# Patient Record
Sex: FEMALE | Race: WHITE | Employment: OTHER | URBAN - NONMETROPOLITAN AREA
[De-identification: names, ages, dates, MRNs, and addresses within clinical notes are randomized per-mention and may not be internally consistent; named-entity substitution may affect disease eponyms.]

---

## 2017-01-05 ENCOUNTER — TELEPHONE (OUTPATIENT)
Dept: CARDIOLOGY | Age: 62
End: 2017-01-05

## 2017-01-05 ENCOUNTER — OFFICE VISIT (OUTPATIENT)
Dept: CARDIOLOGY | Age: 62
End: 2017-01-05
Payer: COMMERCIAL

## 2017-01-05 VITALS — DIASTOLIC BLOOD PRESSURE: 62 MMHG | HEART RATE: 68 BPM | SYSTOLIC BLOOD PRESSURE: 118 MMHG

## 2017-01-05 DIAGNOSIS — I48.92 ATRIAL FLUTTER, PAROXYSMAL (HCC): Primary | ICD-10-CM

## 2017-01-05 PROCEDURE — 93000 ELECTROCARDIOGRAM COMPLETE: CPT | Performed by: CLINICAL NURSE SPECIALIST

## 2017-01-31 ENCOUNTER — TELEPHONE (OUTPATIENT)
Dept: NEUROLOGY | Age: 62
End: 2017-01-31

## 2017-01-31 ENCOUNTER — OFFICE VISIT (OUTPATIENT)
Dept: NEUROLOGY | Age: 62
End: 2017-01-31
Payer: COMMERCIAL

## 2017-01-31 VITALS
HEIGHT: 67 IN | SYSTOLIC BLOOD PRESSURE: 110 MMHG | HEART RATE: 72 BPM | WEIGHT: 293 LBS | DIASTOLIC BLOOD PRESSURE: 80 MMHG | RESPIRATION RATE: 16 BRPM | BODY MASS INDEX: 45.99 KG/M2

## 2017-01-31 DIAGNOSIS — R06.83 SNORING: ICD-10-CM

## 2017-01-31 DIAGNOSIS — G25.81 RESTLESS LEG SYNDROME: ICD-10-CM

## 2017-01-31 DIAGNOSIS — G47.00 INSOMNIA, UNSPECIFIED TYPE: ICD-10-CM

## 2017-01-31 DIAGNOSIS — G47.33 OBSTRUCTIVE SLEEP APNEA (ADULT) (PEDIATRIC): Primary | ICD-10-CM

## 2017-01-31 DIAGNOSIS — R40.0 SOMNOLENCE, DAYTIME: ICD-10-CM

## 2017-01-31 PROCEDURE — 99203 OFFICE O/P NEW LOW 30 MIN: CPT | Performed by: PHYSICIAN ASSISTANT

## 2017-01-31 RX ORDER — GABAPENTIN 600 MG/1
600 TABLET ORAL 2 TIMES DAILY
COMMUNITY
End: 2017-04-27 | Stop reason: ALTCHOICE

## 2017-01-31 RX ORDER — FLUCONAZOLE 150 MG/1
150 TABLET ORAL PRN
COMMUNITY
End: 2017-11-15

## 2017-02-14 ENCOUNTER — HOSPITAL ENCOUNTER (OUTPATIENT)
Dept: GENERAL RADIOLOGY | Facility: HOSPITAL | Age: 62
Discharge: HOME | End: 2017-02-14
Admitting: SPECIALIST

## 2017-02-14 ENCOUNTER — APPOINTMENT (OUTPATIENT)
Dept: PREADMISSION TESTING | Facility: HOSPITAL | Age: 62
End: 2017-02-14

## 2017-02-14 VITALS
SYSTOLIC BLOOD PRESSURE: 122 MMHG | DIASTOLIC BLOOD PRESSURE: 66 MMHG | RESPIRATION RATE: 18 BRPM | OXYGEN SATURATION: 96 % | HEART RATE: 69 BPM | WEIGHT: 293 LBS | HEIGHT: 66 IN | BODY MASS INDEX: 47.09 KG/M2

## 2017-02-14 LAB
ANION GAP SERPL CALCULATED.3IONS-SCNC: 6 MMOL/L (ref 4–13)
BUN BLD-MCNC: 17 MG/DL (ref 5–21)
BUN/CREAT SERPL: 18.1 (ref 7–25)
CALCIUM SPEC-SCNC: 8.9 MG/DL (ref 8.4–10.4)
CHLORIDE SERPL-SCNC: 103 MMOL/L (ref 98–110)
CO2 SERPL-SCNC: 31 MMOL/L (ref 24–31)
CREAT BLD-MCNC: 0.94 MG/DL (ref 0.5–1.4)
DEPRECATED RDW RBC AUTO: 54.7 FL (ref 40–54)
ERYTHROCYTE [DISTWIDTH] IN BLOOD BY AUTOMATED COUNT: 16.6 % (ref 12–15)
GFR SERPL CREATININE-BSD FRML MDRD: 60 ML/MIN/1.73
GLUCOSE BLD-MCNC: 119 MG/DL (ref 70–100)
HCT VFR BLD AUTO: 40.4 % (ref 37–47)
HGB BLD-MCNC: 12.5 G/DL (ref 12–16)
MCH RBC QN AUTO: 28 PG (ref 28–32)
MCHC RBC AUTO-ENTMCNC: 30.9 G/DL (ref 33–36)
MCV RBC AUTO: 90.6 FL (ref 82–98)
PLATELET # BLD AUTO: 252 10*3/MM3 (ref 130–400)
PMV BLD AUTO: 11.6 FL (ref 6–12)
POTASSIUM BLD-SCNC: 4.1 MMOL/L (ref 3.5–5.3)
RBC # BLD AUTO: 4.46 10*6/MM3 (ref 4.2–5.4)
SODIUM BLD-SCNC: 140 MMOL/L (ref 135–145)
WBC NRBC COR # BLD: 8.15 10*3/MM3 (ref 4.8–10.8)

## 2017-02-14 PROCEDURE — 93010 ELECTROCARDIOGRAM REPORT: CPT | Performed by: INTERNAL MEDICINE

## 2017-02-14 RX ORDER — ROPINIROLE 3 MG/1
6 TABLET, FILM COATED ORAL 2 TIMES DAILY
COMMUNITY
End: 2021-10-14

## 2017-02-14 RX ORDER — TRIAMCINOLONE ACETONIDE 1 MG/G
CREAM TOPICAL 2 TIMES DAILY
COMMUNITY
End: 2019-01-04 | Stop reason: ALTCHOICE

## 2017-02-14 RX ORDER — FLECAINIDE ACETATE 100 MG/1
100 TABLET ORAL 2 TIMES DAILY
COMMUNITY
End: 2019-01-04 | Stop reason: SDUPTHER

## 2017-02-14 RX ORDER — FAMCICLOVIR 500 MG/1
500 TABLET ORAL 2 TIMES DAILY
COMMUNITY
End: 2019-10-11

## 2017-02-14 RX ORDER — LEVOTHYROXINE SODIUM 137 UG/1
137 TABLET ORAL EVERY MORNING
COMMUNITY
End: 2021-08-25 | Stop reason: SDUPTHER

## 2017-02-14 RX ORDER — FLUCONAZOLE 150 MG/1
150 TABLET ORAL ONCE
Status: ON HOLD | COMMUNITY
End: 2018-03-13

## 2017-02-14 RX ORDER — VENLAFAXINE HYDROCHLORIDE 150 MG/1
75 CAPSULE, EXTENDED RELEASE ORAL DAILY
Status: ON HOLD | COMMUNITY
End: 2018-11-23

## 2017-02-14 RX ORDER — PANTOPRAZOLE SODIUM 40 MG/1
40 TABLET, DELAYED RELEASE ORAL 2 TIMES DAILY
COMMUNITY
End: 2018-10-03

## 2017-02-14 RX ORDER — ASPIRIN 325 MG
1 TABLET ORAL DAILY
COMMUNITY
End: 2018-03-19 | Stop reason: HOSPADM

## 2017-02-14 RX ORDER — LORAZEPAM 1 MG/1
1 TABLET ORAL 2 TIMES DAILY
COMMUNITY
End: 2021-04-23 | Stop reason: SDUPTHER

## 2017-02-14 RX ORDER — ALENDRONATE SODIUM 70 MG/1
1 TABLET ORAL
Status: ON HOLD | COMMUNITY
End: 2018-03-13

## 2017-02-14 RX ORDER — GABAPENTIN 600 MG/1
800 TABLET ORAL 3 TIMES DAILY
Status: ON HOLD | COMMUNITY
End: 2019-01-25

## 2017-02-14 RX ORDER — DULOXETIN HYDROCHLORIDE 30 MG/1
60 CAPSULE, DELAYED RELEASE ORAL 2 TIMES DAILY
Status: ON HOLD | COMMUNITY
End: 2019-01-25

## 2017-02-14 NOTE — DISCHARGE INSTRUCTIONS
DAY OF SURGERY INSTRUCTIONS        YOUR SURGEON: April SIMEON    PROCEDURE: PILONIDAL CYSTECTOMY AND SUBACEOUS CYST REMOVAL    DATE OF SURGERY: 2/16/2017    ARRIVAL TIME: AS DIRECTED BY OFFICE    DAY OF SURGERY TAKE ONLY THESE MEDICATIONS: TAKE METOPROLOL MORNING OF SURGERY WITH A SIP OF WATER.        BEFORE YOU COME TO THE HOSPITAL  (Pre-op instructions)  • Do not eat, drink, smoke or chew gum after midnight the night before surgery.  This also includes no mints.  • Morning of surgery take only the medicines you have been instructed with a sip of water unless otherwise instructed  by your physician.  • Do not shave, wear makeup or dark nail polish.  • Remove all jewelry including rings.  • Leave anything you consider valuable at home.  • Leave your suitcase in the car until after your surgery.  • Bring the following with you if applicable:  o Picture ID and insurance, Medicare or Medicaid cards  o Co-pay/deductible required by insurance (cash, check, credit card)  o Medications (no narcotics) in original bottles (not a list) including all over-the-counter medications.  o Copy of advance directive, living will or power-of- documents if not brought to PAT  o CPAP or BIPAP mask and tubing  o Skin prep instruction sheet  o Relaxation aids (MP3 player, book, magazine)  • Confirm your arrival time with you surgeon the day before your surgery (surgery times are subject to change)  • On the day of surgery check in at registration located at the main entrance of the hospital.       Outpatient Surgery Guidelines, Adult  Outpatient procedures are those for which the person having the procedure is allowed to go home the same day as the procedure. Various procedures are done on an outpatient basis. You should follow some general guidelines if you will be having an outpatient procedure.  LET YOUR HEALTH CARE PROVIDER KNOW ABOUT:  · Any allergies you have.  · All medicines you are taking, including vitamins, herbs, eye  drops, creams, and over-the-counter medicines.  · Previous problems you or members of your family have had with the use of anesthetics.  · Any blood disorders you have.  · Previous surgeries you have had.  · Medical conditions you have.  RISKS AND COMPLICATIONS  Your health care provider will discuss possible risks and complications with you before surgery. Common risks and complications include:    · Problems due to the use of anesthetics.  · Blood loss and replacement (does not apply to minor surgical procedures).  · Temporary increase in pain due to surgery.  · Uncorrected pain or problems that the surgery was meant to correct.  · Infection.  · New damage.  BEFORE THE PROCEDURE  · Ask your health care provider about changing or stopping your regular medicines. You may need to stop taking certain medicines in the days or weeks before the procedure.  · Stop smoking at least 2 weeks before surgery. This lowers your risk for complications during and after surgery. Ask your health care provider for help with this if needed.  · Eat your usual meals and a light supper the day before surgery. Continue fluid intake. Do not drink alcohol.  · Do not eat or drink after midnight the night before your surgery.   · Arrange for someone to take you home and to stay with you for 24 hours after the procedure. Medicine given for your procedure may affect your ability to drive or to care for yourself.  · Call your health care provider's office if you develop an illness or problem that may prevent you from safely having your procedure.  AFTER THE PROCEDURE  After surgery, you will be taken to a recovery area, where your progress will be monitored. If there are no complications, you will be allowed to go home when you are awake, stable, and taking fluids well. You may have numbness around the surgical site. Healing will take some time. You will have tenderness at the surgical site and may have some swelling and bruising. You may also  have some nausea.  HOME CARE INSTRUCTIONS  · Do not drive for 24 hours, or as directed by your health care provider. Do not drive while taking prescription pain medicines.  · Do not drink alcohol for 24 hours.  · Do not make important decisions or sign legal documents for 24 hours.  · You may resume a normal diet and activities as directed.  · Do not lift anything heavier than 10 pounds (4.5 kg) or play contact sports until your health care provider says it is okay.  · Change your bandages (dressings) as directed.  · Only take over-the-counter or prescription medicines as directed by your health care provider.  · Follow up with your health care provider as directed.  SEEK MEDICAL CARE IF:  · You have increased bleeding (more than a small spot) from the surgical site.  · You have redness, swelling, or increasing pain in the wound.  · You see pus coming from the wound.  · You have a fever.  · You notice a bad smell coming from the wound or dressing.  · You feel lightheaded or faint.  · You develop a rash.  · You have trouble breathing.  · You develop allergies.  MAKE SURE YOU:  · Understand these instructions.  · Will watch your condition.  · Will get help right away if you are not doing well or get worse.     This information is not intended to replace advice given to you by your health care provider. Make sure you discuss any questions you have with your health care provider.     Document Released: 09/12/2002 Document Revised: 05/03/2016 Document Reviewed: 05/22/2014  [employer] Interactive Patient Education ©2016 [employer]       Fall Prevention in Hospitals, Adult  As a hospital patient, your condition and the treatments you receive can increase your risk for falls. Some additional risk factors for falls in a hospital include:  · Being in an unfamiliar environment.  · Being on bed rest.  · Your surgery.  · Taking certain medicines.  · Your tubing requirements, such as intravenous (IV) therapy or catheters.  It is  important that you learn how to decrease fall risks while at the hospital. Below are important tips that can help prevent falls.  SAFETY TIPS FOR PREVENTING FALLS  Talk about your risk of falling.  · Ask your health care provider why you are at risk for falling. Is it your medicine, illness, tubing placement, or something else?  · Make a plan with your health care provider to keep you safe from falls.  · Ask your health care provider or pharmacist about side effects of your medicines. Some medicines can make you dizzy or affect your coordination.  Ask for help.  · Ask for help before getting out of bed. You may need to press your call button.  · Ask for assistance in getting safely to the toilet.  · Ask for a walker or cane to be put at your bedside. Ask that most of the side rails on your bed be placed up before your health care provider leaves the room.  · Ask family or friends to sit with you.  · Ask for things that are out of your reach, such as your glasses, hearing aids, telephone, bedside table, or call button.  Follow these tips to avoid falling:  · Stay lying or seated, rather than standing, while waiting for help.  · Wear rubber-soled slippers or shoes whenever you walk in the hospital.  · Avoid quick, sudden movements.  ¨ Change positions slowly.  ¨ Sit on the side of your bed before standing.  ¨ Stand up slowly and wait before you start to walk.  · Let your health care provider know if there is a spill on the floor.  · Pay careful attention to the medical equipment, electrical cords, and tubes around you.  · When you need help, use your call button by your bed or in the bathroom. Wait for one of your health care providers to help you.  · If you feel dizzy or unsure of your footing, return to bed and wait for assistance.  · Avoid being distracted by the TV, telephone, or another person in your room.  · Do not lean or support yourself on rolling objects, such as IV poles or bedside tables.     This  information is not intended to replace advice given to you by your health care provider. Make sure you discuss any questions you have with your health care provider.     Document Released: 12/15/2001 Document Revised: 01/08/2016 Document Reviewed: 08/25/2013  [employer] Interactive Patient Education ©2016 [employer].       Surgical Site Infections FAQs  What is a Surgical Site Infection (SSI)?  A surgical site infection is an infection that occurs after surgery in the part of the body where the surgery took place. Most patients who have surgery do not develop an infection. However, infections develop in about 1 to 3 out of every 100 patients who have surgery.  Some of the common symptoms of a surgical site infection are:  · Redness and pain around the area where you had surgery  · Drainage of cloudy fluid from your surgical wound  · Fever  Can SSIs be treated?  Yes. Most surgical site infections can be treated with antibiotics. The antibiotic given to you depends on the bacteria (germs) causing the infection. Sometimes patients with SSIs also need another surgery to treat the infection.  What are some of the things that hospitals are doing to prevent SSIs?  To prevent SSIs, doctors, nurses, and other healthcare providers:  · Clean their hands and arms up to their elbows with an antiseptic agent just before the surgery.  · Clean their hands with soap and water or an alcohol-based hand rub before and after caring for each patient.  · May remove some of your hair immediately before your surgery using electric clippers if the hair is in the same area where the procedure will occur. They should not shave you with a razor.  · Wear special hair covers, masks, gowns, and gloves during surgery to keep the surgery area clean.  · Give you antibiotics before your surgery starts. In most cases, you should get antibiotics within 60 minutes before the surgery starts and the antibiotics should be stopped within 24 hours after  surgery.  · Clean the skin at the site of your surgery with a special soap that kills germs.  What can I do to help prevent SSIs?  Before your surgery:  · Tell your doctor about other medical problems you may have. Health problems such as allergies, diabetes, and obesity could affect your surgery and your treatment.  · Quit smoking. Patients who smoke get more infections. Talk to your doctor about how you can quit before your surgery.  · Do not shave near where you will have surgery. Shaving with a razor can irritate your skin and make it easier to develop an infection.  At the time of your surgery:  · Speak up if someone tries to shave you with a razor before surgery. Ask why you need to be shaved and talk with your surgeon if you have any concerns.  · Ask if you will get antibiotics before surgery.  After your surgery:  · Make sure that your healthcare providers clean their hands before examining you, either with soap and water or an alcohol-based hand rub.  · If you do not see your providers clean their hands, please ask them to do so.  · Family and friends who visit you should not touch the surgical wound or dressings.  · Family and friends should clean their hands with soap and water or an alcohol-based hand rub before and after visiting you. If you do not see them clean their hands, ask them to clean their hands.  What do I need to do when I go home from the hospital?  · Before you go home, your doctor or nurse should explain everything you need to know about taking care of your wound. Make sure you understand how to care for your wound before you leave the hospital.  · Always clean your hands before and after caring for your wound.  · Before you go home, make sure you know who to contact if you have questions or problems after you get home.  · If you have any symptoms of an infection, such as redness and pain at the surgery site, drainage, or fever, call your doctor immediately.  If you have additional  questions, please ask your doctor or nurse.  Developed and co-sponsored by The Society for Healthcare Epidemiology of [place] (SHEA); Infectious Diseases Society of [place] (IDSA); American Hospital Association; Association for Professionals in Infection Control and Epidemiology (APIC); Centers for Disease Control and Prevention (CDC); and The Joint Commission.     This information is not intended to replace advice given to you by your health care provider. Make sure you discuss any questions you have with your health care provider.     Document Released: 12/23/2014 Document Revised: 01/08/2016 Document Reviewed: 03/02/2016  [employer] Interactive Patient Education ©2016 [employer].       [facility]  CHG 4% Patient Instruction Sheet    Preparing the Skin Before Surgery  Preparing or “prepping” skin before surgery can reduce the risk of infection at the surgical site. To make the process easier,[facility] has chosen 4% Chlorhexidine Gluconate (CHG) antiseptic solution.   The steps below outline the prepping process and should be carefully followed.                                                                                                                                                      Prep the skin at the following time(s):                                                      We recommend you shower the night before surgery, and again the morning of surgery with the 4% CHG antiseptic solution using half of the bottle and a cloth each time.  Dress in clean clothes/sleepwear after showering.  See instructions below for application.          Do not apply any lotions or moisturizers.       Do not shave the area to be prepped for at least 2 days prior to surgery.    Clipping the hair may be done immediately prior to your surgery at the hospital    if needed.    Directions:  Thoroughly rinse your body with water.  Apply 4% CHG to a cloth and wash skin gently, paying special attention to the operative site.   Rinse again thoroughly.  Once you have begun using this product do not apply anything else to your skin. If itching or redness persists, rinse affected areas and discontinue use.    When using this product:  • Keep out of eyes, ears, and mouth.  • If solution should contact these areas, rinse out promptly and thoroughly with water.  • For external use only.  • Do not use in genital area, on your face or head.      PATIENT/FAMILY/RESPONSIBLE PARTY VERBALIZES UNDERSTANDING OF ABOVE EDUCATION

## 2017-02-16 ENCOUNTER — ANESTHESIA EVENT (OUTPATIENT)
Dept: PERIOP | Facility: HOSPITAL | Age: 62
End: 2017-02-16

## 2017-02-16 ENCOUNTER — ANESTHESIA (OUTPATIENT)
Dept: PERIOP | Facility: HOSPITAL | Age: 62
End: 2017-02-16

## 2017-02-16 ENCOUNTER — HOSPITAL ENCOUNTER (OUTPATIENT)
Facility: HOSPITAL | Age: 62
Setting detail: HOSPITAL OUTPATIENT SURGERY
Discharge: HOME | End: 2017-02-16
Attending: SPECIALIST | Admitting: SPECIALIST

## 2017-02-16 VITALS
RESPIRATION RATE: 18 BRPM | SYSTOLIC BLOOD PRESSURE: 121 MMHG | HEART RATE: 71 BPM | OXYGEN SATURATION: 96 % | TEMPERATURE: 97.3 F | DIASTOLIC BLOOD PRESSURE: 52 MMHG

## 2017-02-16 DIAGNOSIS — L05.91 PILONIDAL CYST: ICD-10-CM

## 2017-02-16 DIAGNOSIS — L72.3 SEBACEOUS CYST: ICD-10-CM

## 2017-02-16 PROCEDURE — 25010000002 MIDAZOLAM PER 1 MG: Performed by: ANESTHESIOLOGY

## 2017-02-16 PROCEDURE — 25010000002 ONDANSETRON PER 1 MG: Performed by: NURSE ANESTHETIST, CERTIFIED REGISTERED

## 2017-02-16 PROCEDURE — 25010000002 DEXAMETHASONE PER 1 MG: Performed by: ANESTHESIOLOGY

## 2017-02-16 PROCEDURE — 25010000002 MORPHINE (PF) 10 MG/ML SOLUTION: Performed by: NURSE ANESTHETIST, CERTIFIED REGISTERED

## 2017-02-16 PROCEDURE — 25010000002 SUCCINYLCHOLINE PER 20 MG: Performed by: NURSE ANESTHETIST, CERTIFIED REGISTERED

## 2017-02-16 PROCEDURE — 25010000002 NEOSTIGMINE PER 0.5 MG: Performed by: NURSE ANESTHETIST, CERTIFIED REGISTERED

## 2017-02-16 PROCEDURE — 25010000002 PROPOFOL 10 MG/ML EMULSION: Performed by: NURSE ANESTHETIST, CERTIFIED REGISTERED

## 2017-02-16 PROCEDURE — 88304 TISSUE EXAM BY PATHOLOGIST: CPT | Performed by: SPECIALIST

## 2017-02-16 PROCEDURE — 25010000002 METOCLOPRAMIDE PER 10 MG: Performed by: ANESTHESIOLOGY

## 2017-02-16 PROCEDURE — 25010000002 FENTANYL CITRATE (PF) 250 MCG/5ML SOLUTION: Performed by: NURSE ANESTHETIST, CERTIFIED REGISTERED

## 2017-02-16 RX ORDER — SCOLOPAMINE TRANSDERMAL SYSTEM 1 MG/1
1 PATCH, EXTENDED RELEASE TRANSDERMAL ONCE
Status: DISCONTINUED | OUTPATIENT
Start: 2017-02-16 | End: 2017-02-16 | Stop reason: HOSPADM

## 2017-02-16 RX ORDER — IPRATROPIUM BROMIDE AND ALBUTEROL SULFATE 2.5; .5 MG/3ML; MG/3ML
3 SOLUTION RESPIRATORY (INHALATION) ONCE AS NEEDED
Status: COMPLETED | OUTPATIENT
Start: 2017-02-16 | End: 2017-02-16

## 2017-02-16 RX ORDER — LABETALOL HYDROCHLORIDE 5 MG/ML
5 INJECTION, SOLUTION INTRAVENOUS
Status: DISCONTINUED | OUTPATIENT
Start: 2017-02-16 | End: 2017-02-16 | Stop reason: HOSPADM

## 2017-02-16 RX ORDER — SODIUM CHLORIDE 0.9 % (FLUSH) 0.9 %
1-10 SYRINGE (ML) INJECTION AS NEEDED
Status: DISCONTINUED | OUTPATIENT
Start: 2017-02-16 | End: 2017-02-16 | Stop reason: HOSPADM

## 2017-02-16 RX ORDER — BUPIVACAINE HYDROCHLORIDE AND EPINEPHRINE 2.5; 5 MG/ML; UG/ML
INJECTION, SOLUTION INFILTRATION; PERINEURAL AS NEEDED
Status: DISCONTINUED | OUTPATIENT
Start: 2017-02-16 | End: 2017-02-16 | Stop reason: HOSPADM

## 2017-02-16 RX ORDER — FLUMAZENIL 0.1 MG/ML
0.2 INJECTION INTRAVENOUS AS NEEDED
Status: DISCONTINUED | OUTPATIENT
Start: 2017-02-16 | End: 2017-02-16 | Stop reason: HOSPADM

## 2017-02-16 RX ORDER — SUCCINYLCHOLINE CHLORIDE 20 MG/ML
INJECTION INTRAMUSCULAR; INTRAVENOUS AS NEEDED
Status: DISCONTINUED | OUTPATIENT
Start: 2017-02-16 | End: 2017-02-16 | Stop reason: SURG

## 2017-02-16 RX ORDER — METOCLOPRAMIDE HYDROCHLORIDE 5 MG/ML
10 INJECTION INTRAMUSCULAR; INTRAVENOUS ONCE AS NEEDED
Status: COMPLETED | OUTPATIENT
Start: 2017-02-16 | End: 2017-02-16

## 2017-02-16 RX ORDER — DEXAMETHASONE SODIUM PHOSPHATE 4 MG/ML
4 INJECTION, SOLUTION INTRA-ARTICULAR; INTRALESIONAL; INTRAMUSCULAR; INTRAVENOUS; SOFT TISSUE ONCE AS NEEDED
Status: COMPLETED | OUTPATIENT
Start: 2017-02-16 | End: 2017-02-16

## 2017-02-16 RX ORDER — MIDAZOLAM HYDROCHLORIDE 1 MG/ML
1 INJECTION INTRAMUSCULAR; INTRAVENOUS
Status: DISCONTINUED | OUTPATIENT
Start: 2017-02-16 | End: 2017-02-16 | Stop reason: HOSPADM

## 2017-02-16 RX ORDER — MAGNESIUM HYDROXIDE 1200 MG/15ML
LIQUID ORAL AS NEEDED
Status: DISCONTINUED | OUTPATIENT
Start: 2017-02-16 | End: 2017-02-16 | Stop reason: HOSPADM

## 2017-02-16 RX ORDER — ONDANSETRON 2 MG/ML
INJECTION INTRAMUSCULAR; INTRAVENOUS AS NEEDED
Status: DISCONTINUED | OUTPATIENT
Start: 2017-02-16 | End: 2017-02-16 | Stop reason: SURG

## 2017-02-16 RX ORDER — HYDROCODONE BITARTRATE AND ACETAMINOPHEN 5; 325 MG/1; MG/1
1 TABLET ORAL EVERY 4 HOURS PRN
Status: DISCONTINUED | OUTPATIENT
Start: 2017-02-16 | End: 2017-02-16 | Stop reason: HOSPADM

## 2017-02-16 RX ORDER — FENTANYL CITRATE 50 UG/ML
25 INJECTION, SOLUTION INTRAMUSCULAR; INTRAVENOUS AS NEEDED
Status: DISCONTINUED | OUTPATIENT
Start: 2017-02-16 | End: 2017-02-16 | Stop reason: HOSPADM

## 2017-02-16 RX ORDER — MIDAZOLAM HYDROCHLORIDE 1 MG/ML
2 INJECTION INTRAMUSCULAR; INTRAVENOUS
Status: DISCONTINUED | OUTPATIENT
Start: 2017-02-16 | End: 2017-02-16 | Stop reason: HOSPADM

## 2017-02-16 RX ORDER — MORPHINE SULFATE 10 MG/ML
INJECTION, SOLUTION INTRAMUSCULAR; INTRAVENOUS AS NEEDED
Status: DISCONTINUED | OUTPATIENT
Start: 2017-02-16 | End: 2017-02-16 | Stop reason: SURG

## 2017-02-16 RX ORDER — MORPHINE SULFATE 2 MG/ML
2 INJECTION, SOLUTION INTRAMUSCULAR; INTRAVENOUS AS NEEDED
Status: DISCONTINUED | OUTPATIENT
Start: 2017-02-16 | End: 2017-02-16 | Stop reason: HOSPADM

## 2017-02-16 RX ORDER — LIDOCAINE HYDROCHLORIDE 20 MG/ML
INJECTION, SOLUTION INFILTRATION; PERINEURAL AS NEEDED
Status: DISCONTINUED | OUTPATIENT
Start: 2017-02-16 | End: 2017-02-16 | Stop reason: SURG

## 2017-02-16 RX ORDER — ROCURONIUM BROMIDE 10 MG/ML
INJECTION, SOLUTION INTRAVENOUS AS NEEDED
Status: DISCONTINUED | OUTPATIENT
Start: 2017-02-16 | End: 2017-02-16 | Stop reason: SURG

## 2017-02-16 RX ORDER — IPRATROPIUM BROMIDE AND ALBUTEROL SULFATE 2.5; .5 MG/3ML; MG/3ML
SOLUTION RESPIRATORY (INHALATION)
Status: COMPLETED
Start: 2017-02-16 | End: 2017-02-16

## 2017-02-16 RX ORDER — METOCLOPRAMIDE HYDROCHLORIDE 5 MG/ML
5 INJECTION INTRAMUSCULAR; INTRAVENOUS
Status: DISCONTINUED | OUTPATIENT
Start: 2017-02-16 | End: 2017-02-16 | Stop reason: HOSPADM

## 2017-02-16 RX ORDER — SODIUM CHLORIDE, SODIUM LACTATE, POTASSIUM CHLORIDE, CALCIUM CHLORIDE 600; 310; 30; 20 MG/100ML; MG/100ML; MG/100ML; MG/100ML
100 INJECTION, SOLUTION INTRAVENOUS CONTINUOUS
Status: DISCONTINUED | OUTPATIENT
Start: 2017-02-16 | End: 2017-02-16 | Stop reason: HOSPADM

## 2017-02-16 RX ORDER — HYDRALAZINE HYDROCHLORIDE 20 MG/ML
5 INJECTION INTRAMUSCULAR; INTRAVENOUS
Status: DISCONTINUED | OUTPATIENT
Start: 2017-02-16 | End: 2017-02-16 | Stop reason: HOSPADM

## 2017-02-16 RX ORDER — NALOXONE HCL 0.4 MG/ML
0.04 VIAL (ML) INJECTION AS NEEDED
Status: DISCONTINUED | OUTPATIENT
Start: 2017-02-16 | End: 2017-02-16 | Stop reason: HOSPADM

## 2017-02-16 RX ORDER — MEPERIDINE HYDROCHLORIDE 25 MG/ML
12.5 INJECTION INTRAMUSCULAR; INTRAVENOUS; SUBCUTANEOUS
Status: DISCONTINUED | OUTPATIENT
Start: 2017-02-16 | End: 2017-02-16 | Stop reason: HOSPADM

## 2017-02-16 RX ORDER — FENTANYL CITRATE 50 UG/ML
INJECTION, SOLUTION INTRAMUSCULAR; INTRAVENOUS AS NEEDED
Status: DISCONTINUED | OUTPATIENT
Start: 2017-02-16 | End: 2017-02-16 | Stop reason: SURG

## 2017-02-16 RX ORDER — PROPOFOL 10 MG/ML
VIAL (ML) INTRAVENOUS AS NEEDED
Status: DISCONTINUED | OUTPATIENT
Start: 2017-02-16 | End: 2017-02-16 | Stop reason: SURG

## 2017-02-16 RX ORDER — ONDANSETRON 2 MG/ML
4 INJECTION INTRAMUSCULAR; INTRAVENOUS AS NEEDED
Status: DISCONTINUED | OUTPATIENT
Start: 2017-02-16 | End: 2017-02-16 | Stop reason: HOSPADM

## 2017-02-16 RX ORDER — HYDROCODONE BITARTRATE AND ACETAMINOPHEN 5; 325 MG/1; MG/1
1-2 TABLET ORAL EVERY 4 HOURS PRN
Qty: 30 TABLET | Refills: 0 | Status: SHIPPED | OUTPATIENT
Start: 2017-02-16 | End: 2018-03-19 | Stop reason: HOSPADM

## 2017-02-16 RX ORDER — GLYCOPYRROLATE 0.2 MG/ML
INJECTION INTRAMUSCULAR; INTRAVENOUS AS NEEDED
Status: DISCONTINUED | OUTPATIENT
Start: 2017-02-16 | End: 2017-02-16 | Stop reason: SURG

## 2017-02-16 RX ADMIN — MIDAZOLAM HYDROCHLORIDE 1 MG: 1 INJECTION, SOLUTION INTRAMUSCULAR; INTRAVENOUS at 10:38

## 2017-02-16 RX ADMIN — SODIUM CHLORIDE, POTASSIUM CHLORIDE, SODIUM LACTATE AND CALCIUM CHLORIDE 100 ML/HR: 600; 310; 30; 20 INJECTION, SOLUTION INTRAVENOUS at 10:38

## 2017-02-16 RX ADMIN — CEFAZOLIN 1 G: 1 INJECTION, POWDER, FOR SOLUTION INTRAMUSCULAR; INTRAVENOUS; PARENTERAL at 12:07

## 2017-02-16 RX ADMIN — PROPOFOL 200 MG: 10 INJECTION, EMULSION INTRAVENOUS at 12:02

## 2017-02-16 RX ADMIN — ROCURONIUM BROMIDE 30 MG: 10 INJECTION INTRAVENOUS at 12:06

## 2017-02-16 RX ADMIN — ROCURONIUM BROMIDE 10 MG: 10 INJECTION INTRAVENOUS at 12:02

## 2017-02-16 RX ADMIN — Medication 4 MG: at 12:38

## 2017-02-16 RX ADMIN — SUCCINYLCHOLINE CHLORIDE 120 MG: 20 INJECTION, SOLUTION INTRAMUSCULAR; INTRAVENOUS at 12:02

## 2017-02-16 RX ADMIN — LIDOCAINE HYDROCHLORIDE 40 MG: 20 INJECTION, SOLUTION INFILTRATION; PERINEURAL at 12:02

## 2017-02-16 RX ADMIN — METOCLOPRAMIDE 10 MG: 5 INJECTION, SOLUTION INTRAMUSCULAR; INTRAVENOUS at 10:38

## 2017-02-16 RX ADMIN — LIDOCAINE HYDROCHLORIDE 0.5 ML: 10 INJECTION, SOLUTION EPIDURAL; INFILTRATION; INTRACAUDAL; PERINEURAL at 10:38

## 2017-02-16 RX ADMIN — GLYCOPYRROLATE 0.4 MG: 0.2 INJECTION, SOLUTION INTRAMUSCULAR; INTRAVENOUS at 12:38

## 2017-02-16 RX ADMIN — IPRATROPIUM BROMIDE AND ALBUTEROL SULFATE 3 ML: .5; 3 SOLUTION RESPIRATORY (INHALATION) at 12:58

## 2017-02-16 RX ADMIN — DEXAMETHASONE SODIUM PHOSPHATE 4 MG: 4 INJECTION, SOLUTION INTRA-ARTICULAR; INTRALESIONAL; INTRAMUSCULAR; INTRAVENOUS; SOFT TISSUE at 10:38

## 2017-02-16 RX ADMIN — FENTANYL CITRATE 100 MCG: 50 INJECTION INTRAMUSCULAR; INTRAVENOUS at 12:02

## 2017-02-16 RX ADMIN — ONDANSETRON HYDROCHLORIDE 4 MG: 2 SOLUTION INTRAMUSCULAR; INTRAVENOUS at 12:38

## 2017-02-16 RX ADMIN — MORPHINE SULFATE 10 MG: 10 INJECTION, SOLUTION INTRAMUSCULAR; INTRAVENOUS at 12:26

## 2017-02-16 RX ADMIN — SCOPALAMINE 1 PATCH: 1 PATCH, EXTENDED RELEASE TRANSDERMAL at 10:38

## 2017-02-16 RX ADMIN — IPRATROPIUM BROMIDE AND ALBUTEROL SULFATE 3 ML: 2.5; .5 SOLUTION RESPIRATORY (INHALATION) at 12:58

## 2017-02-16 NOTE — PLAN OF CARE
Problem: Patient Care Overview (Adult)  Goal: Plan of Care Review  Outcome: Outcome(s) achieved Date Met:  02/16/17 02/16/17 1605   Coping/Psychosocial Response Interventions   Plan Of Care Reviewed With patient;spouse   Patient Care Overview   Progress improving         Problem: Perioperative Period (Adult)  Goal: Signs and Symptoms of Listed Potential Problems Will be Absent or Manageable (Perioperative Period)  Outcome: Outcome(s) achieved Date Met:  02/16/17 02/16/17 1605   Perioperative Period   Problems Assessed (Perioperative Period) all   Problems Present (Perioperative Period) none

## 2017-02-16 NOTE — DISCHARGE INSTRUCTIONS

## 2017-02-16 NOTE — ANESTHESIA PROCEDURE NOTES
Airway  Urgency: elective    Airway not difficult    General Information and Staff    Patient location during procedure: OR  CRNA: MADIE DANG    Indications and Patient Condition  Indications for airway management: airway protection    Preoxygenated: yes  MILS not maintained throughout  Mask difficulty assessment: 1 - vent by mask    Final Airway Details  Final airway type: endotracheal airway      Successful airway: ETT  Cuffed: yes   Successful intubation technique: direct laryngoscopy  Facilitating devices/methods: intubating stylet  Blade: Millan  Blade size: #2  ETT size: 7.5 mm  Cormack-Lehane Classification: grade IIa - partial view of glottis  Placement verified by: chest auscultation and capnometry   Measured from: lips  ETT to lips (cm): 22  Number of attempts at approach: 1

## 2017-02-16 NOTE — ANESTHESIA POSTPROCEDURE EVALUATION
Patient: Danisha Cannon    Procedure Summary     Date Anesthesia Start Anesthesia Stop Room / Location    02/16/17 1156 1300 [facility] OR 06 / [facility]       Procedure Diagnosis Surgeon Provider    PILONIDAL CYSTECTOMY, EXCISION SEBACEOUS CYST - BACK (N/A Back); EXCISION SEBACEOUS CYST - BACK (N/A Back) (PILONIDAL CYST, SEBACEOUS CYST - BACK) MD Anyi Murray CRNA          Anesthesia Type: general  Last vitals  /50 (02/16/17 1422)    Temp 97.3 °F (36.3 °C) (02/16/17 1407)    Pulse 60 (02/16/17 1422)   Resp 14 (02/16/17 1422)    SpO2 98 % (02/16/17 1422)      Post Anesthesia Care and Evaluation    Patient location during evaluation: bedside  Patient participation: complete - patient participated  Level of consciousness: awake and alert  Pain score: 0  Pain management: adequate  Airway patency: patent  Anesthetic complications: No anesthetic complications  PONV Status: noneRespiratory status: acceptable  Hydration status: acceptable

## 2017-02-16 NOTE — H&P
CHIEF COMPLAINT: Cyst on back and buttock.     HISTORY OF PRESENT ILLNESS: The patient is a 61-year-old lady who presents complaining of pain and drainage from her pudendal cleft region. She has noticed this for the last couple of weeks. She has been placed on antibiotics in the past with some relief. Additionally, she complains of a cyst in the center of her mid back and states that it flares, intermittently gets larger, it gets inflamed and then goes down.  She does state that it never completely resolves and the pain always still persist.     PAST MEDICAL HISTORY:  1.  Morbid obesity.   2.  Restless leg syndrome.    3.  Peripheral neuropathy.   4.  Atrial fibrillation.   5.  History of supraventricular tachycardia.    6.  Gastroesophageal reflux disease.    7.  Pylephlebitis.   8.  Osteoporosis.   9.  Depression, anxiety.   10.  Osteoarthritis.   11.  Hypothyroidism.   12.  Recurrent umbilical hernia.     PAST SURGICAL HISTORY:  1.  Laparoscopic cholecystectomy.    2.  Umbilical hernia repair x3 with mesh laparoscopically performed.    3.  Cataract extraction.   4.  Bunionectomy and hammertoe resection.    5.  Release of trigger finger and the thumb.     MEDICATIONS:   1.  Lorazepam.    2.  Ropinirole.  3.  Venlafaxine.    4.  Pantoprazole.    5.  Levothyroxine.    6.  Metoprolol.    7.  Flecainide.  8.  Gabapentin.    9.  Aspirin.   10.  Alendronate.  11.  Fluoxetine.    12.  Diclofenac.     ALLERGIES: CODEINE.     SOCIAL HISTORY: She denies tobacco, ethanol, or illicit drug use.      FAMILY HISTORY: Diabetes mellitus, coronary artery disease, and leukemia.     REVIEW OF SYSTEMS:  CONSTITUTIONAL: No weight changes, fevers, chills.   HEENT: No vision or hearing changes.   PULMONARY: Chronic cough. No recent exacerbation or shortness of breath.   CARDIOVASCULAR: No chest pain, palpitations or arrhythmias.   GASTROINTESTINAL: Heartburn. No hematemesis, bright red blood per rectum, melena, or hematochezia.    GENITOURINARY: No dysuria or hematuria.   ENDOCRINE: Hypothyroidism on supplementation.   PSYCHIATRIC: Anxiety and depression.   NEUROLOGIC: Numbness and tingling.   MUSCULOSKELETAL: _____ bones, painful joints, arthritis and chronic fatigue.   HEMATOLOGIC: History of DVT, easy bruising.   INTEGUMENT: No complaint.     PHYSICAL EXAMINATION:  VITAL SIGNS: She is 5 feet 10 inches tall, weighs 293 pounds. BMI is 45.9, temperature 98.6, blood pressure is 120/70, pulse 66.   GENERAL: The patient is a well-developed, well-nourished female in no acute distress.   HEENT: Normocephalic, atraumatic.   NECK: Supple.   PULMONARY: Clear to auscultation bilaterally.   CARDIOVASCULAR: Regular rate and rhythm.   GASTROINTESTINAL: The abdomen is soft. It is morbidly obese.  Incisional scars are present from laparoscopic hernia repair. I do demonstrate slight bulge just superior to the area of the umbilicus, but actual fascial defect I could not completely discern.  The patient denies any pain or GI symptoms or overlying skin changes.   EXTREMITIES: No pedal edema.   NEUROLOGICAL: Alert and oriented x3.  No gross sensory changes.   BACK: Mid back there is a 2 cm subcutaneous cyst, with overlying pit suggestive of epidermal inclusion cyst. There is a midline pit in the pudendal cleft with slight thickening to the left suggestive of a sinus tract.     ASSESSMENT:   1.  Epidermal inclusion cyst on back.   2.  Pilonidal cyst with tract.     PLAN: The patient does have a history of multiple recurrent umbilical hernia repairs. She is completely asymptomatic from this at this time, in that she has no abdominal pain, GI symptoms or overlying skin changes. Because of this, I would not suggest repair at this time.  I did however, suggest she that wear tightfitting undergarments. She does have a symptomatic epidermal inclusion cyst on her back as well as a symptomatic pilonidal cyst in tract.  She will present to [facility] on  Thursday, 02/16/2017 for excision of the cyst and pilonidal cystectomy. She does understand that the cystectomy will remain open, she will need to have dressing changes.  The risks, benefits, complications and possible alternatives of the above procedure were discussed with the patient who agreed to proceed.         cc:              Macrina [id number]  D:  02/15/2017 08:14:00(Eastern Time)  T:  02/15/2017 09:37:52(Eastern Time)  Voice ID:  [id number]/Document ID:  [id number]  (n)

## 2017-02-16 NOTE — ANESTHESIA PREPROCEDURE EVALUATION
Anesthesia Evaluation     Patient summary reviewed   history of anesthetic complications: PONV  NPO Status: > 8 hours   Airway   Mallampati: I  TM distance: >3 FB  Neck ROM: full  no difficulty expected  Dental - normal exam     Pulmonary - normal exam   (+) sleep apnea (Bipap with 02),   Cardiovascular - normal exam  Exercise tolerance: (Patient limited by weight, denies chest pain with activity)    ECG reviewed  Patient on routine beta blocker and Beta blocker given within 24 hours of surgery    (+) dysrhythmias Atrial Fib,       Neuro/Psych  (+) numbness (bilateral peripheral neuropathy),    GI/Hepatic/Renal/Endo    (+) morbid obesity, GERD, hypothyroidism,   (-) liver disease, renal disease, diabetes    Musculoskeletal (-) negative ROS    Abdominal    Substance History - negative use     OB/GYN negative ob/gyn ROS         Other - negative ROS                                   Anesthesia Plan    ASA 3     general     intravenous induction   Anesthetic plan and risks discussed with patient.

## 2017-02-17 LAB
CYTO UR: NORMAL
LAB AP CASE REPORT: NORMAL
LAB AP CLINICAL INFORMATION: NORMAL
Lab: NORMAL
PATH REPORT.FINAL DX SPEC: NORMAL
PATH REPORT.GROSS SPEC: NORMAL

## 2017-02-17 NOTE — OP NOTE
Date of Procedure:  02/16/2017     PREOPERATIVE DIAGNOSES:   1.  Epidermal inclusion cyst on back.   2.  Pilonidal cyst.     POSTOPERATIVE DIAGNOSES:  1.  Epidermal inclusion cyst on back.  2.  Pilonidal cyst.     PROCEDURES PERFORMED:   1.  Excision of epidermal inclusion cyst, 2 cm, on back.   2.  Excision of pilonidal cyst.     SURGEON: Macrina Capellan MD    ANESTHESIA: General endotracheal with local.     ESTIMATED BLOOD LOSS: Minimal.     IV FLUIDS: Please see Anesthesias notes.     INDICATIONS: The patient is an 61-year-old lady who presented complaining of a recurrent infected cyst on her back. Additionally, she complained of a pilonidal cyst that intermittently flares and does have a sinus tract. She presents for excision of both structures. The risks, benefits, complications, and possible alternatives of the above procedures were discussed with the patient who agreed to proceed.     DESCRIPTION OF PROCEDURE: The patient was taken to the operating room, laid supine on the operating room table. After general endotracheal anesthesia was obtained, she was placed in supine positioning. The back and pudendal cleft were prepped and draped in the usual sterile fashion. On examination, there was a cyst in the midportion of her upper back. An elliptical incision was created including the overlying enlarged pit or sinus or large pore. A 2 cm cyst was removed. The wound bed was then copiously irrigated with sterile saline and suctioned dry. Hemostasis was obtained with Bovie electrocautery. Deep dermis was then approximated with 3-0 Vicryl in a buried simple interrupted fashion. The skin was approximated with 4-0 Vicryl in a running subcuticular fashion. The wounds were then cleansed, dried. Mastisol, Steri-Strips, dry dressings, and Tegaderm were applied examination. On examination of the pudendal cleft, there was 1 pit. Elliptical incision was created. Bovie electrocautery was then used to enter the deep dermis and  subcutaneous tissues down to healthy adipose tissue. Wound bed was then copiously irrigated with sterile saline and suctioned dry. Deep dermis was then approximated with 3-0 Vicryl in a buried, simple interrupted fashion. The wound was then cleansed, dried, and dry dressings were applied. The patient was laid supine and awakened from general endotracheal anesthesia and transported to the postanesthesia unit in stable condition. The sponge, needle, and instrument counts were correct at the end of the case.     COMPLICATIONS: None.     DISPOSITION: Good, stable to PACU.     FINDINGS: A 2 cm epidermal inclusion cyst and 1 sinus pit.         cc:             Macrina [id number]  D:  02/16/2017 13:39:16(Eastern Time)  T:  02/16/2017 15:31:28(Eastern Time)  Voice ID:  [id number]/Document ID:  [id number]

## 2017-03-08 ENCOUNTER — HOSPITAL ENCOUNTER (OUTPATIENT)
Dept: PAIN MANAGEMENT | Age: 62
Discharge: HOME | End: 2017-03-08
Payer: COMMERCIAL

## 2017-03-08 VITALS
RESPIRATION RATE: 20 BRPM | OXYGEN SATURATION: 95 % | TEMPERATURE: 97 F | WEIGHT: 293 LBS | SYSTOLIC BLOOD PRESSURE: 148 MMHG | BODY MASS INDEX: 45.99 KG/M2 | HEIGHT: 67 IN | DIASTOLIC BLOOD PRESSURE: 76 MMHG | HEART RATE: 67 BPM

## 2017-03-08 DIAGNOSIS — M47.816 LUMBAR FACET ARTHROPATHY: ICD-10-CM

## 2017-03-08 DIAGNOSIS — M47.816 FACET SYNDROME, LUMBAR: ICD-10-CM

## 2017-03-08 PROCEDURE — 99213 OFFICE O/P EST LOW 20 MIN: CPT

## 2017-03-08 RX ORDER — HYDROCODONE BITARTRATE AND ACETAMINOPHEN 5; 325 MG/1; MG/1
1 TABLET ORAL EVERY 4 HOURS PRN
COMMUNITY
End: 2017-04-04

## 2017-03-08 ASSESSMENT — PAIN DESCRIPTION - DIRECTION: RADIATING_TOWARDS: DOES NOT RADIATE

## 2017-03-08 ASSESSMENT — PAIN DESCRIPTION - FREQUENCY: FREQUENCY: INTERMITTENT

## 2017-03-08 ASSESSMENT — PAIN SCALES - GENERAL: PAINLEVEL_OUTOF10: 8

## 2017-03-08 ASSESSMENT — PAIN DESCRIPTION - DESCRIPTORS: DESCRIPTORS: ACHING;THROBBING

## 2017-03-08 ASSESSMENT — PAIN DESCRIPTION - PAIN TYPE: TYPE: CHRONIC PAIN

## 2017-03-08 ASSESSMENT — PAIN DESCRIPTION - PROGRESSION: CLINICAL_PROGRESSION: NOT CHANGED

## 2017-03-08 ASSESSMENT — PAIN DESCRIPTION - ONSET: ONSET: ON-GOING

## 2017-03-08 ASSESSMENT — PAIN DESCRIPTION - LOCATION: LOCATION: BACK

## 2017-03-08 ASSESSMENT — PAIN DESCRIPTION - ORIENTATION: ORIENTATION: LOWER

## 2017-03-29 ENCOUNTER — HOSPITAL ENCOUNTER (OUTPATIENT)
Dept: SLEEP CENTER | Age: 62
Discharge: HOME | End: 2017-03-29
Payer: COMMERCIAL

## 2017-03-29 PROCEDURE — 95811 POLYSOM 6/>YRS CPAP 4/> PARM: CPT | Performed by: PSYCHIATRY & NEUROLOGY

## 2017-03-29 PROCEDURE — 95811 POLYSOM 6/>YRS CPAP 4/> PARM: CPT

## 2017-04-02 DIAGNOSIS — G47.33 OBSTRUCTIVE SLEEP APNEA: Primary | ICD-10-CM

## 2017-04-04 ENCOUNTER — OFFICE VISIT (OUTPATIENT)
Dept: NEUROLOGY | Age: 62
End: 2017-04-04
Payer: COMMERCIAL

## 2017-04-04 VITALS
BODY MASS INDEX: 45.99 KG/M2 | HEART RATE: 70 BPM | WEIGHT: 293 LBS | SYSTOLIC BLOOD PRESSURE: 120 MMHG | RESPIRATION RATE: 20 BRPM | DIASTOLIC BLOOD PRESSURE: 62 MMHG | HEIGHT: 67 IN

## 2017-04-04 DIAGNOSIS — F51.01 PRIMARY INSOMNIA: ICD-10-CM

## 2017-04-04 DIAGNOSIS — G25.81 RESTLESS LEGS SYNDROME: ICD-10-CM

## 2017-04-04 DIAGNOSIS — G47.33 SLEEP APNEA, OBSTRUCTIVE: Primary | ICD-10-CM

## 2017-04-04 DIAGNOSIS — G60.9 IDIOPATHIC PERIPHERAL NEUROPATHY: ICD-10-CM

## 2017-04-04 PROCEDURE — 99214 OFFICE O/P EST MOD 30 MIN: CPT | Performed by: PSYCHIATRY & NEUROLOGY

## 2017-04-04 RX ORDER — GABAPENTIN 600 MG/1
600 TABLET ORAL 3 TIMES DAILY
Qty: 90 TABLET | Refills: 3 | Status: SHIPPED | OUTPATIENT
Start: 2017-04-04 | End: 2017-08-07 | Stop reason: SDUPTHER

## 2017-04-04 RX ORDER — DULOXETIN HYDROCHLORIDE 60 MG/1
60 CAPSULE, DELAYED RELEASE ORAL DAILY
Qty: 30 CAPSULE | Refills: 3 | Status: SHIPPED | OUTPATIENT
Start: 2017-04-04 | End: 2017-08-15 | Stop reason: SDUPTHER

## 2017-04-04 RX ORDER — VENLAFAXINE HYDROCHLORIDE 75 MG/1
75 CAPSULE, EXTENDED RELEASE ORAL DAILY
Qty: 30 CAPSULE | Refills: 3 | Status: SHIPPED | OUTPATIENT
Start: 2017-04-04 | End: 2017-07-06

## 2017-04-04 RX ORDER — VENLAFAXINE HYDROCHLORIDE 150 MG/1
150 CAPSULE, EXTENDED RELEASE ORAL DAILY
COMMUNITY
End: 2017-04-27 | Stop reason: ALTCHOICE

## 2017-04-05 DIAGNOSIS — I48.92 ATRIAL FLUTTER, PAROXYSMAL (HCC): Primary | ICD-10-CM

## 2017-04-05 DIAGNOSIS — R00.0 HEART RATE FAST: ICD-10-CM

## 2017-04-12 ENCOUNTER — HOSPITAL ENCOUNTER (OUTPATIENT)
Dept: NEUROLOGY | Age: 62
Discharge: HOME | End: 2017-04-12
Payer: COMMERCIAL

## 2017-04-12 DIAGNOSIS — G60.9 IDIOPATHIC PERIPHERAL NEUROPATHY: ICD-10-CM

## 2017-04-12 LAB
FOLATE: 17.1 NG/ML (ref 4.8–37.3)
TSH SERPL DL<=0.05 MIU/L-ACNC: 0.31 UIU/ML (ref 0.27–4.2)
VITAMIN B-12: >2000 PG/ML (ref 211–946)

## 2017-04-12 PROCEDURE — 95886 MUSC TEST DONE W/N TEST COMP: CPT | Performed by: PSYCHIATRY & NEUROLOGY

## 2017-04-12 PROCEDURE — 95886 MUSC TEST DONE W/N TEST COMP: CPT

## 2017-04-12 PROCEDURE — 95911 NRV CNDJ TEST 9-10 STUDIES: CPT

## 2017-04-12 PROCEDURE — 95911 NRV CNDJ TEST 9-10 STUDIES: CPT | Performed by: PSYCHIATRY & NEUROLOGY

## 2017-04-14 LAB — ANA IGG, ELISA: NORMAL

## 2017-04-15 LAB
+IMM: NORMAL
ALBUMIN SERPL-MCNC: 3.69 G/DL (ref 3.75–5.01)
ALPHA-1-GLOBULIN: 0.32 G/DL (ref 0.19–0.46)
ALPHA-2-GLOBULIN: 0.81 G/DL (ref 0.48–1.05)
BETA GLOBULIN: 0.84 G/DL (ref 0.48–1.1)
GAMMA GLOBULIN: 0.85 G/DL (ref 0.62–1.51)
IGA: 212 MG/DL (ref 68–408)
IGG: 993 MG/DL (ref 768–1632)
IGM: 64 MG/DL (ref 35–263)
SPE/IFE INTERPRETATION: ABNORMAL
TOTAL PROTEIN: 6.5 G/DL (ref 6–8.3)

## 2017-04-20 ENCOUNTER — HOSPITAL ENCOUNTER (OUTPATIENT)
Dept: GENERAL RADIOLOGY | Age: 62
Discharge: HOME | End: 2017-04-20
Payer: COMMERCIAL

## 2017-04-20 DIAGNOSIS — I48.0 AF (PAROXYSMAL ATRIAL FIBRILLATION) (HCC): ICD-10-CM

## 2017-04-20 DIAGNOSIS — R07.89 OTHER CHEST PAIN: ICD-10-CM

## 2017-04-20 PROCEDURE — 71100 X-RAY EXAM RIBS UNI 2 VIEWS: CPT

## 2017-04-27 ENCOUNTER — OFFICE VISIT (OUTPATIENT)
Dept: CARDIOLOGY | Age: 62
End: 2017-04-27
Payer: COMMERCIAL

## 2017-04-27 VITALS
DIASTOLIC BLOOD PRESSURE: 78 MMHG | HEART RATE: 68 BPM | BODY MASS INDEX: 45.99 KG/M2 | WEIGHT: 293 LBS | HEIGHT: 67 IN | SYSTOLIC BLOOD PRESSURE: 138 MMHG

## 2017-04-27 DIAGNOSIS — I48.0 PAROXYSMAL A-FIB (HCC): Primary | ICD-10-CM

## 2017-04-27 PROCEDURE — 93000 ELECTROCARDIOGRAM COMPLETE: CPT | Performed by: INTERNAL MEDICINE

## 2017-04-27 PROCEDURE — 99214 OFFICE O/P EST MOD 30 MIN: CPT | Performed by: INTERNAL MEDICINE

## 2017-05-08 ENCOUNTER — HOSPITAL ENCOUNTER (OUTPATIENT)
Dept: MRI IMAGING | Age: 62
Discharge: HOME | End: 2017-05-08
Payer: COMMERCIAL

## 2017-05-08 DIAGNOSIS — S83.241S OTHER TEAR OF MEDIAL MENISCUS OF RIGHT KNEE, UNSPECIFIED WHETHER OLD OR CURRENT TEAR, SEQUELA: ICD-10-CM

## 2017-05-08 DIAGNOSIS — M25.461 EFFUSION OF RIGHT KNEE: ICD-10-CM

## 2017-05-08 PROCEDURE — 73721 MRI JNT OF LWR EXTRE W/O DYE: CPT

## 2017-05-09 ENCOUNTER — TELEPHONE (OUTPATIENT)
Dept: NEUROLOGY | Age: 62
End: 2017-05-09

## 2017-05-11 ENCOUNTER — OFFICE VISIT (OUTPATIENT)
Dept: NEUROLOGY | Age: 62
End: 2017-05-11
Payer: COMMERCIAL

## 2017-05-11 VITALS
WEIGHT: 292 LBS | SYSTOLIC BLOOD PRESSURE: 122 MMHG | HEIGHT: 67 IN | BODY MASS INDEX: 45.83 KG/M2 | DIASTOLIC BLOOD PRESSURE: 74 MMHG | RESPIRATION RATE: 22 BRPM | HEART RATE: 68 BPM

## 2017-05-11 DIAGNOSIS — G60.9 IDIOPATHIC PERIPHERAL NEUROPATHY: ICD-10-CM

## 2017-05-11 DIAGNOSIS — G25.81 RESTLESS LEGS SYNDROME: ICD-10-CM

## 2017-05-11 DIAGNOSIS — G47.33 OBSTRUCTIVE SLEEP APNEA: ICD-10-CM

## 2017-05-11 DIAGNOSIS — M47.26 OSTEOARTHRITIS OF SPINE WITH RADICULOPATHY, LUMBAR REGION: Primary | ICD-10-CM

## 2017-05-11 PROCEDURE — 99213 OFFICE O/P EST LOW 20 MIN: CPT | Performed by: PSYCHIATRY & NEUROLOGY

## 2017-06-08 DIAGNOSIS — I48.91 ATRIAL FIBRILLATION, UNSPECIFIED TYPE (HCC): ICD-10-CM

## 2017-06-08 RX ORDER — FLECAINIDE ACETATE 100 MG/1
TABLET ORAL
Qty: 180 TABLET | Refills: 0 | Status: SHIPPED | OUTPATIENT
Start: 2017-06-08 | End: 2017-09-22 | Stop reason: SDUPTHER

## 2017-06-19 ENCOUNTER — HOSPITAL ENCOUNTER (EMERGENCY)
Age: 62
Discharge: LEFT BEFORE DISCHARGE | End: 2017-06-19
Payer: COMMERCIAL

## 2017-06-19 VITALS
DIASTOLIC BLOOD PRESSURE: 76 MMHG | WEIGHT: 285 LBS | TEMPERATURE: 97.8 F | HEART RATE: 76 BPM | BODY MASS INDEX: 43.19 KG/M2 | SYSTOLIC BLOOD PRESSURE: 130 MMHG | HEIGHT: 68 IN | RESPIRATION RATE: 16 BRPM | OXYGEN SATURATION: 98 %

## 2017-06-19 PROCEDURE — 4500000002 HC ER NO CHARGE

## 2017-06-22 ENCOUNTER — HOSPITAL ENCOUNTER (OUTPATIENT)
Dept: PREADMISSION TESTING | Age: 62
Discharge: HOME | End: 2017-06-22
Payer: COMMERCIAL

## 2017-06-22 VITALS — BODY MASS INDEX: 45.04 KG/M2 | HEIGHT: 67 IN | WEIGHT: 287 LBS

## 2017-06-22 LAB
ANION GAP SERPL CALCULATED.3IONS-SCNC: 13 MMOL/L (ref 7–19)
APTT: 29.3 SEC (ref 26–36.2)
BASOPHILS ABSOLUTE: 0 K/UL (ref 0–0.2)
BASOPHILS RELATIVE PERCENT: 0.6 % (ref 0–1)
BUN BLDV-MCNC: 18 MG/DL (ref 8–23)
CALCIUM SERPL-MCNC: 9.1 MG/DL (ref 8.8–10.2)
CHLORIDE BLD-SCNC: 101 MMOL/L (ref 98–111)
CO2: 27 MMOL/L (ref 22–29)
CREAT SERPL-MCNC: 0.8 MG/DL (ref 0.5–0.9)
EOSINOPHILS ABSOLUTE: 0.2 K/UL (ref 0–0.6)
EOSINOPHILS RELATIVE PERCENT: 2.3 % (ref 0–5)
GFR NON-AFRICAN AMERICAN: >60
GLUCOSE BLD-MCNC: 90 MG/DL (ref 74–109)
HCT VFR BLD CALC: 39.6 % (ref 37–47)
HEMOGLOBIN: 12.2 G/DL (ref 12–16)
INR BLD: 1.04 (ref 0.88–1.18)
LYMPHOCYTES ABSOLUTE: 1.7 K/UL (ref 1.1–4.5)
LYMPHOCYTES RELATIVE PERCENT: 25.3 % (ref 20–40)
MCH RBC QN AUTO: 28.8 PG (ref 27–31)
MCHC RBC AUTO-ENTMCNC: 30.8 G/DL (ref 33–37)
MCV RBC AUTO: 93.4 FL (ref 81–99)
MONOCYTES ABSOLUTE: 0.7 K/UL (ref 0–0.9)
MONOCYTES RELATIVE PERCENT: 10.8 % (ref 0–10)
NEUTROPHILS ABSOLUTE: 4 K/UL (ref 1.5–7.5)
NEUTROPHILS RELATIVE PERCENT: 60.7 % (ref 50–65)
PDW BLD-RTO: 15.5 % (ref 11.5–14.5)
PLATELET # BLD: 242 K/UL (ref 130–400)
PMV BLD AUTO: 11.2 FL (ref 9.4–12.3)
POTASSIUM SERPL-SCNC: 4.2 MMOL/L (ref 3.5–5)
PROTHROMBIN TIME: 13.5 SEC (ref 12–14.6)
RBC # BLD: 4.24 M/UL (ref 4.2–5.4)
SODIUM BLD-SCNC: 141 MMOL/L (ref 136–145)
WBC # BLD: 6.7 K/UL (ref 4.8–10.8)

## 2017-06-22 PROCEDURE — 85610 PROTHROMBIN TIME: CPT

## 2017-06-22 PROCEDURE — 85025 COMPLETE CBC W/AUTO DIFF WBC: CPT

## 2017-06-22 PROCEDURE — 85730 THROMBOPLASTIN TIME PARTIAL: CPT

## 2017-06-22 PROCEDURE — 87070 CULTURE OTHR SPECIMN AEROBIC: CPT

## 2017-06-22 PROCEDURE — 80048 BASIC METABOLIC PNL TOTAL CA: CPT

## 2017-06-23 LAB — MRSA CULTURE ONLY: NORMAL

## 2017-06-23 RX ORDER — SULFAMETHOXAZOLE AND TRIMETHOPRIM 400; 80 MG/1; MG/1
1 TABLET ORAL 2 TIMES DAILY
COMMUNITY
End: 2017-10-02

## 2017-06-23 RX ORDER — DEXLANSOPRAZOLE 60 MG/1
60 CAPSULE, DELAYED RELEASE ORAL DAILY
COMMUNITY
End: 2017-11-27 | Stop reason: ALTCHOICE

## 2017-07-02 PROBLEM — S83.241A TEAR OF MEDIAL MENISCUS OF RIGHT KNEE, CURRENT: Status: ACTIVE | Noted: 2017-07-02

## 2017-07-03 ENCOUNTER — ANESTHESIA (OUTPATIENT)
Dept: OPERATING ROOM | Age: 62
End: 2017-07-03
Payer: COMMERCIAL

## 2017-07-03 ENCOUNTER — HOSPITAL ENCOUNTER (OUTPATIENT)
Age: 62
Setting detail: OUTPATIENT SURGERY
Discharge: HOME | End: 2017-07-03
Attending: ORTHOPAEDIC SURGERY | Admitting: ORTHOPAEDIC SURGERY
Payer: COMMERCIAL

## 2017-07-03 ENCOUNTER — ANESTHESIA EVENT (OUTPATIENT)
Dept: OPERATING ROOM | Age: 62
End: 2017-07-03
Payer: COMMERCIAL

## 2017-07-03 VITALS
HEIGHT: 67 IN | OXYGEN SATURATION: 100 % | WEIGHT: 287 LBS | BODY MASS INDEX: 45.04 KG/M2 | RESPIRATION RATE: 16 BRPM | SYSTOLIC BLOOD PRESSURE: 113 MMHG | TEMPERATURE: 98.3 F | HEART RATE: 53 BPM | DIASTOLIC BLOOD PRESSURE: 67 MMHG

## 2017-07-03 VITALS
OXYGEN SATURATION: 100 % | RESPIRATION RATE: 10 BRPM | TEMPERATURE: 98 F | SYSTOLIC BLOOD PRESSURE: 110 MMHG | DIASTOLIC BLOOD PRESSURE: 52 MMHG

## 2017-07-03 PROCEDURE — 2500000003 HC RX 250 WO HCPCS: Performed by: NURSE ANESTHETIST, CERTIFIED REGISTERED

## 2017-07-03 PROCEDURE — 6370000000 HC RX 637 (ALT 250 FOR IP): Performed by: ORTHOPAEDIC SURGERY

## 2017-07-03 PROCEDURE — 6360000002 HC RX W HCPCS: Performed by: NURSE ANESTHETIST, CERTIFIED REGISTERED

## 2017-07-03 PROCEDURE — 6360000002 HC RX W HCPCS: Performed by: ORTHOPAEDIC SURGERY

## 2017-07-03 PROCEDURE — 7100000000 HC PACU RECOVERY - FIRST 15 MIN: Performed by: ORTHOPAEDIC SURGERY

## 2017-07-03 PROCEDURE — 3700000000 HC ANESTHESIA ATTENDED CARE: Performed by: ORTHOPAEDIC SURGERY

## 2017-07-03 PROCEDURE — 3600000014 HC SURGERY LEVEL 4 ADDTL 15MIN: Performed by: ORTHOPAEDIC SURGERY

## 2017-07-03 PROCEDURE — 7100000001 HC PACU RECOVERY - ADDTL 15 MIN: Performed by: ORTHOPAEDIC SURGERY

## 2017-07-03 PROCEDURE — 3700000001 HC ADD 15 MINUTES (ANESTHESIA): Performed by: ORTHOPAEDIC SURGERY

## 2017-07-03 PROCEDURE — 2720000001 HC MISC SURG SUPPLY STERILE $51-500: Performed by: ORTHOPAEDIC SURGERY

## 2017-07-03 PROCEDURE — 7100000010 HC PHASE II RECOVERY - FIRST 15 MIN: Performed by: ORTHOPAEDIC SURGERY

## 2017-07-03 PROCEDURE — 3600000004 HC SURGERY LEVEL 4 BASE: Performed by: ORTHOPAEDIC SURGERY

## 2017-07-03 PROCEDURE — 2580000003 HC RX 258: Performed by: ANESTHESIOLOGY

## 2017-07-03 RX ORDER — SODIUM CHLORIDE, SODIUM LACTATE, POTASSIUM CHLORIDE, CALCIUM CHLORIDE 600; 310; 30; 20 MG/100ML; MG/100ML; MG/100ML; MG/100ML
INJECTION, SOLUTION INTRAVENOUS CONTINUOUS
Status: DISCONTINUED | OUTPATIENT
Start: 2017-07-03 | End: 2017-07-03 | Stop reason: HOSPADM

## 2017-07-03 RX ORDER — LIDOCAINE HYDROCHLORIDE 10 MG/ML
INJECTION, SOLUTION EPIDURAL; INFILTRATION; INTRACAUDAL; PERINEURAL PRN
Status: DISCONTINUED | OUTPATIENT
Start: 2017-07-03 | End: 2017-07-03 | Stop reason: SDUPTHER

## 2017-07-03 RX ORDER — MEPERIDINE HYDROCHLORIDE 50 MG/ML
12.5 INJECTION INTRAMUSCULAR; INTRAVENOUS; SUBCUTANEOUS EVERY 5 MIN PRN
Status: DISCONTINUED | OUTPATIENT
Start: 2017-07-03 | End: 2017-07-03 | Stop reason: HOSPADM

## 2017-07-03 RX ORDER — PROPOFOL 10 MG/ML
INJECTION, EMULSION INTRAVENOUS PRN
Status: DISCONTINUED | OUTPATIENT
Start: 2017-07-03 | End: 2017-07-03 | Stop reason: SDUPTHER

## 2017-07-03 RX ORDER — ONDANSETRON 2 MG/ML
4 INJECTION INTRAMUSCULAR; INTRAVENOUS
Status: COMPLETED | OUTPATIENT
Start: 2017-07-03 | End: 2017-07-03

## 2017-07-03 RX ORDER — METHYLPREDNISOLONE ACETATE 80 MG/ML
INJECTION, SUSPENSION INTRA-ARTICULAR; INTRALESIONAL; INTRAMUSCULAR; SOFT TISSUE PRN
Status: DISCONTINUED | OUTPATIENT
Start: 2017-07-03 | End: 2017-07-03 | Stop reason: HOSPADM

## 2017-07-03 RX ORDER — FENTANYL CITRATE 50 UG/ML
50 INJECTION, SOLUTION INTRAMUSCULAR; INTRAVENOUS
Status: DISCONTINUED | OUTPATIENT
Start: 2017-07-03 | End: 2017-07-03 | Stop reason: HOSPADM

## 2017-07-03 RX ORDER — MIDAZOLAM HYDROCHLORIDE 1 MG/ML
2 INJECTION INTRAMUSCULAR; INTRAVENOUS
Status: DISCONTINUED | OUTPATIENT
Start: 2017-07-03 | End: 2017-07-03 | Stop reason: HOSPADM

## 2017-07-03 RX ORDER — SODIUM CHLORIDE 0.9 % (FLUSH) 0.9 %
10 SYRINGE (ML) INJECTION PRN
Status: DISCONTINUED | OUTPATIENT
Start: 2017-07-03 | End: 2017-07-03 | Stop reason: HOSPADM

## 2017-07-03 RX ORDER — ONDANSETRON 4 MG/1
4 TABLET, FILM COATED ORAL DAILY PRN
Qty: 20 TABLET | Refills: 0 | Status: SHIPPED | OUTPATIENT
Start: 2017-07-03 | End: 2017-07-06

## 2017-07-03 RX ORDER — FENTANYL CITRATE 50 UG/ML
INJECTION, SOLUTION INTRAMUSCULAR; INTRAVENOUS PRN
Status: DISCONTINUED | OUTPATIENT
Start: 2017-07-03 | End: 2017-07-03 | Stop reason: SDUPTHER

## 2017-07-03 RX ORDER — LIDOCAINE HYDROCHLORIDE 10 MG/ML
1 INJECTION, SOLUTION EPIDURAL; INFILTRATION; INTRACAUDAL; PERINEURAL
Status: DISCONTINUED | OUTPATIENT
Start: 2017-07-03 | End: 2017-07-03 | Stop reason: HOSPADM

## 2017-07-03 RX ORDER — PROMETHAZINE HYDROCHLORIDE 25 MG/ML
6.25 INJECTION, SOLUTION INTRAMUSCULAR; INTRAVENOUS
Status: DISCONTINUED | OUTPATIENT
Start: 2017-07-03 | End: 2017-07-03 | Stop reason: HOSPADM

## 2017-07-03 RX ORDER — MIDAZOLAM HYDROCHLORIDE 1 MG/ML
INJECTION INTRAMUSCULAR; INTRAVENOUS PRN
Status: DISCONTINUED | OUTPATIENT
Start: 2017-07-03 | End: 2017-07-03 | Stop reason: SDUPTHER

## 2017-07-03 RX ORDER — MORPHINE SULFATE 4 MG/ML
1 INJECTION, SOLUTION INTRAMUSCULAR; INTRAVENOUS EVERY 5 MIN PRN
Status: DISCONTINUED | OUTPATIENT
Start: 2017-07-03 | End: 2017-07-03 | Stop reason: HOSPADM

## 2017-07-03 RX ORDER — DIPHENHYDRAMINE HYDROCHLORIDE 50 MG/ML
12.5 INJECTION INTRAMUSCULAR; INTRAVENOUS
Status: DISCONTINUED | OUTPATIENT
Start: 2017-07-03 | End: 2017-07-03 | Stop reason: HOSPADM

## 2017-07-03 RX ORDER — FENTANYL CITRATE 50 UG/ML
25 INJECTION, SOLUTION INTRAMUSCULAR; INTRAVENOUS
Status: DISCONTINUED | OUTPATIENT
Start: 2017-07-03 | End: 2017-07-03 | Stop reason: HOSPADM

## 2017-07-03 RX ORDER — IBUPROFEN 400 MG/1
400 TABLET ORAL EVERY 6 HOURS PRN
Status: DISCONTINUED | OUTPATIENT
Start: 2017-07-03 | End: 2017-07-03 | Stop reason: HOSPADM

## 2017-07-03 RX ORDER — ENALAPRILAT 2.5 MG/2ML
1.25 INJECTION INTRAVENOUS
Status: DISCONTINUED | OUTPATIENT
Start: 2017-07-03 | End: 2017-07-03 | Stop reason: HOSPADM

## 2017-07-03 RX ORDER — SODIUM CHLORIDE 0.9 % (FLUSH) 0.9 %
10 SYRINGE (ML) INJECTION EVERY 12 HOURS SCHEDULED
Status: DISCONTINUED | OUTPATIENT
Start: 2017-07-03 | End: 2017-07-03 | Stop reason: HOSPADM

## 2017-07-03 RX ORDER — HYDROCODONE BITARTRATE AND ACETAMINOPHEN 10; 325 MG/1; MG/1
TABLET ORAL
Qty: 40 TABLET | Refills: 0 | Status: SHIPPED | OUTPATIENT
Start: 2017-07-03 | End: 2017-07-06

## 2017-07-03 RX ORDER — LABETALOL HYDROCHLORIDE 5 MG/ML
5 INJECTION, SOLUTION INTRAVENOUS EVERY 10 MIN PRN
Status: DISCONTINUED | OUTPATIENT
Start: 2017-07-03 | End: 2017-07-03 | Stop reason: HOSPADM

## 2017-07-03 RX ORDER — DEXAMETHASONE SODIUM PHOSPHATE 10 MG/ML
INJECTION INTRAMUSCULAR; INTRAVENOUS PRN
Status: DISCONTINUED | OUTPATIENT
Start: 2017-07-03 | End: 2017-07-03 | Stop reason: SDUPTHER

## 2017-07-03 RX ORDER — HYDRALAZINE HYDROCHLORIDE 20 MG/ML
5 INJECTION INTRAMUSCULAR; INTRAVENOUS EVERY 10 MIN PRN
Status: DISCONTINUED | OUTPATIENT
Start: 2017-07-03 | End: 2017-07-03 | Stop reason: HOSPADM

## 2017-07-03 RX ORDER — TRAMADOL HYDROCHLORIDE 50 MG/1
50 TABLET ORAL ONCE
Status: COMPLETED | OUTPATIENT
Start: 2017-07-03 | End: 2017-07-03

## 2017-07-03 RX ORDER — MORPHINE SULFATE 4 MG/ML
2 INJECTION, SOLUTION INTRAMUSCULAR; INTRAVENOUS EVERY 5 MIN PRN
Status: DISCONTINUED | OUTPATIENT
Start: 2017-07-03 | End: 2017-07-03 | Stop reason: HOSPADM

## 2017-07-03 RX ORDER — ONDANSETRON 2 MG/ML
INJECTION INTRAMUSCULAR; INTRAVENOUS PRN
Status: DISCONTINUED | OUTPATIENT
Start: 2017-07-03 | End: 2017-07-03 | Stop reason: SDUPTHER

## 2017-07-03 RX ADMIN — HYDROMORPHONE HYDROCHLORIDE 0.5 MG: 1 INJECTION, SOLUTION INTRAMUSCULAR; INTRAVENOUS; SUBCUTANEOUS at 09:26

## 2017-07-03 RX ADMIN — DEXAMETHASONE SODIUM PHOSPHATE 8 MG: 10 INJECTION INTRAMUSCULAR; INTRAVENOUS at 09:05

## 2017-07-03 RX ADMIN — Medication 2 G: at 08:39

## 2017-07-03 RX ADMIN — ONDANSETRON 4 MG: 2 INJECTION INTRAMUSCULAR; INTRAVENOUS at 10:32

## 2017-07-03 RX ADMIN — LIDOCAINE HYDROCHLORIDE 50 MG: 10 INJECTION, SOLUTION EPIDURAL; INFILTRATION; INTRACAUDAL; PERINEURAL at 08:34

## 2017-07-03 RX ADMIN — IBUPROFEN 400 MG: 400 TABLET, FILM COATED ORAL at 10:32

## 2017-07-03 RX ADMIN — ONDANSETRON HYDROCHLORIDE 4 MG: 2 INJECTION, SOLUTION INTRAVENOUS at 09:05

## 2017-07-03 RX ADMIN — TRAMADOL HYDROCHLORIDE 50 MG: 50 TABLET, COATED ORAL at 10:32

## 2017-07-03 RX ADMIN — MIDAZOLAM HYDROCHLORIDE 2 MG: 1 INJECTION, SOLUTION INTRAMUSCULAR; INTRAVENOUS at 08:28

## 2017-07-03 RX ADMIN — PROPOFOL 130 MG: 10 INJECTION, EMULSION INTRAVENOUS at 08:34

## 2017-07-03 RX ADMIN — FENTANYL CITRATE 50 MCG: 50 INJECTION INTRAMUSCULAR; INTRAVENOUS at 08:46

## 2017-07-03 RX ADMIN — SODIUM CHLORIDE, SODIUM LACTATE, POTASSIUM CHLORIDE, AND CALCIUM CHLORIDE: 600; 310; 30; 20 INJECTION, SOLUTION INTRAVENOUS at 08:17

## 2017-07-03 ASSESSMENT — PAIN SCALES - GENERAL
PAINLEVEL_OUTOF10: 4
PAINLEVEL_OUTOF10: 5
PAINLEVEL_OUTOF10: 6
PAINLEVEL_OUTOF10: 6
PAINLEVEL_OUTOF10: 3

## 2017-07-03 ASSESSMENT — PAIN DESCRIPTION - ORIENTATION
ORIENTATION: RIGHT

## 2017-07-03 ASSESSMENT — PAIN DESCRIPTION - LOCATION
LOCATION: KNEE

## 2017-07-03 ASSESSMENT — PAIN DESCRIPTION - PAIN TYPE
TYPE: SURGICAL PAIN

## 2017-07-03 ASSESSMENT — PAIN DESCRIPTION - DESCRIPTORS
DESCRIPTORS: ACHING
DESCRIPTORS: ACHING

## 2017-07-06 ENCOUNTER — OFFICE VISIT (OUTPATIENT)
Dept: NEUROLOGY | Age: 62
End: 2017-07-06
Payer: COMMERCIAL

## 2017-07-06 VITALS
HEART RATE: 66 BPM | HEIGHT: 67 IN | SYSTOLIC BLOOD PRESSURE: 100 MMHG | BODY MASS INDEX: 45.2 KG/M2 | DIASTOLIC BLOOD PRESSURE: 62 MMHG | RESPIRATION RATE: 20 BRPM | WEIGHT: 288 LBS

## 2017-07-06 DIAGNOSIS — F51.01 PRIMARY INSOMNIA: ICD-10-CM

## 2017-07-06 DIAGNOSIS — G60.3 IDIOPATHIC PROGRESSIVE NEUROPATHY: ICD-10-CM

## 2017-07-06 DIAGNOSIS — G47.33 OBSTRUCTIVE SLEEP APNEA: Primary | ICD-10-CM

## 2017-07-06 DIAGNOSIS — M47.816 SPONDYLOSIS OF LUMBAR REGION WITHOUT MYELOPATHY OR RADICULOPATHY: ICD-10-CM

## 2017-07-06 PROCEDURE — 99213 OFFICE O/P EST LOW 20 MIN: CPT | Performed by: PSYCHIATRY & NEUROLOGY

## 2017-07-06 RX ORDER — TRAMADOL HYDROCHLORIDE 50 MG/1
50 TABLET ORAL
COMMUNITY
End: 2017-11-15

## 2017-07-06 RX ORDER — NORTRIPTYLINE HYDROCHLORIDE 25 MG/1
CAPSULE ORAL
Qty: 30 CAPSULE | Refills: 3 | Status: SHIPPED | OUTPATIENT
Start: 2017-07-06 | End: 2017-09-05 | Stop reason: SDUPTHER

## 2017-08-08 RX ORDER — GABAPENTIN 600 MG/1
TABLET ORAL
Qty: 90 TABLET | Refills: 5 | Status: SHIPPED | OUTPATIENT
Start: 2017-08-08 | End: 2018-03-12 | Stop reason: SDUPTHER

## 2017-09-01 DIAGNOSIS — R00.0 HEART RATE FAST: ICD-10-CM

## 2017-09-01 DIAGNOSIS — I48.92 ATRIAL FLUTTER, PAROXYSMAL (HCC): ICD-10-CM

## 2017-09-11 ENCOUNTER — HOSPITAL ENCOUNTER (OUTPATIENT)
Dept: PAIN MANAGEMENT | Age: 62
Discharge: HOME | End: 2017-09-11
Payer: COMMERCIAL

## 2017-09-11 VITALS
BODY MASS INDEX: 45.99 KG/M2 | OXYGEN SATURATION: 95 % | HEART RATE: 64 BPM | DIASTOLIC BLOOD PRESSURE: 63 MMHG | RESPIRATION RATE: 18 BRPM | WEIGHT: 293 LBS | TEMPERATURE: 96.9 F | SYSTOLIC BLOOD PRESSURE: 129 MMHG | HEIGHT: 67 IN

## 2017-09-11 DIAGNOSIS — M47.816 LUMBAR FACET ARTHROPATHY: ICD-10-CM

## 2017-09-11 DIAGNOSIS — M47.816 FACET SYNDROME, LUMBAR: ICD-10-CM

## 2017-09-11 PROCEDURE — 99214 OFFICE O/P EST MOD 30 MIN: CPT

## 2017-09-11 ASSESSMENT — PAIN DESCRIPTION - PAIN TYPE: TYPE: CHRONIC PAIN

## 2017-09-11 ASSESSMENT — PAIN DESCRIPTION - ORIENTATION: ORIENTATION: LOWER;RIGHT

## 2017-09-11 ASSESSMENT — PAIN SCALES - GENERAL: PAINLEVEL_OUTOF10: 2

## 2017-09-11 ASSESSMENT — PAIN DESCRIPTION - LOCATION: LOCATION: BACK;KNEE

## 2017-09-11 ASSESSMENT — ACTIVITIES OF DAILY LIVING (ADL): EFFECT OF PAIN ON DAILY ACTIVITIES: LIMITS ACTIVITY

## 2017-09-11 ASSESSMENT — PAIN DESCRIPTION - DESCRIPTORS: DESCRIPTORS: ACHING;THROBBING

## 2017-09-11 ASSESSMENT — PAIN DESCRIPTION - FREQUENCY: FREQUENCY: INTERMITTENT

## 2017-09-11 ASSESSMENT — PAIN DESCRIPTION - ONSET: ONSET: ON-GOING

## 2017-09-11 ASSESSMENT — PAIN DESCRIPTION - PROGRESSION: CLINICAL_PROGRESSION: NOT CHANGED

## 2017-09-22 DIAGNOSIS — I48.0 PAROXYSMAL A-FIB (HCC): Primary | ICD-10-CM

## 2017-09-25 RX ORDER — FLECAINIDE ACETATE 100 MG/1
TABLET ORAL
Qty: 180 TABLET | Refills: 3 | Status: SHIPPED | OUTPATIENT
Start: 2017-09-25

## 2017-09-27 ENCOUNTER — HOSPITAL ENCOUNTER (OUTPATIENT)
Dept: GENERAL RADIOLOGY | Age: 62
Discharge: HOME | End: 2017-09-27
Payer: COMMERCIAL

## 2017-09-27 DIAGNOSIS — M15.9 GENERALIZED OSTEOARTHROSIS, UNSPECIFIED SITE: ICD-10-CM

## 2017-09-27 DIAGNOSIS — M79.641 PAIN IN RIGHT HAND: ICD-10-CM

## 2017-09-27 PROCEDURE — 73130 X-RAY EXAM OF HAND: CPT

## 2017-10-02 ENCOUNTER — OFFICE VISIT (OUTPATIENT)
Dept: OBGYN | Age: 62
End: 2017-10-02
Payer: COMMERCIAL

## 2017-10-02 VITALS — BODY MASS INDEX: 45.04 KG/M2 | HEIGHT: 67 IN | WEIGHT: 287 LBS

## 2017-10-02 DIAGNOSIS — N81.10 VAGINAL PROLAPSE: ICD-10-CM

## 2017-10-02 DIAGNOSIS — Z01.419 ROUTINE GYNECOLOGICAL EXAMINATION: Primary | ICD-10-CM

## 2017-10-02 DIAGNOSIS — Z12.31 ENCOUNTER FOR SCREENING MAMMOGRAM FOR BREAST CANCER: ICD-10-CM

## 2017-10-02 DIAGNOSIS — Z12.4 SCREENING FOR CERVICAL CANCER: ICD-10-CM

## 2017-10-02 PROCEDURE — 99386 PREV VISIT NEW AGE 40-64: CPT | Performed by: NURSE PRACTITIONER

## 2017-10-02 RX ORDER — NYSTATIN AND TRIAMCINOLONE ACETONIDE 100000; 1 [USP'U]/G; MG/G
OINTMENT TOPICAL 2 TIMES DAILY
COMMUNITY
End: 2017-11-15

## 2017-10-02 ASSESSMENT — ENCOUNTER SYMPTOMS
GASTROINTESTINAL NEGATIVE: 1
RESPIRATORY NEGATIVE: 1
EYES NEGATIVE: 1

## 2017-10-02 NOTE — PATIENT INSTRUCTIONS
Well Visit, Women 48 to 72: Care Instructions  Your Care Instructions  Physical exams can help you stay healthy. Your doctor has checked your overall health and may have suggested ways to take good care of yourself. He or she also may have recommended tests. At home, you can help prevent illness with healthy eating, regular exercise, and other steps. Follow-up care is a key part of your treatment and safety. Be sure to make and go to all appointments, and call your doctor if you are having problems. It's also a good idea to know your test results and keep a list of the medicines you take. How can you care for yourself at home? · Reach and stay at a healthy weight. This will lower your risk for many problems, such as obesity, diabetes, heart disease, and high blood pressure. · Get at least 30 minutes of exercise on most days of the week. Walking is a good choice. You also may want to do other activities, such as running, swimming, cycling, or playing tennis or team sports. · Do not smoke. Smoking can make health problems worse. If you need help quitting, talk to your doctor about stop-smoking programs and medicines. These can increase your chances of quitting for good. · Protect your skin from too much sun. When you're outdoors from 10 a.m. to 4 p.m., stay in the shade or cover up with clothing and a hat with a wide brim. Wear sunglasses that block UV rays. Even when it's cloudy, put broad-spectrum sunscreen (SPF 30 or higher) on any exposed skin. · See a dentist one or two times a year for checkups and to have your teeth cleaned. · Wear a seat belt in the car. · Limit alcohol to 1 drink a day. Too much alcohol can cause health problems. Follow your doctor's advice about when to have certain tests. These tests can spot problems early. · Cholesterol.  Your doctor will tell you how often to have this done based on your age, family history, or other things that can increase your risk for heart attack and show what your risk for a heart attack or stroke is over the next 10 years. When should you call for help? Watch closely for changes in your health, and be sure to contact your doctor if you have any problems or symptoms that concern you. Where can you learn more? Go to https://chpeanushka.healthAllied [employer] Network. org and sign in to your Kinderm[employer] account. Enter D039 in the [employer] box to learn more about \"Well Visit, Women 50 to 72: Care Instructions. \"     If you do not have an account, please click on the \"Sign Up Now\" link. Current as of: July 19, 2016  Content Version: 11.3  © 2169-5719 [employer]. Care instructions adapted under license by [facility] ([facility]). If you have questions about a medical condition or this instruction, always ask your healthcare professional. [address] any warranty or liability for your use of this information.

## 2017-10-02 NOTE — PROGRESS NOTES
METATARSAL HEAD RESECTION performed by Quirino Boeck, DPM at [address]  1/12/2015    x3    AR KNEE SCOPE,DIAGNOSTIC Right 7/3/2017    KNEE ARTHROSCOPY PARTIAL MEDIAL MENISECTOMY performed by Mekhi Beaulieu MD at [place] [address] N/A 1/18/2016    HERNIA VENTRAL REPAIR LAPAROSCOPIC WITH MESH  performed by Lorena Ritchie MD at 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       flecainide (TAMBOCOR) 100 MG tablet TAKE 1 TABLET BY MOUTH TWICE DAILY 180 tablet 3    nortriptyline (PAMELOR) 25 MG capsule TAKE 1 TO 2 CAPSULES BY MOUTH AT BEDTIME 30 capsule 5    metoprolol tartrate (LOPRESSOR) 25 MG tablet TAKE 1/2 TABLET BY MOUTH TWICE DAILY 90 tablet 3    venlafaxine (EFFEXOR XR) 75 MG extended release capsule TAKE 1 CAPSULE BY MOUTH DAILY 30 capsule 5    DULoxetine (CYMBALTA) 60 MG extended release capsule TAKE 1 CAPSULE BY MOUTH DAILY 30 capsule 5    gabapentin (NEURONTIN) 600 MG tablet TAKE 1 TABLET BY MOUTH THREE TIMES DAILY 90 tablet 5    traMADol (ULTRAM) 50 MG tablet Take 50 mg by mouth      dexlansoprazole (DEXILANT) 60 MG CPDR delayed release capsule Take 60 mg by mouth daily Indications: GERD      diclofenac (VOLTAREN) 50 MG EC tablet Take 50 mg by mouth daily      fluconazole (DIFLUCAN) 150 MG tablet Take 150 mg by mouth as needed      aspirin 325 MG tablet Take 325 mg by mouth daily      LORazepam Genitourinary: Rectal exam shows no external hemorrhoid, no internal hemorrhoid, no fissure, no mass, no tenderness and guaiac negative stool. No breast swelling, tenderness, discharge or bleeding. There is no rash, tenderness or lesion on the right labia. There is no rash, tenderness or lesion on the left labia. Uterus is not deviated, not enlarged, not fixed and not tender. Cervix exhibits no motion tenderness, no discharge and no friability. Right adnexum displays no mass, no tenderness and no fullness. Left adnexum displays no mass, no tenderness and no fullness. No vaginal discharge found. Genitourinary Comments: External genitalia: labia and vagina are atrophic with some loss of normal anatomical architecture. Specimen for cervical cytology obtained. Vaginal prolapse noted, mild, grade 2; anterior and posterior   Musculoskeletal: Normal range of motion. Neurological: She is alert and oriented to person, place, and time. Skin: Skin is warm and dry. Psychiatric: She has a normal mood and affect. Her behavior is normal.   Nursing note and vitals reviewed. 1. Routine gynecological examination     2. Screening for cervical cancer  PAP SMEAR   3. Encounter for screening mammogram for breast cancer  [facility] DIGITAL SCREEN W OR WO CAD BILATERAL   4. Vaginal prolapse         MEDICATIONS:  No orders of the defined types were placed in this encounter. ORDERS:  Orders Placed This Encounter   Procedures    [facility] DIGITAL SCREEN W OR WO CAD BILATERAL    PAP SMEAR       PLAN:  1. Pap collected  2. Scheduling mammogram  3. We discussed her prolapse. Let her know if elected for repair, would be surgical and we refer for that type of surgery. Let her know a pessary would likely not be effective as it's vaginal and not bladder or uterine. Let her know that Kegel exercises can be effective at time and we discussed how to do these. To call if problems or concerns.    Patient Instructions        Well Visit, Women 48 Have your blood pressure checked during a routine doctor visit. Your doctor will tell you how often to check your blood pressure based on your age, your blood pressure results, and other factors. · Mammogram. Ask your doctor how often you should have a mammogram, which is an X-ray of your breasts. A mammogram can spot breast cancer before it can be felt and when it is easiest to treat. · Pap test and pelvic exam. Ask your doctor how often you should have a Pap test. You may not need to have a Pap test as often as you used to. · Vision. Have your eyes checked every year or two or as often as your doctor suggests. Some experts recommend that you have yearly exams for glaucoma and other age-related eye problems starting at age 48. · Hearing. Tell your doctor if you notice any change in your hearing. You can have tests to find out how well you hear. · Diabetes. Ask your doctor whether you should have tests for diabetes. · Colon cancer. You should begin tests for colon cancer at age 48. You may have one of several tests. Your doctor will tell you how often to have tests based on your age and risk. Risks include whether you already had a precancerous polyp removed from your colon or whether your parents, sisters and brothers, or children have had colon cancer. · Thyroid disease. Talk to your doctor about whether to have your thyroid checked as part of a regular physical exam. Women have an increased chance of a thyroid problem. · Osteoporosis. You should begin tests for bone density at age 72. If you are younger than 72, ask your doctor whether you have factors that may increase your risk for this disease. You may want to have this test before age 72. · Heart attack and stroke risk. At least every 4 to 6 years, you should have your risk for heart attack and stroke assessed.  Your doctor uses factors such as your age, blood pressure, cholesterol, and whether you smoke or have diabetes to show what your risk for a

## 2017-10-02 NOTE — MR AVS SNAPSHOT
After Visit Summary             Alray Matter   10/2/2017 1:30 PM   Office Visit    Description:  Female : 1955   Provider:  SAMUEL Gallagher   Department:  [facility] OB/GYN              Your Follow-Up and Future Appointments         Below is a list of your follow-up and future appointments. This may not be a complete list as you may have made appointments directly with providers that we are not aware of or your providers may have made some for you. Please call your providers to confirm appointments. It is important to keep your appointments. Please bring your current insurance card, photo ID, co-pay, and all medication bottles to your appointment. If self-pay, payment is expected at the time of service. Your To-Do List     Future Appointments Provider Department Dept Phone    10/9/2017 1:00 PM Jewel Dean MD [facility] [phone]    Please arrive 15 minutes prior to appointment, bring photo ID and insurance card. 10/30/2017 2:15 PM Gabino Black MD [id number] [phone]    Please arrive 15 minutes prior to appointment time, bring insurance card and photo ID.      5:48 PM [facility] Art, [facility] [phone]    Please arrive 15 minutes prior to appointment, bring photo ID and insurance card. 10/8/2018 1:45 PM Pritesh Dumont, [address] [phone]    If this is a sports or school physical please bring the physical form with you. Future Orders Complete By Expires    [facility] DIGITAL SCREEN W OR WO CAD BILATERAL [ Custom]  10/2/2017 2018         Information from Your Visit        Department     Name Address Phone Fax    [facility] OB/GYN [address] [facility]      You Were Seen for:         Comments    Routine gynecological examination   [V72.31. ICD-9-CM]         Vital Signs     Height Weight Breastfeeding?  Body Mass Index Smoking Status quitting, talk to your doctor about stop-smoking programs and medicines. These can increase your chances of quitting for good. · Protect your skin from too much sun. When you're outdoors from 10 a.m. to 4 p.m., stay in the shade or cover up with clothing and a hat with a wide brim. Wear sunglasses that block UV rays. Even when it's cloudy, put broad-spectrum sunscreen (SPF 30 or higher) on any exposed skin. · See a dentist one or two times a year for checkups and to have your teeth cleaned. · Wear a seat belt in the car. · Limit alcohol to 1 drink a day. Too much alcohol can cause health problems. Follow your doctor's advice about when to have certain tests. These tests can spot problems early. · Cholesterol. Your doctor will tell you how often to have this done based on your age, family history, or other things that can increase your risk for heart attack and stroke. · Blood pressure. Have your blood pressure checked during a routine doctor visit. Your doctor will tell you how often to check your blood pressure based on your age, your blood pressure results, and other factors. · Mammogram. Ask your doctor how often you should have a mammogram, which is an X-ray of your breasts. A mammogram can spot breast cancer before it can be felt and when it is easiest to treat. · Pap test and pelvic exam. Ask your doctor how often you should have a Pap test. You may not need to have a Pap test as often as you used to. · Vision. Have your eyes checked every year or two or as often as your doctor suggests. Some experts recommend that you have yearly exams for glaucoma and other age-related eye problems starting at age 48. · Hearing. Tell your doctor if you notice any change in your hearing. You can have tests to find out how well you hear. · Diabetes. Ask your doctor whether you should have tests for diabetes. · Colon cancer. You should begin tests for colon cancer at age 48.  You may have one of several tests. Your doctor will tell you how often to have tests based on your age and risk. Risks include whether you already had a precancerous polyp removed from your colon or whether your parents, sisters and brothers, or children have had colon cancer. · Thyroid disease. Talk to your doctor about whether to have your thyroid checked as part of a regular physical exam. Women have an increased chance of a thyroid problem. · Osteoporosis. You should begin tests for bone density at age 72. If you are younger than 72, ask your doctor whether you have factors that may increase your risk for this disease. You may want to have this test before age 72. · Heart attack and stroke risk. At least every 4 to 6 years, you should have your risk for heart attack and stroke assessed. Your doctor uses factors such as your age, blood pressure, cholesterol, and whether you smoke or have diabetes to show what your risk for a heart attack or stroke is over the next 10 years. When should you call for help? Watch closely for changes in your health, and be sure to contact your doctor if you have any problems or symptoms that concern you. Where can you learn more? Go to https://App Partner.CORD:USE [employer]. org and sign in to your Indigoz account. Enter B465 in the [facility] box to learn more about \"Well Visit, Women 50 to 72: Care Instructions. \"     If you do not have an account, please click on the \"Sign Up Now\" link. Current as of: July 19, 2016  Content Version: 11.3  © 5866-3182 [employer], [employer]. Care instructions adapted under license by [facility] ([facility]). If you have questions about a medical condition or this instruction, always ask your healthcare professional. [place] [address] any warranty or liability for your use of this information.               Today's Medication Changes          These changes are accurate as of: 10/2/17  2:54 PM.  If you have any Basic Information     Date Of Birth Sex Race Ethnicity Preferred Language Preferred Written Language    1955 Female White Non-/Non  English English      Problem List as of 10/2/2017  Date Reviewed: 10/2/2017                Chest pain    Idiopathic progressive neuropathy    Spondylosis of lumbar region without myelopathy or radiculopathy    Tear of medial meniscus of right knee, current    Sleep apnea, obstructive    Obstructive sleep apnea    Obstructive sleep apnea (adult) (pediatric)    Restless leg syndrome    Somnolence, daytime    Snoring    Insomnia    Lumbar facet arthropathy (HCC) (Chronic)    Lumbar facet arthropathy (HCC) (Chronic)    Bilateral edema of lower extremity    Facet syndrome, lumbar (Chronic)    Idiopathic hypotension    Abdominal adhesions (Chronic)    Recurrent ventral incisional hernia (Chronic)    Sick sinus syndrome (HCC)    Paroxysmal a-fib (HCC)    Atrial flutter, paroxysmal (HCC)    Heart rate fast    HTN (hypertension)    Abnormal echocardiogram    Shortness of breath    Sleep apnea    Abnormal Holter exam    MRSA carrier    Umbilical hernia    Cholecystitis with cholelithiasis      Preventive Care        Date Due    Hepatitis C screening is recommended for all adults regardless of risk factors born between [facility] at least once (lifetime) who have never been tested. 1955    HIV screening is recommended for all people regardless of risk factors  aged 15-65 years at least once (lifetime) who have never been HIV tested. 6/7/1968    Tetanus Combination Vaccine (1 - Tdap) 6/7/1972    Pap Smear 6/7/1974    Cholesterol Screening 6/7/1993    Mammograms are recommended every 2 years for low/average risk patients aged 48 - 69, and every year for high risk patients per updated national guidelines. However these guidelines can be individualized by your provider.  6/7/2003    Colonoscopy 6/7/2003    Zoster Vaccine 6/7/2013    Yearly Flu Vaccine (1) 9/1/2017

## 2017-10-09 ENCOUNTER — OFFICE VISIT (OUTPATIENT)
Dept: SURGERY | Age: 62
End: 2017-10-09
Payer: COMMERCIAL

## 2017-10-09 ENCOUNTER — HOSPITAL ENCOUNTER (OUTPATIENT)
Dept: GENERAL RADIOLOGY | Age: 62
Discharge: HOME | End: 2017-10-09
Payer: COMMERCIAL

## 2017-10-09 ENCOUNTER — OFFICE VISIT (OUTPATIENT)
Dept: NEUROLOGY | Age: 62
End: 2017-10-09
Payer: COMMERCIAL

## 2017-10-09 VITALS
DIASTOLIC BLOOD PRESSURE: 78 MMHG | BODY MASS INDEX: 44.98 KG/M2 | HEIGHT: 67 IN | WEIGHT: 286.6 LBS | TEMPERATURE: 97.4 F | SYSTOLIC BLOOD PRESSURE: 124 MMHG

## 2017-10-09 VITALS
RESPIRATION RATE: 20 BRPM | HEIGHT: 67 IN | SYSTOLIC BLOOD PRESSURE: 104 MMHG | WEIGHT: 285.6 LBS | HEART RATE: 62 BPM | DIASTOLIC BLOOD PRESSURE: 60 MMHG | BODY MASS INDEX: 44.83 KG/M2

## 2017-10-09 DIAGNOSIS — M47.816 SPONDYLOSIS OF LUMBAR REGION WITHOUT MYELOPATHY OR RADICULOPATHY: ICD-10-CM

## 2017-10-09 DIAGNOSIS — R25.1 TREMOR: ICD-10-CM

## 2017-10-09 DIAGNOSIS — M53.3 COCCYX PAIN: ICD-10-CM

## 2017-10-09 DIAGNOSIS — G47.33 OBSTRUCTIVE SLEEP APNEA: Primary | ICD-10-CM

## 2017-10-09 DIAGNOSIS — M53.3 COCCYX PAIN: Primary | ICD-10-CM

## 2017-10-09 DIAGNOSIS — G60.3 IDIOPATHIC PROGRESSIVE NEUROPATHY: ICD-10-CM

## 2017-10-09 PROCEDURE — 72220 X-RAY EXAM SACRUM TAILBONE: CPT

## 2017-10-09 PROCEDURE — 99214 OFFICE O/P EST MOD 30 MIN: CPT | Performed by: SURGERY

## 2017-10-09 PROCEDURE — 99214 OFFICE O/P EST MOD 30 MIN: CPT | Performed by: PSYCHIATRY & NEUROLOGY

## 2017-10-09 RX ORDER — DULOXETIN HYDROCHLORIDE 60 MG/1
60 CAPSULE, DELAYED RELEASE ORAL 2 TIMES DAILY
Qty: 60 CAPSULE | Refills: 5 | Status: SHIPPED | OUTPATIENT
Start: 2017-10-09 | End: 2018-09-28 | Stop reason: SDUPTHER

## 2017-10-09 NOTE — PATIENT INSTRUCTIONS
INSTRUCTIONS:  1. Stop the Effexor (venlafaxine)  2. Increase the Cymbalta (duloxetine) to 60 mg twice a day  3. Try to stop the nortriptyline at night  4.  Try to reduce the Requip (ropinirole) to 2 tablets twice a day or 1 in the am and 3 in the pm

## 2017-10-09 NOTE — PROGRESS NOTES
Surgical History:   Procedure Laterality Date    CARDIAC CATHETERIZATION  8/18/15  JDT    EF 50%    CATARACT REMOVAL      CHOLECYSTECTOMY  8/4/14    BY Dr. Caroline Davis  2008    CYST INCISION AND DRAINAGE  03/2017    FINGER TRIGGER RELEASE      FOOT SURGERY Left     removal of two screws    HAMMER TOE SURGERY      HARDWARE REMOVAL FOOT / ANKLE Left 8/16/2016    FOOT HARDWARE REMOVAL - DEEP / 2nd METATARSAL HEAD RESECTION performed by Melinda Nuno DPM at [address] [place] [address]  1/12/2015    x3    [place] KNEE [address] Right 7/3/2017    KNEE ARTHROSCOPY PARTIAL MEDIAL MENISECTOMY performed by Umesh Pittman MD at [place] [address] N/A 1/18/2016    HERNIA VENTRAL REPAIR LAPAROSCOPIC WITH MESH  performed by Beatrice Abernathy MD at [facility]  · None    Significant Injuries  · None    Habits  Maged Curiel reports that she has never smoked. She has never used smokeless tobacco. She reports that she does not drink alcohol or use drugs. Family History   Problem Relation Age of Onset    Heart Disease Mother     Diabetes Mother     High Blood Pressure Father     Cancer Father 79     lung CA    Cancer Brother      leukemia    Alzheimer's Disease Brother        Social History  Maged Curiel is , lives in [place], [place], and is retired. Medications:  Current Outpatient Prescriptions   Medication Sig Dispense Refill    nystatin-triamcinolone (MYCOLOG) 284869-2.1 UNIT/GM-% ointment Apply topically 2 times daily Apply topically 2 times daily.       flecainide (TAMBOCOR) 100 MG tablet TAKE 1 TABLET BY MOUTH TWICE DAILY 180 tablet 3    nortriptyline (PAMELOR) 25 MG capsule TAKE 1 TO 2 CAPSULES BY MOUTH AT BEDTIME 30 capsule 5    metoprolol tartrate (LOPRESSOR) 25 MG tablet TAKE 1/2 TABLET BY MOUTH TWICE DAILY 90 tablet 3    venlafaxine (EFFEXOR XR) 75 MG extended release capsule TAKE 1 CAPSULE BY MOUTH DAILY 30 capsule 5    DULoxetine (CYMBALTA) 60 MG extended release capsule TAKE 1 CAPSULE BY MOUTH DAILY 30 capsule 5    gabapentin (NEURONTIN) 600 MG tablet TAKE 1 TABLET BY MOUTH THREE TIMES DAILY 90 tablet 5    traMADol (ULTRAM) 50 MG tablet Take 50 mg by mouth      dexlansoprazole (DEXILANT) 60 MG CPDR delayed release capsule Take 60 mg by mouth daily Indications: GERD      diclofenac (VOLTAREN) 50 MG EC tablet Take 50 mg by mouth daily      fluconazole (DIFLUCAN) 150 MG tablet Take 150 mg by mouth as needed      aspirin 325 MG tablet Take 325 mg by mouth daily      LORazepam (ATIVAN) 1 MG tablet Take 1 mg by mouth daily   2    rOPINIRole (REQUIP) 3 MG tablet Take 3 mg by mouth See Admin Instructions 2 tabs in AM and 3 tabs in PM      levothyroxine (SYNTHROID) 137 MCG tablet   Take 137 mcg by mouth daily       fluticasone (FLONASE) 50 MCG/ACT nasal spray 2 sprays by Nasal route daily       famciclovir (FAMVIR) 500 MG tablet   Take 500 mg by mouth 2 times daily       triamcinolone acetonide (KENALOG) 0.1 % paste Place onto teeth 2 times daily as needed Apply to teeth 2 times daily. No current facility-administered medications for this visit. Allergies: Allergies as of 10/09/2017 - Review Complete 10/09/2017   Allergen Reaction Noted    Codeine Nausea Only 07/29/2014       Examination:  Vitals:  /60   Pulse 62   Resp 20   Ht 5' 7\" (1.702 m)   Wt 285 lb 9.6 oz (129.5 kg)   BMI 44.73 kg/m²   General appearance: She is an obese woman who is in no distress. HEENT: Sclera clear, anicteric, Oropharynx clear, no lesions, Neck supple with midline trachea, Thyroid without masses and Trachea midline Mallampati 3  Heart:: regular rate and rhythm, S1, S2 normal, no murmur, click, rub or gallop  Lungs: clear to auscultation bilaterally  Extremities: edema mild ankle  Neurologic: Extraocular movements are intact without nystagmus. Visual fields are full to confrontation.  Facial movements are symmetrical and normal. Speech is precise. Extremity strength is normal in both uppers and lowers. Deep tendon reflexes are trace presnt in the upper extremities, and absent in the lower extremities. Rapid alternating movements are unimpaired. Finger-to-nose testing is performed well, without dysmetria. She has a moderate hand postural tremor. Gait is unsteady and she ambulates with a wheeled walker. Assessment:       ICD-10-CM ICD-9-CM    1. Obstructive sleep apnea G47.33 327.23    2. 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 Spondylosis of lumbar region without myelopathy or radiculopathy M47.816 721.3    4. Tremor R25.1 781.0    Her neuropathy pain is worsening. She has developed a postural tremor, likely due to medications. Plan:   1. Stop the Effexor (venlafaxine)  2. Increase the Cymbalta (duloxetine) to 60 mg twice a day  3. Try to stop the nortriptyline at night  4. Try to reduce the Requip (ropinirole) to 2 tablets twice a day or 1 in the am and 3 in the pm  5. Continue BiPAP use  6. FU here in 6 months.     Electronically signed by Sangeetha White MD on 10/9/2017

## 2017-10-30 ENCOUNTER — OFFICE VISIT (OUTPATIENT)
Dept: CARDIOLOGY | Age: 62
End: 2017-10-30
Payer: COMMERCIAL

## 2017-10-30 VITALS
WEIGHT: 277 LBS | SYSTOLIC BLOOD PRESSURE: 138 MMHG | DIASTOLIC BLOOD PRESSURE: 70 MMHG | HEART RATE: 70 BPM | BODY MASS INDEX: 43.47 KG/M2 | HEIGHT: 67 IN

## 2017-10-30 DIAGNOSIS — I48.0 PAROXYSMAL A-FIB (HCC): Primary | ICD-10-CM

## 2017-10-30 PROCEDURE — 99214 OFFICE O/P EST MOD 30 MIN: CPT | Performed by: INTERNAL MEDICINE

## 2017-10-30 PROCEDURE — 93000 ELECTROCARDIOGRAM COMPLETE: CPT | Performed by: INTERNAL MEDICINE

## 2017-10-30 RX ORDER — FLUCONAZOLE 200 MG/1
TABLET ORAL
Refills: 2 | COMMUNITY
Start: 2017-10-18 | End: 2017-10-30

## 2017-10-30 RX ORDER — FUROSEMIDE 20 MG/1
20 TABLET ORAL DAILY PRN
Qty: 90 TABLET | Refills: 3 | Status: SHIPPED | OUTPATIENT
Start: 2017-10-30 | End: 2017-11-15

## 2017-10-30 RX ORDER — POTASSIUM CHLORIDE 750 MG/1
10 TABLET, EXTENDED RELEASE ORAL DAILY PRN
Qty: 90 TABLET | Refills: 3 | Status: SHIPPED | OUTPATIENT
Start: 2017-10-30 | End: 2017-11-15

## 2017-10-30 NOTE — PROGRESS NOTES
[facility], [place]. [address], [place] [address]  Phone: [phone]  Fax: [phone]  Office Visit:  10/30/2017    Daryl Barr : 1955, Female, 57 y.o. Chief Complaint   Patient presents with    6 Month Follow-Up       HISTORY OF PRESENT ILLNESS:    She presents today for follow-up of her paroxysmal atrial fibrillation. I last saw her about 6 months ago. Since then she said she is just had a few relatively mild and brief palpitation episodes. She has not had any progressive shortness of breath. She has been able to lose weight watching her diet although she's not able to do any regular exercise. She recently saw an optometrist who told her that he thought it was possible that she had suffered a stroke. She has had intermittent double vision for about 3 years. She has not seen a neurologist or an ophthalmologist.    Her other concern is problems with lower extremity edema that develops at the end of the day. Said that she has been trying to eat a low-salt diet. She has not had any PND. No angina type symptoms. No syncope. Her peripheral neuropathy is still bothering her.       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.322    HTN (hypertension) I10    Abnormal echocardiogram R93.1    Chest pain R07.9    Shortness of breath R06.02    Sleep apnea G47.30    Abnormal Holter exam R94.31    Heart rate fast R00.0    Paroxysmal a-fib (HCC) I48.0    Atrial flutter, paroxysmal (HCC) I48.92    Sick sinus syndrome (HCC) I49.5    Recurrent ventral incisional hernia K43.2    Abdominal adhesions K66.0    Idiopathic hypotension I95.0    Facet syndrome, lumbar M12.88    Bilateral edema of lower extremity R60.0    Lumbar facet arthropathy M12.88    Lumbar facet arthropathy M12.88    Obstructive sleep apnea G47.33    Obstructive sleep apnea G47.33    Restless leg syndrome G25.81    Somnolence, daytime R40.0    Snoring R06.83    Insomnia G47.00    Sleep apnea, obstructive G47.33    Tear of medial meniscus of right knee, current S83.241A    Idiopathic progressive neuropathy G60.3    Spondylosis of lumbar region without myelopathy or radiculopathy M47.816    Tremor R25.1       [Allergies/Contraindications:  Codeine]     Outpatient Prescriptions Marked as Taking for the 10/30/17 encounter (Office Visit) with Alice Barton MD   Medication Sig Dispense Refill    DULoxetine (CYMBALTA) 60 MG extended release capsule Take 1 capsule by mouth 2 times daily 60 capsule 5    nystatin-triamcinolone (MYCOLOG) 548301-3.1 UNIT/GM-% ointment Apply topically 2 times daily Apply topically 2 times daily.       flecainide (TAMBOCOR) 100 MG tablet TAKE 1 TABLET BY MOUTH TWICE DAILY 180 tablet 3    nortriptyline (PAMELOR) 25 MG capsule TAKE 1 TO 2 CAPSULES BY MOUTH AT BEDTIME 30 capsule 5    metoprolol tartrate (LOPRESSOR) 25 MG tablet TAKE 1/2 TABLET BY MOUTH TWICE DAILY 90 tablet 3    venlafaxine (EFFEXOR XR) 75 MG extended release capsule TAKE 1 CAPSULE BY MOUTH DAILY 30 capsule 5    gabapentin (NEURONTIN) 600 MG tablet TAKE 1 TABLET BY MOUTH THREE TIMES DAILY 90 tablet 5    traMADol (ULTRAM) 50 MG tablet Take 50 mg by mouth      dexlansoprazole (DEXILANT) 60 MG CPDR delayed release capsule Take 60 mg by mouth daily Indications: GERD      diclofenac (VOLTAREN) 50 MG EC tablet Take 50 mg by mouth daily      fluconazole (DIFLUCAN) 150 MG tablet Take 150 mg by mouth as needed      aspirin 325 MG tablet Take 325 mg by mouth daily      LORazepam (ATIVAN) 1 MG tablet Take 1 mg by mouth daily   2    rOPINIRole (REQUIP) 3 MG tablet Take 3 mg by mouth See Admin Instructions 2 tabs in AM and 3 tabs in PM      levothyroxine (SYNTHROID) 137 MCG tablet   Take 137 mcg by mouth daily       fluticasone (FLONASE) 50 MCG/ACT nasal spray

## 2017-11-08 ENCOUNTER — HOSPITAL ENCOUNTER (OUTPATIENT)
Dept: CT IMAGING | Age: 62
Discharge: HOME | End: 2017-11-08
Payer: COMMERCIAL

## 2017-11-08 DIAGNOSIS — J01.00 ACUTE MAXILLARY SINUSITIS, RECURRENCE NOT SPECIFIED: ICD-10-CM

## 2017-11-08 DIAGNOSIS — H53.2 DOUBLE VISION: ICD-10-CM

## 2017-11-08 LAB
GFR NON-AFRICAN AMERICAN: >60
PERFORMED ON: NORMAL
POC CREATININE: 0.9 MG/DL (ref 0.3–1.3)
POC SAMPLE TYPE: NORMAL

## 2017-11-08 PROCEDURE — 82565 ASSAY OF CREATININE: CPT

## 2017-11-08 PROCEDURE — 70470 CT HEAD/BRAIN W/O & W/DYE: CPT

## 2017-11-08 PROCEDURE — 6360000004 HC RX CONTRAST MEDICATION: Performed by: FAMILY MEDICINE

## 2017-11-08 RX ADMIN — IOPAMIDOL 90 ML: 755 INJECTION, SOLUTION INTRAVENOUS at 09:14

## 2017-11-15 ENCOUNTER — OFFICE VISIT (OUTPATIENT)
Dept: PRIMARY CARE CLINIC | Age: 62
End: 2017-11-15
Payer: COMMERCIAL

## 2017-11-15 VITALS
WEIGHT: 275.8 LBS | DIASTOLIC BLOOD PRESSURE: 68 MMHG | OXYGEN SATURATION: 97 % | HEIGHT: 67 IN | HEART RATE: 66 BPM | BODY MASS INDEX: 43.29 KG/M2 | TEMPERATURE: 97.8 F | SYSTOLIC BLOOD PRESSURE: 116 MMHG

## 2017-11-15 DIAGNOSIS — Z23 INFLUENZA VACCINATION ADMINISTERED AT CURRENT VISIT: ICD-10-CM

## 2017-11-15 DIAGNOSIS — M54.40 CHRONIC RIGHT-SIDED LOW BACK PAIN WITH SCIATICA, SCIATICA LATERALITY UNSPECIFIED: ICD-10-CM

## 2017-11-15 DIAGNOSIS — G89.29 CHRONIC RIGHT-SIDED LOW BACK PAIN WITH SCIATICA, SCIATICA LATERALITY UNSPECIFIED: ICD-10-CM

## 2017-11-15 DIAGNOSIS — L05.91 PILONIDAL CYST: ICD-10-CM

## 2017-11-15 DIAGNOSIS — M54.31 SCIATICA OF RIGHT SIDE: Primary | ICD-10-CM

## 2017-11-15 DIAGNOSIS — M1A.00X0 IDIOPATHIC CHRONIC GOUT WITHOUT TOPHUS, UNSPECIFIED SITE: ICD-10-CM

## 2017-11-15 PROCEDURE — 99204 OFFICE O/P NEW MOD 45 MIN: CPT | Performed by: NURSE PRACTITIONER

## 2017-11-15 RX ORDER — METHYLPREDNISOLONE 4 MG/1
TABLET ORAL
Qty: 1 KIT | Refills: 0 | Status: SHIPPED | OUTPATIENT
Start: 2017-11-15 | End: 2017-11-21

## 2017-11-15 RX ORDER — TIZANIDINE 4 MG/1
4 TABLET ORAL NIGHTLY
Qty: 30 TABLET | Refills: 0 | Status: SHIPPED | OUTPATIENT
Start: 2017-11-15 | End: 2017-11-27 | Stop reason: ALTCHOICE

## 2017-11-15 RX ORDER — PREDNISONE 10 MG/1
TABLET ORAL
Refills: 0 | COMMUNITY
Start: 2017-11-06 | End: 2017-11-15 | Stop reason: DRUGHIGH

## 2017-11-15 RX ORDER — ALLOPURINOL 100 MG/1
TABLET ORAL
Refills: 1 | COMMUNITY
Start: 2017-11-06 | End: 2017-11-30 | Stop reason: SDUPTHER

## 2017-11-15 RX ORDER — COLCHICINE 0.6 MG/1
0.6 TABLET ORAL DAILY
Qty: 66 TABLET | Refills: 0 | Status: SHIPPED | OUTPATIENT
Start: 2017-11-15 | End: 2017-11-27 | Stop reason: ALTCHOICE

## 2017-11-15 ASSESSMENT — ENCOUNTER SYMPTOMS
DIARRHEA: 0
NAUSEA: 0
VOICE CHANGE: 0
CHEST TIGHTNESS: 0
BACK PAIN: 1
EYE REDNESS: 0
BLOOD IN STOOL: 0
CONSTIPATION: 0
COUGH: 0
TROUBLE SWALLOWING: 0
SHORTNESS OF BREATH: 0
RHINORRHEA: 0
WHEEZING: 0
VOMITING: 0
SORE THROAT: 0
ABDOMINAL PAIN: 0

## 2017-11-15 NOTE — PROGRESS NOTES
[facility] PRIMARY CARE  [facility]  [address] [place] [address]  Dept: [phone]  Dept Fax: [phone]  Loc: [phone]    Jayson Seo is a 57 y.o. female who presents today for her medical conditions/complaints as noted below. Jayson Seo is c/o of New Patient      Chief Complaint   Patient presents with    New Patient       HPI:       HPI  Pt here to establish care for chronic conditions. Pt has gout and has received treatment from Dr. David Holliday from [facility] recently. Pt states that she isn't completely over the acute flare but has been taking allopurinol 100mg daily along with prednisone. Pt states that the gout is primarily in her hands/ fingers and in the tops of her feet. Pt states that she has also been having low back pain. Pt states that she falls all the time and felt that she might have broken her tail bone. She states that she had a rupture of fluid onto her panties at [address] last year when she noted a cyst to her coccyx area. She states that it never would go away or heal and she had surgery per Dr. Sal Rodriguez for a pilonidal cyst. She states that after the surgery the area of inflammation and drainage improved after packing but then it began to drain again. Pt states that she has been on 4 rounds of bactrim and it still drains at times. Pt states that she has sought the consult of another surgeon and they do not want to perform another surgery on her. Pt states that she also had an xray of the area ordered by Dr. Brigitte Harrison which showed that she did have mild angulation , possible subacute or acute fracture through the distal aspect of the sacrum. The results in mild angulation. Pt c/o low back pain that radiates down into her right buttock and right hip. Pt also c/o pain with palpation of the area.      Past Medical History:   Diagnosis Date    Abdominal adhesions 1/19/2016    Anxiety     Arthritis     Atrial fibrillation (HCC)     Atrial FIRST MEAL OF THE DAY  3     No current facility-administered medications for this visit. Allergies   Allergen Reactions    Codeine Nausea Only     Rapid heart rate, dizziness    Mobic [Meloxicam] Rash     Arms and legs bumpy red rash         Subjective:      Review of Systems   Constitutional: Negative for activity change, appetite change, fatigue, fever and unexpected weight change. HENT: Negative for congestion, ear pain, nosebleeds, rhinorrhea, sore throat, trouble swallowing and voice change. Eyes: Negative for redness and visual disturbance. Respiratory: Negative for cough, chest tightness, shortness of breath and wheezing. Cardiovascular: Negative for chest pain, palpitations and leg swelling. Gastrointestinal: Negative for abdominal pain, blood in stool, constipation, diarrhea, nausea and vomiting. Endocrine: Negative for polydipsia, polyphagia and polyuria. Genitourinary: Negative for dysuria, frequency and urgency. Musculoskeletal: Positive for arthralgias, back pain (low back pain radiates into right buttock. ) and gait problem (right hip pain). Negative for myalgias. Skin: Negative for rash and wound. Neurological: Positive for weakness (normal for pt. Pt has arthritis. Walks with a walker. ). Negative for dizziness, speech difficulty, light-headedness and headaches. Psychiatric/Behavioral: Negative for agitation, confusion, self-injury and suicidal ideas. The patient is not nervous/anxious. Objective:     Physical Exam   Constitutional: She is oriented to person, place, and time. She appears well-developed and well-nourished. No distress. HENT:   Head: Normocephalic and atraumatic. Right Ear: External ear normal.   Left Ear: External ear normal.   Nose: Nose normal.   Mouth/Throat: Oropharynx is clear and moist. No oropharyngeal exudate. Eyes: Conjunctivae are normal. Pupils are equal, round, and reactive to light. Right eye exhibits no discharge.  Left eye exhibits no discharge. Neck: Normal range of motion. Neck supple. Cardiovascular: Normal rate, regular rhythm, normal heart sounds and intact distal pulses. No murmur heard. Pulmonary/Chest: Effort normal and breath sounds normal. No stridor. No respiratory distress. She has no wheezes. She has no rales. She exhibits no tenderness. Abdominal: Soft. Bowel sounds are normal. She exhibits no distension. Musculoskeletal: She exhibits no deformity. Right hip: She exhibits decreased range of motion, tenderness and bony tenderness. Lumbar back: She exhibits tenderness, bony tenderness, swelling and edema. Neurological: She is alert and oriented to person, place, and time. She has normal reflexes. No cranial nerve deficit. Coordination normal.   Skin: Skin is warm and dry. No rash noted. She is not diaphoretic. No erythema. Psychiatric: She has a normal mood and affect. Her behavior is normal. Thought content normal.   Nursing note and vitals reviewed. /68   Pulse 66   Temp 97.8 °F (36.6 °C)   Ht 5' 7\" (1.702 m)   Wt 275 lb 12.8 oz (125.1 kg)   SpO2 97%   BMI 43.20 kg/m²       Assessment:     1. Sciatica of right side  methylPREDNISolone (MEDROL DOSEPACK) 4 MG tablet    DISCONTINUED: tiZANidine (ZANAFLEX) 4 MG tablet   2. Chronic right-sided low back pain with sciatica, sciatica laterality unspecified  Urinalysis   3. Idiopathic chronic gout without tophus, unspecified site  CBC Auto Differential    Comprehensive Metabolic Panel    Urinalysis    C-Reactive Protein    Uric Acid   4. Pilonidal cyst     5. Influenza vaccination administered at current visit  CANCELED: INFLUENZA, QUADV, 3 YRS AND OLDER, IM, MDV, 0.5ML (FLUZONE QUADV)   6. Class 3 obesity due to excess calories with serious comorbidity in adult, unspecified BMI  Hemoglobin A1C    Lipid Panel    TSH without Reflex    Urinalysis       No results found for this visit on 11/15/17. Plan:     1.  Sciatica of right side

## 2017-11-16 ASSESSMENT — ENCOUNTER SYMPTOMS
BACK PAIN: 1
EYE PAIN: 0
ABDOMINAL PAIN: 0
ABDOMINAL DISTENTION: 0
APNEA: 1
COUGH: 1
EYE REDNESS: 0

## 2017-11-16 NOTE — PROGRESS NOTES
03/2017    FINGER TRIGGER RELEASE      FOOT SURGERY Left     removal of two screws    HAMMER TOE SURGERY      HARDWARE REMOVAL FOOT / ANKLE Left 8/16/2016    FOOT HARDWARE REMOVAL - DEEP / 2nd METATARSAL HEAD RESECTION performed by Janusz Arias DPM at [address] [place] [address]  1/12/2015    x3    OTHER SURGICAL HISTORY  02/2017    pilonidal cyst-Dr Bina Kim    AL KNEE [facility] Right 7/3/2017    KNEE ARTHROSCOPY PARTIAL MEDIAL MENISECTOMY performed by Ivan White MD at [place] [address] N/A 1/18/2016    HERNIA VENTRAL REPAIR LAPAROSCOPIC WITH MESH  performed by Mirella Curry MD at [facility]     Current Outpatient Prescriptions   Medication Sig Dispense Refill    allopurinol (ZYLOPRIM) 100 MG tablet TK 1 T PO QD  1    methylPREDNISolone (MEDROL DOSEPACK) 4 MG tablet Take by mouth. 1 kit 0    tiZANidine (ZANAFLEX) 4 MG tablet Take 1 tablet by mouth nightly 1/2 - 1 tablet at bedtime as needed for back pain. 30 tablet 0    colchicine (COLCRYS) 0.6 MG tablet Take 1 tablet by mouth daily Take 2 tablets now and then another tablet at bedtime. Then begin bid dosing.  66 tablet 0    DULoxetine (CYMBALTA) 60 MG extended release capsule Take 1 capsule by mouth 2 times daily 60 capsule 5    flecainide (TAMBOCOR) 100 MG tablet TAKE 1 TABLET BY MOUTH TWICE DAILY 180 tablet 3    metoprolol tartrate (LOPRESSOR) 25 MG tablet TAKE 1/2 TABLET BY MOUTH TWICE DAILY 90 tablet 3    venlafaxine (EFFEXOR XR) 75 MG extended release capsule TAKE 1 CAPSULE BY MOUTH DAILY 30 capsule 5    gabapentin (NEURONTIN) 600 MG tablet TAKE 1 TABLET BY MOUTH THREE TIMES DAILY 90 tablet 5    dexlansoprazole (DEXILANT) 60 MG CPDR delayed release capsule Take 60 mg by mouth daily Indications: GERD      diclofenac (VOLTAREN) 50 MG EC tablet Take 50 mg by mouth daily      aspirin 325 MG tablet Take 325 mg by mouth daily      LORazepam (ATIVAN) 1 MG tablet Take 1 mg by mouth daily   2    rOPINIRole (REQUIP) 3 MG tablet Take 3 mg by mouth See Admin Instructions 2 tabs in AM and 3 tabs in PM      levothyroxine (SYNTHROID) 137 MCG tablet   Take 137 mcg by mouth daily       fluticasone (FLONASE) 50 MCG/ACT nasal spray 2 sprays by Nasal route as needed       famciclovir (FAMVIR) 500 MG tablet   Take 500 mg by mouth 2 times daily       triamcinolone acetonide (KENALOG) 0.1 % paste Place onto teeth 2 times daily as needed Apply to teeth 2 times daily. No current facility-administered medications for this visit. 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 Negative for appetite change. HENT: Positive for mouth sores and postnasal drip. Eyes: Negative for pain and redness. Respiratory: Positive for apnea and cough. Cardiovascular: Positive for chest pain, palpitations and leg swelling. Gastrointestinal: Negative for abdominal distention and abdominal pain. Endocrine: Negative for cold intolerance and heat intolerance. Genitourinary: Negative for dysuria and hematuria. Musculoskeletal: Positive for arthralgias, back pain, gait problem, joint swelling and myalgias. Skin: Negative for rash and wound. Neurological: Positive for tremors, weakness and numbness. Hematological: Bruises/bleeds easily. Psychiatric/Behavioral: Positive for dysphoric mood. The patient is nervous/anxious. Objective:   Physical Exam   Constitutional: She is oriented to person, place, and time. She appears well-developed and well-nourished.    Quite obese with central obesity   HENT:   Head: Normocephalic and atraumatic. Eyes: EOM are normal. Pupils are equal, round, and reactive to light. Neck: Neck supple. No tracheal deviation present. Cardiovascular: Regular rhythm. No murmur heard. Pulmonary/Chest: Effort normal. No respiratory distress. Abdominal: Soft. She exhibits no distension. There is no tenderness. Musculoskeletal: Normal range of motion. She exhibits edema. She exhibits no deformity. Neurological: She is alert and oriented to person, place, and time. No cranial nerve deficit. Skin: Skin is warm and dry. Small scar in blase of cleft, but no evidence of sinus or palpable lump. No erythema. Mild tenderness to palpation over coccyx. Psychiatric: She has a normal mood and affect. Her behavior is normal.   Vitals reviewed. Assessment:      57year old female with coccyx pain and concern of recurrent pilonidal cyst      Plan:      I discussed with the patient that at this time, on exam, I do not see an area to excise. If I excised now, it might not be the area that is getting repeatedly inflamed. Also, I discussed with her that I think her pain may be more related to her coccyx. I have instructed her to follow up with me when/if this area becomes inflamed in the future, as soon as she starts to feel the area developing. I will also order a coccyx xray to evaluate the bone structure as well. I have recommended that she get a memory foam cushion, and avoid cushions with holes cut out as well as sitting for too long on her bottom. I will have her call for any questions or concerns.

## 2017-11-20 ENCOUNTER — HOSPITAL ENCOUNTER (OUTPATIENT)
Dept: GENERAL RADIOLOGY | Age: 62
Discharge: HOME | End: 2017-11-20
Payer: COMMERCIAL

## 2017-11-20 DIAGNOSIS — M25.542 ARTHRALGIA OF LEFT HAND: ICD-10-CM

## 2017-11-20 DIAGNOSIS — M25.541 ARTHRALGIA OF RIGHT HAND: ICD-10-CM

## 2017-11-20 PROCEDURE — 73130 X-RAY EXAM OF HAND: CPT

## 2017-11-27 ENCOUNTER — OFFICE VISIT (OUTPATIENT)
Dept: URGENT CARE | Age: 62
End: 2017-11-27
Payer: COMMERCIAL

## 2017-11-27 ENCOUNTER — HOSPITAL ENCOUNTER (OUTPATIENT)
Dept: GENERAL RADIOLOGY | Age: 62
Discharge: HOME | End: 2017-11-27
Payer: COMMERCIAL

## 2017-11-27 VITALS
SYSTOLIC BLOOD PRESSURE: 133 MMHG | BODY MASS INDEX: 42.69 KG/M2 | DIASTOLIC BLOOD PRESSURE: 77 MMHG | HEART RATE: 76 BPM | WEIGHT: 272 LBS | TEMPERATURE: 97.4 F | HEIGHT: 67 IN | RESPIRATION RATE: 20 BRPM | OXYGEN SATURATION: 95 %

## 2017-11-27 DIAGNOSIS — R07.81 RIB PAIN ON LEFT SIDE: Primary | ICD-10-CM

## 2017-11-27 DIAGNOSIS — M54.40 CHRONIC RIGHT-SIDED LOW BACK PAIN WITH SCIATICA, SCIATICA LATERALITY UNSPECIFIED: ICD-10-CM

## 2017-11-27 DIAGNOSIS — M1A.00X0 IDIOPATHIC CHRONIC GOUT WITHOUT TOPHUS, UNSPECIFIED SITE: ICD-10-CM

## 2017-11-27 DIAGNOSIS — R07.81 RIB PAIN ON LEFT SIDE: ICD-10-CM

## 2017-11-27 DIAGNOSIS — G89.29 CHRONIC RIGHT-SIDED LOW BACK PAIN WITH SCIATICA, SCIATICA LATERALITY UNSPECIFIED: ICD-10-CM

## 2017-11-27 LAB
ALBUMIN SERPL-MCNC: 4 G/DL (ref 3.5–5.2)
ALP BLD-CCNC: 107 U/L (ref 35–104)
ALT SERPL-CCNC: 27 U/L (ref 5–33)
ANION GAP SERPL CALCULATED.3IONS-SCNC: 17 MMOL/L (ref 7–19)
AST SERPL-CCNC: 22 U/L (ref 5–32)
BACTERIA: ABNORMAL /HPF
BASOPHILS ABSOLUTE: 0.1 K/UL (ref 0–0.2)
BASOPHILS RELATIVE PERCENT: 0.6 % (ref 0–1)
BILIRUB SERPL-MCNC: 0.6 MG/DL (ref 0.2–1.2)
BILIRUBIN URINE: NEGATIVE
BLOOD, URINE: NEGATIVE
BUN BLDV-MCNC: 18 MG/DL (ref 8–23)
C-REACTIVE PROTEIN: 0.92 MG/DL (ref 0–0.5)
CALCIUM SERPL-MCNC: 9.5 MG/DL (ref 8.8–10.2)
CASTS: ABNORMAL /LPF
CHLORIDE BLD-SCNC: 104 MMOL/L (ref 98–111)
CHOLESTEROL, TOTAL: 229 MG/DL (ref 160–199)
CLARITY: ABNORMAL
CO2: 26 MMOL/L (ref 22–29)
COLOR: YELLOW
CREAT SERPL-MCNC: 0.9 MG/DL (ref 0.5–0.9)
EOSINOPHILS ABSOLUTE: 0.3 K/UL (ref 0–0.6)
EOSINOPHILS RELATIVE PERCENT: 3.4 % (ref 0–5)
EPITHELIAL CELLS, UA: ABNORMAL /HPF
GFR NON-AFRICAN AMERICAN: >60
GLUCOSE BLD-MCNC: 83 MG/DL (ref 74–109)
GLUCOSE URINE: NEGATIVE MG/DL
HBA1C MFR BLD: 5.7 %
HCT VFR BLD CALC: 46.2 % (ref 37–47)
HDLC SERPL-MCNC: 46 MG/DL (ref 65–121)
HEMOGLOBIN: 14.1 G/DL (ref 12–16)
KETONES, URINE: NEGATIVE MG/DL
LDL CHOLESTEROL CALCULATED: 134 MG/DL
LEUKOCYTE ESTERASE, URINE: ABNORMAL
LYMPHOCYTES ABSOLUTE: 2 K/UL (ref 1.1–4.5)
LYMPHOCYTES RELATIVE PERCENT: 25.2 % (ref 20–40)
MCH RBC QN AUTO: 27.9 PG (ref 27–31)
MCHC RBC AUTO-ENTMCNC: 30.5 G/DL (ref 33–37)
MCV RBC AUTO: 91.5 FL (ref 81–99)
MONOCYTES ABSOLUTE: 0.8 K/UL (ref 0–0.9)
MONOCYTES RELATIVE PERCENT: 9.4 % (ref 0–10)
NEUTROPHILS ABSOLUTE: 4.9 K/UL (ref 1.5–7.5)
NEUTROPHILS RELATIVE PERCENT: 61 % (ref 50–65)
NITRITE, URINE: NEGATIVE
PDW BLD-RTO: 15.6 % (ref 11.5–14.5)
PH UA: 5.5
PLATELET # BLD: 236 K/UL (ref 130–400)
PMV BLD AUTO: 11.8 FL (ref 9.4–12.3)
POTASSIUM SERPL-SCNC: 3.9 MMOL/L (ref 3.5–5)
PROTEIN UA: NEGATIVE MG/DL
RBC # BLD: 5.05 M/UL (ref 4.2–5.4)
RBC UA: ABNORMAL /HPF (ref 0–2)
RENAL EPITHELIAL, UA: ABNORMAL /HPF
SODIUM BLD-SCNC: 147 MMOL/L (ref 136–145)
SPECIFIC GRAVITY UA: 1.02
TOTAL PROTEIN: 6.7 G/DL (ref 6.6–8.7)
TRIGL SERPL-MCNC: 245 MG/DL (ref 0–149)
TSH SERPL DL<=0.05 MIU/L-ACNC: 3.5 UIU/ML (ref 0.27–4.2)
URIC ACID, SERUM: 5.3 MG/DL (ref 2.4–5.7)
UROBILINOGEN, URINE: 0.2 E.U./DL
WBC # BLD: 8.1 K/UL (ref 4.8–10.8)
WBC UA: ABNORMAL /HPF (ref 0–5)

## 2017-11-27 PROCEDURE — 71100 X-RAY EXAM RIBS UNI 2 VIEWS: CPT

## 2017-11-27 PROCEDURE — 96372 THER/PROPH/DIAG INJ SC/IM: CPT | Performed by: PHYSICIAN ASSISTANT

## 2017-11-27 PROCEDURE — 99213 OFFICE O/P EST LOW 20 MIN: CPT | Performed by: PHYSICIAN ASSISTANT

## 2017-11-27 RX ORDER — PANTOPRAZOLE SODIUM 40 MG/1
40 GRANULE, DELAYED RELEASE ORAL
COMMUNITY
End: 2017-11-30 | Stop reason: SDUPTHER

## 2017-11-27 RX ORDER — ALENDRONATE SODIUM 70 MG/1
TABLET ORAL
Refills: 3 | COMMUNITY
Start: 2017-11-06

## 2017-11-27 RX ORDER — KETOROLAC TROMETHAMINE 30 MG/ML
60 INJECTION, SOLUTION INTRAMUSCULAR; INTRAVENOUS ONCE
Status: COMPLETED | OUTPATIENT
Start: 2017-11-27 | End: 2017-11-27

## 2017-11-27 RX ADMIN — KETOROLAC TROMETHAMINE 60 MG: 30 INJECTION, SOLUTION INTRAMUSCULAR; INTRAVENOUS at 12:44

## 2017-11-27 NOTE — PROGRESS NOTES
Subjective:      Patient ID: Brianna Ortiz is a 57 y.o. female. [facility] presents today with left rib pain. States that pain developed 3 weeks ago. She leaned over her computer chair and has been hurting ever since. Has pain with deep breathing, walking, and lying down. Having difficulty sleeping due to pain. States that she is almost out of Tramadol that she has been taking for pain. Is also taking ibuprofen. Review of Systems   Constitutional: Negative for chills and fever. Musculoskeletal:        Left rib pain     All other systems reviewed and are negative. Objective:   Physical Exam   Constitutional: She is oriented to person, place, and time. She appears well-developed and well-nourished. No distress. HENT:   Head: Normocephalic and atraumatic. Eyes: Right eye exhibits no discharge. Left eye exhibits no discharge. Neck: Normal range of motion. Neck supple. Cardiovascular: Normal rate, regular rhythm and normal heart sounds. No murmur heard. Pulmonary/Chest: Effort normal and breath sounds normal. No respiratory distress. Musculoskeletal:   Tender to palpation Left rib   Lymphadenopathy:     She has no cervical adenopathy. Neurological: She is alert and oriented to person, place, and time. Skin: Skin is warm and dry. No rash noted. She is not diaphoretic. No erythema. No pallor. Psychiatric: She has a normal mood and affect. Her behavior is normal. Judgment and thought content normal.   Nursing note and vitals reviewed. Assessment:       Left Rib Pain      Plan:      - Left Rib XR: no acute fracture. - Toradol 60 mg IM x 1 today in office.   - Informed patient that since Tramadol is controlled, I am unable to refill that for her.   - Recommend going to the ER if symptoms worsen or do not improve. - Notify clinic with any questions or concerns   - Return as needed.

## 2017-11-29 LAB — URINE CULTURE, ROUTINE: NORMAL

## 2017-11-30 ENCOUNTER — OFFICE VISIT (OUTPATIENT)
Dept: PRIMARY CARE CLINIC | Age: 62
End: 2017-11-30
Payer: COMMERCIAL

## 2017-11-30 VITALS
BODY MASS INDEX: 43.32 KG/M2 | OXYGEN SATURATION: 89 % | HEIGHT: 67 IN | HEART RATE: 66 BPM | DIASTOLIC BLOOD PRESSURE: 78 MMHG | WEIGHT: 276 LBS | TEMPERATURE: 97.5 F | SYSTOLIC BLOOD PRESSURE: 126 MMHG | RESPIRATION RATE: 18 BRPM

## 2017-11-30 DIAGNOSIS — M1A.9XX0 CHRONIC GOUT WITHOUT TOPHUS, UNSPECIFIED CAUSE, UNSPECIFIED SITE: Primary | ICD-10-CM

## 2017-11-30 DIAGNOSIS — K21.9 GASTROESOPHAGEAL REFLUX DISEASE WITHOUT ESOPHAGITIS: ICD-10-CM

## 2017-11-30 PROCEDURE — 99213 OFFICE O/P EST LOW 20 MIN: CPT | Performed by: NURSE PRACTITIONER

## 2017-11-30 RX ORDER — ALLOPURINOL 100 MG/1
100 TABLET ORAL 2 TIMES DAILY
Qty: 30 TABLET | Refills: 0 | Status: SHIPPED | OUTPATIENT
Start: 2017-11-30 | End: 2017-12-21 | Stop reason: SDUPTHER

## 2017-11-30 RX ORDER — PANTOPRAZOLE SODIUM 40 MG/1
40 GRANULE, DELAYED RELEASE ORAL
Qty: 60 EACH | Refills: 0 | Status: SHIPPED | OUTPATIENT
Start: 2017-11-30 | End: 2018-05-09 | Stop reason: ALTCHOICE

## 2017-11-30 NOTE — PROGRESS NOTES
[facility] PRIMARY CARE  [facility]  [address] [place] [address]  Dept: [phone]  Dept Fax: [phone]  Loc: [phone]    Nacho Ogden is a 57 y.o. female who presents today for her medical conditions/complaints as noted below.   Nacho Ogden is c/o of 2 Week Follow-Up (possible gout or OA)      Chief Complaint   Patient presents with    2 Week Follow-Up     possible gout or OA       HPI:       HPI      Past Medical History:   Diagnosis Date    Abdominal adhesions 1/19/2016    Anxiety     Arthritis     Atrial fibrillation (HCC)     Atrial fibrillation and flutter (HCC) 9/2015    Chronic back pain     Chronic cough     Depression     Edema     Fibrocystic breast     GERD (gastroesophageal reflux disease)     Glossitis     Headache(784.0)     Heart burn     Heart disease     Hypertension     Hypothyroidism     Mouth sore     MRSA (methicillin resistant staph aureus) culture positive 8/4/14    nasal    Numbness     toes    Obstructive sleep apnea     BIPAP    Peripheral neuropathy (HCC)     PONV (postoperative nausea and vomiting)     Prolonged emergence from general anesthesia     Recurrent ventral incisional hernia 1/18/2016    Sleep apnea     Stroke ([address]     Stroke-like symptom     SVT (supraventricular tachycardia) (HCC)     SVT (supraventricular tachycardia) (HCC)     Swelling of extremity     Unspecified sleep apnea     clinicallybi-pap    Ventricular tachyarrhythmia ([address] 9/15    svt        Past Surgical History:   Procedure Laterality Date    CARDIAC CATHETERIZATION  8/18/15  JDT    EF 50%    CATARACT REMOVAL      CHOLECYSTECTOMY  8/4/14    BY Dr. Elise Paredes    COLONOSCOPY  2008    CYST INCISION AND DRAINAGE  03/2017    FINGER TRIGGER RELEASE      FOOT SURGERY Left     removal of two screws    HAMMER TOE SURGERY      HARDWARE REMOVAL FOOT / ANKLE Left 8/16/2016    FOOT HARDWARE REMOVAL - DEEP / 2nd METATARSAL HEAD RESECTION performed by Suzanna Meier DPM at [address] [place] [address]  1/12/2015    x3    OTHER SURGICAL HISTORY  02/2017    pilonidal cyst-Dr Bina Kim    [place] [facility]    KNEE ARTHROSCOPY PARTIAL MEDIAL MENISECTOMY performed by Marlene Melendez MD at 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    Alcohol use No        Current Outpatient Prescriptions   Medication Sig Dispense Refill    allopurinol (ZYLOPRIM) 100 MG tablet Take 1 tablet by mouth 2 times daily 30 tablet 0    pantoprazole sodium (PROTONIX) 40 MG PACK packet Take 1 packet by mouth 2 times daily (before meals) 60 each 0    alendronate (FOSAMAX) 70 MG tablet TK 1 T PO ONCE A WEEK IN THE MORNING 30 MINUTES BEFORE FIRST MEAL OF THE DAY  3    DULoxetine (CYMBALTA) 60 MG extended release capsule Take 1 capsule by mouth 2 times daily 60 capsule 5    flecainide (TAMBOCOR) 100 MG tablet TAKE 1 TABLET BY MOUTH TWICE DAILY 180 tablet 3    metoprolol tartrate (LOPRESSOR) 25 MG tablet TAKE 1/2 TABLET BY MOUTH TWICE DAILY 90 tablet 3    venlafaxine (EFFEXOR XR) 75 MG extended release capsule TAKE 1 CAPSULE BY MOUTH DAILY 30 capsule 5    gabapentin (NEURONTIN) 600 MG tablet TAKE 1 TABLET BY MOUTH THREE TIMES DAILY 90 tablet 5    diclofenac (VOLTAREN) 50 MG EC tablet Take 50 mg by mouth daily      aspirin 325 MG tablet Take 325 mg by mouth daily      LORazepam (ATIVAN) 1 MG tablet Take 1 mg by mouth daily   2    rOPINIRole (REQUIP) 3 MG tablet Take 3 mg by mouth See Admin Instructions 2 tabs in AM and 3 tabs in PM      levothyroxine (SYNTHROID) 137 MCG tablet   Take 137 mcg by mouth daily       fluticasone (FLONASE) 50 MCG/ACT nasal spray 2 sprays by Nasal Neck supple. Cardiovascular: Normal rate, regular rhythm, normal heart sounds and intact distal pulses. No murmur heard. Pulmonary/Chest: Effort normal and breath sounds normal. No stridor. No respiratory distress. She has no wheezes. She has no rales. She exhibits no tenderness. Abdominal: Soft. Bowel sounds are normal. She exhibits no distension. There is no tenderness. Musculoskeletal: Normal range of motion. She exhibits no edema, tenderness or deformity. Neurological: She is alert and oriented to person, place, and time. She has normal reflexes. No cranial nerve deficit. Coordination normal.   Skin: Skin is warm and dry. No rash noted. She is not diaphoretic. There is erythema. Bilateral fingers show erythema, edema to dip, pip joints. Psychiatric: She has a normal mood and affect. Her behavior is normal. Thought content normal.   Nursing note and vitals reviewed. /78   Pulse 66   Temp 97.5 °F (36.4 °C) (Temporal)   Resp 18   Ht 5' 7\" (1.702 m)   Wt 276 lb (125.2 kg)   SpO2 (!) 89% Comment: artificial nails  BMI 43.23 kg/m²         Assessment:     1. Chronic gout without tophus, unspecified cause, unspecified site  allopurinol (ZYLOPRIM) 100 MG tablet   2. Gastroesophageal reflux disease without esophagitis  pantoprazole sodium (PROTONIX) 40 MG PACK packet       No results found for this visit on 11/30/17. Plan:     1. Chronic gout without tophus, unspecified cause, unspecified site    2. Gastroesophageal reflux disease without esophagitis      Pt is not in acute flair but dealing with chronic gout. Plan to start pt on allupurinol to prevent acute flare. Will follow up in one month or sooner if needed. No Follow-up on file. No orders of the defined types were placed in this encounter.       Orders Placed This Encounter   Medications    allopurinol (ZYLOPRIM) 100 MG tablet     Sig: Take 1 tablet by mouth 2 times daily     Dispense:  30 tablet     Refill:  0   

## 2017-12-04 ASSESSMENT — ENCOUNTER SYMPTOMS
CHEST TIGHTNESS: 0
VOICE CHANGE: 0
CONSTIPATION: 0
DIARRHEA: 0
RHINORRHEA: 0
WHEEZING: 0
SORE THROAT: 0
SHORTNESS OF BREATH: 0
VOMITING: 0
ABDOMINAL PAIN: 0
COUGH: 0
EYE REDNESS: 0
BLOOD IN STOOL: 0
TROUBLE SWALLOWING: 0
NAUSEA: 0

## 2017-12-13 ENCOUNTER — OFFICE VISIT (OUTPATIENT)
Dept: PRIMARY CARE CLINIC | Age: 62
End: 2017-12-13
Payer: COMMERCIAL

## 2017-12-13 VITALS
TEMPERATURE: 97.3 F | HEIGHT: 67 IN | BODY MASS INDEX: 43.16 KG/M2 | SYSTOLIC BLOOD PRESSURE: 106 MMHG | WEIGHT: 275 LBS | HEART RATE: 84 BPM | DIASTOLIC BLOOD PRESSURE: 72 MMHG | RESPIRATION RATE: 18 BRPM | OXYGEN SATURATION: 94 %

## 2017-12-13 DIAGNOSIS — G89.29 CHRONIC BACK PAIN, UNSPECIFIED BACK LOCATION, UNSPECIFIED BACK PAIN LATERALITY: ICD-10-CM

## 2017-12-13 DIAGNOSIS — E03.9 HYPOTHYROIDISM, UNSPECIFIED TYPE: Primary | ICD-10-CM

## 2017-12-13 DIAGNOSIS — M54.31 SCIATICA OF RIGHT SIDE: ICD-10-CM

## 2017-12-13 DIAGNOSIS — M54.9 CHRONIC BACK PAIN, UNSPECIFIED BACK LOCATION, UNSPECIFIED BACK PAIN LATERALITY: ICD-10-CM

## 2017-12-13 PROCEDURE — 20552 NJX 1/MLT TRIGGER POINT 1/2: CPT | Performed by: NURSE PRACTITIONER

## 2017-12-13 PROCEDURE — 99214 OFFICE O/P EST MOD 30 MIN: CPT | Performed by: NURSE PRACTITIONER

## 2017-12-13 RX ORDER — LEVOTHYROXINE SODIUM 137 UG/1
137 TABLET ORAL DAILY
Qty: 30 TABLET | Refills: 2 | Status: SHIPPED | OUTPATIENT
Start: 2017-12-13 | End: 2018-10-08 | Stop reason: SDUPTHER

## 2017-12-13 RX ORDER — METHYLPREDNISOLONE ACETATE 40 MG/ML
40 INJECTION, SUSPENSION INTRA-ARTICULAR; INTRALESIONAL; INTRAMUSCULAR; SOFT TISSUE ONCE
Qty: 1 ML | Refills: 0
Start: 2017-12-13 | End: 2017-12-13

## 2017-12-13 RX ORDER — TRAMADOL HYDROCHLORIDE 50 MG/1
50 TABLET ORAL EVERY 6 HOURS PRN
COMMUNITY
End: 2017-12-13 | Stop reason: CLARIF

## 2017-12-13 RX ORDER — TIZANIDINE 4 MG/1
4 TABLET ORAL EVERY EVENING
Qty: 30 TABLET | Refills: 2 | Status: SHIPPED | OUTPATIENT
Start: 2017-12-13 | End: 2018-03-02 | Stop reason: SDUPTHER

## 2017-12-13 ASSESSMENT — ENCOUNTER SYMPTOMS
SORE THROAT: 0
CONSTIPATION: 0
EYE REDNESS: 0
BACK PAIN: 1
COUGH: 0
WHEEZING: 0
VOMITING: 0
RHINORRHEA: 0
DIARRHEA: 0
SHORTNESS OF BREATH: 0
BLOOD IN STOOL: 0
NAUSEA: 0
VOICE CHANGE: 0
CHEST TIGHTNESS: 0
ABDOMINAL PAIN: 0
TROUBLE SWALLOWING: 0

## 2017-12-14 NOTE — PROGRESS NOTES
[facility] PRIMARY CARE  [facility]  [address] [place] [address]  Dept: [phone]  Dept Fax: [phone]  Loc: [phone]    Lionel Rodriguez is a 57 y.o. female who presents today for her medical conditions/complaints as noted below. Lionel Rodriguez is c/o of 2 Week Follow-Up      Chief Complaint   Patient presents with    2 Week Follow-Up       HPI:       HPI     Pt states that she has seen Dr. Ibrahima Steven for her knee pain. Pt states that she was started on mobic and had an allergic reaction. Pt states that she is will follow up with her in two weeks. Pt states that her main concern today is the pain she has in her buttocks. Pt states that the pain will sometimes radiate down her right leg and sometimes into her left buttock. Pt states that her right buttock will feel like their is a ball in their.      Past Medical History:   Diagnosis Date    Abdominal adhesions 1/19/2016    Anxiety     Arthritis     Atrial fibrillation (HCC)     Atrial fibrillation and flutter (HCC) 9/2015    Chronic back pain     Chronic cough     Depression     Edema     Fibrocystic breast     GERD (gastroesophageal reflux disease)     Glossitis     Headache(784.0)     Heart burn     Heart disease     Hypertension     Hypothyroidism     Mouth sore     MRSA (methicillin resistant staph aureus) culture positive 8/4/14    nasal    Numbness     toes    Obstructive sleep apnea     BIPAP    Peripheral neuropathy (HCC)     PONV (postoperative nausea and vomiting)     Prolonged emergence from general anesthesia     Recurrent ventral incisional hernia 1/18/2016    Sleep apnea     Stroke ([address]     Stroke-like symptom     SVT (supraventricular tachycardia) (HCC)     SVT (supraventricular tachycardia) (HCC)     Swelling of extremity     Unspecified sleep apnea     clinicallybi-pap    Ventricular tachyarrhythmia ([address] 9/15    svt        Past Surgical History:   Procedure Laterality Date    CARDIAC CATHETERIZATION  8/18/15  JDT    EF 50%    CATARACT REMOVAL      CHOLECYSTECTOMY  8/4/14    BY Dr. Cleveland Brush  2008    CYST INCISION AND DRAINAGE  03/2017    FINGER TRIGGER RELEASE      FOOT SURGERY Left     removal of two screws    HAMMER TOE SURGERY      HARDWARE REMOVAL FOOT / ANKLE Left 8/16/2016    FOOT HARDWARE REMOVAL - DEEP / 2nd METATARSAL HEAD RESECTION performed by Suzanna Meier DPM at [address] [place] [address] Street  1/12/2015    x3    OTHER SURGICAL HISTORY  02/2017    pilonidal cyst-Dr Bina Kim    [place] KNEE [facility] Right 7/3/2017    KNEE ARTHROSCOPY PARTIAL MEDIAL MENISECTOMY performed by Marlene Melendez MD at [facility] 41 N/A 1/18/2016    HERNIA VENTRAL REPAIR LAPAROSCOPIC WITH MESH  performed by Glen Gupta MD at [place] [address] History   Substance Use Topics    Smoking status: Never Smoker    Smokeless tobacco: Never Used      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  30 tablet 2    methylPREDNISolone acetate (DEPO-MEDROL) 40 MG/ML injection Inject 1 mL into the articular space once for 1 dose 1 mL 0    allopurinol (ZYLOPRIM) 100 MG tablet Take 1 tablet by mouth 2 times daily 30 tablet 0    pantoprazole sodium (PROTONIX) 40 MG PACK packet Take 1 packet by mouth 2 times daily (before meals) 60 each 0    alendronate (FOSAMAX) 70 MG tablet TK 1 T PO ONCE A WEEK IN THE MORNING 30 MINUTES BEFORE FIRST MEAL OF THE DAY  3    DULoxetine (CYMBALTA) 60 MG extended release capsule Take 1 capsule by mouth 2 times daily 60 capsule 5    flecainide (TAMBOCOR) 100 MG tablet TAKE 1 TABLET BY MOUTH TWICE DAILY 180 tablet 3    metoprolol tartrate (LOPRESSOR) 25 MG tablet TAKE 1/2 TABLET BY MOUTH TWICE DAILY 90 tablet 3    venlafaxine (EFFEXOR XR) 75 MG extended release capsule TAKE 1 CAPSULE BY MOUTH DAILY 30 capsule 5    gabapentin (NEURONTIN) 600 MG tablet TAKE 1 TABLET BY MOUTH THREE TIMES DAILY 90 tablet 5    diclofenac (VOLTAREN) 50 MG EC tablet Take 50 mg by mouth daily      aspirin 325 MG tablet Take 325 mg by mouth daily      LORazepam (ATIVAN) 1 MG tablet Take 1 mg by mouth daily   2    rOPINIRole (REQUIP) 3 MG tablet Take 3 mg by mouth See Admin Instructions 2 tabs in AM and 3 tabs in PM      fluticasone (FLONASE) 50 MCG/ACT nasal spray 2 sprays by Nasal route as needed       famciclovir (FAMVIR) 500 MG tablet   Take 500 mg by mouth 2 times daily       triamcinolone acetonide (KENALOG) 0.1 % paste Place onto teeth 2 times daily as needed Apply to teeth 2 times daily. No current facility-administered medications for this visit. Allergies   Allergen Reactions    Codeine Nausea Only     Rapid heart rate, dizziness    Mobic [Meloxicam] Rash     Arms and legs bumpy red rash         Subjective:      Review of Systems   Constitutional: Negative for activity change, appetite change, fatigue, fever and unexpected weight change. HENT: Negative for congestion, ear pain, nosebleeds, rhinorrhea, sore throat, trouble swallowing and voice change. Eyes: Negative for redness and visual disturbance. Respiratory: Negative for cough, chest tightness, shortness of breath and wheezing. Cardiovascular: Negative for chest pain, palpitations and leg swelling. Gastrointestinal: Negative for abdominal pain, blood in stool, constipation, diarrhea, nausea and vomiting. Endocrine: Negative for polydipsia, polyphagia and polyuria. Genitourinary: Negative for dysuria, frequency and urgency. Musculoskeletal: Positive for arthralgias and back pain. Negative for myalgias. Skin: Negative for rash and wound. Neurological: Negative for dizziness, speech difficulty, light-headedness and headaches. Psychiatric/Behavioral: Negative for agitation, confusion, self-injury and suicidal ideas. The patient is not nervous/anxious. Objective:     Physical Exam   Constitutional: She is oriented to person, place, and time. She appears well-developed and well-nourished. No distress. HENT:   Head: Normocephalic and atraumatic. Right Ear: External ear normal.   Left Ear: External ear normal.   Nose: Nose normal.   Mouth/Throat: Oropharynx is clear and moist. No oropharyngeal exudate. Eyes: Conjunctivae are normal. Pupils are equal, round, and reactive to light. Right eye exhibits no discharge. Left eye exhibits no discharge. Neck: Normal range of motion. Neck supple. Cardiovascular: Normal rate, regular rhythm, normal heart sounds and intact distal pulses. No murmur heard. Pulmonary/Chest: Effort normal and breath sounds normal. No stridor. No respiratory distress. She has no wheezes. She has no rales. She exhibits no tenderness. Abdominal: Soft. Bowel sounds are normal. She exhibits no distension. There is no tenderness. Musculoskeletal: Normal range of motion. She exhibits no edema, tenderness or deformity. Back:    Neurological: She is alert and oriented to person, place, and time. She has normal reflexes. No cranial nerve deficit. Coordination normal.   Skin: Skin is warm and dry. No rash noted. She is not diaphoretic. No erythema. Psychiatric: She has a normal mood and affect. Her behavior is normal. Thought content normal.   Nursing note and vitals reviewed. /72   Pulse 84   Temp 97.3 °F (36.3 °C) (Temporal)   Resp 18   Ht 5' 7\" (1.702 m)   Wt 275 lb (124.7 kg)   SpO2 94%   BMI 43.07 kg/m²     A trigger point injection was performed at the site of maximal tenderness noted at the right sciatic area. Area injected noted on assessment.  Consent was obtain verbally and

## 2017-12-19 ENCOUNTER — OFFICE VISIT (OUTPATIENT)
Dept: NEUROLOGY | Age: 62
End: 2017-12-19
Payer: COMMERCIAL

## 2017-12-19 VITALS
WEIGHT: 269.4 LBS | SYSTOLIC BLOOD PRESSURE: 106 MMHG | BODY MASS INDEX: 42.28 KG/M2 | DIASTOLIC BLOOD PRESSURE: 61 MMHG | RESPIRATION RATE: 20 BRPM | HEIGHT: 67 IN | HEART RATE: 67 BPM

## 2017-12-19 DIAGNOSIS — M54.41 CHRONIC RIGHT-SIDED LOW BACK PAIN WITH RIGHT-SIDED SCIATICA: ICD-10-CM

## 2017-12-19 DIAGNOSIS — G89.29 CHRONIC RIGHT-SIDED LOW BACK PAIN WITH RIGHT-SIDED SCIATICA: ICD-10-CM

## 2017-12-19 DIAGNOSIS — R41.3 MEMORY LOSS: ICD-10-CM

## 2017-12-19 DIAGNOSIS — G60.3 IDIOPATHIC PROGRESSIVE NEUROPATHY: Primary | ICD-10-CM

## 2017-12-19 DIAGNOSIS — G47.33 OBSTRUCTIVE SLEEP APNEA: ICD-10-CM

## 2017-12-19 DIAGNOSIS — H53.2 DIPLOPIA: ICD-10-CM

## 2017-12-19 PROCEDURE — 99213 OFFICE O/P EST LOW 20 MIN: CPT | Performed by: PSYCHIATRY & NEUROLOGY

## 2017-12-19 RX ORDER — MELOXICAM 15 MG/1
TABLET ORAL
Refills: 2 | COMMUNITY
Start: 2017-11-29 | End: 2017-12-19

## 2017-12-19 NOTE — PROGRESS NOTES
[id number] [facility] Neurology Follow Up Encounter  2017    Information:   Patient Name: Nara Pugh  :   1955  Age:   57 y.o. MRN:   [id number]  Account #:  [de-identified]  Today:  17    Provider: Manpreet Rosa M.D. Chief Complaint:   Chief Complaint   Patient presents with    3 Month Follow-Up     2 month follow up       Subjective: Nara Pugh is a 57 y.o. woman  with a history of DEVEN, PN, and chronic back pain who is following up. Last visit she was having more neuropathy pain and some medication side effects. Her medications were adjusted. She is doing better. Her neuropathy pain is better. She is sleeping at night better. She uses her CPAP. The RLS is doing fairly well. She does complain of forgetfulness. We evaluated that in the spring and found only benign forgetfulness. She is drowsy in the daytime. She has had right knee problems and is expecting a replacement this winter. She also complains of horizontal binocular diplopia that has been present for a few years. She had a CT head that was normal.  She has seen an eye doctor and has Prizm eye glasses no that helps. There are no headaches.       Objective:     Past Medical History:  Past Medical History:   Diagnosis Date    Abdominal adhesions 2016    Anxiety     Arthritis     Atrial fibrillation (HCC)     Atrial fibrillation and flutter (HCC) 2015    Chronic back pain     Chronic cough     Depression     Edema     Fibrocystic breast     GERD (gastroesophageal reflux disease)     Glossitis     Headache(784.0)     Heart burn     Heart disease     Hypertension     Hypothyroidism     Mouth sore     MRSA (methicillin resistant staph aureus) culture positive 14    nasal    Numbness     toes    Obstructive sleep apnea     BIPAP    Peripheral neuropathy (HCC)     PONV (postoperative nausea and vomiting)     Prolonged emergence from general anesthesia     Recurrent ventral incisional hernia 2016    nodes  Respiratory ROS: negative for - cough, hemoptysis or shortness of breath  Cardiovascular ROS: negative for - chest pain or palpitations  Gastrointestinal ROS: negative for - blood in stools, constipation, diarrhea or nausea/vomiting  Genito-Urinary ROS: negative for - hematuria or urinary frequency/urgency  Musculoskeletal ROS: positive for - joint pain, joint stiffness or joint swelling  Neurological ROS: positive for - memory loss, numbness/tingling or weakness     Examination:  Vitals:  /61   Pulse 67   Resp 20   Ht 5' 7\" (1.702 m)   Wt 269 lb 6.4 oz (122.2 kg)   BMI 42.19 kg/m²   General appearance: She is an obese woman who is in no distress. HEENT: Sclera clear, anicteric, Oropharynx clear, no lesions, Neck supple with midline trachea, Thyroid without masses and Trachea midline Mallampati 3  Heart:: regular rate and rhythm, S1, S2 normal, no murmur, click, rub or gallop  Lungs: clear to auscultation bilaterally  Extremities: edema mild ankle  Neurologic: Extraocular movements are intact without nystagmus. Cover/uncover test does show exophoria bilaterally. Visual fields are full to confrontation. Facial movements are symmetrical and normal. Speech is precise. Extremity strength is normal in both uppers and lowers. Deep tendon reflexes are present and symmetrical including trace present in the ankles. Rapid alternating movements are unimpaired. Finger-to-nose testing is performed well, without dysmetria. Gait is unsteady and she ambulates with a wheeled walker. Pertinent Diagnostic Studies:  CT head with and without was normal    Assessment:       ICD-10-CM ICD-9-CM    1. Idiopathic progressive neuropathy G60.3 356.4    2. Obstructive sleep apnea G47.33 327.23    3. Chronic right-sided low back pain with right-sided sciatica M54.41 724.2     G89.29 724.3      338.29    4. Memory loss R41.3 780.93    5. Diplopia H53.2 368. 2    She is fairly stable.   Her memory loss is from her medical problems and medications and are not felt to represent onset of dementia. Her diplopia is from an exophoria. Her neuropathy is primarily a small fiber type. I discussed these with her and her . Plan:   1. Continue present medications and PBiPAP use. 2. FU in 6 months.     Electronically signed by Tricia Coleman MD on 12/19/2017

## 2017-12-22 DIAGNOSIS — M1A.9XX0 CHRONIC GOUT WITHOUT TOPHUS, UNSPECIFIED CAUSE, UNSPECIFIED SITE: ICD-10-CM

## 2017-12-22 RX ORDER — ALLOPURINOL 100 MG/1
TABLET ORAL
Qty: 180 TABLET | Refills: 0 | Status: ON HOLD | OUTPATIENT
Start: 2017-12-22 | End: 2018-02-16 | Stop reason: ALTCHOICE

## 2018-01-10 ENCOUNTER — OFFICE VISIT (OUTPATIENT)
Dept: PRIMARY CARE CLINIC | Age: 63
End: 2018-01-10
Payer: COMMERCIAL

## 2018-01-10 ENCOUNTER — HOSPITAL ENCOUNTER (OUTPATIENT)
Dept: PREADMISSION TESTING | Age: 63
Discharge: HOME | End: 2018-01-10
Payer: COMMERCIAL

## 2018-01-10 VITALS
BODY MASS INDEX: 42.69 KG/M2 | HEART RATE: 95 BPM | TEMPERATURE: 98.4 F | OXYGEN SATURATION: 94 % | DIASTOLIC BLOOD PRESSURE: 72 MMHG | HEIGHT: 67 IN | WEIGHT: 272 LBS | SYSTOLIC BLOOD PRESSURE: 124 MMHG

## 2018-01-10 VITALS — HEIGHT: 67 IN | WEIGHT: 269 LBS | BODY MASS INDEX: 42.22 KG/M2

## 2018-01-10 DIAGNOSIS — J01.40 ACUTE NON-RECURRENT PANSINUSITIS: Primary | ICD-10-CM

## 2018-01-10 DIAGNOSIS — H66.91 RIGHT OTITIS MEDIA, UNSPECIFIED OTITIS MEDIA TYPE: ICD-10-CM

## 2018-01-10 LAB
ANION GAP SERPL CALCULATED.3IONS-SCNC: 12 MMOL/L (ref 7–19)
APTT: 30 SEC (ref 26–36.2)
BASOPHILS ABSOLUTE: 0 K/UL (ref 0–0.2)
BASOPHILS RELATIVE PERCENT: 0.4 % (ref 0–1)
BUN BLDV-MCNC: 19 MG/DL (ref 8–23)
CALCIUM SERPL-MCNC: 8.6 MG/DL (ref 8.8–10.2)
CHLORIDE BLD-SCNC: 104 MMOL/L (ref 98–111)
CO2: 26 MMOL/L (ref 22–29)
CREAT SERPL-MCNC: 0.8 MG/DL (ref 0.5–0.9)
EKG P AXIS: 31 DEGREES
EKG P-R INTERVAL: 160 MS
EKG Q-T INTERVAL: 412 MS
EKG QRS DURATION: 100 MS
EKG QTC CALCULATION (BAZETT): 426 MS
EKG T AXIS: 48 DEGREES
EOSINOPHILS ABSOLUTE: 0.1 K/UL (ref 0–0.6)
EOSINOPHILS RELATIVE PERCENT: 1.5 % (ref 0–5)
GFR NON-AFRICAN AMERICAN: >60
GLUCOSE BLD-MCNC: 91 MG/DL (ref 74–109)
HCT VFR BLD CALC: 37.5 % (ref 37–47)
HEMOGLOBIN: 11.5 G/DL (ref 12–16)
INR BLD: 1.1 (ref 0.88–1.18)
LYMPHOCYTES ABSOLUTE: 0.5 K/UL (ref 1.1–4.5)
LYMPHOCYTES RELATIVE PERCENT: 10.4 % (ref 20–40)
MCH RBC QN AUTO: 28.6 PG (ref 27–31)
MCHC RBC AUTO-ENTMCNC: 30.7 G/DL (ref 33–37)
MCV RBC AUTO: 93.3 FL (ref 81–99)
MONOCYTES ABSOLUTE: 0.5 K/UL (ref 0–0.9)
MONOCYTES RELATIVE PERCENT: 10 % (ref 0–10)
NEUTROPHILS ABSOLUTE: 3.6 K/UL (ref 1.5–7.5)
NEUTROPHILS RELATIVE PERCENT: 76.6 % (ref 50–65)
PDW BLD-RTO: 15.9 % (ref 11.5–14.5)
PLATELET # BLD: 213 K/UL (ref 130–400)
PMV BLD AUTO: 11.6 FL (ref 9.4–12.3)
POTASSIUM SERPL-SCNC: 3.8 MMOL/L (ref 3.5–5)
PROTHROMBIN TIME: 14.1 SEC (ref 12–14.6)
RBC # BLD: 4.02 M/UL (ref 4.2–5.4)
SODIUM BLD-SCNC: 142 MMOL/L (ref 136–145)
WBC # BLD: 4.7 K/UL (ref 4.8–10.8)

## 2018-01-10 PROCEDURE — 85610 PROTHROMBIN TIME: CPT

## 2018-01-10 PROCEDURE — 85025 COMPLETE CBC W/AUTO DIFF WBC: CPT

## 2018-01-10 PROCEDURE — 99214 OFFICE O/P EST MOD 30 MIN: CPT | Performed by: NURSE PRACTITIONER

## 2018-01-10 PROCEDURE — 85730 THROMBOPLASTIN TIME PARTIAL: CPT

## 2018-01-10 PROCEDURE — 80048 BASIC METABOLIC PNL TOTAL CA: CPT

## 2018-01-10 PROCEDURE — 93005 ELECTROCARDIOGRAM TRACING: CPT

## 2018-01-10 PROCEDURE — 87070 CULTURE OTHR SPECIMN AEROBIC: CPT

## 2018-01-10 RX ORDER — BENZONATATE 100 MG/1
100 CAPSULE ORAL 3 TIMES DAILY PRN
Qty: 21 CAPSULE | Refills: 0 | Status: SHIPPED | OUTPATIENT
Start: 2018-01-10 | End: 2018-01-17

## 2018-01-10 RX ORDER — AMOXICILLIN AND CLAVULANATE POTASSIUM 875; 125 MG/1; MG/1
1 TABLET, FILM COATED ORAL 2 TIMES DAILY
Qty: 20 TABLET | Refills: 0 | Status: SHIPPED | OUTPATIENT
Start: 2018-01-10 | End: 2018-01-20

## 2018-01-10 RX ORDER — DEXLANSOPRAZOLE 60 MG/1
60 CAPSULE, DELAYED RELEASE ORAL DAILY
COMMUNITY
Start: 2018-01-09

## 2018-01-10 RX ORDER — MELOXICAM 15 MG/1
TABLET ORAL
Refills: 2 | COMMUNITY
Start: 2018-01-05 | End: 2018-01-10

## 2018-01-10 RX ORDER — PREDNISONE 10 MG/1
10 TABLET ORAL DAILY
Qty: 7 TABLET | Refills: 0 | Status: SHIPPED | OUTPATIENT
Start: 2018-01-10 | End: 2018-01-17

## 2018-01-10 ASSESSMENT — ENCOUNTER SYMPTOMS
CHEST TIGHTNESS: 1
SINUS PRESSURE: 1
GASTROINTESTINAL NEGATIVE: 1
COUGH: 1
SORE THROAT: 1
SINUS PAIN: 1
EYES NEGATIVE: 1

## 2018-01-10 NOTE — PROGRESS NOTES
[facility] PRIMARY CARE  [address]  [address] [facility] [address]  Dept: [phone]  Dept Fax: [phone]  Loc: [phone]    Nimesh Rubio is a 57 y.o. female who presents today for her medical conditions/complaints as noted below. Nimesh Rubio is c/o of Congestion (x 4 days); Cough; Pharyngitis; and Headache      Chief Complaint   Patient presents with    Congestion     x 4 days    Cough    Pharyngitis    Headache       HPI:     HPI  Patient here with complaints of cough, congestion, sore throat, and ear pain. She reports that the symptoms have been ongoing for about 4 days. She has been taking otc meds including vicks vapor rub without any relief.      Past Medical History:   Diagnosis Date    Abdominal adhesions 1/19/2016    Anxiety     Arthritis     Atrial fibrillation (HCC)     Atrial fibrillation and flutter (HCC) 9/2015    Chronic back pain     Chronic cough     Depression     Edema     Fibrocystic breast     GERD (gastroesophageal reflux disease)     Glossitis     Headache(784.0)     Heart burn     Heart disease     Hypertension     Hypothyroidism     Mouth sore     MRSA (methicillin resistant staph aureus) culture positive 8/4/14    nasal    Numbness     toes    Obstructive sleep apnea     BIPAP    Peripheral neuropathy (HCC)     PONV (postoperative nausea and vomiting)     Prolonged emergence from general anesthesia     Recurrent ventral incisional hernia 1/18/2016    Sleep apnea     Stroke (HCC)     Stroke-like symptom     SVT (supraventricular tachycardia) (HCC)     SVT (supraventricular tachycardia) (HCC)     Swelling of extremity     Unspecified sleep apnea     clinicallybi-pap    Ventricular tachyarrhythmia ([address] 9/15    svt        Past Surgical History:   Procedure Laterality Date    CARDIAC CATHETERIZATION  8/18/15  JDT    EF 50%    CATARACT REMOVAL      CHOLECYSTECTOMY  8/4/14    BY Dr. Boo Freeman

## 2018-01-11 LAB — MRSA CULTURE ONLY: NORMAL

## 2018-01-18 RX ORDER — MORPHINE SULFATE 4 MG/ML
INJECTION, SOLUTION INTRAMUSCULAR; INTRAVENOUS
Status: DISPENSED
Start: 2018-01-18 | End: 2018-01-19

## 2018-01-24 ENCOUNTER — TELEPHONE (OUTPATIENT)
Dept: PRIMARY CARE CLINIC | Age: 63
End: 2018-01-24

## 2018-01-25 ENCOUNTER — OFFICE VISIT (OUTPATIENT)
Dept: PRIMARY CARE CLINIC | Age: 63
End: 2018-01-25
Payer: COMMERCIAL

## 2018-01-25 VITALS
TEMPERATURE: 97.4 F | OXYGEN SATURATION: 98 % | BODY MASS INDEX: 41.75 KG/M2 | DIASTOLIC BLOOD PRESSURE: 74 MMHG | HEART RATE: 63 BPM | HEIGHT: 67 IN | SYSTOLIC BLOOD PRESSURE: 116 MMHG | WEIGHT: 266 LBS | RESPIRATION RATE: 18 BRPM

## 2018-01-25 DIAGNOSIS — B86 SCABIES: Primary | ICD-10-CM

## 2018-01-25 DIAGNOSIS — J30.89 NON-SEASONAL ALLERGIC RHINITIS DUE TO OTHER ALLERGIC TRIGGER, UNSPECIFIED CHRONICITY: ICD-10-CM

## 2018-01-25 PROCEDURE — 99213 OFFICE O/P EST LOW 20 MIN: CPT | Performed by: NURSE PRACTITIONER

## 2018-01-25 RX ORDER — PERMETHRIN 50 MG/G
CREAM TOPICAL
Refills: 0 | COMMUNITY
Start: 2018-01-21 | End: 2018-05-09 | Stop reason: ALTCHOICE

## 2018-01-25 RX ORDER — HYDROXYCHLOROQUINE SULFATE 200 MG/1
TABLET, FILM COATED ORAL
Refills: 1 | COMMUNITY
Start: 2018-01-23 | End: 2018-10-08 | Stop reason: ALTCHOICE

## 2018-01-30 ENCOUNTER — TELEPHONE (OUTPATIENT)
Dept: CARDIOLOGY | Age: 63
End: 2018-01-30

## 2018-01-30 NOTE — TELEPHONE ENCOUNTER
Date: 2/16/18    Cardiologist: Yolette Luis    Procedure: Total Knee    Surgeon: [facility] [address]    Last Office Visit: 10/30/17  Reason for office visit and medical concerns addressed at this office visit: Atrial Fibrillation    Testing Performed and Date of Service:  N/A    Current Medications: ASA, Plaquenil, Elimite, Dexilant, Zyloprim, Synthroid, Protonix, Fosamax, Cymbalta, Tambocor, Lopressor, Effexor Xr, Neurontin, Voltaren, Ativan, Requip, Flonase, Famvir, Kenalog    Is the patient currently taking an anticoagulant? If so, what is the diagnosis the patient has been given to warrant the need for the anticoagulant?  ASA    Additional Notes: Surgical clearance request.

## 2018-01-30 NOTE — TELEPHONE ENCOUNTER
Called and spoke to pt notified that surgical clearance being processed and pt will need to hold ASA prior to surgery.

## 2018-02-02 RX ORDER — VENLAFAXINE HYDROCHLORIDE 75 MG/1
CAPSULE, EXTENDED RELEASE ORAL
Qty: 30 CAPSULE | Refills: 5 | Status: SHIPPED | OUTPATIENT
Start: 2018-02-02 | End: 2018-05-29 | Stop reason: SDUPTHER

## 2018-02-12 ASSESSMENT — ENCOUNTER SYMPTOMS
VOICE CHANGE: 0
WHEEZING: 0
COUGH: 0
EYE REDNESS: 0
CONSTIPATION: 0
NAUSEA: 0
ABDOMINAL PAIN: 0
RHINORRHEA: 0
SORE THROAT: 0
BLOOD IN STOOL: 0
CHEST TIGHTNESS: 0
DIARRHEA: 0
SHORTNESS OF BREATH: 0
VOMITING: 0
TROUBLE SWALLOWING: 0

## 2018-02-12 NOTE — PROGRESS NOTES
03/2017    FINGER TRIGGER RELEASE      FOOT SURGERY Left     removal of two screws    HAMMER TOE SURGERY      HARDWARE REMOVAL FOOT / ANKLE Left 8/16/2016    FOOT HARDWARE REMOVAL - DEEP / 2nd METATARSAL HEAD RESECTION performed by Wil Sheets DPM at [address] [place] [address] Street  1/12/2015    x3    OTHER SURGICAL HISTORY  02/2017    pilonidal cyst-Dr Bina Kim    [place] KNEE [facility] Right 7/3/2017    KNEE ARTHROSCOPY PARTIAL MEDIAL MENISECTOMY performed by Claudia Rachel MD at [place] [address] N/A 1/18/2016    HERNIA VENTRAL REPAIR LAPAROSCOPIC WITH MESH  performed by Sonia Vieira MD at [place] [address] History   Substance Use Topics    Smoking status: Never Smoker    Smokeless tobacco: Never Used      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  THEN 2 TS QD  1    permethrin (ELIMITE) 5 % cream APPLY ON THE SKIN UTD  0    DEXILANT 60 MG CPDR delayed release capsule       allopurinol (ZYLOPRIM) 100 MG tablet TAKE 1 TABLET BY MOUTH TWICE DAILY 180 tablet 0    levothyroxine (SYNTHROID) 137 MCG tablet Take 1 tablet by mouth daily 30 tablet 2    pantoprazole sodium (PROTONIX) 40 MG PACK packet Take 1 packet by mouth 2 times daily (before meals) 60 each 0    alendronate (FOSAMAX) 70 MG tablet TK 1 T PO ONCE A WEEK IN THE MORNING 30 MINUTES BEFORE FIRST MEAL OF THE DAY  3    DULoxetine (CYMBALTA) 60 MG extended release capsule Take 1 capsule by mouth 2 times daily 60 capsule 5    flecainide (TAMBOCOR) 100 MG tablet TAKE 1 TABLET BY MOUTH TWICE DAILY 180 tablet 3   

## 2018-02-16 ENCOUNTER — ANESTHESIA EVENT (OUTPATIENT)
Dept: OPERATING ROOM | Age: 63
DRG: 470 | End: 2018-02-16
Payer: COMMERCIAL

## 2018-02-16 ENCOUNTER — ANESTHESIA (OUTPATIENT)
Dept: OPERATING ROOM | Age: 63
DRG: 470 | End: 2018-02-16
Payer: COMMERCIAL

## 2018-02-16 ENCOUNTER — HOSPITAL ENCOUNTER (INPATIENT)
Age: 63
LOS: 2 days | Discharge: HOME HEALTH | DRG: 470 | End: 2018-02-19
Attending: ORTHOPAEDIC SURGERY | Admitting: ORTHOPAEDIC SURGERY
Payer: COMMERCIAL

## 2018-02-16 VITALS
SYSTOLIC BLOOD PRESSURE: 158 MMHG | OXYGEN SATURATION: 93 % | TEMPERATURE: 97.6 F | RESPIRATION RATE: 26 BRPM | DIASTOLIC BLOOD PRESSURE: 77 MMHG

## 2018-02-16 PROBLEM — M17.11 PRIMARY OSTEOARTHRITIS OF RIGHT KNEE: Status: ACTIVE | Noted: 2018-02-16

## 2018-02-16 LAB
ABO/RH: NORMAL
ANTIBODY SCREEN: NORMAL

## 2018-02-16 PROCEDURE — C1776 JOINT DEVICE (IMPLANTABLE): HCPCS | Performed by: ORTHOPAEDIC SURGERY

## 2018-02-16 PROCEDURE — 2720000001 HC MISC SURG SUPPLY STERILE $51-500: Performed by: ORTHOPAEDIC SURGERY

## 2018-02-16 PROCEDURE — 94664 DEMO&/EVAL PT USE INHALER: CPT

## 2018-02-16 PROCEDURE — 0SRC0J9 REPLACEMENT OF RIGHT KNEE JOINT WITH SYNTHETIC SUBSTITUTE, CEMENTED, OPEN APPROACH: ICD-10-PCS | Performed by: ORTHOPAEDIC SURGERY

## 2018-02-16 PROCEDURE — 2500000003 HC RX 250 WO HCPCS: Performed by: NURSE ANESTHETIST, CERTIFIED REGISTERED

## 2018-02-16 PROCEDURE — 3700000001 HC ADD 15 MINUTES (ANESTHESIA): Performed by: ORTHOPAEDIC SURGERY

## 2018-02-16 PROCEDURE — 6360000002 HC RX W HCPCS: Performed by: ORTHOPAEDIC SURGERY

## 2018-02-16 PROCEDURE — 6360000002 HC RX W HCPCS: Performed by: NURSE ANESTHETIST, CERTIFIED REGISTERED

## 2018-02-16 PROCEDURE — 2720000000 HC MISC SURG SUPPLY STERILE $0-50: Performed by: ORTHOPAEDIC SURGERY

## 2018-02-16 PROCEDURE — 2580000003 HC RX 258: Performed by: ORTHOPAEDIC SURGERY

## 2018-02-16 PROCEDURE — 64447 NJX AA&/STRD FEMORAL NRV IMG: CPT | Performed by: NURSE ANESTHETIST, CERTIFIED REGISTERED

## 2018-02-16 PROCEDURE — 86850 RBC ANTIBODY SCREEN: CPT

## 2018-02-16 PROCEDURE — 2580000003 HC RX 258: Performed by: ANESTHESIOLOGY

## 2018-02-16 PROCEDURE — 6360000002 HC RX W HCPCS: Performed by: ANESTHESIOLOGY

## 2018-02-16 PROCEDURE — 86901 BLOOD TYPING SEROLOGIC RH(D): CPT

## 2018-02-16 PROCEDURE — 3700000000 HC ANESTHESIA ATTENDED CARE: Performed by: ORTHOPAEDIC SURGERY

## 2018-02-16 PROCEDURE — 3E0T3BZ INTRODUCTION OF ANESTHETIC AGENT INTO PERIPHERAL NERVES AND PLEXI, PERCUTANEOUS APPROACH: ICD-10-PCS | Performed by: ORTHOPAEDIC SURGERY

## 2018-02-16 PROCEDURE — 6360000002 HC RX W HCPCS

## 2018-02-16 PROCEDURE — 86900 BLOOD TYPING SEROLOGIC ABO: CPT

## 2018-02-16 PROCEDURE — 7100000000 HC PACU RECOVERY - FIRST 15 MIN: Performed by: ORTHOPAEDIC SURGERY

## 2018-02-16 PROCEDURE — 3600000005 HC SURGERY LEVEL 5 BASE: Performed by: ORTHOPAEDIC SURGERY

## 2018-02-16 PROCEDURE — 3600000015 HC SURGERY LEVEL 5 ADDTL 15MIN: Performed by: ORTHOPAEDIC SURGERY

## 2018-02-16 PROCEDURE — 2720000010 HC SURG SUPPLY STERILE: Performed by: ORTHOPAEDIC SURGERY

## 2018-02-16 PROCEDURE — 7100000001 HC PACU RECOVERY - ADDTL 15 MIN: Performed by: ORTHOPAEDIC SURGERY

## 2018-02-16 PROCEDURE — 36415 COLL VENOUS BLD VENIPUNCTURE: CPT

## 2018-02-16 PROCEDURE — 6370000000 HC RX 637 (ALT 250 FOR IP): Performed by: ORTHOPAEDIC SURGERY

## 2018-02-16 PROCEDURE — 6370000000 HC RX 637 (ALT 250 FOR IP): Performed by: ANESTHESIOLOGY

## 2018-02-16 PROCEDURE — C1713 ANCHOR/SCREW BN/BN,TIS/BN: HCPCS | Performed by: ORTHOPAEDIC SURGERY

## 2018-02-16 DEVICE — IMPLANTABLE DEVICE: Type: IMPLANTABLE DEVICE | Status: FUNCTIONAL

## 2018-02-16 DEVICE — BASEPLATE TIB SZ 4 UNIV KNEE TRITANIUM TOT STBL CEM: Type: IMPLANTABLE DEVICE | Status: FUNCTIONAL

## 2018-02-16 DEVICE — DISCONTINUED USE 416978 CEMENT PALACOS R SING DOSE 40GR: Type: IMPLANTABLE DEVICE | Status: FUNCTIONAL

## 2018-02-16 DEVICE — IMPLANT PAT DIA32MM THK10MM X3 ASYM TRIATHLON: Type: IMPLANTABLE DEVICE | Status: FUNCTIONAL

## 2018-02-16 RX ORDER — OXYCODONE HYDROCHLORIDE AND ACETAMINOPHEN 5; 325 MG/1; MG/1
1 TABLET ORAL EVERY 4 HOURS PRN
Status: DISCONTINUED | OUTPATIENT
Start: 2018-02-16 | End: 2018-02-19 | Stop reason: HOSPADM

## 2018-02-16 RX ORDER — HYDRALAZINE HYDROCHLORIDE 20 MG/ML
5 INJECTION INTRAMUSCULAR; INTRAVENOUS EVERY 10 MIN PRN
Status: DISCONTINUED | OUTPATIENT
Start: 2018-02-16 | End: 2018-02-16

## 2018-02-16 RX ORDER — SODIUM CHLORIDE 9 MG/ML
INJECTION, SOLUTION INTRAVENOUS CONTINUOUS
Status: DISCONTINUED | OUTPATIENT
Start: 2018-02-16 | End: 2018-02-18

## 2018-02-16 RX ORDER — LIDOCAINE HYDROCHLORIDE 10 MG/ML
INJECTION, SOLUTION EPIDURAL; INFILTRATION; INTRACAUDAL; PERINEURAL PRN
Status: DISCONTINUED | OUTPATIENT
Start: 2018-02-16 | End: 2018-02-16 | Stop reason: SDUPTHER

## 2018-02-16 RX ORDER — GLYCOPYRROLATE 0.2 MG/ML
INJECTION INTRAMUSCULAR; INTRAVENOUS PRN
Status: DISCONTINUED | OUTPATIENT
Start: 2018-02-16 | End: 2018-02-16 | Stop reason: SDUPTHER

## 2018-02-16 RX ORDER — PROMETHAZINE HYDROCHLORIDE 25 MG/ML
6.25 INJECTION, SOLUTION INTRAMUSCULAR; INTRAVENOUS
Status: DISCONTINUED | OUTPATIENT
Start: 2018-02-16 | End: 2018-02-16

## 2018-02-16 RX ORDER — SODIUM CHLORIDE 0.9 % (FLUSH) 0.9 %
10 SYRINGE (ML) INJECTION EVERY 12 HOURS SCHEDULED
Status: DISCONTINUED | OUTPATIENT
Start: 2018-02-16 | End: 2018-02-18

## 2018-02-16 RX ORDER — MIDAZOLAM HYDROCHLORIDE 1 MG/ML
2 INJECTION INTRAMUSCULAR; INTRAVENOUS
Status: COMPLETED | OUTPATIENT
Start: 2018-02-16 | End: 2018-02-16

## 2018-02-16 RX ORDER — MORPHINE SULFATE 4 MG/ML
4 INJECTION, SOLUTION INTRAMUSCULAR; INTRAVENOUS EVERY 5 MIN PRN
Status: DISCONTINUED | OUTPATIENT
Start: 2018-02-16 | End: 2018-02-16

## 2018-02-16 RX ORDER — DEXAMETHASONE SODIUM PHOSPHATE 10 MG/ML
INJECTION INTRAMUSCULAR; INTRAVENOUS PRN
Status: DISCONTINUED | OUTPATIENT
Start: 2018-02-16 | End: 2018-02-16 | Stop reason: SDUPTHER

## 2018-02-16 RX ORDER — MORPHINE SULFATE 4 MG/ML
4 INJECTION, SOLUTION INTRAMUSCULAR; INTRAVENOUS
Status: DISCONTINUED | OUTPATIENT
Start: 2018-02-16 | End: 2018-02-16 | Stop reason: SDUPTHER

## 2018-02-16 RX ORDER — FENTANYL CITRATE 50 UG/ML
INJECTION, SOLUTION INTRAMUSCULAR; INTRAVENOUS PRN
Status: DISCONTINUED | OUTPATIENT
Start: 2018-02-16 | End: 2018-02-16 | Stop reason: SDUPTHER

## 2018-02-16 RX ORDER — SODIUM CHLORIDE, SODIUM LACTATE, POTASSIUM CHLORIDE, CALCIUM CHLORIDE 600; 310; 30; 20 MG/100ML; MG/100ML; MG/100ML; MG/100ML
INJECTION, SOLUTION INTRAVENOUS CONTINUOUS
Status: DISCONTINUED | OUTPATIENT
Start: 2018-02-16 | End: 2018-02-16

## 2018-02-16 RX ORDER — MIDAZOLAM HYDROCHLORIDE 1 MG/ML
INJECTION INTRAMUSCULAR; INTRAVENOUS
Status: COMPLETED
Start: 2018-02-16 | End: 2018-02-16

## 2018-02-16 RX ORDER — MORPHINE SULFATE 4 MG/ML
4 INJECTION, SOLUTION INTRAMUSCULAR; INTRAVENOUS
Status: DISCONTINUED | OUTPATIENT
Start: 2018-02-16 | End: 2018-02-17

## 2018-02-16 RX ORDER — ASPIRIN 81 MG/1
81 TABLET ORAL 2 TIMES DAILY
Status: DISCONTINUED | OUTPATIENT
Start: 2018-02-16 | End: 2018-02-19 | Stop reason: HOSPADM

## 2018-02-16 RX ORDER — MORPHINE SULFATE 4 MG/ML
2 INJECTION, SOLUTION INTRAMUSCULAR; INTRAVENOUS
Status: DISCONTINUED | OUTPATIENT
Start: 2018-02-16 | End: 2018-02-16 | Stop reason: SDUPTHER

## 2018-02-16 RX ORDER — LABETALOL HYDROCHLORIDE 5 MG/ML
5 INJECTION, SOLUTION INTRAVENOUS EVERY 10 MIN PRN
Status: DISCONTINUED | OUTPATIENT
Start: 2018-02-16 | End: 2018-02-16

## 2018-02-16 RX ORDER — OXYCODONE HYDROCHLORIDE AND ACETAMINOPHEN 5; 325 MG/1; MG/1
2 TABLET ORAL EVERY 4 HOURS PRN
Status: DISCONTINUED | OUTPATIENT
Start: 2018-02-16 | End: 2018-02-19 | Stop reason: HOSPADM

## 2018-02-16 RX ORDER — ONDANSETRON 2 MG/ML
4 INJECTION INTRAMUSCULAR; INTRAVENOUS EVERY 6 HOURS PRN
Status: DISCONTINUED | OUTPATIENT
Start: 2018-02-16 | End: 2018-02-19 | Stop reason: HOSPADM

## 2018-02-16 RX ORDER — MORPHINE SULFATE 4 MG/ML
4 INJECTION, SOLUTION INTRAMUSCULAR; INTRAVENOUS
Status: DISCONTINUED | OUTPATIENT
Start: 2018-02-16 | End: 2018-02-16

## 2018-02-16 RX ORDER — MORPHINE SULFATE 4 MG/ML
2 INJECTION, SOLUTION INTRAMUSCULAR; INTRAVENOUS EVERY 5 MIN PRN
Status: DISCONTINUED | OUTPATIENT
Start: 2018-02-16 | End: 2018-02-16

## 2018-02-16 RX ORDER — DIPHENHYDRAMINE HYDROCHLORIDE 50 MG/ML
12.5 INJECTION INTRAMUSCULAR; INTRAVENOUS
Status: DISCONTINUED | OUTPATIENT
Start: 2018-02-16 | End: 2018-02-16

## 2018-02-16 RX ORDER — PROPOFOL 10 MG/ML
INJECTION, EMULSION INTRAVENOUS PRN
Status: DISCONTINUED | OUTPATIENT
Start: 2018-02-16 | End: 2018-02-16 | Stop reason: SDUPTHER

## 2018-02-16 RX ORDER — SODIUM CHLORIDE 0.9 % (FLUSH) 0.9 %
10 SYRINGE (ML) INJECTION PRN
Status: DISCONTINUED | OUTPATIENT
Start: 2018-02-16 | End: 2018-02-16

## 2018-02-16 RX ORDER — 0.9 % SODIUM CHLORIDE 0.9 %
500 INTRAVENOUS SOLUTION INTRAVENOUS PRN
Status: DISCONTINUED | OUTPATIENT
Start: 2018-02-16 | End: 2018-02-18

## 2018-02-16 RX ORDER — SODIUM CHLORIDE 0.9 % (FLUSH) 0.9 %
10 SYRINGE (ML) INJECTION PRN
Status: DISCONTINUED | OUTPATIENT
Start: 2018-02-16 | End: 2018-02-18

## 2018-02-16 RX ORDER — MORPHINE SULFATE 4 MG/ML
2 INJECTION, SOLUTION INTRAMUSCULAR; INTRAVENOUS
Status: DISCONTINUED | OUTPATIENT
Start: 2018-02-16 | End: 2018-02-17

## 2018-02-16 RX ORDER — MIDAZOLAM HYDROCHLORIDE 1 MG/ML
INJECTION INTRAMUSCULAR; INTRAVENOUS PRN
Status: DISCONTINUED | OUTPATIENT
Start: 2018-02-16 | End: 2018-02-16 | Stop reason: SDUPTHER

## 2018-02-16 RX ORDER — MEPERIDINE HYDROCHLORIDE 50 MG/ML
12.5 INJECTION INTRAMUSCULAR; INTRAVENOUS; SUBCUTANEOUS EVERY 5 MIN PRN
Status: DISCONTINUED | OUTPATIENT
Start: 2018-02-16 | End: 2018-02-16

## 2018-02-16 RX ORDER — SODIUM CHLORIDE 0.9 % (FLUSH) 0.9 %
10 SYRINGE (ML) INJECTION EVERY 12 HOURS SCHEDULED
Status: DISCONTINUED | OUTPATIENT
Start: 2018-02-16 | End: 2018-02-16

## 2018-02-16 RX ORDER — ROCURONIUM BROMIDE 10 MG/ML
INJECTION, SOLUTION INTRAVENOUS PRN
Status: DISCONTINUED | OUTPATIENT
Start: 2018-02-16 | End: 2018-02-16 | Stop reason: SDUPTHER

## 2018-02-16 RX ORDER — FENTANYL CITRATE 50 UG/ML
50 INJECTION, SOLUTION INTRAMUSCULAR; INTRAVENOUS
Status: DISCONTINUED | OUTPATIENT
Start: 2018-02-16 | End: 2018-02-16

## 2018-02-16 RX ORDER — DOCUSATE SODIUM 100 MG/1
100 CAPSULE, LIQUID FILLED ORAL 2 TIMES DAILY
Status: DISCONTINUED | OUTPATIENT
Start: 2018-02-16 | End: 2018-02-19 | Stop reason: HOSPADM

## 2018-02-16 RX ORDER — SCOLOPAMINE TRANSDERMAL SYSTEM 1 MG/1
1 PATCH, EXTENDED RELEASE TRANSDERMAL ONCE
Status: DISCONTINUED | OUTPATIENT
Start: 2018-02-16 | End: 2018-02-19

## 2018-02-16 RX ORDER — LIDOCAINE HYDROCHLORIDE 10 MG/ML
1 INJECTION, SOLUTION EPIDURAL; INFILTRATION; INTRACAUDAL; PERINEURAL
Status: DISCONTINUED | OUTPATIENT
Start: 2018-02-16 | End: 2018-02-16

## 2018-02-16 RX ORDER — METOCLOPRAMIDE HYDROCHLORIDE 5 MG/ML
10 INJECTION INTRAMUSCULAR; INTRAVENOUS
Status: DISCONTINUED | OUTPATIENT
Start: 2018-02-16 | End: 2018-02-16

## 2018-02-16 RX ADMIN — ASPIRIN 81 MG: 81 TABLET, COATED ORAL at 20:43

## 2018-02-16 RX ADMIN — SODIUM CHLORIDE, SODIUM LACTATE, POTASSIUM CHLORIDE, AND CALCIUM CHLORIDE: 600; 310; 30; 20 INJECTION, SOLUTION INTRAVENOUS at 11:16

## 2018-02-16 RX ADMIN — FENTANYL CITRATE 50 MCG: 50 INJECTION, SOLUTION INTRAMUSCULAR; INTRAVENOUS at 15:55

## 2018-02-16 RX ADMIN — SODIUM CHLORIDE, SODIUM LACTATE, POTASSIUM CHLORIDE, AND CALCIUM CHLORIDE: 600; 310; 30; 20 INJECTION, SOLUTION INTRAVENOUS at 14:13

## 2018-02-16 RX ADMIN — MIDAZOLAM 2 MG: 1 INJECTION INTRAMUSCULAR; INTRAVENOUS at 11:22

## 2018-02-16 RX ADMIN — FENTANYL CITRATE 100 MCG: 50 INJECTION, SOLUTION INTRAMUSCULAR; INTRAVENOUS at 14:20

## 2018-02-16 RX ADMIN — CEFAZOLIN 3 G: 1 INJECTION, POWDER, FOR SOLUTION INTRAMUSCULAR; INTRAVENOUS at 14:25

## 2018-02-16 RX ADMIN — GLYCOPYRROLATE 0.8 MG: 0.2 INJECTION, SOLUTION INTRAMUSCULAR; INTRAVENOUS at 16:18

## 2018-02-16 RX ADMIN — MIDAZOLAM HYDROCHLORIDE 2 MG: 1 INJECTION, SOLUTION INTRAMUSCULAR; INTRAVENOUS at 14:13

## 2018-02-16 RX ADMIN — FENTANYL CITRATE 50 MCG: 50 INJECTION, SOLUTION INTRAMUSCULAR; INTRAVENOUS at 16:10

## 2018-02-16 RX ADMIN — HYDROMORPHONE HYDROCHLORIDE 1 MG: 1 INJECTION, SOLUTION INTRAMUSCULAR; INTRAVENOUS; SUBCUTANEOUS at 16:30

## 2018-02-16 RX ADMIN — SODIUM CHLORIDE, SODIUM LACTATE, POTASSIUM CHLORIDE, AND CALCIUM CHLORIDE: 600; 310; 30; 20 INJECTION, SOLUTION INTRAVENOUS at 16:00

## 2018-02-16 RX ADMIN — MIDAZOLAM: 1 INJECTION INTRAMUSCULAR; INTRAVENOUS at 13:30

## 2018-02-16 RX ADMIN — SODIUM CHLORIDE: 9 INJECTION, SOLUTION INTRAVENOUS at 18:05

## 2018-02-16 RX ADMIN — SODIUM CHLORIDE, SODIUM LACTATE, POTASSIUM CHLORIDE, AND CALCIUM CHLORIDE: 600; 310; 30; 20 INJECTION, SOLUTION INTRAVENOUS at 14:40

## 2018-02-16 RX ADMIN — ROCURONIUM BROMIDE 10 MG: 10 INJECTION INTRAVENOUS at 15:25

## 2018-02-16 RX ADMIN — DOCUSATE SODIUM 100 MG: 100 CAPSULE, LIQUID FILLED ORAL at 20:43

## 2018-02-16 RX ADMIN — OXYCODONE HYDROCHLORIDE AND ACETAMINOPHEN 2 TABLET: 5; 325 TABLET ORAL at 20:44

## 2018-02-16 RX ADMIN — FENTANYL CITRATE 50 MCG: 50 INJECTION, SOLUTION INTRAMUSCULAR; INTRAVENOUS at 14:18

## 2018-02-16 RX ADMIN — PROPOFOL 200 MG: 10 INJECTION, EMULSION INTRAVENOUS at 14:18

## 2018-02-16 RX ADMIN — LIDOCAINE HYDROCHLORIDE 5 ML: 10 INJECTION, SOLUTION EPIDURAL; INFILTRATION; INTRACAUDAL; PERINEURAL at 14:18

## 2018-02-16 RX ADMIN — DEXAMETHASONE SODIUM PHOSPHATE 10 MG: 10 INJECTION INTRAMUSCULAR; INTRAVENOUS at 14:19

## 2018-02-16 RX ADMIN — NEOSTIGMINE METHYLSULFATE 4 MG: 1 INJECTION, SOLUTION INTRAMUSCULAR; INTRAVENOUS; SUBCUTANEOUS at 16:18

## 2018-02-16 RX ADMIN — ROCURONIUM BROMIDE 50 MG: 10 INJECTION INTRAVENOUS at 14:18

## 2018-02-16 ASSESSMENT — PAIN SCALES - GENERAL
PAINLEVEL_OUTOF10: 3
PAINLEVEL_OUTOF10: 0
PAINLEVEL_OUTOF10: 0

## 2018-02-16 ASSESSMENT — ENCOUNTER SYMPTOMS: SHORTNESS OF BREATH: 1

## 2018-02-16 NOTE — ANESTHESIA PRE PROCEDURE
mouth daily    Historical Provider, MD   LORazepam (ATIVAN) 1 MG tablet Take 1 mg by mouth every 8 hours as needed . 3/12/16   Historical Provider, MD   rOPINIRole (REQUIP) 3 MG tablet Take 3 mg by mouth See Admin Instructions 2 tabs in AM and 3 tabs in PM    Historical Provider, MD   fluticasone (FLONASE) 50 MCG/ACT nasal spray 2 sprays by Nasal route as needed     Historical Provider, MD   famciclovir (FAMVIR) 500 MG tablet   Take 500 mg by mouth 2 times daily     Historical Provider, MD   triamcinolone acetonide (KENALOG) 0.1 % paste Place onto teeth 2 times daily as needed Apply to teeth 2 times daily. Historical Provider, MD       Current medications:    Current Facility-Administered Medications   Medication Dose Route Frequency Provider Last Rate Last Dose    lactated ringers infusion   Intravenous Continuous Michaela Burroughs MD        ceFAZolin (ANCEF) 3 g in dextrose 5 % 100 mL IVPB  3 g Intravenous Once Michaela Burroughs MD           Allergies:     Allergies   Allergen Reactions    Codeine Nausea Only     Rapid heart rate, dizziness    Mobic [Meloxicam] Rash     Arms and legs bumpy red rash       Problem List:    Patient Active Problem List   Diagnosis Code    Umbilical hernia X83.6    Cholecystitis with cholelithiasis K80.10    MRSA carrier Z22.322    HTN (hypertension) I10    Abnormal echocardiogram R93.1    Chest pain R07.9    Shortness of breath R06.02    Sleep apnea G47.30    Abnormal Holter exam R94.31    Heart rate fast R00.0    Paroxysmal a-fib (HCC) I48.0    Atrial flutter, paroxysmal (HCC) I48.92    Sick sinus syndrome (HCC) I49.5    Recurrent ventral incisional hernia K43.2    Abdominal adhesions K66.0    Idiopathic hypotension I95.0    Facet syndrome, lumbar M12.88    Bilateral edema of lower extremity R60.0    Lumbar facet arthropathy M12.88    Lumbar facet arthropathy M12.88    Obstructive sleep apnea G47.33    Obstructive sleep apnea G47.33    Restless leg syndrome G25.81    Somnolence, daytime R40.0    Snoring R06.83    Insomnia G47.00    Sleep apnea, obstructive G47.33    Tear of medial meniscus of right knee, current S83.241A    Idiopathic progressive neuropathy G60.3    Spondylosis of lumbar region without myelopathy or radiculopathy M47.816    Tremor R25.1    Sciatica of right side M54.31    Chronic right-sided low back pain with sciatica M54.40, G89.29    Idiopathic chronic gout without tophus M1A. 00X0    Pilonidal cyst L05.91    Class 3 obesity due to excess calories with serious comorbidity in adult E66.09    Memory loss R41.3       Past Medical History:        Diagnosis Date    Abdominal adhesions 1/19/2016    Anxiety     Arthritis     Atrial fibrillation (HCC)     Atrial fibrillation and flutter (HCC) 9/2015    Chronic back pain     Chronic cough     Depression     Edema     Fibrocystic breast     GERD (gastroesophageal reflux disease)     Glossitis     Headache(784.0)     Heart burn     Heart disease     Hypertension     Hypothyroidism     Mouth sore     MRSA (methicillin resistant staph aureus) culture positive 8/4/14    nasal    Numbness     toes    Obstructive sleep apnea     BIPAP    Peripheral neuropathy (HCC)     PONV (postoperative nausea and vomiting)     Prolonged emergence from general anesthesia     Recurrent ventral incisional hernia 1/18/2016    Sleep apnea     Stroke ([address]     Stroke-like symptom     SVT (supraventricular tachycardia) (HCC)     SVT (supraventricular tachycardia) (HCC)     Swelling of extremity     Unspecified sleep apnea     clinicallybi-pap    Ventricular tachyarrhythmia ([address] 9/15    svt       Past Surgical History:        Procedure Laterality Date    CARDIAC CATHETERIZATION  8/18/15  JDT    EF 50%    CATARACT REMOVAL      CHOLECYSTECTOMY  8/4/14    BY Dr. Aron Vivas  2008    CYST INCISION AND DRAINAGE  03/2017    FINGER TRIGGER RELEASE      FOOT SURGERY Left and agrees with Pre Eval content              Shlomo Harding MD   2/16/2018

## 2018-02-17 PROBLEM — Z96.651 TOTAL KNEE REPLACEMENT STATUS, RIGHT: Status: ACTIVE | Noted: 2018-02-17

## 2018-02-17 LAB
HCT VFR BLD CALC: 37 % (ref 37–47)
HEMOGLOBIN: 11.3 G/DL (ref 12–16)

## 2018-02-17 PROCEDURE — 1210000000 HC MED SURG R&B

## 2018-02-17 PROCEDURE — G8979 MOBILITY GOAL STATUS: HCPCS

## 2018-02-17 PROCEDURE — 51798 US URINE CAPACITY MEASURE: CPT

## 2018-02-17 PROCEDURE — 97750 PHYSICAL PERFORMANCE TEST: CPT

## 2018-02-17 PROCEDURE — 2700000000 HC OXYGEN THERAPY PER DAY

## 2018-02-17 PROCEDURE — 85018 HEMOGLOBIN: CPT

## 2018-02-17 PROCEDURE — 51701 INSERT BLADDER CATHETER: CPT

## 2018-02-17 PROCEDURE — 6370000000 HC RX 637 (ALT 250 FOR IP): Performed by: ORTHOPAEDIC SURGERY

## 2018-02-17 PROCEDURE — 94762 N-INVAS EAR/PLS OXIMTRY CONT: CPT

## 2018-02-17 PROCEDURE — G8978 MOBILITY CURRENT STATUS: HCPCS

## 2018-02-17 PROCEDURE — 97162 PT EVAL MOD COMPLEX 30 MIN: CPT

## 2018-02-17 PROCEDURE — 85014 HEMATOCRIT: CPT

## 2018-02-17 PROCEDURE — 2580000003 HC RX 258: Performed by: ORTHOPAEDIC SURGERY

## 2018-02-17 PROCEDURE — 36415 COLL VENOUS BLD VENIPUNCTURE: CPT

## 2018-02-17 PROCEDURE — 6360000002 HC RX W HCPCS: Performed by: ORTHOPAEDIC SURGERY

## 2018-02-17 RX ORDER — LEVOTHYROXINE SODIUM 137 UG/1
137 TABLET ORAL DAILY
Status: DISCONTINUED | OUTPATIENT
Start: 2018-02-17 | End: 2018-02-19 | Stop reason: HOSPADM

## 2018-02-17 RX ORDER — HYDROMORPHONE HCL IN 0.9% NACL 10 MG/50ML
PATIENT CONTROLLED ANALGESIA SYRINGE INTRAVENOUS CONTINUOUS
Status: DISCONTINUED | OUTPATIENT
Start: 2018-02-17 | End: 2018-02-18

## 2018-02-17 RX ORDER — GABAPENTIN 600 MG/1
600 TABLET ORAL 3 TIMES DAILY
Status: DISCONTINUED | OUTPATIENT
Start: 2018-02-17 | End: 2018-02-19 | Stop reason: HOSPADM

## 2018-02-17 RX ORDER — IBUPROFEN 400 MG/1
200 TABLET ORAL DAILY
Status: DISCONTINUED | OUTPATIENT
Start: 2018-02-17 | End: 2018-02-19 | Stop reason: HOSPADM

## 2018-02-17 RX ORDER — ACYCLOVIR 800 MG/1
800 TABLET ORAL 2 TIMES DAILY
Status: DISCONTINUED | OUTPATIENT
Start: 2018-02-17 | End: 2018-02-19 | Stop reason: HOSPADM

## 2018-02-17 RX ORDER — FLUTICASONE PROPIONATE 50 MCG
2 SPRAY, SUSPENSION (ML) NASAL PRN
Status: DISCONTINUED | OUTPATIENT
Start: 2018-02-17 | End: 2018-02-19 | Stop reason: HOSPADM

## 2018-02-17 RX ORDER — ROPINIROLE 2 MG/1
6 TABLET, FILM COATED ORAL DAILY
Status: DISCONTINUED | OUTPATIENT
Start: 2018-02-18 | End: 2018-02-19 | Stop reason: HOSPADM

## 2018-02-17 RX ORDER — PANTOPRAZOLE SODIUM 40 MG/1
40 TABLET, DELAYED RELEASE ORAL
Status: DISCONTINUED | OUTPATIENT
Start: 2018-02-18 | End: 2018-02-19 | Stop reason: HOSPADM

## 2018-02-17 RX ORDER — NALOXONE HYDROCHLORIDE 0.4 MG/ML
0.4 INJECTION, SOLUTION INTRAMUSCULAR; INTRAVENOUS; SUBCUTANEOUS PRN
Status: DISCONTINUED | OUTPATIENT
Start: 2018-02-17 | End: 2018-02-18

## 2018-02-17 RX ORDER — VENLAFAXINE HYDROCHLORIDE 75 MG/1
75 CAPSULE, EXTENDED RELEASE ORAL
Status: DISCONTINUED | OUTPATIENT
Start: 2018-02-18 | End: 2018-02-19 | Stop reason: HOSPADM

## 2018-02-17 RX ORDER — LORAZEPAM 1 MG/1
1 TABLET ORAL EVERY 8 HOURS PRN
Status: DISCONTINUED | OUTPATIENT
Start: 2018-02-17 | End: 2018-02-19 | Stop reason: HOSPADM

## 2018-02-17 RX ORDER — DULOXETIN HYDROCHLORIDE 60 MG/1
60 CAPSULE, DELAYED RELEASE ORAL 2 TIMES DAILY
Status: DISCONTINUED | OUTPATIENT
Start: 2018-02-17 | End: 2018-02-19 | Stop reason: HOSPADM

## 2018-02-17 RX ORDER — FLECAINIDE ACETATE 100 MG/1
100 TABLET ORAL 2 TIMES DAILY
Status: DISCONTINUED | OUTPATIENT
Start: 2018-02-17 | End: 2018-02-19 | Stop reason: HOSPADM

## 2018-02-17 RX ORDER — DIPHENHYDRAMINE HYDROCHLORIDE 50 MG/ML
25 INJECTION INTRAMUSCULAR; INTRAVENOUS EVERY 6 HOURS PRN
Status: DISCONTINUED | OUTPATIENT
Start: 2018-02-17 | End: 2018-02-19 | Stop reason: HOSPADM

## 2018-02-17 RX ORDER — ALENDRONATE SODIUM 70 MG/1
70 TABLET ORAL
Status: DISCONTINUED | OUTPATIENT
Start: 2018-02-18 | End: 2018-02-17 | Stop reason: CLARIF

## 2018-02-17 RX ADMIN — FLECAINIDE ACETATE 100 MG: 100 TABLET ORAL at 20:40

## 2018-02-17 RX ADMIN — DULOXETINE HYDROCHLORIDE 60 MG: 60 CAPSULE, DELAYED RELEASE ORAL at 13:00

## 2018-02-17 RX ADMIN — ACYCLOVIR 800 MG: 800 TABLET ORAL at 20:40

## 2018-02-17 RX ADMIN — METOPROLOL TARTRATE 25 MG: 25 TABLET ORAL at 13:00

## 2018-02-17 RX ADMIN — MORPHINE SULFATE 4 MG: 4 INJECTION, SOLUTION INTRAMUSCULAR; INTRAVENOUS at 07:19

## 2018-02-17 RX ADMIN — GABAPENTIN 600 MG: 600 TABLET, FILM COATED ORAL at 20:39

## 2018-02-17 RX ADMIN — DOCUSATE SODIUM 100 MG: 100 CAPSULE, LIQUID FILLED ORAL at 20:40

## 2018-02-17 RX ADMIN — ROPINIROLE HYDROCHLORIDE 9 MG: 4 TABLET, FILM COATED ORAL at 20:40

## 2018-02-17 RX ADMIN — LORAZEPAM 1 MG: 1 TABLET ORAL at 20:40

## 2018-02-17 RX ADMIN — LEVOTHYROXINE SODIUM 137 MCG: 137 TABLET ORAL at 13:00

## 2018-02-17 RX ADMIN — ACYCLOVIR 800 MG: 800 TABLET ORAL at 13:15

## 2018-02-17 RX ADMIN — FLECAINIDE ACETATE 100 MG: 100 TABLET ORAL at 13:00

## 2018-02-17 RX ADMIN — OXYCODONE HYDROCHLORIDE AND ACETAMINOPHEN 2 TABLET: 5; 325 TABLET ORAL at 08:19

## 2018-02-17 RX ADMIN — DULOXETINE HYDROCHLORIDE 60 MG: 60 CAPSULE, DELAYED RELEASE ORAL at 20:40

## 2018-02-17 RX ADMIN — Medication 10 MG: at 13:18

## 2018-02-17 RX ADMIN — ASPIRIN 81 MG: 81 TABLET, COATED ORAL at 08:18

## 2018-02-17 RX ADMIN — MORPHINE SULFATE 4 MG: 4 INJECTION, SOLUTION INTRAMUSCULAR; INTRAVENOUS at 09:44

## 2018-02-17 RX ADMIN — DOCUSATE SODIUM 100 MG: 100 CAPSULE, LIQUID FILLED ORAL at 08:18

## 2018-02-17 RX ADMIN — ONDANSETRON 4 MG: 2 INJECTION INTRAMUSCULAR; INTRAVENOUS at 20:37

## 2018-02-17 RX ADMIN — MORPHINE SULFATE 2 MG: 4 INJECTION, SOLUTION INTRAMUSCULAR; INTRAVENOUS at 02:37

## 2018-02-17 RX ADMIN — SODIUM CHLORIDE: 9 INJECTION, SOLUTION INTRAVENOUS at 17:42

## 2018-02-17 RX ADMIN — GABAPENTIN 600 MG: 600 TABLET, FILM COATED ORAL at 13:00

## 2018-02-17 RX ADMIN — OXYCODONE HYDROCHLORIDE AND ACETAMINOPHEN 2 TABLET: 5; 325 TABLET ORAL at 00:55

## 2018-02-17 RX ADMIN — METOPROLOL TARTRATE 25 MG: 25 TABLET ORAL at 20:40

## 2018-02-17 RX ADMIN — ASPIRIN 81 MG: 81 TABLET, COATED ORAL at 20:40

## 2018-02-17 ASSESSMENT — PAIN SCALES - GENERAL
PAINLEVEL_OUTOF10: 0
PAINLEVEL_OUTOF10: 3
PAINLEVEL_OUTOF10: 5
PAINLEVEL_OUTOF10: 10
PAINLEVEL_OUTOF10: 8
PAINLEVEL_OUTOF10: 0
PAINLEVEL_OUTOF10: 5
PAINLEVEL_OUTOF10: 10
PAINLEVEL_OUTOF10: 3
PAINLEVEL_OUTOF10: 5

## 2018-02-17 ASSESSMENT — PAIN DESCRIPTION - FREQUENCY
FREQUENCY: INTERMITTENT
FREQUENCY: CONTINUOUS
FREQUENCY: INTERMITTENT

## 2018-02-17 ASSESSMENT — PAIN DESCRIPTION - LOCATION
LOCATION: KNEE

## 2018-02-17 ASSESSMENT — PAIN DESCRIPTION - ORIENTATION
ORIENTATION: RIGHT

## 2018-02-17 ASSESSMENT — PAIN DESCRIPTION - PAIN TYPE
TYPE: SURGICAL PAIN

## 2018-02-17 ASSESSMENT — PAIN DESCRIPTION - DESCRIPTORS: DESCRIPTORS: SORE

## 2018-02-18 LAB
HCT VFR BLD CALC: 34.9 % (ref 37–47)
HEMOGLOBIN: 10.5 G/DL (ref 12–16)

## 2018-02-18 PROCEDURE — 94762 N-INVAS EAR/PLS OXIMTRY CONT: CPT

## 2018-02-18 PROCEDURE — 97116 GAIT TRAINING THERAPY: CPT

## 2018-02-18 PROCEDURE — 51702 INSERT TEMP BLADDER CATH: CPT

## 2018-02-18 PROCEDURE — 85014 HEMATOCRIT: CPT

## 2018-02-18 PROCEDURE — 6370000000 HC RX 637 (ALT 250 FOR IP): Performed by: ORTHOPAEDIC SURGERY

## 2018-02-18 PROCEDURE — 51701 INSERT BLADDER CATHETER: CPT

## 2018-02-18 PROCEDURE — 85018 HEMOGLOBIN: CPT

## 2018-02-18 PROCEDURE — 1210000000 HC MED SURG R&B

## 2018-02-18 PROCEDURE — 36415 COLL VENOUS BLD VENIPUNCTURE: CPT

## 2018-02-18 PROCEDURE — 51798 US URINE CAPACITY MEASURE: CPT

## 2018-02-18 PROCEDURE — 97530 THERAPEUTIC ACTIVITIES: CPT

## 2018-02-18 RX ADMIN — ASPIRIN 81 MG: 81 TABLET, COATED ORAL at 20:27

## 2018-02-18 RX ADMIN — METOPROLOL TARTRATE 25 MG: 25 TABLET ORAL at 20:27

## 2018-02-18 RX ADMIN — FLECAINIDE ACETATE 100 MG: 100 TABLET ORAL at 08:25

## 2018-02-18 RX ADMIN — DULOXETINE HYDROCHLORIDE 60 MG: 60 CAPSULE, DELAYED RELEASE ORAL at 20:27

## 2018-02-18 RX ADMIN — ROPINIROLE HYDROCHLORIDE 9 MG: 4 TABLET, FILM COATED ORAL at 20:26

## 2018-02-18 RX ADMIN — GABAPENTIN 600 MG: 600 TABLET, FILM COATED ORAL at 08:25

## 2018-02-18 RX ADMIN — GABAPENTIN 600 MG: 600 TABLET, FILM COATED ORAL at 20:27

## 2018-02-18 RX ADMIN — GABAPENTIN 600 MG: 600 TABLET, FILM COATED ORAL at 13:47

## 2018-02-18 RX ADMIN — ACYCLOVIR 800 MG: 800 TABLET ORAL at 08:25

## 2018-02-18 RX ADMIN — ASPIRIN 81 MG: 81 TABLET, COATED ORAL at 08:24

## 2018-02-18 RX ADMIN — ROPINIROLE HYDROCHLORIDE 6 MG: 2 TABLET, FILM COATED ORAL at 08:24

## 2018-02-18 RX ADMIN — DOCUSATE SODIUM 100 MG: 100 CAPSULE, LIQUID FILLED ORAL at 20:27

## 2018-02-18 RX ADMIN — PANTOPRAZOLE SODIUM 40 MG: 40 TABLET, DELAYED RELEASE ORAL at 06:10

## 2018-02-18 RX ADMIN — DULOXETINE HYDROCHLORIDE 60 MG: 60 CAPSULE, DELAYED RELEASE ORAL at 08:24

## 2018-02-18 RX ADMIN — DOCUSATE SODIUM 100 MG: 100 CAPSULE, LIQUID FILLED ORAL at 08:24

## 2018-02-18 RX ADMIN — METOPROLOL TARTRATE 25 MG: 25 TABLET ORAL at 08:24

## 2018-02-18 RX ADMIN — LEVOTHYROXINE SODIUM 137 MCG: 137 TABLET ORAL at 08:25

## 2018-02-18 RX ADMIN — ACYCLOVIR 800 MG: 800 TABLET ORAL at 20:27

## 2018-02-18 RX ADMIN — FLECAINIDE ACETATE 100 MG: 100 TABLET ORAL at 20:27

## 2018-02-18 RX ADMIN — VENLAFAXINE HYDROCHLORIDE 75 MG: 75 CAPSULE, EXTENDED RELEASE ORAL at 08:24

## 2018-02-18 ASSESSMENT — PAIN DESCRIPTION - ORIENTATION: ORIENTATION: RIGHT

## 2018-02-18 ASSESSMENT — PAIN SCALES - GENERAL: PAINLEVEL_OUTOF10: 3

## 2018-02-18 ASSESSMENT — PAIN DESCRIPTION - PAIN TYPE: TYPE: SURGICAL PAIN

## 2018-02-18 ASSESSMENT — PAIN DESCRIPTION - LOCATION: LOCATION: KNEE

## 2018-02-19 ENCOUNTER — TELEPHONE (OUTPATIENT)
Dept: PRIMARY CARE CLINIC | Age: 63
End: 2018-02-19

## 2018-02-19 VITALS
RESPIRATION RATE: 18 BRPM | TEMPERATURE: 98.2 F | WEIGHT: 266 LBS | BODY MASS INDEX: 41.75 KG/M2 | HEIGHT: 67 IN | SYSTOLIC BLOOD PRESSURE: 100 MMHG | OXYGEN SATURATION: 95 % | HEART RATE: 69 BPM | DIASTOLIC BLOOD PRESSURE: 55 MMHG

## 2018-02-19 LAB
HCT VFR BLD CALC: 32.8 % (ref 37–47)
HEMOGLOBIN: 10.2 G/DL (ref 12–16)

## 2018-02-19 PROCEDURE — 6370000000 HC RX 637 (ALT 250 FOR IP): Performed by: ORTHOPAEDIC SURGERY

## 2018-02-19 PROCEDURE — 51701 INSERT BLADDER CATHETER: CPT

## 2018-02-19 PROCEDURE — 97116 GAIT TRAINING THERAPY: CPT

## 2018-02-19 PROCEDURE — 51798 US URINE CAPACITY MEASURE: CPT

## 2018-02-19 PROCEDURE — 36415 COLL VENOUS BLD VENIPUNCTURE: CPT

## 2018-02-19 PROCEDURE — 85014 HEMATOCRIT: CPT

## 2018-02-19 PROCEDURE — 85018 HEMOGLOBIN: CPT

## 2018-02-19 RX ORDER — OXYCODONE HYDROCHLORIDE AND ACETAMINOPHEN 5; 325 MG/1; MG/1
1 TABLET ORAL EVERY 4 HOURS PRN
Qty: 60 TABLET | Refills: 0 | Status: SHIPPED | OUTPATIENT
Start: 2018-02-19 | End: 2018-02-26

## 2018-02-19 RX ADMIN — PANTOPRAZOLE SODIUM 40 MG: 40 TABLET, DELAYED RELEASE ORAL at 06:49

## 2018-02-19 RX ADMIN — DULOXETINE HYDROCHLORIDE 60 MG: 60 CAPSULE, DELAYED RELEASE ORAL at 11:25

## 2018-02-19 RX ADMIN — VENLAFAXINE HYDROCHLORIDE 75 MG: 75 CAPSULE, EXTENDED RELEASE ORAL at 11:25

## 2018-02-19 RX ADMIN — DOCUSATE SODIUM 100 MG: 100 CAPSULE, LIQUID FILLED ORAL at 11:27

## 2018-02-19 RX ADMIN — FLECAINIDE ACETATE 100 MG: 100 TABLET ORAL at 11:26

## 2018-02-19 RX ADMIN — METOPROLOL TARTRATE 25 MG: 25 TABLET ORAL at 11:26

## 2018-02-19 RX ADMIN — ROPINIROLE HYDROCHLORIDE 6 MG: 2 TABLET, FILM COATED ORAL at 11:26

## 2018-02-19 RX ADMIN — GABAPENTIN 600 MG: 600 TABLET, FILM COATED ORAL at 11:26

## 2018-02-19 RX ADMIN — LEVOTHYROXINE SODIUM 137 MCG: 137 TABLET ORAL at 11:25

## 2018-02-19 RX ADMIN — ASPIRIN 81 MG: 81 TABLET, COATED ORAL at 11:26

## 2018-02-19 RX ADMIN — ACYCLOVIR 800 MG: 800 TABLET ORAL at 11:25

## 2018-03-02 ENCOUNTER — APPOINTMENT (OUTPATIENT)
Dept: PREADMISSION TESTING | Facility: HOSPITAL | Age: 63
End: 2018-03-02

## 2018-03-02 ENCOUNTER — ANESTHESIA EVENT (OUTPATIENT)
Dept: PERIOP | Facility: HOSPITAL | Age: 63
End: 2018-03-02

## 2018-03-02 VITALS
SYSTOLIC BLOOD PRESSURE: 112 MMHG | WEIGHT: 270.73 LBS | DIASTOLIC BLOOD PRESSURE: 56 MMHG | HEART RATE: 70 BPM | OXYGEN SATURATION: 93 % | HEIGHT: 65 IN | BODY MASS INDEX: 45.11 KG/M2 | RESPIRATION RATE: 18 BRPM

## 2018-03-02 DIAGNOSIS — G89.29 CHRONIC BACK PAIN, UNSPECIFIED BACK LOCATION, UNSPECIFIED BACK PAIN LATERALITY: ICD-10-CM

## 2018-03-02 DIAGNOSIS — M54.9 CHRONIC BACK PAIN, UNSPECIFIED BACK LOCATION, UNSPECIFIED BACK PAIN LATERALITY: ICD-10-CM

## 2018-03-02 LAB
ANION GAP SERPL CALCULATED.3IONS-SCNC: 9 MMOL/L (ref 4–13)
BUN BLD-MCNC: 25 MG/DL (ref 5–21)
BUN/CREAT SERPL: 18.8 (ref 7–25)
CALCIUM SPEC-SCNC: 8.5 MG/DL (ref 8.4–10.4)
CHLORIDE SERPL-SCNC: 98 MMOL/L (ref 98–110)
CO2 SERPL-SCNC: 33 MMOL/L (ref 24–31)
CREAT BLD-MCNC: 1.33 MG/DL (ref 0.5–1.4)
DEPRECATED RDW RBC AUTO: 53.3 FL (ref 40–54)
ERYTHROCYTE [DISTWIDTH] IN BLOOD BY AUTOMATED COUNT: 15.9 % (ref 12–15)
GFR SERPL CREATININE-BSD FRML MDRD: 40 ML/MIN/1.73
GLUCOSE BLD-MCNC: 126 MG/DL (ref 70–100)
HCT VFR BLD AUTO: 33.8 % (ref 37–47)
HGB BLD-MCNC: 10.5 G/DL (ref 12–16)
MCH RBC QN AUTO: 28.3 PG (ref 28–32)
MCHC RBC AUTO-ENTMCNC: 31.1 G/DL (ref 33–36)
MCV RBC AUTO: 91.1 FL (ref 82–98)
PLATELET # BLD AUTO: 376 10*3/MM3 (ref 130–400)
PMV BLD AUTO: 11.1 FL (ref 6–12)
POTASSIUM BLD-SCNC: 4.2 MMOL/L (ref 3.5–5.3)
RBC # BLD AUTO: 3.71 10*6/MM3 (ref 4.2–5.4)
SODIUM BLD-SCNC: 140 MMOL/L (ref 135–145)
WBC NRBC COR # BLD: 19.86 10*3/MM3 (ref 4.8–10.8)

## 2018-03-02 PROCEDURE — 80048 BASIC METABOLIC PNL TOTAL CA: CPT | Performed by: ORTHOPAEDIC SURGERY

## 2018-03-02 PROCEDURE — 36415 COLL VENOUS BLD VENIPUNCTURE: CPT

## 2018-03-02 PROCEDURE — 93005 ELECTROCARDIOGRAM TRACING: CPT

## 2018-03-02 PROCEDURE — 93010 ELECTROCARDIOGRAM REPORT: CPT | Performed by: INTERNAL MEDICINE

## 2018-03-02 PROCEDURE — 85027 COMPLETE CBC AUTOMATED: CPT | Performed by: ORTHOPAEDIC SURGERY

## 2018-03-02 RX ORDER — TIZANIDINE 4 MG/1
TABLET ORAL
Qty: 30 TABLET | Refills: 0 | Status: SHIPPED | OUTPATIENT
Start: 2018-03-02 | End: 2018-04-03 | Stop reason: SDUPTHER

## 2018-03-02 NOTE — DISCHARGE INSTRUCTIONS
DAY OF SURGERY INSTRUCTIONS        YOUR SURGEON: OLGA HENDRIX     PROCEDURE: IRRIGATION AND DEBRIDEMENT TOTAL KNEE AND EXCHANGE POLY RIGHT    DATE OF SURGERY: 3/3/2018    ARRIVAL TIME: AS DIRECTED BY OFFICE    DAY OF SURGERY TAKE ONLY THESE MEDICATIONS: TAMBOCOR AND METOPROLOL        BEFORE YOU COME TO THE HOSPITAL  (Pre-op instructions)  • Do not eat, drink, smoke or chew gum after midnight the night before surgery.  This also includes no mints.  • Morning of surgery take only the medicines you have been instructed with a sip of water unless otherwise instructed  by your physician.  • Do not shave, wear makeup or dark nail polish.  • Remove all jewelry including rings.  • Leave anything you consider valuable at home.  • Leave your suitcase in the car until after your surgery.  • Bring the following with you if applicable:  o Picture ID and insurance, Medicare or Medicaid cards  o Co-pay/deductible required by insurance (cash, check, credit card)  o Copy of advance directive, living will or power-of- documents if not brought to PAT  o CPAP or BIPAP mask and tubing  o Relaxation aids (MP3 player, book, magazine)  • On the day of surgery check in at registration located at the main entrance of the hospital.       Outpatient Surgery Guidelines, Adult  Outpatient procedures are those for which the person having the procedure is allowed to go home the same day as the procedure. Various procedures are done on an outpatient basis. You should follow some general guidelines if you will be having an outpatient procedure.  LET YOUR HEALTH CARE PROVIDER KNOW ABOUT:  · Any allergies you have.  · All medicines you are taking, including vitamins, herbs, eye drops, creams, and over-the-counter medicines.  · Previous problems you or members of your family have had with the use of anesthetics.  · Any blood disorders you have.  · Previous surgeries you have had.  · Medical conditions you have.  RISKS AND  COMPLICATIONS  Your health care provider will discuss possible risks and complications with you before surgery. Common risks and complications include:    · Problems due to the use of anesthetics.  · Blood loss and replacement (does not apply to minor surgical procedures).  · Temporary increase in pain due to surgery.  · Uncorrected pain or problems that the surgery was meant to correct.  · Infection.  · New damage.  BEFORE THE PROCEDURE  · Ask your health care provider about changing or stopping your regular medicines. You may need to stop taking certain medicines in the days or weeks before the procedure.  · Stop smoking at least 2 weeks before surgery. This lowers your risk for complications during and after surgery. Ask your health care provider for help with this if needed.  · Eat your usual meals and a light supper the day before surgery. Continue fluid intake. Do not drink alcohol.  · Do not eat or drink after midnight the night before your surgery.   · Arrange for someone to take you home and to stay with you for 24 hours after the procedure. Medicine given for your procedure may affect your ability to drive or to care for yourself.  · Call your health care provider's office if you develop an illness or problem that may prevent you from safely having your procedure.  AFTER THE PROCEDURE  After surgery, you will be taken to a recovery area, where your progress will be monitored. If there are no complications, you will be allowed to go home when you are awake, stable, and taking fluids well. You may have numbness around the surgical site. Healing will take some time. You will have tenderness at the surgical site and may have some swelling and bruising. You may also have some nausea.  HOME CARE INSTRUCTIONS  · Do not drive for 24 hours, or as directed by your health care provider. Do not drive while taking prescription pain medicines.  · Do not drink alcohol for 24 hours.  · Do not make important decisions or  sign legal documents for 24 hours.  · You may resume a normal diet and activities as directed.  · Do not lift anything heavier than 10 pounds (4.5 kg) or play contact sports until your health care provider says it is okay.  · Change your bandages (dressings) as directed.  · Only take over-the-counter or prescription medicines as directed by your health care provider.  · Follow up with your health care provider as directed.  SEEK MEDICAL CARE IF:  · You have increased bleeding (more than a small spot) from the surgical site.  · You have redness, swelling, or increasing pain in the wound.  · You see pus coming from the wound.  · You have a fever.  · You notice a bad smell coming from the wound or dressing.  · You feel lightheaded or faint.  · You develop a rash.  · You have trouble breathing.  · You develop allergies.  MAKE SURE YOU:  · Understand these instructions.  · Will watch your condition.  · Will get help right away if you are not doing well or get worse.     This information is not intended to replace advice given to you by your health care provider. Make sure you discuss any questions you have with your health care provider.     Document Released: 09/12/2002 Document Revised: 05/03/2016 Document Reviewed: 05/22/2014  [employer] Interactive Patient Education ©2016 [employer]       Fall Prevention in Hospitals, Adult  As a hospital patient, your condition and the treatments you receive can increase your risk for falls. Some additional risk factors for falls in a hospital include:  · Being in an unfamiliar environment.  · Being on bed rest.  · Your surgery.  · Taking certain medicines.  · Your tubing requirements, such as intravenous (IV) therapy or catheters.  It is important that you learn how to decrease fall risks while at the hospital. Below are important tips that can help prevent falls.  SAFETY TIPS FOR PREVENTING FALLS  Talk about your risk of falling.  · Ask your health care provider why you are at  risk for falling. Is it your medicine, illness, tubing placement, or something else?  · Make a plan with your health care provider to keep you safe from falls.  · Ask your health care provider or pharmacist about side effects of your medicines. Some medicines can make you dizzy or affect your coordination.  Ask for help.  · Ask for help before getting out of bed. You may need to press your call button.  · Ask for assistance in getting safely to the toilet.  · Ask for a walker or cane to be put at your bedside. Ask that most of the side rails on your bed be placed up before your health care provider leaves the room.  · Ask family or friends to sit with you.  · Ask for things that are out of your reach, such as your glasses, hearing aids, telephone, bedside table, or call button.  Follow these tips to avoid falling:  · Stay lying or seated, rather than standing, while waiting for help.  · Wear rubber-soled slippers or shoes whenever you walk in the hospital.  · Avoid quick, sudden movements.  ¨ Change positions slowly.  ¨ Sit on the side of your bed before standing.  ¨ Stand up slowly and wait before you start to walk.  · Let your health care provider know if there is a spill on the floor.  · Pay careful attention to the medical equipment, electrical cords, and tubes around you.  · When you need help, use your call button by your bed or in the bathroom. Wait for one of your health care providers to help you.  · If you feel dizzy or unsure of your footing, return to bed and wait for assistance.  · Avoid being distracted by the TV, telephone, or another person in your room.  · Do not lean or support yourself on rolling objects, such as IV poles or bedside tables.     This information is not intended to replace advice given to you by your health care provider. Make sure you discuss any questions you have with your health care provider.     Document Released: 12/15/2001 Document Revised: 01/08/2016 Document Reviewed:  08/25/2013  [employer] Interactive Patient Education ©2016 [employer]       Surgical Site Infections FAQs  What is a Surgical Site Infection (SSI)?  A surgical site infection is an infection that occurs after surgery in the part of the body where the surgery took place. Most patients who have surgery do not develop an infection. However, infections develop in about 1 to 3 out of every 100 patients who have surgery.  Some of the common symptoms of a surgical site infection are:  · Redness and pain around the area where you had surgery  · Drainage of cloudy fluid from your surgical wound  · Fever  Can SSIs be treated?  Yes. Most surgical site infections can be treated with antibiotics. The antibiotic given to you depends on the bacteria (germs) causing the infection. Sometimes patients with SSIs also need another surgery to treat the infection.  What are some of the things that hospitals are doing to prevent SSIs?  To prevent SSIs, doctors, nurses, and other healthcare providers:  · Clean their hands and arms up to their elbows with an antiseptic agent just before the surgery.  · Clean their hands with soap and water or an alcohol-based hand rub before and after caring for each patient.  · May remove some of your hair immediately before your surgery using electric clippers if the hair is in the same area where the procedure will occur. They should not shave you with a razor.  · Wear special hair covers, masks, gowns, and gloves during surgery to keep the surgery area clean.  · Give you antibiotics before your surgery starts. In most cases, you should get antibiotics within 60 minutes before the surgery starts and the antibiotics should be stopped within 24 hours after surgery.  · Clean the skin at the site of your surgery with a special soap that kills germs.  What can I do to help prevent SSIs?  Before your surgery:  · Tell your doctor about other medical problems you may have. Health problems such as allergies,  diabetes, and obesity could affect your surgery and your treatment.  · Quit smoking. Patients who smoke get more infections. Talk to your doctor about how you can quit before your surgery.  · Do not shave near where you will have surgery. Shaving with a razor can irritate your skin and make it easier to develop an infection.  At the time of your surgery:  · Speak up if someone tries to shave you with a razor before surgery. Ask why you need to be shaved and talk with your surgeon if you have any concerns.  · Ask if you will get antibiotics before surgery.  After your surgery:  · Make sure that your healthcare providers clean their hands before examining you, either with soap and water or an alcohol-based hand rub.  · If you do not see your providers clean their hands, please ask them to do so.  · Family and friends who visit you should not touch the surgical wound or dressings.  · Family and friends should clean their hands with soap and water or an alcohol-based hand rub before and after visiting you. If you do not see them clean their hands, ask them to clean their hands.  What do I need to do when I go home from the hospital?  · Before you go home, your doctor or nurse should explain everything you need to know about taking care of your wound. Make sure you understand how to care for your wound before you leave the hospital.  · Always clean your hands before and after caring for your wound.  · Before you go home, make sure you know who to contact if you have questions or problems after you get home.  · If you have any symptoms of an infection, such as redness and pain at the surgery site, drainage, or fever, call your doctor immediately.  If you have additional questions, please ask your doctor or nurse.  Developed and co-sponsored by The Society for Healthcare Epidemiology of [place] (SHEA); Infectious Diseases Society of [place] (IDSA); American Hospital Association; Association for Professionals in Infection  Control and Epidemiology (APIC); Centers for Disease Control and Prevention (CDC); and The Joint Commission.     This information is not intended to replace advice given to you by your health care provider. Make sure you discuss any questions you have with your health care provider.     Document Released: 12/23/2014 Document Revised: 01/08/2016 Document Reviewed: 03/02/2016  [employer] Interactive Patient Education ©2016 [employer]       [facility]  CHG 4% Patient Instruction Sheet    Preparing the Skin Before Surgery  Preparing or “prepping” skin before surgery can reduce the risk of infection at the surgical site. To make the process easier,[facility] has chosen 4% Chlorhexidine Gluconate (CHG) antiseptic solution.   The steps below outline the prepping process and should be carefully followed.                                                                                                                                                      Prep the skin at the following time(s):                                                      We recommend you shower the night before surgery, and again the morning of surgery with the 4% CHG antiseptic solution using half of the bottle and a cloth each time.  Dress in clean clothes/sleepwear after showering.  See instructions below for application.          Do not apply any lotions or moisturizers.       Do not shave the area to be prepped for at least 2 days prior to surgery.    Clipping the hair may be done immediately prior to your surgery at the hospital    if needed.    Directions:  Thoroughly rinse your body with water.  Apply 4% CHG to a cloth and wash skin gently, paying special attention to the operative site.  Rinse again thoroughly.  Once you have begun using this product do not apply anything else to your skin. If itching or redness persists, rinse affected areas and discontinue use.    When using this product:  • Keep out of eyes, ears, and mouth.  • If  solution should contact these areas, rinse out promptly and thoroughly with water.  • For external use only.  • Do not use in genital area, on your face or head.      PATIENT/FAMILY/RESPONSIBLE PARTY VERBALIZES UNDERSTANDING OF ABOVE EDUCATION.  COPY OF PAIN SCALE GIVEN AND REVIEWED WITH VERBALIZED UNDERSTANDING.

## 2018-03-03 ENCOUNTER — ANESTHESIA (OUTPATIENT)
Dept: PERIOP | Facility: HOSPITAL | Age: 63
End: 2018-03-03

## 2018-03-03 ENCOUNTER — HOSPITAL ENCOUNTER (INPATIENT)
Facility: HOSPITAL | Age: 63
LOS: 5 days | Discharge: HOME HEALTH | End: 2018-03-08
Attending: ORTHOPAEDIC SURGERY | Admitting: ORTHOPAEDIC SURGERY

## 2018-03-03 DIAGNOSIS — Z74.09 IMPAIRED MOBILITY: ICD-10-CM

## 2018-03-03 DIAGNOSIS — M00.9 INFECTION OF RIGHT KNEE (HCC): ICD-10-CM

## 2018-03-03 PROBLEM — R41.3 MEMORY LOSS: Status: ACTIVE | Noted: 2017-12-19

## 2018-03-03 PROBLEM — Z96.651 TOTAL KNEE REPLACEMENT STATUS, RIGHT: Status: ACTIVE | Noted: 2018-02-17

## 2018-03-03 PROBLEM — M17.11 PRIMARY OSTEOARTHRITIS OF RIGHT KNEE: Status: ACTIVE | Noted: 2018-02-16

## 2018-03-03 PROBLEM — L05.91 PILONIDAL CYST: Status: ACTIVE | Noted: 2017-11-15

## 2018-03-03 PROBLEM — G47.00 INSOMNIA: Status: ACTIVE | Noted: 2017-01-31

## 2018-03-03 PROBLEM — G47.33 OBSTRUCTIVE SLEEP APNEA: Status: ACTIVE | Noted: 2017-01-31

## 2018-03-03 PROBLEM — M47.816 SPONDYLOSIS OF LUMBAR REGION WITHOUT MYELOPATHY OR RADICULOPATHY: Status: ACTIVE | Noted: 2017-07-06

## 2018-03-03 PROBLEM — R25.1 TREMOR: Status: ACTIVE | Noted: 2017-10-09

## 2018-03-03 PROBLEM — IMO0001 CLASS 3 OBESITY DUE TO EXCESS CALORIES WITH SERIOUS COMORBIDITY IN ADULT: Status: ACTIVE | Noted: 2017-11-15

## 2018-03-03 PROBLEM — R06.83 SNORING: Status: ACTIVE | Noted: 2017-01-31

## 2018-03-03 PROBLEM — S83.241A TEAR OF MEDIAL MENISCUS OF RIGHT KNEE, CURRENT: Status: ACTIVE | Noted: 2017-07-02

## 2018-03-03 PROBLEM — G25.81 RESTLESS LEG SYNDROME: Status: ACTIVE | Noted: 2017-01-31

## 2018-03-03 PROBLEM — M54.31 SCIATICA OF RIGHT SIDE: Status: ACTIVE | Noted: 2017-11-15

## 2018-03-03 PROBLEM — G47.30 SLEEP APNEA: Status: ACTIVE | Noted: 2018-03-03

## 2018-03-03 PROBLEM — G47.33 SLEEP APNEA, OBSTRUCTIVE: Status: ACTIVE | Noted: 2017-04-04

## 2018-03-03 PROBLEM — I10 HTN (HYPERTENSION): Status: ACTIVE | Noted: 2018-03-03

## 2018-03-03 PROBLEM — G60.3 IDIOPATHIC PROGRESSIVE NEUROPATHY: Status: ACTIVE | Noted: 2017-07-06

## 2018-03-03 PROBLEM — R40.0 SOMNOLENCE, DAYTIME: Status: ACTIVE | Noted: 2017-01-31

## 2018-03-03 PROBLEM — G89.29 CHRONIC RIGHT-SIDED LOW BACK PAIN WITH SCIATICA: Status: ACTIVE | Noted: 2017-11-15

## 2018-03-03 PROBLEM — M1A.00X0 IDIOPATHIC CHRONIC GOUT WITHOUT TOPHUS: Status: ACTIVE | Noted: 2017-11-15

## 2018-03-03 PROBLEM — M54.40 CHRONIC RIGHT-SIDED LOW BACK PAIN WITH SCIATICA: Status: ACTIVE | Noted: 2017-11-15

## 2018-03-03 PROCEDURE — 87176 TISSUE HOMOGENIZATION CULTR: CPT | Performed by: ORTHOPAEDIC SURGERY

## 2018-03-03 PROCEDURE — 25010000003 HYDROMORPHONE PER 4 MG: Performed by: ORTHOPAEDIC SURGERY

## 2018-03-03 PROCEDURE — 87186 SC STD MICRODIL/AGAR DIL: CPT | Performed by: ORTHOPAEDIC SURGERY

## 2018-03-03 PROCEDURE — 87070 CULTURE OTHR SPECIMN AEROBIC: CPT | Performed by: ORTHOPAEDIC SURGERY

## 2018-03-03 PROCEDURE — 25010000002 ALBUMIN HUMAN 5% PER 50 ML

## 2018-03-03 PROCEDURE — 87147 CULTURE TYPE IMMUNOLOGIC: CPT | Performed by: ORTHOPAEDIC SURGERY

## 2018-03-03 PROCEDURE — 0SUV09Z SUPPLEMENT RIGHT KNEE JOINT, TIBIAL SURFACE WITH LINER, OPEN APPROACH: ICD-10-PCS | Performed by: ORTHOPAEDIC SURGERY

## 2018-03-03 PROCEDURE — C1776 JOINT DEVICE (IMPLANTABLE): HCPCS | Performed by: ORTHOPAEDIC SURGERY

## 2018-03-03 PROCEDURE — 25010000002 VANCOMYCIN PER 500 MG: Performed by: ORTHOPAEDIC SURGERY

## 2018-03-03 PROCEDURE — 0SPC09Z REMOVAL OF LINER FROM RIGHT KNEE JOINT, OPEN APPROACH: ICD-10-PCS | Performed by: ORTHOPAEDIC SURGERY

## 2018-03-03 PROCEDURE — 25010000002 VANCOMYCIN PER 500 MG: Performed by: NURSE ANESTHETIST, CERTIFIED REGISTERED

## 2018-03-03 PROCEDURE — 25010000002 ONDANSETRON PER 1 MG: Performed by: NURSE ANESTHETIST, CERTIFIED REGISTERED

## 2018-03-03 PROCEDURE — 87205 SMEAR GRAM STAIN: CPT | Performed by: ORTHOPAEDIC SURGERY

## 2018-03-03 PROCEDURE — 25010000002 HYDROMORPHONE PER 4 MG: Performed by: NURSE ANESTHETIST, CERTIFIED REGISTERED

## 2018-03-03 PROCEDURE — P9041 ALBUMIN (HUMAN),5%, 50ML: HCPCS

## 2018-03-03 PROCEDURE — 25010000002 PROPOFOL 10 MG/ML EMULSION: Performed by: NURSE ANESTHETIST, CERTIFIED REGISTERED

## 2018-03-03 PROCEDURE — 87185 SC STD ENZYME DETCJ PER NZM: CPT | Performed by: ORTHOPAEDIC SURGERY

## 2018-03-03 PROCEDURE — 94799 UNLISTED PULMONARY SVC/PX: CPT

## 2018-03-03 PROCEDURE — 25010000002 DEXAMETHASONE PER 1 MG: Performed by: NURSE ANESTHETIST, CERTIFIED REGISTERED

## 2018-03-03 PROCEDURE — 25010000002 MIDAZOLAM PER 1 MG: Performed by: NURSE ANESTHETIST, CERTIFIED REGISTERED

## 2018-03-03 PROCEDURE — 87075 CULTR BACTERIA EXCEPT BLOOD: CPT | Performed by: ORTHOPAEDIC SURGERY

## 2018-03-03 DEVICE — IMPLANTABLE DEVICE: Type: IMPLANTABLE DEVICE | Status: FUNCTIONAL

## 2018-03-03 RX ORDER — NALOXONE HCL 0.4 MG/ML
0.04 VIAL (ML) INJECTION AS NEEDED
Status: DISCONTINUED | OUTPATIENT
Start: 2018-03-03 | End: 2018-03-03 | Stop reason: HOSPADM

## 2018-03-03 RX ORDER — ACYCLOVIR 200 MG/1
400 CAPSULE ORAL 3 TIMES DAILY
Status: DISCONTINUED | OUTPATIENT
Start: 2018-03-03 | End: 2018-03-08 | Stop reason: HOSPADM

## 2018-03-03 RX ORDER — FENTANYL CITRATE 50 UG/ML
25 INJECTION, SOLUTION INTRAMUSCULAR; INTRAVENOUS AS NEEDED
Status: DISCONTINUED | OUTPATIENT
Start: 2018-03-03 | End: 2018-03-03 | Stop reason: HOSPADM

## 2018-03-03 RX ORDER — HYDROMORPHONE HCL IN 0.9% NACL 50 MG/50ML
PATIENT CONTROLLED ANALGESIA SYRINGE INTRAVENOUS CONTINUOUS
Status: DISCONTINUED | OUTPATIENT
Start: 2018-03-03 | End: 2018-03-05

## 2018-03-03 RX ORDER — SODIUM CHLORIDE, SODIUM LACTATE, POTASSIUM CHLORIDE, CALCIUM CHLORIDE 600; 310; 30; 20 MG/100ML; MG/100ML; MG/100ML; MG/100ML
100 INJECTION, SOLUTION INTRAVENOUS CONTINUOUS
Status: DISCONTINUED | OUTPATIENT
Start: 2018-03-03 | End: 2018-03-05

## 2018-03-03 RX ORDER — LEVOTHYROXINE SODIUM 137 UG/1
137 TABLET ORAL DAILY
Status: DISCONTINUED | OUTPATIENT
Start: 2018-03-03 | End: 2018-03-08 | Stop reason: HOSPADM

## 2018-03-03 RX ORDER — SODIUM CHLORIDE, SODIUM LACTATE, POTASSIUM CHLORIDE, CALCIUM CHLORIDE 600; 310; 30; 20 MG/100ML; MG/100ML; MG/100ML; MG/100ML
INJECTION, SOLUTION INTRAVENOUS CONTINUOUS PRN
Status: DISCONTINUED | OUTPATIENT
Start: 2018-03-03 | End: 2018-03-03 | Stop reason: SURG

## 2018-03-03 RX ORDER — ALBUMIN, HUMAN INJ 5% 5 %
SOLUTION INTRAVENOUS
Status: COMPLETED
Start: 2018-03-03 | End: 2018-03-03

## 2018-03-03 RX ORDER — ALBUTEROL SULFATE 90 UG/1
AEROSOL, METERED RESPIRATORY (INHALATION) AS NEEDED
Status: DISCONTINUED | OUTPATIENT
Start: 2018-03-03 | End: 2018-03-03 | Stop reason: SURG

## 2018-03-03 RX ORDER — FLUCONAZOLE 150 MG/1
150 TABLET ORAL ONCE
Status: COMPLETED | OUTPATIENT
Start: 2018-03-03 | End: 2018-03-03

## 2018-03-03 RX ORDER — MIDAZOLAM HYDROCHLORIDE 1 MG/ML
INJECTION INTRAMUSCULAR; INTRAVENOUS AS NEEDED
Status: DISCONTINUED | OUTPATIENT
Start: 2018-03-03 | End: 2018-03-03 | Stop reason: SURG

## 2018-03-03 RX ORDER — HYDROCODONE BITARTRATE AND ACETAMINOPHEN 7.5; 325 MG/1; MG/1
1 TABLET ORAL EVERY 4 HOURS PRN
Status: DISCONTINUED | OUTPATIENT
Start: 2018-03-03 | End: 2018-03-08 | Stop reason: HOSPADM

## 2018-03-03 RX ORDER — NALOXONE HCL 0.4 MG/ML
0.1 VIAL (ML) INJECTION
Status: DISCONTINUED | OUTPATIENT
Start: 2018-03-03 | End: 2018-03-08 | Stop reason: HOSPADM

## 2018-03-03 RX ORDER — MEPERIDINE HYDROCHLORIDE 25 MG/ML
12.5 INJECTION INTRAMUSCULAR; INTRAVENOUS; SUBCUTANEOUS
Status: DISCONTINUED | OUTPATIENT
Start: 2018-03-03 | End: 2018-03-03 | Stop reason: HOSPADM

## 2018-03-03 RX ORDER — TRIAMCINOLONE ACETONIDE 1 MG/G
CREAM TOPICAL EVERY 12 HOURS SCHEDULED
Status: DISCONTINUED | OUTPATIENT
Start: 2018-03-03 | End: 2018-03-08 | Stop reason: HOSPADM

## 2018-03-03 RX ORDER — ROPINIROLE 1 MG/1
3 TABLET, FILM COATED ORAL NIGHTLY
Status: DISCONTINUED | OUTPATIENT
Start: 2018-03-03 | End: 2018-03-08 | Stop reason: HOSPADM

## 2018-03-03 RX ORDER — MAGNESIUM HYDROXIDE 1200 MG/15ML
LIQUID ORAL AS NEEDED
Status: DISCONTINUED | OUTPATIENT
Start: 2018-03-03 | End: 2018-03-03 | Stop reason: HOSPADM

## 2018-03-03 RX ORDER — ONDANSETRON 4 MG/1
4 TABLET, FILM COATED ORAL EVERY 6 HOURS PRN
Status: DISCONTINUED | OUTPATIENT
Start: 2018-03-03 | End: 2018-03-08 | Stop reason: HOSPADM

## 2018-03-03 RX ORDER — ALBUMIN, HUMAN INJ 5% 5 %
12.5 SOLUTION INTRAVENOUS ONCE
Status: COMPLETED | OUTPATIENT
Start: 2018-03-03 | End: 2018-03-03

## 2018-03-03 RX ORDER — VASOPRESSIN 20 U/ML
INJECTION PARENTERAL AS NEEDED
Status: DISCONTINUED | OUTPATIENT
Start: 2018-03-03 | End: 2018-03-03 | Stop reason: SURG

## 2018-03-03 RX ORDER — FLUTICASONE PROPIONATE 50 MCG
2 SPRAY, SUSPENSION (ML) NASAL DAILY
Status: DISCONTINUED | OUTPATIENT
Start: 2018-03-03 | End: 2018-03-08 | Stop reason: HOSPADM

## 2018-03-03 RX ORDER — LORAZEPAM 1 MG/1
1 TABLET ORAL EVERY 8 HOURS PRN
Status: DISCONTINUED | OUTPATIENT
Start: 2018-03-03 | End: 2018-03-08 | Stop reason: HOSPADM

## 2018-03-03 RX ORDER — DEXAMETHASONE SODIUM PHOSPHATE 4 MG/ML
INJECTION, SOLUTION INTRA-ARTICULAR; INTRALESIONAL; INTRAMUSCULAR; INTRAVENOUS; SOFT TISSUE AS NEEDED
Status: DISCONTINUED | OUTPATIENT
Start: 2018-03-03 | End: 2018-03-03 | Stop reason: SURG

## 2018-03-03 RX ORDER — PANTOPRAZOLE SODIUM 40 MG/1
40 TABLET, DELAYED RELEASE ORAL
Status: DISCONTINUED | OUTPATIENT
Start: 2018-03-04 | End: 2018-03-08 | Stop reason: HOSPADM

## 2018-03-03 RX ORDER — EPHEDRINE SULFATE 50 MG/ML
5 INJECTION, SOLUTION INTRAVENOUS ONCE AS NEEDED
Status: DISCONTINUED | OUTPATIENT
Start: 2018-03-03 | End: 2018-03-03 | Stop reason: HOSPADM

## 2018-03-03 RX ORDER — DULOXETIN HYDROCHLORIDE 30 MG/1
30 CAPSULE, DELAYED RELEASE ORAL DAILY
Status: DISCONTINUED | OUTPATIENT
Start: 2018-03-03 | End: 2018-03-08 | Stop reason: HOSPADM

## 2018-03-03 RX ORDER — ONDANSETRON 2 MG/ML
4 INJECTION INTRAMUSCULAR; INTRAVENOUS AS NEEDED
Status: DISCONTINUED | OUTPATIENT
Start: 2018-03-03 | End: 2018-03-03 | Stop reason: HOSPADM

## 2018-03-03 RX ORDER — PROPOFOL 10 MG/ML
VIAL (ML) INTRAVENOUS AS NEEDED
Status: DISCONTINUED | OUTPATIENT
Start: 2018-03-03 | End: 2018-03-03 | Stop reason: SURG

## 2018-03-03 RX ORDER — ONDANSETRON 2 MG/ML
INJECTION INTRAMUSCULAR; INTRAVENOUS AS NEEDED
Status: DISCONTINUED | OUTPATIENT
Start: 2018-03-03 | End: 2018-03-03 | Stop reason: SURG

## 2018-03-03 RX ORDER — LIDOCAINE HYDROCHLORIDE 20 MG/ML
INJECTION, SOLUTION INFILTRATION; PERINEURAL AS NEEDED
Status: DISCONTINUED | OUTPATIENT
Start: 2018-03-03 | End: 2018-03-03 | Stop reason: SURG

## 2018-03-03 RX ORDER — ROCURONIUM BROMIDE 10 MG/ML
INJECTION, SOLUTION INTRAVENOUS AS NEEDED
Status: DISCONTINUED | OUTPATIENT
Start: 2018-03-03 | End: 2018-03-03 | Stop reason: SURG

## 2018-03-03 RX ORDER — GABAPENTIN 300 MG/1
600 CAPSULE ORAL EVERY 8 HOURS SCHEDULED
Status: DISCONTINUED | OUTPATIENT
Start: 2018-03-03 | End: 2018-03-08 | Stop reason: HOSPADM

## 2018-03-03 RX ORDER — FLUMAZENIL 0.1 MG/ML
INJECTION INTRAVENOUS AS NEEDED
Status: DISCONTINUED | OUTPATIENT
Start: 2018-03-03 | End: 2018-03-03 | Stop reason: SURG

## 2018-03-03 RX ORDER — ONDANSETRON 4 MG/1
4 TABLET, ORALLY DISINTEGRATING ORAL EVERY 6 HOURS PRN
Status: DISCONTINUED | OUTPATIENT
Start: 2018-03-03 | End: 2018-03-08 | Stop reason: HOSPADM

## 2018-03-03 RX ORDER — VENLAFAXINE HYDROCHLORIDE 75 MG/1
150 CAPSULE, EXTENDED RELEASE ORAL DAILY
Status: DISCONTINUED | OUTPATIENT
Start: 2018-03-03 | End: 2018-03-08 | Stop reason: HOSPADM

## 2018-03-03 RX ORDER — VANCOMYCIN HYDROCHLORIDE 1 G/200ML
1000 INJECTION, SOLUTION INTRAVENOUS EVERY 12 HOURS
Status: DISCONTINUED | OUTPATIENT
Start: 2018-03-04 | End: 2018-03-05 | Stop reason: DRUGHIGH

## 2018-03-03 RX ORDER — FLUMAZENIL 0.1 MG/ML
0.2 INJECTION INTRAVENOUS AS NEEDED
Status: DISCONTINUED | OUTPATIENT
Start: 2018-03-03 | End: 2018-03-03 | Stop reason: HOSPADM

## 2018-03-03 RX ORDER — METOCLOPRAMIDE HYDROCHLORIDE 5 MG/ML
5 INJECTION INTRAMUSCULAR; INTRAVENOUS
Status: DISCONTINUED | OUTPATIENT
Start: 2018-03-03 | End: 2018-03-03 | Stop reason: HOSPADM

## 2018-03-03 RX ORDER — LABETALOL HYDROCHLORIDE 5 MG/ML
5 INJECTION, SOLUTION INTRAVENOUS
Status: DISCONTINUED | OUTPATIENT
Start: 2018-03-03 | End: 2018-03-03 | Stop reason: HOSPADM

## 2018-03-03 RX ORDER — SODIUM CHLORIDE, SODIUM LACTATE, POTASSIUM CHLORIDE, CALCIUM CHLORIDE 600; 310; 30; 20 MG/100ML; MG/100ML; MG/100ML; MG/100ML
20 INJECTION, SOLUTION INTRAVENOUS CONTINUOUS
Status: DISCONTINUED | OUTPATIENT
Start: 2018-03-03 | End: 2018-03-08 | Stop reason: HOSPADM

## 2018-03-03 RX ORDER — DOCUSATE SODIUM 100 MG/1
100 CAPSULE, LIQUID FILLED ORAL 2 TIMES DAILY PRN
Status: DISCONTINUED | OUTPATIENT
Start: 2018-03-03 | End: 2018-03-08 | Stop reason: HOSPADM

## 2018-03-03 RX ORDER — IPRATROPIUM BROMIDE AND ALBUTEROL SULFATE 2.5; .5 MG/3ML; MG/3ML
3 SOLUTION RESPIRATORY (INHALATION) ONCE AS NEEDED
Status: DISCONTINUED | OUTPATIENT
Start: 2018-03-03 | End: 2018-03-03 | Stop reason: HOSPADM

## 2018-03-03 RX ORDER — SUFENTANIL CITRATE 50 UG/ML
INJECTION EPIDURAL; INTRAVENOUS AS NEEDED
Status: DISCONTINUED | OUTPATIENT
Start: 2018-03-03 | End: 2018-03-03 | Stop reason: SURG

## 2018-03-03 RX ORDER — ONDANSETRON 2 MG/ML
4 INJECTION INTRAMUSCULAR; INTRAVENOUS EVERY 6 HOURS PRN
Status: DISCONTINUED | OUTPATIENT
Start: 2018-03-03 | End: 2018-03-08 | Stop reason: HOSPADM

## 2018-03-03 RX ORDER — PHENYLEPHRINE HCL IN 0.9% NACL 0.8MG/10ML
SYRINGE (ML) INTRAVENOUS AS NEEDED
Status: DISCONTINUED | OUTPATIENT
Start: 2018-03-03 | End: 2018-03-03 | Stop reason: SURG

## 2018-03-03 RX ORDER — HYDRALAZINE HYDROCHLORIDE 20 MG/ML
5 INJECTION INTRAMUSCULAR; INTRAVENOUS
Status: DISCONTINUED | OUTPATIENT
Start: 2018-03-03 | End: 2018-03-03 | Stop reason: HOSPADM

## 2018-03-03 RX ORDER — FLECAINIDE ACETATE 50 MG/1
50 TABLET ORAL EVERY 12 HOURS SCHEDULED
Status: DISCONTINUED | OUTPATIENT
Start: 2018-03-03 | End: 2018-03-07

## 2018-03-03 RX ADMIN — ONDANSETRON HYDROCHLORIDE 4 MG: 2 SOLUTION INTRAMUSCULAR; INTRAVENOUS at 09:12

## 2018-03-03 RX ADMIN — SODIUM CHLORIDE, POTASSIUM CHLORIDE, SODIUM LACTATE AND CALCIUM CHLORIDE 100 ML/HR: 600; 310; 30; 20 INJECTION, SOLUTION INTRAVENOUS at 10:13

## 2018-03-03 RX ADMIN — ROCURONIUM BROMIDE 50 MG: 10 INJECTION INTRAVENOUS at 08:22

## 2018-03-03 RX ADMIN — SUFENTANIL CITRATE 5 MCG: 50 INJECTION, SOLUTION EPIDURAL; INTRAVENOUS at 09:32

## 2018-03-03 RX ADMIN — VASOPRESSIN 1 UNITS: 20 INJECTION INTRAVENOUS at 08:54

## 2018-03-03 RX ADMIN — Medication 160 MCG: at 08:31

## 2018-03-03 RX ADMIN — LEVOTHYROXINE SODIUM 137 MCG: 137 TABLET ORAL at 13:51

## 2018-03-03 RX ADMIN — MIDAZOLAM HYDROCHLORIDE 2 MG: 1 INJECTION, SOLUTION INTRAMUSCULAR; INTRAVENOUS at 08:09

## 2018-03-03 RX ADMIN — LIDOCAINE HYDROCHLORIDE 100 MG: 20 INJECTION, SOLUTION INFILTRATION; PERINEURAL at 08:22

## 2018-03-03 RX ADMIN — FLUMAZENIL 0.3 MG: 0.1 INJECTION, SOLUTION INTRAVENOUS at 09:30

## 2018-03-03 RX ADMIN — VENLAFAXINE HYDROCHLORIDE 150 MG: 75 CAPSULE, EXTENDED RELEASE ORAL at 13:44

## 2018-03-03 RX ADMIN — HYDROMORPHONE HYDROCHLORIDE: 10 INJECTION, SOLUTION INTRAMUSCULAR; INTRAVENOUS; SUBCUTANEOUS at 13:36

## 2018-03-03 RX ADMIN — DEXAMETHASONE SODIUM PHOSPHATE 8 MG: 4 INJECTION, SOLUTION INTRAMUSCULAR; INTRAVENOUS at 08:43

## 2018-03-03 RX ADMIN — SODIUM CHLORIDE, POTASSIUM CHLORIDE, SODIUM LACTATE AND CALCIUM CHLORIDE: 600; 310; 30; 20 INJECTION, SOLUTION INTRAVENOUS at 09:13

## 2018-03-03 RX ADMIN — TRIAMCINOLONE ACETONIDE: 1 CREAM TOPICAL at 23:10

## 2018-03-03 RX ADMIN — PROPOFOL 100 MG: 10 INJECTION, EMULSION INTRAVENOUS at 08:22

## 2018-03-03 RX ADMIN — FLECAINIDE ACETATE 50 MG: 50 TABLET ORAL at 21:41

## 2018-03-03 RX ADMIN — ALBUMIN, HUMAN INJ 5% 12.5 G: 5 SOLUTION at 11:27

## 2018-03-03 RX ADMIN — HYDROMORPHONE HYDROCHLORIDE 0.5 MG: 1 INJECTION, SOLUTION INTRAMUSCULAR; INTRAVENOUS; SUBCUTANEOUS at 10:44

## 2018-03-03 RX ADMIN — ALBUTEROL SULFATE 10 PUFF: 90 AEROSOL, METERED RESPIRATORY (INHALATION) at 09:27

## 2018-03-03 RX ADMIN — GABAPENTIN 600 MG: 300 CAPSULE ORAL at 21:38

## 2018-03-03 RX ADMIN — FLUCONAZOLE 150 MG: 150 TABLET ORAL at 13:51

## 2018-03-03 RX ADMIN — VASOPRESSIN 1 UNITS: 20 INJECTION INTRAVENOUS at 08:42

## 2018-03-03 RX ADMIN — Medication 160 MCG: at 08:27

## 2018-03-03 RX ADMIN — SODIUM CHLORIDE, POTASSIUM CHLORIDE, SODIUM LACTATE AND CALCIUM CHLORIDE 100 ML/HR: 600; 310; 30; 20 INJECTION, SOLUTION INTRAVENOUS at 13:36

## 2018-03-03 RX ADMIN — HYDROCODONE BITARTRATE AND ACETAMINOPHEN 1 TABLET: 7.5; 325 TABLET ORAL at 13:43

## 2018-03-03 RX ADMIN — SODIUM CHLORIDE, POTASSIUM CHLORIDE, SODIUM LACTATE AND CALCIUM CHLORIDE: 600; 310; 30; 20 INJECTION, SOLUTION INTRAVENOUS at 08:04

## 2018-03-03 RX ADMIN — HYDROMORPHONE HYDROCHLORIDE 0.5 MG: 1 INJECTION, SOLUTION INTRAMUSCULAR; INTRAVENOUS; SUBCUTANEOUS at 10:30

## 2018-03-03 RX ADMIN — VASOPRESSIN 1 UNITS: 20 INJECTION INTRAVENOUS at 09:12

## 2018-03-03 RX ADMIN — ROPINIROLE HYDROCHLORIDE 3 MG: 1 TABLET, FILM COATED ORAL at 21:37

## 2018-03-03 RX ADMIN — DULOXETINE HYDROCHLORIDE 30 MG: 30 CAPSULE, DELAYED RELEASE ORAL at 13:44

## 2018-03-03 RX ADMIN — SUFENTANIL CITRATE 20 MCG: 50 INJECTION, SOLUTION EPIDURAL; INTRAVENOUS at 08:20

## 2018-03-03 RX ADMIN — FLUTICASONE PROPIONATE 2 SPRAY: 50 SPRAY, METERED NASAL at 13:51

## 2018-03-03 RX ADMIN — GABAPENTIN 600 MG: 300 CAPSULE ORAL at 13:46

## 2018-03-03 RX ADMIN — VANCOMYCIN HYDROCHLORIDE 1750 MG: 1 INJECTION, POWDER, LYOPHILIZED, FOR SOLUTION INTRAVENOUS at 13:50

## 2018-03-03 RX ADMIN — SUGAMMADEX 200 MG: 100 INJECTION, SOLUTION INTRAVENOUS at 09:23

## 2018-03-03 RX ADMIN — ACYCLOVIR 400 MG: 200 CAPSULE ORAL at 21:35

## 2018-03-03 RX ADMIN — VANCOMYCIN HYDROCHLORIDE 1 G: 1 INJECTION, POWDER, LYOPHILIZED, FOR SOLUTION INTRAVENOUS at 08:53

## 2018-03-03 RX ADMIN — VASOPRESSIN 1 UNITS: 20 INJECTION INTRAVENOUS at 08:38

## 2018-03-03 RX ADMIN — ALBUMIN HUMAN 12.5 G: 0.05 INJECTION, SOLUTION INTRAVENOUS at 11:27

## 2018-03-03 RX ADMIN — SUFENTANIL CITRATE 5 MCG: 50 INJECTION, SOLUTION EPIDURAL; INTRAVENOUS at 09:33

## 2018-03-03 NOTE — ANESTHESIA POSTPROCEDURE EVALUATION
Patient: Danisha Cannon    Procedure Summary     Date Anesthesia Start Anesthesia Stop Room / Location    03/03/18 0817 0947  [facility] OR 11 / [facility] OR       Procedure Diagnosis Surgeon Provider    IRRIGATION AND DEBRIDEMENT TOTAL KNEE & POLY EXCHANGE - RIGHT (Right Knee) (T84.53XA) MD Carito Stallings CRNA          Anesthesia Type: general  Last vitals  BP   118/58 (03/03/18 1158)   Temp   97.8 °F (36.6 °C) (03/03/18 0943)   Pulse   65 (03/03/18 1158)   Resp   16 (03/03/18 1153)     SpO2   95 % (03/03/18 1158)     Post Anesthesia Care and Evaluation    Patient location during evaluation: PACU  Patient participation: complete - patient participated  Level of consciousness: awake and alert  Pain score: 2  Pain management: satisfactory to patient  Airway patency: patent  Anesthetic complications: No anesthetic complications  PONV Status: none  Cardiovascular status: acceptable (/70s prior to PACU discharge. )  Respiratory status: acceptable  Hydration status: acceptable    Comments: Blood pressure 118/58, pulse 65, temperature 97.8 °F (36.6 °C), temperature source Temporal Artery , resp. rate 16, SpO2 95 %.

## 2018-03-03 NOTE — ANESTHESIA PREPROCEDURE EVALUATION
Anesthesia Evaluation     Patient summary reviewed and Nursing notes reviewed   history of anesthetic complications: PONV  NPO Solid Status: > 8 hours  NPO Liquid Status: > 8 hours           Airway   Mallampati: I  TM distance: >3 FB  Neck ROM: full  no difficulty expected  Dental - normal exam     Pulmonary - normal exam   (+) sleep apnea (Bipap with 02; with her in hospital),   (-) pneumonia  Cardiovascular - normal exam  Exercise tolerance: (Patient in wheelchair in hospital r/t 3wk post-op RTKA, can walk with a walker, no chest pain with exertion. )    ECG reviewed  Patient on routine beta blocker and Beta blocker given within 24 hours of surgery    (+) dysrhythmias (currently in Sinus) Atrial Fib,     ROS comment: EKG - NSR with PACs,    Neuro/Psych  (+) numbness (bilateral peripheral neuropathy),     GI/Hepatic/Renal/Endo    (+) morbid obesity, GERD,  hypothyroidism,   (-) liver disease, diabetes    Musculoskeletal     Abdominal    Substance History - negative use     OB/GYN negative ob/gyn ROS         Other   (+) arthritis                     Anesthesia Plan    ASA 3     general     intravenous induction   Anesthetic plan and risks discussed with patient.

## 2018-03-03 NOTE — PROGRESS NOTES
Pharmacy Dosing Service  Pharmacokinetics  Vancomycin Initial Evaluation    Assessment/Action/Plan:  Initiated Vancomycin 1750 mg IVPB once, followed by 1000 mg every 12 hours. Will order Vancomycin trough when pharmacokinetically appropriate.  Pharmacy will monitor renal function and adjust dose accordingly.     Subjective:  Danisha Cannon is a 62 y.o. female for treatment of MRSA colonization/Surgical Prophylaxis.    Objective:  Ht:  ; Wt:    Estimated Creatinine Clearance: 56.2 mL/min (by C-G formula based on Cr of 1.33).   Lab Results   Component Value Date    CREATININE 1.33 03/02/2018    CREATININE 0.94 02/14/2017      Lab Results   Component Value Date    WBC 19.86 (H) 03/02/2018    WBC 8.15 02/14/2017      Baseline culture results:  Microbiology Results (last 10 days)       ** No results found for the last 240 hours. **          Nimo Benson, PharmD  03/03/18 1:16 PM

## 2018-03-03 NOTE — ANESTHESIA PROCEDURE NOTES
Airway  Urgency: elective    Airway not difficult    General Information and Staff    Patient location during procedure: OR  CRNA: WINTER SAUCEDO    Indications and Patient Condition  Indications for airway management: airway protection    Preoxygenated: yes  Mask difficulty assessment: 1 - vent by mask    Final Airway Details  Final airway type: endotracheal airway      Successful airway: ETT  Cuffed: yes   Successful intubation technique: direct laryngoscopy  Facilitating devices/methods: intubating stylet  Endotracheal tube insertion site: oral  Blade: Millan  Blade size: #2  ETT size: 7.5 mm  Cormack-Lehane Classification: grade I - full view of glottis  Placement verified by: chest auscultation and capnometry   Cuff volume (mL): 8  Measured from: lips  ETT to lips (cm): 22  Number of attempts at approach: 1    Additional Comments  Very dry mouth, lubricant used on blade, easy, atraumatic intubation.

## 2018-03-03 NOTE — NURSING NOTE
Patient c/o riight thigh hurting. Can see where tourniquet was.. Dressing checked for tightness. Wiggles toes. Toes cool. Capillary refill good. Pulses 2+ and 3+ with doppler.

## 2018-03-03 NOTE — PLAN OF CARE
Problem: Patient Care Overview (Adult)  Goal: Plan of Care Review  Outcome: Ongoing (interventions implemented as appropriate)   03/03/18 8045   Coping/Psychosocial Response Interventions   Plan Of Care Reviewed With patient   Outcome Evaluation   Outcome Summary/Follow up Plan Pain well controlled with PCA and breakthrough pain meds, Dressing CDI without drainage, ice and elevation, PPP but severe edema present. Plan to get up out of bed tomorrow.      Goal: Adult Individualization and Mutuality  Outcome: Ongoing (interventions implemented as appropriate)    Goal: Discharge Needs Assessment  Outcome: Ongoing (interventions implemented as appropriate)      Problem: Perioperative Period (Adult)  Goal: Signs and Symptoms of Listed Potential Problems Will be Absent or Manageable (Perioperative Period)  Outcome: Ongoing (interventions implemented as appropriate)      Problem: Fall Risk (Adult)  Goal: Identify Related Risk Factors and Signs and Symptoms  Outcome: Outcome(s) achieved Date Met: 03/03/18    Goal: Absence of Falls  Outcome: Ongoing (interventions implemented as appropriate)

## 2018-03-03 NOTE — OP NOTE
[facility]  OP NOTE    Patient Name: Danisha Cannon  Date of Procedure: 3/3/2018     PREOPERATIVE DIAGNOSIS: Infected right total knee arthroplasty     POSTOPERATIVE DIAGNOSIS: Same.     PROCEDURE PERFORMED: Debridement right total knee arthroplasty with polyethylene bearing exchange     SURGEON: Amilcar Capellan MD      ANESTHESIA: General.    PREPARATION: Routine.    STAFF: Circulator: Jaden Stein RN  Scrub Person: Pam Greene; Jayden Ramsey  Assistant: Carlton Alcocer    ESTIMATED BLOOD LOSS: None    SPECIMENS: Deep cultures    COMPLICATIONS: None    INDICATIONS: Danisha Cannon is a 62 y.o. female who is 2 weeks post a right total knee arthroplasty.  She has develop purulent drainage.  It was felt that debridement and a polyethylene bearing exchange were indicated. The indications, risks, and possible complications of the procedure were explained to the patient, who voiced understanding and wished to proceed with surgery.  At surgery there was purulent material in the deep space     PROCEDURE IN DETAIL: The patient was taken to the operating room and placed on the operating table in a supine position. After general anesthesia was obtained, the right lower extremity was prepped and draped in a sterile manner.  The previous surgical incision was reopened and all sutures were removed including superficial and deep fascial sutures.  The area was then thoroughly debrided.  Deep cultures were obtained.  The previous polyethylene liner was then wedged free with an osteotome and removed.  The posterior compartment was then thoroughly debrided and thoroughly irrigated with saline to which vancomycin had been added.  The wound was suctioned dry.  A new polyethylene liner was then impacted in the position appeared closure was then accomplished over deep and superficial Hemovac drains using #1 Vicryl in interrupted fashion on the deep fascia followed by nylon in interrupted vertical mattress fashion  skin. Sterile dressings were applied. The patient tolerated the procedure well. Sponge and needle counts were correct. The patient was then awakened and extubated in the operating room and taken to the recovery room in good condition.  Amilcar Capellan MD  Date: 3/3/2018 Time: 9:32 AM

## 2018-03-03 NOTE — PLAN OF CARE
Problem: Patient Care Overview (Adult)  Goal: Plan of Care Review  Outcome: Ongoing (interventions implemented as appropriate)   03/03/18 1217   Coping/Psychosocial Response Interventions   Plan Of Care Reviewed With patient   Patient Care Overview   Progress improving   Outcome Evaluation   Outcome Summary/Follow up Plan Pain level decresed. B/P improved. Mre hydrated. Meetspacu discharge criteria.       Problem: Perioperative Period (Adult)  Goal: Signs and Symptoms of Listed Potential Problems Will be Absent or Manageable (Perioperative Period)  Outcome: Ongoing (interventions implemented as appropriate)

## 2018-03-04 PROCEDURE — 25010000002 CEFEPIME PER 500 MG: Performed by: INTERNAL MEDICINE

## 2018-03-04 PROCEDURE — 97161 PT EVAL LOW COMPLEX 20 MIN: CPT

## 2018-03-04 PROCEDURE — G8978 MOBILITY CURRENT STATUS: HCPCS

## 2018-03-04 PROCEDURE — G8979 MOBILITY GOAL STATUS: HCPCS

## 2018-03-04 PROCEDURE — 25010000002 VANCOMYCIN PER 500 MG: Performed by: ORTHOPAEDIC SURGERY

## 2018-03-04 PROCEDURE — 97530 THERAPEUTIC ACTIVITIES: CPT

## 2018-03-04 RX ORDER — CEFEPIME HYDROCHLORIDE 1 G/1
2 INJECTION, POWDER, FOR SOLUTION INTRAMUSCULAR; INTRAVENOUS EVERY 12 HOURS
Status: DISCONTINUED | OUTPATIENT
Start: 2018-03-04 | End: 2018-03-04 | Stop reason: SDUPTHER

## 2018-03-04 RX ADMIN — VENLAFAXINE HYDROCHLORIDE 150 MG: 75 CAPSULE, EXTENDED RELEASE ORAL at 08:30

## 2018-03-04 RX ADMIN — DULOXETINE HYDROCHLORIDE 30 MG: 30 CAPSULE, DELAYED RELEASE ORAL at 08:31

## 2018-03-04 RX ADMIN — METOPROLOL TARTRATE 25 MG: 25 TABLET, FILM COATED ORAL at 08:30

## 2018-03-04 RX ADMIN — FLECAINIDE ACETATE 50 MG: 50 TABLET ORAL at 20:56

## 2018-03-04 RX ADMIN — CEFEPIME HYDROCHLORIDE 2 G: 2 INJECTION, POWDER, FOR SOLUTION INTRAVENOUS at 22:02

## 2018-03-04 RX ADMIN — DOCUSATE SODIUM 100 MG: 100 CAPSULE, LIQUID FILLED ORAL at 10:27

## 2018-03-04 RX ADMIN — VANCOMYCIN HYDROCHLORIDE 1000 MG: 1 INJECTION, SOLUTION INTRAVENOUS at 02:59

## 2018-03-04 RX ADMIN — ACYCLOVIR 400 MG: 200 CAPSULE ORAL at 22:02

## 2018-03-04 RX ADMIN — VANCOMYCIN HYDROCHLORIDE 1000 MG: 1 INJECTION, SOLUTION INTRAVENOUS at 14:44

## 2018-03-04 RX ADMIN — FLECAINIDE ACETATE 50 MG: 50 TABLET ORAL at 08:31

## 2018-03-04 RX ADMIN — FLUTICASONE PROPIONATE 2 SPRAY: 50 SPRAY, METERED NASAL at 08:30

## 2018-03-04 RX ADMIN — GABAPENTIN 600 MG: 300 CAPSULE ORAL at 14:44

## 2018-03-04 RX ADMIN — ROPINIROLE HYDROCHLORIDE 3 MG: 1 TABLET, FILM COATED ORAL at 20:56

## 2018-03-04 RX ADMIN — HYDROCODONE BITARTRATE AND ACETAMINOPHEN 1 TABLET: 7.5; 325 TABLET ORAL at 03:03

## 2018-03-04 RX ADMIN — GABAPENTIN 600 MG: 300 CAPSULE ORAL at 21:00

## 2018-03-04 RX ADMIN — METOPROLOL TARTRATE 25 MG: 25 TABLET, FILM COATED ORAL at 20:55

## 2018-03-04 RX ADMIN — SODIUM CHLORIDE, POTASSIUM CHLORIDE, SODIUM LACTATE AND CALCIUM CHLORIDE 100 ML/HR: 600; 310; 30; 20 INJECTION, SOLUTION INTRAVENOUS at 02:59

## 2018-03-04 RX ADMIN — ACYCLOVIR 400 MG: 200 CAPSULE ORAL at 08:31

## 2018-03-04 RX ADMIN — ACYCLOVIR 400 MG: 200 CAPSULE ORAL at 15:44

## 2018-03-04 RX ADMIN — HYDROCODONE BITARTRATE AND ACETAMINOPHEN 1 TABLET: 7.5; 325 TABLET ORAL at 20:56

## 2018-03-04 RX ADMIN — HYDROCODONE BITARTRATE AND ACETAMINOPHEN 1 TABLET: 7.5; 325 TABLET ORAL at 15:44

## 2018-03-04 RX ADMIN — GABAPENTIN 600 MG: 300 CAPSULE ORAL at 05:57

## 2018-03-04 RX ADMIN — LEVOTHYROXINE SODIUM 137 MCG: 137 TABLET ORAL at 08:30

## 2018-03-04 RX ADMIN — CEFEPIME HYDROCHLORIDE 2 G: 2 INJECTION, POWDER, FOR SOLUTION INTRAVENOUS at 11:30

## 2018-03-04 RX ADMIN — PANTOPRAZOLE SODIUM 40 MG: 40 TABLET, DELAYED RELEASE ORAL at 05:57

## 2018-03-04 NOTE — PLAN OF CARE
Problem: Patient Care Overview (Adult)  Goal: Plan of Care Review  Outcome: Ongoing (interventions implemented as appropriate)   03/04/18 4928   Coping/Psychosocial Response Interventions   Plan Of Care Reviewed With patient   Patient Care Overview   Progress no change   Outcome Evaluation   Outcome Summary/Follow up Plan PCA in use. Given Norco once, more for back pain than knee pain. Ambulating to bathroom with 1 assist and walker. Hemovac with small amount sang drainage. Right ankle/foot swollen. Blood pressure improved.        Problem: Perioperative Period (Adult)  Goal: Signs and Symptoms of Listed Potential Problems Will be Absent or Manageable (Perioperative Period)  Outcome: Ongoing (interventions implemented as appropriate)      Problem: Fall Risk (Adult)  Goal: Absence of Falls  Outcome: Ongoing (interventions implemented as appropriate)

## 2018-03-04 NOTE — PLAN OF CARE
Problem: Patient Care Overview (Adult)  Goal: Plan of Care Review  Outcome: Ongoing (interventions implemented as appropriate)   03/04/18 1504   Coping/Psychosocial Response Interventions   Plan Of Care Reviewed With patient   Patient Care Overview   Progress improving   Outcome Evaluation   Outcome Summary/Follow up Plan Pt A&Ox4. Pain well controlled with PCA and occasional P.O. med. Hemovac in place. Incision well approximated, sutures in place. Ice to site for swelling.        Problem: Fall Risk (Adult)  Goal: Absence of Falls  Outcome: Ongoing (interventions implemented as appropriate)

## 2018-03-04 NOTE — PROGRESS NOTES
"Pharmacy Dosing Service  Pharmacokinetics  Vancomycin Follow-up Evaluation    Assessment/Action/Plan:  Vancomycin trough ordered before dose due on 3/5 at 0200.  Continue Vancomycin 1000 mg iv q12h.  Pharmacy will continue to monitor renal function and adjust dose accordingly.     Subjective:  Danisha Cannon is a 62 y.o. female currently on Vancomycin 1000 mg IV every 12 hours for the treatment of possible right knee prosthetic joint infection, day 2 of therapy.    Objective:  Ht: 165 cm (64.96\"); Wt: 126 kg (278 lb 12.8 oz)  Estimated Creatinine Clearance: 57 mL/min (by C-G formula based on Cr of 1.33).   Lab Results   Component Value Date    CREATININE 1.33 03/02/2018    CREATININE 0.94 02/14/2017      Lab Results   Component Value Date    WBC 19.86 (H) 03/02/2018    WBC 8.15 02/14/2017       No results found for: VANCOPEAK, VANCOTROUGH, VANCORANDOM    Culture Results:  Microbiology Results (last 10 days)       Procedure Component Value - Date/Time      Anaerobic Culture - Wound, Knee, Right [[id number]]  (Normal) Collected:  03/03/18 0934    Lab Status:  Preliminary result Specimen:  Wound from Knee, Right Updated:  03/04/18 1221     Culture No anaerobes isolated at 24 hours    Wound Culture - Wound, Knee, Right [[id number]]  (Abnormal) Collected:  03/03/18 0934    Lab Status:  Preliminary result Specimen:  Wound from Knee, Right Updated:  03/04/18 1222     Wound Culture --      Light growth (2+) Staphylococcus aureus, MSSA (A)     BETA LACTAMASE Positive     Gram Stain Result Rare (1+) WBCs seen      Rare (1+) Gram positive cocci    Anaerobic Culture - Tissue, Knee, Right [[id number]]  (Normal) Collected:  03/03/18 0904    Lab Status:  Preliminary result Specimen:  Tissue from Knee, Right Updated:  03/04/18 1220     Culture No anaerobes isolated at 24 hours    Tissue / Bone Culture - Tissue, Knee, Right [[id number]]  (Abnormal) Collected:  03/03/18 0904    Lab Status:  Preliminary result Specimen:  Tissue from Knee, " Right Updated:  03/04/18 1224     Tissue Culture --      Heavy growth (4+) Staphylococcus aureus, MSSA (A)     BETA LACTAMASE Positive     Gram Stain Result Many (4+) WBCs seen      Moderate (3+) Gram positive cocci, intracellular and extracellular            Brayan Johnson [facility]   03/04/18 3:09 PM

## 2018-03-04 NOTE — PROGRESS NOTES
Infectious Diseases Progress Note    Patient:  Danisha Cannon  YOB: 1955  MRN: [id number]   Admit date: 3/3/2018   Admitting Physician: Amilcar Capellan MD  Primary Care Physician: Juan Carlos Sparks MD    Chief Complaint/Interval History: She is without fever.  She is sitting at bedside.  She is going to work with physical therapy.  She does not describe much in the way of any right knee pain or discomfort.  She had indicated yesterday that she had some concern that a prior infected cyst in the sacral/coccyx area may have some relation to her knee.  I examined her with nurse present so that I could thoroughly evaluate the reported cystic area in the sacral location.  She indicates no recent drainage from this area.  She had prior surgical drainage with with Dr. Capellan in 2016.  Patient had some concern about a communication between the sacral area and her bladder because the smell she noticed when the cyst area in the region of the sacrum was drained, was the same as the smell when she had a urinary tract infection sometime later.  On exam there is no erythema, warmth, induration, tenderness, cystic area, or induration in the region of the sacral or coccyx or buttock area.  At this point I feel any communication would be unlikely.  I explained I felt the similar smell was simply having a cyst infection and a separate urinary tract infections with an organism with similar characteristics.  I explained there is no evidence of infection involving the sacral or coccyx area at this time I do not think there would be any correlation with her possible right knee infection.    Intake/Output Summary (Last 24 hours) at 03/04/18 0900  Last data filed at 03/04/18 0420   Gross per 24 hour   Intake             5896 ml   Output             2425 ml   Net             3471 ml     Allergies:   Allergies   Allergen Reactions   • Codeine Dizziness and Nausea Only     Rapid heart rate, dizziness  NAUSEA   • Mobic  "[Meloxicam] Rash   • Morphine And Related Itching     Current Scheduled Medications:     acyclovir 400 mg Oral TID   DULoxetine 30 mg Oral Daily   flecainide 50 mg Oral Q12H   fluticasone 2 spray Each Nare Daily   gabapentin 600 mg Oral Q8H   levothyroxine 137 mcg Oral Daily   metoprolol tartrate 25 mg Oral Q12H   pantoprazole 40 mg Oral Q AM   rOPINIRole 3 mg Oral Nightly   triamcinolone  Topical Q12H   vancomycin 1,000 mg Intravenous Q12H   venlafaxine  mg Oral Daily     Current PRN Medications:  docusate sodium  •  HYDROcodone-acetaminophen  •  LORazepam  •  magnesium hydroxide  •  naloxone  •  ondansetron **OR** ondansetron ODT **OR** ondansetron    Review of Systems no rash or skin itching.  No nausea or vomiting.  No diarrhea.    Vital Signs:  /62 (BP Location: Left arm, Patient Position: Sitting)  Pulse 72  Temp 97.4 °F (36.3 °C) (Axillary)   Resp 20  Ht 165 cm (64.96\")  Wt 127 kg (280 lb 1.6 oz)  SpO2 95%  BMI 46.67 kg/m2    Physical Exam  Vital signs reviewed.  Right knee with drain remains in place.  Abdomen soft and nontender.  Examination of the sacral/coccyx area and buttocks area is outlined in the HPI (nurse present).  Line/IV site: No erythema, warmth, induration, or tenderness.    Lab Results:  CBC:   Results from last 7 days  Lab Units 03/02/18  1437   WBC 10*3/mm3 19.86*   HEMOGLOBIN g/dL 10.5*   HEMATOCRIT % 33.8*   PLATELETS 10*3/mm3 376     BMP:  Results from last 7 days  Lab Units 03/02/18  1437   SODIUM mmol/L 140   POTASSIUM mmol/L 4.2   CHLORIDE mmol/L 98   CO2 mmol/L 33.0*   BUN mg/dL 25*   CREATININE mg/dL 1.33   GLUCOSE mg/dL 126*   CALCIUM mg/dL 8.5     Culture Results:  Operative cultures pending    Radiology: None  Additional Studies Reviewed: None    Impression:   1.  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  Remote history of sacral cyst/possible pilonidal cyst infection-no evidence of any persistent " infection or recurrence.    Recommendations:   Continue empiric antimicrobial therapy while awaiting right knee culture results  Going to continue vancomycin and expand gram-negative coverage while awaiting culture results  We will adjust antibiotics as we get additional information from microbiology lab    Chente Urena MD

## 2018-03-04 NOTE — PROGRESS NOTES
This patient is 2 weeks post a right total knee arthroplasty.  Unfortunately she is now developed an infection.  She is now 1 day post-debridement and polyethylene liner exchange and closure over drains.  Her wound actually looks reasonably good this morning.  There is a slight increase in warmth but no no major swelling is noted.  Her drain is functional the cultures have revealed methicillin sensitive Staphylococcus aureus antibiotics will be managed by infectious disease

## 2018-03-04 NOTE — THERAPY EVALUATION
Acute Care - Physical Therapy Initial Evaluation  [facility]     Patient Name: Danisha Cannon  : 1955  MRN: [id number]  Today's Date: 3/4/2018   Onset of Illness/Injury or Date of Surgery Date: 18  Date of Referral to PT: 18  Referring Physician: Dr. Capellan      Admit Date: 3/3/2018     Visit Dx:    ICD-10-CM ICD-9-CM   1. Infection of right knee M00.9 711.96   2. Impaired mobility Z74.09 799.89     Patient Active Problem List   Diagnosis   • Abdominal adhesions   • Abnormal echocardiogram   • Abnormal Holter exam   • Atrial flutter, paroxysmal   • Bilateral edema of lower extremity   • Chest pain   • Cholecystitis with cholelithiasis   • Chronic right-sided low back pain with sciatica   • Class 3 obesity due to excess calories with serious comorbidity in adult   • Facet syndrome, lumbar   • Heart rate fast   • HTN (hypertension)   • Idiopathic chronic gout without tophus   • Idiopathic hypotension   • Idiopathic progressive neuropathy   • Insomnia   • Lumbar facet arthropathy   • Memory loss   • MRSA carrier   • Obstructive sleep apnea   • Paroxysmal a-fib   • Pilonidal cyst   • Primary osteoarthritis of right knee   • Recurrent ventral incisional hernia   • Restless leg syndrome   • Sciatica of right side   • Shortness of breath   • Sick sinus syndrome   • Sleep apnea   • Sleep apnea, obstructive   • Snoring   • Somnolence, daytime   • Spondylosis of lumbar region without myelopathy or radiculopathy   • Tear of medial meniscus of right knee, current   • Total knee replacement status, right   • Tremor   • Umbilical hernia     Past Medical History:   Diagnosis Date   • Arthritis    • Atrial fibrillation    • Disease of thyroid gland    • GERD (gastroesophageal reflux disease)    • Neuropathy, peripheral    • Pneumonia     RECENT DX PER FAMILY DOCTOR   • PONV (postoperative nausea and vomiting)    • Restless leg syndrome    • Sleep apnea    • SVT (supraventricular tachycardia)      Past Surgical  History:   Procedure Laterality Date   • BUNIONECTOMY Left     AND HAMMER TOE   • CARDIAC SURGERY      HEART CATH   • CATARACT EXTRACTION Right    • HERNIA REPAIR      UMBILICAL X3   • JOINT REPLACEMENT      RIGHT KNEE   • LAPAROSCOPIC CHOLECYSTECTOMY     • PILONIDAL CYSTECTOMY N/A 2/16/2017    Procedure: PILONIDAL CYSTECTOMY, EXCISION SEBACEOUS CYST - BACK;  Surgeon: Macrina Capellan MD;  Location:  [facility] OR;  Service:    • TRUNK LESION/CYST EXCISION N/A 2/16/2017    Procedure: EXCISION SEBACEOUS CYST - BACK;  Surgeon: Macrina Capellan MD;  Location:  [facility] OR;  Service:           PT ASSESSMENT (last 72 hours)      PT Evaluation       03/04/18 0800 03/03/18 1511    Rehab Evaluation    Document Type evaluation   See MAR  -RA     Subjective Information agree to therapy;pain;complains of;numbness;swelling  -RA     Patient Effort, Rehab Treatment good  -RA     General Information    Patient Profile Review yes  -RA     Onset of Illness/Injury or Date of Surgery Date 03/03/18  -RA     Referring Physician Dr. Capellan  -RA     General Observations Awake, alert  -RA     Pertinent History Of Current Problem Recieved total knee, became infected  -RA     Precautions/Limitations fall precautions  -RA     Prior Level of Function independent:;all household mobility;yard work  -RA     Equipment Currently Used at Home rollator;shower chair  -RA     Plans/Goals Discussed With patient  -RA     Risks Reviewed patient:  -RA     Benefits Reviewed patient:  -RA     Barriers to Rehab none identified  -RA     Living Environment    Lives With spouse  -RA spouse  -AB    Living Arrangements house  -RA house  -AB    Home Accessibility no concerns  -RA no concerns  -AB    Stair Railings at Home none  -RA none  -AB    Type of Financial/Environmental Concern  none  -AB    Transportation Available  car  -AB    Clinical Impression    Date of Referral to PT 03/03/18  -RA     PT Diagnosis infection of total knee  -RA     Prognosis good  -RA      Patient/Family Goals Statement Pt would like to get out of bed to walk  -RA     Criteria for Skilled Therapeutic Interventions Met yes;treatment indicated  -RA     Pathology/Pathophysiology Noted (Describe Specifically for Each System) musculoskeletal  -RA     Impairments Found (describe specific impairments) gait, locomotion, and balance  -RA     Functional Limitations in Following Categories (Describe Specific Limitations) self-care;home management  -RA     Rehab Potential good, to achieve stated therapy goals  -RA     Predicted Duration of Therapy Intervention (days/wks) until d/c  -RA     Pain Assessment    Pain Assessment 0-10  -RA     Pain Score 1  -RA     Pain Type Acute pain;Surgical pain  -RA     Pain Location Knee  -RA     Pain Orientation Right  -RA     Pain Descriptors Aching  -RA     Pain Frequency Constant/continuous  -RA     Pain Intervention(s) Medication (See MAR);Repositioned;Ambulation/increased activity  -RA     Response to Interventions tolerated  -RA     Cognitive Assessment/Intervention    Current Cognitive/Communication Assessment functional  -RA     Orientation Status oriented x 4  -RA     Follows Commands/Answers Questions 100% of the time;able to follow multi-step instructions  -RA     Personal Safety good awareness, safety precautions  -RA     Personal Safety Interventions gait belt;nonskid shoes/slippers when out of bed  -RA     ROM (Range of Motion)    General ROM lower extremity range of motion deficits identified  -RA     General ROM Detail AROM -10 degrees R knee ext, 35 degrees R knee flexion  -RA     MMT (Manual Muscle Testing)    General MMT Assessment lower extremity strength deficits identified  -RA     General MMT Assessment Detail R LE 4/5 within available ROM  -RA     Bed Mobility, Assessment/Treatment    Bed Mobility, Assistive Device bed rails;head of bed elevated  -RA     Bed Mobility, Scoot/Bridge, [place] conditional independence  -RA     Bed Mob, Supine to Sit,  [place] conditional independence  -RA     Bed Mobility, Comment Able to elevate head of bed at home  -RA     Transfer Assessment/Treatment    Transfers, Sit-Stand [place] contact guard assist  -RA     Transfer, Comment modAx1 for toilet sit to stand  -RA     Gait Assessment/Treatment    Gait, [place] Level contact guard assist  -RA     Gait, Assistive Device rolling walker  -RA     Gait, Distance (Feet) 150  -RA     Gait, Gait Deviations antalgic  -RA     Gait, Impairments pain;ROM decreased  -RA     Sensory Assessment/Intervention    Sensory Impairment numbness   B feet  -RA     Positioning and Restraints    Pre-Treatment Position in bed  -RA     Post Treatment Position bed  -RA     In Bed supine;call light within reach;encouraged to call for assist;exit alarm on;side rails up x2  -RA       03/03/18 1510 03/02/18 1444    General Information    Equipment Currently Used at Home walker, rolling  -AB walker, rolling;shower chair;raised toilet;grab bar  -    Living Environment    Lives With  spouse  -    Living Arrangements  house  -    Home Accessibility  no concerns  -    Stair Railings at Home  none  -    Type of Financial/Environmental Concern  none  -    Transportation Available  car;family or friend will provide  -      User Key  (r) = Recorded By, (t) = Taken By, (c) = Cosigned By    Initials Name Provider Type    AB Jie Lino, RN Registered Nurse    LATIA Lundy, RN Registered Nurse    JUAN Mary, DIAMOND Physical Therapist          Physical Therapy Education     Title: PT OT SLP Therapies (Done)     Topic: Physical Therapy (Done)     Point: Mobility training (Done)    Learning Progress Summary    Learner Readiness Method Response Comment Documented by Status   Patient Acceptance E VU Pt informed of benefits of PT. Pt instructed to call for help for OOB activity.  03/04/18 0975 Done               Point: Precautions (Done)    Learning Progress Summary    Learner  Readiness Method Response Comment Documented by Status   Patient Acceptance E VU Pt informed of benefits of PT. Pt instructed to call for help for OOB activity. RA 03/04/18 0954 Done                      User Key     Initials Effective Dates Name Provider Type Discipline     02/05/18 -  Sima Mary PT Physical Therapist PT                PT Recommendation and Plan  Anticipated Discharge Disposition: home with outpatient services  Planned Therapy Interventions: bed mobility training, gait training, transfer training  PT Frequency: 2 times/day, per priority policy, daily  Plan of Care Review  Plan Of Care Reviewed With: patient  Outcome Summary/Follow up Plan: PT eval complete. Pt able to ambualte 150 feet using rolling walker CGAx1 no LOB, no rest breaks. ModAx1 needed for sit to stand from toilet, but CGAx1 sit to stand from bed. Skilled therapy services needed to improve ROM and strength for safer function at home. PT recommend d/c home with outpatient therapy.           IP PT Goals       03/04/18 0958          Transfer Training 2 PT LTG    Transfer Training PT 2 LTG, Date Established 03/04/18  -RA      Transfer Training PT 2 LTG, Time to Achieve by discharge  -RA      Transfer Training PT 2 LTG, Activity Type bed to chair /chair to bed;sit to stand/stand to sit;toilet  -RA      Transfer Training PT 2 LTG, [place] Level independent  -RA      Transfer Training PT 2 LTG, Assist Device walker, rolling  -RA      Gait Training PT LTG    Gait Training Goal PT LTG, Date Established 03/04/18  -RA      Gait Training Goal PT LTG, [place] Level conditional independence  -RA      Gait Training Goal PT LTG, Assist Device walker, rolling  -RA      Gait Training Goal PT LTG, Distance to Achieve 200  -RA        User Key  (r) = Recorded By, (t) = Taken By, (c) = Cosigned By    Initials Name Provider Type    RA Sima Mary, PT Physical Therapist                Outcome Measures       03/04/18 0900           How much help from another person do you currently need...    Turning from your back to your side while in flat bed without using bedrails? 3  -RA      Moving from lying on back to sitting on the side of a flat bed without bedrails? 3  -RA      Moving to and from a bed to a chair (including a wheelchair)? 3  -RA      Standing up from a chair using your arms (e.g., wheelchair, bedside chair)? 3  -RA      Climbing 3-5 steps with a railing? 2  -RA      To walk in hospital room? 3  -RA      AM-PAC 6 Clicks Score 17  -RA      Functional Assessment    Outcome Measure Options AM-PAC 6 Clicks Basic Mobility (PT)  -RA        User Key  (r) = Recorded By, (t) = Taken By, (c) = Cosigned By    Initials Name Provider Type    JUAN Mary PT Physical Therapist           Time Calculation:         PT Charges       03/04/18 1000          Time Calculation    Start Time 0845  -RA      Stop Time 0945  -RA      Time Calculation (min) 60 min  -RA      PT Received On 03/04/18  -RA      PT Goal Re-Cert Due Date 03/14/18  -RA        User Key  (r) = Recorded By, (t) = Taken By, (c) = Cosigned By    Initials Name Provider Type    JUAN Mary PT Physical Therapist          Therapy Charges for Today     Code Description Service Date Service Provider Modifiers Qty    99607666922 HC PT MOBILITY CURRENT 3/4/2018 Sima Mary PT GP, CK 1    47299000109 HC PT MOBILITY PROJECTED 3/4/2018 Sima Mary PT GP, CJ 1    08759023711 HC PT EVAL LOW COMPLEXITY 4 3/4/2018 Sima Mary PT GP 1          PT G-Codes  Outcome Measure Options: AM-PAC 6 Clicks Basic Mobility (PT)  Score: 17  Functional Limitation: Mobility: Walking and moving around  Mobility: Walking and Moving Around Current Status (): At least 40 percent but less than 60 percent impaired, limited or restricted  Mobility: Walking and Moving Around Goal Status (): At least 20 percent but less than 40 percent impaired, limited or restricted      Sima BLEDSOE  Usman, PT  3/4/2018

## 2018-03-04 NOTE — PLAN OF CARE
Problem: Patient Care Overview (Adult)  Goal: Plan of Care Review  Outcome: Ongoing (interventions implemented as appropriate)   03/04/18 0956   Coping/Psychosocial Response Interventions   Plan Of Care Reviewed With patient   Outcome Evaluation   Outcome Summary/Follow up Plan PT eval complete. Pt able to ambulate 150 feet using rolling walker CGAx1 no LOB, no rest breaks. ModAx1 needed for sit to stand from toilet, but CGAx1 sit to stand from bed. Skilled therapy services needed to improve ROM and strength for safer function at home. PT recommend d/c home with outpatient therapy.

## 2018-03-05 LAB
B-LACTAMASE USUAL SUSC ISLT: POSITIVE
B-LACTAMASE USUAL SUSC ISLT: POSITIVE
BACTERIA SPEC AEROBE CULT: ABNORMAL
CREAT BLD-MCNC: 1 MG/DL (ref 0.5–1.4)
GFR SERPL CREATININE-BSD FRML MDRD: 56 ML/MIN/1.73
GRAM STN SPEC: ABNORMAL
VANCOMYCIN TROUGH SERPL-MCNC: 23.03 MCG/ML (ref 10–20)

## 2018-03-05 PROCEDURE — 97116 GAIT TRAINING THERAPY: CPT

## 2018-03-05 PROCEDURE — 25010000002 CEFEPIME PER 500 MG: Performed by: INTERNAL MEDICINE

## 2018-03-05 PROCEDURE — 97110 THERAPEUTIC EXERCISES: CPT

## 2018-03-05 PROCEDURE — 82565 ASSAY OF CREATININE: CPT | Performed by: ORTHOPAEDIC SURGERY

## 2018-03-05 PROCEDURE — C1751 CATH, INF, PER/CENT/MIDLINE: HCPCS

## 2018-03-05 PROCEDURE — 97530 THERAPEUTIC ACTIVITIES: CPT

## 2018-03-05 PROCEDURE — 80202 ASSAY OF VANCOMYCIN: CPT | Performed by: ORTHOPAEDIC SURGERY

## 2018-03-05 PROCEDURE — 02HV33Z INSERTION OF INFUSION DEVICE INTO SUPERIOR VENA CAVA, PERCUTANEOUS APPROACH: ICD-10-PCS | Performed by: ORTHOPAEDIC SURGERY

## 2018-03-05 PROCEDURE — 25010000002 VANCOMYCIN 10 G RECONSTITUTED SOLUTION: Performed by: ORTHOPAEDIC SURGERY

## 2018-03-05 RX ORDER — LIDOCAINE HYDROCHLORIDE 10 MG/ML
1 INJECTION, SOLUTION INFILTRATION; PERINEURAL ONCE
Status: COMPLETED | OUTPATIENT
Start: 2018-03-05 | End: 2018-03-05

## 2018-03-05 RX ADMIN — VENLAFAXINE HYDROCHLORIDE 150 MG: 75 CAPSULE, EXTENDED RELEASE ORAL at 08:40

## 2018-03-05 RX ADMIN — LIDOCAINE HYDROCHLORIDE 1 ML: 10 INJECTION, SOLUTION EPIDURAL; INFILTRATION; INTRACAUDAL; PERINEURAL at 19:03

## 2018-03-05 RX ADMIN — SODIUM CHLORIDE, POTASSIUM CHLORIDE, SODIUM LACTATE AND CALCIUM CHLORIDE 20 ML/HR: 600; 310; 30; 20 INJECTION, SOLUTION INTRAVENOUS at 02:17

## 2018-03-05 RX ADMIN — ACYCLOVIR 400 MG: 200 CAPSULE ORAL at 15:11

## 2018-03-05 RX ADMIN — NAFCILLIN SODIUM 2 G: 2 INJECTION, POWDER, LYOPHILIZED, FOR SOLUTION INTRAMUSCULAR; INTRAVENOUS at 22:23

## 2018-03-05 RX ADMIN — METOPROLOL TARTRATE 25 MG: 25 TABLET, FILM COATED ORAL at 20:13

## 2018-03-05 RX ADMIN — GABAPENTIN 600 MG: 300 CAPSULE ORAL at 20:13

## 2018-03-05 RX ADMIN — NAFCILLIN SODIUM 2 G: 2 INJECTION, POWDER, LYOPHILIZED, FOR SOLUTION INTRAMUSCULAR; INTRAVENOUS at 18:23

## 2018-03-05 RX ADMIN — ACYCLOVIR 400 MG: 200 CAPSULE ORAL at 08:40

## 2018-03-05 RX ADMIN — MAGNESIUM HYDROXIDE 10 ML: 2400 SUSPENSION ORAL at 06:15

## 2018-03-05 RX ADMIN — HYDROCODONE BITARTRATE AND ACETAMINOPHEN 1 TABLET: 7.5; 325 TABLET ORAL at 12:42

## 2018-03-05 RX ADMIN — METOPROLOL TARTRATE 25 MG: 25 TABLET, FILM COATED ORAL at 08:40

## 2018-03-05 RX ADMIN — DULOXETINE HYDROCHLORIDE 30 MG: 30 CAPSULE, DELAYED RELEASE ORAL at 08:40

## 2018-03-05 RX ADMIN — ROPINIROLE HYDROCHLORIDE 3 MG: 1 TABLET, FILM COATED ORAL at 20:14

## 2018-03-05 RX ADMIN — HYDROCODONE BITARTRATE AND ACETAMINOPHEN 1 TABLET: 7.5; 325 TABLET ORAL at 03:48

## 2018-03-05 RX ADMIN — GABAPENTIN 600 MG: 300 CAPSULE ORAL at 13:15

## 2018-03-05 RX ADMIN — CEFEPIME HYDROCHLORIDE 2 G: 2 INJECTION, POWDER, FOR SOLUTION INTRAVENOUS at 10:36

## 2018-03-05 RX ADMIN — GABAPENTIN 600 MG: 300 CAPSULE ORAL at 06:14

## 2018-03-05 RX ADMIN — LORAZEPAM 1 MG: 1 TABLET ORAL at 22:30

## 2018-03-05 RX ADMIN — FLECAINIDE ACETATE 50 MG: 50 TABLET ORAL at 08:40

## 2018-03-05 RX ADMIN — VANCOMYCIN HYDROCHLORIDE 750 MG: 10 INJECTION, POWDER, LYOPHILIZED, FOR SOLUTION INTRAVENOUS at 03:48

## 2018-03-05 RX ADMIN — VANCOMYCIN HYDROCHLORIDE 750 MG: 10 INJECTION, POWDER, LYOPHILIZED, FOR SOLUTION INTRAVENOUS at 13:15

## 2018-03-05 RX ADMIN — PANTOPRAZOLE SODIUM 40 MG: 40 TABLET, DELAYED RELEASE ORAL at 06:14

## 2018-03-05 RX ADMIN — FLECAINIDE ACETATE 50 MG: 50 TABLET ORAL at 20:14

## 2018-03-05 RX ADMIN — HYDROCODONE BITARTRATE AND ACETAMINOPHEN 1 TABLET: 7.5; 325 TABLET ORAL at 20:14

## 2018-03-05 RX ADMIN — LORAZEPAM 1 MG: 1 TABLET ORAL at 02:16

## 2018-03-05 RX ADMIN — LEVOTHYROXINE SODIUM 137 MCG: 137 TABLET ORAL at 08:40

## 2018-03-05 RX ADMIN — ACYCLOVIR 400 MG: 200 CAPSULE ORAL at 20:13

## 2018-03-05 NOTE — PAYOR COMM NOTE
"FROM: [facility]  PHONE: [phone]  FAX: [phone]    PENDING: [id number]    Lucretia Cannon (62 y.o. Female)     Date of Birth Social Security Number Address Home Phone MRN    1955  [address] [place] [address] [phone] [id number]    Yazidi Marital Status          None        Admission Date Admission Type Admitting Provider Attending Provider Department, Room/Bed    3/3/18 Elective Amilcar Capellan MD Jackson, Stephen H, MD [facility] 3A, 349/1    Discharge Date Discharge Disposition Discharge Destination                      Attending Provider: Amilcar Capellan MD     Allergies:  Codeine, Mobic [Meloxicam], Morphine And Related    Isolation:  None   Infection:  None   Code Status:  FULL    Ht:  165 cm (64.96\")   Wt:  126 kg (278 lb 12.8 oz)    Admission Cmt:  None   Principal Problem:  None                Active Insurance as of 3/3/2018     Primary Coverage     Payor Plan Insurance Group Employer/Plan Group    [employer]HE[employer] BLUE CROSS ANTHEM BLUE CROSS BLUE SHIELD PPO [id number]     Payor Plan Address Payor Plan Phone Number Effective From Effective To    [address] [phone] 1/1/2016     [place] [address]       Subscriber Name Subscriber Birth Date Member ID       LUCRETIA CANNON 1955 [id number]                 Emergency Contacts      (Rel.) Home Phone Work Phone Mobile Phone    Clark Cannon (Spouse) [phone] -- [phone]            History & Physical     No notes of this type exist for this encounter.        Vital Signs (last 72 hrs)       03/02 0700  -  03/03 0659 03/03 0700  -  03/04 0659 03/04 0700  -  03/05 0659 03/05 0700  -  03/05 0929   Most Recent    Temp (°F)   96.9 -  98.1    97.4 -  98.4      97.6     97.6 (36.4)    Heart Rate   70    62 -  78    64 -  72      72     72    Resp   18    14 -  24    18 -  20      20     20    BP   112/56    (!)76/47 -  136/51    (!) 123/38 -  140/66      150/76     150/76    SpO2 (%)   93    90 -  100 "    95 -  99      99     99          Hospital Medications (all)       Dose Frequency Start End    acyclovir (ZOVIRAX) capsule 400 mg 400 mg 3 Times Daily 3/3/2018 3/10/2018    Sig - Route: Take 2 capsules by mouth 3 (Three) Times a Day. - Oral    albumin human 5 % bottle 12.5 g 12.5 g Once 3/3/2018 3/3/2018    Sig - Route: Infuse 250 mL into a venous catheter 1 (One) Time. - Intravenous    ceFEPime (MAXIPIME) 2g/12.5ml IV PUSH syringe 2 g Every 12 Hours 3/4/2018 3/11/2018    Sig - Route: Infuse 12.5 mL into a venous catheter Every 12 (Twelve) Hours. - Intravenous    Cosign for Ordering: Required by Chente Florentino MD    docusate sodium (COLACE) capsule 100 mg 100 mg 2 Times Daily PRN 3/3/2018     Sig - Route: Take 1 capsule by mouth 2 (Two) Times a Day As Needed for Constipation. - Oral    DULoxetine (CYMBALTA) DR capsule 30 mg 30 mg Daily 3/3/2018     Sig - Route: Take 1 capsule by mouth Daily. - Oral    flecainide (TAMBOCOR) tablet 50 mg 50 mg Every 12 Hours Scheduled 3/3/2018     Sig - Route: Take 1 tablet by mouth Every 12 (Twelve) Hours. - Oral    fluconazole (DIFLUCAN) tablet 150 mg 150 mg Once 3/3/2018 3/3/2018    Sig - Route: Take 1 tablet by mouth 1 (One) Time. - Oral    fluticasone (FLONASE) 50 MCG/ACT nasal spray 2 spray 2 spray Daily 3/3/2018     Sig - Route: 2 sprays by Each Nare route Daily. - Each Nare    gabapentin (NEURONTIN) capsule 600 mg 600 mg Every 8 Hours Scheduled 3/3/2018     Sig - Route: Take 2 capsules by mouth Every 8 (Eight) Hours. - Oral    HYDROcodone-acetaminophen (NORCO) 7.5-325 MG per tablet 1 tablet 1 tablet Every 4 Hours PRN 3/3/2018 3/13/2018    Sig - Route: Take 1 tablet by mouth Every 4 (Four) Hours As Needed for Moderate Pain . - Oral    HYDROmorphone (DILAUDID) PCA 1 mg/mL syringe  Continuous 3/3/2018 3/17/2018    Sig - Route: Infuse  into a venous catheter Continuous. - Intravenous    lactated ringers infusion 20 mL/hr Continuous 3/3/2018     Sig - Route: Infuse 20 mL/hr into  "a venous catheter Continuous. - Intravenous    lactated ringers infusion 100 mL/hr Continuous 3/3/2018     Sig - Route: Infuse 100 mL/hr into a venous catheter Continuous. - Intravenous    levothyroxine (SYNTHROID, LEVOTHROID) tablet 137 mcg 137 mcg Daily 3/3/2018     Sig - Route: Take 1 tablet by mouth Daily. - Oral    LORazepam (ATIVAN) tablet 1 mg 1 mg Every 8 Hours PRN 3/3/2018     Sig - Route: Take 1 tablet by mouth Every 8 (Eight) Hours As Needed for Anxiety. - Oral    magnesium hydroxide (MILK OF MAGNESIA) suspension 2400 mg/10mL 10 mL 10 mL Daily PRN 3/3/2018     Sig - Route: Take 10 mL by mouth Daily As Needed for Constipation. - Oral    metoprolol tartrate (LOPRESSOR) tablet 25 mg 25 mg Every 12 Hours Scheduled 3/3/2018     Sig - Route: Take 1 tablet by mouth Every 12 (Twelve) Hours. - Oral    naloxone (NARCAN) injection 0.1 mg 0.1 mg Every 5 Minutes PRN 3/3/2018     Sig - Route: Infuse 0.25 mL into a venous catheter Every 5 (Five) Minutes As Needed for Opioid Reversal or Respiratory Depression (see administration instructions). - Intravenous    ondansetron (ZOFRAN) injection 4 mg 4 mg Every 6 Hours PRN 3/3/2018     Sig - Route: Infuse 2 mL into a venous catheter Every 6 (Six) Hours As Needed for Nausea or Vomiting. - Intravenous    Linked Group 1:  \"Or\" Linked Group Details        ondansetron (ZOFRAN) tablet 4 mg 4 mg Every 6 Hours PRN 3/3/2018     Sig - Route: Take 1 tablet by mouth Every 6 (Six) Hours As Needed for Nausea or Vomiting. - Oral    Linked Group 1:  \"Or\" Linked Group Details        ondansetron ODT (ZOFRAN-ODT) disintegrating tablet 4 mg 4 mg Every 6 Hours PRN 3/3/2018     Sig - Route: Take 1 tablet by mouth Every 6 (Six) Hours As Needed for Nausea or Vomiting. - Oral    Linked Group 1:  \"Or\" Linked Group Details        pantoprazole (PROTONIX) EC tablet 40 mg 40 mg Every Early Morning 3/4/2018     Sig - Route: Take 1 tablet by mouth Every Morning. - Oral    rOPINIRole (REQUIP) tablet 3 mg 3 " "mg Nightly 3/3/2018     Sig - Route: Take 3 tablets by mouth Every Night. - Oral    triamcinolone (KENALOG) 0.1 % cream  Every 12 Hours Scheduled 3/3/2018     Sig - Route: Apply  topically Every 12 (Twelve) Hours. - Topical    vancomycin (VANCOCIN) 1,750 mg in sodium chloride 0.9 % 500 mL IVPB 15 mg/kg × 123 kg Once 3/3/2018 3/3/2018    Sig - Route: Infuse 1,750 mg into a venous catheter 1 (One) Time. - Intravenous    Linked Group 2:  \"Followed by\" Linked Group Details        vancomycin 750 mg/250 mL 0.9% NS IVPB ([facility]) 750 mg Every 12 Hours 3/5/2018 3/11/2018    Sig - Route: Infuse 250 mL into a venous catheter Every 12 (Twelve) Hours. - Intravenous    venlafaxine XR (EFFEXOR-XR) 24 hr capsule 150 mg 150 mg Daily 3/3/2018     Sig - Route: Take 2 capsules by mouth Daily. - Oral    atropine sulfate injection 0.5 mg (Discontinued) 0.5 mg Once As Needed 3/3/2018 3/3/2018    Sig - Route: Infuse 5 mL into a venous catheter 1 (One) Time As Needed for Bradycardia (symptomatic bradycardia (hypotension, dizziness, confusion). Notify attending anesthesiologist.). - Intravenous    Reason for Discontinue: Patient Transfer    cefepime (MAXIPIME) intramuscular injection 2 g (Discontinued) 2 g Every 12 Hours 3/4/2018 3/4/2018    Sig - Route: Inject 2 g into the shoulder, thigh, or buttocks Every 12 (Twelve) Hours. - Intramuscular    Reason for Discontinue: Reorder    ePHEDrine injection 5 mg (Discontinued) 5 mg Once As Needed 3/3/2018 3/3/2018    Sig - Route: Infuse 0.1 mL into a venous catheter 1 (One) Time As Needed (symptomatic hypotension - Notify attending anesthesiologist). - Intravenous    Reason for Discontinue: Patient Transfer    fentaNYL citrate (PF) (SUBLIMAZE) injection 25 mcg (Discontinued) 25 mcg As Needed 3/3/2018 3/3/2018    Sig - Route: Infuse 0.5 mL into a venous catheter As Needed for Severe Pain  (Use for breakthrough pain). - Intravenous    Reason for Discontinue: Patient Transfer    flumazenil (ROMAZICON) " injection 0.2 mg (Discontinued) 0.2 mg As Needed 3/3/2018 3/3/2018    Sig - Route: Infuse 2 mL into a venous catheter As Needed (unresponsiveness). - Intravenous    Reason for Discontinue: Patient Transfer    hydrALAZINE (APRESOLINE) injection 5 mg (Discontinued) 5 mg Every 10 Minutes PRN 3/3/2018 3/3/2018    Sig - Route: Infuse 0.25 mL into a venous catheter Every 10 (Ten) Minutes As Needed for High Blood Pressure (for systolic blood pressure greater than 180 mmHg or diastolic blood pressure greater than 105 mmHg when HR less than 60). - Intravenous    Reason for Discontinue: Patient Transfer    HYDROmorphone (DILAUDID) injection 0.5 mg (Discontinued) 0.5 mg Every 5 Minutes PRN 3/3/2018 3/3/2018    Sig - Route: Infuse 0.5 mL into a venous catheter Every 5 (Five) Minutes As Needed for Moderate Pain . - Intravenous    Reason for Discontinue: Patient Transfer    ipratropium-albuterol (DUO-NEB) nebulizer solution 3 mL (Discontinued) 3 mL Once As Needed 3/3/2018 3/3/2018    Sig - Route: Take 3 mL by nebulization 1 (One) Time As Needed for Wheezing or Shortness of Air (bronchospasm). - Nebulization    Reason for Discontinue: Patient Transfer    labetalol (NORMODYNE,TRANDATE) injection 5 mg (Discontinued) 5 mg Every 5 Minutes PRN 3/3/2018 3/3/2018    Sig - Route: Infuse 1 mL into a venous catheter Every 5 (Five) Minutes As Needed for High Blood Pressure (for systolic blood pressure greater than 180 mmHg or diastolic blood pressure greater than 105 mmHg). - Intravenous    Reason for Discontinue: Patient Transfer    meperidine (DEMEROL) injection 12.5 mg (Discontinued) 12.5 mg Every 5 Minutes PRN 3/3/2018 3/3/2018    Sig - Route: Infuse 0.5 mL into a venous catheter Every 5 (Five) Minutes As Needed for Shivering (May repeat x 3). - Intravenous    Reason for Discontinue: Patient Transfer    metoclopramide (REGLAN) injection 5 mg (Discontinued) 5 mg Every 15 Minutes PRN 3/3/2018 3/3/2018    Sig - Route: Infuse 1 mL into a  "venous catheter Every 15 (Fifteen) Minutes As Needed for Nausea or Vomiting (for nausea/vomiting). - Intravenous    Reason for Discontinue: Patient Transfer    naloxone (NARCAN) injection 0.04 mg (Discontinued) 0.04 mg As Needed 3/3/2018 3/3/2018    Sig - Route: Infuse 0.1 mL into a venous catheter As Needed for Opioid Reversal (unresponsiveness, decrease oxygen saturation). - Intravenous    Reason for Discontinue: Patient Transfer    ondansetron (ZOFRAN) injection 4 mg (Discontinued) 4 mg As Needed 3/3/2018 3/3/2018    Sig - Route: Infuse 2 mL into a venous catheter As Needed for Nausea or Vomiting. - Intravenous    Reason for Discontinue: Patient Transfer    phenylephrine (AGATA-SYNEPHRINE) 50 mg in sodium chloride 0.9 % 250 mL (0.2 mg/mL) infusion (Discontinued) 0.5-3 mcg/kg/min × 123 kg Titrated 3/3/2018 3/3/2018    Sig - Route: Infuse 61.5-369 mcg/min into a venous catheter Dose Adjusted By Provider As Needed. - Intravenous    Reason for Discontinue: Patient Transfer    sodium chloride (NS) irrigation solution (Discontinued)  As Needed 3/3/2018 3/3/2018    Sig: As Needed.    Reason for Discontinue: Patient Discharge    sodium chloride 3,000 mL with vancomycin 1,000 mg irrigation (Discontinued)  As Needed 3/3/2018 3/3/2018    Sig: As Needed.    Reason for Discontinue: Patient Discharge    vancomycin (VANCOCIN) 1,750 mg in sodium chloride 0.9 % 500 mL IVPB (Discontinued) 15 mg/kg × 123 kg Every 12 Hours 3/3/2018 3/3/2018    Sig - Route: Infuse 1,750 mg into a venous catheter Every 12 (Twelve) Hours. - Intravenous    Reason for Discontinue: Reorder    vancomycin (VANCOCIN) in iso-osmotic dextrose IVPB 1 g (premix) 200 mL (Discontinued) 1,000 mg Every 12 Hours 3/4/2018 3/5/2018    Sig - Route: Infuse 200 mL into a venous catheter Every 12 (Twelve) Hours. - Intravenous    Reason for Discontinue: Dose adjustment    Linked Group 2:  \"Followed by\" Linked Group Details              Lab Results (last 72 hours)     " Procedure Component Value Units Date/Time    Anaerobic Culture - Tissue, Knee, Right [[id number]]  (Normal) Collected:  03/03/18 0904    Specimen:  Tissue from Knee, Right Updated:  03/04/18 1220     Culture No anaerobes isolated at 24 hours    Anaerobic Culture - Wound, Knee, Right [[id number]]  (Normal) Collected:  03/03/18 0934    Specimen:  Wound from Knee, Right Updated:  03/04/18 1221     Culture No anaerobes isolated at 24 hours    Vancomycin, Trough [[id number]]  (Abnormal) Collected:  03/05/18 0048    Specimen:  Blood Updated:  03/05/18 0107     Vancomycin Trough 23.03 (H) mcg/mL     Tissue / Bone Culture - Tissue, Knee, Right [[id number]]  (Abnormal)  (Susceptibility) Collected:  03/03/18 0904    Specimen:  Tissue from Knee, Right Updated:  03/05/18 0651     Tissue Culture --      Heavy growth (4+) Staphylococcus aureus, MSSA (A)     BETA LACTAMASE Positive     Gram Stain Result Many (4+) WBCs seen      Moderate (3+) Gram positive cocci, intracellular and extracellular    Susceptibility      Staphylococcus aureus, MSSA     DONTE     Clindamycin <=0.25 ug/ml Resistant     Erythromycin 4 ug/ml Resistant     Gentamicin <=0.5 ug/ml Susceptible     Inducible Clindamycin Resistance POS  Positive     Levofloxacin 1 ug/ml Susceptible  [1]      Oxacillin 0.5 ug/ml Susceptible     Penicillin G >=0.5 ug/ml Resistant     Tetracycline <=1 ug/ml Susceptible     Trimethoprim + Sulfamethoxazole <=10 ug/ml Susceptible     Vancomycin <=0.5 ug/ml Susceptible            [1]   Staphylococcus species may develop resistance during prolonged therapy with quinolones. Isolates that are initially susceptible may become resistant within three to four days after initiation of therapy. Testing of repeat isolates may be warranted.              Susceptibility Comments     Staphylococcus aureus, MSSA      This isolate is presumed to be clindamycin resistant based on detection of inducible clindamycin resistance.  Clindamycin may still be  effective in some patients.             Wound Culture - Wound, Knee, Right [[id number]]  (Abnormal) Collected:  18 0934    Specimen:  Wound from Knee, Right Updated:  18 0736     Wound Culture --      Light growth (2+) Staphylococcus aureus, MSSA (A)     BETA LACTAMASE Positive     Gram Stain Result Rare (1+) WBCs seen      Rare (1+) Gram positive cocci    Narrative:       See tissue culture [id number] collected 3/3 for DONTE report.           Imaging Results (last 72 hours)     ** No results found for the last 72 hours. **        ECG/EMG Results (last 72 hours)     ** No results found for the last 72 hours. **        Orders (last 72 hrs)     Start     Ordered    18 0200  vancomycin 750 mg/250 mL 0.9% NS IVPB ([facility])  Every 12 Hours      18 0114    18 0100  Vancomycin, Trough  Timed      18 1508    18 1600  Vital Signs  Every 8 Hours      18 1250    18 1528  Change Dressing  Once      18 1527    18 1045  ceFEPime (MAXIPIME) 2g/12.5ml IV PUSH syringe  Every 12 Hours      18 1017    18 1030  cefepime (MAXIPIME) intramuscular injection 2 g  Every 12 Hours,   Status:  Discontinued      18 0953    18 1003  PT Plan of Care Cert / Re-Cert  Once     Comments:  Physical Therapy Plan of Care  Initial Certification  Certification Period: 3/4/2018 - 2018    Patient Name: Danisha Cannon  : 1955    (M00.9) Infection of right knee  (Z74.09) Impaired mobility                  Assessment  PT Assessment  Impairments Found (describe specific impairments): gait, locomotion, and balance  PT Diagnosis: infection of total knee  Rehab Potential: good, to achieve stated therapy goals  Plans/Goals Discussed With: patient  Criteria for Skilled Therapeutic Interventions Met: yes, treatment indicated              IP PT Goals       18 0958          Transfer Training 2 PT LTG    Transfer Training PT 2 LTG, Date Established 18  -RA      Transfer  Training PT 2 LTG, Time to Achieve by discharge  -RA      Transfer Training PT 2 LTG, Activity Type bed to chair /chair to bed;sit   to stand/stand to sit;toilet  -RA      Transfer Training PT 2 LTG, [place] Level independent  -RA      Transfer Training PT 2 LTG, Assist Device walker, rolling  -RA      Gait Training PT LTG    Gait Training Goal PT LTG, Date Established 03/04/18  -RA      Gait Training Goal PT LTG, [place] Level conditional independence    -RA      Gait Training Goal PT LTG, Assist Device walker, rolling  -RA      Gait Training Goal PT LTG, Distance to Achieve 200  -RA        User Key  (r) = Recorded By, (t) = Taken By, (c) = Cosigned By    Initials Name Provider Type    JUAN Mary, PT Physical Therapist            PT OP Goals       03/04/18 1000       Time Calculation    PT Goal Re-Cert Due Date 03/14/18  -RA       User Key  (r) = Recorded By, (t) = Taken By, (c) = Cosigned By    Initials Name Provider Type    JUAN Mary, PT Physical Therapist            Plan  PT Plan  PT Frequency: 2 times/day, per priority policy, daily  Predicted Duration of Therapy Intervention (days/wks): until d/c  Planned Therapy Interventions: bed mobility training, gait training, transfer training  Outcome Summary/Follow up Plan: PT eval complete. Pt able to ambualte 150 feet using rolling walker CGAx1 no LOB, no rest breaks. ModAx1 needed for sit to stand from toilet, but CGAx1 sit to stand from bed. Skilled therapy services needed to improve ROM and strength for safer function at home. PT recommend d/c home with outpatient therapy.         Sima Mary, PT  3/4/2018            By cosigning this order, either electronically or physically, I certify that the therapy services are furnished while this patient is under my care, the services outline above are required by this patient, and will be reviewed every 90 days.        M.D.:__________________________________________ Date:  ______________    03/04/18 1003    03/04/18 0600  pantoprazole (PROTONIX) EC tablet 40 mg  Every Early Morning      03/03/18 1250    03/04/18 0600  Discontinue Indwelling Urinary Catheter in AM  Once      03/03/18 1250    03/04/18 0200  vancomycin (VANCOCIN) in iso-osmotic dextrose IVPB 1 g (premix) 200 mL  Every 12 Hours,   Status:  Discontinued      03/03/18 1310    03/04/18 0000  PT Consult: Eval & Treat  Once     Comments:  Do not flex knee    03/03/18 2134 03/03/18 2215  lactated ringers infusion  Continuous      03/03/18 2134 03/03/18 2135  Oxygen Therapy- Blow by - Humidified; Titrate for SPO2: equal to or greater than, per policy, 92%  Continuous      03/03/18 2134 03/03/18 2135  Pulse Oximetry, Continuous  Continuous      03/03/18 2134 03/03/18 2100  rOPINIRole (REQUIP) tablet 3 mg  Nightly      03/03/18 1250    03/03/18 1600  acyclovir (ZOVIRAX) capsule 400 mg  3 Times Daily      03/03/18 1250    03/03/18 1400  gabapentin (NEURONTIN) capsule 600 mg  Every 8 Hours Scheduled      03/03/18 1250    03/03/18 1400  Turn, Cough & Deep Breathe Every 2 Hours Until Ambulating  Every 2 Hours      03/03/18 1250    03/03/18 1400  Incentive Spirometry  Every 2 Hours While Awake     Comments:  Until lungs are clear and no productive cough.    03/03/18 1250    03/03/18 1400  vancomycin (VANCOCIN) 1,750 mg in sodium chloride 0.9 % 500 mL IVPB  Once      03/03/18 1310    03/03/18 1330  DULoxetine (CYMBALTA) DR capsule 30 mg  Daily      03/03/18 1250    03/03/18 1330  flecainide (TAMBOCOR) tablet 50 mg  Every 12 Hours Scheduled      03/03/18 1250    03/03/18 1330  fluconazole (DIFLUCAN) tablet 150 mg  Once      03/03/18 1250    03/03/18 1330  fluticasone (FLONASE) 50 MCG/ACT nasal spray 2 spray  Daily      03/03/18 1250    03/03/18 1330  levothyroxine (SYNTHROID, LEVOTHROID) tablet 137 mcg  Daily      03/03/18 1250    03/03/18 1330  metoprolol tartrate (LOPRESSOR) tablet 25 mg  Every 12 Hours Scheduled       03/03/18 1250    03/03/18 1330  triamcinolone (KENALOG) 0.1 % cream  Every 12 Hours Scheduled      03/03/18 1250    03/03/18 1330  venlafaxine XR (EFFEXOR-XR) 24 hr capsule 150 mg  Daily      03/03/18 1250    03/03/18 1330  vancomycin (VANCOCIN) 1,750 mg in sodium chloride 0.9 % 500 mL IVPB  Every 12 Hours,   Status:  Discontinued      03/03/18 1250    03/03/18 1330  HYDROmorphone (DILAUDID) PCA 1 mg/mL syringe  Continuous      03/03/18 1250    03/03/18 1330  albumin human 5 % bottle 12.5 g  Once      03/03/18 1251    03/03/18 1251  Weight Bearing - As Tolerated  Continuous      03/03/18 1250    03/03/18 1251  Pillows May Be Used to Elevate Operative Leg, But Do NOT Flex Operative Knee Over a Pillow  Until Discontinued      03/03/18 1250    03/03/18 1251  Instruct Patient to Do At Least 10 Ankle Pumps Per Hour While Awake  Once     Comments:  Instruct Patient to Do At Least 10 Ankle Pumps Per Hour While Awake    03/03/18 1250    03/03/18 1251  Saline lock IV with adequate po intake.  Continuous      03/03/18 1250    03/03/18 1251  Hemovac Drain to Self Suction  Continuous      03/03/18 1250    03/03/18 1251  Continue Indwelling Urinary Catheter Already in Place  Once      03/03/18 1250    03/03/18 1251  Notify Provider if Bladder Distention Continues  Until Discontinued      03/03/18 1250    03/03/18 1251  Catheter Care  Every Shift      03/03/18 1250    03/03/18 1251  Oxygen Therapy-  Continuous      03/03/18 1250    03/03/18 1251  Advance diet as tolerated  Until Discontinued      03/03/18 1250    03/03/18 1251  Full Code  Continuous      03/03/18 1250    03/03/18 1251  VTE Risk Assessment - Low Risk  Once      03/03/18 1250    03/03/18 1251  Pharmacologic VTE Prophylaxis Not Indicated: Post-Op Bleeding Concern  Once      03/03/18 1250    03/03/18 1251  Mechanical VTE Prophylaxis Not Indicated: Risks Outweigh Benefits  Once      03/03/18 1250    03/03/18 1251  Intake and Output  Every Shift      03/03/18 1250     03/03/18 1251  Diet Regular  Diet Effective Now      03/03/18 1250    03/03/18 1250  LORazepam (ATIVAN) tablet 1 mg  Every 8 Hours PRN      03/03/18 1250    03/03/18 1250  ondansetron (ZOFRAN) tablet 4 mg  Every 6 Hours PRN      03/03/18 1250    03/03/18 1250  ondansetron ODT (ZOFRAN-ODT) disintegrating tablet 4 mg  Every 6 Hours PRN      03/03/18 1250    03/03/18 1250  ondansetron (ZOFRAN) injection 4 mg  Every 6 Hours PRN      03/03/18 1250    03/03/18 1250  naloxone (NARCAN) injection 0.1 mg  Every 5 Minutes PRN      03/03/18 1250    03/03/18 1250  HYDROcodone-acetaminophen (NORCO) 7.5-325 MG per tablet 1 tablet  Every 4 Hours PRN      03/03/18 1250    03/03/18 1250  docusate sodium (COLACE) capsule 100 mg  2 Times Daily PRN      03/03/18 1250    03/03/18 1250  magnesium hydroxide (MILK OF MAGNESIA) suspension 2400 mg/10mL 10 mL  Daily PRN      03/03/18 1250    03/03/18 1100  lactated ringers infusion  Continuous      03/03/18 1023    03/03/18 1030  phenylephrine (AGATA-SYNEPHRINE) 50 mg in sodium chloride 0.9 % 250 mL (0.2 mg/mL) infusion  Titrated,   Status:  Discontinued      03/03/18 0952    03/03/18 0953  Vital signs every 5 minutes for 15 minutes, every 15 minutes thereafter.  Once,   Status:  Canceled      03/03/18 0952    03/03/18 0953  Call Anesthesiologist for additional IV Fluid bolus for Hypotension/Tachycardia  Continuous,   Status:  Canceled      03/03/18 0952    03/03/18 0953  Notify Anesthesia of Any Acute Changes in Patient Condition  Until Discontinued,   Status:  Canceled      03/03/18 0952    03/03/18 0953  Notify Anesthesia for Unrelieved Pain  Until Discontinued,   Status:  Canceled      03/03/18 0952    03/03/18 0953  Once DC criteria to floor met, follow surgeon's orders.  Until Discontinued,   Status:  Canceled      03/03/18 0952    03/03/18 0953  Discharge patient from PACU when discharge criteria is met.  Until Discontinued,   Status:  Canceled      03/03/18 0952    03/03/18 0952  Apply  warming blanket  As Needed,   Status:  Canceled     Comments:  For a recorded temp of <36.9 C    03/03/18 0952    03/03/18 0952  labetalol (NORMODYNE,TRANDATE) injection 5 mg  Every 5 Minutes PRN,   Status:  Discontinued      03/03/18 0952    03/03/18 0952  hydrALAZINE (APRESOLINE) injection 5 mg  Every 10 Minutes PRN,   Status:  Discontinued      03/03/18 0952    03/03/18 0952  atropine sulfate injection 0.5 mg  Once As Needed,   Status:  Discontinued      03/03/18 0952    03/03/18 0952  ipratropium-albuterol (DUO-NEB) nebulizer solution 3 mL  Once As Needed,   Status:  Discontinued      03/03/18 0952    03/03/18 0952  naloxone (NARCAN) injection 0.04 mg  As Needed,   Status:  Discontinued      03/03/18 0952    03/03/18 0952  flumazenil (ROMAZICON) injection 0.2 mg  As Needed,   Status:  Discontinued      03/03/18 0952    03/03/18 0952  ondansetron (ZOFRAN) injection 4 mg  As Needed,   Status:  Discontinued      03/03/18 0952    03/03/18 0952  metoclopramide (REGLAN) injection 5 mg  Every 15 Minutes PRN,   Status:  Discontinued      03/03/18 0952    03/03/18 0952  meperidine (DEMEROL) injection 12.5 mg  Every 5 Minutes PRN,   Status:  Discontinued      03/03/18 0952    03/03/18 0952  fentaNYL citrate (PF) (SUBLIMAZE) injection 25 mcg  As Needed,   Status:  Discontinued      03/03/18 0952    03/03/18 0952  HYDROmorphone (DILAUDID) injection 0.5 mg  Every 5 Minutes PRN,   Status:  Discontinued      03/03/18 0952    03/03/18 0952  ePHEDrine injection 5 mg  Once As Needed,   Status:  Discontinued      03/03/18 0952    03/03/18 0950  Transfer Patient  Once      03/03/18 0950    03/03/18 0946  Inpatient Infectious Diseases Consult  Once     Specialty:  Infectious Diseases  Provider:  Chente Florentino MD    03/03/18 0945    03/03/18 0940  Outpatient In A Bed  Once      03/03/18 0944    03/03/18 0935  Anaerobic Culture - Wound, Knee, Right      03/03/18 0935    03/03/18 0935  Wound Culture - Wound, Knee, Right      03/03/18  0935    03/03/18 0905  Anaerobic Culture - Tissue, Knee, Right      03/03/18 0905    03/03/18 0905  Tissue / Bone Culture - Tissue, Knee, Right      03/03/18 0905 03/03/18 0903  sodium chloride (NS) irrigation solution  As Needed,   Status:  Discontinued      03/03/18 0903 03/03/18 0901  sodium chloride 3,000 mL with vancomycin 1,000 mg irrigation  As Needed,   Status:  Discontinued      03/03/18 0902    Unscheduled  Ice to Incision for 48 Hours  As Needed      03/03/18 1250    Unscheduled  Apply Ice to Incision PRN for Edema, After Activity or Exercise, and to Lessen Discomfort  As Needed      03/03/18 1250    Unscheduled  May Stand to Void or Use Bedside Commode Day of Surgery. POD 1 & Thereafter Use Bedside Commode or Bathroom  As Needed      03/03/18 1250    Unscheduled  Change dressing  As Needed     Comments:  May d/c dressing changes when no drainage from wound.    03/03/18 1250    Unscheduled  Bladder Scan if Patient Unable to Void 4-6 Hours After Catheter Removal  As Needed      03/03/18 1250    Unscheduled  Straight Cath Every 4-6 Hours As Needed If Patient is Unable to Void After 4-6 Hours, Bladder Scan Volume is Greater Than 350-500mL & Patient Has Symptoms of Bladder Discomfort / Distention  As Needed      03/03/18 1250    Unscheduled  Consult Pharmacist For Review of Medications That May Cause Urinary Retention - RN To Place Order for Consult it Needed  As Needed      03/03/18 1250    Unscheduled  Schedule / Prompt Voiding For Patients With Urinary Incontinence  As Needed      03/03/18 1250             Operative/Procedure Notes (last 72 hours) (Notes from 3/2/2018  9:30 AM through 3/5/2018  9:30 AM)      Amilcar Capellan MD at 3/3/2018  9:31 AM  Version 1 of 1         [facility]  OP NOTE    Patient Name: Danisha Cannon  Date of Procedure: 3/3/2018     PREOPERATIVE DIAGNOSIS: Infected right total knee arthroplasty     POSTOPERATIVE DIAGNOSIS: Same.     PROCEDURE PERFORMED: Debridement  right total knee arthroplasty with polyethylene bearing exchange     SURGEON: Amilcar Capellan MD      ANESTHESIA: General.    PREPARATION: Routine.    STAFF: Circulator: Jaden Stein RN  Scrub Person: Pam Greene; Jayden Ramsey  Assistant: Carlton Alcocer    ESTIMATED BLOOD LOSS: None    SPECIMENS: Deep cultures    COMPLICATIONS: None    INDICATIONS: Danisha Cannon is a 62 y.o. female who is 2 weeks post a right total knee arthroplasty.  She has develop purulent drainage.  It was felt that debridement and a polyethylene bearing exchange were indicated. The indications, risks, and possible complications of the procedure were explained to the patient, who voiced understanding and wished to proceed with surgery.  At surgery there was purulent material in the deep space     PROCEDURE IN DETAIL: The patient was taken to the operating room and placed on the operating table in a supine position. After general anesthesia was obtained, the right lower extremity was prepped and draped in a sterile manner.  The previous surgical incision was reopened and all sutures were removed including superficial and deep fascial sutures.  The area was then thoroughly debrided.  Deep cultures were obtained.  The previous polyethylene liner was then wedged free with an osteotome and removed.  The posterior compartment was then thoroughly debrided and thoroughly irrigated with saline to which vancomycin had been added.  The wound was suctioned dry.  A new polyethylene liner was then impacted in the position appeared closure was then accomplished over deep and superficial Hemovac drains using #1 Vicryl in interrupted fashion on the deep fascia followed by nylon in interrupted vertical mattress fashion skin. Sterile dressings were applied. The patient tolerated the procedure well. Sponge and needle counts were correct. The patient was then awakened and extubated in the operating room and taken to the recovery room in good  condition.  Amilcar Capellan MD  Date: 3/3/2018 Time: 9:32 AM         Electronically signed by Amilcar Capellan MD at 3/3/2018  9:35 AM           Physician Progress Notes (last 72 hours) (Notes from 3/2/2018  9:30 AM through 3/5/2018  9:30 AM)      Chente Florentino MD at 3/4/2018  9:00 AM  Version 1 of 1         Infectious Diseases Progress Note    Patient:  Danisha Cannon  YOB: 1955  MRN: [id number]   Admit date: 3/3/2018   Admitting Physician: Amilcar Capellan MD  Primary Care Physician: Juan Carlos Sparks MD    Chief Complaint/Interval History: She is without fever.  She is sitting at bedside.  She is going to work with physical therapy.  She does not describe much in the way of any right knee pain or discomfort.  She had indicated yesterday that she had some concern that a prior infected cyst in the sacral/coccyx area may have some relation to her knee.  I examined her with nurse present so that I could thoroughly evaluate the reported cystic area in the sacral location.  She indicates no recent drainage from this area.  She had prior surgical drainage with with Dr. Capellan in 2016.  Patient had some concern about a communication between the sacral area and her bladder because the smell she noticed when the cyst area in the region of the sacrum was drained, was the same as the smell when she had a urinary tract infection sometime later.  On exam there is no erythema, warmth, induration, tenderness, cystic area, or induration in the region of the sacral or coccyx or buttock area.  At this point I feel any communication would be unlikely.  I explained I felt the similar smell was simply having a cyst infection and a separate urinary tract infections with an organism with similar characteristics.  I explained there is no evidence of infection involving the sacral or coccyx area at this time I do not think there would be any correlation with her possible right knee infection.    Intake/Output  "Summary (Last 24 hours) at 03/04/18 0900  Last data filed at 03/04/18 0420   Gross per 24 hour   Intake             5896 ml   Output             2425 ml   Net             3471 ml     Allergies:   Allergies   Allergen Reactions   • Codeine Dizziness and Nausea Only     Rapid heart rate, dizziness  NAUSEA   • Mobic [Meloxicam] Rash   • Morphine And Related Itching     Current Scheduled Medications:     acyclovir 400 mg Oral TID   DULoxetine 30 mg Oral Daily   flecainide 50 mg Oral Q12H   fluticasone 2 spray Each Nare Daily   gabapentin 600 mg Oral Q8H   levothyroxine 137 mcg Oral Daily   metoprolol tartrate 25 mg Oral Q12H   pantoprazole 40 mg Oral Q AM   rOPINIRole 3 mg Oral Nightly   triamcinolone  Topical Q12H   vancomycin 1,000 mg Intravenous Q12H   venlafaxine  mg Oral Daily     Current PRN Medications:  docusate sodium  •  HYDROcodone-acetaminophen  •  LORazepam  •  magnesium hydroxide  •  naloxone  •  ondansetron **OR** ondansetron ODT **OR** ondansetron    Review of Systems no rash or skin itching.  No nausea or vomiting.  No diarrhea.    Vital Signs:  /62 (BP Location: Left arm, Patient Position: Sitting)  Pulse 72  Temp 97.4 °F (36.3 °C) (Axillary)   Resp 20  Ht 165 cm (64.96\")  Wt 127 kg (280 lb 1.6 oz)  SpO2 95%  BMI 46.67 kg/m2    Physical Exam  Vital signs reviewed.  Right knee with drain remains in place.  Abdomen soft and nontender.  Examination of the sacral/coccyx area and buttocks area is outlined in the HPI (nurse present).  Line/IV site: No erythema, warmth, induration, or tenderness.    Lab Results:  CBC:   Results from last 7 days  Lab Units 03/02/18  1437   WBC 10*3/mm3 19.86*   HEMOGLOBIN g/dL 10.5*   HEMATOCRIT % 33.8*   PLATELETS 10*3/mm3 376     BMP:  Results from last 7 days  Lab Units 03/02/18  1437   SODIUM mmol/L 140   POTASSIUM mmol/L 4.2   CHLORIDE mmol/L 98   CO2 mmol/L 33.0*   BUN mg/dL 25*   CREATININE mg/dL 1.33   GLUCOSE mg/dL 126*   CALCIUM mg/dL 8.5 "     Culture Results:  Operative cultures pending    Radiology: None  Additional Studies Reviewed: None    Impression:   1.  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  Remote history of sacral cyst/possible pilonidal cyst infection-no evidence of any persistent infection or recurrence.    Recommendations:   Continue empiric antimicrobial therapy while awaiting right knee culture results  Going to continue vancomycin and expand gram-negative coverage while awaiting culture results  We will adjust antibiotics as we get additional information from microbiology lab    Chente Urena MD     Electronically signed by Chente Urena MD at 3/4/2018  9:52 AM      Amilcar Caepllan MD at 3/4/2018 10:33 AM  Version 1 of 1         This patient is 2 weeks post a right total knee arthroplasty.  Unfortunately she is now developed an infection.  She is now 1 day post-debridement and polyethylene liner exchange and closure over drains.  Her wound actually looks reasonably good this morning.  There is a slight increase in warmth but no no major swelling is noted.  Her drain is functional the cultures have revealed methicillin sensitive Staphylococcus aureus antibiotics will be managed by infectious disease     Electronically signed by Amilcar Capellan MD at 3/4/2018 10:34 AM        Consult Notes (last 72 hours) (Notes from 3/2/2018  9:30 AM through 3/5/2018  9:30 AM)     No notes of this type exist for this encounter.

## 2018-03-05 NOTE — PROGRESS NOTES
Discharge Planning Assessment   Elizabeth     Patient Name: Danisha Cannon  MRN: [id number]  Today's Date: 3/5/2018    Admit Date: 3/3/2018          Discharge Needs Assessment       03/05/18 1046    Living Environment    Lives With spouse    Living Arrangements house    Home Accessibility no concerns    Transportation Available family or friend will provide;car    Living Environment    Primary Care Provided By spouse/significant other    Quality Of Family Relationships supportive;helpful;involved    Able to Return to Prior Living Arrangements yes    Discharge Needs Assessment    Concerns To Be Addressed adjustment to diagnosis/illness concerns    Equipment Currently Used at Home rollator;shower chair    Discharge Planning Comments Patient lives with her  and plans to return home at discharge. Patient has potential need of IV abx. Patient states she is already current with [facility]. SW spoke to Shanta at [facility] and did confirm patient is current. ANDIE advised Shanta that patient has the potential need of IV home infusion. SW will follow for definite need and will make arrangements if IV abx are needed.             Discharge Plan     None        Discharge Placement     No information found                Demographic Summary     None            Functional Status     None            Psychosocial     None            Abuse/Neglect     None            Legal     None            Substance Abuse     None            Patient Forms     None          SOPHIE HernandezW

## 2018-03-05 NOTE — PLAN OF CARE
Problem: Patient Care Overview (Adult)  Goal: Plan of Care Review  Outcome: Ongoing (interventions implemented as appropriate)   03/05/18 7601   Coping/Psychosocial Response Interventions   Plan Of Care Reviewed With patient   Patient Care Overview   Progress improving   Outcome Evaluation   Outcome Summary/Follow up Plan Patient has maintained safety with complaint of pain in right knee; given prn pain medication with relief noted. Patient was taken off PCA pump as ordered this shift. Dressing to right knee changed, sutures intact, hemivac in place; patient tolerated well. Patient continues on IV antibiotics. VSS. Spouse at bedside, will continue to monitor.       Problem: Perioperative Period (Adult)  Goal: Signs and Symptoms of Listed Potential Problems Will be Absent or Manageable (Perioperative Period)  Outcome: Ongoing (interventions implemented as appropriate)      Problem: Fall Risk (Adult)  Goal: Absence of Falls  Outcome: Ongoing (interventions implemented as appropriate)      Problem: Infection, Risk/Actual (Adult)  Goal: Infection Prevention/Resolution  Outcome: Ongoing (interventions implemented as appropriate)

## 2018-03-05 NOTE — PROGRESS NOTES
"Pharmacy Dosing Service  Pharmacokinetics  Vancomycin Follow-up Evaluation    Assessment/Action/Plan:  Vancomycin trough = 23.03 (~10 hours post dose)(estimate true trough ~ 18-19). Since there have been no SCr levels drawn since the drug was initiated, the patient may not be at true steady state yet due to morbid obesity, and the preliminary cultures show MSSA; decreased the Vancomycin dose at this time from 1,000 mg Q12H to 750 mg Q12H to prevent excessive accumulation. Pharmacy will continue to monitor renal function and adjust dose accordingly.     Subjective:  Danisha Cannon is a 62 y.o. female currently on Vancomycin 750 mg IV every 12 hours (from 1,000 mg IV every 12 hours) for the treatment of possible infection of right knee prosthetic joint, day 3 of therapy.    Objective:  Ht: 165 cm (64.96\"); Wt: 126 kg (278 lb 12.8 oz)  Estimated Creatinine Clearance: 57 mL/min (by C-G formula based on Cr of 1.33).   Lab Results   Component Value Date    CREATININE 1.33 03/02/2018      Lab Results   Component Value Date    WBC 19.86 (H) 03/02/2018         Lab Results   Component Value Date    VANCOTROUGH 23.03 (H) 03/05/2018       Culture Results:  Microbiology Results (last 10 days)       Procedure Component Value - Date/Time      Anaerobic Culture - Wound, Knee, Right [[id number]]  (Normal) Collected:  03/03/18 0934    Lab Status:  Preliminary result Specimen:  Wound from Knee, Right Updated:  03/04/18 1221     Culture No anaerobes isolated at 24 hours    Wound Culture - Wound, Knee, Right [[id number]]  (Abnormal) Collected:  03/03/18 0934    Lab Status:  Preliminary result Specimen:  Wound from Knee, Right Updated:  03/04/18 1222     Wound Culture --      Light growth (2+) Staphylococcus aureus, MSSA (A)     BETA LACTAMASE Positive     Gram Stain Result Rare (1+) WBCs seen      Rare (1+) Gram positive cocci    Anaerobic Culture - Tissue, Knee, Right [[id number]]  (Normal) Collected:  03/03/18 0904    Lab Status:  " Preliminary result Specimen:  Tissue from Knee, Right Updated:  03/04/18 1220     Culture No anaerobes isolated at 24 hours    Tissue / Bone Culture - Tissue, Knee, Right [[id number]]  (Abnormal) Collected:  03/03/18 0904    Lab Status:  Preliminary result Specimen:  Tissue from Knee, Right Updated:  03/04/18 1224     Tissue Culture --      Heavy growth (4+) Staphylococcus aureus, MSSA (A)     BETA LACTAMASE Positive     Gram Stain Result Many (4+) WBCs seen      Moderate (3+) Gram positive cocci, intracellular and extracellular            Hayden Cody [facility]   03/05/18 1:15 AM

## 2018-03-05 NOTE — PROGRESS NOTES
This patient is 2 days postoperative debridement of infected right total knee arthroplasty.  She appears to be progressing satisfactorily.  She is afebrile.  There is some slight erythema but no major swelling at this juncture.   plan continue with IV antibiotics

## 2018-03-05 NOTE — THERAPY TREATMENT NOTE
Acute Care - Physical Therapy Treatment Note   [place]     Patient Name: Danisha Cannon  : 1955  MRN: [id number]  Today's Date: 3/5/2018  Onset of Illness/Injury or Date of Surgery Date: 18  Date of Referral to PT: 18  Referring Physician: Dr. Capellan    Admit Date: 3/3/2018    Visit Dx:    ICD-10-CM ICD-9-CM   1. Infection of right knee M00.9 711.96   2. Impaired mobility Z74.09 799.89     Patient Active Problem List   Diagnosis   • Abdominal adhesions   • Abnormal echocardiogram   • Abnormal Holter exam   • Atrial flutter, paroxysmal   • Bilateral edema of lower extremity   • Chest pain   • Cholecystitis with cholelithiasis   • Chronic right-sided low back pain with sciatica   • Class 3 obesity due to excess calories with serious comorbidity in adult   • Facet syndrome, lumbar   • Heart rate fast   • HTN (hypertension)   • Idiopathic chronic gout without tophus   • Idiopathic hypotension   • Idiopathic progressive neuropathy   • Insomnia   • Lumbar facet arthropathy   • Memory loss   • MRSA carrier   • Obstructive sleep apnea   • Paroxysmal a-fib   • Pilonidal cyst   • Primary osteoarthritis of right knee   • Recurrent ventral incisional hernia   • Restless leg syndrome   • Sciatica of right side   • Shortness of breath   • Sick sinus syndrome   • Sleep apnea   • Sleep apnea, obstructive   • Snoring   • Somnolence, daytime   • Spondylosis of lumbar region without myelopathy or radiculopathy   • Tear of medial meniscus of right knee, current   • Total knee replacement status, right   • Tremor   • Umbilical hernia               Adult Rehabilitation Note       18 1435 18 1013 18 1500    Rehab Assessment/Intervention    Discipline physical therapy assistant  -CW physical therapy assistant  -CW physical therapist  -RA    Document Type therapy note (daily note)  -CW therapy note (daily note)  -CW therapy note (daily note)  -RA    Subjective Information agree to therapy;complains  of;dizziness  -CW agree to therapy;complains of;pain  -CW agree to therapy;pain;fatigue  -RA    Patient Effort, Rehab Treatment good  -CW good  -CW good  -RA    Symptoms Noted During/After Treatment dizziness  -CW      Precautions/Limitations fall precautions  -CW fall precautions  -CW fall precautions  -RA    Specific Treatment Considerations No R knee flexion per Dr Capellan  -CW No R knee flexion  -CW     Recorded by [CW] Yadira Flores PTA [CW] Yadira Flores PTA [RA] Sima Mary, PT    Pain Assessment    Pain Assessment 0-10  -CW 0-10  -CW 0-10  -RA    Pain Score 2  -CW 5  -CW 1  -RA    Pain Type Acute pain;Surgical pain  -CW Acute pain;Surgical pain  -CW Acute pain;Surgical pain  -RA    Pain Location Knee  -CW Knee  -CW Knee  -RA    Pain Orientation Right  -CW Right  -CW Right  -RA    Pain Descriptors Aching  -CW Aching  -CW Aching  -RA    Pain Frequency Constant/continuous  -CW Constant/continuous  -CW Constant/continuous  -RA    Pain Intervention(s) Ambulation/increased activity  -CW Ambulation/increased activity  -CW Repositioned;Ambulation/increased activity  -RA    Response to Interventions tolerated  -CW tolerated  -CW tolerated  -RA    Recorded by [CW] Yadira Flores PTA [CW] Yadira Flores PTA [RA] Sima Mary, PT    Bed Mobility, Assessment/Treatment    Bed Mobility, Assistive Device bed rails  -CW bed rails;head of bed elevated  -CW     Bed Mobility, Scoot/Bridge, [place]   conditional independence  -RA    Bed Mob, Supine to Sit, [place] contact guard assist   assist with RLE  -CW conditional independence  -CW     Bed Mob, Sit to Supine, [place] minimum assist (75% patient effort);verbal cues required  -CW --   in chair  -CW     Bed Mobility, Safety Issues decreased use of legs for bridging/pushing  -CW      Bed Mobility, Impairments strength decreased  -CW      Bed Mobility, Comment Able to elevate HOB at home  -CW  Able to elevate head of bed at home   -RA    Recorded by [CW] Yadira Flores PTA [CW] Yadira Flores, MARNI [RA] Sima Mary, PT    Transfer Assessment/Treatment    Transfers, Bed-Chair [place]   contact guard assist  -RA    Transfers, Sit-Stand [place] stand by assist  -CW stand by assist  -CW contact guard assist  -RA    Transfers, Stand-Sit [place] stand by assist  -CW stand by assist  -CW     Transfers, Sit-Stand-Sit, Assist Device elevated surface;rolling walker  -CW elevated surface;rolling walker  -CW     Toilet Transfer, [place] supervision required  -CW supervision required  -CW     Toilet Transfer, Assistive Device rolling walker  -CW rolling walker  -CW     Transfer, Impairments ROM decreased;pain  -CW ROM decreased;pain  -CW     Transfer, Comment   modAx1 for toilet sit to stand  -RA    Recorded by [CW] Yadira Flores PTA [CW] Yadira Flores, MARNI [RA] Sima Mary, PT    Gait Assessment/Treatment    Gait, [place] Level stand by assist  -CW stand by assist  -CW     Gait, Assistive Device rolling walker  -CW rolling walker  -CW     Gait, Distance (Feet) 200   x 2  -   x 2  -CW     Gait, Gait Deviations antalgic;dami decreased;right:;decreased heel strike  -CW antalgic;dami decreased;step length decreased  -CW     Gait, Safety Issues weight-shifting ability decreased  -CW weight-shifting ability decreased  -CW     Gait, Impairments ROM decreased  -CW ROM decreased;pain  -CW     Gait, Comment No R knee flexion per Dr Capellan  -CW No R knee flexion per Dr Capellan  -CW     Recorded by [CW] Yadira Flores PTA [CW] Yadira Flores PTA     Therapy Exercises    Bilateral Lower Extremities  AROM:;supine;ankle pumps/circles;hip abduction/adduction;SLR;quad sets  -CW     Recorded by  [CW] Yadira Flores PTA     Positioning and Restraints    Pre-Treatment Position in bed  -CW in bed  -CW in bed  -RA    Post Treatment Position bed  -CW chair  -CW chair  -RA    In Bed fowlers;call  light within reach;encouraged to call for assist;exit alarm on;with family/caregiver;RLE elevated  -CW  call light within reach;sitting;encouraged to call for assist;legs elevated  -RA    In Chair  reclined;call light within reach;encouraged to call for assist  -CW     Recorded by [CW] Yadira Flores, PTA [CW] Yadira Flores, PTA [RA] Sima Mary, PT      User Key  (r) = Recorded By, (t) = Taken By, (c) = Cosigned By    Initials Name Effective Dates    CW Yadira Flores, PTA 06/22/15 -     RA Sima Mary, PT 02/05/18 -                 IP PT Goals       03/04/18 0958          Transfer Training 2 PT LTG    Transfer Training PT 2 LTG, Date Established 03/04/18  -RA      Transfer Training PT 2 LTG, Time to Achieve by discharge  -RA      Transfer Training PT 2 LTG, Activity Type bed to chair /chair to bed;sit to stand/stand to sit;toilet  -RA      Transfer Training PT 2 LTG, [place] Level independent  -RA      Transfer Training PT 2 LTG, Assist Device walker, rolling  -RA      Gait Training PT LTG    Gait Training Goal PT LTG, Date Established 03/04/18  -RA      Gait Training Goal PT LTG, [place] Level conditional independence  -RA      Gait Training Goal PT LTG, Assist Device walker, rolling  -RA      Gait Training Goal PT LTG, Distance to Achieve 200  -RA        User Key  (r) = Recorded By, (t) = Taken By, (c) = Cosigned By    Initials Name Provider Type    RA Sima Mary, PT Physical Therapist          Physical Therapy Education     Title: PT OT SLP Therapies (Done)     Topic: Physical Therapy (Done)     Point: Mobility training (Done)    Learning Progress Summary    Learner Readiness Method Response Comment Documented by Status   Patient Acceptance E,D MASOUD LUNA Plan of care, precautions, benefit of activity CW 03/05/18 1144 Done    Acceptance E CHERYL Pt informed of benefits of PT. Pt instructed to call for help for OOB activity. RA 03/04/18 0954 Done               Point: Precautions  (Done)    Learning Progress Summary    Learner Readiness Method Response Comment Documented by Status   Patient Acceptance E,D [place] LUNA Plan of care, precautions, benefit of activity  03/05/18 1144 Done    Acceptance E CHERYL Pt informed of benefits of PT. Pt instructed to call for help for OOB activity. RA 03/04/18 0954 Done                      User Key     Initials Effective Dates Name Provider Type Discipline     06/22/15 -  Yadira Flores, PTA Physical Therapy Assistant PT    RA 02/05/18 -  Sima Mary, PT Physical Therapist PT                    PT Recommendation and Plan  Anticipated Discharge Disposition: home with outpatient services  Planned Therapy Interventions: bed mobility training, gait training, transfer training  PT Frequency: 2 times/day, per priority policy, daily  Plan of Care Review  Plan Of Care Reviewed With: patient  Progress: progress toward functional goals as expected  Outcome Summary/Follow up Plan: Pt is independent with bed mobility and sba for sit to stand transfers.  Ambulated sba with rolling walker 200' maintaininng no R knee flexion precautions.  Pt c/o R knee pain 7/10 with activity.  Yobany continue to benefit from therapy to increase strength and endurance.          Outcome Measures       03/05/18 1200 03/04/18 0900       How much help from another person do you currently need...    Turning from your back to your side while in flat bed without using bedrails? 4  -CW 3  -RA     Moving from lying on back to sitting on the side of a flat bed without bedrails? 4  -CW 3  -RA     Moving to and from a bed to a chair (including a wheelchair)? 3  -CW 3  -RA     Standing up from a chair using your arms (e.g., wheelchair, bedside chair)? 3  -CW 3  -RA     Climbing 3-5 steps with a railing? 3  -CW 2  -RA     To walk in hospital room? 3  -CW 3  -RA     AM-PAC 6 Clicks Score 20  -CW 17  -RA     Functional Assessment    Outcome Measure Options AM-PAC 6 Clicks Basic Mobility (PT)  -CW  AM-PAC 6 Clicks Basic Mobility (PT)  -RA       User Key  (r) = Recorded By, (t) = Taken By, (c) = Cosigned By    Initials Name Provider Type    CW Yadira Flores PTA Physical Therapy Assistant    JUAN Mary, PT Physical Therapist           Time Calculation:         PT Charges       03/05/18 1523 03/05/18 1205       Time Calculation    Start Time 1435  -CW 1013  -CW     Stop Time 1517  -CW 1054  -CW     Time Calculation (min) 42 min  -CW 41 min  -CW     PT Received On 03/05/18  -CW 03/05/18  -CW     PT Goal Re-Cert Due Date 03/14/18  -CW 03/14/18  -CW     Time Calculation- PT    Total Timed Code Minutes- PT 42 minute(s)  -CW 41 minute(s)  -CW       User Key  (r) = Recorded By, (t) = Taken By, (c) = Cosigned By    Initials Name Provider Type    CARYN Flores PTA Physical Therapy Assistant          Therapy Charges for Today     Code Description Service Date Service Provider Modifiers Qty    87294312916 HC PT THER PROC EA 15 MIN 3/5/2018 Yadira Flores, PTA GP 1    35839426342 HC GAIT TRAINING EA 15 MIN 3/5/2018 Yadira Flores, PTA GP 2    89214091393 HC GAIT TRAINING EA 15 MIN 3/5/2018 Yadira Flores, PTA GP 2    19871215554 HC PT THERAPEUTIC ACT EA 15 MIN 3/5/2018 Yadira Flores, PTA GP 1          PT G-Codes  Outcome Measure Options: AM-PAC 6 Clicks Basic Mobility (PT)  Score: 17  Functional Limitation: Mobility: Walking and moving around  Mobility: Walking and Moving Around Current Status (): At least 40 percent but less than 60 percent impaired, limited or restricted  Mobility: Walking and Moving Around Goal Status (): At least 20 percent but less than 40 percent impaired, limited or restricted    Yadira Flores PTA  3/5/2018

## 2018-03-05 NOTE — THERAPY TREATMENT NOTE
Acute Care - Physical Therapy Treatment Note   [place]     Patient Name: Danisha Cannon  : 1955  MRN: [id number]  Today's Date: 3/5/2018  Onset of Illness/Injury or Date of Surgery Date: 18  Date of Referral to PT: 18  Referring Physician: Dr. Capellan    Admit Date: 3/3/2018    Visit Dx:    ICD-10-CM ICD-9-CM   1. Infection of right knee M00.9 711.96   2. Impaired mobility Z74.09 799.89     Patient Active Problem List   Diagnosis   • Abdominal adhesions   • Abnormal echocardiogram   • Abnormal Holter exam   • Atrial flutter, paroxysmal   • Bilateral edema of lower extremity   • Chest pain   • Cholecystitis with cholelithiasis   • Chronic right-sided low back pain with sciatica   • Class 3 obesity due to excess calories with serious comorbidity in adult   • Facet syndrome, lumbar   • Heart rate fast   • HTN (hypertension)   • Idiopathic chronic gout without tophus   • Idiopathic hypotension   • Idiopathic progressive neuropathy   • Insomnia   • Lumbar facet arthropathy   • Memory loss   • MRSA carrier   • Obstructive sleep apnea   • Paroxysmal a-fib   • Pilonidal cyst   • Primary osteoarthritis of right knee   • Recurrent ventral incisional hernia   • Restless leg syndrome   • Sciatica of right side   • Shortness of breath   • Sick sinus syndrome   • Sleep apnea   • Sleep apnea, obstructive   • Snoring   • Somnolence, daytime   • Spondylosis of lumbar region without myelopathy or radiculopathy   • Tear of medial meniscus of right knee, current   • Total knee replacement status, right   • Tremor   • Umbilical hernia               Adult Rehabilitation Note       18 1013 18 1500       Rehab Assessment/Intervention    Discipline physical therapy assistant  -CW physical therapist  -RA     Document Type therapy note (daily note)  -CW therapy note (daily note)  -RA     Subjective Information agree to therapy;complains of;pain  -CW agree to therapy;pain;fatigue  -RA     Patient Effort, Rehab  Treatment good  -CW good  -RA     Precautions/Limitations fall precautions  -CW fall precautions  -RA     Specific Treatment Considerations No R knee flexion  -CW      Recorded by [CW] Yadira Flores PTA [RA] Sima Mary, PT     Pain Assessment    Pain Assessment 0-10  -CW 0-10  -RA     Pain Score 5  -CW 1  -RA     Pain Type Acute pain;Surgical pain  -CW Acute pain;Surgical pain  -RA     Pain Location Knee  -CW Knee  -RA     Pain Orientation Right  -CW Right  -RA     Pain Descriptors Aching  -CW Aching  -RA     Pain Frequency Constant/continuous  -CW Constant/continuous  -RA     Pain Intervention(s) Ambulation/increased activity  -CW Repositioned;Ambulation/increased activity  -RA     Response to Interventions tolerated  -CW tolerated  -RA     Recorded by [CW] Yadira Flores PTA [RA] Sima Mary, PT     Bed Mobility, Assessment/Treatment    Bed Mobility, Assistive Device bed rails;head of bed elevated  -CW      Bed Mobility, Scoot/Bridge, [place]  conditional independence  -RA     Bed Mob, Supine to Sit, [place] conditional independence  -CW      Bed Mob, Sit to Supine, [place] --   in chair  -CW      Bed Mobility, Comment  Able to elevate head of bed at home  -RA     Recorded by [CW] Yadira Flores PTA [RA] Sima Mary, DIAMOND     Transfer Assessment/Treatment    Transfers, Bed-Chair [place]  contact guard assist  -RA     Transfers, Sit-Stand [place] stand by assist  -CW contact guard assist  -RA     Transfers, Stand-Sit [place] stand by assist  -CW      Transfers, Sit-Stand-Sit, Assist Device elevated surface;rolling walker  -CW      Toilet Transfer, [place] supervision required  -CW      Toilet Transfer, Assistive Device rolling walker  -CW      Transfer, Impairments ROM decreased;pain  -CW      Transfer, Comment  modAx1 for toilet sit to stand  -RA     Recorded by [CW] Yadira Flores PTA [RA] Sima Mary, PT     Gait Assessment/Treatment     Gait, [place] Level stand by assist  -CW      Gait, Assistive Device rolling walker  -CW      Gait, Distance (Feet) 200   x 2  -CW      Gait, Gait Deviations antalgic;dami decreased;step length decreased  -CW      Gait, Safety Issues weight-shifting ability decreased  -CW      Gait, Impairments ROM decreased;pain  -CW      Gait, Comment No R knee flexion per Dr Capellan  -CW      Recorded by [CW] Yadira Flores PTA      Therapy Exercises    Bilateral Lower Extremities AROM:;supine;ankle pumps/circles;hip abduction/adduction;SLR;quad sets  -CW      Recorded by [CW] Yadira Flores PTA      Positioning and Restraints    Pre-Treatment Position in bed  -CW in bed  -RA     Post Treatment Position chair  -CW chair  -RA     In Bed  call light within reach;sitting;encouraged to call for assist;legs elevated  -RA     In Chair reclined;call light within reach;encouraged to call for assist  -CW      Recorded by [CW] Yadira Flores PTA [RA] Sima Mary, PT       User Key  (r) = Recorded By, (t) = Taken By, (c) = Cosigned By    Initials Name Effective Dates    CW Yadira Flores, PTA 06/22/15 -     RA Sima Mary, PT 02/05/18 -                 IP PT Goals       03/04/18 0958          Transfer Training 2 PT LTG    Transfer Training PT 2 LTG, Date Established 03/04/18  -RA      Transfer Training PT 2 LTG, Time to Achieve by discharge  -RA      Transfer Training PT 2 LTG, Activity Type bed to chair /chair to bed;sit to stand/stand to sit;toilet  -RA      Transfer Training PT 2 LTG, [place] Level independent  -RA      Transfer Training PT 2 LTG, Assist Device walker, rolling  -RA      Gait Training PT LTG    Gait Training Goal PT LTG, Date Established 03/04/18  -RA      Gait Training Goal PT LTG, [place] Level conditional independence  -RA      Gait Training Goal PT LTG, Assist Device walker, rolling  -RA      Gait Training Goal PT LTG, Distance to Achieve 200  -RA        User Key   (r) = Recorded By, (t) = Taken By, (c) = Cosigned By    Initials Name Provider Type    RA Sima Mary, PT Physical Therapist          Physical Therapy Education     Title: PT OT SLP Therapies (Done)     Topic: Physical Therapy (Done)     Point: Mobility training (Done)    Learning Progress Summary    Learner Readiness Method Response Comment Documented by Status   Patient Acceptance E,D VU,DU Plan of care, precautions, benefit of activity  03/05/18 1144 Done    Acceptance E VU Pt informed of benefits of PT. Pt instructed to call for help for OOB activity.  03/04/18 0954 Done               Point: Precautions (Done)    Learning Progress Summary    Learner Readiness Method Response Comment Documented by Status   Patient Acceptance E,D VU,DU Plan of care, precautions, benefit of activity  03/05/18 1144 Done    Acceptance E VU Pt informed of benefits of PT. Pt instructed to call for help for OOB activity.  03/04/18 0954 Done                      User Key     Initials Effective Dates Name Provider Type Discipline     06/22/15 -  Yadira Flores PTA Physical Therapy Assistant PT     02/05/18 -  Sima Mary, PT Physical Therapist PT                    PT Recommendation and Plan  Anticipated Discharge Disposition: home with outpatient services  Planned Therapy Interventions: bed mobility training, gait training, transfer training  PT Frequency: 2 times/day, per priority policy, daily  Plan of Care Review  Plan Of Care Reviewed With: patient  Progress: progress toward functional goals as expected  Outcome Summary/Follow up Plan: Pt is independent with bed mobility and sba for sit to stand transfers.  Ambulated sba with rolling walker 200' maintaininng no R knee flexion precautions.  Pt c/o R knee pain 7/10 with activity.  Yobany continue to benefit from therapy to increase strength and endurance.          Outcome Measures       03/05/18 1200 03/04/18 0900       How much help from another person do you  currently need...    Turning from your back to your side while in flat bed without using bedrails? 4  -CW 3  -RA     Moving from lying on back to sitting on the side of a flat bed without bedrails? 4  -CW 3  -RA     Moving to and from a bed to a chair (including a wheelchair)? 3  -CW 3  -RA     Standing up from a chair using your arms (e.g., wheelchair, bedside chair)? 3  -CW 3  -RA     Climbing 3-5 steps with a railing? 3  -CW 2  -RA     To walk in hospital room? 3  -CW 3  -RA     AM-PAC 6 Clicks Score 20  -CW 17  -RA     Functional Assessment    Outcome Measure Options AM-PAC 6 Clicks Basic Mobility (PT)  -CW AM-PAC 6 Clicks Basic Mobility (PT)  -RA       User Key  (r) = Recorded By, (t) = Taken By, (c) = Cosigned By    Initials Name Provider Type    CARYN Flores PTA Physical Therapy Assistant    JUAN Mary, PT Physical Therapist           Time Calculation:         PT Charges       03/05/18 1205          Time Calculation    Start Time 1013  -CW      Stop Time 1054  -CW      Time Calculation (min) 41 min  -CW      PT Received On 03/05/18  -CW      PT Goal Re-Cert Due Date 03/14/18  -CW      Time Calculation- PT    Total Timed Code Minutes- PT 41 minute(s)  -CW        User Key  (r) = Recorded By, (t) = Taken By, (c) = Cosigned By    Initials Name Provider Type    CARYN Flores PTA Physical Therapy Assistant          Therapy Charges for Today     Code Description Service Date Service Provider Modifiers Qty    67222400014 HC PT THER PROC EA 15 MIN 3/5/2018 Yadira Flores PTA GP 1    40649063304 HC GAIT TRAINING EA 15 MIN 3/5/2018 Yadira Flores PTA GP 2          PT G-Codes  Outcome Measure Options: AM-PAC 6 Clicks Basic Mobility (PT)  Score: 17  Functional Limitation: Mobility: Walking and moving around  Mobility: Walking and Moving Around Current Status (): At least 40 percent but less than 60 percent impaired, limited or restricted  Mobility: Walking and Moving Around Goal  Status (): At least 20 percent but less than 40 percent impaired, limited or restricted    Yadira Flores, PTA  3/5/2018

## 2018-03-05 NOTE — NURSING NOTE
"At 0610 pt asked to see nurse.  Pt then stated that she was worried that she was \"starting to have symptoms like I did  after the surgery 3 weeks ago\".  Pt states that her left leg is twitching and she is seeing \"little blue balloons\" on the walls that aren't there.  Upon further discussion pt says that she only sees things when she is \"between asleep and awake\".  She also states that her legs have a history of twitching due to her neuropathy.  Pt is noticeably anxious.  She states, \"If you say I look healthy then I probably need to get with Dr. Sparks and work on my anxiety\".  Assessed pt's leg, dorsal pedis pulse is strong, toes warm and mobile.  She has no numbness/tingling.  Will inform oncoming nurse at shift report.  Maribell Underwood RN    "

## 2018-03-05 NOTE — PLAN OF CARE
Problem: Patient Care Overview (Adult)  Goal: Plan of Care Review  Outcome: Ongoing (interventions implemented as appropriate)   03/05/18 0402   Coping/Psychosocial Response Interventions   Plan Of Care Reviewed With patient   Patient Care Overview   Progress no change   Outcome Evaluation   Outcome Summary/Follow up Plan Pt. states pain in knee is greater than yesterday. Rates pain 3-4/10. PCA in use and PO norco given. Pt. often anxious. Worries about various medical issues. Last BM 3-1-18. Will offer MOM this morning. Right foot/ankle still swollen. PPP. Hemovac with small amount sero sang drainage. VSS.     Goal: Adult Individualization and Mutuality  Outcome: Ongoing (interventions implemented as appropriate)      Problem: Perioperative Period (Adult)  Goal: Signs and Symptoms of Listed Potential Problems Will be Absent or Manageable (Perioperative Period)  Outcome: Ongoing (interventions implemented as appropriate)      Problem: Fall Risk (Adult)  Goal: Absence of Falls  Outcome: Ongoing (interventions implemented as appropriate)      Problem: Infection, Risk/Actual (Adult)  Goal: Identify Related Risk Factors and Signs and Symptoms  Outcome: Outcome(s) achieved Date Met: 03/05/18    Goal: Infection Prevention/Resolution  Outcome: Ongoing (interventions implemented as appropriate)

## 2018-03-05 NOTE — PLAN OF CARE
Problem: Patient Care Overview (Adult)  Goal: Plan of Care Review  Outcome: Ongoing (interventions implemented as appropriate)   03/05/18 1144   Coping/Psychosocial Response Interventions   Plan Of Care Reviewed With patient   Patient Care Overview   Progress progress toward functional goals as expected   Outcome Evaluation   Outcome Summary/Follow up Plan Pt is independent with bed mobility and sba for sit to stand transfers. Ambulated sba with rolling walker 200' maintaining no R knee flexion precautions. Pt c/o R knee pain 7/10 with activity. Will continue to benefit from therapy to increase strength and endurance.

## 2018-03-05 NOTE — PROGRESS NOTES
Infectious Diseases Progress Note    Patient:  Danisha Cannon  YOB: 1955  MRN: [id number]   Admit date: 3/3/2018   Admitting Physician: Amilcar Capellan MD  Primary Care Physician: Juan Carlos Sparks MD    Chief Complaint/Interval History: She feels okay.  No new symptoms.  Drain remains in place.  Daughter at bedside.  Reviewed culture results with both of them.  Explained one stage and 2-stage approach to prosthetic joint infection.  Reviewed risks and benefits of PICC line.  Explained in her situation, since she has had incision/drainage/poly-exchange that attempt at one stage procedure seems reasonable.  Explained most aggressive approach to infection management was with joint explant.  Explained with MSSA, nafcillin or cefazolin would be treatment of choice.  She reports no allergy to either of these antibiotics.    Intake/Output Summary (Last 24 hours) at 03/05/18 1642  Last data filed at 03/05/18 1445   Gross per 24 hour   Intake             1560 ml   Output             2920 ml   Net            -1360 ml     Allergies:   Allergies   Allergen Reactions   • Codeine Dizziness and Nausea Only     Rapid heart rate, dizziness  NAUSEA   • Mobic [Meloxicam] Rash   • Morphine And Related Itching     Current Scheduled Medications:     acyclovir 400 mg Oral TID   cefepime 2 g Intravenous Q12H   DULoxetine 30 mg Oral Daily   flecainide 50 mg Oral Q12H   fluticasone 2 spray Each Nare Daily   gabapentin 600 mg Oral Q8H   levothyroxine 137 mcg Oral Daily   metoprolol tartrate 25 mg Oral Q12H   pantoprazole 40 mg Oral Q AM   rOPINIRole 3 mg Oral Nightly   triamcinolone  Topical Q12H   vancomycin 750 mg Intravenous Q12H   venlafaxine  mg Oral Daily     Current PRN Medications:  docusate sodium  •  HYDROcodone-acetaminophen  •  LORazepam  •  magnesium hydroxide  •  naloxone  •  ondansetron **OR** ondansetron ODT **OR** ondansetron    Review of Systems no nausea, diarrhea, rash, skin itching.    Vital  "Signs:  /64 (BP Location: Left arm, Patient Position: Lying)  Pulse 79  Temp 97.9 °F (36.6 °C) (Oral)   Resp 18  Ht 165 cm (64.96\")  Wt 126 kg (278 lb 12.8 oz)  SpO2 95%  BMI 46.45 kg/m2    Physical Exam  Vital signs reviewed.  Skin without rash  Right knee with drain in place.  Abdomen soft nontender   Line/IV (right arm peripheral) site: No erythema, warmth, induration, or tenderness.    Lab Results:  CBC:   Results from last 7 days  Lab Units 03/02/18  1437   WBC 10*3/mm3 19.86*   HEMOGLOBIN g/dL 10.5*   HEMATOCRIT % 33.8*   PLATELETS 10*3/mm3 376     BMP:  Results from last 7 days  Lab Units 03/05/18  0048 03/02/18  1437   SODIUM mmol/L  --  140   POTASSIUM mmol/L  --  4.2   CHLORIDE mmol/L  --  98   CO2 mmol/L  --  33.0*   BUN mg/dL  --  25*   CREATININE mg/dL 1.00 1.33   GLUCOSE mg/dL  --  126*   CALCIUM mg/dL  --  8.5     Culture Results:   Wound Culture   Date Value Ref Range Status   03/03/2018 Light growth (2+) Staphylococcus aureus, MSSA (A)  Final   Susceptibility    Staphylococcus aureus, MSSA     DONTE     Clindamycin <=0.25 ug/ml Resistant     Erythromycin 4 ug/ml Resistant     Gentamicin <=0.5 ug/ml Susceptible     Inducible Clindamycin Resistance POS  Positive     Levofloxacin 1 ug/ml Susceptible 1     Oxacillin 0.5 ug/ml Susceptible     Penicillin G >=0.5 ug/ml Resistant     Tetracycline <=1 ug/ml Susceptible     Trimethoprim + Sulfamethoxazole <=10 ug/ml Susceptible     Vancomycin <=0.5 ug/ml Susceptible      Radiology: None  Additional Studies Reviewed: None    Impression:   Right knee prosthetic joint infection.  MSSA recovered on culture.    Recommendations:   Simplify antibiotic treatment to nafcillin  PICC line placement   consultation to evaluate for home IV antibiotic treatment  Patient agreeable to PICC and IV treatment  See orders below     Home IV antibiotic orders:  1.  Diagnosis right knee prosthetic joint infection-she underwent irrigation, debridement, and " poly-exchange on 3/3/18  2.  Orthopedic surgeon-Mervin Capellan M.D.  3.  Microbiology-methicillin susceptible staph aureus  4.  IV access-PICC line  5.  Antibiotic orders:  Nafcillin 12 g IV daily given via a continuous infusion through 4/14/18 (that would be 6 weeks following irrigation and poly-exchange)  6.  Laboratory monitoring:  CMP, CBC, sedimentation rate, CRP every Monday  7.  Follow-up appointment 2 weeks  8.  Fax copy of these orders to [phone]    Chente Urena MD

## 2018-03-06 LAB
ALBUMIN SERPL-MCNC: 3 G/DL (ref 3.5–5)
ALBUMIN/GLOB SERPL: 0.9 G/DL (ref 1.1–2.5)
ALP SERPL-CCNC: 121 U/L (ref 24–120)
ALT SERPL W P-5'-P-CCNC: 38 U/L (ref 0–54)
ANION GAP SERPL CALCULATED.3IONS-SCNC: 9 MMOL/L (ref 4–13)
AST SERPL-CCNC: 47 U/L (ref 7–45)
BASOPHILS # BLD AUTO: 0.06 10*3/MM3 (ref 0–0.2)
BASOPHILS NFR BLD AUTO: 0.7 % (ref 0–2)
BILIRUB SERPL-MCNC: 1.5 MG/DL (ref 0.1–1)
BUN BLD-MCNC: 20 MG/DL (ref 5–21)
BUN/CREAT SERPL: 22.2 (ref 7–25)
CALCIUM SPEC-SCNC: 9 MG/DL (ref 8.4–10.4)
CHLORIDE SERPL-SCNC: 98 MMOL/L (ref 98–110)
CO2 SERPL-SCNC: 35 MMOL/L (ref 24–31)
CREAT BLD-MCNC: 0.9 MG/DL (ref 0.5–1.4)
CRP SERPL-MCNC: 19.92 MG/DL (ref 0–0.99)
DEPRECATED RDW RBC AUTO: 53.6 FL (ref 40–54)
EOSINOPHIL # BLD AUTO: 0.48 10*3/MM3 (ref 0–0.7)
EOSINOPHIL NFR BLD AUTO: 5.9 % (ref 0–4)
ERYTHROCYTE [DISTWIDTH] IN BLOOD BY AUTOMATED COUNT: 16 % (ref 12–15)
ERYTHROCYTE [SEDIMENTATION RATE] IN BLOOD: 69 MM/HR (ref 0–20)
GFR SERPL CREATININE-BSD FRML MDRD: 63 ML/MIN/1.73
GLOBULIN UR ELPH-MCNC: 3.2 GM/DL
GLUCOSE BLD-MCNC: 98 MG/DL (ref 70–100)
HCT VFR BLD AUTO: 32.2 % (ref 37–47)
HGB BLD-MCNC: 9.8 G/DL (ref 12–16)
IMM GRANULOCYTES # BLD: 0.09 10*3/MM3 (ref 0–0.03)
IMM GRANULOCYTES NFR BLD: 1.1 % (ref 0–5)
LYMPHOCYTES # BLD AUTO: 1.23 10*3/MM3 (ref 0.72–4.86)
LYMPHOCYTES NFR BLD AUTO: 15.2 % (ref 15–45)
MCH RBC QN AUTO: 27.6 PG (ref 28–32)
MCHC RBC AUTO-ENTMCNC: 30.4 G/DL (ref 33–36)
MCV RBC AUTO: 90.7 FL (ref 82–98)
MONOCYTES # BLD AUTO: 0.83 10*3/MM3 (ref 0.19–1.3)
MONOCYTES NFR BLD AUTO: 10.3 % (ref 4–12)
NEUTROPHILS # BLD AUTO: 5.4 10*3/MM3 (ref 1.87–8.4)
NEUTROPHILS NFR BLD AUTO: 66.8 % (ref 39–78)
NRBC BLD MANUAL-RTO: 0 /100 WBC (ref 0–0)
PLATELET # BLD AUTO: 301 10*3/MM3 (ref 130–400)
PMV BLD AUTO: 11.2 FL (ref 6–12)
POTASSIUM BLD-SCNC: 4.2 MMOL/L (ref 3.5–5.3)
PROT SERPL-MCNC: 6.2 G/DL (ref 6.3–8.7)
RBC # BLD AUTO: 3.55 10*6/MM3 (ref 4.2–5.4)
SODIUM BLD-SCNC: 142 MMOL/L (ref 135–145)
WBC NRBC COR # BLD: 8.09 10*3/MM3 (ref 4.8–10.8)

## 2018-03-06 PROCEDURE — 86140 C-REACTIVE PROTEIN: CPT | Performed by: INTERNAL MEDICINE

## 2018-03-06 PROCEDURE — 85025 COMPLETE CBC W/AUTO DIFF WBC: CPT | Performed by: INTERNAL MEDICINE

## 2018-03-06 PROCEDURE — 84484 ASSAY OF TROPONIN QUANT: CPT | Performed by: FAMILY MEDICINE

## 2018-03-06 PROCEDURE — 80053 COMPREHEN METABOLIC PANEL: CPT | Performed by: INTERNAL MEDICINE

## 2018-03-06 PROCEDURE — 85651 RBC SED RATE NONAUTOMATED: CPT | Performed by: INTERNAL MEDICINE

## 2018-03-06 PROCEDURE — 97110 THERAPEUTIC EXERCISES: CPT

## 2018-03-06 PROCEDURE — 97116 GAIT TRAINING THERAPY: CPT

## 2018-03-06 RX ADMIN — NAFCILLIN SODIUM 2 G: 2 INJECTION, POWDER, LYOPHILIZED, FOR SOLUTION INTRAMUSCULAR; INTRAVENOUS at 17:47

## 2018-03-06 RX ADMIN — HYDROCODONE BITARTRATE AND ACETAMINOPHEN 1 TABLET: 7.5; 325 TABLET ORAL at 05:56

## 2018-03-06 RX ADMIN — NAFCILLIN SODIUM 2 G: 2 INJECTION, POWDER, LYOPHILIZED, FOR SOLUTION INTRAMUSCULAR; INTRAVENOUS at 09:58

## 2018-03-06 RX ADMIN — HYDROCODONE BITARTRATE AND ACETAMINOPHEN 1 TABLET: 7.5; 325 TABLET ORAL at 20:56

## 2018-03-06 RX ADMIN — GABAPENTIN 600 MG: 300 CAPSULE ORAL at 13:25

## 2018-03-06 RX ADMIN — GABAPENTIN 600 MG: 300 CAPSULE ORAL at 20:57

## 2018-03-06 RX ADMIN — NAFCILLIN SODIUM 2 G: 2 INJECTION, POWDER, LYOPHILIZED, FOR SOLUTION INTRAMUSCULAR; INTRAVENOUS at 13:30

## 2018-03-06 RX ADMIN — GABAPENTIN 600 MG: 300 CAPSULE ORAL at 05:53

## 2018-03-06 RX ADMIN — ACYCLOVIR 400 MG: 200 CAPSULE ORAL at 08:18

## 2018-03-06 RX ADMIN — METOPROLOL TARTRATE 25 MG: 25 TABLET, FILM COATED ORAL at 20:56

## 2018-03-06 RX ADMIN — LEVOTHYROXINE SODIUM 137 MCG: 137 TABLET ORAL at 08:18

## 2018-03-06 RX ADMIN — NAFCILLIN SODIUM 2 G: 2 INJECTION, POWDER, LYOPHILIZED, FOR SOLUTION INTRAMUSCULAR; INTRAVENOUS at 05:51

## 2018-03-06 RX ADMIN — DULOXETINE HYDROCHLORIDE 30 MG: 30 CAPSULE, DELAYED RELEASE ORAL at 08:18

## 2018-03-06 RX ADMIN — PANTOPRAZOLE SODIUM 40 MG: 40 TABLET, DELAYED RELEASE ORAL at 05:56

## 2018-03-06 RX ADMIN — ACYCLOVIR 400 MG: 200 CAPSULE ORAL at 16:27

## 2018-03-06 RX ADMIN — ACYCLOVIR 400 MG: 200 CAPSULE ORAL at 20:56

## 2018-03-06 RX ADMIN — HYDROCODONE BITARTRATE AND ACETAMINOPHEN 1 TABLET: 7.5; 325 TABLET ORAL at 18:03

## 2018-03-06 RX ADMIN — NAFCILLIN SODIUM 2 G: 2 INJECTION, POWDER, LYOPHILIZED, FOR SOLUTION INTRAMUSCULAR; INTRAVENOUS at 22:04

## 2018-03-06 RX ADMIN — FLECAINIDE ACETATE 50 MG: 50 TABLET ORAL at 08:18

## 2018-03-06 RX ADMIN — VENLAFAXINE HYDROCHLORIDE 150 MG: 75 CAPSULE, EXTENDED RELEASE ORAL at 08:18

## 2018-03-06 RX ADMIN — FLECAINIDE ACETATE 50 MG: 50 TABLET ORAL at 20:56

## 2018-03-06 RX ADMIN — HYDROCODONE BITARTRATE AND ACETAMINOPHEN 1 TABLET: 7.5; 325 TABLET ORAL at 16:54

## 2018-03-06 RX ADMIN — NAFCILLIN SODIUM 2 G: 2 INJECTION, POWDER, LYOPHILIZED, FOR SOLUTION INTRAMUSCULAR; INTRAVENOUS at 02:05

## 2018-03-06 RX ADMIN — METOPROLOL TARTRATE 25 MG: 25 TABLET, FILM COATED ORAL at 08:18

## 2018-03-06 RX ADMIN — ROPINIROLE HYDROCHLORIDE 3 MG: 1 TABLET, FILM COATED ORAL at 20:56

## 2018-03-06 NOTE — THERAPY TREATMENT NOTE
Acute Care - Physical Therapy Treatment Note   [facility]     Patient Name: Danisha Cannon  : 1955  MRN: [id number]  Today's Date: 3/6/2018  Onset of Illness/Injury or Date of Surgery Date: 18  Date of Referral to PT: 18  Referring Physician: Dr. Capellan    Admit Date: 3/3/2018    Visit Dx:    ICD-10-CM ICD-9-CM   1. Infection of right knee M00.9 711.96   2. Impaired mobility Z74.09 799.89     Patient Active Problem List   Diagnosis   • Abdominal adhesions   • Abnormal echocardiogram   • Abnormal Holter exam   • Atrial flutter, paroxysmal   • Bilateral edema of lower extremity   • Chest pain   • Cholecystitis with cholelithiasis   • Chronic right-sided low back pain with sciatica   • Class 3 obesity due to excess calories with serious comorbidity in adult   • Facet syndrome, lumbar   • Heart rate fast   • HTN (hypertension)   • Idiopathic chronic gout without tophus   • Idiopathic hypotension   • Idiopathic progressive neuropathy   • Insomnia   • Lumbar facet arthropathy   • Memory loss   • MRSA carrier   • Obstructive sleep apnea   • Paroxysmal a-fib   • Pilonidal cyst   • Primary osteoarthritis of right knee   • Recurrent ventral incisional hernia   • Restless leg syndrome   • Sciatica of right side   • Shortness of breath   • Sick sinus syndrome   • Sleep apnea   • Sleep apnea, obstructive   • Snoring   • Somnolence, daytime   • Spondylosis of lumbar region without myelopathy or radiculopathy   • Tear of medial meniscus of right knee, current   • Total knee replacement status, right   • Tremor   • Umbilical hernia               Adult Rehabilitation Note       18 1000 18 1435 18 1013    Rehab Assessment/Intervention    Discipline physical therapy assistant  -CW physical therapy assistant  -CW physical therapy assistant  -CW    Document Type therapy note (daily note)  -CW therapy note (daily note)  -CW therapy note (daily note)  -CW    Subjective Information agree to  therapy;complains of;pain;fatigue  -CW agree to therapy;complains of;dizziness  -CW agree to therapy;complains of;pain  -CW    Patient Effort, Rehab Treatment good  -CW good  -CW good  -CW    Symptoms Noted During/After Treatment fatigue  -CW dizziness  -CW     Precautions/Limitations fall precautions  -CW fall precautions  -CW fall precautions  -CW    Specific Treatment Considerations No R knee flexion per Dr Capellan  -CW No R knee flexion per Dr Capellan  -CARYN No R knee flexion  -CW    Recorded by [CW] Yadira Flores PTA [CW] Yadira Flores PTA [CW] Yadira Flores PTA    Pain Assessment    Pain Assessment 0-10  -CW 0-10  -CW 0-10  -CW    Pain Score 3  -CW 2  -CW 5  -CW    Pain Type Acute pain;Surgical pain  -CW Acute pain;Surgical pain  -CW Acute pain;Surgical pain  -CW    Pain Location Knee  -CW Knee  -CW Knee  -CW    Pain Orientation Right  -CW Right  -CW Right  -CW    Pain Descriptors Aching  -CW Aching  -CW Aching  -CW    Pain Frequency Constant/continuous  -CW Constant/continuous  -CW Constant/continuous  -CW    Pain Intervention(s) Repositioned  -CW Ambulation/increased activity  -CW Ambulation/increased activity  -CW    Response to Interventions tolerated  -CW tolerated  -CW tolerated  -CW    Recorded by [CW] Yadira Flores PTA [CW] Yadira Flores PTA [CW] Yadira Flores PTA    Bed Mobility, Assessment/Treatment    Bed Mobility, Assistive Device  bed rails  -CW bed rails;head of bed elevated  -CW    Bed Mob, Supine to Sit, [place]  contact guard assist   assist with RLE  -CW conditional independence  -CW    Bed Mob, Sit to Supine, [place]  minimum assist (75% patient effort);verbal cues required  -CW --   in chair  -CW    Bed Mobility, Safety Issues  decreased use of legs for bridging/pushing  -CW     Bed Mobility, Impairments  strength decreased  -CW     Bed Mobility, Comment  Able to elevate HOB at home  -CW     Recorded by  [CW] Yadira Flores PTA [CW] Yadira  CHARLES Flores PTA    Transfer Assessment/Treatment    Transfers, Sit-Stand [place]  stand by assist  -CW stand by assist  -CW    Transfers, Stand-Sit [place]  stand by assist  -CW stand by assist  -CW    Transfers, Sit-Stand-Sit, Assist Device  elevated surface;rolling walker  -CW elevated surface;rolling walker  -CW    Toilet Transfer, [place]  supervision required  -CW supervision required  -CW    Toilet Transfer, Assistive Device  rolling walker  -CW rolling walker  -CW    Transfer, Impairments  ROM decreased;pain  -CW ROM decreased;pain  -CW    Recorded by  [CW] Yadira Flores PTA [CW] Yadira Flores PTA    Gait Assessment/Treatment    Gait, [place] Level  stand by assist  -CW stand by assist  -CW    Gait, Assistive Device  rolling walker  -CW rolling walker  -CW    Gait, Distance (Feet)  200   x 2  -   x 2  -CW    Gait, Gait Deviations  antalgic;dami decreased;right:;decreased heel strike  -CW antalgic;dami decreased;step length decreased  -CW    Gait, Safety Issues  weight-shifting ability decreased  -CW weight-shifting ability decreased  -CW    Gait, Impairments  ROM decreased  -CW ROM decreased;pain  -CW    Gait, Comment  No R knee flexion per Dr Capellan  -CW No R knee flexion per Dr Capellan  -CARYN    Recorded by  [CW] Yadira Flores PTA [CW] Yadira Flores PTA    Therapy Exercises    Right Lower Extremity AROM:;20 reps;supine;ankle pumps/circles;glut sets;hip abduction/adduction;quad sets;SLR  -CW      Left Lower Extremity AROM:;20 reps;supine;ankle pumps/circles;glut sets;heel slides;hip abduction/adduction;quad sets;SAQ;SLR  -CW      Bilateral Lower Extremities   AROM:;supine;ankle pumps/circles;hip abduction/adduction;SLR;quad sets  -CW    Recorded by [CW] Yadira Flores PTA  [CW] Yadira Flores PTA    Positioning and Restraints    Pre-Treatment Position in bed  -CW in bed  -CW in bed  -CW    Post Treatment Position bed  -CW bed  -CW chair  -CW     In Bed supine;call light within reach;encouraged to call for assist;exit alarm on  -CW fowlers;call light within reach;encouraged to call for assist;exit alarm on;with family/caregiver;RLE elevated  -CW     In Chair   reclined;call light within reach;encouraged to call for assist  -CW    Recorded by [CW] Yadira Flores PTA [CW] Yadira Flores, MARNI [CW] Yadira Flores PTA      03/04/18 1500          Rehab Assessment/Intervention    Discipline physical therapist  -RA      Document Type therapy note (daily note)  -RA      Subjective Information agree to therapy;pain;fatigue  -RA      Patient Effort, Rehab Treatment good  -RA      Precautions/Limitations fall precautions  -RA      Recorded by [RA] Sima Mary, PT      Pain Assessment    Pain Assessment 0-10  -RA      Pain Score 1  -RA      Pain Type Acute pain;Surgical pain  -RA      Pain Location Knee  -RA      Pain Orientation Right  -RA      Pain Descriptors Aching  -RA      Pain Frequency Constant/continuous  -RA      Pain Intervention(s) Repositioned;Ambulation/increased activity  -RA      Response to Interventions tolerated  -RA      Recorded by [RA] Sima Mary, PT      Bed Mobility, Assessment/Treatment    Bed Mobility, Scoot/Bridge, [place] conditional independence  -RA      Bed Mobility, Comment Able to elevate head of bed at home  -RA      Recorded by [RA] Sima Mary, PT      Transfer Assessment/Treatment    Transfers, Bed-Chair [place] contact guard assist  -RA      Transfers, Sit-Stand [place] contact guard assist  -RA      Transfer, Comment modAx1 for toilet sit to stand  -RA      Recorded by [RA] Sima Mary, PT      Positioning and Restraints    Pre-Treatment Position in bed  -RA      Post Treatment Position chair  -RA      In Bed call light within reach;sitting;encouraged to call for assist;legs elevated  -RA      Recorded by [RA] Sima Mary, PT        User Key  (r) = Recorded By, (t) = Taken  By, (c) = Cosigned By    Initials Name Effective Dates    CW Yadira Flores, PTA 06/22/15 -     JUAN Mary, PT 02/05/18 -                 IP PT Goals       03/04/18 0958          Transfer Training 2 PT LTG    Transfer Training PT 2 LTG, Date Established 03/04/18  -RA      Transfer Training PT 2 LTG, Time to Achieve by discharge  -RA      Transfer Training PT 2 LTG, Activity Type bed to chair /chair to bed;sit to stand/stand to sit;toilet  -RA      Transfer Training PT 2 LTG, [place] Level independent  -RA      Transfer Training PT 2 LTG, Assist Device walker, rolling  -RA      Gait Training PT LTG    Gait Training Goal PT LTG, Date Established 03/04/18  -RA      Gait Training Goal PT LTG, [place] Level conditional independence  -RA      Gait Training Goal PT LTG, Assist Device walker, rolling  -RA      Gait Training Goal PT LTG, Distance to Achieve 200  -RA        User Key  (r) = Recorded By, (t) = Taken By, (c) = Cosigned By    Initials Name Provider Type    RA Sima Mary, PT Physical Therapist          Physical Therapy Education     Title: PT OT SLP Therapies (Done)     Topic: Physical Therapy (Done)     Point: Mobility training (Done)    Learning Progress Summary    Learner Readiness Method Response Comment Documented by Status   Patient Acceptance [facility], plan of care CW 03/06/18 1127 Done    Acceptance MATTHIEU DESHPANDE DU Plan of care, precautions, benefit of activity CW 03/05/18 1144 Done    Acceptance E VU Pt informed of benefits of PT. Pt instructed to call for help for OOB activity. RA 03/04/18 0954 Done               Point: Precautions (Done)    Learning Progress Summary    Learner Readiness Method Response Comment Documented by Status   Patient Acceptance EMATTHIEU [facility] HEP, plan of care CW 03/06/18 1127 Done    Acceptance EMATTHIEU DU Plan of care, precautions, benefit of activity CW 03/05/18 1144 Done    Acceptance E VU Pt informed of benefits of PT. Pt instructed to call for help  for OOB activity. RA 03/04/18 0954 Done                      User Key     Initials Effective Dates Name Provider Type Discipline    CW 06/22/15 -  Yadira Flores PTA Physical Therapy Assistant PT    RA 02/05/18 -  Sima Mary PT Physical Therapist PT                    PT Recommendation and Plan  Anticipated Discharge Disposition: home with outpatient services  Planned Therapy Interventions: bed mobility training, gait training, transfer training  PT Frequency: 2 times/day, per priority policy, daily  Plan of Care Review  Plan Of Care Reviewed With: patient  Progress: progress toward functional goals as expected  Outcome Summary/Follow up Plan: Pt c/o fatigue this morning. Has been back and forth to bathroom several times.  Completed BLE exercises x 20 in supine position.  Will ambulate in hallway this afternoon.  Pt will continue to benefit from therapy to increase strength and improve mobility.          Outcome Measures       03/06/18 1100 03/05/18 1200 03/04/18 0900    How much help from another person do you currently need...    Turning from your back to your side while in flat bed without using bedrails? 4  -CW 4  -CW 3  -RA    Moving from lying on back to sitting on the side of a flat bed without bedrails? 4  -CW 4  -CW 3  -RA    Moving to and from a bed to a chair (including a wheelchair)? 3  -CW 3  -CW 3  -RA    Standing up from a chair using your arms (e.g., wheelchair, bedside chair)? 3  -CW 3  -CW 3  -RA    Climbing 3-5 steps with a railing? 3  -CW 3  -CW 2  -RA    To walk in hospital room? 3  -CW 3  -CW 3  -RA    AM-PAC 6 Clicks Score 20  -CW 20  -CW 17  -RA    Functional Assessment    Outcome Measure Options AM-PAC 6 Clicks Basic Mobility (PT)  -CW AM-PAC 6 Clicks Basic Mobility (PT)  -CW AM-PAC 6 Clicks Basic Mobility (PT)  -RA      User Key  (r) = Recorded By, (t) = Taken By, (c) = Cosigned By    Initials Name Provider Type    CW Yadira Flores PTA Physical Therapy Assistant    RA  Sima Mary, PT Physical Therapist           Time Calculation:         PT Charges       03/06/18 1130          Time Calculation    Start Time 1000  -CW      Stop Time 1023  -CW      Time Calculation (min) 23 min  -CW      PT Received On 03/06/18  -CW      PT Goal Re-Cert Due Date 03/14/18  -CW      Time Calculation- PT    Total Timed Code Minutes- PT 23 minute(s)  -CW        User Key  (r) = Recorded By, (t) = Taken By, (c) = Cosigned By    Initials Name Provider Type    CW Yadira Flores PTA Physical Therapy Assistant          Therapy Charges for Today     Code Description Service Date Service Provider Modifiers Qty    18796438542 HC PT THER PROC EA 15 MIN 3/5/2018 Yadira Flores, PTA GP 1    55646049196 HC GAIT TRAINING EA 15 MIN 3/5/2018 Yadira Flores, PTA GP 2    81082204782 HC GAIT TRAINING EA 15 MIN 3/5/2018 Yadira Flores, PTA GP 2    57438614181 HC PT THERAPEUTIC ACT EA 15 MIN 3/5/2018 Yadira Flores, PTA GP 1    83062771228 HC PT THER PROC EA 15 MIN 3/6/2018 Yadira Flores, PTA GP 2          PT G-Codes  Outcome Measure Options: AM-PAC 6 Clicks Basic Mobility (PT)  Score: 17  Functional Limitation: Mobility: Walking and moving around  Mobility: Walking and Moving Around Current Status (): At least 40 percent but less than 60 percent impaired, limited or restricted  Mobility: Walking and Moving Around Goal Status (): At least 20 percent but less than 40 percent impaired, limited or restricted    Yadira Flores PTA  3/6/2018

## 2018-03-06 NOTE — PROGRESS NOTES
Continued Stay Note  DOMINGO Johnson     Patient Name: Danisha Cannon  MRN: [id number]  Today's Date: 3/6/2018    Admit Date: 3/3/2018          Discharge Plan       03/06/18 0955    Case Management/Social Work Plan    Plan Possible home with iv abx and Isabel     Patient/Family In Agreement With Plan yes    Additional Comments Pt has orders for iv abx. Spoke with pt and she is fine with us. Orders sent to [facility] at [phone]. Awaiting coverage determination.               Discharge Codes     None            JAHAIRA Moses

## 2018-03-06 NOTE — PLAN OF CARE
Problem: Patient Care Overview (Adult)  Goal: Plan of Care Review  Outcome: Ongoing (interventions implemented as appropriate)   03/06/18 2569   Coping/Psychosocial Response Interventions   Plan Of Care Reviewed With patient   Patient Care Overview   Progress improving   Outcome Evaluation   Outcome Summary/Follow up Plan Patient has maintained safety this shift given prn pain medication as needed. Patient states that her pain has improved, dressing to right knee is c/d/i, vss, continues on iv antibiotics, spouse at bedside, will continue to monitor.        Problem: Perioperative Period (Adult)  Goal: Signs and Symptoms of Listed Potential Problems Will be Absent or Manageable (Perioperative Period)  Outcome: Ongoing (interventions implemented as appropriate)      Problem: Fall Risk (Adult)  Goal: Absence of Falls  Outcome: Ongoing (interventions implemented as appropriate)      Problem: Infection, Risk/Actual (Adult)  Goal: Infection Prevention/Resolution  Outcome: Ongoing (interventions implemented as appropriate)

## 2018-03-06 NOTE — DISCHARGE PLACEMENT REQUEST
"Meghan Samaniego [facility] [phone]    Lucretia Cannon (62 y.o. Female)     Date of Birth Social Security Number Address Home Phone MRN    1955  [address] [place] [address] [phone] [id number]    Spiritism Marital Status          None        Admission Date Admission Type Admitting Provider Attending Provider Department, Room/Bed    3/3/18 Elective Amilcar Capellan MD Jackson, Amilcar CAMILO MD [facility] 3A, 349/1    Discharge Date Discharge Disposition Discharge Destination                      Attending Provider: Amilcar Capellan MD     Allergies:  Codeine, Mobic [Meloxicam], Morphine And Related    Isolation:  None   Infection:  None   Code Status:  FULL    Ht:  165 cm (64.96\")   Wt:  126 kg (278 lb 12.8 oz)    Admission Cmt:  None   Principal Problem:  None                Active Insurance as of 3/3/2018     Primary Coverage     Payor Plan Insurance Group Employer/Plan Group    [employer] BLUE CROSS [employer]M BLUE CROSS BLUE SHIELD PPO [id number]     Payor Plan Address Payor Plan Phone Number Effective From Effective To    [address] [phone] [address]     [place] [address]       Subscriber Name Subscriber Birth Date Member ID       LUCRETIA CANNON 1955 [id number]                 Emergency Contacts      (Rel.) Home Phone Work Phone Mobile Phone    Clark Cannon (Spouse) [phone] -- [phone]        [address] [address]  Phone:  [phone]  Fax:          Patient:     Lucretia Cannon MRN:  [id number]   [address] [place] [address] :  1955  SSN:    Phone: [phone] Sex:  F      INSURANCE PAYOR PLAN GROUP # SUBSCRIBER ID   Primary: ANTHEM BLUE CROSS [id number]   Admitting Diagnosis: Infection of right knee [M00.9]  Order Date:  Mar 5, 2018         Inpatient Case Management  Consult       (Order ID: [id number])     Diagnosis:         Priority:  Routine Expected Date: "   Expiration Date:        Interval:   Count:    Comments: Home IV antibiotic orders:  1.  Diagnosis right knee prosthetic joint infection-she underwent irrigation, debridement, and poly-exchange on 3/3/18  2.  Orthopedic surgeon-Mervin Capellan M.D.  3.  Microbiology-methicillin susceptible staph aureus  4.  IV access-PICC line  5.  Antibiotic orders:  Nafcillin 12 g IV daily given via a continuous infusion through 4/14/18 (that would be 6 weeks following irrigation and poly-exchange)  6.  Laboratory monitoring:  CMP, CBC, sedimentation rate, CRP every Monday  7.  Follow-up appointment 2 weeks  8.  Fax copy of these orders to [phone]      Cehnte Florentino MD  Reason for Consult? Home IV antibiotic orders     Specimen Type:   Specimen Source:   Specimen Taken Date:   Specimen Taken Time:                   Authorizing Provider:Chente Florentino MD  Authorizing Provider's NPI: [id number]  Order Entered By: Chente Florentino MD 3/5/2018  5:50 PM     Electronically signed by: Chente Florentino MD 3/5/2018  5:50 PM     [facility]  OP NOTE     Patient Name: Danisha Cannon  Date of Procedure: 3/3/2018     PREOPERATIVE DIAGNOSIS: Infected right total knee arthroplasty     POSTOPERATIVE DIAGNOSIS: Same.     PROCEDURE PERFORMED: Debridement right total knee arthroplasty with polyethylene bearing exchange     SURGEON: Amilcar Capellan MD      ANESTHESIA: General.     PREPARATION: Routine.     STAFF: Circulator: Jaden Stein RN  Scrub Person: Pam Ramsey  Assistant: Carlton Alcocer     ESTIMATED BLOOD LOSS: None     SPECIMENS: Deep cultures     COMPLICATIONS: None     INDICATIONS: Danisha Cannon is a 62 y.o. female who is 2 weeks post a right total knee arthroplasty.  She has develop purulent drainage.  It was felt that debridement and a polyethylene bearing exchange were indicated. The indications, risks, and possible complications of the procedure were explained to the patient, who voiced  understanding and wished to proceed with surgery.  At surgery there was purulent material in the deep space     PROCEDURE IN DETAIL: The patient was taken to the operating room and placed on the operating table in a supine position. After general anesthesia was obtained, the right lower extremity was prepped and draped in a sterile manner.  The previous surgical incision was reopened and all sutures were removed including superficial and deep fascial sutures.  The area was then thoroughly debrided.  Deep cultures were obtained.  The previous polyethylene liner was then wedged free with an osteotome and removed.  The posterior compartment was then thoroughly debrided and thoroughly irrigated with saline to which vancomycin had been added.  The wound was suctioned dry.  A new polyethylene liner was then impacted in the position appeared closure was then accomplished over deep and superficial Hemovac drains using #1 Vicryl in interrupted fashion on the deep fascia followed by nylon in interrupted vertical mattress fashion skin. Sterile dressings were applied. The patient tolerated the procedure well. Sponge and needle counts were correct. The patient was then awakened and extubated in the operating room and taken to the recovery room in good condition.  Amilcar Capellan MD  Date: 3/3/2018                      Time: 9:32 AM                Routing History       Date/Time From To Method     3/3/2018  9:35 AM MD Amilcar Stallings MD Fax                   Infectious Diseases Progress Note     Patient:  Danisha Cannon  YOB: 1955  MRN: [id number]                 Admit date: 3/3/2018             Admitting Physician: Amilcar Capellan MD  Primary Care Physician: Juan Carlos Sparks MD     Chief Complaint/Interval History: She feels okay.  No new symptoms.  Drain remains in place.  Daughter at bedside.  Reviewed culture results with both of them.  Explained one stage and 2-stage approach to  "prosthetic joint infection.  Reviewed risks and benefits of PICC line.  Explained in her situation, since she has had incision/drainage/poly-exchange that attempt at one stage procedure seems reasonable.  Explained most aggressive approach to infection management was with joint explant.  Explained with MSSA, nafcillin or cefazolin would be treatment of choice.  She reports no allergy to either of these antibiotics.     Intake/Output Summary (Last 24 hours) at 03/05/18 1642  Last data filed at 03/05/18 1445    Gross per 24 hour   Intake             1560 ml   Output             2920 ml   Net            -1360 ml      Allergies:         Allergies   Allergen Reactions   • Codeine Dizziness and Nausea Only       Rapid heart rate, dizziness  NAUSEA   • Mobic [Meloxicam] Rash   • Morphine And Related Itching      Current Scheduled Medications:      acyclovir 400 mg Oral TID   cefepime 2 g Intravenous Q12H   DULoxetine 30 mg Oral Daily   flecainide 50 mg Oral Q12H   fluticasone 2 spray Each Nare Daily   gabapentin 600 mg Oral Q8H   levothyroxine 137 mcg Oral Daily   metoprolol tartrate 25 mg Oral Q12H   pantoprazole 40 mg Oral Q AM   rOPINIRole 3 mg Oral Nightly   triamcinolone   Topical Q12H   vancomycin 750 mg Intravenous Q12H   venlafaxine  mg Oral Daily      Current PRN Medications:  docusate sodium  •  HYDROcodone-acetaminophen  •  LORazepam  •  magnesium hydroxide  •  naloxone  •  ondansetron **OR** ondansetron ODT **OR** ondansetron     Review of Systems no nausea, diarrhea, rash, skin itching.     Vital Signs:  /64 (BP Location: Left arm, Patient Position: Lying)  Pulse 79  Temp 97.9 °F (36.6 °C) (Oral)   Resp 18  Ht 165 cm (64.96\")  Wt 126 kg (278 lb 12.8 oz)  SpO2 95%  BMI 46.45 kg/m2     Physical Exam  Vital signs reviewed.  Skin without rash  Right knee with drain in place.  Abdomen soft nontender   Line/IV (right arm peripheral) site: No erythema, warmth, induration, or tenderness.     Lab " Results:  CBC:   Results from last 7 days  Lab Units 03/02/18  1437   WBC 10*3/mm3 19.86*   HEMOGLOBIN g/dL 10.5*   HEMATOCRIT % 33.8*   PLATELETS 10*3/mm3 376      BMP:  Results from last 7 days  Lab Units 03/05/18  0048 03/02/18  1437   SODIUM mmol/L  --  140   POTASSIUM mmol/L  --  4.2   CHLORIDE mmol/L  --  98   CO2 mmol/L  --  33.0*   BUN mg/dL  --  25*   CREATININE mg/dL 1.00 1.33   GLUCOSE mg/dL  --  126*   CALCIUM mg/dL  --  8.5      Culture Results:         Wound Culture   Date Value Ref Range Status   03/03/2018 Light growth (2+) Staphylococcus aureus, MSSA (A)   Final   Susceptibility     Staphylococcus aureus, MSSA       DONTE       Clindamycin <=0.25 ug/ml Resistant       Erythromycin 4 ug/ml Resistant       Gentamicin <=0.5 ug/ml Susceptible       Inducible Clindamycin Resistance POS  Positive       Levofloxacin 1 ug/ml Susceptible 1       Oxacillin 0.5 ug/ml Susceptible       Penicillin G >=0.5 ug/ml Resistant       Tetracycline <=1 ug/ml Susceptible       Trimethoprim + Sulfamethoxazole <=10 ug/ml Susceptible       Vancomycin <=0.5 ug/ml Susceptible        Radiology: None  Additional Studies Reviewed: None     Impression:   Right knee prosthetic joint infection.  MSSA recovered on culture.     Recommendations:   Simplify antibiotic treatment to nafcillin  PICC line placement   consultation to evaluate for home IV antibiotic treatment  Patient agreeable to PICC and IV treatment  See orders below      Home IV antibiotic orders:  1.  Diagnosis right knee prosthetic joint infection-she underwent irrigation, debridement, and poly-exchange on 3/3/18  2.  Orthopedic surgeon-Mervin Capellan M.D.  3.  Microbiology-methicillin susceptible staph aureus  4.  IV access-PICC line  5.  Antibiotic orders:  Nafcillin 12 g IV daily given via a continuous infusion through 4/14/18 (that would be 6 weeks following irrigation and poly-exchange)  6.  Laboratory monitoring:  CMP, CBC, sedimentation rate, CRP  every Monday  7.  Follow-up appointment 2 weeks  8.  Fax copy of these orders to [phone]     Chente Urena MD                      Infectious Diseases Progress Note     Patient:  Danisha Cannon  YOB: 1955  MRN: [id number]                 Admit date: 3/3/2018             Admitting Physician: Amilcar Capellan MD  Primary Care Physician: Juan Carlos Sparks MD     Chief Complaint/Interval History: She is without fever.  She is sitting at bedside.  She is going to work with physical therapy.  She does not describe much in the way of any right knee pain or discomfort.  She had indicated yesterday that she had some concern that a prior infected cyst in the sacral/coccyx area may have some relation to her knee.  I examined her with nurse present so that I could thoroughly evaluate the reported cystic area in the sacral location.  She indicates no recent drainage from this area.  She had prior surgical drainage with with Dr. Capellan in 2016.  Patient had some concern about a communication between the sacral area and her bladder because the smell she noticed when the cyst area in the region of the sacrum was drained, was the same as the smell when she had a urinary tract infection sometime later.  On exam there is no erythema, warmth, induration, tenderness, cystic area, or induration in the region of the sacral or coccyx or buttock area.  At this point I feel any communication would be unlikely.  I explained I felt the similar smell was simply having a cyst infection and a separate urinary tract infections with an organism with similar characteristics.  I explained there is no evidence of infection involving the sacral or coccyx area at this time I do not think there would be any correlation with her possible right knee infection.     Intake/Output Summary (Last 24 hours) at 03/04/18 0900  Last data filed at 03/04/18 0420    Gross per 24 hour   Intake             5896 ml   Output             2425 ml  "  Net             3471 ml      Allergies:         Allergies   Allergen Reactions   • Codeine Dizziness and Nausea Only       Rapid heart rate, dizziness  NAUSEA   • Mobic [Meloxicam] Rash   • Morphine And Related Itching      Current Scheduled Medications:      acyclovir 400 mg Oral TID   DULoxetine 30 mg Oral Daily   flecainide 50 mg Oral Q12H   fluticasone 2 spray Each Nare Daily   gabapentin 600 mg Oral Q8H   levothyroxine 137 mcg Oral Daily   metoprolol tartrate 25 mg Oral Q12H   pantoprazole 40 mg Oral Q AM   rOPINIRole 3 mg Oral Nightly   triamcinolone   Topical Q12H   vancomycin 1,000 mg Intravenous Q12H   venlafaxine  mg Oral Daily      Current PRN Medications:  docusate sodium  •  HYDROcodone-acetaminophen  •  LORazepam  •  magnesium hydroxide  •  naloxone  •  ondansetron **OR** ondansetron ODT **OR** ondansetron     Review of Systems no rash or skin itching.  No nausea or vomiting.  No diarrhea.     Vital Signs:  /62 (BP Location: Left arm, Patient Position: Sitting)  Pulse 72  Temp 97.4 °F (36.3 °C) (Axillary)   Resp 20  Ht 165 cm (64.96\")  Wt 127 kg (280 lb 1.6 oz)  SpO2 95%  BMI 46.67 kg/m2     Physical Exam  Vital signs reviewed.  Right knee with drain remains in place.  Abdomen soft and nontender.  Examination of the sacral/coccyx area and buttocks area is outlined in the HPI (nurse present).  Line/IV site: No erythema, warmth, induration, or tenderness.     Lab Results:  CBC:   Results from last 7 days  Lab Units 03/02/18  1437   WBC 10*3/mm3 19.86*   HEMOGLOBIN g/dL 10.5*   HEMATOCRIT % 33.8*   PLATELETS 10*3/mm3 376      BMP:  Results from last 7 days  Lab Units 03/02/18  1437   SODIUM mmol/L 140   POTASSIUM mmol/L 4.2   CHLORIDE mmol/L 98   CO2 mmol/L 33.0*   BUN mg/dL 25*   CREATININE mg/dL 1.33   GLUCOSE mg/dL 126*   CALCIUM mg/dL 8.5      Culture Results:  Operative cultures pending     Radiology: None  Additional Studies Reviewed: None     Impression:   1.  Possible right " knee prosthetic joint infection-she has had irrigation, debridement, and poly-exchange  2.  Remote history of sacral cyst/possible pilonidal cyst infection-no evidence of any persistent infection or recurrence.     Recommendations:   Continue empiric antimicrobial therapy while awaiting right knee culture results  Going to continue vancomycin and expand gram-negative coverage while awaiting culture results  We will adjust antibiotics as we get additional information from microbiology lab     Chente Urena MD

## 2018-03-06 NOTE — THERAPY TREATMENT NOTE
Acute Care - Physical Therapy Treatment Note  [facility]     Patient Name: Danisha Cannon  : 1955  MRN: [id number]  Today's Date: 3/6/2018  Onset of Illness/Injury or Date of Surgery Date: 18  Date of Referral to PT: 18  Referring Physician: Dr. Capellan    Admit Date: 3/3/2018    Visit Dx:    ICD-10-CM ICD-9-CM   1. Infection of right knee M00.9 711.96   2. Impaired mobility Z74.09 799.89     Patient Active Problem List   Diagnosis   • Abdominal adhesions   • Abnormal echocardiogram   • Abnormal Holter exam   • Atrial flutter, paroxysmal   • Bilateral edema of lower extremity   • Chest pain   • Cholecystitis with cholelithiasis   • Chronic right-sided low back pain with sciatica   • Class 3 obesity due to excess calories with serious comorbidity in adult   • Facet syndrome, lumbar   • Heart rate fast   • HTN (hypertension)   • Idiopathic chronic gout without tophus   • Idiopathic hypotension   • Idiopathic progressive neuropathy   • Insomnia   • Lumbar facet arthropathy   • Memory loss   • MRSA carrier   • Obstructive sleep apnea   • Paroxysmal a-fib   • Pilonidal cyst   • Primary osteoarthritis of right knee   • Recurrent ventral incisional hernia   • Restless leg syndrome   • Sciatica of right side   • Shortness of breath   • Sick sinus syndrome   • Sleep apnea   • Sleep apnea, obstructive   • Snoring   • Somnolence, daytime   • Spondylosis of lumbar region without myelopathy or radiculopathy   • Tear of medial meniscus of right knee, current   • Total knee replacement status, right   • Tremor   • Umbilical hernia               Adult Rehabilitation Note       18 1437 18 1355 18 1000    Rehab Assessment/Intervention    Discipline physical therapy assistant  -CW --  -CW physical therapy assistant  -CW    Document Type therapy note (daily note)  -CW  therapy note (daily note)  -CW    Subjective Information agree to therapy;complains of;fatigue  -CW  agree to therapy;complains  of;pain;fatigue  -CW    Patient Effort, Rehab Treatment good  -CW  good  -CW    Symptoms Noted During/After Treatment fatigue  -CW  fatigue  -CW    Precautions/Limitations fall precautions  -CW  fall precautions  -CW    Specific Treatment Considerations No R knee flexion per Dr Capellan  -CW  No R knee flexion per Dr Capellan  -CW    Recorded by [CW] Yadira Flores PTA [CW] Yadira Flores PTA [CW] Yadira Flores PTA    Pain Assessment    Pain Assessment No/denies pain  -CW  0-10  -CW    Pain Score   3  -CW    Pain Type   Acute pain;Surgical pain  -CW    Pain Location   Knee  -CW    Pain Orientation   Right  -CW    Pain Descriptors   Aching  -CW    Pain Frequency   Constant/continuous  -CW    Pain Intervention(s)   Repositioned  -CW    Response to Interventions   tolerated  -CW    Recorded by [CW] Yadira Flores PTA  [CW] Yadira Flores PTA    Bed Mobility, Assessment/Treatment    Bed Mob, Supine to Sit, [place] contact guard assist  -CW      Bed Mob, Sit to Supine, [place] contact guard assist  -CW      Bed Mobility, Safety Issues decreased use of legs for bridging/pushing  -CW      Bed Mobility, Impairments ROM decreased  -CW      Bed Mobility, Comment Assist RLE  -CW      Recorded by [CW] Yadira Flores PTA      Transfer Assessment/Treatment    Transfers, Sit-Stand [place] stand by assist  -CW      Transfers, Stand-Sit [place] stand by assist  -CW      Transfers, Sit-Stand-Sit, Assist Device rolling walker  -CW      Toilet Transfer, [place] supervision required  -CW      Toilet Transfer, Assistive Device rolling walker  -CW      Transfer, Impairments ROM decreased  -CW      Recorded by [CW] Yadira Flores PTA      Gait Assessment/Treatment    Gait, [place] Level supervision required  -CW      Gait, Assistive Device rolling walker  -CW      Gait, Distance (Feet) 200  -CW      Gait, Gait Deviations antalgic;dami decreased  -CW      Gait, Impairments  ROM decreased  -CW      Gait, Comment No R knee flexion per Dr Capellan  -CW      Recorded by [CW] Yadira Flores PTA      Therapy Exercises    Right Lower Extremity   AROM:;20 reps;supine;ankle pumps/circles;glut sets;hip abduction/adduction;quad sets;SLR  -CW    Left Lower Extremity   AROM:;20 reps;supine;ankle pumps/circles;glut sets;heel slides;hip abduction/adduction;quad sets;SAQ;SLR  -CW    Recorded by   [CW] Yadira Flores PTA    Positioning and Restraints    Pre-Treatment Position in bed  -CW  in bed  -CW    Post Treatment Position bed  -CW  bed  -CW    In Bed fowlers;call light within reach;encouraged to call for assist;with family/caregiver  -CW  supine;call light within reach;encouraged to call for assist;exit alarm on  -CW    Recorded by [CW] Yadira Flores PTA  [CW] Yadira Flores PTA      03/05/18 1435 03/05/18 1013 03/04/18 1500    Rehab Assessment/Intervention    Discipline physical therapy assistant  -CW physical therapy assistant  -CW physical therapist  -RA    Document Type therapy note (daily note)  -CW therapy note (daily note)  -CW therapy note (daily note)  -RA    Subjective Information agree to therapy;complains of;dizziness  -CW agree to therapy;complains of;pain  -CW agree to therapy;pain;fatigue  -RA    Patient Effort, Rehab Treatment good  -CW good  -CW good  -RA    Symptoms Noted During/After Treatment dizziness  -CW      Precautions/Limitations fall precautions  -CW fall precautions  -CW fall precautions  -RA    Specific Treatment Considerations No R knee flexion per Dr Capellan  -CW No R knee flexion  -CW     Recorded by [CW] Yadira Flores PTA [CW] Yadira Flores PTA [RA] Sima Mary, DIAMOND    Pain Assessment    Pain Assessment 0-10  -CW 0-10  -CW 0-10  -RA    Pain Score 2  -CW 5  -CW 1  -RA    Pain Type Acute pain;Surgical pain  -CW Acute pain;Surgical pain  -CW Acute pain;Surgical pain  -RA    Pain Location Knee  -CW Knee  -CW Knee  -RA    Pain  Orientation Right  -CW Right  -CW Right  -RA    Pain Descriptors Aching  -CW Aching  -CW Aching  -RA    Pain Frequency Constant/continuous  -CW Constant/continuous  -CW Constant/continuous  -RA    Pain Intervention(s) Ambulation/increased activity  -CW Ambulation/increased activity  -CW Repositioned;Ambulation/increased activity  -RA    Response to Interventions tolerated  -CW tolerated  -CW tolerated  -RA    Recorded by [CW] Yadira Flores PTA [CW] Yadira Flores PTA [RA] Sima Mary, PT    Bed Mobility, Assessment/Treatment    Bed Mobility, Assistive Device bed rails  -CW bed rails;head of bed elevated  -CW     Bed Mobility, Scoot/Bridge, [place]   conditional independence  -RA    Bed Mob, Supine to Sit, [place] contact guard assist   assist with RLE  -CW conditional independence  -CW     Bed Mob, Sit to Supine, [place] minimum assist (75% patient effort);verbal cues required  -CW --   in chair  -CW     Bed Mobility, Safety Issues decreased use of legs for bridging/pushing  -CW      Bed Mobility, Impairments strength decreased  -CW      Bed Mobility, Comment Able to elevate HOB at home  -CW  Able to elevate head of bed at home  -RA    Recorded by [CW] Yadira Flores PTA [CW] Yadira Flores, MARNI [RA] Sima Mary, PT    Transfer Assessment/Treatment    Transfers, Bed-Chair [place]   contact guard assist  -RA    Transfers, Sit-Stand [place] stand by assist  -CW stand by assist  -CW contact guard assist  -RA    Transfers, Stand-Sit [place] stand by assist  -CW stand by assist  -CW     Transfers, Sit-Stand-Sit, Assist Device elevated surface;rolling walker  -CW elevated surface;rolling walker  -CW     Toilet Transfer, [place] supervision required  -CW supervision required  -CW     Toilet Transfer, Assistive Device rolling walker  -CW rolling walker  -CW     Transfer, Impairments ROM decreased;pain  -CW ROM decreased;pain  -CW     Transfer, Comment    modAx1 for toilet sit to stand  -RA    Recorded by [CW] Yadira Flores PTA [CW] Yadira Flores PTA [RA] Sima Mary, PT    Gait Assessment/Treatment    Gait, [place] Level stand by assist  -CW stand by assist  -CW     Gait, Assistive Device rolling walker  -CW rolling walker  -CW     Gait, Distance (Feet) 200   x 2  -   x 2  -CW     Gait, Gait Deviations antalgic;dami decreased;right:;decreased heel strike  -CW antalgic;dami decreased;step length decreased  -CW     Gait, Safety Issues weight-shifting ability decreased  -CW weight-shifting ability decreased  -CW     Gait, Impairments ROM decreased  -CW ROM decreased;pain  -CW     Gait, Comment No R knee flexion per Dr Capellan  -CW No R knee flexion per Dr Capellan  -CW     Recorded by [CW] Yadira Flores PTA [CW] Yadira Flores PTA     Therapy Exercises    Bilateral Lower Extremities  AROM:;supine;ankle pumps/circles;hip abduction/adduction;SLR;quad sets  -CW     Recorded by  [CW] Yadira Flores PTA     Positioning and Restraints    Pre-Treatment Position in bed  -CW in bed  -CW in bed  -RA    Post Treatment Position bed  -CW chair  -CW chair  -RA    In Bed fowlers;call light within reach;encouraged to call for assist;exit alarm on;with family/caregiver;RLE elevated  -CW  call light within reach;sitting;encouraged to call for assist;legs elevated  -RA    In Chair  reclined;call light within reach;encouraged to call for assist  -CW     Recorded by [CW] Yadira Flores PTA [CW] Yadira Flores PTA [RA] Sima Mary, PT      User Key  (r) = Recorded By, (t) = Taken By, (c) = Cosigned By    Initials Name Effective Dates     Yadira Flores PTA 06/22/15 -     RA Sima Mary, PT 02/05/18 -                 IP PT Goals       03/04/18 0958          Transfer Training 2 PT LTG    Transfer Training PT 2 LTG, Date Established 03/04/18  -RA      Transfer Training PT 2 LTG, Time to Achieve by discharge  -RA       Transfer Training PT 2 LTG, Activity Type bed to chair /chair to bed;sit to stand/stand to sit;toilet  -RA      Transfer Training PT 2 LTG, [place] Level independent  -RA      Transfer Training PT 2 LTG, Assist Device walker, rolling  -RA      Gait Training PT LTG    Gait Training Goal PT LTG, Date Established 03/04/18  -RA      Gait Training Goal PT LTG, [place] Level conditional independence  -RA      Gait Training Goal PT LTG, Assist Device walker, rolling  -RA      Gait Training Goal PT LTG, Distance to Achieve 200  -RA        User Key  (r) = Recorded By, (t) = Taken By, (c) = Cosigned By    Initials Name Provider Type    RA Sima Mary, PT Physical Therapist          Physical Therapy Education     Title: PT OT SLP Therapies (Done)     Topic: Physical Therapy (Done)     Point: Mobility training (Done)    Learning Progress Summary    Learner Readiness Method Response Comment Documented by Status   Patient Acceptance E,D MASOUD LUNA HEP, plan of care  03/06/18 1127 Done    Acceptance EMATTHIEU DU Plan of care, precautions, benefit of activity  03/05/18 1144 Done    Acceptance E VU Pt informed of benefits of PT. Pt instructed to call for help for OOB activity.  03/04/18 0954 Done               Point: Precautions (Done)    Learning Progress Summary    Learner Readiness Method Response Comment Documented by Status   Patient Acceptance E,D MASOUD LUNA HEP, plan of care CW 03/06/18 1127 Done    Acceptance E,D MASOUD LUNA Plan of care, precautions, benefit of activity CW 03/05/18 1144 Done    Acceptance E VU Pt informed of benefits of PT. Pt instructed to call for help for OOB activity.  03/04/18 0954 Done                      User Key     Initials Effective Dates Name Provider Type Discipline     06/22/15 -  Yadira Flores PTA Physical Therapy Assistant PT     02/05/18 -  Sima Mary PT Physical Therapist PT                    PT Recommendation and Plan  Anticipated Discharge Disposition: home  with outpatient services  Planned Therapy Interventions: bed mobility training, gait training, transfer training  PT Frequency: 2 times/day, per priority policy, daily  Plan of Care Review  Plan Of Care Reviewed With: patient  Progress: progress toward functional goals as expected  Outcome Summary/Follow up Plan: Pt c/o fatigue this morning. Has been back and forth to bathroom several times.  Completed BLE exercises x 20 in supine position.  Will ambulate in hallway this afternoon.  Pt will continue to benefit from therapy to increase strength and improve mobility.          Outcome Measures       03/06/18 1100 03/05/18 1200 03/04/18 0900    How much help from another person do you currently need...    Turning from your back to your side while in flat bed without using bedrails? 4  -CW 4  -CW 3  -RA    Moving from lying on back to sitting on the side of a flat bed without bedrails? 4  -CW 4  -CW 3  -RA    Moving to and from a bed to a chair (including a wheelchair)? 3  -CW 3  -CW 3  -RA    Standing up from a chair using your arms (e.g., wheelchair, bedside chair)? 3  -CW 3  -CW 3  -RA    Climbing 3-5 steps with a railing? 3  -CW 3  -CW 2  -RA    To walk in hospital room? 3  -CW 3  -CW 3  -RA    AM-PAC 6 Clicks Score 20  -CW 20  -CW 17  -RA    Functional Assessment    Outcome Measure Options AM-PAC 6 Clicks Basic Mobility (PT)  -CW AM-PAC 6 Clicks Basic Mobility (PT)  -CW AM-PAC 6 Clicks Basic Mobility (PT)  -RA      User Key  (r) = Recorded By, (t) = Taken By, (c) = Cosigned By    Initials Name Provider Type    CARYN Flores PTA Physical Therapy Assistant    JUAN Mary PT Physical Therapist           Time Calculation:         PT Charges       03/06/18 1518 03/06/18 1130       Time Calculation    Start Time 1437  -CW 1000  -CW     Stop Time 1501  -CW 1023  -CW     Time Calculation (min) 24 min  -CW 23 min  -CW     PT Received On 03/06/18  -CW 03/06/18  -CW     PT Goal Re-Cert Due Date 03/14/18  -CW  03/14/18  -CW     Time Calculation- PT    Total Timed Code Minutes- PT 24 minute(s)  -CW 23 minute(s)  -CW       User Key  (r) = Recorded By, (t) = Taken By, (c) = Cosigned By    Initials Name Provider Type    CARYN Flores PTA Physical Therapy Assistant          Therapy Charges for Today     Code Description Service Date Service Provider Modifiers Qty    70419632709 HC PT THER PROC EA 15 MIN 3/5/2018 Yadira Flores, PTA GP 1    95936055753 HC GAIT TRAINING EA 15 MIN 3/5/2018 Yadira Flores, PTA GP 2    87905434763 HC GAIT TRAINING EA 15 MIN 3/5/2018 Yadira Flores, PTA GP 2    65429839231 HC PT THERAPEUTIC ACT EA 15 MIN 3/5/2018 Yadira Flores, PTA GP 1    14373820348 HC PT THER PROC EA 15 MIN 3/6/2018 Yadira Flores, PTA GP 2    88453694218 HC GAIT TRAINING EA 15 MIN 3/6/2018 Yadira Flores, PTA GP 2          PT G-Codes  Outcome Measure Options: AM-PAC 6 Clicks Basic Mobility (PT)  Score: 17  Functional Limitation: Mobility: Walking and moving around  Mobility: Walking and Moving Around Current Status (): At least 40 percent but less than 60 percent impaired, limited or restricted  Mobility: Walking and Moving Around Goal Status (): At least 20 percent but less than 40 percent impaired, limited or restricted    Yadira Flores PTA  3/6/2018

## 2018-03-06 NOTE — PROGRESS NOTES
Continued Stay Note  [facility]INGO [facility]     Patient Name: Danisha Cannon  MRN: [id number]  Today's Date: 3/6/2018    Admit Date: 3/3/2018          Discharge Plan       03/06/18 1542    Case Management/Social Work Plan    Plan Possible home with iv abx and [facility]    Patient/Family In Agreement With Plan yes    Additional Comments La from [facility] [phone] has called and she is working on pt's orders. She is going to talk with pt to make arrangements and discuss possible price. They can deliver pt's medication tomorrow.               Discharge Codes     None            JAHAIRA Moses

## 2018-03-06 NOTE — PLAN OF CARE
Problem: Patient Care Overview (Adult)  Goal: Plan of Care Review  Outcome: Ongoing (interventions implemented as appropriate)   03/06/18 1128   Coping/Psychosocial Response Interventions   Plan Of Care Reviewed With patient   Patient Care Overview   Progress progress toward functional goals as expected   Outcome Evaluation   Outcome Summary/Follow up Plan Pt c/o fatigue this morning. Has been back and forth to bathroom several times. Completed BLE exercises x 20 in supine position. Will ambulate in hallway this afternoon. Pt will continue to benefit from therapy to increase strength and improve mobility.

## 2018-03-06 NOTE — DISCHARGE PLACEMENT REQUEST
Meghan Samaniego [facility]  [phone]     [facility] [address]  [address] [place] [address]  Phone:  [phone]  Fax:          Patient:     Danisha Cannon MRN:  [id number]   [address] [place] [address] :  1955  SSN:    Phone: [phone] Sex:  F      [employer] # SUBSCRIBER ID   Primary: NADINE VALERO [id number]   Admitting Diagnosis: Infection of right knee [M00.9]  Order Date:  Mar 5, 2018               Inpatient Case Management  Consult       (Order ID: [id number])     Diagnosis:         Priority:  Routine Expected Date:   Expiration Date:        Interval:   Count:    Comments: Home IV antibiotic orders:  1.  Diagnosis right knee prosthetic joint infection-she underwent irrigation, debridement, and poly-exchange on 3/3/18  2.  Orthopedic surgeon-Mervin Capellan M.D.  3.  Microbiology-methicillin susceptible staph aureus  4.  IV access-PICC line  5.  Antibiotic orders:  Nafcillin 12 g IV daily given via a continuous infusion through 18 (that would be 6 weeks following irrigation and poly-exchange)  6.  Laboratory monitoring:  CMP, CBC, sedimentation rate, CRP every Monday  7.  Follow-up appointment 2 weeks  8.  Fax copy of these orders to [phone]      Chente Florentino MD  Reason for Consult? Home IV antibiotic orders     Specimen Type:   Specimen Source:   Specimen Taken Date:   Specimen Taken Time:                         Authorizing Provider:Chente Florentino MD  Authorizing Provider's NPI: [id number]  Order Entered By: Chente Flornetino MD 3/5/2018  5:50 PM     Electronically signed by: Chente Florentino MD 3/5/2018  5:50 PM

## 2018-03-06 NOTE — CONSULTS
PICC placed by Marcela Ramirez RN. Inserted into left basilic vein with ultrasound guidance.  Tip verified by Cardiac Electrical Activity

## 2018-03-06 NOTE — CONSULTS
INFECTIOUS DISEASES CONSULT NOTE    Patient:  Danisha Cannon 62 y.o. female  ROOM # 349/1  YOB: 1955  MRN: [id number]  [facility]:  [id number]  Admit date: 3/3/2018   Admitting Physician: Amilcar Capellan MD  Primary Care Physician: Juan Carlos Sparks MD  REFERRING PROVIDER: Amilcar Capellan MD    Reason for Consultation: Recommendations for antibiotic management of possible right knee infection.    History of Present Illness/Chief Complaint: Pleasant 62-year-old woman.  I was asked to see her for possible right knee prosthetic joint infection.  She actually dated the onset of her problems to 2 years ago.  She indicates she had a cyst type process involving her buttocks.  From what she describes it was near the sacral or right buttock area.  She indicates it was Constance of 2016 when the problem developed.  She had drainage of some foul-smelling material.  She indicates Dr. Capellan of general surgery evaluated her, drained it and then it was left to close by secondary intention.  She indicates she had a couple of other episodes where this area drained.  She had a number of courses of oral antibiotic treatment.  She also indicates she has had some problems with some soreness in her hands and feet.  She has seen rheumatology and had some courses of steroid treatment.  Currently the area where she had had a problem on her buttocks/sacral area has remained completely healed.  She does indicate at one time she had some urine output that had a similar smell related to what she had experienced from the sacral area drainage.  She was concerned that the 2 areas may be related.  Again currently there is no symptoms of either drainage from the sacral area or urinary tract complaints.    She spent quite a bit of time reviewing the above history before we talked about her knee.  She indicates on February 16 she underwent elective right knee arthroplasty.  She indicates the area never stopped draining completely.  She  describes having some clear at times somewhat cloudy or more thick drainage.  She took Bactrim.  The drainage seemed to clear a little bit.  She indicates she was saturating a fair amount of 4 x 4's.  She was taken to surgery.  She underwent irrigation, debridement, and exchange of the poly-material.  She was admitted post surgery.  Infectious diseases has been asked to evaluate and make recommendations for antibiotic treatment.  Prior to surgery she was having no fevers, chills or other localizing symptoms of infection.    I saw her and completed for consultation on March 3, 2018.  Inadvertently did not get full consult completed.  This note is a late entry.    Current Scheduled Medications:     acyclovir 400 mg Oral TID   DULoxetine 30 mg Oral Daily   flecainide 50 mg Oral Q12H   fluticasone 2 spray Each Nare Daily   gabapentin 600 mg Oral Q8H   levothyroxine 137 mcg Oral Daily   metoprolol tartrate 25 mg Oral Q12H   nafcillin 2 g Intravenous Q4H   pantoprazole 40 mg Oral Q AM   rOPINIRole 3 mg Oral Nightly   triamcinolone  Topical Q12H   venlafaxine  mg Oral Daily     Current PRN Medications:  docusate sodium  •  HYDROcodone-acetaminophen  •  LORazepam  •  magnesium hydroxide  •  naloxone  •  ondansetron **OR** ondansetron ODT **OR** ondansetron    Allergies:    Allergies   Allergen Reactions   • Codeine Dizziness and Nausea Only     Rapid heart rate, dizziness  NAUSEA   • Mobic [Meloxicam] Rash   • Morphine And Related Itching     Past Medical History: Pilonidal/buttock cyst.  Obesity.  Thyroid dysfunction.  Gastroesophageal reflux disease.  Atrial fibrillation. Degenerative arthritis.  Sleep apnea.    Past Surgical History: Umbilical hernia repair.  Laparoscopic cholecystectomy.  Bunionectomy.  Trigger finger release.    Social History: No tobacco, alcohol, or illicit drug use.    Family History: Noncontributory    Exposure History: No close contacts have been ill.    Review of Systems   No cough, chest  "pain, sputum production  No nausea or vomiting  No diarrhea or abdominal pain  No vaginal bleeding or discharge  No sore throat or pharyngitis  No adenopathy, weight gain or weight loss  No depression  Please see HPI for remaining pertinent positives negatives from her complete review of systems    Vital Signs:  /100 (BP Location: Right arm, Patient Position: Lying)  Pulse 115  Temp 97.2 °F (36.2 °C) (Oral)   Resp 18  Ht 165 cm (64.96\")  Wt 126 kg (278 lb 12.8 oz)  SpO2 95%  BMI 46.45 kg/m2 Temp (24hrs), Av.2 °F (36.8 °C), Min:97.2 °F (36.2 °C), Max:99.2 °F (37.3 °C)    Physical Exam  Vital signs reviewed.  Line/IV site: No erythema or tenderness.    Lab Results:  CBC:   Results from last 7 days  Lab Units 18  1305 18  1437   WBC 10*3/mm3 8.09 19.86*   HEMOGLOBIN g/dL 9.8* 10.5*   HEMATOCRIT % 32.2* 33.8*   PLATELETS 10*3/mm3 301 376     CMP:   Results from last 7 days  Lab Units 18  1305 18  0048 18  1437   SODIUM mmol/L 142  --  140   POTASSIUM mmol/L 4.2  --  4.2   CHLORIDE mmol/L 98  --  98   CO2 mmol/L 35.0*  --  33.0*   BUN mg/dL 20  --  25*   CREATININE mg/dL 0.90 1.00 1.33   CALCIUM mg/dL 9.0  --  8.5   BILIRUBIN mg/dL 1.5*  --   --    ALK PHOS U/L 121*  --   --    ALT (SGPT) U/L 38  --   --    AST (SGOT) U/L 47*  --   --    GLUCOSE mg/dL 98  --  126*     Impression:   1.  Possible early right knee prosthetic joint infection  2.  History of pilonidal or other buttock area cyst-no evidence of recurrence from a symptom standpoint.  She had been concerned to some degree about her relationship between her prior buttock cyst, a subsequent fairly remote urinary tract infection, and her right knee.  Explained I did not feel there was likely to be any relationship.    Recommendations:    Antibiotic treatment with vancomycin and cefepime  Await operative culture results  We will make additional antibiotic recommendations as we follow clinical course and Bergeron additional " information from culture    Chente Urena MD  03/06/18  4:05 PM

## 2018-03-06 NOTE — PROGRESS NOTES
Patient is post-debridement infected total knee arthroplasty.  She has methicillin sensitive Staphylococcus aureus and is on nafcillin at this time.  She still has some erythema about her wound drainage from her TMs is clearing.    Plan we will discontinue drains and redress the wound and continue with IV antibiotics

## 2018-03-06 NOTE — PLAN OF CARE
Problem: Patient Care Overview (Adult)  Goal: Plan of Care Review  Outcome: Ongoing (interventions implemented as appropriate)   03/06/18 0435   Coping/Psychosocial Response Interventions   Plan Of Care Reviewed With patient   Patient Care Overview   Progress no change   Outcome Evaluation   Outcome Summary/Follow up Plan pt c/o moderate pain during shift, relieved with PRN pain medication as prescribed. Safety maintained, VSS pt up frequesnty to void, PPP, will continue to monitor.       Problem: Perioperative Period (Adult)  Goal: Signs and Symptoms of Listed Potential Problems Will be Absent or Manageable (Perioperative Period)  Outcome: Ongoing (interventions implemented as appropriate)      Problem: Fall Risk (Adult)  Goal: Absence of Falls  Outcome: Ongoing (interventions implemented as appropriate)      Problem: Infection, Risk/Actual (Adult)  Goal: Infection Prevention/Resolution  Outcome: Ongoing (interventions implemented as appropriate)

## 2018-03-07 PROBLEM — E66.01 MORBID (SEVERE) OBESITY DUE TO EXCESS CALORIES: Chronic | Status: ACTIVE | Noted: 2018-03-07

## 2018-03-07 PROBLEM — I10 HTN (HYPERTENSION): Chronic | Status: ACTIVE | Noted: 2018-03-03

## 2018-03-07 PROBLEM — G25.81 RESTLESS LEG SYNDROME: Chronic | Status: ACTIVE | Noted: 2017-01-31

## 2018-03-07 PROBLEM — G47.33 OBSTRUCTIVE SLEEP APNEA: Chronic | Status: ACTIVE | Noted: 2017-01-31

## 2018-03-07 PROBLEM — Z96.651 TOTAL KNEE REPLACEMENT STATUS, RIGHT: Chronic | Status: ACTIVE | Noted: 2018-02-17

## 2018-03-07 PROBLEM — Z96.659 INFECTION ASSOCIATED WITH INTERNAL KNEE PROSTHESIS (HCC): Status: ACTIVE | Noted: 2018-03-07

## 2018-03-07 PROBLEM — G60.3 IDIOPATHIC PROGRESSIVE NEUROPATHY: Chronic | Status: ACTIVE | Noted: 2017-07-06

## 2018-03-07 PROBLEM — T84.59XA: Status: ACTIVE | Noted: 2018-03-07

## 2018-03-07 LAB
ANION GAP SERPL CALCULATED.3IONS-SCNC: 8 MMOL/L (ref 4–13)
BUN BLD-MCNC: 16 MG/DL (ref 5–21)
BUN/CREAT SERPL: 18.6 (ref 7–25)
CALCIUM SPEC-SCNC: 8.8 MG/DL (ref 8.4–10.4)
CHLORIDE SERPL-SCNC: 98 MMOL/L (ref 98–110)
CO2 SERPL-SCNC: 36 MMOL/L (ref 24–31)
CREAT BLD-MCNC: 0.86 MG/DL (ref 0.5–1.4)
GFR SERPL CREATININE-BSD FRML MDRD: 66 ML/MIN/1.73
GLUCOSE BLD-MCNC: 119 MG/DL (ref 70–100)
MAGNESIUM SERPL-MCNC: 2.2 MG/DL (ref 1.4–2.2)
POTASSIUM BLD-SCNC: 3.9 MMOL/L (ref 3.5–5.3)
SODIUM BLD-SCNC: 142 MMOL/L (ref 135–145)
TROPONIN I SERPL-MCNC: <0.012 NG/ML (ref 0–0.03)
TSH SERPL DL<=0.05 MIU/L-ACNC: 13.1 MIU/ML (ref 0.47–4.68)

## 2018-03-07 PROCEDURE — 93010 ELECTROCARDIOGRAM REPORT: CPT | Performed by: INTERNAL MEDICINE

## 2018-03-07 PROCEDURE — 80048 BASIC METABOLIC PNL TOTAL CA: CPT | Performed by: NURSE PRACTITIONER

## 2018-03-07 PROCEDURE — 83735 ASSAY OF MAGNESIUM: CPT | Performed by: NURSE PRACTITIONER

## 2018-03-07 PROCEDURE — 99253 IP/OBS CNSLTJ NEW/EST LOW 45: CPT | Performed by: INTERNAL MEDICINE

## 2018-03-07 PROCEDURE — 93005 ELECTROCARDIOGRAM TRACING: CPT | Performed by: FAMILY MEDICINE

## 2018-03-07 PROCEDURE — 84443 ASSAY THYROID STIM HORMONE: CPT | Performed by: NURSE PRACTITIONER

## 2018-03-07 PROCEDURE — 25010000002 DIGOXIN PER 500 MCG: Performed by: NURSE PRACTITIONER

## 2018-03-07 RX ORDER — FLECAINIDE ACETATE 100 MG/1
100 TABLET ORAL EVERY 12 HOURS SCHEDULED
Status: DISCONTINUED | OUTPATIENT
Start: 2018-03-07 | End: 2018-03-08 | Stop reason: HOSPADM

## 2018-03-07 RX ORDER — DIGOXIN 125 MCG
125 TABLET ORAL
Status: DISCONTINUED | OUTPATIENT
Start: 2018-03-08 | End: 2018-03-08 | Stop reason: HOSPADM

## 2018-03-07 RX ORDER — DIGOXIN 0.25 MG/ML
250 INJECTION INTRAMUSCULAR; INTRAVENOUS ONCE
Status: COMPLETED | OUTPATIENT
Start: 2018-03-07 | End: 2018-03-07

## 2018-03-07 RX ADMIN — ACYCLOVIR 400 MG: 200 CAPSULE ORAL at 09:26

## 2018-03-07 RX ADMIN — DIGOXIN 250 MCG: 250 INJECTION, SOLUTION INTRAMUSCULAR; INTRAVENOUS; PARENTERAL at 14:15

## 2018-03-07 RX ADMIN — DILTIAZEM HYDROCHLORIDE 15 MG/HR: 5 INJECTION INTRAVENOUS at 16:33

## 2018-03-07 RX ADMIN — DILTIAZEM HYDROCHLORIDE 5 MG/HR: 5 INJECTION INTRAVENOUS at 08:24

## 2018-03-07 RX ADMIN — TRIAMCINOLONE ACETONIDE 1 APPLICATION: 1 CREAM TOPICAL at 22:19

## 2018-03-07 RX ADMIN — NAFCILLIN SODIUM 2 G: 2 INJECTION, POWDER, LYOPHILIZED, FOR SOLUTION INTRAMUSCULAR; INTRAVENOUS at 05:48

## 2018-03-07 RX ADMIN — GABAPENTIN 600 MG: 300 CAPSULE ORAL at 05:48

## 2018-03-07 RX ADMIN — NAFCILLIN SODIUM 2 G: 2 INJECTION, POWDER, LYOPHILIZED, FOR SOLUTION INTRAMUSCULAR; INTRAVENOUS at 20:20

## 2018-03-07 RX ADMIN — LORAZEPAM 1 MG: 1 TABLET ORAL at 03:39

## 2018-03-07 RX ADMIN — FLECAINIDE ACETATE 100 MG: 100 TABLET ORAL at 20:57

## 2018-03-07 RX ADMIN — ROPINIROLE HYDROCHLORIDE 3 MG: 1 TABLET, FILM COATED ORAL at 20:20

## 2018-03-07 RX ADMIN — NAFCILLIN SODIUM 2 G: 2 INJECTION, POWDER, LYOPHILIZED, FOR SOLUTION INTRAMUSCULAR; INTRAVENOUS at 12:17

## 2018-03-07 RX ADMIN — METOPROLOL TARTRATE 25 MG: 25 TABLET, FILM COATED ORAL at 09:26

## 2018-03-07 RX ADMIN — NYSTATIN 500000 UNITS: 500000 SUSPENSION ORAL at 20:21

## 2018-03-07 RX ADMIN — NAFCILLIN SODIUM 2 G: 2 INJECTION, POWDER, LYOPHILIZED, FOR SOLUTION INTRAMUSCULAR; INTRAVENOUS at 16:33

## 2018-03-07 RX ADMIN — LEVOTHYROXINE SODIUM 137 MCG: 137 TABLET ORAL at 09:26

## 2018-03-07 RX ADMIN — METOPROLOL TARTRATE 25 MG: 25 TABLET, FILM COATED ORAL at 20:20

## 2018-03-07 RX ADMIN — FLECAINIDE ACETATE 50 MG: 50 TABLET ORAL at 09:26

## 2018-03-07 RX ADMIN — HYDROCODONE BITARTRATE AND ACETAMINOPHEN 1 TABLET: 7.5; 325 TABLET ORAL at 05:47

## 2018-03-07 RX ADMIN — NYSTATIN 500000 UNITS: 500000 SUSPENSION ORAL at 17:35

## 2018-03-07 RX ADMIN — PANTOPRAZOLE SODIUM 40 MG: 40 TABLET, DELAYED RELEASE ORAL at 05:48

## 2018-03-07 RX ADMIN — HYDROCODONE BITARTRATE AND ACETAMINOPHEN 1 TABLET: 7.5; 325 TABLET ORAL at 22:28

## 2018-03-07 RX ADMIN — ACYCLOVIR 400 MG: 200 CAPSULE ORAL at 16:33

## 2018-03-07 RX ADMIN — GABAPENTIN 600 MG: 300 CAPSULE ORAL at 14:16

## 2018-03-07 RX ADMIN — GABAPENTIN 600 MG: 300 CAPSULE ORAL at 20:24

## 2018-03-07 RX ADMIN — VENLAFAXINE HYDROCHLORIDE 150 MG: 75 CAPSULE, EXTENDED RELEASE ORAL at 09:26

## 2018-03-07 RX ADMIN — NAFCILLIN SODIUM 2 G: 2 INJECTION, POWDER, LYOPHILIZED, FOR SOLUTION INTRAMUSCULAR; INTRAVENOUS at 02:18

## 2018-03-07 RX ADMIN — ACYCLOVIR 400 MG: 200 CAPSULE ORAL at 20:20

## 2018-03-07 RX ADMIN — NYSTATIN 500000 UNITS: 500000 SUSPENSION ORAL at 12:17

## 2018-03-07 RX ADMIN — NYSTATIN 500000 UNITS: 500000 SUSPENSION ORAL at 09:51

## 2018-03-07 RX ADMIN — DULOXETINE HYDROCHLORIDE 30 MG: 30 CAPSULE, DELAYED RELEASE ORAL at 09:26

## 2018-03-07 NOTE — CONSULTS
[facility] CONSULT NOTE    Referring Provider: Amilcar Capellan MD    Reason for Consultation: Atrial Fibrillation    Chief complaint: Drainage from Incsision    Subjective .     History of present illness:  Danisha Cannon is a 62 y.o. female with recent right knee replacement 2/16. Patient was admitted 3/2/18 for drainage and fever. 3/3 patient underwent debridement procedure of right knee. Wound Culture has grown staphylococcus aureus, MSSA. Paitent is being followed by Infectious Disease who is managing antibiotics. Patient has a history of paroxysmal atrial fibrillation for which she previously followed with Dr. Wall. He started patient on Flecainide for which patient was maintaining NSR. Patient on admission was started on half of her home dose (50mg) and was started on double her home Lopressor dose (25). Patient was noted on vital signs to have fluctuating heart rate. An EKG was ordered which showed A-fib RVR. Rates ranging from 140-170. Patient is asymptomatic. Denies chest pain or shortness of breath. Patient is on pain medicine and is drowsy. Patient has a Cardizem drip ordered and is being moved to .     History  Past Medical History:   Diagnosis Date   • Arthritis    • Atrial fibrillation    • Disease of thyroid gland    • GERD (gastroesophageal reflux disease)    • Neuropathy, peripheral    • Pneumonia     RECENT DX PER FAMILY DOCTOR   • PONV (postoperative nausea and vomiting)    • Restless leg syndrome    • Sleep apnea    • SVT (supraventricular tachycardia)    ,   Past Surgical History:   Procedure Laterality Date   • BUNIONECTOMY Left     AND HAMMER TOE   • CARDIAC SURGERY      HEART CATH   • CATARACT EXTRACTION Right    • HERNIA REPAIR      UMBILICAL X3   • JOINT REPLACEMENT      RIGHT KNEE   • KNEE POLY INSERT EXCHANGE Right 3/3/2018    Procedure: IRRIGATION AND DEBRIDEMENT TOTAL KNEE & POLY EXCHANGE - RIGHT;  Surgeon: Amilcar Capellan MD;  Location: [facility] OR;   Service:    • LAPAROSCOPIC CHOLECYSTECTOMY     • PILONIDAL CYSTECTOMY N/A 2/16/2017    Procedure: PILONIDAL CYSTECTOMY, EXCISION SEBACEOUS CYST - BACK;  Surgeon: Macrina Capellan MD;  Location:  [facility] OR;  Service:    • TRUNK LESION/CYST EXCISION N/A 2/16/2017    Procedure: EXCISION SEBACEOUS CYST - BACK;  Surgeon: Macrina Capellan MD;  Location:  [facility] OR;  Service:    ,   Family History   Problem Relation Age of Onset   • Diabetes Mother    • Heart disease Mother    • Cancer Father    • Leukemia Brother    ,   Social History   Substance Use Topics   • Smoking status: Never Smoker   • Smokeless tobacco: Former User   • Alcohol use No   ,     Medications    Prior to Admission medications    Medication Sig Start Date End Date Taking? Authorizing Provider   alendronate (FOSAMAX) 70 MG tablet Take 1 tablet by mouth Every 7 (Seven) Days.    Historical Provider, MD   aspirin 325 MG tablet Take 1 tablet by mouth Daily.    Historical Provider, MD   DICLOFENAC PO Take 50 mg by mouth 2 (Two) Times a Day.    Historical Provider, MD   DULoxetine (CYMBALTA) 30 MG capsule Take 30 mg by mouth 2 (Two) Times a Day.    Historical Provider, MD   famciclovir (FAMVIR) 500 MG tablet Take 500 mg by mouth 2 (Two) Times a Day.    Historical Provider, MD   flecainide (TAMBOCOR) 100 MG tablet Take 100 mg by mouth 2 (Two) Times a Day.    Historical Provider, MD   fluconazole (DIFLUCAN) 150 MG tablet Take 150 mg by mouth 1 (One) Time. Takes as needed    Historical Provider, MD   fluticasone (VERAMYST) 27.5 MCG/SPRAY nasal spray 2 sprays into each nostril Daily.    Historical Provider, MD   gabapentin (NEURONTIN) 600 MG tablet Take 600 mg by mouth 3 (Three) Times a Day.    Historical Provider, MD   HYDROcodone-acetaminophen (NORCO) 5-325 MG per tablet Take 1-2 tablets by mouth Every 4 (Four) Hours As Needed (Pain). 2/16/17   Macrina Capellan MD   levothyroxine (SYNTHROID, LEVOTHROID) 137 MCG tablet Take 137 mcg by mouth Daily.    Historical  Provider, MD   LORazepam (ATIVAN) 1 MG tablet Take 1 mg by mouth Every 8 (Eight) Hours As Needed for anxiety.    Historical Provider, MD   metoprolol tartrate (LOPRESSOR) 25 MG tablet Take 25 mg by mouth 2 (Two) Times a Day. TAKES 1/2 TAB BID    Historical Provider, MD   pantoprazole (PROTONIX) 40 MG EC tablet Take 40 mg by mouth 2 (Two) Times a Day.    Historical Provider, MD   rOPINIRole (REQUIP) 3 MG tablet Take 3 mg by mouth Every Night. 2 TABS IN AM AND 3 TABS AT NIGHT    Historical Provider, MD   triamcinolone (KENALOG) 0.1 % cream Apply  topically 2 (Two) Times a Day.    Historical Provider, MD   venlafaxine XR (EFFEXOR-XR) 150 MG 24 hr capsule Take 150 mg by mouth Daily.    Historical Provider, MD       Current Facility-Administered Medications   Medication Dose Route Frequency Provider Last Rate Last Dose   • acyclovir (ZOVIRAX) capsule 400 mg  400 mg Oral TID Amilcar Capellan MD   400 mg at 03/07/18 0926   • diltiaZEM (CARDIZEM) 125 mg in sodium chloride 0.9 % 125 mL (1 mg/mL) infusion  5-15 mg/hr Intravenous Titrated Juan Carlos Sparks MD 10 mL/hr at 03/07/18 0919 10 mg/hr at 03/07/18 0919   • docusate sodium (COLACE) capsule 100 mg  100 mg Oral BID PRN Amilcar Capellan MD   100 mg at 03/04/18 1027   • DULoxetine (CYMBALTA) DR capsule 30 mg  30 mg Oral Daily Amilcar Capellan MD   30 mg at 03/07/18 0926   • flecainide (TAMBOCOR) tablet 100 mg  100 mg Oral Q12H ERNESTO Lobo       • fluticasone (FLONASE) 50 MCG/ACT nasal spray 2 spray  2 spray Each Nare Daily Amilcar Capellan MD   2 spray at 03/04/18 0830   • gabapentin (NEURONTIN) capsule 600 mg  600 mg Oral Q8H Amilcar Capellan MD   600 mg at 03/07/18 0548   • HYDROcodone-acetaminophen (NORCO) 7.5-325 MG per tablet 1 tablet  1 tablet Oral Q4H PRN Amilcar Capellan MD   1 tablet at 03/07/18 0547   • lactated ringers infusion  20 mL/hr Intravenous Continuous Amilcar Capellan MD 20 mL/hr at 03/05/18 0217 20 mL/hr at 03/05/18 0217   •  levothyroxine (SYNTHROID, LEVOTHROID) tablet 137 mcg  137 mcg Oral Daily Amilcar Capellan MD   137 mcg at 03/07/18 0926   • LORazepam (ATIVAN) tablet 1 mg  1 mg Oral Q8H PRN Amilcar Capellan MD   1 mg at 03/07/18 0339   • magnesium hydroxide (MILK OF MAGNESIA) suspension 2400 mg/10mL 10 mL  10 mL Oral Daily PRN Amilcar Capellan MD   10 mL at 03/05/18 0615   • metoprolol tartrate (LOPRESSOR) tablet 25 mg  25 mg Oral Q12H Amilcar Capellan MD   25 mg at 03/07/18 0926   • nafcillin (UNIPEN) 2 g/100 mL 0.9% NS IVPB (mbp)  2 g Intravenous Q4H Chente Florentino MD 0 mL/hr at 03/07/18 0248 2 g at 03/07/18 0548   • naloxone (NARCAN) injection 0.1 mg  0.1 mg Intravenous Q5 Min PRN Amilcar Capellan MD       • nystatin (MYCOSTATIN) 627586 UNIT/ML suspension 500,000 Units  5 mL Mouth/Throat 4x Daily Juan Carlos Sparks MD   500,000 Units at 03/07/18 0951   • ondansetron (ZOFRAN) tablet 4 mg  4 mg Oral Q6H PRN Amilcar Capellan MD        Or   • ondansetron ODT (ZOFRAN-ODT) disintegrating tablet 4 mg  4 mg Oral Q6H PRN Amilcar Capellan MD        Or   • ondansetron (ZOFRAN) injection 4 mg  4 mg Intravenous Q6H PRN Amilcar Capellan MD       • pantoprazole (PROTONIX) EC tablet 40 mg  40 mg Oral Q AM Amilcar Capellan MD   40 mg at 03/07/18 0548   • rOPINIRole (REQUIP) tablet 3 mg  3 mg Oral Nightly Amilcar Capellan MD   3 mg at 03/06/18 2056   • triamcinolone (KENALOG) 0.1 % cream   Topical Q12H Amilcar Capellan MD       • venlafaxine XR (EFFEXOR-XR) 24 hr capsule 150 mg  150 mg Oral Daily Amilcar Capellan MD   150 mg at 03/07/18 0926       Allergies:  Codeine; Mobic [meloxicam]; and Morphine and related    Review of Systems  Review of Systems   Constitution: Negative for chills, decreased appetite, fever, malaise/fatigue, weight gain and weight loss.   HENT: Negative for nosebleeds.    Eyes: Negative for visual disturbance.   Cardiovascular: Negative for chest pain, dyspnea on exertion, leg swelling,  near-syncope, orthopnea, palpitations, paroxysmal nocturnal dyspnea and syncope.   Respiratory: Negative for cough, hemoptysis, shortness of breath and snoring.    Endocrine: Negative for cold intolerance and heat intolerance.   Hematologic/Lymphatic: Negative for bleeding problem. Does not bruise/bleed easily.   Skin: Negative for rash.   Musculoskeletal: Positive for joint pain and joint swelling. Negative for back pain and falls.   Gastrointestinal: Negative for abdominal pain, constipation, diarrhea, heartburn, melena, nausea and vomiting.   Genitourinary: Negative for hematuria.   Neurological: Negative for dizziness, headaches and light-headedness.   Psychiatric/Behavioral: Negative for altered mental status.   Allergic/Immunologic: Negative for persistent infections.       Objective     Physical Exam:  Patient Vitals for the past 24 hrs:   BP Temp Temp src Pulse Resp SpO2   03/07/18 0721 135/71 98.4 °F (36.9 °C) Oral (!) 129 19 94 %   03/07/18 0430 122/66 97.4 °F (36.3 °C) Oral 115 20 97 %   03/07/18 0000 137/56 98.5 °F (36.9 °C) Oral 74 19 98 %   03/06/18 2055 148/65 99 °F (37.2 °C) Oral 87 16 93 %   03/06/18 1850 122/58 99.6 °F (37.6 °C) Oral 88 18 97 %     Physical Exam   Constitutional: She is oriented to person, place, and time. She appears well-developed and well-nourished.   HENT:   Head: Normocephalic and atraumatic.   Eyes: Pupils are equal, round, and reactive to light.   Neck: Normal range of motion. Neck supple. No JVD present. Carotid bruit is not present.   Cardiovascular: Normal heart sounds and intact distal pulses.  An irregular rhythm present. Tachycardia present.    Pulmonary/Chest: Effort normal and breath sounds normal.   Abdominal: Soft. Bowel sounds are normal.   Musculoskeletal: Normal range of motion.   Neurological: She is alert and oriented to person, place, and time. She has normal reflexes.   Skin: Skin is warm and dry.   Psychiatric: She has a normal mood and affect. Her behavior  is normal. Judgment and thought content normal.       Results Review:   I reviewed the patient's new clinical results.  Lab Results (last 72 hours)     Procedure Component Value Units Date/Time    Vancomycin, Trough [[id number]]  (Abnormal) Collected:  03/05/18 0048    Specimen:  Blood Updated:  03/05/18 0107     Vancomycin Trough 23.03 (H) mcg/mL     Tissue / Bone Culture - Tissue, Knee, Right [[id number]]  (Abnormal)  (Susceptibility) Collected:  03/03/18 0904    Specimen:  Tissue from Knee, Right Updated:  03/05/18 0651     Tissue Culture --      Heavy growth (4+) Staphylococcus aureus, MSSA (A)     BETA LACTAMASE Positive     Gram Stain Result Many (4+) WBCs seen      Moderate (3+) Gram positive cocci, intracellular and extracellular    Susceptibility      Staphylococcus aureus, MSSA     DONTE     Clindamycin <=0.25 ug/ml Resistant     Erythromycin 4 ug/ml Resistant     Gentamicin <=0.5 ug/ml Susceptible     Inducible Clindamycin Resistance POS  Positive     Levofloxacin 1 ug/ml Susceptible  [1]      Oxacillin 0.5 ug/ml Susceptible     Penicillin G >=0.5 ug/ml Resistant     Tetracycline <=1 ug/ml Susceptible     Trimethoprim + Sulfamethoxazole <=10 ug/ml Susceptible     Vancomycin <=0.5 ug/ml Susceptible            [1]   Staphylococcus species may develop resistance during prolonged therapy with quinolones. Isolates that are initially susceptible may become resistant within three to four days after initiation of therapy. Testing of repeat isolates may be warranted.              Susceptibility Comments     Staphylococcus aureus, MSSA      This isolate is presumed to be clindamycin resistant based on detection of inducible clindamycin resistance.  Clindamycin may still be effective in some patients.             Wound Culture - Wound, Knee, Right [[id number]]  (Abnormal) Collected:  03/03/18 0934    Specimen:  Wound from Knee, Right Updated:  03/05/18 0736     Wound Culture --      Light growth (2+) Staphylococcus  aureus, MSSA (A)     BETA LACTAMASE Positive     Gram Stain Result Rare (1+) WBCs seen      Rare (1+) Gram positive cocci    Narrative:       See tissue culture [id number] collected 3/3 for DONTE report.     Creatinine, Serum [[id number]]  (Abnormal) Collected:  03/05/18 0048    Specimen:  Blood Updated:  03/05/18 1135     Creatinine 1.00 mg/dL      eGFR Non African Amer 56 (L) mL/min/1.73     Anaerobic Culture - Tissue, Knee, Right [[id number]]  (Normal) Collected:  03/03/18 0904    Specimen:  Tissue from Knee, Right Updated:  03/06/18 0954     Culture No anaerobes isolated at 3 days    Anaerobic Culture - Wound, Knee, Right [[id number]]  (Normal) Collected:  03/03/18 0934    Specimen:  Wound from Knee, Right Updated:  03/06/18 0954     Culture No anaerobes isolated at 3 days    CBC & Differential [[id number]] Collected:  03/06/18 1305    Specimen:  Blood Updated:  03/06/18 1331    Narrative:       The following orders were created for panel order CBC & Differential.  Procedure                               Abnormality         Status                     ---------                               -----------         ------                     CBC Auto Differential[[id number]]        Abnormal            Final result                 Please view results for these tests on the individual orders.    CBC Auto Differential [[id number]]  (Abnormal) Collected:  03/06/18 1305    Specimen:  Blood Updated:  03/06/18 1331     WBC 8.09 10*3/mm3      RBC 3.55 (L) 10*6/mm3      Hemoglobin 9.8 (L) g/dL      Hematocrit 32.2 (L) %      MCV 90.7 fL      MCH 27.6 (L) pg      MCHC 30.4 (L) g/dL      RDW 16.0 (H) %      RDW-SD 53.6 fl      MPV 11.2 fL      Platelets 301 10*3/mm3      Neutrophil % 66.8 %      Lymphocyte % 15.2 %      Monocyte % 10.3 %      Eosinophil % 5.9 (H) %      Basophil % 0.7 %      Immature Grans % 1.1 %      Neutrophils, Absolute 5.40 10*3/mm3      Lymphocytes, Absolute 1.23 10*3/mm3      Monocytes, Absolute 0.83 10*3/mm3       Eosinophils, Absolute 0.48 10*3/mm3      Basophils, Absolute 0.06 10*3/mm3      Immature Grans, Absolute 0.09 (H) 10*3/mm3      nRBC 0.0 /100 WBC     Comprehensive Metabolic Panel [[id number]]  (Abnormal) Collected:  03/06/18 1305    Specimen:  Blood Updated:  03/06/18 1341     Glucose 98 mg/dL      BUN 20 mg/dL      Creatinine 0.90 mg/dL      Sodium 142 mmol/L      Potassium 4.2 mmol/L      Chloride 98 mmol/L      CO2 35.0 (H) mmol/L      Calcium 9.0 mg/dL      Total Protein 6.2 (L) g/dL      Albumin 3.00 (L) g/dL      ALT (SGPT) 38 U/L      AST (SGOT) 47 (H) U/L      Alkaline Phosphatase 121 (H) U/L      Total Bilirubin 1.5 (H) mg/dL      eGFR Non African Amer 63 mL/min/1.73      Globulin 3.2 gm/dL      A/G Ratio 0.9 (L) g/dL      BUN/Creatinine Ratio 22.2     Anion Gap 9.0 mmol/L     C-reactive Protein [[id number]]  (Abnormal) Collected:  03/06/18 1305    Specimen:  Blood Updated:  03/06/18 1352     C-Reactive Protein 19.92 (H) mg/dL     Sedimentation Rate [[id number]]  (Abnormal) Collected:  03/06/18 1305    Specimen:  Blood Updated:  03/06/18 1404     Sed Rate 69 (H) mm/hr     Troponin [[id number]]  (Normal) Collected:  03/06/18 1305    Specimen:  Blood Updated:  03/07/18 0731     Troponin I <0.012 ng/mL           No results found for: ECHOEFEST    Imaging Results (last 72 hours)     ** No results found for the last 72 hours. **        Assessment/Plan     Principal Problem:    Infection associated with internal knee prosthesis  Active Problems:    Atrial fibrillation, paroxysmal    MRSA    Obstructive sleep apnea    Restless leg syndrome    Total knee replacement status, right    Morbid (severe) obesity due to excess calories    Plan:  Will resume home dose of Flecainide- was resumed at half dose  Agree with IV Cardizem  Monitor Telemetry  [id number] score of 1- Aspirin 325 mg at home- would recommend resuming Aspirin once okay with surgery   In review it seems patient has had issues with bradycardia, was  initially evaluated for ablation per Dr. Wall but she was well controlled on Flecainide   Antibiotics per I&D and primary  Labs    Further orders per Dr. King    Thank you for asking us to follow this patient with you.     ERNESTO Lobo  03/07/18  10:28 AM

## 2018-03-07 NOTE — PROGRESS NOTES
This patient is progressing with her antimicrobial therapy for infected total knee arthroplasty.  She unfortunately has noted above has developed atrial fibrillation and is currently being treated by cardiology.    Plan continue with IV antibiotics

## 2018-03-07 NOTE — PROGRESS NOTES
"Infectious Diseases Progress Note    Patient:  Danisha Cannon  YOB: 1955  MRN: [id number]   Admit date: 3/3/2018   Admitting Physician: Amilcar Capellan MD  Primary Care Physician: Juan Carlos Sparks MD    Chief Complaint/Interval History: She developed some atrial arrhythmia.  Transferred to telemetry.  She is not having fever.  No nausea, diarrhea, rash.  No significant right knee discomfort at present.  She has a PICC line in place.   working on home IV antibiotic treatment.    Intake/Output Summary (Last 24 hours) at 03/07/18 1400  Last data filed at 03/07/18 1300   Gross per 24 hour   Intake              840 ml   Output              200 ml   Net              640 ml     Allergies:   Allergies   Allergen Reactions   • Codeine Dizziness and Nausea Only     Rapid heart rate, dizziness  NAUSEA   • Mobic [Meloxicam] Rash   • Morphine And Related Itching     Current Scheduled Medications:     acyclovir 400 mg Oral TID   DULoxetine 30 mg Oral Daily   flecainide 100 mg Oral Q12H   fluticasone 2 spray Each Nare Daily   gabapentin 600 mg Oral Q8H   levothyroxine 137 mcg Oral Daily   metoprolol tartrate 25 mg Oral Q12H   nafcillin 2 g Intravenous Q4H   nystatin 5 mL Mouth/Throat 4x Daily   pantoprazole 40 mg Oral Q AM   rOPINIRole 3 mg Oral Nightly   triamcinolone  Topical Q12H   venlafaxine  mg Oral Daily     Current PRN Medications:  docusate sodium  •  HYDROcodone-acetaminophen  •  LORazepam  •  magnesium hydroxide  •  naloxone  •  ondansetron **OR** ondansetron ODT **OR** ondansetron    Review of Systems see HPI    Vital Signs:  /72 (BP Location: Right arm, Patient Position: Lying)  Pulse 103  Temp 97.9 °F (36.6 °C) (Tympanic)   Resp 20  Ht 165 cm (64.96\")  Wt 126 kg (278 lb 12.8 oz)  SpO2 98%  BMI 46.45 kg/m2    Physical Exam  Vital signs reviewed.    Lungs clear without crackles  Heart without murmur  Skin without rash  Right knee without drainage  Line/IV (right arm " PICC) site: No erythema, warmth, induration, or tenderness.    Lab Results:  CBC:   Results from last 7 days  Lab Units 03/06/18  1305 03/02/18  1437   WBC 10*3/mm3 8.09 19.86*   HEMOGLOBIN g/dL 9.8* 10.5*   HEMATOCRIT % 32.2* 33.8*   PLATELETS 10*3/mm3 301 376     BMP:  Results from last 7 days  Lab Units 03/07/18  1200 03/06/18  1305 03/05/18  0048 03/02/18  1437   SODIUM mmol/L 142 142  --  140   POTASSIUM mmol/L 3.9 4.2  --  4.2   CHLORIDE mmol/L 98 98  --  98   CO2 mmol/L 36.0* 35.0*  --  33.0*   BUN mg/dL 16 20  --  25*   CREATININE mg/dL 0.86 0.90 1.00 1.33   GLUCOSE mg/dL 119* 98  --  126*   CALCIUM mg/dL 8.8 9.0  --  8.5   ALT (SGPT) U/L  --  38  --   --      Culture Results:   Wound Culture   Date Value Ref Range Status   03/03/2018 Light growth (2+) Staphylococcus aureus, MSSA (A)  Final   Susceptibility    Staphylococcus aureus, MSSA     DONTE     Clindamycin <=0.25 ug/ml Resistant     Erythromycin 4 ug/ml Resistant     Gentamicin <=0.5 ug/ml Susceptible     Inducible Clindamycin Resistance POS  Positive     Levofloxacin 1 ug/ml Susceptible 1     Oxacillin 0.5 ug/ml Susceptible     Penicillin G >=0.5 ug/ml Resistant     Tetracycline <=1 ug/ml Susceptible     Trimethoprim + Sulfamethoxazole <=10 ug/ml Susceptible     Vancomycin <=0.5 ug/ml Susceptible      Radiology: None  Additional Studies Reviewed: None    Impression:   Right knee prosthetic joint infection with MSSA.  She seems to be responding favorably to antibiotic treatment with nafcillin.  No signs of antibiotic or line-related problem.    Recommendations:   Continue treatment with nafcillin  Home when stable from orthopedic and cardiac standpoint  Home IV antibiotic orders written on 03/05/2018  See copy of home antibiotic orders below   working on arrangements    Home IV antibiotic orders:  1.  Diagnosis right knee prosthetic joint infection-she underwent irrigation, debridement, and poly-exchange on 3/3/18  2.  Orthopedic  surgeon-Mervin Capellan M.D.  3.  Microbiology-methicillin susceptible staph aureus  4.  IV access-PICC line  5.  Antibiotic orders:  Nafcillin 12 g IV daily given via a continuous infusion through 4/14/18 (that would be 6 weeks following irrigation and poly-exchange)  6.  Laboratory monitoring:  CMP, CBC, sedimentation rate, CRP every Monday  7.  Follow-up appointment 2 weeks  8.  Fax copy of these orders to [phone]    Chente Urena MD

## 2018-03-07 NOTE — PLAN OF CARE
Problem: Patient Care Overview (Adult)  Goal: Plan of Care Review  Outcome: Ongoing (interventions implemented as appropriate)   03/07/18 0502   Coping/Psychosocial Response Interventions   Plan Of Care Reviewed With patient   Patient Care Overview   Progress improving   Outcome Evaluation   Outcome Summary/Follow up Plan pt c/o moderate pain PRN pain medcaiont given as prescirbed. VSS, safety maintained, pt up x1 with a walker, and ambulating well with standy by assistance. PPP, Dressing C/D/I, will continue to monitor.       Problem: Perioperative Period (Adult)  Goal: Signs and Symptoms of Listed Potential Problems Will be Absent or Manageable (Perioperative Period)  Outcome: Outcome(s) achieved Date Met: 03/07/18      Problem: Fall Risk (Adult)  Goal: Absence of Falls  Outcome: Ongoing (interventions implemented as appropriate)      Problem: Infection, Risk/Actual (Adult)  Goal: Infection Prevention/Resolution  Outcome: Ongoing (interventions implemented as appropriate)

## 2018-03-07 NOTE — PROGRESS NOTES
Continued Stay Note   [facility]     Patient Name: Dainsha Cannon  MRN: [id number]  Today's Date: 3/7/2018    Admit Date: 3/3/2018          Discharge Plan       03/07/18 0955    Case Management/Social Work Plan    Plan Home IV Abx and [facility]    Additional Comments IV Abx have been approved and [facility] at [facility] has spoken to patient's  re copay and he agrees. IV abx to be delivered today. SW will follow for discharge orders and will provide to [facility] and [facility].               Discharge Codes     None            FRANKIE Hernandez

## 2018-03-07 NOTE — PLAN OF CARE
Problem: Patient Care Overview (Adult)  Goal: Plan of Care Review  Outcome: Ongoing (interventions implemented as appropriate)   03/07/18 0900   Coping/Psychosocial Response Interventions   Plan Of Care Reviewed With patient   Patient Care Overview   Progress progress towards functional goals is fair   Outcome Evaluation   Outcome Summary/Follow up Plan Pt went converted into afib in the 160s to 180s this morning. Cardizem drip started, cardiology consult requested. Pt had no c/o pain. Up x1 with walker. Drsg c/d/i. Is going to be transferred to , will continue to monitor.      Goal: Adult Individualization and Mutuality  Outcome: Ongoing (interventions implemented as appropriate)    Goal: Discharge Needs Assessment  Outcome: Ongoing (interventions implemented as appropriate)      Problem: Fall Risk (Adult)  Goal: Absence of Falls  Outcome: Ongoing (interventions implemented as appropriate)      Problem: Infection, Risk/Actual (Adult)  Goal: Infection Prevention/Resolution  Outcome: Ongoing (interventions implemented as appropriate)

## 2018-03-07 NOTE — PLAN OF CARE
Problem: Patient Care Overview (Adult)  Goal: Plan of Care Review  Outcome: Ongoing (interventions implemented as appropriate)   03/07/18 1550   Coping/Psychosocial Response Interventions   Plan Of Care Reviewed With patient   Patient Care Overview   Progress no change   Outcome Evaluation   Outcome Summary/Follow up Plan Patient still in Afib/Aflutter with a rate of 120-140's, MD aware. Cardizem gtt at 15mg/hr per orders and IV x1 Digoxin per orders. Will continue to monitor.        Problem: Fall Risk (Adult)  Goal: Absence of Falls  Outcome: Ongoing (interventions implemented as appropriate)      Problem: Infection, Risk/Actual (Adult)  Goal: Infection Prevention/Resolution  Outcome: Ongoing (interventions implemented as appropriate)

## 2018-03-08 VITALS
DIASTOLIC BLOOD PRESSURE: 55 MMHG | HEIGHT: 65 IN | RESPIRATION RATE: 18 BRPM | BODY MASS INDEX: 43.4 KG/M2 | SYSTOLIC BLOOD PRESSURE: 124 MMHG | OXYGEN SATURATION: 96 % | WEIGHT: 260.5 LBS | HEART RATE: 73 BPM | TEMPERATURE: 98 F

## 2018-03-08 PROBLEM — M00.9 INFECTION OF RIGHT KNEE: Status: ACTIVE | Noted: 2018-03-08

## 2018-03-08 LAB
ANION GAP SERPL CALCULATED.3IONS-SCNC: 8 MMOL/L (ref 4–13)
BACTERIA SPEC ANAEROBE CULT: NORMAL
BACTERIA SPEC ANAEROBE CULT: NORMAL
BUN BLD-MCNC: 17 MG/DL (ref 5–21)
BUN/CREAT SERPL: 23.3 (ref 7–25)
CALCIUM SPEC-SCNC: 8.5 MG/DL (ref 8.4–10.4)
CHLORIDE SERPL-SCNC: 100 MMOL/L (ref 98–110)
CO2 SERPL-SCNC: 32 MMOL/L (ref 24–31)
CREAT BLD-MCNC: 0.73 MG/DL (ref 0.5–1.4)
GFR SERPL CREATININE-BSD FRML MDRD: 80 ML/MIN/1.73
GLUCOSE BLD-MCNC: 171 MG/DL (ref 70–100)
MAGNESIUM SERPL-MCNC: 2.3 MG/DL (ref 1.4–2.2)
POTASSIUM BLD-SCNC: 3.4 MMOL/L (ref 3.5–5.3)
SODIUM BLD-SCNC: 140 MMOL/L (ref 135–145)

## 2018-03-08 PROCEDURE — 83735 ASSAY OF MAGNESIUM: CPT | Performed by: INTERNAL MEDICINE

## 2018-03-08 PROCEDURE — 80048 BASIC METABOLIC PNL TOTAL CA: CPT | Performed by: INTERNAL MEDICINE

## 2018-03-08 PROCEDURE — 93005 ELECTROCARDIOGRAM TRACING: CPT | Performed by: INTERNAL MEDICINE

## 2018-03-08 PROCEDURE — 99232 SBSQ HOSP IP/OBS MODERATE 35: CPT | Performed by: INTERNAL MEDICINE

## 2018-03-08 PROCEDURE — 93010 ELECTROCARDIOGRAM REPORT: CPT | Performed by: INTERNAL MEDICINE

## 2018-03-08 RX ORDER — HYDROCODONE BITARTRATE AND ACETAMINOPHEN 5; 325 MG/1; MG/1
1 TABLET ORAL EVERY 6 HOURS PRN
Qty: 30 TABLET | Refills: 0 | Status: ON HOLD | OUTPATIENT
Start: 2018-03-08 | End: 2018-03-13

## 2018-03-08 RX ADMIN — NAFCILLIN SODIUM 2 G: 2 INJECTION, POWDER, LYOPHILIZED, FOR SOLUTION INTRAMUSCULAR; INTRAVENOUS at 08:43

## 2018-03-08 RX ADMIN — METOPROLOL TARTRATE 25 MG: 25 TABLET, FILM COATED ORAL at 08:44

## 2018-03-08 RX ADMIN — GABAPENTIN 600 MG: 300 CAPSULE ORAL at 13:55

## 2018-03-08 RX ADMIN — FLUTICASONE PROPIONATE 2 SPRAY: 50 SPRAY, METERED NASAL at 08:45

## 2018-03-08 RX ADMIN — PANTOPRAZOLE SODIUM 40 MG: 40 TABLET, DELAYED RELEASE ORAL at 05:25

## 2018-03-08 RX ADMIN — ACYCLOVIR 400 MG: 200 CAPSULE ORAL at 08:44

## 2018-03-08 RX ADMIN — TRIAMCINOLONE ACETONIDE: 1 CREAM TOPICAL at 08:45

## 2018-03-08 RX ADMIN — NAFCILLIN SODIUM 2 G: 2 INJECTION, POWDER, LYOPHILIZED, FOR SOLUTION INTRAMUSCULAR; INTRAVENOUS at 01:23

## 2018-03-08 RX ADMIN — GABAPENTIN 600 MG: 300 CAPSULE ORAL at 05:25

## 2018-03-08 RX ADMIN — FLECAINIDE ACETATE 100 MG: 100 TABLET ORAL at 08:44

## 2018-03-08 RX ADMIN — NAFCILLIN SODIUM 2 G: 2 INJECTION, POWDER, LYOPHILIZED, FOR SOLUTION INTRAMUSCULAR; INTRAVENOUS at 12:48

## 2018-03-08 RX ADMIN — NYSTATIN 500000 UNITS: 500000 SUSPENSION ORAL at 08:43

## 2018-03-08 RX ADMIN — DULOXETINE HYDROCHLORIDE 30 MG: 30 CAPSULE, DELAYED RELEASE ORAL at 08:44

## 2018-03-08 RX ADMIN — VENLAFAXINE HYDROCHLORIDE 150 MG: 75 CAPSULE, EXTENDED RELEASE ORAL at 08:45

## 2018-03-08 RX ADMIN — NAFCILLIN SODIUM 2 G: 2 INJECTION, POWDER, LYOPHILIZED, FOR SOLUTION INTRAMUSCULAR; INTRAVENOUS at 05:25

## 2018-03-08 RX ADMIN — DILTIAZEM HYDROCHLORIDE 10 MG/HR: 5 INJECTION INTRAVENOUS at 01:23

## 2018-03-08 RX ADMIN — LEVOTHYROXINE SODIUM 137 MCG: 137 TABLET ORAL at 08:44

## 2018-03-08 RX ADMIN — HYDROCODONE BITARTRATE AND ACETAMINOPHEN 1 TABLET: 7.5; 325 TABLET ORAL at 10:46

## 2018-03-08 NOTE — PROGRESS NOTES
"Chief Complaint: Atrial fibrillation    S: No acute events overnight.  The patient did convert back into normal sinus rhythm overnight.  As she is feeling okay today.  There are plans in place for discharge today.    Medications: Reviewed    Review of Systems: All pertinent negative and positives as noted above.  Otherwise, all systems reviewed and found to be negative.    Telemetry: The patient did convert to normal sinus rhythm about 8 PM last night and has remained in normal sinus rhythm since that time.    O:  /55 (BP Location: Right arm, Patient Position: Lying)  Pulse 73  Temp 98 °F (36.7 °C) (Tympanic)   Resp 18  Ht 165 cm (64.96\")  Wt 118 kg (260 lb 8 oz)  SpO2 96%  BMI 43.4 kg/m2    Temp:  [97.1 °F (36.2 °C)-99.4 °F (37.4 °C)] 98 °F (36.7 °C)  Heart Rate:  [] 73  Resp:  [18-22] 18  BP: (104-140)/(42-73) 124/55    Gen: NAD  CV: RRR  Pulm: Decreased B  GI: obese, s, nt, nd, +bs  Ext: right knee incision c/d/i    Diagnostic Data:    Lab Results   Component Value Date    WBC 8.09 03/06/2018    HGB 9.8 (L) 03/06/2018    HCT 32.2 (L) 03/06/2018    MCV 90.7 03/06/2018     03/06/2018     Lab Results   Component Value Date    GLUCOSE 171 (H) 03/08/2018    CALCIUM 8.5 03/08/2018     03/08/2018    K 3.4 (L) 03/08/2018    CO2 32.0 (H) 03/08/2018     03/08/2018    BUN 17 03/08/2018    CREATININE 0.73 03/08/2018    EGFRIFNONA 80 03/08/2018    BCR 23.3 03/08/2018    ANIONGAP 8.0 03/08/2018     ASSESSMENT/PLAN:     1.  Paroxysmal atrial fibrillation  2.  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  Essential hypertension  4.  Acquired hypothyroidism with elevated TSH  5.  Morbid obesity due to excess calories, BMI 46  6.  Obstructive sleep apnea     - Back in NSR at this time.  - Discontinue Cardizem drip and continue home medications for afib rate and rhythm control - no change in dosing at discharge.  - We will defer addressing the patient's elevated TSH to the " primary service.  The patient does take Synthroid for a history of hypothyroidism.  - Would resume 325 mg daily of aspirin when okay with surgery.    - Follow-up with Dr. Lawrence at [facility] in place and I have recommended patient to keep this follow-up appointment with Dr. Lawrence.  - Dr. Capellan states likely discharge today.

## 2018-03-08 NOTE — PLAN OF CARE
Problem: Patient Care Overview (Adult)  Goal: Plan of Care Review  Outcome: Ongoing (interventions implemented as appropriate)   03/08/18 5670   Coping/Psychosocial Response Interventions   Plan Of Care Reviewed With patient   Patient Care Overview   Progress improving   Outcome Evaluation   Outcome Summary/Follow up Plan Patient is now sinus rhythm in the 60's. Cardizem gtt is weaned to 5mg/hr. Cont to monitor. Cont antibiotic therapy. Also continue 100mg of Tambocor.        Problem: Fall Risk (Adult)  Goal: Absence of Falls  Outcome: Ongoing (interventions implemented as appropriate)      Problem: Infection, Risk/Actual (Adult)  Goal: Infection Prevention/Resolution  Outcome: Ongoing (interventions implemented as appropriate)

## 2018-03-08 NOTE — PROGRESS NOTES
Continued Stay Note   [facility]     Patient Name: Danisha Cannon  MRN: [id number]  Today's Date: 3/8/2018    Admit Date: 3/3/2018          Discharge Plan       03/08/18 1521    Case Management/Social Work Plan    Plan [facility]    Final Note    Final Note Pt is being discharged home today.  Myla Ivan from [facility] is here and aware of d/c home today.  La from [facility] [phone] aware of pt's d/c and they will be delivering IV needs this evening.                Discharge Codes     None        Expected Discharge Date and Time     Expected Discharge Date Expected Discharge Time    Mar 8, 2018             JAHAIRA Jain

## 2018-03-08 NOTE — DISCHARGE SUMMARY
"[facility]  DISCHARGE SUMMARY       Date of Admission: 3/3/2018     Date of Discharge:  3/8/2018     Primary Care Physician: Juan Carlos Sparks MD    Presenting Problem/History of Present Illness:  Infection of right knee [M00.9]     Final Discharge Diagnoses:  Hospital Problem List     * (Principal)Infection associated with internal knee prosthesis    Atrial flutter, paroxysmal    HTN (hypertension) (Chronic)    Idiopathic hypotension (Chronic)    Idiopathic progressive neuropathy (Chronic)    MRSA carrier (Chronic)    Obstructive sleep apnea (Chronic)    Overview Signed 3/3/2018  9:48 AM by Amilcar Capellan MD     Overview:   Updating Deprecated Diagnoses         Restless leg syndrome (Chronic)    Total knee replacement status, right (Chronic)    Morbid (severe) obesity due to excess calories (Chronic)          Consults: #1 Dr. Shook #2 Dr. King #3 Dr Florentino    Procedures Performed: Debridement right knee and polyethylene bearing liner exchange    Pertinent Test Results: Cultures positive for methicillin sensitive Staphylococcus aureus    History of Present Illness on Day of Discharge: Stable on discharge     Hospital Course:  The patient is a 62 y.o. female who presented to [facility] with with an infection to her right knee 2 weeks postoperatively.  On the day of admission she was taken to surgery and debridement was carried out along with deep cultures and polyethylene liner bearing exchange.  Dr. Florentino was consulted for antibiotic management.  Her knee condition gradually improved.  Prior to discharge she developed atrial fibrillation she was treated by Dr. Phillips and is in sinus rhythm at this time.        /55 (BP Location: Right arm, Patient Position: Lying)  Pulse 73  Temp 98 °F (36.7 °C) (Tympanic)   Resp 18  Ht 165 cm (64.96\")  Wt 118 kg (260 lb 8 oz)  SpO2 96%  BMI 43.4 kg/m2      Discharge Medications:   Danisha Cannon   Home Medication " Instructions [facility]:[id number]    Printed on:03/08/18 2582   Medication Information                      alendronate (FOSAMAX) 70 MG tablet  Take 1 tablet by mouth Every 7 (Seven) Days.             aspirin 325 MG tablet  Take 1 tablet by mouth Daily.             DICLOFENAC PO  Take 50 mg by mouth 2 (Two) Times a Day.             DULoxetine (CYMBALTA) 30 MG capsule  Take 30 mg by mouth 2 (Two) Times a Day.             famciclovir (FAMVIR) 500 MG tablet  Take 500 mg by mouth 2 (Two) Times a Day.             flecainide (TAMBOCOR) 100 MG tablet  Take 100 mg by mouth 2 (Two) Times a Day.             fluconazole (DIFLUCAN) 150 MG tablet  Take 150 mg by mouth 1 (One) Time. Takes as needed             fluticasone (VERAMYST) 27.5 MCG/SPRAY nasal spray  2 sprays into each nostril Daily.             gabapentin (NEURONTIN) 600 MG tablet  Take 600 mg by mouth 3 (Three) Times a Day.             HYDROcodone-acetaminophen (NORCO) 5-325 MG per tablet  Take 1-2 tablets by mouth Every 4 (Four) Hours As Needed (Pain).             levothyroxine (SYNTHROID, LEVOTHROID) 137 MCG tablet  Take 137 mcg by mouth Daily.             LORazepam (ATIVAN) 1 MG tablet  Take 1 mg by mouth Every 8 (Eight) Hours As Needed for anxiety.             metoprolol tartrate (LOPRESSOR) 25 MG tablet  Take 25 mg by mouth 2 (Two) Times a Day. TAKES 1/2 TAB BID             pantoprazole (PROTONIX) 40 MG EC tablet  Take 40 mg by mouth 2 (Two) Times a Day.             rOPINIRole (REQUIP) 3 MG tablet  Take 3 mg by mouth Every Night. 2 TABS IN AM AND 3 TABS AT NIGHT             triamcinolone (KENALOG) 0.1 % cream  Apply  topically 2 (Two) Times a Day.             venlafaxine XR (EFFEXOR-XR) 150 MG 24 hr capsule  Take 150 mg by mouth Daily.                 Discharge Diet:  regular    Discharge Care Plan/Instructions:   #1 She can bear weight as tolerated      #2 she is to follow-up in the office in 5 days    #3 She will be maintained on home IV antibiotics    Follow-up  Appointments:   Monday, March 12      Amilcar Capellan MD  03/08/18  8:13 AM

## 2018-03-08 NOTE — PROGRESS NOTES
Family Medicine Progress Note    Patient:  Danisha Cannon  YOB: 1955    MRN: [id number]     Acct: [id number]     Admit date: 3/3/2018    Patient Seen, Chart, Consults notes, Labs, Radiology studies reviewed.    Subjective: Day 0 of stay with infected right knee status post arthroplasty and most recent (in last 24 hours) has had atrial fibrillation requiring moving patient to telemetry bed and Cardizem drip and cardiology consultation.  She is feeling much better this morning.  Her pain is controlled.  Denies any palpitations or shortness of breath.    Past, Family, Social History unchanged from admission.    Diet: Diet Regular    Medications:  Scheduled Meds:  acyclovir 400 mg Oral TID   digoxin 125 mcg Oral Daily   DULoxetine 30 mg Oral Daily   flecainide 100 mg Oral Q12H   fluticasone 2 spray Each Nare Daily   gabapentin 600 mg Oral Q8H   levothyroxine 137 mcg Oral Daily   metoprolol tartrate 25 mg Oral Q12H   nafcillin 2 g Intravenous Q4H   nystatin 5 mL Mouth/Throat 4x Daily   pantoprazole 40 mg Oral Q AM   rOPINIRole 3 mg Oral Nightly   triamcinolone  Topical Q12H   venlafaxine  mg Oral Daily     Continuous Infusions:  diltiaZEM 5-15 mg/hr Last Rate: 5 mg/hr (03/08/18 0634)   lactated ringers 20 mL/hr Last Rate: 20 mL/hr (03/05/18 0217)     PRN Meds:docusate sodium  •  HYDROcodone-acetaminophen  •  LORazepam  •  magnesium hydroxide  •  naloxone  •  ondansetron **OR** ondansetron ODT **OR** ondansetron    Objective:    Lab Results (last 24 hours)     Procedure Component Value Units Date/Time    Troponin [[id number]]  (Normal) Collected:  03/06/18 1305    Specimen:  Blood Updated:  03/07/18 0731     Troponin I <0.012 ng/mL     Anaerobic Culture - Tissue, Knee, Right [[id number]]  (Normal) Collected:  03/03/18 0904    Specimen:  Tissue from Knee, Right Updated:  03/07/18 1210     Culture No anaerobes isolated at 4 days    Anaerobic Culture - Wound, Knee, Right [[id number]]  (Normal) Collected:   "03/03/18 0934    Specimen:  Wound from Knee, Right Updated:  03/07/18 1210     Culture No anaerobes isolated at 4 days    Basic Metabolic Panel [[id number]]  (Abnormal) Collected:  03/07/18 1200    Specimen:  Blood Updated:  03/07/18 1219     Glucose 119 (H) mg/dL      BUN 16 mg/dL      Creatinine 0.86 mg/dL      Sodium 142 mmol/L      Potassium 3.9 mmol/L      Chloride 98 mmol/L      CO2 36.0 (H) mmol/L      Calcium 8.8 mg/dL      eGFR Non African Amer 66 mL/min/1.73      BUN/Creatinine Ratio 18.6     Anion Gap 8.0 mmol/L     Narrative:       GFR Normal >60  Chronic Kidney Disease <60  Kidney Failure <15    Magnesium [[id number]]  (Normal) Collected:  03/07/18 1200    Specimen:  Blood Updated:  03/07/18 1219     Magnesium 2.2 mg/dL     TSH [[id number]]  (Abnormal) Collected:  03/07/18 1200    Specimen:  Blood Updated:  03/07/18 1256     TSH 13.100 (H) mIU/mL     Basic Metabolic Panel [[id number]]  (Abnormal) Collected:  03/08/18 0520    Specimen:  Blood Updated:  03/08/18 0621     Glucose 171 (H) mg/dL      BUN 17 mg/dL      Creatinine 0.73 mg/dL      Sodium 140 mmol/L      Potassium 3.4 (L) mmol/L      Chloride 100 mmol/L      CO2 32.0 (H) mmol/L      Calcium 8.5 mg/dL      eGFR Non African Amer 80 mL/min/1.73      BUN/Creatinine Ratio 23.3     Anion Gap 8.0 mmol/L     Narrative:       GFR Normal >60  Chronic Kidney Disease <60  Kidney Failure <15    Magnesium [[id number]]  (Abnormal) Collected:  03/08/18 0520    Specimen:  Blood Updated:  03/08/18 0621     Magnesium 2.3 (H) mg/dL            Imaging Results (last 72 hours)     ** No results found for the last 72 hours. **           Physical Exam:    Vitals: /42 (BP Location: Right arm, Patient Position: Lying)  Pulse 71  Temp 97.1 °F (36.2 °C) (Temporal Artery )   Resp 20  Ht 165 cm (64.96\")  Wt 118 kg (260 lb 8 oz)  SpO2 92%  BMI 43.4 kg/m2  24 hour intake/output:  Intake/Output Summary (Last 24 hours) at 03/08/18 0644  Last data filed at 03/08/18 7297   " Gross per 24 hour   Intake              560 ml   Output                0 ml   Net              560 ml     Last 3 weights:  Wt Readings from Last 3 Encounters:   03/07/18 118 kg (260 lb 8 oz)   03/02/18 123 kg (270 lb 11.6 oz)   02/14/17 135 kg (298 lb)       General Appearance alert, appears stated age and cooperative  Head normocephalic, without obvious abnormality  Eyes conjunctivae and sclerae normal and no icterus  Neck suppple and trachea midline  Lungs clear to auscultation, respirations regular, respirations even and respirations unlabored  Heart regular rhythm & normal rate and normal S1, S2  Abdomen normal bowel sounds, soft non-tender and no guarding  Extremities edema trace lower bilateral  Skin Decreased erythema present distal to the knee  Neurologic Mental Status orientated to person, place, time and situation        Assessment:    Principal Problem:    Infection associated with internal knee prosthesis  Active Problems:    Atrial flutter, paroxysmal    HTN (hypertension)    Idiopathic hypotension    Idiopathic progressive neuropathy    MRSA carrier    Obstructive sleep apnea    Restless leg syndrome    Total knee replacement status, right    Morbid (severe) obesity due to excess calories          Plan:  I believe has converted with medications back to sinus rhythm.  Appreciate cardiology consultation and input.  Antibiotics per infectious disease instructions.  I anticipate that we will be able to get her off of the Cardizem drip back on her antiarrhythmics today.  As far as disposition, it is likely dependent on when antibiotics and other home plans can be made.      Electronically signed by Juan Carlos Sparks MD on 3/8/2018 at 6:44 AM

## 2018-03-09 NOTE — THERAPY DISCHARGE NOTE
Acute Care - Physical Therapy Discharge Summary  [facility]       Patient Name: Danisha Cannon  : 1955  MRN: [id number]    Today's Date: 3/9/2018  Onset of Illness/Injury or Date of Surgery Date: 18    Date of Referral to PT: 18  Referring Physician: Dr. Capellan      Admit Date: 3/3/2018      PT Recommendation and Plan    Visit Dx:    ICD-10-CM ICD-9-CM   1. Infection of right knee M00.9 711.96   2. Impaired mobility Z74.09 799.89             Outcome Measures       18 1100          How much help from another person do you currently need...    Turning from your back to your side while in flat bed without using bedrails? 4  -CW      Moving from lying on back to sitting on the side of a flat bed without bedrails? 4  -CW      Moving to and from a bed to a chair (including a wheelchair)? 3  -CW      Standing up from a chair using your arms (e.g., wheelchair, bedside chair)? 3  -CW      Climbing 3-5 steps with a railing? 3  -CW      To walk in hospital room? 3  -CW      AM-PAC 6 Clicks Score 20  -CW      Functional Assessment    Outcome Measure Options AM-PAC 6 Clicks Basic Mobility (PT)  -CW        User Key  (r) = Recorded By, (t) = Taken By, (c) = Cosigned By    Initials Name Provider Type    CW Yadira Flores PTA Physical Therapy Assistant                      IP PT Goals       18 0831 18 0958       Transfer Training 2 PT LTG    Transfer Training PT 2 LTG, Date Established  18  -RA     Transfer Training PT 2 LTG, Time to Achieve  by discharge  -RA     Transfer Training PT 2 LTG, Activity Type  bed to chair /chair to bed;sit to stand/stand to sit;toilet  -RA     Transfer Training PT 2 LTG, [place] Level  independent  -RA     Transfer Training PT 2 LTG, Assist Device  walker, rolling  -RA     Transfer Training PT 2 LTG, Date Goal Reviewed 18  -      Transfer Training PT 2 LTG, Outcome goal met  -      Gait Training PT LTG    Gait Training Goal PT LTG, Date  Established  03/04/18  -RA     Gait Training Goal PT LTG, [place] Level  conditional independence  -RA     Gait Training Goal PT LTG, Assist Device  walker, rolling  -RA     Gait Training Goal PT LTG, Distance to Achieve  200  -RA     Gait Training Goal PT LTG, Date Goal Reviewed 03/09/18  -      Gait Training Goal PT LTG, Outcome goal met  -        User Key  (r) = Recorded By, (t) = Taken By, (c) = Cosigned By    Initials Name Provider Type     Gypsy Underwood, PTA Physical Therapy Assistant    JUAN Mary, PT Physical Therapist              PT Discharge Summary  Reason for Discharge: Discharge from facility  Outcomes Achieved: Refer to plan of care for updates on goals achieved  Discharge Destination: Home      Gypsy Underwood PTA   3/9/2018

## 2018-03-09 NOTE — PLAN OF CARE
Problem: Inpatient Physical Therapy  Goal: Transfer Training Goal 2 LTG- PT  Outcome: Outcome(s) achieved Date Met: 03/09/18 03/04/18 0958 03/09/18 0831   Transfer Training 2 PT LTG   Transfer Training PT 2 LTG, Date Established 03/04/18 --    Transfer Training PT 2 LTG, Time to Achieve by discharge --    Transfer Training PT 2 LTG, Activity Type bed to chair /chair to bed;sit to stand/stand to sit;toilet --    Transfer Training PT 2 LTG, [place] Level independent --    Transfer Training PT 2 LTG, Assist Device walker, rolling --    Transfer Training PT 2 LTG, Date Goal Reviewed --  03/09/18   Transfer Training PT 2 LTG, Outcome --  goal met     Goal: Gait Training Goal LTG- PT  Outcome: Unable to achieve outcome(s) by discharge Date Met: 03/09/18

## 2018-03-12 RX ORDER — GABAPENTIN 600 MG/1
TABLET ORAL
Qty: 90 TABLET | Refills: 5 | Status: SHIPPED | OUTPATIENT
Start: 2018-03-12 | End: 2018-05-08

## 2018-03-13 ENCOUNTER — HOSPITAL ENCOUNTER (INPATIENT)
Facility: HOSPITAL | Age: 63
LOS: 6 days | Discharge: HOME | End: 2018-03-19
Attending: ORTHOPAEDIC SURGERY | Admitting: ORTHOPAEDIC SURGERY

## 2018-03-13 ENCOUNTER — ANESTHESIA EVENT (OUTPATIENT)
Dept: PERIOP | Facility: HOSPITAL | Age: 63
End: 2018-03-13

## 2018-03-13 ENCOUNTER — ANESTHESIA (OUTPATIENT)
Dept: PERIOP | Facility: HOSPITAL | Age: 63
End: 2018-03-13

## 2018-03-13 ENCOUNTER — APPOINTMENT (OUTPATIENT)
Dept: GENERAL RADIOLOGY | Facility: HOSPITAL | Age: 63
End: 2018-03-13

## 2018-03-13 DIAGNOSIS — Z74.09 IMPAIRED MOBILITY AND ADLS: ICD-10-CM

## 2018-03-13 DIAGNOSIS — M86.9 BONE INFECTION, KNEE (HCC): ICD-10-CM

## 2018-03-13 DIAGNOSIS — Z78.9 IMPAIRED MOBILITY AND ADLS: ICD-10-CM

## 2018-03-13 DIAGNOSIS — Z74.09 IMPAIRED MOBILITY: ICD-10-CM

## 2018-03-13 PROCEDURE — G0378 HOSPITAL OBSERVATION PER HR: HCPCS

## 2018-03-13 PROCEDURE — 25010000002 NEOSTIGMINE PER 0.5 MG: Performed by: NURSE ANESTHETIST, CERTIFIED REGISTERED

## 2018-03-13 PROCEDURE — C1776 JOINT DEVICE (IMPLANTABLE): HCPCS | Performed by: ORTHOPAEDIC SURGERY

## 2018-03-13 PROCEDURE — 25010000002 ONDANSETRON PER 1 MG: Performed by: NURSE ANESTHETIST, CERTIFIED REGISTERED

## 2018-03-13 PROCEDURE — 87070 CULTURE OTHR SPECIMN AEROBIC: CPT | Performed by: ORTHOPAEDIC SURGERY

## 2018-03-13 PROCEDURE — 25010000002 METOCLOPRAMIDE PER 10 MG: Performed by: ANESTHESIOLOGY

## 2018-03-13 PROCEDURE — 25010000002 TOBRAMYCIN PER 80 MG: Performed by: ORTHOPAEDIC SURGERY

## 2018-03-13 PROCEDURE — 87176 TISSUE HOMOGENIZATION CULTR: CPT | Performed by: ORTHOPAEDIC SURGERY

## 2018-03-13 PROCEDURE — 25010000002 FENTANYL CITRATE (PF) 250 MCG/5ML SOLUTION: Performed by: NURSE ANESTHETIST, CERTIFIED REGISTERED

## 2018-03-13 PROCEDURE — C1713 ANCHOR/SCREW BN/BN,TIS/BN: HCPCS | Performed by: ORTHOPAEDIC SURGERY

## 2018-03-13 PROCEDURE — 25010000002 FENTANYL CITRATE (PF) 100 MCG/2ML SOLUTION: Performed by: NURSE ANESTHETIST, CERTIFIED REGISTERED

## 2018-03-13 PROCEDURE — 25010000002 HYDROMORPHONE PER 4 MG: Performed by: ORTHOPAEDIC SURGERY

## 2018-03-13 PROCEDURE — 87075 CULTR BACTERIA EXCEPT BLOOD: CPT | Performed by: ORTHOPAEDIC SURGERY

## 2018-03-13 PROCEDURE — 94799 UNLISTED PULMONARY SVC/PX: CPT

## 2018-03-13 PROCEDURE — 87205 SMEAR GRAM STAIN: CPT | Performed by: ORTHOPAEDIC SURGERY

## 2018-03-13 PROCEDURE — 3E0U029 INTRODUCTION OF OTHER ANTI-INFECTIVE INTO JOINTS, OPEN APPROACH: ICD-10-PCS | Performed by: ORTHOPAEDIC SURGERY

## 2018-03-13 PROCEDURE — 25010000002 PROPOFOL 10 MG/ML EMULSION: Performed by: NURSE ANESTHETIST, CERTIFIED REGISTERED

## 2018-03-13 PROCEDURE — 73560 X-RAY EXAM OF KNEE 1 OR 2: CPT

## 2018-03-13 PROCEDURE — 25010000002 VANCOMYCIN PER 500 MG: Performed by: ORTHOPAEDIC SURGERY

## 2018-03-13 PROCEDURE — 25010000002 HYDROMORPHONE PER 4 MG: Performed by: ANESTHESIOLOGY

## 2018-03-13 PROCEDURE — 25010000002 ONDANSETRON PER 1 MG: Performed by: ANESTHESIOLOGY

## 2018-03-13 PROCEDURE — 25010000002 GENTAMICIN PER 80 MG: Performed by: ORTHOPAEDIC SURGERY

## 2018-03-13 PROCEDURE — 76000 FLUOROSCOPY <1 HR PHYS/QHP: CPT

## 2018-03-13 PROCEDURE — 25010000003 CEFAZOLIN PER 500 MG: Performed by: ORTHOPAEDIC SURGERY

## 2018-03-13 PROCEDURE — 0SPC0JZ REMOVAL OF SYNTHETIC SUBSTITUTE FROM RIGHT KNEE JOINT, OPEN APPROACH: ICD-10-PCS | Performed by: ORTHOPAEDIC SURGERY

## 2018-03-13 PROCEDURE — 0SRC0J9 REPLACEMENT OF RIGHT KNEE JOINT WITH SYNTHETIC SUBSTITUTE, CEMENTED, OPEN APPROACH: ICD-10-PCS | Performed by: ORTHOPAEDIC SURGERY

## 2018-03-13 PROCEDURE — 25010000002 SUCCINYLCHOLINE PER 20 MG: Performed by: NURSE ANESTHETIST, CERTIFIED REGISTERED

## 2018-03-13 DEVICE — SMARTSET HV HIGH VISCOSITY BONE CEMENT 40G
Type: IMPLANTABLE DEVICE | Status: FUNCTIONAL
Brand: SMARTSET

## 2018-03-13 DEVICE — SIGMA FEMORAL TC3 CEMENTED 4 RIGHT
Type: IMPLANTABLE DEVICE | Status: FUNCTIONAL
Brand: SIGMA

## 2018-03-13 DEVICE — SIGMA TIBIAL INSERT ROTATING PLATFORM TC3 SIZE 4 15MM GVF
Type: IMPLANTABLE DEVICE | Status: FUNCTIONAL
Brand: SIGMA

## 2018-03-13 DEVICE — STIMULAN® RAPID CURE PROVIDED STERILE FOR SINGLE PATIENT USE. STIMULAN® RAPID CURE CONTAINS CALCIUM SULFATE POWDER AND MIXING SOLUTION IN PRE-MEASURED QUANTITIES SO THAT WHEN MIXED TOGETHER IN A STERILE MIXING BOWL, THE RESULTANT PASTE IS TO BE DIGITALLY PACKED INTO OPEN BONE VOID/GAP TO SET INSITU OR PLACED INTO THE MOULD PROVIDED, THE MIXTURE SETS TO FORM BEADS. THE BIODEGRADABLE, RADIOPAQUE BEADS ARE RESORBED IN APPROXIMATELY 30 – 60 DAYS WHEN USED IN ACCORDANCE WITH THE DEVICE LABELLING. STIMULAN® RAPID CURE IS MANUFACTURED FROM SYNTHETIC IMPLANT GRADE CALCIUM SULFATE DIHYDRATE(CASO4.2H2O) THAT RESORBS AND IS REPLACED WITH BONE DURING THE HEALING PROCESS. ALSO, AS THE BONE VOID FILLER BEADS ARE BIODEGRADABLE AND BIOCOMPATIBLE, THEY MAY BE USED AT AN INFECTED SITE.
Type: IMPLANTABLE DEVICE | Status: FUNCTIONAL
Brand: STIMULAN® RAPID CURE

## 2018-03-13 RX ORDER — SODIUM CHLORIDE 0.9 % (FLUSH) 0.9 %
1-10 SYRINGE (ML) INJECTION AS NEEDED
Status: DISCONTINUED | OUTPATIENT
Start: 2018-03-13 | End: 2018-03-13 | Stop reason: HOSPADM

## 2018-03-13 RX ORDER — PROPOFOL 10 MG/ML
VIAL (ML) INTRAVENOUS AS NEEDED
Status: DISCONTINUED | OUTPATIENT
Start: 2018-03-13 | End: 2018-03-13 | Stop reason: SURG

## 2018-03-13 RX ORDER — VENLAFAXINE HYDROCHLORIDE 75 MG/1
150 CAPSULE, EXTENDED RELEASE ORAL DAILY
Status: DISCONTINUED | OUTPATIENT
Start: 2018-03-13 | End: 2018-03-19 | Stop reason: HOSPADM

## 2018-03-13 RX ORDER — SODIUM CHLORIDE, SODIUM LACTATE, POTASSIUM CHLORIDE, CALCIUM CHLORIDE 600; 310; 30; 20 MG/100ML; MG/100ML; MG/100ML; MG/100ML
1000 INJECTION, SOLUTION INTRAVENOUS CONTINUOUS
Status: DISCONTINUED | OUTPATIENT
Start: 2018-03-13 | End: 2018-03-13

## 2018-03-13 RX ORDER — TRIAMCINOLONE ACETONIDE 1 MG/G
CREAM TOPICAL EVERY 12 HOURS SCHEDULED
Status: DISCONTINUED | OUTPATIENT
Start: 2018-03-13 | End: 2018-03-19 | Stop reason: HOSPADM

## 2018-03-13 RX ORDER — HYDROMORPHONE HCL 110MG/55ML
0.5 PATIENT CONTROLLED ANALGESIA SYRINGE INTRAVENOUS
Status: DISCONTINUED | OUTPATIENT
Start: 2018-03-13 | End: 2018-03-15

## 2018-03-13 RX ORDER — SODIUM CHLORIDE 0.9 % (FLUSH) 0.9 %
3 SYRINGE (ML) INJECTION AS NEEDED
Status: DISCONTINUED | OUTPATIENT
Start: 2018-03-13 | End: 2018-03-13 | Stop reason: HOSPADM

## 2018-03-13 RX ORDER — ASPIRIN 325 MG
325 TABLET ORAL DAILY
Status: DISCONTINUED | OUTPATIENT
Start: 2018-03-13 | End: 2018-03-19 | Stop reason: HOSPADM

## 2018-03-13 RX ORDER — HYDROCODONE BITARTRATE AND ACETAMINOPHEN 7.5; 325 MG/1; MG/1
1 TABLET ORAL EVERY 4 HOURS PRN
Status: DISCONTINUED | OUTPATIENT
Start: 2018-03-13 | End: 2018-03-19 | Stop reason: HOSPADM

## 2018-03-13 RX ORDER — ROCURONIUM BROMIDE 10 MG/ML
INJECTION, SOLUTION INTRAVENOUS AS NEEDED
Status: DISCONTINUED | OUTPATIENT
Start: 2018-03-13 | End: 2018-03-13 | Stop reason: SURG

## 2018-03-13 RX ORDER — DOCUSATE SODIUM 100 MG/1
100 CAPSULE, LIQUID FILLED ORAL 2 TIMES DAILY PRN
Status: DISCONTINUED | OUTPATIENT
Start: 2018-03-13 | End: 2018-03-19 | Stop reason: HOSPADM

## 2018-03-13 RX ORDER — GLYCOPYRROLATE 0.2 MG/ML
INJECTION INTRAMUSCULAR; INTRAVENOUS AS NEEDED
Status: DISCONTINUED | OUTPATIENT
Start: 2018-03-13 | End: 2018-03-13 | Stop reason: SURG

## 2018-03-13 RX ORDER — LEVOTHYROXINE SODIUM 137 UG/1
137 TABLET ORAL
Status: DISCONTINUED | OUTPATIENT
Start: 2018-03-14 | End: 2018-03-19 | Stop reason: HOSPADM

## 2018-03-13 RX ORDER — NALOXONE HCL 0.4 MG/ML
0.1 VIAL (ML) INJECTION
Status: DISCONTINUED | OUTPATIENT
Start: 2018-03-13 | End: 2018-03-19 | Stop reason: HOSPADM

## 2018-03-13 RX ORDER — ROPINIROLE 1 MG/1
3 TABLET, FILM COATED ORAL NIGHTLY
Status: DISCONTINUED | OUTPATIENT
Start: 2018-03-13 | End: 2018-03-19 | Stop reason: HOSPADM

## 2018-03-13 RX ORDER — FLECAINIDE ACETATE 100 MG/1
100 TABLET ORAL EVERY 12 HOURS SCHEDULED
Status: DISCONTINUED | OUTPATIENT
Start: 2018-03-13 | End: 2018-03-19 | Stop reason: HOSPADM

## 2018-03-13 RX ORDER — SODIUM CHLORIDE, SODIUM LACTATE, POTASSIUM CHLORIDE, CALCIUM CHLORIDE 600; 310; 30; 20 MG/100ML; MG/100ML; MG/100ML; MG/100ML
50 INJECTION, SOLUTION INTRAVENOUS CONTINUOUS
Status: DISCONTINUED | OUTPATIENT
Start: 2018-03-13 | End: 2018-03-15

## 2018-03-13 RX ORDER — MIDAZOLAM HYDROCHLORIDE 1 MG/ML
1 INJECTION INTRAMUSCULAR; INTRAVENOUS
Status: DISCONTINUED | OUTPATIENT
Start: 2018-03-13 | End: 2018-03-13 | Stop reason: HOSPADM

## 2018-03-13 RX ORDER — FENTANYL CITRATE 50 UG/ML
INJECTION, SOLUTION INTRAMUSCULAR; INTRAVENOUS AS NEEDED
Status: DISCONTINUED | OUTPATIENT
Start: 2018-03-13 | End: 2018-03-13 | Stop reason: SURG

## 2018-03-13 RX ORDER — SODIUM CHLORIDE, SODIUM LACTATE, POTASSIUM CHLORIDE, CALCIUM CHLORIDE 600; 310; 30; 20 MG/100ML; MG/100ML; MG/100ML; MG/100ML
100 INJECTION, SOLUTION INTRAVENOUS CONTINUOUS
Status: DISCONTINUED | OUTPATIENT
Start: 2018-03-13 | End: 2018-03-13

## 2018-03-13 RX ORDER — ONDANSETRON 4 MG/1
4 TABLET, FILM COATED ORAL EVERY 6 HOURS PRN
Status: DISCONTINUED | OUTPATIENT
Start: 2018-03-13 | End: 2018-03-19 | Stop reason: HOSPADM

## 2018-03-13 RX ORDER — METOCLOPRAMIDE HYDROCHLORIDE 5 MG/ML
5 INJECTION INTRAMUSCULAR; INTRAVENOUS
Status: DISCONTINUED | OUTPATIENT
Start: 2018-03-13 | End: 2018-03-13 | Stop reason: HOSPADM

## 2018-03-13 RX ORDER — LABETALOL HYDROCHLORIDE 5 MG/ML
INJECTION, SOLUTION INTRAVENOUS AS NEEDED
Status: DISCONTINUED | OUTPATIENT
Start: 2018-03-13 | End: 2018-03-13 | Stop reason: SURG

## 2018-03-13 RX ORDER — GENTAMICIN SULFATE 40 MG/ML
INJECTION, SOLUTION INTRAMUSCULAR; INTRAVENOUS AS NEEDED
Status: DISCONTINUED | OUTPATIENT
Start: 2018-03-13 | End: 2018-03-13 | Stop reason: HOSPADM

## 2018-03-13 RX ORDER — SODIUM CHLORIDE, SODIUM LACTATE, POTASSIUM CHLORIDE, CALCIUM CHLORIDE 600; 310; 30; 20 MG/100ML; MG/100ML; MG/100ML; MG/100ML
100 INJECTION, SOLUTION INTRAVENOUS CONTINUOUS PRN
Status: DISCONTINUED | OUTPATIENT
Start: 2018-03-13 | End: 2018-03-13 | Stop reason: HOSPADM

## 2018-03-13 RX ORDER — ONDANSETRON 2 MG/ML
4 INJECTION INTRAMUSCULAR; INTRAVENOUS EVERY 6 HOURS PRN
Status: DISCONTINUED | OUTPATIENT
Start: 2018-03-13 | End: 2018-03-19 | Stop reason: HOSPADM

## 2018-03-13 RX ORDER — GABAPENTIN 300 MG/1
600 CAPSULE ORAL EVERY 8 HOURS SCHEDULED
Status: DISCONTINUED | OUTPATIENT
Start: 2018-03-13 | End: 2018-03-19 | Stop reason: HOSPADM

## 2018-03-13 RX ORDER — FLUTICASONE PROPIONATE 50 MCG
1 SPRAY, SUSPENSION (ML) NASAL DAILY
Status: DISCONTINUED | OUTPATIENT
Start: 2018-03-14 | End: 2018-03-19 | Stop reason: HOSPADM

## 2018-03-13 RX ORDER — MIDAZOLAM HYDROCHLORIDE 1 MG/ML
2 INJECTION INTRAMUSCULAR; INTRAVENOUS
Status: DISCONTINUED | OUTPATIENT
Start: 2018-03-13 | End: 2018-03-13 | Stop reason: HOSPADM

## 2018-03-13 RX ORDER — LIDOCAINE HYDROCHLORIDE 20 MG/ML
INJECTION, SOLUTION INFILTRATION; PERINEURAL AS NEEDED
Status: DISCONTINUED | OUTPATIENT
Start: 2018-03-13 | End: 2018-03-13 | Stop reason: SURG

## 2018-03-13 RX ORDER — LORAZEPAM 1 MG/1
1 TABLET ORAL EVERY 8 HOURS PRN
Status: DISCONTINUED | OUTPATIENT
Start: 2018-03-13 | End: 2018-03-19 | Stop reason: HOSPADM

## 2018-03-13 RX ORDER — ONDANSETRON 4 MG/1
4 TABLET, ORALLY DISINTEGRATING ORAL EVERY 6 HOURS PRN
Status: DISCONTINUED | OUTPATIENT
Start: 2018-03-13 | End: 2018-03-19 | Stop reason: HOSPADM

## 2018-03-13 RX ORDER — PANTOPRAZOLE SODIUM 40 MG/1
40 TABLET, DELAYED RELEASE ORAL
Status: DISCONTINUED | OUTPATIENT
Start: 2018-03-14 | End: 2018-03-19 | Stop reason: HOSPADM

## 2018-03-13 RX ORDER — TRANEXAMIC ACID 100 MG/ML
INJECTION, SOLUTION INTRAVENOUS AS NEEDED
Status: DISCONTINUED | OUTPATIENT
Start: 2018-03-13 | End: 2018-03-13 | Stop reason: SURG

## 2018-03-13 RX ORDER — IPRATROPIUM BROMIDE AND ALBUTEROL SULFATE 2.5; .5 MG/3ML; MG/3ML
3 SOLUTION RESPIRATORY (INHALATION) ONCE AS NEEDED
Status: DISCONTINUED | OUTPATIENT
Start: 2018-03-13 | End: 2018-03-13 | Stop reason: HOSPADM

## 2018-03-13 RX ORDER — NALOXONE HCL 0.4 MG/ML
0.04 VIAL (ML) INJECTION AS NEEDED
Status: DISCONTINUED | OUTPATIENT
Start: 2018-03-13 | End: 2018-03-13 | Stop reason: HOSPADM

## 2018-03-13 RX ORDER — LABETALOL HYDROCHLORIDE 5 MG/ML
5 INJECTION, SOLUTION INTRAVENOUS
Status: DISCONTINUED | OUTPATIENT
Start: 2018-03-13 | End: 2018-03-13 | Stop reason: HOSPADM

## 2018-03-13 RX ORDER — FAMOTIDINE 10 MG/ML
20 INJECTION, SOLUTION INTRAVENOUS
Status: DISCONTINUED | OUTPATIENT
Start: 2018-03-13 | End: 2018-03-13 | Stop reason: HOSPADM

## 2018-03-13 RX ORDER — HYDRALAZINE HYDROCHLORIDE 20 MG/ML
5 INJECTION INTRAMUSCULAR; INTRAVENOUS
Status: DISCONTINUED | OUTPATIENT
Start: 2018-03-13 | End: 2018-03-13 | Stop reason: HOSPADM

## 2018-03-13 RX ORDER — TOBRAMYCIN 1.2 G/30ML
INJECTION, POWDER, LYOPHILIZED, FOR SOLUTION INTRAVENOUS AS NEEDED
Status: DISCONTINUED | OUTPATIENT
Start: 2018-03-13 | End: 2018-03-13 | Stop reason: HOSPADM

## 2018-03-13 RX ORDER — MEPERIDINE HYDROCHLORIDE 25 MG/ML
12.5 INJECTION INTRAMUSCULAR; INTRAVENOUS; SUBCUTANEOUS
Status: DISCONTINUED | OUTPATIENT
Start: 2018-03-13 | End: 2018-03-13 | Stop reason: HOSPADM

## 2018-03-13 RX ORDER — ROPINIROLE 1 MG/1
2 TABLET, FILM COATED ORAL DAILY
Status: DISCONTINUED | OUTPATIENT
Start: 2018-03-14 | End: 2018-03-19 | Stop reason: HOSPADM

## 2018-03-13 RX ORDER — SUCCINYLCHOLINE CHLORIDE 20 MG/ML
INJECTION INTRAMUSCULAR; INTRAVENOUS AS NEEDED
Status: DISCONTINUED | OUTPATIENT
Start: 2018-03-13 | End: 2018-03-13 | Stop reason: SURG

## 2018-03-13 RX ORDER — METOCLOPRAMIDE HYDROCHLORIDE 5 MG/ML
10 INJECTION INTRAMUSCULAR; INTRAVENOUS ONCE AS NEEDED
Status: COMPLETED | OUTPATIENT
Start: 2018-03-13 | End: 2018-03-13

## 2018-03-13 RX ORDER — FLUMAZENIL 0.1 MG/ML
0.2 INJECTION INTRAVENOUS AS NEEDED
Status: DISCONTINUED | OUTPATIENT
Start: 2018-03-13 | End: 2018-03-13 | Stop reason: HOSPADM

## 2018-03-13 RX ORDER — CEFAZOLIN SODIUM 1 G/3ML
INJECTION, POWDER, FOR SOLUTION INTRAMUSCULAR; INTRAVENOUS AS NEEDED
Status: DISCONTINUED | OUTPATIENT
Start: 2018-03-13 | End: 2018-03-13 | Stop reason: HOSPADM

## 2018-03-13 RX ORDER — ONDANSETRON 2 MG/ML
INJECTION INTRAMUSCULAR; INTRAVENOUS AS NEEDED
Status: DISCONTINUED | OUTPATIENT
Start: 2018-03-13 | End: 2018-03-13 | Stop reason: SURG

## 2018-03-13 RX ORDER — VASOPRESSIN 20 U/ML
INJECTION PARENTERAL AS NEEDED
Status: DISCONTINUED | OUTPATIENT
Start: 2018-03-13 | End: 2018-03-13 | Stop reason: SURG

## 2018-03-13 RX ORDER — ONDANSETRON 2 MG/ML
4 INJECTION INTRAMUSCULAR; INTRAVENOUS AS NEEDED
Status: DISCONTINUED | OUTPATIENT
Start: 2018-03-13 | End: 2018-03-13 | Stop reason: HOSPADM

## 2018-03-13 RX ORDER — DULOXETIN HYDROCHLORIDE 30 MG/1
30 CAPSULE, DELAYED RELEASE ORAL NIGHTLY
Status: DISCONTINUED | OUTPATIENT
Start: 2018-03-13 | End: 2018-03-19 | Stop reason: HOSPADM

## 2018-03-13 RX ORDER — MAGNESIUM HYDROXIDE 1200 MG/15ML
LIQUID ORAL AS NEEDED
Status: DISCONTINUED | OUTPATIENT
Start: 2018-03-13 | End: 2018-03-13 | Stop reason: HOSPADM

## 2018-03-13 RX ADMIN — ONDANSETRON 4 MG: 2 INJECTION INTRAMUSCULAR; INTRAVENOUS at 15:06

## 2018-03-13 RX ADMIN — FENTANYL CITRATE 250 MCG: 50 INJECTION INTRAMUSCULAR; INTRAVENOUS at 11:52

## 2018-03-13 RX ADMIN — MEPERIDINE HYDROCHLORIDE 25 MG: 25 INJECTION INTRAMUSCULAR; INTRAVENOUS; SUBCUTANEOUS at 15:11

## 2018-03-13 RX ADMIN — HYDROCODONE BITARTRATE AND ACETAMINOPHEN 1 TABLET: 7.5; 325 TABLET ORAL at 18:37

## 2018-03-13 RX ADMIN — GLYCOPYRROLATE 0.4 MG: 0.2 INJECTION, SOLUTION INTRAMUSCULAR; INTRAVENOUS at 13:55

## 2018-03-13 RX ADMIN — ONDANSETRON HYDROCHLORIDE 4 MG: 2 SOLUTION INTRAMUSCULAR; INTRAVENOUS at 13:55

## 2018-03-13 RX ADMIN — LABETALOL HYDROCHLORIDE 10 MG: 5 INJECTION, SOLUTION INTRAVENOUS at 11:59

## 2018-03-13 RX ADMIN — SODIUM CHLORIDE, POTASSIUM CHLORIDE, SODIUM LACTATE AND CALCIUM CHLORIDE: 600; 310; 30; 20 INJECTION, SOLUTION INTRAVENOUS at 12:42

## 2018-03-13 RX ADMIN — FENTANYL CITRATE 150 MCG: 50 INJECTION INTRAMUSCULAR; INTRAVENOUS at 11:26

## 2018-03-13 RX ADMIN — VENLAFAXINE HYDROCHLORIDE 150 MG: 75 CAPSULE, EXTENDED RELEASE ORAL at 18:37

## 2018-03-13 RX ADMIN — FENTANYL CITRATE 100 MCG: 50 INJECTION INTRAMUSCULAR; INTRAVENOUS at 11:22

## 2018-03-13 RX ADMIN — HYDROMORPHONE HYDROCHLORIDE 0.5 MG: 2 INJECTION, SOLUTION INTRAMUSCULAR; INTRAVENOUS; SUBCUTANEOUS at 17:40

## 2018-03-13 RX ADMIN — GABAPENTIN 600 MG: 300 CAPSULE ORAL at 20:31

## 2018-03-13 RX ADMIN — ROCURONIUM BROMIDE 25 MG: 10 INJECTION INTRAVENOUS at 11:29

## 2018-03-13 RX ADMIN — HYDROMORPHONE HYDROCHLORIDE 1 MG: 1 INJECTION, SOLUTION INTRAMUSCULAR; INTRAVENOUS; SUBCUTANEOUS at 14:41

## 2018-03-13 RX ADMIN — HYDROMORPHONE HYDROCHLORIDE 1 MG: 1 INJECTION, SOLUTION INTRAMUSCULAR; INTRAVENOUS; SUBCUTANEOUS at 14:57

## 2018-03-13 RX ADMIN — SODIUM CHLORIDE, POTASSIUM CHLORIDE, SODIUM LACTATE AND CALCIUM CHLORIDE 1000 ML: 600; 310; 30; 20 INJECTION, SOLUTION INTRAVENOUS at 09:37

## 2018-03-13 RX ADMIN — VASOPRESSIN 1 UNITS: 20 INJECTION INTRAVENOUS at 13:05

## 2018-03-13 RX ADMIN — TRANEXAMIC ACID 1000 MG: 100 INJECTION, SOLUTION INTRAVENOUS at 11:16

## 2018-03-13 RX ADMIN — HYDROMORPHONE HYDROCHLORIDE 0.5 MG: 2 INJECTION, SOLUTION INTRAMUSCULAR; INTRAVENOUS; SUBCUTANEOUS at 20:03

## 2018-03-13 RX ADMIN — TRANEXAMIC ACID 1000 MG: 100 INJECTION, SOLUTION INTRAVENOUS at 13:55

## 2018-03-13 RX ADMIN — HYDROCODONE BITARTRATE AND ACETAMINOPHEN 1 TABLET: 7.5; 325 TABLET ORAL at 22:06

## 2018-03-13 RX ADMIN — Medication 3 MG: at 13:55

## 2018-03-13 RX ADMIN — HYDROMORPHONE HYDROCHLORIDE 0.5 MG: 2 INJECTION, SOLUTION INTRAMUSCULAR; INTRAVENOUS; SUBCUTANEOUS at 23:14

## 2018-03-13 RX ADMIN — FLECAINIDE ACETATE 100 MG: 100 TABLET ORAL at 20:31

## 2018-03-13 RX ADMIN — ROCURONIUM BROMIDE 20 MG: 10 INJECTION INTRAVENOUS at 11:52

## 2018-03-13 RX ADMIN — LABETALOL HYDROCHLORIDE 10 MG: 5 INJECTION, SOLUTION INTRAVENOUS at 12:06

## 2018-03-13 RX ADMIN — PROPOFOL 200 MG: 10 INJECTION, EMULSION INTRAVENOUS at 11:22

## 2018-03-13 RX ADMIN — ROPINIROLE HYDROCHLORIDE 3 MG: 1 TABLET, FILM COATED ORAL at 20:32

## 2018-03-13 RX ADMIN — Medication 2 G: at 11:21

## 2018-03-13 RX ADMIN — FENTANYL CITRATE 100 MCG: 50 INJECTION, SOLUTION INTRAMUSCULAR; INTRAVENOUS at 12:16

## 2018-03-13 RX ADMIN — SODIUM CHLORIDE, POTASSIUM CHLORIDE, SODIUM LACTATE AND CALCIUM CHLORIDE 50 ML/HR: 600; 310; 30; 20 INJECTION, SOLUTION INTRAVENOUS at 19:02

## 2018-03-13 RX ADMIN — SUCCINYLCHOLINE CHLORIDE 120 MG: 20 INJECTION, SOLUTION INTRAMUSCULAR; INTRAVENOUS at 11:22

## 2018-03-13 RX ADMIN — DULOXETINE HYDROCHLORIDE 30 MG: 30 CAPSULE, DELAYED RELEASE ORAL at 20:32

## 2018-03-13 RX ADMIN — ROCURONIUM BROMIDE 5 MG: 10 INJECTION INTRAVENOUS at 11:22

## 2018-03-13 RX ADMIN — FAMOTIDINE 20 MG: 10 INJECTION INTRAVENOUS at 10:30

## 2018-03-13 RX ADMIN — ROCURONIUM BROMIDE 15 MG: 10 INJECTION INTRAVENOUS at 12:45

## 2018-03-13 RX ADMIN — LIDOCAINE HYDROCHLORIDE 40 MG: 20 INJECTION, SOLUTION INFILTRATION; PERINEURAL at 11:22

## 2018-03-13 RX ADMIN — CEFAZOLIN 2 G: 1 INJECTION, POWDER, FOR SOLUTION INTRAVENOUS at 20:31

## 2018-03-13 RX ADMIN — MEPERIDINE HYDROCHLORIDE 25 MG: 25 INJECTION INTRAMUSCULAR; INTRAVENOUS; SUBCUTANEOUS at 15:06

## 2018-03-13 RX ADMIN — METOCLOPRAMIDE 10 MG: 5 INJECTION, SOLUTION INTRAMUSCULAR; INTRAVENOUS at 10:29

## 2018-03-13 RX ADMIN — ASPIRIN 325 MG: 325 TABLET, COATED ORAL at 18:37

## 2018-03-13 RX ADMIN — LABETALOL HYDROCHLORIDE 5 MG: 5 INJECTION, SOLUTION INTRAVENOUS at 12:16

## 2018-03-13 NOTE — ANESTHESIA PROCEDURE NOTES
Airway  Urgency: elective    Airway not difficult    General Information and Staff    Patient location during procedure: OR  CRNA: MARGARITA BELL    Indications and Patient Condition  Indications for airway management: airway protection    Preoxygenated: yes  MILS not maintained throughout  Mask difficulty assessment: 1 - vent by mask    Final Airway Details  Final airway type: endotracheal airway      Successful airway: ETT  Cuffed: yes   Successful intubation technique: direct laryngoscopy  Facilitating devices/methods: intubating stylet  Endotracheal tube insertion site: oral  Blade: Millan  Blade size: #2  ETT size: 7.5 mm  Cormack-Lehane Classification: grade I - full view of glottis  Placement verified by: chest auscultation and capnometry   Measured from: lips  ETT to lips (cm): 21  Number of attempts at approach: 1

## 2018-03-13 NOTE — ANESTHESIA POSTPROCEDURE EVALUATION
"Patient: Danisha Cannon    Procedure Summary     Date:  03/13/18 Room / Location:  [facility] OR  /  [facility] OR    Anesthesia Start:  1112 Anesthesia Stop:  1439    Procedure:  1.  EXPLANT TOTAL KNEE 2.  ANTIBOTIC SPACER KNEE (Right Knee) Diagnosis:  (T84.53XA)    Surgeon:  Amilcar Capellan MD Provider:  Darren Kirkland CRNA    Anesthesia Type:  general ASA Status:  3          Anesthesia Type: general  Last vitals  BP   122/50 (03/13/18 1609)   Temp   97.6 °F (36.4 °C) (03/13/18 1609)   Pulse   73 (03/13/18 1609)   Resp   18 (03/13/18 1609)     SpO2   92 % (03/13/18 1609)     Post Anesthesia Care and Evaluation    Patient location during evaluation: PACU  Patient participation: complete - patient participated  Level of consciousness: awake and alert  Pain management: adequate  Airway patency: patent  Anesthetic complications: No anesthetic complications  PONV Status: none  Cardiovascular status: acceptable and hemodynamically stable  Respiratory status: acceptable  Hydration status: acceptable    Comments: Blood pressure 122/50, pulse 73, temperature 97.6 °F (36.4 °C), temperature source Temporal Artery , resp. rate 18, height 165.5 cm (65.16\"), weight 119 kg (262 lb 5.6 oz), SpO2 92 %.    Patient discharged from PACU based upon Carla score. Please see RN notes for further details      "

## 2018-03-13 NOTE — OP NOTE
[facility]  OP NOTE    Patient Name: Danisha Cannon  Date of Procedure: 3/13/2018     PREOPERATIVE DIAGNOSIS: Infected right total knee replacement     POSTOPERATIVE DIAGNOSIS: Same.     PROCEDURE PERFORMED: #1 explantation of right total knee arthroplasty #2 reimplantation right total knee with antibiotic impregnated cement     SURGEON: Amilcar Capellan MD   Asst. : Immanuel Kaminski M.D.   ANESTHESIA: General.    PREPARATION: Routine.    STAFF: Circulator: Karla Caraballo RN  Scrub Person: Marcela Vail; Chandan Lamb; Shani Giang    ESTIMATED BLOOD LOSS: 200 mL    SPECIMENS: None    COMPLICATIONS: None    INDICATIONS: Danisha Cannon is a 62 y.o. female who is post a right total knee replacement and developed an infection.  She is post-debridement and polyethylene liner exchange only to have recurrent infection.  It was felt that removal of current implants and placement of antibiotic spacers were indicated at this time. The indications, risks, and possible complications of the procedure were explained to the patient, who voiced understanding and wished to proceed with surgery.Swyzzleuy implants were used utilizing a size #4 TC 3 femoral component and a size number 4:15 millimeter all polyethylene Sigma tibial component both cemented with antibiotic cement.  Antibiotic impregnated beads were placed deep and superficial also     PROCEDURE IN DETAIL: The patient was taken to the operating room and placed on the operating table in a supine position. After general anesthesia was obtained, the right lower extremity was prepped and draped in a sterile manner.  The right lower extremity was then prepped and draped in the usual fashion.  The extremity was then exsanguinated with an Esmarch bandage and a tourniquet was inflated to 300 mmHg a.  All previous sutures were removed and the arthrotomy was opened.  A thorough debridement with sharp dissection was carried out throughout the knee.  The patellar component was  then removed with an oscillating saw as was the femoral and tibial components after they were impacted free all antibiotic cement was removed with a combination of a Ronjair and the bur.  After completion of the debridement and thorough irrigation the tourniquet was deflated and the knee was wrapped.  A complete new prep and draped was carried out and a new set of instruments in the holding crew changed to sterile gowns and gloves.  The extremity was then reprepped and draped and exsanguinated and the tourniquet was then reinflated to 300 mmHg a.  The appropriate size components were then chosen.    Antibiotic impregnated cement was then mixed and placed on the tibia and the tibial component was pressed into position and any excess cement was then removed after the cement had hardened femoral component was similarly cemented and impacted into position.  After the cement hardened the knee was taken through a range of motion and the alignment appeared excellent.  A minimal lateral release was then carried out.  The knee was again irrigated.  Deep and superficial drains were placed.  Antibiotic beads were placed deep and superficially the deep fascia was closed with Vicryl suture followed by nylon in interrupted fashion on the skin. Sterile dressings were applied. The patient tolerated the procedure well. Sponge and needle counts were correct. The patient was then awakened and extubated in the operating room and taken to the recovery room in good condition.  Amilcar Capellan MD  Date: 3/13/2018 Time: 2:44 PM

## 2018-03-13 NOTE — ANESTHESIA PREPROCEDURE EVALUATION
Anesthesia Evaluation     Patient summary reviewed and Nursing notes reviewed   history of anesthetic complications: PONV  NPO Solid Status: > 8 hours  NPO Liquid Status: > 8 hours           Airway   Mallampati: I  TM distance: >3 FB  Neck ROM: full  no difficulty expected  Dental - normal exam     Pulmonary - normal exam    breath sounds clear to auscultation  (+) sleep apnea (Bipap with 02; with her in hospital),   (-) pneumonia  Cardiovascular - normal exam  Exercise tolerance: poor (<4 METS) ( Can walk with a walker, no chest pain with exertion. )    ECG reviewed  Patient on routine beta blocker and Beta blocker given within 24 hours of surgery  Rhythm: regular  Rate: normal    (+) dysrhythmias (currently in Sinus, SVT. Occurred after surgery.  ) Atrial Fib,     ROS comment: EKG - NSR with PACs,    Neuro/Psych  (+) numbness (bilateral peripheral neuropathy),     GI/Hepatic/Renal/Endo    (+) morbid obesity, GERD,  hypothyroidism,   (-) liver disease, no renal disease, diabetes    Musculoskeletal     Abdominal    Substance History - negative use     OB/GYN negative ob/gyn ROS         Other   (+) arthritis                       Anesthesia Plan    ASA 3     general     intravenous induction   Anesthetic plan and risks discussed with patient.

## 2018-03-14 LAB
HCT VFR BLD AUTO: 25.6 % (ref 37–47)
HGB BLD-MCNC: 8 G/DL (ref 12–16)

## 2018-03-14 PROCEDURE — 25010000002 HYDROMORPHONE PER 4 MG: Performed by: ORTHOPAEDIC SURGERY

## 2018-03-14 PROCEDURE — 85014 HEMATOCRIT: CPT | Performed by: ORTHOPAEDIC SURGERY

## 2018-03-14 PROCEDURE — 97165 OT EVAL LOW COMPLEX 30 MIN: CPT

## 2018-03-14 PROCEDURE — 97161 PT EVAL LOW COMPLEX 20 MIN: CPT

## 2018-03-14 PROCEDURE — 25010000002 TOBRAMYCIN PER 80 MG: Performed by: ORTHOPAEDIC SURGERY

## 2018-03-14 PROCEDURE — G8979 MOBILITY GOAL STATUS: HCPCS

## 2018-03-14 PROCEDURE — G8988 SELF CARE GOAL STATUS: HCPCS

## 2018-03-14 PROCEDURE — 97116 GAIT TRAINING THERAPY: CPT

## 2018-03-14 PROCEDURE — G8987 SELF CARE CURRENT STATUS: HCPCS

## 2018-03-14 PROCEDURE — G8978 MOBILITY CURRENT STATUS: HCPCS

## 2018-03-14 PROCEDURE — 25010000003 CEFAZOLIN PER 500 MG: Performed by: ORTHOPAEDIC SURGERY

## 2018-03-14 PROCEDURE — 85018 HEMOGLOBIN: CPT | Performed by: ORTHOPAEDIC SURGERY

## 2018-03-14 RX ORDER — TOBRAMYCIN SULFATE 40 MG/ML
VIAL (ML) INJECTION AS NEEDED
Status: DISCONTINUED | OUTPATIENT
Start: 2018-03-14 | End: 2018-03-14 | Stop reason: HOSPADM

## 2018-03-14 RX ADMIN — NAFCILLIN SODIUM 2 G: 2 INJECTION, POWDER, LYOPHILIZED, FOR SOLUTION INTRAMUSCULAR; INTRAVENOUS at 20:13

## 2018-03-14 RX ADMIN — GABAPENTIN 600 MG: 300 CAPSULE ORAL at 13:03

## 2018-03-14 RX ADMIN — TRIAMCINOLONE ACETONIDE: 1 CREAM TOPICAL at 08:28

## 2018-03-14 RX ADMIN — METOPROLOL TARTRATE 12.5 MG: 25 TABLET, FILM COATED ORAL at 08:29

## 2018-03-14 RX ADMIN — HYDROCODONE BITARTRATE AND ACETAMINOPHEN 1 TABLET: 7.5; 325 TABLET ORAL at 04:04

## 2018-03-14 RX ADMIN — CEFAZOLIN 2 G: 1 INJECTION, POWDER, FOR SOLUTION INTRAVENOUS at 04:04

## 2018-03-14 RX ADMIN — NAFCILLIN SODIUM 2 G: 2 INJECTION, POWDER, LYOPHILIZED, FOR SOLUTION INTRAMUSCULAR; INTRAVENOUS at 13:03

## 2018-03-14 RX ADMIN — FLECAINIDE ACETATE 100 MG: 100 TABLET ORAL at 08:29

## 2018-03-14 RX ADMIN — PANTOPRAZOLE SODIUM 40 MG: 40 TABLET, DELAYED RELEASE ORAL at 05:54

## 2018-03-14 RX ADMIN — LEVOTHYROXINE SODIUM 137 MCG: 137 TABLET ORAL at 05:54

## 2018-03-14 RX ADMIN — HYDROMORPHONE HYDROCHLORIDE 0.5 MG: 2 INJECTION, SOLUTION INTRAMUSCULAR; INTRAVENOUS; SUBCUTANEOUS at 11:31

## 2018-03-14 RX ADMIN — MAGNESIUM HYDROXIDE 10 ML: 2400 SUSPENSION ORAL at 20:16

## 2018-03-14 RX ADMIN — FLECAINIDE ACETATE 100 MG: 100 TABLET ORAL at 20:17

## 2018-03-14 RX ADMIN — GABAPENTIN 600 MG: 300 CAPSULE ORAL at 20:13

## 2018-03-14 RX ADMIN — ROPINIROLE HYDROCHLORIDE 2 MG: 1 TABLET, FILM COATED ORAL at 08:28

## 2018-03-14 RX ADMIN — ASPIRIN 325 MG: 325 TABLET, COATED ORAL at 08:29

## 2018-03-14 RX ADMIN — VENLAFAXINE HYDROCHLORIDE 150 MG: 75 CAPSULE, EXTENDED RELEASE ORAL at 08:29

## 2018-03-14 RX ADMIN — HYDROMORPHONE HYDROCHLORIDE 0.5 MG: 2 INJECTION, SOLUTION INTRAMUSCULAR; INTRAVENOUS; SUBCUTANEOUS at 02:47

## 2018-03-14 RX ADMIN — HYDROCODONE BITARTRATE AND ACETAMINOPHEN 1 TABLET: 7.5; 325 TABLET ORAL at 20:22

## 2018-03-14 RX ADMIN — GABAPENTIN 600 MG: 300 CAPSULE ORAL at 05:54

## 2018-03-14 RX ADMIN — NAFCILLIN SODIUM 2 G: 2 INJECTION, POWDER, LYOPHILIZED, FOR SOLUTION INTRAMUSCULAR; INTRAVENOUS at 16:41

## 2018-03-14 RX ADMIN — METOPROLOL TARTRATE 12.5 MG: 25 TABLET, FILM COATED ORAL at 20:15

## 2018-03-14 RX ADMIN — FLUTICASONE PROPIONATE 1 SPRAY: 50 SPRAY, METERED NASAL at 08:28

## 2018-03-14 RX ADMIN — HYDROCODONE BITARTRATE AND ACETAMINOPHEN 1 TABLET: 7.5; 325 TABLET ORAL at 16:41

## 2018-03-14 RX ADMIN — HYDROCODONE BITARTRATE AND ACETAMINOPHEN 1 TABLET: 7.5; 325 TABLET ORAL at 08:29

## 2018-03-14 RX ADMIN — DULOXETINE HYDROCHLORIDE 30 MG: 30 CAPSULE, DELAYED RELEASE ORAL at 20:14

## 2018-03-14 RX ADMIN — HYDROMORPHONE HYDROCHLORIDE 0.5 MG: 2 INJECTION, SOLUTION INTRAMUSCULAR; INTRAVENOUS; SUBCUTANEOUS at 05:54

## 2018-03-14 RX ADMIN — ROPINIROLE HYDROCHLORIDE 3 MG: 1 TABLET, FILM COATED ORAL at 20:14

## 2018-03-14 RX ADMIN — NAFCILLIN SODIUM 2 G: 2 INJECTION, POWDER, LYOPHILIZED, FOR SOLUTION INTRAMUSCULAR; INTRAVENOUS at 08:36

## 2018-03-14 NOTE — THERAPY EVALUATION
Acute Care - Occupational Therapy Initial Evaluation  [facility]     Patient Name: Danisha Cannon  : 1955  MRN: [id number]  Today's Date: 3/14/2018  Onset of Illness/Injury or Date of Surgery: 18  Date of Referral to OT: 18  Referring Physician: Dr. Capellan    Admit Date: 3/13/2018       ICD-10-CM ICD-9-CM   1. Bone infection, knee M86.9 730.96   2. Impaired mobility Z74. 799.89   3. Impaired mobility and ADLs Z74. 799.89     Patient Active Problem List   Diagnosis   • Abdominal adhesions   • Abnormal echocardiogram   • Abnormal Holter exam   • Atrial flutter, paroxysmal   • Bilateral edema of lower extremity   • Chest pain   • Cholecystitis with cholelithiasis   • Chronic right-sided low back pain with sciatica   • Class 3 obesity due to excess calories with serious comorbidity in adult   • Facet syndrome, lumbar   • Heart rate fast   • HTN (hypertension)   • Idiopathic chronic gout without tophus   • Idiopathic hypotension   • Idiopathic progressive neuropathy   • Insomnia   • Lumbar facet arthropathy   • Memory loss   • MRSA carrier   • Obstructive sleep apnea   • Paroxysmal a-fib   • Pilonidal cyst   • Primary osteoarthritis of right knee   • Recurrent ventral incisional hernia   • Restless leg syndrome   • Sciatica of right side   • Shortness of breath   • Sick sinus syndrome   • Sleep apnea   • Sleep apnea, obstructive   • Snoring   • Somnolence, daytime   • Spondylosis of lumbar region without myelopathy or radiculopathy   • Tear of medial meniscus of right knee, current   • Total knee replacement status, right   • Tremor   • Umbilical hernia   • Morbid (severe) obesity due to excess calories   • Infection associated with internal knee prosthesis   • Infection of right knee   • Bone infection, knee     Past Medical History:   Diagnosis Date   • Arthritis    • Atrial fibrillation    • Disease of thyroid gland    • GERD (gastroesophageal reflux disease)    • History of incision and drainage      Right knee   • Neuropathy, peripheral    • Pneumonia     RECENT DX PER FAMILY DOCTOR   • PONV (postoperative nausea and vomiting)    • Restless leg syndrome    • Sleep apnea with use of continuous positive airway pressure (CPAP)    • SVT (supraventricular tachycardia)      Past Surgical History:   Procedure Laterality Date   • BUNIONECTOMY Left     AND HAMMER TOE   • CARDIAC SURGERY      HEART CATH   • CATARACT EXTRACTION Right    • HERNIA REPAIR      UMBILICAL X3   • JOINT REPLACEMENT      RIGHT KNEE   • KNEE POLY INSERT EXCHANGE Right 3/3/2018    Procedure: IRRIGATION AND DEBRIDEMENT TOTAL KNEE & POLY EXCHANGE - RIGHT;  Surgeon: Amilcar Capellan MD;  Location:  [facility] OR;  Service:    • LAPAROSCOPIC CHOLECYSTECTOMY     • PILONIDAL CYSTECTOMY N/A 2/16/2017    Procedure: PILONIDAL CYSTECTOMY, EXCISION SEBACEOUS CYST - BACK;  Surgeon: Macrina Capellan MD;  Location:  [facility] OR;  Service:    • TOTAL KNEE  PROSTHESIS REMOVAL W/ SPACER INSERTION Right 3/13/2018    Procedure: 1.  EXPLANT TOTAL KNEE 2.  ANTIBOTIC SPACER KNEE;  Surgeon: Amilcar Capellan MD;  Location:  [facility] OR;  Service: Orthopedics   • TRUNK LESION/CYST EXCISION N/A 2/16/2017    Procedure: EXCISION SEBACEOUS CYST - BACK;  Surgeon: Macrina Capellan MD;  Location:  [facility] OR;  Service:           OT ASSESSMENT FLOWSHEET (last 72 hours)      Occupational Therapy Evaluation     Row Name 03/14/18 0758                   OT Evaluation Time/Intention    Subjective Information complains of;pain   peripheral neuropathy  -AC (r) MK (t) AC (c)        Document Type evaluation  -AC (r) MK (t) AC (c)        Mode of Treatment occupational therapy  -AC (r) MK (t) AC (c)           General Information    Patient Profile Reviewed? yes  -AC (r) MK (t) AC (c)        Onset of Illness/Injury or Date of Surgery 03/13/18  -AC (r) MK (t) AC (c)        Referring Physician Dr. Capellan  -LEESA (r) MK (t) AC (c)        Patient Observations alert;cooperative;agree to therapy  -AC (r)  MK (t) AC (c)        Patient/Family Observations daughter present, supportive, nurse at [facility]  -AC (r) MK (t) AC (c)        General Observations of Patient awake in bed, states pain improved with meds, 2 L supplemental O2 nc  -AC (r) MK (t) AC (c)        Prior Level of Function independent:;ADL's;dressing;bathing;all household mobility  -AC (r) MK (t) AC (c)        Equipment Currently Used at Home walker, rolling;shower chair;grab bar  -AC (r) MK (t) AC (c)        Pertinent History of Current Functional Problem s/p R total knee arthoplasty 3 weeks ago that got infected, pt now s/p debridement and placement of antibiotic spacer 3/13/18  -AC (r) MK (t) AC (c)        Existing Precautions/Restrictions fall   WBAT  -AC (r) MK (t) AC (c)        Equipment Issued to Patient walker, front wheeled  -AC (r) MK (t) AC (c)        Risks Reviewed patient and family:;LOB;nausea/vomiting;dizziness;increased discomfort;lines disloged  -AC (r) MK (t) AC (c)        Benefits Reviewed patient and family:;increase independence;improve function;increase strength;increase balance;decrease pain;increase knowledge  -AC (r) MK (t) AC (c)        Barriers to Rehab environmental barriers;previous functional deficit  -AC (r) MK (t) AC (c)           Relationship/Environment    Primary Source of Support/Comfort spouse;child(tram)   spouse available 24/7, daughter available as needed  -AC (r) MK (t) AC (c)        Lives With spouse  -AC (r) MK (t) AC (c)        Family Caregiver if Needed spouse;child(tram), adult  -AC (r) MK (t) AC (c)           Resource/Environmental Concerns    Current Living Arrangements home/apartment/condo  -AC (r) MK (t) AC (c)        Resource/Environmental Concerns environmental  -AC (r) MK (t) AC (c)        Environment Concerns --   pt has many cats and poor emotional support at home  -AC (r) MK (t) AC (c)           Cognitive Assessment/Interventions    Additional Documentation Cognitive Assessment/Intervention (Group)  -AC (r) MK  (t) AC (c)           Cognitive Assessment/Intervention- PT/OT    Affect/Mental Status (Cognitive) anxious  -AC (r) MK (t) AC (c)        Orientation Status (Cognition) oriented x 4  -AC (r) MK (t) AC (c)        Follows Commands (Cognition) WFL  -AC (r) MK (t) AC (c)        Cognitive Function (Cognitive) WFL  -AC (r) MK (t) AC (c)        Personal Safety Interventions fall prevention program maintained;gait belt;nonskid shoes/slippers when out of bed;supervised activity  -AC (r) MK (t) AC (c)           Safety Issues, Functional Mobility    Impairments Affecting Function (Mobility) balance;pain;strength;endurance/activity tolerance  -AC (r) MK (t) AC (c)           Mobility Assessment/Treatment    Extremity Weight-bearing Status right lower extremity  -AC (r) MK (t) AC (c)        Right Lower Extremity (Weight-bearing Status) weight-bearing as tolerated (WBAT)  -AC (r) MK (t) AC (c)           Bed Mobility Assessment/Treatment    Bed Mobility Assessment/Treatment supine-sit  -AC (r) MK (t) AC (c)        Supine-Sit [place] (Bed Mobility) minimum assist (75% patient effort);verbal cues;moderate assist (50% patient effort)  -AC (r) MK (t) AC (c)        Bed Mobility, Safety Issues decreased use of legs for bridging/pushing;decreased use of arms for pushing/pulling  -AC (r) MK (t) AC (c)        Assistive Device (Bed Mobility) bed rails;head of bed elevated   bed raised  -AC (r) MK (t) AC (c)        Comment (Bed Mobility) assist with RLE  -AC (r) MK (t) AC (c)           Functional Mobility    Functional Mobility- Ind. Level minimum assist (75% patient effort);contact guard assist  -AC (r) MK (t) AC (c)        Functional Mobility- Device rolling walker  -AC (r) MK (t) AC (c)        Functional Mobility- Safety Issues weight-shifting ability decreased  -AC (r) MK (t) AC (c)        Functional Mobility- Comment amb across hallway and back to chair, fatigues with increased distance  -AC (r) MK (t) AC (c)           Transfer  Assessment/Treatment    Transfer Assessment/Treatment stand-sit transfer;sit-stand transfer  -AC (r) MK (t) AC (c)        Sit-Stand [place] (Transfers) minimum assist (75% patient effort)   elevated bed surface  -AC (r) MK (t) AC (c)        Stand-Sit [place] (Transfers) contact guard;verbal cues  -AC (r) MK (t) AC (c)           Sit-Stand Transfer    Assistive Device (Sit-Stand Transfers) walker, platform  -AC (r) MK (t) AC (c)           Stand-Sit Transfer    Assistive Device (Stand-Sit Transfers) walker, front-wheeled  -AC (r) MK (t) AC (c)           ADL Assessment/Intervention    BADL Assessment/Intervention lower body dressing  -AC (r) MK (t) AC (c)           Lower Body Dressing Assessment/Training    Lower Body Dressing [place] Level socks;maximum assist (25% patient effort)  -AC (r) MK (t) AC (c)        Lower Body Dressing Position long sitting  -AC (r) MK (t) AC (c)           BADL Safety/Performance    Impairments, BADL Safety/Performance balance;pain;strength  -AC (r) MK (t) AC (c)           General ROM    GENERAL ROM COMMENTS AROM WFL BUE  -AC (r) MK (t) AC (c)           General Assessment (Manual Muscle Testing)    Comment, General Manual Muscle Testing (MMT) Assessment obs functional 4/5 with RW  -AC (r) MK (t) AC (c)           Motor Assessment/Interventions    Additional Documentation Balance (Group)  -AC (r) MK (t) AC (c)           Balance    Balance static sitting balance;static standing balance;dynamic sitting balance;dynamic standing balance  -AC (r) MK (t) AC (c)           Static Sitting Balance    Level of [place] (Unsupported Sitting, Static Balance) supervision  -AC (r) MK (t) AC (c)        Sitting Position (Unsupported Sitting, Static Balance) sitting on edge of bed  -AC (r) MK (t) AC (c)           Dynamic Sitting Balance    Level of [place], Reaches Outside Midline (Sitting, Dynamic Balance) supervision  -AC (r) MK (t) AC (c)        Sitting Position, Reaches Outside  Midline (Sitting, Dynamic Balance) sitting on edge of bed  -AC (r) MK (t) AC (c)           Static Standing Balance    Level of [place] (Supported Standing, Static Balance) contact guard assist  -AC (r) MK (t) AC (c)        Assistive Device Utilized (Supported Standing, Static Balance) rolling walker  -AC (r) MK (t) AC (c)           Dynamic Standing Balance    Level of [place], Reaches Outside Midline (Standing, Dynamic Balance) contact guard assist;minimal assist, 75% patient effort  -AC (r) MK (t) AC (c)        Comment, Reaches Outside Midline (Standing, Dynamic Balance) use of RW and vc  -AC (r) MK (t) AC (c)           Sensory Assessment/Intervention    Sensory General Assessment no sensation deficits identified  -AC (r) MK (t) AC (c)           Positioning and Restraints    Pre-Treatment Position in bed  -AC (r) MK (t) AC (c)        Post Treatment Position chair  -AC (r) MK (t) AC (c)        In Chair sitting;call light within reach;encouraged to call for assist;with family/caregiver;notified nsg  -AC (r) MK (t) AC (c)           Pain Assessment    Additional Documentation Pain Scale: Numbers Pre/Post-Treatment (Group)  -AC (r) MK (t) AC (c)           Pain Scale: Numbers Pre/Post-Treatment    Pain Scale: Numbers, Pretreatment 2/10  -AC (r) MK (t) AC (c)        Pain Location - Side Right  -AC (r) MK (t) AC (c)        Pain Location knee  -AC (r) MK (t) AC (c)        Pain Intervention(s) Medication (See MAR);Repositioned;Ambulation/increased activity  -AC (r) MK (t) AC (c)           Wound 03/13/18 1411 knee incision    Wound - Properties Group Date first assessed: 03/13/18  -SH Time first assessed: 1411  -SH Location: knee  -SH Type: incision  -SH       Coping    Observed Emotional State calm;cooperative  -AC (r) MK (t) AC (c)           Plan of Care Review    Plan of Care Reviewed With patient;daughter  -AC (r) MK (t) AC (c)           Clinical Impression (OT)    Date of Referral to OT 03/13/18  -AC (r) MK (t)  AC (c)        OT Diagnosis decreased ADl  -AC (r) MK (t) AC (c)        Prognosis (OT Eval) good  -AC (r) MK (t) AC (c)        Patient/Family Goals Statement (OT Eval) return home  -AC (r) MK (t) AC (c)        Criteria for Skilled Therapeutic Interventions Met (OT Eval) yes;treatment indicated  -AC (r) MK (t) AC (c)        Rehab Potential (OT Eval) good, to achieve stated therapy goals  -AC (r) MK (t) AC (c)        Therapy Frequency (OT Eval) 5 times/wk  -AC (r) MK (t) AC (c)        Predicted Duration of Therapy Intervention (OT Eval) until d/c  -AC (r) MK (t) AC (c)        Care Plan Review (OT) evaluation/treatment results reviewed;care plan/treatment goals reviewed;risks/benefits reviewed;current/potential barriers reviewed;patient/other agree to care plan  -AC (r) MK (t) AC (c)        Care Plan Review, Other Participant (OT Eval) daughter  -AC (r) MK (t) AC (c)        Anticipated Discharge Disposition (OT) home or self care  -AC (r) MK (t) AC (c)           Planned OT Interventions    Planned Therapy Interventions (OT Eval) activity tolerance training;BADL retraining;functional balance retraining;occupation/activity based interventions;patient/caregiver education/training;strengthening exercise;transfer/mobility retraining  -AC (r) MK (t) AC (c)           OT Goals    Transfer Goal Selection (OT) transfer, OT goal 1  -AC (r) MK (t) AC (c)        Bathing Goal Selection (OT) --  -AC (r) MK (t) AC (c)        Dressing Goal Selection (OT) dressing, OT goal 1  -AC (r) MK (t) AC (c)        Toileting Goal Selection (OT) toileting, OT goal 1  -AC (r) MK (t) AC (c)           Transfer Goal 1 (OT)    Activity/Assistive Device (Transfer Goal 1, OT) shower chair;toilet  -AC (r) MK (t) AC (c)        [place] Level/Cues Needed (Transfer Goal 1, OT) minimum assist (75% or more patient effort)  -AC (r) MK (t) AC (c)        Time Frame (Transfer Goal 1, OT) long term goal (LTG);by discharge  -AC (r) THOMAS (t) LEESA (c)        Barriers  (Transfers Goal 1, OT) strength, balance  -AC (r) MK (t) AC (c)        Progress/Outcome (Transfer Goal 1, OT) goal ongoing  -AC (r) MK (t) AC (c)           Dressing Goal 1 (OT)    Activity/Assistive Device (Dressing Goal 1, OT) lower body dressing  -AC (r) MK (t) AC (c)        [place]/Cues Needed (Dressing Goal 1, OT) minimum assist (75% or more patient effort)  -AC (r) MK (t) AC (c)        Time Frame (Dressing Goal 1, OT) long term goal (LTG);by discharge  -AC (r) MK (t) AC (c)        Barriers (Dressing Goal 1, OT) ROM, strength  -AC (r) MK (t) AC (c)        Progress/Outcome (Dressing Goal 1, OT) goal ongoing  -AC (r) MK (t) AC (c)           Toileting Goal 1 (OT)    Activity/Device (Toileting Goal 1, OT) toileting skills, all;commode  -AC (r) MK (t) AC (c)        [place] Level/Cues Needed (Toileting Goal 1, OT) minimum assist (75% or more patient effort)  -AC (r) MK (t) AC (c)        Time Frame (Toileting Goal 1, OT) long term goal (LTG);by discharge  -AC (r) MK (t) AC (c)        Barriers (Toileting Goal 1, OT) ROM, strength  -AC (r) MK (t) AC (c)        Progress/Outcome (Toileting Goal 1, OT) goal ongoing  -AC (r) MK (t) AC (c)           Living Environment    Home Accessibility --   walk in shower  -AC (r) MK (t) AC (c)          User Key  (r) = Recorded By, (t) = Taken By, (c) = Cosigned By    Initials Name Effective Dates    LEESA Wheatley, OTTOBIN/L 06/22/15 -     RAFFAELE Caraballo RN 08/02/16 -     THOMAS Helm, OT Student 03/07/18 -                  OT Recommendation and Plan  Rehab Outcome Summary/Treatment Plan  Anticipated Discharge Disposition (OT): home or self care  Planned Therapy Interventions (OT Eval): activity tolerance training, BADL retraining, functional balance retraining, occupation/activity based interventions, patient/caregiver education/training, strengthening exercise, transfer/mobility retraining  Therapy Frequency (OT Eval): 5 times/wk  Plan of Care Review  Plan of Care Reviewed  With: patient  Plan of Care Reviewed With: patient  Outcome Summary: OT eval completed. Pt awake and alert in fowlers, daughter present for eval. States pain has improved with medication rates at 2/10 improvement from 6/10. AROM WFL BUE. Sit<>stand minAx2, RW, and vc. Amb across prabhakar with vc for advancing RW and taking steps. Pt requires skilled OT to address deficits related to transfer and LB ADL for safety and ind. with return home. OT recommends d/c home with assist.          Outcome Measures     Row Name 03/14/18 1400 03/14/18 1000          How much help from another person do you currently need...    Turning from your back to your side while in flat bed without using bedrails?  -- 3  -LH     Moving from lying on back to sitting on the side of a flat bed without bedrails?  -- 2  -LH     Moving to and from a bed to a chair (including a wheelchair)?  -- 3  -LH     Standing up from a chair using your arms (e.g., wheelchair, bedside chair)?  -- 3  -LH     Climbing 3-5 steps with a railing?  -- 2  -LH     To walk in hospital room?  -- 3  -LH     AM-PAC 6 Clicks Score  -- 16  -LH        How much help from another is currently needed...    Putting on and taking off regular lower body clothing? 2  -AC (r) MK (t) AC (c)  --     Bathing (including washing, rinsing, and drying) 2  -AC (r) MK (t) AC (c)  --     Toileting (which includes using toilet bed pan or urinal) 2  -AC (r) MK (t) AC (c)  --     Putting on and taking off regular upper body clothing 3  -AC (r) MK (t) AC (c)  --     Taking care of personal grooming (such as brushing teeth) 3  -AC (r) MK (t) AC (c)  --     Eating meals 4  -AC (r) MK (t) AC (c)  --     Score 16  -AC (r) MK (t)  --        Functional Assessment    Outcome Measure Options AM-PAC 6 Clicks Daily Activity (OT)  -AC (r) MK (t) AC (c) AM-PAC 6 Clicks Basic Mobility (PT)  -       User Key  (r) = Recorded By, (t) = Taken By, (c) = Cosigned By    Initials Name Provider Type    LEESA Wheatley,  OTR/L Occupational Therapist    LH Jovi Whittington, PT Physical Therapist    THOMAS Helm, OT Student OT Student          Time Calculation:   OT Start Time: 0926  OT Stop Time: 1028  OT Time Calculation (min): 62 min    Therapy Charges for Today     Code Description Service Date Service Provider Modifiers Qty    73068694768 HC OT EVAL LOW COMPLEXITY 4 3/14/2018 Carito Helm, OT Student GO, KX 1          OT G-codes  OT Professional Judgement Used?: Yes  OT Functional Scales Options: AM-PAC 6 Clicks Daily Activity (OT)  Score: 16  Functional Limitation: Self care  Self Care Current Status (): At least 40 percent but less than 60 percent impaired, limited or restricted  Self Care Goal Status (): At least 20 percent but less than 40 percent impaired, limited or restricted    Carito Helm OT Student  3/14/2018

## 2018-03-14 NOTE — CONSULTS
INFECTIOUS DISEASES CONSULT NOTE    Patient:  Danisha Cannon 62 y.o. female  ROOM # 352/1  YOB: 1955  MRN: [id number]  [facility]:  [id number]  Admit date: 3/13/2018   Admitting Physician: Amilcar Capellan MD  Primary Care Physician: Juan Carlos Sparks MD  REFERRING PROVIDER: Amilcar Capellan MD    Reason for Consultation: Recommendations for antibiotic management.    History of Present Illness/Chief Complaint: 62-year-old woman.  Known to me from consultation last hospitalization.  She had what was felt to be early right knee prosthetic joint infection with methicillin susceptible Staphylococcus aureus.  She had undergone irrigation, exchange of the poly-material.  She was home on IV antibiotic treatment.  She had developed some ongoing drainage.  She was taken back to surgery today.  She underwent joint explant and placement of antibiotic spacer.  She is having discomfort postoperatively.  Daughter at bedside.  Patient had expressed concern that there was a 12-18 hour delay in her getting IV antibiotics at the time of discharge from her last hospitalization to when she received her next dose at home.  She was concerned that the antibiotic delay caused her difficulties.  I explained I did not feel the antibiotic delay created the problem.  In prior discussions with Dr. Capellan she had significant purulence when he first irrigated and exchanged the poly-material.  Chances of success with a 1 stage approach with her ongoing drainage were going to be minimal.  Two-stage approach appears to be the best way for her to get over this infection and eventually get a new joint.    Current Scheduled Medications:     aspirin 325 mg Oral Daily   ceFAZolin 2 g Intravenous Q8H   DULoxetine 30 mg Oral Nightly   flecainide 100 mg Oral Q12H   fluticasone 1 spray Each Nare Daily   gabapentin 600 mg Oral Q8H   levothyroxine 137 mcg Oral Q AM   metoprolol tartrate 12.5 mg Oral Q12H   pantoprazole 40 mg Oral Q AM   rOPINIRole  "2 mg Oral Daily   rOPINIRole 3 mg Oral Nightly   triamcinolone  Topical Q12H   venlafaxine  mg Oral Daily     Current PRN Medications:  docusate sodium  •  HYDROcodone-acetaminophen  •  HYDROmorphone **AND** naloxone  •  LORazepam  •  magnesium hydroxide  •  naloxone  •  ondansetron **OR** ondansetron ODT **OR** ondansetron    Allergies:    Allergies   Allergen Reactions   • Codeine Dizziness and Nausea Only     Rapid heart rate, dizziness  NAUSEA   • Mobic [Meloxicam] Rash   • Morphine And Related Itching     Past Medical History: History of pilonidal/buttock cyst.  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  Thyroid dysfunction.  Gastroesophageal reflux disease.  Sleep apnea.  Degenerative arthritis.  Sleep apnea.    Past Surgical History: Umbilical hernia repair.  Laparoscopic cholecystectomy.  Bunionectomy.  Trigger finger release.  Right knee arthroplasty.  Irrigation/debridement/poly-exchange right knee.  Right knee explant with placement of antibiotic spacer.    Social History: No tobacco, alcohol, or illicit drug use.    Family History: Noncontributory    Review of Systems  No nausea or vomiting  No rash or skin itching  No pain at her line site  No diarrhea  No cardiopulmonary complaints  She was anxious and tearful  Please see HPI for additional pertinent positives negatives from her complete review of systems    Vital Signs:  /50 (BP Location: Left arm, Patient Position: Lying)   Pulse 90   Temp 99.3 °F (37.4 °C) (Oral)   Resp 18   Ht 165.1 cm (65\")   Wt 119 kg (263 lb)   LMP  (LMP Unknown) Comment: post menapause  SpO2 97%   BMI 43.77 kg/m²  Temp (24hrs), Av.2 °F (36.8 °C), Min:97.2 °F (36.2 °C), Max:99.4 °F (37.4 °C)    Physical Exam  Vital signs reviewed.  Sclera nonicteric  Lungs clear without crackles  Heart regular rhythm without murmur  Abdomen soft nontender without hepatosplenomegaly  Skin without drug rash  PICC without erythema  Right knee with operative dressing " "in place  Right foot intact sensation and capillary refill  Line/IV site: No erythema or tenderness.    Lab Results:  CBC:   Results from last 7 days  Lab Units 03/14/18  0505   HEMOGLOBIN g/dL 8.0*   HEMATOCRIT % 25.6*     CMP:   Results from last 7 days  Lab Units 03/08/18  0520 03/07/18  1200   SODIUM mmol/L 140 142   POTASSIUM mmol/L 3.4* 3.9   CHLORIDE mmol/L 100 98   CO2 mmol/L 32.0* 36.0*   BUN mg/dL 17 16   CREATININE mg/dL 0.73 0.86   CALCIUM mg/dL 8.5 8.8   GLUCOSE mg/dL 171* 119*     Operative culture last admission:  Culture           Heavy growth (4+) Staphylococcus aureus, MSSA        BETA LACTAMASE Positive       Stain     Many (4+) WBCs seen      Moderate (3+) Gram positive cocci, intracellular and extracellular            Resulting Agency: [facility]   Susceptibility      Staphylococcus aureus, MSSA     DONTE     Clindamycin <=0.25 ug/ml\"><=0.25 ug/ml Resistant     Erythromycin 4 ug/ml Resistant     Gentamicin <=0.5 ug/ml\"><=0.5 ug/ml Susceptible     Inducible Clindamycin Resistance POS  Positive     Levofloxacin 1 ug/ml Susceptible 1     Oxacillin 0.5 ug/ml Susceptible     Penicillin G >=0.5 ug/ml Resistant     Tetracycline <=1 ug/ml\"><=1 ug/ml Susceptible     Trimethoprim + Sulfamethoxazole <=10 ug/ml\"><=10 ug/ml Susceptible     Vancomycin <=0.5 ug/ml\"><=0.5 ug/ml Susceptible         Impression:   1.  Right knee prosthetic joint infection.  Methicillin susceptible Staphylococcus aureus recovered from prior culture.  She has undergone joint explant and placement of antibiotic impregnated spacer.  2.  History of pilonidal or buttock cyst-resolved  3.  History of sleep apnea  4.  History atrial fibrillation    Recommendations:    Suggest antibiotic treatment with nafcillin 2 g IV every 4 hours or cefazolin 2 g IV every 8 hours.  Suspect methicillin susceptible staph aureus as the pathogen  We will make antibiotic adjustments if necessary based on operative cultures from her current admission  Will " continue to follow    Chente Urena MD  03/14/18  7:50 AM

## 2018-03-14 NOTE — PLAN OF CARE
Problem: Patient Care Overview  Goal: Plan of Care Review  Outcome: Ongoing (interventions implemented as appropriate)   03/14/18 1128   Coping/Psychosocial   Plan of Care Reviewed With patient;daughter   OTHER   Outcome Summary PT IE complete. Pt ambulated ~ 20 feet cg assist w/ RW. Min/mod assist to sit EOB. Pt to receive skilled PT to address functional mobility deficits. Recommend home w/ assist at D/C. Thank you for referral.

## 2018-03-14 NOTE — PLAN OF CARE
Problem: Patient Care Overview  Goal: Plan of Care Review  Outcome: Ongoing (interventions implemented as appropriate)   03/13/18 1600   Coping/Psychosocial   Plan of Care Reviewed With patient   Plan of Care Review   Progress no change   OTHER   Outcome Summary Pt c/o pain. IV and po pain med given as ordered. Drsg c/d/i. Eulalio drains in place and draining. Safety maintained will continue to monitor.      Goal: Individualization and Mutuality  Outcome: Ongoing (interventions implemented as appropriate)    Goal: Discharge Needs Assessment  Outcome: Ongoing (interventions implemented as appropriate)    Goal: Interprofessional Rounds/Family Conf  Outcome: Ongoing (interventions implemented as appropriate)      Problem: Skin Injury Risk (Adult)  Goal: Identify Related Risk Factors and Signs and Symptoms  Outcome: Ongoing (interventions implemented as appropriate)    Goal: Skin Health and Integrity  Outcome: Ongoing (interventions implemented as appropriate)      Problem: Infection, Risk/Actual (Adult)  Goal: Identify Related Risk Factors and Signs and Symptoms  Outcome: Ongoing (interventions implemented as appropriate)    Goal: Infection Prevention/Resolution  Outcome: Ongoing (interventions implemented as appropriate)      Problem: Pain, Acute (Adult)  Goal: Identify Related Risk Factors and Signs and Symptoms  Outcome: Ongoing (interventions implemented as appropriate)    Goal: Acceptable Pain Control/Comfort Level  Outcome: Ongoing (interventions implemented as appropriate)

## 2018-03-14 NOTE — THERAPY TREATMENT NOTE
Acute Care - Physical Therapy Treatment Note   [place]     Patient Name: Danisha Cannon  : 1955  MRN: [id number]  Today's Date: 3/14/2018  Onset of Illness/Injury or Date of Surgery: 18  Date of Referral to PT: 18  Referring Physician: Dr. Capellan    Admit Date: 3/13/2018    Visit Dx:    ICD-10-CM ICD-9-CM   1. Bone infection, knee M86.9 730.96   2. Impaired mobility Z74.09 799.89     Patient Active Problem List   Diagnosis   • Abdominal adhesions   • Abnormal echocardiogram   • Abnormal Holter exam   • Atrial flutter, paroxysmal   • Bilateral edema of lower extremity   • Chest pain   • Cholecystitis with cholelithiasis   • Chronic right-sided low back pain with sciatica   • Class 3 obesity due to excess calories with serious comorbidity in adult   • Facet syndrome, lumbar   • Heart rate fast   • HTN (hypertension)   • Idiopathic chronic gout without tophus   • Idiopathic hypotension   • Idiopathic progressive neuropathy   • Insomnia   • Lumbar facet arthropathy   • Memory loss   • MRSA carrier   • Obstructive sleep apnea   • Paroxysmal a-fib   • Pilonidal cyst   • Primary osteoarthritis of right knee   • Recurrent ventral incisional hernia   • Restless leg syndrome   • Sciatica of right side   • Shortness of breath   • Sick sinus syndrome   • Sleep apnea   • Sleep apnea, obstructive   • Snoring   • Somnolence, daytime   • Spondylosis of lumbar region without myelopathy or radiculopathy   • Tear of medial meniscus of right knee, current   • Total knee replacement status, right   • Tremor   • Umbilical hernia   • Morbid (severe) obesity due to excess calories   • Infection associated with internal knee prosthesis   • Infection of right knee   • Bone infection, knee       Therapy Treatment    Therapy Treatment / Health Promotion    Treatment Time/Intention  Discipline: physical therapy assistant (Gypsy Underwood PTA)  Document Type: therapy note (daily note) (Gypsy Underwood PTA)  Subjective  Information: complains of, weakness, pain, swelling (Gypsy M Underwood, PTA)  Mode of Treatment: physical therapy (co evaluation) (Jovi Whittington, PT)  Therapy Frequency (PT Clinical Impression): 2 times/day (Jovi Whittington PT)  Existing Precautions/Restrictions: fall (Gypsy M Underwood, PTA)  Equipment Issued to Patient: walker, front wheeled (Jovi Whittington PT)  Plan of Care Review  Plan of Care Reviewed With: patient, daughter (Jovi Whittington PT)    Vitals/Pain/Safety  Pain Assessment  Additional Documentation: Pain Scale: Numbers Pre/Post-Treatment (Group) (Gypsy M Underwood, PTA)  Pain Scale: Numbers Pre/Post-Treatment  Pain Scale: Numbers, Pretreatment: 2/10 (Gypsy M Underwood, PTA)  Pain Location - Side: Right (Gypsy M Underwood, PTA)  Pain Location: knee (Gypsy M Underwood, PTA)  Pain Intervention(s): Medication (See MAR), Repositioned, Ambulation/increased activity (Gypsy M Underwood, PTA)  Pain Scale: Word Pre/Post-Treatment  Pain Location - Side: Right (Gypsy M Underwood, PTA)  Pain Location: knee (Gypsy M Underwood, PTA)  Pain Intervention(s): Medication (See MAR), Repositioned, Ambulation/increased activity (Gypsy M Underwood, PTA)  Pain Scale: FACES Pre/Post-Treatment  Pain Location - Side: Right (Gypsy M Underwood, PTA)  Pain Location: knee (Gypsy M Underwood, PTA)  Pain Intervention(s): Medication (See MAR), Repositioned, Ambulation/increased activity (Gypsy M Underwood, PTA)  Safety Issues, Functional Mobility  Impairments Affecting Function (Mobility): balance, endurance/activity tolerance (Jovi Whittington, PT)  Positioning and Restraints  Pre-Treatment Position: bathroom (Gypsy M Kj, PTA)  Post Treatment Position: bed (Gypsy M Kj, PTA)  In Bed: fowlers, call light within reach, encouraged to call for assist, with family/caregiver (Gypsy Underwood, PTA)  In Chair: sitting, call light within reach, encouraged to call for assist, with family/caregiver, notified nsg (Jovi Whittington, PT)    Mobility,ADL,Motor, Modality  Mobility  Assessment/Intervention  Extremity Weight-bearing Status: right lower extremity (Jovi Whittington, PT)  Right Lower Extremity (Weight-bearing Status): weight-bearing as tolerated (WBAT) (Jovi Whittington, PT)  Bed Mobility Assessment/Treatment  Bed Mobility Assessment/Treatment: sit-supine (Gypsy Underwood, PTA)  Supine-Sit [place] (Bed Mobility):  (in BR) (Gypsy Underwood, PTA)  Sit-Supine [place] (Bed Mobility): minimum assist (75% patient effort), verbal cues (Gypsy Underwood, PTA)  Bed Mobility, Safety Issues: decreased use of legs for bridging/pushing (Jovi Whittington, PT)  Assistive Device (Bed Mobility): bed rails, head of bed elevated (Jovi Whittington, PT)  Transfer Assessment/Treatment  Transfer Assessment/Treatment: sit-stand transfer, stand-sit transfer (Jovi Whittington, PT)  Sit-Stand Transfer  Sit-Stand [place] (Transfers): contact guard, minimum assist (75% patient effort), verbal cues (Gypsy Underwood, PTA)  Assistive Device (Sit-Stand Transfers): walker, front-wheeled (Gypsy Underwood, PTA)  Stand-Sit Transfer  Stand-Sit [place] (Transfers): contact guard, verbal cues (Gypsy Underwood, PTA)  Gait/Stairs Assessment/Training  [place] Level (Gait): contact guard, verbal cues (Gypsy Underwood, PTA)  Assistive Device (Gait): walker, front-wheeled (Gypsy Underwood, PTA)  Distance in Feet (Gait): 50 (Gypsy Underwood PTA)  Deviations/Abnormal Patterns (Gait): antalgic, dami decreased (Gypsy Underwood PTA)  Right Sided Gait Deviations:  (decrease wt bearing RLE) (Jovi Whittington, PT)                 ROM/MMT  General ROM  GENERAL ROM COMMENTS: WFL, R knee n/a (Jovi Whittington, PT)  General Assessment (Manual Muscle Testing)  Comment, General Manual Muscle Testing (MMT) Assessment: grossly 4/5, R knee n/a (Jovi Whittington, PT)       Sensory, Edema, Orthotics          Cognition, Communication, Swallow  Cognitive Assessment/Intervention- PT/OT  Orientation Status (Cognition): oriented x 4 (Jovi Whittington, PT)  Follows  Commands (Cognition): WFL (Jovi Whittington, PT)  Cognitive Function (Cognitive): WFL (Jovi Whittington, PT)  Personal Safety Interventions: fall prevention program maintained, gait belt, nonskid shoes/slippers when out of bed (Jovi Whittington, PT)  Clinical Impression  Equipment Issued to Patient: walker, front wheeled (Jovi Whittington, PT)    Outcome Summary  Outcome Summary/Treatment Plan (PT)  Anticipated Discharge Disposition (PT): home or self care (Jovi Whittington, PT)            PT Rehab Goals     Row Name 03/14/18 0925             Bed Mobility Goal 1 (PT)    Activity/Assistive Device (Bed Mobility Goal 1, PT) bed mobility activities, all  -LH      [place] Level/Cues Needed (Bed Mobility Goal 1, PT) independent  -LH      Time Frame (Bed Mobility Goal 1, PT) by discharge  -      Progress/Outcomes (Bed Mobility Goal 1, PT) goal ongoing  -         Transfer Goal 1 (PT)    Activity/Assistive Device (Transfer Goal 1, PT) sit-to-stand/stand-to-sit;bed-to-chair/chair-to-bed  -LH      [place] Level/Cues Needed (Transfer Goal 1, PT) independent  -LH      Time Frame (Transfer Goal 1, PT) by discharge  -      Progress/Outcome (Transfer Goal 1, PT) goal ongoing  -         Gait Training Goal 1 (PT)    Activity/Assistive Device (Gait Training Goal 1, PT) gait (walking locomotion);assistive device use  -LH      [place] Level (Gait Training Goal 1, PT) independent  -LH      Distance (Gait Goal 1, PT) 120  -LH      Time Frame (Gait Training Goal 1, PT) by discharge  -      Progress/Outcome (Gait Training Goal 1, PT) goal ongoing  -        User Key  (r) = Recorded By, (t) = Taken By, (c) = Cosigned By    Initials Name Provider Type     Jovi Whittington, PT Physical Therapist          Physical Therapy Education     Title: PT OT SLP Therapies (Done)     Topic: Physical Therapy (Done)     Point: Mobility training (Done)    Learning Progress Summary     Learner Status Readiness Method Response Comment Documented by     Patient Done Acceptance E,D VU,DU benefits of PT and POC, call for assist w/ mobility  03/14/18 1128    Family Done Acceptance E,D VU,DU benefits of PT and POC, call for assist w/ mobility  03/14/18 1128          Point: Precautions (Done)    Learning Progress Summary     Learner Status Readiness Method Response Comment Documented by    Patient Done Acceptance E,D VU,DU benefits of PT and POC, call for assist w/ mobility  03/14/18 1128    Family Done Acceptance E,D VU,DU benefits of PT and POC, call for assist w/ mobility  03/14/18 1128                      User Key     Initials Effective Dates Name Provider Type [facility] 08/02/16 -  Jovi Whittington PT Physical Therapist PT                    PT Recommendation and Plan                Outcome Measures     Row Name 03/14/18 1000             How much help from another person do you currently need...    Turning from your back to your side while in flat bed without using bedrails? 3  -LH      Moving from lying on back to sitting on the side of a flat bed without bedrails? 2  -LH      Moving to and from a bed to a chair (including a wheelchair)? 3  -LH      Standing up from a chair using your arms (e.g., wheelchair, bedside chair)? 3  -LH      Climbing 3-5 steps with a railing? 2  -LH      To walk in hospital room? 3  -      AM-PAC 6 Clicks Score 16  -         Functional Assessment    Outcome Measure Options AM-PAC 6 Clicks Basic Mobility (PT)  -        User Key  (r) = Recorded By, (t) = Taken By, (c) = Cosigned By    Initials Name Provider Type     Jovi Whittington PT Physical Therapist           Time Calculation:         PT Charges     Row Name 03/14/18 1441 03/14/18 1127          Time Calculation    Start Time 1415  - 0925  -     Stop Time 1438  -AH 1020  -     Time Calculation (min) 23 min  - 55 min  -     PT Received On  -- 03/14/18  -     PT Goal Re-Cert Due Date  -- 03/24/18  -        Time Calculation- PT    Total Timed Code Minutes-  PT 23 minute(s)  -  --       User Key  (r) = Recorded By, (t) = Taken By, (c) = Cosigned By    Initials Name Provider Type     Gypsy Underwood PTA Physical Therapy Assistant     Jovi Whittington, DIAMOND Physical Therapist          Therapy Charges for Today     Code Description Service Date Service Provider Modifiers Qty    [id number] HC GAIT TRAINING EA 15 MIN 3/14/2018 Gypsy Underwood PTA GP 2          PT G-Codes  Outcome Measure Options: AM-PAC 6 Clicks Basic Mobility (PT)  Score: 16  Functional Limitation: Mobility: Walking and moving around  Mobility: Walking and Moving Around Current Status (): At least 40 percent but less than 60 percent impaired, limited or restricted  Mobility: Walking and Moving Around Goal Status (): At least 20 percent but less than 40 percent impaired, limited or restricted    Gypsy Underwood PTA  3/14/2018

## 2018-03-14 NOTE — PLAN OF CARE
Problem: Patient Care Overview  Goal: Plan of Care Review  Outcome: Ongoing (interventions implemented as appropriate)   03/14/18 0629   Coping/Psychosocial   Plan of Care Reviewed With patient   Plan of Care Review   Progress no change   OTHER   Outcome Summary Medicated throughout shift for c/o pain. Ace wrap to right leg CDI. LETY drains intact. Con't to monitor.     Goal: Individualization and Mutuality  Outcome: Ongoing (interventions implemented as appropriate)    Goal: Discharge Needs Assessment  Outcome: Ongoing (interventions implemented as appropriate)      Problem: Fall Risk (Adult)  Goal: Identify Related Risk Factors and Signs and Symptoms  Outcome: Outcome(s) achieved Date Met: 03/14/18    Goal: Absence of Fall  Outcome: Ongoing (interventions implemented as appropriate)      Problem: Skin Injury Risk (Adult)  Goal: Identify Related Risk Factors and Signs and Symptoms  Outcome: Outcome(s) achieved Date Met: 03/14/18    Goal: Skin Health and Integrity  Outcome: Ongoing (interventions implemented as appropriate)      Problem: Infection, Risk/Actual (Adult)  Goal: Identify Related Risk Factors and Signs and Symptoms  Outcome: Outcome(s) achieved Date Met: 03/14/18    Goal: Infection Prevention/Resolution  Outcome: Ongoing (interventions implemented as appropriate)      Problem: Pain, Acute (Adult)  Goal: Identify Related Risk Factors and Signs and Symptoms  Outcome: Outcome(s) achieved Date Met: 03/14/18    Goal: Acceptable Pain Control/Comfort Level  Outcome: Ongoing (interventions implemented as appropriate)

## 2018-03-14 NOTE — PROGRESS NOTES
Discharge Planning Assessment  [facility]     Patient Name: Danisha Cannon  MRN: [id number]  Today's Date: 3/14/2018    Admit Date: 3/13/2018          Discharge Needs Assessment     Row Name 03/14/18 1001       Living Environment    Lives With spouse    Current Living Arrangements home/apartment/condo    Primary Care Provided by spouse/significant other    Family Caregiver if Needed spouse    Quality of Family Relationships helpful;involved;supportive    Able to Return to Prior Arrangements yes       Resource/Environmental Concerns    Transportation Concerns car, none       Transition Planning    Patient/Family Anticipates Transition to home with help/services;home with family    Patient/Family Anticipated Services at Transition home health care    Transportation Anticipated family or friend will provide       Discharge Needs Assessment    Readmission Within the Last 30 Days previous discharge plan unsuccessful    Concerns to be Addressed discharge planning;adjustment to diagnosis/illness    Equipment Currently Used at Home walker, rolling;commode;bipap/cpap    Outpatient/Agency/Support Group Needs homecare agency;infusion therapy, home    Discharge Facility/Level of Care Needs home with home health    Discharge Coordination/Progress Patient lives with her  and plans to return home at discharge. Patient is current with [facility]. ANDIE spoke to Myla Love with [facility] to confirm and Myla is aware of current admission and will follow for discharge. Patient is also current with home infusion via [facility] and this will continue at discharge as well. ANDIE will follow to notify [facility] and [facility] of discharge.             Discharge Plan    No documentation.       Destination     No service coordination in this encounter.      Durable Medical Equipment     No service coordination in this encounter.      Dialysis/Infusion     No service coordination in this encounter.      Home  Medical Care     No service coordination in this encounter.      Social Care     No service coordination in this encounter.                Demographic Summary    No documentation.           Functional Status    No documentation.           Psychosocial    No documentation.           Abuse/Neglect    No documentation.           Legal    No documentation.           Substance Abuse    No documentation.           Patient Forms    No documentation.         SOPHIE HernandezW

## 2018-03-14 NOTE — THERAPY EVALUATION
Acute Care - Physical Therapy Initial Evaluation  [facility]     Patient Name: Danisha Cannon  : 1955  MRN: [id number]  Today's Date: 3/14/2018   Onset of Illness/Injury or Date of Surgery: 18  Date of Referral to PT: 18  Referring Physician: Dr. Capellan      Admit Date: 3/13/2018    Visit Dx:     ICD-10-CM ICD-9-CM   1. Bone infection, knee M86.9 730.96   2. Impaired mobility Z74.09 799.89     Patient Active Problem List   Diagnosis   • Abdominal adhesions   • Abnormal echocardiogram   • Abnormal Holter exam   • Atrial flutter, paroxysmal   • Bilateral edema of lower extremity   • Chest pain   • Cholecystitis with cholelithiasis   • Chronic right-sided low back pain with sciatica   • Class 3 obesity due to excess calories with serious comorbidity in adult   • Facet syndrome, lumbar   • Heart rate fast   • HTN (hypertension)   • Idiopathic chronic gout without tophus   • Idiopathic hypotension   • Idiopathic progressive neuropathy   • Insomnia   • Lumbar facet arthropathy   • Memory loss   • MRSA carrier   • Obstructive sleep apnea   • Paroxysmal a-fib   • Pilonidal cyst   • Primary osteoarthritis of right knee   • Recurrent ventral incisional hernia   • Restless leg syndrome   • Sciatica of right side   • Shortness of breath   • Sick sinus syndrome   • Sleep apnea   • Sleep apnea, obstructive   • Snoring   • Somnolence, daytime   • Spondylosis of lumbar region without myelopathy or radiculopathy   • Tear of medial meniscus of right knee, current   • Total knee replacement status, right   • Tremor   • Umbilical hernia   • Morbid (severe) obesity due to excess calories   • Infection associated with internal knee prosthesis   • Infection of right knee   • Bone infection, knee     Past Medical History:   Diagnosis Date   • Arthritis    • Atrial fibrillation    • Disease of thyroid gland    • GERD (gastroesophageal reflux disease)    • History of incision and drainage     Right knee   • Neuropathy,  peripheral    • Pneumonia     RECENT DX PER FAMILY DOCTOR   • PONV (postoperative nausea and vomiting)    • Restless leg syndrome    • Sleep apnea with use of continuous positive airway pressure (CPAP)    • SVT (supraventricular tachycardia)      Past Surgical History:   Procedure Laterality Date   • BUNIONECTOMY Left     AND HAMMER TOE   • CARDIAC SURGERY      HEART CATH   • CATARACT EXTRACTION Right    • HERNIA REPAIR      UMBILICAL X3   • JOINT REPLACEMENT      RIGHT KNEE   • KNEE POLY INSERT EXCHANGE Right 3/3/2018    Procedure: IRRIGATION AND DEBRIDEMENT TOTAL KNEE & POLY EXCHANGE - RIGHT;  Surgeon: Amilcar Capellan MD;  Location: [facility] OR;  Service:    • LAPAROSCOPIC CHOLECYSTECTOMY     • PILONIDAL CYSTECTOMY N/A 2/16/2017    Procedure: PILONIDAL CYSTECTOMY, EXCISION SEBACEOUS CYST - BACK;  Surgeon: Macrina Capellna MD;  Location:  [facility] OR;  Service:    • TOTAL KNEE  PROSTHESIS REMOVAL W/ SPACER INSERTION Right 3/13/2018    Procedure: 1.  EXPLANT TOTAL KNEE 2.  ANTIBOTIC SPACER KNEE;  Surgeon: Amilcar Capellan MD;  Location:  [facility] OR;  Service: Orthopedics   • TRUNK LESION/CYST EXCISION N/A 2/16/2017    Procedure: EXCISION SEBACEOUS CYST - BACK;  Surgeon: Macrina Capellan MD;  Location:  [facility] OR;  Service:         PT ASSESSMENT (last 72 hours)      Physical Therapy Evaluation     Row Name 03/14/18 0925          PT Evaluation Time/Intention    Subjective Information complains of;weakness;fatigue;pain;swelling  -     Document Type evaluation  -     Mode of Treatment physical therapy   co evaluation  -     Row Name 03/14/18 0925          General Information    Patient Profile Reviewed? yes  -     Onset of Illness/Injury or Date of Surgery 03/13/18  -     Referring Physician Dr. Capellan  -     Patient Observations alert;cooperative;agree to therapy  -     General Observations of Patient awake, fowlers in bed  -     Prior Level of Function independent:;ADL's;all household mobility  -      Equipment Currently Used at Home walker, rolling;shower chair;grab bar  -     Pertinent History of Current Functional Problem s/p RTKR infection, Sx:  explantation of RTKR, implanted antibiotic spacer 3.13.18  -     Existing Precautions/Restrictions fall  -     Equipment Issued to Patient walker, front wheeled  -     Risks Reviewed patient and family:;LOB;nausea/vomiting;dizziness;increased discomfort  -     Benefits Reviewed patient and family:;improve function;increase independence;increase strength;increase balance  -     Barriers to Rehab previous functional deficit  -     Row Name 03/14/18 0925          Relationship/Environment    Primary Source of Support/Comfort spouse;child(tram)  -     Lives With spouse  -     Family Caregiver if Needed spouse;child(tram), adult  -     Row Name 03/14/18 0925          Resource/Environmental Concerns    Current Living Arrangements home/apartment/condo  -     Row Name 03/14/18 0925          Cognitive Assessment/Interventions    Additional Documentation Cognitive Assessment/Intervention (Group)  -     Row Name 03/14/18 0925          Cognitive Assessment/Intervention- PT/OT    Orientation Status (Cognition) oriented x 4  -     Follows Commands (Cognition) WFL  -     Cognitive Function (Cognitive) WFL  -     Personal Safety Interventions fall prevention program maintained;gait belt;nonskid shoes/slippers when out of bed  -     Row Name 03/14/18 0925          Safety Issues, Functional Mobility    Impairments Affecting Function (Mobility) balance;endurance/activity tolerance  -     Row Name 03/14/18 0925          Mobility Assessment/Treatment    Extremity Weight-bearing Status right lower extremity  -     Right Lower Extremity (Weight-bearing Status) weight-bearing as tolerated (WBAT)  -     Row Name 03/14/18 0925          Bed Mobility Assessment/Treatment    Bed Mobility Assessment/Treatment supine-sit  -     Supine-Sit [place] (Bed  Mobility) verbal cues;minimum assist (75% patient effort);moderate assist (50% patient effort)  -     Bed Mobility, Safety Issues decreased use of legs for bridging/pushing  -     Assistive Device (Bed Mobility) bed rails;head of bed elevated  -     Row Name 03/14/18 0925          Transfer Assessment/Treatment    Transfer Assessment/Treatment sit-stand transfer;stand-sit transfer  -     Sit-Stand [place] (Transfers) verbal cues;minimum assist (75% patient effort)   elevated bed surface  -     Stand-Sit [place] (Transfers) verbal cues;contact guard  -     Row Name 03/14/18 0925          Gait/Stairs Assessment/Training    [place] Level (Gait) verbal cues;contact guard  -     Assistive Device (Gait) walker, front-wheeled  -     Distance in Feet (Gait) 20  -LH     Right Sided Gait Deviations --   decrease wt bearing RLE  -[facility] Name 03/14/18 0925          General ROM    GENERAL ROM COMMENTS WFL, R knee n/a  -[facility] Name 03/14/18 0925          General Assessment (Manual Muscle Testing)    Comment, General Manual Muscle Testing (MMT) Assessment grossly 4/5, R knee n/a  -[facility] Name 03/14/18 0925          Pain Assessment    Additional Documentation Pain Scale: Numbers Pre/Post-Treatment (Group)  -LH     Row Name 03/14/18 0925          Pain Scale: Numbers Pre/Post-Treatment    Pain Scale: Numbers, Pretreatment 2/10  -LH     Pain Location - Side Right  -LH     Pain Location knee  -LH     Pain Intervention(s) Medication (See MAR);Repositioned;Ambulation/increased activity  -     Row Name             Wound 03/13/18 1411 knee incision    Wound - Properties Group Date first assessed: 03/13/18  - Time first assessed: 1411  - Location: knee  -SH Type: incision  -SH    Row Name 03/14/18 0925          Plan of Care Review    Plan of Care Reviewed With patient;daughter  -     Row Name 03/14/18 0925          Physical Therapy Clinical Impression    Date of Referral to PT 03/13/18  -      PT Diagnosis (PT Clinical Impression) impaired mobility  -     Patient/Family Goals Statement (PT Clinical Impression) return home  -     Criteria for Skilled Interventions Met (PT Clinical Impression) yes;treatment indicated  -     Rehab Potential (PT Clinical Summary) good, to achieve stated therapy goals  -     Predicted Duration of Therapy (PT) until d/c  -     Care Plan Review (PT) risks/benefits reviewed;patient/other agree to care plan  -     Row Name 03/14/18 0925          Physical Therapy Goals    Bed Mobility Goal Selection (PT) bed mobility, PT goal 1  -     Transfer Goal Selection (PT) transfer, PT goal 1  -     Gait Training Goal Selection (PT) gait training, PT goal 1  -     Row Name 03/14/18 0925          Bed Mobility Goal 1 (PT)    Activity/Assistive Device (Bed Mobility Goal 1, PT) bed mobility activities, all  -     [place] Level/Cues Needed (Bed Mobility Goal 1, PT) independent  -     Time Frame (Bed Mobility Goal 1, PT) by discharge  -     Progress/Outcomes (Bed Mobility Goal 1, PT) goal ongoing  -     Row Name 03/14/18 0925          Transfer Goal 1 (PT)    Activity/Assistive Device (Transfer Goal 1, PT) sit-to-stand/stand-to-sit;bed-to-chair/chair-to-bed  -LH     [place] Level/Cues Needed (Transfer Goal 1, PT) independent  -LH     Time Frame (Transfer Goal 1, PT) by discharge  -     Progress/Outcome (Transfer Goal 1, PT) goal ongoing  -     Row Name 03/14/18 0925          Gait Training Goal 1 (PT)    Activity/Assistive Device (Gait Training Goal 1, PT) gait (walking locomotion);assistive device use  -     [place] Level (Gait Training Goal 1, PT) independent  -     Distance (Gait Goal 1, PT) 120  -LH     Time Frame (Gait Training Goal 1, PT) by discharge  -     Progress/Outcome (Gait Training Goal 1, PT) goal ongoing  -     Row Name 03/14/18 0925          Positioning and Restraints    Pre-Treatment Position in bed  -     Post Treatment  Position chair  -     In Chair sitting;call light within reach;encouraged to call for assist;with family/caregiver;notified ns  -     Row Name 03/14/18 0925          Living Environment    Home Accessibility --   walk in shower  -       User Key  (r) = Recorded By, (t) = Taken By, (c) = Cosigned By    Initials Name Provider Type     Jovi Whittington PT Physical Therapist     Karla Caraballo, RN Registered Nurse          Physical Therapy Education     Title: PT OT SLP Therapies (Done)     Topic: Physical Therapy (Done)     Point: Mobility training (Done)    Learning Progress Summary     Learner Status Readiness Method Response Comment Documented by    Patient Done Acceptance E,D VU,DU benefits of PT and POC, call for assist w/ mobility  03/14/18 1128    Family Done Acceptance E,D VU,DU benefits of PT and POC, call for assist w/ mobility  03/14/18 1128          Point: Precautions (Done)    Learning Progress Summary     Learner Status Readiness Method Response Comment Documented by    Patient Done Acceptance E,D VU,DU benefits of PT and POC, call for assist w/ mobility  03/14/18 1128    Family Done Acceptance E,D VU,DU benefits of PT and POC, call for assist w/ mobility  03/14/18 1128                      User Key     Initials Effective Dates Name Provider Type [facility] 08/02/16 -  Jovi Whittington PT Physical Therapist PT                PT Recommendation and Plan  Planned Therapy Interventions (PT Eval): bed mobility training, gait training, patient/family education, transfer training  Therapy Frequency (PT Clinical Impression): 2 times/day  Plan of Care Reviewed With: patient, daughter  Outcome Summary: PT IE complete.  Pt ambulated ~ 20 feet cg assist w/ RW.  Min/mod assist to sit EOB.  Pt to receive skilled PT to address functional mobility deficits.  Recommend home w/ assist at D/C.  Thank you for referral.  Plan of Care Reviewed With: patient, daughter          Outcome Measures     Row Name  03/14/18 1000             How much help from another person do you currently need...    Turning from your back to your side while in flat bed without using bedrails? 3  -LH      Moving from lying on back to sitting on the side of a flat bed without bedrails? 2  -LH      Moving to and from a bed to a chair (including a wheelchair)? 3  -LH      Standing up from a chair using your arms (e.g., wheelchair, bedside chair)? 3  -LH      Climbing 3-5 steps with a railing? 2  -LH      To walk in hospital room? 3  -      AM-PAC 6 Clicks Score 16  -         Functional Assessment    Outcome Measure Options AM-PAC 6 Clicks Basic Mobility (PT)  -        User Key  (r) = Recorded By, (t) = Taken By, (c) = Cosigned By    Initials Name Provider Type     Jovi Whittington PT Physical Therapist           Time Calculation:         PT Charges     Row Name 03/14/18 1127             Time Calculation    Start Time 0925  -      Stop Time 1020  -      Time Calculation (min) 55 min  -      PT Received On 03/14/18  -      PT Goal Re-Cert Due Date 03/24/18  -        User Key  (r) = Recorded By, (t) = Taken By, (c) = Cosigned By    Initials Name Provider Type     Jovi Whittington PT Physical Therapist          Therapy Charges for Today     Code Description Service Date Service Provider Modifiers Qty    94518157556  PT MOBILITY CURRENT 3/14/2018 Jovi Whittington PT GP, CK 1    03100148113 HC PT MOBILITY PROJECTED 3/14/2018 Jovi Whittington PT GP, CJ 1    [id number] HC PT EVAL LOW COMPLEXITY 4 3/14/2018 Jovi Whittington PT GP 1          PT G-Codes  Outcome Measure Options: AM-PAC 6 Clicks Basic Mobility (PT)  Score: 16  Functional Limitation: Mobility: Walking and moving around  Mobility: Walking and Moving Around Current Status (): At least 40 percent but less than 60 percent impaired, limited or restricted  Mobility: Walking and Moving Around Goal Status (): At least 20 percent but less than 40 percent impaired, limited or  restricted      Jovi Whittington, PT  3/14/2018

## 2018-03-14 NOTE — PAYOR COMM NOTE
"Lucretia Cannon (62 y.o. Female)     Date of Birth Social Security Number Address Home Phone MRN    1955  [id number] [place] [address] [phone] [id number]    Holiness Marital Status          None        Admission Date Admission Type Admitting Provider Attending Provider Department, Room/Bed    3/13/18 Elective Amilcar Capellan MD Jackson, Stephen H, MD [facility] 3A, 352/1    Discharge Date Discharge Disposition Discharge Destination                       Attending Provider:  Amilcar Capellan MD    Allergies:  Codeine, Mobic [Meloxicam], Morphine And Related    Isolation:  None   Infection:  None   Code Status:  FULL    Ht:  165.1 cm (65\")   Wt:  119 kg (263 lb)    Admission Cmt:  None   Principal Problem:  None                Active Insurance as of 3/13/2018     Primary Coverage     Payor Plan Insurance Group Employer/Plan Group    [employer] BLUE CROSS AN[employer] BLUE CROSS BLUE SHIELD PPO [id number]     Payor Plan Address Payor Plan Phone Number Effective From Effective To    [address] [phone] 1/1/2016     [place] [address]       Subscriber Name Subscriber Birth Date Member ID       LUCRETIA CANNON 1955 [id number]                 Emergency Contacts      (Rel.) Home Phone Work Phone Mobile Phone    Clark Cannon (Spouse) [phone] -- [phone]                  ICU Vital Signs     Row Name 03/14/18 0849 03/14/18 0829 03/14/18 0320 03/14/18 0001 03/13/18 2030       Vitals    Temp 98.9 °F (37.2 °C)  -- 99.3 °F (37.4 °C) 99.4 °F (37.4 °C) 98.2 °F (36.8 °C)    Temp src Axillary  -- Oral Oral Oral    Pulse 88  -- 90 93 82    Heart Rate Source Monitor  -- Monitor Monitor Monitor    Resp 18  -- 18 18 16    Resp Rate Source Visual  -- Visual Visual Visual    /63  -- 123/50 128/53 134/49    BP Location Right arm  -- Left arm Right arm Right arm    BP Method Automatic  -- Automatic Automatic Automatic    Patient Position Lying  -- Lying Lying Lying       " "Oxygen Therapy    SpO2 95 %  -- 97 % 92 % 97 %    Pulse Oximetry Type  --  -- Continuous Continuous Intermittent    Device (Oxygen Therapy) room air room air CPAP CPAP nasal cannula    Flow (L/min) 2  --  --  -- 3    Row Name 03/13/18 2003 03/13/18 1720 03/13/18 1650 03/13/18 1620 03/13/18 1609       Height and Weight    Height  -- 165.1 cm (65\")  --  --  --    Weight  -- 119 kg (263 lb)  --  --  --    Weight Method  -- Bed scale  --  --  --    BSA (Calculated - sq m)  -- 2.22 sq meters  --  --  --    BMI (Calculated)  -- 43.8  --  --  --    Weight in (lb) to have BMI = 25  -- 149.9  --  --  --       Vitals    Temp  -- 97.6 °F (36.4 °C) 97.6 °F (36.4 °C) 97.2 °F (36.2 °C) 97.6 °F (36.4 °C)    Temp src  -- Oral Oral Oral Temporal Artery     Pulse  -- 78 75 73 73    Heart Rate Source  -- Monitor Monitor Monitor Monitor    Resp  -- 18 18 18 18    Resp Rate Source  -- Visual Visual Visual Monitor    BP  -- 152/62 149/70 118/51 122/50    BP Location  -- Right arm Right arm Right arm  --    BP Method  -- Automatic Automatic Automatic Automatic    Patient Position  -- Lying Lying Lying Lying       Oxygen Therapy    SpO2  -- 99 % 100 % 99 % 92 %    Pulse Oximetry Type  -- Intermittent Intermittent Intermittent Continuous    Device (Oxygen Therapy) nasal cannula nasal cannula nasal cannula nasal cannula nasal cannula    Flow (L/min) 3 3 3 3 3    Row Name 03/13/18 1555 03/13/18 1540 03/13/18 1510 03/13/18 1455 03/13/18 1450       Vitals    Pulse 69 69 72 73 76    Heart Rate Source Monitor Monitor Monitor Monitor Monitor    Resp 14 16 20 20 24    Resp Rate Source Monitor Monitor Monitor Monitor Monitor    /57 114/59 133/60 107/67 132/66    Noninvasive MAP (mmHg)  --  --  -- 74 80    BP Method Automatic Automatic Automatic Automatic Automatic    Patient Position Lying Lying Lying Lying Lying       Oxygen Therapy    SpO2 96 % 99 % 95 % 100 % 100 %    Pulse Oximetry Type Continuous Continuous Continuous Continuous " Continuous    Device (Oxygen Therapy) nasal cannula nasal cannula room air room air simple face mask    Flow (L/min) 3 3  --  -- 8    Row Name 03/13/18 1445 03/13/18 1440                Vitals    Temp  -- 97.4 °F (36.3 °C)       Temp src  -- Temporal Artery        Pulse 76 77       Heart Rate Source Monitor Monitor       Resp 24 12       Resp Rate Source Monitor Monitor       /56 133/56       Noninvasive MAP (mmHg) 71  --       BP Method Automatic Automatic       Patient Position Lying Lying          Oxygen Therapy    SpO2 97 % 98 %       Pulse Oximetry Type Continuous Continuous       Device (Oxygen Therapy) simple face mask simple face mask       Flow (L/min) 8 8           Hospital Medications (all)       Dose Frequency Start End    aspirin tablet 325 mg 325 mg Daily 3/13/2018     Sig - Route: Take 1 tablet by mouth Daily. - Oral    ceFAZolin (ANCEF) 2 g/20ml IV PUSH syringe 2 g Once 3/13/2018 3/13/2018    Sig - Route: Infuse 20 mL into a venous catheter 1 (One) Time. - Intravenous    docusate sodium (COLACE) capsule 100 mg 100 mg 2 Times Daily PRN 3/13/2018     Sig - Route: Take 1 capsule by mouth 2 (Two) Times a Day As Needed for Constipation. - Oral    DULoxetine (CYMBALTA) DR capsule 30 mg 30 mg Nightly 3/13/2018     Sig - Route: Take 1 capsule by mouth Every Night. - Oral    flecainide (TAMBOCOR) tablet 100 mg 100 mg Every 12 Hours Scheduled 3/13/2018     Sig - Route: Take 1 tablet by mouth Every 12 (Twelve) Hours. - Oral    fluticasone (FLONASE) 50 MCG/ACT nasal spray 1 spray 1 spray Daily 3/14/2018     Sig - Route: 1 spray by Each Nare route Daily. - Each Nare    gabapentin (NEURONTIN) capsule 600 mg 600 mg Every 8 Hours Scheduled 3/13/2018     Sig - Route: Take 2 capsules by mouth Every 8 (Eight) Hours. - Oral    HYDROcodone-acetaminophen (NORCO) 7.5-325 MG per tablet 1 tablet 1 tablet Every 4 Hours PRN 3/13/2018 3/23/2018    Sig - Route: Take 1 tablet by mouth Every 4 (Four) Hours As Needed for  "Moderate Pain . - Oral    HYDROmorphone (DILAUDID) injection 0.5 mg 0.5 mg Every 2 Hours PRN 3/13/2018 3/23/2018    Sig - Route: Infuse 0.25 mL into a venous catheter Every 2 (Two) Hours As Needed for Severe Pain . - Intravenous    Linked Group 1:  \"And\" Linked Group Details        lactated ringers infusion 50 mL/hr Continuous 3/13/2018     Sig - Route: Infuse 50 mL/hr into a venous catheter Continuous. - Intravenous    levothyroxine (SYNTHROID, LEVOTHROID) tablet 137 mcg 137 mcg Every Early Morning 3/14/2018     Sig - Route: Take 1 tablet by mouth Every Morning. - Oral    LORazepam (ATIVAN) tablet 1 mg 1 mg Every 8 Hours PRN 3/13/2018     Sig - Route: Take 1 tablet by mouth Every 8 (Eight) Hours As Needed for Anxiety (TOOK 2 TABS 3-13-18 4830). - Oral    magnesium hydroxide (MILK OF MAGNESIA) suspension 2400 mg/10mL 10 mL 10 mL Daily PRN 3/13/2018     Sig - Route: Take 10 mL by mouth Daily As Needed for Constipation. - Oral    metoclopramide (REGLAN) injection 10 mg 10 mg Once As Needed 3/13/2018 3/13/2018    Sig - Route: Infuse 2 mL into a venous catheter 1 (One) Time As Needed for Nausea or Vomiting. - Intravenous    metoprolol tartrate (LOPRESSOR) tablet 12.5 mg 12.5 mg Every 12 Hours Scheduled 3/13/2018     Sig - Route: Take 0.5 tablets by mouth Every 12 (Twelve) Hours. - Oral    nafcillin (UNIPEN) 2 g/100 mL 0.9% NS IVPB (mbp) 2 g Every 4 Hours 3/14/2018 4/25/2018    Sig - Route: Infuse 100 mL into a venous catheter Every 4 (Four) Hours. - Intravenous    naloxone (NARCAN) injection 0.1 mg 0.1 mg Every 5 Minutes PRN 3/13/2018     Sig - Route: Infuse 0.25 mL into a venous catheter Every 5 (Five) Minutes As Needed for Opioid Reversal or Respiratory Depression (see administration instructions). - Intravenous    naloxone (NARCAN) injection 0.1 mg 0.1 mg Every 5 Minutes PRN 3/13/2018     Sig - Route: Infuse 0.25 mL into a venous catheter Every 5 (Five) Minutes As Needed for Respiratory Depression. - Intravenous    " "Linked Group 1:  \"And\" Linked Group Details        ondansetron (ZOFRAN) injection 4 mg 4 mg Every 6 Hours PRN 3/13/2018     Sig - Route: Infuse 2 mL into a venous catheter Every 6 (Six) Hours As Needed for Nausea or Vomiting. - Intravenous    Linked Group 2:  \"Or\" Linked Group Details        ondansetron (ZOFRAN) tablet 4 mg 4 mg Every 6 Hours PRN 3/13/2018     Sig - Route: Take 1 tablet by mouth Every 6 (Six) Hours As Needed for Nausea or Vomiting. - Oral    Linked Group 2:  \"Or\" Linked Group Details        ondansetron ODT (ZOFRAN-ODT) disintegrating tablet 4 mg 4 mg Every 6 Hours PRN 3/13/2018     Sig - Route: Take 1 tablet by mouth Every 6 (Six) Hours As Needed for Nausea or Vomiting. - Oral    Linked Group 2:  \"Or\" Linked Group Details        pantoprazole (PROTONIX) EC tablet 40 mg 40 mg Every Early Morning 3/14/2018     Sig - Route: Take 1 tablet by mouth Every Morning. - Oral    rOPINIRole (REQUIP) tablet 2 mg 2 mg Daily 3/14/2018     Sig - Route: Take 2 tablets by mouth Daily. - Oral    rOPINIRole (REQUIP) tablet 3 mg 3 mg Nightly 3/13/2018     Sig - Route: Take 3 tablets by mouth Every Night. - Oral    triamcinolone (KENALOG) 0.1 % cream  Every 12 Hours Scheduled 3/13/2018     Sig - Route: Apply  topically Every 12 (Twelve) Hours. - Topical    venlafaxine XR (EFFEXOR-XR) 24 hr capsule 150 mg 150 mg Daily 3/13/2018     Sig - Route: Take 2 capsules by mouth Daily. - Oral    atropine sulfate injection 0.5 mg (Discontinued) 0.5 mg Once As Needed 3/13/2018 3/13/2018    Sig - Route: Infuse 5 mL into a venous catheter 1 (One) Time As Needed for Bradycardia (symptomatic bradycardia (hypotension, dizziness, confusion). Notify attending anesthesiologist.). - Intravenous    Reason for Discontinue: Patient Transfer    buffered lidocaine syringe 0.5 mL (Discontinued) 0.5 mL Once As Needed 3/13/2018 3/13/2018    Sig - Route: Inject 0.5 mL as directed 1 (One) Time As Needed (IV Start). - Injection    Reason for Discontinue: " Patient Transfer    buffered lidocaine syringe 0.5 mL (Discontinued) 0.5 mL Once As Needed 3/13/2018 3/13/2018    Sig - Route: Inject 0.5 mL into the skin 1 (One) Time As Needed (Prior to IV Insertion). - Intradermal    Reason for Discontinue: Patient Transfer    buffered lidocaine syringe 0.5 mL (Discontinued) 0.5 mL Once As Needed 3/13/2018 3/13/2018    Sig - Route: Inject 0.5 mL as directed 1 (One) Time As Needed (IV Start). - Injection    ceFAZolin (ANCEF) 2 g/20ml IV PUSH syringe (Discontinued) 2 g Every 8 Hours 3/13/2018 3/14/2018    Sig - Route: Infuse 20 mL into a venous catheter Every 8 (Eight) Hours. - Intravenous    ceFAZolin (ANCEF) injection (Discontinued)  As Needed 3/13/2018 3/13/2018    Sig: As Needed.    Reason for Discontinue: Patient Discharge    famotidine (PEPCID) injection 20 mg (Discontinued) 20 mg 60 Minutes Pre-Op 3/13/2018 3/13/2018    Sig - Route: Infuse 2 mL into a venous catheter 60 Minutes Prior to Surgery. - Intravenous    Reason for Discontinue: Patient Transfer    flumazenil (ROMAZICON) injection 0.2 mg (Discontinued) 0.2 mg As Needed 3/13/2018 3/13/2018    Sig - Route: Infuse 2 mL into a venous catheter As Needed (unresponsiveness). - Intravenous    Reason for Discontinue: Patient Transfer    gentamicin (GARAMYCIN) injection (Discontinued)  As Needed 3/13/2018 3/13/2018    Sig: As Needed.    Reason for Discontinue: Patient Discharge    hydrALAZINE (APRESOLINE) injection 5 mg (Discontinued) 5 mg Every 10 Minutes PRN 3/13/2018 3/13/2018    Sig - Route: Infuse 0.25 mL into a venous catheter Every 10 (Ten) Minutes As Needed for High Blood Pressure (for systolic blood pressure greater than 180 mmHg or diastolic blood pressure greater than 105 mmHg when HR less than 60). - Intravenous    Reason for Discontinue: Patient Transfer    HYDROmorphone (DILAUDID) injection 0.5 mg (Discontinued) 0.5 mg As Needed 3/13/2018 3/13/2018    Sig - Route: Infuse 0.5 mL into a venous catheter As Needed for  Moderate Pain  or Severe Pain . - Intravenous    Reason for Discontinue: Patient Transfer    HYDROmorphone (DILAUDID) injection 0.5 mg (Discontinued) 0.5 mg Every 5 Minutes PRN 3/13/2018 3/13/2018    Sig - Route: Infuse 0.5 mL into a venous catheter Every 5 (Five) Minutes As Needed for Moderate Pain . - Intravenous    Reason for Discontinue: Patient Transfer    ipratropium-albuterol (DUO-NEB) nebulizer solution 3 mL (Discontinued) 3 mL Once As Needed 3/13/2018 3/13/2018    Sig - Route: Take 3 mL by nebulization 1 (One) Time As Needed for Wheezing or Shortness of Air (bronchospasm). - Nebulization    Reason for Discontinue: Patient Transfer    labetalol (NORMODYNE,TRANDATE) injection 5 mg (Discontinued) 5 mg Every 5 Minutes PRN 3/13/2018 3/13/2018    Sig - Route: Infuse 1 mL into a venous catheter Every 5 (Five) Minutes As Needed for High Blood Pressure (for systolic blood pressure greater than 180 mmHg or diastolic blood pressure greater than 105 mmHg). - Intravenous    Reason for Discontinue: Patient Transfer    lactated ringers infusion 1,000 mL (Discontinued) 1,000 mL Continuous 3/13/2018 3/13/2018    Sig - Route: Infuse 1,000 mL into a venous catheter Continuous. - Intravenous    lactated ringers infusion (Discontinued) 100 mL/hr Continuous PRN 3/13/2018 3/13/2018    Sig - Route: Infuse 100 mL/hr into a venous catheter Continuous As Needed (Start Prior to Surgery). - Intravenous    Reason for Discontinue: Patient Transfer    lactated ringers infusion (Discontinued) 100 mL/hr Continuous 3/13/2018 3/13/2018    Sig - Route: Infuse 100 mL/hr into a venous catheter Continuous. - Intravenous    meperidine (DEMEROL) injection 12.5 mg (Discontinued) 12.5 mg Every 5 Minutes PRN 3/13/2018 3/13/2018    Sig - Route: Infuse 0.5 mL into a venous catheter Every 5 (Five) Minutes As Needed for Shivering (May repeat x 3). - Intravenous    Reason for Discontinue: Patient Transfer    metoclopramide (REGLAN) injection 5 mg  "(Discontinued) 5 mg Every 15 Minutes PRN 3/13/2018 3/13/2018    Sig - Route: Infuse 1 mL into a venous catheter Every 15 (Fifteen) Minutes As Needed for Nausea or Vomiting (for nausea/vomiting). - Intravenous    Reason for Discontinue: Patient Transfer    midazolam (VERSED) injection 1 mg (Discontinued) 1 mg Every 5 Minutes PRN 3/13/2018 3/13/2018    Sig - Route: Infuse 1 mL into a venous catheter Every 5 (Five) Minutes As Needed for Anxiety (Max 4mg midazolam TOTAL). - Intravenous    Reason for Discontinue: Patient Transfer    Linked Group 3:  \"Or\" Linked Group Details        midazolam (VERSED) injection 2 mg (Discontinued) 2 mg Every 5 Minutes PRN 3/13/2018 3/13/2018    Sig - Route: Infuse 2 mL into a venous catheter Every 5 (Five) Minutes As Needed for Anxiety (Max 4mg midazolam TOTAL). - Intravenous    Reason for Discontinue: Patient Transfer    Linked Group 3:  \"Or\" Linked Group Details        naloxone (NARCAN) injection 0.04 mg (Discontinued) 0.04 mg As Needed 3/13/2018 3/13/2018    Sig - Route: Infuse 0.1 mL into a venous catheter As Needed for Opioid Reversal (unresponsiveness, decrease oxygen saturation). - Intravenous    Reason for Discontinue: Patient Transfer    ondansetron (ZOFRAN) injection 4 mg (Discontinued) 4 mg As Needed 3/13/2018 3/13/2018    Sig - Route: Infuse 2 mL into a venous catheter As Needed for Nausea or Vomiting. - Intravenous    Reason for Discontinue: Patient Transfer    sodium chloride (NS) irrigation solution (Discontinued)  As Needed 3/13/2018 3/13/2018    Sig: As Needed.    Reason for Discontinue: Patient Discharge    sodium chloride 0.9 % flush 1-10 mL (Discontinued) 1-10 mL As Needed 3/13/2018 3/13/2018    Sig - Route: Infuse 1-10 mL into a venous catheter As Needed for Line Care. - Intravenous    Reason for Discontinue: Patient Transfer    sodium chloride 0.9 % flush 1-10 mL (Discontinued) 1-10 mL As Needed 3/13/2018 3/13/2018    Sig - Route: Infuse 1-10 mL into a venous " catheter As Needed for Line Care. - Intravenous    Reason for Discontinue: Patient Transfer    sodium chloride 0.9 % flush 3 mL (Discontinued) 3 mL As Needed 3/13/2018 3/13/2018    Sig - Route: Infuse 3 mL into a venous catheter As Needed for Line Care. - Intravenous    Reason for Discontinue: Patient Transfer    sodium chloride 12,000 mL with vancomycin 3,000 mg irrigation (Discontinued)  As Needed 3/13/2018 3/13/2018    Sig: As Needed.    Reason for Discontinue: Patient Discharge    sodium chloride 12,000 mL with vancomycin 6,000 mg irrigation (Discontinued)  As Needed 3/13/2018 3/13/2018    Sig: As Needed.    Reason for Discontinue: Patient Discharge    tobramycin (NEBCIN) injection (Discontinued)  As Needed 3/13/2018 3/13/2018    Sig: As Needed.    Reason for Discontinue: Patient Discharge    tobramycin (NEBCIN) injection (Discontinued)  As Needed 3/14/2018 3/14/2018    Sig: As Needed.    Reason for Discontinue: Patient Discharge    vancomycin (VANCOCIN) injection (Discontinued)  As Needed 3/13/2018 3/13/2018    Sig: As Needed.    Reason for Discontinue: Patient Discharge    vancomycin (VANCOCIN) injection (Discontinued)  As Needed 3/13/2018 3/13/2018    Sig: As Needed.    Reason for Discontinue: Patient Discharge          Lab Results (last 24 hours)     Procedure Component Value Units Date/Time    Tissue / Bone Culture - Tissue, Knee, Right [[id number]]  (Normal) Collected:  03/13/18 1228    Specimen:  Tissue from Knee, Right Updated:  03/14/18 1100     Tissue Culture No growth at 24 hours     Gram Stain Result Moderate (3+) WBCs seen      No organisms seen    Tissue / Bone Culture - Bone, Knee, Right [[id number]]  (Normal) Collected:  03/13/18 1235    Specimen:  Bone from Knee, Right Updated:  03/14/18 1059     Tissue Culture No growth at 24 hours     Gram Stain Result Many (4+) WBCs seen      No organisms seen    Tissue / Bone Culture - Bone, Knee, Right [[id number]]  (Normal) Collected:  03/13/18 1233     Specimen:  Bone from Knee, Right Updated:  18 1058     Tissue Culture No growth at 24 hours     Gram Stain Result Few (2+) WBCs seen      No organisms seen    Hemoglobin & Hematocrit, Blood [[id number]]  (Abnormal) Collected:  18 0505    Specimen:  Blood Updated:  18 0607     Hemoglobin 8.0 (L) g/dL      Hematocrit 25.6 (L) %     Narrative:       ckd    Anaerobic Culture - Tissue, Knee, Right [[id number]] Collected:  18 1228    Specimen:  Tissue from Knee, Right Updated:  18 1331        Imaging Results (last 24 hours)     Procedure Component Value Units Date/Time    XR Knee 1 or 2 View Right [[id number]] Collected:  18 1451     Updated:  18 1559    Narrative:       EXAMINATION:   XR KNEE 1 OR 2 VW RIGHT-  3/13/2018 2:51 PM CDT     HISTORY: Knee with antibiotics spacer     Single view of the right knee is obtained in the operating room under  the direction of Dr. Capellan. 1 seconds of fluoroscopic time was used  for this procedure  This report was finalized on 2018 14:51 by Dr. Luca Monaco MD.    FL C Arm During Surgery [[id number]] Collected:  18 1451     Updated:  18 1559    Narrative:       EXAMINATION:   XR KNEE 1 OR 2 VW RIGHT-  3/13/2018 2:51 PM CDT     HISTORY: Knee with antibiotics spacer     Single view of the right knee is obtained in the operating room under  the direction of Dr. Capellan. 1 seconds of fluoroscopic time was used  for this procedure  This report was finalized on 2018 14:51 by Dr. Luca Monaco MD.        ECG/EMG Results (last 24 hours)     ** No results found for the last 24 hours. **        Orders (last 24 hrs)     Start     Ordered    18 1132  PT Plan of Care Cert / Re-Cert  Once     Comments:  Physical Therapy Plan of Care  Initial Certification  Certification Period: 3/14/2018 - 2018    Patient Name: Danisha Cannon  : 1955    (M86.9) Bone infection, knee  (Z74.09) Impaired mobility                   Assessment  PT Assessment  PT Diagnosis (PT Clinical Impression): impaired mobility  Criteria for Skilled Interventions Met (PT Clinical Impression): yes, treatment indicated              PT Rehab Goals     Row Name 03/14/18 0925             Bed Mobility Goal 1 (PT)    Activity/Assistive Device (Bed Mobility Goal 1, PT) bed mobility   activities, all  -LH      [place] Level/Cues Needed (Bed Mobility Goal 1, PT) independent  -LH        Time Frame (Bed Mobility Goal 1, PT) by discharge  -LH      Progress/Outcomes (Bed Mobility Goal 1, PT) goal ongoing  -         Transfer Goal 1 (PT)    Activity/Assistive Device (Transfer Goal 1, PT)   sit-to-stand/stand-to-sit;bed-to-chair/chair-to-bed  -LH      [place] Level/Cues Needed (Transfer Goal 1, PT) independent  -LH      Time Frame (Transfer Goal 1, PT) by discharge  -LH      Progress/Outcome (Transfer Goal 1, PT) goal ongoing  -         Gait Training Goal 1 (PT)    Activity/Assistive Device (Gait Training Goal 1, PT) gait (walking   locomotion);assistive device use  -LH      [place] Level (Gait Training Goal 1, PT) independent  -LH      Distance (Gait Goal 1, PT) 120  -LH      Time Frame (Gait Training Goal 1, PT) by discharge  -LH      Progress/Outcome (Gait Training Goal 1, PT) goal ongoing  -        User Key  (r) = Recorded By, (t) = Taken By, (c) = Cosigned By    Initials Name Provider Type     Jovi Whittington, PT Physical Therapist            PT OP Goals     Row Name 03/14/18 1127          Time Calculation    PT Goal Re-Cert Due Date 03/24/18  -       User Key  (r) = Recorded By, (t) = Taken By, (c) = Cosigned By    Initials Name Provider Type     Jovi Whittington, PT Physical Therapist            Plan  PT Plan  Therapy Frequency (PT Clinical Impression): 2 times/day  Planned Therapy Interventions (PT Eval): bed mobility training, gait training, patient/family education, transfer training  Outcome Summary: PT IE complete.  Pt ambulated ~  20 feet cg assist w/ RW.  Min/mod assist to sit EOB.  Pt to receive skilled PT to address functional mobility deficits.  Recommend home w/ assist at D/C.  Thank you for referral.        Jovi Whittington, PT  3/14/2018            By cosigning this order, either electronically or physically, I certify that the therapy services are furnished while this patient is under my care, the services outline above are required by this patient, and will be reviewed every 90 days.        M.D.:__________________________________________ Date: ______________    03/14/18 1132    03/14/18 1114  Change Dressing  Once      03/14/18 1114    03/14/18 1026  Inpatient Advance Care Planning Consult  Once     Comments:  NEW ADMIT   Provider:  (Not yet assigned)    03/14/18 1025    03/14/18 0900  fluticasone (FLONASE) 50 MCG/ACT nasal spray 1 spray  Daily      03/13/18 1717    03/14/18 0900  rOPINIRole (REQUIP) tablet 2 mg  Daily      03/13/18 1806    03/14/18 0900  nafcillin (UNIPEN) 2 g/100 mL 0.9% NS IVPB (mbp)  Every 4 Hours      03/14/18 0803    03/14/18 0746  tobramycin (NEBCIN) injection  As Needed,   Status:  Discontinued      03/14/18 0746    03/14/18 0600  levothyroxine (SYNTHROID, LEVOTHROID) tablet 137 mcg  Every Early Morning      03/13/18 1717    03/14/18 0600  pantoprazole (PROTONIX) EC tablet 40 mg  Every Early Morning      03/13/18 1717    03/14/18 0600  Discontinue Indwelling Urinary Catheter in AM  Once      03/13/18 1717    03/14/18 0600  Hemoglobin & Hematocrit, Blood  Daily      03/13/18 1717    03/14/18 0000  PT Consult: Eval & Treat  Once      03/13/18 1502    03/14/18 0000  OT Consult: Eval & Treat Post-Op Total Knee Arthroplasty  Once      03/13/18 1502    03/13/18 2223  Inpatient Spiritual Care Consult  Once     Provider:  (Not yet assigned)    03/13/18 2223    03/13/18 2200  gabapentin (NEURONTIN) capsule 600 mg  Every 8 Hours Scheduled      03/13/18 1717 03/13/18 2100  DULoxetine (CYMBALTA) DR capsule 30 mg  Nightly       03/13/18 1717 03/13/18 2100  flecainide (TAMBOCOR) tablet 100 mg  Every 12 Hours Scheduled      03/13/18 1717 03/13/18 2100  metoprolol tartrate (LOPRESSOR) tablet 12.5 mg  Every 12 Hours Scheduled      03/13/18 1717 03/13/18 2100  rOPINIRole (REQUIP) tablet 3 mg  Nightly      03/13/18 1717 03/13/18 2100  triamcinolone (KENALOG) 0.1 % cream  Every 12 Hours Scheduled      03/13/18 1717 03/13/18 2000  ceFAZolin (ANCEF) 2 g/20ml IV PUSH syringe  Every 8 Hours,   Status:  Discontinued      03/13/18 1717 03/13/18 1900  aspirin tablet 325 mg  Daily      03/13/18 1717 03/13/18 1800  lactated ringers infusion  Continuous      03/13/18 1717 03/13/18 1800  venlafaxine XR (EFFEXOR-XR) 24 hr capsule 150 mg  Daily      03/13/18 1717 03/13/18 1800  Turn, Cough & Deep Breathe Every 2 Hours Until Ambulating  Every 2 Hours      03/13/18 1717 03/13/18 1800  Incentive Spirometry  Every 2 Hours While Awake     Comments:  Until lungs are clear and no productive cough.    03/13/18 1717 03/13/18 1718  Assess Need for Indwelling Urinary Catheter - Follow Removal Protocol  Continuous     Comments:  Indwelling Urinary Catheter Removal Criteria  Discontinue Indwelling Urinary Catheter Unless One of the Following is Present  Urinary Retention or Obstruction  Chronic Noyola Catheter Use  End of Life  Critical Illness with Strict I/O   Tract or Abdominal Surgery  Stage 3/4 Sacral / Perineal Wound  Required Activity Restriction: Trauma  Required Activity Restriction: Spine Surgery  If Patient is Being Followed by Urology Contact Them PRIOR to Removal  Do Not Remove Indwelling Urinary Catheter Order is Present with a CLINICAL REASON to Maintain the Catheter. Provider is Required to Include a Clinical Reason to Maintain a Urinary Catheter    Chronic Noyola Catheter Use (Present on Admission)  Assess for Continued Need & Document Medical Necessity  If Infection is Suspected, Contact the Provider        03/13/18  1717    03/13/18 1718  Catheter Care  Every Shift      03/13/18 1717    03/13/18 1717  Weight Bearing - As Tolerated  Continuous      03/13/18 1717    03/13/18 1717  Pillows May Be Used to Elevate Operative Leg, But Do NOT Flex Operative Knee Over a Pillow  Until Discontinued      03/13/18 1717    03/13/18 1717  Instruct Patient to Do At Least 10 Ankle Pumps Per Hour While Awake  Once     Comments:  Instruct Patient to Do At Least 10 Ankle Pumps Per Hour While Awake    03/13/18 1717    03/13/18 1717  Saline lock IV with adequate po intake.  Continuous      03/13/18 1717    03/13/18 1717  Hemovac Drain to Self Suction  Continuous,   Status:  Canceled      03/13/18 1717    03/13/18 1717  Continue Indwelling Urinary Catheter Already in Place  Once      03/13/18 1717    03/13/18 1717  Notify Provider if Bladder Distention Continues  Until Discontinued      03/13/18 1717    03/13/18 1717  Catheter Care  Every Shift      03/13/18 1717    03/13/18 1717  Oxygen Therapy-  Continuous      03/13/18 1717    03/13/18 1717  Advance diet as tolerated  Until Discontinued      03/13/18 1717    03/13/18 1717  Full Code  Continuous      03/13/18 1717    03/13/18 1717  VTE Risk Assessment - Low Risk  Once      03/13/18 1717    03/13/18 1717  Pharmacologic VTE Prophylaxis Not Indicated: Currently Anticoagulated  Once      03/13/18 1717    03/13/18 1717  Mechanical VTE Prophylaxis Not Indicated: Currently Anticoagulated  Once      03/13/18 1717    03/13/18 1717  Diet Regular  Diet Effective Now      03/13/18 1717    03/13/18 1716  HYDROmorphone (DILAUDID) injection 0.5 mg  Every 2 Hours PRN      03/13/18 1717    03/13/18 1716  naloxone (NARCAN) injection 0.1 mg  Every 5 Minutes PRN      03/13/18 1717    03/13/18 1716  ondansetron (ZOFRAN) tablet 4 mg  Every 6 Hours PRN      03/13/18 1717    03/13/18 1716  ondansetron ODT (ZOFRAN-ODT) disintegrating tablet 4 mg  Every 6 Hours PRN      03/13/18 1717    03/13/18 1716  ondansetron (ZOFRAN)  injection 4 mg  Every 6 Hours PRN      03/13/18 1717    03/13/18 1716  LORazepam (ATIVAN) tablet 1 mg  Every 8 Hours PRN      03/13/18 1717    03/13/18 1716  naloxone (NARCAN) injection 0.1 mg  Every 5 Minutes PRN      03/13/18 1717    03/13/18 1716  HYDROcodone-acetaminophen (NORCO) 7.5-325 MG per tablet 1 tablet  Every 4 Hours PRN      03/13/18 1717    03/13/18 1716  magnesium hydroxide (MILK OF MAGNESIA) suspension 2400 mg/10mL 10 mL  Daily PRN      03/13/18 1717    03/13/18 1716  docusate sodium (COLACE) capsule 100 mg  2 Times Daily PRN      03/13/18 1717    03/13/18 1523  Inpatient Infectious Diseases Consult  Once     Specialty:  Infectious Diseases  Provider:  Chente lForentino MD    03/13/18 1523    03/13/18 1457  Outpatient In A Bed  Once      03/13/18 1502    03/13/18 1447  FL C Arm During Surgery  1 Time Imaging      03/13/18 1446    03/13/18 1446  XR Knee 1 or 2 View Right  1 Time Imaging      03/13/18 1446    03/13/18 1443  Vital signs every 5 minutes for 15 minutes, every 15 minutes thereafter.  Once,   Status:  Canceled      03/13/18 1443    03/13/18 1443  Call Anesthesiologist for additional IV Fluid bolus for Hypotension/Tachycardia  Continuous,   Status:  Canceled      03/13/18 1443    03/13/18 1443  Notify Anesthesia of Any Acute Changes in Patient Condition  Until Discontinued,   Status:  Canceled      03/13/18 1443    03/13/18 1443  Notify Anesthesia for Unrelieved Pain  Until Discontinued,   Status:  Canceled      03/13/18 1443    03/13/18 1443  Once DC criteria to floor met, follow surgeon's orders.  Until Discontinued,   Status:  Canceled      03/13/18 1443    03/13/18 1443  Discharge patient from PACU when discharge criteria is met.  Until Discontinued,   Status:  Canceled      03/13/18 1443    03/13/18 1442  Apply warming blanket  As Needed,   Status:  Canceled     Comments:  For a recorded temp of <36.9 C    03/13/18 1443    03/13/18 1442  HYDROmorphone (DILAUDID) injection 0.5 mg  As  Needed,   Status:  Discontinued      03/13/18 1443    03/13/18 1442  labetalol (NORMODYNE,TRANDATE) injection 5 mg  Every 5 Minutes PRN,   Status:  Discontinued      03/13/18 1443    03/13/18 1442  hydrALAZINE (APRESOLINE) injection 5 mg  Every 10 Minutes PRN,   Status:  Discontinued      03/13/18 1443    03/13/18 1442  atropine sulfate injection 0.5 mg  Once As Needed,   Status:  Discontinued      03/13/18 1443    03/13/18 1442  ipratropium-albuterol (DUO-NEB) nebulizer solution 3 mL  Once As Needed,   Status:  Discontinued      03/13/18 1443    03/13/18 1442  naloxone (NARCAN) injection 0.04 mg  As Needed,   Status:  Discontinued      03/13/18 1443    03/13/18 1442  flumazenil (ROMAZICON) injection 0.2 mg  As Needed,   Status:  Discontinued      03/13/18 1443    03/13/18 1442  ondansetron (ZOFRAN) injection 4 mg  As Needed,   Status:  Discontinued      03/13/18 1443    03/13/18 1442  metoclopramide (REGLAN) injection 5 mg  Every 15 Minutes PRN,   Status:  Discontinued      03/13/18 1443    03/13/18 1442  meperidine (DEMEROL) injection 12.5 mg  Every 5 Minutes PRN,   Status:  Discontinued      03/13/18 1443    03/13/18 1442  HYDROmorphone (DILAUDID) injection 0.5 mg  Every 5 Minutes PRN,   Status:  Discontinued      03/13/18 1443    03/13/18 1353  sodium chloride 12,000 mL with vancomycin 3,000 mg irrigation  As Needed,   Status:  Discontinued      03/13/18 1353    03/13/18 1351  vancomycin (VANCOCIN) injection  As Needed,   Status:  Discontinued      03/13/18 1351    03/13/18 1351  tobramycin (NEBCIN) injection  As Needed,   Status:  Discontinued      03/13/18 1352    03/13/18 1235  Tissue / Bone Culture - Bone, Knee, Right      03/13/18 1235    03/13/18 1234  Tissue / Bone Culture - Bone, Knee, Right  Status:  Canceled      03/13/18 1234    03/13/18 1233  Tissue / Bone Culture - Bone, Knee, Right      03/13/18 1233    03/13/18 1232  Tissue / Bone Culture - Bone, Knee, Right  Status:  Canceled      03/13/18 1232     03/13/18 1231  Anaerobic Culture - Tissue, Knee, Right      03/13/18 1231    03/13/18 1231  Tissue / Bone Culture - Tissue, Knee, Right      03/13/18 1231    03/13/18 1219  gentamicin (GARAMYCIN) injection  As Needed,   Status:  Discontinued      03/13/18 1220    03/13/18 1217  ceFAZolin (ANCEF) injection  As Needed,   Status:  Discontinued      03/13/18 1218    03/13/18 1216  sodium chloride (NS) irrigation solution  As Needed,   Status:  Discontinued      03/13/18 1216    03/13/18 1216  vancomycin (VANCOCIN) injection  As Needed,   Status:  Discontinued      03/13/18 1217    03/13/18 1213  Insert Noyola Catheter  Once,   Status:  Canceled      03/13/18 1212    03/13/18 1213  sodium chloride 12,000 mL with vancomycin 6,000 mg irrigation  As Needed,   Status:  Discontinued      03/13/18 1215    03/13/18 1100  lactated ringers infusion  Continuous,   Status:  Discontinued      03/13/18 1026    03/13/18 1026  famotidine (PEPCID) injection 20 mg  60 Minutes Pre-Op,   Status:  Discontinued      03/13/18 1026    03/13/18 1025  midazolam (VERSED) injection 1 mg  Every 5 Minutes PRN,   Status:  Discontinued      03/13/18 1026    03/13/18 1025  midazolam (VERSED) injection 2 mg  Every 5 Minutes PRN,   Status:  Discontinued      03/13/18 1026    03/13/18 1025  Vital Signs - Per Anesthesia Protocol  As Needed,   Status:  Canceled      03/13/18 1026    03/13/18 1025  sodium chloride 0.9 % flush 1-10 mL  As Needed,   Status:  Discontinued      03/13/18 1026    03/13/18 1015  lactated ringers infusion 1,000 mL  Continuous,   Status:  Discontinued      03/13/18 0936    03/13/18 0942  sodium chloride 0.9 % flush 1-10 mL  As Needed,   Status:  Discontinued      03/13/18 0942    03/13/18 0942  lactated ringers infusion  Continuous PRN,   Status:  Discontinued      03/13/18 0942    03/13/18 0942  buffered lidocaine syringe 0.5 mL  Once As Needed,   Status:  Discontinued      03/13/18 0942    03/13/18 0936  buffered lidocaine  syringe 0.5 mL  Once As Needed,   Status:  Discontinued      03/13/18 0936 03/13/18 0936  sodium chloride 0.9 % flush 3 mL  As Needed,   Status:  Discontinued      03/13/18 0936    Unscheduled  Ice to Incision for 48 Hours  As Needed      03/13/18 1717    Unscheduled  Apply Ice to Incision PRN for Edema, After Activity or Exercise, and to Lessen Discomfort  As Needed      03/13/18 1717    Unscheduled  May Stand to Void or Use Bedside Commode Day of Surgery. POD 1 & Thereafter Use Bedside Commode or Bathroom  As Needed      03/13/18 1717    Unscheduled  Change dressing  As Needed     Comments:  May d/c dressing changes when no drainage from wound.    03/13/18 1717    Unscheduled  Bladder Scan if Patient Unable to Void 4-6 Hours After Catheter Removal  As Needed      03/13/18 1717    Unscheduled  Straight Cath Every 4-6 Hours As Needed If Patient is Unable to Void After 4-6 Hours, Bladder Scan Volume is Greater Than 350-500mL & Patient Has Symptoms of Bladder Discomfort / Distention  As Needed      03/13/18 1717    Unscheduled  Consult Pharmacist For Review of Medications That May Cause Urinary Retention - RN To Place Order for Consult it Needed  As Needed      03/13/18 1717    Unscheduled  Schedule / Prompt Voiding For Patients With Urinary Incontinence  As Needed      03/13/18 1717          Physician Progress Notes (last 24 hours) (Notes from 3/13/2018 12:12 PM through 3/14/2018 12:12 PM)     No notes of this type exist for this encounter.           Consult Notes (last 24 hours) (Notes from 3/13/2018 12:12 PM through 3/14/2018 12:12 PM)      Chente Florentino MD at 3/13/2018  8:00 PM      Consult Orders:    1. Inpatient Infectious Diseases Consult [[id number]] ordered by Amilcar Capellan MD at 03/13/18 1523                INFECTIOUS DISEASES CONSULT NOTE    Patient:  Danisah Cannon 62 y.o. female  ROOM # 352/1  YOB: 1955  MRN: [id number]  CSN:  [id number]  Admit date: 3/13/2018   Admitting Physician:  Amilcar Capellan MD  Primary Care Physician: Juan Carlos Sparks MD  REFERRING PROVIDER: Amilcar Capellan MD    Reason for Consultation: Recommendations for antibiotic management.    History of Present Illness/Chief Complaint: 62-year-old woman.  Known to me from consultation last hospitalization.  She had what was felt to be early right knee prosthetic joint infection with methicillin susceptible Staphylococcus aureus.  She had undergone irrigation, exchange of the poly-material.  She was home on IV antibiotic treatment.  She had developed some ongoing drainage.  She was taken back to surgery today.  She underwent joint explant and placement of antibiotic spacer.  She is having discomfort postoperatively.  Daughter at bedside.  Patient had expressed concern that there was a 12-18 hour delay in her getting IV antibiotics at the time of discharge from her last hospitalization to when she received her next dose at home.  She was concerned that the antibiotic delay caused her difficulties.  I explained I did not feel the antibiotic delay created the problem.  In prior discussions with Dr. Capellan she had significant purulence when he first irrigated and exchanged the poly-material.  Chances of success with a 1 stage approach with her ongoing drainage were going to be minimal.  Two-stage approach appears to be the best way for her to get over this infection and eventually get a new joint.    Current Scheduled Medications:     aspirin 325 mg Oral Daily   ceFAZolin 2 g Intravenous Q8H   DULoxetine 30 mg Oral Nightly   flecainide 100 mg Oral Q12H   fluticasone 1 spray Each Nare Daily   gabapentin 600 mg Oral Q8H   levothyroxine 137 mcg Oral Q AM   metoprolol tartrate 12.5 mg Oral Q12H   pantoprazole 40 mg Oral Q AM   rOPINIRole 2 mg Oral Daily   rOPINIRole 3 mg Oral Nightly   triamcinolone  Topical Q12H   venlafaxine  mg Oral Daily     Current PRN Medications:  docusate sodium  •  HYDROcodone-acetaminophen  •   "HYDROmorphone **AND** naloxone  •  LORazepam  •  magnesium hydroxide  •  naloxone  •  ondansetron **OR** ondansetron ODT **OR** ondansetron    Allergies:    Allergies   Allergen Reactions   • Codeine Dizziness and Nausea Only     Rapid heart rate, dizziness  NAUSEA   • Mobic [Meloxicam] Rash   • Morphine And Related Itching     Past Medical History: History of pilonidal/buttock cyst.  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  Thyroid dysfunction.  Gastroesophageal reflux disease.  Sleep apnea.  Degenerative arthritis.  Sleep apnea.    Past Surgical History: Umbilical hernia repair.  Laparoscopic cholecystectomy.  Bunionectomy.  Trigger finger release.  Right knee arthroplasty.  Irrigation/debridement/poly-exchange right knee.  Right knee explant with placement of antibiotic spacer.    Social History: No tobacco, alcohol, or illicit drug use.    Family History: Noncontributory    Review of Systems  No nausea or vomiting  No rash or skin itching  No pain at her line site  No diarrhea  No cardiopulmonary complaints  She was anxious and tearful  Please see HPI for additional pertinent positives negatives from her complete review of systems    Vital Signs:  /50 (BP Location: Left arm, Patient Position: Lying)   Pulse 90   Temp 99.3 °F (37.4 °C) (Oral)   Resp 18   Ht 165.1 cm (65\")   Wt 119 kg (263 lb)   LMP  (LMP Unknown) Comment: post menapause  SpO2 97%   BMI 43.77 kg/m²   Temp (24hrs), Av.2 °F (36.8 °C), Min:97.2 °F (36.2 °C), Max:99.4 °F (37.4 °C)    Physical Exam  Vital signs reviewed.  Sclera nonicteric  Lungs clear without crackles  Heart regular rhythm without murmur  Abdomen soft nontender without hepatosplenomegaly  Skin without drug rash  PICC without erythema  Right knee with operative dressing in place  Right foot intact sensation and capillary refill  Line/IV site: No erythema or tenderness.    Lab Results:  CBC:   Results from last 7 days  Lab Units 18  0505   HEMOGLOBIN " "g/dL 8.0*   HEMATOCRIT % 25.6*     CMP:   Results from last 7 days  Lab Units 03/08/18  0520 03/07/18  1200   SODIUM mmol/L 140 142   POTASSIUM mmol/L 3.4* 3.9   CHLORIDE mmol/L 100 98   CO2 mmol/L 32.0* 36.0*   BUN mg/dL 17 16   CREATININE mg/dL 0.73 0.86   CALCIUM mg/dL 8.5 8.8   GLUCOSE mg/dL 171* 119*     Operative culture last admission:  Culture           Heavy growth (4+) Staphylococcus aureus, MSSA        BETA LACTAMASE Positive       Stain     Many (4+) WBCs seen      Moderate (3+) Gram positive cocci, intracellular and extracellular            Resulting Agency: [facility]   Susceptibility      Staphylococcus aureus, MSSA     DONTE     Clindamycin <=0.25 ug/ml\"><=0.25 ug/ml Resistant     Erythromycin 4 ug/ml Resistant     Gentamicin <=0.5 ug/ml\"><=0.5 ug/ml Susceptible     Inducible Clindamycin Resistance POS  Positive     Levofloxacin 1 ug/ml Susceptible 1     Oxacillin 0.5 ug/ml Susceptible     Penicillin G >=0.5 ug/ml Resistant     Tetracycline <=1 ug/ml\"><=1 ug/ml Susceptible     Trimethoprim + Sulfamethoxazole <=10 ug/ml\"><=10 ug/ml Susceptible     Vancomycin <=0.5 ug/ml\"><=0.5 ug/ml Susceptible         Impression:   1.  Right knee prosthetic joint infection.  Methicillin susceptible Staphylococcus aureus recovered from prior culture.  She has undergone joint explant and placement of antibiotic impregnated spacer.  2.  History of pilonidal or buttock cyst-resolved  3.  History of sleep apnea  4.  History atrial fibrillation    Recommendations:    Suggest antibiotic treatment with nafcillin 2 g IV every 4 hours or cefazolin 2 g IV every 8 hours.  Suspect methicillin susceptible staph aureus as the pathogen  We will make antibiotic adjustments if necessary based on operative cultures from her current admission  Will continue to follow    Chente Urena MD  03/14/18  7:50 AM            Electronically signed by Chente Urena MD at 3/14/2018  8:02 AM       "

## 2018-03-14 NOTE — PLAN OF CARE
Problem: Patient Care Overview  Goal: Plan of Care Review  Outcome: Ongoing (interventions implemented as appropriate)   03/14/18 1108   Coping/Psychosocial   Plan of Care Reviewed With patient   Plan of Care Review   Progress no change   OTHER   Outcome Summary OT eval completed. Pt awake and alert in fowlers, daughter present for eval. States pain has improved with medication rates at 2/10 improvement from 6/10. AROM WFL BUE. Sit<>stand minAx2, RW, and vc. Amb across prabhakar with vc for advancing RW and taking steps. Pt requires skilled OT to address deficits related to transfer and LB ADL for safety and ind. with return home. OT recommends d/c home with assist.

## 2018-03-14 NOTE — PLAN OF CARE
Problem: Patient Care Overview  Goal: Plan of Care Review  Outcome: Ongoing (interventions implemented as appropriate)   03/14/18 4296   Coping/Psychosocial   Plan of Care Reviewed With patient   Plan of Care Review   Progress improving   OTHER   Outcome Summary Patient has maintained safety this shift; given prn pain medication when asked for as needed for pain. Patient was able to sit up in chair for a little bit this morning before needing to go back to bed. LETY drains removed by Dr. Capellan changed dressing to right knee sutures intact. Continues on IV antibiotics. VSS. Will continue to monitor.        Problem: Fall Risk (Adult)  Goal: Absence of Fall  Outcome: Ongoing (interventions implemented as appropriate)      Problem: Skin Injury Risk (Adult)  Goal: Skin Health and Integrity  Outcome: Ongoing (interventions implemented as appropriate)      Problem: Infection, Risk/Actual (Adult)  Goal: Infection Prevention/Resolution  Outcome: Ongoing (interventions implemented as appropriate)      Problem: Pain, Acute (Adult)  Goal: Acceptable Pain Control/Comfort Level  Outcome: Ongoing (interventions implemented as appropriate)

## 2018-03-15 LAB
HCT VFR BLD AUTO: 24.8 % (ref 37–47)
HGB BLD-MCNC: 7.5 G/DL (ref 12–16)

## 2018-03-15 PROCEDURE — 25010000002 ONDANSETRON PER 1 MG: Performed by: ORTHOPAEDIC SURGERY

## 2018-03-15 PROCEDURE — 85018 HEMOGLOBIN: CPT | Performed by: ORTHOPAEDIC SURGERY

## 2018-03-15 PROCEDURE — 85014 HEMATOCRIT: CPT | Performed by: ORTHOPAEDIC SURGERY

## 2018-03-15 PROCEDURE — 97116 GAIT TRAINING THERAPY: CPT

## 2018-03-15 RX ADMIN — FLECAINIDE ACETATE 100 MG: 100 TABLET ORAL at 10:00

## 2018-03-15 RX ADMIN — GABAPENTIN 600 MG: 300 CAPSULE ORAL at 05:50

## 2018-03-15 RX ADMIN — PANTOPRAZOLE SODIUM 40 MG: 40 TABLET, DELAYED RELEASE ORAL at 05:50

## 2018-03-15 RX ADMIN — LEVOTHYROXINE SODIUM 137 MCG: 137 TABLET ORAL at 05:50

## 2018-03-15 RX ADMIN — MAGNESIUM HYDROXIDE 10 ML: 2400 SUSPENSION ORAL at 17:34

## 2018-03-15 RX ADMIN — VENLAFAXINE HYDROCHLORIDE 150 MG: 75 CAPSULE, EXTENDED RELEASE ORAL at 09:59

## 2018-03-15 RX ADMIN — ROPINIROLE HYDROCHLORIDE 2 MG: 1 TABLET, FILM COATED ORAL at 08:37

## 2018-03-15 RX ADMIN — HYDROCODONE BITARTRATE AND ACETAMINOPHEN 1 TABLET: 7.5; 325 TABLET ORAL at 10:16

## 2018-03-15 RX ADMIN — DULOXETINE HYDROCHLORIDE 30 MG: 30 CAPSULE, DELAYED RELEASE ORAL at 20:24

## 2018-03-15 RX ADMIN — FLUTICASONE PROPIONATE 1 SPRAY: 50 SPRAY, METERED NASAL at 08:38

## 2018-03-15 RX ADMIN — NAFCILLIN SODIUM 2 G: 2 INJECTION, POWDER, LYOPHILIZED, FOR SOLUTION INTRAMUSCULAR; INTRAVENOUS at 14:00

## 2018-03-15 RX ADMIN — NAFCILLIN SODIUM 2 G: 2 INJECTION, POWDER, LYOPHILIZED, FOR SOLUTION INTRAMUSCULAR; INTRAVENOUS at 20:27

## 2018-03-15 RX ADMIN — METOPROLOL TARTRATE 12.5 MG: 25 TABLET, FILM COATED ORAL at 10:00

## 2018-03-15 RX ADMIN — ONDANSETRON 4 MG: 2 INJECTION INTRAMUSCULAR; INTRAVENOUS at 02:31

## 2018-03-15 RX ADMIN — ROPINIROLE HYDROCHLORIDE 3 MG: 1 TABLET, FILM COATED ORAL at 20:24

## 2018-03-15 RX ADMIN — NAFCILLIN SODIUM 2 G: 2 INJECTION, POWDER, LYOPHILIZED, FOR SOLUTION INTRAMUSCULAR; INTRAVENOUS at 09:59

## 2018-03-15 RX ADMIN — LORAZEPAM 1 MG: 1 TABLET ORAL at 18:20

## 2018-03-15 RX ADMIN — ASPIRIN 325 MG: 325 TABLET, COATED ORAL at 08:37

## 2018-03-15 RX ADMIN — HYDROCODONE BITARTRATE AND ACETAMINOPHEN 1 TABLET: 7.5; 325 TABLET ORAL at 17:34

## 2018-03-15 RX ADMIN — HYDROCODONE BITARTRATE AND ACETAMINOPHEN 1 TABLET: 7.5; 325 TABLET ORAL at 04:39

## 2018-03-15 RX ADMIN — GABAPENTIN 600 MG: 300 CAPSULE ORAL at 14:02

## 2018-03-15 RX ADMIN — GABAPENTIN 600 MG: 300 CAPSULE ORAL at 21:57

## 2018-03-15 RX ADMIN — NAFCILLIN SODIUM 2 G: 2 INJECTION, POWDER, LYOPHILIZED, FOR SOLUTION INTRAMUSCULAR; INTRAVENOUS at 04:39

## 2018-03-15 RX ADMIN — METOPROLOL TARTRATE 12.5 MG: 25 TABLET, FILM COATED ORAL at 20:24

## 2018-03-15 RX ADMIN — NAFCILLIN SODIUM 2 G: 2 INJECTION, POWDER, LYOPHILIZED, FOR SOLUTION INTRAMUSCULAR; INTRAVENOUS at 00:28

## 2018-03-15 RX ADMIN — FLECAINIDE ACETATE 100 MG: 100 TABLET ORAL at 20:24

## 2018-03-15 RX ADMIN — NAFCILLIN SODIUM 2 G: 2 INJECTION, POWDER, LYOPHILIZED, FOR SOLUTION INTRAMUSCULAR; INTRAVENOUS at 17:35

## 2018-03-15 NOTE — PLAN OF CARE
Problem: Patient Care Overview  Goal: Plan of Care Review  Outcome: Ongoing (interventions implemented as appropriate)   03/15/18 0243   Coping/Psychosocial   Plan of Care Reviewed With patient   Plan of Care Review   Progress improving   OTHER   Outcome Summary pt is improving with her ambulation, drsg to right knee noted to have small amount of drainage, abx given, pain well controled, daughter at bedside, safety maintained.        Problem: Fall Risk (Adult)  Goal: Absence of Fall  Outcome: Ongoing (interventions implemented as appropriate)      Problem: Skin Injury Risk (Adult)  Goal: Skin Health and Integrity  Outcome: Ongoing (interventions implemented as appropriate)      Problem: Infection, Risk/Actual (Adult)  Goal: Infection Prevention/Resolution  Outcome: Ongoing (interventions implemented as appropriate)      Problem: Pain, Acute (Adult)  Goal: Acceptable Pain Control/Comfort Level  Outcome: Ongoing (interventions implemented as appropriate)

## 2018-03-15 NOTE — THERAPY TREATMENT NOTE
Acute Care - Physical Therapy Treatment Note  [facility]     Patient Name: Danisha Cannon  : 1955  MRN: [id number]  Today's Date: 3/15/2018  Onset of Illness/Injury or Date of Surgery: 18  Date of Referral to PT: 18  Referring Physician: Dr. Capellan    Admit Date: 3/13/2018    Visit Dx:    ICD-10-CM ICD-9-CM   1. Bone infection, knee M86.9 730.96   2. Impaired mobility Z74. 799.89   3. Impaired mobility and ADLs Z74. 799.     Patient Active Problem List   Diagnosis   • Abdominal adhesions   • Abnormal echocardiogram   • Abnormal Holter exam   • Atrial flutter, paroxysmal   • Bilateral edema of lower extremity   • Chest pain   • Cholecystitis with cholelithiasis   • Chronic right-sided low back pain with sciatica   • Class 3 obesity due to excess calories with serious comorbidity in adult   • Facet syndrome, lumbar   • Heart rate fast   • HTN (hypertension)   • Idiopathic chronic gout without tophus   • Idiopathic hypotension   • Idiopathic progressive neuropathy   • Insomnia   • Lumbar facet arthropathy   • Memory loss   • MRSA carrier   • Obstructive sleep apnea   • Paroxysmal a-fib   • Pilonidal cyst   • Primary osteoarthritis of right knee   • Recurrent ventral incisional hernia   • Restless leg syndrome   • Sciatica of right side   • Shortness of breath   • Sick sinus syndrome   • Sleep apnea   • Sleep apnea, obstructive   • Snoring   • Somnolence, daytime   • Spondylosis of lumbar region without myelopathy or radiculopathy   • Tear of medial meniscus of right knee, current   • Total knee replacement status, right   • Tremor   • Umbilical hernia   • Morbid (severe) obesity due to excess calories   • Infection associated with internal knee prosthesis   • Infection of right knee   • Bone infection, knee       Therapy Treatment    Therapy Treatment / Health Promotion    Treatment Time/Intention  Discipline: physical therapy assistant (Gypsy Underwood PTA)  Document Type: therapy note (daily  note) (Gypsy Underwood PTA)  Subjective Information: complains of, weakness, pain (Gypsy Underwood, PTA)  Existing Precautions/Restrictions: fall (Gypsy Underwood, PTA)  Plan of Care Review  Plan of Care Reviewed With: patient (Gypsy Underwood PTA)    Vitals/Pain/Safety  Pain Scale: Numbers Pre/Post-Treatment  Pain Scale: Numbers, Pretreatment: 1/10 (Gypsy Underwood PTA)  Pain Location - Side: Right (Gypsy ELIZABETH Underwood, PTA)  Pain Location: knee (Gypsy ELIZABETH Underwood, PTA)  Pain Intervention(s): Medication (See MAR), Repositioned, Ambulation/increased activity (Gypsy ELIZABETH Underwood, PTA)  Pain Scale: Word Pre/Post-Treatment  Pain Location - Side: Right (Gypsy M Underwood, PTA)  Pain Location: knee (Gypsy ELIZABETH CaseUnderwood, PTA)  Pain Intervention(s): Medication (See MAR), Repositioned, Ambulation/increased activity (Gypsy ELIZABETH Underwood, PTA)  Pain Scale: FACES Pre/Post-Treatment  Pain Location - Side: Right (Gypsybrina Underwood, PTA)  Pain Location: knee (Gypsy ELIZABETH Underwood, PTA)  Pain Intervention(s): Medication (See MAR), Repositioned, Ambulation/increased activity (Gypsy ELIZABETH Underwood, PTA)  Positioning and Restraints  Pre-Treatment Position: in bed (Gypsy Underwood PTA)  Post Treatment Position: bed (Gypsy Underwood PTA)  In Bed: fowlers, call light within reach, encouraged to call for assist (Gypsy Underwood PTA)    Mobility,ADL,Motor, Modality  Bed Mobility Assessment/Treatment  Supine-Sit [place] (Bed Mobility): minimum assist (75% patient effort), contact guard, verbal cues (assist with RLE) (Gypsy Underwood PTA)  Sit-Supine [place] (Bed Mobility): contact guard, minimum assist (75% patient effort), verbal cues (assist with RLE) (Gypsy Underwood PTA)  Comment (Bed Mobility): assist wtih RLE (Gypsy Underwood PTA)  Sit-Stand Transfer  Sit-Stand [place] (Transfers): contact guard, minimum assist (75% patient effort), verbal cues (Gypsy Underwood PTA)  Stand-Sit Transfer  Stand-Sit [place] (Transfers): contact guard, verbal cues (Gypsy JOHNSON  MARNI Underwood)  Gait/Stairs Assessment/Training  [place] Level (Gait): contact guard, verbal cues (Gypsy Underwood PTA)  Assistive Device (Gait): walker, front-wheeled (Gypsy Underwood PTA)  Distance in Feet (Gait): 75 (75 x 2) (Gypsy Underwood PTA)  Deviations/Abnormal Patterns (Gait): antalgic, dami decreased (Gypsy Underwood PTA)                 ROM/MMT             Sensory, Edema, Orthotics          Cognition, Communication, Swallow       Outcome Summary               PT Rehab Goals     Row Name 03/14/18 0925             Bed Mobility Goal 1 (PT)    Activity/Assistive Device (Bed Mobility Goal 1, PT) bed mobility activities, all  -LH      [place] Level/Cues Needed (Bed Mobility Goal 1, PT) independent  -LH      Time Frame (Bed Mobility Goal 1, PT) by discharge  -      Progress/Outcomes (Bed Mobility Goal 1, PT) goal ongoing  -         Transfer Goal 1 (PT)    Activity/Assistive Device (Transfer Goal 1, PT) sit-to-stand/stand-to-sit;bed-to-chair/chair-to-bed  -LH      [place] Level/Cues Needed (Transfer Goal 1, PT) independent  -LH      Time Frame (Transfer Goal 1, PT) by discharge  -      Progress/Outcome (Transfer Goal 1, PT) goal ongoing  -         Gait Training Goal 1 (PT)    Activity/Assistive Device (Gait Training Goal 1, PT) gait (walking locomotion);assistive device use  -LH      [place] Level (Gait Training Goal 1, PT) independent  -LH      Distance (Gait Goal 1, PT) 120  -LH      Time Frame (Gait Training Goal 1, PT) by discharge  -      Progress/Outcome (Gait Training Goal 1, PT) goal ongoing  -        User Key  (r) = Recorded By, (t) = Taken By, (c) = Cosigned By    Initials Name Provider Type     Jovi Whittington PT Physical Therapist          Physical Therapy Education     Title: PT OT SLP Therapies (Done)     Topic: Physical Therapy (Done)     Point: Mobility training (Done)    Learning Progress Summary     Learner Status Readiness Method Response Comment Documented by     Patient Done Acceptance E VU bed mobility  03/15/18 1005     Done Acceptance E,D VU,DU benefits of PT and POC, call for assist w/ mobility  03/14/18 1128    Family Done Acceptance E,D VU,DU benefits of PT and POC, call for assist w/ mobility  03/14/18 1128          Point: Precautions (Done)    Learning Progress Summary     Learner Status Readiness Method Response Comment Documented by    Patient Done Acceptance E,D VU,DU benefits of PT and POC, call for assist w/ mobility  03/14/18 1128    Family Done Acceptance E,D VU,DU benefits of PT and POC, call for assist w/ mobility  03/14/18 1128                      User Key     Initials Effective Dates Name Provider Type Discipline     08/02/16 -  Gypsy Underwood, PTA Physical Therapy Assistant PT     08/02/16 -  Jovi Whittington, PT Physical Therapist PT                    PT Recommendation and Plan     Plan of Care Reviewed With: patient  Progress: improving  Outcome Summary: pt trans to EOB cga-min assist for RLE, sit-stand cga-min from elevated bed, pt amb 75 feet x 2 with rwx cga.           Outcome Measures     Row Name 03/15/18 1000 03/14/18 1400 03/14/18 1000       How much help from another person do you currently need...    Turning from your back to your side while in flat bed without using bedrails? 3  -AH  -- 3  -LH    Moving from lying on back to sitting on the side of a flat bed without bedrails? 3  -AH  -- 2  -LH    Moving to and from a bed to a chair (including a wheelchair)? 3  -AH  -- 3  -LH    Standing up from a chair using your arms (e.g., wheelchair, bedside chair)? 3  -AH  -- 3  -LH    Climbing 3-5 steps with a railing? 2  -AH  -- 2  -LH    To walk in hospital room? 3  -AH  -- 3  -LH    AM-PAC 6 Clicks Score 17  -  -- 16  -       How much help from another is currently needed...    Putting on and taking off regular lower body clothing?  -- 2  -AC (r) MK (t) AC (c)  --    Bathing (including washing, rinsing, and drying)  -- 2  -AC (r) MK  (t) AC (c)  --    Toileting (which includes using toilet bed pan or urinal)  -- 2  -AC (r) MK (t) AC (c)  --    Putting on and taking off regular upper body clothing  -- 3  -AC (r) MK (t) AC (c)  --    Taking care of personal grooming (such as brushing teeth)  -- 3  -AC (r) MK (t) AC (c)  --    Eating meals  -- 4  -AC (r) MK (t) AC (c)  --    Score  -- 16  -AC (r) MK (t)  --       Functional Assessment    Outcome Measure Options AM-PAC 6 Clicks Basic Mobility (PT)  - AM-PAC 6 Clicks Daily Activity (OT)  -AC (r) MK (t) AC (c) AM-PAC 6 Clicks Basic Mobility (PT)  -      User Key  (r) = Recorded By, (t) = Taken By, (c) = Cosigned By    Initials Name Provider Type     Edouard Wheatley, OTR/L Occupational Therapist     Gypsy Underwood PTA Physical Therapy Assistant     Jovi Whittington, PT Physical Therapist    THOMAS Helm, OT Student OT Student           Time Calculation:         PT Charges     Row Name 03/15/18 1007             Time Calculation    Start Time 0937  -      Stop Time 1007  -      Time Calculation (min) 30 min  -      PT Received On 03/15/18  -      PT Goal Re-Cert Due Date 03/24/18  -         Time Calculation- PT    Total Timed Code Minutes- PT 30 minute(s)  -        User Key  (r) = Recorded By, (t) = Taken By, (c) = Cosigned By    Initials Name Provider Type     Gypsy Underwood PTA Physical Therapy Assistant          Therapy Charges for Today     Code Description Service Date Service Provider Modifiers Qty    96273747542 HC GAIT TRAINING EA 15 MIN 3/14/2018 Gypsy Underwood PTA GP 2    12941808481 HC GAIT TRAINING EA 15 MIN 3/15/2018 Gypsy Underwood PTA GP, KX 2          PT G-Codes  Outcome Measure Options: AM-PAC 6 Clicks Basic Mobility (PT)  Score: 16  Functional Limitation: Mobility: Walking and moving around  Mobility: Walking and Moving Around Current Status (): At least 40 percent but less than 60 percent impaired, limited or restricted  Mobility: Walking and Moving Around  Goal Status (): At least 20 percent but less than 40 percent impaired, limited or restricted    Gypsy Underwood, PTA  3/15/2018

## 2018-03-15 NOTE — PROGRESS NOTES
"Infectious Diseases Progress Note    Patient:  Danisha Cannon  YOB: 1955  MRN: [id number]   Admit date: 3/13/2018   Admitting Physician: Amilcar Capellan MD  Primary Care Physician: Juan Carlos Sparks MD    Chief Complaint/Interval History: She feels much better today.  She had pain which came out of anesthesia yesterday.  Pain controlled today.  No fever.  Tolerating nafcillin without side effect.    Intake/Output Summary (Last 24 hours) at 03/14/18 1916  Last data filed at 03/14/18 1338   Gross per 24 hour   Intake              480 ml   Output              685 ml   Net             -205 ml     Allergies:   Allergies   Allergen Reactions   • Codeine Dizziness and Nausea Only     Rapid heart rate, dizziness  NAUSEA   • Mobic [Meloxicam] Rash   • Morphine And Related Itching     Current Scheduled Medications:     aspirin 325 mg Oral Daily   DULoxetine 30 mg Oral Nightly   flecainide 100 mg Oral Q12H   fluticasone 1 spray Each Nare Daily   gabapentin 600 mg Oral Q8H   levothyroxine 137 mcg Oral Q AM   metoprolol tartrate 12.5 mg Oral Q12H   nafcillin 2 g Intravenous Q4H   pantoprazole 40 mg Oral Q AM   rOPINIRole 2 mg Oral Daily   rOPINIRole 3 mg Oral Nightly   triamcinolone  Topical Q12H   venlafaxine  mg Oral Daily     Current PRN Medications:  docusate sodium  •  HYDROcodone-acetaminophen  •  HYDROmorphone **AND** naloxone  •  LORazepam  •  magnesium hydroxide  •  naloxone  •  ondansetron **OR** ondansetron ODT **OR** ondansetron    Review of Systems see HPI    Vital Signs:  /55 (BP Location: Right arm, Patient Position: Lying)   Pulse 87   Temp 98.7 °F (37.1 °C) (Oral)   Resp 18   Ht 165.1 cm (65\")   Wt 119 kg (263 lb)   LMP  (LMP Unknown) Comment: post menapause  SpO2 92%   BMI 43.77 kg/m²     Physical Exam  Vital signs reviewed.  Right lower extremity sensation and capillary refill intact  Line/IV site: No erythema, warmth, induration, or tenderness.    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  She is undergone explant.  Tolerating nafcillin without side effect.    Recommendations:   Continue nafcillin  We will plan to continue nafcillin 12 g IV daily via continuous infusion when she goes home  IV antibiotic treatment is in place at home.  She will be able to go home from infectious disease standpoint when ready for release per orthopedic surgery.    Chente Urena MD

## 2018-03-15 NOTE — PROGRESS NOTES
Continued Stay Note   [facility]     Patient Name: Danisha Cannon  MRN: [id number]  Today's Date: 3/15/2018    Admit Date: 3/13/2018          Discharge Plan     Row Name 03/15/18 3926       Plan    Plan Home Infusion / Home Health    Patient/Family in Agreement with Plan yes    Plan Comments Received orders for home infusion. ANDIE has faxed to [facility] and spoke to La to confirm receipt. ANDIE faxed to [facility] at [phone]. ANDIE faxed to Dr. Florentino's office at [phone]. ANDIE will follow to notify [facility] and [facility] at time of discharge (likely tomorrow).               Discharge Codes    No documentation.           FRANKIE Hernandez

## 2018-03-15 NOTE — THERAPY TREATMENT NOTE
Acute Care - Physical Therapy Treatment Note  [facility]     Patient Name: Danisha Cannon  : 1955  MRN: [id number]  Today's Date: 3/15/2018  Onset of Illness/Injury or Date of Surgery: 18  Date of Referral to PT: 18  Referring Physician: Dr. Capellan    Admit Date: 3/13/2018    Visit Dx:    ICD-10-CM ICD-9-CM   1. Bone infection, knee M86.9 730.96   2. Impaired mobility Z74. 799.89   3. Impaired mobility and ADLs Z74. 799.     Patient Active Problem List   Diagnosis   • Abdominal adhesions   • Abnormal echocardiogram   • Abnormal Holter exam   • Atrial flutter, paroxysmal   • Bilateral edema of lower extremity   • Chest pain   • Cholecystitis with cholelithiasis   • Chronic right-sided low back pain with sciatica   • Class 3 obesity due to excess calories with serious comorbidity in adult   • Facet syndrome, lumbar   • Heart rate fast   • HTN (hypertension)   • Idiopathic chronic gout without tophus   • Idiopathic hypotension   • Idiopathic progressive neuropathy   • Insomnia   • Lumbar facet arthropathy   • Memory loss   • MRSA carrier   • Obstructive sleep apnea   • Paroxysmal a-fib   • Pilonidal cyst   • Primary osteoarthritis of right knee   • Recurrent ventral incisional hernia   • Restless leg syndrome   • Sciatica of right side   • Shortness of breath   • Sick sinus syndrome   • Sleep apnea   • Sleep apnea, obstructive   • Snoring   • Somnolence, daytime   • Spondylosis of lumbar region without myelopathy or radiculopathy   • Tear of medial meniscus of right knee, current   • Total knee replacement status, right   • Tremor   • Umbilical hernia   • Morbid (severe) obesity due to excess calories   • Infection associated with internal knee prosthesis   • Infection of right knee   • Bone infection, knee       Therapy Treatment    Therapy Treatment / Health Promotion    Treatment Time/Intention  Discipline: physical therapy assistant (Gypsy Underwood PTA)  Document Type: therapy note (daily  note) (Gypsy Underwood PTA)  Subjective Information: complains of, pain, weakness (Gypsy Underwood PTA)  Existing Precautions/Restrictions: fall (Gypsy Underwood, PTA)  Plan of Care Review  Plan of Care Reviewed With: patient (Gypsy Underwood PTA)    Vitals/Pain/Safety  Pain Scale: Numbers Pre/Post-Treatment  Pain Scale: Numbers, Pretreatment: 2/10 (Gypsy Underwood PTA)  Pain Location - Side: Right (Gypsy Underwood, PTA)  Pain Location: knee (Gypsy Underwood, PTA)  Pain Intervention(s): Medication (See MAR) (Gypsy ELIZABETH Underwood, PTA)  Pain Scale: Word Pre/Post-Treatment  Pain Location - Side: Right (Gypsy Underwood, PTA)  Pain Location: knee (Gypsy ELIZABETH Underwood, PTA)  Pain Intervention(s): Medication (See MAR) (Gypsy ELIZABETH Underwood, PTA)  Pain Scale: FACES Pre/Post-Treatment  Pain Location - Side: Right (Gypsy Underwood, PTA)  Pain Location: knee (Gypsy ELIZABETH Underwood, PTA)  Pain Intervention(s): Medication (See MAR) (Gypsy Underwood, PTA)  Positioning and Restraints  Pre-Treatment Position: in bed (Gypsy Underwood PTA)  Post Treatment Position: bed (Gypsy Underwood PTA)  In Bed: fowlers, call light within reach, encouraged to call for assist (Gypsy Underwood PTA)    Mobility,ADL,Motor, Modality  Bed Mobility Assessment/Treatment  Supine-Sit [place] (Bed Mobility): conditional independence (Gypsy Underwood PTA)  Sit-Supine [place] (Bed Mobility): contact guard, minimum assist (75% patient effort), verbal cues (assist with RLE) (Gypsy Underwood PTA)  Comment (Bed Mobility): assist wtih RLE (Gypsy Underwood PTA)  Transfer Assessment/Treatment  Transfer Assessment/Treatment: toilet transfer (Gypsy Underwood PTA)  Sit-Stand Transfer  Sit-Stand [place] (Transfers): contact guard, minimum assist (75% patient effort), verbal cues (Gypsy Underwood PTA)  Stand-Sit Transfer  Stand-Sit [place] (Transfers): contact guard, verbal cues (Gypsy Underwood PTA)  Toilet Transfer  [place] Level (Toilet Transfer): conditional independence  (Gypsy Underwood, MARNI)  Assistive Device (Toilet Transfer): walker, front-wheeled, raised toilet seat (Gypsy Underwood, PTA)  Gait/Stairs Assessment/Training  [place] Level (Gait): contact guard, verbal cues (Gypsy Underwood PTA)  Assistive Device (Gait): walker, front-wheeled (Gypsy Underwood, PTA)  Distance in Feet (Gait): 100 (100 x 2) (Gypsy Underwood PTA)  Deviations/Abnormal Patterns (Gait): antalgic, dami decreased (Gypsy Underwood PTA)                 ROM/MMT             Sensory, Edema, Orthotics          Cognition, Communication, Swallow       Outcome Summary               PT Rehab Goals     Row Name 03/14/18 0925             Bed Mobility Goal 1 (PT)    Activity/Assistive Device (Bed Mobility Goal 1, PT) bed mobility activities, all  -LH      [place] Level/Cues Needed (Bed Mobility Goal 1, PT) independent  -      Time Frame (Bed Mobility Goal 1, PT) by discharge  -      Progress/Outcomes (Bed Mobility Goal 1, PT) goal ongoing  -         Transfer Goal 1 (PT)    Activity/Assistive Device (Transfer Goal 1, PT) sit-to-stand/stand-to-sit;bed-to-chair/chair-to-bed  -LH      [place] Level/Cues Needed (Transfer Goal 1, PT) independent  -LH      Time Frame (Transfer Goal 1, PT) by discharge  -      Progress/Outcome (Transfer Goal 1, PT) goal ongoing  -         Gait Training Goal 1 (PT)    Activity/Assistive Device (Gait Training Goal 1, PT) gait (walking locomotion);assistive device use  -      [place] Level (Gait Training Goal 1, PT) independent  -      Distance (Gait Goal 1, PT) 120  -LH      Time Frame (Gait Training Goal 1, PT) by discharge  -      Progress/Outcome (Gait Training Goal 1, PT) goal ongoing  -        User Key  (r) = Recorded By, (t) = Taken By, (c) = Cosigned By    Initials Name Provider Type     Jovi Whittington PT Physical Therapist          Physical Therapy Education     Title: PT OT SLP Therapies (Done)     Topic: Physical Therapy (Done)     Point: Mobility  training (Done)    Learning Progress Summary     Learner Status Readiness Method Response Comment Documented by    Patient Done Acceptance E VU bed mobility  03/15/18 1005     Done Acceptance E,D VU,DU benefits of PT and POC, call for assist w/ mobility  03/14/18 1128    Family Done Acceptance E,D VU,DU benefits of PT and POC, call for assist w/ mobility  03/14/18 1128          Point: Precautions (Done)    Learning Progress Summary     Learner Status Readiness Method Response Comment Documented by    Patient Done Acceptance E,D VU,DU benefits of PT and POC, call for assist w/ mobility  03/14/18 1128    Family Done Acceptance E,D VU,DU benefits of PT and POC, call for assist w/ mobility  03/14/18 1128                      User Key     Initials Effective Dates Name Provider Type Discipline     08/02/16 -  Gypsy Underwood, PTA Physical Therapy Assistant PT     08/02/16 -  Jovi Whittington, PT Physical Therapist PT                    PT Recommendation and Plan     Plan of Care Reviewed With: patient  Progress: improving  Outcome Summary: pt trans to EOB cga-min assist for RLE, sit-stand cga-min from elevated bed, pt amb 75 feet x 2 with rwx cga.           Outcome Measures     Row Name 03/15/18 1000 03/14/18 1400 03/14/18 1000       How much help from another person do you currently need...    Turning from your back to your side while in flat bed without using bedrails? 3  -AH  -- 3  -LH    Moving from lying on back to sitting on the side of a flat bed without bedrails? 3  -AH  -- 2  -LH    Moving to and from a bed to a chair (including a wheelchair)? 3  -AH  -- 3  -LH    Standing up from a chair using your arms (e.g., wheelchair, bedside chair)? 3  -AH  -- 3  -LH    Climbing 3-5 steps with a railing? 2  -AH  -- 2  -LH    To walk in hospital room? 3  -AH  -- 3  -LH    AM-PAC 6 Clicks Score 17  -AH  -- 16  -       How much help from another is currently needed...    Putting on and taking off regular lower body  clothing?  -- 2  -AC (r) MK (t) AC (c)  --    Bathing (including washing, rinsing, and drying)  -- 2  -AC (r) MK (t) AC (c)  --    Toileting (which includes using toilet bed pan or urinal)  -- 2  -AC (r) MK (t) AC (c)  --    Putting on and taking off regular upper body clothing  -- 3  -AC (r) MK (t) AC (c)  --    Taking care of personal grooming (such as brushing teeth)  -- 3  -AC (r) MK (t) AC (c)  --    Eating meals  -- 4  -AC (r) MK (t) AC (c)  --    Score  -- 16  -AC (r) MK (t)  --       Functional Assessment    Outcome Measure Options AM-PAC 6 Clicks Basic Mobility (PT)  -[facility]-PAC 6 Clicks Daily Activity (OT)  -AC (r) MK (t) AC (c) -PAC 6 Clicks Basic Mobility (PT)  -      User Key  (r) = Recorded By, (t) = Taken By, (c) = Cosigned By    Initials Name Provider Type     Edouard Wheatley, OTR/L Occupational Therapist    NICOL Underwood PTA Physical Therapy Assistant     Jovi Whittington, PT Physical Therapist    THOMAS Helm, OT Student OT Student           Time Calculation:         PT Charges     Row Name 03/15/18 1430 03/15/18 1007          Time Calculation    Start Time 1357  - 0937  -     Stop Time 1427  -AH 1007  -     Time Calculation (min) 30 min  - 30 min  -     PT Received On  -- 03/15/18  -     PT Goal Re-Cert Due Date  -- 03/24/18  -        Time Calculation- PT    Total Timed Code Minutes- PT 30 minute(s)  - 30 minute(s)  -       User Key  (r) = Recorded By, (t) = Taken By, (c) = Cosigned By    Initials Name Provider Type     Gypsy Underwood PTA Physical Therapy Assistant          Therapy Charges for Today     Code Description Service Date Service Provider Modifiers Qty    83526544113  GAIT TRAINING EA 15 MIN 3/14/2018 Gypsy Underwood PTA GP 2    79510632647 HC GAIT TRAINING EA 15 MIN 3/15/2018 Gypsy Underwood PTA GP, KX 2    29673636514 HC GAIT TRAINING EA 15 MIN 3/15/2018 Gypsy Underwood PTA GP, KX 2          PT G-Codes  Outcome Measure Options: AM-PAC 6 Clicks Basic  Mobility (PT)  Score: 16  Functional Limitation: Mobility: Walking and moving around  Mobility: Walking and Moving Around Current Status (): At least 40 percent but less than 60 percent impaired, limited or restricted  Mobility: Walking and Moving Around Goal Status (): At least 20 percent but less than 40 percent impaired, limited or restricted    Gypsy Underwood, PTA  3/15/2018

## 2018-03-15 NOTE — PROGRESS NOTES
"Infectious Diseases Progress Note    Patient:  Danisha Cannon  YOB: 1955  MRN: [id number]   Admit date: 3/13/2018   Admitting Physician: Amilcar Capellan MD  Primary Care Physician: Juan Carlos Sparks MD    Chief Complaint/Interval History: She feels better.  She indicates Dr. Capellan hopes for her to be discharged Friday.  She is tolerating antibiotic treatment without rash or diarrhea.  Pain controlled at present.  No pain at PICC line site.  She seems in good spirits.    Intake/Output Summary (Last 24 hours) at 03/15/18 1117  Last data filed at 03/15/18 0439   Gross per 24 hour   Intake             1060 ml   Output                0 ml   Net             1060 ml     Allergies:   Allergies   Allergen Reactions   • Codeine Dizziness and Nausea Only     Rapid heart rate, dizziness  NAUSEA   • Mobic [Meloxicam] Rash   • Morphine And Related Itching     Current Scheduled Medications:     aspirin 325 mg Oral Daily   DULoxetine 30 mg Oral Nightly   flecainide 100 mg Oral Q12H   fluticasone 1 spray Each Nare Daily   gabapentin 600 mg Oral Q8H   levothyroxine 137 mcg Oral Q AM   metoprolol tartrate 12.5 mg Oral Q12H   nafcillin 2 g Intravenous Q4H   pantoprazole 40 mg Oral Q AM   rOPINIRole 2 mg Oral Daily   rOPINIRole 3 mg Oral Nightly   triamcinolone  Topical Q12H   venlafaxine  mg Oral Daily     Current PRN Medications:  docusate sodium  •  HYDROcodone-acetaminophen  •  LORazepam  •  magnesium hydroxide  •  naloxone  •  [DISCONTINUED] HYDROmorphone **AND** naloxone  •  ondansetron **OR** ondansetron ODT **OR** ondansetron    Review of Systems see HPI    Vital Signs:  /64 (BP Location: Right arm, Patient Position: Lying)   Pulse 82   Temp 98.1 °F (36.7 °C) (Oral)   Resp 18   Ht 165.1 cm (65\")   Wt 119 kg (263 lb)   LMP  (LMP Unknown) Comment: post menapause  SpO2 92%   BMI 43.77 kg/m²     Physical Exam  Vital signs reviewed.  Right foot with intact sensation and capillary refill.  Minimal " serous appearing drainage on right knee dressing.  Line/IV site: No erythema, warmth, induration, or tenderness.    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  She underwent explant on 03/13/2018.    Recommendations:   Anticipate discharge home tomorrow  Updated home IV antibiotic orders written as outlined below and sent to   Will sign off for now  Would be happy to reassess if any questions or concerns for infectious diseases    Home IV antibiotic orders:  1.  Diagnosis right knee prosthetic joint infection-she underwent irrigation, debridement, and poly-exchange on 3/3/18.  She underwent right knee prosthetic joint explant with placement of an antibiotic impregnated spacer on 03/13/2018.  2.  Orthopedic surgeon-Mervin Capellan M.D.  3.  Microbiology-methicillin susceptible staph aureus  4.  IV access-PICC line  5.  Antibiotic orders:  Nafcillin 12 g IV daily given via a continuous infusion through 4/24/18 (that would be 6 weeks following joint explant)  6.  Laboratory monitoring:  CBC, sedimentation rate, CRP every Monday  7.  Follow-up appointment 2 weeks  8.  Fax copy of these orders to [phone]     Chente Urena MD

## 2018-03-15 NOTE — PLAN OF CARE
Problem: Patient Care Overview  Goal: Plan of Care Review  Outcome: Ongoing (interventions implemented as appropriate)   03/15/18 1005   Coping/Psychosocial   Plan of Care Reviewed With patient   Plan of Care Review   Progress improving   OTHER   Outcome Summary pt trans to EOB cga-min assist for RLE, sit-stand cga-min from elevated bed, pt amb 75 feet x 2 with rwx cga.

## 2018-03-16 LAB
HCT VFR BLD AUTO: 23.6 % (ref 37–47)
HGB BLD-MCNC: 7.3 G/DL (ref 12–16)

## 2018-03-16 PROCEDURE — 85018 HEMOGLOBIN: CPT | Performed by: ORTHOPAEDIC SURGERY

## 2018-03-16 PROCEDURE — 97535 SELF CARE MNGMENT TRAINING: CPT

## 2018-03-16 PROCEDURE — 97116 GAIT TRAINING THERAPY: CPT

## 2018-03-16 PROCEDURE — 85014 HEMATOCRIT: CPT | Performed by: ORTHOPAEDIC SURGERY

## 2018-03-16 RX ADMIN — NAFCILLIN SODIUM 2 G: 2 INJECTION, POWDER, LYOPHILIZED, FOR SOLUTION INTRAMUSCULAR; INTRAVENOUS at 20:22

## 2018-03-16 RX ADMIN — HYDROCODONE BITARTRATE AND ACETAMINOPHEN 1 TABLET: 7.5; 325 TABLET ORAL at 09:52

## 2018-03-16 RX ADMIN — GABAPENTIN 600 MG: 300 CAPSULE ORAL at 05:32

## 2018-03-16 RX ADMIN — NAFCILLIN SODIUM 2 G: 2 INJECTION, POWDER, LYOPHILIZED, FOR SOLUTION INTRAMUSCULAR; INTRAVENOUS at 05:32

## 2018-03-16 RX ADMIN — FLECAINIDE ACETATE 100 MG: 100 TABLET ORAL at 09:52

## 2018-03-16 RX ADMIN — VENLAFAXINE HYDROCHLORIDE 150 MG: 75 CAPSULE, EXTENDED RELEASE ORAL at 09:52

## 2018-03-16 RX ADMIN — FLUTICASONE PROPIONATE 1 SPRAY: 50 SPRAY, METERED NASAL at 09:54

## 2018-03-16 RX ADMIN — GABAPENTIN 600 MG: 300 CAPSULE ORAL at 15:40

## 2018-03-16 RX ADMIN — GABAPENTIN 600 MG: 300 CAPSULE ORAL at 20:24

## 2018-03-16 RX ADMIN — HYDROCODONE BITARTRATE AND ACETAMINOPHEN 1 TABLET: 7.5; 325 TABLET ORAL at 03:18

## 2018-03-16 RX ADMIN — PANTOPRAZOLE SODIUM 40 MG: 40 TABLET, DELAYED RELEASE ORAL at 05:32

## 2018-03-16 RX ADMIN — FLECAINIDE ACETATE 100 MG: 100 TABLET ORAL at 20:21

## 2018-03-16 RX ADMIN — NAFCILLIN SODIUM 2 G: 2 INJECTION, POWDER, LYOPHILIZED, FOR SOLUTION INTRAMUSCULAR; INTRAVENOUS at 09:51

## 2018-03-16 RX ADMIN — ASPIRIN 325 MG: 325 TABLET, COATED ORAL at 09:52

## 2018-03-16 RX ADMIN — TRIAMCINOLONE ACETONIDE: 1 CREAM TOPICAL at 09:52

## 2018-03-16 RX ADMIN — DULOXETINE HYDROCHLORIDE 30 MG: 30 CAPSULE, DELAYED RELEASE ORAL at 20:20

## 2018-03-16 RX ADMIN — METOPROLOL TARTRATE 12.5 MG: 25 TABLET, FILM COATED ORAL at 20:21

## 2018-03-16 RX ADMIN — ROPINIROLE HYDROCHLORIDE 3 MG: 1 TABLET, FILM COATED ORAL at 20:21

## 2018-03-16 RX ADMIN — METOPROLOL TARTRATE 12.5 MG: 25 TABLET, FILM COATED ORAL at 09:53

## 2018-03-16 RX ADMIN — NAFCILLIN SODIUM 2 G: 2 INJECTION, POWDER, LYOPHILIZED, FOR SOLUTION INTRAMUSCULAR; INTRAVENOUS at 17:39

## 2018-03-16 RX ADMIN — LEVOTHYROXINE SODIUM 137 MCG: 137 TABLET ORAL at 05:32

## 2018-03-16 RX ADMIN — MAGNESIUM HYDROXIDE 10 ML: 2400 SUSPENSION ORAL at 10:04

## 2018-03-16 RX ADMIN — ROPINIROLE HYDROCHLORIDE 2 MG: 1 TABLET, FILM COATED ORAL at 09:52

## 2018-03-16 RX ADMIN — NAFCILLIN SODIUM 2 G: 2 INJECTION, POWDER, LYOPHILIZED, FOR SOLUTION INTRAMUSCULAR; INTRAVENOUS at 00:55

## 2018-03-16 RX ADMIN — HYDROCODONE BITARTRATE AND ACETAMINOPHEN 1 TABLET: 7.5; 325 TABLET ORAL at 17:49

## 2018-03-16 NOTE — PLAN OF CARE
Problem: Patient Care Overview  Goal: Plan of Care Review  Outcome: Ongoing (interventions implemented as appropriate)   03/16/18 2314   Coping/Psychosocial   Plan of Care Reviewed With patient   Plan of Care Review   Progress improving   OTHER   Outcome Summary Pt completed TB bathing with S with tub bench and HH shower head. Pt I with UB dressing and required set up for LB dressing. R knee was covered during TB shower to prevent water from saturating dressing. Pt plans to discharge home. Continue OT POC

## 2018-03-16 NOTE — PROGRESS NOTES
This patient is post explantation of a right total knee replacement for infection.  She has a spacer in place with antibiotic cement antibiotic beads.  She is on IV antibiotics.  She unfortunately developed some drainage.  We will continue with dressing changes as needed

## 2018-03-16 NOTE — PAYOR COMM NOTE
"FROM: WINTER NAGY  PHONE: [phone]  FAX: [phone]    AUTH: [id number]    Lucretia Cannon (62 y.o. Female)     Date of Birth Social Security Number Address Home Phone MRN    1955  [address] [place] [address] [phone] [id number]    Nondenominational Marital Status          None        Admission Date Admission Type Admitting Provider Attending Provider Department, Room/Bed    3/13/18 Elective Amilcar Capellan MD Jackson, Stephen H, MD [facility] 3A, 352/1    Discharge Date Discharge Disposition Discharge Destination                       Attending Provider:  Amilcar Capellan MD    Allergies:  Codeine, Mobic [Meloxicam], Morphine And Related    Isolation:  None   Infection:  None   Code Status:  FULL    Ht:  165.1 cm (65\")   Wt:  119 kg (263 lb)    Admission Cmt:  None   Principal Problem:  None                Active Insurance as of 3/13/2018     Primary Coverage     Payor Plan Insurance Group Employer/Plan Group    [employer] BLUE CROSS AN[employer]M BLUE CROSS BLUE SHIELD PPO [id number]     Payor Plan Address Payor Plan Phone Number Effective From Effective To    [address] [phone] 1/1/2016     [place] [address]       Subscriber Name Subscriber Birth Date Member ID       LUCRETIA CANNON 1955 [id number]                 Emergency Contacts      (Rel.) Home Phone Work Phone Mobile Phone    Clark Cannon (Spouse) [phone] -- [phone]                  ICU Vital Signs     Row Name 03/16/18 0827 03/16/18 0730 03/16/18 0320 03/15/18 2350 03/15/18 2018       Vitals    Temp 98.1 °F (36.7 °C)  -- 99.1 °F (37.3 °C) 97.9 °F (36.6 °C) 98.8 °F (37.1 °C)    Temp src Oral  -- Oral Oral Oral    Pulse 79  -- 80 73 81    Heart Rate Source Monitor  -- Monitor Monitor Monitor    Resp 18  -- 17 18  --    Resp Rate Source Visual  -- Visual Visual  --    /68  -- 136/58 154/54 139/57    BP Location Right arm  -- Left arm Right arm Right arm    BP Method Automatic  -- Automatic " Automatic Automatic    Patient Position Lying  -- Lying Lying Lying       Oxygen Therapy    SpO2 96 %  -- 94 % 93 % 100 %    Pulse Oximetry Type  --  -- Continuous Continuous Continuous    Device (Oxygen Therapy) room air room air room air CPAP room air    Row Name 03/15/18 1540 03/15/18 1121                Vitals    Temp 98.2 °F (36.8 °C) 98.1 °F (36.7 °C)       Temp src Oral Oral       Pulse 80 81       Heart Rate Source Monitor Monitor       Resp 18 18       Resp Rate Source Visual Visual       /62 124/61       Noninvasive MAP (mmHg) 74 74       BP Location Right arm Right arm       BP Method Automatic Automatic       Patient Position Lying Lying          Oxygen Therapy    SpO2 94 % 93 %       Pulse Oximetry Type Continuous Continuous       Device (Oxygen Therapy) nasal cannula nasal cannula       Flow (L/min) 2 2           Hospital Medications (all)       Dose Frequency Start End    aspirin tablet 325 mg 325 mg Daily 3/13/2018     Sig - Route: Take 1 tablet by mouth Daily. - Oral    ceFAZolin (ANCEF) 2 g/20ml IV PUSH syringe 2 g Once 3/13/2018 3/13/2018    Sig - Route: Infuse 20 mL into a venous catheter 1 (One) Time. - Intravenous    docusate sodium (COLACE) capsule 100 mg 100 mg 2 Times Daily PRN 3/13/2018     Sig - Route: Take 1 capsule by mouth 2 (Two) Times a Day As Needed for Constipation. - Oral    DULoxetine (CYMBALTA) DR capsule 30 mg 30 mg Nightly 3/13/2018     Sig - Route: Take 1 capsule by mouth Every Night. - Oral    flecainide (TAMBOCOR) tablet 100 mg 100 mg Every 12 Hours Scheduled 3/13/2018     Sig - Route: Take 1 tablet by mouth Every 12 (Twelve) Hours. - Oral    fluticasone (FLONASE) 50 MCG/ACT nasal spray 1 spray 1 spray Daily 3/14/2018     Sig - Route: 1 spray by Each Nare route Daily. - Each Nare    gabapentin (NEURONTIN) capsule 600 mg 600 mg Every 8 Hours Scheduled 3/13/2018     Sig - Route: Take 2 capsules by mouth Every 8 (Eight) Hours. - Oral    HYDROcodone-acetaminophen (NORCO)  "7.5-325 MG per tablet 1 tablet 1 tablet Every 4 Hours PRN 3/13/2018 3/23/2018    Sig - Route: Take 1 tablet by mouth Every 4 (Four) Hours As Needed for Moderate Pain . - Oral    levothyroxine (SYNTHROID, LEVOTHROID) tablet 137 mcg 137 mcg Every Early Morning 3/14/2018     Sig - Route: Take 1 tablet by mouth Every Morning. - Oral    LORazepam (ATIVAN) tablet 1 mg 1 mg Every 8 Hours PRN 3/13/2018     Sig - Route: Take 1 tablet by mouth Every 8 (Eight) Hours As Needed for Anxiety (TOOK 2 TABS 3-13-18 0630). - Oral    magnesium hydroxide (MILK OF MAGNESIA) suspension 2400 mg/10mL 10 mL 10 mL Daily PRN 3/13/2018     Sig - Route: Take 10 mL by mouth Daily As Needed for Constipation. - Oral    metoclopramide (REGLAN) injection 10 mg 10 mg Once As Needed 3/13/2018 3/13/2018    Sig - Route: Infuse 2 mL into a venous catheter 1 (One) Time As Needed for Nausea or Vomiting. - Intravenous    metoprolol tartrate (LOPRESSOR) tablet 12.5 mg 12.5 mg Every 12 Hours Scheduled 3/13/2018     Sig - Route: Take 0.5 tablets by mouth Every 12 (Twelve) Hours. - Oral    nafcillin (UNIPEN) 2 g/100 mL 0.9% NS IVPB (mbp) 2 g Every 4 Hours 3/14/2018 4/25/2018    Sig - Route: Infuse 100 mL into a venous catheter Every 4 (Four) Hours. - Intravenous    naloxone (NARCAN) injection 0.1 mg 0.1 mg Every 5 Minutes PRN 3/13/2018     Sig - Route: Infuse 0.25 mL into a venous catheter Every 5 (Five) Minutes As Needed for Opioid Reversal or Respiratory Depression (see administration instructions). - Intravenous    naloxone (NARCAN) injection 0.1 mg 0.1 mg Every 5 Minutes PRN 3/13/2018     Sig - Route: Infuse 0.25 mL into a venous catheter Every 5 (Five) Minutes As Needed for Respiratory Depression. - Intravenous    Linked Group 1:  \"And\" Linked Group Details        ondansetron (ZOFRAN) injection 4 mg 4 mg Every 6 Hours PRN 3/13/2018     Sig - Route: Infuse 2 mL into a venous catheter Every 6 (Six) Hours As Needed for Nausea or Vomiting. - Intravenous    " "Linked Group 2:  \"Or\" Linked Group Details        ondansetron (ZOFRAN) tablet 4 mg 4 mg Every 6 Hours PRN 3/13/2018     Sig - Route: Take 1 tablet by mouth Every 6 (Six) Hours As Needed for Nausea or Vomiting. - Oral    Linked Group 2:  \"Or\" Linked Group Details        ondansetron ODT (ZOFRAN-ODT) disintegrating tablet 4 mg 4 mg Every 6 Hours PRN 3/13/2018     Sig - Route: Take 1 tablet by mouth Every 6 (Six) Hours As Needed for Nausea or Vomiting. - Oral    Linked Group 2:  \"Or\" Linked Group Details        pantoprazole (PROTONIX) EC tablet 40 mg 40 mg Every Early Morning 3/14/2018     Sig - Route: Take 1 tablet by mouth Every Morning. - Oral    rOPINIRole (REQUIP) tablet 2 mg 2 mg Daily 3/14/2018     Sig - Route: Take 2 tablets by mouth Daily. - Oral    rOPINIRole (REQUIP) tablet 3 mg 3 mg Nightly 3/13/2018     Sig - Route: Take 3 tablets by mouth Every Night. - Oral    triamcinolone (KENALOG) 0.1 % cream  Every 12 Hours Scheduled 3/13/2018     Sig - Route: Apply  topically Every 12 (Twelve) Hours. - Topical    venlafaxine XR (EFFEXOR-XR) 24 hr capsule 150 mg 150 mg Daily 3/13/2018     Sig - Route: Take 2 capsules by mouth Daily. - Oral    atropine sulfate injection 0.5 mg (Discontinued) 0.5 mg Once As Needed 3/13/2018 3/13/2018    Sig - Route: Infuse 5 mL into a venous catheter 1 (One) Time As Needed for Bradycardia (symptomatic bradycardia (hypotension, dizziness, confusion). Notify attending anesthesiologist.). - Intravenous    Reason for Discontinue: Patient Transfer    buffered lidocaine syringe 0.5 mL (Discontinued) 0.5 mL Once As Needed 3/13/2018 3/13/2018    Sig - Route: Inject 0.5 mL as directed 1 (One) Time As Needed (IV Start). - Injection    Reason for Discontinue: Patient Transfer    buffered lidocaine syringe 0.5 mL (Discontinued) 0.5 mL Once As Needed 3/13/2018 3/13/2018    Sig - Route: Inject 0.5 mL into the skin 1 (One) Time As Needed (Prior to IV Insertion). - Intradermal    Reason for " Discontinue: Patient Transfer    buffered lidocaine syringe 0.5 mL (Discontinued) 0.5 mL Once As Needed 3/13/2018 3/13/2018    Sig - Route: Inject 0.5 mL as directed 1 (One) Time As Needed (IV Start). - Injection    ceFAZolin (ANCEF) 2 g/20ml IV PUSH syringe (Discontinued) 2 g Every 8 Hours 3/13/2018 3/14/2018    Sig - Route: Infuse 20 mL into a venous catheter Every 8 (Eight) Hours. - Intravenous    ceFAZolin (ANCEF) injection (Discontinued)  As Needed 3/13/2018 3/13/2018    Sig: As Needed.    Reason for Discontinue: Patient Discharge    famotidine (PEPCID) injection 20 mg (Discontinued) 20 mg 60 Minutes Pre-Op 3/13/2018 3/13/2018    Sig - Route: Infuse 2 mL into a venous catheter 60 Minutes Prior to Surgery. - Intravenous    Reason for Discontinue: Patient Transfer    flumazenil (ROMAZICON) injection 0.2 mg (Discontinued) 0.2 mg As Needed 3/13/2018 3/13/2018    Sig - Route: Infuse 2 mL into a venous catheter As Needed (unresponsiveness). - Intravenous    Reason for Discontinue: Patient Transfer    gentamicin (GARAMYCIN) injection (Discontinued)  As Needed 3/13/2018 3/13/2018    Sig: As Needed.    Reason for Discontinue: Patient Discharge    hydrALAZINE (APRESOLINE) injection 5 mg (Discontinued) 5 mg Every 10 Minutes PRN 3/13/2018 3/13/2018    Sig - Route: Infuse 0.25 mL into a venous catheter Every 10 (Ten) Minutes As Needed for High Blood Pressure (for systolic blood pressure greater than 180 mmHg or diastolic blood pressure greater than 105 mmHg when HR less than 60). - Intravenous    Reason for Discontinue: Patient Transfer    HYDROmorphone (DILAUDID) injection 0.5 mg (Discontinued) 0.5 mg As Needed 3/13/2018 3/13/2018    Sig - Route: Infuse 0.5 mL into a venous catheter As Needed for Moderate Pain  or Severe Pain . - Intravenous    Reason for Discontinue: Patient Transfer    HYDROmorphone (DILAUDID) injection 0.5 mg (Discontinued) 0.5 mg Every 5 Minutes PRN 3/13/2018 3/13/2018    Sig - Route: Infuse 0.5 mL  "into a venous catheter Every 5 (Five) Minutes As Needed for Moderate Pain . - Intravenous    Reason for Discontinue: Patient Transfer    HYDROmorphone (DILAUDID) injection 0.5 mg (Discontinued) 0.5 mg Every 2 Hours PRN 3/13/2018 3/15/2018    Sig - Route: Infuse 0.25 mL into a venous catheter Every 2 (Two) Hours As Needed for Severe Pain . - Intravenous    Linked Group 1:  \"And\" Linked Group Details        ipratropium-albuterol (DUO-NEB) nebulizer solution 3 mL (Discontinued) 3 mL Once As Needed 3/13/2018 3/13/2018    Sig - Route: Take 3 mL by nebulization 1 (One) Time As Needed for Wheezing or Shortness of Air (bronchospasm). - Nebulization    Reason for Discontinue: Patient Transfer    labetalol (NORMODYNE,TRANDATE) injection 5 mg (Discontinued) 5 mg Every 5 Minutes PRN 3/13/2018 3/13/2018    Sig - Route: Infuse 1 mL into a venous catheter Every 5 (Five) Minutes As Needed for High Blood Pressure (for systolic blood pressure greater than 180 mmHg or diastolic blood pressure greater than 105 mmHg). - Intravenous    Reason for Discontinue: Patient Transfer    lactated ringers infusion 1,000 mL (Discontinued) 1,000 mL Continuous 3/13/2018 3/13/2018    Sig - Route: Infuse 1,000 mL into a venous catheter Continuous. - Intravenous    lactated ringers infusion (Discontinued) 100 mL/hr Continuous PRN 3/13/2018 3/13/2018    Sig - Route: Infuse 100 mL/hr into a venous catheter Continuous As Needed (Start Prior to Surgery). - Intravenous    Reason for Discontinue: Patient Transfer    lactated ringers infusion (Discontinued) 100 mL/hr Continuous 3/13/2018 3/13/2018    Sig - Route: Infuse 100 mL/hr into a venous catheter Continuous. - Intravenous    lactated ringers infusion (Discontinued) 50 mL/hr Continuous 3/13/2018 3/15/2018    Sig - Route: Infuse 50 mL/hr into a venous catheter Continuous. - Intravenous    meperidine (DEMEROL) injection 12.5 mg (Discontinued) 12.5 mg Every 5 Minutes PRN 3/13/2018 3/13/2018    Sig - Route: " "Infuse 0.5 mL into a venous catheter Every 5 (Five) Minutes As Needed for Shivering (May repeat x 3). - Intravenous    Reason for Discontinue: Patient Transfer    metoclopramide (REGLAN) injection 5 mg (Discontinued) 5 mg Every 15 Minutes PRN 3/13/2018 3/13/2018    Sig - Route: Infuse 1 mL into a venous catheter Every 15 (Fifteen) Minutes As Needed for Nausea or Vomiting (for nausea/vomiting). - Intravenous    Reason for Discontinue: Patient Transfer    midazolam (VERSED) injection 1 mg (Discontinued) 1 mg Every 5 Minutes PRN 3/13/2018 3/13/2018    Sig - Route: Infuse 1 mL into a venous catheter Every 5 (Five) Minutes As Needed for Anxiety (Max 4mg midazolam TOTAL). - Intravenous    Reason for Discontinue: Patient Transfer    Linked Group 3:  \"Or\" Linked Group Details        midazolam (VERSED) injection 2 mg (Discontinued) 2 mg Every 5 Minutes PRN 3/13/2018 3/13/2018    Sig - Route: Infuse 2 mL into a venous catheter Every 5 (Five) Minutes As Needed for Anxiety (Max 4mg midazolam TOTAL). - Intravenous    Reason for Discontinue: Patient Transfer    Linked Group 3:  \"Or\" Linked Group Details        naloxone (NARCAN) injection 0.04 mg (Discontinued) 0.04 mg As Needed 3/13/2018 3/13/2018    Sig - Route: Infuse 0.1 mL into a venous catheter As Needed for Opioid Reversal (unresponsiveness, decrease oxygen saturation). - Intravenous    Reason for Discontinue: Patient Transfer    ondansetron (ZOFRAN) injection 4 mg (Discontinued) 4 mg As Needed 3/13/2018 3/13/2018    Sig - Route: Infuse 2 mL into a venous catheter As Needed for Nausea or Vomiting. - Intravenous    Reason for Discontinue: Patient Transfer    sodium chloride (NS) irrigation solution (Discontinued)  As Needed 3/13/2018 3/13/2018    Sig: As Needed.    Reason for Discontinue: Patient Discharge    sodium chloride 0.9 % flush 1-10 mL (Discontinued) 1-10 mL As Needed 3/13/2018 3/13/2018    Sig - Route: Infuse 1-10 mL into a venous catheter As Needed for Line Care. " - Intravenous    Reason for Discontinue: Patient Transfer    sodium chloride 0.9 % flush 1-10 mL (Discontinued) 1-10 mL As Needed 3/13/2018 3/13/2018    Sig - Route: Infuse 1-10 mL into a venous catheter As Needed for Line Care. - Intravenous    Reason for Discontinue: Patient Transfer    sodium chloride 0.9 % flush 3 mL (Discontinued) 3 mL As Needed 3/13/2018 3/13/2018    Sig - Route: Infuse 3 mL into a venous catheter As Needed for Line Care. - Intravenous    Reason for Discontinue: Patient Transfer    sodium chloride 12,000 mL with vancomycin 3,000 mg irrigation (Discontinued)  As Needed 3/13/2018 3/13/2018    Sig: As Needed.    Reason for Discontinue: Patient Discharge    sodium chloride 12,000 mL with vancomycin 6,000 mg irrigation (Discontinued)  As Needed 3/13/2018 3/13/2018    Sig: As Needed.    Reason for Discontinue: Patient Discharge    tobramycin (NEBCIN) injection (Discontinued)  As Needed 3/13/2018 3/13/2018    Sig: As Needed.    Reason for Discontinue: Patient Discharge    tobramycin (NEBCIN) injection (Discontinued)  As Needed 3/14/2018 3/14/2018    Sig: As Needed.    Reason for Discontinue: Patient Discharge    vancomycin (VANCOCIN) injection (Discontinued)  As Needed 3/13/2018 3/13/2018    Sig: As Needed.    Reason for Discontinue: Patient Discharge    vancomycin (VANCOCIN) injection (Discontinued)  As Needed 3/13/2018 3/13/2018    Sig: As Needed.    Reason for Discontinue: Patient Discharge          Lab Results (last 24 hours)     Procedure Component Value Units Date/Time    Anaerobic Culture - Tissue, Knee, Right [[id number]]  (Normal) Collected:  03/13/18 1228    Specimen:  Tissue from Knee, Right Updated:  03/16/18 1020     Culture No anaerobes isolated at 3 days    Tissue / Bone Culture - Tissue, Knee, Right [[id number]]  (Normal) Collected:  03/13/18 1228    Specimen:  Tissue from Knee, Right Updated:  03/16/18 1020     Tissue Culture No growth at 3 days     Gram Stain Result Moderate (3+) WBCs  seen      No organisms seen    Tissue / Bone Culture - Bone, Knee, Right [[id number]]  (Normal) Collected:  03/13/18 1235    Specimen:  Bone from Knee, Right Updated:  03/16/18 0749     Tissue Culture No growth at 3 days     Gram Stain Result Many (4+) WBCs seen      No organisms seen    Tissue / Bone Culture - Bone, Knee, Right [[id number]]  (Normal) Collected:  03/13/18 1233    Specimen:  Bone from Knee, Right Updated:  03/16/18 0749     Tissue Culture No growth at 3 days     Gram Stain Result Few (2+) WBCs seen      No organisms seen    Hemoglobin & Hematocrit, Blood [[id number]]  (Abnormal) Collected:  03/16/18 0459    Specimen:  Blood Updated:  03/16/18 0533     Hemoglobin 7.3 (L) g/dL      Hematocrit 23.6 (L) %         Imaging Results (last 24 hours)     ** No results found for the last 24 hours. **        ECG/EMG Results (last 24 hours)     ** No results found for the last 24 hours. **        Orders (last 24 hrs)     Start     Ordered    03/15/18 1547  Inpatient Case Management  Consult  Once     Comments:  Home IV antibiotic orders:  1.  Diagnosis right knee prosthetic joint infection-she underwent irrigation, debridement, and poly-exchange on 3/3/18.  She underwent right knee prosthetic joint explant with placement of an antibiotic impregnated spacer on 03/13/2018.  2.  Orthopedic surgeon-Mervin Capellan M.D.  3.  Microbiology-methicillin susceptible staph aureus  4.  IV access-PICC line  5.  Antibiotic orders:  Nafcillin 12 g IV daily given via a continuous infusion through 4/24/18 (that would be 6 weeks following joint explant)  6.  Laboratory monitoring:  CBC, sedimentation rate, CRP every Monday  7.  Follow-up appointment 2 weeks  8.  Fax copy of these orders to [phone]     Chente Urena MD   Provider:  (Not yet assigned)    03/15/18 1547    03/14/18 0900  fluticasone (FLONASE) 50 MCG/ACT nasal spray 1 spray  Daily      03/13/18 1717    03/14/18 0900  rOPINIRole (REQUIP) tablet 2 mg   Daily      03/13/18 1806    03/14/18 0900  nafcillin (UNIPEN) 2 g/100 mL 0.9% NS IVPB (mbp)  Every 4 Hours      03/14/18 0803    03/14/18 0600  levothyroxine (SYNTHROID, LEVOTHROID) tablet 137 mcg  Every Early Morning      03/13/18 1717    03/14/18 0600  pantoprazole (PROTONIX) EC tablet 40 mg  Every Early Morning      03/13/18 1717    03/14/18 0600  Hemoglobin & Hematocrit, Blood  Daily      03/13/18 1717    03/13/18 2200  gabapentin (NEURONTIN) capsule 600 mg  Every 8 Hours Scheduled      03/13/18 1717    03/13/18 2100  DULoxetine (CYMBALTA) DR capsule 30 mg  Nightly      03/13/18 1717    03/13/18 2100  flecainide (TAMBOCOR) tablet 100 mg  Every 12 Hours Scheduled      03/13/18 1717 03/13/18 2100  metoprolol tartrate (LOPRESSOR) tablet 12.5 mg  Every 12 Hours Scheduled      03/13/18 1717 03/13/18 2100  rOPINIRole (REQUIP) tablet 3 mg  Nightly      03/13/18 1717    03/13/18 2100  triamcinolone (KENALOG) 0.1 % cream  Every 12 Hours Scheduled      03/13/18 1717 03/13/18 1900  aspirin tablet 325 mg  Daily      03/13/18 1717    03/13/18 1800  venlafaxine XR (EFFEXOR-XR) 24 hr capsule 150 mg  Daily      03/13/18 1717    03/13/18 1716  naloxone (NARCAN) injection 0.1 mg  Every 5 Minutes PRN      03/13/18 1717    03/13/18 1716  ondansetron (ZOFRAN) tablet 4 mg  Every 6 Hours PRN      03/13/18 1717    03/13/18 1716  ondansetron ODT (ZOFRAN-ODT) disintegrating tablet 4 mg  Every 6 Hours PRN      03/13/18 1717    03/13/18 1716  ondansetron (ZOFRAN) injection 4 mg  Every 6 Hours PRN      03/13/18 1717    03/13/18 1716  LORazepam (ATIVAN) tablet 1 mg  Every 8 Hours PRN      03/13/18 1717    03/13/18 1716  naloxone (NARCAN) injection 0.1 mg  Every 5 Minutes PRN      03/13/18 1717    03/13/18 1716  HYDROcodone-acetaminophen (NORCO) 7.5-325 MG per tablet 1 tablet  Every 4 Hours PRN      03/13/18 1717    03/13/18 1716  magnesium hydroxide (MILK OF MAGNESIA) suspension 2400 mg/10mL 10 mL  Daily PRN      03/13/18 1717     03/13/18 1716  docusate sodium (COLACE) capsule 100 mg  2 Times Daily PRN      03/13/18 1717    Unscheduled  Ice to Incision for 48 Hours  As Needed      03/13/18 1717    Unscheduled  Apply Ice to Incision PRN for Edema, After Activity or Exercise, and to Lessen Discomfort  As Needed      03/13/18 1717    Unscheduled  May Stand to Void or Use Bedside Commode Day of Surgery. POD 1 & Thereafter Use Bedside Commode or Bathroom  As Needed      03/13/18 1717    Unscheduled  Change dressing  As Needed     Comments:  May d/c dressing changes when no drainage from wound.    03/13/18 1717    Unscheduled  Bladder Scan if Patient Unable to Void 4-6 Hours After Catheter Removal  As Needed      03/13/18 1717    Unscheduled  Straight Cath Every 4-6 Hours As Needed If Patient is Unable to Void After 4-6 Hours, Bladder Scan Volume is Greater Than 350-500mL & Patient Has Symptoms of Bladder Discomfort / Distention  As Needed      03/13/18 1717    Unscheduled  Consult Pharmacist For Review of Medications That May Cause Urinary Retention - RN To Place Order for Consult it Needed  As Needed      03/13/18 1717    Unscheduled  Schedule / Prompt Voiding For Patients With Urinary Incontinence  As Needed      03/13/18 1717             Physician Progress Notes (last 24 hours) (Notes from 3/15/2018 11:04 AM through 3/16/2018 11:04 AM)      Chente Florentino MD at 3/15/2018 11:17 AM          Infectious Diseases Progress Note    Patient:  Danisha Cannon  YOB: 1955  MRN: [id number]   Admit date: 3/13/2018   Admitting Physician: Amilcar Capellan MD  Primary Care Physician: Juan Carlos Sparks MD    Chief Complaint/Interval History: She feels better.  She indicates Dr. Capellan hopes for her to be discharged Friday.  She is tolerating antibiotic treatment without rash or diarrhea.  Pain controlled at present.  No pain at PICC line site.  She seems in good spirits.    Intake/Output Summary (Last 24 hours) at 03/15/18 1117  Last data filed  "at 03/15/18 0439   Gross per 24 hour   Intake             1060 ml   Output                0 ml   Net             1060 ml     Allergies:   Allergies   Allergen Reactions   • Codeine Dizziness and Nausea Only     Rapid heart rate, dizziness  NAUSEA   • Mobic [Meloxicam] Rash   • Morphine And Related Itching     Current Scheduled Medications:     aspirin 325 mg Oral Daily   DULoxetine 30 mg Oral Nightly   flecainide 100 mg Oral Q12H   fluticasone 1 spray Each Nare Daily   gabapentin 600 mg Oral Q8H   levothyroxine 137 mcg Oral Q AM   metoprolol tartrate 12.5 mg Oral Q12H   nafcillin 2 g Intravenous Q4H   pantoprazole 40 mg Oral Q AM   rOPINIRole 2 mg Oral Daily   rOPINIRole 3 mg Oral Nightly   triamcinolone  Topical Q12H   venlafaxine  mg Oral Daily     Current PRN Medications:  docusate sodium  •  HYDROcodone-acetaminophen  •  LORazepam  •  magnesium hydroxide  •  naloxone  •  [DISCONTINUED] HYDROmorphone **AND** naloxone  •  ondansetron **OR** ondansetron ODT **OR** ondansetron    Review of Systems see HPI    Vital Signs:  /64 (BP Location: Right arm, Patient Position: Lying)   Pulse 82   Temp 98.1 °F (36.7 °C) (Oral)   Resp 18   Ht 165.1 cm (65\")   Wt 119 kg (263 lb)   LMP  (LMP Unknown) Comment: post menapause  SpO2 92%   BMI 43.77 kg/m²      Physical Exam  Vital signs reviewed.  Right foot with intact sensation and capillary refill.  Minimal serous appearing drainage on right knee dressing.  Line/IV site: No erythema, warmth, induration, or tenderness.    Lab Results:  CBC:   Results from last 7 days  Lab Units 03/15/18  0521 03/14/18  0505   HEMOGLOBIN g/dL 7.5* 8.0*   HEMATOCRIT % 24.8* 25.6*     BMP:    Culture Results:     Current operative cultures no growth  Previous operative cultures 03/03/2018-methicillin susceptible Staphylococcus aureus    Radiology: None  Additional Studies Reviewed: None    Impression:   Methicillin susceptible Staphylococcus aureus right knee prosthetic joint " infection.  She underwent explant on 03/13/2018.    Recommendations:   Anticipate discharge home tomorrow  Updated home IV antibiotic orders written as outlined below and sent to   Will sign off for now  Would be happy to reassess if any questions or concerns for infectious diseases    Home IV antibiotic orders:  1.  Diagnosis right knee prosthetic joint infection-she underwent irrigation, debridement, and poly-exchange on 3/3/18.  She underwent right knee prosthetic joint explant with placement of an antibiotic impregnated spacer on 03/13/2018.  2.  Orthopedic surgeon-Mervin Capellan M.D.  3.  Microbiology-methicillin susceptible staph aureus  4.  IV access-PICC line  5.  Antibiotic orders:  Nafcillin 12 g IV daily given via a continuous infusion through 4/24/18 (that would be 6 weeks following joint explant)  6.  Laboratory monitoring:  CBC, sedimentation rate, CRP every Monday  7.  Follow-up appointment 2 weeks  8.  Fax copy of these orders to [phone]     Chente Florentino MD    Electronically signed by Chente Florentino MD at 3/15/2018  3:46 PM       Consult Notes (last 24 hours) (Notes from 3/15/2018 11:04 AM through 3/16/2018 11:04 AM)     No notes of this type exist for this encounter.

## 2018-03-16 NOTE — THERAPY TREATMENT NOTE
Acute Care - Occupational Therapy Treatment Note  [facility]     Patient Name: Danisha Cannon  : 1955  MRN: [id number]  Today's Date: 3/16/2018  Onset of Illness/Injury or Date of Surgery: 18  Date of Referral to OT: 18  Referring Physician: Dr. Capellan    Admit Date: 3/13/2018       ICD-10-CM ICD-9-CM   1. Bone infection, knee M86.9 730.96   2. Impaired mobility Z74. 799.89   3. Impaired mobility and ADLs Z74. 799.89     Patient Active Problem List   Diagnosis   • Abdominal adhesions   • Abnormal echocardiogram   • Abnormal Holter exam   • Atrial flutter, paroxysmal   • Bilateral edema of lower extremity   • Chest pain   • Cholecystitis with cholelithiasis   • Chronic right-sided low back pain with sciatica   • Class 3 obesity due to excess calories with serious comorbidity in adult   • Facet syndrome, lumbar   • Heart rate fast   • HTN (hypertension)   • Idiopathic chronic gout without tophus   • Idiopathic hypotension   • Idiopathic progressive neuropathy   • Insomnia   • Lumbar facet arthropathy   • Memory loss   • MRSA carrier   • Obstructive sleep apnea   • Paroxysmal a-fib   • Pilonidal cyst   • Primary osteoarthritis of right knee   • Recurrent ventral incisional hernia   • Restless leg syndrome   • Sciatica of right side   • Shortness of breath   • Sick sinus syndrome   • Sleep apnea   • Sleep apnea, obstructive   • Snoring   • Somnolence, daytime   • Spondylosis of lumbar region without myelopathy or radiculopathy   • Tear of medial meniscus of right knee, current   • Total knee replacement status, right   • Tremor   • Umbilical hernia   • Morbid (severe) obesity due to excess calories   • Infection associated with internal knee prosthesis   • Infection of right knee   • Bone infection, knee     Past Medical History:   Diagnosis Date   • Arthritis    • Atrial fibrillation    • Disease of thyroid gland    • GERD (gastroesophageal reflux disease)    • History of incision and drainage      Right knee   • Neuropathy, peripheral    • Pneumonia     RECENT DX PER FAMILY DOCTOR   • PONV (postoperative nausea and vomiting)    • Restless leg syndrome    • Sleep apnea with use of continuous positive airway pressure (CPAP)    • SVT (supraventricular tachycardia)      Past Surgical History:   Procedure Laterality Date   • BUNIONECTOMY Left     AND HAMMER TOE   • CARDIAC SURGERY      HEART CATH   • CATARACT EXTRACTION Right    • HERNIA REPAIR      UMBILICAL X3   • JOINT REPLACEMENT      RIGHT KNEE   • KNEE POLY INSERT EXCHANGE Right 3/3/2018    Procedure: IRRIGATION AND DEBRIDEMENT TOTAL KNEE & POLY EXCHANGE - RIGHT;  Surgeon: Amilcar Capellan MD;  Location: [facility] OR;  Service:    • LAPAROSCOPIC CHOLECYSTECTOMY     • PILONIDAL CYSTECTOMY N/A 2/16/2017    Procedure: PILONIDAL CYSTECTOMY, EXCISION SEBACEOUS CYST - BACK;  Surgeon: Macrina Capellan MD;  Location:  [facility] OR;  Service:    • TOTAL KNEE  PROSTHESIS REMOVAL W/ SPACER INSERTION Right 3/13/2018    Procedure: 1.  EXPLANT TOTAL KNEE 2.  ANTIBOTIC SPACER KNEE;  Surgeon: Amilcar Capellan MD;  Location:  [facility] OR;  Service: Orthopedics   • TRUNK LESION/CYST EXCISION N/A 2/16/2017    Procedure: EXCISION SEBACEOUS CYST - BACK;  Surgeon: Macrina Capellan MD;  Location: [facility] OR;  Service:        Therapy Treatment    Therapy Treatment / Health Promotion    Treatment Time/Intention  Discipline: occupational therapy assistant (JUANA Ojeda)  Document Type: therapy note (daily note) (JUANA Ojeda)  Subjective Information: complains of, weakness (JUANA Ojeda)  Existing Precautions/Restrictions: fall (JUANA Ojeda)  Plan of Care Review  Plan of Care Reviewed With: patient (Cee N Vanessa, GRIMES/L)    Vitals/Pain/Safety  Pain Scale: Numbers Pre/Post-Treatment  Pain Scale: Numbers, Pretreatment: 0/10 - no pain (JUANA Ojeda)  Positioning and Restraints  Pre-Treatment Position: in bed  (Cee Mendez, GRIMES/L)  Post Treatment Position: chair (Cee Mendez, GRIMES/L)  In Chair: reclined, call light within reach, encouraged to call for assist, with family/caregiver (Cee Mendez, GRIMES/L)    Mobility,ADL,Motor, Modality  Bed Mobility Assessment/Treatment  Bed Mobility Assessment/Treatment: supine-sit (Cee Mendez, GRIMES/L)  Supine-Sit Iron (Bed Mobility): conditional independence (Cee Mendez, GRIMES/L)  Assistive Device (Bed Mobility): bed rails, head of bed elevated (Cee Mendez, GRIMES/L)  Transfer Assessment/Treatment  Transfer Assessment/Treatment: sit-stand transfer, stand-sit transfer, toilet transfer (Cee Mendez, GRIMES/L)  Comment (Transfers): sit<> walk in shower from tub bench S with grab bar (Cee Mendez, GRIMES/L)  Sit-Stand Transfer  Sit-Stand Iron (Transfers): stand by assist, supervision (Cee Mendez, GRIMES/L)  Assistive Device (Sit-Stand Transfers): walker, front-wheeled (Cee Adamin, GRIMES/L)  Stand-Sit Transfer  Stand-Sit Iron (Transfers): stand by assist, supervision (Cee Adamin, GRIMES/L)  Assistive Device (Stand-Sit Transfers): walker, front-wheeled (Cee Adamin, GRIMES/L)  Toilet Transfer  Type (Toilet Transfer): sit-stand, stand-sit (Cee Adamin, GRIMES/L)  [place] Level (Toilet Transfer): supervision (Cee Adamin, GRIMES/L)  Assistive Device (Toilet Transfer): commode, raised toilet seat (Cee Mendez, GRIMES/L)  ADL Assessment/Intervention  BADL Assessment/Intervention: upper body dressing, lower body dressing, toileting, bathing (Cee Mendez, GRIMES/L)  Bathing Assessment/Intervention  Bathing [place] Level: lower body, upper body, set up, supervision, conditional independence (JUANA Ojeda)  Assistive Devices (Bathing): grab bars/tub rail, hand-held shower spray hose, tub bench (CORNELIO Ojeda/CHARLES)  Bathing  Position: unsupported sitting, unsupported standing (Cee N Vanessa, GRIMES/L)  Upper Body Dressing Assessment/Training  Upper Body Dressing [place] Level: don, doff, independent (gown) (Cee N Vanessa, GRIMES/L)  Upper Body Dressing Position: unsupported sitting (Cee N Vanessa, GRIMES/L)  Lower Body Dressing Assessment/Training  Lower Body Dressing [place] Level: don, socks, set up (Cee N Vanessa, GRIMES/L)  Lower Body Dressing Position: edge of bed sitting (Cee N Vanessa, GRIMES/L)  Toileting Assessment/Training  [place] Level (Toileting): set up (Cee N Vanessa, GRIMES/L)  Assistive Devices (Toileting): commode, raised toilet seat (Cee N Vanessa, GRIMES/L)  Toileting Position: unsupported sitting, unsupported standing (Cee N Vanessa, GRIMES/L)              ROM/MMT             Sensory, Edema, Orthotics          Cognition, Communication, Swallow  Cognitive Assessment/Intervention- PT/OT  Personal Safety Interventions: fall prevention program maintained, gait belt, nonskid shoes/slippers when out of bed (Cee N Vanessa, GRIMES/L)    Outcome Summary           OT Rehab Goals     Row Name 03/16/18 1100 03/14/18 0758          Transfer Goal 1 (OT)    Activity/Assistive Device (Transfer Goal 1, OT)  -- shower chair;toilet  -AC (r) MK (t) AC (c)     [place] Level/Cues Needed (Transfer Goal 1, OT)  -- minimum assist (75% or more patient effort)  -AC (r) MK (t) AC (c)     Time Frame (Transfer Goal 1, OT)  -- long term goal (LTG);by discharge  -AC (r) MK (t) AC (c)     Barriers (Transfers Goal 1, OT)  -- strength, balance  -AC (r) MK (t) AC (c)     Progress/Outcome (Transfer Goal 1, OT) goal met  -TS goal ongoing  -AC (r) MK (t) AC (c)        Dressing Goal 1 (OT)    Activity/Assistive Device (Dressing Goal 1, OT)  -- lower body dressing  -AC (r) MK (t) AC (c)     [place]/Cues Needed (Dressing Goal 1, OT)  -- minimum assist (75% or more patient effort)  -AC (r) MK (t) AC  (c)     Time Frame (Dressing Goal 1, OT)  -- long term goal (LTG);by discharge  -AC (r) MK (t) AC (c)     Barriers (Dressing Goal 1, OT)  -- ROM, strength  -AC (r) MK (t) AC (c)     Progress/Outcome (Dressing Goal 1, OT) goal met  -TS goal ongoing  -AC (r) MK (t) AC (c)        Toileting Goal 1 (OT)    Activity/Device (Toileting Goal 1, OT)  -- toileting skills, all;commode  -AC (r) MK (t) AC (c)     [place] Level/Cues Needed (Toileting Goal 1, OT)  -- minimum assist (75% or more patient effort)  -AC (r) MK (t) AC (c)     Time Frame (Toileting Goal 1, OT)  -- long term goal (LTG);by discharge  -AC (r) MK (t) AC (c)     Barriers (Toileting Goal 1, OT)  -- ROM, strength  -AC (r) MK (t) AC (c)     Progress/Outcome (Toileting Goal 1, OT) goal met  -TS goal ongoing  -AC (r) MK (t) AC (c)       User Key  (r) = Recorded By, (t) = Taken By, (c) = Cosigned By    Initials Name Provider Type    AC Edouard Wheatley, OTR/L Occupational Therapist    CORNELIO Kincaid/L Occupational Therapy Assistant    THOMAS Helm, OT Student OT Student              OT Recommendation and Plan     Plan of Care Review  Plan of Care Reviewed With: patient  Plan of Care Reviewed With: patient  Outcome Summary: Pt completed TB bathing with S with tub bench and HH shower head. Pt I with UB dressing and required set up for LB dressing. R knee was covered during TB shower to prevent water from saturating dressing. Pt plans to discharge home. Continue OT POC         Outcome Measures     Row Name 03/16/18 1100 03/16/18 0900 03/15/18 1000       How much help from another person do you currently need...    Turning from your back to your side while in flat bed without using bedrails?  -- 3  -AH 3  -AH    Moving from lying on back to sitting on the side of a flat bed without bedrails?  -- 3  -AH 3  -AH    Moving to and from a bed to a chair (including a wheelchair)?  -- 3  -AH 3  -AH    Standing up from a chair using your arms (e.g.,  wheelchair, bedside chair)?  -- 3  - 3  -AH    Climbing 3-5 steps with a railing?  -- 2  - 2  -AH    To walk in hospital room?  -- 3  - 3  -AH    AM-PAC 6 Clicks Score  -- 17  - 17  -       How much help from another is currently needed...    Putting on and taking off regular lower body clothing? 3  -TS  --  --    Bathing (including washing, rinsing, and drying) 3  -TS  --  --    Toileting (which includes using toilet bed pan or urinal) 4  -TS  --  --    Putting on and taking off regular upper body clothing 4  -TS  --  --    Taking care of personal grooming (such as brushing teeth) 4  -TS  --  --    Eating meals 4  -TS  --  --    Score 22  -TS  --  --       Functional Assessment    Outcome Measure Options AM-[facility] 6 Clicks Daily Activity (OT)  -[facility] 6 Clicks Basic Mobility (PT)  -[facility] 6 Clicks Basic Mobility (PT)  -    Row Name 03/14/18 1400 03/14/18 1000          How much help from another person do you currently need...    Turning from your back to your side while in flat bed without using bedrails?  -- 3  -LH     Moving from lying on back to sitting on the side of a flat bed without bedrails?  -- 2  -LH     Moving to and from a bed to a chair (including a wheelchair)?  -- 3  -LH     Standing up from a chair using your arms (e.g., wheelchair, bedside chair)?  -- 3  -LH     Climbing 3-5 steps with a railing?  -- 2  -LH     To walk in hospital room?  -- 3  -     AM-PAC 6 Clicks Score  -- 16  -LH        How much help from another is currently needed...    Putting on and taking off regular lower body clothing? 2  -AC (r) MK (t) AC (c)  --     Bathing (including washing, rinsing, and drying) 2  -AC (r) MK (t) AC (c)  --     Toileting (which includes using toilet bed pan or urinal) 2  -AC (r) MK (t) AC (c)  --     Putting on and taking off regular upper body clothing 3  -AC (r) MK (t) AC (c)  --     Taking care of personal grooming (such as brushing teeth) 3  -AC (r) MK (t) AC (c)  --     Eating  meals 4  -AC (r) MK (t) AC (c)  --     Score 16  -AC (r) MK (t)  --        Functional Assessment    Outcome Measure Options AM-PAC 6 Clicks Daily Activity (OT)  -AC (r) MK (t) AC (c) AM-PAC 6 Clicks Basic Mobility (PT)  -       User Key  (r) = Recorded By, (t) = Taken By, (c) = Cosigned By    Initials Name Provider Type    AC Edouard Wheatley, OTR/L Occupational Therapist     Gypsy Underwood, PTA Physical Therapy Assistant     Jovi Whittington, PT Physical Therapist     Cee Mendez, GRIMES/L Occupational Therapy Assistant     Carito Helm, OT Student OT Student           Time Calculation:         Time Calculation- OT     Row Name 03/16/18 1139             Time Calculation- OT    OT Start Time 1029  -TS      OT Stop Time 1130  -TS      OT Time Calculation (min) 61 min  -TS      Total Timed Code Minutes- OT 61 minute(s)  -TS      OT Received On 03/16/18  -TS        User Key  (r) = Recorded By, (t) = Taken By, (c) = Cosigned By    Initials Name Provider Type     Cee Mendez, GRIMES/L Occupational Therapy Assistant           Therapy Charges for Today     Code Description Service Date Service Provider Modifiers Qty    [id number]  OT SELF CARE/MGMT/TRAIN EA 15 MIN 3/16/2018 PK OjedaA/L KAILEY KX 4          OT G-codes  OT Professional Judgement Used?: Yes  OT Functional Scales Options: AM-PAC 6 Clicks Daily Activity (OT)  Score: 16  Functional Limitation: Self care  Self Care Current Status (): At least 40 percent but less than 60 percent impaired, limited or restricted  Self Care Goal Status (): At least 20 percent but less than 40 percent impaired, limited or restricted    CORNELIO Villavicencio/CHARLES  3/16/2018

## 2018-03-16 NOTE — PROGRESS NOTES
Continued Stay Note  DOMINGO Johnson     Patient Name: Danisha Cannon  MRN: [id number]  Today's Date: 3/16/2018    Admit Date: 3/13/2018          Discharge Plan     Row Name 03/16/18 1137       Plan    Plan Home Infusion / Home Health    Patient/Family in Agreement with Plan yes    Plan Comments SW now unsure if patient will be ready for discharge today d/t drainage in her knee (per Dr. Capellan). SS will continue to follow to notify [facility] and [facility] at time of discharge. Discharge summary will also need to be faxed to both agencies when available.     [facility] [phone] / [phone] fax  [facility] [phone] / [phone]                  Discharge Codes    No documentation.           FRANKIE Hernandez

## 2018-03-16 NOTE — PROGRESS NOTES
Infectious Diseases Progress Note    Patient:  Danisha Cannon  YOB: 1955  MRN: [id number]   Admit date: 3/13/2018   Admitting Physician: Amilcar Capellan MD  Primary Care Physician: Juan Carlos Sparsk MD    Chief Complaint/Interval History: She was concerned about some drainage from the right knee.  She indicates there was some bloody drainage yesterday.  Today she has had small amount of serosanguineous drainage.  She indicates she is a little uncomfortable going home until the drainage has subsided further.  She was concerned she needed a stronger antibiotic given the drainage.  I explained her cultures from the time of irrigation and poly-exchange grew heavy growth of methicillin susceptible staph aureus.  I explained her cultures at the time of joint explant were no growth.  I explained she is on the treatment of choice at optimized dosing for MSSA.  After we talked she seemed to understand we have her on the most appropriate IV antibiotic treatment at this time.  She acknowledged that she gets anxious at times.  Currently her drainage is serosanguineous and not purulent.  She does not show significant cellulitis on current exam.  There does not appear to be any trapped fluid or fluctuance.  Explained I do not think drainage is a sign of treatment failure.  She has had 3 surgeries on her knee within the past month and a half.  I think the multiple recent surgeries are contributing to her drainage.  She voiced understanding.  Family at bedside and seemed to understand discussion as well.    Intake/Output Summary (Last 24 hours) at 03/16/18 1604  Last data filed at 03/16/18 0639   Gross per 24 hour   Intake              650 ml   Output                0 ml   Net              650 ml     Allergies:   Allergies   Allergen Reactions   • Codeine Dizziness and Nausea Only     Rapid heart rate, dizziness  NAUSEA   • Mobic [Meloxicam] Rash   • Morphine And Related Itching     Current Scheduled Medications:  "    aspirin 325 mg Oral Daily   DULoxetine 30 mg Oral Nightly   flecainide 100 mg Oral Q12H   fluticasone 1 spray Each Nare Daily   gabapentin 600 mg Oral Q8H   levothyroxine 137 mcg Oral Q AM   metoprolol tartrate 12.5 mg Oral Q12H   nafcillin 2 g Intravenous Q4H   pantoprazole 40 mg Oral Q AM   rOPINIRole 2 mg Oral Daily   rOPINIRole 3 mg Oral Nightly   triamcinolone  Topical Q12H   venlafaxine  mg Oral Daily     Current PRN Medications:  docusate sodium  •  HYDROcodone-acetaminophen  •  LORazepam  •  magnesium hydroxide  •  naloxone  •  [DISCONTINUED] HYDROmorphone **AND** naloxone  •  ondansetron **OR** ondansetron ODT **OR** ondansetron    Review of Systems she has no nausea, diarrhea, rash, or skin itching.    Vital Signs:  /63 (BP Location: Right arm, Patient Position: Lying)   Pulse 65   Temp 98 °F (36.7 °C) (Oral)   Resp 18   Ht 165.1 cm (65\")   Wt 119 kg (263 lb)   LMP  (LMP Unknown) Comment: post menapause  SpO2 98%   BMI 43.77 kg/m²     Physical Exam  Vital signs reviewed.  PICC line without erythema, warmth, induration.  Right knee without cellulitis.  Small amount of serosanguineous drainage on dressing  Skin margins look viable.  There is no warmth, fluctuance or fluid accumulation.  Skin without drug rash.    Lab Results:  CBC:   Results from last 7 days  Lab Units 03/16/18  0459 03/15/18  0521 03/14/18  0505   HEMOGLOBIN g/dL 7.3* 7.5* 8.0*   HEMATOCRIT % 23.6* 24.8* 25.6*     BMP:    Culture Results:    Operative cultures from 03/03/2018 (irrigation and poly-exchange):  Susceptibility      Staphylococcus aureus, MSSA     DONTE     Clindamycin <=0.25 ug/ml\"><=0.25 ug/ml Resistant     Erythromycin 4 ug/ml Resistant     Gentamicin <=0.5 ug/ml\"><=0.5 ug/ml Susceptible     Inducible Clindamycin Resistance POS  Positive     Levofloxacin 1 ug/ml Susceptible 1     Oxacillin 0.5 ug/ml Susceptible     Penicillin G >=0.5 ug/ml Resistant     Tetracycline <=1 ug/ml\"><=1 ug/ml Susceptible     " "Trimethoprim + Sulfamethoxazole <=10 ug/ml\"><=10 ug/ml Susceptible     Vancomycin <=0.5 ug/ml\"><=0.5 ug/ml Susceptible      Operative cultures from 03/13/2018 (joint explant):  Gram stains between 2+ and 4+ white blood cells depending upon sample.  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  She is undergone explant on 03/13/2018.  From infection standpoint she seems to be improving.  Her operative cultures from 03/03/2018 grew MSSA and her operative cultures from 03/13/2018 are no growth.  She is on the treatment of choice for MSSA infection.  Okay with me for discharge home when she is ready for release per Dr. Capellan.  My home IV antibiotic orders are outlined below.  I discussed with nursing today.  They are going to make sure her home health company has antibiotics in place in case she is discharged over the weekend.  Spoke with the patient about follow-up in 1-2 weeks.  All questions answered.  She was comfortable with plan from infection standpoint.    Recommendations:     Continue treatment with nafcillin  See home IV antibiotic orders below  Okay with me for discharge when released by Dr. Capellan  I will sign off for now  Follow-up in 1-2 weeks  Please call if any questions or concerns for infectious diseases    Home IV antibiotic orders:  1.  Diagnosis right knee prosthetic joint infection-she underwent irrigation, debridement, and poly-exchange on 3/3/18.  She underwent right knee prosthetic joint explant with placement of an antibiotic impregnated spacer on 03/13/2018.  2.  Orthopedic surgeon-Mervni Capellan M.D.  3.  Microbiology-methicillin susceptible staph aureus  4.  IV access-PICC line  5.  Antibiotic orders:  Nafcillin 12 g IV daily given via a continuous infusion through 4/24/18 (that would be 6 weeks following joint explant)  6.  " Laboratory monitoring:  CBC, sedimentation rate, CRP every Monday  7.  Follow-up appointment 2 weeks  8.  Fax copy of these orders to [phone]      Chente Urena MD

## 2018-03-16 NOTE — NURSING NOTE
Pt worried about small amount of drainage on dressing. Reassured her that is normal post op and that we would keep and eye on it and show it to Dr. Capellan tomorrow. Pt called the on call infectious disease number and Dr. Florentino called and told me to repeat to her again that it is normal to have some drainage and that he would rather it be out than in. Also that Dr. Capellan would see her again tomorrow. He said to go ahead and change the dressing to make her feel better.     Rabia NOLEN

## 2018-03-16 NOTE — PLAN OF CARE
Problem: Patient Care Overview  Goal: Plan of Care Review  Outcome: Ongoing (interventions implemented as appropriate)   03/16/18 1742   Coping/Psychosocial   Plan of Care Reviewed With patient   Plan of Care Review   Progress no change   OTHER   Outcome Summary pt c/o nausea and concerned about drainage from R knee, Pt trans to EOB sba, sit-stand cga-sba, pt amb 75 feet with rwx cga-sba.

## 2018-03-16 NOTE — PLAN OF CARE
Problem: Patient Care Overview  Goal: Plan of Care Review  Outcome: Ongoing (interventions implemented as appropriate)   03/15/18 7487   Coping/Psychosocial   Plan of Care Reviewed With patient   Plan of Care Review   Progress no change   OTHER   Outcome Summary PO pain meds PRN with some relief. Drsg changed-small amount of drainage. Up x1 with walker     Goal: Individualization and Mutuality  Outcome: Ongoing (interventions implemented as appropriate)      Problem: Fall Risk (Adult)  Goal: Absence of Fall  Outcome: Ongoing (interventions implemented as appropriate)      Problem: Infection, Risk/Actual (Adult)  Goal: Infection Prevention/Resolution  Outcome: Ongoing (interventions implemented as appropriate)      Problem: Knee Arthroplasty (Total, Partial) (Adult)  Goal: Signs and Symptoms of Listed Potential Problems Will be Absent, Minimized or Managed (Knee Arthroplasty)  Outcome: Ongoing (interventions implemented as appropriate)    Goal: Anesthesia/Sedation Recovery  Outcome: Outcome(s) achieved Date Met: 03/15/18

## 2018-03-16 NOTE — ACP (ADVANCE CARE PLANNING)
Date of First Steps ACP interview: 3/16/2018  Location of interview patient's room  First Steps ACP Facilitator: Blank Desouza, RN  Referral Source:Nurse referral  Present for facilitation: Patient    SUMMARY OF ADVANCE CARE PLANNING DISCUSSION:  Danisha visited for First Steps facilitation. We reviewed purpose and goals for advance care planning.    Advance care/living will document finished during this facilitation? no    Time spent on preparation, facilitation and documentation was under 30 minutes.    RECOMMENDATIONS/FOLLOW-UP:Patient states she has one at home but did not remember to bring. Encouraged to do so when convenient.  Pt request we contact a  for confession and anointing of the sick.      CONSULT/NOTE ROUTED  Yes    Blank Desouza, RN

## 2018-03-16 NOTE — PLAN OF CARE
Problem: Patient Care Overview  Goal: Plan of Care Review  Outcome: Ongoing (interventions implemented as appropriate)   03/16/18 8029   Coping/Psychosocial   Plan of Care Reviewed With patient;spouse   Plan of Care Review   Progress improving   OTHER   Outcome Summary medicated x 1 on my shift for c/o right knee pain. drsg changed to right knee. safety maintained. up x 1 with walker to bathroom.  at bedside. dc plan is home possibly tomorrow with home health and iv abx for approx 6 weeks. left upper arm picc line intact. iv abx every 4 hours continue

## 2018-03-16 NOTE — THERAPY TREATMENT NOTE
Acute Care - Physical Therapy Treatment Note  [facility]     Patient Name: Danisha Cannon  : 1955  MRN: [id number]  Today's Date: 3/16/2018  Onset of Illness/Injury or Date of Surgery: 18  Date of Referral to PT: 18  Referring Physician: Dr. Capellan    Admit Date: 3/13/2018    Visit Dx:    ICD-10-CM ICD-9-CM   1. Bone infection, knee M86.9 730.96   2. Impaired mobility Z74. 799.89   3. Impaired mobility and ADLs Z74. 799.     Patient Active Problem List   Diagnosis   • Abdominal adhesions   • Abnormal echocardiogram   • Abnormal Holter exam   • Atrial flutter, paroxysmal   • Bilateral edema of lower extremity   • Chest pain   • Cholecystitis with cholelithiasis   • Chronic right-sided low back pain with sciatica   • Class 3 obesity due to excess calories with serious comorbidity in adult   • Facet syndrome, lumbar   • Heart rate fast   • HTN (hypertension)   • Idiopathic chronic gout without tophus   • Idiopathic hypotension   • Idiopathic progressive neuropathy   • Insomnia   • Lumbar facet arthropathy   • Memory loss   • MRSA carrier   • Obstructive sleep apnea   • Paroxysmal a-fib   • Pilonidal cyst   • Primary osteoarthritis of right knee   • Recurrent ventral incisional hernia   • Restless leg syndrome   • Sciatica of right side   • Shortness of breath   • Sick sinus syndrome   • Sleep apnea   • Sleep apnea, obstructive   • Snoring   • Somnolence, daytime   • Spondylosis of lumbar region without myelopathy or radiculopathy   • Tear of medial meniscus of right knee, current   • Total knee replacement status, right   • Tremor   • Umbilical hernia   • Morbid (severe) obesity due to excess calories   • Infection associated with internal knee prosthesis   • Infection of right knee   • Bone infection, knee       Therapy Treatment    Therapy Treatment / Health Promotion    Treatment Time/Intention  Discipline: physical therapy assistant (Gypsy Underwood PTA)  Document Type: therapy note (daily  note) (Gypsy Underwood PTA)  Subjective Information: complains of, weakness, pain, nausea/vomiting (Gypsy Underwood PTA)  Existing Precautions/Restrictions: fall (Gypsy Underwood PTA)  Plan of Care Review  Plan of Care Reviewed With: patient (Gypsy Underwood PTA)    Vitals/Pain/Safety  Pain Scale: Numbers Pre/Post-Treatment  Pain Scale: Numbers, Pretreatment: 2/10 (Gypsy Underwood PTA)  Pain Location - Side: Right (Gypsy Underwood PTA)  Pain Location: knee (Gypsy Underwood PTA)  Pain Intervention(s): Repositioned, Ambulation/increased activity (notified RN) (Gypsy Underwood PTA)  Pain Scale: Word Pre/Post-Treatment  Pain Location - Side: Right (Gypsy Underwood PTA)  Pain Location: knee (Gypsy Underwood PTA)  Pain Intervention(s): Repositioned, Ambulation/increased activity (notified RN) (Gypsy Underwood PTA)  Pain Scale: FACES Pre/Post-Treatment  Pain Location - Side: Right (Gypsy Underwood PTA)  Pain Location: knee (Gypsy Underwood PTA)  Pain Intervention(s): Repositioned, Ambulation/increased activity (notified RN) (Gypsy Underwood PTA)  Positioning and Restraints  Pre-Treatment Position: in bed (Gypsy Underwood PTA)  Post Treatment Position: bed (Gypsy Underwood PTA)  In Bed: fowlers, call light within reach, encouraged to call for assist (Gypsy Underwood PTA)    Mobility,ADL,Motor, Modality  Mobility Assessment/Intervention  Extremity Weight-bearing Status: right lower extremity (Gypsy Underwood PTA)  Right Lower Extremity (Weight-bearing Status): weight-bearing as tolerated (WBAT) (Gypsy Underwood PTA)  Bed Mobility Assessment/Treatment  Supine-Sit [place] (Bed Mobility): conditional independence (Gypsy Underwood PTA)  Sit-Supine [place] (Bed Mobility): contact guard (assist with RLE) (Gypsy Underwood PTA)  Sit-Stand Transfer  Sit-Stand [place] (Transfers): contact guard, verbal cues (Gypsy Underwood PTA)  Stand-Sit Transfer  Stand-Sit [place] (Transfers): contact guard, verbal cues (Gypsy  ELIZABETH Underwood PTA)  Gait/Stairs Assessment/Training  [place] Level (Gait): contact guard, stand by assist (Gypsy Underwood PTA)  Assistive Device (Gait): walker, front-wheeled (Gypsy Underwood PTA)  Distance in Feet (Gait): 75 (Gypsy Underwood PTA)  Deviations/Abnormal Patterns (Gait): antalgic, dami decreased (Gypsy Underwood PTA)                 ROM/MMT             Sensory, Edema, Orthotics          Cognition, Communication, Swallow       Outcome Summary               PT Rehab Goals     Row Name 03/14/18 0925             Bed Mobility Goal 1 (PT)    Activity/Assistive Device (Bed Mobility Goal 1, PT) bed mobility activities, all  -LH      [place] Level/Cues Needed (Bed Mobility Goal 1, PT) independent  -      Time Frame (Bed Mobility Goal 1, PT) by discharge  -      Progress/Outcomes (Bed Mobility Goal 1, PT) goal ongoing  -         Transfer Goal 1 (PT)    Activity/Assistive Device (Transfer Goal 1, PT) sit-to-stand/stand-to-sit;bed-to-chair/chair-to-bed  -LH      [place] Level/Cues Needed (Transfer Goal 1, PT) independent  -LH      Time Frame (Transfer Goal 1, PT) by discharge  -      Progress/Outcome (Transfer Goal 1, PT) goal ongoing  -         Gait Training Goal 1 (PT)    Activity/Assistive Device (Gait Training Goal 1, PT) gait (walking locomotion);assistive device use  -LH      [place] Level (Gait Training Goal 1, PT) independent  -LH      Distance (Gait Goal 1, PT) 120  -LH      Time Frame (Gait Training Goal 1, PT) by discharge  -      Progress/Outcome (Gait Training Goal 1, PT) goal ongoing  -        User Key  (r) = Recorded By, (t) = Taken By, (c) = Cosigned By    Initials Name Provider Type     Jovi Whittington PT Physical Therapist          Physical Therapy Education     Title: PT OT SLP Therapies (Done)     Topic: Physical Therapy (Done)     Point: Mobility training (Done)    Learning Progress Summary     Learner Status Readiness Method Response Comment Documented by     Patient Done Acceptance E VU bed mobility  03/16/18 0924     Done Acceptance E VU bed mobility  03/15/18 1005     Done Acceptance E,D VU,DU benefits of PT and POC, call for assist w/ mobility  03/14/18 1128    Family Done Acceptance E,D VU,DU benefits of PT and POC, call for assist w/ mobility  03/14/18 1128          Point: Precautions (Done)    Learning Progress Summary     Learner Status Readiness Method Response Comment Documented by    Patient Done Acceptance E,D VU,DU benefits of PT and POC, call for assist w/ mobility  03/14/18 1128    Family Done Acceptance E,D VU,DU benefits of PT and POC, call for assist w/ mobility  03/14/18 1128                      User Key     Initials Effective Dates Name Provider Type Discipline     08/02/16 -  Gypsy Underwood, PTA Physical Therapy Assistant PT     08/02/16 -  Jovi Whittington, PT Physical Therapist PT                    PT Recommendation and Plan     Plan of Care Reviewed With: patient  Progress: no change  Outcome Summary: pt c/o nausea and concerned about drainage from R knee, Pt trans to EOB sba, sit-stand cga-sba, pt amb 75 feet with rwx cga-sba.          Outcome Measures     Row Name 03/16/18 0900 03/15/18 1000 03/14/18 1400       How much help from another person do you currently need...    Turning from your back to your side while in flat bed without using bedrails? 3  -AH 3  -AH  --    Moving from lying on back to sitting on the side of a flat bed without bedrails? 3  -AH 3  -AH  --    Moving to and from a bed to a chair (including a wheelchair)? 3  -AH 3  -AH  --    Standing up from a chair using your arms (e.g., wheelchair, bedside chair)? 3  - 3  -AH  --    Climbing 3-5 steps with a railing? 2  -AH 2  -AH  --    To walk in hospital room? 3  - 3  -AH  --    AM-PAC 6 Clicks Score 17  -AH 17  -AH  --       How much help from another is currently needed...    Putting on and taking off regular lower body clothing?  --  -- 2  -AC (r) MK (t) AC (c)     Bathing (including washing, rinsing, and drying)  --  -- 2  -AC (r) MK (t) AC (c)    Toileting (which includes using toilet bed pan or urinal)  --  -- 2  -AC (r) MK (t) AC (c)    Putting on and taking off regular upper body clothing  --  -- 3  -AC (r) MK (t) AC (c)    Taking care of personal grooming (such as brushing teeth)  --  -- 3  -AC (r) MK (t) AC (c)    Eating meals  --  -- 4  -AC (r) MK (t) AC (c)    Score  --  -- 16  -AC (r) MK (t)       Functional Assessment    Outcome Measure Options AM-PAC 6 Clicks Basic Mobility (PT)  - AM-PAC 6 Clicks Basic Mobility (PT)  - AM-[facility] 6 Clicks Daily Activity (OT)  -AC (r) MK (t) AC (c)    Row Name 03/14/18 1000             How much help from another person do you currently need...    Turning from your back to your side while in flat bed without using bedrails? 3  -LH      Moving from lying on back to sitting on the side of a flat bed without bedrails? 2  -LH      Moving to and from a bed to a chair (including a wheelchair)? 3  -LH      Standing up from a chair using your arms (e.g., wheelchair, bedside chair)? 3  -LH      Climbing 3-5 steps with a railing? 2  -LH      To walk in hospital room? 3  -      AM-PAC 6 Clicks Score 16  -         Functional Assessment    Outcome Measure Options -[facility] 6 Clicks Basic Mobility (PT)  -        User Key  (r) = Recorded By, (t) = Taken By, (c) = Cosigned By    Initials Name Provider Type    LEESA Wheatley, OTR/L Occupational Therapist    NICOL Underwood, PTA Physical Therapy Assistant    YANI Whittington, PT Physical Therapist    THOMAS Helm, OT Student OT Student           Time Calculation:         PT Charges     Row Name 03/16/18 0926             Time Calculation    Start Time 0853  -      Stop Time 0916  -      Time Calculation (min) 23 min  -      PT Received On 03/16/18  -      PT Goal Re-Cert Due Date 03/24/18  -         Time Calculation- PT    Total Timed Code Minutes- PT 23 minute(s)  -AH         User Key  (r) = Recorded By, (t) = Taken By, (c) = Cosigned By    Initials Name Provider Type     Gypsy Underwood PTA Physical Therapy Assistant          Therapy Charges for Today     Code Description Service Date Service Provider Modifiers Qty    42902574059 HC GAIT TRAINING EA 15 MIN 3/15/2018 Gypsy Underwood PTA GP, KX 2    44560233450 HC GAIT TRAINING EA 15 MIN 3/15/2018 Gypsy Underwood PTA GP, KX 2    [id number] HC GAIT TRAINING EA 15 MIN 3/16/2018 Gypsy Underwood PTA GP, KX 2          PT G-Codes  Outcome Measure Options: AM-PAC 6 Clicks Basic Mobility (PT)  Score: 16  Functional Limitation: Mobility: Walking and moving around  Mobility: Walking and Moving Around Current Status (): At least 40 percent but less than 60 percent impaired, limited or restricted  Mobility: Walking and Moving Around Goal Status (): At least 20 percent but less than 40 percent impaired, limited or restricted    Gypsy Underwood PTA  3/16/2018

## 2018-03-16 NOTE — PLAN OF CARE
"Problem: Patient Care Overview  Goal: Plan of Care Review  Outcome: Ongoing (interventions implemented as appropriate)   03/16/18 9225   Coping/Psychosocial   Plan of Care Reviewed With patient   Plan of Care Review   Progress no change   OTHER   Outcome Summary At beginning of shift pt. was mildly anxious. She felt that she wasn't able to use the incentive spirometer as well as she had been. Assessed lung sounds, clear but slightly diminished in bases. O2 sat 100% on RA. No visible s/s of resp distress. Pt. also upset at daughter. States she told daughter about her IS use difficulty and the daughter said it was because she had a \"big belly\". Dayshift nurse reported half dollar sized area of sero sang drainage on drsg. Since it was changed by dayshift there has been no visible drainage. Med x1 for pain. Pt. only wore CPAP part of the night. O2 sat does drop then come back up when she is asleep without cpap on.     Goal: Individualization and Mutuality  Outcome: Ongoing (interventions implemented as appropriate)      Problem: Fall Risk (Adult)  Goal: Absence of Fall  Outcome: Ongoing (interventions implemented as appropriate)      Problem: Infection, Risk/Actual (Adult)  Goal: Infection Prevention/Resolution  Outcome: Ongoing (interventions implemented as appropriate)      Problem: Knee Arthroplasty (Total, Partial) (Adult)  Goal: Signs and Symptoms of Listed Potential Problems Will be Absent, Minimized or Managed (Knee Arthroplasty)  Outcome: Ongoing (interventions implemented as appropriate)        "

## 2018-03-17 LAB
HCT VFR BLD AUTO: 23.6 % (ref 37–47)
HGB BLD-MCNC: 7.1 G/DL (ref 12–16)

## 2018-03-17 PROCEDURE — 97116 GAIT TRAINING THERAPY: CPT

## 2018-03-17 PROCEDURE — 85018 HEMOGLOBIN: CPT | Performed by: ORTHOPAEDIC SURGERY

## 2018-03-17 PROCEDURE — 97110 THERAPEUTIC EXERCISES: CPT

## 2018-03-17 PROCEDURE — 85014 HEMATOCRIT: CPT | Performed by: ORTHOPAEDIC SURGERY

## 2018-03-17 PROCEDURE — 97535 SELF CARE MNGMENT TRAINING: CPT

## 2018-03-17 RX ADMIN — HYDROCODONE BITARTRATE AND ACETAMINOPHEN 1 TABLET: 7.5; 325 TABLET ORAL at 02:06

## 2018-03-17 RX ADMIN — GABAPENTIN 600 MG: 300 CAPSULE ORAL at 22:05

## 2018-03-17 RX ADMIN — PANTOPRAZOLE SODIUM 40 MG: 40 TABLET, DELAYED RELEASE ORAL at 05:17

## 2018-03-17 RX ADMIN — FLECAINIDE ACETATE 100 MG: 100 TABLET ORAL at 08:18

## 2018-03-17 RX ADMIN — ROPINIROLE HYDROCHLORIDE 3 MG: 1 TABLET, FILM COATED ORAL at 22:00

## 2018-03-17 RX ADMIN — HYDROCODONE BITARTRATE AND ACETAMINOPHEN 1 TABLET: 7.5; 325 TABLET ORAL at 13:58

## 2018-03-17 RX ADMIN — NAFCILLIN SODIUM 2 G: 2 INJECTION, POWDER, LYOPHILIZED, FOR SOLUTION INTRAMUSCULAR; INTRAVENOUS at 22:01

## 2018-03-17 RX ADMIN — GABAPENTIN 600 MG: 300 CAPSULE ORAL at 05:17

## 2018-03-17 RX ADMIN — VENLAFAXINE HYDROCHLORIDE 150 MG: 75 CAPSULE, EXTENDED RELEASE ORAL at 08:18

## 2018-03-17 RX ADMIN — ASPIRIN 325 MG: 325 TABLET, COATED ORAL at 08:18

## 2018-03-17 RX ADMIN — NAFCILLIN SODIUM 2 G: 2 INJECTION, POWDER, LYOPHILIZED, FOR SOLUTION INTRAMUSCULAR; INTRAVENOUS at 17:26

## 2018-03-17 RX ADMIN — HYDROCODONE BITARTRATE AND ACETAMINOPHEN 1 TABLET: 7.5; 325 TABLET ORAL at 06:10

## 2018-03-17 RX ADMIN — METOPROLOL TARTRATE 12.5 MG: 25 TABLET, FILM COATED ORAL at 22:00

## 2018-03-17 RX ADMIN — LEVOTHYROXINE SODIUM 137 MCG: 137 TABLET ORAL at 05:17

## 2018-03-17 RX ADMIN — NAFCILLIN SODIUM 2 G: 2 INJECTION, POWDER, LYOPHILIZED, FOR SOLUTION INTRAMUSCULAR; INTRAVENOUS at 14:00

## 2018-03-17 RX ADMIN — NAFCILLIN SODIUM 2 G: 2 INJECTION, POWDER, LYOPHILIZED, FOR SOLUTION INTRAMUSCULAR; INTRAVENOUS at 00:24

## 2018-03-17 RX ADMIN — GABAPENTIN 600 MG: 300 CAPSULE ORAL at 13:58

## 2018-03-17 RX ADMIN — METOPROLOL TARTRATE 12.5 MG: 25 TABLET, FILM COATED ORAL at 08:19

## 2018-03-17 RX ADMIN — DULOXETINE HYDROCHLORIDE 30 MG: 30 CAPSULE, DELAYED RELEASE ORAL at 22:00

## 2018-03-17 RX ADMIN — NAFCILLIN SODIUM 2 G: 2 INJECTION, POWDER, LYOPHILIZED, FOR SOLUTION INTRAMUSCULAR; INTRAVENOUS at 08:18

## 2018-03-17 RX ADMIN — FLECAINIDE ACETATE 100 MG: 100 TABLET ORAL at 22:01

## 2018-03-17 RX ADMIN — ROPINIROLE HYDROCHLORIDE 2 MG: 1 TABLET, FILM COATED ORAL at 08:18

## 2018-03-17 RX ADMIN — NAFCILLIN SODIUM 2 G: 2 INJECTION, POWDER, LYOPHILIZED, FOR SOLUTION INTRAMUSCULAR; INTRAVENOUS at 05:17

## 2018-03-17 RX ADMIN — FLUTICASONE PROPIONATE 1 SPRAY: 50 SPRAY, METERED NASAL at 08:20

## 2018-03-17 RX ADMIN — HYDROCODONE BITARTRATE AND ACETAMINOPHEN 1 TABLET: 7.5; 325 TABLET ORAL at 18:52

## 2018-03-17 NOTE — PROGRESS NOTES
This patient has a infected right total knee arthroplasty.  She is post explantation and reimplantation with antibiotic Romi.  Yesterday her knee was angered drainage and appears somewhat better tonight is beginning to soften the.  Tag plan possibly discharge in a.m.

## 2018-03-17 NOTE — PLAN OF CARE
Problem: Patient Care Overview  Goal: Plan of Care Review  Outcome: Ongoing (interventions implemented as appropriate)   03/17/18 1501   Coping/Psychosocial   Plan of Care Reviewed With patient   Plan of Care Review   Progress improving   OTHER   Outcome Summary Pt sat up in chair. C/o PRN pain med given as ordered. Continue fall precautiona d antibiotics.       Problem: Fall Risk (Adult)  Goal: Absence of Fall  Outcome: Ongoing (interventions implemented as appropriate)      Problem: Infection, Risk/Actual (Adult)  Goal: Infection Prevention/Resolution  Outcome: Ongoing (interventions implemented as appropriate)

## 2018-03-17 NOTE — PLAN OF CARE
Problem: Patient Care Overview  Goal: Plan of Care Review  Outcome: Ongoing (interventions implemented as appropriate)   03/17/18 9212   Coping/Psychosocial   Plan of Care Reviewed With patient   Plan of Care Review   Progress (Pt. progressing good!)   OTHER   Outcome Summary Pt. has no issues with completing adl dressing using ae with vc's from this drew/l, ue exs to increase ue abiltiy!

## 2018-03-18 LAB
BACTERIA SPEC AEROBE CULT: NORMAL
BACTERIA SPEC ANAEROBE CULT: NORMAL
GRAM STN SPEC: NORMAL
HCT VFR BLD AUTO: 24.2 % (ref 37–47)
HGB BLD-MCNC: 7.3 G/DL (ref 12–16)

## 2018-03-18 PROCEDURE — 85018 HEMOGLOBIN: CPT | Performed by: ORTHOPAEDIC SURGERY

## 2018-03-18 PROCEDURE — 97116 GAIT TRAINING THERAPY: CPT

## 2018-03-18 PROCEDURE — 85014 HEMATOCRIT: CPT | Performed by: ORTHOPAEDIC SURGERY

## 2018-03-18 RX ADMIN — HYDROCODONE BITARTRATE AND ACETAMINOPHEN 1 TABLET: 7.5; 325 TABLET ORAL at 03:55

## 2018-03-18 RX ADMIN — METOPROLOL TARTRATE 12.5 MG: 25 TABLET, FILM COATED ORAL at 20:22

## 2018-03-18 RX ADMIN — FLECAINIDE ACETATE 100 MG: 100 TABLET ORAL at 20:22

## 2018-03-18 RX ADMIN — ASPIRIN 325 MG: 325 TABLET, COATED ORAL at 08:53

## 2018-03-18 RX ADMIN — FLECAINIDE ACETATE 100 MG: 100 TABLET ORAL at 08:53

## 2018-03-18 RX ADMIN — FLUTICASONE PROPIONATE 1 SPRAY: 50 SPRAY, METERED NASAL at 08:56

## 2018-03-18 RX ADMIN — LEVOTHYROXINE SODIUM 137 MCG: 137 TABLET ORAL at 05:57

## 2018-03-18 RX ADMIN — ROPINIROLE HYDROCHLORIDE 3 MG: 1 TABLET, FILM COATED ORAL at 20:22

## 2018-03-18 RX ADMIN — METOPROLOL TARTRATE 12.5 MG: 25 TABLET, FILM COATED ORAL at 08:54

## 2018-03-18 RX ADMIN — ROPINIROLE HYDROCHLORIDE 2 MG: 1 TABLET, FILM COATED ORAL at 08:54

## 2018-03-18 RX ADMIN — VENLAFAXINE HYDROCHLORIDE 150 MG: 75 CAPSULE, EXTENDED RELEASE ORAL at 08:55

## 2018-03-18 RX ADMIN — DULOXETINE HYDROCHLORIDE 30 MG: 30 CAPSULE, DELAYED RELEASE ORAL at 20:22

## 2018-03-18 RX ADMIN — GABAPENTIN 600 MG: 300 CAPSULE ORAL at 21:34

## 2018-03-18 RX ADMIN — TRIAMCINOLONE ACETONIDE: 1 CREAM TOPICAL at 08:56

## 2018-03-18 RX ADMIN — LORAZEPAM 1 MG: 1 TABLET ORAL at 15:47

## 2018-03-18 RX ADMIN — NAFCILLIN SODIUM 2 G: 2 INJECTION, POWDER, LYOPHILIZED, FOR SOLUTION INTRAMUSCULAR; INTRAVENOUS at 13:04

## 2018-03-18 RX ADMIN — NAFCILLIN SODIUM 2 G: 2 INJECTION, POWDER, LYOPHILIZED, FOR SOLUTION INTRAMUSCULAR; INTRAVENOUS at 05:57

## 2018-03-18 RX ADMIN — NAFCILLIN SODIUM 2 G: 2 INJECTION, POWDER, LYOPHILIZED, FOR SOLUTION INTRAMUSCULAR; INTRAVENOUS at 21:35

## 2018-03-18 RX ADMIN — HYDROCODONE BITARTRATE AND ACETAMINOPHEN 1 TABLET: 7.5; 325 TABLET ORAL at 13:03

## 2018-03-18 RX ADMIN — HYDROCODONE BITARTRATE AND ACETAMINOPHEN 1 TABLET: 7.5; 325 TABLET ORAL at 17:14

## 2018-03-18 RX ADMIN — GABAPENTIN 600 MG: 300 CAPSULE ORAL at 13:13

## 2018-03-18 RX ADMIN — NAFCILLIN SODIUM 2 G: 2 INJECTION, POWDER, LYOPHILIZED, FOR SOLUTION INTRAMUSCULAR; INTRAVENOUS at 17:14

## 2018-03-18 RX ADMIN — NAFCILLIN SODIUM 2 G: 2 INJECTION, POWDER, LYOPHILIZED, FOR SOLUTION INTRAMUSCULAR; INTRAVENOUS at 09:55

## 2018-03-18 RX ADMIN — PANTOPRAZOLE SODIUM 40 MG: 40 TABLET, DELAYED RELEASE ORAL at 05:57

## 2018-03-18 RX ADMIN — NAFCILLIN SODIUM 2 G: 2 INJECTION, POWDER, LYOPHILIZED, FOR SOLUTION INTRAMUSCULAR; INTRAVENOUS at 01:22

## 2018-03-18 RX ADMIN — GABAPENTIN 600 MG: 300 CAPSULE ORAL at 05:57

## 2018-03-18 RX ADMIN — LORAZEPAM 1 MG: 1 TABLET ORAL at 06:37

## 2018-03-18 RX ADMIN — HYDROCODONE BITARTRATE AND ACETAMINOPHEN 1 TABLET: 7.5; 325 TABLET ORAL at 21:34

## 2018-03-18 NOTE — PROGRESS NOTES
Continued Stay Note  DOMINGO Johnson     Patient Name: Danisha Cannon  MRN: [id number]  Today's Date: 3/18/2018    Admit Date: 3/13/2018          Discharge Plan     Row Name 03/18/18 0925       Plan    Plan ANDIE spoke to [facility] charge nurse.  [facility] does not think that pt will be dc today.  ANDIE has already faxed iv abx orders/hh orders to [facility] care/[facility].  They will both need to be notified upon dc.  FRANKIE Herr.     Patient/Family in Agreement with Plan yes              Discharge Codes    No documentation.       Expected Discharge Date and Time     Expected Discharge Date Expected Discharge Time    Mar 17, 2018             FRANKIE Silva

## 2018-03-18 NOTE — DISCHARGE PLACEMENT REQUEST
"To:  [facility]  From:  Kallie Dang, FRANKIE  [phone]    Possible dc tomorrow, Monday 3/19    Lucretia Cannon (62 y.o. Female)     Date of Birth Social Security Number Address Home Phone MRN    1955  [address] [place] [address] [phone] [id number]    Mormonism Marital Status          None        Admission Date Admission Type Admitting Provider Attending Provider Department, Room/Bed    3/13/18 Elective Amilcar Capellan MD Jackson, Stephen H, MD [facility] 3A, 352/1    Discharge Date Discharge Disposition Discharge Destination                       Attending Provider:  Amilcar Capellan MD    Allergies:  Codeine, Mobic [Meloxicam], Morphine And Related    Isolation:  None   Infection:  None   Code Status:  FULL    Ht:  165.1 cm (65\")   Wt:  119 kg (263 lb)    Admission Cmt:  None   Principal Problem:  None                Active Insurance as of 3/13/2018     Primary Coverage     Payor Plan Insurance Group Employer/Plan Group    [employer]HE[employer] BLUE CROSS [facility] BLUE CROSS BLUE [facility] PPO [id number]     Payor Plan Address Payor Plan Phone Number Effective From Effective To    [address] [phone] 1/1/2016     [place] [address]       Subscriber Name Subscriber Birth Date Member ID       LUCRETIA CANNON 1955 [id number]                 Emergency Contacts      (Rel.) Home Phone Work Phone Mobile Phone    Clark Cannon (Spouse) [phone] -- [phone]        Here is a copy of my home IV antibiotic orders. Please make sure arrangements in place in case she is discharged over the weekend. Home IV antibiotic orders: 1.  Diagnosis right knee prosthetic joint infection-she underwent irrigation, debridement... [[id number]] (Order [id number])   Order   Date: 3/16/2018 Department: [address] Released By/Authorizing: Chente Florentino MD (auto-released)   Order History   Inpatient   Date/Time Action Taken User Additional Information   03/16/18 1614 Release Chente NAVARRO " MD Mishel (auto-released) From Order: [id number]   Order Details     Frequency Duration Priority Order Class   Once 1  occurrence Routine Hospital Performed   Start Date/Time     Start Date Start Time   03/16/18 0344   Comments     Here is a copy of my home IV antibiotic orders. Please make sure arrangements in place in case she is discharged over the weekend.    Home IV antibiotic orders:  1.  Diagnosis right knee prosthetic joint infection-she underwent irrigation, debridement, and poly-exchange on 3/3/18.  She underwent right knee prosthetic joint explant with placement of an antibiotic impregnated spacer on 03/13/2018.  2.  Orthopedic surgeon-Mervin Capellan M.D.  3.  Microbiology-methicillin susceptible staph aureus  4.  IV access-PICC line  5.  Antibiotic orders:  Nafcillin 12 g IV daily given via a continuous infusion through 4/24/18 (that would be 6 weeks following joint explant)  6.  Laboratory monitoring:  CBC, sedimentation rate, CRP every Monday  7.  Follow-up appointment 2 weeks  8.  Fax copy of these orders to [phone]    Chente Urena MD            History & Physical      H&P filed by Linsey Corona MD at 3/13/2018 11:15 AM     Scan on 3/13/2018 : HISTORY AND PHYSICAL 3/13/2018 (below)            Electronically signed by Interface, Scans Incoming at 3/13/2018 11:15 AM     H&P filed by Linsey Corona MD at 3/12/2018 11:39 PM     Scan on 3/13/2018 : [facility] - 03/12/2018 (below)            Electronically signed by Interface, Scans Incoming at 3/12/2018 11:39 PM          Operative/Procedure Notes (most recent note)      Amilcar Capellan MD at 3/13/2018 10:45 AM        [facility]  OP NOTE    Patient Name: Danisha Cannon  Date of Procedure: 3/13/2018     PREOPERATIVE DIAGNOSIS: Infected right total knee replacement     POSTOPERATIVE DIAGNOSIS: Same.     PROCEDURE PERFORMED: #1 explantation of right total knee arthroplasty #2 reimplantation right total knee with  antibiotic impregnated cement     SURGEON: Amilcar Capellan MD   Asst. : Immanuel Kaminski M.D.   ANESTHESIA: General.    PREPARATION: Routine.    STAFF: Circulator: Karla Caraballo RN  Scrub Person: Macrela Vail; Chandan Lamb; Shani Giang    ESTIMATED BLOOD LOSS: 200 mL    SPECIMENS: None    COMPLICATIONS: None    INDICATIONS: Danisha Cannon is a 62 y.o. female who is post a right total knee replacement and developed an infection.  She is post-debridement and polyethylene liner exchange only to have recurrent infection.  It was felt that removal of current implants and placement of antibiotic spacers were indicated at this time. The indications, risks, and possible complications of the procedure were explained to the patient, who voiced understanding and wished to proceed with surgery.Pocketbookuy implants were used utilizing a size #4 TC 3 femoral component and a size number 4:15 millimeter all polyethylene Sigma tibial component both cemented with antibiotic cement.  Antibiotic impregnated beads were placed deep and superficial also     PROCEDURE IN DETAIL: The patient was taken to the operating room and placed on the operating table in a supine position. After general anesthesia was obtained, the right lower extremity was prepped and draped in a sterile manner.  The right lower extremity was then prepped and draped in the usual fashion.  The extremity was then exsanguinated with an Esmarch bandage and a tourniquet was inflated to 300 mmHg a.  All previous sutures were removed and the arthrotomy was opened.  A thorough debridement with sharp dissection was carried out throughout the knee.  The patellar component was then removed with an oscillating saw as was the femoral and tibial components after they were impacted free all antibiotic cement was removed with a combination of a Ronjair and the bur.  After completion of the debridement and thorough irrigation the tourniquet was deflated and the knee was wrapped.  A  complete new prep and draped was carried out and a new set of instruments in the holding crew changed to sterile gowns and gloves.  The extremity was then reprepped and draped and exsanguinated and the tourniquet was then reinflated to 300 mmHg a.  The appropriate size components were then chosen.    Antibiotic impregnated cement was then mixed and placed on the tibia and the tibial component was pressed into position and any excess cement was then removed after the cement had hardened femoral component was similarly cemented and impacted into position.  After the cement hardened the knee was taken through a range of motion and the alignment appeared excellent.  A minimal lateral release was then carried out.  The knee was again irrigated.  Deep and superficial drains were placed.  Antibiotic beads were placed deep and superficially the deep fascia was closed with Vicryl suture followed by nylon in interrupted fashion on the skin. Sterile dressings were applied. The patient tolerated the procedure well. Sponge and needle counts were correct. The patient was then awakened and extubated in the operating room and taken to the recovery room in good condition.  Amilcar Capellan MD  Date: 3/13/2018 Time: 2:44 PM        Electronically signed by Amilcar Capellan MD at 3/13/2018  2:51 PM          Physician Progress Notes (most recent note)      Amilcar Capellan MD at 3/17/2018 10:19 AM        This patient has a infected right total knee arthroplasty.  She is post explantation and reimplantation with antibiotic Romi.  Yesterday her knee was angered drainage and appears somewhat better tonight is beginning to soften the.  Tag plan possibly discharge in a.m.    Electronically signed by Amilcar Capellan MD at 3/17/2018 10:20 AM          Consult Notes (most recent note)      Chente Florentino MD at 3/13/2018  8:00 PM      Consult Orders:    1. Inpatient Infectious Diseases Consult [[id number]] ordered by Amilcar CAMILO  MD Timi at 03/13/18 1523                INFECTIOUS DISEASES CONSULT NOTE    Patient:  Danisha Cannon 62 y.o. female  ROOM # 352/1  YOB: 1955  MRN: [id number]  CSN:  [id number]  Admit date: 3/13/2018   Admitting Physician: Amilcar Capellan MD  Primary Care Physician: Juan Carlos Sparks MD  REFERRING PROVIDER: Amilcar Capellan MD    Reason for Consultation: Recommendations for antibiotic management.    History of Present Illness/Chief Complaint: 62-year-old woman.  Known to me from consultation last hospitalization.  She had what was felt to be early right knee prosthetic joint infection with methicillin susceptible Staphylococcus aureus.  She had undergone irrigation, exchange of the poly-material.  She was home on IV antibiotic treatment.  She had developed some ongoing drainage.  She was taken back to surgery today.  She underwent joint explant and placement of antibiotic spacer.  She is having discomfort postoperatively.  Daughter at bedside.  Patient had expressed concern that there was a 12-18 hour delay in her getting IV antibiotics at the time of discharge from her last hospitalization to when she received her next dose at home.  She was concerned that the antibiotic delay caused her difficulties.  I explained I did not feel the antibiotic delay created the problem.  In prior discussions with Dr. Capellan she had significant purulence when he first irrigated and exchanged the poly-material.  Chances of success with a 1 stage approach with her ongoing drainage were going to be minimal.  Two-stage approach appears to be the best way for her to get over this infection and eventually get a new joint.    Current Scheduled Medications:     aspirin 325 mg Oral Daily   ceFAZolin 2 g Intravenous Q8H   DULoxetine 30 mg Oral Nightly   flecainide 100 mg Oral Q12H   fluticasone 1 spray Each Nare Daily   gabapentin 600 mg Oral Q8H   levothyroxine 137 mcg Oral Q AM   metoprolol tartrate 12.5 mg Oral Q12H  "  pantoprazole 40 mg Oral Q AM   rOPINIRole 2 mg Oral Daily   rOPINIRole 3 mg Oral Nightly   triamcinolone  Topical Q12H   venlafaxine  mg Oral Daily     Current PRN Medications:  docusate sodium  •  HYDROcodone-acetaminophen  •  HYDROmorphone **AND** naloxone  •  LORazepam  •  magnesium hydroxide  •  naloxone  •  ondansetron **OR** ondansetron ODT **OR** ondansetron    Allergies:    Allergies   Allergen Reactions   • Codeine Dizziness and Nausea Only     Rapid heart rate, dizziness  NAUSEA   • Mobic [Meloxicam] Rash   • Morphine And Related Itching     Past Medical History: History of pilonidal/buttock cyst.  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  Thyroid dysfunction.  Gastroesophageal reflux disease.  Sleep apnea.  Degenerative arthritis.  Sleep apnea.    Past Surgical History: Umbilical hernia repair.  Laparoscopic cholecystectomy.  Bunionectomy.  Trigger finger release.  Right knee arthroplasty.  Irrigation/debridement/poly-exchange right knee.  Right knee explant with placement of antibiotic spacer.    Social History: No tobacco, alcohol, or illicit drug use.    Family History: Noncontributory    Review of Systems  No nausea or vomiting  No rash or skin itching  No pain at her line site  No diarrhea  No cardiopulmonary complaints  She was anxious and tearful  Please see HPI for additional pertinent positives negatives from her complete review of systems    Vital Signs:  /50 (BP Location: Left arm, Patient Position: Lying)   Pulse 90   Temp 99.3 °F (37.4 °C) (Oral)   Resp 18   Ht 165.1 cm (65\")   Wt 119 kg (263 lb)   LMP  (LMP Unknown) Comment: post menapause  SpO2 97%   BMI 43.77 kg/m²   Temp (24hrs), Av.2 °F (36.8 °C), Min:97.2 °F (36.2 °C), Max:99.4 °F (37.4 °C)    Physical Exam  Vital signs reviewed.  Sclera nonicteric  Lungs clear without crackles  Heart regular rhythm without murmur  Abdomen soft nontender without hepatosplenomegaly  Skin without drug rash  PICC without " "erythema  Right knee with operative dressing in place  Right foot intact sensation and capillary refill  Line/IV site: No erythema or tenderness.    Lab Results:  CBC:   Results from last 7 days  Lab Units 03/14/18  0505   HEMOGLOBIN g/dL 8.0*   HEMATOCRIT % 25.6*     CMP:   Results from last 7 days  Lab Units 03/08/18  0520 03/07/18  1200   SODIUM mmol/L 140 142   POTASSIUM mmol/L 3.4* 3.9   CHLORIDE mmol/L 100 98   CO2 mmol/L 32.0* 36.0*   BUN mg/dL 17 16   CREATININE mg/dL 0.73 0.86   CALCIUM mg/dL 8.5 8.8   GLUCOSE mg/dL 171* 119*     Operative culture last admission:  Culture           Heavy growth (4+) Staphylococcus aureus, MSSA        BETA LACTAMASE Positive       Stain     Many (4+) WBCs seen      Moderate (3+) Gram positive cocci, intracellular and extracellular            Resulting Agency: [facility]   Susceptibility      Staphylococcus aureus, MSSA     DONTE     Clindamycin <=0.25 ug/ml\"><=0.25 ug/ml Resistant     Erythromycin 4 ug/ml Resistant     Gentamicin <=0.5 ug/ml\"><=0.5 ug/ml Susceptible     Inducible Clindamycin Resistance POS  Positive     Levofloxacin 1 ug/ml Susceptible 1     Oxacillin 0.5 ug/ml Susceptible     Penicillin G >=0.5 ug/ml Resistant     Tetracycline <=1 ug/ml\"><=1 ug/ml Susceptible     Trimethoprim + Sulfamethoxazole <=10 ug/ml\"><=10 ug/ml Susceptible     Vancomycin <=0.5 ug/ml\"><=0.5 ug/ml Susceptible         Impression:   1.  Right knee prosthetic joint infection.  Methicillin susceptible Staphylococcus aureus recovered from prior culture.  She has undergone joint explant and placement of antibiotic impregnated spacer.  2.  History of pilonidal or buttock cyst-resolved  3.  History of sleep apnea  4.  History atrial fibrillation    Recommendations:    Suggest antibiotic treatment with nafcillin 2 g IV every 4 hours or cefazolin 2 g IV every 8 hours.  Suspect methicillin susceptible staph aureus as the pathogen  We will make antibiotic adjustments if necessary based on operative " cultures from her current admission  Will continue to follow    Chente Urena MD  03/14/18  7:50 AM            Electronically signed by Chente Urena MD at 3/14/2018  8:02 AM

## 2018-03-18 NOTE — PROGRESS NOTES
's revision right knee surgery for an infected total knee arthroplasty.  She is progressing satisfactory.  Wound is beginning to soften.  She still has some drainage but it is serous in nature and not purulent.    Plan continue IV antibiotics

## 2018-03-18 NOTE — DISCHARGE PLACEMENT REQUEST
"Lucretia Cannon (62 y.o. Female)     Date of Birth Social Security Number Address Home Phone MRN    1955  [address]  [place] [address] [phone] [id number]    Yarsanism Marital Status          None        Admission Date Admission Type Admitting Provider Attending Provider Department, Room/Bed    3/13/18 Elective Amilcar Capellan MD Jackson, Stephen H, MD [facility] 3A, 352/1    Discharge Date Discharge Disposition Discharge Destination                       Attending Provider:  Amilcar Capellan MD    Allergies:  Codeine, Mobic [Meloxicam], Morphine And Related    Isolation:  None   Infection:  None   Code Status:  FULL    Ht:  165.1 cm (65\")   Wt:  119 kg (263 lb)    Admission Cmt:  None   Principal Problem:  None                Active Insurance as of 3/13/2018     Primary Coverage     Payor Plan Insurance Group Employer/Plan Group    [employer] BLUE CROSS [employer] BLUE CROSS BLUE [employer] PPO [id number]     Payor Plan Address Payor Plan Phone Number Effective From Effective To    [address] [phone] 1/1/2016     [place] [address]       Subscriber Name Subscriber Birth Date Member ID       LUCRETIA CANNON 1955 [id number]                 Emergency Contacts      (Rel.) Home Phone Work Phone Mobile Phone    Clark Cannon (Spouse) [phone] -- [phone]        Here is a copy of my home IV antibiotic orders. Please make sure arrangements in place in case she is discharged over the weekend. Home IV antibiotic orders: 1.  Diagnosis right knee prosthetic joint infection-she underwent irrigation, debridement... [[id number]] (Order [id number])   Order   Date: 3/16/2018 Department: [address] Released By/Authorizing: Chente Florentino MD (auto-released)   Order History   Inpatient   Date/Time Action Taken User Additional Information   03/16/18 1614 Release Chente Florentino MD (auto-released) From Order: [id number]   Order Details     Frequency Duration Priority " Order Class   Once 1  occurrence Routine Hospital Performed   Start Date/Time     Start Date Start Time   03/16/18 2104   Comments     Here is a copy of my home IV antibiotic orders. Please make sure arrangements in place in case she is discharged over the weekend.    Home IV antibiotic orders:  1.  Diagnosis right knee prosthetic joint infection-she underwent irrigation, debridement, and poly-exchange on 3/3/18.  She underwent right knee prosthetic joint explant with placement of an antibiotic impregnated spacer on 03/13/2018.  2.  Orthopedic surgeon-Mervin Capellan M.D.  3.  Microbiology-methicillin susceptible staph aureus  4.  IV access-PICC line  5.  Antibiotic orders:  Nafcillin 12 g IV daily given via a continuous infusion through 4/24/18 (that would be 6 weeks following joint explant)  6.  Laboratory monitoring:  CBC, sedimentation rate, CRP every Monday  7.  Follow-up appointment 2 weeks  8.  Fax copy of these orders to [phone]    Chente Urena MD

## 2018-03-18 NOTE — PLAN OF CARE
Problem: Patient Care Overview  Goal: Plan of Care Review   03/18/18 9305   Coping/Psychosocial   Plan of Care Reviewed With patient   Plan of Care Review   Progress improving   OTHER   Outcome Summary Pt is A&Ox4. Gets upx1 with walker and walks to bathroom. VSS. Pt had small BM today. Pt c/o some pain in R knee, medication given as ordered. IV abx given. Pt tolerating regular diet. Neuro checks done. Safety maintained.      Goal: Individualization and Mutuality  Outcome: Ongoing (interventions implemented as appropriate)    Goal: Discharge Needs Assessment  Outcome: Ongoing (interventions implemented as appropriate)    Goal: Interprofessional Rounds/Family Conf  Outcome: Ongoing (interventions implemented as appropriate)      Problem: Fall Risk (Adult)  Goal: Absence of Fall  Outcome: Ongoing (interventions implemented as appropriate)      Problem: Infection, Risk/Actual (Adult)  Goal: Infection Prevention/Resolution  Outcome: Ongoing (interventions implemented as appropriate)      Problem: Pain, Acute (Adult)  Goal: Acceptable Pain Control/Comfort Level  Outcome: Ongoing (interventions implemented as appropriate)      Problem: Knee Arthroplasty (Total, Partial) (Adult)  Goal: Signs and Symptoms of Listed Potential Problems Will be Absent, Minimized or Managed (Knee Arthroplasty)  Outcome: Ongoing (interventions implemented as appropriate)

## 2018-03-18 NOTE — DISCHARGE PLACEMENT REQUEST
"To:  [facility]  From:  Kallie Alton, JUSTICE  [phone]    Possible dc tomorrow, 3/19, Monday.      Lucretia Cannon (62 y.o. Female)     Date of Birth Social Security Number Address Home Phone MRN    1955  [address] [place] [address] [phone] [id number]    Gnosticist Marital Status          None        Admission Date Admission Type Admitting Provider Attending Provider Department, Room/Bed    3/13/18 Elective Amilcar Capellan MD Jackson, Stephen H, MD [facility] 3A, 352/1    Discharge Date Discharge Disposition Discharge Destination                       Attending Provider:  Amilcar Capellan MD    Allergies:  Codeine, Mobic [Meloxicam], Morphine And Related    Isolation:  None   Infection:  None   Code Status:  FULL    Ht:  165.1 cm (65\")   Wt:  119 kg (263 lb)    Admission Cmt:  None   Principal Problem:  None                Active Insurance as of 3/13/2018     Primary Coverage     Payor Plan Insurance Group Employer/Plan Group    ANTHEM BLUE CROSS ANTHEM BLUE CROSS BLUE SHIELD PPO [id number]     Payor Plan Address Payor Plan Phone Number Effective From Effective To    [address] [phone] 1/1/2016     [place] [address]       Subscriber Name Subscriber Birth Date Member ID       LUCRETIA CANNON 1955 [id number]                 Emergency Contacts      (Rel.) Home Phone Work Phone Mobile Phone    Clark Cannon (Spouse) [phone] -- [phone]        Inpatient Case Management  Consult [[id number]] (Order [id number])   Order   Date: 3/18/2018 Department: [address] Released By: Rachael Coombs RN (auto-released) Authorizing: Amilcar Capellan MD   Order History   Inpatient   Date/Time Action Taken User Additional Information   03/18/18 0925 Release Rachael Coombs RN (auto-released) From Order: [id number]   Order Details     Frequency Duration Priority Order Class   Once 1  occurrence Routine Hospital Performed   Order " Questions     Question Answer Comment   Reason for Consult? [facility] to evaluate, treat, and administer IV antibiotics           Verbal Order Info     Action Created on Order Mode Entered by Responsible Provider Signed by Signed on   Ordering 03/18/18 0925 Verbal with readback TAMANNA Moore MD     Consult Order Info     ID Description Priority Start Date Start Time   [id number] Inpatient Case Management  Consult Routine 03/18/2018  9:25 AM   Provider Specialty Referred to   ______________________________________ _____________________________________   Reprint Order Requisition     Inpatient Case Management  Consult (Order #[id number]) on 3/18/18   Encounter     View Encounter           Order Provider Info         Office phone Pager E-mail   Ordering User Rachael Coombs RN -- -- --   Authorizing Provider Amilcar Capellan [phone] -- --   Attending Provider Amilcar Capellan [phone] -- --   Order Transmittal Tracking     Inpatient Case Management  Consult (Order #[id number]) on 3/18/18            History & Physical      H&P filed by Linsey Corona MD at 3/13/2018 11:15 AM     Scan on 3/13/2018 : HISTORY AND PHYSICAL 3/13/2018 (below)            Electronically signed by Interface, Scans Incoming at 3/13/2018 11:15 AM     H&P filed by Linsey Corona MD at 3/12/2018 11:39 PM     Scan on 3/13/2018 : [facility] - 03/12/2018 (below)            Electronically signed by Interface, Scans Incoming at 3/12/2018 11:39 PM       Prior to Admission Medications     Prescriptions Last Dose Informant Patient Reported? Taking?    aspirin 325 MG tablet 3/10/2018  Yes Yes    Take 1 tablet by mouth Daily.    DULoxetine (CYMBALTA) 30 MG capsule 3/12/2018 Self Yes Yes    Take 30 mg by mouth 2 (Two) Times a Day.    famciclovir (FAMVIR) 500 MG tablet 3/12/2018 Self Yes Yes    Take 500 mg by mouth 2 (Two) Times a Day.     flecainide (TAMBOCOR) 100 MG tablet 3/12/2018 Self Yes Yes    Take 100 mg by mouth 2 (Two) Times a Day.    fluticasone (VERAMYST) 27.5 MCG/SPRAY nasal spray 3/13/2018 Self Yes Yes    2 sprays into each nostril Daily.    gabapentin (NEURONTIN) 600 MG tablet 3/12/2018  Yes Yes    Take 600 mg by mouth 3 (Three) Times a Day.    HYDROcodone-acetaminophen (NORCO) 5-325 MG per tablet 3/12/2018  No Yes    Take 1-2 tablets by mouth Every 4 (Four) Hours As Needed (Pain).    levothyroxine (SYNTHROID, LEVOTHROID) 137 MCG tablet 3/6/2018 Self Yes Yes    Take 137 mcg by mouth Every Morning.    LORazepam (ATIVAN) 1 MG tablet 3/13/2018 Self Yes Yes    Take 1 mg by mouth Every 8 (Eight) Hours As Needed for Anxiety (TOOK 2 TABS 3-13-18 0630).    metoprolol tartrate (LOPRESSOR) 25 MG tablet 3/13/2018 Self Yes Yes    Take 25 mg by mouth 2 (Two) Times a Day. TAKES 1/2 TAB BID    pantoprazole (PROTONIX) 40 MG EC tablet 3/12/2018 Self Yes Yes    Take 40 mg by mouth 2 (Two) Times a Day.    rOPINIRole (REQUIP) 3 MG tablet 3/12/2018  Yes Yes    Take 3 mg by mouth Every Night. 2 TABS IN AM AND 3 TABS AT NIGHT    triamcinolone (KENALOG) 0.1 % cream 3/12/2018 Self Yes Yes    Apply  topically 2 (Two) Times a Day.    venlafaxine XR (EFFEXOR-XR) 150 MG 24 hr capsule 3/12/2018 Self Yes Yes    Take 150 mg by mouth Daily.          Hospital Medications (active)       Dose Frequency Start End    aspirin tablet 325 mg 325 mg Daily 3/13/2018     Sig - Route: Take 1 tablet by mouth Daily. - Oral    docusate sodium (COLACE) capsule 100 mg 100 mg 2 Times Daily PRN 3/13/2018     Sig - Route: Take 1 capsule by mouth 2 (Two) Times a Day As Needed for Constipation. - Oral    DULoxetine (CYMBALTA) DR capsule 30 mg 30 mg Nightly 3/13/2018     Sig - Route: Take 1 capsule by mouth Every Night. - Oral    flecainide (TAMBOCOR) tablet 100 mg 100 mg Every 12 Hours Scheduled 3/13/2018     Sig - Route: Take 1 tablet by mouth Every 12 (Twelve) Hours. - Oral    fluticasone  "(FLONASE) 50 MCG/ACT nasal spray 1 spray 1 spray Daily 3/14/2018     Sig - Route: 1 spray by Each Nare route Daily. - Each Nare    gabapentin (NEURONTIN) capsule 600 mg 600 mg Every 8 Hours Scheduled 3/13/2018     Sig - Route: Take 2 capsules by mouth Every 8 (Eight) Hours. - Oral    HYDROcodone-acetaminophen (NORCO) 7.5-325 MG per tablet 1 tablet 1 tablet Every 4 Hours PRN 3/13/2018 3/23/2018    Sig - Route: Take 1 tablet by mouth Every 4 (Four) Hours As Needed for Moderate Pain . - Oral    levothyroxine (SYNTHROID, LEVOTHROID) tablet 137 mcg 137 mcg Every Early Morning 3/14/2018     Sig - Route: Take 1 tablet by mouth Every Morning. - Oral    LORazepam (ATIVAN) tablet 1 mg 1 mg Every 8 Hours PRN 3/13/2018     Sig - Route: Take 1 tablet by mouth Every 8 (Eight) Hours As Needed for Anxiety (TOOK 2 TABS 3-13-18 0630). - Oral    magnesium hydroxide (MILK OF MAGNESIA) suspension 2400 mg/10mL 10 mL 10 mL Daily PRN 3/13/2018     Sig - Route: Take 10 mL by mouth Daily As Needed for Constipation. - Oral    metoprolol tartrate (LOPRESSOR) tablet 12.5 mg 12.5 mg Every 12 Hours Scheduled 3/13/2018     Sig - Route: Take 0.5 tablets by mouth Every 12 (Twelve) Hours. - Oral    nafcillin (UNIPEN) 2 g/100 mL 0.9% NS IVPB (mbp) 2 g Every 4 Hours 3/14/2018 4/25/2018    Sig - Route: Infuse 100 mL into a venous catheter Every 4 (Four) Hours. - Intravenous    naloxone (NARCAN) injection 0.1 mg 0.1 mg Every 5 Minutes PRN 3/13/2018     Sig - Route: Infuse 0.25 mL into a venous catheter Every 5 (Five) Minutes As Needed for Opioid Reversal or Respiratory Depression (see administration instructions). - Intravenous    naloxone (NARCAN) injection 0.1 mg 0.1 mg Every 5 Minutes PRN 3/13/2018     Sig - Route: Infuse 0.25 mL into a venous catheter Every 5 (Five) Minutes As Needed for Respiratory Depression. - Intravenous    Linked Group 1:  \"And\" Linked Group Details        ondansetron (ZOFRAN) injection 4 mg 4 mg Every 6 Hours PRN 3/13/2018     " "Sig - Route: Infuse 2 mL into a venous catheter Every 6 (Six) Hours As Needed for Nausea or Vomiting. - Intravenous    Linked Group 2:  \"Or\" Linked Group Details        ondansetron (ZOFRAN) tablet 4 mg 4 mg Every 6 Hours PRN 3/13/2018     Sig - Route: Take 1 tablet by mouth Every 6 (Six) Hours As Needed for Nausea or Vomiting. - Oral    Linked Group 2:  \"Or\" Linked Group Details        ondansetron ODT (ZOFRAN-ODT) disintegrating tablet 4 mg 4 mg Every 6 Hours PRN 3/13/2018     Sig - Route: Take 1 tablet by mouth Every 6 (Six) Hours As Needed for Nausea or Vomiting. - Oral    Linked Group 2:  \"Or\" Linked Group Details        pantoprazole (PROTONIX) EC tablet 40 mg 40 mg Every Early Morning 3/14/2018     Sig - Route: Take 1 tablet by mouth Every Morning. - Oral    rOPINIRole (REQUIP) tablet 2 mg 2 mg Daily 3/14/2018     Sig - Route: Take 2 tablets by mouth Daily. - Oral    rOPINIRole (REQUIP) tablet 3 mg 3 mg Nightly 3/13/2018     Sig - Route: Take 3 tablets by mouth Every Night. - Oral    triamcinolone (KENALOG) 0.1 % cream  Every 12 Hours Scheduled 3/13/2018     Sig - Route: Apply  topically Every 12 (Twelve) Hours. - Topical    venlafaxine XR (EFFEXOR-XR) 24 hr capsule 150 mg 150 mg Daily 3/13/2018     Sig - Route: Take 2 capsules by mouth Daily. - Oral             Operative/Procedure Notes (most recent note)      Amilcar Capellan MD at 3/13/2018 10:45 AM        [facility]  OP NOTE    Patient Name: Danisha Cannon  Date of Procedure: 3/13/2018     PREOPERATIVE DIAGNOSIS: Infected right total knee replacement     POSTOPERATIVE DIAGNOSIS: Same.     PROCEDURE PERFORMED: #1 explantation of right total knee arthroplasty #2 reimplantation right total knee with antibiotic impregnated cement     SURGEON: Amilcar Capellan MD   Asst. : Immanuel Kaminski M.D.   ANESTHESIA: General.    PREPARATION: Routine.    STAFF: Circulator: Karla Caraballo RN  Scrub Person: Marcela Vail; Chandan JOHNSON" Rahat    ESTIMATED BLOOD LOSS: 200 mL    SPECIMENS: None    COMPLICATIONS: None    INDICATIONS: Danisha Cannon is a 62 y.o. female who is post a right total knee replacement and developed an infection.  She is post-debridement and polyethylene liner exchange only to have recurrent infection.  It was felt that removal of current implants and placement of antibiotic spacers were indicated at this time. The indications, risks, and possible complications of the procedure were explained to the patient, who voiced understanding and wished to proceed with surgery.Pollenizeruy implants were used utilizing a size #4 TC 3 femoral component and a size number 4:15 millimeter all polyethylene Sigma tibial component both cemented with antibiotic cement.  Antibiotic impregnated beads were placed deep and superficial also     PROCEDURE IN DETAIL: The patient was taken to the operating room and placed on the operating table in a supine position. After general anesthesia was obtained, the right lower extremity was prepped and draped in a sterile manner.  The right lower extremity was then prepped and draped in the usual fashion.  The extremity was then exsanguinated with an Esmarch bandage and a tourniquet was inflated to 300 mmHg a.  All previous sutures were removed and the arthrotomy was opened.  A thorough debridement with sharp dissection was carried out throughout the knee.  The patellar component was then removed with an oscillating saw as was the femoral and tibial components after they were impacted free all antibiotic cement was removed with a combination of a Ronjair and the bur.  After completion of the debridement and thorough irrigation the tourniquet was deflated and the knee was wrapped.  A complete new prep and draped was carried out and a new set of instruments in the holding crew changed to sterile gowns and gloves.  The extremity was then reprepped and draped and exsanguinated and the tourniquet was then reinflated to 300  mmHg a.  The appropriate size components were then chosen.    Antibiotic impregnated cement was then mixed and placed on the tibia and the tibial component was pressed into position and any excess cement was then removed after the cement had hardened femoral component was similarly cemented and impacted into position.  After the cement hardened the knee was taken through a range of motion and the alignment appeared excellent.  A minimal lateral release was then carried out.  The knee was again irrigated.  Deep and superficial drains were placed.  Antibiotic beads were placed deep and superficially the deep fascia was closed with Vicryl suture followed by nylon in interrupted fashion on the skin. Sterile dressings were applied. The patient tolerated the procedure well. Sponge and needle counts were correct. The patient was then awakened and extubated in the operating room and taken to the recovery room in good condition.  Amilcar Capellan MD  Date: 3/13/2018 Time: 2:44 PM        Electronically signed by Amilcar Capellan MD at 3/13/2018  2:51 PM          Physician Progress Notes (most recent note)      Amilcar Capellan MD at 3/17/2018 10:19 AM        This patient has a infected right total knee arthroplasty.  She is post explantation and reimplantation with antibiotic Romi.  Yesterday her knee was angered drainage and appears somewhat better tonight is beginning to soften the.  Tag plan possibly discharge in a.m.    Electronically signed by Amilcar Capellan MD at 3/17/2018 10:20 AM          Consult Notes (most recent note)      Chente Florentino MD at 3/13/2018  8:00 PM      Consult Orders:    1. Inpatient Infectious Diseases Consult [[id number]] ordered by Amilcar Capellan MD at 03/13/18 1523                INFECTIOUS DISEASES CONSULT NOTE    Patient:  Danisha Cannon 62 y.o. female  ROOM # 352/1  YOB: 1955  MRN: [id number]  CSN:  [id number]  Admit date: 3/13/2018   Admitting Physician: Amilcar CAMILO  MD Timi  Primary Care Physician: Juan Carlos Sparks MD  REFERRING PROVIDER: Amilcar Capellan MD    Reason for Consultation: Recommendations for antibiotic management.    History of Present Illness/Chief Complaint: 62-year-old woman.  Known to me from consultation last hospitalization.  She had what was felt to be early right knee prosthetic joint infection with methicillin susceptible Staphylococcus aureus.  She had undergone irrigation, exchange of the poly-material.  She was home on IV antibiotic treatment.  She had developed some ongoing drainage.  She was taken back to surgery today.  She underwent joint explant and placement of antibiotic spacer.  She is having discomfort postoperatively.  Daughter at bedside.  Patient had expressed concern that there was a 12-18 hour delay in her getting IV antibiotics at the time of discharge from her last hospitalization to when she received her next dose at home.  She was concerned that the antibiotic delay caused her difficulties.  I explained I did not feel the antibiotic delay created the problem.  In prior discussions with Dr. Capellan she had significant purulence when he first irrigated and exchanged the poly-material.  Chances of success with a 1 stage approach with her ongoing drainage were going to be minimal.  Two-stage approach appears to be the best way for her to get over this infection and eventually get a new joint.    Current Scheduled Medications:     aspirin 325 mg Oral Daily   ceFAZolin 2 g Intravenous Q8H   DULoxetine 30 mg Oral Nightly   flecainide 100 mg Oral Q12H   fluticasone 1 spray Each Nare Daily   gabapentin 600 mg Oral Q8H   levothyroxine 137 mcg Oral Q AM   metoprolol tartrate 12.5 mg Oral Q12H   pantoprazole 40 mg Oral Q AM   rOPINIRole 2 mg Oral Daily   rOPINIRole 3 mg Oral Nightly   triamcinolone  Topical Q12H   venlafaxine  mg Oral Daily     Current PRN Medications:  docusate sodium  •  HYDROcodone-acetaminophen  •   "HYDROmorphone **AND** naloxone  •  LORazepam  •  magnesium hydroxide  •  naloxone  •  ondansetron **OR** ondansetron ODT **OR** ondansetron    Allergies:    Allergies   Allergen Reactions   • Codeine Dizziness and Nausea Only     Rapid heart rate, dizziness  NAUSEA   • Mobic [Meloxicam] Rash   • Morphine And Related Itching     Past Medical History: History of pilonidal/buttock cyst.  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  Thyroid dysfunction.  Gastroesophageal reflux disease.  Sleep apnea.  Degenerative arthritis.  Sleep apnea.    Past Surgical History: Umbilical hernia repair.  Laparoscopic cholecystectomy.  Bunionectomy.  Trigger finger release.  Right knee arthroplasty.  Irrigation/debridement/poly-exchange right knee.  Right knee explant with placement of antibiotic spacer.    Social History: No tobacco, alcohol, or illicit drug use.    Family History: Noncontributory    Review of Systems  No nausea or vomiting  No rash or skin itching  No pain at her line site  No diarrhea  No cardiopulmonary complaints  She was anxious and tearful  Please see HPI for additional pertinent positives negatives from her complete review of systems    Vital Signs:  /50 (BP Location: Left arm, Patient Position: Lying)   Pulse 90   Temp 99.3 °F (37.4 °C) (Oral)   Resp 18   Ht 165.1 cm (65\")   Wt 119 kg (263 lb)   LMP  (LMP Unknown) Comment: post menapause  SpO2 97%   BMI 43.77 kg/m²   Temp (24hrs), Av.2 °F (36.8 °C), Min:97.2 °F (36.2 °C), Max:99.4 °F (37.4 °C)    Physical Exam  Vital signs reviewed.  Sclera nonicteric  Lungs clear without crackles  Heart regular rhythm without murmur  Abdomen soft nontender without hepatosplenomegaly  Skin without drug rash  PICC without erythema  Right knee with operative dressing in place  Right foot intact sensation and capillary refill  Line/IV site: No erythema or tenderness.    Lab Results:  CBC:   Results from last 7 days  Lab Units 18  0505   HEMOGLOBIN " "g/dL 8.0*   HEMATOCRIT % 25.6*     CMP:   Results from last 7 days  Lab Units 03/08/18  0520 03/07/18  1200   SODIUM mmol/L 140 142   POTASSIUM mmol/L 3.4* 3.9   CHLORIDE mmol/L 100 98   CO2 mmol/L 32.0* 36.0*   BUN mg/dL 17 16   CREATININE mg/dL 0.73 0.86   CALCIUM mg/dL 8.5 8.8   GLUCOSE mg/dL 171* 119*     Operative culture last admission:  Culture           Heavy growth (4+) Staphylococcus aureus, MSSA        BETA LACTAMASE Positive       Stain     Many (4+) WBCs seen      Moderate (3+) Gram positive cocci, intracellular and extracellular            Resulting Agency: [facility]   Susceptibility      Staphylococcus aureus, MSSA     DONTE     Clindamycin <=0.25 ug/ml\"><=0.25 ug/ml Resistant     Erythromycin 4 ug/ml Resistant     Gentamicin <=0.5 ug/ml\"><=0.5 ug/ml Susceptible     Inducible Clindamycin Resistance POS  Positive     Levofloxacin 1 ug/ml Susceptible 1     Oxacillin 0.5 ug/ml Susceptible     Penicillin G >=0.5 ug/ml Resistant     Tetracycline <=1 ug/ml\"><=1 ug/ml Susceptible     Trimethoprim + Sulfamethoxazole <=10 ug/ml\"><=10 ug/ml Susceptible     Vancomycin <=0.5 ug/ml\"><=0.5 ug/ml Susceptible         Impression:   1.  Right knee prosthetic joint infection.  Methicillin susceptible Staphylococcus aureus recovered from prior culture.  She has undergone joint explant and placement of antibiotic impregnated spacer.  2.  History of pilonidal or buttock cyst-resolved  3.  History of sleep apnea  4.  History atrial fibrillation    Recommendations:    Suggest antibiotic treatment with nafcillin 2 g IV every 4 hours or cefazolin 2 g IV every 8 hours.  Suspect methicillin susceptible staph aureus as the pathogen  We will make antibiotic adjustments if necessary based on operative cultures from her current admission  Will continue to follow    Chente Urena MD  03/14/18  7:50 AM            Electronically signed by Chente Urena MD at 3/14/2018  8:02 AM       "

## 2018-03-18 NOTE — PLAN OF CARE
Problem: Patient Care Overview  Goal: Plan of Care Review  Outcome: Ongoing (interventions implemented as appropriate)   03/18/18 5098   Coping/Psychosocial   Plan of Care Reviewed With patient   Plan of Care Review   Progress no change   OTHER   Outcome Summary VSS. NV check wnl, except continue to have serous drainage at times, ppp. Drsg is intact, area marked did not drain the last 2 times up to br. Pt cont to c/o intermit pain, prn pain med effective. Continue iv antbiotics.. Safety maintained.       Problem: Fall Risk (Adult)  Goal: Absence of Fall  Outcome: Ongoing (interventions implemented as appropriate)      Problem: Infection, Risk/Actual (Adult)  Goal: Infection Prevention/Resolution  Outcome: Ongoing (interventions implemented as appropriate)      Problem: Pain, Acute (Adult)  Goal: Acceptable Pain Control/Comfort Level  Outcome: Ongoing (interventions implemented as appropriate)

## 2018-03-18 NOTE — PLAN OF CARE
Problem: Patient Care Overview  Goal: Plan of Care Review  Outcome: Ongoing (interventions implemented as appropriate)   03/18/18 1101   Coping/Psychosocial   Plan of Care Reviewed With patient   Plan of Care Review   Progress improving   OTHER   Outcome Summary Pt hopes to go home soon. MD keeping eye on drainage. But feel pt is safe from PT stand point. Bed mobility cond independent. Sit-stand w/ bed elevated supervision. Pt amb 175' [facility]. Reviewed home safety

## 2018-03-19 VITALS
WEIGHT: 263 LBS | BODY MASS INDEX: 43.82 KG/M2 | OXYGEN SATURATION: 93 % | SYSTOLIC BLOOD PRESSURE: 147 MMHG | HEIGHT: 65 IN | DIASTOLIC BLOOD PRESSURE: 71 MMHG | HEART RATE: 84 BPM | TEMPERATURE: 97.9 F | RESPIRATION RATE: 16 BRPM

## 2018-03-19 LAB
ALBUMIN SERPL-MCNC: 2.7 G/DL (ref 3.5–5)
ALBUMIN/GLOB SERPL: 0.7 G/DL (ref 1.1–2.5)
ALP SERPL-CCNC: 98 U/L (ref 24–120)
ALT SERPL W P-5'-P-CCNC: 25 U/L (ref 0–54)
ANION GAP SERPL CALCULATED.3IONS-SCNC: 11 MMOL/L (ref 4–13)
AST SERPL-CCNC: 20 U/L (ref 7–45)
BASOPHILS # BLD AUTO: 0.07 10*3/MM3 (ref 0–0.2)
BASOPHILS NFR BLD AUTO: 0.8 % (ref 0–2)
BILIRUB SERPL-MCNC: 0.7 MG/DL (ref 0.1–1)
BUN BLD-MCNC: 12 MG/DL (ref 5–21)
BUN/CREAT SERPL: 17.6 (ref 7–25)
CALCIUM SPEC-SCNC: 8.5 MG/DL (ref 8.4–10.4)
CHLORIDE SERPL-SCNC: 102 MMOL/L (ref 98–110)
CO2 SERPL-SCNC: 30 MMOL/L (ref 24–31)
CREAT BLD-MCNC: 0.68 MG/DL (ref 0.5–1.4)
CRP SERPL-MCNC: 8.93 MG/DL (ref 0–0.99)
DEPRECATED RDW RBC AUTO: 55.5 FL (ref 40–54)
EOSINOPHIL # BLD AUTO: 0.41 10*3/MM3 (ref 0–0.7)
EOSINOPHIL NFR BLD AUTO: 4.9 % (ref 0–4)
ERYTHROCYTE [DISTWIDTH] IN BLOOD BY AUTOMATED COUNT: 16.7 % (ref 12–15)
ERYTHROCYTE [SEDIMENTATION RATE] IN BLOOD: 71 MM/HR (ref 0–20)
GFR SERPL CREATININE-BSD FRML MDRD: 87 ML/MIN/1.73
GLOBULIN UR ELPH-MCNC: 3.7 GM/DL
GLUCOSE BLD-MCNC: 103 MG/DL (ref 70–100)
GLUCOSE BLDC GLUCOMTR-MCNC: 142 MG/DL (ref 70–130)
HCT VFR BLD AUTO: 26.1 % (ref 37–47)
HCT VFR BLD AUTO: 26.7 % (ref 37–47)
HGB BLD-MCNC: 8 G/DL (ref 12–16)
HGB BLD-MCNC: 8.1 G/DL (ref 12–16)
IMM GRANULOCYTES # BLD: 0.03 10*3/MM3 (ref 0–0.03)
IMM GRANULOCYTES NFR BLD: 0.4 % (ref 0–5)
LYMPHOCYTES # BLD AUTO: 1.7 10*3/MM3 (ref 0.72–4.86)
LYMPHOCYTES NFR BLD AUTO: 20.5 % (ref 15–45)
MCH RBC QN AUTO: 27.6 PG (ref 28–32)
MCHC RBC AUTO-ENTMCNC: 30.3 G/DL (ref 33–36)
MCV RBC AUTO: 91.1 FL (ref 82–98)
MONOCYTES # BLD AUTO: 0.83 10*3/MM3 (ref 0.19–1.3)
MONOCYTES NFR BLD AUTO: 10 % (ref 4–12)
NEUTROPHILS # BLD AUTO: 5.27 10*3/MM3 (ref 1.87–8.4)
NEUTROPHILS NFR BLD AUTO: 63.4 % (ref 39–78)
NRBC BLD MANUAL-RTO: 0 /100 WBC (ref 0–0)
PLATELET # BLD AUTO: 458 10*3/MM3 (ref 130–400)
PMV BLD AUTO: 10.8 FL (ref 6–12)
POTASSIUM BLD-SCNC: 3.6 MMOL/L (ref 3.5–5.3)
PROT SERPL-MCNC: 6.4 G/DL (ref 6.3–8.7)
RBC # BLD AUTO: 2.93 10*6/MM3 (ref 4.2–5.4)
SODIUM BLD-SCNC: 143 MMOL/L (ref 135–145)
WBC NRBC COR # BLD: 8.31 10*3/MM3 (ref 4.8–10.8)

## 2018-03-19 PROCEDURE — 85651 RBC SED RATE NONAUTOMATED: CPT | Performed by: INTERNAL MEDICINE

## 2018-03-19 PROCEDURE — 85025 COMPLETE CBC W/AUTO DIFF WBC: CPT | Performed by: INTERNAL MEDICINE

## 2018-03-19 PROCEDURE — 86140 C-REACTIVE PROTEIN: CPT | Performed by: INTERNAL MEDICINE

## 2018-03-19 PROCEDURE — 82962 GLUCOSE BLOOD TEST: CPT

## 2018-03-19 PROCEDURE — 85018 HEMOGLOBIN: CPT | Performed by: ORTHOPAEDIC SURGERY

## 2018-03-19 PROCEDURE — 80053 COMPREHEN METABOLIC PANEL: CPT | Performed by: INTERNAL MEDICINE

## 2018-03-19 PROCEDURE — 85014 HEMATOCRIT: CPT | Performed by: ORTHOPAEDIC SURGERY

## 2018-03-19 RX ORDER — HYDROCODONE BITARTRATE AND ACETAMINOPHEN 7.5; 325 MG/1; MG/1
1 TABLET ORAL EVERY 6 HOURS PRN
Qty: 40 TABLET | Refills: 0 | Status: SHIPPED | OUTPATIENT
Start: 2018-03-19 | End: 2018-03-23

## 2018-03-19 RX ADMIN — FLECAINIDE ACETATE 100 MG: 100 TABLET ORAL at 08:38

## 2018-03-19 RX ADMIN — LORAZEPAM 1 MG: 1 TABLET ORAL at 06:00

## 2018-03-19 RX ADMIN — ASPIRIN 325 MG: 325 TABLET, COATED ORAL at 08:40

## 2018-03-19 RX ADMIN — NAFCILLIN SODIUM 2 G: 2 INJECTION, POWDER, LYOPHILIZED, FOR SOLUTION INTRAMUSCULAR; INTRAVENOUS at 02:32

## 2018-03-19 RX ADMIN — NAFCILLIN SODIUM 2 G: 2 INJECTION, POWDER, LYOPHILIZED, FOR SOLUTION INTRAMUSCULAR; INTRAVENOUS at 10:53

## 2018-03-19 RX ADMIN — GABAPENTIN 600 MG: 300 CAPSULE ORAL at 06:19

## 2018-03-19 RX ADMIN — METOPROLOL TARTRATE 12.5 MG: 25 TABLET, FILM COATED ORAL at 08:38

## 2018-03-19 RX ADMIN — FLUTICASONE PROPIONATE 1 SPRAY: 50 SPRAY, METERED NASAL at 08:39

## 2018-03-19 RX ADMIN — PANTOPRAZOLE SODIUM 40 MG: 40 TABLET, DELAYED RELEASE ORAL at 06:19

## 2018-03-19 RX ADMIN — VENLAFAXINE HYDROCHLORIDE 150 MG: 75 CAPSULE, EXTENDED RELEASE ORAL at 08:39

## 2018-03-19 RX ADMIN — HYDROCODONE BITARTRATE AND ACETAMINOPHEN 1 TABLET: 7.5; 325 TABLET ORAL at 04:42

## 2018-03-19 RX ADMIN — NAFCILLIN SODIUM 2 G: 2 INJECTION, POWDER, LYOPHILIZED, FOR SOLUTION INTRAMUSCULAR; INTRAVENOUS at 06:19

## 2018-03-19 RX ADMIN — LEVOTHYROXINE SODIUM 137 MCG: 137 TABLET ORAL at 06:20

## 2018-03-19 RX ADMIN — ROPINIROLE HYDROCHLORIDE 2 MG: 1 TABLET, FILM COATED ORAL at 08:38

## 2018-03-19 NOTE — PLAN OF CARE
Problem: Patient Care Overview  Goal: Plan of Care Review  Outcome: Ongoing (interventions implemented as appropriate)   03/19/18 0815 03/19/18 9535   Coping/Psychosocial   Plan of Care Reviewed With --  patient   Plan of Care Review   Progress improving --    OTHER   Outcome Summary --  Pt is A&Ox4. Pt gets up standby assist with walker. Dressing changed to abd pad and medipore tape. Neuro checks done. Pt going home w IV abx thru PICC. Takes medications whole with water, tolerates regular diet well. VSS. Safety maintained.      Goal: Individualization and Mutuality  Outcome: Ongoing (interventions implemented as appropriate)    Goal: Discharge Needs Assessment  Outcome: Ongoing (interventions implemented as appropriate)    Goal: Interprofessional Rounds/Family Conf  Outcome: Ongoing (interventions implemented as appropriate)      Problem: Fall Risk (Adult)  Goal: Absence of Fall  Outcome: Ongoing (interventions implemented as appropriate)      Problem: Infection, Risk/Actual (Adult)  Goal: Infection Prevention/Resolution  Outcome: Ongoing (interventions implemented as appropriate)      Problem: Pain, Acute (Adult)  Goal: Acceptable Pain Control/Comfort Level  Outcome: Ongoing (interventions implemented as appropriate)      Problem: Knee Arthroplasty (Total, Partial) (Adult)  Goal: Signs and Symptoms of Listed Potential Problems Will be Absent, Minimized or Managed (Knee Arthroplasty)  Outcome: Ongoing (interventions implemented as appropriate)

## 2018-03-19 NOTE — PROGRESS NOTES
Continued Stay Note   [facility]     Patient Name: Danisha Cannon  MRN: [id number]  Today's Date: 3/19/2018    Admit Date: 3/13/2018          Discharge Plan     Row Name 03/19/18 0828       Plan    Plan [facility]    Patient/Family in Agreement with Plan yes    Final Discharge Disposition Code 06 - home with home health care    Final Note Patient is discharged home today. ANDIE called [facility] and spoke to Shanta to notify her of discharge today. ANDIE faxed discharge summary to Shanta at [phone]. ANDIE also faxed discharge summary to [facility] at [phone]. ANDIE called La at [facility] at [phone] to notify her of discharge today.             Expected Discharge Date and Time     Expected Discharge Date Expected Discharge Time    Mar 19, 2018             FRANKIE Hernandez

## 2018-03-19 NOTE — DISCHARGE INSTR - ACTIVITY
As directed.  #1 she can shower and redress the wound #2 she can bear weight as tolerated #3 continue with home IV antibiotics

## 2018-03-19 NOTE — DISCHARGE SUMMARY
"[facility]  DISCHARGE SUMMARY       Date of Admission: 3/13/2018  Date of Discharge:  3/19/2018  Primary Care Physician: Juan Carlos Sparks MD    Presenting Problem/History of Present Illness:  Bone infection, knee [M86.9]  Bone infection, knee [M86.9]     Final Discharge Diagnoses:  Hospital Problem List     Bone infection, knee          Consults: Dr. Immanuel Kaminski    Procedures Performed: Explantation of right total knee arthroplasty and placement of antibiotic spacer    Pertinent Test Results: Afebrile    History of Present Illness on Day of Discharge: Stable discharge and infected right total knee arthroplasty.  Patient previously has had a polyethylene liner exchange and debridement only to have persistent infection.  On the day of admission the patient was taken to surgery and her components were explanted and an antibiotic spacer system was placed with antibiotic cement and antibiotic beads.  She was continued on IV antibiotics.  Her wound has continued to improve she does have some serous drainage but the tissue is softening and she is pain-free     Hospital Course:  The patient is a 62 y.o. female who presented to [facility] as noted above.       /72 (BP Location: Right arm, Patient Position: Lying)   Pulse 80   Temp 98.2 °F (36.8 °C) (Oral)   Resp 16   Ht 165.1 cm (65\")   Wt 119 kg (263 lb)   LMP  (LMP Unknown) Comment: post menapause  SpO2 97%   BMI 43.77 kg/m²       Discharge Medications:   Danisha Cannon   Home Medication Instructions [id number]    Printed on:03/19/18 0807   Medication Information                      aspirin 325 MG tablet  Take 1 tablet by mouth Daily.             DULoxetine (CYMBALTA) 30 MG capsule  Take 30 mg by mouth 2 (Two) Times a Day.             famciclovir (FAMVIR) 500 MG tablet  Take 500 mg by mouth 2 (Two) Times a Day.             flecainide (TAMBOCOR) 100 MG tablet  Take 100 mg by mouth 2 (Two) Times a Day.             fluticasone " (VERAMYST) 27.5 MCG/SPRAY nasal spray  2 sprays into each nostril Daily.             gabapentin (NEURONTIN) 600 MG tablet  Take 600 mg by mouth 3 (Three) Times a Day.             HYDROcodone-acetaminophen (NORCO) 5-325 MG per tablet  Take 1-2 tablets by mouth Every 4 (Four) Hours As Needed (Pain).             levothyroxine (SYNTHROID, LEVOTHROID) 137 MCG tablet  Take 137 mcg by mouth Every Morning.             LORazepam (ATIVAN) 1 MG tablet  Take 1 mg by mouth Every 8 (Eight) Hours As Needed for Anxiety (TOOK 2 TABS 3-13-18 0630).             metoprolol tartrate (LOPRESSOR) 25 MG tablet  Take 25 mg by mouth 2 (Two) Times a Day. TAKES 1/2 TAB BID             pantoprazole (PROTONIX) 40 MG EC tablet  Take 40 mg by mouth 2 (Two) Times a Day.             rOPINIRole (REQUIP) 3 MG tablet  Take 3 mg by mouth Every Night. 2 TABS IN AM AND 3 TABS AT NIGHT             triamcinolone (KENALOG) 0.1 % cream  Apply  topically 2 (Two) Times a Day.             venlafaxine XR (EFFEXOR-XR) 150 MG 24 hr capsule  Take 150 mg by mouth Daily.                 Discharge Diet:  regular    Discharge Care Plan/Instructions: #1 she can shower and redress the wound #2 she can bear weight as tolerated #3 continue with home IV antibiotics #4 follow-up in the office    Follow-up Appointments:   Thursday, March 22      Amilcar Capellan MD  03/19/18  8:07 AM

## 2018-03-19 NOTE — PLAN OF CARE
Problem: Patient Care Overview  Goal: Plan of Care Review  Outcome: Ongoing (interventions implemented as appropriate)   03/19/18 0815   Coping/Psychosocial   Plan of Care Reviewed With patient   Plan of Care Review   Progress improving   OTHER   Outcome Summary Norco given for pain, dressing has scant amount of drainage, up with minimal assist, abx as ordered, PICC line intact, rested well, safety maintained.        Problem: Fall Risk (Adult)  Goal: Absence of Fall  Outcome: Ongoing (interventions implemented as appropriate)      Problem: Infection, Risk/Actual (Adult)  Goal: Infection Prevention/Resolution  Outcome: Ongoing (interventions implemented as appropriate)      Problem: Pain, Acute (Adult)  Goal: Acceptable Pain Control/Comfort Level  Outcome: Ongoing (interventions implemented as appropriate)      Problem: Knee Arthroplasty (Total, Partial) (Adult)  Goal: Signs and Symptoms of Listed Potential Problems Will be Absent, Minimized or Managed (Knee Arthroplasty)  Outcome: Ongoing (interventions implemented as appropriate)

## 2018-03-20 NOTE — THERAPY DISCHARGE NOTE
Acute Care - Occupational Therapy Discharge Summary  [facility]     Patient Name: Danisha Cannon  : 1955  MRN: [id number]    Today's Date: 3/20/2018       Date of Referral to OT: 18         Admit Date: 3/13/2018        OT Recommendation and Plan    Visit Dx:    ICD-10-CM ICD-9-CM   1. Bone infection, knee M86.9 730.96   2. Impaired mobility Z74.09 799.89   3. Impaired mobility and ADLs Z74.09 799.89                     OT Rehab Goals     Row Name 18 1100 18 0758          Transfer Goal 1 (OT)    Activity/Assistive Device (Transfer Goal 1, OT)  -- shower chair;toilet  -AC (r) MK (t) AC (c)     [place] Level/Cues Needed (Transfer Goal 1, OT)  -- minimum assist (75% or more patient effort)  -AC (r) MK (t) AC (c)     Time Frame (Transfer Goal 1, OT)  -- long term goal (LTG);by discharge  -AC (r) MK (t) AC (c)     Barriers (Transfers Goal 1, OT)  -- strength, balance  -AC (r) MK (t) AC (c)     Progress/Outcome (Transfer Goal 1, OT) goal met  -TS goal ongoing  -AC (r) MK (t) AC (c)        Dressing Goal 1 (OT)    Activity/Assistive Device (Dressing Goal 1, OT)  -- lower body dressing  -AC (r) MK (t) AC (c)     [place]/Cues Needed (Dressing Goal 1, OT)  -- minimum assist (75% or more patient effort)  -AC (r) MK (t) AC (c)     Time Frame (Dressing Goal 1, OT)  -- long term goal (LTG);by discharge  -AC (r) MK (t) AC (c)     Barriers (Dressing Goal 1, OT)  -- ROM, strength  -AC (r) MK (t) AC (c)     Progress/Outcome (Dressing Goal 1, OT) goal met  -TS goal ongoing  -AC (r) MK (t) AC (c)        Toileting Goal 1 (OT)    Activity/Device (Toileting Goal 1, OT)  -- toileting skills, all;commode  -AC (r) MK (t) AC (c)     [place] Level/Cues Needed (Toileting Goal 1, OT)  -- minimum assist (75% or more patient effort)  -AC (r) MK (t) AC (c)     Time Frame (Toileting Goal 1, OT)  -- long term goal (LTG);by discharge  -AC (r) MK (t) AC (c)     Barriers (Toileting Goal 1, OT)  -- ROM, strength  -AC (r)  MK (t) AC (c)     Progress/Outcome (Toileting Goal 1, OT) goal met  -TS goal ongoing  -AC (r) MK (t) AC (c)       User Key  (r) = Recorded By, (t) = Taken By, (c) = Cosigned By    Initials Name Provider Type    AC Edouard Wheatley, OTR/L Occupational Therapist    TS Cee Mendez, GRIMES/L Occupational Therapy Assistant    THOMAS Heml, OT Student OT Student                Outcome Measures     Row Name 03/18/18 1100 03/17/18 5615          How much help from another person do you currently need...    Turning from your back to your side while in flat bed without using bedrails? 4  -NW  --     Moving from lying on back to sitting on the side of a flat bed without bedrails? 4  -NW  --     Moving to and from a bed to a chair (including a wheelchair)? 3  -NW  --     Standing up from a chair using your arms (e.g., wheelchair, bedside chair)? 3  -NW  --     Climbing 3-5 steps with a railing? 3  -NW  --     To walk in hospital room? 3  -NW  --     AM-PAC 6 Clicks Score 20  -NW  --        How much help from another is currently needed...    Putting on and taking off regular lower body clothing?  -- 4  -CJ     Bathing (including washing, rinsing, and drying)  -- 3  -CJ     Toileting (which includes using toilet bed pan or urinal)  -- 4  -CJ     Putting on and taking off regular upper body clothing  -- 4  -CJ     Taking care of personal grooming (such as brushing teeth)  -- 4  -CJ     Eating meals  -- 4  -CJ     Score  -- 23  -CJ        Functional Assessment    Outcome Measure Options AM-PAC 6 Clicks Basic Mobility (PT)  -NW AM-PAC 6 Clicks Daily Activity (OT)  -CJ       User Key  (r) = Recorded By, (t) = Taken By, (c) = Cosigned By    Initials Name Provider Type    SARAH Samaniego, GRIMES/L Occupational Therapy Assistant    CRISTINA Faulkner, MARNI Physical Therapy Assistant              OT Discharge Summary  Reason for Discharge: Discharge from facility  Outcomes Achieved: Refer to plan of care for updates on goals  achieved  Discharge Destination: Home with assist, Home with home health      JUANA Villavicencio  3/20/2018

## 2018-03-20 NOTE — PAYOR COMM NOTE
"[facility] HOME 3-19-18  [id number]  UR PHONE    [phone]    Lucretia Cannon (62 y.o. Female)     Date of Birth Social Security Number Address Home Phone MRN    1955  [address] [place] [address] [phone] [id number]    Jew Marital Status          None        Admission Date Admission Type Admitting Provider Attending Provider Department, Room/Bed    3/13/18 Elective Amilcar Capellan MD  [facility] 3A, 352/1    Discharge Date Discharge Disposition Discharge Destination        3/19/2018 Home or Self Care              Attending Provider:  (none)   Allergies:  Codeine, Mobic [Meloxicam], Morphine And Related    Isolation:  None   Infection:  None   Code Status:  Prior    Ht:  165.1 cm (65\")   Wt:  119 kg (263 lb)    Admission Cmt:  None   Principal Problem:  None                Active Insurance as of 3/13/2018     Primary Coverage     Payor Plan Insurance Group Employer/Plan Group    [employer] BLUE [employer]OSS [employer] BLUE CROSS BLUE [employer]IELD PPO [id number]     Payor Plan Address Payor Plan Phone Number Effective From Effective To    [address] [phone] 1/1/2016     [place] [address]       Subscriber Name Subscriber Birth Date Member ID       LUCRETIA CANNON 1955 [id number]                 Emergency Contacts      (Rel.) Home Phone Work Phone Mobile Phone    Clark Cannon (Spouse) [phone] -- [phone]               Discharge Summary      Amilcar Capellan MD at 3/19/2018  8:07 AM          [facility]  DISCHARGE SUMMARY       Date of Admission: 3/13/2018  Date of Discharge:  3/19/2018  Primary Care Physician: Juan Carlos Sparks MD    Presenting Problem/History of Present Illness:  Bone infection, knee [M86.9]  Bone infection, knee [M86.9]     Final Discharge Diagnoses:  Hospital Problem List     Bone infection, knee          Consults: Dr. Immanuel Kaminski    Procedures Performed: Explantation of right total knee arthroplasty and placement of " "antibiotic spacer    Pertinent Test Results: Afebrile    History of Present Illness on Day of Discharge: Stable discharge and infected right total knee arthroplasty.  Patient previously has had a polyethylene liner exchange and debridement only to have persistent infection.  On the day of admission the patient was taken to surgery and her components were explanted and an antibiotic spacer system was placed with antibiotic cement and antibiotic beads.  She was continued on IV antibiotics.  Her wound has continued to improve she does have some serous drainage but the tissue is softening and she is pain-free     Hospital Course:  The patient is a 62 y.o. female who presented to [facility] as noted above.       /72 (BP Location: Right arm, Patient Position: Lying)   Pulse 80   Temp 98.2 °F (36.8 °C) (Oral)   Resp 16   Ht 165.1 cm (65\")   Wt 119 kg (263 lb)   LMP  (LMP Unknown) Comment: post menapause  SpO2 97%   BMI 43.77 kg/m²        Discharge Medications:   Danisha Cannon   Home Medication Instructions [id number]    Printed on:03/19/18 8982   Medication Information                      aspirin 325 MG tablet  Take 1 tablet by mouth Daily.             DULoxetine (CYMBALTA) 30 MG capsule  Take 30 mg by mouth 2 (Two) Times a Day.             famciclovir (FAMVIR) 500 MG tablet  Take 500 mg by mouth 2 (Two) Times a Day.             flecainide (TAMBOCOR) 100 MG tablet  Take 100 mg by mouth 2 (Two) Times a Day.             fluticasone (VERAMYST) 27.5 MCG/SPRAY nasal spray  2 sprays into each nostril Daily.             gabapentin (NEURONTIN) 600 MG tablet  Take 600 mg by mouth 3 (Three) Times a Day.             HYDROcodone-acetaminophen (NORCO) 5-325 MG per tablet  Take 1-2 tablets by mouth Every 4 (Four) Hours As Needed (Pain).             levothyroxine (SYNTHROID, LEVOTHROID) 137 MCG tablet  Take 137 mcg by mouth Every Morning.             LORazepam (ATIVAN) 1 MG tablet  Take 1 mg by mouth Every " 8 (Eight) Hours As Needed for Anxiety (TOOK 2 TABS 3-13-18 0630).             metoprolol tartrate (LOPRESSOR) 25 MG tablet  Take 25 mg by mouth 2 (Two) Times a Day. TAKES 1/2 TAB BID             pantoprazole (PROTONIX) 40 MG EC tablet  Take 40 mg by mouth 2 (Two) Times a Day.             rOPINIRole (REQUIP) 3 MG tablet  Take 3 mg by mouth Every Night. 2 TABS IN AM AND 3 TABS AT NIGHT             triamcinolone (KENALOG) 0.1 % cream  Apply  topically 2 (Two) Times a Day.             venlafaxine XR (EFFEXOR-XR) 150 MG 24 hr capsule  Take 150 mg by mouth Daily.                 Discharge Diet:  regular    Discharge Care Plan/Instructions: #1 she can shower and redress the wound #2 she can bear weight as tolerated #3 continue with home IV antibiotics #4 follow-up in the office    Follow-up Appointments:   Thursday, March 22      Amilcar Capellan MD  03/19/18  8:07 AM        Electronically signed by Amilcar Capellan MD at 3/19/2018  8:11 AM

## 2018-03-21 NOTE — PAYOR COMM NOTE
"FROM: WINTER NAGY  PHONE: [phone]  FAX: [phone]    AUTH: [id number]    Lucretia Cannon (62 y.o. Female)     Date of Birth Social Security Number Address Home Phone MRN    1955  [address] [place] [address] [phone] [id number]    Anglican Marital Status          None        Admission Date Admission Type Admitting Provider Attending Provider Department, Room/Bed    3/13/18 Elective Amilcar Capellan MD  [facility] 3A, 352/1    Discharge Date Discharge Disposition Discharge Destination        3/19/2018 Home or Self Care              Attending Provider:  (none)   Allergies:  Codeine, Mobic [Meloxicam], Morphine And Related    Isolation:  None   Infection:  None   Code Status:  Prior    Ht:  165.1 cm (65\")   Wt:  119 kg (263 lb)    Admission Cmt:  None   Principal Problem:  None                Active Insurance as of 3/13/2018     Primary Coverage     Payor Plan Insurance Group Employer/Plan Group    [employer] BLUE CROSS [employer] BLUE CROSS BLUE SHIELD PPO [id number]     Payor Plan Address Payor Plan Phone Number Effective From Effective To    [address] [phone] 1/1/2016     [place] [address]       Subscriber Name Subscriber Birth Date Member ID       LUCRETIA CANNON 1955 [id number]                 Emergency Contacts      (Rel.) Home Phone Work Phone Mobile Phone    Clark Cannon (Spouse) [phone] -- [phone]               Physician Progress Notes (last 7 days) (Notes from 3/14/2018 12:12 PM through 3/21/2018 12:12 PM)      Amilcar Capellan MD at 3/18/2018 10:13 AM         's revision right knee surgery for an infected total knee arthroplasty.  She is progressing satisfactory.  Wound is beginning to soften.  She still has some drainage but it is serous in nature and not purulent.    Plan continue IV antibiotics    Electronically signed by Amilcar Capellan MD at 3/18/2018 10:13 AM     Amilcar Capellan MD at 3/17/2018 10:19 AM        This patient " has a infected right total knee arthroplasty.  She is post explantation and reimplantation with antibiotic Romi.  Yesterday her knee was angered drainage and appears somewhat better tonight is beginning to soften the.  Tag plan possibly discharge in a.m.    Electronically signed by Amilcar Capellan MD at 3/17/2018 10:20 AM     Chente Florentino MD at 3/16/2018  4:04 PM          Infectious Diseases Progress Note    Patient:  Danisha Cannon  YOB: 1955  MRN: [id number]   Admit date: 3/13/2018   Admitting Physician: Amilcar Capellan MD  Primary Care Physician: Juan Carlos Sparks MD    Chief Complaint/Interval History: She was concerned about some drainage from the right knee.  She indicates there was some bloody drainage yesterday.  Today she has had small amount of serosanguineous drainage.  She indicates she is a little uncomfortable going home until the drainage has subsided further.  She was concerned she needed a stronger antibiotic given the drainage.  I explained her cultures from the time of irrigation and poly-exchange grew heavy growth of methicillin susceptible staph aureus.  I explained her cultures at the time of joint explant were no growth.  I explained she is on the treatment of choice at optimized dosing for MSSA.  After we talked she seemed to understand we have her on the most appropriate IV antibiotic treatment at this time.  She acknowledged that she gets anxious at times.  Currently her drainage is serosanguineous and not purulent.  She does not show significant cellulitis on current exam.  There does not appear to be any trapped fluid or fluctuance.  Explained I do not think drainage is a sign of treatment failure.  She has had 3 surgeries on her knee within the past month and a half.  I think the multiple recent surgeries are contributing to her drainage.  She voiced understanding.  Family at bedside and seemed to understand discussion as well.    Intake/Output Summary (Last 24  "hours) at 03/16/18 1604  Last data filed at 03/16/18 0639   Gross per 24 hour   Intake              650 ml   Output                0 ml   Net              650 ml     Allergies:   Allergies   Allergen Reactions   • Codeine Dizziness and Nausea Only     Rapid heart rate, dizziness  NAUSEA   • Mobic [Meloxicam] Rash   • Morphine And Related Itching     Current Scheduled Medications:     aspirin 325 mg Oral Daily   DULoxetine 30 mg Oral Nightly   flecainide 100 mg Oral Q12H   fluticasone 1 spray Each Nare Daily   gabapentin 600 mg Oral Q8H   levothyroxine 137 mcg Oral Q AM   metoprolol tartrate 12.5 mg Oral Q12H   nafcillin 2 g Intravenous Q4H   pantoprazole 40 mg Oral Q AM   rOPINIRole 2 mg Oral Daily   rOPINIRole 3 mg Oral Nightly   triamcinolone  Topical Q12H   venlafaxine  mg Oral Daily     Current PRN Medications:  docusate sodium  •  HYDROcodone-acetaminophen  •  LORazepam  •  magnesium hydroxide  •  naloxone  •  [DISCONTINUED] HYDROmorphone **AND** naloxone  •  ondansetron **OR** ondansetron ODT **OR** ondansetron    Review of Systems she has no nausea, diarrhea, rash, or skin itching.    Vital Signs:  /63 (BP Location: Right arm, Patient Position: Lying)   Pulse 65   Temp 98 °F (36.7 °C) (Oral)   Resp 18   Ht 165.1 cm (65\")   Wt 119 kg (263 lb)   LMP  (LMP Unknown) Comment: post menapause  SpO2 98%   BMI 43.77 kg/m²      Physical Exam  Vital signs reviewed.  PICC line without erythema, warmth, induration.  Right knee without cellulitis.  Small amount of serosanguineous drainage on dressing  Skin margins look viable.  There is no warmth, fluctuance or fluid accumulation.  Skin without drug rash.    Lab Results:  CBC:   Results from last 7 days  Lab Units 03/16/18  0459 03/15/18  0521 03/14/18  0505   HEMOGLOBIN g/dL 7.3* 7.5* 8.0*   HEMATOCRIT % 23.6* 24.8* 25.6*     BMP:    Culture Results:    Operative cultures from 03/03/2018 (irrigation and poly-exchange):  Susceptibility      " "Staphylococcus aureus, MSSA     DONTE     Clindamycin <=0.25 ug/ml\"><=0.25 ug/ml Resistant     Erythromycin 4 ug/ml Resistant     Gentamicin <=0.5 ug/ml\"><=0.5 ug/ml Susceptible     Inducible Clindamycin Resistance POS  Positive     Levofloxacin 1 ug/ml Susceptible 1     Oxacillin 0.5 ug/ml Susceptible     Penicillin G >=0.5 ug/ml Resistant     Tetracycline <=1 ug/ml\"><=1 ug/ml Susceptible     Trimethoprim + Sulfamethoxazole <=10 ug/ml\"><=10 ug/ml Susceptible     Vancomycin <=0.5 ug/ml\"><=0.5 ug/ml Susceptible      Operative cultures from 03/13/2018 (joint explant):  Gram stains between 2+ and 4+ white blood cells depending upon sample.  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  She is undergone explant on 03/13/2018.  From infection standpoint she seems to be improving.  Her operative cultures from 03/03/2018 grew MSSA and her operative cultures from 03/13/2018 are no growth.  She is on the treatment of choice for MSSA infection.  Okay with me for discharge home when she is ready for release per Dr. Capellan.  My home IV antibiotic orders are outlined below.  I discussed with nursing today.  They are going to make sure her home health company has antibiotics in place in case she is discharged over the weekend.  Spoke with the patient about follow-up in 1-2 weeks.  All questions answered.  She was comfortable with plan from infection standpoint.    Recommendations:     Continue treatment with nafcillin  See home IV antibiotic orders below  Okay with me for discharge when released by Dr. Capellan  I will sign off for now  Follow-up in 1-2 weeks  Please call if any questions or concerns for infectious diseases    Home IV antibiotic orders:  1.  Diagnosis right knee prosthetic joint infection-she underwent irrigation, debridement, and poly-exchange on " 3/3/18.  She underwent right knee prosthetic joint explant with placement of an antibiotic impregnated spacer on 03/13/2018.  2.  Orthopedic surgeon-Mervin Capellan M.D.  3.  Microbiology-methicillin susceptible staph aureus  4.  IV access-PICC line  5.  Antibiotic orders:  Nafcillin 12 g IV daily given via a continuous infusion through 4/24/18 (that would be 6 weeks following joint explant)  6.  Laboratory monitoring:  CBC, sedimentation rate, CRP every Monday  7.  Follow-up appointment 2 weeks  8.  Fax copy of these orders to [phone]      Chente Florentino MD    Electronically signed by Chente Florentino MD at 3/16/2018  4:13 PM     Amilcar Capellan MD at 3/16/2018 11:26 AM        This patient is post explantation of a right total knee replacement for infection.  She has a spacer in place with antibiotic cement antibiotic beads.  She is on IV antibiotics.  She unfortunately developed some drainage.  We will continue with dressing changes as needed    Electronically signed by Amilcar Capellan MD at 3/16/2018 11:27 AM     Chente Florentino MD at 3/15/2018 11:17 AM          Infectious Diseases Progress Note    Patient:  Danisha Cannon  YOB: 1955  MRN: [id number]   Admit date: 3/13/2018   Admitting Physician: Amilcar Capellan MD  Primary Care Physician: Juan Carlos Sparks MD    Chief Complaint/Interval History: She feels better.  She indicates Dr. Capellan hopes for her to be discharged Friday.  She is tolerating antibiotic treatment without rash or diarrhea.  Pain controlled at present.  No pain at PICC line site.  She seems in good spirits.    Intake/Output Summary (Last 24 hours) at 03/15/18 1117  Last data filed at 03/15/18 0439   Gross per 24 hour   Intake             1060 ml   Output                0 ml   Net             1060 ml     Allergies:   Allergies   Allergen Reactions   • Codeine Dizziness and Nausea Only     Rapid heart rate, dizziness  NAUSEA   • Mobic [Meloxicam] Rash   • Morphine And  "Related Itching     Current Scheduled Medications:     aspirin 325 mg Oral Daily   DULoxetine 30 mg Oral Nightly   flecainide 100 mg Oral Q12H   fluticasone 1 spray Each Nare Daily   gabapentin 600 mg Oral Q8H   levothyroxine 137 mcg Oral Q AM   metoprolol tartrate 12.5 mg Oral Q12H   nafcillin 2 g Intravenous Q4H   pantoprazole 40 mg Oral Q AM   rOPINIRole 2 mg Oral Daily   rOPINIRole 3 mg Oral Nightly   triamcinolone  Topical Q12H   venlafaxine  mg Oral Daily     Current PRN Medications:  docusate sodium  •  HYDROcodone-acetaminophen  •  LORazepam  •  magnesium hydroxide  •  naloxone  •  [DISCONTINUED] HYDROmorphone **AND** naloxone  •  ondansetron **OR** ondansetron ODT **OR** ondansetron    Review of Systems see HPI    Vital Signs:  /64 (BP Location: Right arm, Patient Position: Lying)   Pulse 82   Temp 98.1 °F (36.7 °C) (Oral)   Resp 18   Ht 165.1 cm (65\")   Wt 119 kg (263 lb)   LMP  (LMP Unknown) Comment: post menapause  SpO2 92%   BMI 43.77 kg/m²      Physical Exam  Vital signs reviewed.  Right foot with intact sensation and capillary refill.  Minimal serous appearing drainage on right knee dressing.  Line/IV site: No erythema, warmth, induration, or tenderness.    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  She underwent explant on 03/13/2018.    Recommendations:   Anticipate discharge home tomorrow  Updated home IV antibiotic orders written as outlined below and sent to   Will sign off for now  Would be happy to reassess if any questions or concerns for infectious " diseases    Home IV antibiotic orders:  1.  Diagnosis right knee prosthetic joint infection-she underwent irrigation, debridement, and poly-exchange on 3/3/18.  She underwent right knee prosthetic joint explant with placement of an antibiotic impregnated spacer on 03/13/2018.  2.  Orthopedic surgeon-Mervin Capellan M.D.  3.  Microbiology-methicillin susceptible staph aureus  4.  IV access-PICC line  5.  Antibiotic orders:  Nafcillin 12 g IV daily given via a continuous infusion through 4/24/18 (that would be 6 weeks following joint explant)  6.  Laboratory monitoring:  CBC, sedimentation rate, CRP every Monday  7.  Follow-up appointment 2 weeks  8.  Fax copy of these orders to [phone]     Chente Florentino MD    Electronically signed by Chente Florentino MD at 3/15/2018  3:46 PM     Chente Florentino MD at 3/14/2018  7:16 PM          Infectious Diseases Progress Note    Patient:  Danisha Cannon  YOB: 1955  MRN: [id number]   Admit date: 3/13/2018   Admitting Physician: Amilcar Capellan MD  Primary Care Physician: Juan Carlos Sparks MD    Chief Complaint/Interval History: She feels much better today.  She had pain which came out of anesthesia yesterday.  Pain controlled today.  No fever.  Tolerating nafcillin without side effect.    Intake/Output Summary (Last 24 hours) at 03/14/18 1916  Last data filed at 03/14/18 1338   Gross per 24 hour   Intake              480 ml   Output              685 ml   Net             -205 ml     Allergies:   Allergies   Allergen Reactions   • Codeine Dizziness and Nausea Only     Rapid heart rate, dizziness  NAUSEA   • Mobic [Meloxicam] Rash   • Morphine And Related Itching     Current Scheduled Medications:     aspirin 325 mg Oral Daily   DULoxetine 30 mg Oral Nightly   flecainide 100 mg Oral Q12H   fluticasone 1 spray Each Nare Daily   gabapentin 600 mg Oral Q8H   levothyroxine 137 mcg Oral Q AM   metoprolol tartrate 12.5 mg Oral Q12H   nafcillin 2 g Intravenous Q4H  "  pantoprazole 40 mg Oral Q AM   rOPINIRole 2 mg Oral Daily   rOPINIRole 3 mg Oral Nightly   triamcinolone  Topical Q12H   venlafaxine  mg Oral Daily     Current PRN Medications:  docusate sodium  •  HYDROcodone-acetaminophen  •  HYDROmorphone **AND** naloxone  •  LORazepam  •  magnesium hydroxide  •  naloxone  •  ondansetron **OR** ondansetron ODT **OR** ondansetron    Review of Systems see HPI    Vital Signs:  /55 (BP Location: Right arm, Patient Position: Lying)   Pulse 87   Temp 98.7 °F (37.1 °C) (Oral)   Resp 18   Ht 165.1 cm (65\")   Wt 119 kg (263 lb)   LMP  (LMP Unknown) Comment: post menapause  SpO2 92%   BMI 43.77 kg/m²      Physical Exam  Vital signs reviewed.  Right lower extremity sensation and capillary refill intact  Line/IV site: No erythema, warmth, induration, or tenderness.    Lab Results:  CBC:   Results from last 7 days  Lab Units 03/14/18  0505   HEMOGLOBIN g/dL 8.0*   HEMATOCRIT % 25.6*     BMP:  Results from last 7 days  Lab Units 03/08/18  0520   SODIUM mmol/L 140   POTASSIUM mmol/L 3.4*   CHLORIDE mmol/L 100   CO2 mmol/L 32.0*   BUN mg/dL 17   CREATININE mg/dL 0.73   GLUCOSE mg/dL 171*   CALCIUM mg/dL 8.5     Culture Results:  Operative cultures no growth to date  Radiology: None  Additional Studies Reviewed: None    Impression:   Right knee prosthetic joint infection with methicillin susceptible staph aureus.  She is undergone explant.  Tolerating nafcillin without side effect.    Recommendations:   Continue nafcillin  We will plan to continue nafcillin 12 g IV daily via continuous infusion when she goes home  IV antibiotic treatment is in place at home.  She will be able to go home from infectious disease standpoint when ready for release per orthopedic surgery.    Chente Urena MD    Electronically signed by Chente Urena MD at 3/14/2018  7:36 PM       Consult Notes (last 7 days) (Notes from 03/14/18 through 03/21/18)     No notes of this type exist for this " encounter.          Progress Notes  Date of Service: 3/16/2018 4:04 PM  Chente Florentino MD   Infectious Disease      []Manual[]Template  []Copied  Infectious Diseases Progress Note     Patient:  Danisha Cannon  YOB: 1955  MRN: [id number]       Admit date: 3/13/2018             Admitting Physician: Amilcar Capellan MD  Primary Care Physician: Juan Carlos Sparks MD     Chief Complaint/Interval History: She was concerned about some drainage from the right knee.  She indicates there was some bloody drainage yesterday.  Today she has had small amount of serosanguineous drainage.  She indicates she is a little uncomfortable going home until the drainage has subsided further.  She was concerned she needed a stronger antibiotic given the drainage.  I explained her cultures from the time of irrigation and poly-exchange grew heavy growth of methicillin susceptible staph aureus.  I explained her cultures at the time of joint explant were no growth.  I explained she is on the treatment of choice at optimized dosing for MSSA.  After we talked she seemed to understand we have her on the most appropriate IV antibiotic treatment at this time.  She acknowledged that she gets anxious at times.  Currently her drainage is serosanguineous and not purulent.  She does not show significant cellulitis on current exam.  There does not appear to be any trapped fluid or fluctuance.  Explained I do not think drainage is a sign of treatment failure.  She has had 3 surgeries on her knee within the past month and a half.  I think the multiple recent surgeries are contributing to her drainage.  She voiced understanding.  Family at bedside and seemed to understand discussion as well.     Intake/Output Summary (Last 24 hours) at 03/16/18 1604  Last data filed at 03/16/18 0639    Gross per 24 hour   Intake              650 ml   Output                0 ml   Net              650 ml      Allergies:         Allergies   Allergen Reactions   •  "Codeine Dizziness and Nausea Only       Rapid heart rate, dizziness  NAUSEA   • Mobic [Meloxicam] Rash   • Morphine And Related Itching      Current Scheduled Medications:      aspirin 325 mg Oral Daily   DULoxetine 30 mg Oral Nightly   flecainide 100 mg Oral Q12H   fluticasone 1 spray Each Nare Daily   gabapentin 600 mg Oral Q8H   levothyroxine 137 mcg Oral Q AM   metoprolol tartrate 12.5 mg Oral Q12H   nafcillin 2 g Intravenous Q4H   pantoprazole 40 mg Oral Q AM   rOPINIRole 2 mg Oral Daily   rOPINIRole 3 mg Oral Nightly   triamcinolone   Topical Q12H   venlafaxine  mg Oral Daily      Current PRN Medications:  docusate sodium  •  HYDROcodone-acetaminophen  •  LORazepam  •  magnesium hydroxide  •  naloxone  •  [DISCONTINUED] HYDROmorphone **AND** naloxone  •  ondansetron **OR** ondansetron ODT **OR** ondansetron     Review of Systems she has no nausea, diarrhea, rash, or skin itching.     Vital Signs:  /63 (BP Location: Right arm, Patient Position: Lying)   Pulse 65   Temp 98 °F (36.7 °C) (Oral)   Resp 18   Ht 165.1 cm (65\")   Wt 119 kg (263 lb)   LMP  (LMP Unknown) Comment: post menapause  SpO2 98%   BMI 43.77 kg/m²      Physical Exam  Vital signs reviewed.  PICC line without erythema, warmth, induration.  Right knee without cellulitis.  Small amount of serosanguineous drainage on dressing  Skin margins look viable.  There is no warmth, fluctuance or fluid accumulation.  Skin without drug rash.     Lab Results:  CBC:   Results from last 7 days  Lab Units 03/16/18  0459 03/15/18  0521 03/14/18  0505   HEMOGLOBIN g/dL 7.3* 7.5* 8.0*   HEMATOCRIT % 23.6* 24.8* 25.6*      BMP:    Culture Results:    Operative cultures from 03/03/2018 (irrigation and poly-exchange):  Susceptibility               Staphylococcus aureus, MSSA       DONTE       Clindamycin <=0.25 ug/ml\"><=0.25 ug/ml Resistant       Erythromycin 4 ug/ml Resistant       Gentamicin <=0.5 ug/ml\"><=0.5 ug/ml Susceptible       Inducible " "Clindamycin Resistance POS  Positive       Levofloxacin 1 ug/ml Susceptible 1       Oxacillin 0.5 ug/ml Susceptible       Penicillin G >=0.5 ug/ml Resistant       Tetracycline <=1 ug/ml\"><=1 ug/ml Susceptible       Trimethoprim + Sulfamethoxazole <=10 ug/ml\"><=10 ug/ml Susceptible       Vancomycin <=0.5 ug/ml\"><=0.5 ug/ml Susceptible        Operative cultures from 03/13/2018 (joint explant):  Gram stains between 2+ and 4+ white blood cells depending upon sample.  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  She is undergone explant on 03/13/2018.  From infection standpoint she seems to be improving.  Her operative cultures from 03/03/2018 grew MSSA and her operative cultures from 03/13/2018 are no growth.  She is on the treatment of choice for MSSA infection.  Okay with me for discharge home when she is ready for release per Dr. Capellan.  My home IV antibiotic orders are outlined below.  I discussed with nursing today.  They are going to make sure her home health company has antibiotics in place in case she is discharged over the weekend.  Spoke with the patient about follow-up in 1-2 weeks.  All questions answered.  She was comfortable with plan from infection standpoint.     Recommendations:      Continue treatment with nafcillin  See home IV antibiotic orders below  Okay with me for discharge when released by Dr. Capellan  I will sign off for now  Follow-up in 1-2 weeks  Please call if any questions or concerns for infectious diseases     Home IV antibiotic orders:  1.  Diagnosis right knee prosthetic joint infection-she underwent irrigation, debridement, and poly-exchange on 3/3/18.  She underwent right knee prosthetic joint explant with placement of an antibiotic impregnated spacer on 03/13/2018.  2.  Orthopedic surgeon-Mervin Capellan M.D.  3. "  Microbiology-methicillin susceptible staph aureus  4.  IV access-PICC line  5.  Antibiotic orders:  Nafcillin 12 g IV daily given via a continuous infusion through 4/24/18 (that would be 6 weeks following joint explant)  6.  Laboratory monitoring:  CBC, sedimentation rate, CRP every Monday  7.  Follow-up appointment 2 weeks  8.  Fax copy of these orders to [phone]        Chente Urena MD

## 2018-03-21 NOTE — THERAPY DISCHARGE NOTE
Acute Care - Physical Therapy Discharge Summary  [facility]       Patient Name: Danisha Cannon  : 1955  MRN: [id number]    Today's Date: 3/21/2018  Onset of Illness/Injury or Date of Surgery: 18    Date of Referral to PT: 18  Referring Physician: Dr. Capellan      Admit Date: 3/13/2018      PT Recommendation and Plan    Visit Dx:    ICD-10-CM ICD-9-CM   1. Bone infection, knee M86.9 730.96   2. Impaired mobility Z74.09 799.89   3. Impaired mobility and ADLs Z74.09 799.89             Outcome Measures     Row Name 18 1100             How much help from another person do you currently need...    Turning from your back to your side while in flat bed without using bedrails? 4  -NW      Moving from lying on back to sitting on the side of a flat bed without bedrails? 4  -NW      Moving to and from a bed to a chair (including a wheelchair)? 3  -NW      Standing up from a chair using your arms (e.g., wheelchair, bedside chair)? 3  -NW      Climbing 3-5 steps with a railing? 3  -NW      To walk in hospital room? 3  -NW      AM-PAC 6 Clicks Score 20  -NW         Functional Assessment    Outcome Measure Options AM-PAC 6 Clicks Basic Mobility (PT)  -NW        User Key  (r) = Recorded By, (t) = Taken By, (c) = Cosigned By    Initials Name Provider Type    CRISTINA Faulkner PTA Physical Therapy Assistant                      PT Rehab Goals     Row Name 18 0900 18 0925          Bed Mobility Goal 1 (PT)    Activity/Assistive Device (Bed Mobility Goal 1, PT)  -- bed mobility activities, all  -     [place] Level/Cues Needed (Bed Mobility Goal 1, PT)  -- independent  -     Time Frame (Bed Mobility Goal 1, PT)  -- by discharge  -     Progress/Outcomes (Bed Mobility Goal 1, PT) goal met  - goal ongoing  -        Transfer Goal 1 (PT)    Activity/Assistive Device (Transfer Goal 1, PT)  -- sit-to-stand/stand-to-sit;bed-to-chair/chair-to-bed  -     [place]wright Level/Cues Needed (Transfer  Goal 1, PT)  -- independent  -     Time Frame (Transfer Goal 1, PT)  -- by discharge  -     Progress/Outcome (Transfer Goal 1, PT) goal not met  -KJ goal ongoing  -        Gait Training Goal 1 (PT)    Activity/Assistive Device (Gait Training Goal 1, PT)  -- gait (walking locomotion);assistive device use  -     [place] Level (Gait Training Goal 1, PT)  -- independent  -     Distance (Gait Goal 1, PT)  -- 120  -LH     Time Frame (Gait Training Goal 1, PT)  -- by discharge  -     Progress/Outcome (Gait Training Goal 1, PT) goal not met  -KJ goal ongoing  -       User Key  (r) = Recorded By, (t) = Taken By, (c) = Cosigned By    Initials Name Provider Type     Jovi Whittington, PT Physical Therapist    JASKARAN Limon, PTA Physical Therapy Assistant              PT Discharge Summary  Anticipated Discharge Disposition (PT): home or self care  Reason for Discharge: Discharge from facility  Outcomes Achieved: Refer to plan of care for updates on goals achieved  Discharge Destination: Home      Sophia Limon PTA   3/21/2018

## 2018-03-23 ENCOUNTER — ANESTHESIA (OUTPATIENT)
Dept: PERIOP | Facility: HOSPITAL | Age: 63
End: 2018-03-23

## 2018-03-23 ENCOUNTER — HOSPITAL ENCOUNTER (OUTPATIENT)
Facility: HOSPITAL | Age: 63
Setting detail: HOSPITAL OUTPATIENT SURGERY
Discharge: HOME | End: 2018-03-23
Attending: ORTHOPAEDIC SURGERY | Admitting: ORTHOPAEDIC SURGERY

## 2018-03-23 ENCOUNTER — ANESTHESIA EVENT (OUTPATIENT)
Dept: PERIOP | Facility: HOSPITAL | Age: 63
End: 2018-03-23

## 2018-03-23 VITALS
TEMPERATURE: 97.5 F | RESPIRATION RATE: 16 BRPM | HEART RATE: 71 BPM | DIASTOLIC BLOOD PRESSURE: 59 MMHG | SYSTOLIC BLOOD PRESSURE: 119 MMHG | HEIGHT: 65 IN | OXYGEN SATURATION: 97 % | WEIGHT: 263.23 LBS | BODY MASS INDEX: 43.86 KG/M2

## 2018-03-23 LAB
ABO GROUP BLD: NORMAL
BASOPHILS # BLD AUTO: 0.06 10*3/MM3 (ref 0–0.2)
BASOPHILS NFR BLD AUTO: 0.8 % (ref 0–2)
BLD GP AB SCN SERPL QL: NEGATIVE
DEPRECATED RDW RBC AUTO: 57.1 FL (ref 40–54)
EOSINOPHIL # BLD AUTO: 0.34 10*3/MM3 (ref 0–0.7)
EOSINOPHIL NFR BLD AUTO: 4.6 % (ref 0–4)
ERYTHROCYTE [DISTWIDTH] IN BLOOD BY AUTOMATED COUNT: 17.1 % (ref 12–15)
HCT VFR BLD AUTO: 26.9 % (ref 37–47)
HGB BLD-MCNC: 8 G/DL (ref 12–16)
IMM GRANULOCYTES # BLD: 0.04 10*3/MM3 (ref 0–0.03)
IMM GRANULOCYTES NFR BLD: 0.5 % (ref 0–5)
LYMPHOCYTES # BLD AUTO: 1.44 10*3/MM3 (ref 0.72–4.86)
LYMPHOCYTES NFR BLD AUTO: 19.4 % (ref 15–45)
MCH RBC QN AUTO: 26.9 PG (ref 28–32)
MCHC RBC AUTO-ENTMCNC: 29.7 G/DL (ref 33–36)
MCV RBC AUTO: 90.6 FL (ref 82–98)
MONOCYTES # BLD AUTO: 0.63 10*3/MM3 (ref 0.19–1.3)
MONOCYTES NFR BLD AUTO: 8.5 % (ref 4–12)
NEUTROPHILS # BLD AUTO: 4.93 10*3/MM3 (ref 1.87–8.4)
NEUTROPHILS NFR BLD AUTO: 66.2 % (ref 39–78)
NRBC BLD MANUAL-RTO: 0 /100 WBC (ref 0–0)
PLATELET # BLD AUTO: 531 10*3/MM3 (ref 130–400)
PMV BLD AUTO: 10.2 FL (ref 6–12)
RBC # BLD AUTO: 2.97 10*6/MM3 (ref 4.2–5.4)
RH BLD: POSITIVE
WBC NRBC COR # BLD: 7.44 10*3/MM3 (ref 4.8–10.8)

## 2018-03-23 PROCEDURE — 25010000002 PROPOFOL 10 MG/ML EMULSION: Performed by: NURSE ANESTHETIST, CERTIFIED REGISTERED

## 2018-03-23 PROCEDURE — 25010000002 ONDANSETRON PER 1 MG: Performed by: ANESTHESIOLOGY

## 2018-03-23 PROCEDURE — 25010000002 VANCOMYCIN PER 500 MG: Performed by: ORTHOPAEDIC SURGERY

## 2018-03-23 PROCEDURE — L1830 KO IMMOB CANVAS LONG PRE OTS: HCPCS | Performed by: ORTHOPAEDIC SURGERY

## 2018-03-23 PROCEDURE — 86900 BLOOD TYPING SEROLOGIC ABO: CPT | Performed by: ANESTHESIOLOGY

## 2018-03-23 PROCEDURE — 25010000002 FENTANYL CITRATE (PF) 100 MCG/2ML SOLUTION: Performed by: NURSE ANESTHETIST, CERTIFIED REGISTERED

## 2018-03-23 PROCEDURE — 25010000002 ONDANSETRON PER 1 MG: Performed by: NURSE ANESTHETIST, CERTIFIED REGISTERED

## 2018-03-23 PROCEDURE — 25010000002 DEXAMETHASONE PER 1 MG: Performed by: NURSE ANESTHETIST, CERTIFIED REGISTERED

## 2018-03-23 PROCEDURE — 25010000002 SUCCINYLCHOLINE PER 20 MG: Performed by: NURSE ANESTHETIST, CERTIFIED REGISTERED

## 2018-03-23 PROCEDURE — 86850 RBC ANTIBODY SCREEN: CPT | Performed by: ANESTHESIOLOGY

## 2018-03-23 PROCEDURE — 86901 BLOOD TYPING SEROLOGIC RH(D): CPT | Performed by: ANESTHESIOLOGY

## 2018-03-23 PROCEDURE — 25010000002 MIDAZOLAM PER 1 MG: Performed by: ANESTHESIOLOGY

## 2018-03-23 PROCEDURE — 85025 COMPLETE CBC W/AUTO DIFF WBC: CPT | Performed by: ORTHOPAEDIC SURGERY

## 2018-03-23 RX ORDER — SODIUM CHLORIDE 0.9 % (FLUSH) 0.9 %
1-10 SYRINGE (ML) INJECTION AS NEEDED
Status: DISCONTINUED | OUTPATIENT
Start: 2018-03-23 | End: 2018-03-23 | Stop reason: HOSPADM

## 2018-03-23 RX ORDER — IPRATROPIUM BROMIDE AND ALBUTEROL SULFATE 2.5; .5 MG/3ML; MG/3ML
3 SOLUTION RESPIRATORY (INHALATION) ONCE AS NEEDED
Status: DISCONTINUED | OUTPATIENT
Start: 2018-03-23 | End: 2018-03-23 | Stop reason: HOSPADM

## 2018-03-23 RX ORDER — ASPIRIN 325 MG
325 TABLET ORAL DAILY
COMMUNITY
End: 2018-03-23 | Stop reason: HOSPADM

## 2018-03-23 RX ORDER — FENTANYL CITRATE 50 UG/ML
25 INJECTION, SOLUTION INTRAMUSCULAR; INTRAVENOUS
Status: DISCONTINUED | OUTPATIENT
Start: 2018-03-23 | End: 2018-03-23 | Stop reason: HOSPADM

## 2018-03-23 RX ORDER — SODIUM CHLORIDE, SODIUM LACTATE, POTASSIUM CHLORIDE, CALCIUM CHLORIDE 600; 310; 30; 20 MG/100ML; MG/100ML; MG/100ML; MG/100ML
9 INJECTION, SOLUTION INTRAVENOUS CONTINUOUS
Status: DISCONTINUED | OUTPATIENT
Start: 2018-03-23 | End: 2018-03-23 | Stop reason: HOSPADM

## 2018-03-23 RX ORDER — MEPERIDINE HYDROCHLORIDE 25 MG/ML
12.5 INJECTION INTRAMUSCULAR; INTRAVENOUS; SUBCUTANEOUS
Status: DISCONTINUED | OUTPATIENT
Start: 2018-03-23 | End: 2018-03-23 | Stop reason: HOSPADM

## 2018-03-23 RX ORDER — FENTANYL CITRATE 50 UG/ML
INJECTION, SOLUTION INTRAMUSCULAR; INTRAVENOUS AS NEEDED
Status: DISCONTINUED | OUTPATIENT
Start: 2018-03-23 | End: 2018-03-23 | Stop reason: SURG

## 2018-03-23 RX ORDER — MAGNESIUM HYDROXIDE 1200 MG/15ML
LIQUID ORAL AS NEEDED
Status: DISCONTINUED | OUTPATIENT
Start: 2018-03-23 | End: 2018-03-23 | Stop reason: HOSPADM

## 2018-03-23 RX ORDER — MIDAZOLAM HYDROCHLORIDE 1 MG/ML
2 INJECTION INTRAMUSCULAR; INTRAVENOUS
Status: DISCONTINUED | OUTPATIENT
Start: 2018-03-23 | End: 2018-03-23 | Stop reason: HOSPADM

## 2018-03-23 RX ORDER — SODIUM CHLORIDE, SODIUM LACTATE, POTASSIUM CHLORIDE, CALCIUM CHLORIDE 600; 310; 30; 20 MG/100ML; MG/100ML; MG/100ML; MG/100ML
1000 INJECTION, SOLUTION INTRAVENOUS CONTINUOUS
Status: DISCONTINUED | OUTPATIENT
Start: 2018-03-23 | End: 2018-03-23 | Stop reason: HOSPADM

## 2018-03-23 RX ORDER — LIDOCAINE HYDROCHLORIDE 40 MG/ML
SOLUTION TOPICAL AS NEEDED
Status: DISCONTINUED | OUTPATIENT
Start: 2018-03-23 | End: 2018-03-23 | Stop reason: SURG

## 2018-03-23 RX ORDER — LABETALOL HYDROCHLORIDE 5 MG/ML
5 INJECTION, SOLUTION INTRAVENOUS
Status: DISCONTINUED | OUTPATIENT
Start: 2018-03-23 | End: 2018-03-23 | Stop reason: HOSPADM

## 2018-03-23 RX ORDER — ONDANSETRON 2 MG/ML
4 INJECTION INTRAMUSCULAR; INTRAVENOUS ONCE AS NEEDED
Status: COMPLETED | OUTPATIENT
Start: 2018-03-23 | End: 2018-03-23

## 2018-03-23 RX ORDER — DEXTROSE MONOHYDRATE 25 G/50ML
12.5 INJECTION, SOLUTION INTRAVENOUS AS NEEDED
Status: DISCONTINUED | OUTPATIENT
Start: 2018-03-23 | End: 2018-03-23 | Stop reason: HOSPADM

## 2018-03-23 RX ORDER — SODIUM CHLORIDE 0.9 % (FLUSH) 0.9 %
3 SYRINGE (ML) INJECTION AS NEEDED
Status: DISCONTINUED | OUTPATIENT
Start: 2018-03-23 | End: 2018-03-23 | Stop reason: HOSPADM

## 2018-03-23 RX ORDER — HYDROCODONE BITARTRATE AND ACETAMINOPHEN 7.5; 325 MG/1; MG/1
2 TABLET ORAL ONCE AS NEEDED
Status: COMPLETED | OUTPATIENT
Start: 2018-03-23 | End: 2018-03-23

## 2018-03-23 RX ORDER — LABETALOL HYDROCHLORIDE 5 MG/ML
INJECTION, SOLUTION INTRAVENOUS AS NEEDED
Status: DISCONTINUED | OUTPATIENT
Start: 2018-03-23 | End: 2018-03-23 | Stop reason: SURG

## 2018-03-23 RX ORDER — LIDOCAINE HYDROCHLORIDE 20 MG/ML
INJECTION, SOLUTION INFILTRATION; PERINEURAL AS NEEDED
Status: DISCONTINUED | OUTPATIENT
Start: 2018-03-23 | End: 2018-03-23 | Stop reason: SURG

## 2018-03-23 RX ORDER — SUCCINYLCHOLINE CHLORIDE 20 MG/ML
INJECTION INTRAMUSCULAR; INTRAVENOUS AS NEEDED
Status: DISCONTINUED | OUTPATIENT
Start: 2018-03-23 | End: 2018-03-23 | Stop reason: SURG

## 2018-03-23 RX ORDER — DIPHENHYDRAMINE HYDROCHLORIDE 50 MG/ML
12.5 INJECTION INTRAMUSCULAR; INTRAVENOUS
Status: DISCONTINUED | OUTPATIENT
Start: 2018-03-23 | End: 2018-03-23 | Stop reason: HOSPADM

## 2018-03-23 RX ORDER — MIDAZOLAM HYDROCHLORIDE 1 MG/ML
1 INJECTION INTRAMUSCULAR; INTRAVENOUS
Status: DISCONTINUED | OUTPATIENT
Start: 2018-03-23 | End: 2018-03-23 | Stop reason: HOSPADM

## 2018-03-23 RX ORDER — METOPROLOL TARTRATE 5 MG/5ML
2.5 INJECTION INTRAVENOUS
Status: DISCONTINUED | OUTPATIENT
Start: 2018-03-23 | End: 2018-03-23 | Stop reason: HOSPADM

## 2018-03-23 RX ORDER — ONDANSETRON 2 MG/ML
INJECTION INTRAMUSCULAR; INTRAVENOUS AS NEEDED
Status: DISCONTINUED | OUTPATIENT
Start: 2018-03-23 | End: 2018-03-23 | Stop reason: SURG

## 2018-03-23 RX ORDER — ROCURONIUM BROMIDE 10 MG/ML
INJECTION, SOLUTION INTRAVENOUS AS NEEDED
Status: DISCONTINUED | OUTPATIENT
Start: 2018-03-23 | End: 2018-03-23 | Stop reason: SURG

## 2018-03-23 RX ORDER — DEXAMETHASONE SODIUM PHOSPHATE 4 MG/ML
INJECTION, SOLUTION INTRA-ARTICULAR; INTRALESIONAL; INTRAMUSCULAR; INTRAVENOUS; SOFT TISSUE AS NEEDED
Status: DISCONTINUED | OUTPATIENT
Start: 2018-03-23 | End: 2018-03-23 | Stop reason: SURG

## 2018-03-23 RX ORDER — HYDRALAZINE HYDROCHLORIDE 20 MG/ML
5 INJECTION INTRAMUSCULAR; INTRAVENOUS
Status: DISCONTINUED | OUTPATIENT
Start: 2018-03-23 | End: 2018-03-23 | Stop reason: HOSPADM

## 2018-03-23 RX ORDER — PROPOFOL 10 MG/ML
VIAL (ML) INTRAVENOUS AS NEEDED
Status: DISCONTINUED | OUTPATIENT
Start: 2018-03-23 | End: 2018-03-23 | Stop reason: SURG

## 2018-03-23 RX ORDER — NALOXONE HCL 0.4 MG/ML
0.4 VIAL (ML) INJECTION AS NEEDED
Status: DISCONTINUED | OUTPATIENT
Start: 2018-03-23 | End: 2018-03-23 | Stop reason: HOSPADM

## 2018-03-23 RX ADMIN — HYDROCODONE BITARTRATE AND ACETAMINOPHEN 2 TABLET: 7.5; 325 TABLET ORAL at 14:50

## 2018-03-23 RX ADMIN — ONDANSETRON 4 MG: 2 INJECTION INTRAMUSCULAR; INTRAVENOUS at 13:51

## 2018-03-23 RX ADMIN — FENTANYL CITRATE 100 MCG: 50 INJECTION, SOLUTION INTRAMUSCULAR; INTRAVENOUS at 12:31

## 2018-03-23 RX ADMIN — LABETALOL HYDROCHLORIDE 10 MG: 5 INJECTION, SOLUTION INTRAVENOUS at 13:20

## 2018-03-23 RX ADMIN — DEXAMETHASONE SODIUM PHOSPHATE 4 MG: 4 INJECTION, SOLUTION INTRAMUSCULAR; INTRAVENOUS at 12:31

## 2018-03-23 RX ADMIN — MIDAZOLAM HYDROCHLORIDE 1 MG: 1 INJECTION, SOLUTION INTRAMUSCULAR; INTRAVENOUS at 12:05

## 2018-03-23 RX ADMIN — LIDOCAINE HYDROCHLORIDE 50 MG: 20 INJECTION, SOLUTION INFILTRATION; PERINEURAL at 12:31

## 2018-03-23 RX ADMIN — LIDOCAINE HYDROCHLORIDE 1 EACH: 40 SOLUTION TOPICAL at 12:32

## 2018-03-23 RX ADMIN — LIDOCAINE HYDROCHLORIDE 0.5 ML: 10 INJECTION, SOLUTION EPIDURAL; INFILTRATION; INTRACAUDAL; PERINEURAL at 10:00

## 2018-03-23 RX ADMIN — ROCURONIUM BROMIDE 10 MG: 10 INJECTION INTRAVENOUS at 12:31

## 2018-03-23 RX ADMIN — SODIUM CHLORIDE, POTASSIUM CHLORIDE, SODIUM LACTATE AND CALCIUM CHLORIDE 1000 ML: 600; 310; 30; 20 INJECTION, SOLUTION INTRAVENOUS at 10:00

## 2018-03-23 RX ADMIN — PROPOFOL 150 MG: 10 INJECTION, EMULSION INTRAVENOUS at 12:31

## 2018-03-23 RX ADMIN — ONDANSETRON HYDROCHLORIDE 4 MG: 2 SOLUTION INTRAMUSCULAR; INTRAVENOUS at 12:31

## 2018-03-23 RX ADMIN — SUCCINYLCHOLINE CHLORIDE 100 MG: 20 INJECTION, SOLUTION INTRAMUSCULAR; INTRAVENOUS at 12:31

## 2018-03-23 NOTE — OP NOTE
[facility]  OP NOTE    Patient Name: Danisha Cannon  Date of Procedure: 3/23/2018     PREOPERATIVE DIAGNOSIS: Infected right total knee replacement     POSTOPERATIVE DIAGNOSIS: Same.     PROCEDURE PERFORMED: Debridement and wound closure right total knee     SURGEON: Amilcar Capellan MD      ANESTHESIA: General.    PREPARATION: Routine.    STAFF: Circulator: Harjit Johnson RN  Scrub Person: Pam Greene; Lara Kirkland    ESTIMATED BLOOD LOSS: None    SPECIMENS: None    COMPLICATIONS: None    INDICATIONS: Danisha Cannon is a 62 y.o. 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  She is post explantation of the components and revision with antibiotic cemented components she continues to have a pinhole opening the drainage it was felt that debridement and closure was indicated. The indications, risks, and possible complications of the procedure were explained to the patient, who voiced understanding and wished to proceed with surgery.     PROCEDURE IN DETAIL: The patient was taken to the operating room and placed on the operating table in a supine position. After general anesthesia was obtained, the right lower extremity was prepped and draped in a sterile manner.  The proximal one half of the sutures were removed and area was explored and debrided.  A rent in the deep fascia was closed with #1 Vicryl suture.  The skin was then closed over a drain using nylon suture in interrupted fashionterile dressings were applied. The patient tolerated the procedure well. Sponge and needle counts were correct. The patient was then awakened and extubated in the operating room and taken to the recovery room in good condition.  Amilcar Capellan MD  Date: 3/23/2018 Time: 1:22 PM

## 2018-03-23 NOTE — NURSING NOTE
Patient states that she has a history of sleep apnea which requires her to sleep with a bipap machine at home. She states that she took her lopressor p.o. At 0600 am today . She states that she sleeps with oxygen at bedtime nightly

## 2018-03-23 NOTE — ANESTHESIA PREPROCEDURE EVALUATION
Anesthesia Evaluation     Patient summary reviewed and Nursing notes reviewed   history of anesthetic complications: PONV  NPO Solid Status: > 8 hours  NPO Liquid Status: > 8 hours           Airway   Mallampati: I  TM distance: >3 FB  Neck ROM: full  no difficulty expected  Dental - normal exam     Pulmonary - normal exam    breath sounds clear to auscultation  (+) sleep apnea (Bipap with 02; with her in hospital),   (-) pneumonia, COPD, not a smoker  Cardiovascular - normal exam  Exercise tolerance: poor (<4 METS) ( Can walk with a walker, no chest pain with exertion. )    ECG reviewed  Patient on routine beta blocker and Beta blocker given within 24 hours of surgery  Rhythm: regular  Rate: normal    (+) hypertension, dysrhythmias (currently in Sinus, SVT. Occurred after surgery.  ) Atrial Fib,   (-) pacemaker, past MI, cardiac stents    ROS comment: EKG - NSR with PACs,    Neuro/Psych  (+) numbness (bilateral peripheral neuropathy),     (-) seizures, TIA, CVA  GI/Hepatic/Renal/Endo    (+) morbid obesity, GERD,  hypothyroidism,   (-) liver disease, no renal disease, diabetes    Musculoskeletal     Abdominal    Substance History      OB/GYN          Other   (+) arthritis                       Anesthesia Plan    ASA 3     general     intravenous induction   Anesthetic plan and risks discussed with patient.

## 2018-03-23 NOTE — ANESTHESIA POSTPROCEDURE EVALUATION
"Patient: Danisha Cannon    Procedure Summary     Date:  03/23/18 Room / Location:   [facility]  /  [facility]    Anesthesia Start:  1229 Anesthesia Stop:  1325    Procedure:  DEBRIDEMENT AND CLOSURE KNEE - RIGHT (Right ) Diagnosis:  (T84.53xD)    Surgeon:  Amilcar Capellan MD Provider:  Philip Munoz CRNA    Anesthesia Type:  general ASA Status:  3          Anesthesia Type: general  Last vitals  BP   128/73 (03/23/18 1418)   Temp   97.5 °F (36.4 °C) (03/23/18 1410)   Pulse   67 (03/23/18 1418)   Resp   16 (03/23/18 1418)     SpO2   95 % (03/23/18 1418)     Post Anesthesia Care and Evaluation    Patient location during evaluation: PACU  Patient participation: complete - patient participated  Level of consciousness: awake and alert  Pain score: 0  Pain management: adequate  Airway patency: patent  Anesthetic complications: No anesthetic complications    Cardiovascular status: acceptable and stable  Respiratory status: acceptable and unassisted  Hydration status: stable    Comments: Blood pressure 128/73, pulse 67, temperature 97.5 °F (36.4 °C), temperature source Temporal Artery , resp. rate 16, height 165 cm (64.96\"), weight 119 kg (263 lb 3.7 oz), SpO2 95 %.  .Patient discharged from Pacu per protocol        "

## 2018-03-23 NOTE — ANESTHESIA PROCEDURE NOTES
Airway  Urgency: elective    Airway not difficult    General Information and Staff    Patient location during procedure: OR  CRNA: ROYCE REYNA    Indications and Patient Condition  Indications for airway management: airway protection    Preoxygenated: yes  MILS maintained throughout  Mask difficulty assessment: 1 - vent by mask    Final Airway Details  Final airway type: endotracheal airway      Successful airway: ETT  Cuffed: yes   Successful intubation technique: direct laryngoscopy  Endotracheal tube insertion site: oral  Blade: Millan  Blade size: #2  ETT size: 7.5 mm  Cormack-Lehane Classification: grade I - full view of glottis  Placement verified by: chest auscultation and capnometry   Cuff volume (mL): 6  Measured from: lips  ETT to lips (cm): 21  Number of attempts at approach: 1

## 2018-04-03 DIAGNOSIS — G89.29 CHRONIC BACK PAIN, UNSPECIFIED BACK LOCATION, UNSPECIFIED BACK PAIN LATERALITY: ICD-10-CM

## 2018-04-03 DIAGNOSIS — M54.9 CHRONIC BACK PAIN, UNSPECIFIED BACK LOCATION, UNSPECIFIED BACK PAIN LATERALITY: ICD-10-CM

## 2018-04-03 RX ORDER — TIZANIDINE 4 MG/1
TABLET ORAL
Qty: 30 TABLET | Refills: 0 | Status: SHIPPED | OUTPATIENT
Start: 2018-04-03 | End: 2018-04-30 | Stop reason: SDUPTHER

## 2018-04-25 ENCOUNTER — OFFICE VISIT (OUTPATIENT)
Dept: CARDIOLOGY | Age: 63
End: 2018-04-25
Payer: COMMERCIAL

## 2018-04-25 VITALS
SYSTOLIC BLOOD PRESSURE: 110 MMHG | HEIGHT: 67 IN | WEIGHT: 266 LBS | BODY MASS INDEX: 41.75 KG/M2 | HEART RATE: 68 BPM | DIASTOLIC BLOOD PRESSURE: 60 MMHG

## 2018-04-25 DIAGNOSIS — I48.92 ATRIAL FLUTTER, PAROXYSMAL (HCC): Primary | ICD-10-CM

## 2018-04-25 DIAGNOSIS — R60.9 EDEMA, UNSPECIFIED TYPE: ICD-10-CM

## 2018-04-25 DIAGNOSIS — R06.00 DYSPNEA, UNSPECIFIED TYPE: ICD-10-CM

## 2018-04-25 PROCEDURE — 99202 OFFICE O/P NEW SF 15 MIN: CPT | Performed by: INTERNAL MEDICINE

## 2018-04-25 PROCEDURE — 93000 ELECTROCARDIOGRAM COMPLETE: CPT | Performed by: INTERNAL MEDICINE

## 2018-04-25 RX ORDER — GABAPENTIN 600 MG/1
600 TABLET ORAL 3 TIMES DAILY
COMMUNITY
End: 2018-05-08

## 2018-04-25 RX ORDER — LEVOTHYROXINE SODIUM 137 UG/1
137 TABLET ORAL DAILY
COMMUNITY

## 2018-04-25 ASSESSMENT — ENCOUNTER SYMPTOMS
APNEA: 0
WHEEZING: 0
ABDOMINAL DISTENTION: 0
CHEST TIGHTNESS: 0
STRIDOR: 0
NAUSEA: 0
SHORTNESS OF BREATH: 1
CHOKING: 0
BACK PAIN: 0
BLOOD IN STOOL: 0

## 2018-05-03 ENCOUNTER — HOSPITAL ENCOUNTER (OUTPATIENT)
Dept: NON INVASIVE DIAGNOSTICS | Age: 63
Discharge: HOME | End: 2018-05-03
Payer: COMMERCIAL

## 2018-05-03 DIAGNOSIS — I48.92 ATRIAL FLUTTER, PAROXYSMAL (HCC): ICD-10-CM

## 2018-05-03 DIAGNOSIS — R06.00 DYSPNEA, UNSPECIFIED TYPE: ICD-10-CM

## 2018-05-03 DIAGNOSIS — R60.9 EDEMA, UNSPECIFIED TYPE: ICD-10-CM

## 2018-05-03 LAB
LV EF: 48 %
LVEF MODALITY: NORMAL

## 2018-05-03 PROCEDURE — 93306 TTE W/DOPPLER COMPLETE: CPT

## 2018-05-08 ENCOUNTER — OFFICE VISIT (OUTPATIENT)
Dept: NEUROLOGY | Age: 63
End: 2018-05-08
Payer: COMMERCIAL

## 2018-05-08 VITALS
HEIGHT: 67 IN | BODY MASS INDEX: 39.55 KG/M2 | SYSTOLIC BLOOD PRESSURE: 110 MMHG | DIASTOLIC BLOOD PRESSURE: 69 MMHG | WEIGHT: 252 LBS | HEART RATE: 67 BPM

## 2018-05-08 DIAGNOSIS — G47.33 SLEEP APNEA, OBSTRUCTIVE: ICD-10-CM

## 2018-05-08 DIAGNOSIS — G60.3 IDIOPATHIC PROGRESSIVE NEUROPATHY: Primary | ICD-10-CM

## 2018-05-08 DIAGNOSIS — M47.816 SPONDYLOSIS OF LUMBAR REGION WITHOUT MYELOPATHY OR RADICULOPATHY: ICD-10-CM

## 2018-05-08 PROCEDURE — 99213 OFFICE O/P EST LOW 20 MIN: CPT | Performed by: PSYCHIATRY & NEUROLOGY

## 2018-05-08 RX ORDER — AMITRIPTYLINE HYDROCHLORIDE 25 MG/1
50 TABLET, FILM COATED ORAL NIGHTLY
Qty: 60 TABLET | Refills: 5 | Status: SHIPPED | OUTPATIENT
Start: 2018-05-08 | End: 2018-06-04 | Stop reason: SDUPTHER

## 2018-05-08 RX ORDER — GABAPENTIN 800 MG/1
800 TABLET ORAL 3 TIMES DAILY
Qty: 90 TABLET | Refills: 3 | Status: SHIPPED | OUTPATIENT
Start: 2018-05-08 | End: 2018-09-04 | Stop reason: SDUPTHER

## 2018-05-09 ENCOUNTER — TELEPHONE (OUTPATIENT)
Dept: NEUROLOGY | Age: 63
End: 2018-05-09

## 2018-05-09 ENCOUNTER — OFFICE VISIT (OUTPATIENT)
Dept: CARDIOLOGY | Age: 63
End: 2018-05-09
Payer: COMMERCIAL

## 2018-05-09 VITALS
BODY MASS INDEX: 39.55 KG/M2 | HEIGHT: 67 IN | DIASTOLIC BLOOD PRESSURE: 62 MMHG | WEIGHT: 252 LBS | HEART RATE: 70 BPM | SYSTOLIC BLOOD PRESSURE: 110 MMHG

## 2018-05-09 DIAGNOSIS — I48.0 PAROXYSMAL A-FIB (HCC): Primary | ICD-10-CM

## 2018-05-09 PROCEDURE — 99214 OFFICE O/P EST MOD 30 MIN: CPT | Performed by: INTERNAL MEDICINE

## 2018-05-09 PROCEDURE — 93000 ELECTROCARDIOGRAM COMPLETE: CPT | Performed by: INTERNAL MEDICINE

## 2018-05-29 RX ORDER — VENLAFAXINE HYDROCHLORIDE 75 MG/1
CAPSULE, EXTENDED RELEASE ORAL
Qty: 90 CAPSULE | Refills: 3 | Status: SHIPPED | OUTPATIENT
Start: 2018-05-29 | End: 2019-05-15 | Stop reason: SDUPTHER

## 2018-06-05 RX ORDER — AMITRIPTYLINE HYDROCHLORIDE 25 MG/1
TABLET, FILM COATED ORAL
Qty: 180 TABLET | Refills: 3 | Status: SHIPPED | OUTPATIENT
Start: 2018-06-05 | End: 2021-04-26

## 2018-07-06 ENCOUNTER — TELEPHONE (OUTPATIENT)
Dept: OBGYN | Age: 63
End: 2018-07-06

## 2018-07-06 DIAGNOSIS — Z12.31 ENCOUNTER FOR SCREENING MAMMOGRAM FOR BREAST CANCER: Primary | ICD-10-CM

## 2018-07-24 ENCOUNTER — HOSPITAL ENCOUNTER (OUTPATIENT)
Dept: GENERAL RADIOLOGY | Facility: HOSPITAL | Age: 63
Discharge: HOME | End: 2018-07-24
Attending: PODIATRIST | Admitting: PODIATRIST

## 2018-07-24 ENCOUNTER — OFFICE VISIT (OUTPATIENT)
Dept: WOUND CARE | Facility: HOSPITAL | Age: 63
End: 2018-07-24
Attending: PODIATRIST

## 2018-07-24 ENCOUNTER — APPOINTMENT (OUTPATIENT)
Dept: LAB | Facility: HOSPITAL | Age: 63
End: 2018-07-24
Attending: PODIATRIST

## 2018-07-24 ENCOUNTER — TRANSCRIBE ORDERS (OUTPATIENT)
Dept: LAB | Facility: HOSPITAL | Age: 63
End: 2018-07-24

## 2018-07-24 ENCOUNTER — LAB REQUISITION (OUTPATIENT)
Dept: LAB | Facility: HOSPITAL | Age: 63
End: 2018-07-24

## 2018-07-24 ENCOUNTER — TRANSCRIBE ORDERS (OUTPATIENT)
Dept: ADMINISTRATIVE | Facility: HOSPITAL | Age: 63
End: 2018-07-24

## 2018-07-24 DIAGNOSIS — I73.9 PAD (PERIPHERAL ARTERY DISEASE) (HCC): Primary | ICD-10-CM

## 2018-07-24 DIAGNOSIS — Z00.00 ENCOUNTER FOR GENERAL ADULT MEDICAL EXAMINATION WITHOUT ABNORMAL FINDINGS: ICD-10-CM

## 2018-07-24 DIAGNOSIS — M86.9 OSTEOMYELITIS OF OTHER SITE, UNSPECIFIED TYPE (HCC): Primary | ICD-10-CM

## 2018-07-24 LAB
ALBUMIN SERPL-MCNC: 4 G/DL (ref 3.5–5)
ALBUMIN/GLOB SERPL: 1.3 G/DL (ref 1.1–2.5)
ALP SERPL-CCNC: 95 U/L (ref 24–120)
ALT SERPL W P-5'-P-CCNC: 24 U/L (ref 0–54)
ANION GAP SERPL CALCULATED.3IONS-SCNC: 10 MMOL/L (ref 4–13)
AST SERPL-CCNC: 29 U/L (ref 7–45)
BILIRUB SERPL-MCNC: 0.3 MG/DL (ref 0.1–1)
BUN BLD-MCNC: 24 MG/DL (ref 5–21)
BUN/CREAT SERPL: 27.6 (ref 7–25)
CALCIUM SPEC-SCNC: 9.4 MG/DL (ref 8.4–10.4)
CHLORIDE SERPL-SCNC: 103 MMOL/L (ref 98–110)
CO2 SERPL-SCNC: 29 MMOL/L (ref 24–31)
CREAT BLD-MCNC: 0.87 MG/DL (ref 0.5–1.4)
CRP SERPL-MCNC: 1.71 MG/DL (ref 0–0.99)
DEPRECATED RDW RBC AUTO: 42.7 FL (ref 40–54)
ERYTHROCYTE [DISTWIDTH] IN BLOOD BY AUTOMATED COUNT: 12.8 % (ref 12–15)
ERYTHROCYTE [SEDIMENTATION RATE] IN BLOOD: 24 MM/HR (ref 0–20)
GFR SERPL CREATININE-BSD FRML MDRD: 65 ML/MIN/1.73
GLOBULIN UR ELPH-MCNC: 3 GM/DL
GLUCOSE BLD-MCNC: 92 MG/DL (ref 70–100)
HCT VFR BLD AUTO: 38.7 % (ref 37–47)
HGB BLD-MCNC: 12 G/DL (ref 12–16)
MCH RBC QN AUTO: 28.5 PG (ref 28–32)
MCHC RBC AUTO-ENTMCNC: 31 G/DL (ref 33–36)
MCV RBC AUTO: 91.9 FL (ref 82–98)
PLATELET # BLD AUTO: 254 10*3/MM3 (ref 130–400)
PMV BLD AUTO: 11.1 FL (ref 6–12)
POTASSIUM BLD-SCNC: 4.8 MMOL/L (ref 3.5–5.3)
PREALB SERPL-MCNC: 24.2 MG/DL (ref 18–36)
PROT SERPL-MCNC: 7 G/DL (ref 6.3–8.7)
RBC # BLD AUTO: 4.21 10*6/MM3 (ref 4.2–5.4)
SODIUM BLD-SCNC: 142 MMOL/L (ref 135–145)
WBC NRBC COR # BLD: 6.11 10*3/MM3 (ref 4.8–10.8)

## 2018-07-24 PROCEDURE — 87070 CULTURE OTHR SPECIMN AEROBIC: CPT | Performed by: PODIATRIST

## 2018-07-24 PROCEDURE — 73630 X-RAY EXAM OF FOOT: CPT

## 2018-07-24 PROCEDURE — 86140 C-REACTIVE PROTEIN: CPT | Performed by: PODIATRIST

## 2018-07-24 PROCEDURE — 87176 TISSUE HOMOGENIZATION CULTR: CPT | Performed by: PODIATRIST

## 2018-07-24 PROCEDURE — 84134 ASSAY OF PREALBUMIN: CPT | Performed by: PODIATRIST

## 2018-07-24 PROCEDURE — 87075 CULTR BACTERIA EXCEPT BLOOD: CPT | Performed by: PODIATRIST

## 2018-07-24 PROCEDURE — 87077 CULTURE AEROBIC IDENTIFY: CPT | Performed by: PODIATRIST

## 2018-07-24 PROCEDURE — 87205 SMEAR GRAM STAIN: CPT | Performed by: PODIATRIST

## 2018-07-24 PROCEDURE — 83036 HEMOGLOBIN GLYCOSYLATED A1C: CPT | Performed by: PODIATRIST

## 2018-07-24 PROCEDURE — 80053 COMPREHEN METABOLIC PANEL: CPT | Performed by: PODIATRIST

## 2018-07-24 PROCEDURE — G0463 HOSPITAL OUTPT CLINIC VISIT: HCPCS

## 2018-07-24 PROCEDURE — 85027 COMPLETE CBC AUTOMATED: CPT | Performed by: PODIATRIST

## 2018-07-24 PROCEDURE — 85651 RBC SED RATE NONAUTOMATED: CPT | Performed by: PODIATRIST

## 2018-07-24 PROCEDURE — 36415 COLL VENOUS BLD VENIPUNCTURE: CPT

## 2018-07-24 PROCEDURE — 87147 CULTURE TYPE IMMUNOLOGIC: CPT | Performed by: PODIATRIST

## 2018-07-25 LAB — HBA1C MFR BLD: 5.3 %

## 2018-07-26 ENCOUNTER — HOSPITAL ENCOUNTER (OUTPATIENT)
Dept: ULTRASOUND IMAGING | Facility: HOSPITAL | Age: 63
Discharge: HOME | End: 2018-07-26
Attending: PODIATRIST | Admitting: PODIATRIST

## 2018-07-26 DIAGNOSIS — I73.9 PAD (PERIPHERAL ARTERY DISEASE) (HCC): ICD-10-CM

## 2018-07-26 PROCEDURE — 93924 LWR XTR VASC STDY BILAT: CPT | Performed by: SURGERY

## 2018-07-26 PROCEDURE — 93923 UPR/LXTR ART STDY 3+ LVLS: CPT

## 2018-07-28 LAB
BACTERIA SPEC AEROBE CULT: ABNORMAL
GRAM STN SPEC: ABNORMAL

## 2018-07-29 LAB — BACTERIA SPEC ANAEROBE CULT: NORMAL

## 2018-08-02 ENCOUNTER — OFFICE VISIT (OUTPATIENT)
Dept: WOUND CARE | Facility: HOSPITAL | Age: 63
End: 2018-08-02
Attending: PODIATRIST

## 2018-08-10 ENCOUNTER — LAB REQUISITION (OUTPATIENT)
Dept: LAB | Facility: HOSPITAL | Age: 63
End: 2018-08-10

## 2018-08-10 ENCOUNTER — OFFICE VISIT (OUTPATIENT)
Dept: WOUND CARE | Facility: HOSPITAL | Age: 63
End: 2018-08-10

## 2018-08-10 DIAGNOSIS — Z00.00 ENCOUNTER FOR GENERAL ADULT MEDICAL EXAMINATION WITHOUT ABNORMAL FINDINGS: ICD-10-CM

## 2018-08-10 PROCEDURE — 87176 TISSUE HOMOGENIZATION CULTR: CPT | Performed by: NURSE PRACTITIONER

## 2018-08-10 PROCEDURE — 87075 CULTR BACTERIA EXCEPT BLOOD: CPT | Performed by: NURSE PRACTITIONER

## 2018-08-10 PROCEDURE — 87077 CULTURE AEROBIC IDENTIFY: CPT | Performed by: NURSE PRACTITIONER

## 2018-08-10 PROCEDURE — 87205 SMEAR GRAM STAIN: CPT | Performed by: NURSE PRACTITIONER

## 2018-08-10 PROCEDURE — 87186 SC STD MICRODIL/AGAR DIL: CPT | Performed by: NURSE PRACTITIONER

## 2018-08-10 PROCEDURE — 87070 CULTURE OTHR SPECIMN AEROBIC: CPT | Performed by: NURSE PRACTITIONER

## 2018-08-12 LAB
BACTERIA SPEC AEROBE CULT: ABNORMAL
GRAM STN SPEC: ABNORMAL
GRAM STN SPEC: ABNORMAL

## 2018-08-15 ENCOUNTER — OFFICE VISIT (OUTPATIENT)
Dept: WOUND CARE | Facility: HOSPITAL | Age: 63
End: 2018-08-15

## 2018-08-15 LAB — BACTERIA SPEC ANAEROBE CULT: NORMAL

## 2018-08-15 PROCEDURE — G0463 HOSPITAL OUTPT CLINIC VISIT: HCPCS

## 2018-08-22 ENCOUNTER — APPOINTMENT (OUTPATIENT)
Dept: WOUND CARE | Facility: HOSPITAL | Age: 63
End: 2018-08-22

## 2018-08-29 ENCOUNTER — OFFICE VISIT (OUTPATIENT)
Dept: WOUND CARE | Facility: HOSPITAL | Age: 63
End: 2018-08-29

## 2018-09-04 DIAGNOSIS — G60.3 IDIOPATHIC PROGRESSIVE NEUROPATHY: Primary | ICD-10-CM

## 2018-09-04 RX ORDER — GABAPENTIN 800 MG/1
TABLET ORAL
Qty: 90 TABLET | Refills: 5 | Status: SHIPPED | OUTPATIENT
Start: 2018-09-04 | End: 2018-09-13 | Stop reason: SDUPTHER

## 2018-09-05 ENCOUNTER — OFFICE VISIT (OUTPATIENT)
Dept: WOUND CARE | Facility: HOSPITAL | Age: 63
End: 2018-09-05

## 2018-09-05 PROCEDURE — G0463 HOSPITAL OUTPT CLINIC VISIT: HCPCS

## 2018-09-07 ENCOUNTER — TELEPHONE (OUTPATIENT)
Dept: NEUROLOGY | Age: 63
End: 2018-09-07

## 2018-09-10 RX ORDER — AMITRIPTYLINE HYDROCHLORIDE 25 MG/1
TABLET, FILM COATED ORAL
Qty: 180 TABLET | Refills: 3 | Status: SHIPPED | OUTPATIENT
Start: 2018-09-10 | End: 2018-10-08 | Stop reason: SDUPTHER

## 2018-09-13 ENCOUNTER — OFFICE VISIT (OUTPATIENT)
Dept: NEUROLOGY | Age: 63
End: 2018-09-13
Payer: MEDICARE

## 2018-09-13 VITALS
HEART RATE: 63 BPM | WEIGHT: 270.6 LBS | BODY MASS INDEX: 42.47 KG/M2 | DIASTOLIC BLOOD PRESSURE: 58 MMHG | SYSTOLIC BLOOD PRESSURE: 101 MMHG | HEIGHT: 67 IN

## 2018-09-13 DIAGNOSIS — M79.602 LEFT ARM PAIN: ICD-10-CM

## 2018-09-13 DIAGNOSIS — R41.3 MEMORY LOSS: ICD-10-CM

## 2018-09-13 DIAGNOSIS — G47.33 OBSTRUCTIVE SLEEP APNEA: ICD-10-CM

## 2018-09-13 DIAGNOSIS — M54.41 CHRONIC RIGHT-SIDED LOW BACK PAIN WITH RIGHT-SIDED SCIATICA: ICD-10-CM

## 2018-09-13 DIAGNOSIS — G89.29 CHRONIC RIGHT-SIDED LOW BACK PAIN WITH RIGHT-SIDED SCIATICA: ICD-10-CM

## 2018-09-13 DIAGNOSIS — R20.0 LEFT ARM NUMBNESS: ICD-10-CM

## 2018-09-13 DIAGNOSIS — G60.3 IDIOPATHIC PROGRESSIVE NEUROPATHY: Primary | ICD-10-CM

## 2018-09-13 PROCEDURE — 1101F PT FALLS ASSESS-DOCD LE1/YR: CPT | Performed by: PSYCHIATRY & NEUROLOGY

## 2018-09-13 PROCEDURE — 1123F ACP DISCUSS/DSCN MKR DOCD: CPT | Performed by: PSYCHIATRY & NEUROLOGY

## 2018-09-13 PROCEDURE — 3017F COLORECTAL CA SCREEN DOC REV: CPT | Performed by: PSYCHIATRY & NEUROLOGY

## 2018-09-13 PROCEDURE — G8427 DOCREV CUR MEDS BY ELIG CLIN: HCPCS | Performed by: PSYCHIATRY & NEUROLOGY

## 2018-09-13 PROCEDURE — 99213 OFFICE O/P EST LOW 20 MIN: CPT | Performed by: PSYCHIATRY & NEUROLOGY

## 2018-09-13 PROCEDURE — G8417 CALC BMI ABV UP PARAM F/U: HCPCS | Performed by: PSYCHIATRY & NEUROLOGY

## 2018-09-13 PROCEDURE — 1036F TOBACCO NON-USER: CPT | Performed by: PSYCHIATRY & NEUROLOGY

## 2018-09-13 PROCEDURE — G8399 PT W/DXA RESULTS DOCUMENT: HCPCS | Performed by: PSYCHIATRY & NEUROLOGY

## 2018-09-13 PROCEDURE — 4040F PNEUMOC VAC/ADMIN/RCVD: CPT | Performed by: PSYCHIATRY & NEUROLOGY

## 2018-09-13 PROCEDURE — 1090F PRES/ABSN URINE INCON ASSESS: CPT | Performed by: PSYCHIATRY & NEUROLOGY

## 2018-09-13 RX ORDER — GABAPENTIN 800 MG/1
TABLET ORAL
Qty: 90 TABLET | Refills: 5 | Status: SHIPPED | OUTPATIENT
Start: 2018-09-13 | End: 2019-03-16 | Stop reason: SDUPTHER

## 2018-09-13 NOTE — PROGRESS NOTES
Stroke-like symptom     SVT (supraventricular tachycardia) (HCC)     SVT (supraventricular tachycardia) (HCC)     Swelling of extremity     Unspecified sleep apnea     clinicallybi-pap    Ventricular tachyarrhythmia ([address] 9/15    svt       Past Surgical History:   Procedure Laterality Date    CARDIAC CATHETERIZATION  8/18/15  JDT    EF 50%    CATARACT REMOVAL      CHOLECYSTECTOMY  8/4/14    BY Dr. Tanna Bernheim  2008    CYST INCISION AND DRAINAGE  03/2017    FINGER TRIGGER RELEASE      FOOT SURGERY Left     removal of two screws    HAMMER TOE SURGERY      HARDWARE REMOVAL FOOT / ANKLE Left 8/16/2016    FOOT HARDWARE REMOVAL - DEEP / 2nd METATARSAL HEAD RESECTION performed by Raul Field DPM at [address] [place] [address]  1/12/2015    x3    KNEE SURGERY      Took prostetic joint out because of infection and put a spacer in    OTHER SURGICAL HISTORY  02/2017    pilonidal cyst-Dr Bina Kim    IN KNEE [facility] Right 7/3/2017    KNEE ARTHROSCOPY PARTIAL MEDIAL MENISECTOMY performed by Alphonse Rivera MD at [address] [place] [address] Right 2/16/2018    KNEE TOTAL ARTHROPLASTY performed by April Valadez MD at [place] [address] N/A 1/18/2016    HERNIA VENTRAL REPAIR LAPAROSCOPIC WITH MESH  performed by Yolonda Severs, MD at [facility]  · None    Significant Injuries  · None    Habits  Augusto Patel reports that she has never smoked. She has never used smokeless tobacco. She reports that she does not drink alcohol or use drugs. Family History   Problem Relation Age of Onset    Heart Disease Mother     Diabetes Mother     High Blood Pressure Father     Cancer Father 79        lung CA    Cancer Brother         leukemia    Alzheimer's Disease Brother        Social History  Augusto Patel is , lives in [place], [place], and is retired.     Medications:  Current Outpatient Prescriptions   Medication Sig Dispense Refill    amitriptyline (ELAVIL) 25 MG tablet TAKE 2 TABLETS BY MOUTH EVERY NIGHT 180 tablet 3    gabapentin (NEURONTIN) 800 MG tablet TAKE 1 TABLET BY MOUTH THREE TIMES DAILY 90 tablet 5    amitriptyline (ELAVIL) 25 MG tablet TAKE 2 TABLETS BY MOUTH EVERY NIGHT 180 tablet 3    venlafaxine (EFFEXOR XR) 75 MG extended release capsule TAKE 1 CAPSULE BY MOUTH DAILY 90 capsule 3    dicloxacillin (DYNAPEN) 500 MG capsule Take 500 mg by mouth 3 times daily      tiZANidine (ZANAFLEX) 4 MG tablet TAKE 1/2-1 TABLET BY MOUTH EVERY NIGHT AT BEDTIME 30 tablet 3    levothyroxine (SYNTHROID) 137 MCG tablet Take 137 mcg by mouth Daily      hydroxychloroquine (PLAQUENIL) 200 MG tablet TK 1 T PO QD FOR WEEK ONE. THEN 2 TS QD  1    DEXILANT 60 MG CPDR delayed release capsule Take 60 mg by mouth daily       alendronate (FOSAMAX) 70 MG tablet TK 1 T PO ONCE A WEEK IN THE MORNING 30 MINUTES BEFORE FIRST MEAL OF THE DAY  3    DULoxetine (CYMBALTA) 60 MG extended release capsule Take 1 capsule by mouth 2 times daily 60 capsule 5    flecainide (TAMBOCOR) 100 MG tablet TAKE 1 TABLET BY MOUTH TWICE DAILY 180 tablet 3    metoprolol tartrate (LOPRESSOR) 25 MG tablet TAKE 1/2 TABLET BY MOUTH TWICE DAILY 90 tablet 3    diclofenac (VOLTAREN) 50 MG EC tablet Take 50 mg by mouth daily      LORazepam (ATIVAN) 1 MG tablet Take 1 mg by mouth every 8 hours as needed . 2    rOPINIRole (REQUIP) 3 MG tablet Take 3 mg by mouth 2 times daily 2 tabs in AM and 3 tabs in PM       fluticasone (FLONASE) 50 MCG/ACT nasal spray 2 sprays by Nasal route as needed       famciclovir (FAMVIR) 500 MG tablet   Take 500 mg by mouth 2 times daily       triamcinolone acetonide (KENALOG) 0.1 % paste Place onto teeth 2 times daily as needed Apply to teeth 2 times daily.  levothyroxine (SYNTHROID) 137 MCG tablet Take 1 tablet by mouth daily 30 tablet 2     No current facility-administered medications for this visit. Allergies:   Allergies as of 09/13/2018

## 2018-09-17 ENCOUNTER — OFFICE VISIT (OUTPATIENT)
Dept: WOUND CARE | Facility: HOSPITAL | Age: 63
End: 2018-09-17

## 2018-09-24 ENCOUNTER — OFFICE VISIT (OUTPATIENT)
Dept: WOUND CARE | Facility: HOSPITAL | Age: 63
End: 2018-09-24

## 2018-09-27 ENCOUNTER — HOSPITAL ENCOUNTER (OUTPATIENT)
Dept: CT IMAGING | Age: 63
Discharge: HOME | End: 2018-09-27
Payer: MEDICARE

## 2018-09-27 DIAGNOSIS — S70.11XA CONTUSION OF RIGHT THIGH, INITIAL ENCOUNTER: ICD-10-CM

## 2018-09-27 PROCEDURE — 73701 CT LOWER EXTREMITY W/DYE: CPT

## 2018-09-27 PROCEDURE — 6360000004 HC RX CONTRAST MEDICATION: Performed by: FAMILY MEDICINE

## 2018-09-27 RX ADMIN — IOPAMIDOL 75 ML: 755 INJECTION, SOLUTION INTRAVENOUS at 11:35

## 2018-09-28 RX ORDER — DULOXETIN HYDROCHLORIDE 60 MG/1
CAPSULE, DELAYED RELEASE ORAL
Qty: 180 CAPSULE | Refills: 3 | Status: SHIPPED | OUTPATIENT
Start: 2018-09-28 | End: 2019-09-07 | Stop reason: SDUPTHER

## 2018-09-28 NOTE — TELEPHONE ENCOUNTER
Requested Prescriptions     Pending Prescriptions Disp Refills    DULoxetine (CYMBALTA) 60 MG extended release capsule [Pharmacy Med Name: DULOXETINE DR 60MG CAPSULES] 180 capsule 0     Sig: TAKE 1 CAPSULE BY MOUTH TWICE DAILY     Last ov 9/13/2018  Next ov 1/7/2019  Last rx  10/9/2017 with 5 refills  Claudio Scott

## 2018-10-01 ENCOUNTER — OFFICE VISIT (OUTPATIENT)
Dept: WOUND CARE | Facility: HOSPITAL | Age: 63
End: 2018-10-01

## 2018-10-02 ENCOUNTER — TELEPHONE (OUTPATIENT)
Dept: VASCULAR SURGERY | Facility: CLINIC | Age: 63
End: 2018-10-02

## 2018-10-02 NOTE — TELEPHONE ENCOUNTER
Spoke with Mrs Cannon reminding her of her appointment for Wednesday, October 3rd, 2018 at 1030 am with Dr Nino Stern.  Cannon confirmed she would be here.

## 2018-10-03 ENCOUNTER — OFFICE VISIT (OUTPATIENT)
Dept: VASCULAR SURGERY | Facility: CLINIC | Age: 63
End: 2018-10-03

## 2018-10-03 VITALS
HEART RATE: 65 BPM | DIASTOLIC BLOOD PRESSURE: 66 MMHG | OXYGEN SATURATION: 99 % | SYSTOLIC BLOOD PRESSURE: 104 MMHG | HEIGHT: 67 IN | WEIGHT: 273 LBS | BODY MASS INDEX: 42.85 KG/M2

## 2018-10-03 DIAGNOSIS — S70.11XA HEMATOMA OF THIGH, RIGHT, INITIAL ENCOUNTER: Primary | ICD-10-CM

## 2018-10-03 DIAGNOSIS — I10 ESSENTIAL HYPERTENSION: Chronic | ICD-10-CM

## 2018-10-03 PROCEDURE — 99203 OFFICE O/P NEW LOW 30 MIN: CPT | Performed by: SURGERY

## 2018-10-03 RX ORDER — TIZANIDINE 4 MG/1
TABLET ORAL
COMMUNITY
Start: 2018-04-30 | End: 2018-10-03

## 2018-10-03 RX ORDER — AMITRIPTYLINE HYDROCHLORIDE 50 MG/1
50 TABLET, FILM COATED ORAL NIGHTLY
COMMUNITY
End: 2019-05-28

## 2018-10-03 RX ORDER — DEXLANSOPRAZOLE 60 MG/1
60 CAPSULE, DELAYED RELEASE ORAL DAILY
COMMUNITY
Start: 2018-01-09 | End: 2021-10-21 | Stop reason: SDUPTHER

## 2018-10-03 RX ORDER — ALENDRONATE SODIUM 70 MG/1
70 TABLET ORAL
COMMUNITY
End: 2022-10-10

## 2018-10-03 RX ORDER — ALLOPURINOL 100 MG/1
100 TABLET ORAL DAILY
COMMUNITY
End: 2019-01-04 | Stop reason: ALTCHOICE

## 2018-10-03 NOTE — PROGRESS NOTES
10/03/2018      No referring provider defined for this encounter.    Danisha Cannon  1955    Chief Complaint   Patient presents with   • Establish Care     Hematoma of the R thigh.  Referred by Dr. Shook.  This happened after a fall on September the 9th       Dear No ref. provider found:      HPI  I had the pleasure of seeing your patient Danisha Cannon in the office today.  Thank you kindly for this consultation.  As you recall, Danisha Cannon is a 63 y.o.  female who you are currently following for right medial thigh hematoma.  As per the patient she had a fall approximately 3-4 weeks ago at which time she sustained injury to the right medial thigh.  At that time she developed a hematoma in the area.  She was initially seen in the [facility] ER at which time an ultrasound of the thigh did show hematoma.  However it was stable and not expanding and so she was discharged.  However she subsequently continued to have an unchanged hematoma at the site and so on 9/27 had a CT of the right lower extremity with contrast also done at [facility] which does show a 14 x 9 x 16 cm hematoma in the subcutaneous tissue of the right medial thigh.  There was no active extravasation noted.  I have only been able to see this report and unable to review the images at this time.  Ms. Cannon does report that the hematoma has been stable in size over the last 2 weeks.  However she does report that it does give her some discomfort due to its size.  She also notes some mild edema of the distal right lower extremity since the time of the injury.  However she denies any erythema to the site.  Chest denies any fever or chills.  She has no other complaints at this time..    Past Medical History:   Diagnosis Date   • Anemia    • Anxiety and depression    • Arthritis    • Atrial fibrillation (CMS/HCC)    • Disease of thyroid gland    • GERD (gastroesophageal reflux disease)    • History of incision and drainage     Right knee   • Infection       in right knee to bone, cleaned it out and put new hardware with antibiotic and pt developed hole in incision   • Neuropathy, peripheral    • Pneumonia     RECENT DX PER FAMILY DOCTOR   • PONV (postoperative nausea and vomiting)    • Restless leg syndrome    • Sleep apnea with use of continuous positive airway pressure (CPAP)    • SVT (supraventricular tachycardia) (CMS/HCC)        Past Surgical History:   Procedure Laterality Date   • BUNIONECTOMY Left     AND HAMMER TOE   • CARDIAC SURGERY      HEART CATH   • CATARACT EXTRACTION Right    • HERNIA REPAIR      UMBILICAL X3   • JOINT REPLACEMENT      RIGHT KNEE   • KNEE POLY INSERT EXCHANGE Right 3/3/2018    Procedure: IRRIGATION AND DEBRIDEMENT TOTAL KNEE & POLY EXCHANGE - RIGHT;  Surgeon: Amilcar Capellan MD;  Location:  [facility] OR;  Service:    • LAPAROSCOPIC CHOLECYSTECTOMY     • LEG DEBRIDEMENT Right 3/23/2018    Procedure: DEBRIDEMENT AND CLOSURE KNEE - RIGHT;  Surgeon: Amilcar Capellan MD;  Location:  [facility] OR;  Service: Orthopedics   • PERIPHERALLY INSERTED CENTRAL CATHETER INSERTION     • PILONIDAL CYSTECTOMY N/A 2/16/2017    Procedure: PILONIDAL CYSTECTOMY, EXCISION SEBACEOUS CYST - BACK;  Surgeon: Macrina Capellan MD;  Location:  [facility] OR;  Service:    • TOTAL KNEE  PROSTHESIS REMOVAL W/ SPACER INSERTION Right 3/13/2018    Procedure: 1.  EXPLANT TOTAL KNEE 2.  ANTIBOTIC SPACER KNEE;  Surgeon: Amilcar Capellan MD;  Location:  [facility] OR;  Service: Orthopedics   • TRUNK LESION/CYST EXCISION N/A 2/16/2017    Procedure: EXCISION SEBACEOUS CYST - BACK;  Surgeon: Macrina Capellan MD;  Location:  [facility] OR;  Service:        Family History   Problem Relation Age of Onset   • Diabetes Mother    • Heart disease Mother    • Cancer Father    • Leukemia Brother        Social History     Social History   • Marital status:      Spouse name: N/A   • Number of children: N/A   • Years of education: N/A     Occupational History   • Not on file.     Social History  Main Topics   • Smoking status: Never Smoker   • Smokeless tobacco: Never Used   • Alcohol use No   • Drug use: No   • Sexual activity: Defer     Other Topics Concern   • Not on file     Social History Narrative   • No narrative on file       Allergies   Allergen Reactions   • Other Rash     Dial soap  Redness and swelling on skin and burning sensation   • Codeine Dizziness and Nausea Only     Rapid heart rate, dizziness  NAUSEA   • Mobic [Meloxicam] Rash   • Morphine And Related Itching       Prior to Admission medications    Medication Sig Start Date End Date Taking? Authorizing Provider   alendronate (FOSAMAX) 70 MG tablet Take 70 mg by mouth Every 7 (Seven) Days.   Yes Christel Fermin MD   allopurinol (ZYLOPRIM) 100 MG tablet Take 100 mg by mouth Daily.   Yes Christle Fermin MD   amitriptyline (ELAVIL) 50 MG tablet Take 50 mg by mouth Every Night. 2 tablets at bedtime   Yes Christel Fermin MD   dexlansoprazole (DEXILANT) 60 MG capsule Take 60 mg by mouth. 1/9/18  Yes Christel Fermin MD   DICLOFENAC PO Take 50 mg by mouth Daily.   Yes Christel Fermin MD   dicloxacillin (DYNAPEN) 500 MG capsule Take 500 mg by mouth Every 8 (Eight) Hours.   Yes Chirstel Fermin MD   DULoxetine (CYMBALTA) 30 MG capsule Take 30 mg by mouth 2 (Two) Times a Day.   Yes Christel Fermin MD   famciclovir (FAMVIR) 500 MG tablet Take 500 mg by mouth 2 (Two) Times a Day.   Yes Christel Fermin MD   flecainide (TAMBOCOR) 100 MG tablet Take 100 mg by mouth 2 (Two) Times a Day.   Yes Christel Fermin MD   fluticasone (VERAMYST) 27.5 MCG/SPRAY nasal spray 2 sprays into each nostril Daily.   Yes Christel Fermin MD   gabapentin (NEURONTIN) 600 MG tablet Take 600 mg by mouth 3 (Three) Times a Day.   Yes Christel Fermin MD   levothyroxine (SYNTHROID, LEVOTHROID) 137 MCG tablet Take 137 mcg by mouth Every Morning.   Yes Christel Fermin MD   LORazepam (ATIVAN) 1 MG tablet Take 1 mg by  "mouth Every 8 (Eight) Hours As Needed for Anxiety (TOOK 2 TABS 3-13-18 0630).   Yes Christel Fermin MD   metoprolol tartrate (LOPRESSOR) 25 MG tablet Take 25 mg by mouth 2 (Two) Times a Day. TAKES 1/2 TAB BID   Yes Christel Fermin MD   rOPINIRole (REQUIP) 3 MG tablet Take 3 mg by mouth Every Night. 2 TABS IN AM AND 3 TABS AT NIGHT   Yes Christel Fermin MD   triamcinolone (KENALOG) 0.1 % cream Apply  topically 2 (Two) Times a Day.   Yes Christel Fermin MD   venlafaxine XR (EFFEXOR-XR) 150 MG 24 hr capsule Take 150 mg by mouth Daily.   Yes Christel Fermin MD   tiZANidine (ZANAFLEX) 4 MG tablet TAKE 1/2-1 TABLET BY MOUTH EVERY NIGHT AT BEDTIME 4/30/18 10/3/18 Yes Christel Fermin MD   nafcillin 12 g in sodium chloride 0.9 % 500 mL IVPB Infuse 12 g into a venous catheter Daily.  10/3/18  Christel Fermin MD   pantoprazole (PROTONIX) 40 MG EC tablet Take 40 mg by mouth 2 (Two) Times a Day.  10/3/18  Christel Fermin MD       Review of Systems   Constitutional: Negative.    HENT: Negative.    Eyes: Negative.    Respiratory: Negative.    Cardiovascular: Positive for leg swelling (Visible hematoma presents to the right medial thigh). Negative for chest pain and palpitations.   Gastrointestinal: Negative.    Endocrine: Negative.    Genitourinary: Negative.    Musculoskeletal: Negative.    Skin: Negative.    Allergic/Immunologic: Negative.    Neurological: Negative.    Hematological: Negative.    Psychiatric/Behavioral: Negative.        /66   Pulse 65   Ht 170.2 cm (67\")   Wt 124 kg (273 lb)   LMP  (LMP Unknown)   SpO2 99%   BMI 42.76 kg/m²   Physical Exam   Constitutional: She is oriented to person, place, and time. She appears well-developed and well-nourished.   HENT:   Head: Normocephalic and atraumatic.   Eyes: Pupils are equal, round, and reactive to light. Conjunctivae and EOM are normal.   Neck: Normal range of motion. Neck supple. No JVD present. "   Cardiovascular: Normal rate, regular rhythm, intact distal pulses and normal pulses.    Pulses:       Carotid pulses are 2+ on the right side, and 2+ on the left side.       Radial pulses are 2+ on the right side, and 2+ on the left side.        Femoral pulses are 2+ on the right side, and 2+ on the left side.       Popliteal pulses are 2+ on the right side, and 2+ on the left side.        Dorsalis pedis pulses are 2+ on the right side, and 2+ on the left side.        Posterior tibial pulses are 2+ on the right side, and 2+ on the left side.   Pulmonary/Chest: Effort normal. No respiratory distress.   Abdominal: Soft. Bowel sounds are normal. She exhibits no distension and no mass. There is no tenderness.   Musculoskeletal: Normal range of motion. She exhibits edema (There is pitting edema to the bilateral lower extremities right greater than left.).   Neurological: She is alert and oriented to person, place, and time. No sensory deficit. She exhibits normal muscle tone.   Skin: Skin is warm and dry. Capillary refill takes less than 2 seconds.   There is an obvious deformity to the soft tissues of the right medial thigh measuring approximately 15 x 10 cm.  This area is mainly soft with some mild induration to the periphery and consistent with the known hematoma.  There is no overlying erythema and really no overlying ecchymosis.   Psychiatric: She has a normal mood and affect. Her behavior is normal. Judgment and thought content normal.   Vitals reviewed.      No results found.    Patient Active Problem List   Diagnosis   • Abdominal adhesions   • Abnormal echocardiogram   • Abnormal Holter exam   • Atrial flutter, paroxysmal (CMS/HCC)   • Bilateral edema of lower extremity   • Chest pain   • Cholecystitis with cholelithiasis   • Chronic right-sided low back pain with sciatica   • Class 3 obesity due to excess calories with serious comorbidity in adult   • Facet syndrome, lumbar   • Heart rate fast   • HTN  (hypertension)   • Idiopathic chronic gout without tophus   • Idiopathic hypotension   • Idiopathic progressive neuropathy   • Insomnia   • Lumbar facet arthropathy   • Memory loss   • MRSA carrier   • Obstructive sleep apnea   • Paroxysmal A-fib (CMS/HCC)   • Pilonidal cyst   • Primary osteoarthritis of right knee   • Recurrent ventral incisional hernia   • Restless leg syndrome   • Sciatica of right side   • Shortness of breath   • Sick sinus syndrome (CMS/HCC)   • Sleep apnea   • Sleep apnea, obstructive   • Snoring   • Somnolence, daytime   • Spondylosis of lumbar region without myelopathy or radiculopathy   • Tear of medial meniscus of right knee, current   • Total knee replacement status, right   • Tremor   • Umbilical hernia   • Morbid (severe) obesity due to excess calories (CMS/HCC)   • 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        Plan: After thoroughly evaluating Danisha Cannon, I believe the best course of action is to remain conservative from a vascular standpoint.  I have asked Mrs. Cannon to obtain her CT scan images from [facility] that I may review them.  Based on review of the images and her response to continued conservative therapy, we will discuss of any further surgical intervention is necessary.  However I think initially conservative approach to this hematoma is the most prudent.  We will see Danisha Cannon back in 1 week to further assess her progress.  We also discussed her lower extremity edema and that venous reflux may be playing a role in this.  As such I recommended that she wear compression stockings to the bilateral lower extremities which she has.  We also discussed that once this hematoma is resolved we will likely proceed with lower extremity venous duplex with venous reflux study to further evaluate her venous system. I also did discuss vascular risk factors as they  pertain to the progression of vascular disease including controlling hypertension as well as obesity  The patient can continue taking their current medication regimen as previously planned.  This was all discussed in full with complete understanding.    Thank you for allowing me to participate in the care of your patient.  Please do not hesitate with any questions or concerns.  I will keep you aware of any further encounters with Danisha Cannon.        Sincerely yours,         Nino Stern MD

## 2018-10-03 NOTE — PATIENT INSTRUCTIONS

## 2018-10-05 ENCOUNTER — TELEPHONE (OUTPATIENT)
Dept: VASCULAR SURGERY | Facility: CLINIC | Age: 63
End: 2018-10-05

## 2018-10-08 ENCOUNTER — OFFICE VISIT (OUTPATIENT)
Dept: VASCULAR SURGERY | Facility: CLINIC | Age: 63
End: 2018-10-08

## 2018-10-08 ENCOUNTER — OFFICE VISIT (OUTPATIENT)
Dept: OBGYN | Age: 63
End: 2018-10-08
Payer: MEDICARE

## 2018-10-08 VITALS
HEIGHT: 67 IN | SYSTOLIC BLOOD PRESSURE: 122 MMHG | DIASTOLIC BLOOD PRESSURE: 72 MMHG | HEART RATE: 64 BPM | BODY MASS INDEX: 43 KG/M2 | WEIGHT: 274 LBS

## 2018-10-08 VITALS
BODY MASS INDEX: 42.85 KG/M2 | OXYGEN SATURATION: 97 % | DIASTOLIC BLOOD PRESSURE: 70 MMHG | HEIGHT: 67 IN | WEIGHT: 273 LBS | SYSTOLIC BLOOD PRESSURE: 118 MMHG | HEART RATE: 62 BPM

## 2018-10-08 DIAGNOSIS — I87.2 VENOUS INSUFFICIENCY OF BOTH LOWER EXTREMITIES: ICD-10-CM

## 2018-10-08 DIAGNOSIS — I10 ESSENTIAL HYPERTENSION: Chronic | ICD-10-CM

## 2018-10-08 DIAGNOSIS — Z12.39 SCREENING FOR BREAST CANCER: ICD-10-CM

## 2018-10-08 DIAGNOSIS — E66.01 MORBID (SEVERE) OBESITY DUE TO EXCESS CALORIES (HCC): Chronic | ICD-10-CM

## 2018-10-08 DIAGNOSIS — N90.89 VULVAR LESION: ICD-10-CM

## 2018-10-08 DIAGNOSIS — Z01.419 ENCOUNTER FOR WELL WOMAN EXAM WITH ROUTINE GYNECOLOGICAL EXAM: Primary | ICD-10-CM

## 2018-10-08 DIAGNOSIS — Z12.4 SCREENING FOR CERVICAL CANCER: ICD-10-CM

## 2018-10-08 DIAGNOSIS — S70.11XD HEMATOMA OF RIGHT THIGH, SUBSEQUENT ENCOUNTER: Primary | ICD-10-CM

## 2018-10-08 PROCEDURE — 99213 OFFICE O/P EST LOW 20 MIN: CPT | Performed by: SURGERY

## 2018-10-08 PROCEDURE — G8484 FLU IMMUNIZE NO ADMIN: HCPCS | Performed by: NURSE PRACTITIONER

## 2018-10-08 PROCEDURE — G0101 CA SCREEN;PELVIC/BREAST EXAM: HCPCS | Performed by: NURSE PRACTITIONER

## 2018-10-08 RX ORDER — TRIAMCINOLONE ACETONIDE 0.1 %
PASTE (GRAM) DENTAL 2 TIMES DAILY PRN
Qty: 1 TUBE | Refills: 2 | Status: SHIPPED | OUTPATIENT
Start: 2018-10-08 | End: 2019-07-17 | Stop reason: SDUPTHER

## 2018-10-08 RX ORDER — ALLOPURINOL 100 MG/1
100 TABLET ORAL DAILY
COMMUNITY
End: 2021-08-17

## 2018-10-08 ASSESSMENT — ENCOUNTER SYMPTOMS
RESPIRATORY NEGATIVE: 1
ALLERGIC/IMMUNOLOGIC NEGATIVE: 1
DIARRHEA: 0
GASTROINTESTINAL NEGATIVE: 1
CONSTIPATION: 0

## 2018-10-08 NOTE — PATIENT INSTRUCTIONS

## 2018-10-08 NOTE — PROGRESS NOTES
"10/08/2018      Juan Carlos Sparks MD  [address]  [place] [address]        Danisha Cannon  1955    Chief Complaint   Patient presents with   • Follow-up     Hematoma of the R leg.  She doesn't see a change in the size, however, it isn't as uncomfortable       Dear Juan Carlos Sparks MD:    HPI     I had the pleasure of seeing you patient in the office today for follow up.  As you recall, the patient is a 63 y.o. female who we are currently following for a right thigh hematoma which she sustained after a fall.  She reports some improvement in both the size of the hematoma as well as symptoms.  She no longer feels the pressure-like symptoms that she was previously feeling.  She also reports that the hematoma has somewhat slightly decreased in size.  She also reports the lower extremity edema has improved since wearing her compression stockings.  She is otherwise without complaint.  She does have a disc of her CT that was done at [facility] previously, but was not able to bring it to the office today so I was unable to review her images.    /70 (BP Location: Right arm, Patient Position: Sitting, Cuff Size: Adult)   Ht 170.2 cm (67\")   Wt 124 kg (273 lb)   LMP  (LMP Unknown)   BMI 42.76 kg/m²   Physical Exam   Constitutional: She is oriented to person, place, and time. She appears well-developed and well-nourished.   HENT:   Head: Normocephalic and atraumatic.   Eyes: Pupils are equal, round, and reactive to light. Conjunctivae and EOM are normal.   Neck: Normal range of motion. Neck supple. No JVD present.   Cardiovascular: Normal rate, regular rhythm, intact distal pulses and normal pulses.    Pulses:       Carotid pulses are 2+ on the right side, and 2+ on the left side.       Radial pulses are 2+ on the right side, and 2+ on the left side.        Femoral pulses are 2+ on the right side, and 2+ on the left side.       Popliteal pulses are 2+ on the right side, and 2+ on the " left side.        Dorsalis pedis pulses are 2+ on the right side, and 2+ on the left side.        Posterior tibial pulses are 2+ on the right side, and 2+ on the left side.   Pulmonary/Chest: Effort normal. No respiratory distress.   Abdominal: Soft. Bowel sounds are normal. She exhibits no distension and no mass. There is no tenderness.   Musculoskeletal: Normal range of motion. She exhibits edema (There is pitting edema to the bilateral lower extremities right greater than left.  This is somewhat improved today with the application of a compression stocking that she has been wearing.).   Neurological: She is alert and oriented to person, place, and time. No sensory deficit. She exhibits normal muscle tone.   Skin: Skin is warm and dry. Capillary refill takes less than 2 seconds.   The right thigh hematoma has somewhat improved in size.  Today measures approximately 13 x 8 cm.  There is no overlying skin erythema or signs of necrosis.  There is no tenderness to palpation of the area.    There are varicosities present of the bilateral lower extremities mainly around the proximal posterior calves bilaterally   Psychiatric: She has a normal mood and affect. Her behavior is normal. Judgment and thought content normal.   Vitals reviewed.      DIAGNOSTIC DATA: CT of the right thigh was performed prior at [facility] but images are unavailable to me at this time.    No results found.    Patient Active Problem List   Diagnosis   • Abdominal adhesions   • Abnormal echocardiogram   • Abnormal Holter exam   • Atrial flutter, paroxysmal (CMS/HCC)   • Bilateral edema of lower extremity   • Chest pain   • Cholecystitis with cholelithiasis   • Chronic right-sided low back pain with sciatica   • Class 3 obesity due to excess calories with serious comorbidity in adult   • Facet syndrome, lumbar   • Heart rate fast   • HTN (hypertension)   • Idiopathic chronic gout without tophus   • Idiopathic hypotension   • Idiopathic  progressive neuropathy   • Insomnia   • Lumbar facet arthropathy   • Memory loss   • MRSA carrier   • Obstructive sleep apnea   • Paroxysmal A-fib (CMS/HCC)   • Pilonidal cyst   • Primary osteoarthritis of right knee   • Recurrent ventral incisional hernia   • Restless leg syndrome   • Sciatica of right side   • Shortness of breath   • Sick sinus syndrome (CMS/HCC)   • Sleep apnea   • Sleep apnea, obstructive   • Snoring   • Somnolence, daytime   • Spondylosis of lumbar region without myelopathy or radiculopathy   • Tear of medial meniscus of right knee, current   • Total knee replacement status, right   • Tremor   • Umbilical hernia   • Morbid (severe) obesity due to excess calories (CMS/HCC)   • 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 Hematoma of right thigh, subsequent encounter S70.11XD V58.89     924.00   2. Morbid (severe) obesity due to excess calories (CMS/HCC) E66.01 278.01   3. Essential hypertension I10 401.9           PLAN: After thoroughly evaluating Danisha Cannon, I believe the best course of action is to remain conservative from a vascular standpoint.  As the hematoma is decreasing in size, and her symptoms are improving there is no plan for vascular intervention at this point.  I advised her to continue with warm compresses to the area daily, and to continue to wear her lower extremity compression stockings to help with the lower extremity edema.  She will follow up in 6 weeks to further assess her progress.  That time if the hematoma has resolved, we will plan for a lower extremity venous duplex with venous insufficiency study given her history of bilateral lower extremity edema as well as varicose veins.  The patient is to continue taking their medications as previously discussed.   This was all discussed in full with complete understanding.  Thank you for allowing me to  participate in the care of your patient.  Please do not hesitate to call with any questions or concerns.  We will keep you aware of any further encounters with Danisha Cannon.      Sincerely Yours,      Nino Stern MD

## 2018-10-08 NOTE — PROGRESS NOTES
place, and time. She appears well-developed and well-nourished. Neck: Normal range of motion. Neck supple. No thyroid mass and no thyromegaly present. Cardiovascular: Normal rate and regular rhythm. Pulmonary/Chest: Effort normal and breath sounds normal. Right breast exhibits no inverted nipple, no mass, no nipple discharge and no skin change. Left breast exhibits no inverted nipple, no mass, no nipple discharge and no skin change. Abdominal: Soft. She exhibits no mass. There is no tenderness. Genitourinary: Vagina normal and uterus normal.       There is lesion on the right labia. Cervix exhibits no motion tenderness. Right adnexum displays no mass and no tenderness. Left adnexum displays no mass and no tenderness. Genitourinary Comments: Pap collected  Atrophic vaginal changes  External os pinpoint  White lesion on right labia minora   Musculoskeletal: Normal range of motion. Neurological: She is alert and oriented to person, place, and time. Skin: Skin is warm and dry. Psychiatric: She has a normal mood and affect. Nursing note and vitals reviewed. Diagnosis Orders   1. Encounter for well woman exam with routine gynecological exam  PAP SMEAR    Human papillomavirus (HPV) DNA probe thin prep high risk   2. Screening for cervical cancer  PAP SMEAR    Human papillomavirus (HPV) DNA probe thin prep high risk   3. Screening for breast cancer  CHONG Screening Bilateral   4. Vulvar lesion         MEDICATIONS:  Orders Placed This Encounter   Medications    triamcinolone acetonide (KENALOG) 0.1 % paste     Sig: Place onto teeth 2 times daily as needed (itching) Apply to teeth 2 times daily.      Dispense:  1 Tube     Refill:  2       ORDERS:  Orders Placed This Encounter   Procedures    CHONG Screening Bilateral    PAP SMEAR    Human papillomavirus (HPV) DNA probe thin prep high risk       PLAN:  Pap collected  Gave order for screening mammogram  Discussed white lesion on vulva- pt states she

## 2018-10-08 NOTE — PATIENT INSTRUCTIONS
Patient Education        Well Visit, Over 72: Care Instructions  Your Care Instructions    Physical exams can help you stay healthy. Your doctor has checked your overall health and may have suggested ways to take good care of yourself. He or she also may have recommended tests. At home, you can help prevent illness with healthy eating, regular exercise, and other steps. Follow-up care is a key part of your treatment and safety. Be sure to make and go to all appointments, and call your doctor if you are having problems. It's also a good idea to know your test results and keep a list of the medicines you take. How can you care for yourself at home? · Reach and stay at a healthy weight. This will lower your risk for many problems, such as obesity, diabetes, heart disease, and high blood pressure. · Get at least 30 minutes of exercise on most days of the week. Walking is a good choice. You also may want to do other activities, such as running, swimming, cycling, or playing tennis or team sports. · Do not smoke. Smoking can make health problems worse. If you need help quitting, talk to your doctor about stop-smoking programs and medicines. These can increase your chances of quitting for good. · Protect your skin from too much sun. When you're outdoors from 10 a.m. to 4 p.m., stay in the shade or cover up with clothing and a hat with a wide brim. Wear sunglasses that block UV rays. Even when it's cloudy, put broad-spectrum sunscreen (SPF 30 or higher) on any exposed skin. · See a dentist one or two times a year for checkups and to have your teeth cleaned. · Wear a seat belt in the car. · Limit alcohol to 2 drinks a day for men and 1 drink a day for women. Too much alcohol can cause health problems. Follow your doctor's advice about when to have certain tests. These tests can spot problems early. For men and women  · Cholesterol.  Your doctor will tell you how often to have this done based on your overall health

## 2018-10-09 ENCOUNTER — OFFICE VISIT (OUTPATIENT)
Dept: WOUND CARE | Facility: HOSPITAL | Age: 63
End: 2018-10-09
Attending: PODIATRIST

## 2018-10-09 ENCOUNTER — HOSPITAL ENCOUNTER (OUTPATIENT)
Dept: GENERAL RADIOLOGY | Facility: HOSPITAL | Age: 63
Discharge: HOME | End: 2018-10-09
Attending: PODIATRIST | Admitting: PODIATRIST

## 2018-10-09 ENCOUNTER — TRANSCRIBE ORDERS (OUTPATIENT)
Dept: ADMINISTRATIVE | Facility: HOSPITAL | Age: 63
End: 2018-10-09

## 2018-10-09 DIAGNOSIS — L97.522 ULCER OF LEFT FOOT, WITH FAT LAYER EXPOSED (HCC): Primary | ICD-10-CM

## 2018-10-09 PROCEDURE — 73630 X-RAY EXAM OF FOOT: CPT

## 2018-10-11 ENCOUNTER — HOSPITAL ENCOUNTER (OUTPATIENT)
Dept: WOMENS IMAGING | Age: 63
Discharge: HOME | End: 2018-10-11
Payer: MEDICARE

## 2018-10-11 DIAGNOSIS — Z12.31 ENCOUNTER FOR SCREENING MAMMOGRAM FOR BREAST CANCER: ICD-10-CM

## 2018-10-11 LAB
HPV TYPE 16: NOT DETECTED
HPV TYPE 18: NOT DETECTED
INTERPRETATION: NORMAL
OTHER HIGH RISK HPV: NOT DETECTED
SOURCE: NORMAL

## 2018-10-11 PROCEDURE — 77063 BREAST TOMOSYNTHESIS BI: CPT

## 2018-10-16 ENCOUNTER — OFFICE VISIT (OUTPATIENT)
Dept: WOUND CARE | Facility: HOSPITAL | Age: 63
End: 2018-10-16
Attending: PODIATRIST

## 2018-10-18 ENCOUNTER — TELEPHONE (OUTPATIENT)
Dept: NEUROLOGY | Age: 63
End: 2018-10-18

## 2018-10-18 NOTE — TELEPHONE ENCOUNTER
pt wants to know why her appt was canceled on 11/13 with Kianna, it was for a follow up from her MRI on 11/02. She was rescheduled to Jan 2019 and does not want to wait that long to discuss results, please contact pt to discuss why it was changed.

## 2018-10-23 ENCOUNTER — OFFICE VISIT (OUTPATIENT)
Dept: WOUND CARE | Facility: HOSPITAL | Age: 63
End: 2018-10-23
Attending: PODIATRIST

## 2018-10-30 ENCOUNTER — OFFICE VISIT (OUTPATIENT)
Dept: WOUND CARE | Facility: HOSPITAL | Age: 63
End: 2018-10-30
Attending: PODIATRIST

## 2018-11-05 RX ORDER — AMITRIPTYLINE HYDROCHLORIDE 25 MG/1
TABLET, FILM COATED ORAL
Qty: 180 TABLET | Refills: 1 | Status: SHIPPED | OUTPATIENT
Start: 2018-11-05 | End: 2019-05-04 | Stop reason: SDUPTHER

## 2018-11-07 ENCOUNTER — OFFICE VISIT (OUTPATIENT)
Dept: WOUND CARE | Facility: HOSPITAL | Age: 63
End: 2018-11-07
Attending: SURGERY

## 2018-11-12 ENCOUNTER — OFFICE VISIT (OUTPATIENT)
Dept: CARDIOLOGY | Age: 63
End: 2018-11-12
Payer: MEDICARE

## 2018-11-12 VITALS
HEIGHT: 67 IN | WEIGHT: 274 LBS | BODY MASS INDEX: 43 KG/M2 | DIASTOLIC BLOOD PRESSURE: 64 MMHG | SYSTOLIC BLOOD PRESSURE: 94 MMHG | HEART RATE: 67 BPM

## 2018-11-12 DIAGNOSIS — I48.0 PAROXYSMAL A-FIB (HCC): Primary | ICD-10-CM

## 2018-11-12 DIAGNOSIS — I48.92 ATRIAL FLUTTER, PAROXYSMAL (HCC): ICD-10-CM

## 2018-11-12 PROCEDURE — 93000 ELECTROCARDIOGRAM COMPLETE: CPT | Performed by: INTERNAL MEDICINE

## 2018-11-12 PROCEDURE — 3017F COLORECTAL CA SCREEN DOC REV: CPT | Performed by: INTERNAL MEDICINE

## 2018-11-12 PROCEDURE — G8427 DOCREV CUR MEDS BY ELIG CLIN: HCPCS | Performed by: INTERNAL MEDICINE

## 2018-11-12 PROCEDURE — G8417 CALC BMI ABV UP PARAM F/U: HCPCS | Performed by: INTERNAL MEDICINE

## 2018-11-12 PROCEDURE — 0296T PR EXT ECG > 48HR TO 21 DAY RCRD W/CONECT INTL RCRD: CPT | Performed by: INTERNAL MEDICINE

## 2018-11-12 PROCEDURE — 99211 OFF/OP EST MAY X REQ PHY/QHP: CPT | Performed by: INTERNAL MEDICINE

## 2018-11-14 ENCOUNTER — OFFICE VISIT (OUTPATIENT)
Dept: WOUND CARE | Facility: HOSPITAL | Age: 63
End: 2018-11-14
Attending: SURGERY

## 2018-11-19 ENCOUNTER — OFFICE VISIT (OUTPATIENT)
Dept: VASCULAR SURGERY | Facility: CLINIC | Age: 63
End: 2018-11-19

## 2018-11-19 VITALS
DIASTOLIC BLOOD PRESSURE: 70 MMHG | BODY MASS INDEX: 41.59 KG/M2 | HEIGHT: 67 IN | OXYGEN SATURATION: 98 % | SYSTOLIC BLOOD PRESSURE: 128 MMHG | HEART RATE: 72 BPM | WEIGHT: 265 LBS

## 2018-11-19 DIAGNOSIS — S70.11XD TRAUMATIC HEMATOMA OF RIGHT THIGH, SUBSEQUENT ENCOUNTER: Primary | ICD-10-CM

## 2018-11-19 PROBLEM — S70.11XA TRAUMATIC HEMATOMA OF RIGHT THIGH: Status: ACTIVE | Noted: 2018-11-19

## 2018-11-19 PROCEDURE — 99213 OFFICE O/P EST LOW 20 MIN: CPT | Performed by: SURGERY

## 2018-11-19 NOTE — PROGRESS NOTES
11/19/2018      Juan Carlos Sparks MD  [address]  [facility] [address]        Danisha Cannon  1955    No chief complaint on file.      Dear Juan Carlos Sparks MD:    HPI     I had the pleasure of seeing you patient in the office today for follow up.  As you recall, the patient is a 63 y.o. female who we are currently following for a right thigh hematoma which she sustained after a fall.  The hematoma itself appears to have almost completely resolved and the only complaint is a small area of induration at the actual site of trauma in the medial right thigh.  She otherwise denies any fevers or chills.  She has no other complaints and reports feeling overall much better.     LMP  (LMP Unknown)   Physical Exam   Constitutional: She is oriented to person, place, and time. She appears well-developed and well-nourished.   HENT:   Head: Normocephalic and atraumatic.   Eyes: Conjunctivae and EOM are normal. Pupils are equal, round, and reactive to light.   Neck: Normal range of motion. Neck supple. No JVD present.   Cardiovascular: Normal rate, regular rhythm, intact distal pulses and normal pulses.   Pulses:       Carotid pulses are 2+ on the right side, and 2+ on the left side.       Radial pulses are 2+ on the right side, and 2+ on the left side.        Femoral pulses are 2+ on the right side, and 2+ on the left side.       Popliteal pulses are 2+ on the right side, and 2+ on the left side.        Dorsalis pedis pulses are 2+ on the right side, and 2+ on the left side.        Posterior tibial pulses are 2+ on the right side, and 2+ on the left side.   Pulmonary/Chest: Effort normal. No respiratory distress.   Abdominal: Soft. Bowel sounds are normal. She exhibits no distension and no mass. There is no tenderness.   Musculoskeletal: Normal range of motion. She exhibits edema (Lower extremity edema is significantly improved with the continued use of compression stockings.  She now only has minimal  edema of the bilateral lower extremities.).   Neurological: She is alert and oriented to person, place, and time. No sensory deficit. She exhibits normal muscle tone.   Skin: Skin is warm and dry. Capillary refill takes less than 2 seconds.   The right thigh hematoma has almost completely resolved.  The only finding is a small area of induration and subcutaneous tissue at the site of her prior trauma which appears to be just normal healing tissue.  She has no erythema or ecchymosis of the overlying skin.  There is no fluctuance present.    There are varicosities present of the bilateral lower extremities mainly around the proximal posterior calves bilaterally   Psychiatric: She has a normal mood and affect. Her behavior is normal. Judgment and thought content normal.   Vitals reviewed.      DIAGNOSTIC DATA:    No results found.    Patient Active Problem List   Diagnosis   • Abdominal adhesions   • Abnormal echocardiogram   • Abnormal Holter exam   • Atrial flutter, paroxysmal (CMS/HCC)   • Bilateral edema of lower extremity   • Chest pain   • Cholecystitis with cholelithiasis   • Chronic right-sided low back pain with sciatica   • Class 3 obesity due to excess calories with serious comorbidity in adult   • Facet syndrome, lumbar   • Heart rate fast   • HTN (hypertension)   • Idiopathic chronic gout without tophus   • Idiopathic hypotension   • Idiopathic progressive neuropathy   • Insomnia   • Lumbar facet arthropathy   • Memory loss   • MRSA carrier   • Obstructive sleep apnea   • Paroxysmal A-fib (CMS/HCC)   • Pilonidal cyst   • Primary osteoarthritis of right knee   • Recurrent ventral incisional hernia   • Restless leg syndrome   • Sciatica of right side   • Shortness of breath   • Sick sinus syndrome (CMS/HCC)   • Sleep apnea   • Sleep apnea, obstructive   • Snoring   • Somnolence, daytime   • Spondylosis of lumbar region without myelopathy or radiculopathy   • Tear of medial meniscus of right knee, current   •  Total knee replacement status, right   • Tremor   • Umbilical hernia   • Morbid (severe) obesity due to excess calories (CMS/HCC)   • 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 Traumatic hematoma of right thigh, subsequent encounter S70.11XD V58.89     924.00           PLAN: After thoroughly evaluating Danisha Cannon, I believe the best course of action is to remain conservative from a vascular standpoint.  As the hematoma has now basically completely resolved she has no further need for vascular intervention.  I advised her to continue with warm compresses to the area daily, and to continue to wear her lower extremity compression stockings to help with the lower extremity edema.  Given her significant improvement in lower extremity edema with the compression stockings we will hold off on any venous reflux testing at this point.  She will continue to follow in the wound care center for her left first toe wound which also continues to improve.  I will actually be seeing her in the wound care center in 2 weeks for continued follow-up there.  The patient is to continue taking their medications as previously discussed.   This was all discussed in full with complete understanding.  Thank you for allowing me to participate in the care of your patient.  Please do not hesitate to call with any questions or concerns.  We will keep you aware of any further encounters with Danisha Cannon.      Sincerely Yours,      Nino Stern MD

## 2018-11-19 NOTE — PATIENT INSTRUCTIONS

## 2018-11-23 ENCOUNTER — HOSPITAL ENCOUNTER (INPATIENT)
Facility: HOSPITAL | Age: 63
LOS: 1 days | Discharge: HOME | End: 2018-11-23
Attending: FAMILY MEDICINE | Admitting: FAMILY MEDICINE

## 2018-11-23 VITALS
DIASTOLIC BLOOD PRESSURE: 72 MMHG | HEIGHT: 67 IN | TEMPERATURE: 98.4 F | HEART RATE: 65 BPM | OXYGEN SATURATION: 100 % | BODY MASS INDEX: 43.03 KG/M2 | RESPIRATION RATE: 18 BRPM | SYSTOLIC BLOOD PRESSURE: 138 MMHG | WEIGHT: 274.2 LBS

## 2018-11-23 PROBLEM — I48.91 ATRIAL FIBRILLATION WITH RVR (HCC): Status: ACTIVE | Noted: 2018-11-23

## 2018-11-23 PROBLEM — E03.9 HYPOTHYROIDISM: Status: ACTIVE | Noted: 2018-11-23

## 2018-11-23 LAB
ANION GAP SERPL CALCULATED.3IONS-SCNC: 10 MMOL/L (ref 4–13)
ARTICHOKE IGE QN: 120 MG/DL (ref 0–99)
BASOPHILS # BLD AUTO: 0.03 10*3/MM3 (ref 0–0.2)
BASOPHILS NFR BLD AUTO: 0.5 % (ref 0–2)
BUN BLD-MCNC: 21 MG/DL (ref 5–21)
BUN/CREAT SERPL: 26.3 (ref 7–25)
CALCIUM SPEC-SCNC: 9.1 MG/DL (ref 8.4–10.4)
CHLORIDE SERPL-SCNC: 107 MMOL/L (ref 98–110)
CHOLEST SERPL-MCNC: 198 MG/DL (ref 130–200)
CO2 SERPL-SCNC: 28 MMOL/L (ref 24–31)
CREAT BLD-MCNC: 0.8 MG/DL (ref 0.5–1.4)
DEPRECATED RDW RBC AUTO: 48.8 FL (ref 40–54)
EOSINOPHIL # BLD AUTO: 0.28 10*3/MM3 (ref 0–0.7)
EOSINOPHIL NFR BLD AUTO: 4.8 % (ref 0–4)
ERYTHROCYTE [DISTWIDTH] IN BLOOD BY AUTOMATED COUNT: 14.7 % (ref 12–15)
GFR SERPL CREATININE-BSD FRML MDRD: 72 ML/MIN/1.73
GLUCOSE BLD-MCNC: 114 MG/DL (ref 70–100)
HCT VFR BLD AUTO: 40.7 % (ref 37–47)
HDLC SERPL-MCNC: 39 MG/DL
HGB BLD-MCNC: 12.6 G/DL (ref 12–16)
IMM GRANULOCYTES # BLD: 0.02 10*3/MM3 (ref 0–0.03)
IMM GRANULOCYTES NFR BLD: 0.3 % (ref 0–5)
LDLC/HDLC SERPL: 3.43 {RATIO}
LYMPHOCYTES # BLD AUTO: 1.56 10*3/MM3 (ref 0.72–4.86)
LYMPHOCYTES NFR BLD AUTO: 26.5 % (ref 15–45)
MAGNESIUM SERPL-MCNC: 2 MG/DL (ref 1.4–2.2)
MCH RBC QN AUTO: 28 PG (ref 28–32)
MCHC RBC AUTO-ENTMCNC: 31 G/DL (ref 33–36)
MCV RBC AUTO: 90.4 FL (ref 82–98)
MONOCYTES # BLD AUTO: 0.57 10*3/MM3 (ref 0.19–1.3)
MONOCYTES NFR BLD AUTO: 9.7 % (ref 4–12)
NEUTROPHILS # BLD AUTO: 3.43 10*3/MM3 (ref 1.87–8.4)
NEUTROPHILS NFR BLD AUTO: 58.2 % (ref 39–78)
NRBC BLD MANUAL-RTO: 0 /100 WBC (ref 0–0)
PLATELET # BLD AUTO: 241 10*3/MM3 (ref 130–400)
PMV BLD AUTO: 11.4 FL (ref 6–12)
POTASSIUM BLD-SCNC: 3.9 MMOL/L (ref 3.5–5.3)
RBC # BLD AUTO: 4.5 10*6/MM3 (ref 4.2–5.4)
SODIUM BLD-SCNC: 145 MMOL/L (ref 135–145)
TRIGL SERPL-MCNC: 126 MG/DL (ref 0–149)
TROPONIN I SERPL-MCNC: 0.01 NG/ML (ref 0–0.03)
TSH SERPL DL<=0.05 MIU/L-ACNC: 0.72 MIU/ML (ref 0.47–4.68)
WBC NRBC COR # BLD: 5.89 10*3/MM3 (ref 4.8–10.8)

## 2018-11-23 PROCEDURE — 93010 ELECTROCARDIOGRAM REPORT: CPT | Performed by: INTERNAL MEDICINE

## 2018-11-23 PROCEDURE — 93005 ELECTROCARDIOGRAM TRACING: CPT | Performed by: NURSE PRACTITIONER

## 2018-11-23 PROCEDURE — 94799 UNLISTED PULMONARY SVC/PX: CPT

## 2018-11-23 PROCEDURE — 25010000002 ENOXAPARIN PER 10 MG: Performed by: FAMILY MEDICINE

## 2018-11-23 PROCEDURE — 84484 ASSAY OF TROPONIN QUANT: CPT | Performed by: FAMILY MEDICINE

## 2018-11-23 PROCEDURE — 84443 ASSAY THYROID STIM HORMONE: CPT | Performed by: FAMILY MEDICINE

## 2018-11-23 PROCEDURE — 83735 ASSAY OF MAGNESIUM: CPT | Performed by: FAMILY MEDICINE

## 2018-11-23 PROCEDURE — 99222 1ST HOSP IP/OBS MODERATE 55: CPT | Performed by: INTERNAL MEDICINE

## 2018-11-23 PROCEDURE — 85025 COMPLETE CBC W/AUTO DIFF WBC: CPT | Performed by: FAMILY MEDICINE

## 2018-11-23 PROCEDURE — 80048 BASIC METABOLIC PNL TOTAL CA: CPT | Performed by: FAMILY MEDICINE

## 2018-11-23 PROCEDURE — 94760 N-INVAS EAR/PLS OXIMETRY 1: CPT

## 2018-11-23 PROCEDURE — 80061 LIPID PANEL: CPT | Performed by: FAMILY MEDICINE

## 2018-11-23 RX ORDER — SODIUM CHLORIDE 0.9 % (FLUSH) 0.9 %
3-10 SYRINGE (ML) INJECTION AS NEEDED
Status: DISCONTINUED | OUTPATIENT
Start: 2018-11-23 | End: 2018-11-23 | Stop reason: HOSPADM

## 2018-11-23 RX ORDER — FLECAINIDE ACETATE 100 MG/1
100 TABLET ORAL EVERY 12 HOURS SCHEDULED
Status: DISCONTINUED | OUTPATIENT
Start: 2018-11-23 | End: 2018-11-23 | Stop reason: HOSPADM

## 2018-11-23 RX ORDER — DULOXETIN HYDROCHLORIDE 30 MG/1
60 CAPSULE, DELAYED RELEASE ORAL DAILY
Status: DISCONTINUED | OUTPATIENT
Start: 2018-11-23 | End: 2018-11-23 | Stop reason: HOSPADM

## 2018-11-23 RX ORDER — SODIUM CHLORIDE 0.9 % (FLUSH) 0.9 %
3 SYRINGE (ML) INJECTION EVERY 12 HOURS SCHEDULED
Status: DISCONTINUED | OUTPATIENT
Start: 2018-11-23 | End: 2018-11-23 | Stop reason: HOSPADM

## 2018-11-23 RX ORDER — TRAMADOL HYDROCHLORIDE 50 MG/1
50 TABLET ORAL EVERY 6 HOURS PRN
Status: DISCONTINUED | OUTPATIENT
Start: 2018-11-23 | End: 2018-11-23 | Stop reason: HOSPADM

## 2018-11-23 RX ORDER — LEVOTHYROXINE SODIUM 137 UG/1
137 TABLET ORAL
Status: DISCONTINUED | OUTPATIENT
Start: 2018-11-23 | End: 2018-11-23 | Stop reason: HOSPADM

## 2018-11-23 RX ORDER — AMITRIPTYLINE HYDROCHLORIDE 25 MG/1
25 TABLET, FILM COATED ORAL NIGHTLY
Status: DISCONTINUED | OUTPATIENT
Start: 2018-11-23 | End: 2018-11-23 | Stop reason: HOSPADM

## 2018-11-23 RX ORDER — SODIUM CHLORIDE 9 MG/ML
50 INJECTION, SOLUTION INTRAVENOUS CONTINUOUS
Status: DISCONTINUED | OUTPATIENT
Start: 2018-11-23 | End: 2018-11-23 | Stop reason: HOSPADM

## 2018-11-23 RX ORDER — PANTOPRAZOLE SODIUM 40 MG/1
40 TABLET, DELAYED RELEASE ORAL
Status: DISCONTINUED | OUTPATIENT
Start: 2018-11-23 | End: 2018-11-23 | Stop reason: HOSPADM

## 2018-11-23 RX ORDER — ROPINIROLE 1 MG/1
3 TABLET, FILM COATED ORAL NIGHTLY
Status: DISCONTINUED | OUTPATIENT
Start: 2018-11-23 | End: 2018-11-23 | Stop reason: HOSPADM

## 2018-11-23 RX ORDER — HYDROCODONE BITARTRATE AND ACETAMINOPHEN 10; 325 MG/1; MG/1
1 TABLET ORAL EVERY 4 HOURS PRN
Status: DISCONTINUED | OUTPATIENT
Start: 2018-11-23 | End: 2018-11-23 | Stop reason: HOSPADM

## 2018-11-23 RX ORDER — LORAZEPAM 1 MG/1
1 TABLET ORAL 2 TIMES DAILY PRN
Status: DISCONTINUED | OUTPATIENT
Start: 2018-11-23 | End: 2018-11-23 | Stop reason: HOSPADM

## 2018-11-23 RX ORDER — DICLOXACILLIN SODIUM 250 MG/1
500 CAPSULE ORAL EVERY 8 HOURS SCHEDULED
Status: DISCONTINUED | OUTPATIENT
Start: 2018-11-23 | End: 2018-11-23 | Stop reason: HOSPADM

## 2018-11-23 RX ORDER — FLUTICASONE PROPIONATE 50 MCG
1 SPRAY, SUSPENSION (ML) NASAL DAILY
Status: DISCONTINUED | OUTPATIENT
Start: 2018-11-23 | End: 2018-11-23 | Stop reason: HOSPADM

## 2018-11-23 RX ORDER — ALLOPURINOL 100 MG/1
100 TABLET ORAL DAILY
Status: DISCONTINUED | OUTPATIENT
Start: 2018-11-23 | End: 2018-11-23 | Stop reason: HOSPADM

## 2018-11-23 RX ORDER — ACETAMINOPHEN 325 MG/1
650 TABLET ORAL EVERY 4 HOURS PRN
Status: DISCONTINUED | OUTPATIENT
Start: 2018-11-23 | End: 2018-11-23 | Stop reason: HOSPADM

## 2018-11-23 RX ORDER — GABAPENTIN 400 MG/1
800 CAPSULE ORAL EVERY 8 HOURS SCHEDULED
Status: DISCONTINUED | OUTPATIENT
Start: 2018-11-23 | End: 2018-11-23 | Stop reason: HOSPADM

## 2018-11-23 RX ORDER — FLECAINIDE ACETATE 100 MG/1
100 TABLET ORAL EVERY 12 HOURS SCHEDULED
Qty: 60 TABLET | Refills: 0 | Status: SHIPPED | OUTPATIENT
Start: 2018-11-23 | End: 2019-10-11

## 2018-11-23 RX ADMIN — DULOXETINE HYDROCHLORIDE 60 MG: 30 CAPSULE, DELAYED RELEASE ORAL at 09:30

## 2018-11-23 RX ADMIN — GABAPENTIN 800 MG: 400 CAPSULE ORAL at 15:13

## 2018-11-23 RX ADMIN — DICLOXACILLIN SODIUM 500 MG: 250 CAPSULE ORAL at 15:13

## 2018-11-23 RX ADMIN — GABAPENTIN 800 MG: 400 CAPSULE ORAL at 05:38

## 2018-11-23 RX ADMIN — ALLOPURINOL 100 MG: 100 TABLET ORAL at 09:30

## 2018-11-23 RX ADMIN — ENOXAPARIN SODIUM 40 MG: 40 INJECTION SUBCUTANEOUS at 09:32

## 2018-11-23 RX ADMIN — SODIUM CHLORIDE, PRESERVATIVE FREE 3 ML: 5 INJECTION INTRAVENOUS at 09:30

## 2018-11-23 RX ADMIN — SODIUM CHLORIDE 50 ML/HR: 9 INJECTION, SOLUTION INTRAVENOUS at 02:56

## 2018-11-23 RX ADMIN — PANTOPRAZOLE SODIUM 40 MG: 40 TABLET, DELAYED RELEASE ORAL at 05:37

## 2018-11-23 RX ADMIN — DILTIAZEM HYDROCHLORIDE 10 MG/HR: 5 INJECTION INTRAVENOUS at 02:56

## 2018-11-23 RX ADMIN — FLECAINIDE ACETATE 100 MG: 100 TABLET ORAL at 09:30

## 2018-11-23 RX ADMIN — DICLOXACILLIN SODIUM 500 MG: 250 CAPSULE ORAL at 05:38

## 2018-11-23 RX ADMIN — LEVOTHYROXINE SODIUM 137 MCG: 137 TABLET ORAL at 05:37

## 2018-11-23 RX ADMIN — METOPROLOL TARTRATE 25 MG: 25 TABLET, FILM COATED ORAL at 09:30

## 2018-11-23 RX ADMIN — FLUTICASONE PROPIONATE 1 SPRAY: 50 SPRAY, METERED NASAL at 09:30

## 2018-11-23 NOTE — PLAN OF CARE
Problem: Patient Care Overview  Goal: Plan of Care Review  Outcome: Ongoing (interventions implemented as appropriate)   11/23/18 0619   Coping/Psychosocial   Plan of Care Reviewed With patient   Plan of Care Review   Progress improving   OTHER   Outcome Summary Patient converted to NSR at 0255. Cardizem IV stopped. NS at 50 ml/hr continued. VSS. Consult cardiology today. Continue to monitor.      Goal: Individualization and Mutuality  Outcome: Ongoing (interventions implemented as appropriate)    Goal: Discharge Needs Assessment  Outcome: Ongoing (interventions implemented as appropriate)    Goal: Interprofessional Rounds/Family Conf  Outcome: Ongoing (interventions implemented as appropriate)      Problem: Fall Risk (Adult)  Goal: Identify Related Risk Factors and Signs and Symptoms  Outcome: Ongoing (interventions implemented as appropriate)    Goal: Absence of Fall  Outcome: Ongoing (interventions implemented as appropriate)      Problem: Arrhythmia/Dysrhythmia (Symptomatic) (Adult)  Goal: Signs and Symptoms of Listed Potential Problems Will be Absent, Minimized or Managed (Arrhythmia/Dysrhythmia)  Outcome: Ongoing (interventions implemented as appropriate)

## 2018-11-23 NOTE — CONSULTS
"  Patient Care Team:  Juan Carlos Sparks MD as PCP - General  Juan Carlos Sparks MD as PCP - Family Medicine  Juan Carlos Sparks MD  REASON FOR REFERRAL: atrial fibrillation   Chief complaint : palpitations, dizziness     Subjective     Patient is a 63 y.o. female presents with palpitations. She states she was at home last night around 6 pm when she developed palpitations or \"heart pounding\", with associated dizziness, weakness and diaphoresis. She presented to the [facility] and was found to be in afib, rvr with HR as high as 170s. Troponin was negative. She denies chest pain or shortness of breath. She was transferred to [facility]. She was placed in IV Cardizem. She converted to NSR around 3 am this morning and her symptoms have resolved. She has a known history of PAF, but has been out of her lopressor and flecainide for 2 weeks. She is not anticoagulated, previously due to a low CHADSVASC score. She was also taken off of her aspirin following issues with an infected right knee and right thigh hematoma. She reports frequent falls due to loss of balance and neuropathy. She saw Dr. Lawrence at [facility] recently and was placed in a 1 week zio patch monitor. She reports she turned this in 1 week ago, but has not heard back regarding the results. His note indicates he plans to anticoagulate her if there is evidence of PAF on the monitor. She reports she did have another episode of palpitations 1-2 weeks ago. She tells me she is interested in possibly following with [facility] going forward.     She reports she had a normal cardiac cath 4-5 years ago. Echo in May reveals and LVEF of 45-50%, mild LVH and TR, and grade I diastolic dysfunction.     Review of Systems   Review of Systems   Constitutional: Positive for diaphoresis. Negative for fatigue, fever and unexpected weight change.   HENT: Negative for nosebleeds.    Respiratory: Negative for apnea, cough, chest tightness, shortness of breath and wheezing.  "   Cardiovascular: Positive for palpitations. Negative for chest pain and leg swelling.   Gastrointestinal: Negative for abdominal distention, nausea and vomiting.   Genitourinary: Negative for hematuria.   Musculoskeletal: Negative for gait problem.   Skin: Negative for color change.   Neurological: Positive for dizziness. Negative for syncope, weakness and light-headedness.       History  Past Medical History:   Diagnosis Date   • Anemia    • Anxiety and depression    • Arthritis    • Atrial fibrillation (CMS/HCC)    • Disease of thyroid gland    • GERD (gastroesophageal reflux disease)    • History of incision and drainage     Right knee   • Infection      in right knee to bone, cleaned it out and put new hardware with antibiotic and pt developed hole in incision   • Neuropathy, peripheral    • Pneumonia     RECENT DX PER FAMILY DOCTOR   • PONV (postoperative nausea and vomiting)    • Restless leg syndrome    • Sleep apnea with use of continuous positive airway pressure (CPAP)    • SVT (supraventricular tachycardia) (CMS/HCC)      Past Surgical History:   Procedure Laterality Date   • BUNIONECTOMY Left     AND HAMMER TOE   • CARDIAC SURGERY      HEART CATH   • CATARACT EXTRACTION Right    • HERNIA REPAIR      UMBILICAL X3   • JOINT REPLACEMENT      RIGHT KNEE   • LAPAROSCOPIC CHOLECYSTECTOMY     • PERIPHERALLY INSERTED CENTRAL CATHETER INSERTION       Family History   Problem Relation Age of Onset   • Diabetes Mother    • Heart disease Mother    • Cancer Father    • Leukemia Brother      Social History     Tobacco Use   • Smoking status: Never Smoker   • Smokeless tobacco: Never Used   Substance Use Topics   • Alcohol use: No   • Drug use: No     Medications Prior to Admission   Medication Sig Dispense Refill Last Dose   • alendronate (FOSAMAX) 70 MG tablet Take 70 mg by mouth Every 7 (Seven) Days.   Past Month at Unknown time   • allopurinol (ZYLOPRIM) 100 MG tablet Take 100 mg by mouth Daily.   11/22/2018 at  Unknown time   • amitriptyline (ELAVIL) 50 MG tablet Take 25 mg by mouth Every Night. 2 tablets at bedtime    11/22/2018 at Unknown time   • dexlansoprazole (DEXILANT) 60 MG capsule Take 60 mg by mouth.   11/22/2018 at Unknown time   • DICLOFENAC PO Take 50 mg by mouth Daily.   11/22/2018 at Unknown time   • DULoxetine (CYMBALTA) 30 MG capsule Take 60 mg by mouth 2 (Two) Times a Day.   11/22/2018 at Unknown time   • famciclovir (FAMVIR) 500 MG tablet Take 500 mg by mouth 2 (Two) Times a Day.   11/22/2018 at Unknown time   • flecainide (TAMBOCOR) 100 MG tablet Take 100 mg by mouth 2 (Two) Times a Day.   Past Week at Unknown time   • fluticasone (VERAMYST) 27.5 MCG/SPRAY nasal spray 2 sprays into the nostril(s) as directed by provider Daily As Needed.   Past Week at Unknown time   • gabapentin (NEURONTIN) 600 MG tablet Take 800 mg by mouth 3 (Three) Times a Day.   11/22/2018 at Unknown time   • levothyroxine (SYNTHROID, LEVOTHROID) 137 MCG tablet Take 137 mcg by mouth Every Morning.   11/22/2018 at Unknown time   • LORazepam (ATIVAN) 1 MG tablet Take 1 mg by mouth 2 (Two) Times a Day As Needed for Anxiety (TOOK 2 TABS 3-13-18 0630).   11/22/2018 at Unknown time   • metoprolol tartrate (LOPRESSOR) 25 MG tablet Take 25 mg by mouth 2 (Two) Times a Day. TAKES 1/2 TAB BID   Past Week at Unknown time   • rOPINIRole (REQUIP) 3 MG tablet Take 3 mg by mouth Every Night. 2 TABS IN AM AND 3 TABS AT NIGHT   11/22/2018 at Unknown time   • dicloxacillin (DYNAPEN) 500 MG capsule Take 500 mg by mouth Every 8 (Eight) Hours.   More than a month at Unknown time   • triamcinolone (KENALOG) 0.1 % cream Apply  topically 2 (Two) Times a Day.   More than a month at Unknown time       Current Facility-Administered Medications:   •  acetaminophen (TYLENOL) tablet 650 mg, 650 mg, Oral, Q4H PRN, Juan Carlos Sparks MD  •  allopurinol (ZYLOPRIM) tablet 100 mg, 100 mg, Oral, Daily, Juan Carlos Sparks MD, 100 mg at 11/23/18 0930  •   amitriptyline (ELAVIL) tablet 25 mg, 25 mg, Oral, Nightly, Juan Carlos Sparks MD  •  dicloxacillin (DYNAPEN) capsule 500 mg, 500 mg, Oral, Q8H, Juan Carlos Sparks MD, 500 mg at 11/23/18 0538  •  diltiaZEM (CARDIZEM) 125 mg in sodium chloride 0.9 % 125 mL (1 mg/mL) infusion, 5-15 mg/hr, Intravenous, Titrated, Juan Carlos Sparks MD, Stopped at 11/23/18 0533  •  DULoxetine (CYMBALTA) DR capsule 60 mg, 60 mg, Oral, Daily, Juan Carlos Sparks MD, 60 mg at 11/23/18 0930  •  enoxaparin (LOVENOX) syringe 40 mg, 40 mg, Subcutaneous, Q12H, Juan Carlos Sparks MD  •  flecainide (TAMBOCOR) tablet 100 mg, 100 mg, Oral, Q12H, Juan Carlos Sparks MD, 100 mg at 11/23/18 0930  •  fluticasone (FLONASE) 50 MCG/ACT nasal spray 1 spray, 1 spray, Each Nare, Daily, Juan Carlos Sparks MD, 1 spray at 11/23/18 0930  •  gabapentin (NEURONTIN) capsule 800 mg, 800 mg, Oral, Q8H, Juan Carlos Sparks MD, 800 mg at 11/23/18 0538  •  HYDROcodone-acetaminophen (NORCO)  MG per tablet 1 tablet, 1 tablet, Oral, Q4H PRN, Juan Carlos Sparks MD  •  levothyroxine (SYNTHROID, LEVOTHROID) tablet 137 mcg, 137 mcg, Oral, Q AM, Juan Carlos Sparks MD, 137 mcg at 11/23/18 0537  •  LORazepam (ATIVAN) tablet 1 mg, 1 mg, Oral, BID PRN, Juan Carlos Sparks MD  •  metoprolol tartrate (LOPRESSOR) tablet 25 mg, 25 mg, Oral, Q12H, Juan Carlos Sparks MD, 25 mg at 11/23/18 0930  •  pantoprazole (PROTONIX) EC tablet 40 mg, 40 mg, Oral, BID AC, Juan Carlos Sparks MD, 40 mg at 11/23/18 0537  •  rOPINIRole (REQUIP) tablet 3 mg, 3 mg, Oral, Nightly, Juan Carlos Sparks MD  •  sodium chloride 0.9 % flush 3 mL, 3 mL, Intravenous, Q12H, Juan Carlos Sparks MD, 3 mL at 11/23/18 0930  •  sodium chloride 0.9 % flush 3-10 mL, 3-10 mL, Intravenous, PRN, Juan Carlos Sparks MD  •  sodium chloride 0.9 % infusion, 50 mL/hr, Intravenous, Continuous, Juan Carlos Sparks MD, Last Rate: 50 mL/hr at 11/23/18 0256, 50 mL/hr at 11/23/18 0256  •   traMADol (ULTRAM) tablet 50 mg, 50 mg, Oral, Q6H PRN, Juan Carlos Sparks MD  Allergies:  Other; Codeine; Mobic [meloxicam]; and Morphine and related    Objective     Vital Signs  Temp:  [97.7 °F (36.5 °C)-98.4 °F (36.9 °C)] 97.7 °F (36.5 °C)  Heart Rate:  [] 78  Resp:  [18-20] 20  BP: (112-134)/(59-74) 134/74    Physical Exam:   Physical Exam   Constitutional: She is oriented to person, place, and time. She appears well-developed and well-nourished. No distress.   HENT:   Head: Normocephalic and atraumatic.   Eyes: Pupils are equal, round, and reactive to light.   Neck: Normal range of motion. Neck supple. No JVD present. No thyromegaly present.   Cardiovascular: Normal rate, regular rhythm, normal heart sounds and intact distal pulses. Exam reveals no gallop and no friction rub.   No murmur heard.  Pulmonary/Chest: Effort normal and breath sounds normal. No respiratory distress. She has no wheezes. She has no rales. She exhibits no tenderness.   Abdominal: Soft. Bowel sounds are normal. She exhibits no distension. There is no tenderness.   Musculoskeletal: Normal range of motion. She exhibits no edema.   Neurological: She is alert and oriented to person, place, and time. No cranial nerve deficit.   Skin: Skin is warm and dry. She is not diaphoretic.   Right inner thigh hematoma, firm to palpation , resolving    Psychiatric: She has a normal mood and affect. Her behavior is normal.     Results Review:     Lab Results (last 72 hours)     Procedure Component Value Units Date/Time    TSH [[id number]]  (Normal) Collected:  11/23/18 0240    Specimen:  Blood Updated:  11/23/18 0355     TSH 0.716 mIU/mL     Troponin [[id number]]  (Normal) Collected:  11/23/18 0240    Specimen:  Blood Updated:  11/23/18 0336     Troponin I 0.012 ng/mL     Lipid Panel [[id number]]  (Abnormal) Collected:  11/23/18 0240    Specimen:  Blood Updated:  11/23/18 0336     Total Cholesterol 198 mg/dL      Triglycerides 126 mg/dL      HDL  Cholesterol 39 mg/dL      LDL Cholesterol  120 mg/dL      LDL/HDL Ratio 3.43    Basic Metabolic Panel [[id number]]  (Abnormal) Collected:  11/23/18 0240    Specimen:  Blood Updated:  11/23/18 0325     Glucose 114 mg/dL      BUN 21 mg/dL      Creatinine 0.80 mg/dL      Sodium 145 mmol/L      Potassium 3.9 mmol/L      Chloride 107 mmol/L      CO2 28.0 mmol/L      Calcium 9.1 mg/dL      eGFR Non African Amer 72 mL/min/1.73      BUN/Creatinine Ratio 26.3     Anion Gap 10.0 mmol/L     Narrative:       GFR Normal >60  Chronic Kidney Disease <60  Kidney Failure <15    Magnesium [[id number]]  (Normal) Collected:  11/23/18 0240    Specimen:  Blood Updated:  11/23/18 0325     Magnesium 2.0 mg/dL     CBC & Differential [[id number]] Collected:  11/23/18 0240    Specimen:  Blood Updated:  11/23/18 0314    Narrative:       The following orders were created for panel order CBC & Differential.  Procedure                               Abnormality         Status                     ---------                               -----------         ------                     CBC Auto Differential[[id number]]        Abnormal            Final result                 Please view results for these tests on the individual orders.    CBC Auto Differential [[id number]]  (Abnormal) Collected:  11/23/18 0240    Specimen:  Blood Updated:  11/23/18 0314     WBC 5.89 10*3/mm3      RBC 4.50 10*6/mm3      Hemoglobin 12.6 g/dL      Hematocrit 40.7 %      MCV 90.4 fL      MCH 28.0 pg      MCHC 31.0 g/dL      RDW 14.7 %      RDW-SD 48.8 fl      MPV 11.4 fL      Platelets 241 10*3/mm3      Neutrophil % 58.2 %      Lymphocyte % 26.5 %      Monocyte % 9.7 %      Eosinophil % 4.8 %      Basophil % 0.5 %      Immature Grans % 0.3 %      Neutrophils, Absolute 3.43 10*3/mm3      Lymphocytes, Absolute 1.56 10*3/mm3      Monocytes, Absolute 0.57 10*3/mm3      Eosinophils, Absolute 0.28 10*3/mm3      Basophils, Absolute 0.03 10*3/mm3      Immature Grans, Absolute 0.02  10*3/mm3      nRBC 0.0 /100 WBC           Assessment/Plan     -Paroxysmal atrial fibrillation, rapid ventricular response - now NSR   -Frequent falls   -Right thigh hematoma following fall 9/9/18- resolving  -Morbid obesity   -Sleep apnea   -Hyperlipidemia   -Mild left ventricular systolic dysfunction - LVEF 45-50% by 5/2018 echo at [facility]   -History of bradycardia     Plan-  Resume flecainide and lopressor at home doses - pt had been out of these for 2 weeks  Monitor telemetry  zio patch results from [facility] pending   TSH and electrolytes WNL  Continue CPAP at night for sleep apnea  CHADSVASC is at least 3 (female, age 63, LV systolic dysfunction). However, she may not bee the best candidate for anticoagulation give her history of frequent falls and right thigh hematoma.  Possible Watchman candidate. Will discuss further with Dr. Cobb     I discussed the patient's findings and my recommendations with patient and nursing staff.     Jie Santos, APRN  11/23/18  9:43 AM

## 2018-11-23 NOTE — H&P
Internal Medicine History and Physical and Discharge Summary      Name: Danisha Cannon  MRN: [id number]     Acct: [id number]  Room: 409/    Admit Date: 11/23/2018   Discharge Date: 11/23/2018  PCP: Juan Carlos Sparks MD    Discharge Diagnoses:    Principal Problem: Atrial fibrillation with RVR (CMS/HCC)  Active Hospital Problems    Diagnosis Date Noted   • **Atrial fibrillation with RVR (CMS/HCC) [I48.91] 11/23/2018   • Hypothyroidism [E03.9] 11/23/2018   • HTN (hypertension) [I10] 03/03/2018   • Obstructive sleep apnea [G47.33] 01/31/2017      Resolved Hospital Problems   No resolved problems to display.       Chief Complaint:     Fast heart rate    Consults:     Cardiology    History of Present Illness & Hospital Course:      Danisha Cannon is a 63 y.o.  female who presented to [facility] ED 11/22 with acute onset of rapid heart beat.  Associated with chest pain, dizziness, diaphoresis, and anxiety.  No SOB/edema.  No jaw pain/nausea/vomiting.  She has a history of paroxysmal Afib and currently sees [facility] cardiologist Dr. Lawrence.  Recently completed extended holter study as Dr. Lawrence would like work-up repeated.  Reports noncompliance with metoprolol & fleicanide last 2 weeks secondary to no refills available when transitioning from Dr. Wall to Dr. Lawrence.  Started on diltiazem gtt in ED.  Transferred to [facility] for access to cardiology.  Patient declined [facility].      She was admitted for further management.  Monitored on telemetry and continued on diltiazem gtt.  Home medications resumed.  Discussed need for compliance with medications.  Converted to NSR overnight and was able to be weaned off diltiazem.  Did have asymptomatic 7s run of SVT overnight.  Cardiology was consulted.  They agreed with resuming her home medications.  She was interested in continuing care with Dr. Cobb.  Cardiology recommended discharge home without anticoagulation.  Discussion of watchman procedure was initiated and is  to be continued at her follow-up appointment.  On day of discharge, patient was pleased with her progress and comfortable with continuing her care at home.  A 30day supply of her metoprolol & fleicanide were sent to her pharmacy of choice.    Past Medical History:     Code Status:    Code Status and Medical Interventions:   Ordered at: 11/23/18 0205     Code Status:    CPR     Medical Interventions (Level of Support Prior to Arrest):    Full     Past Medical History:   Diagnosis Date   • Anemia    • Anxiety and depression    • Arthritis    • Atrial fibrillation (CMS/HCC)    • Disease of thyroid gland    • GERD (gastroesophageal reflux disease)    • History of incision and drainage     Right knee   • Infection      in right knee to bone, cleaned it out and put new hardware with antibiotic and pt developed hole in incision   • Neuropathy, peripheral    • Pneumonia     RECENT DX PER FAMILY DOCTOR   • PONV (postoperative nausea and vomiting)    • Restless leg syndrome    • Sleep apnea with use of continuous positive airway pressure (CPAP)    • SVT (supraventricular tachycardia) (CMS/HCC)         Past Surgical History:     Past Surgical History:   Procedure Laterality Date   • BUNIONECTOMY Left     AND HAMMER TOE   • CARDIAC SURGERY      HEART CATH   • CATARACT EXTRACTION Right    • HERNIA REPAIR      UMBILICAL X3   • JOINT REPLACEMENT      RIGHT KNEE   • LAPAROSCOPIC CHOLECYSTECTOMY     • PERIPHERALLY INSERTED CENTRAL CATHETER INSERTION        Medications Prior to Admission:       Prior to Admission medications    Medication Sig Start Date End Date Taking? Authorizing Provider   alendronate (FOSAMAX) 70 MG tablet Take 70 mg by mouth Every 7 (Seven) Days.   Yes Christel Fermin MD   allopurinol (ZYLOPRIM) 100 MG tablet Take 100 mg by mouth Daily.   Yes Christel Fermin MD   amitriptyline (ELAVIL) 50 MG tablet Take 25 mg by mouth Every Night. 2 tablets at bedtime    Yes Christel Fermin MD   dexlansoprazole  (DEXILANT) 60 MG capsule Take 60 mg by mouth. 1/9/18  Yes Christel Fermin MD   DICLOFENAC PO Take 50 mg by mouth Daily.   Yes Christel Fermin MD   DULoxetine (CYMBALTA) 30 MG capsule Take 60 mg by mouth 2 (Two) Times a Day.   Yes Christel Fermin MD   famciclovir (FAMVIR) 500 MG tablet Take 500 mg by mouth 2 (Two) Times a Day.   Yes Christel Fermin MD   flecainide (TAMBOCOR) 100 MG tablet Take 100 mg by mouth 2 (Two) Times a Day.   Yes Christel Fermin MD   fluticasone (VERAMYST) 27.5 MCG/SPRAY nasal spray 2 sprays into the nostril(s) as directed by provider Daily As Needed.   Yes Christel Fermin MD   gabapentin (NEURONTIN) 600 MG tablet Take 800 mg by mouth 3 (Three) Times a Day.   Yes Christel Fermin MD   levothyroxine (SYNTHROID, LEVOTHROID) 137 MCG tablet Take 137 mcg by mouth Every Morning.   Yes Christel Fermin MD   LORazepam (ATIVAN) 1 MG tablet Take 1 mg by mouth 2 (Two) Times a Day As Needed for Anxiety (TOOK 2 TABS 3-13-18 0630).   Yes Christel Fermin MD   metoprolol tartrate (LOPRESSOR) 25 MG tablet Take 25 mg by mouth 2 (Two) Times a Day. TAKES 1/2 TAB BID   Yes Christel Fermin MD   rOPINIRole (REQUIP) 3 MG tablet Take 3 mg by mouth Every Night. 2 TABS IN AM AND 3 TABS AT NIGHT   Yes Christel Fermin MD   venlafaxine XR (EFFEXOR-XR) 150 MG 24 hr capsule Take 75 mg by mouth Daily.  11/23/18 Yes Christel Fermin MD   dicloxacillin (DYNAPEN) 500 MG capsule Take 500 mg by mouth Every 8 (Eight) Hours.    Christel Fermin MD   triamcinolone (KENALOG) 0.1 % cream Apply  topically 2 (Two) Times a Day.    Christel Fermin MD        Allergies:       Other; Codeine; Mobic [meloxicam]; and Morphine and related    Social History:     Tobacco:    reports that  has never smoked. she has never used smokeless tobacco.  Alcohol:      reports that she does not drink alcohol.  Drug Use:  reports that she does not use drugs.    Family History:  "    Family History   Problem Relation Age of Onset   • Diabetes Mother    • Heart disease Mother    • Cancer Father    • Leukemia Brother        Review of Systems:     12point ROS discussed with patient and is negative except for what is reported in the HPI.    Physical Exam:     Vitals:  /72 (BP Location: Left arm, Patient Position: Sitting)   Pulse 65   Temp 98.4 °F (36.9 °C) (Temporal)   Resp 18   Ht 170.2 cm (67\")   Wt 124 kg (274 lb 3.2 oz)   LMP  (LMP Unknown)   SpO2 100%   BMI 42.95 kg/m²   Temp (24hrs), Av.2 °F (36.8 °C), Min:97.7 °F (36.5 °C), Max:98.4 °F (36.9 °C)    General appearance - alert, well appearing, and in no acute distress  Mental status - oriented to person, place, and time with normal affect  Head - normocephalic and atraumatic  Eyes - pupils equal and reactive, extraocular eye movements intact, conjunctiva clear  Ears - hearing appears to be intact  Nose - no drainage noted  Mouth - mucous membranes moist  Neck - supple, no carotid bruits, thyroid not palpable  Chest - clear to auscultation, normal effort  Heart - normal rate, regular rhythm, no murmur  Abdomen - soft, nontender, nondistended, bowel sounds present all four quadrants, no masses, hepatomegaly or splenomegaly  Neurological - normal speech, no focal findings or movement disorder noted, cranial nerves II through XII grossly intact  Extremities - peripheral pulses palpable, no pedal edema or calf pain with palpation  Skin - no gross lesions, rashes, or induration noted      Data:     Wt Readings from Last 3 Encounters:   18 124 kg (274 lb 3.2 oz)   18 120 kg (265 lb)   10/08/18 124 kg (273 lb)       Recent Results (from the past 48 hour(s))   Basic Metabolic Panel    Collection Time: 18  2:40 AM   Result Value Ref Range    Glucose 114 (H) 70 - 100 mg/dL    BUN 21 5 - 21 mg/dL    Creatinine 0.80 0.50 - 1.40 mg/dL    Sodium 145 135 - 145 mmol/L    Potassium 3.9 3.5 - 5.3 mmol/L    Chloride 107 98 " - 110 mmol/L    CO2 28.0 24.0 - 31.0 mmol/L    Calcium 9.1 8.4 - 10.4 mg/dL    eGFR Non African Amer 72 >60 mL/min/1.73    BUN/Creatinine Ratio 26.3 (H) 7.0 - 25.0    Anion Gap 10.0 4.0 - 13.0 mmol/L   Magnesium    Collection Time: 11/23/18  2:40 AM   Result Value Ref Range    Magnesium 2.0 1.4 - 2.2 mg/dL   TSH    Collection Time: 11/23/18  2:40 AM   Result Value Ref Range    TSH 0.716 0.470 - 4.680 mIU/mL   Troponin    Collection Time: 11/23/18  2:40 AM   Result Value Ref Range    Troponin I 0.012 0.000 - 0.034 ng/mL   Lipid Panel    Collection Time: 11/23/18  2:40 AM   Result Value Ref Range    Total Cholesterol 198 130 - 200 mg/dL    Triglycerides 126 0 - 149 mg/dL    HDL Cholesterol 39 (L) >=50 mg/dL    LDL Cholesterol  120 (H) 0 - 99 mg/dL    LDL/HDL Ratio 3.43    CBC Auto Differential    Collection Time: 11/23/18  2:40 AM   Result Value Ref Range    WBC 5.89 4.80 - 10.80 10*3/mm3    RBC 4.50 4.20 - 5.40 10*6/mm3    Hemoglobin 12.6 12.0 - 16.0 g/dL    Hematocrit 40.7 37.0 - 47.0 %    MCV 90.4 82.0 - 98.0 fL    MCH 28.0 28.0 - 32.0 pg    MCHC 31.0 (L) 33.0 - 36.0 g/dL    RDW 14.7 12.0 - 15.0 %    RDW-SD 48.8 40.0 - 54.0 fl    MPV 11.4 6.0 - 12.0 fL    Platelets 241 130 - 400 10*3/mm3    Neutrophil % 58.2 39.0 - 78.0 %    Lymphocyte % 26.5 15.0 - 45.0 %    Monocyte % 9.7 4.0 - 12.0 %    Eosinophil % 4.8 (H) 0.0 - 4.0 %    Basophil % 0.5 0.0 - 2.0 %    Immature Grans % 0.3 0.0 - 5.0 %    Neutrophils, Absolute 3.43 1.87 - 8.40 10*3/mm3    Lymphocytes, Absolute 1.56 0.72 - 4.86 10*3/mm3    Monocytes, Absolute 0.57 0.19 - 1.30 10*3/mm3    Eosinophils, Absolute 0.28 0.00 - 0.70 10*3/mm3    Basophils, Absolute 0.03 0.00 - 0.20 10*3/mm3    Immature Grans, Absolute 0.02 0.00 - 0.03 10*3/mm3    nRBC 0.0 0.0 - 0.0 /100 WBC       RADIOLOGY  Imaging Results (last 72 hours)     ** No results found for the last 72 hours. **          Discharge:          Discharge Medications      Changes to Medications      Instructions Start  Date   flecainide 100 MG tablet  Commonly known as:  TAMBOCOR  What changed:  Another medication with the same name was added. Make sure you understand how and when to take each.   100 mg, Oral, 2 Times Daily      flecainide 100 MG tablet  Commonly known as:  TAMBOCOR  What changed:  You were already taking a medication with the same name, and this prescription was added. Make sure you understand how and when to take each.   100 mg, Oral, Every 12 Hours Scheduled      metoprolol tartrate 25 MG tablet  Commonly known as:  LOPRESSOR  What changed:  Another medication with the same name was added. Make sure you understand how and when to take each.   25 mg, Oral, 2 Times Daily, TAKES 1/2 TAB BID       metoprolol tartrate 25 MG tablet  Commonly known as:  LOPRESSOR  What changed:  You were already taking a medication with the same name, and this prescription was added. Make sure you understand how and when to take each.   12.5 mg, Oral, Every 12 Hours Scheduled         Continue These Medications      Instructions Start Date   alendronate 70 MG tablet  Commonly known as:  FOSAMAX   70 mg, Oral, Every 7 Days      allopurinol 100 MG tablet  Commonly known as:  ZYLOPRIM   100 mg, Oral, Daily      amitriptyline 50 MG tablet  Commonly known as:  ELAVIL   25 mg, Oral, Nightly, 2 tablets at bedtime      DEXILANT 60 MG capsule  Generic drug:  dexlansoprazole   60 mg, Oral      DICLOFENAC PO   50 mg, Oral, Daily      dicloxacillin 500 MG capsule  Commonly known as:  DYNAPEN   500 mg, Oral, Every 8 Hours Scheduled      DULoxetine 30 MG capsule  Commonly known as:  CYMBALTA   60 mg, Oral, 2 Times Daily      famciclovir 500 MG tablet  Commonly known as:  FAMVIR   500 mg, Oral, 2 Times Daily      fluticasone 27.5 MCG/SPRAY nasal spray  Commonly known as:  VERAMYST   2 sprays, Nasal, Daily PRN      gabapentin 600 MG tablet  Commonly known as:  NEURONTIN   800 mg, Oral, 3 Times Daily      levothyroxine 137 MCG tablet  Commonly known  as:  SYNTHROID, LEVOTHROID   137 mcg, Oral, Every Morning      LORazepam 1 MG tablet  Commonly known as:  ATIVAN   1 mg, Oral, 2 Times Daily PRN      rOPINIRole 3 MG tablet  Commonly known as:  REQUIP   3 mg, Oral, Nightly, 2 TABS IN AM AND 3 TABS AT NIGHT       triamcinolone 0.1 % cream  Commonly known as:  KENALOG   Topical, 2 Times Daily             Discharge Diet:  regular  Discharge Activity:  increase to former level as tolerated    Disposition: to home  Follow up with Juan Carlos Sparks MD within 1 week.  Follow up with cardiology in 1month.    >30minutes was spent in the discharge planning and coordination process.    Signed:  Tomas Kennedy MD  11/23/2018, 3:12 PM  Copy to be sent to Juan Carlos Sparks MD

## 2018-11-28 ENCOUNTER — OFFICE VISIT (OUTPATIENT)
Dept: WOUND CARE | Facility: HOSPITAL | Age: 63
End: 2018-11-28
Attending: NURSE PRACTITIONER

## 2018-12-12 ENCOUNTER — OFFICE VISIT (OUTPATIENT)
Dept: WOUND CARE | Facility: HOSPITAL | Age: 63
End: 2018-12-12
Attending: SURGERY

## 2018-12-12 PROCEDURE — G0463 HOSPITAL OUTPT CLINIC VISIT: HCPCS

## 2019-01-04 ENCOUNTER — OFFICE VISIT (OUTPATIENT)
Dept: CARDIOLOGY | Facility: CLINIC | Age: 64
End: 2019-01-04

## 2019-01-04 VITALS
HEART RATE: 66 BPM | DIASTOLIC BLOOD PRESSURE: 74 MMHG | BODY MASS INDEX: 43.95 KG/M2 | OXYGEN SATURATION: 98 % | SYSTOLIC BLOOD PRESSURE: 128 MMHG | WEIGHT: 280 LBS | HEIGHT: 67 IN

## 2019-01-04 DIAGNOSIS — I48.0 PAROXYSMAL A-FIB (HCC): Primary | ICD-10-CM

## 2019-01-04 DIAGNOSIS — E66.01 MORBID (SEVERE) OBESITY DUE TO EXCESS CALORIES (HCC): Chronic | ICD-10-CM

## 2019-01-04 PROBLEM — I10 HTN (HYPERTENSION): Chronic | Status: RESOLVED | Noted: 2018-03-03 | Resolved: 2019-01-04

## 2019-01-04 PROBLEM — I48.91 ATRIAL FIBRILLATION WITH RVR (HCC): Status: RESOLVED | Noted: 2018-11-23 | Resolved: 2019-01-04

## 2019-01-04 PROCEDURE — 93000 ELECTROCARDIOGRAM COMPLETE: CPT | Performed by: INTERNAL MEDICINE

## 2019-01-04 PROCEDURE — 99214 OFFICE O/P EST MOD 30 MIN: CPT | Performed by: INTERNAL MEDICINE

## 2019-01-04 NOTE — PROGRESS NOTES
Referring Provider: Juan Carlos Sparks MD    Reason for Follow-up Visit: afib    Subjective .   Chief Complaint:   Chief Complaint   Patient presents with   • Follow-up     hospital fu  to discuss getting the Watchman.   • Atrial Fibrillation     as long as she is on her medicatins she has no palpitations.  if she has to be off it for more than 24 hours her heart goes wild.   • Hyperlipidemia     labs done 11/23/18 LDL was 120 pt is not on medication.       History of present illness:  Danisha Cannon is a 63 y.o. yo female with history of PAF, she wears an apple watch with an sal that monitors her heart rate and rhythm. She has about 10 episodes of recurrent afib that I have reviewed and agree with. The longest episode lasted 8 hrs. She is not anticoagulated at present due to frequent falls.     History  Past Medical History:   Diagnosis Date   • Anemia    • Anxiety and depression    • Arthritis    • Atrial fibrillation (CMS/HCC)    • Disease of thyroid gland    • GERD (gastroesophageal reflux disease)    • History of incision and drainage     Right knee   • Hyperlipidemia    • Infection      in right knee to bone, cleaned it out and put new hardware with antibiotic and pt developed hole in incision   • Neuropathy, peripheral    • Pneumonia     RECENT DX PER FAMILY DOCTOR   • PONV (postoperative nausea and vomiting)    • Restless leg syndrome    • Sleep apnea with use of continuous positive airway pressure (CPAP)    • SVT (supraventricular tachycardia) (CMS/HCC)    ,   Past Surgical History:   Procedure Laterality Date   • BUNIONECTOMY Left     AND HAMMER TOE   • CARDIAC SURGERY      HEART CATH   • CATARACT EXTRACTION Right    • HERNIA REPAIR      UMBILICAL X3   • JOINT REPLACEMENT      RIGHT KNEE   • KNEE POLY INSERT EXCHANGE Right 3/3/2018    Procedure: IRRIGATION AND DEBRIDEMENT TOTAL KNEE & POLY EXCHANGE - RIGHT;  Surgeon: Amilcar Capellan MD;  Location: [facility] OR;  Service:    • LAPAROSCOPIC  CHOLECYSTECTOMY     • LEG DEBRIDEMENT Right 3/23/2018    Procedure: DEBRIDEMENT AND CLOSURE KNEE - RIGHT;  Surgeon: Amilcar Capellan MD;  Location:  [facility] OR;  Service: Orthopedics   • PERIPHERALLY INSERTED CENTRAL CATHETER INSERTION     • PILONIDAL CYSTECTOMY N/A 2/16/2017    Procedure: PILONIDAL CYSTECTOMY, EXCISION SEBACEOUS CYST - BACK;  Surgeon: Macrina Capellan MD;  Location:  [facility] OR;  Service:    • TOTAL KNEE  PROSTHESIS REMOVAL W/ SPACER INSERTION Right 3/13/2018    Procedure: 1.  EXPLANT TOTAL KNEE 2.  ANTIBOTIC SPACER KNEE;  Surgeon: Amilcar Capellan MD;  Location:  [facility] OR;  Service: Orthopedics   • TRUNK LESION/CYST EXCISION N/A 2/16/2017    Procedure: EXCISION SEBACEOUS CYST - BACK;  Surgeon: Macrina Capellan MD;  Location:  [facility] OR;  Service:    ,   Family History   Problem Relation Age of Onset   • Diabetes Mother    • Heart disease Mother    • Alzheimer's disease Mother    • Hyperlipidemia Mother    • Hypertension Mother    • Cancer Father    • Hypertension Father    • Leukemia Brother    • Heart attack Sister    • Clotting disorder Brother    • No Known Problems Brother    ,   Social History     Tobacco Use   • Smoking status: Never Smoker   • Smokeless tobacco: Never Used   Substance Use Topics   • Alcohol use: No   • Drug use: No   ,     Medications  Current Outpatient Medications   Medication Sig Dispense Refill   • alendronate (FOSAMAX) 70 MG tablet Take 70 mg by mouth Every 7 (Seven) Days.     • amitriptyline (ELAVIL) 50 MG tablet Take 25 mg by mouth Every Night. 2 tablets at bedtime      • dexlansoprazole (DEXILANT) 60 MG capsule Take 60 mg by mouth.     • DICLOFENAC PO Take 50 mg by mouth Daily.     • DULoxetine (CYMBALTA) 30 MG capsule Take 60 mg by mouth 2 (Two) Times a Day.     • famciclovir (FAMVIR) 500 MG tablet Take 500 mg by mouth 2 (Two) Times a Day.     • flecainide (TAMBOCOR) 100 MG tablet Take 1 tablet by mouth Every 12 (Twelve) Hours. 60 tablet 0   • fluticasone (VERAMYST)  "27.5 MCG/SPRAY nasal spray 2 sprays into the nostril(s) as directed by provider Daily As Needed.     • gabapentin (NEURONTIN) 600 MG tablet Take 800 mg by mouth 3 (Three) Times a Day.     • levothyroxine (SYNTHROID, LEVOTHROID) 137 MCG tablet Take 137 mcg by mouth Every Morning.     • LORazepam (ATIVAN) 1 MG tablet Take 1 mg by mouth 2 (Two) Times a Day As Needed for Anxiety (TOOK 2 TABS 3-13-18 0630).     • metoprolol tartrate (LOPRESSOR) 25 MG tablet Take 0.5 tablets by mouth Every 12 (Twelve) Hours. 60 tablet 0   • rOPINIRole (REQUIP) 3 MG tablet Take 3 mg by mouth Every Night. 2 TABS IN AM AND 3 TABS AT NIGHT       No current facility-administered medications for this visit.        Allergies:  Other; Codeine; Mobic [meloxicam]; and Morphine and related    Review of Systems  Review of Systems   HENT: Negative for nosebleeds.    Cardiovascular: Positive for dyspnea on exertion and palpitations. Negative for chest pain, claudication, irregular heartbeat, leg swelling, near-syncope, orthopnea, paroxysmal nocturnal dyspnea and syncope.   Respiratory: Negative for cough, hemoptysis and shortness of breath.    Musculoskeletal: Positive for falls.   Gastrointestinal: Negative for dysphagia, hematemesis and melena.   Genitourinary: Negative for hematuria.   All other systems reviewed and are negative.      Objective     Physical Exam:  /74 (BP Location: Left arm, Patient Position: Sitting, Cuff Size: Adult)   Pulse 66   Ht 170.2 cm (67\")   Wt 127 kg (280 lb)   LMP  (LMP Unknown)   SpO2 98%   BMI 43.85 kg/m²   Physical Exam   Constitutional: She is oriented to person, place, and time. She appears well-developed and well-nourished. No distress.   HENT:   Head: Normocephalic and atraumatic.   Eyes: No scleral icterus.   Neck: Normal range of motion.   Cardiovascular: Normal rate, regular rhythm and normal heart sounds. Exam reveals no gallop and no friction rub.   No murmur heard.  Pulmonary/Chest: Effort normal " and breath sounds normal. No respiratory distress. She has no wheezes. She has no rales.   Abdominal: Soft. Bowel sounds are normal. She exhibits no distension. There is no tenderness.   Musculoskeletal: She exhibits no edema.        Legs:  Neurological: She is alert and oriented to person, place, and time. No cranial nerve deficit.   Skin: Skin is warm and dry. She is not diaphoretic. No erythema.   Psychiatric: She has a normal mood and affect. Her behavior is normal.       Results Review:    ECG 12 Lead  Date/Time: 1/4/2019 10:56 AM  Performed by: Philip Cobb MD  Authorized by: Philip Cobb MD   Comparison: compared with previous ECG   Rhythm: sinus rhythm  Rate: normal  Conduction: incomplete RBBB and LAFB  ST Segments: ST segments normal  T Waves: T waves normal  QRS axis: left  Clinical impression: abnormal ECG            Admission on 11/23/2018, Discharged on 11/23/2018   Component Date Value Ref Range Status   • Glucose 11/23/2018 114* 70 - 100 mg/dL Final   • BUN 11/23/2018 21  5 - 21 mg/dL Final   • Creatinine 11/23/2018 0.80  0.50 - 1.40 mg/dL Final   • Sodium 11/23/2018 145  135 - 145 mmol/L Final   • Potassium 11/23/2018 3.9  3.5 - 5.3 mmol/L Final   • Chloride 11/23/2018 107  98 - 110 mmol/L Final   • CO2 11/23/2018 28.0  24.0 - 31.0 mmol/L Final   • Calcium 11/23/2018 9.1  8.4 - 10.4 mg/dL Final   • eGFR Non  Amer 11/23/2018 72  >60 mL/min/1.73 Final   • BUN/Creatinine Ratio 11/23/2018 26.3* 7.0 - 25.0 Final   • Anion Gap 11/23/2018 10.0  4.0 - 13.0 mmol/L Final   • Magnesium 11/23/2018 2.0  1.4 - 2.2 mg/dL Final   • TSH 11/23/2018 0.716  0.470 - 4.680 mIU/mL Final   • Troponin I 11/23/2018 0.012  0.000 - 0.034 ng/mL Final   • Total Cholesterol 11/23/2018 198  130 - 200 mg/dL Final   • Triglycerides 11/23/2018 126  0 - 149 mg/dL Final   • HDL Cholesterol 11/23/2018 39* >=50 mg/dL Final   • LDL Cholesterol  11/23/2018 120* 0 - 99 mg/dL Final   • LDL/HDL Ratio 11/23/2018 3.43   Final   • WBC  11/23/2018 5.89  4.80 - 10.80 10*3/mm3 Final   • RBC 11/23/2018 4.50  4.20 - 5.40 10*6/mm3 Final   • Hemoglobin 11/23/2018 12.6  12.0 - 16.0 g/dL Final   • Hematocrit 11/23/2018 40.7  37.0 - 47.0 % Final   • MCV 11/23/2018 90.4  82.0 - 98.0 fL Final   • MCH 11/23/2018 28.0  28.0 - 32.0 pg Final   • MCHC 11/23/2018 31.0* 33.0 - 36.0 g/dL Final   • RDW 11/23/2018 14.7  12.0 - 15.0 % Final   • RDW-SD 11/23/2018 48.8  40.0 - 54.0 fl Final   • MPV 11/23/2018 11.4  6.0 - 12.0 fL Final   • Platelets 11/23/2018 241  130 - 400 10*3/mm3 Final   • Neutrophil % 11/23/2018 58.2  39.0 - 78.0 % Final   • Lymphocyte % 11/23/2018 26.5  15.0 - 45.0 % Final   • Monocyte % 11/23/2018 9.7  4.0 - 12.0 % Final   • Eosinophil % 11/23/2018 4.8* 0.0 - 4.0 % Final   • Basophil % 11/23/2018 0.5  0.0 - 2.0 % Final   • Immature Grans % 11/23/2018 0.3  0.0 - 5.0 % Final   • Neutrophils, Absolute 11/23/2018 3.43  1.87 - 8.40 10*3/mm3 Final   • Lymphocytes, Absolute 11/23/2018 1.56  0.72 - 4.86 10*3/mm3 Final   • Monocytes, Absolute 11/23/2018 0.57  0.19 - 1.30 10*3/mm3 Final   • Eosinophils, Absolute 11/23/2018 0.28  0.00 - 0.70 10*3/mm3 Final   • Basophils, Absolute 11/23/2018 0.03  0.00 - 0.20 10*3/mm3 Final   • Immature Grans, Absolute 11/23/2018 0.02  0.00 - 0.03 10*3/mm3 Final   • nRBC 11/23/2018 0.0  0.0 - 0.0 /100 WBC Final       Assessment/Plan   Danisha was seen today for follow-up, atrial fibrillation and hyperlipidemia.    Diagnoses and all orders for this visit:    Paroxysmal A-fib (CMS/HCC), having documented recurrence. Will refer to Dr. Clement to see if she is a candidate for ablation. She may be an eventual watchman candidate. Needs to have an echo to assess LV function    Morbid (severe) obesity due to excess calories (CMS/HCC), recently increasded    Other orders  -     ECG 12 Lead        Patient's Body mass index is 43.85 kg/m². BMI is above normal parameters. Recommendations include: exercise counseling.

## 2019-01-07 ENCOUNTER — OFFICE VISIT (OUTPATIENT)
Dept: NEUROLOGY | Age: 64
End: 2019-01-07
Payer: MEDICARE

## 2019-01-07 VITALS
RESPIRATION RATE: 20 BRPM | SYSTOLIC BLOOD PRESSURE: 100 MMHG | HEART RATE: 58 BPM | WEIGHT: 280.8 LBS | BODY MASS INDEX: 44.07 KG/M2 | HEIGHT: 67 IN | DIASTOLIC BLOOD PRESSURE: 61 MMHG

## 2019-01-07 DIAGNOSIS — G89.29 CHRONIC RIGHT-SIDED LOW BACK PAIN WITH RIGHT-SIDED SCIATICA: ICD-10-CM

## 2019-01-07 DIAGNOSIS — G60.3 IDIOPATHIC PROGRESSIVE NEUROPATHY: Primary | ICD-10-CM

## 2019-01-07 DIAGNOSIS — M54.41 CHRONIC RIGHT-SIDED LOW BACK PAIN WITH RIGHT-SIDED SCIATICA: ICD-10-CM

## 2019-01-07 DIAGNOSIS — R41.3 MEMORY LOSS: ICD-10-CM

## 2019-01-07 DIAGNOSIS — G47.33 OBSTRUCTIVE SLEEP APNEA: ICD-10-CM

## 2019-01-07 PROCEDURE — G8427 DOCREV CUR MEDS BY ELIG CLIN: HCPCS | Performed by: PSYCHIATRY & NEUROLOGY

## 2019-01-07 PROCEDURE — 99213 OFFICE O/P EST LOW 20 MIN: CPT | Performed by: PSYCHIATRY & NEUROLOGY

## 2019-01-07 PROCEDURE — 1036F TOBACCO NON-USER: CPT | Performed by: PSYCHIATRY & NEUROLOGY

## 2019-01-07 PROCEDURE — 1101F PT FALLS ASSESS-DOCD LE1/YR: CPT | Performed by: PSYCHIATRY & NEUROLOGY

## 2019-01-07 PROCEDURE — G8417 CALC BMI ABV UP PARAM F/U: HCPCS | Performed by: PSYCHIATRY & NEUROLOGY

## 2019-01-07 PROCEDURE — 3017F COLORECTAL CA SCREEN DOC REV: CPT | Performed by: PSYCHIATRY & NEUROLOGY

## 2019-01-07 PROCEDURE — 1123F ACP DISCUSS/DSCN MKR DOCD: CPT | Performed by: PSYCHIATRY & NEUROLOGY

## 2019-01-07 PROCEDURE — G8484 FLU IMMUNIZE NO ADMIN: HCPCS | Performed by: PSYCHIATRY & NEUROLOGY

## 2019-01-07 PROCEDURE — G8399 PT W/DXA RESULTS DOCUMENT: HCPCS | Performed by: PSYCHIATRY & NEUROLOGY

## 2019-01-07 PROCEDURE — 4040F PNEUMOC VAC/ADMIN/RCVD: CPT | Performed by: PSYCHIATRY & NEUROLOGY

## 2019-01-07 PROCEDURE — 1090F PRES/ABSN URINE INCON ASSESS: CPT | Performed by: PSYCHIATRY & NEUROLOGY

## 2019-01-14 ENCOUNTER — HOSPITAL ENCOUNTER (OUTPATIENT)
Dept: CARDIOLOGY | Facility: HOSPITAL | Age: 64
Discharge: HOME | End: 2019-01-14
Attending: INTERNAL MEDICINE | Admitting: INTERNAL MEDICINE

## 2019-01-14 VITALS
BODY MASS INDEX: 43.94 KG/M2 | HEIGHT: 67 IN | SYSTOLIC BLOOD PRESSURE: 128 MMHG | DIASTOLIC BLOOD PRESSURE: 74 MMHG | WEIGHT: 279.98 LBS

## 2019-01-14 DIAGNOSIS — I48.0 PAROXYSMAL A-FIB (HCC): ICD-10-CM

## 2019-01-14 LAB
BH CV ECHO MEAS - AO MAX PG (FULL): 9.2 MMHG
BH CV ECHO MEAS - AO MAX PG: 17.8 MMHG
BH CV ECHO MEAS - AO MEAN PG (FULL): 5 MMHG
BH CV ECHO MEAS - AO MEAN PG: 9 MMHG
BH CV ECHO MEAS - AO ROOT AREA (BSA CORRECTED): 1.2
BH CV ECHO MEAS - AO ROOT AREA: 6.6 CM^2
BH CV ECHO MEAS - AO ROOT DIAM: 2.9 CM
BH CV ECHO MEAS - AO V2 MAX: 211 CM/SEC
BH CV ECHO MEAS - AO V2 MEAN: 144 CM/SEC
BH CV ECHO MEAS - AO V2 VTI: 45.3 CM
BH CV ECHO MEAS - AVA(I,A): 2.4 CM^2
BH CV ECHO MEAS - AVA(I,D): 2.4 CM^2
BH CV ECHO MEAS - AVA(V,A): 2.2 CM^2
BH CV ECHO MEAS - AVA(V,D): 2.2 CM^2
BH CV ECHO MEAS - BSA(HAYCOCK): 2.5 M^2
BH CV ECHO MEAS - BSA: 2.3 M^2
BH CV ECHO MEAS - BZI_BMI: 43.9 KILOGRAMS/M^2
BH CV ECHO MEAS - BZI_METRIC_HEIGHT: 170.2 CM
BH CV ECHO MEAS - BZI_METRIC_WEIGHT: 127 KG
BH CV ECHO MEAS - EDV(CUBED): 179.4 ML
BH CV ECHO MEAS - EDV(TEICH): 156.2 ML
BH CV ECHO MEAS - EF(CUBED): 76.7 %
BH CV ECHO MEAS - EF(TEICH): 68.1 %
BH CV ECHO MEAS - ESV(CUBED): 41.8 ML
BH CV ECHO MEAS - ESV(TEICH): 49.8 ML
BH CV ECHO MEAS - FS: 38.5 %
BH CV ECHO MEAS - IVS/LVPW: 1.1
BH CV ECHO MEAS - IVSD: 1.1 CM
BH CV ECHO MEAS - LA DIMENSION: 4.2 CM
BH CV ECHO MEAS - LA/AO: 1.4
BH CV ECHO MEAS - LAT PEAK E' VEL: 12.2 CM/SEC
BH CV ECHO MEAS - LV MASS(C)D: 225.3 GRAMS
BH CV ECHO MEAS - LV MASS(C)DI: 96.6 GRAMS/M^2
BH CV ECHO MEAS - LV MAX PG: 8.6 MMHG
BH CV ECHO MEAS - LV MEAN PG: 4 MMHG
BH CV ECHO MEAS - LV V1 MAX: 147 CM/SEC
BH CV ECHO MEAS - LV V1 MEAN: 92.9 CM/SEC
BH CV ECHO MEAS - LV V1 VTI: 34.1 CM
BH CV ECHO MEAS - LVIDD: 5.6 CM
BH CV ECHO MEAS - LVIDS: 3.5 CM
BH CV ECHO MEAS - LVOT AREA (M): 3.1 CM^2
BH CV ECHO MEAS - LVOT AREA: 3.1 CM^2
BH CV ECHO MEAS - LVOT DIAM: 2 CM
BH CV ECHO MEAS - LVPWD: 0.94 CM
BH CV ECHO MEAS - MED PEAK E' VEL: 6.96 CM/SEC
BH CV ECHO MEAS - MV A MAX VEL: 89.2 CM/SEC
BH CV ECHO MEAS - MV DEC TIME: 0.18 SEC
BH CV ECHO MEAS - MV E MAX VEL: 118 CM/SEC
BH CV ECHO MEAS - MV E/A: 1.3
BH CV ECHO MEAS - RAP SYSTOLE: 5 MMHG
BH CV ECHO MEAS - RVSP: 45.7 MMHG
BH CV ECHO MEAS - SI(AO): 128.3 ML/M^2
BH CV ECHO MEAS - SI(CUBED): 59 ML/M^2
BH CV ECHO MEAS - SI(LVOT): 45.9 ML/M^2
BH CV ECHO MEAS - SI(TEICH): 45.6 ML/M^2
BH CV ECHO MEAS - SV(AO): 299.2 ML
BH CV ECHO MEAS - SV(CUBED): 137.6 ML
BH CV ECHO MEAS - SV(LVOT): 107.1 ML
BH CV ECHO MEAS - SV(TEICH): 106.4 ML
BH CV ECHO MEAS - TR MAX VEL: 319 CM/SEC
BH CV ECHO MEASUREMENTS AVERAGE E/E' RATIO: 12.32
LEFT ATRIUM VOLUME INDEX: 28.1 ML/M2
LEFT ATRIUM VOLUME: 65.5 CM3
LV EF 2D ECHO EST: 60 %

## 2019-01-14 PROCEDURE — 93306 TTE W/DOPPLER COMPLETE: CPT

## 2019-01-14 PROCEDURE — 93306 TTE W/DOPPLER COMPLETE: CPT | Performed by: INTERNAL MEDICINE

## 2019-01-23 ENCOUNTER — TRANSCRIBE ORDERS (OUTPATIENT)
Dept: LAB | Facility: HOSPITAL | Age: 64
End: 2019-01-23

## 2019-01-23 ENCOUNTER — APPOINTMENT (OUTPATIENT)
Dept: LAB | Facility: HOSPITAL | Age: 64
End: 2019-01-23
Attending: INTERNAL MEDICINE

## 2019-01-23 DIAGNOSIS — R00.0 HEART RATE FAST: ICD-10-CM

## 2019-01-23 DIAGNOSIS — I48.91 ATRIAL FIBRILLATION, UNSPECIFIED TYPE (HCC): ICD-10-CM

## 2019-01-23 DIAGNOSIS — I48.92 ATRIAL FLUTTER, PAROXYSMAL (HCC): ICD-10-CM

## 2019-01-23 DIAGNOSIS — B95.61 STAPHYLOCOCCUS AUREUS SUPERFICIAL FOLLICULITIS: Primary | ICD-10-CM

## 2019-01-23 DIAGNOSIS — L73.9 STAPHYLOCOCCUS AUREUS SUPERFICIAL FOLLICULITIS: Primary | ICD-10-CM

## 2019-01-23 LAB
BASOPHILS # BLD AUTO: 0.03 10*3/MM3 (ref 0–0.2)
BASOPHILS NFR BLD AUTO: 0.4 % (ref 0–2)
CRP SERPL-MCNC: 2.31 MG/DL (ref 0–0.99)
DEPRECATED RDW RBC AUTO: 48.6 FL (ref 40–54)
EOSINOPHIL # BLD AUTO: 0.29 10*3/MM3 (ref 0–0.7)
EOSINOPHIL NFR BLD AUTO: 3.9 % (ref 0–4)
ERYTHROCYTE [DISTWIDTH] IN BLOOD BY AUTOMATED COUNT: 15.1 % (ref 12–15)
ERYTHROCYTE [SEDIMENTATION RATE] IN BLOOD: 11 MM/HR (ref 0–20)
HCT VFR BLD AUTO: 38.1 % (ref 37–47)
HGB BLD-MCNC: 11.7 G/DL (ref 12–16)
IMM GRANULOCYTES # BLD AUTO: 0.02 10*3/MM3 (ref 0–0.03)
IMM GRANULOCYTES NFR BLD AUTO: 0.3 % (ref 0–5)
LYMPHOCYTES # BLD AUTO: 1.46 10*3/MM3 (ref 0.72–4.86)
LYMPHOCYTES NFR BLD AUTO: 19.8 % (ref 15–45)
MCH RBC QN AUTO: 27.5 PG (ref 28–32)
MCHC RBC AUTO-ENTMCNC: 30.7 G/DL (ref 33–36)
MCV RBC AUTO: 89.6 FL (ref 82–98)
MONOCYTES # BLD AUTO: 0.6 10*3/MM3 (ref 0.19–1.3)
MONOCYTES NFR BLD AUTO: 8.2 % (ref 4–12)
NEUTROPHILS # BLD AUTO: 4.96 10*3/MM3 (ref 1.87–8.4)
NEUTROPHILS NFR BLD AUTO: 67.4 % (ref 39–78)
NRBC BLD AUTO-RTO: 0 /100 WBC (ref 0–0)
PLATELET # BLD AUTO: 253 10*3/MM3 (ref 130–400)
PMV BLD AUTO: 10.9 FL (ref 6–12)
RBC # BLD AUTO: 4.25 10*6/MM3 (ref 4.2–5.4)
WBC NRBC COR # BLD: 7.36 10*3/MM3 (ref 4.8–10.8)

## 2019-01-23 PROCEDURE — 85025 COMPLETE CBC W/AUTO DIFF WBC: CPT | Performed by: INTERNAL MEDICINE

## 2019-01-23 PROCEDURE — 36415 COLL VENOUS BLD VENIPUNCTURE: CPT

## 2019-01-23 PROCEDURE — 85651 RBC SED RATE NONAUTOMATED: CPT | Performed by: INTERNAL MEDICINE

## 2019-01-23 PROCEDURE — 86140 C-REACTIVE PROTEIN: CPT | Performed by: INTERNAL MEDICINE

## 2019-01-24 ENCOUNTER — HOSPITAL ENCOUNTER (INPATIENT)
Facility: HOSPITAL | Age: 64
LOS: 7 days | Discharge: HOME HEALTH | End: 2019-02-01
Attending: EMERGENCY MEDICINE | Admitting: SURGERY

## 2019-01-24 DIAGNOSIS — G60.3 IDIOPATHIC PROGRESSIVE NEUROPATHY: Chronic | ICD-10-CM

## 2019-01-24 DIAGNOSIS — M00.9 INFECTION OF RIGHT KNEE (HCC): ICD-10-CM

## 2019-01-24 DIAGNOSIS — Z74.09 IMPAIRED FUNCTIONAL MOBILITY, BALANCE, GAIT, AND ENDURANCE: ICD-10-CM

## 2019-01-24 DIAGNOSIS — S70.11XD TRAUMATIC HEMATOMA OF RIGHT THIGH, SUBSEQUENT ENCOUNTER: ICD-10-CM

## 2019-01-24 DIAGNOSIS — R41.82 ALTERED MENTAL STATUS, UNSPECIFIED ALTERED MENTAL STATUS TYPE: Primary | ICD-10-CM

## 2019-01-24 DIAGNOSIS — I48.0 PAROXYSMAL A-FIB (HCC): Chronic | ICD-10-CM

## 2019-01-24 DIAGNOSIS — L02.415 ABSCESS OF RIGHT THIGH: ICD-10-CM

## 2019-01-24 DIAGNOSIS — R60.0 BILATERAL EDEMA OF LOWER EXTREMITY: ICD-10-CM

## 2019-01-24 DIAGNOSIS — G47.33 OBSTRUCTIVE SLEEP APNEA: Chronic | ICD-10-CM

## 2019-01-24 PROCEDURE — 85025 COMPLETE CBC W/AUTO DIFF WBC: CPT | Performed by: EMERGENCY MEDICINE

## 2019-01-24 PROCEDURE — 99285 EMERGENCY DEPT VISIT HI MDM: CPT

## 2019-01-24 PROCEDURE — 80053 COMPREHEN METABOLIC PANEL: CPT | Performed by: EMERGENCY MEDICINE

## 2019-01-24 RX ORDER — SODIUM CHLORIDE 0.9 % (FLUSH) 0.9 %
10 SYRINGE (ML) INJECTION AS NEEDED
Status: DISCONTINUED | OUTPATIENT
Start: 2019-01-24 | End: 2019-02-01 | Stop reason: HOSPADM

## 2019-01-25 ENCOUNTER — APPOINTMENT (OUTPATIENT)
Dept: CT IMAGING | Facility: HOSPITAL | Age: 64
End: 2019-01-25

## 2019-01-25 ENCOUNTER — APPOINTMENT (OUTPATIENT)
Dept: GENERAL RADIOLOGY | Facility: HOSPITAL | Age: 64
End: 2019-01-25

## 2019-01-25 PROBLEM — M17.11 PRIMARY OSTEOARTHRITIS OF RIGHT KNEE: Chronic | Status: ACTIVE | Noted: 2018-02-16

## 2019-01-25 PROBLEM — D72.829 LEUKOCYTOSIS: Status: ACTIVE | Noted: 2019-01-25

## 2019-01-25 PROBLEM — R50.9 FEVER: Status: ACTIVE | Noted: 2019-01-25

## 2019-01-25 PROBLEM — R09.02 HYPOXIA: Status: ACTIVE | Noted: 2019-01-25

## 2019-01-25 PROBLEM — R41.82 ALTERED MENTAL STATUS: Status: ACTIVE | Noted: 2019-01-25

## 2019-01-25 PROBLEM — E03.9 HYPOTHYROIDISM: Chronic | Status: ACTIVE | Noted: 2018-11-23

## 2019-01-25 PROBLEM — M1A.00X0 IDIOPATHIC CHRONIC GOUT WITHOUT TOPHUS: Chronic | Status: ACTIVE | Noted: 2017-11-15

## 2019-01-25 LAB
ALBUMIN SERPL-MCNC: 3.7 G/DL (ref 3.5–5)
ALBUMIN/GLOB SERPL: 1.2 G/DL (ref 1.1–2.5)
ALP SERPL-CCNC: 120 U/L (ref 24–120)
ALT SERPL W P-5'-P-CCNC: 33 U/L (ref 0–54)
ANION GAP SERPL CALCULATED.3IONS-SCNC: 7 MMOL/L (ref 4–13)
ARTERIAL PATENCY WRIST A: POSITIVE
AST SERPL-CCNC: 41 U/L (ref 7–45)
ATMOSPHERIC PRESS: 758 MMHG
BASE EXCESS BLDA CALC-SCNC: 1.7 MMOL/L (ref 0–2)
BASOPHILS # BLD AUTO: 0.04 10*3/MM3 (ref 0–0.2)
BASOPHILS NFR BLD AUTO: 0.3 % (ref 0–2)
BDY SITE: ABNORMAL
BILIRUB SERPL-MCNC: 0.9 MG/DL (ref 0.1–1)
BILIRUB UR QL STRIP: NEGATIVE
BODY TEMPERATURE: 37 C
BUN BLD-MCNC: 21 MG/DL (ref 5–21)
BUN/CREAT SERPL: 23.3 (ref 7–25)
CALCIUM SPEC-SCNC: 8.6 MG/DL (ref 8.4–10.4)
CHLORIDE SERPL-SCNC: 102 MMOL/L (ref 98–110)
CLARITY UR: CLEAR
CO2 SERPL-SCNC: 28 MMOL/L (ref 24–31)
COLOR UR: YELLOW
CREAT BLD-MCNC: 0.9 MG/DL (ref 0.5–1.4)
D-LACTATE SERPL-SCNC: 1.1 MMOL/L (ref 0.5–2)
DEPRECATED RDW RBC AUTO: 47.5 FL (ref 40–54)
EOSINOPHIL # BLD AUTO: 0.01 10*3/MM3 (ref 0–0.7)
EOSINOPHIL NFR BLD AUTO: 0.1 % (ref 0–4)
ERYTHROCYTE [DISTWIDTH] IN BLOOD BY AUTOMATED COUNT: 15.1 % (ref 12–15)
FLUAV AG NPH QL: NEGATIVE
FLUBV AG NPH QL IA: NEGATIVE
GFR SERPL CREATININE-BSD FRML MDRD: 63 ML/MIN/1.73
GLOBULIN UR ELPH-MCNC: 3.2 GM/DL
GLUCOSE BLD-MCNC: 114 MG/DL (ref 70–100)
GLUCOSE UR STRIP-MCNC: NEGATIVE MG/DL
HCO3 BLDA-SCNC: 26.1 MMOL/L (ref 20–26)
HCT VFR BLD AUTO: 39.2 % (ref 37–47)
HGB BLD-MCNC: 12.7 G/DL (ref 12–16)
HGB UR QL STRIP.AUTO: NEGATIVE
HOLD SPECIMEN: NORMAL
HOLD SPECIMEN: NORMAL
IMM GRANULOCYTES # BLD AUTO: 0.12 10*3/MM3 (ref 0–0.03)
IMM GRANULOCYTES NFR BLD AUTO: 0.9 % (ref 0–5)
KETONES UR QL STRIP: NEGATIVE
LEUKOCYTE ESTERASE UR QL STRIP.AUTO: NEGATIVE
LYMPHOCYTES # BLD AUTO: 0.75 10*3/MM3 (ref 0.72–4.86)
LYMPHOCYTES NFR BLD AUTO: 5.6 % (ref 15–45)
Lab: ABNORMAL
MCH RBC QN AUTO: 28 PG (ref 28–32)
MCHC RBC AUTO-ENTMCNC: 32.4 G/DL (ref 33–36)
MCV RBC AUTO: 86.5 FL (ref 82–98)
MODALITY: ABNORMAL
MONOCYTES # BLD AUTO: 1.08 10*3/MM3 (ref 0.19–1.3)
MONOCYTES NFR BLD AUTO: 8 % (ref 4–12)
NEUTROPHILS # BLD AUTO: 11.51 10*3/MM3 (ref 1.87–8.4)
NEUTROPHILS NFR BLD AUTO: 85.1 % (ref 39–78)
NITRITE UR QL STRIP: NEGATIVE
NRBC BLD AUTO-RTO: 0 /100 WBC (ref 0–0)
PCO2 BLDA: 39.7 MM HG (ref 35–45)
PH BLDA: 7.43 PH UNITS (ref 7.35–7.45)
PH UR STRIP.AUTO: 7 [PH] (ref 5–8)
PLATELET # BLD AUTO: 224 10*3/MM3 (ref 130–400)
PMV BLD AUTO: 11.1 FL (ref 6–12)
PO2 BLDA: 56.5 MM HG (ref 83–108)
POTASSIUM BLD-SCNC: 4.2 MMOL/L (ref 3.5–5.3)
PROT SERPL-MCNC: 6.9 G/DL (ref 6.3–8.7)
PROT UR QL STRIP: ABNORMAL
RBC # BLD AUTO: 4.53 10*6/MM3 (ref 4.2–5.4)
SAO2 % BLDCOA: 90.4 % (ref 94–99)
SODIUM BLD-SCNC: 137 MMOL/L (ref 135–145)
SP GR UR STRIP: 1.02 (ref 1–1.03)
UROBILINOGEN UR QL STRIP: ABNORMAL
VENTILATOR MODE: ABNORMAL
WBC NRBC COR # BLD: 13.51 10*3/MM3 (ref 4.8–10.8)
WHOLE BLOOD HOLD SPECIMEN: NORMAL
WHOLE BLOOD HOLD SPECIMEN: NORMAL

## 2019-01-25 PROCEDURE — 82803 BLOOD GASES ANY COMBINATION: CPT

## 2019-01-25 PROCEDURE — 70450 CT HEAD/BRAIN W/O DYE: CPT

## 2019-01-25 PROCEDURE — 25010000002 CEFTRIAXONE PER 250 MG: Performed by: EMERGENCY MEDICINE

## 2019-01-25 PROCEDURE — 71045 X-RAY EXAM CHEST 1 VIEW: CPT

## 2019-01-25 PROCEDURE — 83605 ASSAY OF LACTIC ACID: CPT | Performed by: EMERGENCY MEDICINE

## 2019-01-25 PROCEDURE — 36600 WITHDRAWAL OF ARTERIAL BLOOD: CPT

## 2019-01-25 PROCEDURE — 81003 URINALYSIS AUTO W/O SCOPE: CPT | Performed by: EMERGENCY MEDICINE

## 2019-01-25 PROCEDURE — 94799 UNLISTED PULMONARY SVC/PX: CPT

## 2019-01-25 PROCEDURE — 25010000002 ENOXAPARIN PER 10 MG: Performed by: FAMILY MEDICINE

## 2019-01-25 PROCEDURE — 87804 INFLUENZA ASSAY W/OPTIC: CPT | Performed by: FAMILY MEDICINE

## 2019-01-25 PROCEDURE — 87040 BLOOD CULTURE FOR BACTERIA: CPT | Performed by: EMERGENCY MEDICINE

## 2019-01-25 RX ORDER — DULOXETIN HYDROCHLORIDE 30 MG/1
60 CAPSULE, DELAYED RELEASE ORAL EVERY 12 HOURS SCHEDULED
Status: DISCONTINUED | OUTPATIENT
Start: 2019-01-25 | End: 2019-02-01 | Stop reason: HOSPADM

## 2019-01-25 RX ORDER — SODIUM CHLORIDE 0.9 % (FLUSH) 0.9 %
3-10 SYRINGE (ML) INJECTION AS NEEDED
Status: DISCONTINUED | OUTPATIENT
Start: 2019-01-25 | End: 2019-02-01 | Stop reason: HOSPADM

## 2019-01-25 RX ORDER — DICLOFENAC SODIUM 75 MG/1
75 TABLET, DELAYED RELEASE ORAL DAILY
COMMUNITY
End: 2022-02-01 | Stop reason: SDUPTHER

## 2019-01-25 RX ORDER — LORAZEPAM 1 MG/1
1 TABLET ORAL 2 TIMES DAILY PRN
Status: DISCONTINUED | OUTPATIENT
Start: 2019-01-25 | End: 2019-02-01 | Stop reason: HOSPADM

## 2019-01-25 RX ORDER — DULOXETIN HYDROCHLORIDE 60 MG/1
60 CAPSULE, DELAYED RELEASE ORAL 2 TIMES DAILY
COMMUNITY
End: 2021-10-05 | Stop reason: SDUPTHER

## 2019-01-25 RX ORDER — GABAPENTIN 800 MG/1
800 TABLET ORAL 3 TIMES DAILY
COMMUNITY

## 2019-01-25 RX ORDER — ROPINIROLE 1 MG/1
3 TABLET, FILM COATED ORAL NIGHTLY
Status: DISCONTINUED | OUTPATIENT
Start: 2019-01-25 | End: 2019-01-25 | Stop reason: SDUPTHER

## 2019-01-25 RX ORDER — DULOXETIN HYDROCHLORIDE 30 MG/1
60 CAPSULE, DELAYED RELEASE ORAL DAILY
Status: DISCONTINUED | OUTPATIENT
Start: 2019-01-25 | End: 2019-01-25 | Stop reason: SDUPTHER

## 2019-01-25 RX ORDER — FLUTICASONE PROPIONATE 50 MCG
2 SPRAY, SUSPENSION (ML) NASAL DAILY
Status: DISCONTINUED | OUTPATIENT
Start: 2019-01-25 | End: 2019-02-01 | Stop reason: HOSPADM

## 2019-01-25 RX ORDER — HYDROCODONE BITARTRATE AND ACETAMINOPHEN 7.5; 325 MG/1; MG/1
1 TABLET ORAL EVERY 6 HOURS PRN
COMMUNITY
End: 2019-02-15

## 2019-01-25 RX ORDER — AMITRIPTYLINE HYDROCHLORIDE 25 MG/1
25 TABLET, FILM COATED ORAL NIGHTLY
Status: DISCONTINUED | OUTPATIENT
Start: 2019-01-25 | End: 2019-01-25 | Stop reason: SDUPTHER

## 2019-01-25 RX ORDER — AMITRIPTYLINE HYDROCHLORIDE 25 MG/1
50 TABLET, FILM COATED ORAL NIGHTLY
Status: DISCONTINUED | OUTPATIENT
Start: 2019-01-25 | End: 2019-02-01 | Stop reason: HOSPADM

## 2019-01-25 RX ORDER — ACETAMINOPHEN 500 MG
500 TABLET ORAL EVERY 4 HOURS PRN
Status: DISCONTINUED | OUTPATIENT
Start: 2019-01-25 | End: 2019-02-01 | Stop reason: HOSPADM

## 2019-01-25 RX ORDER — FLECAINIDE ACETATE 100 MG/1
100 TABLET ORAL EVERY 12 HOURS SCHEDULED
Status: DISCONTINUED | OUTPATIENT
Start: 2019-01-25 | End: 2019-02-01 | Stop reason: HOSPADM

## 2019-01-25 RX ORDER — SODIUM CHLORIDE 0.9 % (FLUSH) 0.9 %
3 SYRINGE (ML) INJECTION EVERY 12 HOURS SCHEDULED
Status: DISCONTINUED | OUTPATIENT
Start: 2019-01-25 | End: 2019-02-01 | Stop reason: HOSPADM

## 2019-01-25 RX ORDER — LEVOTHYROXINE SODIUM 137 UG/1
137 TABLET ORAL
Status: DISCONTINUED | OUTPATIENT
Start: 2019-01-25 | End: 2019-02-01 | Stop reason: HOSPADM

## 2019-01-25 RX ORDER — ROPINIROLE 1 MG/1
3 TABLET, FILM COATED ORAL EVERY 12 HOURS
Status: DISCONTINUED | OUTPATIENT
Start: 2019-01-25 | End: 2019-02-01 | Stop reason: HOSPADM

## 2019-01-25 RX ORDER — FLUTICASONE PROPIONATE 50 MCG
2 SPRAY, SUSPENSION (ML) NASAL DAILY PRN
COMMUNITY
End: 2021-04-23 | Stop reason: SDUPTHER

## 2019-01-25 RX ORDER — GABAPENTIN 400 MG/1
800 CAPSULE ORAL EVERY 8 HOURS SCHEDULED
Status: DISCONTINUED | OUTPATIENT
Start: 2019-01-25 | End: 2019-02-01 | Stop reason: HOSPADM

## 2019-01-25 RX ORDER — PANTOPRAZOLE SODIUM 40 MG/1
40 TABLET, DELAYED RELEASE ORAL
Status: DISCONTINUED | OUTPATIENT
Start: 2019-01-25 | End: 2019-02-01 | Stop reason: HOSPADM

## 2019-01-25 RX ORDER — MELOXICAM 10 MG/1
10 CAPSULE ORAL DAILY
COMMUNITY
End: 2019-02-15

## 2019-01-25 RX ORDER — ROPINIROLE 3 MG/1
9 TABLET, FILM COATED ORAL NIGHTLY
COMMUNITY
End: 2019-02-15 | Stop reason: SDUPTHER

## 2019-01-25 RX ADMIN — FLECAINIDE ACETATE 100 MG: 100 TABLET ORAL at 21:07

## 2019-01-25 RX ADMIN — AMITRIPTYLINE HYDROCHLORIDE 50 MG: 25 TABLET, FILM COATED ORAL at 21:06

## 2019-01-25 RX ADMIN — METOPROLOL TARTRATE 12.5 MG: 25 TABLET, FILM COATED ORAL at 08:45

## 2019-01-25 RX ADMIN — PANTOPRAZOLE SODIUM 40 MG: 40 TABLET, DELAYED RELEASE ORAL at 06:44

## 2019-01-25 RX ADMIN — SODIUM CHLORIDE, PRESERVATIVE FREE 3 ML: 5 INJECTION INTRAVENOUS at 21:09

## 2019-01-25 RX ADMIN — FLECAINIDE ACETATE 100 MG: 100 TABLET ORAL at 08:44

## 2019-01-25 RX ADMIN — ACETAMINOPHEN 500 MG: 500 TABLET, FILM COATED ORAL at 10:35

## 2019-01-25 RX ADMIN — ACETAMINOPHEN 500 MG: 500 TABLET, FILM COATED ORAL at 14:26

## 2019-01-25 RX ADMIN — DULOXETINE HYDROCHLORIDE 60 MG: 30 CAPSULE, DELAYED RELEASE ORAL at 08:44

## 2019-01-25 RX ADMIN — PANTOPRAZOLE SODIUM 40 MG: 40 TABLET, DELAYED RELEASE ORAL at 16:37

## 2019-01-25 RX ADMIN — METOPROLOL TARTRATE 12.5 MG: 25 TABLET, FILM COATED ORAL at 21:07

## 2019-01-25 RX ADMIN — LORAZEPAM 1 MG: 1 TABLET ORAL at 21:17

## 2019-01-25 RX ADMIN — ENOXAPARIN SODIUM 40 MG: 40 INJECTION SUBCUTANEOUS at 08:44

## 2019-01-25 RX ADMIN — DULOXETINE HYDROCHLORIDE 60 MG: 30 CAPSULE, DELAYED RELEASE ORAL at 21:07

## 2019-01-25 RX ADMIN — FLUTICASONE PROPIONATE 2 SPRAY: 50 SPRAY, METERED NASAL at 08:44

## 2019-01-25 RX ADMIN — ACETAMINOPHEN 500 MG: 500 TABLET, FILM COATED ORAL at 21:17

## 2019-01-25 RX ADMIN — GABAPENTIN 800 MG: 400 CAPSULE ORAL at 13:33

## 2019-01-25 RX ADMIN — CEFTRIAXONE SODIUM 1 G: 1 INJECTION, POWDER, FOR SOLUTION INTRAMUSCULAR; INTRAVENOUS at 01:59

## 2019-01-25 RX ADMIN — LEVOTHYROXINE SODIUM 137 MCG: 137 TABLET ORAL at 06:44

## 2019-01-25 RX ADMIN — ROPINIROLE HYDROCHLORIDE 3 MG: 1 TABLET, FILM COATED ORAL at 21:06

## 2019-01-25 RX ADMIN — GABAPENTIN 800 MG: 400 CAPSULE ORAL at 06:44

## 2019-01-25 RX ADMIN — SODIUM CHLORIDE, PRESERVATIVE FREE 3 ML: 5 INJECTION INTRAVENOUS at 08:44

## 2019-01-25 RX ADMIN — GABAPENTIN 800 MG: 400 CAPSULE ORAL at 21:06

## 2019-01-26 LAB
ANION GAP SERPL CALCULATED.3IONS-SCNC: 12 MMOL/L (ref 4–13)
BASOPHILS # BLD AUTO: 0.03 10*3/MM3 (ref 0–0.2)
BASOPHILS NFR BLD AUTO: 0.2 % (ref 0–2)
BUN BLD-MCNC: 17 MG/DL (ref 5–21)
BUN/CREAT SERPL: 22.1 (ref 7–25)
CALCIUM SPEC-SCNC: 8.3 MG/DL (ref 8.4–10.4)
CHLORIDE SERPL-SCNC: 99 MMOL/L (ref 98–110)
CO2 SERPL-SCNC: 25 MMOL/L (ref 24–31)
CREAT BLD-MCNC: 0.77 MG/DL (ref 0.5–1.4)
DEPRECATED RDW RBC AUTO: 48.3 FL (ref 40–54)
EOSINOPHIL # BLD AUTO: 0.06 10*3/MM3 (ref 0–0.7)
EOSINOPHIL NFR BLD AUTO: 0.4 % (ref 0–4)
ERYTHROCYTE [DISTWIDTH] IN BLOOD BY AUTOMATED COUNT: 15.2 % (ref 12–15)
GFR SERPL CREATININE-BSD FRML MDRD: 75 ML/MIN/1.73
GLUCOSE BLD-MCNC: 105 MG/DL (ref 70–100)
HCT VFR BLD AUTO: 35.9 % (ref 37–47)
HGB BLD-MCNC: 11.4 G/DL (ref 12–16)
IMM GRANULOCYTES # BLD AUTO: 0.07 10*3/MM3 (ref 0–0.03)
IMM GRANULOCYTES NFR BLD AUTO: 0.5 % (ref 0–5)
IRON 24H UR-MRATE: <10 MCG/DL (ref 42–180)
LYMPHOCYTES # BLD AUTO: 0.98 10*3/MM3 (ref 0.72–4.86)
LYMPHOCYTES NFR BLD AUTO: 6.7 % (ref 15–45)
MCH RBC QN AUTO: 27.5 PG (ref 28–32)
MCHC RBC AUTO-ENTMCNC: 31.8 G/DL (ref 33–36)
MCV RBC AUTO: 86.5 FL (ref 82–98)
MONOCYTES # BLD AUTO: 1.06 10*3/MM3 (ref 0.19–1.3)
MONOCYTES NFR BLD AUTO: 7.3 % (ref 4–12)
NEUTROPHILS # BLD AUTO: 12.35 10*3/MM3 (ref 1.87–8.4)
NEUTROPHILS NFR BLD AUTO: 84.9 % (ref 39–78)
NRBC BLD AUTO-RTO: 0 /100 WBC (ref 0–0)
PLATELET # BLD AUTO: 196 10*3/MM3 (ref 130–400)
PMV BLD AUTO: 11.4 FL (ref 6–12)
POTASSIUM BLD-SCNC: 4.3 MMOL/L (ref 3.5–5.3)
RBC # BLD AUTO: 4.15 10*6/MM3 (ref 4.2–5.4)
SODIUM BLD-SCNC: 136 MMOL/L (ref 135–145)
WBC NRBC COR # BLD: 14.55 10*3/MM3 (ref 4.8–10.8)

## 2019-01-26 PROCEDURE — 25010000002 VANCOMYCIN 1 G RECONSTITUTED SOLUTION 1 EACH VIAL: Performed by: FAMILY MEDICINE

## 2019-01-26 PROCEDURE — 25010000002 DIGOXIN PER 500 MCG: Performed by: INTERNAL MEDICINE

## 2019-01-26 PROCEDURE — 80048 BASIC METABOLIC PNL TOTAL CA: CPT | Performed by: FAMILY MEDICINE

## 2019-01-26 PROCEDURE — 25010000002 CEFTRIAXONE PER 250 MG: Performed by: FAMILY MEDICINE

## 2019-01-26 PROCEDURE — 25010000002 VANCOMYCIN 10 G RECONSTITUTED SOLUTION: Performed by: FAMILY MEDICINE

## 2019-01-26 PROCEDURE — 83540 ASSAY OF IRON: CPT | Performed by: FAMILY MEDICINE

## 2019-01-26 PROCEDURE — 25010000002 ENOXAPARIN PER 10 MG: Performed by: FAMILY MEDICINE

## 2019-01-26 PROCEDURE — 85025 COMPLETE CBC W/AUTO DIFF WBC: CPT | Performed by: FAMILY MEDICINE

## 2019-01-26 RX ORDER — DILTIAZEM HYDROCHLORIDE 5 MG/ML
10 INJECTION INTRAVENOUS ONCE
Status: COMPLETED | OUTPATIENT
Start: 2019-01-26 | End: 2019-01-26

## 2019-01-26 RX ORDER — DIGOXIN 0.25 MG/ML
500 INJECTION INTRAMUSCULAR; INTRAVENOUS ONCE AS NEEDED
Status: COMPLETED | OUTPATIENT
Start: 2019-01-26 | End: 2019-01-26

## 2019-01-26 RX ADMIN — DILTIAZEM HYDROCHLORIDE 5 MG/HR: 5 INJECTION INTRAVENOUS at 20:42

## 2019-01-26 RX ADMIN — ENOXAPARIN SODIUM 40 MG: 40 INJECTION SUBCUTANEOUS at 09:37

## 2019-01-26 RX ADMIN — VANCOMYCIN HYDROCHLORIDE 1500 MG: 10 INJECTION, POWDER, LYOPHILIZED, FOR SOLUTION INTRAVENOUS at 22:08

## 2019-01-26 RX ADMIN — ACETAMINOPHEN 500 MG: 500 TABLET, FILM COATED ORAL at 06:30

## 2019-01-26 RX ADMIN — CEFTRIAXONE SODIUM 1 G: 1 INJECTION, POWDER, FOR SOLUTION INTRAMUSCULAR; INTRAVENOUS at 02:30

## 2019-01-26 RX ADMIN — METOPROLOL TARTRATE 12.5 MG: 25 TABLET, FILM COATED ORAL at 09:41

## 2019-01-26 RX ADMIN — METOPROLOL TARTRATE 12.5 MG: 25 TABLET, FILM COATED ORAL at 19:25

## 2019-01-26 RX ADMIN — DULOXETINE HYDROCHLORIDE 60 MG: 30 CAPSULE, DELAYED RELEASE ORAL at 20:34

## 2019-01-26 RX ADMIN — FLUTICASONE PROPIONATE 2 SPRAY: 50 SPRAY, METERED NASAL at 09:40

## 2019-01-26 RX ADMIN — ROPINIROLE HYDROCHLORIDE 3 MG: 1 TABLET, FILM COATED ORAL at 20:34

## 2019-01-26 RX ADMIN — ROPINIROLE HYDROCHLORIDE 3 MG: 1 TABLET, FILM COATED ORAL at 09:40

## 2019-01-26 RX ADMIN — FLECAINIDE ACETATE 100 MG: 100 TABLET ORAL at 19:25

## 2019-01-26 RX ADMIN — DULOXETINE HYDROCHLORIDE 60 MG: 30 CAPSULE, DELAYED RELEASE ORAL at 09:41

## 2019-01-26 RX ADMIN — SODIUM CHLORIDE, PRESERVATIVE FREE 3 ML: 5 INJECTION INTRAVENOUS at 20:34

## 2019-01-26 RX ADMIN — PANTOPRAZOLE SODIUM 40 MG: 40 TABLET, DELAYED RELEASE ORAL at 06:30

## 2019-01-26 RX ADMIN — PANTOPRAZOLE SODIUM 40 MG: 40 TABLET, DELAYED RELEASE ORAL at 16:55

## 2019-01-26 RX ADMIN — ACETAMINOPHEN 500 MG: 500 TABLET, FILM COATED ORAL at 16:22

## 2019-01-26 RX ADMIN — GABAPENTIN 800 MG: 400 CAPSULE ORAL at 14:14

## 2019-01-26 RX ADMIN — GABAPENTIN 800 MG: 400 CAPSULE ORAL at 06:30

## 2019-01-26 RX ADMIN — DILTIAZEM HYDROCHLORIDE 10 MG: 5 INJECTION INTRAVENOUS at 17:41

## 2019-01-26 RX ADMIN — DIGOXIN 500 MCG: 0.25 INJECTION INTRAMUSCULAR; INTRAVENOUS at 23:35

## 2019-01-26 RX ADMIN — SODIUM CHLORIDE, PRESERVATIVE FREE 3 ML: 5 INJECTION INTRAVENOUS at 09:38

## 2019-01-26 RX ADMIN — AMITRIPTYLINE HYDROCHLORIDE 50 MG: 25 TABLET, FILM COATED ORAL at 20:34

## 2019-01-26 RX ADMIN — FLECAINIDE ACETATE 100 MG: 100 TABLET ORAL at 09:39

## 2019-01-26 RX ADMIN — ACETAMINOPHEN 500 MG: 500 TABLET, FILM COATED ORAL at 11:46

## 2019-01-26 RX ADMIN — LORAZEPAM 1 MG: 1 TABLET ORAL at 16:21

## 2019-01-26 RX ADMIN — VANCOMYCIN HYDROCHLORIDE 2000 MG: 1 INJECTION, POWDER, LYOPHILIZED, FOR SOLUTION INTRAVENOUS at 12:19

## 2019-01-26 RX ADMIN — DILTIAZEM HYDROCHLORIDE 10 MG: 5 INJECTION INTRAVENOUS at 20:41

## 2019-01-26 RX ADMIN — GABAPENTIN 800 MG: 400 CAPSULE ORAL at 20:42

## 2019-01-26 RX ADMIN — LEVOTHYROXINE SODIUM 137 MCG: 137 TABLET ORAL at 06:30

## 2019-01-27 ENCOUNTER — APPOINTMENT (OUTPATIENT)
Dept: CARDIOLOGY | Facility: HOSPITAL | Age: 64
End: 2019-01-27
Attending: INTERNAL MEDICINE

## 2019-01-27 LAB
ANION GAP SERPL CALCULATED.3IONS-SCNC: 7 MMOL/L (ref 4–13)
BASOPHILS # BLD AUTO: 0.03 10*3/MM3 (ref 0–0.2)
BASOPHILS NFR BLD AUTO: 0.3 % (ref 0–2)
BH CV ECHO MEAS - BSA(HAYCOCK): 2.5 M^2
BH CV ECHO MEAS - BSA: 2.3 M^2
BH CV ECHO MEAS - BZI_BMI: 43.9 KILOGRAMS/M^2
BH CV ECHO MEAS - BZI_METRIC_HEIGHT: 170.2 CM
BH CV ECHO MEAS - BZI_METRIC_WEIGHT: 127 KG
BH CV ECHO MEAS - EDV(CUBED): 131.9 ML
BH CV ECHO MEAS - EDV(MOD-SP4): 122 ML
BH CV ECHO MEAS - EDV(TEICH): 123.2 ML
BH CV ECHO MEAS - EF(CUBED): 69.9 %
BH CV ECHO MEAS - EF(MOD-SP4): 47.4 %
BH CV ECHO MEAS - EF(TEICH): 61.2 %
BH CV ECHO MEAS - ESV(CUBED): 39.7 ML
BH CV ECHO MEAS - ESV(MOD-SP4): 64.2 ML
BH CV ECHO MEAS - ESV(TEICH): 47.8 ML
BH CV ECHO MEAS - FS: 33 %
BH CV ECHO MEAS - IVS/LVPW: 0.87
BH CV ECHO MEAS - IVSD: 1.2 CM
BH CV ECHO MEAS - LAT PEAK E' VEL: 9.4 CM/SEC
BH CV ECHO MEAS - LV DIASTOLIC VOL/BSA (35-75): 52.3 ML/M^2
BH CV ECHO MEAS - LV MASS(C)D: 250.4 GRAMS
BH CV ECHO MEAS - LV MASS(C)DI: 107.3 GRAMS/M^2
BH CV ECHO MEAS - LV SYSTOLIC VOL/BSA (12-30): 27.5 ML/M^2
BH CV ECHO MEAS - LVIDD: 5.1 CM
BH CV ECHO MEAS - LVIDS: 3.4 CM
BH CV ECHO MEAS - LVLD AP4: 8.8 CM
BH CV ECHO MEAS - LVLS AP4: 7.5 CM
BH CV ECHO MEAS - LVPWD: 1.3 CM
BH CV ECHO MEAS - MED PEAK E' VEL: 20.8 CM/SEC
BH CV ECHO MEAS - MV A MAX VEL: 75.2 CM/SEC
BH CV ECHO MEAS - MV DEC SLOPE: 278 CM/SEC^2
BH CV ECHO MEAS - MV DEC TIME: 0.17 SEC
BH CV ECHO MEAS - MV E MAX VEL: 118 CM/SEC
BH CV ECHO MEAS - MV E/A: 1.6
BH CV ECHO MEAS - MV P1/2T MAX VEL: 117 CM/SEC
BH CV ECHO MEAS - MV P1/2T: 123.3 MSEC
BH CV ECHO MEAS - MVA P1/2T LCG: 1.9 CM^2
BH CV ECHO MEAS - MVA(P1/2T): 1.8 CM^2
BH CV ECHO MEAS - RAP SYSTOLE: 5 MMHG
BH CV ECHO MEAS - RVSP: 37.5 MMHG
BH CV ECHO MEAS - SI(CUBED): 39.5 ML/M^2
BH CV ECHO MEAS - SI(MOD-SP4): 24.8 ML/M^2
BH CV ECHO MEAS - SI(TEICH): 32.4 ML/M^2
BH CV ECHO MEAS - SV(CUBED): 92.2 ML
BH CV ECHO MEAS - SV(MOD-SP4): 57.8 ML
BH CV ECHO MEAS - SV(TEICH): 75.5 ML
BH CV ECHO MEAS - TR MAX VEL: 285 CM/SEC
BH CV ECHO MEASUREMENTS AVERAGE E/E' RATIO: 7.81
BUN BLD-MCNC: 16 MG/DL (ref 5–21)
BUN/CREAT SERPL: 20.3 (ref 7–25)
CALCIUM SPEC-SCNC: 8 MG/DL (ref 8.4–10.4)
CHLORIDE SERPL-SCNC: 104 MMOL/L (ref 98–110)
CO2 SERPL-SCNC: 25 MMOL/L (ref 24–31)
CREAT BLD-MCNC: 0.79 MG/DL (ref 0.5–1.4)
DEPRECATED RDW RBC AUTO: 47.9 FL (ref 40–54)
EOSINOPHIL # BLD AUTO: 0.11 10*3/MM3 (ref 0–0.7)
EOSINOPHIL NFR BLD AUTO: 1 % (ref 0–4)
ERYTHROCYTE [DISTWIDTH] IN BLOOD BY AUTOMATED COUNT: 15.2 % (ref 12–15)
FOLATE SERPL-MCNC: >20 NG/ML
GFR SERPL CREATININE-BSD FRML MDRD: 73 ML/MIN/1.73
GLUCOSE BLD-MCNC: 143 MG/DL (ref 70–100)
HCT VFR BLD AUTO: 37.1 % (ref 37–47)
HGB BLD-MCNC: 11.8 G/DL (ref 12–16)
IMM GRANULOCYTES # BLD AUTO: 0.06 10*3/MM3 (ref 0–0.03)
IMM GRANULOCYTES NFR BLD AUTO: 0.5 % (ref 0–5)
LEFT ATRIUM VOLUME INDEX: 29.3 ML/M2
LV EF 2D ECHO EST: 50 %
LYMPHOCYTES # BLD AUTO: 1.29 10*3/MM3 (ref 0.72–4.86)
LYMPHOCYTES NFR BLD AUTO: 11.2 % (ref 15–45)
MAXIMAL PREDICTED HEART RATE: 155 BPM
MCH RBC QN AUTO: 27.5 PG (ref 28–32)
MCHC RBC AUTO-ENTMCNC: 31.8 G/DL (ref 33–36)
MCV RBC AUTO: 86.5 FL (ref 82–98)
MONOCYTES # BLD AUTO: 1.21 10*3/MM3 (ref 0.19–1.3)
MONOCYTES NFR BLD AUTO: 10.5 % (ref 4–12)
NEUTROPHILS # BLD AUTO: 8.78 10*3/MM3 (ref 1.87–8.4)
NEUTROPHILS NFR BLD AUTO: 76.5 % (ref 39–78)
NRBC BLD AUTO-RTO: 0 /100 WBC (ref 0–0)
PLATELET # BLD AUTO: 204 10*3/MM3 (ref 130–400)
PMV BLD AUTO: 11.6 FL (ref 6–12)
POTASSIUM BLD-SCNC: 3.9 MMOL/L (ref 3.5–5.3)
RBC # BLD AUTO: 4.29 10*6/MM3 (ref 4.2–5.4)
SODIUM BLD-SCNC: 136 MMOL/L (ref 135–145)
STRESS TARGET HR: 132 BPM
TSH SERPL DL<=0.05 MIU/L-ACNC: 0.65 MIU/ML (ref 0.47–4.68)
VIT B12 BLD-MCNC: 548 PG/ML (ref 239–931)
WBC NRBC COR # BLD: 11.48 10*3/MM3 (ref 4.8–10.8)

## 2019-01-27 PROCEDURE — 25010000002 ENOXAPARIN PER 10 MG: Performed by: FAMILY MEDICINE

## 2019-01-27 PROCEDURE — 84443 ASSAY THYROID STIM HORMONE: CPT | Performed by: INTERNAL MEDICINE

## 2019-01-27 PROCEDURE — 82746 ASSAY OF FOLIC ACID SERUM: CPT | Performed by: FAMILY MEDICINE

## 2019-01-27 PROCEDURE — 80048 BASIC METABOLIC PNL TOTAL CA: CPT | Performed by: FAMILY MEDICINE

## 2019-01-27 PROCEDURE — 93325 DOPPLER ECHO COLOR FLOW MAPG: CPT | Performed by: INTERNAL MEDICINE

## 2019-01-27 PROCEDURE — 93010 ELECTROCARDIOGRAM REPORT: CPT | Performed by: INTERNAL MEDICINE

## 2019-01-27 PROCEDURE — 93308 TTE F-UP OR LMTD: CPT

## 2019-01-27 PROCEDURE — 93005 ELECTROCARDIOGRAM TRACING: CPT | Performed by: NURSE PRACTITIONER

## 2019-01-27 PROCEDURE — 85025 COMPLETE CBC W/AUTO DIFF WBC: CPT | Performed by: FAMILY MEDICINE

## 2019-01-27 PROCEDURE — 93308 TTE F-UP OR LMTD: CPT | Performed by: INTERNAL MEDICINE

## 2019-01-27 PROCEDURE — 99222 1ST HOSP IP/OBS MODERATE 55: CPT | Performed by: NURSE PRACTITIONER

## 2019-01-27 PROCEDURE — 93325 DOPPLER ECHO COLOR FLOW MAPG: CPT

## 2019-01-27 PROCEDURE — 25010000002 VANCOMYCIN 10 G RECONSTITUTED SOLUTION: Performed by: FAMILY MEDICINE

## 2019-01-27 PROCEDURE — 82607 VITAMIN B-12: CPT | Performed by: FAMILY MEDICINE

## 2019-01-27 PROCEDURE — 25010000002 PERFLUTREN 6.52 MG/ML SUSPENSION: Performed by: FAMILY MEDICINE

## 2019-01-27 PROCEDURE — 25010000002 IRON SUCROSE PER 1 MG: Performed by: FAMILY MEDICINE

## 2019-01-27 PROCEDURE — 25010000002 CEFTRIAXONE PER 250 MG: Performed by: FAMILY MEDICINE

## 2019-01-27 RX ORDER — TRAMADOL HYDROCHLORIDE 50 MG/1
50 TABLET ORAL EVERY 6 HOURS PRN
Status: DISCONTINUED | OUTPATIENT
Start: 2019-01-27 | End: 2019-02-01 | Stop reason: HOSPADM

## 2019-01-27 RX ADMIN — METOPROLOL TARTRATE 12.5 MG: 25 TABLET, FILM COATED ORAL at 08:23

## 2019-01-27 RX ADMIN — ACETAMINOPHEN 500 MG: 500 TABLET, FILM COATED ORAL at 03:57

## 2019-01-27 RX ADMIN — DULOXETINE HYDROCHLORIDE 60 MG: 30 CAPSULE, DELAYED RELEASE ORAL at 21:52

## 2019-01-27 RX ADMIN — AMITRIPTYLINE HYDROCHLORIDE 50 MG: 25 TABLET, FILM COATED ORAL at 21:52

## 2019-01-27 RX ADMIN — IRON SUCROSE 200 MG: 20 INJECTION, SOLUTION INTRAVENOUS at 09:44

## 2019-01-27 RX ADMIN — VANCOMYCIN HYDROCHLORIDE 1500 MG: 10 INJECTION, POWDER, LYOPHILIZED, FOR SOLUTION INTRAVENOUS at 10:13

## 2019-01-27 RX ADMIN — CEFTRIAXONE SODIUM 1 G: 1 INJECTION, POWDER, FOR SOLUTION INTRAMUSCULAR; INTRAVENOUS at 03:47

## 2019-01-27 RX ADMIN — METOPROLOL TARTRATE 12.5 MG: 25 TABLET, FILM COATED ORAL at 21:53

## 2019-01-27 RX ADMIN — LEVOTHYROXINE SODIUM 137 MCG: 137 TABLET ORAL at 06:20

## 2019-01-27 RX ADMIN — SODIUM CHLORIDE, PRESERVATIVE FREE 3 ML: 5 INJECTION INTRAVENOUS at 08:24

## 2019-01-27 RX ADMIN — ACETAMINOPHEN 500 MG: 500 TABLET, FILM COATED ORAL at 16:46

## 2019-01-27 RX ADMIN — GABAPENTIN 800 MG: 400 CAPSULE ORAL at 06:20

## 2019-01-27 RX ADMIN — FLECAINIDE ACETATE 100 MG: 100 TABLET ORAL at 08:23

## 2019-01-27 RX ADMIN — GABAPENTIN 800 MG: 400 CAPSULE ORAL at 21:52

## 2019-01-27 RX ADMIN — VANCOMYCIN HYDROCHLORIDE 1500 MG: 10 INJECTION, POWDER, LYOPHILIZED, FOR SOLUTION INTRAVENOUS at 21:52

## 2019-01-27 RX ADMIN — SODIUM CHLORIDE, PRESERVATIVE FREE 3 ML: 5 INJECTION INTRAVENOUS at 21:52

## 2019-01-27 RX ADMIN — FLECAINIDE ACETATE 100 MG: 100 TABLET ORAL at 21:52

## 2019-01-27 RX ADMIN — DILTIAZEM HYDROCHLORIDE 15 MG/HR: 5 INJECTION INTRAVENOUS at 06:20

## 2019-01-27 RX ADMIN — ROPINIROLE HYDROCHLORIDE 3 MG: 1 TABLET, FILM COATED ORAL at 21:52

## 2019-01-27 RX ADMIN — ENOXAPARIN SODIUM 40 MG: 40 INJECTION SUBCUTANEOUS at 08:22

## 2019-01-27 RX ADMIN — PANTOPRAZOLE SODIUM 40 MG: 40 TABLET, DELAYED RELEASE ORAL at 16:46

## 2019-01-27 RX ADMIN — PERFLUTREN 8.48 MG: 6.52 INJECTION, SUSPENSION INTRAVENOUS at 15:05

## 2019-01-27 RX ADMIN — GABAPENTIN 800 MG: 400 CAPSULE ORAL at 13:42

## 2019-01-27 RX ADMIN — PANTOPRAZOLE SODIUM 40 MG: 40 TABLET, DELAYED RELEASE ORAL at 08:24

## 2019-01-27 RX ADMIN — ROPINIROLE HYDROCHLORIDE 3 MG: 1 TABLET, FILM COATED ORAL at 08:23

## 2019-01-27 RX ADMIN — DULOXETINE HYDROCHLORIDE 60 MG: 30 CAPSULE, DELAYED RELEASE ORAL at 08:24

## 2019-01-28 ENCOUNTER — APPOINTMENT (OUTPATIENT)
Dept: GENERAL RADIOLOGY | Facility: HOSPITAL | Age: 64
End: 2019-01-28

## 2019-01-28 ENCOUNTER — ANESTHESIA EVENT (OUTPATIENT)
Dept: PERIOP | Facility: HOSPITAL | Age: 64
End: 2019-01-28

## 2019-01-28 ENCOUNTER — ANESTHESIA (OUTPATIENT)
Dept: PERIOP | Facility: HOSPITAL | Age: 64
End: 2019-01-28

## 2019-01-28 ENCOUNTER — APPOINTMENT (OUTPATIENT)
Dept: CT IMAGING | Facility: HOSPITAL | Age: 64
End: 2019-01-28

## 2019-01-28 PROBLEM — M86.9: Status: RESOLVED | Noted: 2018-03-13 | Resolved: 2019-01-28

## 2019-01-28 PROBLEM — R93.89 ABNORMAL CHEST X-RAY: Status: ACTIVE | Noted: 2019-01-28

## 2019-01-28 LAB
ANION GAP SERPL CALCULATED.3IONS-SCNC: 9 MMOL/L (ref 4–13)
BASOPHILS # BLD AUTO: 0.04 10*3/MM3 (ref 0–0.2)
BASOPHILS NFR BLD AUTO: 0.4 % (ref 0–2)
BUN BLD-MCNC: 16 MG/DL (ref 5–21)
BUN/CREAT SERPL: 21.6 (ref 7–25)
CALCIUM SPEC-SCNC: 8.2 MG/DL (ref 8.4–10.4)
CHLORIDE SERPL-SCNC: 106 MMOL/L (ref 98–110)
CO2 SERPL-SCNC: 23 MMOL/L (ref 24–31)
CREAT BLD-MCNC: 0.74 MG/DL (ref 0.5–1.4)
DEPRECATED RDW RBC AUTO: 46.8 FL (ref 40–54)
EOSINOPHIL # BLD AUTO: 0.33 10*3/MM3 (ref 0–0.7)
EOSINOPHIL NFR BLD AUTO: 3.6 % (ref 0–4)
ERYTHROCYTE [DISTWIDTH] IN BLOOD BY AUTOMATED COUNT: 15.3 % (ref 12–15)
GFR SERPL CREATININE-BSD FRML MDRD: 79 ML/MIN/1.73
GLUCOSE BLD-MCNC: 122 MG/DL (ref 70–100)
HCT VFR BLD AUTO: 33.1 % (ref 37–47)
HGB BLD-MCNC: 10.5 G/DL (ref 12–16)
IMM GRANULOCYTES # BLD AUTO: 0.04 10*3/MM3 (ref 0–0.03)
IMM GRANULOCYTES NFR BLD AUTO: 0.4 % (ref 0–5)
LYMPHOCYTES # BLD AUTO: 1.03 10*3/MM3 (ref 0.72–4.86)
LYMPHOCYTES NFR BLD AUTO: 11.3 % (ref 15–45)
MCH RBC QN AUTO: 26.9 PG (ref 28–32)
MCHC RBC AUTO-ENTMCNC: 31.7 G/DL (ref 33–36)
MCV RBC AUTO: 84.9 FL (ref 82–98)
MONOCYTES # BLD AUTO: 1.1 10*3/MM3 (ref 0.19–1.3)
MONOCYTES NFR BLD AUTO: 12.1 % (ref 4–12)
NEUTROPHILS # BLD AUTO: 6.54 10*3/MM3 (ref 1.87–8.4)
NEUTROPHILS NFR BLD AUTO: 72.2 % (ref 39–78)
NRBC BLD AUTO-RTO: 0 /100 WBC (ref 0–0)
PLATELET # BLD AUTO: 207 10*3/MM3 (ref 130–400)
PMV BLD AUTO: 11.7 FL (ref 6–12)
POTASSIUM BLD-SCNC: 4 MMOL/L (ref 3.5–5.3)
RBC # BLD AUTO: 3.9 10*6/MM3 (ref 4.2–5.4)
SODIUM BLD-SCNC: 138 MMOL/L (ref 135–145)
VANCOMYCIN TROUGH SERPL-MCNC: 24.89 MCG/ML (ref 10–20)
WBC NRBC COR # BLD: 9.08 10*3/MM3 (ref 4.8–10.8)

## 2019-01-28 PROCEDURE — 0YBC0ZZ EXCISION OF RIGHT UPPER LEG, OPEN APPROACH: ICD-10-PCS | Performed by: SURGERY

## 2019-01-28 PROCEDURE — 25010000002 VANCOMYCIN 10 G RECONSTITUTED SOLUTION: Performed by: FAMILY MEDICINE

## 2019-01-28 PROCEDURE — 25010000002 DEXAMETHASONE PER 1 MG: Performed by: ANESTHESIOLOGY

## 2019-01-28 PROCEDURE — 87077 CULTURE AEROBIC IDENTIFY: CPT | Performed by: SURGERY

## 2019-01-28 PROCEDURE — 97530 THERAPEUTIC ACTIVITIES: CPT

## 2019-01-28 PROCEDURE — 87176 TISSUE HOMOGENIZATION CULTR: CPT | Performed by: SURGERY

## 2019-01-28 PROCEDURE — 10140 I&D HMTMA SEROMA/FLUID COLLJ: CPT | Performed by: SURGERY

## 2019-01-28 PROCEDURE — 25010000002 CEFTRIAXONE PER 250 MG: Performed by: FAMILY MEDICINE

## 2019-01-28 PROCEDURE — 25010000002 IRON SUCROSE PER 1 MG: Performed by: FAMILY MEDICINE

## 2019-01-28 PROCEDURE — 71260 CT THORAX DX C+: CPT

## 2019-01-28 PROCEDURE — 25010000002 ONDANSETRON PER 1 MG: Performed by: NURSE ANESTHETIST, CERTIFIED REGISTERED

## 2019-01-28 PROCEDURE — 88304 TISSUE EXAM BY PATHOLOGIST: CPT | Performed by: SURGERY

## 2019-01-28 PROCEDURE — 87185 SC STD ENZYME DETCJ PER NZM: CPT | Performed by: SURGERY

## 2019-01-28 PROCEDURE — 87206 SMEAR FLUORESCENT/ACID STAI: CPT | Performed by: SURGERY

## 2019-01-28 PROCEDURE — 25010000002 MIDAZOLAM PER 1 MG: Performed by: ANESTHESIOLOGY

## 2019-01-28 PROCEDURE — 99222 1ST HOSP IP/OBS MODERATE 55: CPT | Performed by: SURGERY

## 2019-01-28 PROCEDURE — 87186 SC STD MICRODIL/AGAR DIL: CPT | Performed by: SURGERY

## 2019-01-28 PROCEDURE — 25010000002 SUCCINYLCHOLINE PER 20 MG: Performed by: NURSE ANESTHETIST, CERTIFIED REGISTERED

## 2019-01-28 PROCEDURE — 87102 FUNGUS ISOLATION CULTURE: CPT | Performed by: SURGERY

## 2019-01-28 PROCEDURE — 85025 COMPLETE CBC W/AUTO DIFF WBC: CPT | Performed by: FAMILY MEDICINE

## 2019-01-28 PROCEDURE — 88305 TISSUE EXAM BY PATHOLOGIST: CPT | Performed by: SURGERY

## 2019-01-28 PROCEDURE — 88112 CYTOPATH CELL ENHANCE TECH: CPT | Performed by: SURGERY

## 2019-01-28 PROCEDURE — 97161 PT EVAL LOW COMPLEX 20 MIN: CPT

## 2019-01-28 PROCEDURE — 87070 CULTURE OTHR SPECIMN AEROBIC: CPT | Performed by: SURGERY

## 2019-01-28 PROCEDURE — 25010000002 FENTANYL CITRATE (PF) 250 MCG/5ML SOLUTION: Performed by: NURSE ANESTHETIST, CERTIFIED REGISTERED

## 2019-01-28 PROCEDURE — 87147 CULTURE TYPE IMMUNOLOGIC: CPT | Performed by: SURGERY

## 2019-01-28 PROCEDURE — 99232 SBSQ HOSP IP/OBS MODERATE 35: CPT | Performed by: INTERNAL MEDICINE

## 2019-01-28 PROCEDURE — 87015 SPECIMEN INFECT AGNT CONCNTJ: CPT | Performed by: SURGERY

## 2019-01-28 PROCEDURE — 87205 SMEAR GRAM STAIN: CPT | Performed by: SURGERY

## 2019-01-28 PROCEDURE — 87116 MYCOBACTERIA CULTURE: CPT | Performed by: SURGERY

## 2019-01-28 PROCEDURE — 80202 ASSAY OF VANCOMYCIN: CPT | Performed by: FAMILY MEDICINE

## 2019-01-28 PROCEDURE — 0J9N0ZX DRAINAGE OF RIGHT LOWER LEG SUBCUTANEOUS TISSUE AND FASCIA, OPEN APPROACH, DIAGNOSTIC: ICD-10-PCS | Performed by: SURGERY

## 2019-01-28 PROCEDURE — 71045 X-RAY EXAM CHEST 1 VIEW: CPT

## 2019-01-28 PROCEDURE — 25010000002 IOPAMIDOL 61 % SOLUTION: Performed by: FAMILY MEDICINE

## 2019-01-28 PROCEDURE — 25010000002 PROPOFOL 10 MG/ML EMULSION: Performed by: NURSE ANESTHETIST, CERTIFIED REGISTERED

## 2019-01-28 PROCEDURE — 80048 BASIC METABOLIC PNL TOTAL CA: CPT | Performed by: FAMILY MEDICINE

## 2019-01-28 PROCEDURE — 87075 CULTR BACTERIA EXCEPT BLOOD: CPT | Performed by: SURGERY

## 2019-01-28 RX ORDER — MIDAZOLAM HYDROCHLORIDE 1 MG/ML
2 INJECTION INTRAMUSCULAR; INTRAVENOUS
Status: DISCONTINUED | OUTPATIENT
Start: 2019-01-28 | End: 2019-01-28 | Stop reason: HOSPADM

## 2019-01-28 RX ORDER — METOCLOPRAMIDE HYDROCHLORIDE 5 MG/ML
5 INJECTION INTRAMUSCULAR; INTRAVENOUS
Status: DISCONTINUED | OUTPATIENT
Start: 2019-01-28 | End: 2019-01-28 | Stop reason: HOSPADM

## 2019-01-28 RX ORDER — LIDOCAINE HYDROCHLORIDE 20 MG/ML
INJECTION, SOLUTION INFILTRATION; PERINEURAL AS NEEDED
Status: DISCONTINUED | OUTPATIENT
Start: 2019-01-28 | End: 2019-01-28 | Stop reason: SURG

## 2019-01-28 RX ORDER — ACETAMINOPHEN 500 MG
1000 TABLET ORAL ONCE
Status: COMPLETED | OUTPATIENT
Start: 2019-01-28 | End: 2019-01-28

## 2019-01-28 RX ORDER — ONDANSETRON 2 MG/ML
4 INJECTION INTRAMUSCULAR; INTRAVENOUS EVERY 6 HOURS PRN
Status: DISCONTINUED | OUTPATIENT
Start: 2019-01-28 | End: 2019-02-01 | Stop reason: HOSPADM

## 2019-01-28 RX ORDER — ONDANSETRON 2 MG/ML
4 INJECTION INTRAMUSCULAR; INTRAVENOUS AS NEEDED
Status: DISCONTINUED | OUTPATIENT
Start: 2019-01-28 | End: 2019-01-28 | Stop reason: HOSPADM

## 2019-01-28 RX ORDER — PROPOFOL 10 MG/ML
VIAL (ML) INTRAVENOUS AS NEEDED
Status: DISCONTINUED | OUTPATIENT
Start: 2019-01-28 | End: 2019-01-28 | Stop reason: SURG

## 2019-01-28 RX ORDER — NALOXONE HCL 0.4 MG/ML
0.04 VIAL (ML) INJECTION AS NEEDED
Status: DISCONTINUED | OUTPATIENT
Start: 2019-01-28 | End: 2019-01-28 | Stop reason: HOSPADM

## 2019-01-28 RX ORDER — MEPERIDINE HYDROCHLORIDE 25 MG/ML
12.5 INJECTION INTRAMUSCULAR; INTRAVENOUS; SUBCUTANEOUS
Status: DISCONTINUED | OUTPATIENT
Start: 2019-01-28 | End: 2019-01-28 | Stop reason: HOSPADM

## 2019-01-28 RX ORDER — LABETALOL HYDROCHLORIDE 5 MG/ML
5 INJECTION, SOLUTION INTRAVENOUS
Status: DISCONTINUED | OUTPATIENT
Start: 2019-01-28 | End: 2019-01-28 | Stop reason: HOSPADM

## 2019-01-28 RX ORDER — DEXAMETHASONE SODIUM PHOSPHATE 4 MG/ML
4 INJECTION, SOLUTION INTRA-ARTICULAR; INTRALESIONAL; INTRAMUSCULAR; INTRAVENOUS; SOFT TISSUE ONCE AS NEEDED
Status: COMPLETED | OUTPATIENT
Start: 2019-01-28 | End: 2019-01-28

## 2019-01-28 RX ORDER — FENTANYL CITRATE 50 UG/ML
INJECTION, SOLUTION INTRAMUSCULAR; INTRAVENOUS AS NEEDED
Status: DISCONTINUED | OUTPATIENT
Start: 2019-01-28 | End: 2019-01-28 | Stop reason: SURG

## 2019-01-28 RX ORDER — FENTANYL CITRATE 50 UG/ML
25 INJECTION, SOLUTION INTRAMUSCULAR; INTRAVENOUS
Status: DISCONTINUED | OUTPATIENT
Start: 2019-01-28 | End: 2019-01-28 | Stop reason: HOSPADM

## 2019-01-28 RX ORDER — MIDAZOLAM HYDROCHLORIDE 1 MG/ML
1 INJECTION INTRAMUSCULAR; INTRAVENOUS
Status: DISCONTINUED | OUTPATIENT
Start: 2019-01-28 | End: 2019-01-28 | Stop reason: HOSPADM

## 2019-01-28 RX ORDER — BUPIVACAINE HCL/0.9 % NACL/PF 0.1 %
2 PLASTIC BAG, INJECTION (ML) EPIDURAL ONCE
Status: COMPLETED | OUTPATIENT
Start: 2019-01-28 | End: 2019-01-28

## 2019-01-28 RX ORDER — HYDRALAZINE HYDROCHLORIDE 20 MG/ML
5 INJECTION INTRAMUSCULAR; INTRAVENOUS
Status: DISCONTINUED | OUTPATIENT
Start: 2019-01-28 | End: 2019-01-28 | Stop reason: HOSPADM

## 2019-01-28 RX ORDER — IPRATROPIUM BROMIDE AND ALBUTEROL SULFATE 2.5; .5 MG/3ML; MG/3ML
SOLUTION RESPIRATORY (INHALATION)
Status: COMPLETED
Start: 2019-01-28 | End: 2019-01-28

## 2019-01-28 RX ORDER — FLUMAZENIL 0.1 MG/ML
0.2 INJECTION INTRAVENOUS AS NEEDED
Status: DISCONTINUED | OUTPATIENT
Start: 2019-01-28 | End: 2019-01-28 | Stop reason: HOSPADM

## 2019-01-28 RX ORDER — LIDOCAINE HYDROCHLORIDE 40 MG/ML
SOLUTION TOPICAL AS NEEDED
Status: DISCONTINUED | OUTPATIENT
Start: 2019-01-28 | End: 2019-01-28 | Stop reason: SURG

## 2019-01-28 RX ORDER — MORPHINE SULFATE 4 MG/ML
4 INJECTION, SOLUTION INTRAMUSCULAR; INTRAVENOUS EVERY 4 HOURS PRN
Status: ACTIVE | OUTPATIENT
Start: 2019-01-28 | End: 2019-01-31

## 2019-01-28 RX ORDER — OXYCODONE HYDROCHLORIDE AND ACETAMINOPHEN 5; 325 MG/1; MG/1
1 TABLET ORAL EVERY 4 HOURS PRN
Status: DISCONTINUED | OUTPATIENT
Start: 2019-01-28 | End: 2019-02-01 | Stop reason: HOSPADM

## 2019-01-28 RX ORDER — SODIUM CHLORIDE, SODIUM LACTATE, POTASSIUM CHLORIDE, CALCIUM CHLORIDE 600; 310; 30; 20 MG/100ML; MG/100ML; MG/100ML; MG/100ML
100 INJECTION, SOLUTION INTRAVENOUS CONTINUOUS
Status: DISCONTINUED | OUTPATIENT
Start: 2019-01-28 | End: 2019-01-28

## 2019-01-28 RX ORDER — DILTIAZEM HYDROCHLORIDE 5 MG/ML
10 INJECTION INTRAVENOUS ONCE
Status: COMPLETED | OUTPATIENT
Start: 2019-01-28 | End: 2019-01-28

## 2019-01-28 RX ORDER — SODIUM CHLORIDE 0.9 % (FLUSH) 0.9 %
1-10 SYRINGE (ML) INJECTION AS NEEDED
Status: DISCONTINUED | OUTPATIENT
Start: 2019-01-28 | End: 2019-01-28 | Stop reason: HOSPADM

## 2019-01-28 RX ORDER — OXYCODONE AND ACETAMINOPHEN 10; 325 MG/1; MG/1
1 TABLET ORAL ONCE AS NEEDED
Status: DISCONTINUED | OUTPATIENT
Start: 2019-01-28 | End: 2019-01-28 | Stop reason: HOSPADM

## 2019-01-28 RX ORDER — SUCCINYLCHOLINE CHLORIDE 20 MG/ML
INJECTION INTRAMUSCULAR; INTRAVENOUS AS NEEDED
Status: DISCONTINUED | OUTPATIENT
Start: 2019-01-28 | End: 2019-01-28 | Stop reason: SURG

## 2019-01-28 RX ORDER — ONDANSETRON 2 MG/ML
INJECTION INTRAMUSCULAR; INTRAVENOUS AS NEEDED
Status: DISCONTINUED | OUTPATIENT
Start: 2019-01-28 | End: 2019-01-28 | Stop reason: SURG

## 2019-01-28 RX ORDER — FENTANYL CITRATE 50 UG/ML
25 INJECTION, SOLUTION INTRAMUSCULAR; INTRAVENOUS AS NEEDED
Status: DISCONTINUED | OUTPATIENT
Start: 2019-01-28 | End: 2019-01-28 | Stop reason: HOSPADM

## 2019-01-28 RX ORDER — ROCURONIUM BROMIDE 10 MG/ML
INJECTION, SOLUTION INTRAVENOUS AS NEEDED
Status: DISCONTINUED | OUTPATIENT
Start: 2019-01-28 | End: 2019-01-28 | Stop reason: SURG

## 2019-01-28 RX ORDER — SODIUM CHLORIDE 0.9 % (FLUSH) 0.9 %
3 SYRINGE (ML) INJECTION EVERY 12 HOURS SCHEDULED
Status: DISCONTINUED | OUTPATIENT
Start: 2019-01-28 | End: 2019-01-28 | Stop reason: HOSPADM

## 2019-01-28 RX ORDER — PHENYLEPHRINE HCL IN 0.9% NACL 0.8MG/10ML
SYRINGE (ML) INTRAVENOUS AS NEEDED
Status: DISCONTINUED | OUTPATIENT
Start: 2019-01-28 | End: 2019-01-28 | Stop reason: SURG

## 2019-01-28 RX ORDER — IPRATROPIUM BROMIDE AND ALBUTEROL SULFATE 2.5; .5 MG/3ML; MG/3ML
3 SOLUTION RESPIRATORY (INHALATION) ONCE AS NEEDED
Status: COMPLETED | OUTPATIENT
Start: 2019-01-28 | End: 2019-01-28

## 2019-01-28 RX ADMIN — SODIUM CHLORIDE, PRESERVATIVE FREE 3 ML: 5 INJECTION INTRAVENOUS at 21:00

## 2019-01-28 RX ADMIN — FLUTICASONE PROPIONATE 2 SPRAY: 50 SPRAY, METERED NASAL at 08:38

## 2019-01-28 RX ADMIN — SUCCINYLCHOLINE CHLORIDE 200 MG: 20 INJECTION, SOLUTION INTRAMUSCULAR; INTRAVENOUS at 15:38

## 2019-01-28 RX ADMIN — FLECAINIDE ACETATE 100 MG: 100 TABLET ORAL at 21:00

## 2019-01-28 RX ADMIN — IPRATROPIUM BROMIDE AND ALBUTEROL SULFATE 3 ML: 2.5; .5 SOLUTION RESPIRATORY (INHALATION) at 16:24

## 2019-01-28 RX ADMIN — AMITRIPTYLINE HYDROCHLORIDE 50 MG: 25 TABLET, FILM COATED ORAL at 21:00

## 2019-01-28 RX ADMIN — Medication 2 G: at 15:42

## 2019-01-28 RX ADMIN — ROPINIROLE HYDROCHLORIDE 3 MG: 1 TABLET, FILM COATED ORAL at 20:59

## 2019-01-28 RX ADMIN — LEVOTHYROXINE SODIUM 137 MCG: 137 TABLET ORAL at 06:30

## 2019-01-28 RX ADMIN — DULOXETINE HYDROCHLORIDE 60 MG: 30 CAPSULE, DELAYED RELEASE ORAL at 08:36

## 2019-01-28 RX ADMIN — ROCURONIUM BROMIDE 10 MG: 10 INJECTION INTRAVENOUS at 15:38

## 2019-01-28 RX ADMIN — ONDANSETRON HYDROCHLORIDE 4 MG: 2 SOLUTION INTRAMUSCULAR; INTRAVENOUS at 15:56

## 2019-01-28 RX ADMIN — FENTANYL CITRATE 50 MCG: 50 INJECTION INTRAMUSCULAR; INTRAVENOUS at 15:59

## 2019-01-28 RX ADMIN — METOPROLOL TARTRATE 12.5 MG: 25 TABLET, FILM COATED ORAL at 20:59

## 2019-01-28 RX ADMIN — SUGAMMADEX 200 MG: 100 INJECTION, SOLUTION INTRAVENOUS at 16:49

## 2019-01-28 RX ADMIN — LIDOCAINE HYDROCHLORIDE 100 MG: 20 INJECTION, SOLUTION INFILTRATION; PERINEURAL at 15:38

## 2019-01-28 RX ADMIN — DILTIAZEM HYDROCHLORIDE 10 MG: 5 INJECTION INTRAVENOUS at 18:05

## 2019-01-28 RX ADMIN — GABAPENTIN 800 MG: 400 CAPSULE ORAL at 06:30

## 2019-01-28 RX ADMIN — CEFTRIAXONE SODIUM 1 G: 1 INJECTION, POWDER, FOR SOLUTION INTRAMUSCULAR; INTRAVENOUS at 03:57

## 2019-01-28 RX ADMIN — IOPAMIDOL 100 ML: 612 INJECTION, SOLUTION INTRAVENOUS at 09:00

## 2019-01-28 RX ADMIN — Medication 80 MCG: at 15:53

## 2019-01-28 RX ADMIN — DEXAMETHASONE SODIUM PHOSPHATE 4 MG: 4 INJECTION, SOLUTION INTRA-ARTICULAR; INTRALESIONAL; INTRAMUSCULAR; INTRAVENOUS; SOFT TISSUE at 15:29

## 2019-01-28 RX ADMIN — ROPINIROLE HYDROCHLORIDE 3 MG: 1 TABLET, FILM COATED ORAL at 08:36

## 2019-01-28 RX ADMIN — ACETAMINOPHEN 1000 MG: 500 TABLET, FILM COATED ORAL at 15:29

## 2019-01-28 RX ADMIN — LIDOCAINE HYDROCHLORIDE 1 EACH: 40 SOLUTION TOPICAL at 15:40

## 2019-01-28 RX ADMIN — DULOXETINE HYDROCHLORIDE 60 MG: 30 CAPSULE, DELAYED RELEASE ORAL at 20:59

## 2019-01-28 RX ADMIN — SODIUM CHLORIDE, POTASSIUM CHLORIDE, SODIUM LACTATE AND CALCIUM CHLORIDE 100 ML/HR: 600; 310; 30; 20 INJECTION, SOLUTION INTRAVENOUS at 15:29

## 2019-01-28 RX ADMIN — PROPOFOL 200 MG: 10 INJECTION, EMULSION INTRAVENOUS at 15:38

## 2019-01-28 RX ADMIN — FLECAINIDE ACETATE 100 MG: 100 TABLET ORAL at 08:36

## 2019-01-28 RX ADMIN — FENTANYL CITRATE 100 MCG: 50 INJECTION INTRAMUSCULAR; INTRAVENOUS at 15:38

## 2019-01-28 RX ADMIN — METOPROLOL TARTRATE 12.5 MG: 25 TABLET, FILM COATED ORAL at 08:36

## 2019-01-28 RX ADMIN — PANTOPRAZOLE SODIUM 40 MG: 40 TABLET, DELAYED RELEASE ORAL at 08:36

## 2019-01-28 RX ADMIN — MIDAZOLAM HYDROCHLORIDE 2 MG: 1 INJECTION, SOLUTION INTRAMUSCULAR; INTRAVENOUS at 15:29

## 2019-01-28 RX ADMIN — LIDOCAINE HYDROCHLORIDE 40 MG: 20 INJECTION, SOLUTION INFILTRATION; PERINEURAL at 16:46

## 2019-01-28 RX ADMIN — IRON SUCROSE 200 MG: 20 INJECTION, SOLUTION INTRAVENOUS at 08:36

## 2019-01-28 RX ADMIN — SODIUM CHLORIDE, PRESERVATIVE FREE 3 ML: 5 INJECTION INTRAVENOUS at 08:38

## 2019-01-28 RX ADMIN — GABAPENTIN 800 MG: 400 CAPSULE ORAL at 21:00

## 2019-01-28 RX ADMIN — Medication 80 MCG: at 15:46

## 2019-01-28 NOTE — ANESTHESIA PREPROCEDURE EVALUATION
Anesthesia Evaluation     Patient summary reviewed   history of anesthetic complications: PONV  NPO Solid Status: > 8 hours  NPO Liquid Status: > 8 hours           Airway   Mallampati: I  TM distance: >3 FB  Neck ROM: full  No difficulty expected  Dental - normal exam         Pulmonary - normal exam    breath sounds clear to auscultation  (+) sleep apnea (Bipap with 02; with her in hospital) on CPAP,   (-) pneumonia, COPD, asthma, recent URI, not a smoker  Cardiovascular - normal exam  Exercise tolerance: poor (<4 METS) ( Can walk with a walker, no chest pain with exertion. )    ECG reviewed  Patient on routine beta blocker and Beta blocker given within 24 hours of surgery  Rhythm: irregular  Rate: normal    (+) dysrhythmias (Reports she regularly converts back and forth) Atrial Fib, hyperlipidemia,   (-) pacemaker, hypertension (Denied by patient), past MI, angina, cardiac stents, CABG    ROS comment: TTE 1/27/19 - ·Left ventricular wall thickness is consistent with mild concentric hypertrophy.  ·Estimated EF = 50%.  ·Left ventricular systolic function is normal.  ·Left atrial cavity size is borderline dilated.    Neuro/Psych  (+) numbness (bilateral peripheral neuropathy), psychiatric history Anxiety and Depression,     (-) seizures, TIA, CVA  GI/Hepatic/Renal/Endo    (+) morbid obesity, GERD,  hypothyroidism,   (-) liver disease, no renal disease, diabetes, hyperthyroidism    Musculoskeletal     Abdominal   (+) obese,    Substance History      OB/GYN          Other   (+) arthritis                       Anesthesia Plan    ASA 3     general   (Admitted after a fall 1/24/19.)  intravenous induction   Anesthetic plan, all risks, benefits, and alternatives have been provided, discussed and informed consent has been obtained with: patient.

## 2019-01-28 NOTE — ANESTHESIA PROCEDURE NOTES
Airway  Urgency: elective    Airway not difficult    General Information and Staff    Patient location during procedure: OR  CRNA: Brook Dukes CRNA    Indications and Patient Condition  Indications for airway management: airway protection    Preoxygenated: yes  Mask difficulty assessment: 1 - vent by mask    Final Airway Details  Final airway type: endotracheal airway      Successful airway: ETT  Cuffed: yes   Successful intubation technique: direct laryngoscopy  Facilitating devices/methods: intubating stylet  Endotracheal tube insertion site: oral  Blade: Millan  Blade size: 2  ETT size (mm): 7.0  Cormack-Lehane Classification: grade I - full view of glottis  Placement verified by: chest auscultation and capnometry   Cuff volume (mL): 5  Measured from: lips  ETT to lips (cm): 22  Number of attempts at approach: 1    Additional Comments  Atraumatic

## 2019-01-29 LAB
ANION GAP SERPL CALCULATED.3IONS-SCNC: 7 MMOL/L (ref 4–13)
BASOPHILS # BLD AUTO: 0.02 10*3/MM3 (ref 0–0.2)
BASOPHILS NFR BLD AUTO: 0.2 % (ref 0–2)
BUN BLD-MCNC: 23 MG/DL (ref 5–21)
BUN/CREAT SERPL: 28 (ref 7–25)
CALCIUM SPEC-SCNC: 8.8 MG/DL (ref 8.4–10.4)
CHLORIDE SERPL-SCNC: 108 MMOL/L (ref 98–110)
CO2 SERPL-SCNC: 27 MMOL/L (ref 24–31)
CREAT BLD-MCNC: 0.82 MG/DL (ref 0.5–1.4)
DEPRECATED RDW RBC AUTO: 49.4 FL (ref 40–54)
EOSINOPHIL # BLD AUTO: 0 10*3/MM3 (ref 0–0.7)
EOSINOPHIL NFR BLD AUTO: 0 % (ref 0–4)
ERYTHROCYTE [DISTWIDTH] IN BLOOD BY AUTOMATED COUNT: 15.4 % (ref 12–15)
GFR SERPL CREATININE-BSD FRML MDRD: 70 ML/MIN/1.73
GLUCOSE BLD-MCNC: 129 MG/DL (ref 70–100)
HCT VFR BLD AUTO: 34.1 % (ref 37–47)
HGB BLD-MCNC: 10.6 G/DL (ref 12–16)
IMM GRANULOCYTES # BLD AUTO: 0.08 10*3/MM3 (ref 0–0.03)
IMM GRANULOCYTES NFR BLD AUTO: 0.8 % (ref 0–5)
LYMPHOCYTES # BLD AUTO: 0.55 10*3/MM3 (ref 0.72–4.86)
LYMPHOCYTES NFR BLD AUTO: 5.5 % (ref 15–45)
MCH RBC QN AUTO: 27.2 PG (ref 28–32)
MCHC RBC AUTO-ENTMCNC: 31.1 G/DL (ref 33–36)
MCV RBC AUTO: 87.4 FL (ref 82–98)
MONOCYTES # BLD AUTO: 0.5 10*3/MM3 (ref 0.19–1.3)
MONOCYTES NFR BLD AUTO: 5 % (ref 4–12)
NEUTROPHILS # BLD AUTO: 8.81 10*3/MM3 (ref 1.87–8.4)
NEUTROPHILS NFR BLD AUTO: 88.5 % (ref 39–78)
NRBC BLD AUTO-RTO: 0 /100 WBC (ref 0–0)
PLATELET # BLD AUTO: 240 10*3/MM3 (ref 130–400)
PMV BLD AUTO: 11.5 FL (ref 6–12)
POTASSIUM BLD-SCNC: 4.6 MMOL/L (ref 3.5–5.3)
RBC # BLD AUTO: 3.9 10*6/MM3 (ref 4.2–5.4)
SODIUM BLD-SCNC: 142 MMOL/L (ref 135–145)
WBC NRBC COR # BLD: 9.96 10*3/MM3 (ref 4.8–10.8)

## 2019-01-29 PROCEDURE — 85025 COMPLETE CBC W/AUTO DIFF WBC: CPT | Performed by: FAMILY MEDICINE

## 2019-01-29 PROCEDURE — 25010000002 CEFTRIAXONE PER 250 MG: Performed by: FAMILY MEDICINE

## 2019-01-29 PROCEDURE — 97116 GAIT TRAINING THERAPY: CPT

## 2019-01-29 PROCEDURE — 25010000002 VANCOMYCIN 10 G RECONSTITUTED SOLUTION: Performed by: FAMILY MEDICINE

## 2019-01-29 PROCEDURE — C1751 CATH, INF, PER/CENT/MIDLINE: HCPCS

## 2019-01-29 PROCEDURE — 99024 POSTOP FOLLOW-UP VISIT: CPT | Performed by: SURGERY

## 2019-01-29 PROCEDURE — 80048 BASIC METABOLIC PNL TOTAL CA: CPT | Performed by: FAMILY MEDICINE

## 2019-01-29 PROCEDURE — 25010000002 IRON SUCROSE PER 1 MG: Performed by: FAMILY MEDICINE

## 2019-01-29 PROCEDURE — 97530 THERAPEUTIC ACTIVITIES: CPT

## 2019-01-29 PROCEDURE — 36410 VNPNXR 3YR/> PHY/QHP DX/THER: CPT

## 2019-01-29 PROCEDURE — 25010000002 ENOXAPARIN PER 10 MG: Performed by: FAMILY MEDICINE

## 2019-01-29 PROCEDURE — 97164 PT RE-EVAL EST PLAN CARE: CPT

## 2019-01-29 RX ORDER — DIPHENHYDRAMINE HCL 25 MG
25 CAPSULE ORAL EVERY 6 HOURS PRN
Status: DISCONTINUED | OUTPATIENT
Start: 2019-01-29 | End: 2019-02-01 | Stop reason: HOSPADM

## 2019-01-29 RX ADMIN — METOPROLOL TARTRATE 12.5 MG: 25 TABLET, FILM COATED ORAL at 21:46

## 2019-01-29 RX ADMIN — GABAPENTIN 800 MG: 400 CAPSULE ORAL at 15:37

## 2019-01-29 RX ADMIN — ROPINIROLE HYDROCHLORIDE 3 MG: 1 TABLET, FILM COATED ORAL at 21:46

## 2019-01-29 RX ADMIN — FLECAINIDE ACETATE 100 MG: 100 TABLET ORAL at 09:17

## 2019-01-29 RX ADMIN — PANTOPRAZOLE SODIUM 40 MG: 40 TABLET, DELAYED RELEASE ORAL at 17:59

## 2019-01-29 RX ADMIN — IRON SUCROSE 200 MG: 20 INJECTION, SOLUTION INTRAVENOUS at 11:56

## 2019-01-29 RX ADMIN — VANCOMYCIN HYDROCHLORIDE 1250 MG: 10 INJECTION, POWDER, LYOPHILIZED, FOR SOLUTION INTRAVENOUS at 15:37

## 2019-01-29 RX ADMIN — FLUTICASONE PROPIONATE 2 SPRAY: 50 SPRAY, METERED NASAL at 09:16

## 2019-01-29 RX ADMIN — DULOXETINE HYDROCHLORIDE 60 MG: 30 CAPSULE, DELAYED RELEASE ORAL at 09:16

## 2019-01-29 RX ADMIN — PANTOPRAZOLE SODIUM 40 MG: 40 TABLET, DELAYED RELEASE ORAL at 09:20

## 2019-01-29 RX ADMIN — ROPINIROLE HYDROCHLORIDE 3 MG: 1 TABLET, FILM COATED ORAL at 09:16

## 2019-01-29 RX ADMIN — GABAPENTIN 800 MG: 400 CAPSULE ORAL at 21:46

## 2019-01-29 RX ADMIN — METOPROLOL TARTRATE 12.5 MG: 25 TABLET, FILM COATED ORAL at 09:16

## 2019-01-29 RX ADMIN — FLECAINIDE ACETATE 100 MG: 100 TABLET ORAL at 21:46

## 2019-01-29 RX ADMIN — LEVOTHYROXINE SODIUM 137 MCG: 137 TABLET ORAL at 05:21

## 2019-01-29 RX ADMIN — DULOXETINE HYDROCHLORIDE 60 MG: 30 CAPSULE, DELAYED RELEASE ORAL at 21:46

## 2019-01-29 RX ADMIN — SODIUM CHLORIDE, PRESERVATIVE FREE 3 ML: 5 INJECTION INTRAVENOUS at 21:47

## 2019-01-29 RX ADMIN — CEFTRIAXONE SODIUM 1 G: 1 INJECTION, POWDER, FOR SOLUTION INTRAMUSCULAR; INTRAVENOUS at 02:22

## 2019-01-29 RX ADMIN — AMITRIPTYLINE HYDROCHLORIDE 50 MG: 25 TABLET, FILM COATED ORAL at 21:45

## 2019-01-29 RX ADMIN — GABAPENTIN 800 MG: 400 CAPSULE ORAL at 05:21

## 2019-01-29 RX ADMIN — ENOXAPARIN SODIUM 40 MG: 40 INJECTION SUBCUTANEOUS at 09:21

## 2019-01-29 RX ADMIN — VANCOMYCIN HYDROCHLORIDE 1250 MG: 10 INJECTION, POWDER, LYOPHILIZED, FOR SOLUTION INTRAVENOUS at 04:32

## 2019-01-29 NOTE — ANESTHESIA POSTPROCEDURE EVALUATION
"Patient: Danisha Cannon    Procedure Summary     Date:  01/28/19 Room / Location:  [facility] OR  / [facility] HYBRID OR 12    Anesthesia Start:  1533 Anesthesia Stop:  1619    Procedure:  INCISION AND DRAINAGE OF RIGHT THIGH HEMATOMA (Right Leg Lower) Diagnosis:       Traumatic hematoma of right thigh, subsequent encounter      (Traumatic hematoma of right thigh, subsequent encounter [S70.11XD])    Surgeon:  Nino Stern MD Provider:  Brook Dukes CRNA    Anesthesia Type:  general ASA Status:  3          Anesthesia Type: general  Last vitals  BP   116/68 (01/28/19 1800)   Temp   98.3 °F (36.8 °C) (01/28/19 1639)   Pulse   103 (01/28/19 1800)   Resp   16 (01/28/19 1800)     SpO2   96 % (01/28/19 1800)     Post Anesthesia Care and Evaluation    Patient location during evaluation: PACU  Patient participation: complete - patient participated  Level of consciousness: awake and alert  Pain management: adequate  Airway patency: patent  Anesthetic complications: No anesthetic complications  PONV Status: controlled  Cardiovascular status: acceptable and hemodynamically stable  Respiratory status: acceptable  Hydration status: acceptable    Comments: Patient discharged from PACU prior to anesthesia evaluation based on Carla Score.  For details, see RN note.     /68   Pulse 103   Temp 98.3 °F (36.8 °C) (Temporal)   Resp 16   Ht 170.2 cm (67.01\")   Wt 127 kg (280 lb 3.2 oz)   LMP  (LMP Unknown)   SpO2 96%   BMI 43.88 kg/m²     Patient initially came to the PACU without any issues.  However, after 15 minutes, patient stated she was having difficulty breathing, despite receiving a breathing treatment.  Started to exhibit gasping with her breathing despite having 15L O2 via facemask.  Patient received only 10 mg of Rocuronium during the case, approximately one hour prior.  Patient was given Sugammadex, and patient began to feel some relief within one minute.  Patient improved, and was able to express " that she was feeling much better.  Respiratory was also called and brought the patient her BiPAP machine.  Patient also showed a worsening of her heart rate.  The patient was in atrial fibrillation at times prior to the operation.  During the preop conversation, the patient stated she regularly converts back and forth between NSR and A-fib.  Patient was given diltiazem 10 mg IV bolus, and rate was controlled < 110 bpm.

## 2019-01-30 LAB
ANION GAP SERPL CALCULATED.3IONS-SCNC: 7 MMOL/L (ref 4–13)
BACTERIA SPEC AEROBE CULT: NORMAL
BACTERIA SPEC AEROBE CULT: NORMAL
BASOPHILS # BLD AUTO: 0.07 10*3/MM3 (ref 0–0.2)
BASOPHILS NFR BLD AUTO: 0.6 % (ref 0–2)
BUN BLD-MCNC: 25 MG/DL (ref 5–21)
BUN/CREAT SERPL: 30.9 (ref 7–25)
CALCIUM SPEC-SCNC: 8.9 MG/DL (ref 8.4–10.4)
CHLORIDE SERPL-SCNC: 106 MMOL/L (ref 98–110)
CO2 SERPL-SCNC: 27 MMOL/L (ref 24–31)
CREAT BLD-MCNC: 0.81 MG/DL (ref 0.5–1.4)
CYTO UR: NORMAL
CYTO UR: NORMAL
DEPRECATED RDW RBC AUTO: 51.6 FL (ref 40–54)
EOSINOPHIL # BLD AUTO: 0.08 10*3/MM3 (ref 0–0.7)
EOSINOPHIL NFR BLD AUTO: 0.7 % (ref 0–4)
ERYTHROCYTE [DISTWIDTH] IN BLOOD BY AUTOMATED COUNT: 15.9 % (ref 12–15)
GFR SERPL CREATININE-BSD FRML MDRD: 71 ML/MIN/1.73
GLUCOSE BLD-MCNC: 87 MG/DL (ref 70–100)
HCT VFR BLD AUTO: 36.2 % (ref 37–47)
HGB BLD-MCNC: 11.2 G/DL (ref 12–16)
IMM GRANULOCYTES # BLD AUTO: 0.38 10*3/MM3 (ref 0–0.03)
IMM GRANULOCYTES NFR BLD AUTO: 3.2 % (ref 0–5)
LAB AP CASE REPORT: NORMAL
LAB AP CASE REPORT: NORMAL
LYMPHOCYTES # BLD AUTO: 1.98 10*3/MM3 (ref 0.72–4.86)
LYMPHOCYTES NFR BLD AUTO: 16.7 % (ref 15–45)
MCH RBC QN AUTO: 27.8 PG (ref 28–32)
MCHC RBC AUTO-ENTMCNC: 30.9 G/DL (ref 33–36)
MCV RBC AUTO: 89.8 FL (ref 82–98)
MONOCYTES # BLD AUTO: 0.76 10*3/MM3 (ref 0.19–1.3)
MONOCYTES NFR BLD AUTO: 6.4 % (ref 4–12)
NEUTROPHILS # BLD AUTO: 8.56 10*3/MM3 (ref 1.87–8.4)
NEUTROPHILS NFR BLD AUTO: 72.4 % (ref 39–78)
NRBC BLD AUTO-RTO: 0 /100 WBC (ref 0–0)
PATH REPORT.FINAL DX SPEC: NORMAL
PATH REPORT.FINAL DX SPEC: NORMAL
PATH REPORT.GROSS SPEC: NORMAL
PATH REPORT.GROSS SPEC: NORMAL
PLATELET # BLD AUTO: 315 10*3/MM3 (ref 130–400)
PMV BLD AUTO: 11.3 FL (ref 6–12)
POTASSIUM BLD-SCNC: 4.1 MMOL/L (ref 3.5–5.3)
RBC # BLD AUTO: 4.03 10*6/MM3 (ref 4.2–5.4)
SODIUM BLD-SCNC: 140 MMOL/L (ref 135–145)
VANCOMYCIN TROUGH SERPL-MCNC: 21.29 MCG/ML (ref 10–20)
WBC NRBC COR # BLD: 11.83 10*3/MM3 (ref 4.8–10.8)

## 2019-01-30 PROCEDURE — 99024 POSTOP FOLLOW-UP VISIT: CPT | Performed by: SURGERY

## 2019-01-30 PROCEDURE — 85025 COMPLETE CBC W/AUTO DIFF WBC: CPT | Performed by: FAMILY MEDICINE

## 2019-01-30 PROCEDURE — 25010000002 CEFTRIAXONE PER 250 MG: Performed by: FAMILY MEDICINE

## 2019-01-30 PROCEDURE — 25010000002 ENOXAPARIN PER 10 MG: Performed by: FAMILY MEDICINE

## 2019-01-30 PROCEDURE — 80048 BASIC METABOLIC PNL TOTAL CA: CPT | Performed by: FAMILY MEDICINE

## 2019-01-30 PROCEDURE — 25010000002 VANCOMYCIN 10 G RECONSTITUTED SOLUTION: Performed by: FAMILY MEDICINE

## 2019-01-30 PROCEDURE — 94760 N-INVAS EAR/PLS OXIMETRY 1: CPT

## 2019-01-30 PROCEDURE — 80202 ASSAY OF VANCOMYCIN: CPT | Performed by: FAMILY MEDICINE

## 2019-01-30 PROCEDURE — 97116 GAIT TRAINING THERAPY: CPT

## 2019-01-30 PROCEDURE — 94799 UNLISTED PULMONARY SVC/PX: CPT

## 2019-01-30 RX ORDER — VANCOMYCIN HYDROCHLORIDE 1 G/200ML
7.5 INJECTION, SOLUTION INTRAVENOUS EVERY 12 HOURS
Status: DISCONTINUED | OUTPATIENT
Start: 2019-01-31 | End: 2019-01-31

## 2019-01-30 RX ADMIN — GABAPENTIN 800 MG: 400 CAPSULE ORAL at 15:45

## 2019-01-30 RX ADMIN — PANTOPRAZOLE SODIUM 40 MG: 40 TABLET, DELAYED RELEASE ORAL at 09:25

## 2019-01-30 RX ADMIN — SODIUM CHLORIDE, PRESERVATIVE FREE 3 ML: 5 INJECTION INTRAVENOUS at 21:05

## 2019-01-30 RX ADMIN — LEVOTHYROXINE SODIUM 137 MCG: 137 TABLET ORAL at 05:02

## 2019-01-30 RX ADMIN — CEFTRIAXONE SODIUM 1 G: 1 INJECTION, POWDER, FOR SOLUTION INTRAMUSCULAR; INTRAVENOUS at 03:01

## 2019-01-30 RX ADMIN — AMITRIPTYLINE HYDROCHLORIDE 50 MG: 25 TABLET, FILM COATED ORAL at 21:00

## 2019-01-30 RX ADMIN — FLUTICASONE PROPIONATE 2 SPRAY: 50 SPRAY, METERED NASAL at 09:30

## 2019-01-30 RX ADMIN — DULOXETINE HYDROCHLORIDE 60 MG: 30 CAPSULE, DELAYED RELEASE ORAL at 21:03

## 2019-01-30 RX ADMIN — FLECAINIDE ACETATE 100 MG: 100 TABLET ORAL at 09:26

## 2019-01-30 RX ADMIN — ROPINIROLE HYDROCHLORIDE 3 MG: 1 TABLET, FILM COATED ORAL at 09:26

## 2019-01-30 RX ADMIN — GABAPENTIN 800 MG: 400 CAPSULE ORAL at 21:00

## 2019-01-30 RX ADMIN — METOPROLOL TARTRATE 12.5 MG: 25 TABLET, FILM COATED ORAL at 21:00

## 2019-01-30 RX ADMIN — GABAPENTIN 800 MG: 400 CAPSULE ORAL at 05:02

## 2019-01-30 RX ADMIN — FLECAINIDE ACETATE 100 MG: 100 TABLET ORAL at 21:00

## 2019-01-30 RX ADMIN — METOPROLOL TARTRATE 12.5 MG: 25 TABLET, FILM COATED ORAL at 09:26

## 2019-01-30 RX ADMIN — ROPINIROLE HYDROCHLORIDE 3 MG: 1 TABLET, FILM COATED ORAL at 21:03

## 2019-01-30 RX ADMIN — VANCOMYCIN HYDROCHLORIDE 1250 MG: 10 INJECTION, POWDER, LYOPHILIZED, FOR SOLUTION INTRAVENOUS at 17:35

## 2019-01-30 RX ADMIN — ENOXAPARIN SODIUM 40 MG: 40 INJECTION SUBCUTANEOUS at 09:30

## 2019-01-30 RX ADMIN — VANCOMYCIN HYDROCHLORIDE 1250 MG: 10 INJECTION, POWDER, LYOPHILIZED, FOR SOLUTION INTRAVENOUS at 03:14

## 2019-01-30 RX ADMIN — PANTOPRAZOLE SODIUM 40 MG: 40 TABLET, DELAYED RELEASE ORAL at 17:35

## 2019-01-30 RX ADMIN — DULOXETINE HYDROCHLORIDE 60 MG: 30 CAPSULE, DELAYED RELEASE ORAL at 09:26

## 2019-01-31 LAB
ANION GAP SERPL CALCULATED.3IONS-SCNC: 5 MMOL/L (ref 4–13)
ANISOCYTOSIS BLD QL: ABNORMAL
B-LACTAMASE USUAL SUSC ISLT: POSITIVE
BACTERIA FLD CULT: ABNORMAL
BUN BLD-MCNC: 27 MG/DL (ref 5–21)
BUN/CREAT SERPL: 31 (ref 7–25)
CALCIUM SPEC-SCNC: 8.9 MG/DL (ref 8.4–10.4)
CHLORIDE SERPL-SCNC: 107 MMOL/L (ref 98–110)
CLUMPED PLATELETS: PRESENT
CO2 SERPL-SCNC: 28 MMOL/L (ref 24–31)
CREAT BLD-MCNC: 0.87 MG/DL (ref 0.5–1.4)
DEPRECATED RDW RBC AUTO: 50.6 FL (ref 40–54)
EOSINOPHIL # BLD MANUAL: 0.28 10*3/MM3 (ref 0–0.7)
EOSINOPHIL NFR BLD MANUAL: 3 % (ref 0–4)
ERYTHROCYTE [DISTWIDTH] IN BLOOD BY AUTOMATED COUNT: 16 % (ref 12–15)
GFR SERPL CREATININE-BSD FRML MDRD: 65 ML/MIN/1.73
GIANT PLATELETS: ABNORMAL
GLUCOSE BLD-MCNC: 85 MG/DL (ref 70–100)
GRAM STN SPEC: ABNORMAL
GRAM STN SPEC: ABNORMAL
HCT VFR BLD AUTO: 34.3 % (ref 37–47)
HGB BLD-MCNC: 10.6 G/DL (ref 12–16)
LYMPHOCYTES # BLD MANUAL: 1.73 10*3/MM3 (ref 0.72–4.86)
LYMPHOCYTES NFR BLD MANUAL: 18.2 % (ref 15–45)
LYMPHOCYTES NFR BLD MANUAL: 6.1 % (ref 4–12)
MCH RBC QN AUTO: 27 PG (ref 28–32)
MCHC RBC AUTO-ENTMCNC: 30.9 G/DL (ref 33–36)
MCV RBC AUTO: 87.5 FL (ref 82–98)
METAMYELOCYTES NFR BLD MANUAL: 3 % (ref 0–0)
MICROCYTES BLD QL: ABNORMAL
MONOCYTES # BLD AUTO: 0.58 10*3/MM3 (ref 0.19–1.3)
MYELOCYTES NFR BLD MANUAL: 3 % (ref 0–0)
NEUTROPHILS # BLD AUTO: 6.04 10*3/MM3 (ref 1.87–8.4)
NEUTROPHILS NFR BLD MANUAL: 61.6 % (ref 39–78)
NEUTS BAND NFR BLD MANUAL: 2 % (ref 0–10)
PLATELET # BLD AUTO: 306 10*3/MM3 (ref 130–400)
PMV BLD AUTO: 11.2 FL (ref 6–12)
POLYCHROMASIA BLD QL SMEAR: ABNORMAL
POTASSIUM BLD-SCNC: 4.2 MMOL/L (ref 3.5–5.3)
RBC # BLD AUTO: 3.92 10*6/MM3 (ref 4.2–5.4)
SODIUM BLD-SCNC: 140 MMOL/L (ref 135–145)
VARIANT LYMPHS NFR BLD MANUAL: 3 % (ref 0–5)
WBC MORPH BLD: NORMAL
WBC NRBC COR # BLD: 9.49 10*3/MM3 (ref 4.8–10.8)

## 2019-01-31 PROCEDURE — 25010000002 VANCOMYCIN PER 500 MG: Performed by: FAMILY MEDICINE

## 2019-01-31 PROCEDURE — 97164 PT RE-EVAL EST PLAN CARE: CPT | Performed by: PHYSICAL THERAPIST

## 2019-01-31 PROCEDURE — 80048 BASIC METABOLIC PNL TOTAL CA: CPT | Performed by: FAMILY MEDICINE

## 2019-01-31 PROCEDURE — 85007 BL SMEAR W/DIFF WBC COUNT: CPT | Performed by: FAMILY MEDICINE

## 2019-01-31 PROCEDURE — 97116 GAIT TRAINING THERAPY: CPT

## 2019-01-31 PROCEDURE — 94799 UNLISTED PULMONARY SVC/PX: CPT

## 2019-01-31 PROCEDURE — 94760 N-INVAS EAR/PLS OXIMETRY 1: CPT

## 2019-01-31 PROCEDURE — 2W1LX6Z COMPRESSION OF RIGHT LOWER EXTREMITY USING PRESSURE DRESSING: ICD-10-PCS | Performed by: FAMILY MEDICINE

## 2019-01-31 PROCEDURE — 99024 POSTOP FOLLOW-UP VISIT: CPT | Performed by: SURGERY

## 2019-01-31 PROCEDURE — 85025 COMPLETE CBC W/AUTO DIFF WBC: CPT | Performed by: FAMILY MEDICINE

## 2019-01-31 PROCEDURE — 25010000002 ENOXAPARIN PER 10 MG: Performed by: FAMILY MEDICINE

## 2019-01-31 PROCEDURE — 97605 NEG PRS WND THER DME<=50SQCM: CPT | Performed by: PHYSICAL THERAPIST

## 2019-01-31 RX ORDER — CEPHALEXIN 500 MG/1
1000 CAPSULE ORAL EVERY 8 HOURS SCHEDULED
Status: DISCONTINUED | OUTPATIENT
Start: 2019-01-31 | End: 2019-02-01 | Stop reason: HOSPADM

## 2019-01-31 RX ADMIN — FLUTICASONE PROPIONATE 2 SPRAY: 50 SPRAY, METERED NASAL at 10:00

## 2019-01-31 RX ADMIN — PANTOPRAZOLE SODIUM 40 MG: 40 TABLET, DELAYED RELEASE ORAL at 09:59

## 2019-01-31 RX ADMIN — DULOXETINE HYDROCHLORIDE 60 MG: 30 CAPSULE, DELAYED RELEASE ORAL at 09:59

## 2019-01-31 RX ADMIN — METOPROLOL TARTRATE 12.5 MG: 25 TABLET, FILM COATED ORAL at 21:10

## 2019-01-31 RX ADMIN — LEVOTHYROXINE SODIUM 137 MCG: 137 TABLET ORAL at 05:12

## 2019-01-31 RX ADMIN — FLECAINIDE ACETATE 100 MG: 100 TABLET ORAL at 09:59

## 2019-01-31 RX ADMIN — AMITRIPTYLINE HYDROCHLORIDE 50 MG: 25 TABLET, FILM COATED ORAL at 21:10

## 2019-01-31 RX ADMIN — ROPINIROLE HYDROCHLORIDE 3 MG: 1 TABLET, FILM COATED ORAL at 22:39

## 2019-01-31 RX ADMIN — SODIUM CHLORIDE, PRESERVATIVE FREE 3 ML: 5 INJECTION INTRAVENOUS at 21:10

## 2019-01-31 RX ADMIN — CEPHALEXIN 1000 MG: 500 CAPSULE ORAL at 18:02

## 2019-01-31 RX ADMIN — SODIUM CHLORIDE, PRESERVATIVE FREE 3 ML: 5 INJECTION INTRAVENOUS at 10:00

## 2019-01-31 RX ADMIN — DULOXETINE HYDROCHLORIDE 60 MG: 30 CAPSULE, DELAYED RELEASE ORAL at 21:10

## 2019-01-31 RX ADMIN — VANCOMYCIN HYDROCHLORIDE 1000 MG: 1 INJECTION, SOLUTION INTRAVENOUS at 10:11

## 2019-01-31 RX ADMIN — FLECAINIDE ACETATE 100 MG: 100 TABLET ORAL at 21:10

## 2019-01-31 RX ADMIN — ENOXAPARIN SODIUM 40 MG: 40 INJECTION SUBCUTANEOUS at 10:00

## 2019-01-31 RX ADMIN — METOPROLOL TARTRATE 12.5 MG: 25 TABLET, FILM COATED ORAL at 09:59

## 2019-01-31 RX ADMIN — GABAPENTIN 800 MG: 400 CAPSULE ORAL at 21:10

## 2019-01-31 RX ADMIN — CEPHALEXIN 1000 MG: 500 CAPSULE ORAL at 22:39

## 2019-01-31 RX ADMIN — GABAPENTIN 800 MG: 400 CAPSULE ORAL at 05:12

## 2019-01-31 RX ADMIN — ACETAMINOPHEN 500 MG: 500 TABLET, FILM COATED ORAL at 22:39

## 2019-01-31 RX ADMIN — GABAPENTIN 800 MG: 400 CAPSULE ORAL at 14:08

## 2019-01-31 RX ADMIN — ROPINIROLE HYDROCHLORIDE 3 MG: 1 TABLET, FILM COATED ORAL at 09:59

## 2019-01-31 RX ADMIN — PANTOPRAZOLE SODIUM 40 MG: 40 TABLET, DELAYED RELEASE ORAL at 18:02

## 2019-02-01 VITALS
RESPIRATION RATE: 18 BRPM | TEMPERATURE: 97.6 F | HEART RATE: 64 BPM | OXYGEN SATURATION: 93 % | BODY MASS INDEX: 43.98 KG/M2 | DIASTOLIC BLOOD PRESSURE: 61 MMHG | WEIGHT: 280.2 LBS | HEIGHT: 67 IN | SYSTOLIC BLOOD PRESSURE: 120 MMHG

## 2019-02-01 PROBLEM — S70.11XA TRAUMATIC HEMATOMA OF RIGHT THIGH: Status: RESOLVED | Noted: 2018-11-19 | Resolved: 2019-02-01

## 2019-02-01 PROBLEM — L02.415 ABSCESS OF RIGHT THIGH: Status: ACTIVE | Noted: 2019-02-01

## 2019-02-01 PROBLEM — R41.82 ALTERED MENTAL STATUS: Status: RESOLVED | Noted: 2019-01-25 | Resolved: 2019-02-01

## 2019-02-01 PROBLEM — L02.415 ABSCESS OF RIGHT THIGH: Status: RESOLVED | Noted: 2019-02-01 | Resolved: 2019-02-01

## 2019-02-01 LAB
ANION GAP SERPL CALCULATED.3IONS-SCNC: 5 MMOL/L (ref 4–13)
B-LACTAMASE USUAL SUSC ISLT: POSITIVE
BACTERIA SPEC AEROBE CULT: ABNORMAL
BASOPHILS # BLD MANUAL: 0.09 10*3/MM3 (ref 0–0.2)
BASOPHILS NFR BLD AUTO: 1 % (ref 0–2)
BUN BLD-MCNC: 22 MG/DL (ref 5–21)
BUN/CREAT SERPL: 25.6 (ref 7–25)
CALCIUM SPEC-SCNC: 9.1 MG/DL (ref 8.4–10.4)
CHLORIDE SERPL-SCNC: 104 MMOL/L (ref 98–110)
CLUMPED PLATELETS: PRESENT
CO2 SERPL-SCNC: 31 MMOL/L (ref 24–31)
CREAT BLD-MCNC: 0.86 MG/DL (ref 0.5–1.4)
DEPRECATED RDW RBC AUTO: 49.4 FL (ref 40–54)
EOSINOPHIL # BLD MANUAL: 0.52 10*3/MM3 (ref 0–0.7)
EOSINOPHIL NFR BLD MANUAL: 6 % (ref 0–4)
ERYTHROCYTE [DISTWIDTH] IN BLOOD BY AUTOMATED COUNT: 15.7 % (ref 12–15)
GFR SERPL CREATININE-BSD FRML MDRD: 66 ML/MIN/1.73
GIANT PLATELETS: ABNORMAL
GLUCOSE BLD-MCNC: 91 MG/DL (ref 70–100)
GRAM STN SPEC: ABNORMAL
GRAM STN SPEC: ABNORMAL
HCT VFR BLD AUTO: 36.2 % (ref 37–47)
HGB BLD-MCNC: 11.3 G/DL (ref 12–16)
LYMPHOCYTES # BLD MANUAL: 1.92 10*3/MM3 (ref 0.72–4.86)
LYMPHOCYTES NFR BLD MANUAL: 22 % (ref 15–45)
LYMPHOCYTES NFR BLD MANUAL: 4 % (ref 4–12)
MCH RBC QN AUTO: 27.4 PG (ref 28–32)
MCHC RBC AUTO-ENTMCNC: 31.2 G/DL (ref 33–36)
MCV RBC AUTO: 87.9 FL (ref 82–98)
MONOCYTES # BLD AUTO: 0.35 10*3/MM3 (ref 0.19–1.3)
MYELOCYTES NFR BLD MANUAL: 1 % (ref 0–0)
NEUTROPHILS # BLD AUTO: 5.24 10*3/MM3 (ref 1.87–8.4)
NEUTROPHILS NFR BLD MANUAL: 60 % (ref 39–78)
PLASMA CELL PREC NFR BLD MANUAL: 1 % (ref 0–0)
PLATELET # BLD AUTO: 320 10*3/MM3 (ref 130–400)
PMV BLD AUTO: 10.6 FL (ref 6–12)
POTASSIUM BLD-SCNC: 4 MMOL/L (ref 3.5–5.3)
RBC # BLD AUTO: 4.12 10*6/MM3 (ref 4.2–5.4)
RBC MORPH BLD: NORMAL
SODIUM BLD-SCNC: 140 MMOL/L (ref 135–145)
VARIANT LYMPHS NFR BLD MANUAL: 5 % (ref 0–5)
WBC MORPH BLD: NORMAL
WBC NRBC COR # BLD: 8.74 10*3/MM3 (ref 4.8–10.8)

## 2019-02-01 PROCEDURE — 85025 COMPLETE CBC W/AUTO DIFF WBC: CPT | Performed by: FAMILY MEDICINE

## 2019-02-01 PROCEDURE — 80048 BASIC METABOLIC PNL TOTAL CA: CPT | Performed by: FAMILY MEDICINE

## 2019-02-01 PROCEDURE — 25010000002 ENOXAPARIN PER 10 MG: Performed by: FAMILY MEDICINE

## 2019-02-01 PROCEDURE — 85007 BL SMEAR W/DIFF WBC COUNT: CPT | Performed by: FAMILY MEDICINE

## 2019-02-01 PROCEDURE — 97530 THERAPEUTIC ACTIVITIES: CPT

## 2019-02-01 RX ORDER — FLUCONAZOLE 150 MG/1
150 TABLET ORAL WEEKLY
Qty: 3 TABLET | Refills: 0 | Status: SHIPPED | OUTPATIENT
Start: 2019-02-01 | End: 2019-02-15

## 2019-02-01 RX ORDER — FLUCONAZOLE 150 MG/1
150 TABLET ORAL WEEKLY
Status: DISCONTINUED | OUTPATIENT
Start: 2019-02-01 | End: 2019-02-01 | Stop reason: HOSPADM

## 2019-02-01 RX ORDER — CEPHALEXIN 500 MG/1
1000 CAPSULE ORAL EVERY 8 HOURS SCHEDULED
Qty: 78 CAPSULE | Refills: 0 | Status: SHIPPED | OUTPATIENT
Start: 2019-02-01 | End: 2019-02-14

## 2019-02-01 RX ADMIN — CEPHALEXIN 1000 MG: 500 CAPSULE ORAL at 13:55

## 2019-02-01 RX ADMIN — ROPINIROLE HYDROCHLORIDE 3 MG: 1 TABLET, FILM COATED ORAL at 08:54

## 2019-02-01 RX ADMIN — ENOXAPARIN SODIUM 40 MG: 40 INJECTION SUBCUTANEOUS at 08:54

## 2019-02-01 RX ADMIN — LEVOTHYROXINE SODIUM 137 MCG: 137 TABLET ORAL at 05:37

## 2019-02-01 RX ADMIN — DULOXETINE HYDROCHLORIDE 60 MG: 30 CAPSULE, DELAYED RELEASE ORAL at 08:54

## 2019-02-01 RX ADMIN — GABAPENTIN 800 MG: 400 CAPSULE ORAL at 05:37

## 2019-02-01 RX ADMIN — PANTOPRAZOLE SODIUM 40 MG: 40 TABLET, DELAYED RELEASE ORAL at 08:53

## 2019-02-01 RX ADMIN — FLECAINIDE ACETATE 100 MG: 100 TABLET ORAL at 08:54

## 2019-02-01 RX ADMIN — CEPHALEXIN 1000 MG: 500 CAPSULE ORAL at 05:37

## 2019-02-01 RX ADMIN — GABAPENTIN 800 MG: 400 CAPSULE ORAL at 13:55

## 2019-02-01 RX ADMIN — FLUTICASONE PROPIONATE 2 SPRAY: 50 SPRAY, METERED NASAL at 08:55

## 2019-02-01 RX ADMIN — METOPROLOL TARTRATE 12.5 MG: 25 TABLET, FILM COATED ORAL at 08:53

## 2019-02-01 RX ADMIN — FLUCONAZOLE 150 MG: 150 TABLET ORAL at 10:17

## 2019-02-01 NOTE — PROGRESS NOTES
Continued Stay Note  DOMINGO Johnson     Patient Name: Danisha Cannon  MRN: [id number]  Today's Date: 2/1/2019    Admit Date: 1/24/2019    Discharge Plan     Row Name 02/01/19 0921       Plan    Plan  [facility] / [facility] wound vac    Patient/Family in Agreement with Plan  yes    Plan Comments  Dr. Stern has signed [employer] [facility]. SW faxed Rx to [facility] at [phone]. Will follow for approval (anticipate today).             Expected Discharge Date and Time     Expected Discharge Date Expected Discharge Time    Feb 1, 2019             FRANKIE Hernandez

## 2019-02-01 NOTE — PROGRESS NOTES
Continued Stay Note  DOMINGO Johnson     Patient Name: Danisha Cannon  MRN: [id number]  Today's Date: 2/1/2019    Admit Date: 1/24/2019    Discharge Plan     Row Name 02/01/19 1425       Plan    Plan  [facility] / [facility] wound vac    Patient/Family in Agreement with Plan  yes    Plan Comments  SW checked [employer] re status of order. All needed documentation has been received and is being reviewed for approval. Will continue to follow for release of home vac.            Expected Discharge Date and Time     Expected Discharge Date Expected Discharge Time    Feb 1, 2019             FRANKIE Hernandez

## 2019-02-01 NOTE — DISCHARGE SUMMARY
Date of Discharge:  2/1/2019    Discharge Diagnosis: Right thigh traumatic hematoma with subsequent secondary infection and abscess, altered mental status metabolic in nature likely related to infection    Presenting Problem/History of Present Illness  Altered mental status, unspecified altered mental status type [R41.82]       Hospital Course  Patient is a 63 y.o. female presented with alteration in mental status as well as fever from unknown source and leukocytosis.  She was admitted and workup included a flu swab chest x-ray and blood work.  She had a long-standing right thigh hematoma at least since September of last year.  On the second day of admission her hematoma rapidly increased in size and had significant warmth and redness around it.  She was placed on antibiotics ceftriaxone and vancomycin.  Consultation obtained from vascular surgery who had been following this hematoma as an outpatient.  She was taken the operating room for incision and drainage at which time 700 mL of blood as well as purulent drainage and a rind were able to be removed.  Cultures sent and infectious disease consultation obtained.  Culture showed Heavy growth (4+) Staphylococcus lugdunensis.  Antibiotics were de-escalated and she was placed on oral Keflex.  A wound VAC was placed on January 31 and she tolerated this well.  We are currently in the process of arranging for home health to manage the wound VAC.  From a medical standpoint she can go home once arrangements are made with that.    Procedures Performed    Procedure(s):  INCISION AND DRAINAGE OF RIGHT THIGH HEMATOMA  -------------------       Consults:   Consults     Date and Time Order Name Status Description    1/30/2019 0724 Inpatient Infectious Diseases Consult Completed     1/28/2019 0709 Inpatient Vascular Surgery Consult Completed     1/26/2019 3815 Inpatient Cardiology Consult Completed           Pertinent Test Results: microbiology: blood culture: negative and wound  culture: positive    Condition on Discharge:  Stable    Vital Signs  Temp:  [97.3 °F (36.3 °C)-97.8 °F (36.6 °C)] 97.6 °F (36.4 °C)  Heart Rate:  [60-76] 60  Resp:  [16-20] 16  BP: (134-156)/(54-71) 156/71    Physical Exam:   General Appearance: alert, appears stated age and cooperative  Head: normocephalic, without obvious abnormality  Eyes: conjunctivae and sclerae normal and no icterus  Neck: supple and trachea midline  Lungs: clear to auscultation, respirations regular, respirations even and respirations unlabored  Heart: regular rhythm & normal rate and normal S1, S2  Abdomen: normal bowel sounds, soft non-tender and no guarding  Extremities: no edema, no cyanosis and no redness  Skin: Significantly decreased redness.  Wound VAC is in place.  Right thigh appears much improved.  Neurologic: Mental Status orientated to person, place, time and situation, Cranial Nerves cranial nerves 2 - 12 grossly intact as examined    Discharge Disposition      Discharge Medications     Discharge Medications      ASK your doctor about these medications      Instructions Start Date   alendronate 70 MG tablet  Commonly known as:  FOSAMAX   70 mg, Oral, Every 7 Days, Friday evening.      amitriptyline 50 MG tablet  Commonly known as:  ELAVIL   50 mg, Oral, Nightly      DEXILANT 60 MG capsule  Generic drug:  dexlansoprazole   60 mg, Oral, Daily      diclofenac 75 MG EC tablet  Commonly known as:  VOLTAREN   75 mg, Oral, Daily      DULoxetine 60 MG capsule  Commonly known as:  CYMBALTA   60 mg, Oral, 2 Times Daily      famciclovir 500 MG tablet  Commonly known as:  FAMVIR   500 mg, Oral, 2 Times Daily      flecainide 100 MG tablet  Commonly known as:  TAMBOCOR   100 mg, Oral, Every 12 Hours Scheduled      fluticasone 50 MCG/ACT nasal spray  Commonly known as:  FLONASE   2 sprays, Nasal, Daily PRN      gabapentin 800 MG tablet  Commonly known as:  NEURONTIN   800 mg, Oral, 3 Times Daily      HYDROcodone-acetaminophen 7.5-325 MG per  tablet  Commonly known as:  NORCO   1 tablet, Oral, Every 6 Hours PRN      levothyroxine 137 MCG tablet  Commonly known as:  SYNTHROID, LEVOTHROID   137 mcg, Oral, Every Morning      LORazepam 1 MG tablet  Commonly known as:  ATIVAN   1 mg, Oral, 2 Times Daily      metoprolol tartrate 25 MG tablet  Commonly known as:  LOPRESSOR   12.5 mg, Oral, Every 12 Hours      rOPINIRole 3 MG tablet  Commonly known as:  REQUIP   9 mg, Oral, Nightly, Night dose.       rOPINIRole 3 MG tablet  Commonly known as:  REQUIP   6 mg, Oral, Every Morning, Morning dose.      VIVLODEX 10 MG capsule  Generic drug:  Meloxicam   10 mg, Oral, Daily             Discharge Diet: regular    Activity at Discharge: ad clarke    Follow-up Appointments  Future Appointments   Date Time Provider Department Center   7/8/2019 10:15 AM Philip Cobb MD [facility]         Test Results Pending at Discharge   Order Current Status    Fungus Culture - Body Fluid, Thigh, Right In process    Fungus Culture - Tissue, Thigh, Right In process    AFB Culture - Body Fluid, Thigh, Right Preliminary result    AFB Culture - Tissue, Thigh, Right Preliminary result    Anaerobic Culture - Body Fluid, Thigh, Right Preliminary result    Anaerobic Culture - Tissue, Thigh, Right Preliminary result           Juan Carlos Sparks MD  02/01/19  7:37 AM    Time: Discharge 40 min

## 2019-02-01 NOTE — PROGRESS NOTES
"Infectious Diseases Progress Note    Patient:  Danisha Cannon  YOB: 1955  MRN: [id number]   Admit date: 1/24/2019   Admitting Physician: Juan Carlos Sparks MD  Primary Care Physician: Juan Carlos Sparks MD    Chief Complaint/Interval History: She will likely be discharged home today.  She is tolerating oral antibiotic treatment with Keflex.  Awaiting wound VAC approval.  No rash or skin itching.  No nausea or diarrhea.     Intake/Output Summary (Last 24 hours) at 2/1/2019 0728  Last data filed at 1/31/2019 1011  Gross per 24 hour   Intake 200 ml   Output --   Net 200 ml     Allergies:   Allergies   Allergen Reactions   • Other Rash     Dial soap  Redness and swelling on skin and burning sensation   • Codeine Dizziness and Nausea Only     Rapid heart rate, dizziness  NAUSEA   • Mobic [Meloxicam] Rash   • Morphine And Related Itching     Current Scheduled Medications:     amitriptyline 50 mg Oral Nightly   cephalexin 1,000 mg Oral Q8H   DULoxetine 60 mg Oral Q12H   enoxaparin 40 mg Subcutaneous Q24H   flecainide 100 mg Oral Q12H   fluconazole 150 mg Oral Weekly   fluticasone 2 spray Each Nare Daily   gabapentin 800 mg Oral Q8H   levothyroxine 137 mcg Oral Q AM   metoprolol tartrate 12.5 mg Oral Q12H   pantoprazole 40 mg Oral BID AC   rOPINIRole 3 mg Oral Q12H   sodium chloride 3 mL Intravenous Q12H     Current PRN Medications:  •  acetaminophen  •  diphenhydrAMINE  •  LORazepam  •  ondansetron  •  oxyCODONE-acetaminophen  •  [COMPLETED] Insert peripheral IV **AND** sodium chloride  •  sodium chloride  •  traMADol    Review of Systems see HPI    Vital Signs:  /71 (BP Location: Right arm, Patient Position: Lying)   Pulse 60   Temp 97.6 °F (36.4 °C) (Axillary)   Resp 16   Ht 170.2 cm (67.01\")   Wt 127 kg (280 lb 3.2 oz)   LMP  (LMP Unknown)   SpO2 100%   BMI 43.88 kg/m²     Physical Exam  Vital signs reviewed.  Right thigh wound VAC in place medially.  Less surrounding induration.  No " significant erythema.  No warmth or fluctuance.  Right knee without swelling, warmth, or effusion.    Lab Results:  CBC: Results from last 7 days   Lab Units 02/01/19  0507 01/31/19  0439 01/30/19  0454 01/29/19  0749 01/28/19  0617 01/27/19  0439 01/26/19  0446   WBC 10*3/mm3 8.74 9.49 11.83* 9.96 9.08 11.48* 14.55*   HEMOGLOBIN g/dL 11.3* 10.6* 11.2* 10.6* 10.5* 11.8* 11.4*   HEMATOCRIT % 36.2* 34.3* 36.2* 34.1* 33.1* 37.1 35.9*   PLATELETS 10*3/mm3 320 306 315 240 207 204 196     BMP:  Results from last 7 days   Lab Units 02/01/19  0507 01/31/19  0439 01/30/19  0454 01/29/19  0749 01/28/19  0617 01/27/19  0439 01/26/19  0446   SODIUM mmol/L 140 140 140 142 138 136 136   POTASSIUM mmol/L 4.0 4.2 4.1 4.6 4.0 3.9 4.3   CHLORIDE mmol/L 104 107 106 108 106 104 99   CO2 mmol/L 31.0 28.0 27.0 27.0 23.0* 25.0 25.0   BUN mg/dL 22* 27* 25* 23* 16 16 17   CREATININE mg/dL 0.86 0.87 0.81 0.82 0.74 0.79 0.77   GLUCOSE mg/dL 91 85 87 129* 122* 143* 105*   CALCIUM mg/dL 9.1 8.9 8.9 8.8 8.2* 8.0* 8.3*     Culture Results:    Radiology: None  Additional Studies Reviewed: None    Impression:   1.  Secondarily infected right thigh hematoma-Staphylococcus lugdanensis recovered on culture  2.  History right knee infection with MSSA-no current findings to suggest recurrent or persistent and infection involving the right knee    Recommendations:   Okay with me for discharge home  Continue cephalexin 1 g orally every 8 hours for 2 weeks  Follow-up appointment in 3 weeks  Would be happy to see sooner if any new or worsening symptoms concerning for infection  Discussed with Dr. Shook this morning  Will sign off  Please call if additional questions for infectious diseases    Chente Urena MD

## 2019-02-01 NOTE — NURSING NOTE
Pt alert and oriented x4. No c/o pain. VSS. Wound vac in place to R leg. Discharge teaching done. Discharged with home health.

## 2019-02-01 NOTE — PROGRESS NOTES
Continued Stay Note  DOMINGO Johnson     Patient Name: Danisha Cannon  MRN: [id number]  Today's Date: 2/1/2019    Admit Date: 1/24/2019    Discharge Plan     Row Name 02/01/19 1503       Plan    Plan  [facility] / [facility] wound vac    Patient/Family in Agreement with Plan  yes    Final Discharge Disposition Code  06 - home with home health care    Final Note  ANDIE checked [employer] again re status and home vac unit is approved. ANDIE completed placement paperwork with patient and faxed back to [facility] at [phone]. ANDIE notified charge nurse and PTA. Plan discharge today.        Expected Discharge Date and Time     Expected Discharge Date Expected Discharge Time    Feb 1, 2019             FRANKIE Hernandez

## 2019-02-01 NOTE — PLAN OF CARE
Problem: Patient Care Overview  Goal: Plan of Care Review  Outcome: Ongoing (interventions implemented as appropriate)   02/01/19 8499   Coping/Psychosocial   Plan of Care Reviewed With patient   Plan of Care Review   Progress no change   OTHER   Outcome Summary Med x1 for pain with stated relief. No neuro changes, no confusion. RLE still edematous, though redness improved. Wound vac in place and patent. Walking well with rolling walker and assist x1.        Problem: Fall Risk (Adult)  Goal: Absence of Fall  Outcome: Ongoing (interventions implemented as appropriate)      Problem: Skin and Soft Tissue Infection (Adult)  Goal: Signs and Symptoms of Listed Potential Problems Will be Absent, Minimized or Managed (Skin and Soft Tissue Infection)  Outcome: Ongoing (interventions implemented as appropriate)

## 2019-02-02 ENCOUNTER — READMISSION MANAGEMENT (OUTPATIENT)
Dept: CALL CENTER | Facility: HOSPITAL | Age: 64
End: 2019-02-02

## 2019-02-02 LAB
BACTERIA SPEC ANAEROBE CULT: NORMAL
BACTERIA SPEC ANAEROBE CULT: NORMAL

## 2019-02-03 NOTE — OUTREACH NOTE
Prep Survey      Responses   Facility patient discharged from?  [place]   Is patient eligible?  Yes   Discharge diagnosis  Right thigh traumatic hematoma with subsequent secondary infection and abscess, altered mental status metabolic in nature likely related to infection   Does the patient have one of the following disease processes/diagnoses(primary or secondary)?  General Surgery   Does the patient have Home health ordered?  Yes   What is the Home health agency?   [facility] / [facility] wound vac   Is there a DME ordered?  Yes   What DME was ordered?  wound vac   General alerts for this patient  INCISION AND DRAINAGE OF RIGHT THIGH HEMATOMA   Prep survey completed?  Yes          Jazzy Arshad RN

## 2019-02-03 NOTE — THERAPY DISCHARGE NOTE
Acute Care - Physical Therapy Discharge Summary  [facility]       Patient Name: Danisha Cannon  : 1955  MRN: [id number]    Today's Date: 2/3/2019  Onset of Illness/Injury or Date of Surgery: 19    Date of Referral to PT: 19  Referring Physician: Dr. Stern      Admit Date: 2019      PT Recommendation and Plan    Visit Dx:    ICD-10-CM ICD-9-CM   1. Altered mental status, unspecified altered mental status type R41.82 780.97   2. Traumatic hematoma of right thigh, subsequent encounter S70.11XD V58.89     924.00   3. Impaired functional mobility, balance, gait, and endurance Z74.09 V49.89   4. 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 Obstructive sleep apnea G47.33 327.23   6. Paroxysmal A-fib (CMS/[facility]) I48.0 427.31   7. Abscess of right thigh L02.415 682.6   8. Bilateral edema of lower extremity R60.0 782.3   9. Infection of right knee (CMS/[facility]) M00.9 686.9             Therapy Suggested Charges     Code   Minutes Charges    87453 (CPT®) Hc Pt Neuromusc Re Education Ea 15 Min      18031 (CPT®) Hc Pt Ther Proc Ea 15 Min      26225 (CPT®) Hc Gait Training Ea 15 Min 33 2    71367 (CPT®) Hc Pt Therapeutic Act Ea 15 Min      99533 (CPT®) Hc Pt Manual Therapy Ea 15 Min      59993 (CPT®) Hc Pt Iontophoresis Ea 15 Min      15614 (CPT®) Hc Pt Elec Stim Ea-Per 15 Min      88330 (CPT®) Hc Pt Ultrasound Ea 15 Min      85008 (CPT®) Hc Pt Self Care/Mgmt/Train Ea 15 Min      99263 (CPT®) Hc Pt Prosthetic (S) Train Initial Encounter, Each 15 Min      12090 (CPT®) Hc Pt Orthotic(S)/Prosthetic(S) Encounter, Each 15 Min      09458 (CPT®) Hc Orthotic(S) Mgmt/Train Initial Encounter, Each 15min      Total  33 2          Rehab Goal Summary     Row Name 19 1129             Physical Therapy Goals    Bed Mobility Goal Selection (PT)  bed mobility, PT goal 1  -KJ      Transfer Goal Selection (PT)  transfer, PT goal 1  -KJ      Gait Training Goal Selection (PT)  gait training, PT goal 1  -KJ      Wound Care  Goal Selection (PT)  wound care, PT goal 1;wound care, PT goal 2;wound care, PT goal 3  -KJ         Bed Mobility Goal 1 (PT)    Activity/Assistive Device (Bed Mobility Goal 1, PT)  bed mobility activities, all  -KJ      [place] Level/Cues Needed (Bed Mobility Goal 1, PT)  supervision required  -KJ      Time Frame (Bed Mobility Goal 1, PT)  10 days  -KJ      Progress/Outcomes (Bed Mobility Goal 1, PT)  goal ongoing  -KJ         Transfer Goal 1 (PT)    Activity/Assistive Device (Transfer Goal 1, PT)  transfers, all  -KJ      [place] Level/Cues Needed (Transfer Goal 1, PT)  supervision required  -KJ      Time Frame (Transfer Goal 1, PT)  10 days  -KJ      Progress/Outcome (Transfer Goal 1, PT)  goal ongoing  -KJ         Gait Training Goal 1 (PT)    Activity/Assistive Device (Gait Training Goal 1, PT)  gait (walking locomotion);assistive device use;decrease fall risk;diminish gait deviation;forward stepping;improve balance and speed;increase endurance/gait distance;increase energy conservation;turning, left;turning, right;walker, rolling;other (see comments) 3-wheeled walker  -KJ      [place] Level (Gait Training Goal 1, PT)  supervision required  -KJ      Distance (Gait Goal 1, PT)  200 ft  -KJ      Time Frame (Gait Training Goal 1, PT)  10 days  -KJ      Progress/Outcome (Gait Training Goal 1, PT)  goal ongoing  -KJ         Wound Care Goal 1 (PT)    Wound Care Goal 1 (PT)  R thigh wound will decrease in depth of undermining to no deeper than 5 cm all the way around  -KJ      Time Frame (Wound Care Goal 1, PT)  long term goal (LTG);by discharge  -KJ      Progress/Outcome (Wound Care Goal 1, PT)  goal not met  -KJ         Wound Care Goal 2 (PT)    Wound Care Goal 2 (PT)  R thigh wound will decrease in width by 1 cm from 4 cm to 3 cm  -KJ      Time Frame (Wound Care Goal 2, PT)  long term goal (LTG);by discharge  -KJ      Progress/Outcome (Wound Care Goal 2, PT)  goal not met  -KJ         Wound Care Goal 3  (PT)    Wound Care Goal 3 (PT)  R thigh wound will decrease in length by 1 cm to 3.5 cm  -KJ      Time Frame (Wound Care Goal 3, PT)  long term goal (LTG);by discharge  -KJ      Progress/Outcome (Wound Care Goal 3, PT)  goal not met  -KJ         Patient Education Goal (PT)    Activity (Patient Education Goal, PT)  Pt will voice understanding of activities to prevent post-op complications.  -KJ      [place]/Cues/Accuracy (Memory Goal 2, PT)  verbalizes understanding;demonstrates adequately;independent  -KJ      Time Frame (Patient Education Goal, PT)  10 days  -KJ      Progress/Outcome (Patient Education Goal, PT)  goal met  -KJ        User Key  (r) = Recorded By, (t) = Taken By, (c) = Cosigned By    Initials Name Provider Type Discipline    Sophia Butts, PTA Physical Therapy Assistant PT              PT Discharge Summary  Anticipated Discharge Disposition (PT): home  Reason for Discharge: Discharge from facility  Outcomes Achieved: Refer to plan of care for updates on goals achieved  Discharge Destination: Home with home health, Home      Sophia Limon PTA   2/3/2019

## 2019-02-04 ENCOUNTER — READMISSION MANAGEMENT (OUTPATIENT)
Dept: CALL CENTER | Facility: HOSPITAL | Age: 64
End: 2019-02-04

## 2019-02-04 NOTE — OUTREACH NOTE
General Surgery Week 1 Survey      Responses   Facility patient discharged from?  [place]   Does the patient have one of the following disease processes/diagnoses(primary or secondary)?  General Surgery   Is there a successful TCM telephone encounter documented?  No   Week 1 attempt successful?  Yes   Call start time  1637   Call end time  1639   General alerts for this patient  INCISION AND DRAINAGE OF RIGHT THIGH HEMATOMA   Meds reviewed with patient/caregiver?  Yes   Is the patient having any side effects they believe may be caused by any medication additions or changes?  No   Does the patient have all medications related to this admission filled (includes all antibiotics, pain medications, etc.)  Yes   Is the patient taking all medications as directed (includes completed medication regime)?  Yes   Medication comments  pcp on wed   Does the patient have a follow up appointment scheduled with their surgeon?  Yes   Has the patient kept scheduled appointments due by today?  Yes   What is the Home health agency?   [facility] / [facility] wound vac   Has home health visited the patient within 72 hours of discharge?  Yes   What DME was ordered?  wound vac   Psychosocial issues?  No   Did the patient receive a copy of their discharge instructions?  Yes   Nursing interventions  Reviewed instructions with patient   What is the patient's perception of their health status since discharge?  Improving   Nursing interventions  Nurse provided patient education   Is the patient /caregiver able to teach back basic post-op care?  Continue use of incentive spirometry at least 1 week post discharge, Practice 'cough and deep breath'   Is the patient/caregiver able to teach back signs and symptoms of incisional infection?  Increased redness, swelling or pain at the incisonal site, Increased drainage or bleeding, Incisional warmth, Pus or odor from incision, Fever   Is the patient/caregiver able to teach back steps to recovery at  home?  Make a list of questions for surgeon's appointment, Set small, achievable goals for return to baseline health   Is the patient/caregiver able to teach back the hierarchy of who to call/visit for symptoms/problems? PCP, Specialist, Home health nurse, Urgent Care, ED, 911  Yes   Week 1 call completed?  Yes          Jyoti Ness, RN

## 2019-02-12 ENCOUNTER — READMISSION MANAGEMENT (OUTPATIENT)
Dept: CALL CENTER | Facility: HOSPITAL | Age: 64
End: 2019-02-12

## 2019-02-12 NOTE — OUTREACH NOTE
General Surgery Week 2 Survey      Responses   Facility patient discharged from?  [place]   Does the patient have one of the following disease processes/diagnoses(primary or secondary)?  General Surgery   Week 2 attempt successful?  No   Unsuccessful attempts  Attempt 1          Krystina Sharp RN

## 2019-02-14 ENCOUNTER — READMISSION MANAGEMENT (OUTPATIENT)
Dept: CALL CENTER | Facility: HOSPITAL | Age: 64
End: 2019-02-14

## 2019-02-14 ENCOUNTER — TELEPHONE (OUTPATIENT)
Dept: VASCULAR SURGERY | Facility: CLINIC | Age: 64
End: 2019-02-14

## 2019-02-14 NOTE — TELEPHONE ENCOUNTER
I called the patient to remind her of her appt here in the office tomorrow with Dr. Stern.  She was aware of this appt and confirmed.

## 2019-02-14 NOTE — OUTREACH NOTE
General Surgery Week 2 Survey      Responses   Facility patient discharged from?  [place]   Does the patient have one of the following disease processes/diagnoses(primary or secondary)?  General Surgery   Week 2 attempt successful?  No   Unsuccessful attempts  Attempt 2          Jyoti Ness RN

## 2019-02-15 ENCOUNTER — OFFICE VISIT (OUTPATIENT)
Dept: VASCULAR SURGERY | Facility: CLINIC | Age: 64
End: 2019-02-15

## 2019-02-15 VITALS
DIASTOLIC BLOOD PRESSURE: 66 MMHG | WEIGHT: 280 LBS | OXYGEN SATURATION: 99 % | HEIGHT: 67 IN | HEART RATE: 64 BPM | SYSTOLIC BLOOD PRESSURE: 108 MMHG | BODY MASS INDEX: 43.95 KG/M2

## 2019-02-15 DIAGNOSIS — S70.11XD TRAUMATIC HEMATOMA OF RIGHT THIGH, SUBSEQUENT ENCOUNTER: ICD-10-CM

## 2019-02-15 DIAGNOSIS — R60.0 BILATERAL EDEMA OF LOWER EXTREMITY: Primary | ICD-10-CM

## 2019-02-15 PROCEDURE — 99024 POSTOP FOLLOW-UP VISIT: CPT | Performed by: SURGERY

## 2019-02-15 RX ORDER — VENLAFAXINE HYDROCHLORIDE 75 MG/1
CAPSULE, EXTENDED RELEASE ORAL
Refills: 2 | COMMUNITY
Start: 2018-11-23 | End: 2021-04-21

## 2019-02-15 RX ORDER — TRIAMCINOLONE ACETONIDE 0.1 %
PASTE (GRAM) DENTAL SEE ADMIN INSTRUCTIONS
Refills: 5 | COMMUNITY
Start: 2019-01-13 | End: 2021-06-16 | Stop reason: SDUPTHER

## 2019-02-15 NOTE — PATIENT INSTRUCTIONS

## 2019-02-15 NOTE — PROGRESS NOTES
02/15/2019      Juan Carlos Sparks MD  [address]  [facility] [address]        Danisha Cannon  1955    Chief Complaint   Patient presents with   • Post-op     2 wk po I&D.  Pt states that she is doing well.       Dear Juan Carlos Sparks MD:    HPI     I had the pleasure of seeing you patient in the office today for follow up.  As you recall, the patient is a 63 y.o. female who we are currently following for an infected right thigh hematoma.  I had previously seen her in the office after she had had a trauma that caused a right thigh hematoma which we were managing conservatively.  However she had a recent hospitalization 2 weeks ago for sepsis and had concerning signs for infected hematoma/abscess at that site.  She was ultimately taken to the operating room and had drainage of an infected hematoma/abscess to that site in the right upper thigh with ultimate wound VAC placement.  She was seen by ID and discharged on p.o. antibiotics which she is due to finish in the next few days.  She returns to the office today for continued evaluation.  She reports that the wound VAC changes have been going well at home with home health.  She has noted a significantly decreased size of the wound from the time of hospitalization.  She denies any pain at the site.  She denies any recent fevers or chills.  She otherwise is at her baseline level of health with no other complaints.      Review of Systems   Constitutional: Negative.  Negative for activity change, appetite change, chills, diaphoresis, fatigue and fever.   HENT: Negative.  Negative for congestion, sinus pressure, sore throat and trouble swallowing.    Eyes: Negative.  Negative for visual disturbance.   Respiratory: Negative.  Negative for chest tightness and shortness of breath.    Cardiovascular: Negative for chest pain, palpitations and leg swelling (Visible hematoma presents to the right medial thigh).   Gastrointestinal: Negative.  Negative for  "abdominal distention.   Endocrine: Negative.    Genitourinary: Negative.    Musculoskeletal: Negative.    Skin: Negative.    Allergic/Immunologic: Negative.    Neurological: Negative.    Hematological: Negative.    Psychiatric/Behavioral: Negative.        /66   Pulse 64   Ht 170.2 cm (67\")   Wt 127 kg (280 lb)   LMP  (LMP Unknown)   SpO2 99%   BMI 43.85 kg/m²   Physical Exam   Constitutional: She is oriented to person, place, and time. She appears well-developed and well-nourished.   HENT:   Head: Normocephalic and atraumatic.   Eyes: Conjunctivae and EOM are normal. Pupils are equal, round, and reactive to light.   Neck: Normal range of motion. Neck supple. No JVD present.   Cardiovascular: Normal rate, regular rhythm, intact distal pulses and normal pulses.   Pulses:       Carotid pulses are 2+ on the right side, and 2+ on the left side.       Radial pulses are 2+ on the right side, and 2+ on the left side.        Femoral pulses are 2+ on the right side, and 2+ on the left side.       Popliteal pulses are 2+ on the right side, and 2+ on the left side.        Dorsalis pedis pulses are 2+ on the right side, and 2+ on the left side.        Posterior tibial pulses are 2+ on the right side, and 2+ on the left side.   Pulmonary/Chest: Effort normal. No respiratory distress.   Abdominal: Soft. Bowel sounds are normal. She exhibits no distension and no mass. There is no tenderness.   Musculoskeletal: Normal range of motion. She exhibits edema (Lower extremity edema is significantly improved with the continued use of compression stockings.  She now only has minimal edema of the bilateral lower extremities.).   Neurological: She is alert and oriented to person, place, and time. No sensory deficit. She exhibits normal muscle tone.   Skin: Skin is warm and dry. Capillary refill takes less than 2 seconds.   There is a VAC in place to the right thigh incision site which we removed in the office.  The wound has " significantly decreased in size and today measures approximately 5 x 2-1/2 cm.  There is excellent granulation tissue present in the base of the wound.  There is no surrounding erythema and no fluctuance noted.  The area is nontender.    There are varicosities present of the bilateral lower extremities mainly around the proximal posterior calves bilaterally   Psychiatric: She has a normal mood and affect. Her behavior is normal. Judgment and thought content normal.   Vitals reviewed.      DIAGNOSTIC DATA:    Ct Head Without Contrast    Result Date: 1/25/2019  Narrative: EXAM: CT OF THE HEAD WITHOUT IV CONTRAST 1/25/2019  COMPARISON: None  INDICATION: Female, 63 years-old. Fall, confusion, loss of consciousness.  PROCEDURE: Non contrast enhanced head CT was performed.  Radiation dose equals  mGy-cm.  Automated exposure control dose reduction technique was implemented.  FINDINGS: Ventricles and cerebrospinal fluid spaces are normal in size and configuration for the patient's age. There is no evidence of mass-effect or midline shift. The gray-white differentiation is preserved.  There is no evidence of intracranial contusion, hemorrhage, or skull fracture. The visualized portions of the paranasal sinuses and mastoid air cells are unremarkable.      Impression: 1.   No acute intracranial abnormality. This report was finalized on 01/25/2019 07:40 by Dr. Viri Chu MD.    Ct Chest With Contrast    Result Date: 1/28/2019  Narrative: EXAM: CT CHEST W CONTRAST- - 1/28/2019 8:06 AM CST  HISTORY: Abn findings on diagnostic imaging of lung; R41.82-Altered mental status, unspecified   COMPARISON: 1/25/2019.  DOSE LENGTH PRODUCT: 261 mGy cm. Automated exposure control was also utilized to decrease patient radiation dose.  TECHNIQUE: Enhanced CT images of the chest obtained with multiplanar reformats.  FINDINGS:  AIRWAYS/PULMONARY PARENCHYMA: The central airways are midline and patent. No suspicious pulmonary mass or  nodule. No pleural effusion or pneumothorax.  CARDIAC:  Normal heart size.  No pericardial effusion.  No coronary artery calcifications by CT.  VASCULATURE: Thoracic aorta is normal in course and caliber. Mild calcified aortic atherosclerosis. Pulmonary artery enlarged measuring 3.6 cm in transverse dimension, may be partially artifactual, but enlargement could be due to pulmonary artery hypertension.  MEDIASTINUM: There is no mediastinal or hilar lymphadenopathy by CT size criteria. Esophagus has normal coarse, caliber and wall thickness.  EXTRATHORACIC: The visualized portions of the thyroid gland are unremarkable. No thoracic inlet or axillary adenopathy. The extrathoracic soft tissues are within normal limits.  INCLUDED UPPER ABDOMEN: Cholecystectomy clips. The partially visualized liver, spleen, adrenal glands are within normal limits. Fatty infiltration of the pancreas.  OSSEOUS: Old left-sided rib fractures, one of which likely correlates with the nodular opacity on prior chest radiograph 1/25/2019.       Impression: 1. Nodular opacity on prior radiograph correlates with old rib fracture. No suspicious pulmonary finding. 2. Enlarged pulmonary artery, may be partially artifactual due to motion, but true enlargement can be due to pulmonary artery hypertension. This report was finalized on 01/28/2019 08:28 by Dr Gris Fernandes MD.    Xr Chest 1 View    Result Date: 1/28/2019  Narrative: XR CHEST 1 VW- 1/28/2019 4:56 PM CST  HISTORY: Respiratory distress; R41.82-Altered mental status, unspecified; S70.11XD-Contusion of right thigh, subsequent encounter; Z74.09-Other reduced mobility   COMPARISON: 1/25/2019.  FINDINGS: Opacities are seen in the medial RIGHT lung base. Stable granulomatous calcifications are noted on the LEFT. The cardiomediastinal silhouette and pulmonary vascularity are unchanged.  The osseous structures and surrounding soft tissues demonstrate no acute abnormality.      Impression: 1. RIGHT  "middle lobe opacities are likely due to pneumonia.   This report was finalized on 01/28/2019 17:02 by Dr. Giovani Amato MD.    Xr Chest 1 View    Result Date: 1/25/2019  Narrative: EXAM: XR CHEST 1 VW- - 1/25/2019 12:06 AM CST  HISTORY: fever, shortness of breath  COMPARISON: None.  TECHNIQUE:  1 images.  Frontal view of the chest.  FINDINGS:  There is a 2.6 cm nodular opacity at the left lung base. No pneumothorax or pleural effusion. Borderline prominent cardiac silhouette. No acute bony finding.       Impression: 1. There is a 2.6 cm nodular opacity at the left lung base. This could represent a structure external to the patient or a pulmonary nodule. Options for follow-up include repeat chest x-ray with external structures removed or CT chest.  Chart review indicates the patient was admitted to the hospital overnight.  Exam was tagged in PACS with \"new lung findings\" to assist in appropriate follow up. This report was finalized on 01/25/2019 07:49 by Dr Gris Fernandes MD.      Patient Active Problem List   Diagnosis   • Abdominal adhesions   • Abnormal echocardiogram   • Abnormal Holter exam   • Atrial flutter, paroxysmal (CMS/HCC)   • Bilateral edema of lower extremity   • Chest pain   • Cholecystitis with cholelithiasis   • Chronic right-sided low back pain with sciatica   • Class 3 obesity due to excess calories with serious comorbidity in adult   • Facet syndrome, lumbar   • Heart rate fast   • Idiopathic chronic gout without tophus   • Idiopathic hypotension   • Idiopathic progressive neuropathy   • Insomnia   • Lumbar facet arthropathy   • Memory loss   • MRSA carrier   • Obstructive sleep apnea   • Paroxysmal A-fib (CMS/HCC)   • Pilonidal cyst   • Primary osteoarthritis of right knee   • Recurrent ventral incisional hernia   • Restless leg syndrome   • Sciatica of right side   • Shortness of breath   • Sick sinus syndrome (CMS/HCC)   • Sleep apnea   • Sleep apnea, obstructive   • Snoring   • Somnolence, " daytime   • Spondylosis of lumbar region without myelopathy or radiculopathy   • Tear of medial meniscus of right knee, current   • Total knee replacement status, right   • Tremor   • Umbilical hernia   • Morbid (severe) obesity due to excess calories (CMS/HCC)   • 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 Bilateral edema of lower extremity R60.0 782.3   2. Traumatic hematoma of right thigh, subsequent encounter S70.11XD V58.89     924.00           PLAN: After thoroughly evaluating Danisha aCnnon, I believe the best course of action is to remain conservative from a vascular standpoint.  She has done very well since drainage of the infected hematoma/abscess.  She continues with a wound VAC to the site.  We did remove the wound VAC in the office today to assess the wound and wet-to-dry dressing was placed.  She will have home health coming to replace the VAC later today.  I will ultimately refer her to wound care for continued care of this area and I will likely see her over there.  I expect that within the next week the VAC will be able to be removed as the wound is much smaller in size.  She should otherwise continue her course of antibiotics out to completion.  The patient is to continue taking their medications as previously discussed.   I did discuss vascular risk factors as they pertain to the progression of vascular disease including controlling hypertension. Patient's Body mass index is 43.85 kg/m². BMI is above normal parameters. Recommendations include: educational material.  I will plan to see her back here in my vascular office in 3 months.  At that time once her wound is completely healed we will have further discussion regarding workup for possible venous reflux as she does have continued lower extremity edema  and signs of venous insufficiency.   This was all discussed in full with complete understanding.  Thank you for allowing me to participate in the care of your patient.  Please do not hesitate to call with any questions or concerns.  We will keep you aware of any further encounters with Danisha Cannon.      Sincerely Yours,      Nino Stern MD

## 2019-02-16 ENCOUNTER — READMISSION MANAGEMENT (OUTPATIENT)
Dept: CALL CENTER | Facility: HOSPITAL | Age: 64
End: 2019-02-16

## 2019-02-16 NOTE — OUTREACH NOTE
General Surgery Week 3 Survey      Responses   Facility patient discharged from?  [place]   Does the patient have one of the following disease processes/diagnoses(primary or secondary)?  General Surgery   Week 3 attempt successful?  No   Unsuccessful attempts  Attempt 1          Reji Flores RN

## 2019-02-18 ENCOUNTER — READMISSION MANAGEMENT (OUTPATIENT)
Dept: CALL CENTER | Facility: HOSPITAL | Age: 64
End: 2019-02-18

## 2019-02-18 NOTE — OUTREACH NOTE
General Surgery Week 3 Survey      Responses   Facility patient discharged from?  [place]   Does the patient have one of the following disease processes/diagnoses(primary or secondary)?  General Surgery   Week 3 attempt successful?  No   Unsuccessful attempts  Attempt 2          Jyoti Ness RN

## 2019-02-22 ENCOUNTER — TELEPHONE (OUTPATIENT)
Dept: VASCULAR SURGERY | Facility: CLINIC | Age: 64
End: 2019-02-22

## 2019-02-22 NOTE — TELEPHONE ENCOUNTER
Rose called and stated that the patient's wound vac wouldn't stay on any longer because there was no longer an depth to the patient's wound.  Dr. Stern stated that it would be okay for the patient to discontinue using the wound vac and begin wet to dry dressings to the area.  She was notified.

## 2019-02-25 ENCOUNTER — OFFICE VISIT (OUTPATIENT)
Dept: WOUND CARE | Facility: HOSPITAL | Age: 64
End: 2019-02-25

## 2019-02-25 PROCEDURE — G0463 HOSPITAL OUTPT CLINIC VISIT: HCPCS

## 2019-03-06 ENCOUNTER — OFFICE VISIT (OUTPATIENT)
Dept: WOUND CARE | Facility: HOSPITAL | Age: 64
End: 2019-03-06

## 2019-03-07 ENCOUNTER — TRANSCRIBE ORDERS (OUTPATIENT)
Dept: ADMINISTRATIVE | Facility: HOSPITAL | Age: 64
End: 2019-03-07

## 2019-03-07 DIAGNOSIS — R06.02 SHORTNESS OF BREATH: Primary | ICD-10-CM

## 2019-03-11 ENCOUNTER — HOSPITAL ENCOUNTER (OUTPATIENT)
Dept: PULMONOLOGY | Facility: HOSPITAL | Age: 64
Discharge: HOME | End: 2019-03-11
Admitting: FAMILY MEDICINE

## 2019-03-11 DIAGNOSIS — R06.02 SHORTNESS OF BREATH: ICD-10-CM

## 2019-03-11 LAB
FUNGUS WND CULT: NORMAL
FUNGUS WND CULT: NORMAL
MYCOBACTERIUM SPEC CULT: NORMAL
MYCOBACTERIUM SPEC CULT: NORMAL
NIGHT BLUE STAIN TISS: NORMAL

## 2019-03-11 PROCEDURE — 94727 GAS DIL/WSHOT DETER LNG VOL: CPT | Performed by: INTERNAL MEDICINE

## 2019-03-11 PROCEDURE — 94729 DIFFUSING CAPACITY: CPT | Performed by: INTERNAL MEDICINE

## 2019-03-11 PROCEDURE — 94729 DIFFUSING CAPACITY: CPT

## 2019-03-11 PROCEDURE — 94727 GAS DIL/WSHOT DETER LNG VOL: CPT

## 2019-03-11 PROCEDURE — 94010 BREATHING CAPACITY TEST: CPT

## 2019-03-11 PROCEDURE — 94010 BREATHING CAPACITY TEST: CPT | Performed by: INTERNAL MEDICINE

## 2019-03-15 ENCOUNTER — OFFICE VISIT (OUTPATIENT)
Dept: WOUND CARE | Facility: HOSPITAL | Age: 64
End: 2019-03-15

## 2019-03-15 PROCEDURE — 17250 CHEM CAUT OF GRANLTJ TISSUE: CPT

## 2019-03-16 DIAGNOSIS — G60.3 IDIOPATHIC PROGRESSIVE NEUROPATHY: ICD-10-CM

## 2019-03-18 RX ORDER — GABAPENTIN 800 MG/1
TABLET ORAL
Qty: 90 TABLET | Refills: 5 | Status: SHIPPED | OUTPATIENT
Start: 2019-03-18 | End: 2019-09-24 | Stop reason: SDUPTHER

## 2019-03-22 ENCOUNTER — OFFICE VISIT (OUTPATIENT)
Dept: WOUND CARE | Facility: HOSPITAL | Age: 64
End: 2019-03-22

## 2019-03-22 PROCEDURE — 17250 CHEM CAUT OF GRANLTJ TISSUE: CPT

## 2019-03-27 ENCOUNTER — OFFICE VISIT (OUTPATIENT)
Dept: WOUND CARE | Facility: HOSPITAL | Age: 64
End: 2019-03-27

## 2019-03-27 PROCEDURE — G0463 HOSPITAL OUTPT CLINIC VISIT: HCPCS

## 2019-05-06 RX ORDER — AMITRIPTYLINE HYDROCHLORIDE 25 MG/1
TABLET, FILM COATED ORAL
Qty: 180 TABLET | Refills: 1 | Status: SHIPPED | OUTPATIENT
Start: 2019-05-06 | End: 2019-06-18 | Stop reason: SDUPTHER

## 2019-05-16 RX ORDER — VENLAFAXINE HYDROCHLORIDE 75 MG/1
CAPSULE, EXTENDED RELEASE ORAL
Qty: 90 CAPSULE | Refills: 3 | Status: SHIPPED | OUTPATIENT
Start: 2019-05-16 | End: 2020-05-04

## 2019-06-18 RX ORDER — AMITRIPTYLINE HYDROCHLORIDE 25 MG/1
TABLET, FILM COATED ORAL
Qty: 180 TABLET | Refills: 3 | Status: SHIPPED | OUTPATIENT
Start: 2019-06-18 | End: 2020-05-04

## 2019-07-09 ENCOUNTER — OFFICE VISIT (OUTPATIENT)
Dept: NEUROLOGY | Age: 64
End: 2019-07-09
Payer: MEDICARE

## 2019-07-09 VITALS
DIASTOLIC BLOOD PRESSURE: 77 MMHG | HEIGHT: 66 IN | SYSTOLIC BLOOD PRESSURE: 134 MMHG | WEIGHT: 275 LBS | RESPIRATION RATE: 20 BRPM | BODY MASS INDEX: 44.2 KG/M2 | HEART RATE: 78 BPM

## 2019-07-09 DIAGNOSIS — G25.81 RESTLESS LEG SYNDROME: ICD-10-CM

## 2019-07-09 DIAGNOSIS — G89.29 CHRONIC RIGHT-SIDED LOW BACK PAIN WITH RIGHT-SIDED SCIATICA: ICD-10-CM

## 2019-07-09 DIAGNOSIS — G60.3 IDIOPATHIC PROGRESSIVE NEUROPATHY: Primary | ICD-10-CM

## 2019-07-09 DIAGNOSIS — G47.33 OBSTRUCTIVE SLEEP APNEA: ICD-10-CM

## 2019-07-09 DIAGNOSIS — M54.41 CHRONIC RIGHT-SIDED LOW BACK PAIN WITH RIGHT-SIDED SCIATICA: ICD-10-CM

## 2019-07-09 DIAGNOSIS — R41.3 MEMORY LOSS: ICD-10-CM

## 2019-07-09 PROCEDURE — G8427 DOCREV CUR MEDS BY ELIG CLIN: HCPCS | Performed by: PSYCHIATRY & NEUROLOGY

## 2019-07-09 PROCEDURE — 99214 OFFICE O/P EST MOD 30 MIN: CPT | Performed by: PSYCHIATRY & NEUROLOGY

## 2019-07-09 PROCEDURE — G8399 PT W/DXA RESULTS DOCUMENT: HCPCS | Performed by: PSYCHIATRY & NEUROLOGY

## 2019-07-09 PROCEDURE — 4040F PNEUMOC VAC/ADMIN/RCVD: CPT | Performed by: PSYCHIATRY & NEUROLOGY

## 2019-07-09 PROCEDURE — 1090F PRES/ABSN URINE INCON ASSESS: CPT | Performed by: PSYCHIATRY & NEUROLOGY

## 2019-07-09 PROCEDURE — 1036F TOBACCO NON-USER: CPT | Performed by: PSYCHIATRY & NEUROLOGY

## 2019-07-09 PROCEDURE — 3017F COLORECTAL CA SCREEN DOC REV: CPT | Performed by: PSYCHIATRY & NEUROLOGY

## 2019-07-09 PROCEDURE — 1123F ACP DISCUSS/DSCN MKR DOCD: CPT | Performed by: PSYCHIATRY & NEUROLOGY

## 2019-07-09 PROCEDURE — G8417 CALC BMI ABV UP PARAM F/U: HCPCS | Performed by: PSYCHIATRY & NEUROLOGY

## 2019-07-09 NOTE — PROGRESS NOTES
[facility]  [address] [place] [address], [address] [place] [address],[address]  Morteza Peterson  Phone [phone]  Fax [phone]     [facility] Follow Up Encounter  2019 11:18 AM    Information:   Patient Name: Korin Nichole  :   1955  Age:   75 y.o. MRN:   [id number]  Account #:  [de-identified]  Today:  19    Provider: Blessing Tate M.D. Chief Complaint:   Chief Complaint   Patient presents with    6 Month Follow-Up       Subjective: Korin Nichole is a 75 y.o. woman with a history of peripheral neuropathy, chronic back and right leg pain, DEVEN, and forgetfulness who is following up. She has a stress fracture in the left 4th metatarsal.  She says her peripheral neuropathy is so bad that it does not hurt. She relays that she lived in [place] [address] within 200 feet of [place] for [place] years and moved here in . That large strawberry field used methyl bromide every spring as a soil fumigant. She is is worried that her PN may be related to that. She is also concerned about daytime drowsiness. She is concerned she may have narcolepsy. She says she gets enough sleep at night. She uses her BiPAP nightly she says. She takes several sedating medications - gabapentin, lorazepam, tizanidine, amitriptyline, Requip. She is very drowsy in the daytime and falls asleep when sedentary. She is planning cardio ablation for her Afib and SVT.       Objective:     Past Medical History:  Past Medical History:   Diagnosis Date    Abdominal adhesions 2016    Anxiety     Arthritis     Atrial fibrillation (HCC)     Atrial fibrillation and flutter (HCC) 2015    Chronic back pain     Chronic cough     Depression     Edema     Fibrocystic breast     GERD (gastroesophageal reflux disease)     Glossitis     Headache(784.0)     Heart burn     Heart disease     Hypertension     Hypothyroidism     Mouth sore     MRSA (methicillin resistant staph aureus) culture positive 14    nasal    Numbness     toes  Obstructive sleep apnea     BIPAP    Peripheral neuropathy     PONV (postoperative nausea and vomiting)     Prolonged emergence from general anesthesia     Recurrent ventral incisional hernia 1/18/2016    Sleep apnea     Stroke ([address]     Stroke-like symptom     SVT (supraventricular tachycardia) (HCC)     SVT (supraventricular tachycardia) (HCC)     Swelling of extremity     Unspecified sleep apnea     clinicallybi-pap    Ventricular tachyarrhythmia ([address] 9/15    svt       Past Surgical History:   Procedure Laterality Date    CARDIAC CATHETERIZATION  8/18/15  JDT    EF 50%    CATARACT REMOVAL      CHOLECYSTECTOMY  8/4/14    BY Dr. Brayden Vernon  2008    CYST INCISION AND DRAINAGE  03/2017    FINGER TRIGGER RELEASE      FOOT SURGERY Left     removal of two screws    HAMMER TOE SURGERY      HARDWARE REMOVAL FOOT / ANKLE Left 8/16/2016    FOOT HARDWARE REMOVAL - DEEP / 2nd METATARSAL HEAD RESECTION performed by Naseem Cha DPM at [address] [place] [address]  1/12/2015    x3    KNEE SURGERY      Took prostetic joint out because of infection and put a spacer in    OTHER SURGICAL HISTORY  02/2017    pilonidal cyst-Dr Bina Kim    OH KNEE [facility] Right 7/3/2017    KNEE ARTHROSCOPY PARTIAL MEDIAL MENISECTOMY performed by Sandip Hardy MD at [place] [address] Right 2/16/2018    KNEE TOTAL ARTHROPLASTY performed by Anthony Jain MD at [place] [address] N/A 1/18/2016    HERNIA VENTRAL REPAIR LAPAROSCOPIC WITH MESH  performed by Bennett Salmeron MD at [facility]  · None    Significant Injuries  · None    Habits  Delinda Schwab reports that she has never smoked. She has never used smokeless tobacco. She reports that she does not drink alcohol or use drugs.     Family History   Problem Relation Age of Onset    Heart Disease Mother     Diabetes Mother     High Blood Pressure Father     Cancer Father 79 lung CA    Cancer Brother         leukemia    Alzheimer's Disease Brother        Social History  Nidhi Duggan is , lives in [facility], and is retired. Medications:  Current Outpatient Medications   Medication Sig Dispense Refill    amitriptyline (ELAVIL) 25 MG tablet TAKE 2 TABLETS BY MOUTH EVERY NIGHT 180 tablet 3    venlafaxine (EFFEXOR XR) 75 MG extended release capsule TAKE 1 CAPSULE BY MOUTH DAILY 90 capsule 3    gabapentin (NEURONTIN) 800 MG tablet TAKE 1 TABLET BY MOUTH THREE TIMES DAILY 90 tablet 5    triamcinolone acetonide (KENALOG) 0.1 % paste Place onto teeth 2 times daily as needed (itching) Apply to teeth 2 times daily. 1 Tube 2    DULoxetine (CYMBALTA) 60 MG extended release capsule TAKE 1 CAPSULE BY MOUTH TWICE DAILY 180 capsule 3    levothyroxine (SYNTHROID) 137 MCG tablet Take 137 mcg by mouth Daily      DEXILANT 60 MG CPDR delayed release capsule Take 60 mg by mouth daily       alendronate (FOSAMAX) 70 MG tablet TK 1 T PO ONCE A WEEK IN THE MORNING 30 MINUTES BEFORE FIRST MEAL OF THE DAY  3    flecainide (TAMBOCOR) 100 MG tablet TAKE 1 TABLET BY MOUTH TWICE DAILY 180 tablet 3    metoprolol tartrate (LOPRESSOR) 25 MG tablet TAKE 1/2 TABLET BY MOUTH TWICE DAILY 90 tablet 3    diclofenac (VOLTAREN) 50 MG EC tablet Take 50 mg by mouth daily      LORazepam (ATIVAN) 1 MG tablet Take 1 mg by mouth every 8 hours as needed .   2    rOPINIRole (REQUIP) 3 MG tablet Take 3 mg by mouth 2 times daily 2 tabs in AM and 3 tabs in PM       fluticasone (FLONASE) 50 MCG/ACT nasal spray 2 sprays by Nasal route as needed       famciclovir (FAMVIR) 500 MG tablet   Take 500 mg by mouth 2 times daily       metoprolol tartrate (LOPRESSOR) 25 MG tablet TAKE 1/2 TABLET BY MOUTH TWICE DAILY 30 tablet 5    allopurinol (ZYLOPRIM) 100 MG tablet Take 100 mg by mouth daily      amitriptyline (ELAVIL) 25 MG tablet TAKE 2 TABLETS BY MOUTH EVERY NIGHT 180 tablet 3    dicloxacillin (DYNAPEN) 500 MG Jessy Baker MD on 7/9/2019

## 2019-07-17 DIAGNOSIS — I48.92 ATRIAL FLUTTER, PAROXYSMAL (HCC): ICD-10-CM

## 2019-07-17 DIAGNOSIS — R00.0 HEART RATE FAST: ICD-10-CM

## 2019-07-17 RX ORDER — TRIAMCINOLONE ACETONIDE 0.1 %
PASTE (GRAM) DENTAL
Qty: 5 G | Refills: 0 | Status: SHIPPED | OUTPATIENT
Start: 2019-07-17

## 2019-10-11 ENCOUNTER — OFFICE VISIT (OUTPATIENT)
Dept: GASTROENTEROLOGY | Facility: CLINIC | Age: 64
End: 2019-10-11

## 2019-10-11 VITALS
OXYGEN SATURATION: 96 % | WEIGHT: 282 LBS | DIASTOLIC BLOOD PRESSURE: 78 MMHG | SYSTOLIC BLOOD PRESSURE: 128 MMHG | HEIGHT: 66 IN | HEART RATE: 75 BPM | BODY MASS INDEX: 45.32 KG/M2

## 2019-10-11 DIAGNOSIS — Z78.9 NONSMOKER: ICD-10-CM

## 2019-10-11 DIAGNOSIS — E66.9 OBESITY, UNSPECIFIED OBESITY SEVERITY, UNSPECIFIED OBESITY TYPE: ICD-10-CM

## 2019-10-11 DIAGNOSIS — R10.13 DYSPEPSIA: Primary | ICD-10-CM

## 2019-10-11 DIAGNOSIS — I48.92 ATRIAL FLUTTER, PAROXYSMAL (HCC): ICD-10-CM

## 2019-10-11 DIAGNOSIS — Z79.01 ANTICOAGULATED: ICD-10-CM

## 2019-10-11 DIAGNOSIS — Z79.1 NSAID LONG-TERM USE: ICD-10-CM

## 2019-10-11 DIAGNOSIS — G47.30 SLEEP APNEA, UNSPECIFIED TYPE: ICD-10-CM

## 2019-10-11 DIAGNOSIS — R13.19 ESOPHAGEAL DYSPHAGIA: ICD-10-CM

## 2019-10-11 PROCEDURE — 99214 OFFICE O/P EST MOD 30 MIN: CPT | Performed by: CLINICAL NURSE SPECIALIST

## 2019-10-11 RX ORDER — APIXABAN 5 MG/1
5 TABLET, FILM COATED ORAL 2 TIMES DAILY
Refills: 4 | COMMUNITY
Start: 2019-07-15 | End: 2020-06-25 | Stop reason: HOSPADM

## 2019-10-11 NOTE — PROGRESS NOTES
Danisha Cannon  1955    10/11/2019  Chief Complaint   Patient presents with   • GI Problem     New patient ref by Dr. Sparks for reflux     Subjective   HPI  Danisha Cannon is a 64 y.o. female who presents with a complaint of dyspepsia and increased indigestion since cardiac ablation in August 2019.  Progressive ongoing no certain triggers.  She says that shortly after procedure she did have some epigastric discomfort that did resolve.  However 3 weeks later it seemed that her GERD and indigestion increased with some radiation to her shoulder blades.  Described as a burning.  Moderate to severe.  Dexilant does help.  Bending over makes it worse.  She does have some difficulty with swallowing meat or solid foods mid esophageal region she has had endoscopy 14 years ago.  She does not recall having dilatation.  She is nervous to have this done.  She is on Eliquis given her most recent cardiac ablation and is followed by Dr. Guerin for this she has an appointment next week with him in follow-up.  She does take NSAIDs diclofenac as well as meloxicam.  Past Medical History:   Diagnosis Date   • Anemia    • Anxiety and depression    • Arthritis    • Atrial fibrillation (CMS/HCC)    • Disease of thyroid gland    • GERD (gastroesophageal reflux disease)    • History of incision and drainage     Right knee   • Hyperlipidemia    • Infection      in right knee to bone, cleaned it out and put new hardware with antibiotic and pt developed hole in incision   • Neuropathy, peripheral    • Pneumonia     RECENT DX PER FAMILY DOCTOR   • PONV (postoperative nausea and vomiting)    • Restless leg syndrome    • Sleep apnea with use of continuous positive airway pressure (CPAP)    • SVT (supraventricular tachycardia) (CMS/HCC)      Past Surgical History:   Procedure Laterality Date   • BUNIONECTOMY Left     AND HAMMER TOE   • CARDIAC SURGERY      HEART CATH   • CATARACT EXTRACTION Right    • HERNIA REPAIR      UMBILICAL X3   • INCISION  AND DRAINAGE LEG Right 1/28/2019    Procedure: INCISION AND DRAINAGE OF RIGHT THIGH HEMATOMA;  Surgeon: Nino Stern MD;  Location:  [facility];  Service: Vascular   • JOINT REPLACEMENT      RIGHT KNEE   • KNEE POLY INSERT EXCHANGE Right 3/3/2018    Procedure: IRRIGATION AND DEBRIDEMENT TOTAL KNEE & POLY EXCHANGE - RIGHT;  Surgeon: Amilcar Capellan MD;  Location:  [facility] OR;  Service:    • LAPAROSCOPIC CHOLECYSTECTOMY     • LEG DEBRIDEMENT Right 3/23/2018    Procedure: DEBRIDEMENT AND CLOSURE KNEE - RIGHT;  Surgeon: Amilcar Capellan MD;  Location:  [facility] OR;  Service: Orthopedics   • PERIPHERALLY INSERTED CENTRAL CATHETER INSERTION     • PILONIDAL CYSTECTOMY N/A 2/16/2017    Procedure: PILONIDAL CYSTECTOMY, EXCISION SEBACEOUS CYST - BACK;  Surgeon: Macrina Capellan MD;  Location:  [facility] OR;  Service:    • TOTAL KNEE  PROSTHESIS REMOVAL W/ SPACER INSERTION Right 3/13/2018    Procedure: 1.  EXPLANT TOTAL KNEE 2.  ANTIBOTIC SPACER KNEE;  Surgeon: Amilcar Capellan MD;  Location:  [facility] OR;  Service: Orthopedics   • TRUNK LESION/CYST EXCISION N/A 2/16/2017    Procedure: EXCISION SEBACEOUS CYST - BACK;  Surgeon: Macrina Capellan MD;  Location: [facility] OR;  Service:        Outpatient Medications Marked as Taking for the 10/11/19 encounter (Office Visit) with Jazzy Maguire APRN   Medication Sig Dispense Refill   • alendronate (FOSAMAX) 70 MG tablet Take 70 mg by mouth Every 7 (Seven) Days. Friday evening.     • amitriptyline (ELAVIL) 50 MG tablet amitriptyline 50 mg tablet   Take 1 tablet every day by oral route.     • dexlansoprazole (DEXILANT) 60 MG capsule Take 60 mg by mouth Daily.     • diclofenac (VOLTAREN) 75 MG EC tablet Take 75 mg by mouth Daily.     • DULoxetine (CYMBALTA) 60 MG capsule Take 60 mg by mouth 2 (Two) Times a Day.     • ELIQUIS 5 MG tablet tablet Take 5 mg by mouth 2 (Two) Times a Day.  4   • fluticasone (FLONASE) 50 MCG/ACT nasal spray 2 sprays into the nostril(s) as  directed by provider Daily As Needed for Rhinitis or Allergies.     • gabapentin (NEURONTIN) 800 MG tablet Take 800 mg by mouth 3 (Three) Times a Day.     • levothyroxine (SYNTHROID, LEVOTHROID) 137 MCG tablet Take 137 mcg by mouth Every Morning.     • LORazepam (ATIVAN) 1 MG tablet Take 1 mg by mouth 2 (Two) Times a Day.     • metoprolol tartrate (LOPRESSOR) 25 MG tablet Take 12.5 mg by mouth Every 12 (Twelve) Hours.     • montelukast (SINGULAIR) 10 MG tablet Take  by mouth Daily.  5   • nystatin (MYCOSTATIN) 159089 UNIT/ML suspension 5 mL Every 6 (Six) Hours.     • rOPINIRole (REQUIP) 3 MG tablet Take 6 mg by mouth Every Morning. Morning dose.     • triamcinolone (KENALOG) 0.1 % paste See Admin Instructions.  5   • venlafaxine XR (EFFEXOR-XR) 75 MG 24 hr capsule TK 1 C PO D  2   • VIVLODEX 10 MG capsule TK 1 C PO QD  0     Allergies   Allergen Reactions   • Other Rash     Dial soap  Redness and swelling on skin and burning sensation   • Codeine Dizziness and Nausea Only     Rapid heart rate, dizziness  NAUSEA   • Mobic [Meloxicam] Rash   • Morphine And Related Itching     Social History     Socioeconomic History   • Marital status:      Spouse name: Not on file   • Number of children: Not on file   • Years of education: Not on file   • Highest education level: Not on file   Tobacco Use   • Smoking status: Never Smoker   • Smokeless tobacco: Never Used   Substance and Sexual Activity   • Alcohol use: No   • Drug use: No   • Sexual activity: Defer     Family History   Problem Relation Age of Onset   • Diabetes Mother    • Heart disease Mother    • Alzheimer's disease Mother    • Hyperlipidemia Mother    • Hypertension Mother    • Cancer Father    • Hypertension Father    • Leukemia Brother    • Heart attack Sister    • Clotting disorder Brother    • No Known Problems Brother    • Colon polyps Neg Hx    • Colon cancer Neg Hx      Health Maintenance   Topic Date Due   • URINE MICROALBUMIN  1955   •  "TDAP/TD VACCINES (1 - Tdap) 06/07/1972   • HEPATITIS C SCREENING  03/02/2018   • DIABETIC FOOT EXAM  03/02/2018   • DIABETIC EYE EXAM  03/02/2018   • COLONOSCOPY  03/02/2018   • ZOSTER VACCINE (2 of 2) 10/27/2018   • HEMOGLOBIN A1C  01/24/2019   • PNEUMOCOCCAL VACCINES (65+ LOW/MEDIUM RISK) (2 of 2 - PCV13) 09/04/2019   • LIPID PANEL  11/23/2019   • MEDICARE ANNUAL WELLNESS  01/02/2020   • MAMMOGRAM  10/11/2020   • INFLUENZA VACCINE  Completed     Review of Systems   Constitutional: Negative for activity change, appetite change, chills, diaphoresis, fatigue, fever and unexpected weight change.   HENT: Positive for trouble swallowing. Negative for ear pain, hearing loss, mouth sores, sore throat and voice change.    Eyes: Negative.    Respiratory: Negative for cough, choking, shortness of breath and wheezing.    Cardiovascular: Negative for chest pain and palpitations.   Gastrointestinal: Negative for abdominal pain, blood in stool, constipation, diarrhea, nausea and vomiting.   Endocrine: Negative for cold intolerance and heat intolerance.   Genitourinary: Negative for decreased urine volume, dysuria, frequency, hematuria and urgency.   Musculoskeletal: Negative for back pain, gait problem and myalgias.   Skin: Negative for color change, pallor and rash.   Allergic/Immunologic: Negative for food allergies and immunocompromised state.   Neurological: Negative for dizziness, tremors, seizures, syncope, weakness, light-headedness, numbness and headaches.   Hematological: Negative for adenopathy. Does not bruise/bleed easily.   Psychiatric/Behavioral: Negative for agitation and confusion. The patient is not nervous/anxious.    All other systems reviewed and are negative.    Objective   Vitals:    10/11/19 1108   BP: 128/78   Pulse: 75   SpO2: 96%   Weight: 128 kg (282 lb)   Height: 166.4 cm (65.5\")     Body mass index is 46.21 kg/m².  Physical Exam   Constitutional: She is oriented to person, place, and time. She " appears well-developed and well-nourished.   HENT:   Head: Normocephalic and atraumatic.   Eyes: Pupils are equal, round, and reactive to light.   Neck: Normal range of motion. Neck supple. No tracheal deviation present.   Cardiovascular: Normal rate, regular rhythm and normal heart sounds. Exam reveals no gallop and no friction rub.   No murmur heard.  Pulmonary/Chest: Effort normal and breath sounds normal. No respiratory distress. She has no wheezes. She has no rales. She exhibits no tenderness.   Abdominal: Soft. Bowel sounds are normal. She exhibits no distension. There is no hepatosplenomegaly. There is no tenderness. There is no rigidity, no rebound and no guarding.   Musculoskeletal: Normal range of motion. She exhibits no edema, tenderness or deformity.   Neurological: She is alert and oriented to person, place, and time. She has normal reflexes.   Skin: Skin is warm and dry. No rash noted. No pallor.   Psychiatric: She has a normal mood and affect. Her behavior is normal. Judgment and thought content normal.     Assessment/Plan   Danisha was seen today for gi problem.    Diagnoses and all orders for this visit:    Dyspepsia    Anticoagulated  Comments:  Eliquis with recent ablation in [place] Dr Guerin to hold if able likely wont be able to until follow up with him in next week.    Sleep apnea, unspecified type    Atrial flutter, paroxysmal (CMS/HCC)    Esophageal dysphagia  -     FL Esophagram Complete; Future    NSAID long-term use  Comments:  NO NSAIDS recommended, she is on Dicolfenac as well as Meloxicam    Obesity, unspecified obesity severity, unspecified obesity type    Nonsmoker    Long discussion today regarding her symptoms and history.  I feel that she has multiple contributing factors to include recent procedure, anxiety, sleep apnea, and morbid obesity. She is on Dexilant daily and she is to continue this. She says that her symptoms are at this time some better. She also is taking 2  different NSAIDS and she will need to take these minimally or discontinue if possible. I have discussed with her upper endoscopy however she is on Eliquis after her recent cardiac ablation and is unable to hold this she does have follow-up with Dr. Guerin next week and hopefully he can give us some guidance as to when she may be able to hold this for endoscopy procedure with possible dilatation.  Otherwise I have made dietary recommendations she is to continue her PPI therapy and will get an esophagram to further evaluate her esophagus at this time in the setting of Eliquis.    She verbalizes and agrees and we will see her back in 3 weeks    EMR Dragon/transcription disclaimer: Much of this encounter note is electronic transcription/translation of spoken language to printed text. The electronic translation of spoken language may be erroneous, or at times, nonsensical words or phrases may be inadvertently transcribed. Although I have reviewed the note for such errors, some may still exist.  Body mass index is 46.21 kg/m².  Return in about 3 weeks (around 11/1/2019).    Patient's Body mass index is 46.21 kg/m². BMI is above normal parameters. Recommendations include: nutrition counseling.      All risks, benefits, alternatives, and indications of colonoscopy and/or Endoscopy procedure have been discussed with the patient. Risks to include perforation of the colon requiring possible surgery or colostomy, risk of bleeding from biopsies or removal of colon tissue, possibility of missing a colon polyp or cancer, or adverse drug reaction.  Benefits to include the diagnosis and management of disease of the colon and rectum. Alternatives to include barium enema, radiographic evaluation, lab testing or no intervention. Pt verbalizes understanding and agrees.     Jazzy Maguire, APRN  10/11/2019  11:39 AM      Obesity, Adult  Obesity is the condition of having too much total body fat. Being overweight or obese means  that your weight is greater than what is considered healthy for your body size. Obesity is determined by a measurement called BMI. BMI is an estimate of body fat and is calculated from height and weight. For adults, a BMI of 30 or higher is considered obese.  Obesity can eventually lead to other health concerns and major illnesses, including:  · Stroke.  · Coronary artery disease (CAD).  · Type 2 diabetes.  · Some types of cancer, including cancers of the colon, breast, uterus, and gallbladder.  · Osteoarthritis.  · High blood pressure (hypertension).  · High cholesterol.  · Sleep apnea.  · Gallbladder stones.  · Infertility problems.  What are the causes?  The main cause of obesity is taking in (consuming) more calories than your body uses for energy. Other factors that contribute to this condition may include:  · Being born with genes that make you more likely to become obese.  · Having a medical condition that causes obesity. These conditions include:  ¨ Hypothyroidism.  ¨ Polycystic ovarian syndrome (PCOS).  ¨ Binge-eating disorder.  ¨ Cushing syndrome.  · Taking certain medicines, such as steroids, antidepressants, and seizure medicines.  · Not being physically active (sedentary lifestyle).  · Living where there are limited places to exercise safely or buy healthy foods.  · Not getting enough sleep.  What increases the risk?  The following factors may increase your risk of this condition:  · Having a family history of obesity.  · Being a woman of -American descent.  · Being a man of  descent.  What are the signs or symptoms?  Having excessive body fat is the main symptom of this condition.  How is this diagnosed?  This condition may be diagnosed based on:  · Your symptoms.  · Your medical history.  · A physical exam. Your health care provider may measure:  ¨ Your BMI. If you are an adult with a BMI between 25 and less than 30, you are considered overweight. If you are an adult with a BMI of 30 or  higher, you are considered obese.  ¨ The distances around your hips and your waist (circumferences). These may be compared to each other to help diagnose your condition.  ¨ Your skinfold thickness. Your health care provider may gently pinch a fold of your skin and measure it.  How is this treated?  Treatment for this condition often includes changing your lifestyle. Treatment may include some or all of the following:  · Dietary changes. Work with your health care provider and a dietitian to set a weight-loss goal that is healthy and reasonable for you. Dietary changes may include eating:  ¨ Smaller portions. A portion size is the amount of a particular food that is healthy for you to eat at one time. This varies from person to person.  ¨ Low-calorie or low-fat options.  ¨ More whole grains, fruits, and vegetables.  · Regular physical activity. This may include aerobic activity (cardio) and strength training.  · Medicine to help you lose weight. Your health care provider may prescribe medicine if you are unable to lose 1 pound a week after 6 weeks of eating more healthily and doing more physical activity.  · Surgery. Surgical options may include gastric banding and gastric bypass. Surgery may be done if:  ¨ Other treatments have not helped to improve your condition.  ¨ You have a BMI of 40 or higher.  ¨ You have life-threatening health problems related to obesity.  Follow these instructions at home:     Eating and drinking     · Follow recommendations from your health care provider about what you eat and drink. Your health care provider may advise you to:  ¨ Limit fast foods, sweets, and processed snack foods.  ¨ Choose low-fat options, such as low-fat milk instead of whole milk.  ¨ Eat 5 or more servings of fruits or vegetables every day.  ¨ Eat at home more often. This gives you more control over what you eat.  ¨ Choose healthy foods when you eat out.  ¨ Learn what a healthy portion size is.  ¨ Keep low-fat snacks  on hand.  ¨ Avoid sugary drinks, such as soda, fruit juice, iced tea sweetened with sugar, and flavored milk.  ¨ Eat a healthy breakfast.  · Drink enough water to keep your urine clear or pale yellow.  · Do not go without eating for long periods of time (do not fast) or follow a fad diet. Fasting and fad diets can be unhealthy and even dangerous.  Physical Activity   · Exercise regularly, as told by your health care provider. Ask your health care provider what types of exercise are safe for you and how often you should exercise.  · Warm up and stretch before being active.  · Cool down and stretch after being active.  · Rest between periods of activity.  Lifestyle   · Limit the time that you spend in front of your TV, computer, or video game system.  · Find ways to reward yourself that do not involve food.  · Limit alcohol intake to no more than 1 drink a day for nonpregnant women and 2 drinks a day for men. One drink equals 12 oz of beer, 5 oz of wine, or 1½ oz of hard liquor.  General instructions   · Keep a weight loss journal to keep track of the food you eat and how much you exercise you get.  · Take over-the-counter and prescription medicines only as told by your health care provider.  · Take vitamins and supplements only as told by your health care provider.  · Consider joining a support group. Your health care provider may be able to recommend a support group.  · Keep all follow-up visits as told by your health care provider. This is important.  Contact a health care provider if:  · You are unable to meet your weight loss goal after 6 weeks of dietary and lifestyle changes.  This information is not intended to replace advice given to you by your health care provider. Make sure you discuss any questions you have with your health care provider.  Document Released: 01/25/2006 Document Revised: 05/22/2017 Document Reviewed: 10/05/2016  [employer] Patient Education © 2017 [employer]      If you smoke  or use tobacco, 4 minutes reading provided  Steps to Quit Smoking  Smoking tobacco can be harmful to your health and can affect almost every organ in your body. Smoking puts you, and those around you, at risk for developing many serious chronic diseases. Quitting smoking is difficult, but it is one of the best things that you can do for your health. It is never too late to quit.  What are the benefits of quitting smoking?  When you quit smoking, you lower your risk of developing serious diseases and conditions, such as:  · Lung cancer or lung disease, such as COPD.  · Heart disease.  · Stroke.  · Heart attack.  · Infertility.  · Osteoporosis and bone fractures.  Additionally, symptoms such as coughing, wheezing, and shortness of breath may get better when you quit. You may also find that you get sick less often because your body is stronger at fighting off colds and infections. If you are pregnant, quitting smoking can help to reduce your chances of having a baby of low birth weight.  How do I get ready to quit?  When you decide to quit smoking, create a plan to make sure that you are successful. Before you quit:  · Pick a date to quit. Set a date within the next two weeks to give you time to prepare.  · Write down the reasons why you are quitting. Keep this list in places where you will see it often, such as on your bathroom mirror or in your car or wallet.  · Identify the people, places, things, and activities that make you want to smoke (triggers) and avoid them. Make sure to take these actions:  ¨ Throw away all cigarettes at home, at work, and in your car.  ¨ Throw away smoking accessories, such as ashtrays and lighters.  ¨ Clean your car and make sure to empty the ashtray.  ¨ Clean your home, including curtains and carpets.  · Tell your family, friends, and coworkers that you are quitting. Support from your loved ones can make quitting easier.  · Talk with your health care provider about your options for  quitting smoking.  · Find out what treatment options are covered by your health insurance.  What strategies can I use to quit smoking?  Talk with your healthcare provider about different strategies to quit smoking. Some strategies include:  · Quitting smoking altogether instead of gradually lessening how much you smoke over a period of time. Research shows that quitting “cold turkey” is more successful than gradually quitting.  · Attending in-person counseling to help you build problem-solving skills. You are more likely to have success in quitting if you attend several counseling sessions. Even short sessions of 10 minutes can be effective.  · Finding resources and support systems that can help you to quit smoking and remain smoke-free after you quit. These resources are most helpful when you use them often. They can include:  ¨ Online chats with a counselor.  ¨ Telephone quitlines.  ¨ Printed self-help materials.  ¨ Support groups or group counseling.  ¨ Text messaging programs.  ¨ Mobile phone applications.  · Taking medicines to help you quit smoking. (If you are pregnant or breastfeeding, talk with your health care provider first.) Some medicines contain nicotine and some do not. Both types of medicines help with cravings, but the medicines that include nicotine help to relieve withdrawal symptoms. Your health care provider may recommend:  ¨ Nicotine patches, gum, or lozenges.  ¨ Nicotine inhalers or sprays.  ¨ Non-nicotine medicine that is taken by mouth.  Talk with your health care provider about combining strategies, such as taking medicines while you are also receiving in-person counseling. Using these two strategies together makes you more likely to succeed in quitting than if you used either strategy on its own.  If you are pregnant or breastfeeding, talk with your health care provider about finding counseling or other support strategies to quit smoking. Do not take medicine to help you quit smoking  unless told to do so by your health care provider.  What things can I do to make it easier to quit?  Quitting smoking might feel overwhelming at first, but there is a lot that you can do to make it easier. Take these important actions:  · Reach out to your family and friends and ask that they support and encourage you during this time. Call telephone quitlines, reach out to support groups, or work with a counselor for support.  · Ask people who smoke to avoid smoking around you.  · Avoid places that trigger you to smoke, such as bars, parties, or smoke-break areas at work.  · Spend time around people who do not smoke.  · Lessen stress in your life, because stress can be a smoking trigger for some people. To lessen stress, try:  ¨ Exercising regularly.  ¨ Deep-breathing exercises.  ¨ Yoga.  ¨ Meditating.  ¨ Performing a body scan. This involves closing your eyes, scanning your body from head to toe, and noticing which parts of your body are particularly tense. Purposefully relax the muscles in those areas.  · Download or purchase mobile phone or tablet apps (applications) that can help you stick to your quit plan by providing reminders, tips, and encouragement. There are many free apps, such as QuitGuide from the CDC (Centers for Disease Control and Prevention). You can find other support for quitting smoking (smoking cessation) through smokefree.gov and other websites.  How will I feel when I quit smoking?  Within the first 24 hours of quitting smoking, you may start to feel some withdrawal symptoms. These symptoms are usually most noticeable 2-3 days after quitting, but they usually do not last beyond 2-3 weeks. Changes or symptoms that you might experience include:  · Mood swings.  · Restlessness, anxiety, or irritation.  · Difficulty concentrating.  · Dizziness.  · Strong cravings for sugary foods in addition to nicotine.  · Mild weight gain.  · Constipation.  · Nausea.  · Coughing or a sore throat.  · Changes in  how your medicines work in your body.  · A depressed mood.  · Difficulty sleeping (insomnia).  After the first 2-3 weeks of quitting, you may start to notice more positive results, such as:  · Improved sense of smell and taste.  · Decreased coughing and sore throat.  · Slower heart rate.  · Lower blood pressure.  · Clearer skin.  · The ability to breathe more easily.  · Fewer sick days.  Quitting smoking is very challenging for most people. Do not get discouraged if you are not successful the first time. Some people need to make many attempts to quit before they achieve long-term success. Do your best to stick to your quit plan, and talk with your health care provider if you have any questions or concerns.  This information is not intended to replace advice given to you by your health care provider. Make sure you discuss any questions you have with your health care provider.  Document Released: 12/12/2002 Document Revised: 08/15/2017 Document Reviewed: 05/03/2016  Elsevier Interactive Patient Education © 2017 Elsevier Inc.

## 2019-10-14 ENCOUNTER — TELEPHONE (OUTPATIENT)
Dept: GASTROENTEROLOGY | Facility: CLINIC | Age: 64
End: 2019-10-14

## 2019-10-14 ENCOUNTER — HOSPITAL ENCOUNTER (OUTPATIENT)
Dept: WOMENS IMAGING | Age: 64
Discharge: HOME | End: 2019-10-14
Payer: MEDICARE

## 2019-10-14 DIAGNOSIS — Z12.39 SCREENING FOR BREAST CANCER: ICD-10-CM

## 2019-10-14 PROCEDURE — 77063 BREAST TOMOSYNTHESIS BI: CPT

## 2019-10-14 NOTE — TELEPHONE ENCOUNTER
Last week you asked me to find out if the patient could hold her thinners. Received clearance from Dr. Guerin that the patient can hold her Eliquis at any time. We are setting her up for esophagram this week.

## 2019-10-18 ENCOUNTER — OFFICE VISIT (OUTPATIENT)
Dept: OBGYN | Age: 64
End: 2019-10-18
Payer: MEDICARE

## 2019-10-18 VITALS
HEIGHT: 65 IN | WEIGHT: 280 LBS | SYSTOLIC BLOOD PRESSURE: 98 MMHG | HEART RATE: 67 BPM | DIASTOLIC BLOOD PRESSURE: 53 MMHG | BODY MASS INDEX: 46.65 KG/M2

## 2019-10-18 DIAGNOSIS — N95.2 ATROPHIC VAGINITIS: ICD-10-CM

## 2019-10-18 DIAGNOSIS — Z01.419 VISIT FOR PELVIC EXAM: Primary | ICD-10-CM

## 2019-10-18 DIAGNOSIS — Z12.39 SCREENING FOR BREAST CANCER: ICD-10-CM

## 2019-10-18 DIAGNOSIS — B37.9 YEAST INFECTION: ICD-10-CM

## 2019-10-18 PROCEDURE — 1090F PRES/ABSN URINE INCON ASSESS: CPT | Performed by: NURSE PRACTITIONER

## 2019-10-18 PROCEDURE — G8417 CALC BMI ABV UP PARAM F/U: HCPCS | Performed by: NURSE PRACTITIONER

## 2019-10-18 PROCEDURE — 1036F TOBACCO NON-USER: CPT | Performed by: NURSE PRACTITIONER

## 2019-10-18 PROCEDURE — 3017F COLORECTAL CA SCREEN DOC REV: CPT | Performed by: NURSE PRACTITIONER

## 2019-10-18 PROCEDURE — 1123F ACP DISCUSS/DSCN MKR DOCD: CPT | Performed by: NURSE PRACTITIONER

## 2019-10-18 PROCEDURE — G8427 DOCREV CUR MEDS BY ELIG CLIN: HCPCS | Performed by: NURSE PRACTITIONER

## 2019-10-18 PROCEDURE — G8484 FLU IMMUNIZE NO ADMIN: HCPCS | Performed by: NURSE PRACTITIONER

## 2019-10-18 PROCEDURE — 99213 OFFICE O/P EST LOW 20 MIN: CPT | Performed by: NURSE PRACTITIONER

## 2019-10-18 PROCEDURE — G8399 PT W/DXA RESULTS DOCUMENT: HCPCS | Performed by: NURSE PRACTITIONER

## 2019-10-18 PROCEDURE — 4040F PNEUMOC VAC/ADMIN/RCVD: CPT | Performed by: NURSE PRACTITIONER

## 2019-10-18 PROCEDURE — G0101 CA SCREEN;PELVIC/BREAST EXAM: HCPCS | Performed by: NURSE PRACTITIONER

## 2019-10-18 RX ORDER — MELOXICAM 10 MG/1
10 CAPSULE ORAL DAILY
Refills: 0 | COMMUNITY
Start: 2019-09-13 | End: 2020-07-21

## 2019-10-18 RX ORDER — FLUCONAZOLE 100 MG/1
100 TABLET ORAL DAILY
Qty: 3 TABLET | Refills: 3 | Status: SHIPPED | OUTPATIENT
Start: 2019-10-18 | End: 2019-10-21

## 2019-10-18 ASSESSMENT — ENCOUNTER SYMPTOMS
RESPIRATORY NEGATIVE: 1
DIARRHEA: 0
CONSTIPATION: 0
GASTROINTESTINAL NEGATIVE: 1
ALLERGIC/IMMUNOLOGIC NEGATIVE: 1

## 2019-10-21 ENCOUNTER — HOSPITAL ENCOUNTER (OUTPATIENT)
Dept: GENERAL RADIOLOGY | Facility: HOSPITAL | Age: 64
Discharge: HOME | End: 2019-10-21
Admitting: CLINICAL NURSE SPECIALIST

## 2019-10-21 DIAGNOSIS — R13.19 ESOPHAGEAL DYSPHAGIA: ICD-10-CM

## 2019-10-21 PROCEDURE — 74220 X-RAY XM ESOPHAGUS 1CNTRST: CPT

## 2019-10-21 RX ADMIN — BARIUM SULFATE 240 ML: 960 POWDER, FOR SUSPENSION ORAL at 11:18

## 2019-10-21 RX ADMIN — ANTACID/ANTIFLATULENT 1 TABLET: 380; 550; 10; 10 GRANULE, EFFERVESCENT ORAL at 11:19

## 2019-10-21 RX ADMIN — BARIUM SULFATE 700 MG: 700 TABLET ORAL at 11:18

## 2019-10-21 RX ADMIN — BARIUM SULFATE 120 ML: 980 POWDER, FOR SUSPENSION ORAL at 11:18

## 2019-10-30 ENCOUNTER — OFFICE VISIT (OUTPATIENT)
Dept: GASTROENTEROLOGY | Facility: CLINIC | Age: 64
End: 2019-10-30

## 2019-10-30 VITALS
OXYGEN SATURATION: 98 % | WEIGHT: 283 LBS | DIASTOLIC BLOOD PRESSURE: 68 MMHG | HEART RATE: 77 BPM | SYSTOLIC BLOOD PRESSURE: 122 MMHG | HEIGHT: 66 IN | BODY MASS INDEX: 45.48 KG/M2

## 2019-10-30 DIAGNOSIS — E66.9 OBESITY, UNSPECIFIED OBESITY SEVERITY, UNSPECIFIED OBESITY TYPE: ICD-10-CM

## 2019-10-30 DIAGNOSIS — Z79.1 NSAID LONG-TERM USE: ICD-10-CM

## 2019-10-30 DIAGNOSIS — Z79.01 ANTICOAGULATED: ICD-10-CM

## 2019-10-30 DIAGNOSIS — R10.13 DYSPEPSIA: Primary | ICD-10-CM

## 2019-10-30 DIAGNOSIS — R13.19 ESOPHAGEAL DYSPHAGIA: ICD-10-CM

## 2019-10-30 DIAGNOSIS — I48.92 ATRIAL FLUTTER, PAROXYSMAL (HCC): ICD-10-CM

## 2019-10-30 DIAGNOSIS — Z78.9 NONSMOKER: ICD-10-CM

## 2019-10-30 DIAGNOSIS — G47.30 SLEEP APNEA, UNSPECIFIED TYPE: ICD-10-CM

## 2019-10-30 PROCEDURE — 99214 OFFICE O/P EST MOD 30 MIN: CPT | Performed by: CLINICAL NURSE SPECIALIST

## 2019-10-30 NOTE — PROGRESS NOTES
Danisha Cannon  1955      10/30/2019  Chief Complaint   Patient presents with   • GI Problem     Here to discuss problems swallowing         HPI    Danisha Cannon is a  64 y.o. female here for a follow up visit for her complaint of reflux and dysphasia.  She was initially seen by Jazzy Maguire on October 11, 2019 at that time she was having some dyspepsia and indigestion seem to have been aggravated by recent cardiac ablation.  She had burning moderate to severe nature.  Dexilant was started.  She also had complaints of dysphagia with meats.  She has a history of NSAID use dimension as well.    Today the patient returns in follow-up.  She continues to take her Dexilant and she describes her reflux as improved.  Dysphasia seems to be better as well is only happening on a rare occasion and it passes within seconds.  No painful swallowing.  She had an esophagram on October 21, 2019 the barium tablet swallowed passed within the stomach without delay.  There is no measurable hiatal hernia seen.  The meat mucosa of the esophagus was normal without any thickening ulcerations or erosions seen.  The esophageal transit time was also normal no evidence of dysmotility.  This report has been discussed with the patient who is accompanied by her family at the her side.    The patient tells me that she continues to be on Eliquis by her local cardiologist, and they are wanting her to go to [facility] in [facility] to discuss a watchman procedure as well as to partake in a clinical study regarding her heart.  They are awaiting the date to be seen at [facility].  She denies any chest pain or shortness of breath at this time.    Patient is unaware if she is ever had endoscopy in the past.  I see nothing in our [facility] records.  She tells me she had a colonoscopy which was normal 10 years ago in [facility].  She denies any family history of any intestinal/stomach/esophageal cancers.    Past Medical History:   Diagnosis  Date   • Anemia    • Anxiety and depression    • Arthritis    • Atrial fibrillation (CMS/HCC)    • Disease of thyroid gland    • GERD (gastroesophageal reflux disease)    • History of incision and drainage     Right knee   • Hyperlipidemia    • Infection      in right knee to bone, cleaned it out and put new hardware with antibiotic and pt developed hole in incision   • Neuropathy, peripheral    • Pneumonia     RECENT DX PER FAMILY DOCTOR   • PONV (postoperative nausea and vomiting)    • Restless leg syndrome    • Sleep apnea with use of continuous positive airway pressure (CPAP)    • SVT (supraventricular tachycardia) (CMS/HCC)      Past Surgical History:   Procedure Laterality Date   • BUNIONECTOMY Left     AND HAMMER TOE   • CARDIAC SURGERY      HEART CATH   • CATARACT EXTRACTION Right    • HERNIA REPAIR      UMBILICAL X3   • INCISION AND DRAINAGE LEG Right 1/28/2019    Procedure: INCISION AND DRAINAGE OF RIGHT THIGH HEMATOMA;  Surgeon: Nino Stern MD;  Location:  [facility] HYBRID OR 12;  Service: Vascular   • JOINT REPLACEMENT      RIGHT KNEE   • KNEE POLY INSERT EXCHANGE Right 3/3/2018    Procedure: IRRIGATION AND DEBRIDEMENT TOTAL KNEE & POLY EXCHANGE - RIGHT;  Surgeon: Amilcar Capellan MD;  Location: [facility] OR;  Service:    • LAPAROSCOPIC CHOLECYSTECTOMY     • LEG DEBRIDEMENT Right 3/23/2018    Procedure: DEBRIDEMENT AND CLOSURE KNEE - RIGHT;  Surgeon: Amilcar Capellan MD;  Location:  [facility] OR;  Service: Orthopedics   • PERIPHERALLY INSERTED CENTRAL CATHETER INSERTION     • PILONIDAL CYSTECTOMY N/A 2/16/2017    Procedure: PILONIDAL CYSTECTOMY, EXCISION SEBACEOUS CYST - BACK;  Surgeon: Macrina Capellan MD;  Location:  [facility] OR;  Service:    • TOTAL KNEE  PROSTHESIS REMOVAL W/ SPACER INSERTION Right 3/13/2018    Procedure: 1.  EXPLANT TOTAL KNEE 2.  ANTIBOTIC SPACER KNEE;  Surgeon: Amilcar Capellan MD;  Location: [facility] OR;  Service: Orthopedics   • TRUNK LESION/CYST EXCISION N/A 2/16/2017     Procedure: EXCISION SEBACEOUS CYST - BACK;  Surgeon: Macrina Capellan MD;  [facility]: [facility];  Service:        Outpatient Medications Marked as Taking for the 10/30/19 encounter (Office Visit) with Jazzy Maguire APRN   Medication Sig Dispense Refill   • alendronate (FOSAMAX) 70 MG tablet Take 70 mg by mouth Every 7 (Seven) Days. Friday evening.     • amitriptyline (ELAVIL) 50 MG tablet amitriptyline 50 mg tablet   Take 1 tablet every day by oral route.     • dexlansoprazole (DEXILANT) 60 MG capsule Take 60 mg by mouth Daily.     • diclofenac (VOLTAREN) 75 MG EC tablet Take 75 mg by mouth Daily.     • DULoxetine (CYMBALTA) 60 MG capsule Take 60 mg by mouth 2 (Two) Times a Day.     • ELIQUIS 5 MG tablet tablet Take 5 mg by mouth 2 (Two) Times a Day.  4   • fluticasone (FLONASE) 50 MCG/ACT nasal spray 2 sprays into the nostril(s) as directed by provider Daily As Needed for Rhinitis or Allergies.     • gabapentin (NEURONTIN) 800 MG tablet Take 800 mg by mouth 3 (Three) Times a Day.     • levothyroxine (SYNTHROID, LEVOTHROID) 137 MCG tablet Take 137 mcg by mouth Every Morning.     • LORazepam (ATIVAN) 1 MG tablet Take 1 mg by mouth 2 (Two) Times a Day.     • metoprolol tartrate (LOPRESSOR) 25 MG tablet Take 12.5 mg by mouth Every 12 (Twelve) Hours.     • montelukast (SINGULAIR) 10 MG tablet Take  by mouth Daily.  5   • nystatin (MYCOSTATIN) 728468 UNIT/ML suspension 5 mL Every 6 (Six) Hours.     • rOPINIRole (REQUIP) 3 MG tablet Take 6 mg by mouth Every Morning. Morning dose.     • triamcinolone (KENALOG) 0.1 % paste See Admin Instructions.  5   • venlafaxine XR (EFFEXOR-XR) 75 MG 24 hr capsule TK 1 C PO D  2   • VIVLODEX 10 MG capsule TK 1 C PO QD  0       Allergies   Allergen Reactions   • Other Rash     Dial soap  Redness and swelling on skin and burning sensation   • Codeine Dizziness and Nausea Only     Rapid heart rate, dizziness  NAUSEA   • Mobic [Meloxicam] Rash   • Morphine And Related Itching  "      Social History     Socioeconomic History   • Marital status:      Spouse name: Not on file   • Number of children: Not on file   • Years of education: Not on file   • Highest education level: Not on file   Tobacco Use   • Smoking status: Never Smoker   • Smokeless tobacco: Never Used   Substance and Sexual Activity   • Alcohol use: No   • Drug use: No   • Sexual activity: Defer       Family History   Problem Relation Age of Onset   • Diabetes Mother    • Heart disease Mother    • Alzheimer's disease Mother    • Hyperlipidemia Mother    • Hypertension Mother    • Cancer Father    • Hypertension Father    • Leukemia Brother    • Heart attack Sister    • Clotting disorder Brother    • No Known Problems Brother    • Colon polyps Neg Hx    • Colon cancer Neg Hx        Review of Systems   Constitutional: Negative for fatigue, fever and unexpected weight change.   Respiratory: Negative for cough and shortness of breath.    Gastrointestinal: Negative for blood in stool, constipation, diarrhea, nausea and vomiting.   Musculoskeletal: Positive for gait problem and myalgias.   Neurological: Negative for dizziness, speech difficulty and weakness.       /68   Pulse 77   Ht 166.4 cm (65.5\")   Wt 128 kg (283 lb)   LMP  (LMP Unknown)   SpO2 98%   Breastfeeding? No   BMI 46.38 kg/m²   Body mass index is 46.38 kg/m².    Physical Exam   Constitutional: She is oriented to person, place, and time. She appears well-developed and well-nourished.   HENT:   Head: Normocephalic and atraumatic.   Cardiovascular: Normal rate, regular rhythm and normal heart sounds.   Pulmonary/Chest: Effort normal. No respiratory distress. She has no wheezes. She has no rales.   Abdominal: Soft. Bowel sounds are normal. There is no tenderness.   Neurological: She is alert and oriented to person, place, and time.   Skin: Skin is warm and dry.       ASSESSMENT AND PLAN    Patient's Body mass index is 46.38 kg/m². BMI is above normal " parameters. Recommendations include: nutrition counseling.    Danisha was seen today for gi problem.    Diagnoses and all orders for this visit:    Dyspepsia  Comments:  improved with daily Dexilant    Anticoagulated  Comments:  Eliquis...awaiting appointment with [facility] [place] for possible watchman's procedure and clinical trial    Sleep apnea, unspecified type    Atrial flutter, paroxysmal (CMS/HCC)    Esophageal dysphagia  Comments:  normal Esophagram    NSAID long-term use  Comments:  educated to limit    Obesity, unspecified obesity severity, unspecified obesity type    Nonsmoker    We will see the patient back in follow-up in 6 months time.  She is to continue her Dexilant.  I have given her much education regarding her reflux disease including but not limited to having smaller meals more frequent times a day, weight loss, avoidance of chocolate, caffeine, peppermint, and tomato based products.  She is to call us if the dysphasia gives her any further problems.  I have educated her that she is in need of an endoscopy as well as colonoscopy exam.  She is going to see what the cardiologist at [facility] tell her regarding her Eliquis and possible watchman's procedure upcoming.  She would not need to be off the Eliquis prior to any endoscopic with dilatation and she verbalizes understanding of that.  * Surgery not found *    There are no Patient Instructions on file for this visit.  Gypsy Dugan, APRN  1:49 PM  10/30/2019    Obesity, Adult  Obesity is the condition of having too much total body fat. Being overweight or obese means that your weight is greater than what is considered healthy for your body size. Obesity is determined by a measurement called BMI. BMI is an estimate of body fat and is calculated from height and weight. For adults, a BMI of 30 or higher is considered obese.  Obesity can eventually lead to other health concerns and major illnesses, including:  · Stroke.  · Coronary artery disease  (CAD).  · Type 2 diabetes.  · Some types of cancer, including cancers of the colon, breast, uterus, and gallbladder.  · Osteoarthritis.  · High blood pressure (hypertension).  · High cholesterol.  · Sleep apnea.  · Gallbladder stones.  · Infertility problems.  What are the causes?  The main cause of obesity is taking in (consuming) more calories than your body uses for energy. Other factors that contribute to this condition may include:  · Being born with genes that make you more likely to become obese.  · Having a medical condition that causes obesity. These conditions include:  ¨ Hypothyroidism.  ¨ Polycystic ovarian syndrome (PCOS).  ¨ Binge-eating disorder.  ¨ Cushing syndrome.  · Taking certain medicines, such as steroids, antidepressants, and seizure medicines.  · Not being physically active (sedentary lifestyle).  · Living where there are limited places to exercise safely or buy healthy foods.  · Not getting enough sleep.  What increases the risk?  The following factors may increase your risk of this condition:  · Having a family history of obesity.  · Being a woman of -American descent.  · Being a man of  descent.  What are the signs or symptoms?  Having excessive body fat is the main symptom of this condition.  How is this diagnosed?  This condition may be diagnosed based on:  · Your symptoms.  · Your medical history.  · A physical exam. Your health care provider may measure:  ¨ Your BMI. If you are an adult with a BMI between 25 and less than 30, you are considered overweight. If you are an adult with a BMI of 30 or higher, you are considered obese.  ¨ The distances around your hips and your waist (circumferences). These may be compared to each other to help diagnose your condition.  ¨ Your skinfold thickness. Your health care provider may gently pinch a fold of your skin and measure it.  How is this treated?  Treatment for this condition often includes changing your lifestyle. Treatment may  include some or all of the following:  · Dietary changes. Work with your health care provider and a dietitian to set a weight-loss goal that is healthy and reasonable for you. Dietary changes may include eating:  ¨ Smaller portions. A portion size is the amount of a particular food that is healthy for you to eat at one time. This varies from person to person.  ¨ Low-calorie or low-fat options.  ¨ More whole grains, fruits, and vegetables.  · Regular physical activity. This may include aerobic activity (cardio) and strength training.  · Medicine to help you lose weight. Your health care provider may prescribe medicine if you are unable to lose 1 pound a week after 6 weeks of eating more healthily and doing more physical activity.  · Surgery. Surgical options may include gastric banding and gastric bypass. Surgery may be done if:  ¨ Other treatments have not helped to improve your condition.  ¨ You have a BMI of 40 or higher.  ¨ You have life-threatening health problems related to obesity.  Follow these instructions at home:     Eating and drinking     · Follow recommendations from your health care provider about what you eat and drink. Your health care provider may advise you to:  ¨ Limit fast foods, sweets, and processed snack foods.  ¨ Choose low-fat options, such as low-fat milk instead of whole milk.  ¨ Eat 5 or more servings of fruits or vegetables every day.  ¨ Eat at home more often. This gives you more control over what you eat.  ¨ Choose healthy foods when you eat out.  ¨ Learn what a healthy portion size is.  ¨ Keep low-fat snacks on hand.  ¨ Avoid sugary drinks, such as soda, fruit juice, iced tea sweetened with sugar, and flavored milk.  ¨ Eat a healthy breakfast.  · Drink enough water to keep your urine clear or pale yellow.  · Do not go without eating for long periods of time (do not fast) or follow a fad diet. Fasting and fad diets can be unhealthy and even dangerous.  Physical Activity   · Exercise  regularly, as told by your health care provider. Ask your health care provider what types of exercise are safe for you and how often you should exercise.  · Warm up and stretch before being active.  · Cool down and stretch after being active.  · Rest between periods of activity.  Lifestyle   · Limit the time that you spend in front of your TV, computer, or video game system.  · Find ways to reward yourself that do not involve food.  · Limit alcohol intake to no more than 1 drink a day for nonpregnant women and 2 drinks a day for men. One drink equals 12 oz of beer, 5 oz of wine, or 1½ oz of hard liquor.  General instructions   · Keep a weight loss journal to keep track of the food you eat and how much you exercise you get.  · Take over-the-counter and prescription medicines only as told by your health care provider.  · Take vitamins and supplements only as told by your health care provider.  · Consider joining a support group. Your health care provider may be able to recommend a support group.  · Keep all follow-up visits as told by your health care provider. This is important.  Contact a health care provider if:  · You are unable to meet your weight loss goal after 6 weeks of dietary and lifestyle changes.  This information is not intended to replace advice given to you by your health care provider. Make sure you discuss any questions you have with your health care provider.  Document Released: 01/25/2006 Document Revised: 05/22/2017 Document Reviewed: 10/05/2016  [employer] Interactive Patient Education © 2017 [employer] Inc.      IF YOU SMOKE OR USE TOBACCO PLEASE READ THE FOLLOWING:    Why is smoking bad for me?  Smoking increases the risk of heart disease, lung disease, vascular disease, stroke, and cancer.     If you smoke, STOP!    If you would like more information on quitting smoking, please visit the [employer] website: [email or web address]   This link will  provide additional resources including the QUIT line and the Beat the Pack support groups.     For more information:    Quit Now [place]  1-800-QUIT-NOW  [email or web address]

## 2019-12-03 ENCOUNTER — TRANSCRIBE ORDERS (OUTPATIENT)
Dept: ADMINISTRATIVE | Facility: HOSPITAL | Age: 64
End: 2019-12-03

## 2019-12-03 DIAGNOSIS — R09.02 HYPOXEMIA: Primary | ICD-10-CM

## 2019-12-12 ENCOUNTER — APPOINTMENT (OUTPATIENT)
Dept: PULMONOLOGY | Facility: HOSPITAL | Age: 64
End: 2019-12-12

## 2019-12-13 ENCOUNTER — HOSPITAL ENCOUNTER (OUTPATIENT)
Dept: PULMONOLOGY | Facility: HOSPITAL | Age: 64
Discharge: HOME | End: 2019-12-13
Admitting: FAMILY MEDICINE

## 2019-12-13 DIAGNOSIS — R09.02 HYPOXEMIA: ICD-10-CM

## 2019-12-13 PROCEDURE — 94729 DIFFUSING CAPACITY: CPT | Performed by: INTERNAL MEDICINE

## 2019-12-13 PROCEDURE — 94010 BREATHING CAPACITY TEST: CPT | Performed by: INTERNAL MEDICINE

## 2019-12-13 PROCEDURE — 94010 BREATHING CAPACITY TEST: CPT

## 2019-12-13 PROCEDURE — 94729 DIFFUSING CAPACITY: CPT

## 2019-12-13 PROCEDURE — 94726 PLETHYSMOGRAPHY LUNG VOLUMES: CPT

## 2019-12-13 PROCEDURE — 94726 PLETHYSMOGRAPHY LUNG VOLUMES: CPT | Performed by: INTERNAL MEDICINE

## 2019-12-16 NOTE — PLAN OF CARE
"Problem: Patient Care Overview  Goal: Plan of Care Review  Outcome: Ongoing (interventions implemented as appropriate)   03/17/18 9749   Coping/Psychosocial   Plan of Care Reviewed With patient   Plan of Care Review   Progress improving   OTHER   Outcome Summary Med x1 tonight for headache. Right knee drsg changed by dayshift after report at 1900. So far pt. has 2\" by 3\" area of sero sang drainage. Areas marked by time. Pt. stated she thinks she may be getting an ingrown toenail on her left big toe. Advised to discuss with her PCP.       Problem: Fall Risk (Adult)  Goal: Absence of Fall  Outcome: Ongoing (interventions implemented as appropriate)      Problem: Infection, Risk/Actual (Adult)  Goal: Infection Prevention/Resolution  Outcome: Ongoing (interventions implemented as appropriate)      Problem: Knee Arthroplasty (Total, Partial) (Adult)  Goal: Signs and Symptoms of Listed Potential Problems Will be Absent, Minimized or Managed (Knee Arthroplasty)  Outcome: Ongoing (interventions implemented as appropriate)        " Medication: Tramadol 50 MG     Date of last refill: 11/14/2019 (#60/0)  [place]     Last office visit: 02/13/2019  Due back to clinic per last office note:  6-8 months  Date next office visit scheduled:  No future appoin

## 2020-01-14 ENCOUNTER — OFFICE VISIT (OUTPATIENT)
Dept: NEUROLOGY | Age: 65
End: 2020-01-14
Payer: MEDICARE

## 2020-01-14 VITALS
RESPIRATION RATE: 18 BRPM | SYSTOLIC BLOOD PRESSURE: 115 MMHG | HEIGHT: 65 IN | HEART RATE: 66 BPM | WEIGHT: 293 LBS | BODY MASS INDEX: 48.82 KG/M2 | DIASTOLIC BLOOD PRESSURE: 67 MMHG

## 2020-01-14 PROCEDURE — 1090F PRES/ABSN URINE INCON ASSESS: CPT | Performed by: PSYCHIATRY & NEUROLOGY

## 2020-01-14 PROCEDURE — 99214 OFFICE O/P EST MOD 30 MIN: CPT | Performed by: PSYCHIATRY & NEUROLOGY

## 2020-01-14 PROCEDURE — 4040F PNEUMOC VAC/ADMIN/RCVD: CPT | Performed by: PSYCHIATRY & NEUROLOGY

## 2020-01-14 PROCEDURE — G8484 FLU IMMUNIZE NO ADMIN: HCPCS | Performed by: PSYCHIATRY & NEUROLOGY

## 2020-01-14 PROCEDURE — G8427 DOCREV CUR MEDS BY ELIG CLIN: HCPCS | Performed by: PSYCHIATRY & NEUROLOGY

## 2020-01-14 PROCEDURE — G8417 CALC BMI ABV UP PARAM F/U: HCPCS | Performed by: PSYCHIATRY & NEUROLOGY

## 2020-01-14 PROCEDURE — 3017F COLORECTAL CA SCREEN DOC REV: CPT | Performed by: PSYCHIATRY & NEUROLOGY

## 2020-01-14 PROCEDURE — 1036F TOBACCO NON-USER: CPT | Performed by: PSYCHIATRY & NEUROLOGY

## 2020-01-14 PROCEDURE — G8399 PT W/DXA RESULTS DOCUMENT: HCPCS | Performed by: PSYCHIATRY & NEUROLOGY

## 2020-01-14 PROCEDURE — 1123F ACP DISCUSS/DSCN MKR DOCD: CPT | Performed by: PSYCHIATRY & NEUROLOGY

## 2020-01-14 NOTE — PROGRESS NOTES
culture positive 8/4/14    nasal    Numbness     toes    Obstructive sleep apnea     BIPAP    Peripheral neuropathy     PONV (postoperative nausea and vomiting)     Prolonged emergence from general anesthesia     Recurrent ventral incisional hernia 1/18/2016    Sleep apnea     Stroke ([address]     Stroke-like symptom     SVT (supraventricular tachycardia) (HCC)     SVT (supraventricular tachycardia) (HCC)     Swelling of extremity     Unspecified sleep apnea     clinicallybi-pap    Ventricular tachyarrhythmia ([address] 9/15    svt       Past Surgical History:   Procedure Laterality Date    CARDIAC CATHETERIZATION  8/18/15  JDT    EF 50%    CARDIAC SURGERY      CATARACT REMOVAL      CHOLECYSTECTOMY  8/4/14    BY Dr. Olivia Acosta  2008    CYST INCISION AND DRAINAGE  03/2017    FINGER TRIGGER RELEASE      FOOT SURGERY Left     removal of two screws    HAMMER TOE SURGERY      HARDWARE REMOVAL FOOT / ANKLE Left 8/16/2016    FOOT HARDWARE REMOVAL - DEEP / 2nd METATARSAL HEAD RESECTION performed by Angela Brumfield DPM at [address] [place] [address]  1/12/2015    x3    KNEE SURGERY      Took prostetic joint out because of infection and put a spacer in    OTHER SURGICAL HISTORY  02/2017    pilonidal cyst-Dr Bina Kim    MA KNEE [address] Right 7/3/2017    KNEE ARTHROSCOPY PARTIAL MEDIAL MENISECTOMY performed by Jony Livingston MD at [place] [id number] Right 2/16/2018    KNEE TOTAL ARTHROPLASTY performed by Nic Beebe MD at [place] [address] N/A 1/18/2016    HERNIA VENTRAL REPAIR LAPAROSCOPIC WITH MESH  performed [facility]ravind Posey MD at [facility]  · None    Significant Injuries  · None    Habits  Jeanie Can reports that she has never smoked. She has never used smokeless tobacco. She reports that she does not drink alcohol or use drugs.     Family History   Problem Relation Age of Onset    Heart Disease Mother    Lonmireya Apt Diabetes Mother     High Blood Pressure Father     Cancer Father 79        lung CA    Cancer Brother         leukemia    Alzheimer's Disease Brother        Social History  Murray Sharma is , lives in [place], [place], and is retired. Medications:  Current Outpatient Medications   Medication Sig Dispense Refill    gabapentin (NEURONTIN) 800 MG tablet TAKE 1 TABLET BY MOUTH THREE TIMES DAILY 90 tablet 5    DULoxetine (CYMBALTA) 60 MG extended release capsule TAKE 1 CAPSULE BY MOUTH TWICE DAILY 180 capsule 3    triamcinolone acetonide (KENALOG) 0.1 % paste APPLY TWICE DAILY AS NEEDED TO TEETH 5 g 0    venlafaxine (EFFEXOR XR) 75 MG extended release capsule TAKE 1 CAPSULE BY MOUTH DAILY 90 capsule 3    allopurinol (ZYLOPRIM) 100 MG tablet Take 100 mg by mouth daily      amitriptyline (ELAVIL) 25 MG tablet TAKE 2 TABLETS BY MOUTH EVERY NIGHT 180 tablet 3    levothyroxine (SYNTHROID) 137 MCG tablet Take 137 mcg by mouth Daily      DEXILANT 60 MG CPDR delayed release capsule Take 60 mg by mouth daily       alendronate (FOSAMAX) 70 MG tablet TK 1 T PO ONCE A WEEK IN THE MORNING 30 MINUTES BEFORE FIRST MEAL OF THE DAY  3    flecainide (TAMBOCOR) 100 MG tablet TAKE 1 TABLET BY MOUTH TWICE DAILY 180 tablet 3    metoprolol tartrate (LOPRESSOR) 25 MG tablet TAKE 1/2 TABLET BY MOUTH TWICE DAILY 90 tablet 3    diclofenac (VOLTAREN) 50 MG EC tablet Take 50 mg by mouth daily      LORazepam (ATIVAN) 1 MG tablet Take 1 mg by mouth every 8 hours as needed .   2    rOPINIRole (REQUIP) 3 MG tablet Take 3 mg by mouth 2 times daily 2 tabs in AM and 3 tabs in PM       fluticasone (FLONASE) 50 MCG/ACT nasal spray 2 sprays by Nasal route as needed       famciclovir (FAMVIR) 500 MG tablet   Take 500 mg by mouth 2 times daily       VIVLODEX 10 MG CAPS Take 10 mg by mouth daily  0    metoprolol tartrate (LOPRESSOR) 25 MG tablet TAKE 1/2 TABLET BY MOUTH TWICE DAILY (Patient not taking: Reported on 10/18/2019) 30 tablet 5 are intact without nystagmus. Cover/uncover test does show exophoria bilaterally.  Visual fields are full to confrontation. Facial movements are symmetrical and normal. Speech is precise. Extremity strength is normal in both uppers and lowers. Deep tendon reflexes are present and symmetrical including trace present in the ankles. Rapid alternating movements are unimpaired. Finger-to-nose testing is performed well, without dysmetria. Gait is unsteady and she ambulates with a wheeled walker. Pertinent Diagnostic Studies:  BiPAP download shows that she uses her auto BiPAP at 6cm to 18cm 83.3% of nights but only for an average of 2 hours and 20 minutes. She is 23.3% compliant. Her residual AHI is 3.2. Assessment:       ICD-10-CM    1. 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 Chronic right-sided low back pain with right-sided sciatica M54.41     G89.29    3. Obstructive sleep apnea G47.33    4. Restless leg syndrome G25.81    5. Memory loss R41.3    Her neuropathy, back pain, RLS, and memory are stable. She takes the BiPAP off during the night. Plan:   1. Continue BiPAP use during sleep. Use more. Get more sleep at night. I discussed this with her.   2. Continue the same medications  3. FU in [facility]    Electronically signed by Deann Ibarra MD on 1/14/2020

## 2020-01-15 ENCOUNTER — HOSPITAL ENCOUNTER (OUTPATIENT)
Dept: WOMENS IMAGING | Age: 65
Discharge: HOME | End: 2020-01-15
Payer: MEDICARE

## 2020-01-15 PROCEDURE — 77080 DXA BONE DENSITY AXIAL: CPT

## 2020-01-31 ENCOUNTER — TELEPHONE (OUTPATIENT)
Dept: NEUROLOGY | Age: 65
End: 2020-01-31

## 2020-01-31 NOTE — TELEPHONE ENCOUNTER
Lalo Chun Key: [id number] - [place] Case ID: [id number] - Rx #: [id number] Need help?  Call us at [phone]   Status   Sent to [facility]   DrugAmitriptyline HCl 25MG tablets   FormOptumRx Medicare Part D Electronic Prior Authorization Form (8482 NCPDP)   Original Claim [id number] [place] Required [phone] If Level of Care [facility], call [phone] Requires Prior Authorization

## 2020-04-21 PROBLEM — Z98.890 S/P ABLATION OF ATRIAL FIBRILLATION: Status: ACTIVE | Noted: 2020-04-21

## 2020-04-21 PROBLEM — Z98.890 S/P RADIOFREQUENCY ABLATION OPERATION FOR ARRHYTHMIA: Status: ACTIVE | Noted: 2020-04-21

## 2020-04-21 PROBLEM — Z86.79 S/P RADIOFREQUENCY ABLATION OPERATION FOR ARRHYTHMIA: Status: ACTIVE | Noted: 2020-04-21

## 2020-04-21 PROBLEM — Z86.79 S/P ABLATION OF ATRIAL FIBRILLATION: Status: ACTIVE | Noted: 2020-04-21

## 2020-04-21 PROBLEM — E78.2 MIXED HYPERLIPIDEMIA: Status: ACTIVE | Noted: 2020-04-21

## 2020-04-22 ENCOUNTER — TELEMEDICINE - AUDIO (OUTPATIENT)
Dept: CARDIOLOGY | Facility: CLINIC | Age: 65
End: 2020-04-22

## 2020-04-22 VITALS — DIASTOLIC BLOOD PRESSURE: 79 MMHG | SYSTOLIC BLOOD PRESSURE: 136 MMHG | HEART RATE: 76 BPM

## 2020-04-22 DIAGNOSIS — G47.33 OBSTRUCTIVE SLEEP APNEA: Chronic | ICD-10-CM

## 2020-04-22 DIAGNOSIS — Z98.890 S/P ABLATION OF ATRIAL FIBRILLATION: ICD-10-CM

## 2020-04-22 DIAGNOSIS — E78.2 MIXED HYPERLIPIDEMIA: ICD-10-CM

## 2020-04-22 DIAGNOSIS — Z98.890 S/P RADIOFREQUENCY ABLATION OPERATION FOR ARRHYTHMIA: ICD-10-CM

## 2020-04-22 DIAGNOSIS — I48.0 PAROXYSMAL A-FIB (HCC): Primary | Chronic | ICD-10-CM

## 2020-04-22 DIAGNOSIS — Z86.79 S/P RADIOFREQUENCY ABLATION OPERATION FOR ARRHYTHMIA: ICD-10-CM

## 2020-04-22 DIAGNOSIS — Z86.79 S/P ABLATION OF ATRIAL FIBRILLATION: ICD-10-CM

## 2020-04-22 PROCEDURE — 99443 PR PHYS/QHP TELEPHONE EVALUATION 21-30 MIN: CPT | Performed by: NURSE PRACTITIONER

## 2020-04-22 NOTE — PROGRESS NOTES
Subjective:     Encounter Date:04/22/2020      Patient ID: Danisha Cannon is a 64 y.o. female with a history of paroxsymal atrial fibrillation s/p ablation with Dr. Clement 8/5/19, SVT s/p ablation with Dr. Clement 8/5/19, morbid obesity and hyperlipidemia.    You have chosen to receive care through a telephone visit. Do you consent to use a telephone visit for your medical care today? YES     Unable to complete visit using a video connection to the patient. A phone visit was used to complete this visits. Total time of discussion was 30 minutes.    Chief Complaint: follow up  Atrial Fibrillation   Presents for follow-up visit. Symptoms are negative for chest pain, dizziness, palpitations, shortness of breath, syncope, tachycardia and weakness. The symptoms have been stable. Past medical history includes atrial fibrillation and hyperlipidemia.   Hyperlipidemia   This is a chronic problem. The current episode started more than 1 year ago. The problem is controlled. Recent lipid tests were reviewed and are normal. Exacerbating diseases include obesity. Pertinent negatives include no chest pain or shortness of breath.     Patient presents today via video for a follow up. She was unable to get her camera working so her visit was changed to a telephone visit. She states she has been doing well since her ablation. She notes Dr. Guerin mentioned she might be a good candidate for a watchman device. She denies chest pain, shortness of breath, palpitations, orthopnea and PND. She is compliant with her Eliquis.     The following portions of the patient's history were reviewed and updated as appropriate: allergies, current medications, past family history, past medical history, past social history, past surgical history and problem list.    Allergies   Allergen Reactions   • Other Rash     Dial soap  Redness and swelling on skin and burning sensation   • Codeine Dizziness and Nausea Only     Rapid heart rate, dizziness  NAUSEA    • Mobic [Meloxicam] Rash   • Morphine And Related Itching       Current Outpatient Medications:   •  alendronate (FOSAMAX) 70 MG tablet, Take 70 mg by mouth Every 7 (Seven) Days. Friday evening., Disp: , Rfl:   •  amitriptyline (ELAVIL) 50 MG tablet, amitriptyline 50 mg tablet  Take 1 tablet every day by oral route., Disp: , Rfl:   •  dexlansoprazole (DEXILANT) 60 MG capsule, Take 60 mg by mouth Daily., Disp: , Rfl:   •  diclofenac (VOLTAREN) 75 MG EC tablet, Take 75 mg by mouth Daily., Disp: , Rfl:   •  DULoxetine (CYMBALTA) 60 MG capsule, Take 60 mg by mouth 2 (Two) Times a Day., Disp: , Rfl:   •  ELIQUIS 5 MG tablet tablet, Take 5 mg by mouth 2 (Two) Times a Day., Disp: , Rfl: 4  •  fluticasone (FLONASE) 50 MCG/ACT nasal spray, 2 sprays into the nostril(s) as directed by provider Daily As Needed for Rhinitis or Allergies., Disp: , Rfl:   •  gabapentin (NEURONTIN) 800 MG tablet, Take 800 mg by mouth 3 (Three) Times a Day., Disp: , Rfl:   •  levothyroxine (SYNTHROID, LEVOTHROID) 137 MCG tablet, Take 137 mcg by mouth Every Morning., Disp: , Rfl:   •  LORazepam (ATIVAN) 1 MG tablet, Take 1 mg by mouth 2 (Two) Times a Day., Disp: , Rfl:   •  metoprolol tartrate (LOPRESSOR) 25 MG tablet, Take 12.5 mg by mouth Every 12 (Twelve) Hours., Disp: , Rfl:   •  montelukast (SINGULAIR) 10 MG tablet, Take  by mouth Daily., Disp: , Rfl: 5  •  nystatin (MYCOSTATIN) 946614 UNIT/ML suspension, 5 mL Every 6 (Six) Hours., Disp: , Rfl:   •  rOPINIRole (REQUIP) 3 MG tablet, Take 6 mg by mouth Every Morning. Morning dose., Disp: , Rfl:   •  triamcinolone (KENALOG) 0.1 % paste, See Admin Instructions., Disp: , Rfl: 5  •  venlafaxine XR (EFFEXOR-XR) 75 MG 24 hr capsule, TK 1 C PO D, Disp: , Rfl: 2  Past Medical History:   Diagnosis Date   • Anemia    • Anxiety and depression    • Arthritis    • Atrial fibrillation (CMS/HCC)    • Disease of thyroid gland    • GERD (gastroesophageal reflux disease)    • History of incision and drainage      Right knee   • Hyperlipidemia    • Infection      in right knee to bone, cleaned it out and put new hardware with antibiotic and pt developed hole in incision   • Neuropathy, peripheral    • Pneumonia     RECENT DX PER FAMILY DOCTOR   • PONV (postoperative nausea and vomiting)    • Restless leg syndrome    • Sleep apnea with use of continuous positive airway pressure (CPAP)    • SVT (supraventricular tachycardia) (CMS/[facility])        Social History     Socioeconomic History   • Marital status:      Spouse name: Not on file   • Number of children: Not on file   • Years of education: Not on file   • Highest education level: Not on file   Tobacco Use   • Smoking status: Never Smoker   • Smokeless tobacco: Never Used   Substance and Sexual Activity   • Alcohol use: No   • Drug use: No   • Sexual activity: Defer       Review of Systems   Constitution: Negative for malaise/fatigue, weight gain and weight loss.   Cardiovascular: Negative for chest pain, dyspnea on exertion, irregular heartbeat, leg swelling, near-syncope, orthopnea, palpitations, paroxysmal nocturnal dyspnea and syncope.   Respiratory: Negative for cough, shortness of breath, sleep disturbances due to breathing, sputum production and wheezing.    Skin: Negative for dry skin, flushing, itching and rash.   Gastrointestinal: Negative for hematemesis and hematochezia.   Neurological: Negative for dizziness, light-headedness, loss of balance and weakness.       Procedures  /79   Pulse 76   LMP  (LMP Unknown)        Objective:     Physical Exam   Constitutional: She is oriented to person, place, and time. Vital signs are normal. No distress.   Cardiovascular: Normal rate.   Pulmonary/Chest: No respiratory distress.   Neurological: She is alert and oriented to person, place, and time.   Psychiatric: She has a normal mood and affect. Her speech is normal and behavior is normal. Judgment and thought content normal. Cognition and memory are normal.        Lab Review:   I have reviewed previous office notes, previous hospitalization records, most recent labs, most recent cardiac testing and notes from [facility].            Assessment:          Diagnosis Plan   1. Paroxysmal A-fib (CMS/HCC)     2. S/P ablation of atrial fibrillation     3. S/P radiofrequency ablation operation for arrhythmia     4. Mixed hyperlipidemia     5. Obstructive sleep apnea            Plan:       1. PAF- s/p ablation. Stable. On Eliquis  2. S/p ablation- in 8/19 with Dr. Guerin.   3. SVT ablation- same time as Afib ablation with Dr. Guerin.  4. HLD- followed by pcp.   5. TRISTON- compliant with her BiPAP    Informed patient we would be glad to send her to our structural heart clinic for evaluation of watchman device once our office opens back up following the COVID-19 pandemic.     Follow up in 6 months or sooner if symptoms worsen.

## 2020-04-30 ENCOUNTER — TELEMEDICINE (OUTPATIENT)
Dept: GASTROENTEROLOGY | Facility: CLINIC | Age: 65
End: 2020-04-30

## 2020-04-30 VITALS — WEIGHT: 280 LBS | HEIGHT: 65 IN | BODY MASS INDEX: 46.65 KG/M2

## 2020-04-30 DIAGNOSIS — Z79.01 ANTICOAGULATED: ICD-10-CM

## 2020-04-30 DIAGNOSIS — Z78.9 NONSMOKER: ICD-10-CM

## 2020-04-30 DIAGNOSIS — E66.9 OBESITY, UNSPECIFIED OBESITY SEVERITY, UNSPECIFIED OBESITY TYPE: ICD-10-CM

## 2020-04-30 DIAGNOSIS — R10.13 DYSPEPSIA: ICD-10-CM

## 2020-04-30 DIAGNOSIS — R13.19 ESOPHAGEAL DYSPHAGIA: Primary | ICD-10-CM

## 2020-04-30 PROCEDURE — 99213 OFFICE O/P EST LOW 20 MIN: CPT | Performed by: CLINICAL NURSE SPECIALIST

## 2020-04-30 NOTE — PROGRESS NOTES
Danisha Cannon  1955 4/30/2020  Chief Complaint   Patient presents with   • GI Problem     Discuss problems swallowing     Subjective   HPI  Danisha Cannon is a 64 y.o. female who presents with a complaint of ongoing persistent dysphagia which she has had for months. She has seen me prior for this in October 2019 however unable to do procedures at that time due to anticoagulation and procedures at [facility] for this to include possible Watchman device. She opted to follow dietary precautions eating slow and taking PPI at that time.  She did have an esophagram in October 2019 and this was negative. Barium tablet passed without delay.   Today she tells me that they have not scheduled her procedure for her Atrial flutter at this time. She is pending a clinical trial. She tells me that at this time she is much improved. No futher difficulty swallowing. No nausea or vomiting. No reflux. Stable on her medication.   Past Medical History:   Diagnosis Date   • Anemia    • Anxiety and depression    • Arthritis    • Atrial fibrillation (CMS/HCC)    • Disease of thyroid gland    • GERD (gastroesophageal reflux disease)    • History of incision and drainage     Right knee   • Hyperlipidemia    • Infection      in right knee to bone, cleaned it out and put new hardware with antibiotic and pt developed hole in incision   • Neuropathy, peripheral    • Pneumonia     RECENT DX PER FAMILY DOCTOR   • PONV (postoperative nausea and vomiting)    • Restless leg syndrome    • Sleep apnea with use of continuous positive airway pressure (CPAP)    • SVT (supraventricular tachycardia) (CMS/HCC)      Past Surgical History:   Procedure Laterality Date   • BUNIONECTOMY Left     AND HAMMER TOE   • CARDIAC SURGERY      HEART CATH   • CATARACT EXTRACTION Right    • HERNIA REPAIR      UMBILICAL X3   • INCISION AND DRAINAGE LEG Right 1/28/2019    Procedure: INCISION AND DRAINAGE OF RIGHT THIGH HEMATOMA;  Surgeon: Nino Stern MD;   Location:  [facility] HYBRID OR 12;  Service: Vascular   • JOINT REPLACEMENT      RIGHT KNEE   • KNEE POLY INSERT EXCHANGE Right 3/3/2018    Procedure: IRRIGATION AND DEBRIDEMENT TOTAL KNEE & POLY EXCHANGE - RIGHT;  Surgeon: Amilcar Capellan MD;  Location:  [facility] OR;  Service:    • LAPAROSCOPIC CHOLECYSTECTOMY     • LEG DEBRIDEMENT Right 3/23/2018    Procedure: DEBRIDEMENT AND CLOSURE KNEE - RIGHT;  Surgeon: Amilcar Capellan MD;  Location:  [facility] OR;  Service: Orthopedics   • PERIPHERALLY INSERTED CENTRAL CATHETER INSERTION     • PILONIDAL CYSTECTOMY N/A 2/16/2017    Procedure: PILONIDAL CYSTECTOMY, EXCISION SEBACEOUS CYST - BACK;  Surgeon: Macrina Capellan MD;  Location:  [facility] OR;  Service:    • TOTAL KNEE  PROSTHESIS REMOVAL W/ SPACER INSERTION Right 3/13/2018    Procedure: 1.  EXPLANT TOTAL KNEE 2.  ANTIBOTIC SPACER KNEE;  Surgeon: Amilcar Capellan MD;  Location:  [facility] OR;  Service: Orthopedics   • TRUNK LESION/CYST EXCISION N/A 2/16/2017    Procedure: EXCISION SEBACEOUS CYST - BACK;  Surgeon: Macrina Capellan MD;  Location:  [facility] OR;  Service:        Outpatient Medications Marked as Taking for the 4/30/20 encounter (Telemedicine) with Jazzy Maguire APRN   Medication Sig Dispense Refill   • alendronate (FOSAMAX) 70 MG tablet Take 70 mg by mouth Every 7 (Seven) Days. Friday evening.     • amitriptyline (ELAVIL) 50 MG tablet amitriptyline 50 mg tablet   Take 1 tablet every day by oral route.     • dexlansoprazole (DEXILANT) 60 MG capsule Take 60 mg by mouth Daily.     • diclofenac (VOLTAREN) 75 MG EC tablet Take 75 mg by mouth Daily.     • DULoxetine (CYMBALTA) 60 MG capsule Take 60 mg by mouth 2 (Two) Times a Day.     • ELIQUIS 5 MG tablet tablet Take 5 mg by mouth 2 (Two) Times a Day.  4   • fluticasone (FLONASE) 50 MCG/ACT nasal spray 2 sprays into the nostril(s) as directed by provider Daily As Needed for Rhinitis or Allergies.     • gabapentin (NEURONTIN) 800 MG tablet Take 800 mg by mouth 3  (Three) Times a Day.     • levothyroxine (SYNTHROID, LEVOTHROID) 137 MCG tablet Take 137 mcg by mouth Every Morning.     • LORazepam (ATIVAN) 1 MG tablet Take 1 mg by mouth 2 (Two) Times a Day.     • metoprolol tartrate (LOPRESSOR) 25 MG tablet Take 12.5 mg by mouth Every 12 (Twelve) Hours.     • montelukast (SINGULAIR) 10 MG tablet Take  by mouth Daily.  5   • nystatin (MYCOSTATIN) 852416 UNIT/ML suspension 5 mL Every 6 (Six) Hours.     • rOPINIRole (REQUIP) 3 MG tablet Take 6 mg by mouth Every Morning. Morning dose.     • triamcinolone (KENALOG) 0.1 % paste See Admin Instructions.  5   • venlafaxine XR (EFFEXOR-XR) 75 MG 24 hr capsule TK 1 C PO D  2     Allergies   Allergen Reactions   • Other Rash     Dial soap  Redness and swelling on skin and burning sensation   • Codeine Dizziness and Nausea Only     Rapid heart rate, dizziness  NAUSEA   • Mobic [Meloxicam] Rash   • Morphine And Related Itching     Social History     Socioeconomic History   • Marital status:      Spouse name: Not on file   • Number of children: Not on file   • Years of education: Not on file   • Highest education level: Not on file   Tobacco Use   • Smoking status: Never Smoker   • Smokeless tobacco: Never Used   Substance and Sexual Activity   • Alcohol use: No   • Drug use: No   • Sexual activity: Defer     Family History   Problem Relation Age of Onset   • Diabetes Mother    • Heart disease Mother    • Alzheimer's disease Mother    • Hyperlipidemia Mother    • Hypertension Mother    • Cancer Father    • Hypertension Father    • Leukemia Brother    • Heart attack Sister    • Clotting disorder Brother    • No Known Problems Brother    • Colon polyps Neg Hx    • Colon cancer Neg Hx      Health Maintenance   Topic Date Due   • URINE MICROALBUMIN  1955   • TDAP/TD VACCINES (1 - Tdap) 06/07/1964   • HEPATITIS C SCREENING  03/02/2018   • DIABETIC FOOT EXAM  03/02/2018   • DIABETIC EYE EXAM  03/02/2018   • COLONOSCOPY  03/02/2018   •  "Pneumococcal Vaccine Once at 65 Years Old  06/07/2018   • ZOSTER VACCINE (2 of 2) 10/27/2018   • HEMOGLOBIN A1C  01/24/2019   • LIPID PANEL  11/23/2019   • MEDICARE ANNUAL WELLNESS  01/02/2020   • INFLUENZA VACCINE  08/01/2020   • MAMMOGRAM  10/14/2021     Review of Systems   Constitutional: Negative for activity change, appetite change, chills, diaphoresis, fatigue, fever and unexpected weight change.   HENT: Positive for trouble swallowing. Negative for ear pain, hearing loss, mouth sores, sore throat and voice change.    Eyes: Negative.    Respiratory: Negative for cough, choking, shortness of breath and wheezing.    Cardiovascular: Negative for chest pain and palpitations.   Gastrointestinal: Negative for abdominal pain, blood in stool, constipation, diarrhea, nausea and vomiting.   Endocrine: Negative for cold intolerance and heat intolerance.   Genitourinary: Negative for decreased urine volume, dysuria, frequency, hematuria and urgency.   Musculoskeletal: Negative for back pain, gait problem and myalgias.   Skin: Negative for color change, pallor and rash.   Allergic/Immunologic: Negative for food allergies and immunocompromised state.   Neurological: Negative for dizziness, tremors, seizures, syncope, weakness, light-headedness, numbness and headaches.   Hematological: Negative for adenopathy. Does not bruise/bleed easily.   Psychiatric/Behavioral: Negative for agitation and confusion. The patient is not nervous/anxious.    All other systems reviewed and are negative.    Objective   Vitals:    04/30/20 1013   Weight: 127 kg (280 lb)   Height: 165.1 cm (65\")     Body mass index is 46.59 kg/m².  Physical Exam  Assessment/Plan   Danisha was seen today for gi problem.    Diagnoses and all orders for this visit:    Esophageal dysphagia    Dyspepsia    Obesity, unspecified obesity severity, unspecified obesity type    Nonsmoker    Anticoagulated  Comments:  Eliquis she takes due to hx of Aflutter      I discussed non " pharmaceutical treatment of gerd.  This includes gradually losing weight to achieve ideal body wt., elevation of the head of bed by 4-6 inches, nothing to eat or drink 3 hours prior to lying down, avoiding tight clothing, stress reduction, tobacco cessation, reduction of alcohol intake, and dietary restrictions (avoiding caffeine, coffee, fatty foods, mints, chocolate, spicy foods and tomato based sauces as much as possible).    Continue current regimen.   Follow up 1 year.     Part of this note may be an electronic transcription/translation of spoken language to printed text using the Dragon Dictation System.  Body mass index is 46.59 kg/m².  Return in about 1 year (around 4/30/2021).    Patient's Body mass index is 46.59 kg/m². BMI is above normal parameters. Recommendations include: nutrition counseling.  You have chosen to receive care through a telehealth visit.  Do you consent to use a video/audio connection for your medical care today? Yes      All risks, benefits, alternatives, and indications of colonoscopy and/or Endoscopy procedure have been discussed with the patient. Risks to include perforation of the colon requiring possible surgery or colostomy, risk of bleeding from biopsies or removal of colon tissue, possibility of missing a colon polyp or cancer, or adverse drug reaction.  Benefits to include the diagnosis and management of disease of the colon and rectum. Alternatives to include barium enema, radiographic evaluation, lab testing or no intervention. Pt verbalizes understanding and agrees.     Jazzy Maguire, APRN  4/30/2020  11:19      Obesity, Adult  Obesity is the condition of having too much total body fat. Being overweight or obese means that your weight is greater than what is considered healthy for your body size. Obesity is determined by a measurement called BMI. BMI is an estimate of body fat and is calculated from height and weight. For adults, a BMI of 30 or higher is considered  obese.  Obesity can eventually lead to other health concerns and major illnesses, including:  · Stroke.  · Coronary artery disease (CAD).  · Type 2 diabetes.  · Some types of cancer, including cancers of the colon, breast, uterus, and gallbladder.  · Osteoarthritis.  · High blood pressure (hypertension).  · High cholesterol.  · Sleep apnea.  · Gallbladder stones.  · Infertility problems.  What are the causes?  The main cause of obesity is taking in (consuming) more calories than your body uses for energy. Other factors that contribute to this condition may include:  · Being born with genes that make you more likely to become obese.  · Having a medical condition that causes obesity. These conditions include:  ¨ Hypothyroidism.  ¨ Polycystic ovarian syndrome (PCOS).  ¨ Binge-eating disorder.  ¨ Cushing syndrome.  · Taking certain medicines, such as steroids, antidepressants, and seizure medicines.  · Not being physically active (sedentary lifestyle).  · Living where there are limited places to exercise safely or buy healthy foods.  · Not getting enough sleep.  What increases the risk?  The following factors may increase your risk of this condition:  · Having a family history of obesity.  · Being a woman of -American descent.  · Being a man of  descent.  What are the signs or symptoms?  Having excessive body fat is the main symptom of this condition.  How is this diagnosed?  This condition may be diagnosed based on:  · Your symptoms.  · Your medical history.  · A physical exam. Your health care provider may measure:  ¨ Your BMI. If you are an adult with a BMI between 25 and less than 30, you are considered overweight. If you are an adult with a BMI of 30 or higher, you are considered obese.  ¨ The distances around your hips and your waist (circumferences). These may be compared to each other to help diagnose your condition.  ¨ Your skinfold thickness. Your health care provider may gently pinch a fold of  your skin and measure it.  How is this treated?  Treatment for this condition often includes changing your lifestyle. Treatment may include some or all of the following:  · Dietary changes. Work with your health care provider and a dietitian to set a weight-loss goal that is healthy and reasonable for you. Dietary changes may include eating:  ¨ Smaller portions. A portion size is the amount of a particular food that is healthy for you to eat at one time. This varies from person to person.  ¨ Low-calorie or low-fat options.  ¨ More whole grains, fruits, and vegetables.  · Regular physical activity. This may include aerobic activity (cardio) and strength training.  · Medicine to help you lose weight. Your health care provider may prescribe medicine if you are unable to lose 1 pound a week after 6 weeks of eating more healthily and doing more physical activity.  · Surgery. Surgical options may include gastric banding and gastric bypass. Surgery may be done if:  ¨ Other treatments have not helped to improve your condition.  ¨ You have a BMI of 40 or higher.  ¨ You have life-threatening health problems related to obesity.  Follow these instructions at home:     Eating and drinking     · Follow recommendations from your health care provider about what you eat and drink. Your health care provider may advise you to:  ¨ Limit fast foods, sweets, and processed snack foods.  ¨ Choose low-fat options, such as low-fat milk instead of whole milk.  ¨ Eat 5 or more servings of fruits or vegetables every day.  ¨ Eat at home more often. This gives you more control over what you eat.  ¨ Choose healthy foods when you eat out.  ¨ Learn what a healthy portion size is.  ¨ Keep low-fat snacks on hand.  ¨ Avoid sugary drinks, such as soda, fruit juice, iced tea sweetened with sugar, and flavored milk.  ¨ Eat a healthy breakfast.  · Drink enough water to keep your urine clear or pale yellow.  · Do not go without eating for long periods of  time (do not fast) or follow a fad diet. Fasting and fad diets can be unhealthy and even dangerous.  Physical Activity   · Exercise regularly, as told by your health care provider. Ask your health care provider what types of exercise are safe for you and how often you should exercise.  · Warm up and stretch before being active.  · Cool down and stretch after being active.  · Rest between periods of activity.  Lifestyle   · Limit the time that you spend in front of your TV, computer, or video game system.  · Find ways to reward yourself that do not involve food.  · Limit alcohol intake to no more than 1 drink a day for nonpregnant women and 2 drinks a day for men. One drink equals 12 oz of beer, 5 oz of wine, or 1½ oz of hard liquor.  General instructions   · Keep a weight loss journal to keep track of the food you eat and how much you exercise you get.  · Take over-the-counter and prescription medicines only as told by your health care provider.  · Take vitamins and supplements only as told by your health care provider.  · Consider joining a support group. Your health care provider may be able to recommend a support group.  · Keep all follow-up visits as told by your health care provider. This is important.  Contact a health care provider if:  · You are unable to meet your weight loss goal after 6 weeks of dietary and lifestyle changes.  This information is not intended to replace advice given to you by your health care provider. Make sure you discuss any questions you have with your health care provider.  Document Released: 01/25/2006 Document Revised: 05/22/2017 Document Reviewed: 10/05/2016  [employer] Interactive Patient Education © 2017 [employer]      If you smoke or use tobacco, 4 minutes reading provided  Steps to Quit Smoking  Smoking tobacco can be harmful to your health and can affect almost every organ in your body. Smoking puts you, and those around you, at risk for developing many serious chronic  diseases. Quitting smoking is difficult, but it is one of the best things that you can do for your health. It is never too late to quit.  What are the benefits of quitting smoking?  When you quit smoking, you lower your risk of developing serious diseases and conditions, such as:  · Lung cancer or lung disease, such as COPD.  · Heart disease.  · Stroke.  · Heart attack.  · Infertility.  · Osteoporosis and bone fractures.  Additionally, symptoms such as coughing, wheezing, and shortness of breath may get better when you quit. You may also find that you get sick less often because your body is stronger at fighting off colds and infections. If you are pregnant, quitting smoking can help to reduce your chances of having a baby of low birth weight.  How do I get ready to quit?  When you decide to quit smoking, create a plan to make sure that you are successful. Before you quit:  · Pick a date to quit. Set a date within the next two weeks to give you time to prepare.  · Write down the reasons why you are quitting. Keep this list in places where you will see it often, such as on your bathroom mirror or in your car or wallet.  · Identify the people, places, things, and activities that make you want to smoke (triggers) and avoid them. Make sure to take these actions:  ¨ Throw away all cigarettes at home, at work, and in your car.  ¨ Throw away smoking accessories, such as ashtrays and lighters.  ¨ Clean your car and make sure to empty the ashtray.  ¨ Clean your home, including curtains and carpets.  · Tell your family, friends, and coworkers that you are quitting. Support from your loved ones can make quitting easier.  · Talk with your health care provider about your options for quitting smoking.  · Find out what treatment options are covered by your health insurance.  What strategies can I use to quit smoking?  Talk with your healthcare provider about different strategies to quit smoking. Some strategies  include:  · Quitting smoking altogether instead of gradually lessening how much you smoke over a period of time. Research shows that quitting “cold turkey” is more successful than gradually quitting.  · Attending in-person counseling to help you build problem-solving skills. You are more likely to have success in quitting if you attend several counseling sessions. Even short sessions of 10 minutes can be effective.  · Finding resources and support systems that can help you to quit smoking and remain smoke-free after you quit. These resources are most helpful when you use them often. They can include:  ¨ Online chats with a counselor.  ¨ Telephone quitlines.  ¨ Printed self-help materials.  ¨ Support groups or group counseling.  ¨ Text messaging programs.  ¨ Mobile phone applications.  · Taking medicines to help you quit smoking. (If you are pregnant or breastfeeding, talk with your health care provider first.) Some medicines contain nicotine and some do not. Both types of medicines help with cravings, but the medicines that include nicotine help to relieve withdrawal symptoms. Your health care provider may recommend:  ¨ Nicotine patches, gum, or lozenges.  ¨ Nicotine inhalers or sprays.  ¨ Non-nicotine medicine that is taken by mouth.  Talk with your health care provider about combining strategies, such as taking medicines while you are also receiving in-person counseling. Using these two strategies together makes you more likely to succeed in quitting than if you used either strategy on its own.  If you are pregnant or breastfeeding, talk with your health care provider about finding counseling or other support strategies to quit smoking. Do not take medicine to help you quit smoking unless told to do so by your health care provider.  What things can I do to make it easier to quit?  Quitting smoking might feel overwhelming at first, but there is a lot that you can do to make it easier. Take these important  actions:  · Reach out to your family and friends and ask that they support and encourage you during this time. Call telephone quitlines, reach out to support groups, or work with a counselor for support.  · Ask people who smoke to avoid smoking around you.  · Avoid places that trigger you to smoke, such as bars, parties, or smoke-break areas at work.  · Spend time around people who do not smoke.  · Lessen stress in your life, because stress can be a smoking trigger for some people. To lessen stress, try:  ¨ Exercising regularly.  ¨ Deep-breathing exercises.  ¨ Yoga.  ¨ Meditating.  ¨ Performing a body scan. This involves closing your eyes, scanning your body from head to toe, and noticing which parts of your body are particularly tense. Purposefully relax the muscles in those areas.  · Download or purchase mobile phone or tablet apps (applications) that can help you stick to your quit plan by providing reminders, tips, and encouragement. There are many free apps, such as QuitGuide from the CDC (Centers for Disease Control and Prevention). You can find other support for quitting smoking (smoking cessation) through smokefree.gov and other websites.  How will I feel when I quit smoking?  Within the first 24 hours of quitting smoking, you may start to feel some withdrawal symptoms. These symptoms are usually most noticeable 2-3 days after quitting, but they usually do not last beyond 2-3 weeks. Changes or symptoms that you might experience include:  · Mood swings.  · Restlessness, anxiety, or irritation.  · Difficulty concentrating.  · Dizziness.  · Strong cravings for sugary foods in addition to nicotine.  · Mild weight gain.  · Constipation.  · Nausea.  · Coughing or a sore throat.  · Changes in how your medicines work in your body.  · A depressed mood.  · Difficulty sleeping (insomnia).  After the first 2-3 weeks of quitting, you may start to notice more positive results, such as:  · Improved sense of smell and  taste.  · Decreased coughing and sore throat.  · Slower heart rate.  · Lower blood pressure.  · Clearer skin.  · The ability to breathe more easily.  · Fewer sick days.  Quitting smoking is very challenging for most people. Do not get discouraged if you are not successful the first time. Some people need to make many attempts to quit before they achieve long-term success. Do your best to stick to your quit plan, and talk with your health care provider if you have any questions or concerns.  This information is not intended to replace advice given to you by your health care provider. Make sure you discuss any questions you have with your health care provider.  Document Released: 12/12/2002 Document Revised: 08/15/2017 Document Reviewed: 05/03/2016  [employer] Interactive Patient Education © 2017 [employer]

## 2020-04-30 NOTE — PROGRESS NOTES
You have chosen to receive care through a telehealth visit.  Do you consent to use a video/audio connection for your medical care today? Yes

## 2020-05-04 RX ORDER — VENLAFAXINE HYDROCHLORIDE 75 MG/1
CAPSULE, EXTENDED RELEASE ORAL
Qty: 90 CAPSULE | Refills: 3 | Status: SHIPPED | OUTPATIENT
Start: 2020-05-04 | End: 2021-04-26

## 2020-05-04 RX ORDER — AMITRIPTYLINE HYDROCHLORIDE 25 MG/1
TABLET, FILM COATED ORAL
Qty: 180 TABLET | Refills: 3 | Status: SHIPPED | OUTPATIENT
Start: 2020-05-04 | End: 2020-07-21

## 2020-06-05 ENCOUNTER — APPOINTMENT (OUTPATIENT)
Dept: PREADMISSION TESTING | Facility: HOSPITAL | Age: 65
End: 2020-06-05

## 2020-06-05 ENCOUNTER — HOSPITAL ENCOUNTER (OUTPATIENT)
Dept: GENERAL RADIOLOGY | Facility: HOSPITAL | Age: 65
Discharge: HOME | End: 2020-06-05
Admitting: ORTHOPAEDIC SURGERY

## 2020-06-05 VITALS
HEART RATE: 84 BPM | WEIGHT: 293 LBS | RESPIRATION RATE: 20 BRPM | BODY MASS INDEX: 50.02 KG/M2 | DIASTOLIC BLOOD PRESSURE: 45 MMHG | SYSTOLIC BLOOD PRESSURE: 123 MMHG | HEIGHT: 64 IN | OXYGEN SATURATION: 95 %

## 2020-06-05 LAB
ALBUMIN SERPL-MCNC: 3.7 G/DL (ref 3.5–5.2)
ALBUMIN/GLOB SERPL: 1.1 G/DL
ALP SERPL-CCNC: 103 U/L (ref 39–117)
ALT SERPL W P-5'-P-CCNC: 18 U/L (ref 1–33)
ANION GAP SERPL CALCULATED.3IONS-SCNC: 10 MMOL/L (ref 5–15)
AST SERPL-CCNC: 24 U/L (ref 1–32)
BACTERIA UR QL AUTO: ABNORMAL /HPF
BASOPHILS # BLD AUTO: 0.04 10*3/MM3 (ref 0–0.2)
BASOPHILS NFR BLD AUTO: 0.5 % (ref 0–1.5)
BILIRUB SERPL-MCNC: 0.4 MG/DL (ref 0.2–1.2)
BILIRUB UR QL STRIP: NEGATIVE
BUN BLD-MCNC: 23 MG/DL (ref 8–23)
BUN/CREAT SERPL: 28.8 (ref 7–25)
CALCIUM SPEC-SCNC: 9.4 MG/DL (ref 8.6–10.5)
CHLORIDE SERPL-SCNC: 104 MMOL/L (ref 98–107)
CLARITY UR: CLEAR
CO2 SERPL-SCNC: 29 MMOL/L (ref 22–29)
COLOR UR: YELLOW
CREAT BLD-MCNC: 0.8 MG/DL (ref 0.57–1)
DEPRECATED RDW RBC AUTO: 46.7 FL (ref 37–54)
EOSINOPHIL # BLD AUTO: 0.36 10*3/MM3 (ref 0–0.4)
EOSINOPHIL NFR BLD AUTO: 4.9 % (ref 0.3–6.2)
ERYTHROCYTE [DISTWIDTH] IN BLOOD BY AUTOMATED COUNT: 14.5 % (ref 12.3–15.4)
GFR SERPL CREATININE-BSD FRML MDRD: 72 ML/MIN/1.73
GLOBULIN UR ELPH-MCNC: 3.3 GM/DL
GLUCOSE BLD-MCNC: 89 MG/DL (ref 65–99)
GLUCOSE UR STRIP-MCNC: NEGATIVE MG/DL
HCT VFR BLD AUTO: 41.2 % (ref 34–46.6)
HGB BLD-MCNC: 12.9 G/DL (ref 12–15.9)
HGB UR QL STRIP.AUTO: NEGATIVE
HYALINE CASTS UR QL AUTO: ABNORMAL /LPF
IMM GRANULOCYTES # BLD AUTO: 0.03 10*3/MM3 (ref 0–0.05)
IMM GRANULOCYTES NFR BLD AUTO: 0.4 % (ref 0–0.5)
INR PPP: 1.19 (ref 0.91–1.09)
KETONES UR QL STRIP: NEGATIVE
LEUKOCYTE ESTERASE UR QL STRIP.AUTO: ABNORMAL
LYMPHOCYTES # BLD AUTO: 1.48 10*3/MM3 (ref 0.7–3.1)
LYMPHOCYTES NFR BLD AUTO: 20.1 % (ref 19.6–45.3)
MCH RBC QN AUTO: 28 PG (ref 26.6–33)
MCHC RBC AUTO-ENTMCNC: 31.3 G/DL (ref 31.5–35.7)
MCV RBC AUTO: 89.6 FL (ref 79–97)
MONOCYTES # BLD AUTO: 0.59 10*3/MM3 (ref 0.1–0.9)
MONOCYTES NFR BLD AUTO: 8 % (ref 5–12)
NEUTROPHILS # BLD AUTO: 4.85 10*3/MM3 (ref 1.7–7)
NEUTROPHILS NFR BLD AUTO: 66.1 % (ref 42.7–76)
NITRITE UR QL STRIP: NEGATIVE
NRBC BLD AUTO-RTO: 0 /100 WBC (ref 0–0.2)
PH UR STRIP.AUTO: 7 [PH] (ref 5–8)
PLATELET # BLD AUTO: 236 10*3/MM3 (ref 140–450)
PMV BLD AUTO: 11.5 FL (ref 6–12)
POTASSIUM BLD-SCNC: 5.2 MMOL/L (ref 3.5–5.2)
PROT SERPL-MCNC: 7 G/DL (ref 6–8.5)
PROT UR QL STRIP: NEGATIVE
PROTHROMBIN TIME: 14.7 SECONDS (ref 11.9–14.6)
RBC # BLD AUTO: 4.6 10*6/MM3 (ref 3.77–5.28)
RBC # UR: ABNORMAL /HPF
REF LAB TEST METHOD: ABNORMAL
SODIUM BLD-SCNC: 143 MMOL/L (ref 136–145)
SP GR UR STRIP: 1.02 (ref 1–1.03)
SQUAMOUS #/AREA URNS HPF: ABNORMAL /HPF
UROBILINOGEN UR QL STRIP: ABNORMAL
WBC NRBC COR # BLD: 7.35 10*3/MM3 (ref 3.4–10.8)
WBC UR QL AUTO: ABNORMAL /HPF

## 2020-06-05 PROCEDURE — 93010 ELECTROCARDIOGRAM REPORT: CPT | Performed by: INTERNAL MEDICINE

## 2020-06-05 PROCEDURE — 85610 PROTHROMBIN TIME: CPT | Performed by: ORTHOPAEDIC SURGERY

## 2020-06-05 PROCEDURE — 93005 ELECTROCARDIOGRAM TRACING: CPT

## 2020-06-05 PROCEDURE — 71045 X-RAY EXAM CHEST 1 VIEW: CPT

## 2020-06-05 PROCEDURE — 36415 COLL VENOUS BLD VENIPUNCTURE: CPT

## 2020-06-05 PROCEDURE — 85025 COMPLETE CBC W/AUTO DIFF WBC: CPT | Performed by: ORTHOPAEDIC SURGERY

## 2020-06-05 PROCEDURE — 80053 COMPREHEN METABOLIC PANEL: CPT | Performed by: ORTHOPAEDIC SURGERY

## 2020-06-05 PROCEDURE — 81001 URINALYSIS AUTO W/SCOPE: CPT | Performed by: ORTHOPAEDIC SURGERY

## 2020-06-05 RX ORDER — FLECAINIDE ACETATE 100 MG/1
100 TABLET ORAL 2 TIMES DAILY
COMMUNITY
End: 2022-06-06

## 2020-06-05 NOTE — DISCHARGE INSTRUCTIONS
DAY OF SURGERY INSTRUCTIONS        YOUR SURGEON: dr gildardo miller, dr suresh meade    PROCEDURE: ***removal of antibiotic spacer, right revision total knee replacement    DATE OF SURGERY: ***6/19/2020    ARRIVAL TIME: AS DIRECTED BY OFFICE    YOU MAY TAKE THE FOLLOWING MEDICATION(S) THE MORNING OF SURGERY WITH A SIP OF WATER: ***gabapentin, metoprolol, and lorazepam per anesthesia      ALL OTHER HOME MEDICATION CHECK WITH YOUR PHYSICIAN                            MANAGING PAIN AFTER SURGERY    We know you are probably wondering what your pain will be like after surgery.  Following surgery it is unrealistic to expect you will not have pain.   Pain is how our bodies let us know that something is wrong or cautions us to be careful.  That said, our goal is to make your pain tolerable.    Methods we may use to treat your pain include (oral or IV medications, PCAs, epidurals, nerve blocks, etc.)   While some procedures require IV pain medications for a short time after surgery, transitioning to pain medications by mouth allows for better management of pain.   Your nurse will encourage you to take oral pain medications whenever possible.  IV medications work almost immediately, but only last a short while.  Taking medications by mouth allows for a more constant level of medication in your blood stream for a longer period of time.      Once your pain is out of control it is harder to get back under control.  It is important you are aware when your next dose of pain medication is due.  If you are admitted, your nurse may write the time of your next dose on the white board in your room to help you remember.      We are interested in your pain and encourage you to inform us about aggravating factors during your visit.   Many times a simple repositioning every few hours can make a big difference.    If your physician says it is okay, do not let your pain prevent you from getting out of bed. Be sure to call your nurse for  assistance prior to getting up so you do not fall.      Before surgery, please decide your tolerable pain goal.  These faces help describe the pain ratings we use on a 0-10 scale.   Be prepared to tell us your goal and whether or not you take pain or anxiety medications at home.          BEFORE YOU COME TO THE HOSPITAL  (Pre-op instructions)  • Do not eat, drink, smoke or chew gum after midnight the night before surgery.  This also includes no mints.  • Morning of surgery take only the medicines you have been instructed with a sip of water unless otherwise instructed  by your physician.  • Do not shave, wear makeup or dark nail polish.  • Remove all jewelry including rings.  • Leave anything you consider valuable at home.  • Leave your suitcase in the car until after your surgery.  • Bring the following with you if applicable:  o Picture ID and insurance, Medicare or Medicaid cards  o Co-pay/deductible required by insurance (cash, check, credit card)  o Copy of advance directive, living will or power-of- documents if not brought to PAT  o CPAP or BIPAP mask and tubing  o Relaxation aids ( book, magazine), etc.  o Hearing aids                        ON THE DAY OF SURGERY  · On the day of surgery check in at registration located at the main entrance of the hospital.   ? You will be registered and given a beeper with instructions where to wait in the main lobby.  ? When your beeper lights up and vibrates a member of the Outpatient Surgery staff will meet you at the double doors under the stair steps and escort you to your preoperative room.   · You may have cloth compression devices placed on your legs. These help to prevent blood clots and reduce swelling in your legs.  · An IV may be inserted into one of your veins.  · In the operating room, you may be given one or more of the following:  ? A medicine to help you relax (sedative).  ? A medicine to numb the area (local anesthetic).  ? A medicine to make you  "fall asleep (general anesthetic).  ? A medicine that is injected into an area of your body to numb everything below the injection site (regional anesthetic).  · Your surgical site will be marked or identified.  · You may be given an antibiotic through your IV to help prevent infection.  Contact a health care provider if you:  · Develop a fever of more than 100.4°F (38°C) or other feelings of illness during the 48 hours before your surgery.  · Have symptoms that get worse.  Have questions or concerns about your surgery    General Anesthesia/Surgery, Adult  General anesthesia is the use of medicines to make a person \"go to sleep\" (unconscious) for a medical procedure. General anesthesia must be used for certain procedures, and is often recommended for procedures that:  · Last a long time.  · Require you to be still or in an unusual position.  · Are major and can cause blood loss.  The medicines used for general anesthesia are called general anesthetics. As well as making you unconscious for a certain amount of time, these medicines:  · Prevent pain.  · Control your blood pressure.  · Relax your muscles.  Tell a health care provider about:  · Any allergies you have.  · All medicines you are taking, including vitamins, herbs, eye drops, creams, and over-the-counter medicines.  · Any problems you or family members have had with anesthetic medicines.  · Types of anesthetics you have had in the past.  · Any blood disorders you have.  · Any surgeries you have had.  · Any medical conditions you have.  · Any recent upper respiratory, chest, or ear infections.  · Any history of:  ? Heart or lung conditions, such as heart failure, sleep apnea, asthma, or chronic obstructive pulmonary disease (COPD).  ?  service.  ? Depression or anxiety.  · Any tobacco or drug use, including marijuana or alcohol use.  · Whether you are pregnant or may be pregnant.  What are the risks?  Generally, this is a safe procedure. However, " problems may occur, including:  · Allergic reaction.  · Lung and heart problems.  · Inhaling food or liquid from the stomach into the lungs (aspiration).  · Nerve injury.  · Air in the bloodstream, which can lead to stroke.  · Extreme agitation or confusion (delirium) when you wake up from the anesthetic.  · Waking up during your procedure and being unable to move. This is rare.  These problems are more likely to develop if you are having a major surgery or if you have an advanced or serious medical condition. You can prevent some of these complications by answering all of your health care provider's questions thoroughly and by following all instructions before your procedure.  General anesthesia can cause side effects, including:  · Nausea or vomiting.  · A sore throat from the breathing tube.  · Hoarseness.  · Wheezing or coughing.  · Shaking chills.  · Tiredness.  · Body aches.  · Anxiety.  · Sleepiness or drowsiness.  · Confusion or agitation.  RISKS AND COMPLICATIONS OF SURGERY  Your health care provider will discuss possible risks and complications with you before surgery. Common risks and complications include:    · Problems due to the use of anesthetics.  · Blood loss and replacement (does not apply to minor surgical procedures).  · Temporary increase in pain due to surgery.  · Uncorrected pain or problems that the surgery was meant to correct.  · Infection.  · New damage.    What happens before the procedure?    Medicines  Ask your health care provider about:  · Changing or stopping your regular medicines. This is especially important if you are taking diabetes medicines or blood thinners.  · Taking medicines such as aspirin and ibuprofen. These medicines can thin your blood. Do not take these medicines unless your health care provider tells you to take them.  · Taking over-the-counter medicines, vitamins, herbs, and supplements. Do not take these during the week before your procedure unless your health  care provider approves them.  General instructions  · Starting 3-6 weeks before the procedure, do not use any products that contain nicotine or tobacco, such as cigarettes and e-cigarettes. If you need help quitting, ask your health care provider.  · If you brush your teeth on the morning of the procedure, make sure to spit out all of the toothpaste.  · Tell your health care provider if you become ill or develop a cold, cough, or fever.  · If instructed by your health care provider, bring your sleep apnea device with you on the day of your surgery (if applicable).  · Ask your health care provider if you will be going home the same day, the following day, or after a longer hospital stay.  ? Plan to have someone take you home from the hospital or clinic.  ? Plan to have a responsible adult care for you for at least 24 hours after you leave the hospital or clinic. This is important.  What happens during the procedure?  · You will be given anesthetics through both of the following:  ? A mask placed over your nose and mouth.  ? An IV in one of your veins.  · You may receive a medicine to help you relax (sedative).  · After you are unconscious, a breathing tube may be inserted down your throat to help you breathe. This will be removed before you wake up.  · An anesthesia specialist will stay with you throughout your procedure. He or she will:  ? Keep you comfortable and safe by continuing to give you medicines and adjusting the amount of medicine that you get.  ? Monitor your blood pressure, pulse, and oxygen levels to make sure that the anesthetics do not cause any problems.  The procedure may vary among health care providers and hospitals.  What happens after the procedure?  · Your blood pressure, temperature, heart rate, breathing rate, and blood oxygen level will be monitored until the medicines you were given have worn off.  · You will wake up in a recovery area. You may wake up slowly.  · If you feel anxious or  agitated, you may be given medicine to help you calm down.  · If you will be going home the same day, your health care provider may check to make sure you can walk, drink, and urinate.  · Your health care provider will treat any pain or side effects you have before you go home.  · Do not drive for 24 hours if you were given a sedative.  Summary  · General anesthesia is used to keep you still and prevent pain during a procedure.  · It is important to tell your healthcare provider about your medical history and any surgeries you have had, and previous experience with anesthesia.  · Follow your healthcare provider’s instructions about when to stop eating, drinking, or taking certain medicines before your procedure.  · Plan to have someone take you home from the hospital or clinic.  This information is not intended to replace advice given to you by your health care provider. Make sure you discuss any questions you have with your health care provider.  Document Released: 03/26/2009 Document Revised: 08/03/2018 Document Reviewed: 08/03/2018  [employer] Interactive Patient Education © 2019 [employer]       Fall Prevention in Hospitals, Adult  As a hospital patient, your condition and the treatments you receive can increase your risk for falls. Some additional risk factors for falls in a hospital include:  · Being in an unfamiliar environment.  · Being on bed rest.  · Your surgery.  · Taking certain medicines.  · Your tubing requirements, such as intravenous (IV) therapy or catheters.  It is important that you learn how to decrease fall risks while at the hospital. Below are important tips that can help prevent falls.  SAFETY TIPS FOR PREVENTING FALLS  Talk about your risk of falling.  · Ask your health care provider why you are at risk for falling. Is it your medicine, illness, tubing placement, or something else?  · Make a plan with your health care provider to keep you safe from falls.  · Ask your health care provider  or pharmacist about side effects of your medicines. Some medicines can make you dizzy or affect your coordination.  Ask for help.  · Ask for help before getting out of bed. You may need to press your call button.  · Ask for assistance in getting safely to the toilet.  · Ask for a walker or cane to be put at your bedside. Ask that most of the side rails on your bed be placed up before your health care provider leaves the room.  · Ask family or friends to sit with you.  · Ask for things that are out of your reach, such as your glasses, hearing aids, telephone, bedside table, or call button.  Follow these tips to avoid falling:  · Stay lying or seated, rather than standing, while waiting for help.  · Wear rubber-soled slippers or shoes whenever you walk in the hospital.  · Avoid quick, sudden movements.  ¨ Change positions slowly.  ¨ Sit on the side of your bed before standing.  ¨ Stand up slowly and wait before you start to walk.  · Let your health care provider know if there is a spill on the floor.  · Pay careful attention to the medical equipment, electrical cords, and tubes around you.  · When you need help, use your call button by your bed or in the bathroom. Wait for one of your health care providers to help you.  · If you feel dizzy or unsure of your footing, return to bed and wait for assistance.  · Avoid being distracted by the TV, telephone, or another person in your room.  · Do not lean or support yourself on rolling objects, such as IV poles or bedside tables.     This information is not intended to replace advice given to you by your health care provider. Make sure you discuss any questions you have with your health care provider.     Document Released: 12/15/2001 Document Revised: 01/08/2016 Document Reviewed: 08/25/2013  [employer] Interactive Patient Education ©2016 [employer]       [facility]  CHG 4% Patient Instruction Sheet    Chlorhexidine Before Surgery  Chlorhexidine gluconate (CHG)  is a germ-killing (antiseptic) solution that is used to clean the skin. It gets rid of the bacteria that normally live on the skin. Cleaning your skin with CHG before surgery helps lower the risk for infection after surgery.    How to use CHG solution  · You will take 2 showers, one shower the night before surgery, the second shower the morning of surgery before coming to the hospital.  · Use CHG only as told by your health care provider, and follow the instructions on the label.  · Use CHG solution while taking a shower. Follow these steps when using CHG solution (unless your health care provider gives you different instructions):  1. Start the shower.  2. Use your normal soap and shampoo to wash your face and hair.  3. Turn off the shower or move out of the shower stream.  4. Pour the CHG onto a clean washcloth. Do not use any type of brush or rough-edged sponge.  5. Starting at your neck, lather your body down to your toes. Make sure you:  6. Pay special attention to the part of your body where you will be having surgery. Scrub this area for at least 1 minute.  7. Use the full amount of CHG as directed. Usually, this is one half bottle for each shower.  8. Do not use CHG on your head or face. If the solution gets into your ears or eyes, rinse them well with water.  9. Avoid your genital area.  10. Avoid any areas of skin that have broken skin, cuts, or scrapes.  11. Scrub your back and under your arms. Make sure to wash skin folds.  12. Let the lather sit on your skin for 1-2 minutes or as long as told by your health care  provider.  13. Thoroughly rinse your entire body in the shower. Make sure that all body creases and crevices are rinsed well.  14. Dry off with a clean towel. Do not put any substances on your body afterward, such as powder, lotion, or perfume.  15. Put on clean clothes or pajamas.  16. If it is the night before your surgery, sleep in clean sheets.    What are the risks?  Risks of using CHG  include:  · A skin reaction.  · Hearing loss, if CHG gets in your ears.  · Eye injury, if CHG gets in your eyes and is not rinsed out.  · The CHG product catching fire.  Make sure that you avoid smoking and flames after applying CHG to your skin.  Do not use CHG:  · If you have a chlorhexidine allergy or have previously reacted to chlorhexidine.  · On babies younger than 2 months of age.      On the day of surgery, when you are taken to your room in Outpatient Surgery you will be given a CHG prepackaged cloth to wipe the site for your surgery.  How to use CHG prepackaged cloths  · Follow the instructions on the label.  · Use the CHG cloth on clean, dry skin. Follow these steps when using a CHG cloth (unless your health care provider gives you different instructions):  1. Using the CHG cloth, vigorously scrub the part of your body where you will be having surgery. Scrub using a back-and-forth motion for 3 minutes. The area on your body should be completely wet with CHG when you are finished scrubbing.  2. Do not rinse. Discard the cloth and let the area air-dry for 1 minute. Do not put any substances on your body afterward, such as powder, lotion, or perfume.  Contact a health care provider if:  · Your skin gets irritated after scrubbing.  · You have questions about using your solution or cloth.  Get help right away if:  · Your eyes become very red or swollen.  · Your eyes itch badly.  · Your skin itches badly and is red or swollen.  · Your hearing changes.  · You have trouble seeing.  · You have swelling or tingling in your mouth or throat.  · You have trouble breathing.  · You swallow any chlorhexidine.  Summary  · Chlorhexidine gluconate (CHG) is a germ-killing (antiseptic) solution that is used to clean the skin. Cleaning your skin with CHG before surgery helps lower the risk for infection after surgery.  · You may be given CHG to use at home. It may be in a bottle or in a prepackaged cloth to use on your skin.  Carefully follow your health care provider's instructions and the instructions on the product label.  · Do not use CHG if you have a chlorhexidine allergy.  · Contact your health care provider if your skin gets irritated after scrubbing.  This information is not intended to replace advice given to you by your health care provider. Make sure you discuss any questions you have with your health care provider.  Document Released: 09/11/2013 Document Revised: 11/15/2018 Document Reviewed: 11/15/2018  [employer] Interactive Patient Education © 2019 [employer]          PATIENT/FAMILY/RESPONSIBLE PARTY VERBALIZES UNDERSTANDING OF ABOVE EDUCATION.  COPY OF PAIN SCALE GIVEN AND REVIEWED WITH VERBALIZED UNDERSTANDING.

## 2020-06-12 ENCOUNTER — TRANSCRIBE ORDERS (OUTPATIENT)
Dept: ADMINISTRATIVE | Facility: HOSPITAL | Age: 65
End: 2020-06-12

## 2020-06-12 DIAGNOSIS — Z01.818 PRE-OP TESTING: Primary | ICD-10-CM

## 2020-06-16 ENCOUNTER — LAB (OUTPATIENT)
Dept: LAB | Facility: HOSPITAL | Age: 65
End: 2020-06-16

## 2020-06-16 PROCEDURE — U0003 INFECTIOUS AGENT DETECTION BY NUCLEIC ACID (DNA OR RNA); SEVERE ACUTE RESPIRATORY SYNDROME CORONAVIRUS 2 (SARS-COV-2) (CORONAVIRUS DISEASE [COVID-19]), AMPLIFIED PROBE TECHNIQUE, MAKING USE OF HIGH THROUGHPUT TECHNOLOGIES AS DESCRIBED BY CMS-2020-01-R: HCPCS | Performed by: ORTHOPAEDIC SURGERY

## 2020-06-17 LAB
COVID LABCORP PRIORITY: NORMAL
SARS-COV-2 RNA RESP QL NAA+PROBE: NOT DETECTED

## 2020-06-19 ENCOUNTER — HOSPITAL ENCOUNTER (INPATIENT)
Facility: HOSPITAL | Age: 65
LOS: 6 days | Discharge: HOME | End: 2020-06-25
Attending: ORTHOPAEDIC SURGERY | Admitting: ORTHOPAEDIC SURGERY

## 2020-06-19 ENCOUNTER — ANESTHESIA (OUTPATIENT)
Dept: PERIOP | Facility: HOSPITAL | Age: 65
End: 2020-06-19

## 2020-06-19 ENCOUNTER — ANESTHESIA EVENT (OUTPATIENT)
Dept: PERIOP | Facility: HOSPITAL | Age: 65
End: 2020-06-19

## 2020-06-19 DIAGNOSIS — M17.11 PRIMARY OSTEOARTHRITIS OF RIGHT KNEE: Chronic | ICD-10-CM

## 2020-06-19 DIAGNOSIS — Z74.09 IMPAIRED FUNCTIONAL MOBILITY, BALANCE, GAIT, AND ENDURANCE: ICD-10-CM

## 2020-06-19 DIAGNOSIS — Z96.651 TOTAL KNEE REPLACEMENT STATUS, RIGHT: ICD-10-CM

## 2020-06-19 DIAGNOSIS — Z96.651 STATUS POST REVISION OF TOTAL REPLACEMENT OF RIGHT KNEE: Primary | ICD-10-CM

## 2020-06-19 DIAGNOSIS — Z78.9 DECREASED ACTIVITIES OF DAILY LIVING (ADL): ICD-10-CM

## 2020-06-19 LAB
ABO GROUP BLD: NORMAL
BLD GP AB SCN SERPL QL: NEGATIVE
RH BLD: POSITIVE
T&S EXPIRATION DATE: NORMAL

## 2020-06-19 PROCEDURE — C1776 JOINT DEVICE (IMPLANTABLE): HCPCS | Performed by: ORTHOPAEDIC SURGERY

## 2020-06-19 PROCEDURE — 25010000002 ONDANSETRON PER 1 MG: Performed by: NURSE ANESTHETIST, CERTIFIED REGISTERED

## 2020-06-19 PROCEDURE — 86900 BLOOD TYPING SEROLOGIC ABO: CPT | Performed by: ORTHOPAEDIC SURGERY

## 2020-06-19 PROCEDURE — 86850 RBC ANTIBODY SCREEN: CPT | Performed by: ORTHOPAEDIC SURGERY

## 2020-06-19 PROCEDURE — C1713 ANCHOR/SCREW BN/BN,TIS/BN: HCPCS | Performed by: ORTHOPAEDIC SURGERY

## 2020-06-19 PROCEDURE — 86901 BLOOD TYPING SEROLOGIC RH(D): CPT | Performed by: ORTHOPAEDIC SURGERY

## 2020-06-19 PROCEDURE — 25010000002 ROPIVACAINE PER 1 MG: Performed by: ORTHOPAEDIC SURGERY

## 2020-06-19 PROCEDURE — 25010000002 VANCOMYCIN 1 G RECONSTITUTED SOLUTION: Performed by: ORTHOPAEDIC SURGERY

## 2020-06-19 PROCEDURE — 0SPC0EZ REMOVAL OF ARTICULATING SPACER FROM RIGHT KNEE JOINT, OPEN APPROACH: ICD-10-PCS | Performed by: ORTHOPAEDIC SURGERY

## 2020-06-19 PROCEDURE — 25010000002 HYDROMORPHONE PER 4 MG: Performed by: NURSE ANESTHETIST, CERTIFIED REGISTERED

## 2020-06-19 PROCEDURE — 0SRC0J9 REPLACEMENT OF RIGHT KNEE JOINT WITH SYNTHETIC SUBSTITUTE, CEMENTED, OPEN APPROACH: ICD-10-PCS | Performed by: ORTHOPAEDIC SURGERY

## 2020-06-19 PROCEDURE — 25010000002 PROPOFOL 10 MG/ML EMULSION: Performed by: NURSE ANESTHETIST, CERTIFIED REGISTERED

## 2020-06-19 PROCEDURE — 25010000002 NEOSTIGMINE 10 MG/10ML SOLUTION: Performed by: NURSE ANESTHETIST, CERTIFIED REGISTERED

## 2020-06-19 PROCEDURE — 25010000002 MORPHINE (PF) 10 MG/ML SOLUTION: Performed by: ORTHOPAEDIC SURGERY

## 2020-06-19 DEVICE — CMT BONE SIMPLEX/P TMYCIN FDOS 10PK: Type: IMPLANTABLE DEVICE | Site: KNEE | Status: FUNCTIONAL

## 2020-06-19 DEVICE — P.F.C. SIGMA FEMORAL ADAPTER 5 DEGREE
Type: IMPLANTABLE DEVICE | Site: KNEE | Status: FUNCTIONAL
Brand: P.F.C. SIGMA

## 2020-06-19 DEVICE — M.B.T. REVISION METAPHYSEAL SLEEVE POROUS 45MM: Type: IMPLANTABLE DEVICE | Site: KNEE | Status: FUNCTIONAL

## 2020-06-19 DEVICE — P.F.C. SIGMA FEMORAL ADAPTER BOLT +2/-2
Type: IMPLANTABLE DEVICE | Site: KNEE | Status: FUNCTIONAL
Brand: P.F.C. SIGMA

## 2020-06-19 DEVICE — SIGMA FEMORAL TC3 CEMENTED 4 RIGHT
Type: IMPLANTABLE DEVICE | Site: KNEE | Status: FUNCTIONAL
Brand: SIGMA

## 2020-06-19 DEVICE — UNIVERSAL STEM FLUTED 115MM X 14MM: Type: IMPLANTABLE DEVICE | Site: KNEE | Status: FUNCTIONAL

## 2020-06-19 DEVICE — UNIVERSAL STEM FLUTED 75MM X 12MM: Type: IMPLANTABLE DEVICE | Site: KNEE | Status: FUNCTIONAL

## 2020-06-19 DEVICE — P.F.C. SIGMA POSTERIOR AUGMENT COMBO CEMENTED SIZE 4 8MM
Type: IMPLANTABLE DEVICE | Site: KNEE | Status: FUNCTIONAL
Brand: P.F.C. SIGMA

## 2020-06-19 DEVICE — SIGMA TIBIAL INSERT ROTATING PLATFORM TC3 SIZE 4 10MM GVF
Type: IMPLANTABLE DEVICE | Site: KNEE | Status: FUNCTIONAL
Brand: SIGMA

## 2020-06-19 DEVICE — UNIVERSAL FEMORAL SLEEVE DISTAL POROUS 31MM: Type: IMPLANTABLE DEVICE | Site: KNEE | Status: FUNCTIONAL

## 2020-06-19 DEVICE — P.F.C. SIGMA DISTAL AUGMENT COMBO SIZE 4 12MM RIGHT
Type: IMPLANTABLE DEVICE | Site: KNEE | Status: FUNCTIONAL
Brand: P.F.C. SIGMA

## 2020-06-19 DEVICE — TIBIAL TRAY ROTATING PLATFORM M.B.T. REVISION SIZE 3 CEMENTED: Type: IMPLANTABLE DEVICE | Site: KNEE | Status: FUNCTIONAL

## 2020-06-19 RX ORDER — VANCOMYCIN HYDROCHLORIDE 1 G/20ML
INJECTION, POWDER, LYOPHILIZED, FOR SOLUTION INTRAVENOUS AS NEEDED
Status: DISCONTINUED | OUTPATIENT
Start: 2020-06-19 | End: 2020-06-19 | Stop reason: HOSPADM

## 2020-06-19 RX ORDER — SODIUM CHLORIDE, SODIUM LACTATE, POTASSIUM CHLORIDE, CALCIUM CHLORIDE 600; 310; 30; 20 MG/100ML; MG/100ML; MG/100ML; MG/100ML
100 INJECTION, SOLUTION INTRAVENOUS CONTINUOUS
Status: DISCONTINUED | OUTPATIENT
Start: 2020-06-19 | End: 2020-06-19

## 2020-06-19 RX ORDER — SODIUM CHLORIDE 0.9 % (FLUSH) 0.9 %
3-10 SYRINGE (ML) INJECTION AS NEEDED
Status: DISCONTINUED | OUTPATIENT
Start: 2020-06-19 | End: 2020-06-19 | Stop reason: HOSPADM

## 2020-06-19 RX ORDER — SODIUM CHLORIDE, SODIUM LACTATE, POTASSIUM CHLORIDE, CALCIUM CHLORIDE 600; 310; 30; 20 MG/100ML; MG/100ML; MG/100ML; MG/100ML
1000 INJECTION, SOLUTION INTRAVENOUS CONTINUOUS
Status: DISCONTINUED | OUTPATIENT
Start: 2020-06-19 | End: 2020-06-19

## 2020-06-19 RX ORDER — ONDANSETRON 2 MG/ML
4 INJECTION INTRAMUSCULAR; INTRAVENOUS EVERY 6 HOURS PRN
Status: DISCONTINUED | OUTPATIENT
Start: 2020-06-19 | End: 2020-06-25 | Stop reason: HOSPADM

## 2020-06-19 RX ORDER — VENLAFAXINE HYDROCHLORIDE 75 MG/1
75 CAPSULE, EXTENDED RELEASE ORAL
Status: DISCONTINUED | OUTPATIENT
Start: 2020-06-20 | End: 2020-06-25 | Stop reason: HOSPADM

## 2020-06-19 RX ORDER — ROPIVACAINE HYDROCHLORIDE 5 MG/ML
INJECTION, SOLUTION EPIDURAL; INFILTRATION; PERINEURAL AS NEEDED
Status: DISCONTINUED | OUTPATIENT
Start: 2020-06-19 | End: 2020-06-19 | Stop reason: HOSPADM

## 2020-06-19 RX ORDER — PHENYLEPHRINE HCL IN 0.9% NACL 0.8MG/10ML
SYRINGE (ML) INTRAVENOUS AS NEEDED
Status: DISCONTINUED | OUTPATIENT
Start: 2020-06-19 | End: 2020-06-19 | Stop reason: SURG

## 2020-06-19 RX ORDER — NALOXONE HCL 0.4 MG/ML
0.04 VIAL (ML) INJECTION AS NEEDED
Status: DISCONTINUED | OUTPATIENT
Start: 2020-06-19 | End: 2020-06-19 | Stop reason: HOSPADM

## 2020-06-19 RX ORDER — MONTELUKAST SODIUM 10 MG/1
10 TABLET ORAL DAILY
Status: DISCONTINUED | OUTPATIENT
Start: 2020-06-19 | End: 2020-06-25 | Stop reason: HOSPADM

## 2020-06-19 RX ORDER — SODIUM CHLORIDE, SODIUM LACTATE, POTASSIUM CHLORIDE, CALCIUM CHLORIDE 600; 310; 30; 20 MG/100ML; MG/100ML; MG/100ML; MG/100ML
75 INJECTION, SOLUTION INTRAVENOUS CONTINUOUS
Status: DISCONTINUED | OUTPATIENT
Start: 2020-06-19 | End: 2020-06-20

## 2020-06-19 RX ORDER — ONDANSETRON 2 MG/ML
INJECTION INTRAMUSCULAR; INTRAVENOUS AS NEEDED
Status: DISCONTINUED | OUTPATIENT
Start: 2020-06-19 | End: 2020-06-19 | Stop reason: SURG

## 2020-06-19 RX ORDER — SUFENTANIL CITRATE 50 UG/ML
INJECTION EPIDURAL; INTRAVENOUS AS NEEDED
Status: DISCONTINUED | OUTPATIENT
Start: 2020-06-19 | End: 2020-06-19 | Stop reason: SURG

## 2020-06-19 RX ORDER — MORPHINE SULFATE 1 MG/ML
INJECTION INTRAVENOUS CONTINUOUS
Status: DISCONTINUED | OUTPATIENT
Start: 2020-06-19 | End: 2020-06-20

## 2020-06-19 RX ORDER — LIDOCAINE HYDROCHLORIDE 10 MG/ML
0.5 INJECTION, SOLUTION EPIDURAL; INFILTRATION; INTRACAUDAL; PERINEURAL ONCE AS NEEDED
Status: DISCONTINUED | OUTPATIENT
Start: 2020-06-19 | End: 2020-06-19 | Stop reason: HOSPADM

## 2020-06-19 RX ORDER — ONDANSETRON 2 MG/ML
4 INJECTION INTRAMUSCULAR; INTRAVENOUS AS NEEDED
Status: DISCONTINUED | OUTPATIENT
Start: 2020-06-19 | End: 2020-06-19 | Stop reason: HOSPADM

## 2020-06-19 RX ORDER — MIDAZOLAM HYDROCHLORIDE 1 MG/ML
1 INJECTION INTRAMUSCULAR; INTRAVENOUS
Status: DISCONTINUED | OUTPATIENT
Start: 2020-06-19 | End: 2020-06-19 | Stop reason: HOSPADM

## 2020-06-19 RX ORDER — DULOXETIN HYDROCHLORIDE 30 MG/1
60 CAPSULE, DELAYED RELEASE ORAL 2 TIMES DAILY
Status: DISCONTINUED | OUTPATIENT
Start: 2020-06-19 | End: 2020-06-25 | Stop reason: HOSPADM

## 2020-06-19 RX ORDER — CEFAZOLIN SODIUM IN 0.9 % NACL 3 G/100 ML
3 INTRAVENOUS SOLUTION, PIGGYBACK (ML) INTRAVENOUS ONCE
Status: COMPLETED | OUTPATIENT
Start: 2020-06-19 | End: 2020-06-19

## 2020-06-19 RX ORDER — HYDROCODONE BITARTRATE AND ACETAMINOPHEN 7.5; 325 MG/1; MG/1
1 TABLET ORAL EVERY 4 HOURS PRN
Status: DISCONTINUED | OUTPATIENT
Start: 2020-06-19 | End: 2020-06-25 | Stop reason: HOSPADM

## 2020-06-19 RX ORDER — HYDROMORPHONE HYDROCHLORIDE 1 MG/ML
0.2 INJECTION, SOLUTION INTRAMUSCULAR; INTRAVENOUS; SUBCUTANEOUS
Status: DISCONTINUED | OUTPATIENT
Start: 2020-06-19 | End: 2020-06-19 | Stop reason: HOSPADM

## 2020-06-19 RX ORDER — FLUMAZENIL 0.1 MG/ML
0.2 INJECTION INTRAVENOUS AS NEEDED
Status: DISCONTINUED | OUTPATIENT
Start: 2020-06-19 | End: 2020-06-19 | Stop reason: HOSPADM

## 2020-06-19 RX ORDER — DOCUSATE SODIUM 100 MG/1
100 CAPSULE, LIQUID FILLED ORAL 2 TIMES DAILY PRN
Status: DISCONTINUED | OUTPATIENT
Start: 2020-06-19 | End: 2020-06-25 | Stop reason: HOSPADM

## 2020-06-19 RX ORDER — MAGNESIUM HYDROXIDE 1200 MG/15ML
LIQUID ORAL AS NEEDED
Status: DISCONTINUED | OUTPATIENT
Start: 2020-06-19 | End: 2020-06-19 | Stop reason: HOSPADM

## 2020-06-19 RX ORDER — ACETAMINOPHEN 500 MG
1000 TABLET ORAL ONCE
Status: COMPLETED | OUTPATIENT
Start: 2020-06-19 | End: 2020-06-19

## 2020-06-19 RX ORDER — FENTANYL CITRATE 50 UG/ML
25 INJECTION, SOLUTION INTRAMUSCULAR; INTRAVENOUS
Status: DISCONTINUED | OUTPATIENT
Start: 2020-06-19 | End: 2020-06-19 | Stop reason: HOSPADM

## 2020-06-19 RX ORDER — ONDANSETRON 4 MG/1
4 TABLET, FILM COATED ORAL EVERY 6 HOURS PRN
Status: DISCONTINUED | OUTPATIENT
Start: 2020-06-19 | End: 2020-06-25 | Stop reason: HOSPADM

## 2020-06-19 RX ORDER — LABETALOL HYDROCHLORIDE 5 MG/ML
5 INJECTION, SOLUTION INTRAVENOUS
Status: DISCONTINUED | OUTPATIENT
Start: 2020-06-19 | End: 2020-06-19 | Stop reason: HOSPADM

## 2020-06-19 RX ORDER — NALOXONE HCL 0.4 MG/ML
0.1 VIAL (ML) INJECTION
Status: DISCONTINUED | OUTPATIENT
Start: 2020-06-19 | End: 2020-06-25 | Stop reason: HOSPADM

## 2020-06-19 RX ORDER — FAMOTIDINE 10 MG/ML
20 INJECTION, SOLUTION INTRAVENOUS
Status: COMPLETED | OUTPATIENT
Start: 2020-06-19 | End: 2020-06-19

## 2020-06-19 RX ORDER — PROPOFOL 10 MG/ML
VIAL (ML) INTRAVENOUS AS NEEDED
Status: DISCONTINUED | OUTPATIENT
Start: 2020-06-19 | End: 2020-06-19 | Stop reason: SURG

## 2020-06-19 RX ORDER — SODIUM CHLORIDE 0.9 % (FLUSH) 0.9 %
3 SYRINGE (ML) INJECTION AS NEEDED
Status: DISCONTINUED | OUTPATIENT
Start: 2020-06-19 | End: 2020-06-19 | Stop reason: HOSPADM

## 2020-06-19 RX ORDER — LORAZEPAM 1 MG/1
1 TABLET ORAL 2 TIMES DAILY
Status: DISCONTINUED | OUTPATIENT
Start: 2020-06-19 | End: 2020-06-25 | Stop reason: HOSPADM

## 2020-06-19 RX ORDER — TRANEXAMIC ACID 100 MG/ML
INJECTION, SOLUTION INTRAVENOUS AS NEEDED
Status: DISCONTINUED | OUTPATIENT
Start: 2020-06-19 | End: 2020-06-19 | Stop reason: SURG

## 2020-06-19 RX ORDER — FLECAINIDE ACETATE 50 MG/1
50 TABLET ORAL EVERY 12 HOURS SCHEDULED
Status: DISCONTINUED | OUTPATIENT
Start: 2020-06-19 | End: 2020-06-25 | Stop reason: HOSPADM

## 2020-06-19 RX ORDER — OXYCODONE AND ACETAMINOPHEN 10; 325 MG/1; MG/1
1 TABLET ORAL ONCE AS NEEDED
Status: DISCONTINUED | OUTPATIENT
Start: 2020-06-19 | End: 2020-06-19 | Stop reason: HOSPADM

## 2020-06-19 RX ORDER — ASPIRIN 325 MG
325 TABLET, DELAYED RELEASE (ENTERIC COATED) ORAL DAILY
Status: DISCONTINUED | OUTPATIENT
Start: 2020-06-20 | End: 2020-06-25 | Stop reason: HOSPADM

## 2020-06-19 RX ORDER — SODIUM CHLORIDE 0.9 % (FLUSH) 0.9 %
10 SYRINGE (ML) INJECTION AS NEEDED
Status: DISCONTINUED | OUTPATIENT
Start: 2020-06-19 | End: 2020-06-19 | Stop reason: HOSPADM

## 2020-06-19 RX ORDER — ROCURONIUM BROMIDE 10 MG/ML
INJECTION, SOLUTION INTRAVENOUS AS NEEDED
Status: DISCONTINUED | OUTPATIENT
Start: 2020-06-19 | End: 2020-06-19 | Stop reason: SURG

## 2020-06-19 RX ORDER — ROPINIROLE 1 MG/1
3 TABLET, FILM COATED ORAL 2 TIMES DAILY
Status: DISCONTINUED | OUTPATIENT
Start: 2020-06-19 | End: 2020-06-25 | Stop reason: HOSPADM

## 2020-06-19 RX ORDER — GABAPENTIN 400 MG/1
800 CAPSULE ORAL EVERY 8 HOURS SCHEDULED
Status: DISCONTINUED | OUTPATIENT
Start: 2020-06-19 | End: 2020-06-25 | Stop reason: HOSPADM

## 2020-06-19 RX ORDER — SODIUM CHLORIDE 0.9 % (FLUSH) 0.9 %
3 SYRINGE (ML) INJECTION EVERY 12 HOURS SCHEDULED
Status: DISCONTINUED | OUTPATIENT
Start: 2020-06-19 | End: 2020-06-19 | Stop reason: HOSPADM

## 2020-06-19 RX ORDER — FLUTICASONE PROPIONATE 50 MCG
2 SPRAY, SUSPENSION (ML) NASAL DAILY PRN
Status: DISCONTINUED | OUTPATIENT
Start: 2020-06-19 | End: 2020-06-25 | Stop reason: HOSPADM

## 2020-06-19 RX ORDER — NEOSTIGMINE METHYLSULFATE 1 MG/ML
INJECTION, SOLUTION INTRAVENOUS AS NEEDED
Status: DISCONTINUED | OUTPATIENT
Start: 2020-06-19 | End: 2020-06-19 | Stop reason: SURG

## 2020-06-19 RX ORDER — PANTOPRAZOLE SODIUM 40 MG/1
40 TABLET, DELAYED RELEASE ORAL
Status: DISCONTINUED | OUTPATIENT
Start: 2020-06-20 | End: 2020-06-25 | Stop reason: HOSPADM

## 2020-06-19 RX ORDER — OXYCODONE AND ACETAMINOPHEN 7.5; 325 MG/1; MG/1
2 TABLET ORAL ONCE AS NEEDED
Status: COMPLETED | OUTPATIENT
Start: 2020-06-19 | End: 2020-06-19

## 2020-06-19 RX ORDER — HYDROMORPHONE HCL 110MG/55ML
PATIENT CONTROLLED ANALGESIA SYRINGE INTRAVENOUS AS NEEDED
Status: DISCONTINUED | OUTPATIENT
Start: 2020-06-19 | End: 2020-06-19 | Stop reason: SURG

## 2020-06-19 RX ADMIN — SUFENTANIL CITRATE 20 MCG: 50 INJECTION EPIDURAL; INTRAVENOUS at 14:53

## 2020-06-19 RX ADMIN — SODIUM CHLORIDE, POTASSIUM CHLORIDE, SODIUM LACTATE AND CALCIUM CHLORIDE 75 ML/HR: 600; 310; 30; 20 INJECTION, SOLUTION INTRAVENOUS at 20:15

## 2020-06-19 RX ADMIN — LABETALOL HYDROCHLORIDE 5 MG: 5 INJECTION, SOLUTION INTRAVENOUS at 17:16

## 2020-06-19 RX ADMIN — LIDOCAINE HYDROCHLORIDE 100 MG: 20 INJECTION, SOLUTION INTRAVENOUS at 13:28

## 2020-06-19 RX ADMIN — SODIUM CHLORIDE, POTASSIUM CHLORIDE, SODIUM LACTATE AND CALCIUM CHLORIDE 100 ML/HR: 600; 310; 30; 20 INJECTION, SOLUTION INTRAVENOUS at 11:07

## 2020-06-19 RX ADMIN — ACETAMINOPHEN 1000 MG: 500 TABLET, FILM COATED ORAL at 11:46

## 2020-06-19 RX ADMIN — PROPOFOL 200 MG: 10 INJECTION, EMULSION INTRAVENOUS at 13:28

## 2020-06-19 RX ADMIN — NEOSTIGMINE METHYLSULFATE 5 MG: 1 INJECTION INTRAVENOUS at 16:36

## 2020-06-19 RX ADMIN — SUFENTANIL CITRATE 30 MCG: 50 INJECTION EPIDURAL; INTRAVENOUS at 15:51

## 2020-06-19 RX ADMIN — HYDROMORPHONE HYDROCHLORIDE 0.5 MG: 2 INJECTION INTRAMUSCULAR; INTRAVENOUS; SUBCUTANEOUS at 16:44

## 2020-06-19 RX ADMIN — SODIUM CHLORIDE, POTASSIUM CHLORIDE, SODIUM LACTATE AND CALCIUM CHLORIDE 100 ML/HR: 600; 310; 30; 20 INJECTION, SOLUTION INTRAVENOUS at 18:41

## 2020-06-19 RX ADMIN — FLECAINIDE ACETATE 50 MG: 50 TABLET ORAL at 20:36

## 2020-06-19 RX ADMIN — DULOXETINE HYDROCHLORIDE 60 MG: 30 CAPSULE, DELAYED RELEASE ORAL at 20:36

## 2020-06-19 RX ADMIN — ROCURONIUM BROMIDE 10 MG: 10 INJECTION INTRAVENOUS at 15:22

## 2020-06-19 RX ADMIN — MONTELUKAST 10 MG: 10 TABLET, FILM COATED ORAL at 20:36

## 2020-06-19 RX ADMIN — LORAZEPAM 1 MG: 1 TABLET ORAL at 20:36

## 2020-06-19 RX ADMIN — METOPROLOL TARTRATE 12.5 MG: 25 TABLET, FILM COATED ORAL at 20:36

## 2020-06-19 RX ADMIN — HYDROMORPHONE HYDROCHLORIDE 0.5 MG: 2 INJECTION INTRAMUSCULAR; INTRAVENOUS; SUBCUTANEOUS at 16:39

## 2020-06-19 RX ADMIN — GABAPENTIN 800 MG: 400 CAPSULE ORAL at 20:36

## 2020-06-19 RX ADMIN — FAMOTIDINE 20 MG: 10 INJECTION, SOLUTION INTRAVENOUS at 11:35

## 2020-06-19 RX ADMIN — OXYCODONE HYDROCHLORIDE AND ACETAMINOPHEN 2 TABLET: 7.5; 325 TABLET ORAL at 18:44

## 2020-06-19 RX ADMIN — GLYCOPYRROLATE 0.8 MG: 0.2 INJECTION, SOLUTION INTRAMUSCULAR; INTRAVENOUS at 16:36

## 2020-06-19 RX ADMIN — SUFENTANIL CITRATE 15 MCG: 50 INJECTION EPIDURAL; INTRAVENOUS at 13:28

## 2020-06-19 RX ADMIN — MORPHINE SULFATE: 10 INJECTION INTRAVENOUS at 20:17

## 2020-06-19 RX ADMIN — ROCURONIUM BROMIDE 50 MG: 10 INJECTION INTRAVENOUS at 13:28

## 2020-06-19 RX ADMIN — SUFENTANIL CITRATE 15 MCG: 50 INJECTION EPIDURAL; INTRAVENOUS at 14:02

## 2020-06-19 RX ADMIN — SUFENTANIL CITRATE 20 MCG: 50 INJECTION EPIDURAL; INTRAVENOUS at 14:16

## 2020-06-19 RX ADMIN — ROCURONIUM BROMIDE 10 MG: 10 INJECTION INTRAVENOUS at 14:44

## 2020-06-19 RX ADMIN — TRANEXAMIC ACID 1000 MG: 100 INJECTION, SOLUTION INTRAVENOUS at 13:51

## 2020-06-19 RX ADMIN — SODIUM CHLORIDE, POTASSIUM CHLORIDE, SODIUM LACTATE AND CALCIUM CHLORIDE: 600; 310; 30; 20 INJECTION, SOLUTION INTRAVENOUS at 16:50

## 2020-06-19 RX ADMIN — ONDANSETRON HYDROCHLORIDE 4 MG: 2 SOLUTION INTRAMUSCULAR; INTRAVENOUS at 16:30

## 2020-06-19 RX ADMIN — CEFAZOLIN 3 G: 1 INJECTION, POWDER, FOR SOLUTION INTRAMUSCULAR; INTRAVENOUS; PARENTERAL at 13:42

## 2020-06-19 RX ADMIN — ROPINIROLE HYDROCHLORIDE 3 MG: 1 TABLET, FILM COATED ORAL at 20:36

## 2020-06-19 RX ADMIN — ROCURONIUM BROMIDE 10 MG: 10 INJECTION INTRAVENOUS at 15:51

## 2020-06-19 RX ADMIN — TRANEXAMIC ACID 1000 MG: 100 INJECTION, SOLUTION INTRAVENOUS at 16:27

## 2020-06-19 RX ADMIN — Medication 80 MCG: at 13:46

## 2020-06-19 NOTE — ANESTHESIA PROCEDURE NOTES
Airway  Urgency: elective    Date/Time: 6/19/2020 1:30 PM  Airway not difficult    General Information and Staff    Patient location during procedure: OR  CRNA: Brook Dukes CRNA    Indications and Patient Condition  Indications for airway management: airway protection    Preoxygenated: yes  Mask difficulty assessment: 2 - vent by mask + OA or adjuvant +/- NMBA    Final Airway Details  Final airway type: endotracheal airway      Successful airway: ETT  Cuffed: yes   Successful intubation technique: direct laryngoscopy  Facilitating devices/methods: intubating stylet  Endotracheal tube insertion site: oral  Blade: Millan  Blade size: 2  ETT size (mm): 7.5  Cormack-Lehane Classification: grade I - full view of glottis  Placement verified by: chest auscultation and capnometry   Cuff volume (mL): 4  Measured from: lips  ETT/EBT  to lips (cm): 22  Number of attempts at approach: 1  Assessment: lips, teeth, and gum same as pre-op and atraumatic intubation    Additional Comments  Atraumatic

## 2020-06-19 NOTE — ANESTHESIA PREPROCEDURE EVALUATION
Anesthesia Evaluation     Patient summary reviewed   history of anesthetic complications: PONV  NPO Solid Status: > 8 hours  NPO Liquid Status: > 8 hours           Airway   Mallampati: I  TM distance: >3 FB  Neck ROM: full  No difficulty expected  Dental - normal exam         Pulmonary - normal exam    breath sounds clear to auscultation  (+) sleep apnea (Bipap with 02; with her in hospital) on CPAP,   (-) pneumonia, COPD, asthma, recent URI, not a smoker  Cardiovascular - normal exam  Exercise tolerance: poor (<4 METS) ( Can walk with a walker, no chest pain with exertion. )    ECG reviewed  Patient on routine beta blocker and Beta blocker given within 24 hours of surgery  Rate: normal    (+) dysrhythmias (Reports she regularly converts back and forth, s/p ablation) Atrial Fib, hyperlipidemia,   (-) pacemaker, hypertension (Denied by patient), past MI, angina, cardiac stents, CABG    ROS comment: TTE 1/27/19 - ·Left ventricular wall thickness is consistent with mild concentric hypertrophy.  ·Estimated EF = 50%.  ·Left ventricular systolic function is normal.  ·Left atrial cavity size is borderline dilated.    Neuro/Psych  (+) numbness (bilateral peripheral neuropathy), psychiatric history Anxiety and Depression,     (-) seizures, TIA, CVA  GI/Hepatic/Renal/Endo    (+) morbid obesity, GERD,  thyroid problem hypothyroidism  (-) liver disease, no renal disease, diabetes    Musculoskeletal     Abdominal   (+) obese,    Substance History      OB/GYN          Other   arthritis,                        Anesthesia Plan    ASA 3     general     intravenous induction     Anesthetic plan, all risks, benefits, and alternatives have been provided, discussed and informed consent has been obtained with: patient.

## 2020-06-20 LAB
CREAT BLD-MCNC: 0.76 MG/DL (ref 0.57–1)
GFR SERPL CREATININE-BSD FRML MDRD: 76 ML/MIN/1.73
HCT VFR BLD AUTO: 41.7 % (ref 34–46.6)
HGB BLD-MCNC: 12.8 G/DL (ref 12–15.9)
HOLD SPECIMEN: NORMAL

## 2020-06-20 PROCEDURE — 25010000002 VANCOMYCIN: Performed by: ORTHOPAEDIC SURGERY

## 2020-06-20 PROCEDURE — 85018 HEMOGLOBIN: CPT | Performed by: ORTHOPAEDIC SURGERY

## 2020-06-20 PROCEDURE — 85014 HEMATOCRIT: CPT | Performed by: ORTHOPAEDIC SURGERY

## 2020-06-20 PROCEDURE — 82565 ASSAY OF CREATININE: CPT | Performed by: ORTHOPAEDIC SURGERY

## 2020-06-20 PROCEDURE — 25010000002 VANCOMYCIN PER 500 MG: Performed by: ORTHOPAEDIC SURGERY

## 2020-06-20 PROCEDURE — 97165 OT EVAL LOW COMPLEX 30 MIN: CPT | Performed by: OCCUPATIONAL THERAPIST

## 2020-06-20 PROCEDURE — 97161 PT EVAL LOW COMPLEX 20 MIN: CPT | Performed by: PHYSICAL THERAPIST

## 2020-06-20 RX ORDER — VANCOMYCIN HYDROCHLORIDE 1 G/200ML
1000 INJECTION, SOLUTION INTRAVENOUS EVERY 12 HOURS
Status: DISCONTINUED | OUTPATIENT
Start: 2020-06-20 | End: 2020-06-21

## 2020-06-20 RX ADMIN — ROPINIROLE HYDROCHLORIDE 3 MG: 1 TABLET, FILM COATED ORAL at 21:35

## 2020-06-20 RX ADMIN — FLECAINIDE ACETATE 50 MG: 50 TABLET ORAL at 09:56

## 2020-06-20 RX ADMIN — LORAZEPAM 1 MG: 1 TABLET ORAL at 21:35

## 2020-06-20 RX ADMIN — GABAPENTIN 800 MG: 400 CAPSULE ORAL at 06:47

## 2020-06-20 RX ADMIN — ROPINIROLE HYDROCHLORIDE 3 MG: 1 TABLET, FILM COATED ORAL at 09:56

## 2020-06-20 RX ADMIN — DULOXETINE HYDROCHLORIDE 60 MG: 30 CAPSULE, DELAYED RELEASE ORAL at 21:35

## 2020-06-20 RX ADMIN — ASPIRIN 325 MG: 325 TABLET, COATED ORAL at 09:56

## 2020-06-20 RX ADMIN — PANTOPRAZOLE SODIUM 40 MG: 40 TABLET, DELAYED RELEASE ORAL at 17:42

## 2020-06-20 RX ADMIN — LEVOTHYROXINE SODIUM 137 MCG: 112 TABLET ORAL at 06:47

## 2020-06-20 RX ADMIN — PANTOPRAZOLE SODIUM 40 MG: 40 TABLET, DELAYED RELEASE ORAL at 09:55

## 2020-06-20 RX ADMIN — VANCOMYCIN HYDROCHLORIDE 2000 MG: 1 INJECTION, POWDER, LYOPHILIZED, FOR SOLUTION INTRAVENOUS at 01:06

## 2020-06-20 RX ADMIN — METOPROLOL TARTRATE 12.5 MG: 25 TABLET, FILM COATED ORAL at 09:56

## 2020-06-20 RX ADMIN — GABAPENTIN 800 MG: 400 CAPSULE ORAL at 14:40

## 2020-06-20 RX ADMIN — GABAPENTIN 800 MG: 400 CAPSULE ORAL at 21:35

## 2020-06-20 RX ADMIN — DULOXETINE HYDROCHLORIDE 60 MG: 30 CAPSULE, DELAYED RELEASE ORAL at 09:56

## 2020-06-20 RX ADMIN — METOPROLOL TARTRATE 12.5 MG: 25 TABLET, FILM COATED ORAL at 21:35

## 2020-06-20 RX ADMIN — VANCOMYCIN HYDROCHLORIDE 1000 MG: 1 INJECTION, SOLUTION INTRAVENOUS at 14:40

## 2020-06-20 RX ADMIN — VENLAFAXINE HYDROCHLORIDE 75 MG: 75 CAPSULE, EXTENDED RELEASE ORAL at 09:55

## 2020-06-20 RX ADMIN — LORAZEPAM 1 MG: 1 TABLET ORAL at 09:55

## 2020-06-20 RX ADMIN — HYDROCODONE BITARTRATE AND ACETAMINOPHEN 1 TABLET: 7.5; 325 TABLET ORAL at 10:01

## 2020-06-20 RX ADMIN — HYDROCODONE BITARTRATE AND ACETAMINOPHEN 1 TABLET: 7.5; 325 TABLET ORAL at 03:13

## 2020-06-20 RX ADMIN — MONTELUKAST 10 MG: 10 TABLET, FILM COATED ORAL at 09:56

## 2020-06-20 RX ADMIN — HYDROCODONE BITARTRATE AND ACETAMINOPHEN 1 TABLET: 7.5; 325 TABLET ORAL at 14:40

## 2020-06-20 RX ADMIN — FLECAINIDE ACETATE 50 MG: 50 TABLET ORAL at 21:35

## 2020-06-20 NOTE — ANESTHESIA POSTPROCEDURE EVALUATION
Patient: Danisha Cannon    Procedure Summary     Date:  06/19/20 Room / Location:   [facility]  /  [facility]    Anesthesia Start:  1320 Anesthesia Stop:  1708    Procedures:       REMOVAL OF ANTIBIOTIC SPACER (Right Knee)      REVISION TOTAL KNEE REPLACEMENT (Right Knee) Diagnosis:  (T84.53xD)    Surgeon:  Amilcar Capellan MD Provider:  Brook Dukes CRNA    Anesthesia Type:  general ASA Status:  3          Anesthesia Type: general    Vitals  Vitals Value Taken Time   /90 6/19/2020  6:52 PM   Temp 97 °F (36.1 °C) 6/19/2020  5:47 PM   Pulse 79 6/19/2020  6:53 PM   Resp 16 6/19/2020  6:47 PM   SpO2 99 % 6/19/2020  6:53 PM   Vitals shown include unvalidated device data.        Post Anesthesia Care and Evaluation    Patient location during evaluation: PACU  Patient participation: complete - patient participated  Level of consciousness: awake and awake and alert  Pain score: 0  Pain management: adequate  Airway patency: patent  Anesthetic complications: No anesthetic complications  PONV Status: none  Cardiovascular status: acceptable  Respiratory status: acceptable  Hydration status: acceptable    Comments: Patient discharged according to acceptable Carla score per RN assessment. See nursing records for further information.     Blood pressure 163/96, pulse 78, temperature 97 °F (36.1 °C), resp. rate 16, SpO2 98 %, not currently breastfeeding.

## 2020-06-21 LAB — VANCOMYCIN TROUGH SERPL-MCNC: 36.1 MCG/ML (ref 5–20)

## 2020-06-21 PROCEDURE — 80202 ASSAY OF VANCOMYCIN: CPT | Performed by: ORTHOPAEDIC SURGERY

## 2020-06-21 PROCEDURE — 97116 GAIT TRAINING THERAPY: CPT

## 2020-06-21 PROCEDURE — 97110 THERAPEUTIC EXERCISES: CPT

## 2020-06-21 PROCEDURE — 25010000002 VANCOMYCIN PER 500 MG: Performed by: ORTHOPAEDIC SURGERY

## 2020-06-21 RX ORDER — VANCOMYCIN HYDROCHLORIDE 1 G/200ML
1000 INJECTION, SOLUTION INTRAVENOUS EVERY 24 HOURS
Status: DISCONTINUED | OUTPATIENT
Start: 2020-06-22 | End: 2020-06-22

## 2020-06-21 RX ADMIN — ASPIRIN 325 MG: 325 TABLET, COATED ORAL at 09:01

## 2020-06-21 RX ADMIN — MONTELUKAST 10 MG: 10 TABLET, FILM COATED ORAL at 09:01

## 2020-06-21 RX ADMIN — VENLAFAXINE HYDROCHLORIDE 75 MG: 75 CAPSULE, EXTENDED RELEASE ORAL at 09:02

## 2020-06-21 RX ADMIN — DULOXETINE HYDROCHLORIDE 60 MG: 30 CAPSULE, DELAYED RELEASE ORAL at 21:40

## 2020-06-21 RX ADMIN — DULOXETINE HYDROCHLORIDE 60 MG: 30 CAPSULE, DELAYED RELEASE ORAL at 09:02

## 2020-06-21 RX ADMIN — HYDROCODONE BITARTRATE AND ACETAMINOPHEN 1 TABLET: 7.5; 325 TABLET ORAL at 21:42

## 2020-06-21 RX ADMIN — LORAZEPAM 1 MG: 1 TABLET ORAL at 21:40

## 2020-06-21 RX ADMIN — METOPROLOL TARTRATE 12.5 MG: 25 TABLET, FILM COATED ORAL at 21:40

## 2020-06-21 RX ADMIN — METOPROLOL TARTRATE 12.5 MG: 25 TABLET, FILM COATED ORAL at 09:01

## 2020-06-21 RX ADMIN — GABAPENTIN 800 MG: 400 CAPSULE ORAL at 13:48

## 2020-06-21 RX ADMIN — GABAPENTIN 800 MG: 400 CAPSULE ORAL at 21:40

## 2020-06-21 RX ADMIN — FLECAINIDE ACETATE 50 MG: 50 TABLET ORAL at 21:40

## 2020-06-21 RX ADMIN — ROPINIROLE HYDROCHLORIDE 3 MG: 1 TABLET, FILM COATED ORAL at 21:40

## 2020-06-21 RX ADMIN — HYDROCODONE BITARTRATE AND ACETAMINOPHEN 1 TABLET: 7.5; 325 TABLET ORAL at 06:14

## 2020-06-21 RX ADMIN — FLECAINIDE ACETATE 50 MG: 50 TABLET ORAL at 09:02

## 2020-06-21 RX ADMIN — LORAZEPAM 1 MG: 1 TABLET ORAL at 09:01

## 2020-06-21 RX ADMIN — ROPINIROLE HYDROCHLORIDE 3 MG: 1 TABLET, FILM COATED ORAL at 09:01

## 2020-06-21 RX ADMIN — PANTOPRAZOLE SODIUM 40 MG: 40 TABLET, DELAYED RELEASE ORAL at 06:08

## 2020-06-21 RX ADMIN — VANCOMYCIN HYDROCHLORIDE 1000 MG: 1 INJECTION, SOLUTION INTRAVENOUS at 02:08

## 2020-06-21 RX ADMIN — GABAPENTIN 800 MG: 400 CAPSULE ORAL at 06:09

## 2020-06-21 RX ADMIN — LEVOTHYROXINE SODIUM 137 MCG: 112 TABLET ORAL at 06:08

## 2020-06-21 RX ADMIN — PANTOPRAZOLE SODIUM 40 MG: 40 TABLET, DELAYED RELEASE ORAL at 17:24

## 2020-06-22 LAB
ANION GAP SERPL CALCULATED.3IONS-SCNC: 10 MMOL/L (ref 5–15)
BUN BLD-MCNC: 17 MG/DL (ref 8–23)
BUN/CREAT SERPL: 19.5 (ref 7–25)
CALCIUM SPEC-SCNC: 8.5 MG/DL (ref 8.6–10.5)
CHLORIDE SERPL-SCNC: 99 MMOL/L (ref 98–107)
CO2 SERPL-SCNC: 26 MMOL/L (ref 22–29)
CREAT BLD-MCNC: 0.87 MG/DL (ref 0.57–1)
GFR SERPL CREATININE-BSD FRML MDRD: 65 ML/MIN/1.73
GLUCOSE BLD-MCNC: 109 MG/DL (ref 65–99)
POTASSIUM BLD-SCNC: 4.2 MMOL/L (ref 3.5–5.2)
SODIUM BLD-SCNC: 135 MMOL/L (ref 136–145)
VANCOMYCIN SERPL-MCNC: 9.1 MCG/ML (ref 5–40)
WHOLE BLOOD HOLD SPECIMEN: NORMAL

## 2020-06-22 PROCEDURE — 97116 GAIT TRAINING THERAPY: CPT

## 2020-06-22 PROCEDURE — 80048 BASIC METABOLIC PNL TOTAL CA: CPT | Performed by: ORTHOPAEDIC SURGERY

## 2020-06-22 PROCEDURE — 25010000002 VANCOMYCIN PER 500 MG: Performed by: ORTHOPAEDIC SURGERY

## 2020-06-22 PROCEDURE — 97110 THERAPEUTIC EXERCISES: CPT

## 2020-06-22 PROCEDURE — 80202 ASSAY OF VANCOMYCIN: CPT | Performed by: ORTHOPAEDIC SURGERY

## 2020-06-22 RX ORDER — VANCOMYCIN HYDROCHLORIDE 1 G/200ML
1000 INJECTION, SOLUTION INTRAVENOUS EVERY 24 HOURS
Status: COMPLETED | OUTPATIENT
Start: 2020-06-22 | End: 2020-06-22

## 2020-06-22 RX ORDER — VANCOMYCIN HYDROCHLORIDE 1 G/200ML
1000 INJECTION, SOLUTION INTRAVENOUS EVERY 24 HOURS
Status: COMPLETED | OUTPATIENT
Start: 2020-06-23 | End: 2020-06-24

## 2020-06-22 RX ADMIN — HYDROCODONE BITARTRATE AND ACETAMINOPHEN 1 TABLET: 7.5; 325 TABLET ORAL at 22:19

## 2020-06-22 RX ADMIN — METOPROLOL TARTRATE 12.5 MG: 25 TABLET, FILM COATED ORAL at 08:36

## 2020-06-22 RX ADMIN — DULOXETINE HYDROCHLORIDE 60 MG: 30 CAPSULE, DELAYED RELEASE ORAL at 21:34

## 2020-06-22 RX ADMIN — VENLAFAXINE HYDROCHLORIDE 75 MG: 75 CAPSULE, EXTENDED RELEASE ORAL at 08:36

## 2020-06-22 RX ADMIN — HYDROCODONE BITARTRATE AND ACETAMINOPHEN 1 TABLET: 7.5; 325 TABLET ORAL at 15:03

## 2020-06-22 RX ADMIN — GABAPENTIN 800 MG: 400 CAPSULE ORAL at 06:14

## 2020-06-22 RX ADMIN — DOCUSATE SODIUM 100 MG: 100 CAPSULE ORAL at 21:33

## 2020-06-22 RX ADMIN — LORAZEPAM 1 MG: 1 TABLET ORAL at 08:36

## 2020-06-22 RX ADMIN — GABAPENTIN 800 MG: 400 CAPSULE ORAL at 15:03

## 2020-06-22 RX ADMIN — LEVOTHYROXINE SODIUM 137 MCG: 112 TABLET ORAL at 06:14

## 2020-06-22 RX ADMIN — GABAPENTIN 800 MG: 400 CAPSULE ORAL at 21:33

## 2020-06-22 RX ADMIN — ROPINIROLE HYDROCHLORIDE 3 MG: 1 TABLET, FILM COATED ORAL at 08:36

## 2020-06-22 RX ADMIN — PANTOPRAZOLE SODIUM 40 MG: 40 TABLET, DELAYED RELEASE ORAL at 17:42

## 2020-06-22 RX ADMIN — LORAZEPAM 1 MG: 1 TABLET ORAL at 21:33

## 2020-06-22 RX ADMIN — DULOXETINE HYDROCHLORIDE 60 MG: 30 CAPSULE, DELAYED RELEASE ORAL at 08:36

## 2020-06-22 RX ADMIN — ASPIRIN 325 MG: 325 TABLET, COATED ORAL at 08:36

## 2020-06-22 RX ADMIN — MONTELUKAST 10 MG: 10 TABLET, FILM COATED ORAL at 08:36

## 2020-06-22 RX ADMIN — PANTOPRAZOLE SODIUM 40 MG: 40 TABLET, DELAYED RELEASE ORAL at 06:15

## 2020-06-22 RX ADMIN — METOPROLOL TARTRATE 12.5 MG: 25 TABLET, FILM COATED ORAL at 21:35

## 2020-06-22 RX ADMIN — FLECAINIDE ACETATE 50 MG: 50 TABLET ORAL at 08:36

## 2020-06-22 RX ADMIN — FLECAINIDE ACETATE 50 MG: 50 TABLET ORAL at 21:35

## 2020-06-22 RX ADMIN — VANCOMYCIN HYDROCHLORIDE 1000 MG: 1 INJECTION, SOLUTION INTRAVENOUS at 10:33

## 2020-06-22 RX ADMIN — ROPINIROLE HYDROCHLORIDE 3 MG: 1 TABLET, FILM COATED ORAL at 21:35

## 2020-06-23 LAB — VANCOMYCIN SERPL-MCNC: 10.3 MCG/ML (ref 5–40)

## 2020-06-23 PROCEDURE — 97110 THERAPEUTIC EXERCISES: CPT

## 2020-06-23 PROCEDURE — 97116 GAIT TRAINING THERAPY: CPT

## 2020-06-23 PROCEDURE — 80202 ASSAY OF VANCOMYCIN: CPT | Performed by: ORTHOPAEDIC SURGERY

## 2020-06-23 PROCEDURE — 97535 SELF CARE MNGMENT TRAINING: CPT

## 2020-06-23 PROCEDURE — 25010000002 VANCOMYCIN PER 500 MG: Performed by: ORTHOPAEDIC SURGERY

## 2020-06-23 RX ADMIN — DULOXETINE HYDROCHLORIDE 60 MG: 30 CAPSULE, DELAYED RELEASE ORAL at 09:32

## 2020-06-23 RX ADMIN — ROPINIROLE HYDROCHLORIDE 3 MG: 1 TABLET, FILM COATED ORAL at 09:32

## 2020-06-23 RX ADMIN — GABAPENTIN 800 MG: 400 CAPSULE ORAL at 13:30

## 2020-06-23 RX ADMIN — ASPIRIN 325 MG: 325 TABLET, COATED ORAL at 09:32

## 2020-06-23 RX ADMIN — VENLAFAXINE HYDROCHLORIDE 75 MG: 75 CAPSULE, EXTENDED RELEASE ORAL at 09:33

## 2020-06-23 RX ADMIN — LORAZEPAM 1 MG: 1 TABLET ORAL at 09:31

## 2020-06-23 RX ADMIN — PANTOPRAZOLE SODIUM 40 MG: 40 TABLET, DELAYED RELEASE ORAL at 18:07

## 2020-06-23 RX ADMIN — ROPINIROLE HYDROCHLORIDE 3 MG: 1 TABLET, FILM COATED ORAL at 21:33

## 2020-06-23 RX ADMIN — DULOXETINE HYDROCHLORIDE 60 MG: 30 CAPSULE, DELAYED RELEASE ORAL at 21:33

## 2020-06-23 RX ADMIN — GABAPENTIN 800 MG: 400 CAPSULE ORAL at 06:36

## 2020-06-23 RX ADMIN — DOCUSATE SODIUM 100 MG: 100 CAPSULE ORAL at 21:33

## 2020-06-23 RX ADMIN — VANCOMYCIN HYDROCHLORIDE 1000 MG: 1 INJECTION, SOLUTION INTRAVENOUS at 09:34

## 2020-06-23 RX ADMIN — PANTOPRAZOLE SODIUM 40 MG: 40 TABLET, DELAYED RELEASE ORAL at 06:36

## 2020-06-23 RX ADMIN — METOPROLOL TARTRATE 12.5 MG: 25 TABLET, FILM COATED ORAL at 09:33

## 2020-06-23 RX ADMIN — HYDROCODONE BITARTRATE AND ACETAMINOPHEN 1 TABLET: 7.5; 325 TABLET ORAL at 10:16

## 2020-06-23 RX ADMIN — LORAZEPAM 1 MG: 1 TABLET ORAL at 21:33

## 2020-06-23 RX ADMIN — MONTELUKAST 10 MG: 10 TABLET, FILM COATED ORAL at 09:32

## 2020-06-23 RX ADMIN — FLECAINIDE ACETATE 50 MG: 50 TABLET ORAL at 21:33

## 2020-06-23 RX ADMIN — FLECAINIDE ACETATE 50 MG: 50 TABLET ORAL at 09:32

## 2020-06-23 RX ADMIN — GABAPENTIN 800 MG: 400 CAPSULE ORAL at 21:33

## 2020-06-23 RX ADMIN — LEVOTHYROXINE SODIUM 137 MCG: 112 TABLET ORAL at 06:36

## 2020-06-24 PROCEDURE — 97110 THERAPEUTIC EXERCISES: CPT

## 2020-06-24 PROCEDURE — 25010000002 VANCOMYCIN PER 500 MG: Performed by: ORTHOPAEDIC SURGERY

## 2020-06-24 PROCEDURE — 97116 GAIT TRAINING THERAPY: CPT

## 2020-06-24 RX ORDER — SENNA PLUS 8.6 MG/1
2 TABLET ORAL NIGHTLY PRN
Status: DISCONTINUED | OUTPATIENT
Start: 2020-06-24 | End: 2020-06-25 | Stop reason: HOSPADM

## 2020-06-24 RX ADMIN — HYDROCODONE BITARTRATE AND ACETAMINOPHEN 1 TABLET: 7.5; 325 TABLET ORAL at 16:18

## 2020-06-24 RX ADMIN — DULOXETINE HYDROCHLORIDE 60 MG: 30 CAPSULE, DELAYED RELEASE ORAL at 20:51

## 2020-06-24 RX ADMIN — GABAPENTIN 800 MG: 400 CAPSULE ORAL at 16:18

## 2020-06-24 RX ADMIN — FLECAINIDE ACETATE 50 MG: 50 TABLET ORAL at 20:51

## 2020-06-24 RX ADMIN — PANTOPRAZOLE SODIUM 40 MG: 40 TABLET, DELAYED RELEASE ORAL at 06:28

## 2020-06-24 RX ADMIN — ROPINIROLE HYDROCHLORIDE 3 MG: 1 TABLET, FILM COATED ORAL at 20:51

## 2020-06-24 RX ADMIN — LORAZEPAM 1 MG: 1 TABLET ORAL at 10:48

## 2020-06-24 RX ADMIN — METOPROLOL TARTRATE 12.5 MG: 25 TABLET, FILM COATED ORAL at 10:47

## 2020-06-24 RX ADMIN — MONTELUKAST 10 MG: 10 TABLET, FILM COATED ORAL at 10:47

## 2020-06-24 RX ADMIN — LORAZEPAM 1 MG: 1 TABLET ORAL at 20:51

## 2020-06-24 RX ADMIN — PANTOPRAZOLE SODIUM 40 MG: 40 TABLET, DELAYED RELEASE ORAL at 17:16

## 2020-06-24 RX ADMIN — ASPIRIN 325 MG: 325 TABLET, COATED ORAL at 10:48

## 2020-06-24 RX ADMIN — VANCOMYCIN HYDROCHLORIDE 1000 MG: 1 INJECTION, SOLUTION INTRAVENOUS at 10:49

## 2020-06-24 RX ADMIN — ROPINIROLE HYDROCHLORIDE 3 MG: 1 TABLET, FILM COATED ORAL at 10:48

## 2020-06-24 RX ADMIN — GABAPENTIN 800 MG: 400 CAPSULE ORAL at 20:51

## 2020-06-24 RX ADMIN — METOPROLOL TARTRATE 12.5 MG: 25 TABLET, FILM COATED ORAL at 20:51

## 2020-06-24 RX ADMIN — DULOXETINE HYDROCHLORIDE 60 MG: 30 CAPSULE, DELAYED RELEASE ORAL at 10:48

## 2020-06-24 RX ADMIN — GABAPENTIN 800 MG: 400 CAPSULE ORAL at 06:28

## 2020-06-24 RX ADMIN — SENNOSIDES 2 TABLET: 8.6 TABLET, FILM COATED ORAL at 20:51

## 2020-06-24 RX ADMIN — FLECAINIDE ACETATE 50 MG: 50 TABLET ORAL at 10:48

## 2020-06-24 RX ADMIN — VENLAFAXINE HYDROCHLORIDE 75 MG: 75 CAPSULE, EXTENDED RELEASE ORAL at 10:48

## 2020-06-24 RX ADMIN — LEVOTHYROXINE SODIUM 137 MCG: 112 TABLET ORAL at 06:28

## 2020-06-25 VITALS
RESPIRATION RATE: 18 BRPM | TEMPERATURE: 97.9 F | HEIGHT: 64 IN | HEART RATE: 72 BPM | DIASTOLIC BLOOD PRESSURE: 63 MMHG | OXYGEN SATURATION: 93 % | WEIGHT: 293 LBS | BODY MASS INDEX: 50.02 KG/M2 | SYSTOLIC BLOOD PRESSURE: 109 MMHG

## 2020-06-25 PROCEDURE — 97116 GAIT TRAINING THERAPY: CPT

## 2020-06-25 RX ORDER — HYDROCODONE BITARTRATE AND ACETAMINOPHEN 7.5; 325 MG/1; MG/1
1 TABLET ORAL EVERY 6 HOURS PRN
Qty: 40 TABLET | Refills: 0 | Status: SHIPPED | OUTPATIENT
Start: 2020-06-25 | End: 2020-07-05

## 2020-06-25 RX ADMIN — LEVOTHYROXINE SODIUM 137 MCG: 112 TABLET ORAL at 06:30

## 2020-06-25 RX ADMIN — ROPINIROLE HYDROCHLORIDE 2 MG: 1 TABLET, FILM COATED ORAL at 08:51

## 2020-06-25 RX ADMIN — PANTOPRAZOLE SODIUM 40 MG: 40 TABLET, DELAYED RELEASE ORAL at 06:26

## 2020-06-25 RX ADMIN — VENLAFAXINE HYDROCHLORIDE 75 MG: 75 CAPSULE, EXTENDED RELEASE ORAL at 08:50

## 2020-06-25 RX ADMIN — HYDROCODONE BITARTRATE AND ACETAMINOPHEN 1 TABLET: 7.5; 325 TABLET ORAL at 14:18

## 2020-06-25 RX ADMIN — LORAZEPAM 1 MG: 1 TABLET ORAL at 08:51

## 2020-06-25 RX ADMIN — METOPROLOL TARTRATE 12.5 MG: 25 TABLET, FILM COATED ORAL at 08:51

## 2020-06-25 RX ADMIN — MONTELUKAST 10 MG: 10 TABLET, FILM COATED ORAL at 08:50

## 2020-06-25 RX ADMIN — FLECAINIDE ACETATE 50 MG: 50 TABLET ORAL at 08:51

## 2020-06-25 RX ADMIN — GABAPENTIN 800 MG: 400 CAPSULE ORAL at 06:29

## 2020-06-25 RX ADMIN — ASPIRIN 325 MG: 325 TABLET, COATED ORAL at 08:50

## 2020-06-25 RX ADMIN — HYDROCODONE BITARTRATE AND ACETAMINOPHEN 1 TABLET: 7.5; 325 TABLET ORAL at 06:25

## 2020-06-25 RX ADMIN — DULOXETINE HYDROCHLORIDE 60 MG: 30 CAPSULE, DELAYED RELEASE ORAL at 08:50

## 2020-06-26 ENCOUNTER — READMISSION MANAGEMENT (OUTPATIENT)
Dept: CALL CENTER | Facility: HOSPITAL | Age: 65
End: 2020-06-26

## 2020-06-26 NOTE — OUTREACH NOTE
Prep Survey      Responses   [facility] facility patient discharged from?  [facility]   Is LACE score < 7 ?  No   Eligibility  Readm Mgmt   Discharge diagnosis  Primary osteoarthritis of right knee   Does the patient have one of the following disease processes/diagnoses(primary or secondary)?  Total Joint Replacement   Does the patient have Home health ordered?  No   Is there a DME ordered?  No   Medication alerts for this patient  ASA    Prep survey completed?  Yes          Carolyn Gonzalez RN

## 2020-06-29 ENCOUNTER — READMISSION MANAGEMENT (OUTPATIENT)
Dept: CALL CENTER | Facility: HOSPITAL | Age: 65
End: 2020-06-29

## 2020-06-29 NOTE — OUTREACH NOTE
Total Joint Week 1 Survey      Responses   [facility] patient discharged from?  [place]   Does the patient have one of the following disease processes/diagnoses(primary or secondary)?  Total Joint Replacement   Is there a successful TCM telephone encounter documented?  No   Joint surgery performed?  Knee   Week 1 attempt successful?  Yes   Call start time  1648   Call end time  1655   Discharge diagnosis  Primary osteoarthritis of right knee   Does the patient have all medications related to this admission filled (includes all antibiotics, pain medications, etc.)  Yes   Is the patient taking all medications as directed (includes completed medication regime)?  Yes   Is the patient able to teach back alternate methods of pain control?  Ice, Knee-elevation/no pillow under knee, Reposition, Correct alignment, Short, frequent activity   Does the patient have a follow up appointment with their surgeon?  Yes   Has the patient kept scheduled appointments due by today?  Yes   What is the Home health agency?   pt not sure   Has home health visited the patient within 72 hours of discharge?  No   What DME was ordered?  none   Psychosocial issues?  No   Has the patient began therapy sessions (either in the home or as an out patient)?  No   Does the patient have a wound vac in place?  N/A   Has the patient fallen since discharge?  No   Did the patient receive a copy of their discharge instructions?  Yes   Nursing interventions  Reviewed instructions with patient   What is the patient's perception of their functional status since discharge?  Improving   Is the patient able to teach back signs and symptoms of infection?  Temp >100.4 for 24h or longer, Incisional drainage, Blisters around incision, Increased swelling or redness around incision (not associated with surgical edema), Severe discomfort or pain, Changes in mobility, Shortness of breath or chest pain   Is the patient able to teach back how to prevent infection?   Check incision daily, Wash hands before and after touching incision, Keep incision covered if drainage, Keep incision covered if pets in house, Shower only as directed by surgeon, Eat well-balanced diet, No tub baths, hot tub or swimming, No lotion or creams   Is the patient able to teach back signs and symptoms of DVT?  Redness in calf, Area hot to touch, Shortness of breath or chest pain, Swelling in calf, Severe pain in calf   Is the patient able to teach back home safety measures?  Ability to shower, Accessibility to necessary areas in home, Modifications to reach items, Modifications with ADLs such as dressing, cooking, toileting   Did the patient implement home safety suggestions from pre-surgery classes if attended?  N/A   Is the patient/caregiver able to teach back the hierarchy of who to call/visit for symptoms/problems? PCP, Specialist, Home health nurse, Urgent Care, ED, 911  Yes   Additional teach back comments  using walker with good results   Week 1 call completed?  Yes          Georgina Carson RN

## 2020-07-07 ENCOUNTER — READMISSION MANAGEMENT (OUTPATIENT)
Dept: CALL CENTER | Facility: HOSPITAL | Age: 65
End: 2020-07-07

## 2020-07-07 NOTE — OUTREACH NOTE
Total Joint Week 2 Survey      Responses   [facility] patient discharged from?  [place]   Does the patient have one of the following disease processes/diagnoses(primary or secondary)?  Total Joint Replacement   Joint surgery performed?  Knee   Week 2 attempt successful?  No   Unsuccessful attempts  Attempt 1          Reji Flores RN

## 2020-07-09 ENCOUNTER — READMISSION MANAGEMENT (OUTPATIENT)
Dept: CALL CENTER | Facility: HOSPITAL | Age: 65
End: 2020-07-09

## 2020-07-09 NOTE — OUTREACH NOTE
Total Joint Week 2 Survey      Responses   [facility] patient discharged from?  [place]   Does the patient have one of the following disease processes/diagnoses(primary or secondary)?  Total Joint Replacement   Joint surgery performed?  Knee   Week 2 attempt successful?  No   Unsuccessful attempts  Attempt 2          Adriana Madrigal RN

## 2020-07-21 ENCOUNTER — OFFICE VISIT (OUTPATIENT)
Dept: NEUROLOGY | Age: 65
End: 2020-07-21
Payer: MEDICARE

## 2020-07-21 VITALS
SYSTOLIC BLOOD PRESSURE: 128 MMHG | RESPIRATION RATE: 16 BRPM | HEIGHT: 64 IN | HEART RATE: 77 BPM | BODY MASS INDEX: 50.02 KG/M2 | WEIGHT: 293 LBS | DIASTOLIC BLOOD PRESSURE: 74 MMHG

## 2020-07-21 PROCEDURE — 99214 OFFICE O/P EST MOD 30 MIN: CPT | Performed by: PSYCHIATRY & NEUROLOGY

## 2020-07-21 NOTE — PROGRESS NOTES
[facility]  [address] [place] [address] Drive, [address] [address]  Morteza Peterson  Phone [phone]  Fax [phone]     [facility] Follow Up Encounter  20 1:16 PM CDT    Information:   Patient Name: Shawn Guadarrama  :   1955  Age:   77 y.o. MRN:   [id number]  Account #:  [de-identified]  Today:  20    Provider: Lebron Nevarez M.D. Chief Complaint:   Chief Complaint   Patient presents with    6 Month Follow-Up    Sleep Apnea       Subjective: Shawn Guadarrama is a 77 y.o. woman with a history of Shawn Guadarrama is a 75 y.o. Gladis Charter with a history of peripheral neuropathy, chronic back and right leg pain, DEVEN, and forgetfulness who is following up. She had her right knee replaced and that went well. She can ambulate better but still uses a rolling walker. She complains of dizziness when she moves her head to the right when lying down. She denies vertigo though. She has chronic back pain and right radicular pain. The neuropathy is gradually worsening. She has numbness and tingling in her feet and lower legs. She has numbness in her hands. The gabapentin does help the neuropathic pain. She sleeps with her BiPAP nightly and rests well.         Objective:     Past Medical History:  Past Medical History:   Diagnosis Date    Abdominal adhesions 2016    Anxiety     Arthritis     Atrial fibrillation (HCC)     Atrial fibrillation and flutter (HCC) 2015    Chronic back pain     Chronic cough     Depression     Edema     Fibrocystic breast     GERD (gastroesophageal reflux disease)     Glossitis     Headache(784.0)     Heart burn     Heart disease     Hypertension     Hypothyroidism     Mouth sore     MRSA (methicillin resistant staph aureus) culture positive 14    nasal    Numbness     toes    Obstructive sleep apnea     BIPAP    Peripheral neuropathy     PONV (postoperative nausea and vomiting)     Prolonged emergence from general anesthesia     Recurrent ventral incisional hernia 1/18/2016    Sleep apnea     Stroke ([facility] [address]     Stroke-like symptom     SVT (supraventricular tachycardia) (HCC)     SVT (supraventricular tachycardia) (HCC)     Swelling of extremity     Unspecified sleep apnea     clinicallybi-pap    Ventricular tachyarrhythmia ([facility] [address] 9/15    svt       Past Surgical History:   Procedure Laterality Date    CARDIAC CATHETERIZATION  8/18/15  JDT    EF 50%    CARDIAC SURGERY      CATARACT REMOVAL      CHOLECYSTECTOMY  8/4/14    BY Dr. Nupur Keating  2008    CYST INCISION AND DRAINAGE  03/2017    FINGER TRIGGER RELEASE      FOOT SURGERY Left     removal of two screws    HAMMER TOE SURGERY      HARDWARE REMOVAL FOOT / ANKLE Left 8/16/2016    FOOT HARDWARE REMOVAL - DEEP / 2nd METATARSAL HEAD RESECTION performed by Moises Brewster DPM at [address] [place] [address]  1/12/2015    x3    KNEE SURGERY      Took prostetic joint out because of infection and put a spacer in    OTHER SURGICAL HISTORY  02/2017    pilonidal cyst-Dr Bina Kim    [place] KNEE [facility] Right 7/3/2017    KNEE ARTHROSCOPY PARTIAL MEDIAL MENISECTOMY performed by Nemo Alamo MD at [address] [place] [address] Right 2/16/2018    KNEE TOTAL ARTHROPLASTY performed by Jude Esquivel MD at [place] [address] N/A 1/18/2016    HERNIA VENTRAL REPAIR LAPAROSCOPIC WITH MESH  performed by Falguni Ramsey MD at [facility]  · None    Significant Injuries  · None    Habits  Maximo Brewster reports that she has never smoked. She has never used smokeless tobacco. She reports that she does not drink alcohol or use drugs.     Family History   Problem Relation Age of Onset    Heart Disease Mother     Diabetes Mother     High Blood Pressure Father     Cancer Father 79        lung CA    Cancer Brother         leukemia    Alzheimer's Disease Brother        Social History  Maximo Brewster is , lives in [place], [place], and is retired. Medications:  Current Outpatient Medications   Medication Sig Dispense Refill    venlafaxine (EFFEXOR XR) 75 MG extended release capsule TAKE 1 CAPSULE BY MOUTH DAILY 90 capsule 3    gabapentin (NEURONTIN) 800 MG tablet TAKE 1 TABLET BY MOUTH THREE TIMES DAILY 90 tablet 5    DULoxetine (CYMBALTA) 60 MG extended release capsule TAKE 1 CAPSULE BY MOUTH TWICE DAILY 180 capsule 3    triamcinolone acetonide (KENALOG) 0.1 % paste APPLY TWICE DAILY AS NEEDED TO TEETH 5 g 0    allopurinol (ZYLOPRIM) 100 MG tablet Take 100 mg by mouth daily      amitriptyline (ELAVIL) 25 MG tablet TAKE 2 TABLETS BY MOUTH EVERY NIGHT 180 tablet 3    dicloxacillin (DYNAPEN) 500 MG capsule Take 500 mg by mouth 3 times daily      levothyroxine (SYNTHROID) 137 MCG tablet Take 137 mcg by mouth Daily      DEXILANT 60 MG CPDR delayed release capsule Take 60 mg by mouth daily       alendronate (FOSAMAX) 70 MG tablet TK 1 T PO ONCE A WEEK IN THE MORNING 30 MINUTES BEFORE FIRST MEAL OF THE DAY  3    flecainide (TAMBOCOR) 100 MG tablet TAKE 1 TABLET BY MOUTH TWICE DAILY 180 tablet 3    metoprolol tartrate (LOPRESSOR) 25 MG tablet TAKE 1/2 TABLET BY MOUTH TWICE DAILY 90 tablet 3    diclofenac (VOLTAREN) 50 MG EC tablet Take 50 mg by mouth daily      LORazepam (ATIVAN) 1 MG tablet Take 1 mg by mouth every 8 hours as needed . 2    rOPINIRole (REQUIP) 3 MG tablet Take 3 mg by mouth 2 times daily 2 tabs in AM and 3 tabs in PM       fluticasone (FLONASE) 50 MCG/ACT nasal spray 2 sprays by Nasal route as needed       famciclovir (FAMVIR) 500 MG tablet   Take 500 mg by mouth 2 times daily        No current facility-administered medications for this visit. Allergies:   Allergies as of 07/21/2020 - Review Complete 07/21/2020   Allergen Reaction Noted    Azithromycin  07/09/2019    Codeine Nausea Only 07/29/2014    Mobic [meloxicam] Rash 12/13/2017    Morphine and related Itching 03/02/2018       Review of Systems:  Constitutional: negative for - chills and fever  Eyes:  negative for - visual disturbance and photophobia  HENMT: negative for - headaches and sinus pain  Respiratory: negative for - cough, hemoptysis, and shortness of breath  Cardiovascular: negative for - chest pain and palpitations  Gastrointestinal: negative for - blood in stools, constipation, diarrhea, nausea, and vomiting  Genito-Urinary: negative for - hematuria, urinary frequency, urinary urgency, and urinary retention  Musculoskeletal: positive for - joint pain, joint stiffness, and joint swelling  Hematological and Lymphatic: negative for - bleeding problems, abnormal bruising, and swollen lymph nodes  Endocrine:  negative for - polydipsia and polyphagia  Allergy/Immunology:  negative for - rhinorrhea, sinus congestion, hives  Integument:  negative for - negative for - rash, change in moles, new or changing lesions  Psychological: negative for - anxiety and depression  Neurological: positive for - memory loss, numbness/tingling, and weakness     PHYSICAL EXAMINATION:  Vitals:  /74   Pulse 77   Resp 16   Ht 5' 4\" (1.626 m)   Wt 298 lb (135.2 kg)   BMI 51.15 kg/m²   General appearance:  Alert, well developed, well nourished, in no distress  HEENT:  normocephalic, atraumatic, sclera appear normal, no nasal abnormalities, no rhinorrhea, Ears appear normal, oral mucous membranes are moist without erythema, trachea midline, thyroid is normal, no lymphadenopathy or neck mass. Cardiovascular:  Regular rate and rhythm without murmer. moderate peripheral edema, No cyanosis or clubbing. No carotid bruits. Pulmonary:  Lungs are clear to auscultation. Breathing appears normal, good expansion, normal effort without use of accessory muscles  Musculoskeletal:  Joints are arthritic. She has chronic venous stasis dermatitis in her lower legs.     Integument:  No rash, erythema, or pallor  Psychiatric:  Mood, affect, and behavior appear normal NEUROLOGIC EXAMINATION:  Mental Status:  alert, oriented to person, place, and time. Speech:  Clear without dysarthria or dysphonia  Language:  Fluent without aphasia  Cranial Nerves:   II Visual fields are full to confrontation   III,IV, VI Extraocular movements are full   VII Facial movements are symmetrical without weakness   VIII Hearing is intact   IX,X Shoulder shrug and head rotation strength are intact   XII No tongue atrophy or fasciculations. Normal tongue protrusion. No tongue weakness  Motor:  Normal strength in both upper and lower extremities. Normal muscle tone and bulk. Deep tendon reflexes are absent. Issa's signs are absent bilaterally. There is no ankle clonus on either side. Coordination:  Rapid alternating movements are normal in both upper extremities. Finger to nose testing is unimpaired bilaterally. Gait:  Unsteady    Pertinent Diagnostic Studies:  GILMAR is appropriate    Assessment:       ICD-10-CM    1. Idiopathic progressive neuropathy  G60.3    2. Chronic right-sided low back pain with right-sided sciatica  M54.41     G89.29    3. Obstructive sleep apnea  G47.33    4. Restless leg syndrome  G25.81    5. Memory loss  R41.3    She is clinically stable with multiple medical problems    Plan:   1. Continue BiPAP use during sleep  2. Continue present medications including gabapentin, Cymbalta, amitriptyline, Requip  3.  FU in 6 months    Electronically signed by Perla Pisano MD on 7/21/20

## 2020-07-27 ENCOUNTER — READMISSION MANAGEMENT (OUTPATIENT)
Dept: CALL CENTER | Facility: HOSPITAL | Age: 65
End: 2020-07-27

## 2020-07-27 NOTE — OUTREACH NOTE
Total Joint Month 1 Survey      Responses   [facility] patient discharged from?  [place]   Does the patient have one of the following disease processes/diagnoses(primary or secondary)?  Total Joint Replacement   Joint surgery performed?  Knee   Month 1 attempt successful?  Yes   Call start time  1414   Call end time  1417   Has the patient been back in either the hospital or Emergency Department since discharge?  No   Discharge diagnosis  Primary osteoarthritis of right knee   Is the patient taking all medications as directed (includes completed medication regime)?  Yes   Has the patient kept scheduled appointments due by today?  Yes   Comments  Surgeon is pleased with progress.   Is the patient still receiving Home Health Services?  N/A   DME comments  Patient is using a walker currently.     Is the patient still attending therapy sessions(either in the home or as an outpatient)?  No [Patient reports she has not done therapy.]   Has the patient fallen since discharge?  No   Comments  Patient states she is healing and is more stable   What is the patient's perception of their functional status since discharge?  Improving   Additional teach back comments  Patent does have some swelling and redness.  Surgeon reports this is to be expected.  She does elevate and ice in the evenings   Month 1 call completed?  Yes   Wrap up additional comments  Patient reports she is doing very well.  She denies any needs during this call.          Adriana Madrigal RN

## 2020-08-05 NOTE — TELEPHONE ENCOUNTER
Requested Prescriptions     Pending Prescriptions Disp Refills    DULoxetine (CYMBALTA) 60 MG extended release capsule [Pharmacy Med Name: DULOXETINE DR 60MG CAPSULES] 180 capsule 3     Sig: TAKE 1 CAPSULE BY MOUTH TWICE DAILY       Last Office Visit: 7/21/2020  Next Office Visit: 1/26/2021  Last Medication Refill: 9/10/19 with 3 refills

## 2020-08-06 RX ORDER — DULOXETIN HYDROCHLORIDE 60 MG/1
CAPSULE, DELAYED RELEASE ORAL
Qty: 180 CAPSULE | Refills: 3 | Status: SHIPPED | OUTPATIENT
Start: 2020-08-06 | End: 2021-07-26

## 2020-08-26 ENCOUNTER — READMISSION MANAGEMENT (OUTPATIENT)
Dept: CALL CENTER | Facility: HOSPITAL | Age: 65
End: 2020-08-26

## 2020-08-26 NOTE — OUTREACH NOTE
Total Joint Month 2 Survey      Responses   [facility] patient discharged from?  [place]   Does the patient have one of the following disease processes/diagnoses(primary or secondary)?  Total Joint Replacement   Joint surgery performed?  Knee   Month 2 attempt successful?  Yes   Call start time  1210   Call end time  1216   Has the patient been back in either the hospital or Emergency Department since discharge?  No   Discharge diagnosis  Primary osteoarthritis of right knee   Is the patient taking all medications as directed (includes completed medication regime)?  Yes   Is the patient able to teach back alternate methods of pain control?  Ice, Knee-elevation/no pillow under knee, Reposition, Correct alignment, Short, frequent activity   Has the patient kept scheduled appointments due by today?  Yes   Is the patient still receiving Home Health Services?  N/A   Is the patient still attending therapy sessions(either in the home or as an outpatient)?  No   Has the patient fallen since discharge?  No   Comments  Patient feels like she may be overdoing it sometimes. Encouraged her to let her body be the guide. and not overdo.    What is the patient's perception of their functional status since discharge?  Improving   Is the patient able to teach back signs and symptoms of infection?  Temp >100.4 for 24h or longer, Incisional drainage, Blisters around incision, Increased swelling or redness around incision (not associated with surgical edema), Severe discomfort or pain, Changes in mobility, Shortness of breath or chest pain   Is the patient/caregiver able to teach back the hierarchy of who to call/visit for symptoms/problems? PCP, Specialist, Home health nurse, Urgent Care, ED, 911  Yes   Month 2 Call Completed?  Yes          Reji Flores RN

## 2020-09-25 ENCOUNTER — READMISSION MANAGEMENT (OUTPATIENT)
Dept: CALL CENTER | Facility: HOSPITAL | Age: 65
End: 2020-09-25

## 2020-09-25 NOTE — OUTREACH NOTE
Total Joint Month 3 Survey      Responses   [facility] patient discharged from?  [place]   Does the patient have one of the following disease processes/diagnoses(primary or secondary)?  Total Joint Replacement   Joint surgery performed?  Knee   Month 3 attempt successful?  Yes   Call start time  1201   Call end time  1209   Has the patient been back in either the hospital or Emergency Department since discharge?  No   Is the patient taking all medications as directed (includes completed medication regime)?  Yes   Has the patient kept scheduled appointments due by today?  Yes   Is the patient still receiving Home Health Services?  N/A   DME comments  Pt is using only a cane at this time.   Is the patient still attending therapy sessions(either in the home or as an outpatient)?  No   Has the patient fallen since discharge?  No   Graduated  Yes   Did the patient feel the follow up calls were helpful during their recovery period?  Yes   Was the number of calls appropriate?  Yes   Wrap up additional comments  Pt is doing great. No pain. No infection. Surgeon has released her.          Sophia Pierce RN

## 2020-10-28 ENCOUNTER — OFFICE VISIT (OUTPATIENT)
Dept: CARDIOLOGY | Facility: CLINIC | Age: 65
End: 2020-10-28

## 2020-10-28 ENCOUNTER — HOSPITAL ENCOUNTER (OUTPATIENT)
Dept: WOMENS IMAGING | Age: 65
Discharge: HOME | End: 2020-10-28
Payer: MEDICARE

## 2020-10-28 VITALS
OXYGEN SATURATION: 98 % | SYSTOLIC BLOOD PRESSURE: 144 MMHG | HEIGHT: 64 IN | HEART RATE: 82 BPM | DIASTOLIC BLOOD PRESSURE: 72 MMHG | BODY MASS INDEX: 50.02 KG/M2 | WEIGHT: 293 LBS

## 2020-10-28 DIAGNOSIS — Z98.890 S/P ABLATION OF ATRIAL FIBRILLATION: ICD-10-CM

## 2020-10-28 DIAGNOSIS — I48.0 PAROXYSMAL A-FIB (HCC): Primary | Chronic | ICD-10-CM

## 2020-10-28 DIAGNOSIS — G47.33 OBSTRUCTIVE SLEEP APNEA: Chronic | ICD-10-CM

## 2020-10-28 DIAGNOSIS — E78.2 MIXED HYPERLIPIDEMIA: ICD-10-CM

## 2020-10-28 DIAGNOSIS — Z86.79 S/P ABLATION OF ATRIAL FIBRILLATION: ICD-10-CM

## 2020-10-28 PROBLEM — E66.01 MORBID OBESITY: Status: ACTIVE | Noted: 2017-11-15

## 2020-10-28 PROCEDURE — 77063 BREAST TOMOSYNTHESIS BI: CPT

## 2020-10-28 PROCEDURE — 99214 OFFICE O/P EST MOD 30 MIN: CPT | Performed by: NURSE PRACTITIONER

## 2020-10-28 NOTE — PATIENT INSTRUCTIONS
"BMI for Adults  What is BMI?  Body mass index (BMI) is a number that is calculated from a person's weight and height. BMI can help estimate how much of a person's weight is composed of fat. BMI does not measure body fat directly. Rather, it is an alternative to procedures that directly measure body fat, which can be difficult and expensive.  BMI can help identify people who may be at higher risk for certain medical problems.  What are BMI measurements used for?  BMI is used as a screening tool to identify possible weight problems. It helps determine whether a person is obese, overweight, a healthy weight, or underweight.  BMI is useful for:  · Identifying a weight problem that may be related to a medical condition or may increase the risk for medical problems.  · Promoting changes, such as changes in diet and exercise, to help reach a healthy weight. BMI screening can be repeated to see if these changes are working.  How is BMI calculated?  BMI involves measuring your weight in relation to your height. Both height and weight are measured, and the BMI is calculated from those numbers. This can be done either in English ([place] or metric measurements. Note that charts and online BMI calculators are available to help you find your BMI quickly and easily without having to do these calculations yourself.  To calculate your BMI in English (U.S[place] measurements:    1. Measure your weight in pounds (lb).  2. Multiply the number of pounds by 703.  ? For example, for a person who weighs 180 lb, multiply that number by 703, which equals 126,540.  3. Measure your height in inches. Then multiply that number by itself to get a measurement called \"inches squared.\"  ? For example, for a person who is 70 inches tall, the \"inches squared\" measurement is 70 inches x 70 inches, which equals 4,900 inches squared.  4. Divide the total from step 2 (number of lb x 703) by the total from step 3 (inches squared): 126,540 ÷ 4,900 = 25.8. This is " "your BMI.  To calculate your BMI in metric measurements:  1. Measure your weight in kilograms (kg).  2. Measure your height in meters (m). Then multiply that number by itself to get a measurement called \"meters squared.\"  ? For example, for a person who is 1.75 m tall, the \"meters squared\" measurement is 1.75 m x 1.75 m, which is equal to 3.1 meters squared.  3. Divide the number of kilograms (your weight) by the meters squared number. In this example: 70 ÷ 3.1 = 22.6. This is your BMI.  What do the results mean?  BMI charts are used to identify whether you are underweight, normal weight, overweight, or obese. The following guidelines will be used:  · Underweight: BMI less than 18.5.  · Normal weight: BMI between 18.5 and 24.9.  · Overweight: BMI between 25 and 29.9.  · Obese: BMI of 30 or above.  Keep these notes in mind:  · Weight includes both fat and muscle, so someone with a muscular build, such as an athlete, may have a BMI that is higher than 24.9. In cases like these, BMI is not an accurate measure of body fat.  · To determine if excess body fat is the cause of a BMI of 25 or higher, further assessments may need to be done by a health care provider.  · BMI is usually interpreted in the same way for men and women.  Where to find more information  For more information about BMI, including tools to quickly calculate your BMI, go to these websites:  · Centers for Disease Control and Prevention: www.cdc.gov  · American Heart Association: www.heart.org  · National Heart, Lung, and Blood [place]: www.nhlbi.nih.gov  Summary  · Body mass index (BMI) is a number that is calculated from a person's weight and height.  · BMI may help estimate how much of a person's weight is composed of fat. BMI can help identify those who may be at higher risk for certain medical problems.  · BMI can be measured using English measurements or metric measurements.  · BMI charts are used to identify whether you are underweight, normal " weight, overweight, or obese.  This information is not intended to replace advice given to you by your health care provider. Make sure you discuss any questions you have with your health care provider.  Document Released: 08/29/2005 Document Revised: 09/09/2020 Document Reviewed: 07/17/2020  Elsevier Patient Education © 2020 [employer]

## 2020-10-28 NOTE — PROGRESS NOTES
Subjective:     Encounter Date:10/28/2020      Patient ID: Danisha Cannon is a 65 y.o. female with a history of PAF s/p ablation and HLD.    Chief Complaint: follow up  Atrial Fibrillation  Presents for follow-up visit. Symptoms include palpitations. Symptoms are negative for chest pain, dizziness, shortness of breath, syncope and weakness. The symptoms have been stable. Past medical history includes atrial fibrillation and hyperlipidemia.   Hyperlipidemia  This is a chronic problem. The current episode started more than 1 year ago. The problem is controlled. Pertinent negatives include no chest pain or shortness of breath.     Patient presents today for a routine follow up. Patient has a history of PAF s/p ablation by Dr. Guerin in 8/19. She remains on Flecainide and reports daily palpitations that are unchanged from previous. She continues to follow with Dr. Guerin who started her on Eliquis at her last visit. She denies bleeding. She is inquiring about possible Watchman placement as it was recommended by Dr. Guerin after her ablation. She is chronically short of breath and relates this to her weight and allergies. She denies chest pain, edema, orthopnea and PND.    The following portions of the patient's history were reviewed and updated as appropriate: allergies, current medications, past family history, past medical history, past social history, past surgical history and problem list.    Allergies   Allergen Reactions   • Other Rash     Dial soap  Redness and swelling on skin and burning sensation   • Codeine Dizziness and Nausea Only     Rapid heart rate, dizziness  NAUSEA   • Mobic [Meloxicam] Rash   • Morphine And Related Itching       Current Outpatient Medications:   •  alendronate (FOSAMAX) 70 MG tablet, Take 70 mg by mouth Every 7 (Seven) Days. Friday evening., Disp: , Rfl:   •  amitriptyline (ELAVIL) 50 MG tablet, Take 100 mg by mouth Every Night., Disp: , Rfl:   •  apixaban (ELIQUIS) 5 MG  tablet tablet, Take 5 mg by mouth 2 (Two) Times a Day., Disp: , Rfl:   •  Aspirin-Acetaminophen-Caffeine (EXCEDRIN EXTRA STRENGTH PO), Take 2 tablets by mouth As Needed (for headache)., Disp: , Rfl:   •  dexlansoprazole (DEXILANT) 60 MG capsule, Take 60 mg by mouth Daily., Disp: , Rfl:   •  diclofenac (VOLTAREN) 75 MG EC tablet, Take 75 mg by mouth Daily., Disp: , Rfl:   •  DULoxetine (CYMBALTA) 60 MG capsule, Take 60 mg by mouth 2 (Two) Times a Day., Disp: , Rfl:   •  flecainide (TAMBOCOR) 100 MG tablet, Take 100 mg by mouth 2 (Two) Times a Day., Disp: , Rfl:   •  fluticasone (FLONASE) 50 MCG/ACT nasal spray, 2 sprays into the nostril(s) as directed by provider Daily As Needed for Rhinitis or Allergies., Disp: , Rfl:   •  gabapentin (NEURONTIN) 800 MG tablet, Take 800 mg by mouth 3 (Three) Times a Day., Disp: , Rfl:   •  levothyroxine (SYNTHROID, LEVOTHROID) 137 MCG tablet, Take 137 mcg by mouth Every Morning., Disp: , Rfl:   •  LORazepam (ATIVAN) 1 MG tablet, Take 1 mg by mouth 2 (Two) Times a Day., Disp: , Rfl:   •  metoprolol tartrate (LOPRESSOR) 25 MG tablet, Take 12.5 mg by mouth Every 12 (Twelve) Hours., Disp: , Rfl:   •  montelukast (SINGULAIR) 10 MG tablet, Take  by mouth Daily., Disp: , Rfl: 5  •  nystatin (MYCOSTATIN) 021111 UNIT/ML suspension, 5 mL Every 6 (Six) Hours., Disp: , Rfl:   •  phenylephrine (AGATA-SYNEPHRINE) 0.5 % nasal spray, 1 spray into the nostril(s) as directed by provider Every 4 (Four) Hours As Needed for Congestion. DAILY, Disp: , Rfl:   •  rOPINIRole (REQUIP) 3 MG tablet, Take 6 mg by mouth 2 (two) times a day. Take 3 tablets at night and 2 tablets every am, Disp: , Rfl:   •  triamcinolone (KENALOG) 0.1 % paste, See Admin Instructions., Disp: , Rfl: 5  •  venlafaxine XR (EFFEXOR-XR) 75 MG 24 hr capsule, TK 1 C PO D, Disp: , Rfl: 2  Past Medical History:   Diagnosis Date   • Anemia    • Anxiety and depression    • Arthritis    • Atrial fibrillation (CMS/HCC)    • Chronic cough    •  Disease of thyroid gland    • GERD (gastroesophageal reflux disease)    • History of incision and drainage     Right knee   • History of staph infection     right knee, and upper right thigh   • Hyperlipidemia    • Infection      in right knee to bone, cleaned it out and put new hardware with antibiotic and pt developed hole in incision   • Neuropathy, peripheral    • Pneumonia     RECENT DX PER FAMILY DOCTOR   • PONV (postoperative nausea and vomiting)    • Restless leg syndrome    • Sleep apnea with use of continuous positive airway pressure (CPAP)     bipap   • SVT (supraventricular tachycardia) (CMS/HCC)        Social History     Socioeconomic History   • Marital status:      Spouse name: Not on file   • Number of children: Not on file   • Years of education: Not on file   • Highest education level: Not on file   Tobacco Use   • Smoking status: Never Smoker   • Smokeless tobacco: Never Used   Substance and Sexual Activity   • Alcohol use: No   • Drug use: No   • Sexual activity: Defer       Review of Systems   Constitution: Negative for malaise/fatigue, weight gain and weight loss.   Cardiovascular: Positive for palpitations. Negative for chest pain, dyspnea on exertion, irregular heartbeat, leg swelling, near-syncope, orthopnea, paroxysmal nocturnal dyspnea and syncope.   Respiratory: Negative for cough, shortness of breath, sleep disturbances due to breathing, sputum production and wheezing.    Skin: Negative for dry skin, flushing, itching and rash.   Gastrointestinal: Negative for hematemesis and hematochezia.   Neurological: Negative for dizziness, light-headedness, loss of balance and weakness.   All other systems reviewed and are negative.         Objective:     Vitals signs reviewed.   Constitutional:       General: Not in acute distress.     Appearance: Well-developed. Not diaphoretic.   Eyes:      General: No scleral icterus.     Conjunctiva/sclera: Conjunctivae normal.      Pupils: Pupils are  "equal, round, and reactive to light.   HENT:      Head: Normocephalic.    Mouth/Throat:      Pharynx: No oropharyngeal exudate.   Neck:      Musculoskeletal: Normal range of motion and neck supple.   Pulmonary:      Effort: Pulmonary effort is normal. No respiratory distress.      Breath sounds: Normal breath sounds. No wheezing. No rales.   Chest:      Chest wall: Not tender to palpatation.   Cardiovascular:      Normal rate. Regular rhythm.   Pulses:     Intact distal pulses.   Edema:     Peripheral edema absent.   Abdominal:      General: Bowel sounds are normal. There is no distension.      Palpations: Abdomen is soft.      Tenderness: There is no abdominal tenderness.   Musculoskeletal: Normal range of motion.   Skin:     General: Skin is warm and dry.      Coloration: Skin is not pale.      Findings: No erythema or rash.   Neurological:      Mental Status: Alert and oriented to person, place, and time.      Deep Tendon Reflexes: Reflexes are normal and symmetric.   Psychiatric:         Behavior: Behavior normal.           Procedures  /72   Pulse 82   Ht 162.6 cm (64\")   Wt (!) 138 kg (304 lb)   LMP  (LMP Unknown)   SpO2 98%   BMI 52.18 kg/m²     Lab Review:   I have reviewed previous office notes, [place] notes, recent labs and recent cardiac testing.           Assessment:          Diagnosis Plan   1. Paroxysmal A-fib (CMS/HCC)  Ambulatory Referral to [facility] [place]   2. S/P ablation of atrial fibrillation     3. Mixed hyperlipidemia     4. Obstructive sleep apnea            Plan:       1. PAF- stable. NSR Today. Follows with Dr. Guerin. continue Flecainide and Eliquis. Due to previous thigh hematoma and frequent falls, would likely be a candidate for Watchman device. [id number] 2 with HAS-BLED 3. Will refer to structural heart clinic.   2. S/p ablation - per Dr. Guerin in 8/19  3. HLD- reports controlled. Followed by PCP   4. [facility]STON- reports compliance with BiPAP.         Follow " up in 6 months or sooner if symptoms worsen.

## 2020-10-30 ENCOUNTER — TELEPHONE (OUTPATIENT)
Dept: CARDIOLOGY | Facility: CLINIC | Age: 65
End: 2020-10-30

## 2020-10-30 DIAGNOSIS — I48.0 PAF (PAROXYSMAL ATRIAL FIBRILLATION) (HCC): Primary | ICD-10-CM

## 2020-10-30 NOTE — TELEPHONE ENCOUNTER
Call to pt to discuss the Watchman implant with her. She is interested in starting the work up for this. She has recently spoken to Dr. Clement and ERNESTO Jimenez about this. She had no questions at this time. Orders to be placed and office will call with scheduling details. She verbalized understanding.

## 2020-11-06 ENCOUNTER — HOSPITAL ENCOUNTER (OUTPATIENT)
Dept: CT IMAGING | Facility: HOSPITAL | Age: 65
Discharge: HOME | End: 2020-11-06
Admitting: INTERNAL MEDICINE

## 2020-11-06 DIAGNOSIS — I48.0 PAF (PAROXYSMAL ATRIAL FIBRILLATION) (HCC): ICD-10-CM

## 2020-11-06 PROCEDURE — 75572 CT HRT W/3D IMAGE: CPT | Performed by: INTERNAL MEDICINE

## 2020-11-06 PROCEDURE — 75572 CT HRT W/3D IMAGE: CPT

## 2020-11-06 PROCEDURE — 0 IOPAMIDOL PER 1 ML: Performed by: INTERNAL MEDICINE

## 2020-11-06 RX ADMIN — IOPAMIDOL 100 ML: 755 INJECTION, SOLUTION INTRAVENOUS at 11:20

## 2020-11-12 RX ORDER — GABAPENTIN 800 MG/1
TABLET ORAL
Qty: 90 TABLET | Refills: 5 | Status: SHIPPED | OUTPATIENT
Start: 2020-11-12 | End: 2021-05-14

## 2020-11-12 NOTE — TELEPHONE ENCOUNTER
Cala Duane has requested a refill on her medication.       Last office visit : 7/21/2020   Next office visit : 1/26/2021   Last medication refill : 4/7/20 with 5 refills  Remy Barrera : up to date       Requested Prescriptions     Pending Prescriptions Disp Refills    gabapentin (NEURONTIN) 800 MG tablet [Pharmacy Med Name: GABAPENTIN 800MG TABLETS] 90 tablet 5     Sig: TAKE 1 TABLET BY MOUTH THREE TIMES DAILY

## 2020-11-13 DIAGNOSIS — I48.0 PAF (PAROXYSMAL ATRIAL FIBRILLATION) (HCC): Primary | ICD-10-CM

## 2020-11-13 NOTE — PROGRESS NOTES
"Pharmacy Dosing Service  Automatic Renal Adjustment  Lovenox    Assessment/Action/Plan:  Based on prescribing information provided by the drug , Lovenox 40 mg SQ  every 24 hours, has been changed to Lovenox 40 mg SQ  every 12 hours. Pharmacy will continue to monitor daily and make further adjustment(s) accordingly.     Subjective:  Danisha Cannon is a 63 y.o. female     Additional Factors Considered:  • Patient disposition per documentation  • Disease state or condition being treated    Objective:  Ht: 170.2 cm (67\"); Wt: 124 kg (274 lb 3.2 oz)  Estimated Creatinine Clearance: 95.8 mL/min (by C-G formula based on SCr of 0.8 mg/dL).   Lab Results   Component Value Date    CREATININE 0.80 11/23/2018       Adriana Bojorquez, PharmD  11/23/18 9:14 AM    " Hemostasis: Electrocautery and TCA 25%

## 2020-11-20 ENCOUNTER — HOSPITAL ENCOUNTER (OUTPATIENT)
Dept: CT IMAGING | Facility: HOSPITAL | Age: 65
Discharge: HOME | End: 2020-11-20

## 2020-11-20 DIAGNOSIS — I48.0 PAF (PAROXYSMAL ATRIAL FIBRILLATION) (HCC): ICD-10-CM

## 2020-11-20 PROCEDURE — 71250 CT THORAX DX C-: CPT

## 2020-11-20 PROCEDURE — 0 IOPAMIDOL PER 1 ML: Performed by: INTERNAL MEDICINE

## 2020-11-20 PROCEDURE — 75572 CT HRT W/3D IMAGE: CPT

## 2020-11-20 PROCEDURE — 75572 CT HRT W/3D IMAGE: CPT | Performed by: INTERNAL MEDICINE

## 2020-11-20 RX ADMIN — IOPAMIDOL 130 ML: 755 INJECTION, SOLUTION INTRAVENOUS at 12:00

## 2020-11-30 ENCOUNTER — TELEPHONE (OUTPATIENT)
Dept: CARDIOLOGY | Facility: CLINIC | Age: 65
End: 2020-11-30

## 2020-11-30 ENCOUNTER — APPOINTMENT (OUTPATIENT)
Dept: CARDIOLOGY | Facility: HOSPITAL | Age: 65
End: 2020-11-30

## 2020-11-30 DIAGNOSIS — I48.0 PAF (PAROXYSMAL ATRIAL FIBRILLATION) (HCC): Primary | ICD-10-CM

## 2020-11-30 NOTE — TELEPHONE ENCOUNTER
After reviewing most recent CT with Dr. Cobb, it was decided that pt would need a ASHTYN for pre-watchman screening. She is agreeable to move forward with that. Orders to be placed and office will contact with scheduling details.

## 2020-12-02 ENCOUNTER — TRANSCRIBE ORDERS (OUTPATIENT)
Dept: ADMINISTRATIVE | Facility: HOSPITAL | Age: 65
End: 2020-12-02

## 2020-12-02 DIAGNOSIS — Z01.818 PRE-OP TESTING: Primary | ICD-10-CM

## 2020-12-07 ENCOUNTER — LAB (OUTPATIENT)
Dept: LAB | Facility: HOSPITAL | Age: 65
End: 2020-12-07

## 2020-12-07 PROCEDURE — U0003 INFECTIOUS AGENT DETECTION BY NUCLEIC ACID (DNA OR RNA); SEVERE ACUTE RESPIRATORY SYNDROME CORONAVIRUS 2 (SARS-COV-2) (CORONAVIRUS DISEASE [COVID-19]), AMPLIFIED PROBE TECHNIQUE, MAKING USE OF HIGH THROUGHPUT TECHNOLOGIES AS DESCRIBED BY CMS-2020-01-R: HCPCS | Performed by: INTERNAL MEDICINE

## 2020-12-07 PROCEDURE — C9803 HOPD COVID-19 SPEC COLLECT: HCPCS | Performed by: INTERNAL MEDICINE

## 2020-12-08 LAB
COVID LABCORP PRIORITY: NORMAL
SARS-COV-2 RNA RESP QL NAA+PROBE: NOT DETECTED

## 2020-12-10 ENCOUNTER — HOSPITAL ENCOUNTER (OUTPATIENT)
Dept: CARDIOLOGY | Facility: HOSPITAL | Age: 65
Discharge: HOME | End: 2020-12-10
Admitting: INTERNAL MEDICINE

## 2020-12-10 VITALS
RESPIRATION RATE: 20 BRPM | TEMPERATURE: 97 F | WEIGHT: 293 LBS | OXYGEN SATURATION: 95 % | DIASTOLIC BLOOD PRESSURE: 78 MMHG | HEIGHT: 64 IN | BODY MASS INDEX: 50.02 KG/M2 | SYSTOLIC BLOOD PRESSURE: 124 MMHG | HEART RATE: 65 BPM

## 2020-12-10 DIAGNOSIS — I48.0 PAF (PAROXYSMAL ATRIAL FIBRILLATION) (HCC): ICD-10-CM

## 2020-12-10 LAB
BH CV ECHO MEAS - BSA(HAYCOCK): 2.6 M^2
BH CV ECHO MEAS - BSA: 2.3 M^2
BH CV ECHO MEAS - BZI_BMI: 52.5 KILOGRAMS/M^2
BH CV ECHO MEAS - BZI_METRIC_HEIGHT: 162.6 CM
BH CV ECHO MEAS - BZI_METRIC_WEIGHT: 138.8 KG
BH CV ECHO MEAS - RAP SYSTOLE: 5 MMHG
BH CV ECHO MEAS - RVSP: 28.2 MMHG
BH CV ECHO MEAS - TR MAX VEL: 241 CM/SEC
LV EF 2D ECHO EST: 65 %

## 2020-12-10 PROCEDURE — 99235 HOSP IP/OBS SAME DATE MOD 70: CPT | Performed by: INTERNAL MEDICINE

## 2020-12-10 PROCEDURE — 25010000002 MIDAZOLAM PER 1 MG: Performed by: INTERNAL MEDICINE

## 2020-12-10 PROCEDURE — 93325 DOPPLER ECHO COLOR FLOW MAPG: CPT

## 2020-12-10 PROCEDURE — 93312 ECHO TRANSESOPHAGEAL: CPT

## 2020-12-10 PROCEDURE — 93320 DOPPLER ECHO COMPLETE: CPT

## 2020-12-10 PROCEDURE — 25010000002 FENTANYL CITRATE (PF) 100 MCG/2ML SOLUTION: Performed by: INTERNAL MEDICINE

## 2020-12-10 PROCEDURE — 93312 ECHO TRANSESOPHAGEAL: CPT | Performed by: INTERNAL MEDICINE

## 2020-12-10 PROCEDURE — 93325 DOPPLER ECHO COLOR FLOW MAPG: CPT | Performed by: INTERNAL MEDICINE

## 2020-12-10 PROCEDURE — 93320 DOPPLER ECHO COMPLETE: CPT | Performed by: INTERNAL MEDICINE

## 2020-12-10 RX ORDER — SODIUM CHLORIDE 0.9 % (FLUSH) 0.9 %
10 SYRINGE (ML) INJECTION AS NEEDED
Status: DISCONTINUED | OUTPATIENT
Start: 2020-12-10 | End: 2020-12-11 | Stop reason: HOSPADM

## 2020-12-10 RX ORDER — SODIUM CHLORIDE 0.9 % (FLUSH) 0.9 %
10 SYRINGE (ML) INJECTION EVERY 12 HOURS SCHEDULED
Status: DISCONTINUED | OUTPATIENT
Start: 2020-12-10 | End: 2020-12-11 | Stop reason: HOSPADM

## 2020-12-10 RX ORDER — FENTANYL CITRATE 50 UG/ML
INJECTION, SOLUTION INTRAMUSCULAR; INTRAVENOUS
Status: COMPLETED | OUTPATIENT
Start: 2020-12-10 | End: 2020-12-10

## 2020-12-10 RX ORDER — SODIUM CHLORIDE 9 MG/ML
75 INJECTION, SOLUTION INTRAVENOUS CONTINUOUS
Status: DISCONTINUED | OUTPATIENT
Start: 2020-12-10 | End: 2020-12-11 | Stop reason: HOSPADM

## 2020-12-10 RX ORDER — MIDAZOLAM HYDROCHLORIDE 1 MG/ML
INJECTION INTRAMUSCULAR; INTRAVENOUS
Status: COMPLETED | OUTPATIENT
Start: 2020-12-10 | End: 2020-12-10

## 2020-12-10 RX ADMIN — TOPICAL ANESTHETIC 1 SPRAY: 200 SPRAY DENTAL; PERIODONTAL at 09:58

## 2020-12-10 RX ADMIN — MIDAZOLAM 2 MG: 1 INJECTION INTRAMUSCULAR; INTRAVENOUS at 09:58

## 2020-12-10 RX ADMIN — FENTANYL CITRATE 50 MCG: 50 INJECTION, SOLUTION INTRAMUSCULAR; INTRAVENOUS at 09:58

## 2020-12-10 RX ADMIN — MIDAZOLAM 1 MG: 1 INJECTION INTRAMUSCULAR; INTRAVENOUS at 10:10

## 2020-12-10 RX ADMIN — MIDAZOLAM 2 MG: 1 INJECTION INTRAMUSCULAR; INTRAVENOUS at 10:00

## 2020-12-14 ENCOUNTER — OFFICE VISIT (OUTPATIENT)
Dept: CARDIOLOGY | Facility: CLINIC | Age: 65
End: 2020-12-14

## 2020-12-14 VITALS
HEIGHT: 64 IN | HEART RATE: 91 BPM | OXYGEN SATURATION: 74 % | SYSTOLIC BLOOD PRESSURE: 126 MMHG | DIASTOLIC BLOOD PRESSURE: 82 MMHG | BODY MASS INDEX: 50.02 KG/M2 | WEIGHT: 293 LBS

## 2020-12-14 DIAGNOSIS — R29.6 FREQUENT FALLS: ICD-10-CM

## 2020-12-14 DIAGNOSIS — S70.10XD HEMATOMA OF THIGH, UNSPECIFIED LATERALITY, SUBSEQUENT ENCOUNTER: ICD-10-CM

## 2020-12-14 DIAGNOSIS — I48.0 PAROXYSMAL A-FIB (HCC): Primary | Chronic | ICD-10-CM

## 2020-12-14 PROCEDURE — 99213 OFFICE O/P EST LOW 20 MIN: CPT | Performed by: INTERNAL MEDICINE

## 2020-12-14 NOTE — PROGRESS NOTES
Reason for Visit: Left atrial appendage closure shared decision making.     HPI:  Danisha Cannon is a 65 y.o. female is here today for Left atrial appendage closure shared decision making.  She follows with Dr. Cobb as well as Dr. Guerin of electrophysiology.  She has had frequent falls and also a history of a thigh hematoma.  Due to her bleeding history and fall history she is being worked up for left atrial appendage occlusion with the Watchman device.  The hematoma occurred about a year to a year and a half ago following a fall.  She had a large hematoma develop on her right thigh that required drainage.  She has had multiple falls since then.  She attributes this to peripheral neuropathy.  She had a ASHTYN done on 12/10/2020 to further evaluate her left atrial appendage.      Patient Active Problem List   Diagnosis   • Abdominal adhesions   • Abnormal echocardiogram   • Abnormal Holter exam   • Atrial flutter, paroxysmal (CMS/HCC)   • Bilateral edema of lower extremity   • Chest pain   • Cholecystitis with cholelithiasis   • Chronic right-sided low back pain with sciatica   • Morbid obesity (CMS/HCC)   • Facet syndrome, lumbar   • Heart rate fast   • Idiopathic chronic gout without tophus   • Idiopathic hypotension   • Idiopathic progressive neuropathy   • Insomnia   • Lumbar facet arthropathy   • Memory loss   • MRSA carrier   • Obstructive sleep apnea   • Paroxysmal A-fib (CMS/HCC)   • Pilonidal cyst   • Primary osteoarthritis of right knee   • Recurrent ventral incisional hernia   • Restless leg syndrome   • Sciatica of right side   • Shortness of breath   • Sick sinus syndrome (CMS/HCC)   • Sleep apnea   • Sleep apnea, obstructive   • Snoring   • Somnolence, daytime   • Spondylosis of lumbar region without myelopathy or radiculopathy   • Tear of medial meniscus of right knee, current   • Total knee replacement status, right   • Tremor   • Umbilical hernia   • Obesity, unspecified obesity severity, unspecified  obesity type   • 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)   • Infection of right knee (CMS/HCC)   • Hypothyroidism   • Fever   • Hypoxia   • Leukocytosis   • Abnormal chest x-ray   • Abscess of right thigh   • Dyspepsia   • Anticoagulated   • Nonsmoker   • NSAID long-term use   • Esophageal dysphagia   • S/P ablation of atrial fibrillation   • S/P radiofrequency ablation operation for arrhythmia   • Mixed hyperlipidemia       Social History     Tobacco Use   • Smoking status: Never Smoker   • Smokeless tobacco: Never Used   Substance Use Topics   • Alcohol use: No   • Drug use: No       Family History   Problem Relation Age of Onset   • Diabetes Mother    • Heart disease Mother    • Alzheimer's disease Mother    • Hyperlipidemia Mother    • Hypertension Mother    • Cancer Father    • Hypertension Father    • Leukemia Brother    • Heart attack Sister    • Clotting disorder Brother    • No Known Problems Brother    • Colon polyps Neg Hx    • Colon cancer Neg Hx        The following portions of the patient's history were reviewed and updated as appropriate: allergies, current medications, past family history, past medical history, past social history, past surgical history and problem list.      Current Outpatient Medications:   •  alendronate (FOSAMAX) 70 MG tablet, Take 70 mg by mouth Every 7 (Seven) Days. Friday evening., Disp: , Rfl:   •  amitriptyline (ELAVIL) 50 MG tablet, Take 100 mg by mouth Every Night., Disp: , Rfl:   •  apixaban (ELIQUIS) 5 MG tablet tablet, Take 5 mg by mouth 2 (Two) Times a Day., Disp: , Rfl:   •  dexlansoprazole (DEXILANT) 60 MG capsule, Take 60 mg by mouth Daily., Disp: , Rfl:   •  diclofenac (VOLTAREN) 75 MG EC tablet, Take 75 mg by mouth Daily., Disp: , Rfl:   •  DULoxetine (CYMBALTA) 60 MG capsule, Take 60 mg by mouth 2 (Two) Times a Day., Disp: , Rfl:   •  flecainide (TAMBOCOR) 100 MG tablet, Take 100 mg by mouth 2 (Two) Times a Day., Disp: , Rfl:   •  fluticasone  (FLONASE) 50 MCG/ACT nasal spray, 2 sprays into the nostril(s) as directed by provider Daily As Needed for Rhinitis or Allergies., Disp: , Rfl:   •  gabapentin (NEURONTIN) 800 MG tablet, Take 800 mg by mouth 3 (Three) Times a Day., Disp: , Rfl:   •  levothyroxine (SYNTHROID, LEVOTHROID) 137 MCG tablet, Take 137 mcg by mouth Every Morning., Disp: , Rfl:   •  LORazepam (ATIVAN) 1 MG tablet, Take 1 mg by mouth 2 (Two) Times a Day., Disp: , Rfl:   •  metoprolol tartrate (LOPRESSOR) 25 MG tablet, Take 12.5 mg by mouth Every 12 (Twelve) Hours., Disp: , Rfl:   •  montelukast (SINGULAIR) 10 MG tablet, Take  by mouth Daily., Disp: , Rfl: 5  •  nystatin (MYCOSTATIN) 628933 UNIT/ML suspension, 5 mL Every 6 (Six) Hours., Disp: , Rfl:   •  rOPINIRole (REQUIP) 3 MG tablet, Take 6 mg by mouth 2 (two) times a day. Take 3 tablets at night and 2 tablets every am, Disp: , Rfl:   •  triamcinolone (KENALOG) 0.1 % paste, See Admin Instructions., Disp: , Rfl: 5  •  venlafaxine XR (EFFEXOR-XR) 75 MG 24 hr capsule, TK 1 C PO D, Disp: , Rfl: 2  •  Aspirin-Acetaminophen-Caffeine (EXCEDRIN EXTRA STRENGTH PO), Take 2 tablets by mouth As Needed (for headache)., Disp: , Rfl:   •  phenylephrine (AGATA-SYNEPHRINE) 0.5 % nasal spray, 1 spray into the nostril(s) as directed by provider Every 4 (Four) Hours As Needed for Congestion. DAILY, Disp: , Rfl:     Review of Systems   Constitution: Negative for chills and fever.   HENT: Negative for congestion and hearing loss.    Cardiovascular: Negative for chest pain and paroxysmal nocturnal dyspnea.   Respiratory: Negative for cough and shortness of breath.    Skin: Negative for rash.   Musculoskeletal: Positive for falls and joint pain.   Gastrointestinal: Negative for abdominal pain and heartburn.   Neurological: Positive for disturbances in coordination and numbness. Negative for dizziness.       Objective   /82 (BP Location: Left arm, Patient Position: Standing, Cuff Size: Large Adult)   Pulse 91   " Ht 162.6 cm (64.02\")   Wt (!) 139 kg (306 lb)   LMP  (LMP Unknown)   SpO2 (!) 74%   BMI 52.50 kg/m²   Constitutional:       Appearance: Well-developed. Morbidly obese.   Eyes:      General: Lids are normal. Vision grossly intact.   HENT:      Head: Normocephalic and atraumatic.   Pulmonary:      Effort: Pulmonary effort is normal.      Breath sounds: Normal breath sounds.   Cardiovascular:      Normal rate. Regular rhythm.      Murmurs: There is no murmur.      No gallop.   Edema:     Peripheral edema present.  Skin:     General: Skin is warm and dry.   Neurological:      Mental Status: Alert and oriented to person, place, and time.   Psychiatric:         Attention and Perception: Attention normal.         Mood and Affect: Mood normal.       Procedures  CHADS-VASc Risk Assessment            2       Total Score        1 Age 65-74    1 Sex: Female        Criteria that do not apply:    CHF    Hypertension    Age >/= 75    DM    PRIOR STROKE/TIA/THROMBO    Vascular Disease           ICD-10-CM ICD-9-CM   1. Paroxysmal A-fib (CMS/[facility])  I48.0 427.31   2. Frequent falls  R29.6 V15.88   3. Hematoma of thigh, unspecified laterality, subsequent encounter  S70.10XD V58.89     924.00         Assessment/Plan:  Based on the history, it has been determined that the patient is a poor candidate for long-term oral anticoagulation.  The patient may, however, be tolerant to short-term anticoagulation as necessary.    Specifically regarding anticoagulation they have demonstrated: Hematoma, frequent falls    We have discussed that you need stroke and bleeding risk on and off anticoagulation.  The individual [id number] stroke risk store is 2 (age 65-75, female sex), indicating an adjusted stroke rate of 2.2%/year.    Based on both stroke and bleeding risk, shared decision has been made to pursue closure of the left atrial appendage is a safe, effective alternative to long-term oral anticoagulation therapy for stroke prophylaxis.  " This will reduce her long-term risk of incidence of bleeding.  Information regarding left atrial appendage closure and shared decision making were provided to patient.

## 2020-12-16 ENCOUNTER — PREP FOR SURGERY (OUTPATIENT)
Dept: OTHER | Facility: HOSPITAL | Age: 65
End: 2020-12-16

## 2020-12-16 DIAGNOSIS — I48.0 PAF (PAROXYSMAL ATRIAL FIBRILLATION) (HCC): Primary | ICD-10-CM

## 2020-12-16 RX ORDER — SODIUM CHLORIDE 0.9 % (FLUSH) 0.9 %
10 SYRINGE (ML) INJECTION AS NEEDED
Status: CANCELLED | OUTPATIENT
Start: 2020-12-16

## 2020-12-16 RX ORDER — ASPIRIN 325 MG
325 TABLET ORAL ONCE
Status: CANCELLED | OUTPATIENT
Start: 2020-12-16 | End: 2020-12-16

## 2020-12-16 RX ORDER — BUPIVACAINE HCL/0.9 % NACL/PF 0.1 %
2 PLASTIC BAG, INJECTION (ML) EPIDURAL ONCE
Status: CANCELLED | OUTPATIENT
Start: 2020-12-16 | End: 2020-12-16

## 2020-12-16 RX ORDER — SODIUM CHLORIDE, SODIUM LACTATE, POTASSIUM CHLORIDE, CALCIUM CHLORIDE 600; 310; 30; 20 MG/100ML; MG/100ML; MG/100ML; MG/100ML
1 INJECTION, SOLUTION INTRAVENOUS CONTINUOUS
Status: CANCELLED | OUTPATIENT
Start: 2020-12-16

## 2020-12-16 RX ORDER — SODIUM CHLORIDE 0.9 % (FLUSH) 0.9 %
3 SYRINGE (ML) INJECTION EVERY 12 HOURS SCHEDULED
Status: CANCELLED | OUTPATIENT
Start: 2020-12-16

## 2020-12-18 ENCOUNTER — TRANSCRIBE ORDERS (OUTPATIENT)
Dept: LAB | Facility: HOSPITAL | Age: 65
End: 2020-12-18

## 2020-12-18 DIAGNOSIS — Z01.818 PRE-OP TESTING: Primary | ICD-10-CM

## 2020-12-28 ENCOUNTER — APPOINTMENT (OUTPATIENT)
Dept: CARDIOLOGY | Facility: HOSPITAL | Age: 65
End: 2020-12-28

## 2020-12-28 ENCOUNTER — APPOINTMENT (OUTPATIENT)
Dept: LAB | Facility: HOSPITAL | Age: 65
End: 2020-12-28

## 2021-01-07 ENCOUNTER — TELEPHONE (OUTPATIENT)
Dept: CARDIOLOGY | Facility: CLINIC | Age: 66
End: 2021-01-07

## 2021-01-07 NOTE — TELEPHONE ENCOUNTER
Patient called the office today to report her HR has been dropping down into the 40's.  She is not sure if she is passing out when this happens or just falling asleep.      I tried calling the patient back multiple times to discuss in further detail.  No answer each time.  I made her an appt to see the APRN tomorrow.  I left a voicemail explaining this appt date and time and that we would most likely need to get an EKG on her to evaluate what rhythm her heart is in.

## 2021-01-08 ENCOUNTER — OFFICE VISIT (OUTPATIENT)
Dept: CARDIOLOGY | Facility: CLINIC | Age: 66
End: 2021-01-08

## 2021-01-08 VITALS
SYSTOLIC BLOOD PRESSURE: 132 MMHG | WEIGHT: 293 LBS | HEART RATE: 71 BPM | BODY MASS INDEX: 50.02 KG/M2 | OXYGEN SATURATION: 95 % | DIASTOLIC BLOOD PRESSURE: 80 MMHG | HEIGHT: 64 IN

## 2021-01-08 DIAGNOSIS — Z98.890 S/P ABLATION OF ATRIAL FIBRILLATION: ICD-10-CM

## 2021-01-08 DIAGNOSIS — R00.1 BRADYCARDIA: ICD-10-CM

## 2021-01-08 DIAGNOSIS — R40.0 SOMNOLENCE, DAYTIME: ICD-10-CM

## 2021-01-08 DIAGNOSIS — I48.0 PAROXYSMAL A-FIB (HCC): Primary | Chronic | ICD-10-CM

## 2021-01-08 DIAGNOSIS — Z86.79 S/P ABLATION OF ATRIAL FIBRILLATION: ICD-10-CM

## 2021-01-08 PROCEDURE — 93000 ELECTROCARDIOGRAM COMPLETE: CPT | Performed by: NURSE PRACTITIONER

## 2021-01-08 PROCEDURE — 99214 OFFICE O/P EST MOD 30 MIN: CPT | Performed by: NURSE PRACTITIONER

## 2021-01-08 RX ORDER — DEXAMETHASONE 4 MG/1
TABLET ORAL
COMMUNITY
Start: 2020-11-23 | End: 2021-05-06 | Stop reason: SDUPTHER

## 2021-01-08 NOTE — PROGRESS NOTES
"    Subjective:     Encounter Date:01/08/2021      Patient ID: Danisha Cannon is a 65 y.o. female with a history of PAF s/p ablation and HLD.    Chief Complaint: bradycardia  Atrial Fibrillation  Presents for follow-up visit. Symptoms include palpitations. Symptoms are negative for chest pain, dizziness, shortness of breath, syncope and weakness. The symptoms have been stable. Past medical history includes atrial fibrillation and hyperlipidemia.   Hyperlipidemia  This is a chronic problem. The current episode started more than 1 year ago. The problem is controlled. Pertinent negatives include no chest pain or shortness of breath.     Patient presents today with complaints of bradycardia. She reports she has a new sal on her phone that has been monitoring her HR 24 hrs a day. She reports she is having difficulty staying awake throughout the day and her HR has been getting into the 40s in the early morning hours while she is sleeping. She reports the only symptom prior to \"falling asleep\" is double vision. She reports her neurologist at [facility] is suggesting she may potentially have narcolepsy. She reports she thinks she is falling asleep but is unsure. She reports her family has to \"really shake her awake\" when she has one of her spells. She also reports when she gets anxious, upset or has the slightest bit of adrenaline released from her body, she has no issues with daytime somnolence. She denies chest pain, dyspnea, swelling, orthopnea and PND.     Of note, she has a history of PAF s/p ablation by Dr. Guerin in 8/19. She remains on Flecainide. She is scheduled for watchman placement on 2/21/21 at [place].     The following portions of the patient's history were reviewed and updated as appropriate: allergies, current medications, past family history, past medical history, past social history, past surgical history and problem list.    Allergies   Allergen Reactions   • Other Rash     Dial soap  Redness and " swelling on skin and burning sensation   • Codeine Dizziness and Nausea Only     Rapid heart rate, dizziness  NAUSEA   • Mobic [Meloxicam] Rash   • Morphine And Related Itching       Current Outpatient Medications:   •  alendronate (FOSAMAX) 70 MG tablet, Take 70 mg by mouth Every 7 (Seven) Days. Friday evening., Disp: , Rfl:   •  amitriptyline (ELAVIL) 50 MG tablet, Take 100 mg by mouth Every Night., Disp: , Rfl:   •  apixaban (ELIQUIS) 5 MG tablet tablet, Take 5 mg by mouth 2 (Two) Times a Day., Disp: , Rfl:   •  dexlansoprazole (DEXILANT) 60 MG capsule, Take 60 mg by mouth Daily., Disp: , Rfl:   •  diclofenac (VOLTAREN) 75 MG EC tablet, Take 75 mg by mouth Daily., Disp: , Rfl:   •  DULoxetine (CYMBALTA) 60 MG capsule, Take 60 mg by mouth 2 (Two) Times a Day., Disp: , Rfl:   •  flecainide (TAMBOCOR) 100 MG tablet, Take 100 mg by mouth 2 (Two) Times a Day., Disp: , Rfl:   •  fluticasone (FLONASE) 50 MCG/ACT nasal spray, 2 sprays into the nostril(s) as directed by provider Daily As Needed for Rhinitis or Allergies., Disp: , Rfl:   •  fluticasone (Flovent HFA) 110 MCG/ACT inhaler, Flovent  mcg/actuation aerosol inhaler  INL 1 PUFF PO BID, Disp: , Rfl:   •  gabapentin (NEURONTIN) 800 MG tablet, Take 800 mg by mouth 3 (Three) Times a Day., Disp: , Rfl:   •  levothyroxine (SYNTHROID, LEVOTHROID) 137 MCG tablet, Take 137 mcg by mouth Every Morning., Disp: , Rfl:   •  LORazepam (ATIVAN) 1 MG tablet, Take 1 mg by mouth 2 (Two) Times a Day., Disp: , Rfl:   •  metoprolol tartrate (LOPRESSOR) 25 MG tablet, Take 12.5 mg by mouth Every 12 (Twelve) Hours., Disp: , Rfl:   •  montelukast (SINGULAIR) 10 MG tablet, Take  by mouth Daily., Disp: , Rfl: 5  •  nystatin (MYCOSTATIN) 093178 UNIT/ML suspension, 5 mL Every 6 (Six) Hours., Disp: , Rfl:   •  rOPINIRole (REQUIP) 3 MG tablet, Take 6 mg by mouth 2 (two) times a day. Take 3 tablets at night and 2 tablets every am, Disp: , Rfl:   •  triamcinolone (KENALOG) 0.1 % paste, See  Admin Instructions., Disp: , Rfl: 5  •  venlafaxine XR (EFFEXOR-XR) 75 MG 24 hr capsule, TK 1 C PO D, Disp: , Rfl: 2  Past Medical History:   Diagnosis Date   • Anemia    • Anxiety and depression    • Arthritis    • Atrial fibrillation (CMS/HCC)    • Chronic cough    • Disease of thyroid gland    • GERD (gastroesophageal reflux disease)    • History of incision and drainage     Right knee   • History of staph infection     right knee, and upper right thigh   • Hyperlipidemia    • Infection      in right knee to bone, cleaned it out and put new hardware with antibiotic and pt developed hole in incision   • Neuropathy, peripheral    • Pneumonia     RECENT DX PER FAMILY DOCTOR   • PONV (postoperative nausea and vomiting)    • Restless leg syndrome    • Sleep apnea with use of continuous positive airway pressure (CPAP)     bipap   • SVT (supraventricular tachycardia) (CMS/HCC)        Social History     Socioeconomic History   • Marital status:      Spouse name: Not on file   • Number of children: Not on file   • Years of education: Not on file   • Highest education level: Not on file   Tobacco Use   • Smoking status: Never Smoker   • Smokeless tobacco: Never Used   Substance and Sexual Activity   • Alcohol use: No   • Drug use: No   • Sexual activity: Defer       Review of Systems   Constitution: Negative for malaise/fatigue, weight gain and weight loss.   Cardiovascular: Positive for palpitations. Negative for chest pain, dyspnea on exertion, irregular heartbeat, leg swelling, near-syncope, orthopnea, paroxysmal nocturnal dyspnea and syncope.   Respiratory: Negative for cough, shortness of breath, sleep disturbances due to breathing, sputum production and wheezing.    Skin: Negative for dry skin, flushing, itching and rash.   Gastrointestinal: Negative for hematemesis and hematochezia.   Neurological: Negative for dizziness, light-headedness, loss of balance and weakness.   All other systems reviewed and are  "negative.         Objective:     Vitals signs reviewed.   Constitutional:       General: Not in acute distress.     Appearance: Well-developed. Not diaphoretic.   Eyes:      General: No scleral icterus.     Conjunctiva/sclera: Conjunctivae normal.      Pupils: Pupils are equal, round, and reactive to light.   HENT:      Head: Normocephalic.    Mouth/Throat:      Pharynx: No oropharyngeal exudate.   Neck:      Musculoskeletal: Normal range of motion and neck supple.   Pulmonary:      Effort: Pulmonary effort is normal. No respiratory distress.      Breath sounds: Normal breath sounds. No wheezing. No rales.   Chest:      Chest wall: Not tender to palpatation.   Cardiovascular:      Normal rate. Regular rhythm.   Pulses:     Intact distal pulses.   Edema:     Peripheral edema absent.   Abdominal:      General: Bowel sounds are normal. There is no distension.      Palpations: Abdomen is soft.      Tenderness: There is no abdominal tenderness.   Musculoskeletal: Normal range of motion.   Skin:     General: Skin is warm and dry.      Coloration: Skin is not pale.      Findings: No erythema or rash.   Neurological:      Mental Status: Alert and oriented to person, place, and time.      Deep Tendon Reflexes: Reflexes are normal and symmetric.   Psychiatric:         Behavior: Behavior normal.           ECG 12 Lead    Date/Time: 1/8/2021 3:05 PM  Performed by: Barbara De Dios APRN  Authorized by: Barbara De Dios APRN   Comparison: compared with previous ECG from 7/5/2020  Similar to previous ECG  Rhythm: sinus rhythm  Ectopy: atrial premature contractions  Rate: normal  BPM: 71  Q waves: aVR, aVL and V1    QRS axis: normal    Clinical impression: abnormal EKG          /80   Pulse 71   Ht 162.6 cm (64\")   Wt (!) 140 kg (308 lb)   LMP  (LMP Unknown)   SpO2 95%   BMI 52.87 kg/m²     Lab Review:   I have reviewed previous office notes, [place] notes, recent labs and recent cardiac testing.         Assessment:    " "      Diagnosis Plan   1. Paroxysmal A-fib (CMS/HCC)  ECG 12 Lead   2. S/P ablation of atrial fibrillation     3. Somnolence, daytime  Holter Monitor - 72 Hour Up To 21 Days   4. Bradycardia  ECG 12 Lead          Plan:       1. PAF- stable. NSR Today. Follows with Dr. Guerin. continue Flecainide, lopressor and Eliquis.   2. S/p ablation - per Dr. Guerin in 8/19  3. Somnolence, daytime- workup per her neurologist.   4. Bradycardia- per review of her \"sal\" it appears her bradycardia is occurring during the early mornings, which would be normal. Will check a 7 day holter. Will call with results.     Keep follow up in April. Return sooner if symptoms worsen.     I spent 30 minutes caring for Danisha on this date of service. This time includes time spent by me in the following activities:preparing for the visit, reviewing tests, obtaining and/or reviewing a separately obtained history, performing a medically appropriate examination and/or evaluation , ordering medications, tests, or procedures, documenting information in the medical record and independently interpreting results and communicating that information with the patient/family/caregiver      "

## 2021-01-18 ENCOUNTER — OFFICE VISIT (OUTPATIENT)
Dept: OBGYN CLINIC | Age: 66
End: 2021-01-18
Payer: MEDICARE

## 2021-01-18 VITALS
SYSTOLIC BLOOD PRESSURE: 139 MMHG | HEIGHT: 64 IN | HEART RATE: 75 BPM | WEIGHT: 293 LBS | BODY MASS INDEX: 50.02 KG/M2 | DIASTOLIC BLOOD PRESSURE: 73 MMHG

## 2021-01-18 DIAGNOSIS — Z12.39 SCREENING BREAST EXAMINATION: ICD-10-CM

## 2021-01-18 DIAGNOSIS — Z12.4 SCREENING FOR CERVICAL CANCER: ICD-10-CM

## 2021-01-18 DIAGNOSIS — B37.9 YEAST INFECTION: ICD-10-CM

## 2021-01-18 DIAGNOSIS — Z01.419 ENCOUNTER FOR ROUTINE GYNECOLOGIC EXAMINATION IN MEDICARE PATIENT: Primary | ICD-10-CM

## 2021-01-18 DIAGNOSIS — Z12.31 ENCOUNTER FOR SCREENING MAMMOGRAM FOR MALIGNANT NEOPLASM OF BREAST: ICD-10-CM

## 2021-01-18 PROCEDURE — G0101 CA SCREEN;PELVIC/BREAST EXAM: HCPCS | Performed by: NURSE PRACTITIONER

## 2021-01-18 PROCEDURE — 3017F COLORECTAL CA SCREEN DOC REV: CPT | Performed by: NURSE PRACTITIONER

## 2021-01-18 PROCEDURE — G8417 CALC BMI ABV UP PARAM F/U: HCPCS | Performed by: NURSE PRACTITIONER

## 2021-01-18 PROCEDURE — G8399 PT W/DXA RESULTS DOCUMENT: HCPCS | Performed by: NURSE PRACTITIONER

## 2021-01-18 PROCEDURE — G8484 FLU IMMUNIZE NO ADMIN: HCPCS | Performed by: NURSE PRACTITIONER

## 2021-01-18 PROCEDURE — 99214 OFFICE O/P EST MOD 30 MIN: CPT | Performed by: NURSE PRACTITIONER

## 2021-01-18 PROCEDURE — 1090F PRES/ABSN URINE INCON ASSESS: CPT | Performed by: NURSE PRACTITIONER

## 2021-01-18 PROCEDURE — 4040F PNEUMOC VAC/ADMIN/RCVD: CPT | Performed by: NURSE PRACTITIONER

## 2021-01-18 PROCEDURE — 1123F ACP DISCUSS/DSCN MKR DOCD: CPT | Performed by: NURSE PRACTITIONER

## 2021-01-18 PROCEDURE — G8427 DOCREV CUR MEDS BY ELIG CLIN: HCPCS | Performed by: NURSE PRACTITIONER

## 2021-01-18 PROCEDURE — 1036F TOBACCO NON-USER: CPT | Performed by: NURSE PRACTITIONER

## 2021-01-18 RX ORDER — FLUCONAZOLE 150 MG/1
150 TABLET ORAL
Qty: 2 TABLET | Refills: 0 | Status: SHIPPED | OUTPATIENT
Start: 2021-01-18 | End: 2021-01-24

## 2021-01-18 ASSESSMENT — ENCOUNTER SYMPTOMS
DIARRHEA: 0
ALLERGIC/IMMUNOLOGIC NEGATIVE: 1
CONSTIPATION: 0
EYES NEGATIVE: 1
RESPIRATORY NEGATIVE: 1
GASTROINTESTINAL NEGATIVE: 1

## 2021-01-18 NOTE — PROGRESS NOTES
Suzan Moreira is a 77 y.o. female who presents today for her medical conditions/ complaints as noted below. Suzan Moreira is c/o of Gynecologic Exam        HPI   Pt presents for annual exam and pap smear. Current with screening mammogram and DEXA. Dr Jorge Capps PCP- draws labs and manages meds. Questions when she can get her COVID vaccine. Mammo:10/28/2020  Pap smear:  Contraception:Postmenopausal     P:2  Ab:0  Bone density:01/15/2020  Colonoscopy:  No LMP recorded.  Patient is postmenopausal.  [id number]    Past Medical History:   Diagnosis Date    Abdominal adhesions 2016    Anxiety     Arthritis     Atrial fibrillation (HCC)     Atrial fibrillation and flutter (HCC) 2015    Chronic back pain     Chronic cough     Depression     Edema     Fibrocystic breast     GERD (gastroesophageal reflux disease)     Glossitis     Headache(784.0)     Heart burn     Heart disease     Hypertension     Hypothyroidism     Mouth sore     MRSA (methicillin resistant staph aureus) culture positive 14    nasal    Numbness     toes    Obstructive sleep apnea     BIPAP    Peripheral neuropathy     PONV (postoperative nausea and vomiting)     Prolonged emergence from general anesthesia     Recurrent ventral incisional hernia 2016    Sleep apnea     Stroke (HCC)     Stroke-like symptom     SVT (supraventricular tachycardia) (HCC)     SVT (supraventricular tachycardia) (HCC)     Swelling of extremity     Unspecified sleep apnea     clinicallybi-pap    Ventricular tachyarrhythmia ([address] 9/15    svt     Past Surgical History:   Procedure Laterality Date    CARDIAC CATHETERIZATION  8/18/15  JDT    EF 50%    CARDIAC SURGERY      CATARACT REMOVAL      CHOLECYSTECTOMY  14    BY Dr. Teresa Zurita      CYST INCISION AND DRAINAGE  2017    FINGER TRIGGER RELEASE      FOOT SURGERY Left     removal of two screws    HAMMER TOE SURGERY      HARDWARE REMOVAL FOOT / Joe Walker Left 8/16/2016    FOOT HARDWARE REMOVAL - DEEP / 2nd METATARSAL HEAD RESECTION performed by Lisa Beebe DPM at [address]  1/12/2015    x3    KNEE SURGERY      Took prostetic joint out because of infection and put a spacer in    OTHER SURGICAL HISTORY  02/2017    pilonidal cyst-Dr Bina Kim    CA KNEE [facility] Right 7/3/2017    KNEE ARTHROSCOPY PARTIAL MEDIAL MENISECTOMY performed by Myla Mcwilliams MD at [place] [address] Right 2/16/2018    KNEE TOTAL ARTHROPLASTY performed by Fernanda Patel MD at [place] [address] N/A 1/18/2016    HERNIA VENTRAL REPAIR LAPAROSCOPIC WITH MESH  performed by Brandan Andrew MD at [facility] History   Problem Relation Age of Onset    Heart Disease Mother     Diabetes Mother     High Blood Pressure Father     Cancer Father 79        lung CA    Cancer Brother         leukemia    Alzheimer's Disease Brother      Social History     Tobacco Use    Smoking status: Never Smoker    Smokeless tobacco: Never Used    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   Substance Use Topics    Alcohol use: No       Current Outpatient Medications   Medication Sig Dispense Refill    DULoxetine (CYMBALTA) 60 MG extended release capsule TAKE 1 CAPSULE BY MOUTH TWICE DAILY 180 capsule 3    venlafaxine (EFFEXOR XR) 75 MG extended release capsule TAKE 1 CAPSULE BY MOUTH DAILY 90 capsule 3    triamcinolone acetonide (KENALOG) 0.1 % paste APPLY TWICE DAILY AS NEEDED TO TEETH 5 g 0    allopurinol (ZYLOPRIM) 100 MG tablet Take 100 mg by mouth daily      amitriptyline (ELAVIL) 25 MG tablet TAKE 2 TABLETS BY MOUTH EVERY NIGHT 180 tablet 3    dicloxacillin (DYNAPEN) 500 MG capsule Take 500 mg by mouth 3 times daily      levothyroxine (SYNTHROID) 137 MCG tablet Take 137 mcg by mouth Daily      DEXILANT 60 MG CPDR delayed release capsule Take 60 mg by mouth daily       alendronate (FOSAMAX) 70 MG tablet TK 1 T PO ONCE A WEEK IN THE MORNING 30 MINUTES BEFORE FIRST MEAL OF THE DAY  3    flecainide (TAMBOCOR) 100 MG tablet TAKE 1 TABLET BY MOUTH TWICE DAILY 180 tablet 3    metoprolol tartrate (LOPRESSOR) 25 MG tablet TAKE 1/2 TABLET BY MOUTH TWICE DAILY 90 tablet 3    diclofenac (VOLTAREN) 50 MG EC tablet Take 50 mg by mouth daily      LORazepam (ATIVAN) 1 MG tablet Take 1 mg by mouth every 8 hours as needed . 2    rOPINIRole (REQUIP) 3 MG tablet Take 3 mg by mouth 2 times daily 2 tabs in AM and 3 tabs in PM       fluticasone (FLONASE) 50 MCG/ACT nasal spray 2 sprays by Nasal route as needed       famciclovir (FAMVIR) 500 MG tablet   Take 500 mg by mouth 2 times daily       gabapentin (NEURONTIN) 800 MG tablet TAKE 1 TABLET BY MOUTH THREE TIMES DAILY 90 tablet 5     No current facility-administered medications for this visit. Allergies   Allergen Reactions    Azithromycin     Codeine Nausea Only     Rapid heart rate, dizziness    Mobic [Meloxicam] Rash     Arms and legs bumpy red rash    Morphine And Related Itching     Vitals:    01/18/21 1345   BP: 139/73   Pulse: 75     Body mass index is 52.7 kg/m². Review of Systems   Constitutional: Negative. HENT: Negative. Eyes: Negative. Respiratory: Negative. Cardiovascular: Negative. Gastrointestinal: Negative. Negative for constipation and diarrhea. Endocrine: Negative. Genitourinary: Negative. Negative for frequency, menstrual problem and urgency. Musculoskeletal: Positive for arthralgias (knees) and gait problem. Skin: Negative. Allergic/Immunologic: Negative. Hematological: Negative. Psychiatric/Behavioral: Negative. All other systems reviewed and are negative. Physical Exam  Vitals signs and nursing note reviewed. Constitutional:       Appearance: She is well-developed. HENT:      Head: Normocephalic. Neck:      Musculoskeletal: Normal range of motion and neck supple.       Thyroid: No thyroid mass or thyromegaly. Cardiovascular:      Rate and Rhythm: Normal rate and regular rhythm. Pulmonary:      Effort: Pulmonary effort is normal.      Breath sounds: Normal breath sounds. Chest:      Breasts:         Right: No inverted nipple, mass, nipple discharge or skin change. Left: No inverted nipple, mass, nipple discharge or skin change. Abdominal:      Palpations: Abdomen is soft. There is no mass. Tenderness: There is no abdominal tenderness. Genitourinary:     General: Normal vulva. Vagina: Normal.      Cervix: No cervical motion tenderness. Uterus: Normal. Not enlarged. Adnexa:         Right: No mass or tenderness. Left: No mass or tenderness. Comments: Pap collected  Musculoskeletal: Normal range of motion. Skin:     General: Skin is warm and dry. Neurological:      Mental Status: She is alert and oriented to person, place, and time. Psychiatric:         Attention and Perception: Attention normal.         Mood and Affect: Mood normal.         Speech: Speech normal.         Behavior: Behavior normal.         Thought Content: Thought content normal.         Cognition and Memory: Cognition normal.         Judgment: Judgment normal.          Diagnosis Orders   1. Encounter for routine gynecologic examination in Medicare patient  NE CA SCREEN;PELVIC/BREAST EXAM   2. Screening for cervical cancer  NE CA SCREEN;PELVIC/BREAST EXAM   3. Screening breast examination  NE CA SCREEN;PELVIC/BREAST EXAM       MEDICATIONS:  No orders of the defined types were placed in this encounter. ORDERS:  Orders Placed This Encounter   Procedures    NE CA SCREEN;PELVIC/BREAST EXAM       PLAN:  Pap collected  Refilled medications for yeast    Patient Instructions   Patient Education        Breast Self-Exam: Care Instructions  Your Care Instructions     A breast self-exam is when you check your breasts for lumps or changes.  This regular exam helps you learn how your breasts normally look and feel. Most breast problems or changes are not because of cancer. Breast self-exam is not a substitute for a mammogram. Having regular breast exams by your doctor and regular mammograms improve your chances of finding any problems with your breasts. Some women set a time each month to do a step-by-step breast self-exam. Other women like a less formal system. They might look at their breasts as they brush their teeth, or feel their breasts once in a while in the shower. If you notice a change in your breast, tell your doctor. Follow-up care is a key part of your treatment and safety. Be sure to make and go to all appointments, and call your doctor if you are having problems. It's also a good idea to know your test results and keep a list of the medicines you take. How do you do a breast self-exam?  · The best time to examine your breasts is usually one week after your menstrual period begins. Your breasts should not be tender then. If you do not have periods, you might do your exam on a day of the month that is easy to remember. · To examine your breasts:  ? Remove all your clothes above the waist and lie down. When you are lying down, your breast tissue spreads evenly over your chest wall, which makes it easier to feel all your breast tissue. ? Use the pads--not the fingertips--of the 3 middle fingers of your left hand to check your right breast. Move your fingers slowly in small coin-sized circles that overlap. ? Use three levels of pressure to feel of all your breast tissue. Use light pressure to feel the tissue close to the skin surface. Use medium pressure to feel a little deeper. Use firm pressure to feel your tissue close to your breastbone and ribs. Use each pressure level to feel your breast tissue before moving on to the next spot. ? Check your entire breast, moving up and down as if following a strip from the collarbone to the bra line, and from the armpit to the ribs.  Repeat

## 2021-01-18 NOTE — PROGRESS NOTES
Pt presents today for pap smear and breast exam.  She had a holtor monitor on for a week and now has a spot on chest that itches some and was not sure if that was from the tape. She is wanting to discuss the COVID vaccine.      Mammo:10/28/2020  Pap smear:2018  Contraception:Postmenopausal     P:2  Ab:0  Bone density:01/15/2020  Colonoscopy:2008

## 2021-01-18 NOTE — PATIENT INSTRUCTIONS
Patient Education        Breast Self-Exam: Care Instructions  Your Care Instructions     A breast self-exam is when you check your breasts for lumps or changes. This regular exam helps you learn how your breasts normally look and feel. Most breast problems or changes are not because of cancer. Breast self-exam is not a substitute for a mammogram. Having regular breast exams by your doctor and regular mammograms improve your chances of finding any problems with your breasts. Some women set a time each month to do a step-by-step breast self-exam. Other women like a less formal system. They might look at their breasts as they brush their teeth, or feel their breasts once in a while in the shower. If you notice a change in your breast, tell your doctor. Follow-up care is a key part of your treatment and safety. Be sure to make and go to all appointments, and call your doctor if you are having problems. It's also a good idea to know your test results and keep a list of the medicines you take. How do you do a breast self-exam?  · The best time to examine your breasts is usually one week after your menstrual period begins. Your breasts should not be tender then. If you do not have periods, you might do your exam on a day of the month that is easy to remember. · To examine your breasts:  ? Remove all your clothes above the waist and lie down. When you are lying down, your breast tissue spreads evenly over your chest wall, which makes it easier to feel all your breast tissue. ? Use the pads--not the fingertips--of the 3 middle fingers of your left hand to check your right breast. Move your fingers slowly in small coin-sized circles that overlap. ? Use three levels of pressure to feel of all your breast tissue. Use light pressure to feel the tissue close to the skin surface. Use medium pressure to feel a little deeper. Use firm pressure to feel your tissue close to your breastbone and ribs.  Use each pressure level to feel your breast tissue before moving on to the next spot. ? Check your entire breast, moving up and down as if following a strip from the collarbone to the bra line, and from the armpit to the ribs. Repeat until you have covered the entire breast.  ? Repeat this procedure for your left breast, using the pads of the 3 middle fingers of your right hand. · To examine your breasts while in the shower:  ? Place one arm over your head and lightly soap your breast on that side. ? Using the pads of your fingers, gently move your hand over your breast (in the strip pattern described above), feeling carefully for any lumps or changes. ? Repeat for the other breast.  · Have your doctor inspect anything you notice to see if you need further testing. Where can you learn more? Go to [email or web address] and sign in to your [employer] account. Enter P148 in the [place] box to learn more about \"Breast Self-Exam: Care Instructions. \"     If you do not have an account, please click on the \"Sign Up Now\" link. Current as of: April 29, 2020               Content Version: 12.6  © 6585-5151 [employer]. Care instructions adapted under license by [facility] ([facility]). If you have questions about a medical condition or this instruction, always ask your healthcare professional. [address] any warranty or liability for your use of this information.

## 2021-01-21 LAB
HPV COMMENT: NORMAL
HPV TYPE 16: NOT DETECTED
HPV TYPE 18: NOT DETECTED
HPVOH (OTHER TYPES): NOT DETECTED

## 2021-01-29 ENCOUNTER — TELEPHONE (OUTPATIENT)
Dept: CARDIOLOGY | Facility: CLINIC | Age: 66
End: 2021-01-29

## 2021-01-29 NOTE — TELEPHONE ENCOUNTER
----- Message from ERNESTO Spaulding sent at 1/29/2021 12:50 PM CST -----  Please let patient know her daytime somnolence and presyncope are not related to her heart rate being too slow. Her average HR was 76 while wearing the monitor. She is having some fast heart rates on occasion that she was asymptomatic to.

## 2021-01-29 NOTE — TELEPHONE ENCOUNTER
The patient is called and notified of the holter monitor result and she voices understanding.  Ruthie Richards MA

## 2021-02-18 ENCOUNTER — TRANSCRIBE ORDERS (OUTPATIENT)
Dept: LAB | Facility: HOSPITAL | Age: 66
End: 2021-02-18

## 2021-02-18 DIAGNOSIS — Z01.818 PRE-OP TESTING: Primary | ICD-10-CM

## 2021-02-22 ENCOUNTER — LAB (OUTPATIENT)
Dept: LAB | Facility: HOSPITAL | Age: 66
End: 2021-02-22

## 2021-02-22 LAB — SARS-COV-2 ORF1AB RESP QL NAA+PROBE: NOT DETECTED

## 2021-02-22 PROCEDURE — C9803 HOPD COVID-19 SPEC COLLECT: HCPCS | Performed by: INTERNAL MEDICINE

## 2021-02-22 PROCEDURE — U0005 INFEC AGEN DETEC AMPLI PROBE: HCPCS | Performed by: INTERNAL MEDICINE

## 2021-02-22 PROCEDURE — U0004 COV-19 TEST NON-CDC HGH THRU: HCPCS | Performed by: INTERNAL MEDICINE

## 2021-03-30 NOTE — NURSING NOTE
Patient has left upper inner arm PICC line with iv antibiotics iinfusing through PICC line from home on programmable iv pump.   Spontaneous

## 2021-04-01 ENCOUNTER — OFFICE VISIT (OUTPATIENT)
Dept: INTERNAL MEDICINE | Facility: CLINIC | Age: 66
End: 2021-04-01

## 2021-04-01 ENCOUNTER — LAB (OUTPATIENT)
Dept: LAB | Facility: HOSPITAL | Age: 66
End: 2021-04-01

## 2021-04-01 VITALS
RESPIRATION RATE: 18 BRPM | WEIGHT: 293 LBS | HEIGHT: 64 IN | BODY MASS INDEX: 50.02 KG/M2 | DIASTOLIC BLOOD PRESSURE: 60 MMHG | HEART RATE: 89 BPM | TEMPERATURE: 98.6 F | OXYGEN SATURATION: 94 % | SYSTOLIC BLOOD PRESSURE: 102 MMHG

## 2021-04-01 DIAGNOSIS — R40.0 SOMNOLENCE, DAYTIME: ICD-10-CM

## 2021-04-01 DIAGNOSIS — E03.9 HYPOTHYROIDISM, UNSPECIFIED TYPE: Primary | Chronic | ICD-10-CM

## 2021-04-01 DIAGNOSIS — R73.03 PREDIABETES: ICD-10-CM

## 2021-04-01 DIAGNOSIS — G60.3 IDIOPATHIC PROGRESSIVE NEUROPATHY: Chronic | ICD-10-CM

## 2021-04-01 DIAGNOSIS — E03.9 HYPOTHYROIDISM, UNSPECIFIED TYPE: Chronic | ICD-10-CM

## 2021-04-01 DIAGNOSIS — Z12.11 COLON CANCER SCREENING: ICD-10-CM

## 2021-04-01 DIAGNOSIS — R10.13 DYSPEPSIA: ICD-10-CM

## 2021-04-01 DIAGNOSIS — F32.A ANXIETY AND DEPRESSION: ICD-10-CM

## 2021-04-01 DIAGNOSIS — E66.01 MORBID OBESITY (HCC): ICD-10-CM

## 2021-04-01 DIAGNOSIS — F41.9 ANXIETY AND DEPRESSION: ICD-10-CM

## 2021-04-01 LAB — TSH SERPL DL<=0.05 MIU/L-ACNC: 1.65 UIU/ML (ref 0.27–4.2)

## 2021-04-01 PROCEDURE — 84443 ASSAY THYROID STIM HORMONE: CPT

## 2021-04-01 PROCEDURE — 99204 OFFICE O/P NEW MOD 45 MIN: CPT | Performed by: INTERNAL MEDICINE

## 2021-04-01 PROCEDURE — 36415 COLL VENOUS BLD VENIPUNCTURE: CPT
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
## 2021-04-05 ENCOUNTER — LAB (OUTPATIENT)
Dept: LAB | Facility: HOSPITAL | Age: 66
End: 2021-04-05

## 2021-04-05 ENCOUNTER — TRANSCRIBE ORDERS (OUTPATIENT)
Dept: LAB | Facility: HOSPITAL | Age: 66
End: 2021-04-05

## 2021-04-05 DIAGNOSIS — Z20.822 ENCOUNTER FOR LABORATORY TESTING FOR COVID-19 VIRUS: Primary | ICD-10-CM

## 2021-04-05 DIAGNOSIS — Z20.822 ENCOUNTER FOR LABORATORY TESTING FOR COVID-19 VIRUS: ICD-10-CM

## 2021-04-05 LAB — SARS-COV-2 ORF1AB RESP QL NAA+PROBE: NOT DETECTED

## 2021-04-05 PROCEDURE — C9803 HOPD COVID-19 SPEC COLLECT: HCPCS

## 2021-04-05 PROCEDURE — U0005 INFEC AGEN DETEC AMPLI PROBE: HCPCS

## 2021-04-05 PROCEDURE — U0004 COV-19 TEST NON-CDC HGH THRU: HCPCS

## 2021-04-12 ENCOUNTER — TELEPHONE (OUTPATIENT)
Dept: NEUROLOGY | Age: 66
End: 2021-04-12

## 2021-04-12 NOTE — TELEPHONE ENCOUNTER
Called and spoke with patient to let her know that she needs to get a current DME Dwnld report on her sleep machine from [facility] and bring report to her appointment.

## 2021-04-13 ENCOUNTER — TELEPHONE (OUTPATIENT)
Dept: NEUROLOGY | Age: 66
End: 2021-04-13

## 2021-04-13 NOTE — TELEPHONE ENCOUNTER
Called and spoke with at patient to let her know that her appointment for today with Dr. Annette Abbott had to be rescheduled due to he has had family emergency come up and will not be in the office this afternoon. Patient is aware of when I have her appointment rescheduled too.

## 2021-04-21 ENCOUNTER — TELEMEDICINE (OUTPATIENT)
Dept: PSYCHIATRY | Facility: CLINIC | Age: 66
End: 2021-04-21

## 2021-04-21 ENCOUNTER — TELEPHONE (OUTPATIENT)
Dept: INTERNAL MEDICINE | Facility: CLINIC | Age: 66
End: 2021-04-21

## 2021-04-21 DIAGNOSIS — Z79.899 POLYPHARMACY: ICD-10-CM

## 2021-04-21 DIAGNOSIS — F33.1 MAJOR DEPRESSIVE DISORDER, RECURRENT EPISODE, MODERATE (HCC): ICD-10-CM

## 2021-04-21 DIAGNOSIS — F41.1 GENERALIZED ANXIETY DISORDER: Primary | ICD-10-CM

## 2021-04-21 DIAGNOSIS — F42.8 OBSESSIVE THINKING: ICD-10-CM

## 2021-04-21 PROCEDURE — 90792 PSYCH DIAG EVAL W/MED SRVCS: CPT | Performed by: NURSE PRACTITIONER

## 2021-04-21 RX ORDER — SERTRALINE HYDROCHLORIDE 25 MG/1
25 TABLET, FILM COATED ORAL DAILY
Qty: 30 TABLET | Refills: 0 | Status: SHIPPED | OUTPATIENT
Start: 2021-04-21 | End: 2021-05-18 | Stop reason: SDUPTHER

## 2021-04-21 RX ORDER — VENLAFAXINE HYDROCHLORIDE 37.5 MG/1
37.5 CAPSULE, EXTENDED RELEASE ORAL DAILY
Qty: 10 CAPSULE | Refills: 0 | Status: SHIPPED | OUTPATIENT
Start: 2021-04-21 | End: 2021-05-18

## 2021-04-21 NOTE — PROGRESS NOTES
This provider is located at [facility], [address] [address].The Patient is seen remotely at [address] [place] [address] via [employer]. Patient is being seen via telehealth and confirm that they are in a secure environment for this session. The patient's condition being diagnosed/treated is appropriate for telemedicine. The provider identified himself/herself: herself as well as her credentials.   The patient gave consent to be seen remotely, and when consent is given they understand that the consent allows for patient identifiable information to be sent to a third party as needed.   They may refuse to be seen remotely at any time. The electronic data is encrypted and password protected, and the patient has been advised of the potential risks to privacy not withstanding such measures.    You have chosen to receive care through a telehealth visit.  Do you consent to use a video/audio connection for your medical care today? Yes      Subjective   Danisha Cannon is a 65 y.o. female who is here today for initial appointment.     Chief Complaint:  Anxiety, depression and pain     HPI:  History of Present Illness  Patient presents today after being referred by her PCP Dr. Duc العلي.  Dr. Duc العلي was concerned regarding her medication list of being on lorazepam, duloxetine, venlafaxine, gabapentin and Requip.  According to his notes she was falling asleep in the office.  Patient states she has been falling asleep so when she has to drive she will not take her daytime medications.  Dr. Duc العلي notes in his visits with her as well as the patient that she has started falling and had a concussion 2 months ago.  Patient notes that her spouse started getting worse roughly 15 years ago.  Informed patient that this could be a result of the lorazepam and the side effects is patient states she was unaware as it does help with her pain and her sleep.  Patient states at first it was  helping with her anxiety but now she is just fearful of coming off of it.  Patient states she has not been on any other medication and does not feel as if the venlafaxine has been helpful now she has had more depression and anxiety.  The patient reports depressive symptoms including depressed mood, crying spells, insomnia, anhedonia, feelings of guilt, feelings of hopelessness, low energy and difficulty concentrating, and have caused impairment in important areas of functioning.  Depression rated 6/10 with 10 being the worst.   The patient reports the following symptoms of anxiety: constant anxiety/worry, restlessness/on edge, difficulty concentrating, mind goes blank, irritability and sleep disturbance and have caused impairment in important areas of functioning.  Patient states that there may be a couple times a week where she will stay up working on her craft projects and not sleep till early in the morning and missed doses of medication but states it only occurs once or twice a week.  Highly encouraged patient that she needs to get on a good sleep schedule as she states when she does sleep she gets roughly 6 to 7 hours.  Patient reports her appetite is good.  Patient notes however that she does feel tired all the time and has to wear a BiPAP/CPAP at night.  Patient states that she had scabies 4 years ago but does believe she still has it.  Patient states that she has found eggs before and put them in a baggy and they have grown.  Patient notes that her daughter has told her to stop putting cream on herself as she is just imagining them.  Patient states that she is stopped picking with a knife roughly 2 weeks ago but still feels they are going to come back.  She denies any other hallucinations or paranoia.  Patient states that she did have a phobia of public restrooms until she cathed herself and public 3 times and then that went away.  Patient denies any hypomanic or manic episodes.  Patient denies any SI/HI.   Denies any self-harm at this time.  Denies any AH/VH.      Past Psych History: Patient notes that she has been on lorazepam for the past 15 years duloxetine for neuropathic pain for 3 years venlafaxine for over 5 years as well as amitriptyline.  Patient states at first lorazepam was 4 her anxiety but now she uses it for sleep and pain and she has been taking the dose in the morning for 6 months now as previously it was just at night.  Patient denies any hospitalization or SI attempts.  Patient states that she has been taking a knife picking at her skin as she thought she had scabies and worms at times but has not done so in several weeks.    Substance Abuse: Patient denies.  Oni reviewed.  Patient is prescribed gabapentin as well as lorazepam per her primary care providers according to her Oni.    Past Social History: Patient was born and raised in [place] with her mother and father.  She states she had a good childhood growing up.  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  She has been  for 46 years and has 2 children and 3 grandchildren.  Patient states that they moved to [place] roughly 15 years ago as her sister was here and they wanted to stay due to real estate.  Patient is currently taking care of her  who is just been recently diagnosed with Parkinson's on a [place].  Patient does attend [employer].  Patient denies any abuse.  Patient denies any legal issues or  history.    Family History:  family history includes Alzheimer's disease in her mother; Anxiety disorder in her father; Cancer in her father; Clotting disorder in her brother; Depression in her father; Diabetes in her mother; Heart attack in her sister; Heart disease in her mother; Hyperlipidemia in her mother; Hypertension in her father and mother; Leukemia  in her brother; No Known Problems in her brother.    Medical/Surgical History:  Past Medical History:   Diagnosis Date   • Anemia    • Anxiety and depression    • Arthritis    • Atrial fibrillation (CMS/HCC)    • Chronic cough    • Disease of thyroid gland    • GERD (gastroesophageal reflux disease)    • History of incision and drainage     Right knee   • History of staph infection     right knee, and upper right thigh   • Hyperlipidemia    • Infection      in right knee to bone, cleaned it out and put new hardware with antibiotic and pt developed hole in incision   • Neuropathy, peripheral    • Pneumonia     RECENT DX PER FAMILY DOCTOR   • PONV (postoperative nausea and vomiting)    • Restless leg syndrome    • Sleep apnea with use of continuous positive airway pressure (CPAP)     bipap   • SVT (supraventricular tachycardia) (CMS/HCC)      Past Surgical History:   Procedure Laterality Date   • BUNIONECTOMY Left     AND HAMMER TOE   • CARDIAC ABLATION      at [facility] 8/2019   • CARDIAC SURGERY      HEART CATH   • CATARACT EXTRACTION Right    • HERNIA REPAIR      UMBILICAL X3   • INCISION AND DRAINAGE LEG Right 1/28/2019    Procedure: INCISION AND DRAINAGE OF RIGHT THIGH HEMATOMA;  Surgeon: Nino Stern MD;  Location: [facility] HYBRID OR 12;  Service: Vascular   • JOINT REPLACEMENT      RIGHT KNEE   • KNEE POLY INSERT EXCHANGE Right 3/3/2018    Procedure: IRRIGATION AND DEBRIDEMENT TOTAL KNEE & POLY EXCHANGE - RIGHT;  Surgeon: Amilcar Capellan MD;  Location: [facility] OR;  Service:    • LAPAROSCOPIC CHOLECYSTECTOMY     • LEG DEBRIDEMENT Right 3/23/2018    Procedure: DEBRIDEMENT AND CLOSURE KNEE - RIGHT;  Surgeon: Amilcar Capellan MD;  Location: [facility] OR;  Service: Orthopedics   • PERIPHERALLY INSERTED CENTRAL CATHETER INSERTION     • PILONIDAL CYSTECTOMY N/A 2/16/2017    Procedure: PILONIDAL CYSTECTOMY, EXCISION SEBACEOUS CYST - BACK;  Surgeon: Macrina Capellan MD;  Location: [facility] OR;  Service:    •  TOTAL KNEE  PROSTHESIS REMOVAL W/ SPACER INSERTION Right 3/13/2018    Procedure: 1.  EXPLANT TOTAL KNEE 2.  ANTIBOTIC SPACER KNEE;  Surgeon: Amilcar Capellan MD;  Location:  [facility] OR;  Service: Orthopedics   • TOTAL KNEE  PROSTHESIS REMOVAL W/ SPACER INSERTION Right 6/19/2020    Procedure: REMOVAL OF ANTIBIOTIC SPACER;  Surgeon: Amilcar Capellan MD;  Location:  [facility] OR;  Service: Orthopedics;  Laterality: Right;   • TOTAL KNEE ARTHROPLASTY REVISION Right 6/19/2020    Procedure: REVISION TOTAL KNEE REPLACEMENT;  Surgeon: Amilcar Capellan MD;  Location:  [facility] OR;  Service: Orthopedics;  Laterality: Right;   • TRUNK LESION/CYST EXCISION N/A 2/16/2017    Procedure: EXCISION SEBACEOUS CYST - BACK;  Surgeon: Macrina Capellan MD;  Location:  [facility] OR;  Service:        Allergies   Allergen Reactions   • Other Rash     Dial soap  Redness and swelling on skin and burning sensation   • Codeine Dizziness and Nausea Only     Rapid heart rate, dizziness  NAUSEA   • Mobic [Meloxicam] Rash   • Morphine And Related Itching   • Azithromycin Other (See Comments)       Current Medications:   Current Outpatient Medications   Medication Sig Dispense Refill   • alendronate (FOSAMAX) 70 MG tablet Take 70 mg by mouth Every 30 (Thirty) Days.     • amitriptyline (ELAVIL) 50 MG tablet Take 100 mg by mouth Every Night.     • ASPIRIN 81 PO Take 1 tablet by mouth Daily.     • dexlansoprazole (DEXILANT) 60 MG capsule Take 60 mg by mouth Daily.     • diclofenac (VOLTAREN) 75 MG EC tablet Take 75 mg by mouth Daily.     • DULoxetine (CYMBALTA) 60 MG capsule Take 60 mg by mouth 2 (Two) Times a Day.     • flecainide (TAMBOCOR) 100 MG tablet Take 100 mg by mouth 2 (Two) Times a Day.     • fluticasone (FLONASE) 50 MCG/ACT nasal spray 2 sprays into the nostril(s) as directed by provider Daily As Needed for Rhinitis or Allergies.     • fluticasone (Flovent HFA) 110 MCG/ACT inhaler Flovent  mcg/actuation aerosol inhaler   INL 1 PUFF PO BID      • gabapentin (NEURONTIN) 800 MG tablet Take 800 mg by mouth 3 (Three) Times a Day.     • levothyroxine (SYNTHROID, LEVOTHROID) 137 MCG tablet Take 137 mcg by mouth Every Morning.     • LORazepam (ATIVAN) 1 MG tablet Take 1 mg by mouth 2 (Two) Times a Day.     • metoprolol tartrate (LOPRESSOR) 25 MG tablet Take 12.5 mg by mouth Every 12 (Twelve) Hours.     • montelukast (SINGULAIR) 10 MG tablet Take  by mouth Daily.  5   • nystatin (MYCOSTATIN) 158819 UNIT/ML suspension 5 mL Every 6 (Six) Hours.     • rOPINIRole (REQUIP) 3 MG tablet Take 6 mg by mouth 2 (two) times a day. Take 3 tablets at night and 2 tablets every am     • triamcinolone (KENALOG) 0.1 % paste See Admin Instructions.  5   • apixaban (ELIQUIS) 5 MG tablet tablet Take 5 mg by mouth 2 (Two) Times a Day.     • sertraline (Zoloft) 25 MG tablet Take 1 tablet by mouth Daily for 30 days. 30 tablet 0   • venlafaxine XR (Effexor XR) 37.5 MG 24 hr capsule Take 1 capsule by mouth Daily. 10 capsule 0     No current facility-administered medications for this visit.       Review of Systems   Musculoskeletal: Positive for neck pain. Negative for back pain.   Psychiatric/Behavioral: Positive for dysphoric mood and sleep disturbance. The patient is nervous/anxious.    All other systems reviewed and are negative.      Review of Systems - General ROS: negative for - chills, fever or malaise  Ophthalmic ROS: negative for - loss of vision  ENT ROS: negative for - hearing change  Allergy and Immunology ROS: negative for - hives  Hematological and Lymphatic ROS: negative for - bleeding problems  Endocrine ROS: negative for - skin changes  Respiratory ROS: no cough, shortness of breath, or wheezing  Cardiovascular ROS: no chest pain or dyspnea on exertion  Gastrointestinal ROS: no abdominal pain, change in bowel habits, or black or bloody stools  Genito-Urinary ROS: no dysuria, trouble voiding, or hematuria  Musculoskeletal ROS: negative for - gait  disturbance  Neurological ROS: no TIA or stroke symptoms  Dermatological ROS: negative for rash    Objective   Physical Exam  Nursing note reviewed.   Constitutional:       Appearance: Normal appearance.   Neurological:      Mental Status: She is alert.   Psychiatric:         Attention and Perception: Attention and perception normal.         Mood and Affect: Mood is anxious and depressed.         Speech: Speech normal.         Behavior: Behavior is cooperative.       not currently breastfeeding. Due to the remote nature of this encounter (virtual encounter), vitals were unable to be obtained.  Height stated at 64 inches.  Weight stated at 310 pounds.        Result Review :     The following data was reviewed by: ERNESTO Smith on 04/21/2021:  Common labs    Common Labsle 6/5/20 6/5/20 6/20/20 6/20/20 6/22/20    1033 1033 0337 0337    Glucose  89   109 (A)   BUN  23   17   Creatinine  0.80  0.76 0.87   eGFR Non African Am  72  76 65   Sodium  143   135 (A)   Potassium  5.2   4.2   Chloride  104   99   Calcium  9.4   8.5 (A)   Albumin  3.70      Total Bilirubin  0.4      Alkaline Phosphatase  103      AST (SGOT)  24      ALT (SGPT)  18      WBC 7.35       Hemoglobin 12.9  12.8     Hematocrit 41.2  41.7     Platelets 236       (A) Abnormal value            CMP    CMP 6/5/20 6/20/20 6/22/20   Glucose 89  109 (A)   BUN 23  17   Creatinine 0.80 0.76 0.87   eGFR Non African Am 72 76 65   Sodium 143  135 (A)   Potassium 5.2  4.2   Chloride 104  99   Calcium 9.4  8.5 (A)   Albumin 3.70     Total Bilirubin 0.4     Alkaline Phosphatase 103     AST (SGOT) 24     ALT (SGPT) 18     (A) Abnormal value            CBC    CBC 6/5/20 6/20/20   WBC 7.35    RBC 4.60    Hemoglobin 12.9 12.8   Hematocrit 41.2 41.7   MCV 89.6    MCH 28.0    MCHC 31.3 (A)    RDW 14.5    Platelets 236    (A) Abnormal value            CBC w/diff    CBC w/Diff 6/5/20 6/20/20   WBC 7.35    RBC 4.60    Hemoglobin 12.9 12.8   Hematocrit 41.2 41.7   MCV  89.6    MCH 28.0    MCHC 31.3 (A)    RDW 14.5    Platelets 236    Neutrophil Rel % 66.1    Immature Granulocyte Rel % 0.4    Lymphocyte Rel % 20.1    Monocyte Rel % 8.0    Eosinophil Rel % 4.9    Basophil Rel % 0.5    (A) Abnormal value                TSH    TSH 4/1/21   TSH 1.650           BMP    BMP 6/5/20 6/20/20 6/22/20   BUN 23  17   Creatinine 0.80 0.76 0.87   Sodium 143  135 (A)   Potassium 5.2  4.2   Chloride 104  99   CO2 29.0  26.0   Calcium 9.4  8.5 (A)   (A) Abnormal value                HgB    HGB 6/5/20 6/20/20   Hemoglobin 12.9 12.8           UA    Urinalysis 6/5/20 6/5/20    1033 1033   Squamous Epithelial Cells, UA  0-2   Specific Gravity, UA 1.016    Ketones, UA Negative    Blood, UA Negative    Leukocytes, UA Trace (A)    Nitrite, UA Negative    RBC, UA  3-5 (A)   WBC, UA  3-5 (A)   Bacteria, UA  None Seen   (A) Abnormal value            Data reviewed: PCP notes     Mental Status Exam:   Hygiene:   good  Cooperation:  Cooperative  Eye Contact:  Good  Psychomotor Behavior:  Appropriate  Affect:  Appropriate  Hopelessness: 4  Speech:  Normal  Thought Process:  Goal directed  Thought Content:  Normal  Suicidal:  None  Homicidal:  None  Hallucinations:  Tactile  Delusion:  None  Memory:  Intact  Orientation:  Person, Place, Time and Situation  Reliability:  fair  Insight:  Fair  Judgement:  Fair  Impulse Control:  Fair  Physical/Medical Issues:  Yes see problem list: DM, HTN, dyslipidemia, afib, SVT, GERD, TRISTON    PHQ-9 Score:   PHQ-9 Total Score: (P) 12   PHQ-9 Depression Screening  Little interest or pleasure in doing things? (P) 1   Feeling down, depressed, or hopeless? (P) 1   Trouble falling or staying asleep, or sleeping too much? (P) 3   Feeling tired or having little energy? (P) 3   Poor appetite or overeating? (P) 3   Feeling bad about yourself - or that you are a failure or have let yourself or your family down? (P) 1   Trouble concentrating on things, such as reading the newspaper or  watching television? (P) 0   Moving or speaking so slowly that other people could have noticed? Or the opposite - being so fidgety or restless that you have been moving around a lot more than usual? (P) 0   Thoughts that you would be better off dead, or of hurting yourself in some way? (P) 0   PHQ-9 Total Score (P) 12   If you checked off any problems, how difficult have these problems made it for you to do your work, take care of things at home, or get along with other people? (P) Somewhat difficult     PHQ-9 Total Score: (P) 12      Feeling nervous, anxious or on edge: Nearly every day  Not being able to stop or control worrying: Nearly every day  Worrying too much about different things: Nearly every day  Trouble Relaxing: Nearly every day  Being so restless that it is hard to sit still: Not at all  Feeling afraid as if something awful might happen: Nearly every day  Becoming easily annoyed or irritable: Not at all  JAMES 7 Total Score: 15  If you checked any problems, how difficult have these problems made it for you to do your work, take care of things at home, or get along with other people: Very difficult      PROMIS scale screening tool that patient filled out virtually reviewed by this APRN at today's encounter.    Patient screened positive for depression based on a PHQ-9 score of 12 on 4/14/2021. Follow-up recommendations include: see notes and medication changes..        Assessment/Plan   Diagnoses and all orders for this visit:    1. Generalized anxiety disorder (Primary)  -     venlafaxine XR (Effexor XR) 37.5 MG 24 hr capsule; Take 1 capsule by mouth Daily.  Dispense: 10 capsule; Refill: 0  -     sertraline (Zoloft) 25 MG tablet; Take 1 tablet by mouth Daily for 30 days.  Dispense: 30 tablet; Refill: 0    2. Major depressive disorder, recurrent episode, moderate (CMS/HCC)  -     venlafaxine XR (Effexor XR) 37.5 MG 24 hr capsule; Take 1 capsule by mouth Daily.  Dispense: 10 capsule; Refill: 0  -      sertraline (Zoloft) 25 MG tablet; Take 1 tablet by mouth Daily for 30 days.  Dispense: 30 tablet; Refill: 0    3. Obsessive thinking  -     sertraline (Zoloft) 25 MG tablet; Take 1 tablet by mouth Daily for 30 days.  Dispense: 30 tablet; Refill: 0    4. Polypharmacy        TREATMENT PLAN/GOALS: Continue supportive psychotherapy efforts and medications as indicated. Treatment and medication options discussed during today's visit. Patient ackowledged and verbally consented to continue with current treatment plan and was educated on the importance of compliance with treatment and follow-up appointments.    MEDICATION ISSUES:  We discussed risks, benefits, and side effects of the above medications and the patient was agreeable with the plan. Patient was educated on the importance of compliance with treatment and follow-up appointments.  Patient is agreeable to call the office with any worsening of symptoms or onset of side effects. Patient is agreeable to call 911 or go to the nearest ER should he/she begin having SI/HI.     -Due to polypharmacy recommend patient talking with her neurologist her PCP regarding tapering off duloxetine and amitriptyline since they are managing those medications.  -Educated patient extensively regarding the side effects and risk of lorazepam for long-term use as causing memory issues or significant fatigue and somnolence which she is experiencing and could be causing worsening depression and dependence.  Would encourage a slow taper but unsure at this time who is prescribing the patient's lorazepam.  -Since patient is still having significant depression and anxiety and obsessive thinking regarding scabies switching from venlafaxine to sertraline.  Encourage patient to take 37.5 mg for 10 days then discontinue.  -Begin sertraline 25 mg daily while tapering off the venlafaxine.  -Would also recommend monitoring the need for Requip twice a day as compared to just nightly.  Patient does state  "that her sleep schedule is \"off\" so would encourage proper sleep hygiene.     Counseled patient regarding multimodal approach with healthy nutrition, healthy sleep, regular physical activity, social activities, counseling, and medications.      Coping skills reviewed and encouraged positive framing of thoughts     Assisted patient in processing above session content; acknowledged and normalized patient’s thoughts, feelings, and concerns.  Applied  positive coping skills and behavior management in session.  Allowed patient to freely discuss issues without interruption or judgment. Provided safe, confidential environment to facilitate the development of positive therapeutic relationship and encourage open, honest communication. Assisted patient in identifying risk factors which would indicate the need for higher level of care including thoughts to harm self or others and/or self-harming behavior and encouraged patient to contact this office, call 911, or present to the nearest emergency room should any of these events occur. Discussed crisis intervention services and means to access.       We discussed risks, benefits, and side effects of the above medication and the patient was agreeable with the plan.     Return in about 2 weeks (around 5/5/2021), or if symptoms worsen or fail to improve, for Recheck.         MEDS ORDERED DURING VISIT:  New Medications Ordered This Visit   Medications   • venlafaxine XR (Effexor XR) 37.5 MG 24 hr capsule     Sig: Take 1 capsule by mouth Daily.     Dispense:  10 capsule     Refill:  0     Tapering off onto sertraline   • sertraline (Zoloft) 25 MG tablet     Sig: Take 1 tablet by mouth Daily for 30 days.     Dispense:  30 tablet     Refill:  0           Follow Up   Return in about 2 weeks (around 5/5/2021), or if symptoms worsen or fail to improve, for Recheck.    Patient was given instructions and counseling regarding her condition or for health maintenance advice. Please see " specific information pulled into the AVS if appropriate.     This document has been electronically signed by ERNESTO Smith  April 21, 2021 12:27 EDT    Part of this note may be an electronic transcription/translation of spoken language to printed text using the Dragon Dictation System.

## 2021-04-23 ENCOUNTER — OFFICE VISIT (OUTPATIENT)
Dept: INTERNAL MEDICINE | Facility: CLINIC | Age: 66
End: 2021-04-23

## 2021-04-23 VITALS
DIASTOLIC BLOOD PRESSURE: 70 MMHG | TEMPERATURE: 97.8 F | RESPIRATION RATE: 15 BRPM | OXYGEN SATURATION: 95 % | WEIGHT: 293 LBS | HEIGHT: 64 IN | BODY MASS INDEX: 50.02 KG/M2 | SYSTOLIC BLOOD PRESSURE: 127 MMHG | HEART RATE: 74 BPM

## 2021-04-23 DIAGNOSIS — J32.0 LEFT MAXILLARY SINUSITIS: ICD-10-CM

## 2021-04-23 DIAGNOSIS — F41.9 ANXIETY: Primary | ICD-10-CM

## 2021-04-23 PROCEDURE — 99214 OFFICE O/P EST MOD 30 MIN: CPT | Performed by: INTERNAL MEDICINE

## 2021-04-23 RX ORDER — AMOXICILLIN AND CLAVULANATE POTASSIUM 875; 125 MG/1; MG/1
1 TABLET, FILM COATED ORAL 2 TIMES DAILY
Qty: 14 TABLET | Refills: 0 | Status: SHIPPED | OUTPATIENT
Start: 2021-04-23 | End: 2021-04-30 | Stop reason: HOSPADM

## 2021-04-23 RX ORDER — LORAZEPAM 1 MG/1
TABLET ORAL
Qty: 45 TABLET | Refills: 0 | Status: SHIPPED | OUTPATIENT
Start: 2021-04-23 | End: 2021-05-23

## 2021-04-23 RX ORDER — CLOPIDOGREL BISULFATE 75 MG/1
75 TABLET ORAL DAILY
COMMUNITY
Start: 2021-04-07 | End: 2021-04-30 | Stop reason: HOSPADM

## 2021-04-23 RX ORDER — FLUTICASONE PROPIONATE 50 MCG
2 SPRAY, SUSPENSION (ML) NASAL DAILY PRN
Qty: 16 G | Refills: 5 | Status: SHIPPED | OUTPATIENT
Start: 2021-04-23 | End: 2022-11-02 | Stop reason: SDUPTHER

## 2021-04-23 NOTE — PROGRESS NOTES
Chief Complaint   Patient presents with   • Office Visit   • Medication Discussion     Answers for HPI/ROS submitted by the patient on 4/22/2021  Please describe your symptoms.: Medication adjustment; anxiety; depression; nerve pain  Have you had these symptoms before?: Yes  How long have you been having these symptoms?: Greater than 2 weeks  Please list any medications you are currently taking for this condition.: Lorazepam 1mg twice daily, Duloxetine DR 60mg twice daily, Amitriptyline 25mg 2 tablets every night, Effexor 1 capsule daily  Please describe any probable cause for these symptoms. : Peripheral neuropathy, Worry-wort, have a dark outlook on life, Scared that I may out-live my , he has always taken care of me, been there for me, would do anything for me  What is the primary reason for your visit?: Other        History:  Danisha Cannon is a 65 y.o. female who presents today for evaluation of the above problems.    She presents today to discuss her Ativan.  She saw Gypsy Wright on April 21.  I reviewed her note and also discussed with her.  She was recommended to taper off the duloxetine and amitriptyline.  She is also recommended to titrate off of her Ativan as she uses it for pain rather than anxiety.  She is currently being tapered off of the venlafaxine and transitioning to sertraline.  She has follow-up appointment on May 3, 2021.    She says that her anxiety causes her neck to hurt.  She is tried numerous methods to decrease the neck pain, but the only thing that has helped her was the Ativan.  She has been on it for 15 years.    She recently used [place] and since then her head has itched her.  She washed her head in the sink and found something appearing like lint in the sink.  She brought it here today concerned it might be mites.    Dr. Arauz prescribes cymbalta and amitriptyline and gabapentin    She reports sinus infection symptoms since last week.  Located in her left maxillary sinus.   She always has postnasal drip but has worsened.  She has had both COVID-19 vaccines and the most recent one was April 13.    HPI    ROS:  Review of Systems  See above    Current Outpatient Medications:   •  alendronate (FOSAMAX) 70 MG tablet, Take 70 mg by mouth Every 30 (Thirty) Days., Disp: , Rfl:   •  amitriptyline (ELAVIL) 50 MG tablet, Take 100 mg by mouth Every Night., Disp: , Rfl:   •  ASPIRIN 81 PO, Take 1 tablet by mouth Daily., Disp: , Rfl:   •  clopidogrel (PLAVIX) 75 MG tablet, Take 75 mg by mouth Daily., Disp: , Rfl:   •  dexlansoprazole (DEXILANT) 60 MG capsule, Take 60 mg by mouth Daily., Disp: , Rfl:   •  diclofenac (VOLTAREN) 75 MG EC tablet, Take 75 mg by mouth Daily., Disp: , Rfl:   •  DULoxetine (CYMBALTA) 60 MG capsule, Take 60 mg by mouth 2 (Two) Times a Day., Disp: , Rfl:   •  flecainide (TAMBOCOR) 100 MG tablet, Take 100 mg by mouth 2 (Two) Times a Day., Disp: , Rfl:   •  fluticasone (FLONASE) 50 MCG/ACT nasal spray, 2 sprays into the nostril(s) as directed by provider Daily As Needed for Rhinitis or Allergies., Disp: 16 g, Rfl: 5  •  fluticasone (Flovent HFA) 110 MCG/ACT inhaler, Flovent  mcg/actuation aerosol inhaler  INL 1 PUFF PO BID, Disp: , Rfl:   •  gabapentin (NEURONTIN) 800 MG tablet, Take 800 mg by mouth 3 (Three) Times a Day., Disp: , Rfl:   •  levothyroxine (SYNTHROID, LEVOTHROID) 137 MCG tablet, Take 137 mcg by mouth Every Morning., Disp: , Rfl:   •  LORazepam (ATIVAN) 1 MG tablet, Take 1 tablet by mouth Every Morning AND 0.5 tablets Every Evening. Do all this for 30 days., Disp: 45 tablet, Rfl: 0  •  metoprolol tartrate (LOPRESSOR) 25 MG tablet, Take 12.5 mg by mouth Every 12 (Twelve) Hours., Disp: , Rfl:   •  montelukast (SINGULAIR) 10 MG tablet, Take  by mouth Daily., Disp: , Rfl: 5  •  nystatin (MYCOSTATIN) 503316 UNIT/ML suspension, 5 mL Every 6 (Six) Hours., Disp: , Rfl:   •  rOPINIRole (REQUIP) 3 MG tablet, Take 6 mg by mouth 2 (two) times a day. Take 3 tablets at  night and 2 tablets every am, Disp: , Rfl:   •  sertraline (Zoloft) 25 MG tablet, Take 1 tablet by mouth Daily for 30 days., Disp: 30 tablet, Rfl: 0  •  triamcinolone (KENALOG) 0.1 % paste, See Admin Instructions., Disp: , Rfl: 5  •  venlafaxine XR (Effexor XR) 37.5 MG 24 hr capsule, Take 1 capsule by mouth Daily., Disp: 10 capsule, Rfl: 0  •  amoxicillin-clavulanate (Augmentin) 875-125 MG per tablet, Take 1 tablet by mouth 2 (Two) Times a Day., Disp: 14 tablet, Rfl: 0    Lab Results   Component Value Date    GLUCOSE 109 (H) 06/22/2020    BUN 17 06/22/2020    CREATININE 0.87 06/22/2020    EGFRIFNONA 65 06/22/2020    BCR 19.5 06/22/2020    K 4.2 06/22/2020    CO2 26.0 06/22/2020    CALCIUM 8.5 (L) 06/22/2020    ALBUMIN 3.70 06/05/2020    AST 24 06/05/2020    ALT 18 06/05/2020       WBC   Date Value Ref Range Status   06/05/2020 7.35 3.40 - 10.80 10*3/mm3 Final   01/10/2018 4.7 (L) 4.8 - 10.8 K/uL Final     RBC   Date Value Ref Range Status   06/05/2020 4.60 3.77 - 5.28 10*6/mm3 Final   01/10/2018 4.02 (L) 4.20 - 5.40 M/uL Final     Hemoglobin   Date Value Ref Range Status   06/20/2020 12.8 12.0 - 15.9 g/dL Final   02/19/2018 10.2 (L) 12.0 - 16.0 g/dL Final     Hematocrit   Date Value Ref Range Status   06/20/2020 41.7 34.0 - 46.6 % Final   02/19/2018 32.8 (L) 37.0 - 47.0 % Final     MCV   Date Value Ref Range Status   06/05/2020 89.6 79.0 - 97.0 fL Final   01/10/2018 93.3 81.0 - 99.0 fL Final     MCH   Date Value Ref Range Status   06/05/2020 28.0 26.6 - 33.0 pg Final   01/10/2018 28.6 27.0 - 31.0 pg Final     MCHC   Date Value Ref Range Status   06/05/2020 31.3 (L) 31.5 - 35.7 g/dL Final   01/10/2018 30.7 (L) 33.0 - 37.0 g/dL Final     RDW   Date Value Ref Range Status   06/05/2020 14.5 12.3 - 15.4 % Final   01/10/2018 15.9 (H) 11.5 - 14.5 % Final     RDW-SD   Date Value Ref Range Status   06/05/2020 46.7 37.0 - 54.0 fl Final     MPV   Date Value Ref Range Status   06/05/2020 11.5 6.0 - 12.0 fL Final   01/10/2018  11.6 9.4 - 12.3 fL Final     Platelets   Date Value Ref Range Status   06/05/2020 236 140 - 450 10*3/mm3 Final   01/10/2018 213 130 - 400 K/uL Final     Neutrophil Rel %   Date Value Ref Range Status   01/10/2018 76.6 (H) 50.0 - 65.0 % Final     Neutrophil %   Date Value Ref Range Status   06/05/2020 66.1 42.7 - 76.0 % Final     Lymphocyte Rel %   Date Value Ref Range Status   01/10/2018 10.4 (L) 20.0 - 40.0 % Final     Lymphocyte %   Date Value Ref Range Status   06/05/2020 20.1 19.6 - 45.3 % Final     Monocyte Rel %   Date Value Ref Range Status   01/10/2018 10.0 0.0 - 10.0 % Final     Monocyte %   Date Value Ref Range Status   06/05/2020 8.0 5.0 - 12.0 % Final     Eosinophil Rel %   Date Value Ref Range Status   01/10/2018 1.5 0.0 - 5.0 % Final     Eosinophil %   Date Value Ref Range Status   06/05/2020 4.9 0.3 - 6.2 % Final     Basophil Rel %   Date Value Ref Range Status   01/10/2018 0.4 0.0 - 1.0 % Final     Basophil %   Date Value Ref Range Status   06/05/2020 0.5 0.0 - 1.5 % Final     Immature Grans %   Date Value Ref Range Status   06/05/2020 0.4 0.0 - 0.5 % Final     Neutrophils Absolute   Date Value Ref Range Status   01/10/2018 3.6 1.5 - 7.5 K/uL Final     Neutrophils, Absolute   Date Value Ref Range Status   06/05/2020 4.85 1.70 - 7.00 10*3/mm3 Final     Lymphocytes Absolute   Date Value Ref Range Status   01/10/2018 0.5 (L) 1.1 - 4.5 K/uL Final     Lymphocytes, Absolute   Date Value Ref Range Status   06/05/2020 1.48 0.70 - 3.10 10*3/mm3 Final     Monocytes Absolute   Date Value Ref Range Status   01/10/2018 0.50 0.00 - 0.90 K/uL Final     Monocytes, Absolute   Date Value Ref Range Status   06/05/2020 0.59 0.10 - 0.90 10*3/mm3 Final     Eosinophils Absolute   Date Value Ref Range Status   01/10/2018 0.10 0.00 - 0.60 K/uL Final     Eosinophils, Absolute   Date Value Ref Range Status   06/05/2020 0.36 0.00 - 0.40 10*3/mm3 Final     Basophils Absolute   Date Value Ref Range Status   01/10/2018 0.00 0.00 -  "0.20 K/uL Final     Basophils, Absolute   Date Value Ref Range Status   06/05/2020 0.04 0.00 - 0.20 10*3/mm3 Final     Immature Grans, Absolute   Date Value Ref Range Status   06/05/2020 0.03 0.00 - 0.05 10*3/mm3 Final     nRBC   Date Value Ref Range Status   06/05/2020 0.0 0.0 - 0.2 /100 WBC Final         OBJECTIVE:  Visit Vitals  /70 (BP Location: Left arm, Patient Position: Sitting, Cuff Size: Adult)   Pulse 74   Temp 97.8 °F (36.6 °C) (Oral)   Resp 15   Ht 162.6 cm (64.02\")   Wt (!) 139 kg (306 lb)   LMP  (LMP Unknown)   SpO2 95%   BMI 52.50 kg/m²      Physical Exam  Vitals reviewed.   Constitutional:       General: She is not in acute distress.     Appearance: She is well-developed. She is not diaphoretic.   HENT:      Head: Normocephalic and atraumatic.   Eyes:      Pupils: Pupils are equal, round, and reactive to light.   Neck:      Thyroid: No thyromegaly.      Trachea: Phonation normal.   Cardiovascular:      Heart sounds: No murmur heard.     Pulmonary:      Effort: No respiratory distress.   Skin:     Coloration: Skin is not pale.      Findings: No erythema.   Neurological:      Mental Status: She is alert.   Psychiatric:         Mood and Affect: Mood is anxious.         Behavior: Behavior normal.         Thought Content: Thought content normal.         Judgment: Judgment normal.         Assessment/Plan    Diagnoses and all orders for this visit:    1. Anxiety (Primary)  -     LORazepam (ATIVAN) 1 MG tablet; Take 1 tablet by mouth Every Morning AND 0.5 tablets Every Evening. Do all this for 30 days.  Dispense: 45 tablet; Refill: 0    2. Left maxillary sinusitis  -     amoxicillin-clavulanate (Augmentin) 875-125 MG per tablet; Take 1 tablet by mouth 2 (Two) Times a Day.  Dispense: 14 tablet; Refill: 0    Other orders  -     fluticasone (FLONASE) 50 MCG/ACT nasal spray; 2 sprays into the nostril(s) as directed by provider Daily As Needed for Rhinitis or Allergies.  Dispense: 16 g; Refill: 5      We " are going to slowly titrate her off the lorazepam.  She is currently taking lorazepam 1 mg twice a day.  I will start tapering off today.  Continue lorazepam 1 mg in the morning and decrease her evening dose to 0.5 mg.  Continue this until her next visit in 4 weeks.    If she develops neck pain as we taper off we can add a muscle relaxant but given her polypharmacy already I would not like to start this at this time    She will contact Dr. Delio Arauz about coming off of Cymbalta and Elavil    The concerning objects she brought to the office today appear to be skin flakes.  Not concerning for mites or nymphs of any sort.    I appreciate Gypsy Wright's help in managing her issues and medications.    Return in about 4 weeks (around 5/21/2021) for Recheck, taper of ativan.    Patient was given instructions and counseling regarding his/her condition or for health maintenance advice. Please see specific information pulled into the AVS if appropriate.     MELLISA Arauz MD   13:07 CDT  4/23/2021

## 2021-04-26 ENCOUNTER — OFFICE VISIT (OUTPATIENT)
Dept: GASTROENTEROLOGY | Facility: CLINIC | Age: 66
End: 2021-04-26

## 2021-04-26 VITALS
WEIGHT: 293 LBS | SYSTOLIC BLOOD PRESSURE: 132 MMHG | TEMPERATURE: 97.6 F | HEART RATE: 84 BPM | HEIGHT: 65 IN | DIASTOLIC BLOOD PRESSURE: 80 MMHG | OXYGEN SATURATION: 94 % | BODY MASS INDEX: 48.82 KG/M2

## 2021-04-26 DIAGNOSIS — I48.92 ATRIAL FLUTTER, PAROXYSMAL (HCC): ICD-10-CM

## 2021-04-26 DIAGNOSIS — Z79.01 ANTICOAGULATED: ICD-10-CM

## 2021-04-26 DIAGNOSIS — I10 HTN (HYPERTENSION), BENIGN: ICD-10-CM

## 2021-04-26 DIAGNOSIS — Z78.9 NONSMOKER: ICD-10-CM

## 2021-04-26 DIAGNOSIS — Z12.11 ENCOUNTER FOR SCREENING FOR MALIGNANT NEOPLASM OF COLON: Primary | ICD-10-CM

## 2021-04-26 DIAGNOSIS — E66.9 OBESITY, UNSPECIFIED OBESITY SEVERITY, UNSPECIFIED OBESITY TYPE: ICD-10-CM

## 2021-04-26 PROCEDURE — 99214 OFFICE O/P EST MOD 30 MIN: CPT | Performed by: CLINICAL NURSE SPECIALIST

## 2021-04-26 RX ORDER — AMITRIPTYLINE HYDROCHLORIDE 25 MG/1
TABLET, FILM COATED ORAL
Qty: 180 TABLET | Refills: 3 | Status: SHIPPED | OUTPATIENT
Start: 2021-04-26 | End: 2022-05-11

## 2021-04-26 RX ORDER — SODIUM, POTASSIUM,MAG SULFATES 17.5-3.13G
SOLUTION, RECONSTITUTED, ORAL ORAL
Qty: 177 ML | Refills: 0 | Status: SHIPPED | OUTPATIENT
Start: 2021-04-26 | End: 2021-06-02

## 2021-04-26 RX ORDER — VENLAFAXINE HYDROCHLORIDE 75 MG/1
CAPSULE, EXTENDED RELEASE ORAL
Qty: 90 CAPSULE | Refills: 3 | Status: SHIPPED | OUTPATIENT
Start: 2021-04-26 | End: 2021-08-17

## 2021-04-26 NOTE — TELEPHONE ENCOUNTER
Requested Prescriptions     Pending Prescriptions Disp Refills    amitriptyline (ELAVIL) 25 MG tablet [Pharmacy Med Name: AMITRIPTYLINE 25MG TABLETS] 180 tablet 3     Sig: TAKE 2 TABLETS BY MOUTH EVERY NIGHT    venlafaxine (EFFEXOR XR) 75 MG extended release capsule [Pharmacy Med Name: VENLAFAXINE ER 75MG CAPSULES] 90 capsule 3     Sig: TAKE 1 CAPSULE BY MOUTH DAILY       Last Office Visit:7/21/20  Next Office Visit: 6/15/2021  Last Medication Refill:  Elavil 6/5/18 with 3 refills Effexor  - 5/4/20 with 3 refills

## 2021-04-26 NOTE — PROGRESS NOTES
Danisha Cannon  1955 4/26/2021  Chief Complaint   Patient presents with   • GI Problem     Subjective   HPI  Danisha Cannon is a 65 y.o. female who presents as a referral for preventative maintenance. She has no complaints of nausea or vomiting. No change in bowels. No wt loss. No BRBPR. No melena. There is NO family hx for colon cancer. No abdominal pain. She denies any reflux or indigestion at this time. This is only a mild intermittent issue for her she says and dion when she lays down after eating.   SHE SAYS LAST COLONOSCOPY 10+ YEARS AGO.   Past Medical History:   Diagnosis Date   • Anemia    • Anxiety and depression    • Arthritis    • Atrial fibrillation (CMS/HCC)    • Chronic cough    • Disease of thyroid gland    • GERD (gastroesophageal reflux disease)    • History of incision and drainage     Right knee   • History of staph infection     right knee, and upper right thigh   • Hyperlipidemia    • Infection      in right knee to bone, cleaned it out and put new hardware with antibiotic and pt developed hole in incision   • Neuropathy, peripheral    • Pneumonia     RECENT DX PER FAMILY DOCTOR   • PONV (postoperative nausea and vomiting)    • Restless leg syndrome    • Sleep apnea with use of continuous positive airway pressure (CPAP)     bipap   • SVT (supraventricular tachycardia) (CMS/HCC)      Past Surgical History:   Procedure Laterality Date   • BUNIONECTOMY Left     AND HAMMER TOE   • CARDIAC ABLATION      at [facility] 8/2019   • CARDIAC SURGERY      HEART CATH   • CATARACT EXTRACTION Right    • HERNIA REPAIR      UMBILICAL X3   • INCISION AND DRAINAGE LEG Right 1/28/2019    Procedure: INCISION AND DRAINAGE OF RIGHT THIGH HEMATOMA;  Surgeon: Nino Stern MD;  Location: [place] [address];  Service: Vascular   • JOINT REPLACEMENT      RIGHT KNEE   • KNEE POLY INSERT EXCHANGE Right 3/3/2018    Procedure: IRRIGATION AND DEBRIDEMENT TOTAL KNEE & POLY EXCHANGE - RIGHT;  Surgeon: Amilcar CAMILO  MD Timi;  Location:  [facility] OR;  Service:    • LAPAROSCOPIC CHOLECYSTECTOMY     • LEG DEBRIDEMENT Right 3/23/2018    Procedure: DEBRIDEMENT AND CLOSURE KNEE - RIGHT;  Surgeon: Amilcar Capellan MD;  Location:  [facility];  Service: Orthopedics   • PERIPHERALLY INSERTED CENTRAL CATHETER INSERTION     • PILONIDAL CYSTECTOMY N/A 2/16/2017    Procedure: PILONIDAL CYSTECTOMY, EXCISION SEBACEOUS CYST - BACK;  Surgeon: Macrina Capellan MD;  Location:  [facility];  Service:    • TOTAL KNEE  PROSTHESIS REMOVAL W/ SPACER INSERTION Right 3/13/2018    Procedure: 1.  EXPLANT TOTAL KNEE 2.  ANTIBOTIC SPACER KNEE;  Surgeon: Amilcar Capellan MD;  Location:  [facility];  Service: Orthopedics   • TOTAL KNEE  PROSTHESIS REMOVAL W/ SPACER INSERTION Right 6/19/2020    Procedure: REMOVAL OF ANTIBIOTIC SPACER;  Surgeon: Amilcar Capellan MD;  Location:  [facility];  Service: Orthopedics;  Laterality: Right;   • TOTAL KNEE ARTHROPLASTY REVISION Right 6/19/2020    Procedure: REVISION TOTAL KNEE REPLACEMENT;  Surgeon: Amilcar Capellan MD;  Location:  [facility];  Service: Orthopedics;  Laterality: Right;   • TRUNK LESION/CYST EXCISION N/A 2/16/2017    Procedure: EXCISION SEBACEOUS CYST - BACK;  Surgeon: Macrina Capellan MD;  Location:  [facility];  Service:      Outpatient Medications Marked as Taking for the 4/26/21 encounter (Office Visit) with Jazzy Maguire APRN   Medication Sig Dispense Refill   • alendronate (FOSAMAX) 70 MG tablet Take 70 mg by mouth Every 30 (Thirty) Days.     • amitriptyline (ELAVIL) 50 MG tablet Take 100 mg by mouth Every Night.     • amoxicillin-clavulanate (Augmentin) 875-125 MG per tablet Take 1 tablet by mouth 2 (Two) Times a Day. 14 tablet 0   • ASPIRIN 81 PO Take 1 tablet by mouth Daily.     • clopidogrel (PLAVIX) 75 MG tablet Take 75 mg by mouth Daily.     • dexlansoprazole (DEXILANT) 60 MG capsule Take 60 mg by mouth Daily.     • diclofenac (VOLTAREN) 75 MG EC tablet Take 75 mg by mouth Daily.     •  DULoxetine (CYMBALTA) 60 MG capsule Take 60 mg by mouth 2 (Two) Times a Day.     • flecainide (TAMBOCOR) 100 MG tablet Take 100 mg by mouth 2 (Two) Times a Day.     • fluticasone (FLONASE) 50 MCG/ACT nasal spray 2 sprays into the nostril(s) as directed by provider Daily As Needed for Rhinitis or Allergies. 16 g 5   • fluticasone (Flovent HFA) 110 MCG/ACT inhaler Flovent  mcg/actuation aerosol inhaler   INL 1 PUFF PO BID     • gabapentin (NEURONTIN) 800 MG tablet Take 800 mg by mouth 3 (Three) Times a Day.     • levothyroxine (SYNTHROID, LEVOTHROID) 137 MCG tablet Take 137 mcg by mouth Every Morning.     • LORazepam (ATIVAN) 1 MG tablet Take 1 tablet by mouth Every Morning AND 0.5 tablets Every Evening. Do all this for 30 days. 45 tablet 0   • metoprolol tartrate (LOPRESSOR) 25 MG tablet Take 12.5 mg by mouth Every 12 (Twelve) Hours.     • montelukast (SINGULAIR) 10 MG tablet Take  by mouth Daily.  5   • nystatin (MYCOSTATIN) 639744 UNIT/ML suspension 5 mL Every 6 (Six) Hours.     • rOPINIRole (REQUIP) 3 MG tablet Take 6 mg by mouth 2 (two) times a day. Take 3 tablets at night and 2 tablets every am     • sertraline (Zoloft) 25 MG tablet Take 1 tablet by mouth Daily for 30 days. 30 tablet 0   • triamcinolone (KENALOG) 0.1 % paste See Admin Instructions.  5   • venlafaxine XR (Effexor XR) 37.5 MG 24 hr capsule Take 1 capsule by mouth Daily. 10 capsule 0     Allergies   Allergen Reactions   • Other Rash     Dial soap  Redness and swelling on skin and burning sensation   • Codeine Dizziness and Nausea Only     Rapid heart rate, dizziness  NAUSEA   • Mobic [Meloxicam] Rash   • Morphine And Related Itching   • Azithromycin Other (See Comments)     Social History     Socioeconomic History   • Marital status:      Spouse name: Not on file   • Number of children: Not on file   • Years of education: Not on file   • Highest education level: Not on file   Tobacco Use   • Smoking status: Never Smoker   • Smokeless  "tobacco: Never Used   Vaping Use   • Vaping Use: Never used   Substance and Sexual Activity   • Alcohol use: No   • Drug use: No   • Sexual activity: Defer     Family History   Problem Relation Age of Onset   • Diabetes Mother    • Heart disease Mother    • Alzheimer's disease Mother    • Hyperlipidemia Mother    • Hypertension Mother    • Cancer Father    • Hypertension Father    • Anxiety disorder Father    • Depression Father    • Leukemia Brother    • Heart attack Sister    • Clotting disorder Brother    • No Known Problems Brother    • Colon polyps Neg Hx    • Colon cancer Neg Hx      Health Maintenance   Topic Date Due   • URINE MICROALBUMIN  Never done   • DXA SCAN  Never done   • COLONOSCOPY  Never done   • TDAP/TD VACCINES (1 - Tdap) Never done   • HEPATITIS C SCREENING  Never done   • DIABETIC FOOT EXAM  Never done   • DIABETIC EYE EXAM  Never done   • Pneumococcal Vaccine 65+ (1 of 1 - PPSV23) Never done   • ZOSTER VACCINE (2 of 2) 10/27/2018   • HEMOGLOBIN A1C  01/24/2019   • LIPID PANEL  11/23/2019   • ANNUAL WELLNESS VISIT  01/06/2021   • COVID-19 Vaccine (2 - Moderna 2-dose series) 04/13/2021   • INFLUENZA VACCINE  08/01/2021   • MAMMOGRAM  10/28/2022       REVIEW OF SYSTEMS  General: well appearing, no fever chills or sweats, no unexplained wt loss  HEENT: no acute visual or hearing disturbances  Cardiovascular: No chest pain or palpitations  Pulmonary: No shortness of breath, coughing, wheezing or hemoptysis  : No burning, urgency, hematuria, or dysuria  Musculoskeletal: No joint pain or stiffness  Peripheral: no edema  Skin: No lesions or rashes  Neuro: No dizziness, headaches, stroke, syncope  Endocrine: No hot or cold intolerances  Hematological: No blood dyscrasias    Objective   Vitals:    04/26/21 1410   BP: 132/80   Pulse: 84   Temp: 97.6 °F (36.4 °C)   SpO2: 94%   Weight: (!) 139 kg (306 lb)   Height: 165.1 cm (65\")     Body mass index is 50.92 kg/m².  Patient's Body mass index is 50.92 " kg/m². BMI is above normal parameters. Recommendations include: nutrition counseling.      PHYSICAL EXAM  General: age appropriate well nourished well appearing, no acute distress  Head: normocephalic and atraumatic  Global assessment-supple  Neck-No JVD noted, no lymphadenopathy  Pulmonary-clear to auscultation bilaterally, normal respiratory effort  Cardiovascular-normal rate and rhythm, normal heart sounds, S1 and S2 noted  Abdomen-soft, non tender, non distended, normal bowel sounds all 4 quadrants, no hepatosplenomegaly noted  Extremities-No clubbing cyanosis or edema  Neuro-Non focal, converses appropriately, awake, alert, oriented    Assessment/Plan     Diagnoses and all orders for this visit:    1. Encounter for screening for malignant neoplasm of colon (Primary)  -     Suprep Bowel Prep Kit 17.5-3.13-1.6 GM/177ML solution oral solution; Take as directed by office instructions provided  Dispense: 177 mL; Refill: 0  -     Case Request; Standing  -     Follow Anesthesia Guidelines / Standing Orders; Future  -     Obtain Informed Consent; Future  -     Implement Anesthesia Orders Day of Procedure; Standing  -     Obtain Informed Consent; Standing  -     Verify bowel prep was successful; Standing  -     Case Request    2. Anticoagulated  Comments:  Plavix to hold per Dr Truong Connelly [facility]    3. Nonsmoker    4. Obesity, unspecified obesity severity, unspecified obesity type    5. Atrial flutter, paroxysmal (CMS/HCC)    6. HTN (hypertension), benign  Comments:  cont  BP medication the day of procedure        COLONOSCOPY WITH ANESTHESIA (N/A)  Body mass index is 50.92 kg/m².    Patient instructions on prep prior to procedure provided to the patient.    All risks, benefits, alternatives, and indications of colonoscopy procedure have been discussed with the patient. Risks to include perforation of the colon requiring possible surgery or colostomy, risk of bleeding from biopsies or removal of colon tissue,  possibility of missing a colon polyp or cancer, or adverse drug reaction.  Benefits to include the diagnosis and management of disease of the colon and rectum. Alternatives to include barium enema, radiographic evaluation, lab testing or no intervention. Pt verbalizes understanding and agrees.     Jazzy Maguire, ERNESTO  2021  14:30 CDT      IF YOU SMOKE OR USE TOBACCO PLEASE READ THE FOLLOWIN minutes reading provided    Why is smoking bad for me?  Smoking increases the risk of heart disease, lung disease, vascular disease, stroke, and cancer.     If you smoke, STOP!    If you would like more information on quitting smoking, please visit the [employer] website: [email or web address]   This link will provide additional resources including the QUIT line and the Beat the Pack support groups.     For more information:    Quit Now [place]  -QUIT-NOW  [email or web address]

## 2021-04-27 ENCOUNTER — HOSPITAL ENCOUNTER (INPATIENT)
Facility: HOSPITAL | Age: 66
LOS: 3 days | Discharge: HOME HEALTH | End: 2021-04-30
Attending: EMERGENCY MEDICINE | Admitting: INTERNAL MEDICINE

## 2021-04-27 ENCOUNTER — OFFICE VISIT (OUTPATIENT)
Dept: INTERNAL MEDICINE | Facility: CLINIC | Age: 66
End: 2021-04-27

## 2021-04-27 ENCOUNTER — APPOINTMENT (OUTPATIENT)
Dept: ULTRASOUND IMAGING | Facility: HOSPITAL | Age: 66
End: 2021-04-27

## 2021-04-27 ENCOUNTER — APPOINTMENT (OUTPATIENT)
Dept: CT IMAGING | Facility: HOSPITAL | Age: 66
End: 2021-04-27

## 2021-04-27 VITALS
DIASTOLIC BLOOD PRESSURE: 74 MMHG | HEART RATE: 87 BPM | TEMPERATURE: 98.6 F | SYSTOLIC BLOOD PRESSURE: 130 MMHG | RESPIRATION RATE: 15 BRPM | BODY MASS INDEX: 48.82 KG/M2 | OXYGEN SATURATION: 93 % | HEIGHT: 65 IN | WEIGHT: 293 LBS

## 2021-04-27 DIAGNOSIS — S70.12XA TRAUMATIC HEMATOMA OF LEFT THIGH, INITIAL ENCOUNTER: ICD-10-CM

## 2021-04-27 DIAGNOSIS — N17.9 AKI (ACUTE KIDNEY INJURY) (HCC): ICD-10-CM

## 2021-04-27 DIAGNOSIS — D64.9 ANEMIA, UNSPECIFIED TYPE: ICD-10-CM
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
## 2021-04-27 DIAGNOSIS — Z74.09 IMPAIRED MOBILITY: ICD-10-CM

## 2021-04-27 DIAGNOSIS — I82.412 ACUTE DEEP VEIN THROMBOSIS (DVT) OF FEMORAL VEIN OF LEFT LOWER EXTREMITY (HCC): ICD-10-CM

## 2021-04-27 DIAGNOSIS — Z79.01 CURRENT USE OF LONG TERM ANTICOAGULATION: ICD-10-CM

## 2021-04-27 DIAGNOSIS — S80.12XA HEMATOMA OF LEFT LOWER EXTREMITY, INITIAL ENCOUNTER: Primary | ICD-10-CM

## 2021-04-27 LAB
ANION GAP SERPL CALCULATED.3IONS-SCNC: 9 MMOL/L (ref 5–15)
BASOPHILS # BLD AUTO: 0.05 10*3/MM3 (ref 0–0.2)
BASOPHILS NFR BLD AUTO: 0.5 % (ref 0–1.5)
BUN SERPL-MCNC: 32 MG/DL (ref 8–23)
BUN/CREAT SERPL: 27.4 (ref 7–25)
CALCIUM SPEC-SCNC: 8.9 MG/DL (ref 8.6–10.5)
CHLORIDE SERPL-SCNC: 103 MMOL/L (ref 98–107)
CO2 SERPL-SCNC: 29 MMOL/L (ref 22–29)
CREAT SERPL-MCNC: 1.17 MG/DL (ref 0.57–1)
DEPRECATED RDW RBC AUTO: 50.6 FL (ref 37–54)
EOSINOPHIL # BLD AUTO: 0.32 10*3/MM3 (ref 0–0.4)
EOSINOPHIL NFR BLD AUTO: 3.4 % (ref 0.3–6.2)
ERYTHROCYTE [DISTWIDTH] IN BLOOD BY AUTOMATED COUNT: 15.5 % (ref 12.3–15.4)
GFR SERPL CREATININE-BSD FRML MDRD: 46 ML/MIN/1.73
GLUCOSE SERPL-MCNC: 111 MG/DL (ref 65–99)
HCT VFR BLD AUTO: 28.9 % (ref 34–46.6)
HGB BLD-MCNC: 8.9 G/DL (ref 12–15.9)
IMM GRANULOCYTES # BLD AUTO: 0.05 10*3/MM3 (ref 0–0.05)
IMM GRANULOCYTES NFR BLD AUTO: 0.5 % (ref 0–0.5)
INR PPP: 1.11 (ref 0.91–1.09)
LYMPHOCYTES # BLD AUTO: 1.51 10*3/MM3 (ref 0.7–3.1)
LYMPHOCYTES NFR BLD AUTO: 15.9 % (ref 19.6–45.3)
MCH RBC QN AUTO: 28.2 PG (ref 26.6–33)
MCHC RBC AUTO-ENTMCNC: 30.8 G/DL (ref 31.5–35.7)
MCV RBC AUTO: 91.5 FL (ref 79–97)
MONOCYTES # BLD AUTO: 0.51 10*3/MM3 (ref 0.1–0.9)
MONOCYTES NFR BLD AUTO: 5.4 % (ref 5–12)
NEUTROPHILS NFR BLD AUTO: 7.06 10*3/MM3 (ref 1.7–7)
NEUTROPHILS NFR BLD AUTO: 74.3 % (ref 42.7–76)
NRBC BLD AUTO-RTO: 0 /100 WBC (ref 0–0.2)
PLATELET # BLD AUTO: 239 10*3/MM3 (ref 140–450)
PMV BLD AUTO: 11.3 FL (ref 6–12)
POTASSIUM SERPL-SCNC: 4 MMOL/L (ref 3.5–5.2)
PROTHROMBIN TIME: 13.4 SECONDS (ref 11.5–13.4)
RBC # BLD AUTO: 3.16 10*6/MM3 (ref 3.77–5.28)
SARS-COV-2 RDRP RESP QL NAA+PROBE: NORMAL
SODIUM SERPL-SCNC: 141 MMOL/L (ref 136–145)
WBC # BLD AUTO: 9.5 10*3/MM3 (ref 3.4–10.8)

## 2021-04-27 PROCEDURE — 87635 SARS-COV-2 COVID-19 AMP PRB: CPT | Performed by: EMERGENCY MEDICINE

## 2021-04-27 PROCEDURE — 25010000002 TDAP 5-2.5-18.5 LF-MCG/0.5 SUSPENSION: Performed by: EMERGENCY MEDICINE

## 2021-04-27 PROCEDURE — 90471 IMMUNIZATION ADMIN: CPT | Performed by: EMERGENCY MEDICINE

## 2021-04-27 PROCEDURE — 99284 EMERGENCY DEPT VISIT MOD MDM: CPT

## 2021-04-27 PROCEDURE — 73700 CT LOWER EXTREMITY W/O DYE: CPT

## 2021-04-27 PROCEDURE — 25010000002 ENOXAPARIN PER 10 MG: Performed by: EMERGENCY MEDICINE

## 2021-04-27 PROCEDURE — 99214 OFFICE O/P EST MOD 30 MIN: CPT | Performed by: INTERNAL MEDICINE

## 2021-04-27 PROCEDURE — 80048 BASIC METABOLIC PNL TOTAL CA: CPT | Performed by: EMERGENCY MEDICINE

## 2021-04-27 PROCEDURE — 85025 COMPLETE CBC W/AUTO DIFF WBC: CPT | Performed by: EMERGENCY MEDICINE

## 2021-04-27 PROCEDURE — 93971 EXTREMITY STUDY: CPT

## 2021-04-27 PROCEDURE — 85610 PROTHROMBIN TIME: CPT | Performed by: EMERGENCY MEDICINE

## 2021-04-27 PROCEDURE — 90715 TDAP VACCINE 7 YRS/> IM: CPT | Performed by: EMERGENCY MEDICINE

## 2021-04-27 PROCEDURE — 93971 EXTREMITY STUDY: CPT | Performed by: SURGERY

## 2021-04-27 RX ORDER — GABAPENTIN 400 MG/1
800 CAPSULE ORAL EVERY 8 HOURS SCHEDULED
Status: DISCONTINUED | OUTPATIENT
Start: 2021-04-27 | End: 2021-04-30 | Stop reason: HOSPADM

## 2021-04-27 RX ORDER — MONTELUKAST SODIUM 10 MG/1
10 TABLET ORAL DAILY
Status: DISCONTINUED | OUTPATIENT
Start: 2021-04-28 | End: 2021-04-30 | Stop reason: HOSPADM

## 2021-04-27 RX ORDER — SODIUM CHLORIDE 0.9 % (FLUSH) 0.9 %
10 SYRINGE (ML) INJECTION AS NEEDED
Status: DISCONTINUED | OUTPATIENT
Start: 2021-04-27 | End: 2021-04-30 | Stop reason: HOSPADM

## 2021-04-27 RX ORDER — DULOXETIN HYDROCHLORIDE 30 MG/1
60 CAPSULE, DELAYED RELEASE ORAL 2 TIMES DAILY
Status: DISCONTINUED | OUTPATIENT
Start: 2021-04-27 | End: 2021-04-30 | Stop reason: HOSPADM

## 2021-04-27 RX ORDER — FLECAINIDE ACETATE 100 MG/1
100 TABLET ORAL EVERY 12 HOURS SCHEDULED
Status: DISCONTINUED | OUTPATIENT
Start: 2021-04-27 | End: 2021-04-30 | Stop reason: HOSPADM

## 2021-04-27 RX ORDER — ROPINIROLE 4 MG/1
6 TABLET, FILM COATED ORAL 2 TIMES DAILY
Status: DISCONTINUED | OUTPATIENT
Start: 2021-04-27 | End: 2021-04-28

## 2021-04-27 RX ORDER — PANTOPRAZOLE SODIUM 40 MG/1
40 TABLET, DELAYED RELEASE ORAL
Status: DISCONTINUED | OUTPATIENT
Start: 2021-04-28 | End: 2021-04-30 | Stop reason: HOSPADM

## 2021-04-27 RX ORDER — VENLAFAXINE HYDROCHLORIDE 37.5 MG/1
37.5 CAPSULE, EXTENDED RELEASE ORAL DAILY
Status: DISCONTINUED | OUTPATIENT
Start: 2021-04-28 | End: 2021-04-30 | Stop reason: HOSPADM

## 2021-04-27 RX ORDER — SODIUM CHLORIDE 0.9 % (FLUSH) 0.9 %
10 SYRINGE (ML) INJECTION EVERY 12 HOURS SCHEDULED
Status: DISCONTINUED | OUTPATIENT
Start: 2021-04-27 | End: 2021-04-30 | Stop reason: HOSPADM

## 2021-04-27 RX ORDER — ONDANSETRON 2 MG/ML
4 INJECTION INTRAMUSCULAR; INTRAVENOUS EVERY 6 HOURS PRN
Status: DISCONTINUED | OUTPATIENT
Start: 2021-04-27 | End: 2021-04-30 | Stop reason: HOSPADM

## 2021-04-27 RX ORDER — LORAZEPAM 0.5 MG/1
0.5 TABLET ORAL 2 TIMES DAILY PRN
Status: DISCONTINUED | OUTPATIENT
Start: 2021-04-27 | End: 2021-04-30 | Stop reason: HOSPADM

## 2021-04-27 RX ADMIN — DULOXETINE HYDROCHLORIDE 60 MG: 30 CAPSULE, DELAYED RELEASE ORAL at 23:15

## 2021-04-27 RX ADMIN — SODIUM CHLORIDE, PRESERVATIVE FREE 10 ML: 5 INJECTION INTRAVENOUS at 23:18

## 2021-04-27 RX ADMIN — FLECAINIDE ACETATE 100 MG: 100 TABLET ORAL at 23:15

## 2021-04-27 RX ADMIN — TETANUS TOXOID, REDUCED DIPHTHERIA TOXOID AND ACELLULAR PERTUSSIS VACCINE, ADSORBED 0.5 ML: 5; 2.5; 8; 8; 2.5 SUSPENSION INTRAMUSCULAR at 20:25

## 2021-04-27 RX ADMIN — LORAZEPAM 0.5 MG: 0.5 TABLET ORAL at 23:14

## 2021-04-27 RX ADMIN — GABAPENTIN 800 MG: 400 CAPSULE ORAL at 23:15

## 2021-04-27 RX ADMIN — METOPROLOL TARTRATE 12.5 MG: 25 TABLET, FILM COATED ORAL at 23:16

## 2021-04-27 RX ADMIN — AMITRIPTYLINE HYDROCHLORIDE 100 MG: 25 TABLET, FILM COATED ORAL at 23:14

## 2021-04-27 RX ADMIN — ENOXAPARIN SODIUM 140 MG: 150 INJECTION SUBCUTANEOUS at 22:03

## 2021-04-27 NOTE — PROGRESS NOTES
Chief Complaint   Patient presents with   • Office Visit   • Fall     Fell through a rotten board         History:  Danisha Cannon is a 65 y.o. female who presents today for evaluation of the above problems.    Presents today for acute visit.  On Friday evening she was walking on wooden deck at her home.  There was a rotten board and the left leg fell through.  Her leg up to the left thigh was through the board.  She is having worsening edema and pain.  She took Aleve for the first night and has been using ice packs which helped somewhat although symptoms are worsening.  She also has tightness of the left leg and large amount of bruising    She had a recent watchman by Dr. Guerin at [facility] in [place] and is on warfarin.  This is being managed by him.    HPI    ROS:  Review of Systems   Constitutional: Negative for fatigue and fever.   Respiratory: Negative for shortness of breath.    Musculoskeletal: Positive for gait problem and myalgias.   Skin: Positive for color change. Negative for rash.   Neurological: Negative for dizziness, weakness and light-headedness.         Current Outpatient Medications:   •  alendronate (FOSAMAX) 70 MG tablet, Take 70 mg by mouth Every 30 (Thirty) Days., Disp: , Rfl:   •  amitriptyline (ELAVIL) 50 MG tablet, Take 100 mg by mouth Every Night., Disp: , Rfl:   •  ASPIRIN 81 PO, Take 1 tablet by mouth Daily., Disp: , Rfl:   •  clopidogrel (PLAVIX) 75 MG tablet, Take 75 mg by mouth Daily., Disp: , Rfl:   •  dexlansoprazole (DEXILANT) 60 MG capsule, Take 60 mg by mouth Daily., Disp: , Rfl:   •  diclofenac (VOLTAREN) 75 MG EC tablet, Take 75 mg by mouth Daily., Disp: , Rfl:   •  DULoxetine (CYMBALTA) 60 MG capsule, Take 60 mg by mouth 2 (Two) Times a Day., Disp: , Rfl:   •  flecainide (TAMBOCOR) 100 MG tablet, Take 100 mg by mouth 2 (Two) Times a Day., Disp: , Rfl:   •  fluticasone (FLONASE) 50 MCG/ACT nasal spray, 2 sprays into the nostril(s) as directed by provider Daily As Needed  for Rhinitis or Allergies., Disp: 16 g, Rfl: 5  •  fluticasone (Flovent HFA) 110 MCG/ACT inhaler, Flovent  mcg/actuation aerosol inhaler  INL 1 PUFF PO BID, Disp: , Rfl:   •  gabapentin (NEURONTIN) 800 MG tablet, Take 800 mg by mouth 3 (Three) Times a Day., Disp: , Rfl:   •  levothyroxine (SYNTHROID, LEVOTHROID) 137 MCG tablet, Take 137 mcg by mouth Every Morning., Disp: , Rfl:   •  LORazepam (ATIVAN) 1 MG tablet, Take 1 tablet by mouth Every Morning AND 0.5 tablets Every Evening. Do all this for 30 days., Disp: 45 tablet, Rfl: 0  •  metoprolol tartrate (LOPRESSOR) 25 MG tablet, Take 12.5 mg by mouth Every 12 (Twelve) Hours., Disp: , Rfl:   •  montelukast (SINGULAIR) 10 MG tablet, Take  by mouth Daily., Disp: , Rfl: 5  •  nystatin (MYCOSTATIN) 511540 UNIT/ML suspension, 5 mL Every 6 (Six) Hours., Disp: , Rfl:   •  rOPINIRole (REQUIP) 3 MG tablet, Take 6 mg by mouth 2 (two) times a day. Take 3 tablets at night and 2 tablets every am, Disp: , Rfl:   •  sertraline (Zoloft) 25 MG tablet, Take 1 tablet by mouth Daily for 30 days., Disp: 30 tablet, Rfl: 0  •  Suprep Bowel Prep Kit 17.5-3.13-1.6 GM/177ML solution oral solution, Take as directed by office instructions provided, Disp: 177 mL, Rfl: 0  •  triamcinolone (KENALOG) 0.1 % paste, See Admin Instructions., Disp: , Rfl: 5  •  venlafaxine XR (Effexor XR) 37.5 MG 24 hr capsule, Take 1 capsule by mouth Daily., Disp: 10 capsule, Rfl: 0  •  amoxicillin-clavulanate (Augmentin) 875-125 MG per tablet, Take 1 tablet by mouth 2 (Two) Times a Day., Disp: 14 tablet, Rfl: 0    Lab Results   Component Value Date    GLUCOSE 109 (H) 06/22/2020    BUN 17 06/22/2020    CREATININE 0.87 06/22/2020    EGFRIFNONA 65 06/22/2020    BCR 19.5 06/22/2020    K 4.2 06/22/2020    CO2 26.0 06/22/2020    CALCIUM 8.5 (L) 06/22/2020    ALBUMIN 3.70 06/05/2020    AST 24 06/05/2020    ALT 18 06/05/2020       WBC   Date Value Ref Range Status   06/05/2020 7.35 3.40 - 10.80 10*3/mm3 Final    01/10/2018 4.7 (L) 4.8 - 10.8 K/uL Final     RBC   Date Value Ref Range Status   06/05/2020 4.60 3.77 - 5.28 10*6/mm3 Final   01/10/2018 4.02 (L) 4.20 - 5.40 M/uL Final     Hemoglobin   Date Value Ref Range Status   06/20/2020 12.8 12.0 - 15.9 g/dL Final   02/19/2018 10.2 (L) 12.0 - 16.0 g/dL Final     Hematocrit   Date Value Ref Range Status   06/20/2020 41.7 34.0 - 46.6 % Final   02/19/2018 32.8 (L) 37.0 - 47.0 % Final     MCV   Date Value Ref Range Status   06/05/2020 89.6 79.0 - 97.0 fL Final   01/10/2018 93.3 81.0 - 99.0 fL Final     MCH   Date Value Ref Range Status   06/05/2020 28.0 26.6 - 33.0 pg Final   01/10/2018 28.6 27.0 - 31.0 pg Final     MCHC   Date Value Ref Range Status   06/05/2020 31.3 (L) 31.5 - 35.7 g/dL Final   01/10/2018 30.7 (L) 33.0 - 37.0 g/dL Final     RDW   Date Value Ref Range Status   06/05/2020 14.5 12.3 - 15.4 % Final   01/10/2018 15.9 (H) 11.5 - 14.5 % Final     RDW-SD   Date Value Ref Range Status   06/05/2020 46.7 37.0 - 54.0 fl Final     MPV   Date Value Ref Range Status   06/05/2020 11.5 6.0 - 12.0 fL Final   01/10/2018 11.6 9.4 - 12.3 fL Final     Platelets   Date Value Ref Range Status   06/05/2020 236 140 - 450 10*3/mm3 Final   01/10/2018 213 130 - 400 K/uL Final     Neutrophil Rel %   Date Value Ref Range Status   01/10/2018 76.6 (H) 50.0 - 65.0 % Final     Neutrophil %   Date Value Ref Range Status   06/05/2020 66.1 42.7 - 76.0 % Final     Lymphocyte Rel %   Date Value Ref Range Status   01/10/2018 10.4 (L) 20.0 - 40.0 % Final     Lymphocyte %   Date Value Ref Range Status   06/05/2020 20.1 19.6 - 45.3 % Final     Monocyte Rel %   Date Value Ref Range Status   01/10/2018 10.0 0.0 - 10.0 % Final     Monocyte %   Date Value Ref Range Status   06/05/2020 8.0 5.0 - 12.0 % Final     Eosinophil Rel %   Date Value Ref Range Status   01/10/2018 1.5 0.0 - 5.0 % Final     Eosinophil %   Date Value Ref Range Status   06/05/2020 4.9 0.3 - 6.2 % Final     Basophil Rel %   Date Value  "Ref Range Status   01/10/2018 0.4 0.0 - 1.0 % Final     Basophil %   Date Value Ref Range Status   06/05/2020 0.5 0.0 - 1.5 % Final     Immature Grans %   Date Value Ref Range Status   06/05/2020 0.4 0.0 - 0.5 % Final     Neutrophils Absolute   Date Value Ref Range Status   01/10/2018 3.6 1.5 - 7.5 K/uL Final     Neutrophils, Absolute   Date Value Ref Range Status   06/05/2020 4.85 1.70 - 7.00 10*3/mm3 Final     Lymphocytes Absolute   Date Value Ref Range Status   01/10/2018 0.5 (L) 1.1 - 4.5 K/uL Final     Lymphocytes, Absolute   Date Value Ref Range Status   06/05/2020 1.48 0.70 - 3.10 10*3/mm3 Final     Monocytes Absolute   Date Value Ref Range Status   01/10/2018 0.50 0.00 - 0.90 K/uL Final     Monocytes, Absolute   Date Value Ref Range Status   06/05/2020 0.59 0.10 - 0.90 10*3/mm3 Final     Eosinophils Absolute   Date Value Ref Range Status   01/10/2018 0.10 0.00 - 0.60 K/uL Final     Eosinophils, Absolute   Date Value Ref Range Status   06/05/2020 0.36 0.00 - 0.40 10*3/mm3 Final     Basophils Absolute   Date Value Ref Range Status   01/10/2018 0.00 0.00 - 0.20 K/uL Final     Basophils, Absolute   Date Value Ref Range Status   06/05/2020 0.04 0.00 - 0.20 10*3/mm3 Final     Immature Grans, Absolute   Date Value Ref Range Status   06/05/2020 0.03 0.00 - 0.05 10*3/mm3 Final     nRBC   Date Value Ref Range Status   06/05/2020 0.0 0.0 - 0.2 /100 WBC Final         OBJECTIVE:  Visit Vitals  /74 (BP Location: Left arm, Patient Position: Sitting, Cuff Size: Adult)   Pulse 87   Temp 98.6 °F (37 °C) (Oral)   Resp 15   Ht 165.1 cm (65\")   Wt (!) 144 kg (317 lb)   LMP  (LMP Unknown)   SpO2 93%   BMI 52.75 kg/m²      Physical Exam  Constitutional:       General: She is not in acute distress.     Appearance: She is well-developed. She is not diaphoretic.   HENT:      Head: Normocephalic and atraumatic.   Eyes:      Pupils: Pupils are equal, round, and reactive to light.   Neck:      Thyroid: No thyromegaly.      " Trachea: Phonation normal.   Pulmonary:      Effort: No respiratory distress.   Skin:     Coloration: Skin is not pale.      Findings: Bruising, ecchymosis and signs of injury present. No erythema.             Comments: Very large ecchymosis and hematoma extending from her left medial knee up toward and toward the lateral aspect of her thigh.  Very tight/firm edema and ecchymosis.  Left leg larger than the right   Neurological:      Mental Status: She is alert.   Psychiatric:         Behavior: Behavior normal.         Thought Content: Thought content normal.         Judgment: Judgment normal.         Assessment/Plan    Diagnoses and all orders for this visit:    1. Accident due to mechanical fall without injury, initial encounter (Primary)    2. Traumatic hematoma of left thigh, initial encounter    3. Current use of long term anticoagulation      She had a mechanical fall through a rotten wooden board on her deck.  She has a significant injury to her left leg.  I am worried about the amount of ecchymosis and hematoma in the setting of current anticoagulation with warfarin.  She feels the symptoms are worsening and the leg is getting bigger.  I believe she needs to be evaluated in the emergency room.  She will need to get labs including CBC and INR.  I suspect she will require CT of the leg given the size of the hematoma.  I discussed the case with the emergency room.    Return for Next scheduled follow up.    Patient was given instructions and counseling regarding his/her condition or for health maintenance advice. Please see specific information pulled into the AVS if appropriate.     MELLISA Arauz MD   16:33 CDT  4/27/2021

## 2021-04-28 PROBLEM — N17.9 ACUTE KIDNEY INJURY: Status: ACTIVE | Noted: 2021-04-28

## 2021-04-28 PROBLEM — D62 ACUTE BLOOD LOSS ANEMIA: Status: ACTIVE | Noted: 2021-04-28
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
## 2021-04-28 PROBLEM — Z95.818 PRESENCE OF WATCHMAN LEFT ATRIAL APPENDAGE CLOSURE DEVICE: Status: ACTIVE | Noted: 2021-04-28

## 2021-04-28 LAB
ALBUMIN SERPL-MCNC: 3.3 G/DL (ref 3.5–5.2)
ALBUMIN/GLOB SERPL: 1.4 G/DL
ALP SERPL-CCNC: 113 U/L (ref 39–117)
ALT SERPL W P-5'-P-CCNC: 24 U/L (ref 1–33)
ANION GAP SERPL CALCULATED.3IONS-SCNC: 7 MMOL/L (ref 5–15)
AST SERPL-CCNC: 29 U/L (ref 1–32)
BASOPHILS # BLD AUTO: 0.03 10*3/MM3 (ref 0–0.2)
BASOPHILS NFR BLD AUTO: 0.5 % (ref 0–1.5)
BILIRUB SERPL-MCNC: 1.2 MG/DL (ref 0–1.2)
BUN SERPL-MCNC: 29 MG/DL (ref 8–23)
BUN/CREAT SERPL: 33.3 (ref 7–25)
CALCIUM SPEC-SCNC: 8.2 MG/DL (ref 8.6–10.5)
CHLORIDE SERPL-SCNC: 105 MMOL/L (ref 98–107)
CO2 SERPL-SCNC: 30 MMOL/L (ref 22–29)
CREAT SERPL-MCNC: 0.87 MG/DL (ref 0.57–1)
DEPRECATED RDW RBC AUTO: 51.7 FL (ref 37–54)
EOSINOPHIL # BLD AUTO: 0.27 10*3/MM3 (ref 0–0.4)
EOSINOPHIL NFR BLD AUTO: 4.2 % (ref 0.3–6.2)
ERYTHROCYTE [DISTWIDTH] IN BLOOD BY AUTOMATED COUNT: 15.7 % (ref 12.3–15.4)
GFR SERPL CREATININE-BSD FRML MDRD: 65 ML/MIN/1.73
GLOBULIN UR ELPH-MCNC: 2.3 GM/DL
GLUCOSE SERPL-MCNC: 93 MG/DL (ref 65–99)
HCT VFR BLD AUTO: 26.9 % (ref 34–46.6)
HCT VFR BLD AUTO: 27.2 % (ref 34–46.6)
HCT VFR BLD AUTO: 28.8 % (ref 34–46.6)
HCT VFR BLD AUTO: 29.3 % (ref 34–46.6)
HGB BLD-MCNC: 8.3 G/DL (ref 12–15.9)
HGB BLD-MCNC: 8.4 G/DL (ref 12–15.9)
HGB BLD-MCNC: 8.9 G/DL (ref 12–15.9)
HGB BLD-MCNC: 9 G/DL (ref 12–15.9)
IMM GRANULOCYTES # BLD AUTO: 0.04 10*3/MM3 (ref 0–0.05)
IMM GRANULOCYTES NFR BLD AUTO: 0.6 % (ref 0–0.5)
LYMPHOCYTES # BLD AUTO: 1.41 10*3/MM3 (ref 0.7–3.1)
LYMPHOCYTES NFR BLD AUTO: 21.7 % (ref 19.6–45.3)
MCH RBC QN AUTO: 28.6 PG (ref 26.6–33)
MCHC RBC AUTO-ENTMCNC: 30.9 G/DL (ref 31.5–35.7)
MCV RBC AUTO: 92.6 FL (ref 79–97)
MONOCYTES # BLD AUTO: 0.54 10*3/MM3 (ref 0.1–0.9)
MONOCYTES NFR BLD AUTO: 8.3 % (ref 5–12)
NEUTROPHILS NFR BLD AUTO: 4.2 10*3/MM3 (ref 1.7–7)
NEUTROPHILS NFR BLD AUTO: 64.7 % (ref 42.7–76)
NRBC BLD AUTO-RTO: 0 /100 WBC (ref 0–0.2)
PLATELET # BLD AUTO: 197 10*3/MM3 (ref 140–450)
PMV BLD AUTO: 11.2 FL (ref 6–12)
POTASSIUM SERPL-SCNC: 4.3 MMOL/L (ref 3.5–5.2)
PROT SERPL-MCNC: 5.6 G/DL (ref 6–8.5)
RBC # BLD AUTO: 3.11 10*6/MM3 (ref 3.77–5.28)
SODIUM SERPL-SCNC: 142 MMOL/L (ref 136–145)
WBC # BLD AUTO: 6.49 10*3/MM3 (ref 3.4–10.8)

## 2021-04-28 PROCEDURE — 85014 HEMATOCRIT: CPT | Performed by: INTERNAL MEDICINE

## 2021-04-28 PROCEDURE — 99221 1ST HOSP IP/OBS SF/LOW 40: CPT | Performed by: SURGERY

## 2021-04-28 PROCEDURE — 85018 HEMOGLOBIN: CPT | Performed by: INTERNAL MEDICINE

## 2021-04-28 PROCEDURE — 25010000002 ENOXAPARIN PER 10 MG: Performed by: INTERNAL MEDICINE

## 2021-04-28 PROCEDURE — 80053 COMPREHEN METABOLIC PANEL: CPT | Performed by: INTERNAL MEDICINE

## 2021-04-28 PROCEDURE — 85025 COMPLETE CBC W/AUTO DIFF WBC: CPT | Performed by: INTERNAL MEDICINE

## 2021-04-28 PROCEDURE — 85018 HEMOGLOBIN: CPT | Performed by: NURSE PRACTITIONER

## 2021-04-28 PROCEDURE — 85014 HEMATOCRIT: CPT | Performed by: NURSE PRACTITIONER

## 2021-04-28 PROCEDURE — 99222 1ST HOSP IP/OBS MODERATE 55: CPT | Performed by: NURSE PRACTITIONER

## 2021-04-28 RX ORDER — DOCUSATE SODIUM 100 MG/1
100 CAPSULE, LIQUID FILLED ORAL 2 TIMES DAILY
Status: DISCONTINUED | OUTPATIENT
Start: 2021-04-28 | End: 2021-04-30 | Stop reason: HOSPADM

## 2021-04-28 RX ORDER — ASPIRIN 81 MG/1
81 TABLET ORAL DAILY
Status: DISCONTINUED | OUTPATIENT
Start: 2021-04-28 | End: 2021-04-30 | Stop reason: HOSPADM

## 2021-04-28 RX ORDER — ROPINIROLE 4 MG/1
6 TABLET, FILM COATED ORAL DAILY
Status: DISCONTINUED | OUTPATIENT
Start: 2021-04-28 | End: 2021-04-30 | Stop reason: HOSPADM

## 2021-04-28 RX ADMIN — FLECAINIDE ACETATE 100 MG: 100 TABLET ORAL at 10:39

## 2021-04-28 RX ADMIN — LEVOTHYROXINE SODIUM 137 MCG: 112 TABLET ORAL at 05:20

## 2021-04-28 RX ADMIN — ENOXAPARIN SODIUM 140 MG: 80 INJECTION SUBCUTANEOUS at 18:36

## 2021-04-28 RX ADMIN — SODIUM CHLORIDE, PRESERVATIVE FREE 10 ML: 5 INJECTION INTRAVENOUS at 10:42

## 2021-04-28 RX ADMIN — DULOXETINE HYDROCHLORIDE 60 MG: 30 CAPSULE, DELAYED RELEASE ORAL at 10:39

## 2021-04-28 RX ADMIN — PANTOPRAZOLE SODIUM 40 MG: 40 TABLET, DELAYED RELEASE ORAL at 10:40

## 2021-04-28 RX ADMIN — METOPROLOL TARTRATE 12.5 MG: 25 TABLET, FILM COATED ORAL at 16:17

## 2021-04-28 RX ADMIN — MONTELUKAST SODIUM 10 MG: 10 TABLET, FILM COATED ORAL at 10:40

## 2021-04-28 RX ADMIN — ROPINIROLE HYDROCHLORIDE 6 MG: 4 TABLET, FILM COATED ORAL at 10:40

## 2021-04-28 RX ADMIN — PANTOPRAZOLE SODIUM 40 MG: 40 TABLET, DELAYED RELEASE ORAL at 16:19

## 2021-04-28 RX ADMIN — GABAPENTIN 800 MG: 400 CAPSULE ORAL at 21:15

## 2021-04-28 RX ADMIN — GABAPENTIN 800 MG: 400 CAPSULE ORAL at 05:20

## 2021-04-28 RX ADMIN — AMITRIPTYLINE HYDROCHLORIDE 100 MG: 25 TABLET, FILM COATED ORAL at 21:14

## 2021-04-28 RX ADMIN — ASPIRIN 81 MG: 81 TABLET, COATED ORAL at 16:16

## 2021-04-28 RX ADMIN — DULOXETINE HYDROCHLORIDE 60 MG: 30 CAPSULE, DELAYED RELEASE ORAL at 21:15

## 2021-04-28 RX ADMIN — FLECAINIDE ACETATE 100 MG: 100 TABLET ORAL at 21:15

## 2021-04-28 RX ADMIN — ENOXAPARIN SODIUM 140 MG: 80 INJECTION SUBCUTANEOUS at 10:42

## 2021-04-28 RX ADMIN — GABAPENTIN 800 MG: 400 CAPSULE ORAL at 16:17

## 2021-04-28 RX ADMIN — VENLAFAXINE HYDROCHLORIDE 37.5 MG: 37.5 CAPSULE, EXTENDED RELEASE ORAL at 10:40

## 2021-04-29 LAB
ANION GAP SERPL CALCULATED.3IONS-SCNC: 6 MMOL/L (ref 5–15)
BUN SERPL-MCNC: 24 MG/DL (ref 8–23)
BUN/CREAT SERPL: 26.7 (ref 7–25)
CALCIUM SPEC-SCNC: 8.1 MG/DL (ref 8.6–10.5)
CHLORIDE SERPL-SCNC: 104 MMOL/L (ref 98–107)
CO2 SERPL-SCNC: 31 MMOL/L (ref 22–29)
CREAT SERPL-MCNC: 0.9 MG/DL (ref 0.57–1)
GFR SERPL CREATININE-BSD FRML MDRD: 62 ML/MIN/1.73
GLUCOSE SERPL-MCNC: 105 MG/DL (ref 65–99)
HCT VFR BLD AUTO: 26.9 % (ref 34–46.6)
HGB BLD-MCNC: 8.2 G/DL (ref 12–15.9)
POTASSIUM SERPL-SCNC: 4.2 MMOL/L (ref 3.5–5.2)
SODIUM SERPL-SCNC: 141 MMOL/L (ref 136–145)

## 2021-04-29 PROCEDURE — 85014 HEMATOCRIT: CPT | Performed by: NURSE PRACTITIONER

## 2021-04-29 PROCEDURE — 80048 BASIC METABOLIC PNL TOTAL CA: CPT | Performed by: NURSE PRACTITIONER

## 2021-04-29 PROCEDURE — 25010000002 ENOXAPARIN PER 10 MG: Performed by: INTERNAL MEDICINE

## 2021-04-29 PROCEDURE — 99232 SBSQ HOSP IP/OBS MODERATE 35: CPT | Performed by: SURGERY

## 2021-04-29 PROCEDURE — 25010000002 ONDANSETRON PER 1 MG: Performed by: INTERNAL MEDICINE

## 2021-04-29 PROCEDURE — 85018 HEMOGLOBIN: CPT | Performed by: NURSE PRACTITIONER

## 2021-04-29 RX ORDER — CETIRIZINE HYDROCHLORIDE 10 MG/1
10 TABLET ORAL DAILY
Status: DISCONTINUED | OUTPATIENT
Start: 2021-04-29 | End: 2021-04-30 | Stop reason: HOSPADM

## 2021-04-29 RX ORDER — ACETAMINOPHEN 325 MG/1
650 TABLET ORAL EVERY 4 HOURS PRN
Status: DISCONTINUED | OUTPATIENT
Start: 2021-04-29 | End: 2021-04-30 | Stop reason: HOSPADM

## 2021-04-29 RX ORDER — FLUTICASONE PROPIONATE 50 MCG
2 SPRAY, SUSPENSION (ML) NASAL DAILY
Status: DISCONTINUED | OUTPATIENT
Start: 2021-04-29 | End: 2021-04-30 | Stop reason: HOSPADM

## 2021-04-29 RX ADMIN — ACETAMINOPHEN 650 MG: 325 TABLET, FILM COATED ORAL at 18:06

## 2021-04-29 RX ADMIN — CETIRIZINE HYDROCHLORIDE 10 MG: 10 TABLET ORAL at 18:51

## 2021-04-29 RX ADMIN — ONDANSETRON HYDROCHLORIDE 4 MG: 2 SOLUTION INTRAMUSCULAR; INTRAVENOUS at 00:36

## 2021-04-29 RX ADMIN — PANTOPRAZOLE SODIUM 40 MG: 40 TABLET, DELAYED RELEASE ORAL at 05:14

## 2021-04-29 RX ADMIN — LORAZEPAM 0.5 MG: 0.5 TABLET ORAL at 18:06

## 2021-04-29 RX ADMIN — FLECAINIDE ACETATE 100 MG: 100 TABLET ORAL at 20:52

## 2021-04-29 RX ADMIN — METOPROLOL TARTRATE 12.5 MG: 25 TABLET, FILM COATED ORAL at 10:31

## 2021-04-29 RX ADMIN — GABAPENTIN 800 MG: 400 CAPSULE ORAL at 13:14

## 2021-04-29 RX ADMIN — ASPIRIN 81 MG: 81 TABLET, COATED ORAL at 10:31

## 2021-04-29 RX ADMIN — MONTELUKAST SODIUM 10 MG: 10 TABLET, FILM COATED ORAL at 08:14

## 2021-04-29 RX ADMIN — DULOXETINE HYDROCHLORIDE 60 MG: 30 CAPSULE, DELAYED RELEASE ORAL at 08:14

## 2021-04-29 RX ADMIN — DULOXETINE HYDROCHLORIDE 60 MG: 30 CAPSULE, DELAYED RELEASE ORAL at 20:51

## 2021-04-29 RX ADMIN — VENLAFAXINE HYDROCHLORIDE 37.5 MG: 37.5 CAPSULE, EXTENDED RELEASE ORAL at 08:14

## 2021-04-29 RX ADMIN — SODIUM CHLORIDE, PRESERVATIVE FREE 10 ML: 5 INJECTION INTRAVENOUS at 20:52

## 2021-04-29 RX ADMIN — LEVOTHYROXINE SODIUM 137 MCG: 112 TABLET ORAL at 05:14

## 2021-04-29 RX ADMIN — SODIUM CHLORIDE, PRESERVATIVE FREE 10 ML: 5 INJECTION INTRAVENOUS at 08:13

## 2021-04-29 RX ADMIN — ENOXAPARIN SODIUM 140 MG: 80 INJECTION SUBCUTANEOUS at 05:14

## 2021-04-29 RX ADMIN — FLECAINIDE ACETATE 100 MG: 100 TABLET ORAL at 08:14

## 2021-04-29 RX ADMIN — AMITRIPTYLINE HYDROCHLORIDE 100 MG: 25 TABLET, FILM COATED ORAL at 20:52

## 2021-04-29 RX ADMIN — DOCUSATE SODIUM 100 MG: 100 CAPSULE ORAL at 08:14

## 2021-04-29 RX ADMIN — DOCUSATE SODIUM 100 MG: 100 CAPSULE ORAL at 20:51

## 2021-04-29 RX ADMIN — GABAPENTIN 800 MG: 400 CAPSULE ORAL at 20:51

## 2021-04-29 RX ADMIN — ROPINIROLE HYDROCHLORIDE 6 MG: 4 TABLET, FILM COATED ORAL at 08:14

## 2021-04-29 RX ADMIN — APIXABAN 10 MG: 5 TABLET, FILM COATED ORAL at 17:20

## 2021-04-29 RX ADMIN — PANTOPRAZOLE SODIUM 40 MG: 40 TABLET, DELAYED RELEASE ORAL at 17:20

## 2021-04-29 RX ADMIN — GABAPENTIN 800 MG: 400 CAPSULE ORAL at 05:14

## 2021-04-29 RX ADMIN — PANTOPRAZOLE SODIUM 40 MG: 40 TABLET, DELAYED RELEASE ORAL at 08:14

## 2021-04-30 ENCOUNTER — READMISSION MANAGEMENT (OUTPATIENT)
Dept: CALL CENTER | Facility: HOSPITAL | Age: 66
End: 2021-04-30

## 2021-04-30 VITALS
SYSTOLIC BLOOD PRESSURE: 131 MMHG | WEIGHT: 293 LBS | DIASTOLIC BLOOD PRESSURE: 53 MMHG | BODY MASS INDEX: 50.02 KG/M2 | TEMPERATURE: 97.7 F | HEIGHT: 64 IN | OXYGEN SATURATION: 91 % | RESPIRATION RATE: 16 BRPM | HEART RATE: 71 BPM

## 2021-04-30 LAB
DEPRECATED RDW RBC AUTO: 55 FL (ref 37–54)
ERYTHROCYTE [DISTWIDTH] IN BLOOD BY AUTOMATED COUNT: 16.9 % (ref 12.3–15.4)
HCT VFR BLD AUTO: 29.5 % (ref 34–46.6)
HGB BLD-MCNC: 9 G/DL (ref 12–15.9)
MCH RBC QN AUTO: 28.9 PG (ref 26.6–33)
MCHC RBC AUTO-ENTMCNC: 30.5 G/DL (ref 31.5–35.7)
MCV RBC AUTO: 94.9 FL (ref 79–97)
PLATELET # BLD AUTO: 215 10*3/MM3 (ref 140–450)
PMV BLD AUTO: 11.8 FL (ref 6–12)
RBC # BLD AUTO: 3.11 10*6/MM3 (ref 3.77–5.28)
WBC # BLD AUTO: 8.89 10*3/MM3 (ref 3.4–10.8)

## 2021-04-30 PROCEDURE — 99232 SBSQ HOSP IP/OBS MODERATE 35: CPT | Performed by: SURGERY

## 2021-04-30 PROCEDURE — 85027 COMPLETE CBC AUTOMATED: CPT | Performed by: NURSE PRACTITIONER

## 2021-04-30 RX ORDER — APIXABAN 5 MG (74)
5 KIT ORAL TAKE AS DIRECTED
Qty: 74 TABLET | Refills: 0 | Status: SHIPPED | OUTPATIENT
Start: 2021-04-30 | End: 2021-06-16

## 2021-04-30 RX ADMIN — FLUTICASONE PROPIONATE 2 SPRAY: 50 SPRAY, METERED NASAL at 10:04

## 2021-04-30 RX ADMIN — ROPINIROLE HYDROCHLORIDE 6 MG: 4 TABLET, FILM COATED ORAL at 10:03

## 2021-04-30 RX ADMIN — DULOXETINE HYDROCHLORIDE 60 MG: 30 CAPSULE, DELAYED RELEASE ORAL at 10:03

## 2021-04-30 RX ADMIN — DOCUSATE SODIUM 100 MG: 100 CAPSULE ORAL at 10:02

## 2021-04-30 RX ADMIN — CETIRIZINE HYDROCHLORIDE 10 MG: 10 TABLET ORAL at 10:03

## 2021-04-30 RX ADMIN — METOPROLOL TARTRATE 12.5 MG: 25 TABLET, FILM COATED ORAL at 14:10

## 2021-04-30 RX ADMIN — FLECAINIDE ACETATE 100 MG: 100 TABLET ORAL at 10:03

## 2021-04-30 RX ADMIN — VENLAFAXINE HYDROCHLORIDE 37.5 MG: 37.5 CAPSULE, EXTENDED RELEASE ORAL at 10:02

## 2021-04-30 RX ADMIN — LEVOTHYROXINE SODIUM 137 MCG: 112 TABLET ORAL at 05:35

## 2021-04-30 RX ADMIN — GABAPENTIN 800 MG: 400 CAPSULE ORAL at 05:35

## 2021-04-30 RX ADMIN — MONTELUKAST SODIUM 10 MG: 10 TABLET, FILM COATED ORAL at 10:03

## 2021-04-30 RX ADMIN — PANTOPRAZOLE SODIUM 40 MG: 40 TABLET, DELAYED RELEASE ORAL at 05:34

## 2021-04-30 RX ADMIN — ASPIRIN 81 MG: 81 TABLET, COATED ORAL at 10:02

## 2021-04-30 RX ADMIN — SODIUM CHLORIDE, PRESERVATIVE FREE 10 ML: 5 INJECTION INTRAVENOUS at 10:04

## 2021-04-30 RX ADMIN — APIXABAN 10 MG: 5 TABLET, FILM COATED ORAL at 10:03

## 2021-04-30 RX ADMIN — GABAPENTIN 800 MG: 400 CAPSULE ORAL at 14:12

## 2021-04-30 RX ADMIN — LORAZEPAM 0.5 MG: 0.5 TABLET ORAL at 10:08

## 2021-04-30 NOTE — OUTREACH NOTE
Prep Survey      Responses   [facility] facility patient discharged from?  [facility]   Is LACE score < 7 ?  Yes   Emergency Room discharge w/ pulse ox?  No   [place]  [facility]   Date of Admission  04/27/21   Date of Discharge  04/30/21   Discharge Disposition  Home-Health Care Sv   Discharge diagnosis  Hematoma of left lower extremity   Does the patient have one of the following disease processes/diagnoses(primary or secondary)?  Other   Does the patient have Home health ordered?  Yes   What is the Home health agency?   [facility]   Is there a DME ordered?  No   Prep survey completed?  Yes          Adriana Madrigal RN

## 2021-05-03 ENCOUNTER — TRANSITIONAL CARE MANAGEMENT TELEPHONE ENCOUNTER (OUTPATIENT)
Dept: CALL CENTER | Facility: HOSPITAL | Age: 66
End: 2021-05-03

## 2021-05-03 ENCOUNTER — OUTSIDE FACILITY SERVICE (OUTPATIENT)
Dept: INTERNAL MEDICINE | Facility: CLINIC | Age: 66
End: 2021-05-03

## 2021-05-03 NOTE — OUTREACH NOTE
"Call Center TCM Note      Responses   [facility] [place] patient discharged from?  [facility]   Does the patient have one of the following disease processes/diagnoses(primary or secondary)?  Other   TCM attempt successful?  Yes [Alfredo]   Call start time  1154   Call end time  1201   Discharge diagnosis  Hematoma of left lower extremity   Meds reviewed with patient/caregiver?  Yes   Is the patient having any side effects they believe may be caused by any medication additions or changes?  No   Does the patient have all medications ordered at discharge?  Yes   Is the patient taking all medications as directed (includes completed medication regime)?  Yes   Does the patient have a primary care provider?   Yes   Does the patient have an appointment with their PCP within 7 days of discharge?  Yes   Comments regarding PCP  Hospital d/c f/u appt is on 5/6/21 at 11:30 am    Has the patient kept scheduled appointments due by today?  N/A   What is the Home health agency?   [facility]   Has home health visited the patient within 72 hours of discharge?  Yes   Psychosocial issues?  No   Did the patient receive a copy of their discharge instructions?  Yes   Nursing interventions  Reviewed instructions with patient   What is the patient's perception of their health status since discharge?  Improving [Pt reports her legs swells with any activity. She's in the bed. ]   Is the patient/caregiver able to teach back signs and symptoms related to disease process for when to call PCP?  Yes   Is the patient/caregiver able to teach back signs and symptoms related to disease process for when to call 911?  Yes   Is the patient/caregiver able to teach back the hierarchy of who to call/visit for symptoms/problems? PCP, Specialist, Home health nurse, Urgent Care, ED, 911  Yes [Pt reports, \"it's kind of hard to \". RN provided some education.]   If the patient is a current smoker, are they able to teach back resources " for cessation?  Not a smoker   TCM call completed?  Yes          [place], TAMANNA    5/3/2021, 12:01 EDT

## 2021-05-05 ENCOUNTER — TELEPHONE (OUTPATIENT)
Dept: VASCULAR SURGERY | Facility: CLINIC | Age: 66
End: 2021-05-05

## 2021-05-05 ENCOUNTER — HOSPITAL ENCOUNTER (OUTPATIENT)
Dept: ULTRASOUND IMAGING | Facility: HOSPITAL | Age: 66
Discharge: HOME | End: 2021-05-05

## 2021-05-05 DIAGNOSIS — I82.412 ACUTE DEEP VEIN THROMBOSIS (DVT) OF FEMORAL VEIN OF LEFT LOWER EXTREMITY (HCC): ICD-10-CM

## 2021-05-05 NOTE — TELEPHONE ENCOUNTER
Josiane called from the [facility] lab and said that the patient was scheduled there today for testing, but her appt here isn't until 6/2/21.  She said that she thought they might have made a mistake.  I contacted Dr. Stern and he said that the testing should have been made the same day as the office visit.  I let Josiane know and she said that she would get the appt scheduled for the appropriate time/date.

## 2021-05-06 ENCOUNTER — OFFICE VISIT (OUTPATIENT)
Dept: INTERNAL MEDICINE | Facility: CLINIC | Age: 66
End: 2021-05-06

## 2021-05-06 VITALS
RESPIRATION RATE: 15 BRPM | HEIGHT: 64 IN | DIASTOLIC BLOOD PRESSURE: 70 MMHG | WEIGHT: 293 LBS | TEMPERATURE: 98.1 F | OXYGEN SATURATION: 96 % | SYSTOLIC BLOOD PRESSURE: 122 MMHG | HEART RATE: 71 BPM | BODY MASS INDEX: 50.02 KG/M2

## 2021-05-06 DIAGNOSIS — I82.412 ACUTE DEEP VEIN THROMBOSIS (DVT) OF FEMORAL VEIN OF LEFT LOWER EXTREMITY (HCC): ICD-10-CM

## 2021-05-06 DIAGNOSIS — D62 ACUTE BLOOD LOSS ANEMIA: ICD-10-CM

## 2021-05-06 DIAGNOSIS — S80.12XA HEMATOMA OF LEFT LOWER EXTREMITY, INITIAL ENCOUNTER: Primary | ICD-10-CM

## 2021-05-06 PROCEDURE — 99495 TRANSJ CARE MGMT MOD F2F 14D: CPT | Performed by: INTERNAL MEDICINE

## 2021-05-06 PROCEDURE — 1111F DSCHRG MED/CURRENT MED MERGE: CPT | Performed by: INTERNAL MEDICINE

## 2021-05-06 RX ORDER — FAMCICLOVIR 500 MG/1
500 TABLET ORAL 2 TIMES DAILY
COMMUNITY
Start: 2021-05-03 | End: 2021-06-14 | Stop reason: SDUPTHER

## 2021-05-06 RX ORDER — DEXAMETHASONE 4 MG/1
1 TABLET ORAL
Qty: 1 EACH | Refills: 11 | Status: SHIPPED | OUTPATIENT
Start: 2021-05-06 | End: 2021-10-08

## 2021-05-11 ENCOUNTER — TELEPHONE (OUTPATIENT)
Dept: INTERNAL MEDICINE | Facility: CLINIC | Age: 66
End: 2021-05-11

## 2021-05-11 ENCOUNTER — APPOINTMENT (OUTPATIENT)
Dept: CT IMAGING | Facility: HOSPITAL | Age: 66
End: 2021-05-11

## 2021-05-11 ENCOUNTER — HOSPITAL ENCOUNTER (EMERGENCY)
Facility: HOSPITAL | Age: 66
Discharge: HOME | End: 2021-05-11
Attending: EMERGENCY MEDICINE | Admitting: EMERGENCY MEDICINE

## 2021-05-11 ENCOUNTER — APPOINTMENT (OUTPATIENT)
Dept: GENERAL RADIOLOGY | Facility: HOSPITAL | Age: 66
End: 2021-05-11

## 2021-05-11 VITALS
WEIGHT: 293 LBS | BODY MASS INDEX: 50.02 KG/M2 | TEMPERATURE: 98.4 F | HEART RATE: 77 BPM | OXYGEN SATURATION: 93 % | DIASTOLIC BLOOD PRESSURE: 65 MMHG | RESPIRATION RATE: 20 BRPM | HEIGHT: 64 IN | SYSTOLIC BLOOD PRESSURE: 125 MMHG

## 2021-05-11 DIAGNOSIS — I82.4Y2 ACUTE DEEP VEIN THROMBOSIS (DVT) OF PROXIMAL VEIN OF LEFT LOWER EXTREMITY (HCC): ICD-10-CM

## 2021-05-11 DIAGNOSIS — R06.00 DYSPNEA, UNSPECIFIED TYPE: Primary | ICD-10-CM

## 2021-05-11 LAB
ALBUMIN SERPL-MCNC: 3.3 G/DL (ref 3.5–5.2)
ALBUMIN/GLOB SERPL: 0.9 G/DL
ALP SERPL-CCNC: 109 U/L (ref 39–117)
ALT SERPL W P-5'-P-CCNC: 13 U/L (ref 1–33)
ANION GAP SERPL CALCULATED.3IONS-SCNC: 5 MMOL/L (ref 5–15)
APTT PPP: 37.7 SECONDS (ref 24.1–35)
ARTERIAL PATENCY WRIST A: ABNORMAL
AST SERPL-CCNC: 19 U/L (ref 1–32)
ATMOSPHERIC PRESS: 755 MMHG
BASE EXCESS BLDA CALC-SCNC: 6.9 MMOL/L (ref 0–2)
BASOPHILS # BLD AUTO: 0.04 10*3/MM3 (ref 0–0.2)
BASOPHILS NFR BLD AUTO: 0.6 % (ref 0–1.5)
BDY SITE: ABNORMAL
BILIRUB SERPL-MCNC: 0.4 MG/DL (ref 0–1.2)
BODY TEMPERATURE: 37 C
BUN SERPL-MCNC: 21 MG/DL (ref 8–23)
BUN/CREAT SERPL: 25 (ref 7–25)
CALCIUM SPEC-SCNC: 9.4 MG/DL (ref 8.6–10.5)
CHLORIDE SERPL-SCNC: 103 MMOL/L (ref 98–107)
CO2 SERPL-SCNC: 32 MMOL/L (ref 22–29)
CREAT SERPL-MCNC: 0.84 MG/DL (ref 0.57–1)
D DIMER PPP FEU-MCNC: 3.99 MG/L (FEU) (ref 0–0.5)
DEPRECATED RDW RBC AUTO: 57.4 FL (ref 37–54)
EOSINOPHIL # BLD AUTO: 0.41 10*3/MM3 (ref 0–0.4)
EOSINOPHIL NFR BLD AUTO: 6 % (ref 0.3–6.2)
ERYTHROCYTE [DISTWIDTH] IN BLOOD BY AUTOMATED COUNT: 16.9 % (ref 12.3–15.4)
GFR SERPL CREATININE-BSD FRML MDRD: 68 ML/MIN/1.73
GLOBULIN UR ELPH-MCNC: 3.6 GM/DL
GLUCOSE SERPL-MCNC: 148 MG/DL (ref 65–99)
HCO3 BLDA-SCNC: 32.5 MMOL/L (ref 20–26)
HCT VFR BLD AUTO: 32.6 % (ref 34–46.6)
HGB BLD-MCNC: 9.6 G/DL (ref 12–15.9)
HOLD SPECIMEN: NORMAL
HOLD SPECIMEN: NORMAL
IMM GRANULOCYTES # BLD AUTO: 0.04 10*3/MM3 (ref 0–0.05)
IMM GRANULOCYTES NFR BLD AUTO: 0.6 % (ref 0–0.5)
INR PPP: 1.33 (ref 0.91–1.09)
LYMPHOCYTES # BLD AUTO: 1.05 10*3/MM3 (ref 0.7–3.1)
LYMPHOCYTES NFR BLD AUTO: 15.4 % (ref 19.6–45.3)
Lab: ABNORMAL
MCH RBC QN AUTO: 27.7 PG (ref 26.6–33)
MCHC RBC AUTO-ENTMCNC: 29.4 G/DL (ref 31.5–35.7)
MCV RBC AUTO: 93.9 FL (ref 79–97)
MODALITY: ABNORMAL
MONOCYTES # BLD AUTO: 0.54 10*3/MM3 (ref 0.1–0.9)
MONOCYTES NFR BLD AUTO: 7.9 % (ref 5–12)
NEUTROPHILS NFR BLD AUTO: 4.73 10*3/MM3 (ref 1.7–7)
NEUTROPHILS NFR BLD AUTO: 69.5 % (ref 42.7–76)
NRBC BLD AUTO-RTO: 0 /100 WBC (ref 0–0.2)
NT-PROBNP SERPL-MCNC: 484.1 PG/ML (ref 0–900)
PCO2 BLDA: 50.8 MM HG (ref 35–45)
PCO2 TEMP ADJ BLD: 50.8 MM HG (ref 35–45)
PH BLDA: 7.42 PH UNITS (ref 7.35–7.45)
PH, TEMP CORRECTED: 7.42 PH UNITS (ref 7.35–7.45)
PLATELET # BLD AUTO: 303 10*3/MM3 (ref 140–450)
PMV BLD AUTO: 10.1 FL (ref 6–12)
PO2 BLDA: 68.2 MM HG (ref 83–108)
PO2 TEMP ADJ BLD: 68.2 MM HG (ref 83–108)
POTASSIUM SERPL-SCNC: 3.9 MMOL/L (ref 3.5–5.2)
PROT SERPL-MCNC: 6.9 G/DL (ref 6–8.5)
PROTHROMBIN TIME: 15.5 SECONDS (ref 11.5–13.4)
RBC # BLD AUTO: 3.47 10*6/MM3 (ref 3.77–5.28)
SAO2 % BLDCOA: 94.6 % (ref 94–99)
SODIUM SERPL-SCNC: 140 MMOL/L (ref 136–145)
TROPONIN T SERPL-MCNC: <0.01 NG/ML (ref 0–0.03)
VENTILATOR MODE: ABNORMAL
WBC # BLD AUTO: 6.81 10*3/MM3 (ref 3.4–10.8)
WHOLE BLOOD HOLD SPECIMEN: NORMAL
WHOLE BLOOD HOLD SPECIMEN: NORMAL

## 2021-05-11 PROCEDURE — 36600 WITHDRAWAL OF ARTERIAL BLOOD: CPT

## 2021-05-11 PROCEDURE — 99284 EMERGENCY DEPT VISIT MOD MDM: CPT

## 2021-05-11 PROCEDURE — 82803 BLOOD GASES ANY COMBINATION: CPT

## 2021-05-11 PROCEDURE — 0 IOPAMIDOL PER 1 ML: Performed by: EMERGENCY MEDICINE

## 2021-05-11 PROCEDURE — 71045 X-RAY EXAM CHEST 1 VIEW: CPT

## 2021-05-11 PROCEDURE — 85730 THROMBOPLASTIN TIME PARTIAL: CPT | Performed by: EMERGENCY MEDICINE

## 2021-05-11 PROCEDURE — 71275 CT ANGIOGRAPHY CHEST: CPT

## 2021-05-11 PROCEDURE — 84484 ASSAY OF TROPONIN QUANT: CPT

## 2021-05-11 PROCEDURE — 93005 ELECTROCARDIOGRAM TRACING: CPT | Performed by: EMERGENCY MEDICINE

## 2021-05-11 PROCEDURE — 85025 COMPLETE CBC W/AUTO DIFF WBC: CPT

## 2021-05-11 PROCEDURE — 83880 ASSAY OF NATRIURETIC PEPTIDE: CPT

## 2021-05-11 PROCEDURE — 85610 PROTHROMBIN TIME: CPT | Performed by: EMERGENCY MEDICINE

## 2021-05-11 PROCEDURE — 80053 COMPREHEN METABOLIC PANEL: CPT

## 2021-05-11 PROCEDURE — 93005 ELECTROCARDIOGRAM TRACING: CPT

## 2021-05-11 PROCEDURE — 93010 ELECTROCARDIOGRAM REPORT: CPT | Performed by: INTERNAL MEDICINE

## 2021-05-11 PROCEDURE — 85379 FIBRIN DEGRADATION QUANT: CPT | Performed by: EMERGENCY MEDICINE

## 2021-05-11 PROCEDURE — 36415 COLL VENOUS BLD VENIPUNCTURE: CPT

## 2021-05-11 RX ORDER — SODIUM CHLORIDE 0.9 % (FLUSH) 0.9 %
10 SYRINGE (ML) INJECTION AS NEEDED
Status: DISCONTINUED | OUTPATIENT
Start: 2021-05-11 | End: 2021-05-11 | Stop reason: HOSPADM

## 2021-05-11 RX ADMIN — IOPAMIDOL 100 ML: 755 INJECTION, SOLUTION INTRAVENOUS at 18:03

## 2021-05-11 NOTE — TELEPHONE ENCOUNTER
Caller:  SUDEEP    Relationship to patient: [facility]     Best call back number: [phone]    Patient is needing: SUDEEP WITH [facility] CALLING IN WANTING TO LET DR. RUANO KNOW THAT SHE SAW PATIENT THIS MORNING AND SHE WAS EXPERIENCING SOME LEG PAIN IN WHICH SHE HAS BEEN TAKING TYLENOL FOR, SHE WAS ALSO EXPERIENCING SOME SHORTNESS OF BREATH WALKING FROM LIVING ROOM TO BEDROOM. PAIN IS COMING FROM LEFT LEG WHERE PATIENT HAD INJURY DBT THERE 2 LARGE HEMATOMAS BUT NO REDNESS OR WARMTH, NO CHARLEY HORSE PAIN BUT MORE OF CRAMPING PAIN. STATS WERE 85 INITIALLY CLIMBED TO 95 WITH DEEP BREATHING. PATIENT ALSO STATED THAT HEMATOMAS ARE PAINFUL TO TOUCH WHEN STANDING.

## 2021-05-11 NOTE — TELEPHONE ENCOUNTER
I spoke with the patient and called the home health nurse. The patient voiced understanding and going to be evaluated in the ER

## 2021-05-11 NOTE — TELEPHONE ENCOUNTER
I spoke with the patient to let her know that she would need to be seen in the ER given the note from the home health nurse. The patient voiced understanding.

## 2021-05-12 LAB
QT INTERVAL: 388 MS
QTC INTERVAL: 442 MS

## 2021-05-13 ENCOUNTER — OFFICE VISIT (OUTPATIENT)
Dept: NEUROLOGY | Age: 66
End: 2021-05-13
Payer: MEDICARE

## 2021-05-13 VITALS
BODY MASS INDEX: 50.02 KG/M2 | HEART RATE: 79 BPM | WEIGHT: 293 LBS | SYSTOLIC BLOOD PRESSURE: 136 MMHG | DIASTOLIC BLOOD PRESSURE: 79 MMHG | HEIGHT: 64 IN

## 2021-05-13 DIAGNOSIS — G60.3 IDIOPATHIC PROGRESSIVE NEUROPATHY: ICD-10-CM

## 2021-05-13 DIAGNOSIS — G25.81 RESTLESS LEG SYNDROME: ICD-10-CM

## 2021-05-13 DIAGNOSIS — Z99.89 CPAP (CONTINUOUS POSITIVE AIRWAY PRESSURE) DEPENDENCE: ICD-10-CM

## 2021-05-13 DIAGNOSIS — M54.41 CHRONIC RIGHT-SIDED LOW BACK PAIN WITH RIGHT-SIDED SCIATICA: ICD-10-CM

## 2021-05-13 DIAGNOSIS — Z79.899 MEDICATION MANAGEMENT: ICD-10-CM

## 2021-05-13 DIAGNOSIS — R01.1 HEART MURMUR: ICD-10-CM

## 2021-05-13 DIAGNOSIS — G89.29 CHRONIC RIGHT-SIDED LOW BACK PAIN WITH RIGHT-SIDED SCIATICA: ICD-10-CM

## 2021-05-13 DIAGNOSIS — G47.33 OBSTRUCTIVE SLEEP APNEA: Primary | ICD-10-CM

## 2021-05-13 DIAGNOSIS — R09.89 BRUIT OF RIGHT CAROTID ARTERY: ICD-10-CM

## 2021-05-13 PROCEDURE — G8399 PT W/DXA RESULTS DOCUMENT: HCPCS | Performed by: PHYSICIAN ASSISTANT

## 2021-05-13 PROCEDURE — 3017F COLORECTAL CA SCREEN DOC REV: CPT | Performed by: PHYSICIAN ASSISTANT

## 2021-05-13 PROCEDURE — G8427 DOCREV CUR MEDS BY ELIG CLIN: HCPCS | Performed by: PHYSICIAN ASSISTANT

## 2021-05-13 PROCEDURE — 4040F PNEUMOC VAC/ADMIN/RCVD: CPT | Performed by: PHYSICIAN ASSISTANT

## 2021-05-13 PROCEDURE — 1090F PRES/ABSN URINE INCON ASSESS: CPT | Performed by: PHYSICIAN ASSISTANT

## 2021-05-13 PROCEDURE — 99214 OFFICE O/P EST MOD 30 MIN: CPT | Performed by: PHYSICIAN ASSISTANT

## 2021-05-13 PROCEDURE — G8417 CALC BMI ABV UP PARAM F/U: HCPCS | Performed by: PHYSICIAN ASSISTANT

## 2021-05-13 PROCEDURE — 1123F ACP DISCUSS/DSCN MKR DOCD: CPT | Performed by: PHYSICIAN ASSISTANT

## 2021-05-13 PROCEDURE — 1036F TOBACCO NON-USER: CPT | Performed by: PHYSICIAN ASSISTANT

## 2021-05-13 RX ORDER — SERTRALINE HYDROCHLORIDE 25 MG/1
25 TABLET, FILM COATED ORAL DAILY
COMMUNITY
End: 2021-08-17

## 2021-05-13 RX ORDER — QUETIAPINE FUMARATE 25 MG/1
25 TABLET, FILM COATED ORAL DAILY
COMMUNITY
End: 2021-08-17

## 2021-05-13 NOTE — PROGRESS NOTES
[id number] [facility]  [address] [place] [address] [address], [address]  [place]  Phone [phone]  Fax [phone]       [facility] Sleep Follow Up Encounter      Information:   Patient Name: Sherren Mathieu  :   1955  Age:   77 y.o. MRN:   [id number]  Account #:  [de-identified]  Today:                21    Provider:  Miguel Oliveira PA-C    Chief Complaint   Patient presents with    Sleep Apnea     pt states she is tired but she is not having trouble sleeping        Subjective: Sherren Mathieu is a 77 y.o. female  with a history of DEVEN, RLS, peripheral neuropathy, chronic back and right leg pain, and forgetfulness who comes in for a sleep clinic follow up. The compliance report is unavailable today. Despite CPAP use, she has excessive daytime somnolence but she has poor sleep hygiene. Sometimes she stays up all night. She hates the CPAP and stays up so she doesn't have to use it. She reports that she has a lot of leakage. She can't stand to tighten the mask. She uses a FFM. She can fall asleep when sedentary and if she drives. She no longer drives. In regards to the peripheral neuropathy and chronic back and leg pain, gabapentin (takes it BID instead of TID), Cymbalta, and amitriptyline alleviates the pain in the morning. By 3:00 pm the pain returns. Dr. Ponce Tong prescribes gabapentin, Cymbalta, amitriptyline and Requip. She has taken these medications for awhile. She reports that Dr. Mao Mera stated that she is on too many medications. She says that the current medication regimen controls the PN and back pain. She manages well with it and could not function without it.        Objective:     Past Medical History:   Diagnosis Date    Abdominal adhesions 2016    Anxiety     Arthritis     Atrial fibrillation (HCC)     Atrial fibrillation and flutter (HCC) 2015    Chronic back pain     Chronic cough     Depression     Edema     Fibrocystic breast     GERD (gastroesophageal reflux disease)     Glossitis     Headache(784.0)     Heart burn     Heart disease     Hypertension     Hypothyroidism     Mouth sore     MRSA (methicillin resistant staph aureus) culture positive 8/4/14    nasal    Numbness     toes    Obstructive sleep apnea     BIPAP    Peripheral neuropathy     PONV (postoperative nausea and vomiting)     Prolonged emergence from general anesthesia     Recurrent ventral incisional hernia 1/18/2016    Sleep apnea     Stroke ([facility]     Stroke-like symptom     SVT (supraventricular tachycardia) (HCC)     SVT (supraventricular tachycardia) (HCC)     Swelling of extremity     Unspecified sleep apnea     clinicallybi-pap    Ventricular tachyarrhythmia ([address] 9/15    svt       Past Surgical History:   Procedure Laterality Date    CARDIAC CATHETERIZATION  8/18/15  JDT    EF 50%    CARDIAC SURGERY      CATARACT REMOVAL      CHOLECYSTECTOMY  8/4/14    BY Dr. Dave Lindquist  2008    CYST INCISION AND DRAINAGE  03/2017    FINGER TRIGGER RELEASE      FOOT SURGERY Left     removal of two screws    HAMMER TOE SURGERY      HARDWARE REMOVAL FOOT / ANKLE Left 8/16/2016    FOOT HARDWARE REMOVAL - DEEP / 2nd METATARSAL HEAD RESECTION performed by Janet Ceron DPM at [address] [place] [address]  1/12/2015    x3    KNEE SURGERY      Took prostetic joint out because of infection and put a spacer in    OTHER SURGICAL HISTORY  02/2017    pilonidal cyst-Dr Bina Kim    [place] KNEE [facility] Right 7/3/2017    KNEE ARTHROSCOPY PARTIAL MEDIAL MENISECTOMY performed by Jose Roberto Lentz MD at [place] [address] Right 2/16/2018    KNEE TOTAL ARTHROPLASTY performed by Yosef Mehta MD at [place] [address] N/A 1/18/2016    HERNIA VENTRAL REPAIR LAPAROSCOPIC WITH MESH  performed by Matilde Stewart MD at [facility]  ·     Significant Injuries  ·     Family History   Problem Relation Age of Onset    Heart Disease Mother     Diabetes Mother     High Blood Pressure Father     Cancer Father 79        lung CA    Cancer Brother         leukemia    Alzheimer's Disease Brother        Social History  Social History     Tobacco Use   Smoking Status Never Smoker   Smokeless Tobacco Never Used   Tobacco Comment    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 1 Tube 1    DULoxetine (CYMBALTA) 60 MG extended release capsule TAKE 1 CAPSULE BY MOUTH TWICE DAILY 180 capsule 3    triamcinolone acetonide (KENALOG) 0.1 % paste APPLY TWICE DAILY AS NEEDED TO TEETH 5 g 0    allopurinol (ZYLOPRIM) 100 MG tablet Take 100 mg by mouth daily      dicloxacillin (DYNAPEN) 500 MG capsule Take 500 mg by mouth 3 times daily      levothyroxine (SYNTHROID) 137 MCG tablet Take 137 mcg by mouth Daily      DEXILANT 60 MG CPDR delayed release capsule Take 60 mg by mouth daily       alendronate (FOSAMAX) 70 MG tablet TK 1 T PO ONCE A WEEK IN THE MORNING 30 MINUTES BEFORE FIRST MEAL OF THE DAY  3    flecainide (TAMBOCOR) 100 MG tablet TAKE 1 TABLET BY MOUTH TWICE DAILY 180 tablet 3    metoprolol tartrate (LOPRESSOR) 25 MG tablet TAKE 1/2 TABLET BY MOUTH TWICE DAILY 90 tablet 3    diclofenac (VOLTAREN) 50 MG EC tablet Take 50 mg by mouth daily      LORazepam (ATIVAN) 1 MG tablet Take 1 mg by mouth every 8 hours as needed .   2    rOPINIRole (REQUIP) 3 MG tablet Take 3 mg by mouth 2 times daily 2 tabs in AM and 3 tabs in PM       fluticasone (FLONASE) 50 MCG/ACT nasal spray 2 sprays by Nasal route as needed       famciclovir (FAMVIR) 500 MG tablet   Take 500 mg by mouth 2 times daily       venlafaxine (EFFEXOR XR) 75 MG extended release capsule TAKE 1 CAPSULE BY MOUTH DAILY (Patient not taking: Reported on 5/13/2021) 90 capsule 3    gabapentin (NEURONTIN) 800 MG tablet TAKE 1 TABLET BY MOUTH THREE TIMES DAILY 90 tablet 5     No current facility-administered medications for this visit. Allergies:  Azithromycin, Codeine, Mobic [meloxicam], and Morphine and related    REVIEW OF SYSTEMS     Constitutional: []? Fever []? Sweats []? Chills []? Recent Injury   [x]? Denies all unless marked  HENT:[]? Headache  []? Head Injury  []? Sore Throat  []? Ear Pain  []? Dizziness []? Hearing Loss   [x]? Denies all unless marked  Musculoskeletal: []? Arthralgia  []? Myalgias []? Muscle cramps  []? Muscle twitches   [x]? Denies all unless marked   Spine:  []? Neck pain  []? Back pain  []? Sciaticia  [x]? Denies all unless marked  Neurological:[]? Visual Disturbance []? Double Vision []? Slurred Speech []? Trouble swallowing  []? Vertigo []? Tingling []? Numbness []? Weakness []? Loss of Balance   []? Loss of Consciousness []? Memory Loss []? Seizures  [x]? Denies all unless marked  Psychiatric/Behavioral:[]? Depression []? Anxiety  [x]? Denies all unless marked  Sleep: []? Insomnia []? Sleep Disturbance []? Snoring []? Restless Legs [x]? Daytime Sleepiness [x]? Sleep Apnea  [x]? Denies all unless marked    The MA has completed the ROS with the patient. I have reviewed it in its' entirety with the patient and agree with the documentation.      PHYSICAL EXAM  /79   Pulse 79   Ht 5' 4\" (1.626 m)   Wt (!) 307 lb (139.3 kg)   BMI 52.70 kg/m²      Constitutional   Alert in NAD, well developed, pleasant and cooperative with exam; body habitus obese as indicated by BMI HEENT- Conjunctiva normal.  No scars, masses, or lesions over external nose or ears, hearing intact, no neck masses noted, no jugular vein distension, right carotid bruit  Cardiac- Regular rate and rhythm; Grade I/VI heart murmur (previously undiagnosed)  Pulmonary- Clear to auscultation, good expansion, normal effort without use of accessory muscles  Musculoskeletal  No significant wasting of muscles noted, no bony deformities  Extremities - No clubbing, cyanosis or edema  Skin  Warm, dry, and intact. No rash, erythema, or pallor  Psychiatric  Mood, affect, and behavior appear normal      Neurologic:  Extraocular movements are intact without nystagmus. PERRL. Visual fields are full to confrontation. Facial movements are symmetrical and normal.  Speech is precise. Extremity strength is normal in both uppers and lowers. Deep tendon reflexes are intact and symmetrical.  Rapid alternating movements are unimpaired. Finger-to-nose testing is performed well, without dysmetria. Gait is normal.    I reviewed the following studies:       []  :  Clinical laboratory test results     []  :  Radiology reports                    [x]  :  Review and summarization of medical records and/or obtain medical records        []  :  Previous/recent polysomnogram report(s)     []  :  [place] Sleepiness Scale       [x]  :  Compliance download:  Unavailable today    Assessment:       ICD-10-CM    1. Obstructive sleep apnea  G47.33    2. Restless leg syndrome  G25.81    3. Idiopathic progressive neuropathy  G60.3    4. Chronic right-sided low back pain with right-sided sciatica  M54.41     G89.29    5. Bruit of right carotid artery  R09.89 US CAROTID ARTERY BILATERAL     CANCELED: US CAROTID ARTERY BILATERAL   6. Heart murmur  R01.1    7. CPAP (continuous positive airway pressure) dependence  Z99.89    8.  Medication management  Z79.899           [x]  :  Stable-RLS, PN, and LBP     []  :  Improved                       []  :  Well

## 2021-05-13 NOTE — PATIENT INSTRUCTIONS
Patient education: Sleep apnea in adults       INTRODUCTION  Normally during sleep, air moves through the throat and in and out of the lungs at a regular rhythm. In a person with sleep apnea, air movement is periodically diminished or stopped. There are two types of sleep apnea: obstructive sleep apnea and central sleep apnea. In obstructive sleep apnea, breathing is abnormal because of narrowing or closure of the throat. In central sleep apnea, breathing is abnormal because of a change in the breathing control and rhythm. Sleep apnea is a serious condition that can affect a person's ability to safely perform normal daily activities and can affect long term health. Approximately 25 percent of adults are at risk for sleep apnea of some degree. Men are more commonly affected than women. Other risk factors include middle and older age, being overweight or obese, and having a small mouth and throat. This topic review focuses on the most common type of sleep apnea in adults, obstructive sleep apnea (DEVEN). HOW SLEEP APNEA OCCURS  The throat is surrounded by muscles that control the airway for speaking, swallowing, and breathing. During sleep, these muscles are less active, and this causes the throat to narrow. In most people, this narrowing does not affect breathing. In others, it can cause snoring, sometimes with reduced or completely blocked airflow. A completely blocked airway without airflow is called an obstructive apnea. Partial obstruction with diminished airflow is called a hypopnea. A person may have apnea and hypopnea during sleep. Insufficient breathing due to apnea or hypopnea causes oxygen levels to fall and carbon dioxide to rise. Because the airway is blocked, breathing faster or harder does not help to improve oxygen levels until the airway is reopened. Typically, the obstruction requires the person to awaken to activate the upper airway muscles.  Once the airway is opened, the person then takes several deep breaths to catch up on breathing. As the person awakens, he or she may move briefly, snort or snore, and take a deep breath. Less frequently, a person may awaken completely with a sensation of gasping, smothering, or choking. If the person falls back to sleep quickly, he or she will not remember the event. Many people with sleep apnea are unaware of their abnormal breathing in sleep, and all patients underestimate how often their sleep is interrupted. Awakening from sleep causes sleep to be unrefreshing and causes fatigue and daytime sleepiness. Anatomic causes of obstructive sleep apnea   Most patients have DEVEN because of a small upper airway. As the bones of the face and skull develop, some people develop a small lower face, a small mouth, and a tongue that seems too large for the mouth. These features are genetically determined, which explains why DEVEN tends to cluster in families. Obesity is another major factor. Tonsil enlargement can be an important cause, especially in children. SLEEP APNEA SYMPTOMS  The main symptoms of DEVEN are loud snoring, fatigue, and daytime sleepiness. However, some people have no symptoms. For example, if the person does not have a bed partner, he or she may not be aware of the snoring. Fatigue and sleepiness have many causes and are often attributed to overwork and increasing age. As a result, a person may be slow to recognize that they have a problem. A bed partner or spouse often prompts the patient to seek medical care. Other symptoms may include one or more of the following:  ?Restless sleep  ? Awakening with choking, gasping, or smothering  ? Morning headaches, dry mouth, or sore throat  ? Waking frequently to urinate  ? Awakening unrested, groggy  ? Low energy, difficulty concentrating, memory impairment    Risk factors  Certain factors increase the risk of sleep apnea.   ?Increasing age [de-identified] DEVEN occurs at all ages, but it is more common in middle and older age pressure)  device uses an air-tight attachment to the nose, typically a mask, connected to a tube and a blower which generates the pressure. Devices that fit comfortably into the nasal opening, rather than over the nose, are also available. CPAP should be used any time the person sleeps (day or night). The CPAP device is usually used for the first time in the sleep lab, where a technician can adjust the pressure and select the best equipment to keep the airway open. Alternatively, an auto device with a self-adjusting pressure feature, provided with proper education and training, can get treatment started without another sleep test. While the treatment may seem uncomfortable, noisy, or bulky at first, most people accept the treatment after experiencing better sleep. However, difficulty with mask comfort and nasal congestion prevent up to 50 percent of people from using the treatment on a regular basis. Continued follow up with a healthcare provider helps to ensure that the treatment is effective and comfortable. Information from the CPAP machine is often used by physicians, therapists, and insurers to track the success of treatment. CPAP can be delivered with different features to improve comfort and solve problems that may come up during treatment. Changes in treatment may be needed if symptoms do not improve or if the persons condition changes, such as a gain or loss of weight. Adjust sleep position  Adjusting sleep position (to stay off the back) may help improve sleep quality in people who have DEVEN when sleeping on the back. However, this is difficult to maintain throughout the night and is rarely an adequate solution. Weight loss  Weight loss may be helpful for obese or overweight patients. Weight loss may be accomplished with dietary changes, exercise, and/or surgical treatment.  However, it can be difficult to maintain weight loss; the five-year success of non-surgical weight loss is only 5 percent, meaning that 95 percent of people regain lost weight. Avoid alcohol and other sedatives  Alcohol can worsen sleepiness, potentially increasing the risk of accidents or injury. People with DEVEN are often counseled to drink little to no alcohol, even during the daytime. Similarly, people who take anti-anxiety medications or sedatives to sleep should speak with their healthcare provider about the safety of these medications. People with DEVEN must notify all healthcare providers, including surgeons, about their condition and the potential risks of being sedated. People with DEVEN who are given anesthesia and/or pain medications require special management and close monitoring to reduce the risk of a blocked airway. Dental devices  A dental device, called an oral appliance or mandibular advancement device, can reposition the jaw (mandible), bringing the tongue and soft palate forward as well. This may relieve obstruction in some people. This treatment is excellent for reducing snoring, although the effect on DEVEN is sometimes more limited. As a result, dental devices are best used for mild cases of DEVEN when relief of snoring is the main goal. Failure to tolerate and accept CPAP is another indication for dental devices. While dental devices are not as effective as CPAP for DEVEN, some patients prefer a dental device to CPAP. Side effects of dental devices are generally minor but may include changes to the bite with prolonged use. Surgical treatment  Surgery is an alternative therapy for patients who cannot tolerate or do not improve with nonsurgical treatments such as CPAP or oral devices. Surgery can also be used in combination with other nonsurgical treatments. Surgical procedures reshape structures in the upper airways or surgically reposition bone or soft tissue. Uvulopalatopharyngoplasty (UPPP) removes the uvula and excessive tissue in the throat, including the tonsils, if present.  Other procedures, such as maxillomandibular advancement (MMA), address both the upper and lower pharyngeal airway more globally. UPPP alone has limited success rates (less than 50 percent) and people can relapse (when DEVEN symptoms return after surgery). As a result, this surgery is only recommended in a minority of people and should be considered with caution. MMA may have a higher success rate, particularly in people with abnormal jaw (maxilla and mandible) anatomy, but it is the most complicated procedure. A newer surgical approach, nerve stimulation to protrude the tongue, has promising success rates in very selected people. Tracheostomy creates a permanent opening in the neck. It is reserved for people with severe disease in whom less drastic measures have failed or are inappropriate. Although it is always successful in eliminating obstructive sleep apnea, tracheostomy requires significant lifestyle changes and carries some serious risks (eg, infection, bleeding, blockage). All surgical treatments require discussions about the goals of treatment, the expected outcomes, and potential complications. Hypoglossal nerve stimulator- \"Inspire\" device    PAP treatment failure:  Possible causes of treatment failure include nonadherence or suboptimal adherence, weight gain, an inappropriate level of prescribed positive pressure, or an additional disorder causing sleepiness (eg, narcolepsy) that may require alterations in the therapeutic regimen. A review of medications should also be undertaken since many drugs may lead to sleepiness. Inadequate sleep time may also negate the expected effects from treatment of DEVEN. Also, pt's can have persistent hypersomnolence associated with sleep apnea even in the presence of adequate therapy and at those times Provigil or Nuvigil or other stimulants may be indicated.     Once the patient's positive airway pressure therapy has been optimized and symptoms resolved, a regimen of long-term follow-up should be established. Annual visits are reasonable, with more frequent visits in between if new issues arise. The purpose of long-term follow-up is to assess usage and monitor for recurrent DEVEN, new side effects, air leakage, and fluctuations in body weight. WHERE TO GET MORE INFORMATION  Your healthcare provider is the best source of information for questions and concerns related to your medical problem. Organizations  American Sleep Apnea Association  Provides information about sleep apnea to the public, publishes a newsletter, and serves as an advocate for people with the disorder. Paige, [address], [facility], [address] [place] [address]   [email or web address]   [email or web address] org   Tel: [phone]   Fax: [facility] organization that works to [employer] and safety by promoting public understanding of sleep and sleep disorders. Supports sleep-related education, research, and advocacy; produces and distributes educational materials to the public and healthcare professionals; and offers postdoctoral fellowships and grants for sleep researchers. [address]   [email or web address]   [email or web address] org   Tel: [phone]   Fax: [phone]    Important information:  Medicare/private insurance CPAP/BiPAP/APAP requirements:  Medicare/private insurance has specific requirements for PAP compliance that must be met during the first 90 days of use to continue coverage for CPAP/BiPAP/APAP  from day 91 and beyond. The policy requires that patients use a PAP device 4 hours per 24 hour period, at least 70% of the time over a 30 day period. This data must be downloaded as a report direct from the PAP devices. This is called a compliance download. Your PAP supplier will assist you in this matter.      Reminder:  Please bring a copy of the compliance download to your next office visit or have your supplier fax it to our office prior to your office visit. Note:  Where applicable, we will utilize PAP device efficiency reports, additional testing, and face-to-face  clinical evaluation subsequent to any treatment, changes in treatment, and continued treatment. Caution:  Please abstain from driving or engaging in other activities which may be hazardous in the presence of diminished alertness or daytime drowsiness. And avoid the use of sedatives or alcohol, which can worsen sleep apnea and daytime drowsiness. Mask suggestions:  -     Resmed Airfit N20 (Nasal) or F20 (Full face mask). They conform to your face, thus decreasing the potential for mask leakage. You might like the AirTouch F20(full face mask). It has a \"memory foam\" like cushion. The AirFit F30 is a smaller style full face mask designed to sit low on and cover less of your face for fewer facial marks. AirFit N30i has a top of the head tube with a nasal mask. AirFit P10 reported to be the most comfortable nasal pillow mask. Resmed Mirage FX reported to be the most comfortable nasal mask. Resmed Mirage Salmon reported to be the most comfortable hybrid mask. AirTouch N20-memory foam nasal mask. Respironics: You might also like to try a nasal mask called a Dreamwear nasal mask or the Dreamwear nasal pillow. Another suggestion is the [facility], it is a minimal contact full face mask. The Enedelia Ivans incredible under the nose design makes it the only full face mask that won't cause red marks on the bridge of your nose when compared to other full face masks. The Dreamwear full face mask has a  soft feel, unique in-frame air-flow, and innovative air tube connection at the top of the head for the ultimate in sleep comfort. Comfort Gel Blue. Dreamwear gel pillows. Ky & Solange: Brevida nasal pillow mask and Simplus FFM    The use of a memory foam CPAP pillow supports the head and neck throughout the night.

## 2021-05-13 NOTE — LETTER
[id number] [address], [address]  [place], [facility]  Phone [phone]           Re:  Adonis Hernandez    19  :  1955  Address: [address] [place] [address]       Replinishible PAP Supplies, 1 year supply  Item HPCPS Code Frequency   Mask of choice  or  1 per 3 months   Nasal Mask cushion/pillows  or  2 per 30 days   Full Face Mask Interface  1 per 30 days   Headgear  1 per 6 months   Tubing, length of choice  or  1 per 3 months   Water Chamber  1 per 6 months   Chinstrap  1 per 6 months   Disposable Filters  2 per 30 days   Reusable Filters  1 per 6 months     Diagnoses:  Obstructive sleep apnea (G47.33)  Length of Need: Lifetime, 99    Ordering Provider: Minerva Moore PA-C  NPI:  [id number]    NOTE: She would like to try a Dream Wear face mask.     Signature:         Date: 2021      Electronically Signed by Minerva Moore PA-C  on 2021 at 10:56 AM

## 2021-05-14 ENCOUNTER — TELEPHONE (OUTPATIENT)
Dept: INTERNAL MEDICINE | Facility: CLINIC | Age: 66
End: 2021-05-14

## 2021-05-14 DIAGNOSIS — G60.3 IDIOPATHIC PROGRESSIVE NEUROPATHY: ICD-10-CM

## 2021-05-14 PROCEDURE — G0180 MD CERTIFICATION HHA PATIENT: HCPCS | Performed by: INTERNAL MEDICINE

## 2021-05-14 RX ORDER — FUROSEMIDE 40 MG/1
40 TABLET ORAL DAILY
Qty: 30 TABLET | Refills: 0 | Status: SHIPPED | OUTPATIENT
Start: 2021-05-14 | End: 2021-05-17 | Stop reason: SDUPTHER

## 2021-05-14 NOTE — TELEPHONE ENCOUNTER
Requested Prescriptions     Pending Prescriptions Disp Refills    gabapentin (NEURONTIN) 800 MG tablet [Pharmacy Med Name: GABAPENTIN 800MG TABLETS] 90 tablet      Sig: TAKE 1 TABLET BY MOUTH THREE TIMES DAILY       Last Office Visit:  5/13/2021  Next Office Visit:  8/17/2021  Last Medication Refill:  11/12/2020 5 refills   Alice Sargent up to date:  5/11/2021     *RX updated to reflect   5/11/2021  fill date*

## 2021-05-14 NOTE — TELEPHONE ENCOUNTER
She does have her flovent. She has been using it. I have spoken with her and let her know that she has lasix at the pharmacy since you had reviewed her CT scan.

## 2021-05-14 NOTE — TELEPHONE ENCOUNTER
Sean called and stated that her o2 level was dropping. She stated the patient is dropping with talking to 89-90%. She is still having symptoms with SOB with very minimal effort. Patient does have an appointment on Monday to come in. The CT contrast the vein blew from that IV there is swelling and bruising. She does have a CPAP that she uses at night time. No regular oxygen.

## 2021-05-14 NOTE — TELEPHONE ENCOUNTER
O2 stats are dropping between the ranges of 82-90 just by talking while at rest - and by just standing up -visibly and audibly short winded -     Blisters, bruising and swelling at site from contrast.   Using ice on affected area.     Transferred to office

## 2021-05-16 RX ORDER — GABAPENTIN 800 MG/1
TABLET ORAL
Qty: 90 TABLET | Refills: 5 | Status: SHIPPED | OUTPATIENT
Start: 2021-05-16 | End: 2021-11-17

## 2021-05-17 ENCOUNTER — OFFICE VISIT (OUTPATIENT)
Dept: CARDIOLOGY | Facility: CLINIC | Age: 66
End: 2021-05-17

## 2021-05-17 ENCOUNTER — OFFICE VISIT (OUTPATIENT)
Dept: INTERNAL MEDICINE | Facility: CLINIC | Age: 66
End: 2021-05-17

## 2021-05-17 VITALS
HEIGHT: 64 IN | DIASTOLIC BLOOD PRESSURE: 66 MMHG | WEIGHT: 293 LBS | HEART RATE: 77 BPM | SYSTOLIC BLOOD PRESSURE: 108 MMHG | BODY MASS INDEX: 50.02 KG/M2 | OXYGEN SATURATION: 94 %

## 2021-05-17 VITALS
DIASTOLIC BLOOD PRESSURE: 60 MMHG | WEIGHT: 293 LBS | HEIGHT: 64 IN | HEART RATE: 84 BPM | OXYGEN SATURATION: 94 % | BODY MASS INDEX: 50.02 KG/M2 | TEMPERATURE: 98.3 F | RESPIRATION RATE: 17 BRPM | SYSTOLIC BLOOD PRESSURE: 115 MMHG

## 2021-05-17 DIAGNOSIS — Z98.890 S/P ABLATION OF ATRIAL FIBRILLATION: ICD-10-CM

## 2021-05-17 DIAGNOSIS — S80.12XA HEMATOMA OF LEFT LOWER EXTREMITY, INITIAL ENCOUNTER: Primary | ICD-10-CM

## 2021-05-17 DIAGNOSIS — I82.412 ACUTE DEEP VEIN THROMBOSIS (DVT) OF FEMORAL VEIN OF LEFT LOWER EXTREMITY (HCC): ICD-10-CM

## 2021-05-17 DIAGNOSIS — Z95.818 PRESENCE OF WATCHMAN LEFT ATRIAL APPENDAGE CLOSURE DEVICE: ICD-10-CM

## 2021-05-17 DIAGNOSIS — I48.0 PAF (PAROXYSMAL ATRIAL FIBRILLATION) (HCC): Primary | ICD-10-CM

## 2021-05-17 DIAGNOSIS — Z86.79 S/P ABLATION OF ATRIAL FIBRILLATION: ICD-10-CM

## 2021-05-17 PROBLEM — I82.402 ACUTE DEEP VEIN THROMBOSIS (DVT) OF LEFT LOWER EXTREMITY: Status: ACTIVE | Noted: 2021-05-17

## 2021-05-17 PROCEDURE — 93000 ELECTROCARDIOGRAM COMPLETE: CPT | Performed by: NURSE PRACTITIONER

## 2021-05-17 PROCEDURE — 99213 OFFICE O/P EST LOW 20 MIN: CPT | Performed by: INTERNAL MEDICINE

## 2021-05-17 PROCEDURE — 99214 OFFICE O/P EST MOD 30 MIN: CPT | Performed by: NURSE PRACTITIONER

## 2021-05-17 RX ORDER — FUROSEMIDE 40 MG/1
40 TABLET ORAL DAILY PRN
Qty: 30 TABLET | Refills: 2 | Status: SHIPPED | OUTPATIENT
Start: 2021-05-17 | End: 2021-06-16 | Stop reason: SDUPTHER

## 2021-05-17 RX ORDER — QUETIAPINE FUMARATE 25 MG/1
25 TABLET, FILM COATED ORAL DAILY
COMMUNITY
End: 2021-05-18

## 2021-05-17 NOTE — PROGRESS NOTES
Chief Complaint   Patient presents with   • Follow-up     ER Follow Up   • Shortness of Breath         History:  Danisha Cannon is a 65 y.o. female who presents today for evaluation of the above problems.      2 week follow up  She was having shortness of breath.  On Friday I started Lasix 40 mg given the recent CT scan findings below.  Showed possibility of fluid and she feels much better now.  The shortness of breath completely gone.  She was having hypoxia as well which is resolved.  Did not have any evidence of pulmonary embolism.    Her left leg pain and swelling is improved.  She is walking much better too      Study Result    Narrative & Impression   Exam: CT angiogram of the of the chest with intravenous contrast.     CLINICAL HISTORY:  Pulmonary embolus suspected. Left leg DVT.     DOSE:  1336 mGycm. Automated exposure control was utilized to diminish  patient radiation dose.     TECHNIQUE: Contiguous axial images were obtained through the thorax  following intravenous contrast administration with reformatted images  obtained in the sagittal and coronal projections from the original data  set. Three-dimensional reconstructions are also obtained.     COMPARISON:  11/20/2020     FINDINGS:     Pulmonary arteries:  There is normal enhancement of the pulmonary  arteries with no evidence of pulmonary thromboembolic disease.     Aorta:  The thoracic aorta and proximal great vessels are normal in  appearance. There is no evidence of aortic dissection or aneurysm.     LUNGS:  There is patchy groundglass disease within both lungs for which  I would favor patchy interstitial edema over an interstitial  pneumonitis. The lungs are hypoventilated. There is no pleural effusion  present.     Pleural spaces: Unremarkable. No evidence of pleural effusion or  pneumothorax.     HEART: There is moderate cardiomegaly. A Watchman device is noted within  the left atrial appendage. No evidence of right ventricular dysfunction  or  pericardial effusion.     Bones: No acute osseous abnormalities are demonstrated. There is  anterolisthesis of C7 on T1 stable from the previous exam. Multiple old  left sided rib fractures are present.     CHEST WALL: No chest wall abnormalities are demonstrated.     Lymph nodes: No enlarged mediastinal or axillary lymphadenopathy is  present.     Upper abdomen: A small hiatal hernia is present. Limited images of the  upper abdomen are otherwise unremarkable.     IMPRESSION:  1. No evidence of pulmonary thromboembolic disease. The thoracic aorta  is normal in caliber with no evidence of dissection or aneurysm.  2. Patchy groundglass disease within both lungs. I would favor  interstitial edema over an interstitial pneumonitis. Lungs are also  hypoventilated causing accentuation of the bronchovascular markings and  cardiac silhouette. No evidence of pleural effusion.  3. Cardiomegaly. No evidence of pericardial effusion. A Watchman left  atrial appendage device is present.  This report was finalized on 05/11/2021 18:57 by Dr. Jair Boles MD.       HPI    ROS:  Review of Systems   Constitutional: Negative for fatigue and fever.   Respiratory: Negative for shortness of breath, wheezing and stridor.    Cardiovascular: Positive for leg swelling (improving). Negative for chest pain and palpitations.   Musculoskeletal: Positive for gait problem and myalgias.   Skin: Positive for color change. Negative for rash.   Neurological: Negative for dizziness, weakness and light-headedness.   Hematological: Bruises/bleeds easily.         Current Outpatient Medications:   •  alendronate (FOSAMAX) 70 MG tablet, Take 70 mg by mouth Every 7 (Seven) Days., Disp: , Rfl:   •  amitriptyline (ELAVIL) 50 MG tablet, Take 100 mg by mouth Every Night., Disp: , Rfl:   •  apixaban (ELIQUIS) 5 MG tablet tablet, Take 1 tablet by mouth Every 12 (Twelve) Hours., Disp: 60 tablet, Rfl: 0  •  Apixaban Starter Pack (Eliquis DVT/PE Starter Pack)  tablet therapy pack, Take two 5 mg tablets by mouth every 12 hours for 7 days. Followed by one 5 mg tablet every 12 hours., Disp: 74 tablet, Rfl: 0  •  ASPIRIN 81 PO, Take 1 tablet by mouth Daily., Disp: , Rfl:   •  dexlansoprazole (DEXILANT) 60 MG capsule, Take 60 mg by mouth Daily., Disp: , Rfl:   •  diclofenac (VOLTAREN) 75 MG EC tablet, Take 75 mg by mouth Daily., Disp: , Rfl:   •  DULoxetine (CYMBALTA) 60 MG capsule, Take 60 mg by mouth 2 (Two) Times a Day., Disp: , Rfl:   •  famciclovir (FAMVIR) 500 MG tablet, Take 500 mg by mouth 2 (two) times a day., Disp: , Rfl:   •  flecainide (TAMBOCOR) 100 MG tablet, Take 100 mg by mouth 2 (Two) Times a Day., Disp: , Rfl:   •  fluticasone (FLONASE) 50 MCG/ACT nasal spray, 2 sprays into the nostril(s) as directed by provider Daily As Needed for Rhinitis or Allergies., Disp: 16 g, Rfl: 5  •  fluticasone (Flovent HFA) 110 MCG/ACT inhaler, Inhale 1 puff 2 (Two) Times a Day., Disp: 1 each, Rfl: 11  •  furosemide (Lasix) 40 MG tablet, Take 1 tablet by mouth Daily As Needed (edema or soa)., Disp: 30 tablet, Rfl: 2  •  gabapentin (NEURONTIN) 800 MG tablet, Take 800 mg by mouth 3 (Three) Times a Day., Disp: , Rfl:   •  levothyroxine (SYNTHROID, LEVOTHROID) 137 MCG tablet, Take 137 mcg by mouth Every Morning., Disp: , Rfl:   •  LORazepam (ATIVAN) 1 MG tablet, Take 1 tablet by mouth Every Morning AND 0.5 tablets Every Evening. Do all this for 30 days., Disp: 45 tablet, Rfl: 0  •  metoprolol tartrate (LOPRESSOR) 25 MG tablet, Take 12.5 mg by mouth Every 12 (Twelve) Hours., Disp: , Rfl:   •  montelukast (SINGULAIR) 10 MG tablet, Take  by mouth Daily., Disp: , Rfl: 5  •  nystatin (MYCOSTATIN) 130923 UNIT/ML suspension, 5 mL Every 6 (Six) Hours., Disp: , Rfl:   •  QUEtiapine (SEROquel) 25 MG tablet, Take 25 mg by mouth Daily., Disp: , Rfl:   •  rOPINIRole (REQUIP) 3 MG tablet, Take 6 mg by mouth 2 (two) times a day. Take 3 tablets at night and 2 tablets every am, Disp: , Rfl:   •   sertraline (Zoloft) 25 MG tablet, Take 1 tablet by mouth Daily for 30 days., Disp: 30 tablet, Rfl: 0  •  triamcinolone (KENALOG) 0.1 % paste, See Admin Instructions., Disp: , Rfl: 5  •  venlafaxine XR (Effexor XR) 37.5 MG 24 hr capsule, Take 1 capsule by mouth Daily., Disp: 10 capsule, Rfl: 0  •  Suprep Bowel Prep Kit 17.5-3.13-1.6 GM/177ML solution oral solution, Take as directed by office instructions provided, Disp: 177 mL, Rfl: 0    Lab Results   Component Value Date    GLUCOSE 148 (H) 05/11/2021    BUN 21 05/11/2021    CREATININE 0.84 05/11/2021    EGFRIFNONA 68 05/11/2021    BCR 25.0 05/11/2021    K 3.9 05/11/2021    CO2 32.0 (H) 05/11/2021    CALCIUM 9.4 05/11/2021    ALBUMIN 3.30 (L) 05/11/2021    AST 19 05/11/2021    ALT 13 05/11/2021       WBC   Date Value Ref Range Status   05/11/2021 6.81 3.40 - 10.80 10*3/mm3 Final   01/10/2018 4.7 (L) 4.8 - 10.8 K/uL Final     RBC   Date Value Ref Range Status   05/11/2021 3.47 (L) 3.77 - 5.28 10*6/mm3 Final   01/10/2018 4.02 (L) 4.20 - 5.40 M/uL Final     Hemoglobin   Date Value Ref Range Status   05/11/2021 9.6 (L) 12.0 - 15.9 g/dL Final   02/19/2018 10.2 (L) 12.0 - 16.0 g/dL Final     Hematocrit   Date Value Ref Range Status   05/11/2021 32.6 (L) 34.0 - 46.6 % Final   02/19/2018 32.8 (L) 37.0 - 47.0 % Final     MCV   Date Value Ref Range Status   05/11/2021 93.9 79.0 - 97.0 fL Final   01/10/2018 93.3 81.0 - 99.0 fL Final     MCH   Date Value Ref Range Status   05/11/2021 27.7 26.6 - 33.0 pg Final   01/10/2018 28.6 27.0 - 31.0 pg Final     MCHC   Date Value Ref Range Status   05/11/2021 29.4 (L) 31.5 - 35.7 g/dL Final   01/10/2018 30.7 (L) 33.0 - 37.0 g/dL Final     RDW   Date Value Ref Range Status   05/11/2021 16.9 (H) 12.3 - 15.4 % Final   01/10/2018 15.9 (H) 11.5 - 14.5 % Final     RDW-SD   Date Value Ref Range Status   05/11/2021 57.4 (H) 37.0 - 54.0 fl Final     MPV   Date Value Ref Range Status   05/11/2021 10.1 6.0 - 12.0 fL Final   01/10/2018 11.6 9.4 - 12.3  fL Final     Platelets   Date Value Ref Range Status   05/11/2021 303 140 - 450 10*3/mm3 Final   01/10/2018 213 130 - 400 K/uL Final     Neutrophil Rel %   Date Value Ref Range Status   01/10/2018 76.6 (H) 50.0 - 65.0 % Final     Neutrophil %   Date Value Ref Range Status   05/11/2021 69.5 42.7 - 76.0 % Final     Lymphocyte Rel %   Date Value Ref Range Status   01/10/2018 10.4 (L) 20.0 - 40.0 % Final     Lymphocyte %   Date Value Ref Range Status   05/11/2021 15.4 (L) 19.6 - 45.3 % Final     Monocyte Rel %   Date Value Ref Range Status   01/10/2018 10.0 0.0 - 10.0 % Final     Monocyte %   Date Value Ref Range Status   05/11/2021 7.9 5.0 - 12.0 % Final     Eosinophil Rel %   Date Value Ref Range Status   01/10/2018 1.5 0.0 - 5.0 % Final     Eosinophil %   Date Value Ref Range Status   05/11/2021 6.0 0.3 - 6.2 % Final     Basophil Rel %   Date Value Ref Range Status   01/10/2018 0.4 0.0 - 1.0 % Final     Basophil %   Date Value Ref Range Status   05/11/2021 0.6 0.0 - 1.5 % Final     Immature Grans %   Date Value Ref Range Status   05/11/2021 0.6 (H) 0.0 - 0.5 % Final     Neutrophils Absolute   Date Value Ref Range Status   01/10/2018 3.6 1.5 - 7.5 K/uL Final     Neutrophils, Absolute   Date Value Ref Range Status   05/11/2021 4.73 1.70 - 7.00 10*3/mm3 Final     Lymphocytes Absolute   Date Value Ref Range Status   01/10/2018 0.5 (L) 1.1 - 4.5 K/uL Final     Lymphocytes, Absolute   Date Value Ref Range Status   05/11/2021 1.05 0.70 - 3.10 10*3/mm3 Final     Monocytes Absolute   Date Value Ref Range Status   01/10/2018 0.50 0.00 - 0.90 K/uL Final     Monocytes, Absolute   Date Value Ref Range Status   05/11/2021 0.54 0.10 - 0.90 10*3/mm3 Final     Eosinophils Absolute   Date Value Ref Range Status   01/10/2018 0.10 0.00 - 0.60 K/uL Final     Eosinophils, Absolute   Date Value Ref Range Status   05/11/2021 0.41 (H) 0.00 - 0.40 10*3/mm3 Final     Basophils Absolute   Date Value Ref Range Status   01/10/2018 0.00 0.00 -  "0.20 K/uL Final     Basophils, Absolute   Date Value Ref Range Status   05/11/2021 0.04 0.00 - 0.20 10*3/mm3 Final     Immature Grans, Absolute   Date Value Ref Range Status   05/11/2021 0.04 0.00 - 0.05 10*3/mm3 Final     nRBC   Date Value Ref Range Status   05/11/2021 0.0 0.0 - 0.2 /100 WBC Final         OBJECTIVE:  Visit Vitals  /60 (BP Location: Left arm, Patient Position: Sitting, Cuff Size: Adult)   Pulse 84   Temp 98.3 °F (36.8 °C) (Oral)   Resp 17   Ht 162.6 cm (64.02\")   Wt 133 kg (294 lb)   LMP  (LMP Unknown)   SpO2 94%   BMI 50.44 kg/m²      Physical Exam  Constitutional:       General: She is not in acute distress.     Appearance: She is well-developed. She is not diaphoretic.   HENT:      Head: Normocephalic and atraumatic.   Eyes:      Pupils: Pupils are equal, round, and reactive to light.   Neck:      Thyroid: No thyromegaly.      Vascular: No carotid bruit.      Trachea: Phonation normal.   Cardiovascular:      Rate and Rhythm: Normal rate and regular rhythm.      Heart sounds: No murmur heard.        Comments: Improved edema.  Wearing bilateral compression stockings  Pulmonary:      Effort: Pulmonary effort is normal. No respiratory distress.      Breath sounds: Normal breath sounds. No wheezing.      Comments: Slightly decreased at the bases likely from body habitus  Skin:     Coloration: Skin is not pale.      Findings: Bruising, ecchymosis and signs of injury present. No erythema.          Neurological:      Mental Status: She is alert.   Psychiatric:         Behavior: Behavior normal.         Thought Content: Thought content normal.         Judgment: Judgment normal.         Assessment/Plan    Diagnoses and all orders for this visit:    1. Hematoma of left lower extremity, initial encounter (Primary)    2. Acute deep vein thrombosis (DVT) of proximal profunda femoral vein of left lower extremity (CMS/HCC)    Other orders  -     furosemide (Lasix) 40 MG tablet; Take 1 tablet by mouth Daily " As Needed (edema or soa).  Dispense: 30 tablet; Refill: 2    Shortness of breath improving.  She likely had mild pulmonary edema.  Unclear etiology.  I reviewed her recent echo and she had normal ejection fraction.  There is no signs of diastolic dysfunction either.  I did not appreciate any murmur that was appreciated by [facility] neurology.  Change Lasix to as needed for shortness of breath or edema       I also did not appreciate any right carotid bruit that was noted on their exam.  She will be seeing Dr. Stern in the next couple weeks, I will defer carotid ultrasound to him if he feels it is necessary    She is recovering from the moderate to large size hematoma of the left lower extremity.  She had acute blood loss anemia secondary to this which is improving.  She was also found to have an acute DVT of the left proximal profunda femoral vein and remains on Eliquis.  The plan is to repeat a Doppler on June 2.  The hope is to come off of Eliquis at that time if the clot has resolved.  If the clot has resolved she should be placed back on Plavix 65 mg daily and in addition to the baby aspirin she is already on.  Defer length of treatment to Dr. Stern.    28 minutes was spent total on the day of the visit.    Return in about 3 months (around 8/17/2021) for Medicare Wellness.    Patient was given instructions and counseling regarding his/her condition or for health maintenance advice. Please see specific information pulled into the AVS if appropriate.     MELLISA Arauz MD   13:44 CDT  5/17/2021

## 2021-05-17 NOTE — PROGRESS NOTES
Subjective:     Encounter Date:01/08/2021      Patient ID: Danisha Cannon is a 65 y.o. female with a history of PAF s/p ablation and watchman implant and HLD.    Chief Complaint: bradycardia  Atrial Fibrillation  Presents for follow-up visit. Symptoms are negative for chest pain, dizziness, palpitations, shortness of breath, syncope and weakness. The symptoms have been stable. Past medical history includes atrial fibrillation and hyperlipidemia.   Hyperlipidemia  This is a chronic problem. The current episode started more than 1 year ago. The problem is controlled. Pertinent negatives include no chest pain or shortness of breath.   Shortness of Breath  Pertinent negatives include no chest pain, leg swelling, orthopnea, PND, rash, sputum production, syncope or wheezing.     Patient presents today for a follow up. Patient has a history of PAF s/p ablation by Dr. Guerin in 8/19. She remains on Flecainide. She underwent watchman implant on 2/21/21 per Dr. Guerin at [place]. She was recently hospitalized after a fall that resulted in a left leg hematoma and a nonocclusive DVT. She had her 45 day ASHTYN that revealed the device was well seated and had her Eliquis stopped and was started on Plavix. Due to her recent DVT, her plavix was stopped and she was started back on Eliquis. In review of hospitalization records, Dr. Stern plans to recheck a venous duplex and take her off her Eliquis if the DVT is resolved. She had issues with shortness of breath after her discharge which resulted in an ER visit. No changes were made at that time. Her PCP then started her on Lasix and she notes improvement in her symptoms. She reports otherwise she has been well. She denies chest pain, palpitations, dyspnea, edema, orthopnea and PND.      The following portions of the patient's history were reviewed and updated as appropriate: allergies, current medications, past family history, past medical history, past social history, past  surgical history and problem list.    Allergies   Allergen Reactions   • Other Rash     Dial soap  Redness and swelling on skin and burning sensation   • Codeine Dizziness and Nausea Only     Rapid heart rate, dizziness  NAUSEA   • Mobic [Meloxicam] Rash   • Morphine And Related Itching   • Azithromycin Other (See Comments)       Current Outpatient Medications:   •  alendronate (FOSAMAX) 70 MG tablet, Take 70 mg by mouth Every 7 (Seven) Days., Disp: , Rfl:   •  amitriptyline (ELAVIL) 50 MG tablet, Take 100 mg by mouth Every Night., Disp: , Rfl:   •  apixaban (ELIQUIS) 5 MG tablet tablet, Take 1 tablet by mouth Every 12 (Twelve) Hours., Disp: 60 tablet, Rfl: 0  •  Apixaban Starter Pack (Eliquis DVT/PE Starter Pack) tablet therapy pack, Take two 5 mg tablets by mouth every 12 hours for 7 days. Followed by one 5 mg tablet every 12 hours., Disp: 74 tablet, Rfl: 0  •  ASPIRIN 81 PO, Take 1 tablet by mouth Daily., Disp: , Rfl:   •  dexlansoprazole (DEXILANT) 60 MG capsule, Take 60 mg by mouth Daily., Disp: , Rfl:   •  diclofenac (VOLTAREN) 75 MG EC tablet, Take 75 mg by mouth Daily., Disp: , Rfl:   •  DULoxetine (CYMBALTA) 60 MG capsule, Take 60 mg by mouth 2 (Two) Times a Day., Disp: , Rfl:   •  famciclovir (FAMVIR) 500 MG tablet, Take 500 mg by mouth 2 (two) times a day., Disp: , Rfl:   •  flecainide (TAMBOCOR) 100 MG tablet, Take 100 mg by mouth 2 (Two) Times a Day., Disp: , Rfl:   •  fluticasone (FLONASE) 50 MCG/ACT nasal spray, 2 sprays into the nostril(s) as directed by provider Daily As Needed for Rhinitis or Allergies., Disp: 16 g, Rfl: 5  •  fluticasone (Flovent HFA) 110 MCG/ACT inhaler, Inhale 1 puff 2 (Two) Times a Day., Disp: 1 each, Rfl: 11  •  furosemide (Lasix) 40 MG tablet, Take 1 tablet by mouth Daily As Needed (edema or soa)., Disp: 30 tablet, Rfl: 2  •  gabapentin (NEURONTIN) 800 MG tablet, Take 800 mg by mouth 3 (Three) Times a Day., Disp: , Rfl:   •  levothyroxine (SYNTHROID, LEVOTHROID) 137 MCG  tablet, Take 137 mcg by mouth Every Morning., Disp: , Rfl:   •  LORazepam (ATIVAN) 1 MG tablet, Take 1 tablet by mouth Every Morning AND 0.5 tablets Every Evening. Do all this for 30 days., Disp: 45 tablet, Rfl: 0  •  metoprolol tartrate (LOPRESSOR) 25 MG tablet, Take 12.5 mg by mouth Every 12 (Twelve) Hours., Disp: , Rfl:   •  montelukast (SINGULAIR) 10 MG tablet, Take  by mouth Daily., Disp: , Rfl: 5  •  nystatin (MYCOSTATIN) 758960 UNIT/ML suspension, 5 mL Every 6 (Six) Hours., Disp: , Rfl:   •  QUEtiapine (SEROquel) 25 MG tablet, Take 25 mg by mouth Daily., Disp: , Rfl:   •  rOPINIRole (REQUIP) 3 MG tablet, Take 6 mg by mouth 2 (two) times a day. Take 3 tablets at night and 2 tablets every am, Disp: , Rfl:   •  sertraline (Zoloft) 25 MG tablet, Take 1 tablet by mouth Daily for 30 days., Disp: 30 tablet, Rfl: 0  •  Suprep Bowel Prep Kit 17.5-3.13-1.6 GM/177ML solution oral solution, Take as directed by office instructions provided, Disp: 177 mL, Rfl: 0  •  triamcinolone (KENALOG) 0.1 % paste, See Admin Instructions., Disp: , Rfl: 5  •  venlafaxine XR (Effexor XR) 37.5 MG 24 hr capsule, Take 1 capsule by mouth Daily., Disp: 10 capsule, Rfl: 0  Past Medical History:   Diagnosis Date   • Anemia    • Anxiety and depression    • Arthritis    • Atrial fibrillation (CMS/HCC)    • Chronic cough    • Disease of thyroid gland    • DVT (deep venous thrombosis) (CMS/HCC)    • GERD (gastroesophageal reflux disease)    • History of incision and drainage     Right knee   • History of staph infection     right knee, and upper right thigh   • Hyperlipidemia    • Infection      in right knee to bone, cleaned it out and put new hardware with antibiotic and pt developed hole in incision   • Neuropathy, peripheral    • Pneumonia     RECENT DX PER FAMILY DOCTOR   • PONV (postoperative nausea and vomiting)    • Restless leg syndrome    • Sleep apnea with use of continuous positive airway pressure (CPAP)     bipap   • SVT  (supraventricular tachycardia) (CMS/[facility])        Social History     Socioeconomic History   • Marital status:      Spouse name: Not on file   • Number of children: Not on file   • Years of education: Not on file   • Highest education level: Not on file   Tobacco Use   • Smoking status: Never Smoker   • Smokeless tobacco: Never Used   Vaping Use   • Vaping Use: Never used   Substance and Sexual Activity   • Alcohol use: No   • Drug use: No   • Sexual activity: Defer       Review of Systems   Constitutional: Negative for malaise/fatigue, weight gain and weight loss.   Cardiovascular: Negative for chest pain, dyspnea on exertion, irregular heartbeat, leg swelling, near-syncope, orthopnea, palpitations, paroxysmal nocturnal dyspnea and syncope.   Respiratory: Negative for cough, shortness of breath, sleep disturbances due to breathing, sputum production and wheezing.    Skin: Negative for dry skin, flushing, itching and rash.   Gastrointestinal: Negative for hematemesis and hematochezia.   Neurological: Negative for dizziness, light-headedness, loss of balance and weakness.   All other systems reviewed and are negative.         Objective:     Vitals reviewed.   Constitutional:       General: Not in acute distress.     Appearance: Well-developed. Not diaphoretic.   Eyes:      General: No scleral icterus.     Conjunctiva/sclera: Conjunctivae normal.      Pupils: Pupils are equal, round, and reactive to light.   HENT:      Head: Normocephalic.    Mouth/Throat:      Pharynx: No oropharyngeal exudate.   Pulmonary:      Effort: Pulmonary effort is normal. No respiratory distress.      Breath sounds: Normal breath sounds. No wheezing. No rales.   Chest:      Chest wall: Not tender to palpatation.   Cardiovascular:      Normal rate. Regular rhythm.   Pulses:     Intact distal pulses.   Edema:     Peripheral edema absent.   Abdominal:      General: Bowel sounds are normal. There is no distension.      Palpations: Abdomen  "is soft.      Tenderness: There is no abdominal tenderness.   Musculoskeletal: Normal range of motion.      Cervical back: Normal range of motion and neck supple. Skin:     General: Skin is warm and dry.      Coloration: Skin is not pale.      Findings: No erythema or rash.   Neurological:      Mental Status: Alert and oriented to person, place, and time.      Deep Tendon Reflexes: Reflexes are normal and symmetric.   Psychiatric:         Behavior: Behavior normal.           ECG 12 Lead    Date/Time: 5/17/2021 3:24 PM  Performed by: Barbara De Dios APRN  Authorized by: Barbara De Dios APRN   Comparison: compared with previous ECG from 5/11/2021  Similar to previous ECG  Rhythm: sinus rhythm  Rate: normal  BPM: 77  Conduction: left anterior fascicular block  QRS axis: normal    Clinical impression: abnormal EKG          /66   Pulse 77   Ht 162.6 cm (64\")   Wt (!) 142 kg (312 lb)   LMP  (LMP Unknown)   SpO2 94%   BMI 53.55 kg/m²     Lab Review:   I have reviewed previous office notes, [facility] notes, recent labs and recent cardiac testing.         Assessment:          Diagnosis Plan   1. PAF (paroxysmal atrial fibrillation) (CMS/[facility])     2. Presence of Watchman left atrial appendage closure device     3. S/P ablation of atrial fibrillation     4. Acute deep vein thrombosis (DVT) of femoral vein of left lower extremity (CMS/[facility])            Plan:       1. PAF- stable. NSR Today.s/p ablation. Follows with Dr. Guerin. continue Flecainide, lopressor and Eliquis.   2. S/p ablation - per Dr. Guerin in 8/19  3. S/p watchman- placed on 2/21/21 per Dr. Guerin. Due for a follow up with him on Thursday. Informed patient when she is taken off her Eliquis, she will need to go back on her Plavix until Dr. Guerin feels it is safe to stop.   4. Acute DVT- noted on US while inpatient. Currently on Eliquis. Repeat US ordered for 6/2 to determine if Eliquis can be stopped. Following with Dr. Stern.     Follow up " in 3 months or return sooner if symptoms worsen.     I spent 30 minutes caring for Danisha on this date of service. This time includes time spent by me in the following activities:preparing for the visit, reviewing tests, obtaining and/or reviewing a separately obtained history, performing a medically appropriate examination and/or evaluation , ordering medications, tests, or procedures, documenting information in the medical record and independently interpreting results and communicating that information with the patient/family/caregiver

## 2021-05-18 ENCOUNTER — TELEMEDICINE (OUTPATIENT)
Dept: PSYCHIATRY | Facility: CLINIC | Age: 66
End: 2021-05-18

## 2021-05-18 DIAGNOSIS — Z79.899 POLYPHARMACY: ICD-10-CM

## 2021-05-18 DIAGNOSIS — F41.1 GENERALIZED ANXIETY DISORDER: Chronic | ICD-10-CM

## 2021-05-18 DIAGNOSIS — F33.1 MAJOR DEPRESSIVE DISORDER, RECURRENT EPISODE, MODERATE (HCC): Primary | Chronic | ICD-10-CM

## 2021-05-18 DIAGNOSIS — F42.8 OBSESSIVE THINKING: Chronic | ICD-10-CM

## 2021-05-18 PROCEDURE — 99214 OFFICE O/P EST MOD 30 MIN: CPT | Performed by: NURSE PRACTITIONER

## 2021-05-18 NOTE — PROGRESS NOTES
This provider is located at [facility], [address] [place] [address].The Patient is seen remotely at home, [address] [place] [address] via [employer].  Patient is being seen via telehealth and confirm that they are in a secure environment for this session. The patient's condition being diagnosed/treated is appropriate for telemedicine. The provider identified himself/herself: herself as well as her credentials.   The patient gave consent to be seen remotely, and when consent is given they understand that the consent allows for patient identifiable information to be sent to a third party as needed.   They may refuse to be seen remotely at any time. The electronic data is encrypted and password protected, and the patient has been advised of the potential risks to privacy not withstanding such measures.    You have chosen to receive care through a telehealth visit.  Do you consent to use a video/audio connection for your medical care today? Yes      Chief Complaint  Depression/anxiety    Subjective          Danisha Cannon presents to [facility] for medication management.     History of Present Illness  Patient presents today via MyChart reporting that she has had some medication changes due to the fact that she had a fall recently.  She states that she fell at home and had 2 hematomas and a blood clot in both of her legs and during the hospital visit they noted fluid in her lungs so she was placed on Lasix in which she reports she lost 15 pounds of fluid.  She notes that she is still short of breath trying to move but she is getting around better at home.  Patient states that she did talk with Dr. Xavier Camp her PCP regarding tapering the lorazepam and he stated to her according to the patient that she had been on 15 years so it would be detrimental to taper her.  Patient notes however with the sertraline she was not needing the lorazepam during  the day but did needed at night as she cannot sleep without it.  Patient states however she has been out of the sertraline for a week now and reports she did need some during the day of the lorazepam but for the most part mainly finds that she needs it at night as she will get 4 to 8 hours of sleep with it.  She notes the quetiapine was not helpful for sleep.  Patient states the only reason she gets up at night is to use the bathroom.  Patient states she did speak with her neurologist who wanted to keep her on her medications for her nerve pain and did not want to make any changes.  Encourage patient just to be aware of side effects of serotonin syndrome and risk as she denied any at this time.  Patient notes that she felt happier with the sertraline and not down as much and less worried.  Patient denied any side effects with coming off the venlafaxine onto the sertraline.  Patient reports appetite is unchanged.  Patient denies any SI/HI/AH/VH.        Objective   Vital Signs:   There were no vitals taken for this visit.  Due to the remote nature of this encounter (virtual encounter), vitals were unable to be obtained.  Height stated at 64 inches.  Weight stated at 312 pounds.      PHQ-9 Score:   PHQ-9 Total Score: (P) 7     Patient screened positive for depression based on a PHQ-9 score of 7 on 5/18/2021. Follow-up recommendations include: see notes and medication mangagement. .    PHQ-9 Depression Screening  Little interest or pleasure in doing things? (P) 0   Feeling down, depressed, or hopeless? (P) 1   Trouble falling or staying asleep, or sleeping too much? (P) 0   Feeling tired or having little energy? (P) 3   Poor appetite or overeating? (P) 3   Feeling bad about yourself - or that you are a failure or have let yourself or your family down? (P) 0   Trouble concentrating on things, such as reading the newspaper or watching television? (P) 0   Moving or speaking so slowly that other people could have noticed?  Or the opposite - being so fidgety or restless that you have been moving around a lot more than usual? (P) 0   Thoughts that you would be better off dead, or of hurting yourself in some way? (P) 0   PHQ-9 Total Score (P) 7   If you checked off any problems, how difficult have these problems made it for you to do your work, take care of things at home, or get along with other people? (P) Somewhat difficult     PHQ-9 Total Score: (P) 7      Feeling nervous, anxious or on edge: (P) More than half the days  Not being able to stop or control worrying: (P) Nearly every day  Worrying too much about different things: (P) Several days  Trouble Relaxing: (P) Several days  Being so restless that it is hard to sit still: (P) Not at all  Feeling afraid as if something awful might happen: (P) Nearly every day  Becoming easily annoyed or irritable: (P) Several days  JAMES 7 Total Score: (P) 11  If you checked any problems, how difficult have these problems made it for you to do your work, take care of things at home, or get along with other people: (P) Not difficult at all      PROMIS scale screening tool that patient filled out virtually reviewed by this APRN at today's encounter.      Mental Status Exam:   Hygiene:   good  Cooperation:  Cooperative  Eye Contact:  Good  Psychomotor Behavior:  Restless  Affect:  Appropriate  Mood: anxious  Speech:  Normal  Thought Process:  Goal directed and Linear  Thought Content:  Normal  Suicidal:  None  Homicidal:  None  Hallucinations:  None  Delusion:  None  Memory:  Intact  Orientation:  Person, Place, Time and Situation  Reliability:  good  Insight:  Good and Fair  Judgement:  Good and Fair  Impulse Control:  Good and Fair  Physical/Medical Issues:  Yes Afib, DVT, hyperlipidemia, restless leg, SVT, arthritis     Current Medications:   Current Outpatient Medications   Medication Sig Dispense Refill   • alendronate (FOSAMAX) 70 MG tablet Take 70 mg by mouth Every 7 (Seven) Days.     •  amitriptyline (ELAVIL) 50 MG tablet Take 100 mg by mouth Every Night.     • apixaban (ELIQUIS) 5 MG tablet tablet Take 1 tablet by mouth Every 12 (Twelve) Hours. 60 tablet 0   • Apixaban Starter Pack (Eliquis DVT/PE Starter Pack) tablet therapy pack Take two 5 mg tablets by mouth every 12 hours for 7 days. Followed by one 5 mg tablet every 12 hours. 74 tablet 0   • ASPIRIN 81 PO Take 1 tablet by mouth Daily.     • dexlansoprazole (DEXILANT) 60 MG capsule Take 60 mg by mouth Daily.     • diclofenac (VOLTAREN) 75 MG EC tablet Take 75 mg by mouth Daily.     • DULoxetine (CYMBALTA) 60 MG capsule Take 60 mg by mouth 2 (Two) Times a Day.     • famciclovir (FAMVIR) 500 MG tablet Take 500 mg by mouth 2 (two) times a day.     • flecainide (TAMBOCOR) 100 MG tablet Take 100 mg by mouth 2 (Two) Times a Day.     • fluticasone (FLONASE) 50 MCG/ACT nasal spray 2 sprays into the nostril(s) as directed by provider Daily As Needed for Rhinitis or Allergies. 16 g 5   • fluticasone (Flovent HFA) 110 MCG/ACT inhaler Inhale 1 puff 2 (Two) Times a Day. 1 each 11   • furosemide (Lasix) 40 MG tablet Take 1 tablet by mouth Daily As Needed (edema or soa). 30 tablet 2   • gabapentin (NEURONTIN) 800 MG tablet Take 800 mg by mouth 3 (Three) Times a Day.     • levothyroxine (SYNTHROID, LEVOTHROID) 137 MCG tablet Take 137 mcg by mouth Every Morning.     • LORazepam (ATIVAN) 1 MG tablet Take 1 tablet by mouth Every Morning AND 0.5 tablets Every Evening. Do all this for 30 days. 45 tablet 0   • metoprolol tartrate (LOPRESSOR) 25 MG tablet Take 12.5 mg by mouth Every 12 (Twelve) Hours.     • montelukast (SINGULAIR) 10 MG tablet Take  by mouth Daily.  5   • nystatin (MYCOSTATIN) 446924 UNIT/ML suspension 5 mL Every 6 (Six) Hours.     • rOPINIRole (REQUIP) 3 MG tablet Take 6 mg by mouth 2 (two) times a day. Take 3 tablets at night and 2 tablets every am     • Suprep Bowel Prep Kit 17.5-3.13-1.6 GM/177ML solution oral solution Take as directed by office  instructions provided 177 mL 0   • triamcinolone (KENALOG) 0.1 % paste See Admin Instructions.  5   • sertraline (Zoloft) 50 MG tablet Take 1 tablet by mouth Daily for 30 days. 30 tablet 0     No current facility-administered medications for this visit.       Physical Exam  Nursing note reviewed.   Constitutional:       Appearance: Normal appearance.   Neurological:      Mental Status: She is alert.   Psychiatric:         Attention and Perception: Attention and perception normal.         Mood and Affect: Mood is anxious and depressed.         Speech: Speech normal.         Behavior: Behavior is cooperative.         Thought Content: Thought content normal.         Cognition and Memory: Cognition and memory normal.        Result Review :     The following data was reviewed by: ERNESTO Smith on 05/18/2021:  Common labs    Common Labsle 4/29/21 4/29/21 4/30/21 5/11/21 5/11/21    0439 0439  1438 1438   Glucose  105 (A)   148 (A)   BUN  24 (A)   21   Creatinine  0.90   0.84   eGFR Non African Am  62   68   Sodium  141   140   Potassium  4.2   3.9   Chloride  104   103   Calcium  8.1 (A)   9.4   Albumin     3.30 (A)   Total Bilirubin     0.4   Alkaline Phosphatase     109   AST (SGOT)     19   ALT (SGPT)     13   WBC   8.89 6.81    Hemoglobin 8.2 (A)  9.0 (A) 9.6 (A)    Hematocrit 26.9 (A)  29.5 (A) 32.6 (A)    Platelets   215 303    (A) Abnormal value            CMP    CMP 4/28/21 4/29/21 5/11/21   Glucose 93 105 (A) 148 (A)   BUN 29 (A) 24 (A) 21   Creatinine 0.87 0.90 0.84   eGFR Non  Am 65 62 68   Sodium 142 141 140   Potassium 4.3 4.2 3.9   Chloride 105 104 103   Calcium 8.2 (A) 8.1 (A) 9.4   Albumin 3.30 (A)  3.30 (A)   Total Bilirubin 1.2  0.4   Alkaline Phosphatase 113  109   AST (SGOT) 29  19   ALT (SGPT) 24  13   (A) Abnormal value            CBC    CBC 4/29/21 4/30/21 5/11/21   WBC  8.89 6.81   RBC  3.11 (A) 3.47 (A)   Hemoglobin 8.2 (A) 9.0 (A) 9.6 (A)   Hematocrit 26.9 (A) 29.5 (A) 32.6 (A)    MCV  94.9 93.9   MCH  28.9 27.7   MCHC  30.5 (A) 29.4 (A)   RDW  16.9 (A) 16.9 (A)   Platelets  215 303   (A) Abnormal value            CBC w/diff    CBC w/Diff 4/29/21 4/30/21 5/11/21   WBC  8.89 6.81   RBC  3.11 (A) 3.47 (A)   Hemoglobin 8.2 (A) 9.0 (A) 9.6 (A)   Hematocrit 26.9 (A) 29.5 (A) 32.6 (A)   MCV  94.9 93.9   MCH  28.9 27.7   MCHC  30.5 (A) 29.4 (A)   RDW  16.9 (A) 16.9 (A)   Platelets  215 303   Neutrophil Rel %   69.5   Immature Granulocyte Rel %   0.6 (A)   Lymphocyte Rel %   15.4 (A)   Monocyte Rel %   7.9   Eosinophil Rel %   6.0   Basophil Rel %   0.6   (A) Abnormal value                TSH    TSH 4/1/21   TSH 1.650               UA    Urinalysis 6/5/20 6/5/20    1033 1033   Squamous Epithelial Cells, UA  0-2   Specific Gravity, UA 1.016    Ketones, UA Negative    Blood, UA Negative    Leukocytes, UA Trace (A)    Nitrite, UA Negative    RBC, UA  3-5 (A)   WBC, UA  3-5 (A)   Bacteria, UA  None Seen   (A) Abnormal value            Data reviewed: PCP notes        Assessment and Plan    Problem List Items Addressed This Visit     None      Visit Diagnoses     Major depressive disorder, recurrent episode, moderate (CMS/HCC)  (Chronic)   -  Primary    Relevant Medications    sertraline (Zoloft) 50 MG tablet    Generalized anxiety disorder  (Chronic)       Relevant Medications    sertraline (Zoloft) 50 MG tablet    Obsessive thinking  (Chronic)       Relevant Medications    sertraline (Zoloft) 50 MG tablet    Polypharmacy                TREATMENT PLAN/GOALS: Continue supportive psychotherapy efforts and medications as indicated. Treatment and medication options discussed during today's visit. Patient ackowledged and verbally consented to continue with current treatment plan and was educated on the importance of compliance with treatment and follow-up appointments.    MEDICATION ISSUES:  We discussed risks, benefits, and side effects of the above medications and the patient was agreeable with the plan.  Patient was educated on the importance of compliance with treatment and follow-up appointments.  Patient is agreeable to call the office with any worsening of symptoms or onset of side effects. Patient is agreeable to call 911 or go to the nearest ER should he/she begin having SI/HI.      -Encourage patient since she has been without sertraline for 1 week to take 25 mg for 1 week then increase up to 50 mg if she had any side effects or worsening symptoms to contact the clinic she verbalized understanding.  -According to patient her PCP is taking over her lorazepam encouraged her if she is not needing her lorazepam during the day and has been used to only taking it at night to continue doing so as needed but if she were to have any side effects or withdrawals to contact the clinic/PCP patient verbalized understanding.   -Discontinue Seroquel as patient felt it was not helpful for her sleep and the only thing was helpful was her 0.5 lorazepam.      Counseled patient regarding multimodal approach with healthy nutrition, healthy sleep, regular physical activity, social activities, counseling, and medications.      Coping skills reviewed and encouraged positive framing of thoughts     Assisted patient in processing above session content; acknowledged and normalized patient’s thoughts, feelings, and concerns.  Applied  positive coping skills and behavior management in session.  Allowed patient to freely discuss issues without interruption or judgment. Provided safe, confidential environment to facilitate the development of positive therapeutic relationship and encourage open, honest communication. Assisted patient in identifying risk factors which would indicate the need for higher level of care including thoughts to harm self or others and/or self-harming behavior and encouraged patient to contact this office, call 911, or present to the nearest emergency room should any of these events occur. Discussed crisis  intervention services and means to access.     MEDS ORDERED DURING VISIT:  New Medications Ordered This Visit   Medications   • sertraline (Zoloft) 50 MG tablet     Sig: Take 1 tablet by mouth Daily for 30 days.     Dispense:  30 tablet     Refill:  0           Follow Up   Return in about 3 weeks (around 6/8/2021), or if symptoms worsen or fail to improve, for Recheck.    Patient was given instructions and counseling regarding her condition or for health maintenance advice. Please see specific information pulled into the AVS if appropriate.     This document has been electronically signed by ERNESTO Smith  May 18, 2021 11:38 EDT    Part of this note may be an electronic transcription/translation of spoken language to printed text using the Dragon Dictation System.

## 2021-05-20 ENCOUNTER — HOSPITAL ENCOUNTER (OUTPATIENT)
Dept: NON INVASIVE DIAGNOSTICS | Age: 66
Discharge: HOME | End: 2021-05-20
Payer: MEDICARE

## 2021-05-20 DIAGNOSIS — R09.89 BRUIT OF RIGHT CAROTID ARTERY: ICD-10-CM

## 2021-05-20 PROCEDURE — 93880 EXTRACRANIAL BILAT STUDY: CPT

## 2021-05-24 DIAGNOSIS — I65.21 CAROTID STENOSIS, RIGHT: Primary | ICD-10-CM

## 2021-05-25 ENCOUNTER — TELEPHONE (OUTPATIENT)
Dept: NEUROLOGY | Age: 66
End: 2021-05-25

## 2021-05-25 ENCOUNTER — TELEPHONE (OUTPATIENT)
Dept: PODIATRY | Facility: CLINIC | Age: 66
End: 2021-05-25

## 2021-05-25 NOTE — TELEPHONE ENCOUNTER
----- Message from SAMUEL Sorensen sent at 5/24/2021  4:21 PM CDT -----  LB patient. Carotid stenosis noted in the right carotid artery, will put referral in to vascular.

## 2021-05-25 NOTE — TELEPHONE ENCOUNTER
Pt called stating that she had seen her Neuro doctor in [facility] and that they stated that she has 50%-60% blockage and she was wondering since she has a appointment with Dr. Stern if she needed to get another referral and reschedule the appointment for that reason and or could she bring the disk.     I informed the patient we can keep her appointment and to bring the disk with her and any paperwork from the appointment. And that the pt had no worry with rescheduling the appointment.

## 2021-05-31 DIAGNOSIS — F41.1 GENERALIZED ANXIETY DISORDER: ICD-10-CM

## 2021-05-31 DIAGNOSIS — F33.1 MAJOR DEPRESSIVE DISORDER, RECURRENT EPISODE, MODERATE (HCC): ICD-10-CM

## 2021-05-31 DIAGNOSIS — F42.8 OBSESSIVE THINKING: ICD-10-CM

## 2021-06-02 ENCOUNTER — HOSPITAL ENCOUNTER (OUTPATIENT)
Dept: ULTRASOUND IMAGING | Facility: HOSPITAL | Age: 66
Discharge: HOME | End: 2021-06-02
Admitting: SURGERY

## 2021-06-02 ENCOUNTER — OFFICE VISIT (OUTPATIENT)
Dept: VASCULAR SURGERY | Facility: CLINIC | Age: 66
End: 2021-06-02

## 2021-06-02 VITALS
DIASTOLIC BLOOD PRESSURE: 84 MMHG | HEIGHT: 64 IN | WEIGHT: 293 LBS | OXYGEN SATURATION: 94 % | BODY MASS INDEX: 50.02 KG/M2 | HEART RATE: 72 BPM | SYSTOLIC BLOOD PRESSURE: 124 MMHG

## 2021-06-02 DIAGNOSIS — Z79.01 ANTICOAGULATED: ICD-10-CM

## 2021-06-02 DIAGNOSIS — I65.23 BILATERAL CAROTID ARTERY STENOSIS: Primary | ICD-10-CM

## 2021-06-02 DIAGNOSIS — I48.92 ATRIAL FLUTTER, PAROXYSMAL (HCC): ICD-10-CM

## 2021-06-02 DIAGNOSIS — Z98.890 S/P ABLATION OF ATRIAL FIBRILLATION: ICD-10-CM

## 2021-06-02 DIAGNOSIS — R60.0 BILATERAL EDEMA OF LOWER EXTREMITY: ICD-10-CM

## 2021-06-02 DIAGNOSIS — E66.9 OBESITY, UNSPECIFIED OBESITY SEVERITY, UNSPECIFIED OBESITY TYPE: Chronic | ICD-10-CM

## 2021-06-02 DIAGNOSIS — S80.12XD HEMATOMA OF LEFT LOWER EXTREMITY, SUBSEQUENT ENCOUNTER: ICD-10-CM

## 2021-06-02 DIAGNOSIS — E78.2 MIXED HYPERLIPIDEMIA: ICD-10-CM

## 2021-06-02 DIAGNOSIS — Z95.818 PRESENCE OF WATCHMAN LEFT ATRIAL APPENDAGE CLOSURE DEVICE: ICD-10-CM

## 2021-06-02 DIAGNOSIS — Z86.79 S/P ABLATION OF ATRIAL FIBRILLATION: ICD-10-CM

## 2021-06-02 PROCEDURE — 93971 EXTREMITY STUDY: CPT | Performed by: SURGERY

## 2021-06-02 PROCEDURE — 93971 EXTREMITY STUDY: CPT

## 2021-06-02 PROCEDURE — 99214 OFFICE O/P EST MOD 30 MIN: CPT | Performed by: SURGERY

## 2021-06-02 NOTE — PATIENT INSTRUCTIONS
"BMI for Adults  What is BMI?  Body mass index (BMI) is a number that is calculated from a person's weight and height. BMI can help estimate how much of a person's weight is composed of fat. BMI does not measure body fat directly. Rather, it is an alternative to procedures that directly measure body fat, which can be difficult and expensive.  BMI can help identify people who may be at higher risk for certain medical problems.  What are BMI measurements used for?  BMI is used as a screening tool to identify possible weight problems. It helps determine whether a person is obese, overweight, a healthy weight, or underweight.  BMI is useful for:  · Identifying a weight problem that may be related to a medical condition or may increase the risk for medical problems.  · Promoting changes, such as changes in diet and exercise, to help reach a healthy weight. BMI screening can be repeated to see if these changes are working.  How is BMI calculated?  BMI involves measuring your weight in relation to your height. Both height and weight are measured, and the BMI is calculated from those numbers. This can be done either in English (U[place] or metric measurements. Note that charts and online BMI calculators are available to help you find your BMI quickly and easily without having to do these calculations yourself.  To calculate your BMI in English (U.S[place] measurements:    1. Measure your weight in pounds (lb).  2. Multiply the number of pounds by 703.  ? For example, for a person who weighs 180 lb, multiply that number by 703, which equals 126,540.  3. Measure your height in inches. Then multiply that number by itself to get a measurement called \"inches squared.\"  ? For example, for a person who is 70 inches tall, the \"inches squared\" measurement is 70 inches x 70 inches, which equals 4,900 inches squared.  4. Divide the total from step 2 (number of lb x 703) by the total from step 3 (inches squared): 126,540 ÷ 4,900 = 25.8. This is " "your BMI.  To calculate your BMI in metric measurements:  1. Measure your weight in kilograms (kg).  2. Measure your height in meters (m). Then multiply that number by itself to get a measurement called \"meters squared.\"  ? For example, for a person who is 1.75 m tall, the \"meters squared\" measurement is 1.75 m x 1.75 m, which is equal to 3.1 meters squared.  3. Divide the number of kilograms (your weight) by the meters squared number. In this example: 70 ÷ 3.1 = 22.6. This is your BMI.  What do the results mean?  BMI charts are used to identify whether you are underweight, normal weight, overweight, or obese. The following guidelines will be used:  · Underweight: BMI less than 18.5.  · Normal weight: BMI between 18.5 and 24.9.  · Overweight: BMI between 25 and 29.9.  · Obese: BMI of 30 or above.  Keep these notes in mind:  · Weight includes both fat and muscle, so someone with a muscular build, such as an athlete, may have a BMI that is higher than 24.9. In cases like these, BMI is not an accurate measure of body fat.  · To determine if excess body fat is the cause of a BMI of 25 or higher, further assessments may need to be done by a health care provider.  · BMI is usually interpreted in the same way for men and women.  Where to find more information  For more information about BMI, including tools to quickly calculate your BMI, go to these websites:  · Centers for Disease Control and Prevention: www.cdc.gov  · American Heart Association: www.heart.org  · National Heart, Lung, and Blood [place]: www.nhlbi.nih.gov  Summary  · Body mass index (BMI) is a number that is calculated from a person's weight and height.  · BMI may help estimate how much of a person's weight is composed of fat. BMI can help identify those who may be at higher risk for certain medical problems.  · BMI can be measured using English measurements or metric measurements.  · BMI charts are used to identify whether you are underweight, normal " weight, overweight, or obese.  This information is not intended to replace advice given to you by your health care provider. Make sure you discuss any questions you have with your health care provider.  Document Revised: 09/09/2020 Document Reviewed: 07/17/2020  Elsevier Patient Education © 2021 [employer]

## 2021-06-02 NOTE — PROGRESS NOTES
06/02/2021      MATTHIEU Arauz MD  [address] [facility] [address]  [facility] [address]        Danisha Cannon  1955    Chief Complaint   Patient presents with   • Follow-up     Pt states hematomas on left thigh, and blod clot. Venous doppler done this morning. Pt states neuro told her there is a partial bloackage in carotid. 50-67% on right and less than 50 on left.- pt denies stoke symptoms and pain   • smoking status     never smoker   • Med Management     verbally reviewed meds with pt       Dear MATTHIEU Arauz MD:    HPI     I had the pleasure of seeing your patient in the office today for follow up.  As you recall, the patient is a 65 y.o. female who we are currently following for recent admission due to a left thigh hematoma.  Patient has a history of chronic anticoagulation due to A. fib and was actually seen here at this office back in late 2018, in early 2019 after developing a right thigh hematoma after local trauma.  Initially we had been trying to manage it conservatively however she developed signs of infection of that hematoma and required incision and drainage of that area in the operating room in January 2019 with placement of a wound VAC.  She had then subsequently followed here at our [facility] wound care center and had healing of that area with good result.  Following that she did not come for any further follow-up here in the vascular surgery office.  She did however return here to [facility] for admission after another fall at her home where she injured her left thigh and developed ecchymosis and swelling.  She was found to have a hematoma in both the medial and lateral thighs and was able to be managed conservatively.  It was noted on that admission that she had had placement of a watchman device in [place] due to her A. fib and so was actually off of anticoagulation and on aspirin/Plavix at that time.  However here when she was found to have the hematoma she was also found to have a left profunda  femoris vein DVT and so decision was made by the admitting team to continue her aspirin and stop the Plavix and place her on therapeutic anticoagulation ultimately with Eliquis which she was discharged on.  Her hemoglobin remained stable and there was no further signs of any bleeding and so she was discharged.  Today in the office she notes significant improvement in her ecchymosis and swelling to the left thigh.  She does continue to have some mild induration both in the medial and lateral thighs given the known hematoma but it is much improved as this resolves.  She denies any signs of infection with no fevers or chills or any other issues.  She did have a repeat venous duplex of her left lower extremity done today which I did personally review.  The previously noted left profunda femoris vein DVT is completely resolved.  She has been continuing on her Eliquis up to this point with no issue and is also on 81 mg aspirin daily.  She otherwise feels well and has no other acute significant complaints.  On further discussion she also does note that recently she had a carotid duplex done at an outside hospital as part of screening and it did ultimately show 50 to 69% stenosis of the right ICA by velocity on the report that I was able to review.  However I was unable to review any images as this was done at an outside hospital.  She is asymptomatic from a carotid standpoint denies any TIA or strokelike symptoms.      Review of Systems   Constitutional: Negative.  Negative for activity change, appetite change, chills, diaphoresis, fatigue and fever.   HENT: Negative.  Negative for congestion, sneezing, sore throat and trouble swallowing.    Eyes: Negative.  Negative for visual disturbance.   Respiratory: Negative.  Negative for chest tightness and shortness of breath.    Cardiovascular: Negative.  Negative for chest pain, palpitations and leg swelling.   Gastrointestinal: Negative.  Negative for abdominal distention,  "abdominal pain, nausea and vomiting.   Endocrine: Negative.    Genitourinary: Negative.    Musculoskeletal: Negative.    Skin: Negative.         Mild residual ecchymosis to the left medial and lateral thigh at the site of known previous hematoma.   Allergic/Immunologic: Negative.    Neurological: Negative.    Hematological: Negative.    Psychiatric/Behavioral: Negative.        /84 (BP Location: Right arm, Patient Position: Sitting, Cuff Size: Adult)   Pulse 72   Ht 162.6 cm (64\")   Wt (!) 140 kg (308 lb 6.4 oz)   LMP  (LMP Unknown)   SpO2 94%   BMI 52.94 kg/m²   Physical Exam  Vitals reviewed.   Constitutional:       Appearance: Normal appearance. She is obese.   HENT:      Head: Normocephalic and atraumatic.      Nose: Nose normal.      Mouth/Throat:      Mouth: Mucous membranes are moist.   Eyes:      Extraocular Movements: Extraocular movements intact.      Pupils: Pupils are equal, round, and reactive to light.   Cardiovascular:      Rate and Rhythm: Normal rate and regular rhythm.      Pulses: Normal pulses.           Carotid pulses are 2+ on the right side and 2+ on the left side.       Radial pulses are 2+ on the right side and 2+ on the left side.        Femoral pulses are 2+ on the right side and 2+ on the left side.       Popliteal pulses are 2+ on the right side and 2+ on the left side.        Dorsalis pedis pulses are 2+ on the right side and 2+ on the left side.        Posterior tibial pulses are 2+ on the right side and 2+ on the left side.      Comments: She has chronic edema of the bilateral lower extremities from ankle to knee.  Left thigh area at the site of previous trauma/hematoma shows some residual ecchymosis mainly along the left thigh but this is almost completely resolved.  She does have some induration in the deep soft tissues in both the medial and lateral thigh consistent with the known hematoma but this is also improved from previous.  She has no significant " tenderness.  Pulmonary:      Effort: Pulmonary effort is normal. No respiratory distress.   Abdominal:      General: There is no distension.      Palpations: Abdomen is soft. There is no mass.      Tenderness: There is no abdominal tenderness.   Musculoskeletal:      Cervical back: Normal range of motion and neck supple.      Right lower leg: Edema present.      Left lower leg: Edema present.   Skin:     General: Skin is warm.      Capillary Refill: Capillary refill takes less than 2 seconds.   Neurological:      General: No focal deficit present.      Mental Status: She is alert and oriented to person, place, and time.   Psychiatric:         Mood and Affect: Mood normal.         Behavior: Behavior normal.         Thought Content: Thought content normal.         Judgment: Judgment normal.         DIAGNOSTIC DATA:    Carotid duplex was recently done at [facility] here in [place].  I was able to review a report.  It showed 50 to 69% stenosis of the right ICA, and less than 50% stenosis on the left.    XR Chest 1 View    Result Date: 5/11/2021  Narrative: EXAMINATION:  XR CHEST 1 VW-  5/11/2021 3:35 PM CDT  HISTORY: [facility] Triage Protocol  COMPARISON: 6/5/2020.  FINDINGS:  There is mild hypoventilation. There is no focal infiltrate or effusion. Heart size is upper limits of normal. No acute bony abnormality is seen.      Impression: No active disease is seen.   This report was finalized on 05/11/2021 15:52 by Dr. Jacinto Lowery MD.    CT Angiogram Chest    Result Date: 5/11/2021  Narrative: Exam: CT angiogram of the of the chest with intravenous contrast.  CLINICAL HISTORY:  Pulmonary embolus suspected. Left leg DVT.  DOSE:  1336 mGycm. Automated exposure control was utilized to diminish patient radiation dose.  TECHNIQUE: Contiguous axial images were obtained through the thorax following intravenous contrast administration with reformatted images obtained in the sagittal and coronal projections from the original  data set. Three-dimensional reconstructions are also obtained.  COMPARISON:  11/20/2020  FINDINGS:  Pulmonary arteries:  There is normal enhancement of the pulmonary arteries with no evidence of pulmonary thromboembolic disease.  Aorta:  The thoracic aorta and proximal great vessels are normal in appearance. There is no evidence of aortic dissection or aneurysm.  LUNGS:  There is patchy groundglass disease within both lungs for which I would favor patchy interstitial edema over an interstitial pneumonitis. The lungs are hypoventilated. There is no pleural effusion present.  Pleural spaces: Unremarkable. No evidence of pleural effusion or pneumothorax.  HEART: There is moderate cardiomegaly. A Watchman device is noted within the left atrial appendage. No evidence of right ventricular dysfunction or pericardial effusion.  Bones: No acute osseous abnormalities are demonstrated. There is anterolisthesis of C7 on T1 stable from the previous exam. Multiple old left sided rib fractures are present.  CHEST WALL: No chest wall abnormalities are demonstrated.  Lymph nodes: No enlarged mediastinal or axillary lymphadenopathy is present.  Upper abdomen: A small hiatal hernia is present. Limited images of the upper abdomen are otherwise unremarkable.      Impression: 1. No evidence of pulmonary thromboembolic disease. The thoracic aorta is normal in caliber with no evidence of dissection or aneurysm. 2. Patchy groundglass disease within both lungs. I would favor interstitial edema over an interstitial pneumonitis. Lungs are also hypoventilated causing accentuation of the bronchovascular markings and cardiac silhouette. No evidence of pleural effusion. 3. Cardiomegaly. No evidence of pericardial effusion. A Watchman left atrial appendage device is present. This report was finalized on 05/11/2021 18:57 by Dr. Jair Boles MD.      Patient Active Problem List   Diagnosis   • Abdominal adhesions   • Abnormal echocardiogram   •  Abnormal Holter exam   • Atrial flutter, paroxysmal (CMS/HCC)   • Bilateral edema of lower extremity   • Chest pain   • Cholecystitis with cholelithiasis   • Chronic right-sided low back pain with sciatica   • Morbid obesity (CMS/HCC)   • Facet syndrome, lumbar   • Heart rate fast   • Idiopathic chronic gout without tophus   • Idiopathic hypotension   • Idiopathic progressive neuropathy   • Insomnia   • Lumbar facet arthropathy   • Memory loss   • MRSA carrier   • Obstructive sleep apnea   • Pilonidal cyst   • Primary osteoarthritis of right knee   • Recurrent ventral incisional hernia   • Restless leg syndrome   • Sciatica of right side   • Shortness of breath   • Sick sinus syndrome (CMS/HCC)   • Sleep apnea   • Sleep apnea, obstructive   • Snoring   • Somnolence, daytime   • Spondylosis of lumbar region without myelopathy or radiculopathy   • Tear of medial meniscus of right knee, current   • Total knee replacement status, right   • Tremor   • Umbilical hernia   • Obesity, unspecified obesity severity, unspecified obesity type   • 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)   • Infection of right knee (CMS/[facility])   • Hypothyroidism   • Fever   • Hypoxia   • Leukocytosis   • Abnormal chest x-ray   • Abscess of right thigh   • Dyspepsia   • Anticoagulated   • Nonsmoker   • NSAID long-term use   • Esophageal dysphagia   • S/P ablation of atrial fibrillation   • Mixed hyperlipidemia   • PAF (paroxysmal atrial fibrillation) (CMS/[facility])   • Bradycardia   • Prediabetes   • Anxiety and depression   • Hematoma of left lower extremity   • Fall   • Acute blood loss anemia due to hematoma from recent fall   • Acute kidney injury (CMS/[facility])   • Presence of Watchman left atrial appendage closure device   • Acute deep vein thrombosis (DVT) of left lower extremity (CMS/[facility])         ICD-10-CM ICD-9-CM   1. Bilateral carotid artery stenosis  I65.23 433.10     433.30   2. Atrial flutter, paroxysmal (CMS/[facility])  I48.92  427.32   3. Mixed hyperlipidemia  E78.2 272.2   4. S/P ablation of atrial fibrillation  Z98.890 V45.89    Z86.79    5. Presence of Watchman left atrial appendage closure device  Z95.818 V45.09   6. Anticoagulated  Z79.01 V58.61   7. Obesity, unspecified obesity severity, unspecified obesity type  E66.9 278.00   8. Hematoma of left lower extremity, subsequent encounter  S80.12XD V58.89     924.5   9. Bilateral edema of lower extremity  R60.0 782.3       Lab Frequency Next Occurrence   Follow Anesthesia Guidelines / Standing Orders Once 12/16/2020   Provide NPO Instructions to Patient Once 12/21/2020   Chlorhexidine Skin Prep Once 12/21/2020   CBC (No Diff) Once 12/21/2020   Comprehensive Metabolic Panel Once 12/21/2020   Protime-INR Once 12/21/2020   XR Chest 2 View Once 12/21/2020   Follow Anesthesia Guidelines / Standing Orders Once 04/26/2021   Obtain Informed Consent Once 05/01/2021   US Carotid Bilateral Once 11/29/2021       PLAN: After thoroughly evaluating Danisha Cannon, I believe the best course of action is to remain conservative from a vascular standpoint.  Overall she continues to improve in terms of her left thigh hematoma after previous fall about a month ago.  Her ecchymosis is significantly improved as well as the induration that is palpable from the hematoma itself.  I have recommended that she continue with compression wrap to that area to help further reduce the swelling and allow the hematoma time to resolve.  Her venous duplex of the left lower extremity that was done today that I did personally review showed evidence of resolution of the previously noted left profunda femoris vein DVT.  As such at this point I think we can take her off of the Eliquis and I will stop that today.  As noted when she was in the hospital cardiology would like her to resume Plavix when the Eliquis is stopped and so she will continue on aspirin, and Plavix moving forward as previous given her watchman device.  She will  continue following with cardiology regarding this and the overall duration of her Plavix.  From a carotid standpoint as mentioned above she had a recent carotid duplex done at an outside institution as ordered by her neurologist.  I did review the report of this and it does show 50 to 69% stenosis of the right ICA, and less than 50% stenosis on the left.  She is asymptomatic.  As such plan will be for repeat carotid duplex in 6 months for continued surveillance.  I will see her back here in the office in 6 months with that repeat carotid duplex for further surveillance as well as to continue following her left thigh hematoma to resolution.  The patient is to continue taking their medications as previously discussed.   I did discuss vascular risk factors as they pertain to the progression of vascular disease including controlling hypertension, and hyperlipidemia. These factors remain stable. Patient's Body mass index is 52.94 kg/m². indicating that she is morbidly obese (BMI > 40 or > 35 with obesity - related health condition). Obesity-related health conditions include the following: hypertension, dyslipidemias and lower extremity venous stasis disease. Obesity is unchanged. BMI is is above average; BMI management plan is completed. We discussed portion control and increasing exercise. This was all discussed in full with complete understanding.  Thank you for allowing me to participate in the care of your patient.  Please do not hesitate to call with any questions or concerns.  We will keep you aware of any further encounters with Danisha ELIZABETH Cannon.      Sincerely Yours,      Nino Stern MD

## 2021-06-03 RX ORDER — SERTRALINE HYDROCHLORIDE 25 MG/1
TABLET, FILM COATED ORAL
Qty: 30 TABLET | Refills: 0 | OUTPATIENT
Start: 2021-06-03

## 2021-06-12 DIAGNOSIS — I82.412 ACUTE DEEP VEIN THROMBOSIS (DVT) OF FEMORAL VEIN OF LEFT LOWER EXTREMITY (HCC): ICD-10-CM

## 2021-06-14 DIAGNOSIS — F41.1 GENERALIZED ANXIETY DISORDER: Chronic | ICD-10-CM

## 2021-06-14 DIAGNOSIS — F33.1 MAJOR DEPRESSIVE DISORDER, RECURRENT EPISODE, MODERATE (HCC): Chronic | ICD-10-CM

## 2021-06-14 DIAGNOSIS — F42.8 OBSESSIVE THINKING: Chronic | ICD-10-CM

## 2021-06-14 RX ORDER — FAMCICLOVIR 500 MG/1
500 TABLET ORAL 2 TIMES DAILY
Qty: 60 TABLET | Refills: 2 | Status: SHIPPED | OUTPATIENT
Start: 2021-06-14 | End: 2021-11-09

## 2021-06-14 RX ORDER — APIXABAN 5 MG/1
TABLET, FILM COATED ORAL
Qty: 60 TABLET | Refills: 0 | OUTPATIENT
Start: 2021-06-14

## 2021-06-16 ENCOUNTER — OFFICE VISIT (OUTPATIENT)
Dept: INTERNAL MEDICINE | Facility: CLINIC | Age: 66
End: 2021-06-16

## 2021-06-16 VITALS
OXYGEN SATURATION: 91 % | HEART RATE: 74 BPM | TEMPERATURE: 98.6 F | SYSTOLIC BLOOD PRESSURE: 106 MMHG | RESPIRATION RATE: 15 BRPM | WEIGHT: 293 LBS | HEIGHT: 64 IN | BODY MASS INDEX: 50.02 KG/M2 | DIASTOLIC BLOOD PRESSURE: 70 MMHG

## 2021-06-16 DIAGNOSIS — S80.12XA HEMATOMA OF LEFT LOWER EXTREMITY, INITIAL ENCOUNTER: ICD-10-CM

## 2021-06-16 DIAGNOSIS — R60.0 BILATERAL EDEMA OF LOWER EXTREMITY: Primary | ICD-10-CM

## 2021-06-16 PROCEDURE — 99213 OFFICE O/P EST LOW 20 MIN: CPT | Performed by: INTERNAL MEDICINE

## 2021-06-16 RX ORDER — LORAZEPAM 1 MG/1
1 TABLET ORAL NIGHTLY
COMMUNITY
Start: 2021-06-10 | End: 2021-09-21

## 2021-06-16 RX ORDER — FUROSEMIDE 40 MG/1
40 TABLET ORAL DAILY PRN
Qty: 90 TABLET | Refills: 3 | Status: SHIPPED | OUTPATIENT
Start: 2021-06-16 | End: 2022-04-08

## 2021-06-16 RX ORDER — TRIAMCINOLONE ACETONIDE 0.1 %
PASTE (GRAM) DENTAL SEE ADMIN INSTRUCTIONS
Qty: 5 G | Refills: 2 | Status: SHIPPED | OUTPATIENT
Start: 2021-06-16 | End: 2021-12-10 | Stop reason: SDUPTHER

## 2021-06-16 RX ORDER — CLOPIDOGREL BISULFATE 75 MG/1
75 TABLET ORAL DAILY
COMMUNITY
End: 2021-10-08

## 2021-06-16 NOTE — PROGRESS NOTES
"Chief Complaint   Patient presents with   • Office Visit   • Medication Concerns         History:  Danisha Cannon is a 66 y.o. female who presents today for evaluation of the above problems.    She presents for acute visit.  She is having some medication concerns.  She is concerned about her Lasix prescription being sent to her previous primary care doctor.  Her last dose of the Lasix was today.  She takes 20 mg daily for lower extremity edema and \"fluid around lungs\".  There is no past medical history for congestive heart failure    Left leg is doing well.  She is now off Eliquis and on both Plavix and aspirin per watchman protocol    She has been on Kenalog paste for the last 30 years.  This has been prescribed by her previous doctor and not her dentist.  She takes it for outbreaks of herpes as well as the famciclovir  twice a day    HPI    ROS:  Review of Systems   Constitutional: Negative for fatigue and fever.   Respiratory: Negative for shortness of breath, wheezing and stridor.    Cardiovascular: Positive for leg swelling (improving). Negative for chest pain and palpitations.   Musculoskeletal: Positive for gait problem and myalgias.   Skin: Positive for color change. Negative for rash.   Neurological: Negative for dizziness, weakness and light-headedness.   Hematological: Bruises/bleeds easily.         Current Outpatient Medications:   •  alendronate (FOSAMAX) 70 MG tablet, Take 70 mg by mouth Every 7 (Seven) Days., Disp: , Rfl:   •  amitriptyline (ELAVIL) 50 MG tablet, Take 100 mg by mouth Every Night., Disp: , Rfl:   •  ASPIRIN 81 PO, Take 1 tablet by mouth Daily., Disp: , Rfl:   •  clopidogrel (PLAVIX) 75 MG tablet, Take 75 mg by mouth Daily., Disp: , Rfl:   •  dexlansoprazole (DEXILANT) 60 MG capsule, Take 60 mg by mouth Daily., Disp: , Rfl:   •  diclofenac (VOLTAREN) 75 MG EC tablet, Take 75 mg by mouth Daily., Disp: , Rfl:   •  DULoxetine (CYMBALTA) 60 MG capsule, Take 60 mg by mouth 2 (Two) Times a " Day., Disp: , Rfl:   •  famciclovir (FAMVIR) 500 MG tablet, Take 1 tablet by mouth 2 (two) times a day., Disp: 60 tablet, Rfl: 2  •  flecainide (TAMBOCOR) 100 MG tablet, Take 100 mg by mouth 2 (Two) Times a Day., Disp: , Rfl:   •  fluticasone (FLONASE) 50 MCG/ACT nasal spray, 2 sprays into the nostril(s) as directed by provider Daily As Needed for Rhinitis or Allergies., Disp: 16 g, Rfl: 5  •  fluticasone (Flovent HFA) 110 MCG/ACT inhaler, Inhale 1 puff 2 (Two) Times a Day., Disp: 1 each, Rfl: 11  •  furosemide (Lasix) 40 MG tablet, Take 1 tablet by mouth Daily As Needed (edema or soa)., Disp: 90 tablet, Rfl: 3  •  gabapentin (NEURONTIN) 800 MG tablet, Take 800 mg by mouth 3 (Three) Times a Day., Disp: , Rfl:   •  levothyroxine (SYNTHROID, LEVOTHROID) 137 MCG tablet, Take 137 mcg by mouth Every Morning., Disp: , Rfl:   •  LORazepam (ATIVAN) 1 MG tablet, Take 1 mg by mouth Every Night., Disp: , Rfl:   •  metoprolol tartrate (LOPRESSOR) 25 MG tablet, Take 12.5 mg by mouth Every 12 (Twelve) Hours., Disp: , Rfl:   •  montelukast (SINGULAIR) 10 MG tablet, Take  by mouth Daily., Disp: , Rfl: 5  •  nystatin (MYCOSTATIN) 226576 UNIT/ML suspension, 5 mL Every 6 (Six) Hours., Disp: , Rfl:   •  rOPINIRole (REQUIP) 3 MG tablet, Take 6 mg by mouth 2 (two) times a day. Take 3 tablets at night and 2 tablets every am, Disp: , Rfl:   •  sertraline (ZOLOFT) 50 MG tablet, TAKE 1 TABLET BY MOUTH DAILY, Disp: 30 tablet, Rfl: 0  •  triamcinolone (KENALOG) 0.1 % paste, Apply  to teeth See Admin Instructions., Disp: 5 g, Rfl: 2    Lab Results   Component Value Date    GLUCOSE 148 (H) 05/11/2021    BUN 21 05/11/2021    CREATININE 0.84 05/11/2021    EGFRIFNONA 68 05/11/2021    BCR 25.0 05/11/2021    K 3.9 05/11/2021    CO2 32.0 (H) 05/11/2021    CALCIUM 9.4 05/11/2021    ALBUMIN 3.30 (L) 05/11/2021    AST 19 05/11/2021    ALT 13 05/11/2021       WBC   Date Value Ref Range Status   05/11/2021 6.81 3.40 - 10.80 10*3/mm3 Final   01/10/2018 4.7  (L) 4.8 - 10.8 K/uL Final     RBC   Date Value Ref Range Status   05/11/2021 3.47 (L) 3.77 - 5.28 10*6/mm3 Final   01/10/2018 4.02 (L) 4.20 - 5.40 M/uL Final     Hemoglobin   Date Value Ref Range Status   05/11/2021 9.6 (L) 12.0 - 15.9 g/dL Final   02/19/2018 10.2 (L) 12.0 - 16.0 g/dL Final     Hematocrit   Date Value Ref Range Status   05/11/2021 32.6 (L) 34.0 - 46.6 % Final   02/19/2018 32.8 (L) 37.0 - 47.0 % Final     MCV   Date Value Ref Range Status   05/11/2021 93.9 79.0 - 97.0 fL Final   01/10/2018 93.3 81.0 - 99.0 fL Final     MCH   Date Value Ref Range Status   05/11/2021 27.7 26.6 - 33.0 pg Final   01/10/2018 28.6 27.0 - 31.0 pg Final     MCHC   Date Value Ref Range Status   05/11/2021 29.4 (L) 31.5 - 35.7 g/dL Final   01/10/2018 30.7 (L) 33.0 - 37.0 g/dL Final     RDW   Date Value Ref Range Status   05/11/2021 16.9 (H) 12.3 - 15.4 % Final   01/10/2018 15.9 (H) 11.5 - 14.5 % Final     RDW-SD   Date Value Ref Range Status   05/11/2021 57.4 (H) 37.0 - 54.0 fl Final     MPV   Date Value Ref Range Status   05/11/2021 10.1 6.0 - 12.0 fL Final   01/10/2018 11.6 9.4 - 12.3 fL Final     Platelets   Date Value Ref Range Status   05/11/2021 303 140 - 450 10*3/mm3 Final   01/10/2018 213 130 - 400 K/uL Final     Neutrophil Rel %   Date Value Ref Range Status   01/10/2018 76.6 (H) 50.0 - 65.0 % Final     Neutrophil %   Date Value Ref Range Status   05/11/2021 69.5 42.7 - 76.0 % Final     Lymphocyte Rel %   Date Value Ref Range Status   01/10/2018 10.4 (L) 20.0 - 40.0 % Final     Lymphocyte %   Date Value Ref Range Status   05/11/2021 15.4 (L) 19.6 - 45.3 % Final     Monocyte Rel %   Date Value Ref Range Status   01/10/2018 10.0 0.0 - 10.0 % Final     Monocyte %   Date Value Ref Range Status   05/11/2021 7.9 5.0 - 12.0 % Final     Eosinophil Rel %   Date Value Ref Range Status   01/10/2018 1.5 0.0 - 5.0 % Final     Eosinophil %   Date Value Ref Range Status   05/11/2021 6.0 0.3 - 6.2 % Final     Basophil Rel %   Date  "Value Ref Range Status   01/10/2018 0.4 0.0 - 1.0 % Final     Basophil %   Date Value Ref Range Status   05/11/2021 0.6 0.0 - 1.5 % Final     Immature Grans %   Date Value Ref Range Status   05/11/2021 0.6 (H) 0.0 - 0.5 % Final     Neutrophils Absolute   Date Value Ref Range Status   01/10/2018 3.6 1.5 - 7.5 K/uL Final     Neutrophils, Absolute   Date Value Ref Range Status   05/11/2021 4.73 1.70 - 7.00 10*3/mm3 Final     Lymphocytes Absolute   Date Value Ref Range Status   01/10/2018 0.5 (L) 1.1 - 4.5 K/uL Final     Lymphocytes, Absolute   Date Value Ref Range Status   05/11/2021 1.05 0.70 - 3.10 10*3/mm3 Final     Monocytes Absolute   Date Value Ref Range Status   01/10/2018 0.50 0.00 - 0.90 K/uL Final     Monocytes, Absolute   Date Value Ref Range Status   05/11/2021 0.54 0.10 - 0.90 10*3/mm3 Final     Eosinophils Absolute   Date Value Ref Range Status   01/10/2018 0.10 0.00 - 0.60 K/uL Final     Eosinophils, Absolute   Date Value Ref Range Status   05/11/2021 0.41 (H) 0.00 - 0.40 10*3/mm3 Final     Basophils Absolute   Date Value Ref Range Status   01/10/2018 0.00 0.00 - 0.20 K/uL Final     Basophils, Absolute   Date Value Ref Range Status   05/11/2021 0.04 0.00 - 0.20 10*3/mm3 Final     Immature Grans, Absolute   Date Value Ref Range Status   05/11/2021 0.04 0.00 - 0.05 10*3/mm3 Final     nRBC   Date Value Ref Range Status   05/11/2021 0.0 0.0 - 0.2 /100 WBC Final         OBJECTIVE:  Visit Vitals  /70 (BP Location: Left arm, Patient Position: Sitting, Cuff Size: Adult)   Pulse 74   Temp 98.6 °F (37 °C) (Oral)   Resp 15   Ht 162.6 cm (64.02\")   Wt (!) 138 kg (304 lb)   LMP  (LMP Unknown)   SpO2 91%   BMI 52.16 kg/m²      Physical Exam  Constitutional:       General: She is not in acute distress.     Appearance: She is well-developed. She is not diaphoretic.   HENT:      Head: Normocephalic and atraumatic.   Eyes:      Pupils: Pupils are equal, round, and reactive to light.   Neck:      Thyroid: No " thyromegaly.      Trachea: Phonation normal.   Cardiovascular:      Comments: Trace edema bilaterally  Pulmonary:      Effort: No respiratory distress.   Skin:     Coloration: Skin is not pale.      Findings: No erythema.      Comments: Much improved left thigh hematoma.  Little to no ecchymosis at all   Neurological:      Mental Status: She is alert.   Psychiatric:         Behavior: Behavior normal.         Thought Content: Thought content normal.         Judgment: Judgment normal.         Assessment/Plan    Diagnoses and all orders for this visit:    1. Bilateral edema of lower extremity (Primary)  -     furosemide (Lasix) 40 MG tablet; Take 1 tablet by mouth Daily As Needed (edema or soa).  Dispense: 90 tablet; Refill: 3    2. Hematoma of left lower extremity, initial encounter    Other orders  -     triamcinolone (KENALOG) 0.1 % paste; Apply  to teeth See Admin Instructions.  Dispense: 5 g; Refill: 2      Refill Lasix.  Refill of the Kenalog paste for when she has outbreaks and oral herpes simplex    Her left thigh hematoma continues to improve.    She is off of Eliquis (VTE resolution) and back on aspirin and Plavix per watchman protocol      Return for Next scheduled follow up.    Patient was given instructions and counseling regarding his/her condition or for health maintenance advice. Please see specific information pulled into the AVS if appropriate.     MELLISA Arauz MD   08:45 CDT  6/16/2021

## 2021-06-17 RX ORDER — GABAPENTIN 800 MG/1
800 TABLET ORAL 3 TIMES DAILY
OUTPATIENT
Start: 2021-06-17

## 2021-06-17 NOTE — TELEPHONE ENCOUNTER
Caller: Danisha Cannon    Relationship: Self    Best call back number:[phone]    Medication needed:   Requested Prescriptions     Pending Prescriptions Disp Refills   • gabapentin (NEURONTIN) 800 MG tablet       Sig: Take 1 tablet by mouth 3 (Three) Times a Day.     What is the patient's preferred pharmacy: [id number] - [facility] [address] AT [address] [phone] [facility] [phone]

## 2021-06-18 ENCOUNTER — TELEPHONE (OUTPATIENT)
Dept: INTERNAL MEDICINE | Facility: CLINIC | Age: 66
End: 2021-06-18

## 2021-06-18 NOTE — TELEPHONE ENCOUNTER
Caller: GUTIERREZ    Relationship to patient: Home Health    Best call back number: [phone]    Patient is needing:   GUTIERREZ CALLED AND ADVISED THAT PATIENT WILL BE DISCHARGED FROM HOME HEALTH ON 06/25/21.

## 2021-06-22 ENCOUNTER — OFFICE VISIT (OUTPATIENT)
Dept: INTERNAL MEDICINE | Facility: CLINIC | Age: 66
End: 2021-06-22

## 2021-06-22 VITALS
HEIGHT: 64 IN | BODY MASS INDEX: 50.02 KG/M2 | HEART RATE: 76 BPM | DIASTOLIC BLOOD PRESSURE: 82 MMHG | SYSTOLIC BLOOD PRESSURE: 122 MMHG | WEIGHT: 293 LBS | OXYGEN SATURATION: 94 %

## 2021-06-22 DIAGNOSIS — R09.02 HYPOXIA: Primary | ICD-10-CM

## 2021-06-22 DIAGNOSIS — D64.9 ANEMIA, UNSPECIFIED TYPE: ICD-10-CM

## 2021-06-22 PROBLEM — R01.1 HEART MURMUR: Status: ACTIVE | Noted: 2021-05-13

## 2021-06-22 PROBLEM — I70.90 ARTERIOSCLEROTIC VASCULAR DISEASE: Status: ACTIVE | Noted: 2020-07-07

## 2021-06-22 PROBLEM — R11.2 POSTOPERATIVE NAUSEA AND VOMITING: Status: ACTIVE | Noted: 2021-06-22

## 2021-06-22 PROBLEM — R09.89 BRUIT OF RIGHT CAROTID ARTERY: Status: ACTIVE | Noted: 2021-05-13

## 2021-06-22 PROBLEM — K14.0 GLOSSITIS: Status: ACTIVE | Noted: 2019-04-30

## 2021-06-22 PROBLEM — R33.9 RETENTION OF URINE: Status: ACTIVE | Noted: 2019-04-30

## 2021-06-22 PROBLEM — J30.9 ALLERGIC RHINITIS: Status: ACTIVE | Noted: 2019-04-30

## 2021-06-22 PROBLEM — I50.9 CONGESTIVE HEART FAILURE (HCC): Status: ACTIVE | Noted: 2020-07-07

## 2021-06-22 PROBLEM — Z98.890 POSTOPERATIVE NAUSEA AND VOMITING: Status: ACTIVE | Noted: 2021-06-22

## 2021-06-22 PROBLEM — R60.9 SWELLING: Status: ACTIVE | Noted: 2019-04-30

## 2021-06-22 PROBLEM — B35.6 TINEA CRURIS: Status: ACTIVE | Noted: 2019-04-30

## 2021-06-22 PROBLEM — S92.343A CLOSED FRACTURE OF FOURTH METATARSAL BONE: Status: ACTIVE | Noted: 2019-06-26

## 2021-06-22 PROBLEM — T85.79XA: Status: ACTIVE | Noted: 2018-03-22

## 2021-06-22 PROBLEM — S83.249A TEAR OF MEDIAL MENISCUS OF KNEE: Status: ACTIVE | Noted: 2017-05-18

## 2021-06-22 PROBLEM — L28.2 PRURITIC RASH: Status: ACTIVE | Noted: 2020-11-23

## 2021-06-22 PROBLEM — R00.2 PALPITATIONS: Status: ACTIVE | Noted: 2019-04-30

## 2021-06-22 PROBLEM — M21.629 TAILOR'S BUNION: Status: ACTIVE | Noted: 2017-06-05

## 2021-06-22 PROBLEM — F45.1 UNDIFFERENTIATED SOMATOFORM DISORDER: Status: ACTIVE | Noted: 2019-04-30

## 2021-06-22 PROBLEM — Z99.89 CPAP (CONTINUOUS POSITIVE AIRWAY PRESSURE) DEPENDENCE: Status: ACTIVE | Noted: 2021-05-13

## 2021-06-22 PROBLEM — E03.2 HYPOTHYROIDISM DUE TO DRUGS: Status: ACTIVE | Noted: 2019-04-30

## 2021-06-22 PROBLEM — J30.2 SEASONAL ALLERGIES: Status: ACTIVE | Noted: 2020-04-15

## 2021-06-22 PROCEDURE — 99214 OFFICE O/P EST MOD 30 MIN: CPT | Performed by: NURSE PRACTITIONER

## 2021-06-22 NOTE — PATIENT INSTRUCTIONS
"[facility] (25th ed., pp. 1444-3612). [place], [place]: Else[employer].\">   Anemia    Anemia is a condition in which there is not enough red blood cells or hemoglobin in the blood. Hemoglobin is a substance in red blood cells that carries oxygen.  When you do not have enough red blood cells or hemoglobin (are anemic), your body cannot get enough oxygen and your organs may not work properly. As a result, you may feel very tired or have other problems.  What are the causes?  Common causes of anemia include:  · Excessive bleeding. Anemia can be caused by excessive bleeding inside or outside the body, including bleeding from the intestines or from heavy menstrual periods in females.  · Poor nutrition.  · Long-lasting (chronic) kidney, thyroid, and liver disease.  · Bone marrow disorders, spleen problems, and blood disorders.  · Cancer and treatments for cancer.  · HIV (human immunodeficiency virus) and AIDS (acquired immunodeficiency syndrome).  · Infections, medicines, and autoimmune disorders that destroy red blood cells.  What are the signs or symptoms?  Symptoms of this condition include:  · Minor weakness.  · Dizziness.  · Headache, or difficulties concentrating and sleeping.  · Heartbeats that feel irregular or faster than normal (palpitations).  · Shortness of breath, especially with exercise.  · Pale skin, lips, and nails, or cold hands and feet.  · Indigestion and nausea.  Symptoms may occur suddenly or develop slowly. If your anemia is mild, you may not have symptoms.  How is this diagnosed?  This condition is diagnosed based on blood tests, your medical history, and a physical exam. In some cases, a test may be needed in which cells are removed from the soft tissue inside of a bone and looked at under a microscope (bone marrow biopsy). Your health care provider may also check your stool (feces) for blood and may do additional testing to look for the cause of your bleeding.  Other tests may " include:  · Imaging tests, such as a CT scan or MRI.  · A procedure to see inside your esophagus and stomach (endoscopy).  · A procedure to see inside your colon and rectum (colonoscopy).  How is this treated?  Treatment for this condition depends on the cause. If you continue to lose a lot of blood, you may need to be treated at a hospital. Treatment may include:  · Taking supplements of iron, vitamin B12, or folic acid.  · Taking a hormone medicine (erythropoietin) that can help to stimulate red blood cell growth.  · Having a blood transfusion. This may be needed if you lose a lot of blood.  · Making changes to your diet.  · Having surgery to remove your spleen.  Follow these instructions at home:  · Take over-the-counter and prescription medicines only as told by your health care provider.  · Take supplements only as told by your health care provider.  · Follow any diet instructions that you were given by your health care provider.  · Keep all follow-up visits as told by your health care provider. This is important.  Contact a health care provider if:  · You develop new bleeding anywhere in the body.  Get help right away if:  · You are very weak.  · You are short of breath.  · You have pain in your abdomen or chest.  · You are dizzy or feel faint.  · You have trouble concentrating.  · You have bloody stools, black stools, or tarry stools.  · You vomit repeatedly or you vomit up blood.  These symptoms may represent a serious problem that is an emergency. Do not wait to see if the symptoms will go away. Get medical help right away. Call your local emergency services (911 in the [place] Do not drive yourself to the hospital.  Summary  · Anemia is a condition in which you do not have enough red blood cells or enough of a substance in your red blood cells that carries oxygen (hemoglobin).  · Symptoms may occur suddenly or develop slowly.  · If your anemia is mild, you may not have symptoms.  · This condition is  diagnosed with blood tests, a medical history, and a physical exam. Other tests may be needed.  · Treatment for this condition depends on the cause of the anemia.  This information is not intended to replace advice given to you by your health care provider. Make sure you discuss any questions you have with your health care provider.  Document Revised: 11/24/2020 Document Reviewed: 11/24/2020  [employer] Patient Education © 2021 [employer]

## 2021-06-22 NOTE — PROGRESS NOTES
"Chief Complaint   Patient presents with   • Daytime Sleepiness     c/o   • oxygen     pt states her oxygen has been dropping into the 80\"s, she states she uses CPAP at night         History:  Danisha Cannon is a 66 y.o. female who presents today for evaluation of the above problems.          She is here today because her home health nurse checked her pulse ox while she was walking around at home, and it was in the 80's.  Patient has a history of CHF, obesity, sleep apnea.  Recently had CTA chest that showed fluid and was started on Lasix 40 mg daily with resolution of acute SOB that was going on at that time.  She says now she is always SOB with exertion, but this is a chronic phenomenon, nothing new or acute at today's visit.    She is not in distress today.  She mows her lawn on a riding mower.  She is still active and out-and-about.  They are planning on going shopping today.  She uses a rollator walker and was able to get in to the office today. She does, however, say that she is always short of breath with activity.      ROS:  Review of Systems   Constitutional: Positive for fatigue. Negative for activity change.   Respiratory: Negative for cough.    Cardiovascular: Negative for chest pain, palpitations and leg swelling.       Allergies   Allergen Reactions   • Other Rash     Dial soap  Redness and swelling on skin and burning sensation   • Codeine Dizziness and Nausea Only     Rapid heart rate, dizziness  NAUSEA   • Mobic [Meloxicam] Rash   • Morphine And Related Itching   • Azithromycin Other (See Comments)     Past Medical History:   Diagnosis Date   • Anemia    • Anxiety and depression    • Arthritis    • Atrial fibrillation (CMS/HCC)    • Chronic cough    • Disease of thyroid gland    • DVT (deep venous thrombosis) (CMS/HCC)    • GERD (gastroesophageal reflux disease)    • History of incision and drainage     Right knee   • History of staph infection     right knee, and upper right thigh   • Hyperlipidemia  "   • Infection      in right knee to bone, cleaned it out and put new hardware with antibiotic and pt developed hole in incision   • Neuropathy, peripheral    • Pneumonia     RECENT DX PER FAMILY DOCTOR   • PONV (postoperative nausea and vomiting)    • Restless leg syndrome    • Sleep apnea with use of continuous positive airway pressure (CPAP)     bipap   • SVT (supraventricular tachycardia) (CMS/HCC)      Past Surgical History:   Procedure Laterality Date   • BUNIONECTOMY Left     AND HAMMER TOE   • CARDIAC ABLATION      at [facility] 8/2019   • CARDIAC SURGERY      HEART CATH   • CATARACT EXTRACTION Right    • HERNIA REPAIR      UMBILICAL X3   • INCISION AND DRAINAGE LEG Right 1/28/2019    Procedure: INCISION AND DRAINAGE OF RIGHT THIGH HEMATOMA;  Surgeon: Nino Stern MD;  Location:  [facility];  Service: Vascular   • JOINT REPLACEMENT      RIGHT KNEE   • KNEE POLY INSERT EXCHANGE Right 3/3/2018    Procedure: IRRIGATION AND DEBRIDEMENT TOTAL KNEE & POLY EXCHANGE - RIGHT;  Surgeon: Amilcar Capellan MD;  Location:  [facility];  Service:    • LAPAROSCOPIC CHOLECYSTECTOMY     • LEG DEBRIDEMENT Right 3/23/2018    Procedure: DEBRIDEMENT AND CLOSURE KNEE - RIGHT;  Surgeon: Amilcar Capellan MD;  Location:  [facility];  Service: Orthopedics   • PERIPHERALLY INSERTED CENTRAL CATHETER INSERTION     • PILONIDAL CYSTECTOMY N/A 2/16/2017    Procedure: PILONIDAL CYSTECTOMY, EXCISION SEBACEOUS CYST - BACK;  Surgeon: Macrina Capellan MD;  Location:  [facility];  Service:    • TOTAL KNEE  PROSTHESIS REMOVAL W/ SPACER INSERTION Right 3/13/2018    Procedure: 1.  EXPLANT TOTAL KNEE 2.  ANTIBOTIC SPACER KNEE;  Surgeon: Amilcar Capellan MD;  Location:  [facility];  Service: Orthopedics   • TOTAL KNEE  PROSTHESIS REMOVAL W/ SPACER INSERTION Right 6/19/2020    Procedure: REMOVAL OF ANTIBIOTIC SPACER;  Surgeon: Amilcar Capellan MD;  Location:  [facility];  Service: Orthopedics;  Laterality: Right;   • TOTAL KNEE  ARTHROPLASTY REVISION Right 6/19/2020    Procedure: REVISION TOTAL KNEE REPLACEMENT;  Surgeon: Amilcar Capellan MD;  Location:  [facility];  Service: Orthopedics;  Laterality: Right;   • TRUNK LESION/CYST EXCISION N/A 2/16/2017    Procedure: EXCISION SEBACEOUS CYST - BACK;  Surgeon: Macrina Capellan MD;  Location:  [facility];  Service:      Family History   Problem Relation Age of Onset   • Diabetes Mother    • Heart disease Mother    • Alzheimer's disease Mother    • Hyperlipidemia Mother    • Hypertension Mother    • Cancer Father    • Hypertension Father    • Anxiety disorder Father    • Depression Father    • Leukemia Brother    • Heart attack Sister    • Clotting disorder Brother    • No Known Problems Brother    • Colon polyps Neg Hx    • Colon cancer Neg Hx      Danisha  reports that she has never smoked. She has never used smokeless tobacco. She reports that she does not drink alcohol and does not use drugs.    I have reviewed and updated the above documentation (if necessary) including but not limited to chief complaint, ROS, PFSH, allergies and medications        Current Outpatient Medications:   •  alendronate (FOSAMAX) 70 MG tablet, Take 70 mg by mouth Every 7 (Seven) Days., Disp: , Rfl:   •  amitriptyline (ELAVIL) 50 MG tablet, Take 100 mg by mouth Every Night., Disp: , Rfl:   •  ASPIRIN 81 PO, Take 1 tablet by mouth Daily., Disp: , Rfl:   •  clopidogrel (PLAVIX) 75 MG tablet, Take 75 mg by mouth Daily., Disp: , Rfl:   •  dexlansoprazole (DEXILANT) 60 MG capsule, Take 60 mg by mouth Daily., Disp: , Rfl:   •  diclofenac (VOLTAREN) 75 MG EC tablet, Take 75 mg by mouth Daily., Disp: , Rfl:   •  DULoxetine (CYMBALTA) 60 MG capsule, Take 60 mg by mouth 2 (Two) Times a Day., Disp: , Rfl:   •  famciclovir (FAMVIR) 500 MG tablet, Take 1 tablet by mouth 2 (two) times a day., Disp: 60 tablet, Rfl: 2  •  flecainide (TAMBOCOR) 100 MG tablet, Take 100 mg by mouth 2 (Two) Times a Day., Disp: , Rfl:   •  fluticasone  (FLONASE) 50 MCG/ACT nasal spray, 2 sprays into the nostril(s) as directed by provider Daily As Needed for Rhinitis or Allergies., Disp: 16 g, Rfl: 5  •  fluticasone (Flovent HFA) 110 MCG/ACT inhaler, Inhale 1 puff 2 (Two) Times a Day., Disp: 1 each, Rfl: 11  •  furosemide (Lasix) 40 MG tablet, Take 1 tablet by mouth Daily As Needed (edema or soa)., Disp: 90 tablet, Rfl: 3  •  gabapentin (NEURONTIN) 800 MG tablet, Take 800 mg by mouth 3 (Three) Times a Day., Disp: , Rfl:   •  levothyroxine (SYNTHROID, LEVOTHROID) 137 MCG tablet, Take 137 mcg by mouth Every Morning., Disp: , Rfl:   •  LORazepam (ATIVAN) 1 MG tablet, Take 1 mg by mouth Every Night., Disp: , Rfl:   •  metoprolol tartrate (LOPRESSOR) 25 MG tablet, Take 12.5 mg by mouth Every 12 (Twelve) Hours., Disp: , Rfl:   •  montelukast (SINGULAIR) 10 MG tablet, Take  by mouth Daily., Disp: , Rfl: 5  •  nystatin (MYCOSTATIN) 716213 UNIT/ML suspension, 5 mL Every 6 (Six) Hours., Disp: , Rfl:   •  rOPINIRole (REQUIP) 3 MG tablet, Take 6 mg by mouth 2 (two) times a day. Take 3 tablets at night and 2 tablets every am, Disp: , Rfl:   •  sertraline (ZOLOFT) 50 MG tablet, TAKE 1 TABLET BY MOUTH DAILY, Disp: 30 tablet, Rfl: 0  •  triamcinolone (KENALOG) 0.1 % paste, Apply  to teeth See Admin Instructions., Disp: 5 g, Rfl: 2    PHQ-9 Depression Screening  Little interest or pleasure in doing things?     Feeling down, depressed, or hopeless?     Trouble falling or staying asleep, or sleeping too much?     Feeling tired or having little energy?     Poor appetite or overeating?     Feeling bad about yourself - or that you are a failure or have let yourself or your family down?     Trouble concentrating on things, such as reading the newspaper or watching television?     Moving or speaking so slowly that other people could have noticed? Or the opposite - being so fidgety or restless that you have been moving around a lot more than usual?     Thoughts that you would be better off  "dead, or of hurting yourself in some way?     PHQ-9 Total Score     If you checked off any problems, how difficult have these problems made it for you to do your work, take care of things at home, or get along with other people?       PHQ-9 Total Score:      OBJECTIVE:  Visit Vitals  /82 (BP Location: Right arm, Patient Position: Sitting, Cuff Size: Adult)   Pulse 76   Ht 162.6 cm (64\")   Wt 134 kg (294 lb 6.4 oz)   LMP  (LMP Unknown)   SpO2 94%   BMI 50.53 kg/m²      Physical Exam  Vitals and nursing note reviewed.   Constitutional:       General: She is not in acute distress.     Appearance: Normal appearance. She is not ill-appearing, toxic-appearing or diaphoretic.      Comments: Conversational at rest, moves slowly with walker with exertion.   HENT:      Head: Normocephalic and atraumatic.   Eyes:      General: No scleral icterus.        Right eye: No discharge.         Left eye: No discharge.      Conjunctiva/sclera: Conjunctivae normal.   Cardiovascular:      Rate and Rhythm: Normal rate and regular rhythm.   Pulmonary:      Effort: Pulmonary effort is normal. No respiratory distress.      Breath sounds: Normal breath sounds. No stridor. No wheezing, rhonchi or rales.   Abdominal:      Palpations: Abdomen is soft.   Musculoskeletal:      Cervical back: Normal range of motion and neck supple. No rigidity or tenderness.      Right lower leg: No edema.      Left lower leg: No edema.   Lymphadenopathy:      Cervical: No cervical adenopathy.   Skin:     General: Skin is warm and dry.      Coloration: Skin is not jaundiced or pale.      Findings: No bruising, erythema, lesion or rash.   Neurological:      General: No focal deficit present.      Mental Status: She is alert and oriented to person, place, and time.   Psychiatric:         Mood and Affect: Mood normal.         Behavior: Behavior normal.         Thought Content: Thought content normal.         Judgment: Judgment normal.     Contains abnormal data " CBC Auto Differential  Order: [id number] - Part of Panel Order [id number]  Status:  Final result   Visible to patient:  Yes (MyChart)  Specimen Information: Blood        Component   Ref Range & Units 1 mo ago   WBC   3.40 - 10.80 10*3/mm3 6.81    RBC   3.77 - 5.28 10*6/mm3 3.47Low     Hemoglobin   12.0 - 15.9 g/dL 9.6Low     Hematocrit   34.0 - 46.6 % 32.6Low     MCV   79.0 - 97.0 fL 93.9    MCH   26.6 - 33.0 pg 27.7    MCHC   31.5 - 35.7 g/dL 29.4Low     RDW   12.3 - 15.4 % 16.9High     RDW-SD   37.0 - 54.0 fl 57.4High     MPV   6.0 - 12.0 fL 10.1    Platelets   140 - 450 10*3/mm3 303    Neutrophil %   42.7 - 76.0 % 69.5    Lymphocyte %   19.6 - 45.3 % 15.4Low     Monocyte %   5.0 - 12.0 % 7.9    Eosinophil %   0.3 - 6.2 % 6.0    Basophil %   0.0 - 1.5 % 0.6    Immature Grans %   0.0 - 0.5 % 0.6High     Neutrophils, Absolute   1.70 - 7.00 10*3/mm3 4.73    Lymphocytes, Absolute   0.70 - 3.10 10*3/mm3 1.05    Monocytes, Absolute   0.10 - 0.90 10*3/mm3 0.54    Eosinophils, Absolute   0.00 - 0.40 10*3/mm3 0.41High     Basophils, Absolute   0.00 - 0.20 10*3/mm3 0.04    Immature Grans, Absolute   0.00 - 0.05 10*3/mm3 0.04    nRBC   0.0 - 0.2 /100 WBC 0.0    Resulting Agency  [facility]         Specimen Collected: 05/11/21 14:38 Last Resulted: 05/11/21 14:54         Lab Flowsheet      Order Details      View Encounter      Lab and Collection Details      Routing           Echocardiogram Findings  11/30/20  Left Ventricle Estimated left ventricular EF = 65% Left ventricular systolic function is normal.   Normal left ventricular cavity size noted. Left ventricular wall thickness is consistent with mild concentric hypertrophy.   Right Ventricle The right ventricular cavity is mildly dilated. Borderline low right ventricular systolic function noted.   Left Atrium The left atrial cavity is borderline dilated. Left atrial appendage morphology best described as windsock. The left atrial appendage was visualized through multiple  planes. No evidence of a left atrial appendage thrombus was present.  Lipomatous hypertrophy of the interatrial septum present. The left superior and right superior pulmonary vein(s) appear normal with no flow abnormalities. Normal pulmonary vein flow.   Right Atrium Normal right atrial cavity size noted.   Aortic Valve The aortic valve is structurally normal with no regurgitation or stenosis present. The aortic valve appears trileaflet.   Mitral Valve The mitral valve is grossly normal in structure. Trace mitral valve regurgitation is present. No significant mitral valve stenosis is present.   Tricuspid Valve Trace tricuspid valve regurgitation is present. No evidence of significant tricuspid valve stenosis is present.   Pulmonic Valve The pulmonic valve is grossly normal in structure. There is trace pulmonic valve regurgitation present.   Greater Vessels No dilation of the aortic root is present. No dilation of the sinuses of Valsalva is present.   Pericardium There is no evidence of pericardial effusion. .   Tricuspid Valve Measurements    Regurgitation   TR max jaime 241 cm/sec      RVSP(TR) 28.2 mmHg      RAP systole 5 mmHg      PISA   TR max jaime 241 cm/sec          Study Information    Physician Technologist Supporting Staff    Rosalinda Hickey    Moderate Sedation Charge Report    Open Hard Copy Result Report (Order #[id number] - Adult Transesophageal Echo (ASHTYN) W/ Cont if Necessary Per Protocol)   Signed    Electronically signed by Philip Cobb MD on 12/10/20 at 1512 CST   Printable Result Report    Result Report    Encounter    View Encounter             Results Routing Tracking    View Results Routing Information   Order Report    Order Details  Narrative & Impression   Exam: CT angiogram of the of the chest with intravenous contrast.     CLINICAL HISTORY:  Pulmonary embolus suspected. Left leg DVT.     DOSE:  1336 mGycm. Automated exposure control was utilized to diminish  patient radiation dose.      TECHNIQUE: Contiguous axial images were obtained through the thorax  following intravenous contrast administration with reformatted images  obtained in the sagittal and coronal projections from the original data  set. Three-dimensional reconstructions are also obtained.     COMPARISON:  11/20/2020     FINDINGS:     Pulmonary arteries:  There is normal enhancement of the pulmonary  arteries with no evidence of pulmonary thromboembolic disease.     Aorta:  The thoracic aorta and proximal great vessels are normal in  appearance. There is no evidence of aortic dissection or aneurysm.     LUNGS:  There is patchy groundglass disease within both lungs for which  I would favor patchy interstitial edema over an interstitial  pneumonitis. The lungs are hypoventilated. There is no pleural effusion  present.     Pleural spaces: Unremarkable. No evidence of pleural effusion or  pneumothorax.     HEART: There is moderate cardiomegaly. A Watchman device is noted within  the left atrial appendage. No evidence of right ventricular dysfunction  or pericardial effusion.     Bones: No acute osseous abnormalities are demonstrated. There is  anterolisthesis of C7 on T1 stable from the previous exam. Multiple old  left sided rib fractures are present.     CHEST WALL: No chest wall abnormalities are demonstrated.     Lymph nodes: No enlarged mediastinal or axillary lymphadenopathy is  present.     Upper abdomen: A small hiatal hernia is present. Limited images of the  upper abdomen are otherwise unremarkable.     IMPRESSION:  1. No evidence of pulmonary thromboembolic disease. The thoracic aorta  is normal in caliber with no evidence of dissection or aneurysm.  2. Patchy groundglass disease within both lungs. I would favor  interstitial edema over an interstitial pneumonitis. Lungs are also  hypoventilated causing accentuation of the bronchovascular markings and  cardiac silhouette. No evidence of pleural effusion.  3. Cardiomegaly. No  evidence of pericardial effusion. A Watchman left  atrial appendage device is present.  This report was finalized on 05/11/2021 18:57 by Dr. Jair Boles MD.   Imaging    CT Angiogram Chest (Order: [id number]) - 5/11/2021  Result History    CT Angiogram Chest (Order #[id number]) on 5/11/2021 - Order Result History Report   Reprint Order Requisition    CT Angiogram Chest (Order #[id number]) on 5/11/21   Signed by    Signed Date/Time  Phone Pager   JAIR BOLES 5/11/2021  6:57 PM [id number]  6:57 [phone]    Exam Information    Status Exam Begun  Exam Ended    Final [99] 5/11/2021 18:02 5/11/2021  6:03 PM [id number]  6:03 PM   Questions    Release to patient Immediate      Imaging Related Medications    Medication    iopamidol (ISOVUE-370) 76 % injection 100 mL (Completed)   Route:   Intravenous   Admin Amount:   100 mL   Volume:   100 mL   Last Admin Time:   05/11/21 1803   Number of Expected Doses:   1   Most Recent Administration:    User Action Time Recorded Time Dose Route Site Comment Action Reason   Sandra Wisdom Den 05/11/21 1803 05/11/21 1803 100 mL Intravenous   Given    Full Administration Report              External Results Report    Open External Results Report    Encounter    View Encounter             Intra Procedure Documentation    Intra Procedure Documentation   Order-Level Documents - 05/11/2021:    Scan on 5/11/2021 1431 by [employer] [facility], [place]: ECG 12-LEAD  Scan on 5/11/2021 by [employer], [place]: EKG UNCONFIRMED, [facility], 05/11/2021         Encounter-Level Documents - 05/11/2021:    Document on 5/11/2021 2049 by Andrez Gilbert Jr., MD: ED After Visit Summary  Document on 5/11/2021 2049 by Andrez Gilbert Jr., MD: COVID:Protect Yourself and Others  Document on 5/11/2021 2049 by Andrez Gilbert Jr., MD: COVID:Waiting on Results  Electronic signature on 5/11/2021 1547 - E-signed  Scan on 5/11/2021 by [employer], [place]: AVS SIGNATURE SHEET, [facility], 05/11/2021  [facility]20[facility] [place] SIGN IN FOR[facility], [place], 05/11/2021         Reason for Exam  Priority: STAT  PE suspected, low/intermediate prob, neg D-dimer   Results Routing Tracking    View Results Routing Information   Order Report     Order Details    We re-checked her pulse ox at rest here and got 87%.  With exercise in the hallway, pulse ox was 89%. Back in the exam room for recovery, pulse ox was 85%.    MDM    Assessment/Plan    Diagnoses and all orders for this visit:    1. Hypoxia (Primary)  -     Oxygen Therapy    2. Anemia, unspecified type  -     CBC w AUTO Differential; Future      I am going to order O2 continuous.  I suspect her hypoxia is multi-factorial, as she has had pulmonary edema, obesity, sleep apnea.  She does say she had PFT's done within the last few years and this was completely normal.  Also re-check CBC as last Hgb was 9.6, and this could also be contributing to her symptoms.      Education materials and an After Visit Summary were printed and given to the patient at discharge.  Return for Next scheduled follow up.         ERNESTO Beckford   10:01 CDT  6/22/2021

## 2021-06-23 NOTE — TELEPHONE ENCOUNTER
Spoke with patient is would like to just see PCP for the Zoloft says she is doing ok and if she needs to see us in the future she will ask for another referral

## 2021-06-25 ENCOUNTER — TELEPHONE (OUTPATIENT)
Dept: INTERNAL MEDICINE | Facility: CLINIC | Age: 66
End: 2021-06-25

## 2021-06-25 NOTE — TELEPHONE ENCOUNTER
Caller: sudeep    Relationship to patient: [facility]    Best call back number: [phone]    Patient is needing: SUDEEP FROM [facility] JUST WANTED TO LET  KNOW THAT, SHE TREATED PATIENT TOE FOR A BUSTED BLISTER.

## 2021-06-28 ENCOUNTER — TELEPHONE (OUTPATIENT)
Dept: INTERNAL MEDICINE | Facility: CLINIC | Age: 66
End: 2021-06-28

## 2021-06-28 NOTE — TELEPHONE ENCOUNTER
PATIENT CALLED IN REQUESTING AN ORDER FOR DAYTIME OXYGEN TO BE SENT TO [facility]       THIS IS WHO SHE USES FOR BIPAP MACHINE AND SUPPLIES    GOOD CALL BACK   [phone]

## 2021-06-30 ENCOUNTER — TELEPHONE (OUTPATIENT)
Dept: INTERNAL MEDICINE | Facility: CLINIC | Age: 66
End: 2021-06-30

## 2021-06-30 ENCOUNTER — OUTSIDE FACILITY SERVICE (OUTPATIENT)
Dept: INTERNAL MEDICINE | Facility: CLINIC | Age: 66
End: 2021-06-30

## 2021-06-30 PROCEDURE — OUTSIDEPOS PR OUTSIDE POS PLACEHOLDER: Performed by: INTERNAL MEDICINE

## 2021-07-01 DIAGNOSIS — R09.02 HYPOXIA: Primary | ICD-10-CM

## 2021-07-01 NOTE — TELEPHONE ENCOUNTER
Thanks, Braeden. Are we able to pull up the order that is already in there and send to [facility]? It looks like it was sent to [facility] last week. Just let me know if I need to do another order. Thanks!

## 2021-07-12 ENCOUNTER — TELEPHONE (OUTPATIENT)
Dept: INTERNAL MEDICINE | Facility: CLINIC | Age: 66
End: 2021-07-12

## 2021-07-12 NOTE — TELEPHONE ENCOUNTER
Caller: Danisha Cannon    Relationship: Self    Best call back number: [phone]    What is the best time to reach you:     ANYTIME      Who are you requesting to speak with     CLINIC    Do you know the name of the person who called: PATIENT      What was the call regarding:        PATIENT STATES SHE HAS THRUSH AND NEEDS MEDICATION CALLED IN.      Do you require a callback: YES      [id number] - [place], [place] - [address] - [phone] [facility] [phone] FX

## 2021-07-15 ENCOUNTER — TELEPHONE (OUTPATIENT)
Dept: INTERNAL MEDICINE | Facility: CLINIC | Age: 66
End: 2021-07-15

## 2021-07-15 ENCOUNTER — HOSPITAL ENCOUNTER (OUTPATIENT)
Dept: GENERAL RADIOLOGY | Facility: HOSPITAL | Age: 66
Discharge: HOME | End: 2021-07-15
Admitting: NURSE PRACTITIONER

## 2021-07-15 ENCOUNTER — OFFICE VISIT (OUTPATIENT)
Dept: INTERNAL MEDICINE | Facility: CLINIC | Age: 66
End: 2021-07-15

## 2021-07-15 VITALS — DIASTOLIC BLOOD PRESSURE: 82 MMHG | SYSTOLIC BLOOD PRESSURE: 124 MMHG | OXYGEN SATURATION: 97 % | HEART RATE: 95 BPM

## 2021-07-15 DIAGNOSIS — M79.671 RIGHT FOOT PAIN: ICD-10-CM

## 2021-07-15 DIAGNOSIS — L98.9 FACIAL SKIN LESION: ICD-10-CM

## 2021-07-15 DIAGNOSIS — M79.671 RIGHT FOOT PAIN: Primary | ICD-10-CM

## 2021-07-15 PROBLEM — M81.0 OSTEOPOROSIS: Status: ACTIVE | Noted: 2019-06-26

## 2021-07-15 PROBLEM — E16.2 HYPOGLYCEMIA: Status: ACTIVE | Noted: 2019-04-30

## 2021-07-15 PROCEDURE — 99214 OFFICE O/P EST MOD 30 MIN: CPT | Performed by: NURSE PRACTITIONER

## 2021-07-15 PROCEDURE — 73630 X-RAY EXAM OF FOOT: CPT

## 2021-07-15 NOTE — PROGRESS NOTES
Chief Complaint   Patient presents with   • Foot Pain     right         History:  Danisha Cannon is a 66 y.o. female who presents today for evaluation of the above problems.          6 months ago right foot pain, underneath.  Top of foot hurts now, for 4 weeks. Diclofenac daily and icy hot at night. Is wearing flat support shoes.  No known injury to foot.     Has seen Dr. Mathis about left foot years ago. Would see him again if needed.      Has a small red spot under right nare where O2 cannula rests.  Is concerned it could be MRSA as  had in the past.        ROS:  Review of Systems  As above     Allergies   Allergen Reactions   • Other Rash     Dial soap  Redness and swelling on skin and burning sensation   • Codeine Dizziness and Nausea Only     Rapid heart rate, dizziness  NAUSEA   • Mobic [Meloxicam] Rash   • Morphine And Related Itching   • Azithromycin Other (See Comments)     Past Medical History:   Diagnosis Date   • Anemia    • Anxiety and depression    • Arthritis    • Atrial fibrillation (CMS/HCC)    • Chronic cough    • Disease of thyroid gland    • DVT (deep venous thrombosis) (CMS/HCC)    • GERD (gastroesophageal reflux disease)    • History of incision and drainage     Right knee   • History of staph infection     right knee, and upper right thigh   • Hyperlipidemia    • Infection      in right knee to bone, cleaned it out and put new hardware with antibiotic and pt developed hole in incision   • Neuropathy, peripheral    • Pneumonia     RECENT DX PER FAMILY DOCTOR   • PONV (postoperative nausea and vomiting)    • Restless leg syndrome    • Sleep apnea with use of continuous positive airway pressure (CPAP)     bipap   • SVT (supraventricular tachycardia) (CMS/HCC)      Past Surgical History:   Procedure Laterality Date   • BUNIONECTOMY Left     AND HAMMER TOE   • CARDIAC ABLATION      at [facility] 8/2019   • CARDIAC SURGERY      HEART CATH   • CATARACT EXTRACTION Right    • HERNIA REPAIR       UMBILICAL X3   • INCISION AND DRAINAGE LEG Right 1/28/2019    Procedure: INCISION AND DRAINAGE OF RIGHT THIGH HEMATOMA;  Surgeon: Nino Stern MD;  Location: [facility] HYBRID OR 12;  Service: Vascular   • JOINT REPLACEMENT      RIGHT KNEE   • KNEE POLY INSERT EXCHANGE Right 3/3/2018    Procedure: IRRIGATION AND DEBRIDEMENT TOTAL KNEE & POLY EXCHANGE - RIGHT;  Surgeon: Amilcar Capellan MD;  Location: [facility] OR;  Service:    • LAPAROSCOPIC CHOLECYSTECTOMY     • LEG DEBRIDEMENT Right 3/23/2018    Procedure: DEBRIDEMENT AND CLOSURE KNEE - RIGHT;  Surgeon: Amilcar Capellan MD;  Location: [facility] OR;  Service: Orthopedics   • PERIPHERALLY INSERTED CENTRAL CATHETER INSERTION     • PILONIDAL CYSTECTOMY N/A 2/16/2017    Procedure: PILONIDAL CYSTECTOMY, EXCISION SEBACEOUS CYST - BACK;  Surgeon: Macrina Capellan MD;  Location: [facility] OR;  Service:    • TOTAL KNEE  PROSTHESIS REMOVAL W/ SPACER INSERTION Right 3/13/2018    Procedure: 1.  EXPLANT TOTAL KNEE 2.  ANTIBOTIC SPACER KNEE;  Surgeon: Amilcar Capellan MD;  Location: [facility] OR;  Service: Orthopedics   • TOTAL KNEE  PROSTHESIS REMOVAL W/ SPACER INSERTION Right 6/19/2020    Procedure: REMOVAL OF ANTIBIOTIC SPACER;  Surgeon: Amilcar Capellan MD;  Location: [facility] OR;  Service: Orthopedics;  Laterality: Right;   • TOTAL KNEE ARTHROPLASTY REVISION Right 6/19/2020    Procedure: REVISION TOTAL KNEE REPLACEMENT;  Surgeon: Amilcar Capellan MD;  Location: [facility] OR;  Service: Orthopedics;  Laterality: Right;   • TRUNK LESION/CYST EXCISION N/A 2/16/2017    Procedure: EXCISION SEBACEOUS CYST - BACK;  Surgeon: Macrina Capellan MD;  Location: [facility] OR;  Service:      Family History   Problem Relation Age of Onset   • Diabetes Mother    • Heart disease Mother    • Alzheimer's disease Mother    • Hyperlipidemia Mother    • Hypertension Mother    • Cancer Father    • Hypertension Father    • Anxiety disorder Father    • Depression Father    • Leukemia Brother    • Heart  attack Sister    • Clotting disorder Brother    • No Known Problems Brother    • Colon polyps Neg Hx    • Colon cancer Neg Hx      Danisha  reports that she has never smoked. She has never used smokeless tobacco. She reports that she does not drink alcohol and does not use drugs.    I have reviewed and updated the above documentation (if necessary) including but not limited to chief complaint, ROS, PFSH, allergies and medications        Current Outpatient Medications:   •  alendronate (FOSAMAX) 70 MG tablet, Take 70 mg by mouth Every 7 (Seven) Days., Disp: , Rfl:   •  amitriptyline (ELAVIL) 50 MG tablet, Take 100 mg by mouth Every Night., Disp: , Rfl:   •  ASPIRIN 81 PO, Take 1 tablet by mouth Daily., Disp: , Rfl:   •  clopidogrel (PLAVIX) 75 MG tablet, Take 75 mg by mouth Daily., Disp: , Rfl:   •  dexlansoprazole (DEXILANT) 60 MG capsule, Take 60 mg by mouth Daily., Disp: , Rfl:   •  diclofenac (VOLTAREN) 75 MG EC tablet, Take 75 mg by mouth Daily., Disp: , Rfl:   •  DULoxetine (CYMBALTA) 60 MG capsule, Take 60 mg by mouth 2 (Two) Times a Day., Disp: , Rfl:   •  famciclovir (FAMVIR) 500 MG tablet, Take 1 tablet by mouth 2 (two) times a day., Disp: 60 tablet, Rfl: 2  •  flecainide (TAMBOCOR) 100 MG tablet, Take 100 mg by mouth 2 (Two) Times a Day., Disp: , Rfl:   •  fluticasone (FLONASE) 50 MCG/ACT nasal spray, 2 sprays into the nostril(s) as directed by provider Daily As Needed for Rhinitis or Allergies., Disp: 16 g, Rfl: 5  •  fluticasone (Flovent HFA) 110 MCG/ACT inhaler, Inhale 1 puff 2 (Two) Times a Day., Disp: 1 each, Rfl: 11  •  furosemide (Lasix) 40 MG tablet, Take 1 tablet by mouth Daily As Needed (edema or soa)., Disp: 90 tablet, Rfl: 3  •  gabapentin (NEURONTIN) 800 MG tablet, Take 800 mg by mouth 3 (Three) Times a Day., Disp: , Rfl:   •  levothyroxine (SYNTHROID, LEVOTHROID) 137 MCG tablet, Take 137 mcg by mouth Every Morning., Disp: , Rfl:   •  LORazepam (ATIVAN) 1 MG tablet, Take 1 mg by mouth Every  Night., Disp: , Rfl:   •  metoprolol tartrate (LOPRESSOR) 25 MG tablet, Take 12.5 mg by mouth Every 12 (Twelve) Hours., Disp: , Rfl:   •  montelukast (SINGULAIR) 10 MG tablet, Take  by mouth Daily., Disp: , Rfl: 5  •  nystatin (MYCOSTATIN) 172052 UNIT/ML suspension, Take 5 mL by mouth Every 6 (Six) Hours for 10 days., Disp: 200 mL, Rfl: 0  •  rOPINIRole (REQUIP) 3 MG tablet, Take 6 mg by mouth 2 (two) times a day. Take 3 tablets at night and 2 tablets every am, Disp: , Rfl:   •  sertraline (ZOLOFT) 50 MG tablet, TAKE 1 TABLET BY MOUTH DAILY, Disp: 30 tablet, Rfl: 0  •  triamcinolone (KENALOG) 0.1 % paste, Apply  to teeth See Admin Instructions., Disp: 5 g, Rfl: 2  •  mupirocin (Bactroban) 2 % ointment, Apply  topically to the appropriate area as directed 2 (Two) Times a Day., Disp: 15 g, Rfl: 0        PHQ-9 Total Score:      OBJECTIVE:  Visit Vitals  /82 (BP Location: Right arm, Patient Position: Sitting, Cuff Size: Adult)   Pulse 95   LMP  (LMP Unknown)   SpO2 97%      Physical Exam  Vitals and nursing note reviewed.   Constitutional:       General: She is not in acute distress.     Appearance: Normal appearance. She is not ill-appearing, toxic-appearing or diaphoretic.   Cardiovascular:      Rate and Rhythm: Normal rate and regular rhythm.   Pulmonary:      Effort: Pulmonary effort is normal. No respiratory distress.      Breath sounds: Normal breath sounds.   Musculoskeletal:      Comments: Bony deformities of both feet, and both feet flat.  Tender along right great MTP joint extending along the entire 1st metatarsal.  No erythema or edema. Does not appear to be gout.   Skin:     Findings: Lesion (inferior to right nare there is a 4-5 mm erythematous mildly papular skin lesion without drainage or crusting) present.   Neurological:      Mental Status: She is alert.   Psychiatric:         Mood and Affect: Mood normal.         Behavior: Behavior normal.         Thought Content: Thought content normal.          Judgment: Judgment normal.         [facility]    Assessment/Plan    Diagnoses and all orders for this visit:    1. Right foot pain (Primary)  -     XR Foot 3+ View Right; Future    2. Facial skin lesion  -     mupirocin (Bactroban) 2 % ointment; Apply  topically to the appropriate area as directed 2 (Two) Times a Day.  Dispense: 15 g; Refill: 0    Check foot xray and refer to Dr. Mathis if indicated.      I feel the skin lesion may be irritation from the nasal cannula.  I've encouraged her to ask at the supply store if they have any suggestions for keeping the NC from rubbing her skin there.  Will try the Bactroban as well.    Education materials and an After Visit Summary were printed and given to the patient at discharge.  Return for Next scheduled follow up.         ERNESTO Beckford   14:46 CDT  7/15/2021

## 2021-07-15 NOTE — TELEPHONE ENCOUNTER
Caller: Danisha Cannon    Relationship: Self    Best call back number: [phone]     What medication are you requesting: PATIENT NEEDING NEW BIPAP MACHINE    If a prescription is needed, what is your preferred pharmacy and phone number:  [facility]  [address] [place] [address] [phone]    Additional notes: PATIENT STATED THAT CLINICAL NOTES MUST BE NOTED THAT PATIENTS OLD MACHINE NO LONGER WORKS AND IF DR. DESI RUANO IS UNABLE TO SEND IN SCRIPT THEN PLEASE FORWARD TO DR. KARINA RUANO. PLEASE ADVISE.

## 2021-07-16 DIAGNOSIS — S92.301A CLOSED NONDISPLACED FRACTURE OF METATARSAL BONE OF RIGHT FOOT, UNSPECIFIED METATARSAL, INITIAL ENCOUNTER: ICD-10-CM

## 2021-07-16 DIAGNOSIS — M79.671 RIGHT FOOT PAIN: Primary | ICD-10-CM

## 2021-07-17 DIAGNOSIS — F33.1 MAJOR DEPRESSIVE DISORDER, RECURRENT EPISODE, MODERATE (HCC): Chronic | ICD-10-CM

## 2021-07-17 DIAGNOSIS — F42.8 OBSESSIVE THINKING: Chronic | ICD-10-CM

## 2021-07-17 DIAGNOSIS — F41.1 GENERALIZED ANXIETY DISORDER: Chronic | ICD-10-CM

## 2021-07-19 NOTE — PROGRESS NOTES
Attempted to call patient . No answer. Left Vm to call the office back. I am going to send a letter today.

## 2021-07-21 NOTE — TELEPHONE ENCOUNTER
Pt called and stated she no longer wanted our services and she wanted her PCP taking care of her zoloft. She stated if she needed us she would have another referral sent in.

## 2021-07-25 NOTE — TELEPHONE ENCOUNTER
Requested Prescriptions     Pending Prescriptions Disp Refills    amitriptyline (ELAVIL) 25 MG tablet [Pharmacy Med Name: AMITRIPTYLINE 25MG TABLETS] 180 tablet 0     Sig: TAKE 2 TABLETS BY MOUTH EVERY NIGHT     Last OV  1/7/19  Next OV 7/9/19   Last Rx  11/5/18 with 1 refill Yes - the patient is able to be screened

## 2021-07-26 RX ORDER — DULOXETIN HYDROCHLORIDE 60 MG/1
CAPSULE, DELAYED RELEASE ORAL
Qty: 180 CAPSULE | Refills: 3 | Status: SHIPPED | OUTPATIENT
Start: 2021-07-26

## 2021-07-26 NOTE — TELEPHONE ENCOUNTER
Requested Prescriptions     Pending Prescriptions Disp Refills    DULoxetine (CYMBALTA) 60 MG extended release capsule [Pharmacy Med Name: DULOXETINE DR 60MG CAPSULES] 180 capsule 3     Sig: TAKE 1 CAPSULE BY MOUTH TWICE DAILY       Last Office Visit: 5/13/2021  Next Office Visit: 7/27/2021  Last Medication Refill: 8/6/2020 3 refills

## 2021-07-27 ENCOUNTER — OFFICE VISIT (OUTPATIENT)
Dept: NEUROLOGY | Age: 66
End: 2021-07-27
Payer: MEDICARE

## 2021-07-27 VITALS
TEMPERATURE: 96.5 F | DIASTOLIC BLOOD PRESSURE: 71 MMHG | BODY MASS INDEX: 50.02 KG/M2 | HEART RATE: 80 BPM | WEIGHT: 293 LBS | HEIGHT: 64 IN | OXYGEN SATURATION: 96 % | SYSTOLIC BLOOD PRESSURE: 102 MMHG

## 2021-07-27 DIAGNOSIS — G25.81 RESTLESS LEG SYNDROME: ICD-10-CM

## 2021-07-27 DIAGNOSIS — Z99.89 BIPAP (BIPHASIC POSITIVE AIRWAY PRESSURE) DEPENDENCE: ICD-10-CM

## 2021-07-27 DIAGNOSIS — G47.33 OBSTRUCTIVE SLEEP APNEA: Primary | ICD-10-CM

## 2021-07-27 PROCEDURE — 1036F TOBACCO NON-USER: CPT | Performed by: PHYSICIAN ASSISTANT

## 2021-07-27 PROCEDURE — 1090F PRES/ABSN URINE INCON ASSESS: CPT | Performed by: PHYSICIAN ASSISTANT

## 2021-07-27 PROCEDURE — 99213 OFFICE O/P EST LOW 20 MIN: CPT | Performed by: PHYSICIAN ASSISTANT

## 2021-07-27 PROCEDURE — 3017F COLORECTAL CA SCREEN DOC REV: CPT | Performed by: PHYSICIAN ASSISTANT

## 2021-07-27 PROCEDURE — G8417 CALC BMI ABV UP PARAM F/U: HCPCS | Performed by: PHYSICIAN ASSISTANT

## 2021-07-27 PROCEDURE — 4040F PNEUMOC VAC/ADMIN/RCVD: CPT | Performed by: PHYSICIAN ASSISTANT

## 2021-07-27 PROCEDURE — G8427 DOCREV CUR MEDS BY ELIG CLIN: HCPCS | Performed by: PHYSICIAN ASSISTANT

## 2021-07-27 PROCEDURE — G8399 PT W/DXA RESULTS DOCUMENT: HCPCS | Performed by: PHYSICIAN ASSISTANT

## 2021-07-27 PROCEDURE — 1123F ACP DISCUSS/DSCN MKR DOCD: CPT | Performed by: PHYSICIAN ASSISTANT

## 2021-07-27 RX ORDER — CLOPIDOGREL BISULFATE 75 MG/1
75 TABLET ORAL DAILY
COMMUNITY
End: 2022-03-22

## 2021-07-27 NOTE — LETTER
[address]  [address] [facility], [place] [address]  Phone [phone]           Re:  Luis Nickerson    19  :  1955  Address: [address] [place] [address]         REFERRAL  Referred to: DME provider of patient's choice  Luis Nickerson is referred for the following:    DME Equipment HPCPS Code Setting             BiPAP device with flex or comparable pressure relief per comfort  IPAP: 8cm,  EPAP: 16cm   Heated Humidifier  Patient Choice     Diagnoses:  Obstructive sleep apnea (G47.33)  Length of Need: Lifetime, 99    Ordering Provider: Marek Morgan PA-C  NPI:  [id number]        Signature:          Date: 2021      Electronically Signed by Marek Morgan PA-C  on 2021 at 3:05 PM

## 2021-07-27 NOTE — PATIENT INSTRUCTIONS
several deep breaths to catch up on breathing. As the person awakens, he or she may move briefly, snort or snore, and take a deep breath. Less frequently, a person may awaken completely with a sensation of gasping, smothering, or choking. If the person falls back to sleep quickly, he or she will not remember the event. Many people with sleep apnea are unaware of their abnormal breathing in sleep, and all patients underestimate how often their sleep is interrupted. Awakening from sleep causes sleep to be unrefreshing and causes fatigue and daytime sleepiness. Anatomic causes of obstructive sleep apnea   Most patients have DEVEN because of a small upper airway. As the bones of the face and skull develop, some people develop a small lower face, a small mouth, and a tongue that seems too large for the mouth. These features are genetically determined, which explains why DEVEN tends to cluster in families. Obesity is another major factor. Tonsil enlargement can be an important cause, especially in children. SLEEP APNEA SYMPTOMS  The main symptoms of DEVEN are loud snoring, fatigue, and daytime sleepiness. However, some people have no symptoms. For example, if the person does not have a bed partner, he or she may not be aware of the snoring. Fatigue and sleepiness have many causes and are often attributed to overwork and increasing age. As a result, a person may be slow to recognize that they have a problem. A bed partner or spouse often prompts the patient to seek medical care. Other symptoms may include one or more of the following:  ?Restless sleep  ? Awakening with choking, gasping, or smothering  ? Morning headaches, dry mouth, or sore throat  ? Waking frequently to urinate  ? Awakening unrested, groggy  ? Low energy, difficulty concentrating, memory impairment    Risk factors  Certain factors increase the risk of sleep apnea.   ?Increasing age [de-identified] DEVEN occurs at all ages, but it is more common in middle and older age adults. ?Male sex  DEVEN is two times more common in men, especially in middle age. ?Obesity  The more obese a person is, the more likely he or she is to have DEVEN. ? Sedation from medication or alcohol  This interferes with the ability to awaken from sleep and can lengthen periods of apnea (no breathing), with potentially dangerous consequences. ? Abnormality of the airway. SLEEP APNEA CONSEQUENCES  Complications of sleep apnea can include daytime sleepiness and difficulty concentrating. The consequence of this is an increased risk of accidents and errors in daily activities. Studies have shown that people with severe DEVEN are more than twice as likely to be involved in a motor vehicle accident as people without these conditions. People with DEVEN are encouraged to discuss options for driving, working, and performing other high-risk tasks with a healthcare provider. In addition, people with untreated DEVEN may have an increased risk of cardiovascular problems such as high blood pressure, heart attack, abnormal heart rhythms, or stroke. This risk may be due to changes in the heart rate and blood pressure that occur during sleep. SLEEP APNEA DIAGNOSIS  The diagnosis of DEVEN is best made by a knowledgeable sleep medicine specialist who has an understanding of the individual's health issues. The diagnosis is usually based upon the person's medical history, physical examination, and testing, including:  ? A complaint of snoring and ineffective sleep  ? Neck size (greater than 16 inches in men or 14 inches in women) is associated with an increased risk of sleep apnea  ? A small upper airway: difficulty seeing the throat because of a tongue that is large for the mouth  ? High blood pressure, especially if it is resistant to treatment  ? If a bed partner has observed the patient during episodes of stopped breathing (apnea), choking, or gasping during sleep, there is a strong possibility of sleep apnea.     Testing is usually performed in a sleep laboratory. A full sleep study is called a polysomnogram. The polysomnogram measures the breathing effort and airflow, blood oxygen level, heart rate and rhythm, duration of the various stages of sleep, body position, and movement of the arms/legs. Home monitoring devices are available that can perform a sleep study. This is a reasonable alternative to conventional testing in a sleep laboratory if the clinician strongly suspects moderate or severe sleep apnea and the patient does not have other illnesses or sleep disorders that may interfere with the results. SLEEP APNEA TREATMENT  Sleep apnea is best treated by a knowledgeable sleep medicine specialist. The goal of treatment is to maintain an open airway during sleep. Effective treatment will eliminate the symptoms of sleep disturbance; long-term health consequences are also reduced. Most treatments require nightly use. The challenge for the clinician and the patient is to select an effective therapy that is appropriate for the patient's problem and that is acceptable for long term use. Auto-titrating CPAP delivers an amount of PAP that varies during the night. The variation is dependent on event detection software algorithms, which will increase the pressure gradually in response to flow changes until adequate patency is detected. After a period of sustained upper airway patency, the delivered level of pressure gradually decreases until the algorithm identifies recurrent upper airway obstruction, at which point the delivered pressure again increases. The result is that the delivered pressure varies throughout the night, in an effort to provide the lowest pressure that is necessary to maintain upper airway patency. Continuous positive airway pressure (CPAP)  The most effective treatment for sleep apnea uses air pressure from a mechanical device to keep the upper airway open during sleep.  A CPAP (continuous positive airway pressure)  device uses an air-tight attachment to the nose, typically a mask, connected to a tube and a blower which generates the pressure. Devices that fit comfortably into the nasal opening, rather than over the nose, are also available. CPAP should be used any time the person sleeps (day or night). The CPAP device is usually used for the first time in the sleep lab, where a technician can adjust the pressure and select the best equipment to keep the airway open. Alternatively, an auto device with a self-adjusting pressure feature, provided with proper education and training, can get treatment started without another sleep test. While the treatment may seem uncomfortable, noisy, or bulky at first, most people accept the treatment after experiencing better sleep. However, difficulty with mask comfort and nasal congestion prevent up to 50 percent of people from using the treatment on a regular basis. Continued follow up with a healthcare provider helps to ensure that the treatment is effective and comfortable. Information from the CPAP machine is often used by physicians, therapists, and insurers to track the success of treatment. CPAP can be delivered with different features to improve comfort and solve problems that may come up during treatment. Changes in treatment may be needed if symptoms do not improve or if the persons condition changes, such as a gain or loss of weight. Adjust sleep position  Adjusting sleep position (to stay off the back) may help improve sleep quality in people who have DEVEN when sleeping on the back. However, this is difficult to maintain throughout the night and is rarely an adequate solution. Weight loss  Weight loss may be helpful for obese or overweight patients. Weight loss may be accomplished with dietary changes, exercise, and/or surgical treatment.  However, it can be difficult to maintain weight loss; the five-year success of non-surgical weight loss is only 5 percent, meaning that 95 percent of people regain lost weight. Avoid alcohol and other sedatives  Alcohol can worsen sleepiness, potentially increasing the risk of accidents or injury. People with DEVEN are often counseled to drink little to no alcohol, even during the daytime. Similarly, people who take anti-anxiety medications or sedatives to sleep should speak with their healthcare provider about the safety of these medications. People with DEVEN must notify all healthcare providers, including surgeons, about their condition and the potential risks of being sedated. People with DEVEN who are given anesthesia and/or pain medications require special management and close monitoring to reduce the risk of a blocked airway. Dental devices  A dental device, called an oral appliance or mandibular advancement device, can reposition the jaw (mandible), bringing the tongue and soft palate forward as well. This may relieve obstruction in some people. This treatment is excellent for reducing snoring, although the effect on DEVEN is sometimes more limited. As a result, dental devices are best used for mild cases of DEVEN when relief of snoring is the main goal. Failure to tolerate and accept CPAP is another indication for dental devices. While dental devices are not as effective as CPAP for DEVEN, some patients prefer a dental device to CPAP. Side effects of dental devices are generally minor but may include changes to the bite with prolonged use. Surgical treatment  Surgery is an alternative therapy for patients who cannot tolerate or do not improve with nonsurgical treatments such as CPAP or oral devices. Surgery can also be used in combination with other nonsurgical treatments. Surgical procedures reshape structures in the upper airways or surgically reposition bone or soft tissue. Uvulopalatopharyngoplasty (UPPP) removes the uvula and excessive tissue in the throat, including the tonsils, if present.  Other procedures, such as maxillomandibular advancement (MMA), address both the upper and lower pharyngeal airway more globally. UPPP alone has limited success rates (less than 50 percent) and people can relapse (when DEVEN symptoms return after surgery). As a result, this surgery is only recommended in a minority of people and should be considered with caution. MMA may have a higher success rate, particularly in people with abnormal jaw (maxilla and mandible) anatomy, but it is the most complicated procedure. A newer surgical approach, nerve stimulation to protrude the tongue, has promising success rates in very selected people. Tracheostomy creates a permanent opening in the neck. It is reserved for people with severe disease in whom less drastic measures have failed or are inappropriate. Although it is always successful in eliminating obstructive sleep apnea, tracheostomy requires significant lifestyle changes and carries some serious risks (eg, infection, bleeding, blockage). All surgical treatments require discussions about the goals of treatment, the expected outcomes, and potential complications. Hypoglossal nerve stimulator- \"Inspire\" device    PAP treatment failure:  Possible causes of treatment failure include nonadherence or suboptimal adherence, weight gain, an inappropriate level of prescribed positive pressure, or an additional disorder causing sleepiness (eg, narcolepsy) that may require alterations in the therapeutic regimen. A review of medications should also be undertaken since many drugs may lead to sleepiness. Inadequate sleep time may also negate the expected effects from treatment of DEVEN. Also, pt's can have persistent hypersomnolence associated with sleep apnea even in the presence of adequate therapy and at those times Provigil or Nuvigil or other stimulants may be indicated.     Once the patient's positive airway pressure therapy has been optimized and symptoms resolved, a regimen of long-term follow-up should be established. Annual visits are reasonable, with more frequent visits in between if new issues arise. The purpose of long-term follow-up is to assess usage and monitor for recurrent DEVEN, new side effects, air leakage, and fluctuations in body weight. WHERE TO GET MORE INFORMATION  Your healthcare provider is the best source of information for questions and concerns related to your medical problem. Organizations  [employer]  Provides information about sleep apnea to the public, publishes a newsletter, and serves as an advocate for people with the disorder. Paige, [address]   [email or web address]   [email or web address]   Tel: [phone]   Fax: [facility] that works to [employer] and safety by promoting public understanding of sleep and sleep disorders. Supports sleep-related education, research, and advocacy; produces and distributes educational materials to the public and healthcare professionals; and offers postdoctoral fellowships and grants for sleep researchers. [address]   [email or web address]   [email or web address]   Tel: [phone]   Fax: [phone]    Important information:  Medicare/private insurance CPAP/BiPAP/APAP requirements:  Medicare/private insurance has specific requirements for PAP compliance that must be met during the first 90 days of use to continue coverage for CPAP/BiPAP/APAP  from day 91 and beyond. The policy requires that patients use a PAP device 4 hours per 24 hour period, at least 70% of the time over a 30 day period. This data must be downloaded as a report direct from the PAP devices. This is called a compliance download. Your PAP supplier will assist you in this matter.      Note:  Where applicable, we will utilize PAP device efficiency reports, additional testing, and

## 2021-07-27 NOTE — PROGRESS NOTES
[facility] Neurology and Sleep Medicine  [address] [place] [address] Drive, [address]  [facility], [facility]  Phone [phone]  Fax [phone]       [facility] Sleep Follow Up Encounter      Information:   Patient Name: Paulo Schreiber  :   1955  Age:   74 y.o. MRN:   [id number]  Account #:  [de-identified]  Today:                21    Provider:  Jose De Jesus Benites PA-C    Chief Complaint   Patient presents with    Follow-up     Patient wanting to get an order for a new machine, states hers is over 10years old.  Sleep Apnea      Subjective: Paulo Schreiber is a 74 y.o. female  with a history of DEVEN and RLS who comes in for a sleep clinic follow up. She was diagnosed with DEVEN approximately 5-6 years ago per HST. She was referred for a split-night PSG due to excessive daytime somnolence, despite BiPAP use. The split-night PSG, 3/29/2017 revealed an AHI of 39.5. She is prescribed BiPAP at a pressure of 16cm/12cm. Today she reports that the BiPAP is over 10years old. It is out of date. She desires to obtain a new BiPAP. She uses BiPAP about 6 hours per day. She may not go to sleep until 3:00 am. She reports that the DEVEN symptoms are controlled. The RLS is stable with Requip.       Objective:     Past Medical History:   Diagnosis Date    Abdominal adhesions 2016    Anxiety     Arthritis     Atrial fibrillation (HCC)     Atrial fibrillation and flutter (HCC) 2015    BiPAP (biphasic positive airway pressure) dependence     Chronic back pain     Chronic cough     Depression     Edema     Fibrocystic breast     GERD (gastroesophageal reflux disease)     Glossitis     Headache(784.0)     Heart burn     Heart disease     Hypertension     Hypothyroidism     Mouth sore     MRSA (methicillin resistant staph aureus) culture positive 2014    nasal    Numbness     toes    Obstructive sleep apnea     BIPAP    DEVEN (obstructive sleep apnea)     Peripheral neuropathy     PONV (postoperative nausea and vomiting)     Prolonged emergence from general anesthesia     Recurrent ventral incisional hernia 01/18/2016    Sleep apnea     Stroke ([facility]     Stroke-like symptom     SVT (supraventricular tachycardia) (HCC)     SVT (supraventricular tachycardia) (HCC)     Swelling of extremity     Unspecified sleep apnea     clinicallybi-pap    Ventricular tachyarrhythmia ([facility] 09/2015    svt       Past Surgical History:   Procedure Laterality Date    CARDIAC CATHETERIZATION  8/18/15  JDT    EF 50%    CARDIAC SURGERY      CATARACT REMOVAL      CHOLECYSTECTOMY  8/4/14    BY Dr. Deacon Vivas  2008    CYST INCISION AND DRAINAGE  03/2017    FINGER TRIGGER RELEASE      FOOT SURGERY Left     removal of two screws    HAMMER TOE SURGERY      HARDWARE REMOVAL FOOT / ANKLE Left 8/16/2016    FOOT HARDWARE REMOVAL - DEEP / 2nd METATARSAL HEAD RESECTION performed by Jorge Luis Locke DPM at [address] [place] [address]  1/12/2015    x3    KNEE SURGERY      Took prostetic joint out because of infection and put a spacer in    OTHER SURGICAL HISTORY  02/2017    pilonidal cyst-Dr Bina Kim    MS KNEE [facility] Right 7/3/2017    KNEE ARTHROSCOPY PARTIAL MEDIAL MENISECTOMY performed by Geovanny Dumont MD at [place] [address] Right 2/16/2018    KNEE TOTAL ARTHROPLASTY performed by Claudell Hubert, MD at [place] [address] N/A 1/18/2016    HERNIA VENTRAL REPAIR LAPAROSCOPIC WITH MESH  performed by Tootie Paz MD at 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     Social History     Substance and Sexual Activity   Alcohol Use No     Social History     Substance and Sexual Activity   Drug Use No         Current Outpatient Medications   Medication Sig Dispense Refill    clopidogrel (PLAVIX) 75 MG tablet Take 75 mg by mouth daily      sertraline (ZOLOFT) 50 MG tablet 50 mg      DULoxetine (CYMBALTA) 60 MG extended release capsule TAKE 1 CAPSULE BY MOUTH TWICE DAILY 180 capsule 3    gabapentin (NEURONTIN) 800 MG tablet TAKE 1 TABLET BY MOUTH THREE TIMES DAILY 90 tablet 5    sertraline (ZOLOFT) 25 MG tablet Take 25 mg by mouth daily Pt states she doesn't know the mg      QUEtiapine (SEROQUEL) 25 MG tablet Take 25 mg by mouth daily Pt states she doesn't know the mg      amitriptyline (ELAVIL) 25 MG tablet TAKE 2 TABLETS BY MOUTH EVERY NIGHT 180 tablet 3    venlafaxine (EFFEXOR XR) 75 MG extended release capsule TAKE 1 CAPSULE BY MOUTH DAILY 90 capsule 3    nystatin-triamcinolone (MYCOLOG II) 373614-4.1 UNIT/GM-% cream Apply topically 2 times daily. 1 Tube 1    triamcinolone acetonide (KENALOG) 0.1 % paste APPLY TWICE DAILY AS NEEDED TO TEETH 5 g 0    allopurinol (ZYLOPRIM) 100 MG tablet Take 100 mg by mouth daily      dicloxacillin (DYNAPEN) 500 MG capsule Take 500 mg by mouth 3 times daily      levothyroxine (SYNTHROID) 137 MCG tablet Take 137 mcg by mouth Daily      DEXILANT 60 MG CPDR delayed release capsule Take 60 mg by mouth daily       alendronate (FOSAMAX) 70 MG tablet TK 1 T PO ONCE A WEEK IN THE MORNING 30 MINUTES BEFORE FIRST MEAL OF THE DAY  3    flecainide (TAMBOCOR) 100 MG tablet TAKE 1 TABLET BY MOUTH TWICE DAILY 180 tablet 3    metoprolol tartrate (LOPRESSOR) 25 MG tablet TAKE 1/2 TABLET BY MOUTH TWICE DAILY 90 tablet 3    diclofenac (VOLTAREN) 50 MG EC tablet Take 50 mg by mouth daily      LORazepam (ATIVAN) 1 MG tablet Take 1 mg by mouth every 8 hours as needed .   2    rOPINIRole (REQUIP) 3 MG tablet Take 3 mg by mouth 2 times daily 2 tabs in AM and 3 tabs in PM       fluticasone (FLONASE) 50 MCG/ACT nasal spray 2 sprays by Nasal route as needed       famciclovir (FAMVIR) 500 MG tablet   Take 500 mg by mouth 2 times daily        No current facility-administered medications for this visit. Allergies:  Azithromycin, Codeine, Mobic [meloxicam], and Morphine and related    REVIEW OF SYSTEMS     Constitutional: []? Fever []? Sweats []? Chills []? Recent Injury   [x]? Denies all unless marked  HENT:[]? Headache  []? Head Injury  []? Sore Throat  []? Ear Pain  []? Dizziness []? Hearing Loss   [x]? Denies all unless marked  Musculoskeletal: []? Arthralgia  []? Myalgias []? Muscle cramps  []? Muscle twitches   [x]? Denies all unless marked   Spine:  []? Neck pain  []? Back pain  []? Sciaticia  [x]? Denies all unless marked  Neurological:[]? Visual Disturbance []? Double Vision []? Slurred Speech []? Trouble swallowing  []? Vertigo []? Tingling []? Numbness []? Weakness []? Loss of Balance   []? Loss of Consciousness []? Memory Loss []? Seizures  [x]? Denies all unless marked  Psychiatric/Behavioral:[]? Depression []? Anxiety  [x]? Denies all unless marked  Sleep: []? Insomnia []? Sleep Disturbance []? Snoring [x]? Restless Legs []? Daytime Sleepiness [x]? Sleep Apnea  []? Denies all unless marked    The MA has completed the ROS with the patient. I have reviewed it in its' entirety with the patient and agree with the documentation.      PHYSICAL EXAM  /71 (Site: Left Upper Arm, Position: Sitting, Cuff Size: Medium Adult)   Pulse 80   Temp 96.5 °F (35.8 °C)   Ht 5' 4\" (1.626 m)   Wt (!) 307 lb (139.3 kg)   SpO2 96% Comment: pt on O2  Breastfeeding No   BMI 52.70 kg/m²      Constitutional   Alert in NAD, well developed, pleasant and cooperative with exam; body habitus obese as indicated by BMI  HEENT- Conjunctiva normal.  No scars, masses, or lesions over external nose or ears, hearing intact, no neck masses noted, no jugular vein distension, right carotid bruit  Cardiac- Regular rate and rhythm; Grade I/VI murmur  Pulmonary- Clear to auscultation, good expansion, normal effort without use of accessory muscles  Musculoskeletal  No significant wasting of muscles noted, no bony deformities  Extremities - No clubbing, cyanosis or edema  Skin  Warm, dry, and intact. No rash, erythema, or pallor  Psychiatric  Mood, affect, and behavior appear normal      Neurologic:  Extraocular movements are intact without nystagmus. PERRL. Visual fields are full to confrontation. Facial movements are symmetrical and normal.  Speech is precise. Extremity strength is normal in both uppers and lowers. Deep tendon reflexes are intact and symmetrical.  Rapid alternating movements are unimpaired. Finger-to-nose testing is performed well, without dysmetria. Gait is normal.    I reviewed the following studies:       []  :  Clinical laboratory test results     []  :  Radiology reports                    [x]  :  Review and summarization of medical records and/or obtain medical records        [x]  :  Previous/recent polysomnogram report(s)     []  :  [place] Sleepiness Scale       []  :  Compliance download:  Unavailable    Assessment:       ICD-10-CM    1. Obstructive sleep apnea  G47.33    2. Restless leg syndrome  G25.81    3. BiPAP (biphasic positive airway pressure) dependence  Z99.89           [x]  :  Stable     []  :  Improved                       []  :  Well controlled              []  :  Resolving     []  :  Resolved     []  :  Inadequately controlled     []  :  Worsening     []  :  Additional workup planned        Plan:     No orders of the defined types were placed in this encounter. 1.   Previously or presently advised of the etiology,  pathophysiology, diagnosis, treatment options, and risks of untreated DEVEN.  Risks may include, but are not limited to  hypertension, coronary artery disease, atrial fibrillation, CHF, diabetes, stroke, weight gain, impaired cognition, daytime somnolence, and motor vehicle accidents. Advised to abstain from driving or operating heavy machinery when drowsy and the use of respiratory suppressants. Discussed diagnostic studies and potential treatment plan. 2.  Will evaluate for PAP clinical benefit and and compliance during a 30 day period within the preceding 90 days PRN. 3.  The following educational material has been included in this visit after visit summary for your review: DEVEN/PAP guidelines-Discussed with the patient and all questions fully answered. 4.  Order-auto adjusting BiPAP 8cm to 16cm-[employer]  5.   Follow up per protocol

## 2021-08-04 ENCOUNTER — OFFICE VISIT (OUTPATIENT)
Dept: INTERNAL MEDICINE | Facility: CLINIC | Age: 66
End: 2021-08-04

## 2021-08-04 VITALS
TEMPERATURE: 98.2 F | WEIGHT: 293 LBS | DIASTOLIC BLOOD PRESSURE: 65 MMHG | RESPIRATION RATE: 15 BRPM | HEIGHT: 64 IN | OXYGEN SATURATION: 95 % | HEART RATE: 82 BPM | BODY MASS INDEX: 50.02 KG/M2 | SYSTOLIC BLOOD PRESSURE: 105 MMHG

## 2021-08-04 DIAGNOSIS — D18.01 SKIN HEMANGIOMA: Primary | ICD-10-CM

## 2021-08-04 PROCEDURE — 99212 OFFICE O/P EST SF 10 MIN: CPT | Performed by: INTERNAL MEDICINE

## 2021-08-04 NOTE — PROGRESS NOTES
Chief Complaint   Patient presents with   • Office Visit   • Skin Itching         History:  Danisha Cannon is a 66 y.o. female who presents today for evaluation of the above problems.    This was to be her Medicare wellness visit, but she is not due until February 22, 2022.  She did want to keep the appointment today because of some red marks on her skin, cheeks and itching as well.  She does have easy bruising evidenced by recent leg hematoma (which is improving).  She is on aspirin and Plavix per watchman protocol  She denies any new bleeding such as bleeding of her gums, epistaxis or bleeding elsewhere.    She does have a CBC ordered from June to recheck her hemoglobin    HPI    ROS:  Review of Systems      Current Outpatient Medications:   •  alendronate (FOSAMAX) 70 MG tablet, Take 70 mg by mouth Every 7 (Seven) Days., Disp: , Rfl:   •  amitriptyline (ELAVIL) 50 MG tablet, Take 100 mg by mouth Every Night., Disp: , Rfl:   •  ASPIRIN 81 PO, Take 1 tablet by mouth Daily., Disp: , Rfl:   •  clopidogrel (PLAVIX) 75 MG tablet, Take 75 mg by mouth Daily., Disp: , Rfl:   •  dexlansoprazole (DEXILANT) 60 MG capsule, Take 60 mg by mouth Daily., Disp: , Rfl:   •  diclofenac (VOLTAREN) 75 MG EC tablet, Take 75 mg by mouth Daily., Disp: , Rfl:   •  DULoxetine (CYMBALTA) 60 MG capsule, Take 60 mg by mouth 2 (Two) Times a Day., Disp: , Rfl:   •  famciclovir (FAMVIR) 500 MG tablet, Take 1 tablet by mouth 2 (two) times a day., Disp: 60 tablet, Rfl: 2  •  flecainide (TAMBOCOR) 100 MG tablet, Take 100 mg by mouth 2 (Two) Times a Day., Disp: , Rfl:   •  fluticasone (FLONASE) 50 MCG/ACT nasal spray, 2 sprays into the nostril(s) as directed by provider Daily As Needed for Rhinitis or Allergies., Disp: 16 g, Rfl: 5  •  fluticasone (Flovent HFA) 110 MCG/ACT inhaler, Inhale 1 puff 2 (Two) Times a Day., Disp: 1 each, Rfl: 11  •  furosemide (Lasix) 40 MG tablet, Take 1 tablet by mouth Daily As Needed (edema or soa)., Disp: 90 tablet, Rfl:  3  •  gabapentin (NEURONTIN) 800 MG tablet, Take 800 mg by mouth 3 (Three) Times a Day., Disp: , Rfl:   •  levothyroxine (SYNTHROID, LEVOTHROID) 137 MCG tablet, Take 137 mcg by mouth Every Morning., Disp: , Rfl:   •  LORazepam (ATIVAN) 1 MG tablet, Take 1 mg by mouth Every Night., Disp: , Rfl:   •  metoprolol tartrate (LOPRESSOR) 25 MG tablet, Take 12.5 mg by mouth Every 12 (Twelve) Hours., Disp: , Rfl:   •  montelukast (SINGULAIR) 10 MG tablet, Take  by mouth Daily., Disp: , Rfl: 5  •  mupirocin (Bactroban) 2 % ointment, Apply  topically to the appropriate area as directed 2 (Two) Times a Day., Disp: 15 g, Rfl: 0  •  rOPINIRole (REQUIP) 3 MG tablet, Take 6 mg by mouth 2 (two) times a day. Take 3 tablets at night and 2 tablets every am, Disp: , Rfl:   •  sertraline (ZOLOFT) 50 MG tablet, TAKE 1 TABLET BY MOUTH DAILY, Disp: 30 tablet, Rfl: 0  •  triamcinolone (KENALOG) 0.1 % paste, Apply  to teeth See Admin Instructions., Disp: 5 g, Rfl: 2    Lab Results   Component Value Date    GLUCOSE 148 (H) 05/11/2021    BUN 21 05/11/2021    CREATININE 0.84 05/11/2021    EGFRIFNONA 68 05/11/2021    BCR 25.0 05/11/2021    K 3.9 05/11/2021    CO2 32.0 (H) 05/11/2021    CALCIUM 9.4 05/11/2021    ALBUMIN 3.30 (L) 05/11/2021    AST 19 05/11/2021    ALT 13 05/11/2021       WBC   Date Value Ref Range Status   05/11/2021 6.81 3.40 - 10.80 10*3/mm3 Final   01/10/2018 4.7 (L) 4.8 - 10.8 K/uL Final     RBC   Date Value Ref Range Status   05/11/2021 3.47 (L) 3.77 - 5.28 10*6/mm3 Final   01/10/2018 4.02 (L) 4.20 - 5.40 M/uL Final     Hemoglobin   Date Value Ref Range Status   05/11/2021 9.6 (L) 12.0 - 15.9 g/dL Final   02/19/2018 10.2 (L) 12.0 - 16.0 g/dL Final     Hematocrit   Date Value Ref Range Status   05/11/2021 32.6 (L) 34.0 - 46.6 % Final   02/19/2018 32.8 (L) 37.0 - 47.0 % Final     MCV   Date Value Ref Range Status   05/11/2021 93.9 79.0 - 97.0 fL Final   01/10/2018 93.3 81.0 - 99.0 fL Final     MCH   Date Value Ref Range Status    05/11/2021 27.7 26.6 - 33.0 pg Final   01/10/2018 28.6 27.0 - 31.0 pg Final     MCHC   Date Value Ref Range Status   05/11/2021 29.4 (L) 31.5 - 35.7 g/dL Final   01/10/2018 30.7 (L) 33.0 - 37.0 g/dL Final     RDW   Date Value Ref Range Status   05/11/2021 16.9 (H) 12.3 - 15.4 % Final   01/10/2018 15.9 (H) 11.5 - 14.5 % Final     RDW-SD   Date Value Ref Range Status   05/11/2021 57.4 (H) 37.0 - 54.0 fl Final     MPV   Date Value Ref Range Status   05/11/2021 10.1 6.0 - 12.0 fL Final   01/10/2018 11.6 9.4 - 12.3 fL Final     Platelets   Date Value Ref Range Status   05/11/2021 303 140 - 450 10*3/mm3 Final   01/10/2018 213 130 - 400 K/uL Final     Neutrophil Rel %   Date Value Ref Range Status   01/10/2018 76.6 (H) 50.0 - 65.0 % Final     Neutrophil %   Date Value Ref Range Status   05/11/2021 69.5 42.7 - 76.0 % Final     Lymphocyte Rel %   Date Value Ref Range Status   01/10/2018 10.4 (L) 20.0 - 40.0 % Final     Lymphocyte %   Date Value Ref Range Status   05/11/2021 15.4 (L) 19.6 - 45.3 % Final     Monocyte Rel %   Date Value Ref Range Status   01/10/2018 10.0 0.0 - 10.0 % Final     Monocyte %   Date Value Ref Range Status   05/11/2021 7.9 5.0 - 12.0 % Final     Eosinophil Rel %   Date Value Ref Range Status   01/10/2018 1.5 0.0 - 5.0 % Final     Eosinophil %   Date Value Ref Range Status   05/11/2021 6.0 0.3 - 6.2 % Final     Basophil Rel %   Date Value Ref Range Status   01/10/2018 0.4 0.0 - 1.0 % Final     Basophil %   Date Value Ref Range Status   05/11/2021 0.6 0.0 - 1.5 % Final     Immature Grans %   Date Value Ref Range Status   05/11/2021 0.6 (H) 0.0 - 0.5 % Final     Neutrophils Absolute   Date Value Ref Range Status   01/10/2018 3.6 1.5 - 7.5 K/uL Final     Neutrophils, Absolute   Date Value Ref Range Status   05/11/2021 4.73 1.70 - 7.00 10*3/mm3 Final     Lymphocytes Absolute   Date Value Ref Range Status   01/10/2018 0.5 (L) 1.1 - 4.5 K/uL Final     Lymphocytes, Absolute   Date Value Ref Range Status  "  05/11/2021 1.05 0.70 - 3.10 10*3/mm3 Final     Monocytes Absolute   Date Value Ref Range Status   01/10/2018 0.50 0.00 - 0.90 K/uL Final     Monocytes, Absolute   Date Value Ref Range Status   05/11/2021 0.54 0.10 - 0.90 10*3/mm3 Final     Eosinophils Absolute   Date Value Ref Range Status   01/10/2018 0.10 0.00 - 0.60 K/uL Final     Eosinophils, Absolute   Date Value Ref Range Status   05/11/2021 0.41 (H) 0.00 - 0.40 10*3/mm3 Final     Basophils Absolute   Date Value Ref Range Status   01/10/2018 0.00 0.00 - 0.20 K/uL Final     Basophils, Absolute   Date Value Ref Range Status   05/11/2021 0.04 0.00 - 0.20 10*3/mm3 Final     Immature Grans, Absolute   Date Value Ref Range Status   05/11/2021 0.04 0.00 - 0.05 10*3/mm3 Final     nRBC   Date Value Ref Range Status   05/11/2021 0.0 0.0 - 0.2 /100 WBC Final         OBJECTIVE:  Visit Vitals  /65 (BP Location: Left arm, Patient Position: Sitting, Cuff Size: Adult)   Pulse 82   Temp 98.2 °F (36.8 °C) (Oral)   Resp 15   Ht 162.6 cm (64.02\")   Wt 135 kg (298 lb)   LMP  (LMP Unknown)   SpO2 95%   BMI 51.13 kg/m²      Physical Exam  Constitutional:       General: She is not in acute distress.     Appearance: She is well-developed. She is not diaphoretic.   HENT:      Head: Normocephalic and atraumatic.   Eyes:      Pupils: Pupils are equal, round, and reactive to light.   Neck:      Thyroid: No thyromegaly.      Trachea: Phonation normal.   Pulmonary:      Effort: No respiratory distress.   Skin:     Coloration: Skin is not pale.      Findings: No erythema.      Comments: There are a few senile ecchymosis.  There are also nonblanching petechiae on her upper extremities, and a few on her cheeks   Neurological:      Mental Status: She is alert.   Psychiatric:         Behavior: Behavior normal.         Thought Content: Thought content normal.         Judgment: Judgment normal.         Assessment/Plan    Diagnoses and all orders for this visit:    1. Skin hemangioma " (Primary)      I would like her to get a CBC that was ordered in June.  I suspect the areas are just very small hemangiomas but given her easy bleeding in the past, current dual antiplatelet therapy I would like to make sure she does not have thrombocytopenia, and that her hemoglobin is improving.    She's on both cymbalta and zoloft. I've sent a message to Gypsy Remingtonjay jay to see if both of these medications are okay together given the increased risk of serotonin syndrome.    18 minutes was spent total on the day of the visit.    Return in about 4 months (around 12/4/2021).    Patient was given instructions and counseling regarding his/her condition or for health maintenance advice. Please see specific information pulled into the AVS if appropriate.     MELLISA Arauz MD   11:52 CDT  8/4/2021

## 2021-08-09 ENCOUNTER — HOSPITAL ENCOUNTER (OUTPATIENT)
Dept: MRI IMAGING | Facility: HOSPITAL | Age: 66
Discharge: HOME | End: 2021-08-09
Admitting: NURSE PRACTITIONER

## 2021-08-09 DIAGNOSIS — M79.671 RIGHT FOOT PAIN: Primary | ICD-10-CM

## 2021-08-09 DIAGNOSIS — S92.301A CLOSED NONDISPLACED FRACTURE OF METATARSAL BONE OF RIGHT FOOT, UNSPECIFIED METATARSAL, INITIAL ENCOUNTER: ICD-10-CM

## 2021-08-09 DIAGNOSIS — M79.671 RIGHT FOOT PAIN: ICD-10-CM

## 2021-08-09 PROCEDURE — 82565 ASSAY OF CREATININE: CPT

## 2021-08-09 PROCEDURE — A9577 INJ MULTIHANCE: HCPCS | Performed by: NURSE PRACTITIONER

## 2021-08-09 PROCEDURE — 73720 MRI LWR EXTREMITY W/O&W/DYE: CPT

## 2021-08-09 PROCEDURE — 0 GADOBENATE DIMEGLUMINE 529 MG/ML SOLUTION: Performed by: NURSE PRACTITIONER

## 2021-08-09 RX ADMIN — GADOBENATE DIMEGLUMINE 20 ML: 529 INJECTION, SOLUTION INTRAVENOUS at 13:55

## 2021-08-11 LAB — CREAT BLDA-MCNC: 1.3 MG/DL (ref 0.6–1.3)

## 2021-08-17 ENCOUNTER — TELEPHONE (OUTPATIENT)
Dept: INTERNAL MEDICINE | Facility: CLINIC | Age: 66
End: 2021-08-17

## 2021-08-17 ENCOUNTER — OFFICE VISIT (OUTPATIENT)
Dept: NEUROLOGY | Age: 66
End: 2021-08-17
Payer: MEDICARE

## 2021-08-17 VITALS
DIASTOLIC BLOOD PRESSURE: 83 MMHG | SYSTOLIC BLOOD PRESSURE: 138 MMHG | BODY MASS INDEX: 50.02 KG/M2 | RESPIRATION RATE: 18 BRPM | WEIGHT: 293 LBS | HEART RATE: 82 BPM | HEIGHT: 64 IN

## 2021-08-17 DIAGNOSIS — R41.3 MEMORY LOSS: ICD-10-CM

## 2021-08-17 DIAGNOSIS — G47.33 OBSTRUCTIVE SLEEP APNEA: Primary | ICD-10-CM

## 2021-08-17 DIAGNOSIS — I65.21 CAROTID STENOSIS, RIGHT: ICD-10-CM

## 2021-08-17 DIAGNOSIS — G25.81 RESTLESS LEG SYNDROME: ICD-10-CM

## 2021-08-17 DIAGNOSIS — G60.3 IDIOPATHIC PROGRESSIVE NEUROPATHY: ICD-10-CM

## 2021-08-17 PROCEDURE — 4040F PNEUMOC VAC/ADMIN/RCVD: CPT | Performed by: PSYCHIATRY & NEUROLOGY

## 2021-08-17 PROCEDURE — 1090F PRES/ABSN URINE INCON ASSESS: CPT | Performed by: PSYCHIATRY & NEUROLOGY

## 2021-08-17 PROCEDURE — G8427 DOCREV CUR MEDS BY ELIG CLIN: HCPCS | Performed by: PSYCHIATRY & NEUROLOGY

## 2021-08-17 PROCEDURE — G8399 PT W/DXA RESULTS DOCUMENT: HCPCS | Performed by: PSYCHIATRY & NEUROLOGY

## 2021-08-17 PROCEDURE — 1036F TOBACCO NON-USER: CPT | Performed by: PSYCHIATRY & NEUROLOGY

## 2021-08-17 PROCEDURE — G8417 CALC BMI ABV UP PARAM F/U: HCPCS | Performed by: PSYCHIATRY & NEUROLOGY

## 2021-08-17 PROCEDURE — 3017F COLORECTAL CA SCREEN DOC REV: CPT | Performed by: PSYCHIATRY & NEUROLOGY

## 2021-08-17 PROCEDURE — 99214 OFFICE O/P EST MOD 30 MIN: CPT | Performed by: PSYCHIATRY & NEUROLOGY

## 2021-08-17 PROCEDURE — 1123F ACP DISCUSS/DSCN MKR DOCD: CPT | Performed by: PSYCHIATRY & NEUROLOGY

## 2021-08-17 NOTE — TELEPHONE ENCOUNTER
PATIENT HAS CALLED ASKING WHY HER LORAZEPAM HAS BEEN DENIED.   PATIENT WOULD ALSO LIKE TO HAVE A SCRIPT FOR PERMETHRIN CREAM FOR SKIN MITES.   PLEASE ADVISE.

## 2021-08-17 NOTE — LETTER
[place] [address]  [place], [address]  Phone [phone] Fax [phone]     Patient Name: Gabriela Hernandez 2021  : 1955  Age: 74 y.o.   Patient Address: [address] [place] [address]       Patient Phone: [phone] (home)     REFERRAL  Referred to: DME provider of patient's choice  Gabriela Hernandez is referred for the following:    DME Equipment HPCPS Code Setting   Auto Adjusting BiPAP device with flex or comparable pressure relief per comfort  Min EPAP: 8cm to   Max IPAP: 20cm,   Pressure Support Range:  4cm to 6cm   Heated Humidifier  Patient Choice       Replinishible PAP Supplies, 1 year supply  Item HPCPS Code Frequency   Mask of choice  or  1 per 3 months   Nasal Mask cushion/pillows  or  2 per 30 days   Full Face Mask Interface  1 per 30 days   Headgear  1 per 6 months   Tubing, length of choice  or  1 per 3 months   Water Chamber  1 per 6 months   Chinstrap  1 per 6 months   Disposable Filters  2 per 30 days   Reusable Filters  1 per 6 months     Diagnoses:  Obstructive sleep apnea (G47.33)  Length of Need: Lifetime, 99    Ordering Provider: Luh Diego M.D.  NPI: [id number]         Signature:       Date: 2021      Electronically Signed by Luh Diego M.D.

## 2021-08-17 NOTE — PROGRESS NOTES
[facility]  [address] [place] [address], [address] [place] [address],[address]  Morteza Peterson  Phone [phone]  Fax [phone]     [facility] Follow Up Encounter  21 1:49 PM CDT    Information:   Patient Name: Paulo Schreiber  :   1955  Age:   74 y.o. MRN:   [id number]  Account #:  [de-identified]  Today:  21    Provider: Nichelle Velez M.D. Chief Complaint:   Chief Complaint   Patient presents with    Follow-up       Subjective: Paulo Schreiber is a 74 y.o. woman with a history of peripheral neuropathy, chronic back and right leg pain, DEVEN, and forgetfulness who is following up. She has been through quite a bit since I have seen her. She had a Watchman device installed. She had a DVT and was on an anticoagulant. She has had diastolic heart failure and is now using supplemental oxygen at 2 LPM 24 hours a day. She uses oxygen through her BiPAP. She uses her BiPAP nightly and rests well. She has not obtained a new BiPAP machine. Her neuropathy and her RLS are doing well. She was found recently to have right carotid stenosis and does take Plavix.         Objective:     Past Medical History:  Past Medical History:   Diagnosis Date    Abdominal adhesions 2016    Anxiety     Arthritis     Atrial fibrillation (HCC)     Atrial fibrillation and flutter (HCC) 2015    BiPAP (biphasic positive airway pressure) dependence     Chronic back pain     Chronic cough     Depression     Edema     Fibrocystic breast     GERD (gastroesophageal reflux disease)     Glossitis     Headache(784.0)     Heart burn     Heart disease     Hypertension     Hypothyroidism     Mouth sore     MRSA (methicillin resistant staph aureus) culture positive 2014    nasal    Numbness     toes    Obstructive sleep apnea     BIPAP    DEVEN (obstructive sleep apnea)     Peripheral neuropathy     PONV (postoperative nausea and vomiting)     Prolonged emergence from general anesthesia     Recurrent ventral incisional hernia 01/18/2016    Sleep apnea     Stroke ([facility]     Stroke-like symptom     SVT (supraventricular tachycardia) (HCC)     SVT (supraventricular tachycardia) (HCC)     Swelling of extremity     Unspecified sleep apnea     clinicallybi-pap    Ventricular tachyarrhythmia ([facility] [address] 09/2015    svt       Past Surgical History:   Procedure Laterality Date    CARDIAC CATHETERIZATION  8/18/15  JDT    EF 50%    CARDIAC SURGERY      CATARACT REMOVAL      CHOLECYSTECTOMY  8/4/14    BY Dr. Eder Valencia  2008    CYST INCISION AND DRAINAGE  03/2017    FINGER TRIGGER RELEASE      FOOT SURGERY Left     removal of two screws    HAMMER TOE SURGERY      HARDWARE REMOVAL FOOT / ANKLE Left 8/16/2016    FOOT HARDWARE REMOVAL - DEEP / 2nd METATARSAL HEAD RESECTION performed by Leslie Correa DPM at [address] [place] [address]  1/12/2015    x3    KNEE SURGERY      Took prostetic joint out because of infection and put a spacer in    OTHER SURGICAL HISTORY  02/2017    pilonidal cyst-Dr Bina Kim    [facility] Right 7/3/2017    KNEE ARTHROSCOPY PARTIAL MEDIAL MENISECTOMY performed by Domenic Manzano MD at [place] [address] Right 2/16/2018    KNEE TOTAL ARTHROPLASTY performed by Francisco Pina MD at [place] [address] N/A 1/18/2016    HERNIA VENTRAL REPAIR LAPAROSCOPIC WITH MESH  performed by Kwame Barksdale MD at [facility]  · None    Significant Injuries  · None    Habits  Tex Phelan reports that she has never smoked. She has never used smokeless tobacco. She reports that she does not drink alcohol and does not use drugs.     Family History   Problem Relation Age of Onset    Heart Disease Mother     Diabetes Mother     High Blood Pressure Father     Cancer Father 79        lung CA    Cancer Brother         leukemia    Alzheimer's Disease Brother        Social History  Tex Phelan is , lives in [place], [place], and is retired. Medications:  Current Outpatient Medications   Medication Sig Dispense Refill    clopidogrel (PLAVIX) 75 MG tablet Take 75 mg by mouth daily      sertraline (ZOLOFT) 50 MG tablet 50 mg      DULoxetine (CYMBALTA) 60 MG extended release capsule TAKE 1 CAPSULE BY MOUTH TWICE DAILY 180 capsule 3    gabapentin (NEURONTIN) 800 MG tablet TAKE 1 TABLET BY MOUTH THREE TIMES DAILY 90 tablet 5    amitriptyline (ELAVIL) 25 MG tablet TAKE 2 TABLETS BY MOUTH EVERY NIGHT 180 tablet 3    nystatin-triamcinolone (MYCOLOG II) 205127-5.1 UNIT/GM-% cream Apply topically 2 times daily. 1 Tube 1    triamcinolone acetonide (KENALOG) 0.1 % paste APPLY TWICE DAILY AS NEEDED TO TEETH 5 g 0    levothyroxine (SYNTHROID) 137 MCG tablet Take 137 mcg by mouth Daily      DEXILANT 60 MG CPDR delayed release capsule Take 60 mg by mouth daily       alendronate (FOSAMAX) 70 MG tablet TK 1 T PO ONCE A WEEK IN THE MORNING 30 MINUTES BEFORE FIRST MEAL OF THE DAY  3    flecainide (TAMBOCOR) 100 MG tablet TAKE 1 TABLET BY MOUTH TWICE DAILY 180 tablet 3    metoprolol tartrate (LOPRESSOR) 25 MG tablet TAKE 1/2 TABLET BY MOUTH TWICE DAILY 90 tablet 3    diclofenac (VOLTAREN) 50 MG EC tablet Take 50 mg by mouth daily      LORazepam (ATIVAN) 1 MG tablet Take 1 mg by mouth every 8 hours as needed . 2    rOPINIRole (REQUIP) 3 MG tablet Take 3 mg by mouth 2 times daily 2 tabs in AM and 3 tabs in PM       fluticasone (FLONASE) 50 MCG/ACT nasal spray 2 sprays by Nasal route as needed       famciclovir (FAMVIR) 500 MG tablet   Take 500 mg by mouth 2 times daily        No current facility-administered medications for this visit. Allergies:   Allergies as of 08/17/2021 - Fully Reviewed 08/17/2021   Allergen Reaction Noted    Azithromycin  07/09/2019    Codeine Nausea Only 07/29/2014    Mobic [meloxicam] Rash 12/13/2017    Morphine and related Itching 03/02/2018       Review of Systems:  Constitutional: negative for - chills and fever  Eyes:  negative for - visual disturbance and photophobia  HENMT: negative for - headaches and sinus pain  Respiratory: negative for - cough, hemoptysis  Positive for - shortness of breath  Cardiovascular: negative for - chest pain and palpitations  Gastrointestinal: negative for - blood in stools, constipation, diarrhea, nausea, and vomiting  Genito-Urinary: negative for - hematuria, urinary frequency, urinary urgency, and urinary retention  Musculoskeletal: positive for - joint pain, joint stiffness, and joint swelling  Hematological and Lymphatic: negative for - bleeding problems, abnormal bruising, and swollen lymph nodes  Endocrine:  negative for - polydipsia and polyphagia  Allergy/Immunology:  negative for - rhinorrhea, sinus congestion, hives  Integument:  negative for - negative for - rash, change in moles, new or changing lesions  Psychological: negative for - anxiety and depression  Neurological: negative for - memory loss, weakness  Positive for - numbness/tingling    PHYSICAL EXAMINATION:  Vitals:  /83   Pulse 82   Resp 18   Ht 5' 4\" (1.626 m)   Wt 298 lb (135.2 kg)   BMI 51.15 kg/m²   General appearance:  Alert, well developed, well nourished, in no distress  HEENT:  normocephalic, atraumatic, sclera appear normal, no nasal abnormalities, no rhinorrhea, Ears appear normal, oral mucous membranes are moist without erythema, trachea midline, thyroid is normal, no lymphadenopathy or neck mass. Cardiovascular:  Regular rate and rhythm without murmer. No peripheral edema, No cyanosis or clubbing. No carotid bruits. Pulmonary:  Lungs are clear to auscultation. Breathing appears normal, good expansion, normal effort without use of accessory muscles  Musculoskeletal:  Joints are arthritic  Integument:  No rash, erythema, or pallor  Psychiatric:  Mood, affect, and behavior appear normal      NEUROLOGIC EXAMINATION:  Mental Status:  alert, oriented to person, place, and time.   Speech: Clear without dysarthria or dysphonia  Language:  Fluent without aphasia  Cranial Nerves:   II Visual fields are full to confrontation   III,IV, VI Extraocular movements are full   VII Facial movements are symmetrical without weakness   VIII Hearing is intact   IX,X Shoulder shrug and head rotation strength are intact   XII No tongue atrophy or fasciculations. Normal tongue protrusion. No tongue weakness  Motor:  Normal strength in both upper and lower extremities. Normal muscle tone and bulk. Deep tendon reflexes are absent in both upper and lower extremities. Issa's signs are absent bilaterally. There is no ankle clonus on either side. Sensation:  Sensation is reduced to light touch, temperature, and vibration in all distal extremities. Coordination:  Rapid alternating movements are normal in both upper and lower extremities. Finger to nose testing is unimpaired bilaterally. Gait:  Unsteady    Pertinent Diagnostic Studies:  Impression    1. Severe disc height loss at C7-T1 with grade 1 anterolisthesis,   sclerotic and erosive endplate changes, not optimally characterized on   this exam. Differential would include severe degenerative change,   neuropathic joint, more discitis/osteomyelitis. Recommend correlation   with symptoms/serum inflammatory markers and consider MRI cervical spine   for further evaluation. 2. Enlarged pulmonary artery, which can be seen with pulmonary artery   hypertension. 3. Cardiac morphology portion of the exam to be reported separately. This report was finalized on 11/20/2020 14:01 by Dr Albino Baez MD.  Narrative    EXAM: Leigh Jules 80- - 11/20/2020 12:18 PM CST     HISTORY: VIDYA eval; I48.0-Paroxysmal atrial fibrillation       COMPARISON: 1/20/2019. DOSE LENGTH PRODUCT: 772 mGy cm. Automatic exposure control was utilized   to make radiation dose as low as reasonably achievable. TECHNIQUE: Unenhanced CT images obtained with multiplanar reformats. Recommendations for incidental nodule follow-up are based on Fleischner   Society recommendations. FINDINGS:   AIRWAYS/PULMONARY PARENCHYMA: The central airways are midline and   patent. No suspicious pulmonary mass or nodule. No pleural effusion or   pneumothorax. VASCULATURE: Limited evaluation of the vasculature without intravenous   contrast. Thoracic aorta is normal in course and caliber.  Mild   calcified aortic atherosclerosis.  Pulmonary artery measures up to 3.5   cm, enlarged, which can be seen with pulmonary artery hypertension. CARDIAC:  Normal heart size.  No pericardial effusion.  Scattered   coronary artery calcifications. MEDIASTINUM: There is no mediastinal or hilar lymphadenopathy by CT size   criteria. Esophagus has normal course, caliber and wall thickness. EXTRATHORACIC: The visualized portions of the thyroid gland are   unremarkable. No thoracic inlet or axillary adenopathy. The   extrathoracic soft tissues are within normal limits. INCLUDED UPPER ABDOMEN: Visualized portion of the liver, spleen, adrenal   glands are within normal limits. Cholecystectomy clips. Fatty   infiltration of the pancreas. OSSEOUS: Old left posterior rib fractures. Severe disc height loss at   C7-T1 with grade 1 anterolisthesis, sclerotic and erosive endplate   changes. Other levels of severe disc height loss and sclerotic endplate   changes with vacuum disc phenomenon. Ref Range & Units 8 mo ago   BSA  m^2 2. 3        TR max leopoldo  cm/sec 241.0        RVSP(TR)  mmHg 28.2        RAP systole  mmHg 5.0        BH CV ECHO LAURENT - BZI_BMI  kilograms/m^2 52. 5        BH CV ECHO LAURENT - BSA([place])  m^2 2.6        BH CV ECHO LAURENT - BZI_METRIC_WEIGHT  kg 138.8        BH CV ECHO LAURENT - BZI_METRIC_HEIGHT  cm 162.6        Echo EF Estimated  % 65        Narrative    · Left ventricular wall thickness is consistent with concentric   hypertrophy.    · Estimated left ventricular EF = 65% Left ventricular systolic function   is normal.   · The right ventricular cavity is mildly dilated. Other Result Text    This result has an attachment that is not available. Date: 12/10/20     Impression    1. No evidence of pulmonary thromboembolic disease. The thoracic aorta   is normal in caliber with no evidence of dissection or aneurysm. 2. Patchy groundglass disease within both lungs. I would favor   interstitial edema over an interstitial pneumonitis. Lungs are also   hypoventilated causing accentuation of the bronchovascular markings and   cardiac silhouette. No evidence of pleural effusion. 3. Cardiomegaly. No evidence of pericardial effusion. A Watchman left   atrial appendage device is present. This report was finalized on 05/11/2021 18:57 by Dr. Jorge Luis Huynh MD.  Narrative    Exam: CT angiogram of the of the chest with intravenous contrast.     CLINICAL HISTORY:  Pulmonary embolus suspected. Left leg DVT. DOSE:  1336 mGycm. Automated exposure control was utilized to diminish   patient radiation dose. ARTERIAL BG  [facility]  05/11/2021  Component      pH, Arterial   pCO2, Arterial   pO2, Arterial   HCO3, Arterial   Base Excess, Arterial   O2 Saturation, Arterial   Temperature   pCO2, Temperature Corrected   pH, Temp Corrected   pO2, Temperature Corrected   Component 05/11/2021 01/25/2019        pH, Arterial 7.415 7.427   pCO2, Arterial 50. 8High      39.7   pO2, Arterial 68.2Low      56.5Low        HCO3, Arterial 32. 5High      26.1High        Base Excess, Arterial 6.9High      1.7   O2 Saturation, Arterial 94.6 90.4Low        Temperature 37.0 37.0   pCO2, Temperature Corrected 50. 8High        pH, Temp Corrected 7.415    pO2, Temperature Corrected 68.2Low            Assessment:       ICD-10-CM    1. Obstructive sleep apnea  G47.33    2. Idiopathic progressive neuropathy  G60.3    3. Restless leg syndrome  G25.81    4. Carotid stenosis, right  I65.21    5.  Memory loss  R41.3 Clinically stable. She needs a new BiPAP. She has an auto BiPAP and given her CHF and weight increase will adjust pressure settings to low EPAP of 8cm to high IPAP of 20cm and pressure support of 4cm to 6cm. She currently has a [employer] system one BiPAP auto which has not been recalled. She many need a titration or split PSG depending on her insurance requirements. Plan:   1. New auto BiPAP. New order written. She may need a sleep study depending on her insurance. 2. Continue BiPAP use with supplemental oxygen during sleep  3. Continue present medications  4.  FU in 6 months    Electronically signed by Brooke Silva MD on 8/17/21

## 2021-08-19 NOTE — TELEPHONE ENCOUNTER
PATIENT HAS BEEN CALLED GIVEN MESSAGE AND REALIZED THAT HAD BEEN DISCUSSED AND CALLED [facility] AND REQUESTED.     PATIENT DID ASK IF THE PERMETHRIN CREAM COULD BE SENT IN?

## 2021-08-20 ENCOUNTER — TELEPHONE (OUTPATIENT)
Dept: INTERNAL MEDICINE | Facility: CLINIC | Age: 66
End: 2021-08-20

## 2021-08-20 NOTE — TELEPHONE ENCOUNTER
PATIENT IS SCHEDULED FOR Monday WITH NASREEN OROZCO.   REGARDING HER SKIN IRRITATION  ( SHE STATED SKIN MITES)

## 2021-08-20 NOTE — TELEPHONE ENCOUNTER
PATIENT HAS CALLED, SHE ASK IF DR RUANO WAS GOING TO APPROVE THE PROMETRIN CREAM?   PATIENT IS STILL HAVING ISSUES WITH HER SKIN.  SHE STATED THAT THE STRESS OF WHETHER OR NOT SHE IS GOING TO GET THIS CREAM HAS CAUSED HER ACID REFLUX TO ACT UP AND SHE IS DOUBLING UP ON HER  DEXALINT.

## 2021-08-23 ENCOUNTER — OFFICE VISIT (OUTPATIENT)
Dept: INTERNAL MEDICINE | Facility: CLINIC | Age: 66
End: 2021-08-23

## 2021-08-23 VITALS
HEART RATE: 102 BPM | OXYGEN SATURATION: 94 % | DIASTOLIC BLOOD PRESSURE: 74 MMHG | SYSTOLIC BLOOD PRESSURE: 124 MMHG | BODY MASS INDEX: 50.02 KG/M2 | WEIGHT: 293 LBS | HEIGHT: 64 IN

## 2021-08-23 DIAGNOSIS — L30.9 DERMATITIS: Primary | ICD-10-CM

## 2021-08-23 PROBLEM — M1A.00X0 IDIOPATHIC CHRONIC GOUT WITHOUT TOPHUS: Chronic | Status: RESOLVED | Noted: 2017-11-15 | Resolved: 2021-08-23

## 2021-08-23 PROBLEM — F32.9 MAJOR DEPRESSIVE DISORDER: Status: ACTIVE | Noted: 2020-08-17

## 2021-08-23 PROCEDURE — 99213 OFFICE O/P EST LOW 20 MIN: CPT | Performed by: NURSE PRACTITIONER

## 2021-08-23 RX ORDER — TRIAMCINOLONE ACETONIDE 1 MG/G
CREAM TOPICAL 2 TIMES DAILY
Qty: 45 G | Refills: 0 | Status: SHIPPED | OUTPATIENT
Start: 2021-08-23 | End: 2021-10-08

## 2021-08-23 RX ORDER — PERMETHRIN 50 MG/G
CREAM TOPICAL ONCE
Qty: 60 G | Refills: 1 | Status: SHIPPED | OUTPATIENT
Start: 2021-08-23 | End: 2022-03-11 | Stop reason: SDUPTHER

## 2021-08-24 NOTE — PROGRESS NOTES
Chief Complaint   Patient presents with   • Med Refill     needs a cream refill         History:  Danisha Cannon is a 66 y.o. female who presents today for evaluation of the above problems.          She has a problem with mites in her home and frequently has to use Elimite that has been prescribed by Dr. Ashley Rocha.  Needs a refill of this.  Right now she is fairly clear but does have a few bites on her back.  They itch.  Sees Dr. Rocha again in about a month.    Rash  This is a chronic problem. The current episode started more than 1 year ago. The problem has been waxing and waning since onset. The rash is diffuse. The rash is characterized by itchiness. She was exposed to an insect bite/sting. Associated symptoms include congestion, coughing, fatigue, joint pain and shortness of breath. Pertinent negatives include no anorexia, diarrhea, eye pain, facial edema, fever, nail changes, rhinorrhea, sore throat or vomiting.       ROS:  Review of Systems   Constitutional: Positive for fatigue. Negative for fever.   HENT: Positive for congestion. Negative for rhinorrhea and sore throat.    Eyes: Negative for pain.   Respiratory: Positive for cough and shortness of breath.    Gastrointestinal: Negative for anorexia, diarrhea and vomiting.   Musculoskeletal: Positive for joint pain.   Skin: Positive for rash. Negative for nail changes.       Allergies   Allergen Reactions   • Other Rash     Dial soap  Redness and swelling on skin and burning sensation   • Codeine Dizziness and Nausea Only     Rapid heart rate, dizziness  NAUSEA   • Mobic [Meloxicam] Rash   • Morphine And Related Itching   • Azithromycin Other (See Comments)     Past Medical History:   Diagnosis Date   • Anemia    • Anxiety and depression    • Arthritis    • Atrial fibrillation (CMS/HCC)    • Chronic cough    • Disease of thyroid gland    • DVT (deep venous thrombosis) (CMS/HCC)    • GERD (gastroesophageal reflux disease)    • History of incision and drainage      Right knee   • History of staph infection     right knee, and upper right thigh   • Hyperlipidemia    • Infection      in right knee to bone, cleaned it out and put new hardware with antibiotic and pt developed hole in incision   • Neuropathy, peripheral    • Pneumonia     RECENT DX PER FAMILY DOCTOR   • PONV (postoperative nausea and vomiting)    • Restless leg syndrome    • Sleep apnea with use of continuous positive airway pressure (CPAP)     bipap   • SVT (supraventricular tachycardia) (CMS/HCC)      Past Surgical History:   Procedure Laterality Date   • BUNIONECTOMY Left     AND HAMMER TOE   • CARDIAC ABLATION      at [facility] 8/2019   • CARDIAC SURGERY      HEART CATH   • CATARACT EXTRACTION Right    • HERNIA REPAIR      UMBILICAL X3   • INCISION AND DRAINAGE LEG Right 1/28/2019    Procedure: INCISION AND DRAINAGE OF RIGHT THIGH HEMATOMA;  Surgeon: Nino Stern MD;  Location:  [facility] 12;  Service: Vascular   • JOINT REPLACEMENT      RIGHT KNEE   • KNEE POLY INSERT EXCHANGE Right 3/3/2018    Procedure: IRRIGATION AND DEBRIDEMENT TOTAL KNEE & POLY EXCHANGE - RIGHT;  Surgeon: Amilcar Capellan MD;  Location:  [facility] OR;  Service:    • LAPAROSCOPIC CHOLECYSTECTOMY     • LEG DEBRIDEMENT Right 3/23/2018    Procedure: DEBRIDEMENT AND CLOSURE KNEE - RIGHT;  Surgeon: Amilcar Capellan MD;  Location: [facility] OR;  Service: Orthopedics   • PERIPHERALLY INSERTED CENTRAL CATHETER INSERTION     • PILONIDAL CYSTECTOMY N/A 2/16/2017    Procedure: PILONIDAL CYSTECTOMY, EXCISION SEBACEOUS CYST - BACK;  Surgeon: Macrina Capellan MD;  Location:  [facility] OR;  Service:    • TOTAL KNEE  PROSTHESIS REMOVAL W/ SPACER INSERTION Right 3/13/2018    Procedure: 1.  EXPLANT TOTAL KNEE 2.  ANTIBOTIC SPACER KNEE;  Surgeon: Amilcar Capellan MD;  Location: [facility] OR;  Service: Orthopedics   • TOTAL KNEE  PROSTHESIS REMOVAL W/ SPACER INSERTION Right 6/19/2020    Procedure: REMOVAL OF ANTIBIOTIC SPACER;  Surgeon: Timi  Amilcar CAMILO MD;  Location:  [facility] OR;  Service: Orthopedics;  Laterality: Right;   • TOTAL KNEE ARTHROPLASTY REVISION Right 6/19/2020    Procedure: REVISION TOTAL KNEE REPLACEMENT;  Surgeon: Amilcar Capellan MD;  Location:  [facility] OR;  Service: Orthopedics;  Laterality: Right;   • TRUNK LESION/CYST EXCISION N/A 2/16/2017    Procedure: EXCISION SEBACEOUS CYST - BACK;  Surgeon: Macrina Capellan MD;  Location:  [facility] OR;  Service:      Family History   Problem Relation Age of Onset   • Diabetes Mother    • Heart disease Mother    • Alzheimer's disease Mother    • Hyperlipidemia Mother    • Hypertension Mother    • Cancer Father    • Hypertension Father    • Anxiety disorder Father    • Depression Father    • Leukemia Brother    • Heart attack Sister    • Clotting disorder Brother    • No Known Problems Brother    • Colon polyps Neg Hx    • Colon cancer Neg Hx      Danisha  reports that she has never smoked. She has never used smokeless tobacco. She reports that she does not drink alcohol and does not use drugs.    I have reviewed and updated the above documentation (if necessary) including but not limited to chief complaint, ROS, PFSH, allergies and medications        Current Outpatient Medications:   •  alendronate (FOSAMAX) 70 MG tablet, Take 70 mg by mouth Every 7 (Seven) Days., Disp: , Rfl:   •  amitriptyline (ELAVIL) 50 MG tablet, Take 100 mg by mouth Every Night., Disp: , Rfl:   •  ASPIRIN 81 PO, Take 1 tablet by mouth Daily., Disp: , Rfl:   •  clopidogrel (PLAVIX) 75 MG tablet, Take 75 mg by mouth Daily., Disp: , Rfl:   •  dexlansoprazole (DEXILANT) 60 MG capsule, Take 60 mg by mouth Daily., Disp: , Rfl:   •  diclofenac (VOLTAREN) 75 MG EC tablet, Take 75 mg by mouth Daily., Disp: , Rfl:   •  DULoxetine (CYMBALTA) 60 MG capsule, Take 60 mg by mouth 2 (Two) Times a Day., Disp: , Rfl:   •  famciclovir (FAMVIR) 500 MG tablet, Take 1 tablet by mouth 2 (two) times a day., Disp: 60 tablet, Rfl: 2  •  flecainide  "(TAMBOCOR) 100 MG tablet, Take 100 mg by mouth 2 (Two) Times a Day., Disp: , Rfl:   •  fluticasone (FLONASE) 50 MCG/ACT nasal spray, 2 sprays into the nostril(s) as directed by provider Daily As Needed for Rhinitis or Allergies., Disp: 16 g, Rfl: 5  •  fluticasone (Flovent HFA) 110 MCG/ACT inhaler, Inhale 1 puff 2 (Two) Times a Day., Disp: 1 each, Rfl: 11  •  furosemide (Lasix) 40 MG tablet, Take 1 tablet by mouth Daily As Needed (edema or soa)., Disp: 90 tablet, Rfl: 3  •  gabapentin (NEURONTIN) 800 MG tablet, Take 800 mg by mouth 3 (Three) Times a Day., Disp: , Rfl:   •  levothyroxine (SYNTHROID, LEVOTHROID) 137 MCG tablet, Take 137 mcg by mouth Every Morning., Disp: , Rfl:   •  LORazepam (ATIVAN) 1 MG tablet, Take 1 mg by mouth Every Night., Disp: , Rfl:   •  metoprolol tartrate (LOPRESSOR) 25 MG tablet, Take 12.5 mg by mouth Every 12 (Twelve) Hours., Disp: , Rfl:   •  montelukast (SINGULAIR) 10 MG tablet, Take  by mouth Daily., Disp: , Rfl: 5  •  mupirocin (Bactroban) 2 % ointment, Apply  topically to the appropriate area as directed 2 (Two) Times a Day., Disp: 15 g, Rfl: 0  •  rOPINIRole (REQUIP) 3 MG tablet, Take 6 mg by mouth 2 (two) times a day. Take 3 tablets at night and 2 tablets every am, Disp: , Rfl:   •  sertraline (ZOLOFT) 50 MG tablet, TAKE 1 TABLET BY MOUTH DAILY, Disp: 30 tablet, Rfl: 0  •  triamcinolone (KENALOG) 0.1 % paste, Apply  to teeth See Admin Instructions., Disp: 5 g, Rfl: 2  •  triamcinolone (KENALOG) 0.1 % cream, Apply  topically to the appropriate area as directed 2 (Two) Times a Day., Disp: 45 g, Rfl: 0        PHQ-9 Total Score:      OBJECTIVE:  Visit Vitals  /74 (BP Location: Left arm, Patient Position: Sitting, Cuff Size: Adult)   Pulse 102   Ht 162.6 cm (64\")   Wt (!) 138 kg (303 lb 11.2 oz)   LMP  (LMP Unknown)   SpO2 94%   BMI 52.13 kg/m²      Physical Exam  Nursing note reviewed.   Constitutional:       General: She is not in acute distress.     Appearance: Normal " appearance. She is not ill-appearing, toxic-appearing or diaphoretic.   HENT:      Head: Normocephalic and atraumatic.   Skin:     Findings: No lesion or rash.      Comments: Today I see 3-4 scattered erythematous papules on back typical of a bite.  She does show me a picture of rash on the back of her neck that was there before she used Elimite recently.  It is difficult to assess by the picture.  The neck is clear today. Trunk, abdomen, arms, chest, and back are otherwise clear at today's visit.   Neurological:      Mental Status: She is alert and oriented to person, place, and time.   Psychiatric:         Mood and Affect: Mood normal.         Behavior: Behavior normal.         Thought Content: Thought content normal.         Judgment: Judgment normal.         [facility]    Assessment/Plan    Diagnoses and all orders for this visit:    1. Dermatitis (Primary)  -     permethrin (Elimite) 5 % cream; Apply  topically to the appropriate area as directed 1 (One) Time for 1 dose.  Dispense: 60 g; Refill: 1  -     triamcinolone (KENALOG) 0.1 % cream; Apply  topically to the appropriate area as directed 2 (Two) Times a Day.  Dispense: 45 g; Refill: 0      I am going to give her the steroid cream to have to spot treat itchy bumps PRN.  I do not think she needs to use the Elimite right now again and have told her this.  I did refill it since this has been a recurring problem for her, but we discussed that this is an insecticide and not to be used regularly. She voices understanding and plans to see dermatology in 1 month.    Education materials and an After Visit Summary were printed and given to the patient at discharge.  Return for Next scheduled follow up.         ERNESTO Beckford   12:08 CDT  8/24/2021   Answers for HPI/ROS submitted by the patient on 8/20/2021  What is the primary reason for your visit?: Rash

## 2021-08-25 RX ORDER — LEVOTHYROXINE SODIUM 137 UG/1
137 TABLET ORAL EVERY MORNING
Qty: 90 TABLET | Refills: 3 | Status: SHIPPED | OUTPATIENT
Start: 2021-08-25

## 2021-08-25 RX ORDER — DULOXETIN HYDROCHLORIDE 60 MG/1
60 CAPSULE, DELAYED RELEASE ORAL 2 TIMES DAILY
Qty: 60 CAPSULE | Refills: 3 | OUTPATIENT
Start: 2021-08-25

## 2021-08-25 NOTE — TELEPHONE ENCOUNTER
Medication needed:   Requested Prescriptions     Pending Prescriptions Disp Refills   • levothyroxine (SYNTHROID, LEVOTHROID) 137 MCG tablet       Sig: Take 1 tablet by mouth Every Morning.   • DULoxetine (CYMBALTA) 60 MG capsule       Sig: Take 1 capsule by mouth 2 (Two) Times a Day.     What is the patient's preferred pharmacy: [id number] - [place], [place] - [address] AT [address] [phone] [facility] [phone]

## 2021-08-26 RX ORDER — DEXLANSOPRAZOLE 60 MG/1
60 CAPSULE, DELAYED RELEASE ORAL DAILY
Qty: 90 CAPSULE | Refills: 3 | Status: CANCELLED | OUTPATIENT
Start: 2021-08-26

## 2021-08-27 ENCOUNTER — LAB (OUTPATIENT)
Dept: LAB | Facility: HOSPITAL | Age: 66
End: 2021-08-27

## 2021-08-27 ENCOUNTER — HOSPITAL ENCOUNTER (OUTPATIENT)
Dept: GENERAL RADIOLOGY | Facility: HOSPITAL | Age: 66
Discharge: HOME | End: 2021-08-27

## 2021-08-27 ENCOUNTER — OFFICE VISIT (OUTPATIENT)
Dept: INTERNAL MEDICINE | Facility: CLINIC | Age: 66
End: 2021-08-27

## 2021-08-27 VITALS — HEART RATE: 94 BPM | DIASTOLIC BLOOD PRESSURE: 80 MMHG | OXYGEN SATURATION: 95 % | SYSTOLIC BLOOD PRESSURE: 126 MMHG

## 2021-08-27 DIAGNOSIS — M25.512 ACUTE PAIN OF LEFT SHOULDER: ICD-10-CM

## 2021-08-27 DIAGNOSIS — R55 SYNCOPE, UNSPECIFIED SYNCOPE TYPE: ICD-10-CM

## 2021-08-27 DIAGNOSIS — E03.9 HYPOTHYROIDISM, UNSPECIFIED TYPE: ICD-10-CM

## 2021-08-27 DIAGNOSIS — S00.83XA CONTUSION OF FACE, INITIAL ENCOUNTER: ICD-10-CM

## 2021-08-27 DIAGNOSIS — R55 SYNCOPE, UNSPECIFIED SYNCOPE TYPE: Primary | ICD-10-CM

## 2021-08-27 DIAGNOSIS — D64.9 ANEMIA, UNSPECIFIED TYPE: ICD-10-CM

## 2021-08-27 LAB
ALBUMIN SERPL-MCNC: 3.7 G/DL (ref 3.5–5.2)
ALBUMIN/GLOB SERPL: 1 G/DL
ALP SERPL-CCNC: 121 U/L (ref 39–117)
ALT SERPL W P-5'-P-CCNC: 17 U/L (ref 1–33)
ANION GAP SERPL CALCULATED.3IONS-SCNC: 10 MMOL/L (ref 5–15)
AST SERPL-CCNC: 23 U/L (ref 1–32)
BASOPHILS # BLD AUTO: 0.04 10*3/MM3 (ref 0–0.2)
BASOPHILS NFR BLD AUTO: 0.5 % (ref 0–1.5)
BILIRUB SERPL-MCNC: 0.4 MG/DL (ref 0–1.2)
BUN SERPL-MCNC: 21 MG/DL (ref 8–23)
BUN/CREAT SERPL: 23.1 (ref 7–25)
CALCIUM SPEC-SCNC: 9 MG/DL (ref 8.6–10.5)
CHLORIDE SERPL-SCNC: 102 MMOL/L (ref 98–107)
CO2 SERPL-SCNC: 29 MMOL/L (ref 22–29)
CREAT SERPL-MCNC: 0.91 MG/DL (ref 0.57–1)
DEPRECATED RDW RBC AUTO: 56.7 FL (ref 37–54)
EOSINOPHIL # BLD AUTO: 0.23 10*3/MM3 (ref 0–0.4)
EOSINOPHIL NFR BLD AUTO: 2.9 % (ref 0.3–6.2)
ERYTHROCYTE [DISTWIDTH] IN BLOOD BY AUTOMATED COUNT: 17.7 % (ref 12.3–15.4)
GFR SERPL CREATININE-BSD FRML MDRD: 61 ML/MIN/1.73
GLOBULIN UR ELPH-MCNC: 3.7 GM/DL
GLUCOSE SERPL-MCNC: 111 MG/DL (ref 65–99)
HCT VFR BLD AUTO: 41.2 % (ref 34–46.6)
HGB BLD-MCNC: 12.8 G/DL (ref 12–15.9)
IMM GRANULOCYTES # BLD AUTO: 0.05 10*3/MM3 (ref 0–0.05)
IMM GRANULOCYTES NFR BLD AUTO: 0.6 % (ref 0–0.5)
LYMPHOCYTES # BLD AUTO: 1.23 10*3/MM3 (ref 0.7–3.1)
LYMPHOCYTES NFR BLD AUTO: 15.3 % (ref 19.6–45.3)
MCH RBC QN AUTO: 27.2 PG (ref 26.6–33)
MCHC RBC AUTO-ENTMCNC: 31.1 G/DL (ref 31.5–35.7)
MCV RBC AUTO: 87.7 FL (ref 79–97)
MONOCYTES # BLD AUTO: 0.63 10*3/MM3 (ref 0.1–0.9)
MONOCYTES NFR BLD AUTO: 7.8 % (ref 5–12)
NEUTROPHILS NFR BLD AUTO: 5.87 10*3/MM3 (ref 1.7–7)
NEUTROPHILS NFR BLD AUTO: 72.9 % (ref 42.7–76)
NRBC BLD AUTO-RTO: 0 /100 WBC (ref 0–0.2)
PLATELET # BLD AUTO: 250 10*3/MM3 (ref 140–450)
PMV BLD AUTO: 11 FL (ref 6–12)
POTASSIUM SERPL-SCNC: 4 MMOL/L (ref 3.5–5.2)
PROT SERPL-MCNC: 7.4 G/DL (ref 6–8.5)
RBC # BLD AUTO: 4.7 10*6/MM3 (ref 3.77–5.28)
SODIUM SERPL-SCNC: 141 MMOL/L (ref 136–145)
WBC # BLD AUTO: 8.05 10*3/MM3 (ref 3.4–10.8)

## 2021-08-27 PROCEDURE — 85025 COMPLETE CBC W/AUTO DIFF WBC: CPT

## 2021-08-27 PROCEDURE — 93000 ELECTROCARDIOGRAM COMPLETE: CPT | Performed by: NURSE PRACTITIONER

## 2021-08-27 PROCEDURE — 80053 COMPREHEN METABOLIC PANEL: CPT

## 2021-08-27 PROCEDURE — 99214 OFFICE O/P EST MOD 30 MIN: CPT | Performed by: NURSE PRACTITIONER

## 2021-08-27 PROCEDURE — 73030 X-RAY EXAM OF SHOULDER: CPT

## 2021-08-27 PROCEDURE — 36415 COLL VENOUS BLD VENIPUNCTURE: CPT

## 2021-08-27 PROCEDURE — 84443 ASSAY THYROID STIM HORMONE: CPT

## 2021-08-27 NOTE — PROGRESS NOTES
Procedure     ECG 12 Lead    Date/Time: 8/27/2021 4:27 PM  Performed by: Julisa Donovan APRN  Authorized by: Julisa Donovan APRN   Comparison: compared with previous ECG from 5/17/2021  Similar to previous ECG  Rhythm: sinus tachycardia  Rate: tachycardic  BPM: 106  Conduction: left anterior fascicular block    Clinical impression: abnormal EKG

## 2021-08-27 NOTE — PROGRESS NOTES
"Chief Complaint   Patient presents with   • Syncope     2 days ago \" i was sitting in my chair and the next thing i know i just landed in the floor\"         History:  Danisha Cannon is a 66 y.o. female who presents today for evaluation of the above problems.          Was working on computer at home, no warning just woke up on the floor.  Hurt left shoulder when she fell, hurt her face, small cut on forehead, abrasion on nose.  This was 2 days ago.  She felt fine before and has felt fine since.  No TIA symptoms.  No chest pain or dizziness.          ROS:  Review of Systems  As above    Allergies   Allergen Reactions   • Other Rash     Dial soap  Redness and swelling on skin and burning sensation   • Codeine Dizziness and Nausea Only     Rapid heart rate, dizziness  NAUSEA   • Mobic [Meloxicam] Rash   • Morphine And Related Itching   • Azithromycin Other (See Comments)     Past Medical History:   Diagnosis Date   • Anemia    • Anxiety and depression    • Arthritis    • Atrial fibrillation (CMS/HCC)    • Chronic cough    • Disease of thyroid gland    • DVT (deep venous thrombosis) (CMS/HCC)    • GERD (gastroesophageal reflux disease)    • History of incision and drainage     Right knee   • History of staph infection     right knee, and upper right thigh   • Hyperlipidemia    • Infection      in right knee to bone, cleaned it out and put new hardware with antibiotic and pt developed hole in incision   • Neuropathy, peripheral    • Pneumonia     RECENT DX PER FAMILY DOCTOR   • PONV (postoperative nausea and vomiting)    • Restless leg syndrome    • Sleep apnea with use of continuous positive airway pressure (CPAP)     bipap   • SVT (supraventricular tachycardia) (CMS/HCC)      Past Surgical History:   Procedure Laterality Date   • BUNIONECTOMY Left     AND HAMMER TOE   • CARDIAC ABLATION      at [facility] 8/2019   • CARDIAC SURGERY      HEART CATH   • CATARACT EXTRACTION Right    • HERNIA REPAIR      UMBILICAL X3   • " INCISION AND DRAINAGE LEG Right 1/28/2019    Procedure: INCISION AND DRAINAGE OF RIGHT THIGH HEMATOMA;  Surgeon: Nino Stern MD;  Location:  [facility] OR 12;  Service: Vascular   • JOINT REPLACEMENT      RIGHT KNEE   • KNEE POLY INSERT EXCHANGE Right 3/3/2018    Procedure: IRRIGATION AND DEBRIDEMENT TOTAL KNEE & POLY EXCHANGE - RIGHT;  Surgeon: Amilcar Capellan MD;  Location: [facility] OR;  Service:    • LAPAROSCOPIC CHOLECYSTECTOMY     • LEG DEBRIDEMENT Right 3/23/2018    Procedure: DEBRIDEMENT AND CLOSURE KNEE - RIGHT;  Surgeon: Amilcar Capellan MD;  Location: [facility] OR;  Service: Orthopedics   • PERIPHERALLY INSERTED CENTRAL CATHETER INSERTION     • PILONIDAL CYSTECTOMY N/A 2/16/2017    Procedure: PILONIDAL CYSTECTOMY, EXCISION SEBACEOUS CYST - BACK;  Surgeon: Macrina Capellan MD;  Location: [facility] OR;  Service:    • TOTAL KNEE  PROSTHESIS REMOVAL W/ SPACER INSERTION Right 3/13/2018    Procedure: 1.  EXPLANT TOTAL KNEE 2.  ANTIBOTIC SPACER KNEE;  Surgeon: Amilcar Capellan MD;  Location: [facility] OR;  Service: Orthopedics   • TOTAL KNEE  PROSTHESIS REMOVAL W/ SPACER INSERTION Right 6/19/2020    Procedure: REMOVAL OF ANTIBIOTIC SPACER;  Surgeon: Amilcar Capellan MD;  Location: [facility] OR;  Service: Orthopedics;  Laterality: Right;   • TOTAL KNEE ARTHROPLASTY REVISION Right 6/19/2020    Procedure: REVISION TOTAL KNEE REPLACEMENT;  Surgeon: Amilcar Capellan MD;  Location: [facility] OR;  Service: Orthopedics;  Laterality: Right;   • TRUNK LESION/CYST EXCISION N/A 2/16/2017    Procedure: EXCISION SEBACEOUS CYST - BACK;  Surgeon: Macrina Capellan MD;  Location: [facility] OR;  Service:      Family History   Problem Relation Age of Onset   • Diabetes Mother    • Heart disease Mother    • Alzheimer's disease Mother    • Hyperlipidemia Mother    • Hypertension Mother    • Cancer Father    • Hypertension Father    • Anxiety disorder Father    • Depression Father    • Leukemia Brother    • Heart attack Sister     • Clotting disorder Brother    • No Known Problems Brother    • Colon polyps Neg Hx    • Colon cancer Neg Hx      Danisha  reports that she has never smoked. She has never used smokeless tobacco. She reports that she does not drink alcohol and does not use drugs.    I have reviewed and updated the above documentation (if necessary) including but not limited to chief complaint, ROS, PFSH, allergies and medications        Current Outpatient Medications:   •  alendronate (FOSAMAX) 70 MG tablet, Take 70 mg by mouth Every 7 (Seven) Days., Disp: , Rfl:   •  amitriptyline (ELAVIL) 50 MG tablet, Take 100 mg by mouth Every Night., Disp: , Rfl:   •  ASPIRIN 81 PO, Take 1 tablet by mouth Daily., Disp: , Rfl:   •  clopidogrel (PLAVIX) 75 MG tablet, Take 75 mg by mouth Daily., Disp: , Rfl:   •  dexlansoprazole (DEXILANT) 60 MG capsule, Take 60 mg by mouth Daily., Disp: , Rfl:   •  diclofenac (VOLTAREN) 75 MG EC tablet, Take 75 mg by mouth Daily., Disp: , Rfl:   •  DULoxetine (CYMBALTA) 60 MG capsule, Take 60 mg by mouth 2 (Two) Times a Day., Disp: , Rfl:   •  famciclovir (FAMVIR) 500 MG tablet, Take 1 tablet by mouth 2 (two) times a day., Disp: 60 tablet, Rfl: 2  •  flecainide (TAMBOCOR) 100 MG tablet, Take 100 mg by mouth 2 (Two) Times a Day., Disp: , Rfl:   •  fluticasone (FLONASE) 50 MCG/ACT nasal spray, 2 sprays into the nostril(s) as directed by provider Daily As Needed for Rhinitis or Allergies., Disp: 16 g, Rfl: 5  •  fluticasone (Flovent HFA) 110 MCG/ACT inhaler, Inhale 1 puff 2 (Two) Times a Day., Disp: 1 each, Rfl: 11  •  furosemide (Lasix) 40 MG tablet, Take 1 tablet by mouth Daily As Needed (edema or soa)., Disp: 90 tablet, Rfl: 3  •  gabapentin (NEURONTIN) 800 MG tablet, Take 800 mg by mouth 3 (Three) Times a Day., Disp: , Rfl:   •  levothyroxine (SYNTHROID, LEVOTHROID) 137 MCG tablet, Take 1 tablet by mouth Every Morning., Disp: 90 tablet, Rfl: 3  •  LORazepam (ATIVAN) 1 MG tablet, Take 1 mg by mouth Every Night.,  Disp: , Rfl:   •  metoprolol tartrate (LOPRESSOR) 25 MG tablet, Take 12.5 mg by mouth Every 12 (Twelve) Hours., Disp: , Rfl:   •  montelukast (SINGULAIR) 10 MG tablet, Take  by mouth Daily., Disp: , Rfl: 5  •  mupirocin (Bactroban) 2 % ointment, Apply  topically to the appropriate area as directed 2 (Two) Times a Day., Disp: 15 g, Rfl: 0  •  rOPINIRole (REQUIP) 3 MG tablet, Take 6 mg by mouth 2 (two) times a day. Take 3 tablets at night and 2 tablets every am, Disp: , Rfl:   •  sertraline (ZOLOFT) 50 MG tablet, TAKE 1 TABLET BY MOUTH DAILY, Disp: 30 tablet, Rfl: 0  •  triamcinolone (KENALOG) 0.1 % cream, Apply  topically to the appropriate area as directed 2 (Two) Times a Day., Disp: 45 g, Rfl: 0  •  triamcinolone (KENALOG) 0.1 % paste, Apply  to teeth See Admin Instructions., Disp: 5 g, Rfl: 2    PHQ-9 Depression Screening  Little interest or pleasure in doing things?     Feeling down, depressed, or hopeless?     Trouble falling or staying asleep, or sleeping too much?     Feeling tired or having little energy?     Poor appetite or overeating?     Feeling bad about yourself - or that you are a failure or have let yourself or your family down?     Trouble concentrating on things, such as reading the newspaper or watching television?     Moving or speaking so slowly that other people could have noticed? Or the opposite - being so fidgety or restless that you have been moving around a lot more than usual?     Thoughts that you would be better off dead, or of hurting yourself in some way?     PHQ-9 Total Score     If you checked off any problems, how difficult have these problems made it for you to do your work, take care of things at home, or get along with other people?       PHQ-9 Total Score:      OBJECTIVE:  Visit Vitals  /80 (BP Location: Right arm, Patient Position: Sitting, Cuff Size: Adult)   Pulse 94   LMP  (LMP Unknown)   SpO2 95%      Physical Exam  Vitals and nursing note reviewed.   Constitutional:   "     General: She is not in acute distress.     Appearance: Normal appearance. She is not ill-appearing, toxic-appearing or diaphoretic.   HENT:      Head: Normocephalic and atraumatic.      Comments: 1/2 cm laceration, well-adhered and scabbed right forehead.     Nose: Nose normal. No congestion.      Comments: She has an abrasion across the bridge of her nose, and it is tender, but there is no swelling.    Eyes:      General: No scleral icterus.        Right eye: No discharge.         Left eye: No discharge.      Conjunctiva/sclera: Conjunctivae normal.      Pupils: Pupils are equal, round, and reactive to light.   Cardiovascular:      Rate and Rhythm: Regular rhythm.      Heart sounds: Normal heart sounds. No murmur heard.        Comments: Mildly tachycardic to auscultation  Pulmonary:      Effort: Pulmonary effort is normal. No respiratory distress.      Breath sounds: Normal breath sounds. No stridor. No wheezing, rhonchi or rales.   Abdominal:      General: There is no distension.      Palpations: Abdomen is soft.      Tenderness: There is no abdominal tenderness.   Musculoskeletal:         General: Tenderness (left shoulder tender to palpation along acromion process) present. Normal range of motion.      Comments: Cannot move hand behind back to scratch back on left due to pain, but otherwise good strength and mobility of left shoulder.  Pour sign negative.   Skin:     General: Skin is warm and dry.      Findings: Bruising (right upper arm with 2\" diameter bruise patient says is also from her fall) present.   Neurological:      General: No focal deficit present.      Mental Status: She is alert and oriented to person, place, and time.   Psychiatric:         Mood and Affect: Mood normal.         Behavior: Behavior normal.         Thought Content: Thought content normal.         Judgment: Judgment normal.         [facility]    Assessment/Plan    Diagnoses and all orders for this visit:    1. Syncope, unspecified " syncope type (Primary)  -     CBC w AUTO Differential; Future  -     ECG 12 Lead  -     Comprehensive metabolic panel; Future  -     TSH; Future  -     Holter Monitor - 72 Hour Up To 15 Days; Future    2. Acute pain of left shoulder  -     XR Shoulder 2+ View Left; Future    3. Contusion of face, initial encounter    4. Hypothyroidism, unspecified type  -     TSH; Future      She does have a history of PAF and SVT.  EKG looks stable today.  Will check Zio patch, as I feel cardiac etiology of syncope is a strong possibility.  I asked her to go to ER if she faints again.  She has not had any TIA-type symptoms, nor has she developed headache or neuro symptoms after falling (concern on Plavix with head injury/bleed), so I am not ordering a CT head at this time.  I did tell her to let us know if anything changes in this regard.  Will check labs as above as well, and x-ray shoulder to assess possible injury from fall. She defers facial xrays at this time.    Education materials and an After Visit Summary were printed and given to the patient at discharge.  Return for Next scheduled follow up.         Julisa Donovan, ERNESTO   16:28 CDT  8/27/2021

## 2021-08-28 LAB — TSH SERPL DL<=0.05 MIU/L-ACNC: 3.2 UIU/ML (ref 0.27–4.2)

## 2021-09-03 ENCOUNTER — HOSPITAL ENCOUNTER (OUTPATIENT)
Dept: CARDIOLOGY | Facility: HOSPITAL | Age: 66
Discharge: HOME | End: 2021-09-03
Admitting: NURSE PRACTITIONER

## 2021-09-03 DIAGNOSIS — R55 SYNCOPE, UNSPECIFIED SYNCOPE TYPE: ICD-10-CM

## 2021-09-03 PROCEDURE — 93246 EXT ECG>7D<15D RECORDING: CPT

## 2021-09-14 ENCOUNTER — TELEPHONE (OUTPATIENT)
Dept: INTERNAL MEDICINE | Facility: CLINIC | Age: 66
End: 2021-09-14

## 2021-09-14 NOTE — TELEPHONE ENCOUNTER
Pt called and states she when she is using her oxygen tanks her o2 is at 90. When she uses her concentrator her o2 is up to 95. She uses 2L on both. She states she called the equiptment place and they said she just needed to call our office and up her oxygen to 3L. Please advise

## 2021-09-15 DIAGNOSIS — R09.02 HYPOXIA: Primary | ICD-10-CM

## 2021-09-15 NOTE — TELEPHONE ENCOUNTER
Will you please let her know we do not usually manage oxygen orders, and I have put in a referral to pulmonology for her. Thank you!

## 2021-09-17 LAB
MAXIMAL PREDICTED HEART RATE: 152 BPM
STRESS TARGET HR: 129 BPM

## 2021-09-17 PROCEDURE — 93248 EXT ECG>7D<15D REV&INTERPJ: CPT | Performed by: INTERNAL MEDICINE

## 2021-09-21 ENCOUNTER — OFFICE VISIT (OUTPATIENT)
Dept: INTERNAL MEDICINE | Facility: CLINIC | Age: 66
End: 2021-09-21

## 2021-09-21 VITALS
OXYGEN SATURATION: 91 % | TEMPERATURE: 97.8 F | HEART RATE: 88 BPM | DIASTOLIC BLOOD PRESSURE: 80 MMHG | SYSTOLIC BLOOD PRESSURE: 152 MMHG

## 2021-09-21 DIAGNOSIS — S46.209A INJURY OF TENDON OF BICEPS: ICD-10-CM

## 2021-09-21 DIAGNOSIS — M79.604 RIGHT LEG PAIN: Primary | ICD-10-CM

## 2021-09-21 PROCEDURE — 99213 OFFICE O/P EST LOW 20 MIN: CPT | Performed by: NURSE PRACTITIONER

## 2021-09-21 NOTE — PROGRESS NOTES
Chief Complaint   Patient presents with   • Leg Pain     right leg         History:  Danisha Cannon is a 66 y.o. female who presents today for evaluation of the above problems.          Last day of Plavix today, just baby ASA from here on out.    She is here for right leg pain, started suddenly this AM.  Hurts very badly right medial thigh, can't see anything on the skin there. No known injury. No fever.      Hurt right arm last week when she was pulling in hose.  Felt a pop and in the next few days was very tender and arm got bruised all up and down.  Resolved now; can move arm fine.      ROS:  Review of Systems  As above    Allergies   Allergen Reactions   • Other Rash     Dial soap  Redness and swelling on skin and burning sensation   • Codeine Dizziness and Nausea Only     Rapid heart rate, dizziness  NAUSEA   • Mobic [Meloxicam] Rash   • Morphine And Related Itching   • Azithromycin Other (See Comments)     Past Medical History:   Diagnosis Date   • Anemia    • Anxiety and depression    • Arthritis    • Atrial fibrillation (CMS/HCC)    • Chronic cough    • Disease of thyroid gland    • DVT (deep venous thrombosis) (CMS/HCC)    • GERD (gastroesophageal reflux disease)    • History of incision and drainage     Right knee   • History of staph infection     right knee, and upper right thigh   • Hyperlipidemia    • Infection      in right knee to bone, cleaned it out and put new hardware with antibiotic and pt developed hole in incision   • Neuropathy, peripheral    • Pneumonia     RECENT DX PER FAMILY DOCTOR   • PONV (postoperative nausea and vomiting)    • Restless leg syndrome    • Sleep apnea with use of continuous positive airway pressure (CPAP)     bipap   • SVT (supraventricular tachycardia) (CMS/HCC)      Past Surgical History:   Procedure Laterality Date   • BUNIONECTOMY Left     AND HAMMER TOE   • CARDIAC ABLATION      at [facility] 8/2019   • CARDIAC SURGERY      HEART CATH   • CATARACT EXTRACTION Right    •  HERNIA REPAIR      UMBILICAL X3   • INCISION AND DRAINAGE LEG Right 1/28/2019    Procedure: INCISION AND DRAINAGE OF RIGHT THIGH HEMATOMA;  Surgeon: Nino Stern MD;  Location: [facility] HYBRID OR 12;  Service: Vascular   • JOINT REPLACEMENT      RIGHT KNEE   • KNEE POLY INSERT EXCHANGE Right 3/3/2018    Procedure: IRRIGATION AND DEBRIDEMENT TOTAL KNEE & POLY EXCHANGE - RIGHT;  Surgeon: Amilcar Capellan MD;  Location: [facility] OR;  Service:    • LAPAROSCOPIC CHOLECYSTECTOMY     • LEG DEBRIDEMENT Right 3/23/2018    Procedure: DEBRIDEMENT AND CLOSURE KNEE - RIGHT;  Surgeon: Amilcar Capellan MD;  Location: [facility] OR;  Service: Orthopedics   • PERIPHERALLY INSERTED CENTRAL CATHETER INSERTION     • PILONIDAL CYSTECTOMY N/A 2/16/2017    Procedure: PILONIDAL CYSTECTOMY, EXCISION SEBACEOUS CYST - BACK;  Surgeon: Macrina Capellan MD;  Location: [facility] OR;  Service:    • TOTAL KNEE  PROSTHESIS REMOVAL W/ SPACER INSERTION Right 3/13/2018    Procedure: 1.  EXPLANT TOTAL KNEE 2.  ANTIBOTIC SPACER KNEE;  Surgeon: Amilcar Capellan MD;  Location: [facility] OR;  Service: Orthopedics   • TOTAL KNEE  PROSTHESIS REMOVAL W/ SPACER INSERTION Right 6/19/2020    Procedure: REMOVAL OF ANTIBIOTIC SPACER;  Surgeon: Amilcar Capellan MD;  Location: [facility] OR;  Service: Orthopedics;  Laterality: Right;   • TOTAL KNEE ARTHROPLASTY REVISION Right 6/19/2020    Procedure: REVISION TOTAL KNEE REPLACEMENT;  Surgeon: Amilcar Capellan MD;  Location: [facility] OR;  Service: Orthopedics;  Laterality: Right;   • TRUNK LESION/CYST EXCISION N/A 2/16/2017    Procedure: EXCISION SEBACEOUS CYST - BACK;  Surgeon: Macrina Capellan MD;  Location: [facility] OR;  Service:      Family History   Problem Relation Age of Onset   • Diabetes Mother    • Heart disease Mother    • Alzheimer's disease Mother    • Hyperlipidemia Mother    • Hypertension Mother    • Cancer Father    • Hypertension Father    • Anxiety disorder Father    • Depression Father    • Leukemia  Brother    • Heart attack Sister    • Clotting disorder Brother    • No Known Problems Brother    • Colon polyps Neg Hx    • Colon cancer Neg Hx      Danisha  reports that she has never smoked. She has never used smokeless tobacco. She reports that she does not drink alcohol and does not use drugs.    I have reviewed and updated the above documentation (if necessary) including but not limited to chief complaint, ROS, PFSH, allergies and medications        Current Outpatient Medications:   •  alendronate (FOSAMAX) 70 MG tablet, Take 70 mg by mouth Every 7 (Seven) Days., Disp: , Rfl:   •  amitriptyline (ELAVIL) 50 MG tablet, Take 100 mg by mouth Every Night., Disp: , Rfl:   •  ASPIRIN 81 PO, Take 1 tablet by mouth Daily., Disp: , Rfl:   •  clopidogrel (PLAVIX) 75 MG tablet, Take 75 mg by mouth Daily., Disp: , Rfl:   •  dexlansoprazole (DEXILANT) 60 MG capsule, Take 60 mg by mouth Daily., Disp: , Rfl:   •  diclofenac (VOLTAREN) 75 MG EC tablet, Take 75 mg by mouth Daily., Disp: , Rfl:   •  DULoxetine (CYMBALTA) 60 MG capsule, Take 60 mg by mouth 2 (Two) Times a Day., Disp: , Rfl:   •  famciclovir (FAMVIR) 500 MG tablet, Take 1 tablet by mouth 2 (two) times a day., Disp: 60 tablet, Rfl: 2  •  flecainide (TAMBOCOR) 100 MG tablet, Take 100 mg by mouth 2 (Two) Times a Day., Disp: , Rfl:   •  fluticasone (FLONASE) 50 MCG/ACT nasal spray, 2 sprays into the nostril(s) as directed by provider Daily As Needed for Rhinitis or Allergies., Disp: 16 g, Rfl: 5  •  fluticasone (Flovent HFA) 110 MCG/ACT inhaler, Inhale 1 puff 2 (Two) Times a Day., Disp: 1 each, Rfl: 11  •  furosemide (Lasix) 40 MG tablet, Take 1 tablet by mouth Daily As Needed (edema or soa)., Disp: 90 tablet, Rfl: 3  •  gabapentin (NEURONTIN) 800 MG tablet, Take 800 mg by mouth 3 (Three) Times a Day., Disp: , Rfl:   •  levothyroxine (SYNTHROID, LEVOTHROID) 137 MCG tablet, Take 1 tablet by mouth Every Morning., Disp: 90 tablet, Rfl: 3  •  metoprolol tartrate (LOPRESSOR)  25 MG tablet, Take 12.5 mg by mouth Every 12 (Twelve) Hours., Disp: , Rfl:   •  montelukast (SINGULAIR) 10 MG tablet, Take  by mouth Daily., Disp: , Rfl: 5  •  mupirocin (Bactroban) 2 % ointment, Apply  topically to the appropriate area as directed 2 (Two) Times a Day., Disp: 15 g, Rfl: 0  •  rOPINIRole (REQUIP) 3 MG tablet, Take 6 mg by mouth 2 (two) times a day. Take 3 tablets at night and 2 tablets every am, Disp: , Rfl:   •  triamcinolone (KENALOG) 0.1 % cream, Apply  topically to the appropriate area as directed 2 (Two) Times a Day., Disp: 45 g, Rfl: 0  •  triamcinolone (KENALOG) 0.1 % paste, Apply  to teeth See Admin Instructions., Disp: 5 g, Rfl: 2        PHQ-9 Total Score:      OBJECTIVE:  Visit Vitals  /80 (BP Location: Right arm, Patient Position: Sitting, Cuff Size: Adult)   Pulse 88   Temp 97.8 °F (36.6 °C) (Oral)   LMP  (LMP Unknown)   SpO2 91%      Physical Exam  Vitals and nursing note reviewed.   Constitutional:       Appearance: Normal appearance.   HENT:      Head: Normocephalic and atraumatic.   Musculoskeletal:         General: Tenderness (right medial thigh there is significant tenderness when a palpate a small 1 cm varicosity; it is soft and reducible) present.      Right lower leg: Edema (trace, has on compression stockings) present.      Left lower leg: Edema (trace, has on compression stockings) present.      Comments: There is no tenderness or edema or bruising in the area of left biceps tendon insertion today.  She can flex left biceps with good strength and no pain.    There is some tenderness extending along the right medial thigh for several inches, but no cord or firmness palpated.   Skin:     General: Skin is warm and dry.      Comments: No erythema or evidence of cellulitis over the varicosity that is tender right medial thigh   Neurological:      Mental Status: She is alert and oriented to person, place, and time.   Psychiatric:         Mood and Affect: Mood normal.          Behavior: Behavior normal.         Thought Content: Thought content normal.         Judgment: Judgment normal.         MDM    Assessment/Plan    Diagnoses and all orders for this visit:    1. Right leg pain (Primary)  -     US venous doppler lower extremity right (duplex); Future    2. Injury of tendon of biceps      I want to rule out DVT; however, this is not likely.  I feel the pain she is having over the varicosity is likely related to the varicosity, perhaps even a rupture.  She does have several soft varicose veins on the right leg.  I have advised that she treat this with heat to the area for now and have gotten her in first thing in the morning for her US.    Since the injury to the biceps region seems to be resolved completely at present, I will not pursue further work up at this time.    Education materials and an After Visit Summary were printed and given to the patient at discharge.  Return if symptoms worsen or fail to improve, for Next scheduled follow up.         Julisa Donovan, APRN   13:30 CDT  9/21/2021

## 2021-09-21 NOTE — PATIENT INSTRUCTIONS
Tomorrow, 9/22/21, please be at the [facility] here at [facility] at 8:30 AM for your ultrasound.

## 2021-09-22 ENCOUNTER — HOSPITAL ENCOUNTER (OUTPATIENT)
Dept: ULTRASOUND IMAGING | Facility: HOSPITAL | Age: 66
Discharge: HOME | End: 2021-09-22
Admitting: NURSE PRACTITIONER

## 2021-09-22 DIAGNOSIS — M79.604 RIGHT LEG PAIN: ICD-10-CM

## 2021-09-22 PROCEDURE — 93971 EXTREMITY STUDY: CPT | Performed by: SURGERY

## 2021-09-22 PROCEDURE — 93971 EXTREMITY STUDY: CPT

## 2021-09-22 NOTE — PROGRESS NOTES
Attempted to call the patient all the numbers listed on the chart. No answer. Left a VM and the home number kept on ringing.

## 2021-10-03 NOTE — PROGRESS NOTES
" ERNESTO Stroud  [facility]   Respiratory Disease Clinic  [address] [place] [address]  Phone: [phone]  Fax: [phone]       Chief Complaint  HYPOXIA    Subjective    History of Present Illness    Danisha Cannon presents to [facility] PULMONARY & CRITICAL CARE MEDICINE   History of Present Illness   New patient referral from ERNESTO Beckham for hypoxia.    Patient has a history of PAF s/p ablation by Dr. Guerin in 8/19.  She remains on Flecainide.  She underwent watchman implant on 2/21/21 per Dr. Guerin at [place].  She does have a hx of DVT.  She is currently on ASA and plavix.   • Shortness of breath, yes.  She states she has been SOB for at least 5 years.  She started to require O2 in the last 2 mo.  She currently requires 2L continuous.  She is here today as her PCP wants pulmonary to manage her O2.  She states that she is currently taking flovent.  She has been on it for about 10 months.  She does not like it because it causes her to have thrush.  She states she started taking it because of a cough with yellow sputum, wheezing, and shortness of breath.  She does report that it has helped with her cough and wheezing.     • Cough, yes.  Cough has been ongoing for about 4 years.     • Do you have a history of lung problems, no.  No hx of asthma, emphysema, or COPD.    • Family history of lung problems, father passed away from lung CA.  • Heartburn, yes.  She is on dexilant.  She states that she has seen GI in the past.   • Trouble swallowing, no.  She has followed with GI.   • Do you have a history of connective tissue disease, no.    • Rash, no. Dry mouth, yes.  Dry eyes, yes.  Joint pain, arthritis - erosive osteoarthritis.    • Allergies, yes.  She states she used to be on \"allergy shots\" when she lived in [place].  She has not been on them for 16 years.  She saw Dr. Alfaro and per patient she should be on them, but there was concerns " "regarding history of a-fib.  She states that she last saw Dr. Alfaro about a year ago.  She is currently on flonase and singulair.  She does have +eos from 5/11/21 at 410.    • Do you have pets, yes, 8 cats inside.    • The patient  reports that she has never smoked. She has never used smokeless tobacco.  • Do you drink alcohol? no  • Do you use any illegal drugs or prescription drugs that are not prescribed to you? No     • Work history: business office.    • Have you ever taken Methotrexate? No  • Have you ever taken Amiodarone? No   • Have you ever taken Macrodantin for an extended period of time? No  • Obstructive sleep apnea: She follows with Dr. Delio Arauz for TRISTON.  She uses [employer].     Objective   Vital Signs:   /82   Pulse 82   Ht 162.6 cm (64\")   Wt (!) 138 kg (305 lb)   SpO2 96% Comment: 2 L  BMI 52.35 kg/m²     Physical Exam  Vitals reviewed.   Constitutional:       General: She is not in acute distress.     Appearance: She is well-developed. She is obese.      Interventions: Nasal cannula and face mask in place.   HENT:      Head: Normocephalic and atraumatic.   Eyes:      General: No scleral icterus.     Conjunctiva/sclera: Conjunctivae normal.      Pupils: Pupils are equal, round, and reactive to light.   Cardiovascular:      Rate and Rhythm: Normal rate.   Pulmonary:      Effort: Pulmonary effort is normal. No respiratory distress.      Breath sounds: Decreased breath sounds present. No wheezing or rales.   Musculoskeletal:         General: Normal range of motion.      Cervical back: Normal range of motion and neck supple.      Right lower leg: Edema present.      Left lower leg: Edema present.      Comments: Ambulates with Rollator   Skin:     General: Skin is warm and dry.   Neurological:      Mental Status: She is alert and oriented to person, place, and time.   Psychiatric:         Behavior: Behavior normal.         Thought Content: Thought content normal.         Judgment: " Judgment normal.        Result Review :  The following data was reviewed by: ERNESTO Stroud on 10/04/2021:  CBC w AUTO Differential (08/27/2021 15:03)  Comprehensive metabolic panel (08/27/2021 15:03)  Blood Gas, Arterial - (05/11/2021 15:40)  CBC & Differential (05/11/2021 14:38)    Rest/Exercise Pulse Ox Values        Some values may be hidden. Unless noted otherwise, only the newest values recorded on each date are displayed.         Rest/Exercise Pulse Ox Results 10/4/21   Rest room air SAT % 90   Exercise room air SAT % 85   Rest on O2 @ Liters 2   Rest on O2 SAT % 96   Exercise on O2 @ Liters 2   Exercise on O2 SAT % 92          Exam: CT angiogram of the of the chest with intravenous contrast.     CLINICAL HISTORY:  Pulmonary embolus suspected. Left leg DVT.     DOSE:  1336 mGycm. Automated exposure control was utilized to diminish  patient radiation dose.     TECHNIQUE: Contiguous axial images were obtained through the thorax  following intravenous contrast administration with reformatted images  obtained in the sagittal and coronal projections from the original data  set. Three-dimensional reconstructions are also obtained.     COMPARISON:  11/20/2020     FINDINGS:     Pulmonary arteries:  There is normal enhancement of the pulmonary  arteries with no evidence of pulmonary thromboembolic disease.     Aorta:  The thoracic aorta and proximal great vessels are normal in  appearance. There is no evidence of aortic dissection or aneurysm.     LUNGS:  There is patchy groundglass disease within both lungs for which  I would favor patchy interstitial edema over an interstitial  pneumonitis. The lungs are hypoventilated. There is no pleural effusion  present.     Pleural spaces: Unremarkable. No evidence of pleural effusion or  pneumothorax.     HEART: There is moderate cardiomegaly. A Watchman device is noted within  the left atrial appendage. No evidence of right ventricular dysfunction  or pericardial  effusion.     Bones: No acute osseous abnormalities are demonstrated. There is  anterolisthesis of C7 on T1 stable from the previous exam. Multiple old  left sided rib fractures are present.     CHEST WALL: No chest wall abnormalities are demonstrated.     Lymph nodes: No enlarged mediastinal or axillary lymphadenopathy is  present.     Upper abdomen: A small hiatal hernia is present. Limited images of the  upper abdomen are otherwise unremarkable.     IMPRESSION:  1. No evidence of pulmonary thromboembolic disease. The thoracic aorta  is normal in caliber with no evidence of dissection or aneurysm.  2. Patchy groundglass disease within both lungs. I would favor  interstitial edema over an interstitial pneumonitis. Lungs are also  hypoventilated causing accentuation of the bronchovascular markings and  cardiac silhouette. No evidence of pleural effusion.  3. Cardiomegaly. No evidence of pericardial effusion. A Watchman left  atrial appendage device is present.  This report was finalized on 05/11/2021 18:57 by Dr. Jair Boles MD.     EXAMINATION:  XR CHEST 1 VW-  5/11/2021 3:35 PM CDT     HISTORY: SOA Triage Protocol     COMPARISON: 6/5/2020.     FINDINGS:  There is mild hypoventilation. There is no focal infiltrate  or effusion. Heart size is upper limits of normal. No acute bony  abnormality is seen.     IMPRESSION:  No active disease is seen.        This report was finalized on 05/11/2021 15:52 by Dr. Jacinto Lowery MD.    Results for orders placed during the hospital encounter of 12/13/19    Full Pulmonary Function Test With Bronchodilator & ABG    Narrative  [facility] - Pulmonary Function Test    [address] [place] [address]  [place]  [address]  [phone]    Patient : Danisha Cannon  MRN : [id number]  CSN : [id number]  Pulmonologist : Ryan Keith MD  Date : 12/13/2019    ______________________________________________________________________    Interpretation :  1.  Spirometry reveals a borderline  low forced vital capacity and otherwise are within normal limits.  2.  Lung volumes also reveal a borderline low vital capacity and a decrease in expiratory reserve volume but otherwise are within normal limits.  3.  Diffusion capacity is within normal limits and when corrected for alveolar volume is supranormal.        Ryan Keith MD      Results for orders placed during the hospital encounter of 03/11/19    Full Pulmonary Function Test With Bronchodilator & ABG    Narrative  [facility] - Pulmonary Function Test    [address]  [address]  [phone]    Patient : Danisha Cannon  MRN : [id number]  CSN : [id number]  Pulmonologist : Ryan Keith MD  Date : 3/13/2019    ______________________________________________________________________    Interpretation :  1.  Spirometry is within normal limits.  2.  Lung volumes are within normal limits.  3.  There is a moderate diffusion impairment which when corrected for alveolar volume is normalized.      Ryan Keith MD           Assessment and Plan  Diagnoses and all orders for this visit:    1. Shortness of breath (Primary)  Comments:  Likely multifactorial 2' known afib, morbid obesity/deconditioning, possible bronchitis 2' uncontrolled allergic rhinitis.   Orders:  -     Walking Oximetry; Future  -     Walking Oximetry    2. Obstructive sleep apnea  Comments:  Continue noninvasive positive pressure ventilation device.  Obtain overnight on 2 L and Pap.  Obtain compliance report.  Orders:  -     Overnight Sleep Oximetry Study; Future    3. Allergic rhinitis, unspecified seasonality, unspecified trigger  Comments:  Obtain labs as noted.  Referral to ENT for possible immunotherapy.  Continue Flonase and Singulair.  Add Xyzal.  Orders:  -     levocetirizine (Xyzal) 5 MG tablet; Take 1 tablet by mouth Every Evening for 30 days.  Dispense: 30 tablet; Refill: 11  -     Ambulatory Referral to ENT (Otolaryngology)  -     CBC &  Differential  -     IgE + Allergens (22)  -     IgG subclasses (1-4)  -     IgG  -     IgM  -     IgA    4. Chronic respiratory failure with hypoxia (HCC)  Comments:  Continue chronic oxygen therapy.  The patient is benefiting from it and wishes to continue it.  Orders:  -     Oxygen Therapy  -     Pulmonary Function Test; Future    5. Ground glass opacity present on imaging of lung  Comments:  Per CTA 5/11/21 patchy groundglass disease within both lungs favoring patchy interstitial edema over an interstitial pneumonitis. f/u HRCT   Orders:  -     CT Chest Hi Resolution Diagnostic; Future    6. Chronic bronchitis, unspecified chronic bronchitis type (HCC)  Comments:  Stop flovent. Trial Stiolto. Inhaler demo provided.   Orders:  -     tiotropium bromide-olodaterol (Stiolto Respimat) 2.5-2.5 MCG/ACT aerosol solution inhaler; Inhale 2 puffs Daily for 30 days.  Dispense: 1 each; Refill: 0    7. Need for influenza vaccination  Comments:  flu vaccine given today  Orders:  -     Fluzone High-Dose 65+yrs    8. Morbid obesity with BMI of 50.0-59.9, adult (HCC)  Comments:  recommend weight loss      Health maintenance:   Influenza vaccine: yes  Pneumovax 23: yes   Prevnar 13: no   Covid 19: yes  Follow Up  Sophia Valero, ERNESTO  10/4/2021  17:34 CDT  Return in about 6 weeks (around 11/15/2021) for CPFT.  Patient was given instructions and counseling regarding her condition or for health maintenance advice. Please see specific information pulled into the AVS if appropriate.   Please note that portions of this note were completed with a voice recognition program.

## 2021-10-04 ENCOUNTER — OFFICE VISIT (OUTPATIENT)
Dept: INTERNAL MEDICINE | Facility: CLINIC | Age: 66
End: 2021-10-04

## 2021-10-04 ENCOUNTER — OFFICE VISIT (OUTPATIENT)
Dept: PULMONOLOGY | Facility: CLINIC | Age: 66
End: 2021-10-04

## 2021-10-04 VITALS
WEIGHT: 293 LBS | DIASTOLIC BLOOD PRESSURE: 76 MMHG | SYSTOLIC BLOOD PRESSURE: 122 MMHG | HEART RATE: 70 BPM | OXYGEN SATURATION: 97 % | HEIGHT: 64 IN | BODY MASS INDEX: 50.02 KG/M2

## 2021-10-04 VITALS
OXYGEN SATURATION: 96 % | SYSTOLIC BLOOD PRESSURE: 118 MMHG | HEIGHT: 64 IN | HEART RATE: 82 BPM | WEIGHT: 293 LBS | DIASTOLIC BLOOD PRESSURE: 82 MMHG | BODY MASS INDEX: 50.02 KG/M2

## 2021-10-04 DIAGNOSIS — F41.9 ANXIETY: ICD-10-CM

## 2021-10-04 DIAGNOSIS — E66.01 MORBID OBESITY WITH BMI OF 50.0-59.9, ADULT (HCC): ICD-10-CM

## 2021-10-04 DIAGNOSIS — Z23 NEED FOR INFLUENZA VACCINATION: ICD-10-CM

## 2021-10-04 DIAGNOSIS — B88.9 DERMATOSIS DUE TO MITES: Primary | ICD-10-CM

## 2021-10-04 DIAGNOSIS — J42 CHRONIC BRONCHITIS, UNSPECIFIED CHRONIC BRONCHITIS TYPE (HCC): ICD-10-CM

## 2021-10-04 DIAGNOSIS — G47.33 OBSTRUCTIVE SLEEP APNEA: Chronic | ICD-10-CM

## 2021-10-04 DIAGNOSIS — R06.02 SHORTNESS OF BREATH: Primary | ICD-10-CM

## 2021-10-04 DIAGNOSIS — J30.9 ALLERGIC RHINITIS, UNSPECIFIED SEASONALITY, UNSPECIFIED TRIGGER: Chronic | ICD-10-CM

## 2021-10-04 DIAGNOSIS — Z12.31 ENCOUNTER FOR SCREENING MAMMOGRAM FOR MALIGNANT NEOPLASM OF BREAST: ICD-10-CM

## 2021-10-04 DIAGNOSIS — J96.11 CHRONIC RESPIRATORY FAILURE WITH HYPOXIA (HCC): ICD-10-CM

## 2021-10-04 DIAGNOSIS — R91.8 GROUND GLASS OPACITY PRESENT ON IMAGING OF LUNG: ICD-10-CM

## 2021-10-04 PROBLEM — G47.30 SLEEP APNEA: Chronic | Status: ACTIVE | Noted: 2018-03-03

## 2021-10-04 PROCEDURE — 94664 DEMO&/EVAL PT USE INHALER: CPT | Performed by: NURSE PRACTITIONER

## 2021-10-04 PROCEDURE — 90662 IIV NO PRSV INCREASED AG IM: CPT | Performed by: NURSE PRACTITIONER

## 2021-10-04 PROCEDURE — 99214 OFFICE O/P EST MOD 30 MIN: CPT | Performed by: NURSE PRACTITIONER

## 2021-10-04 PROCEDURE — G0008 ADMIN INFLUENZA VIRUS VAC: HCPCS | Performed by: NURSE PRACTITIONER

## 2021-10-04 RX ORDER — METOPROLOL SUCCINATE 25 MG/1
25 TABLET, EXTENDED RELEASE ORAL DAILY
COMMUNITY
Start: 2021-09-26 | End: 2021-10-04

## 2021-10-04 RX ORDER — TIOTROPIUM BROMIDE AND OLODATEROL 3.124; 2.736 UG/1; UG/1
2 SPRAY, METERED RESPIRATORY (INHALATION)
Qty: 1 EACH | Refills: 0 | COMMUNITY
Start: 2021-10-04 | End: 2021-11-03

## 2021-10-04 RX ORDER — PERMETHRIN 50 MG/G
CREAM TOPICAL WEEKLY
Qty: 1 EACH | Refills: 1 | Status: SHIPPED | OUTPATIENT
Start: 2021-10-04 | End: 2021-10-08

## 2021-10-04 RX ORDER — LEVOCETIRIZINE DIHYDROCHLORIDE 5 MG/1
5 TABLET, FILM COATED ORAL EVERY EVENING
Qty: 30 TABLET | Refills: 11 | Status: SHIPPED | OUTPATIENT
Start: 2021-10-04 | End: 2021-10-08

## 2021-10-04 NOTE — PATIENT INSTRUCTIONS
Allergic Rhinitis, Adult    Allergic rhinitis is an allergic reaction that affects the mucous membrane inside the nose. The mucous membrane is the tissue that produces mucus.  There are two types of allergic rhinitis:  · Seasonal. This type is also called hay fever and happens only during certain seasons.  · Perennial. This type can happen at any time of the year.  Allergic rhinitis cannot be spread from person to person. This condition can be mild, moderate, or severe. It can develop at any age and may be outgrown.  What are the causes?  This condition is caused by allergens. These are things that can cause an allergic reaction. Allergens may differ for seasonal allergic rhinitis and perennial allergic rhinitis.  · Seasonal allergic rhinitis is triggered by pollen. Pollen can come from grasses, trees, and weeds.  · Perennial allergic rhinitis may be triggered by:  ? Dust mites.  ? Proteins in a pet's urine, saliva, or dander. Dander is dead skin cells from a pet.  ? Smoke, mold, or car fumes.  What increases the risk?  You are more likely to develop this condition if you have a family history of allergies or other conditions related to allergies, including:  · Allergic conjunctivitis. This is inflammation of parts of the eyes and eyelids.  · Asthma. This condition affects the lungs and makes it hard to breathe.  · Atopic dermatitis or eczema. This is long term (chronic) inflammation of the skin.  · Food allergies.  What are the signs or symptoms?  Symptoms of this condition include:  · Sneezing or coughing.  · A stuffy nose (nasal congestion), itchy nose, or nasal discharge.  · Itchy eyes and tearing of the eyes.  · A feeling of mucus dripping down the back of your throat (postnasal drip).  · Trouble sleeping.  · Tiredness or fatigue.  · Headache.  · Sore throat.  How is this diagnosed?  This condition may be diagnosed with your symptoms, medical history, and physical exam. Your health care provider may check for  related conditions, such as:  · Asthma.  · Pink eye. This is eye inflammation caused by infection (conjunctivitis).  · Ear infection.  · Upper respiratory infection. This is an infection in the nose, throat, or upper airways.  You may also have tests to find out which allergens trigger your symptoms. These may include skin tests or blood tests.  How is this treated?  There is no cure for this condition, but treatment can help control symptoms. Treatment may include:  · Taking medicines that block allergy symptoms, such as corticosteroids and antihistamines. Medicine may be given as a shot, nasal spray, or pill.  · Avoiding any allergens.  · Being exposed again and again to tiny amounts of allergens to help you build a defense against allergens (immunotherapy). This is done if other treatments have not helped. It may include:  ? Allergy shots. These are injected medicines that have small amounts of allergen in them.  ? Sublingual immunotherapy. This involves taking small doses of a medicine with allergen in it under your tongue.  If these treatments do not work, your health care provider may prescribe newer, stronger medicines.  Follow these instructions at home:  Avoiding allergens  Find out what you are allergic to and avoid those allergens. These are some things you can do to help avoid allergens:  · If you have perennial allergies:  ? Replace carpet with wood, tile, or vinyl gagan. Carpet can trap dander and dust.  ? Do not smoke. Do not allow smoking in your home.  ? Change your heating and air conditioning filters at least once a month.  · If you have seasonal allergies, take these steps during allergy season:  ? Keep windows closed as much as possible.  ? Plan outdoor activities when pollen counts are lowest. Check pollen counts before you plan outdoor activities.  ? When coming indoors, change clothing and shower before sitting on furniture or bedding.  · If you have a pet in the house that produces  allergens:  ? Keep the pet out of the bedroom.  ? Vacuum, sweep, and dust regularly.  General instructions  · Take over-the-counter and prescription medicines only as told by your health care provider.  · Drink enough fluid to keep your urine pale yellow.  · Keep all follow-up visits as told by your health care provider. This is important.  Where to find more information  · American Academy of Allergy, Asthma & Immunology: www.aaaai.org  Contact a health care provider if:  · You have a fever.  · You develop a cough that does not go away.  · You make whistling sounds when you breathe (wheeze).  · Your symptoms slow you down or stop you from doing your normal activities each day.  Get help right away if:  · You have shortness of breath.  This symptom may represent a serious problem that is an emergency. Do not wait to see if the symptom will go away. Get medical help right away. Call your local emergency services (911 in the [place] Do not drive yourself to the hospital.  Summary  · Allergic rhinitis may be managed by taking medicines as directed and avoiding allergens.  · If you have seasonal allergies, keep windows closed as much as possible during allergy season.  · Contact your health care provider if you develop a fever or a cough that does not go away.  This information is not intended to replace advice given to you by your health care provider. Make sure you discuss any questions you have with your health care provider.  Document Revised: 02/05/2021 Document Reviewed: 12/15/2020  [employer] Patient Education © 2021 [email or web address]      Obesity, Adult  Obesity is having too much body fat. Being obese means that your weight is more than what is healthy for you.  BMI is a number that explains how much body fat you have. If you have a BMI of 30 or more, you are obese. Obesity is often caused by eating or drinking more calories than your body uses. Changing your lifestyle can help you lose weight.  Obesity can cause  serious health problems, such as:  · Stroke.  · Coronary artery disease (CAD).  · Type 2 diabetes.  · Some types of cancer, including cancers of the colon, breast, uterus, and gallbladder.  · Osteoarthritis.  · High blood pressure (hypertension).  · High cholesterol.  · Sleep apnea.  · Gallbladder stones.  · Infertility problems.  What are the causes?  · Eating meals each day that are high in calories, sugar, and fat.  · Being born with genes that may make you more likely to become obese.  · Having a medical condition that causes obesity.  · Taking certain medicines.  · Sitting a lot (having a sedentary lifestyle).  · Not getting enough sleep.  · Drinking a lot of drinks that have sugar in them.  What increases the risk?  · Having a family history of obesity.  · Being an  woman.  · Being a  man.  · Living in an area with limited access to:  ? [place], recreation centers, or sidewalks.  ? Healthy food choices, such as grocery stores and [email or web address] markets.  What are the signs or symptoms?  The main sign is having too much body fat.  How is this treated?  · Treatment for this condition often includes changing your lifestyle. Treatment may include:  ? Changing your diet. This may include making a healthy meal plan.  ? Exercise. This may include activity that causes your heart to beat faster (aerobic exercise) and strength training. Work with your doctor to design a program that works for you.  ? Medicine to help you lose weight. This may be used if you are not able to lose 1 pound a week after 6 weeks of healthy eating and more exercise.  ? Treating conditions that cause the obesity.  ? Surgery. Options may include gastric banding and gastric bypass. This may be done if:  § Other treatments have not helped to improve your condition.  § You have a BMI of 40 or higher.  § You have life-threatening health problems related to obesity.  Follow these instructions at home:  Eating and drinking    · Follow  advice from your doctor about what to eat and drink. Your doctor may tell you to:  ? Limit fast food, sweets, and processed snack foods.  ? Choose low-fat options. For example, choose low-fat milk instead of whole milk.  ? Eat 5 or more servings of fruits or vegetables each day.  ? Eat at home more often. This gives you more control over what you eat.  ? Choose healthy foods when you eat out.  ? Learn to read food labels. This will help you learn how much food is in 1 serving.  ? Keep low-fat snacks available.  ? Avoid drinks that have a lot of sugar in them. These include soda, fruit juice, iced tea with sugar, and flavored milk.  · Drink enough water to keep your pee (urine) pale yellow.  · Do not go on fad diets.    Physical activity  · Exercise often, as told by your doctor. Most adults should get up to 150 minutes of moderate-intensity exercise every week.Ask your doctor:  ? What types of exercise are safe for you.  ? How often you should exercise.  · Warm up and stretch before being active.  · Do slow stretching after being active (cool down).  · Rest between times of being active.  Lifestyle  · Work with your doctor and a food expert (dietitian) to set a weight-loss goal that is best for you.  · Limit your screen time.  · Find ways to reward yourself that do not involve food.  · Do not drink alcohol if:  ? Your doctor tells you not to drink.  ? You are pregnant, may be pregnant, or are planning to become pregnant.  · If you drink alcohol:  ? Limit how much you use to:  § 0-1 drink a day for women.  § 0-2 drinks a day for men.  ? Be aware of how much alcohol is in your drink. In the [place] one drink equals one 12 oz bottle of beer (355 mL), one 5 oz glass of wine (148 mL), or one 1½ oz glass of hard liquor (44 mL).  General instructions  · Keep a weight-loss journal. This can help you keep track of:  ? The food that you eat.  ? How much exercise you get.  · Take over-the-counter and prescription medicines only  as told by your doctor.  · Take vitamins and supplements only as told by your doctor.  · Think about joining a support group.  · Keep all follow-up visits as told by your doctor. This is important.  Contact a doctor if:  · You cannot meet your weight loss goal after you have changed your diet and lifestyle for 6 weeks.  Get help right away if you:  · Are having trouble breathing.  · Are having thoughts of harming yourself.  Summary  · Obesity is having too much body fat.  · Being obese means that your weight is more than what is healthy for you.  · Work with your doctor to set a weight-loss goal.  · Get regular exercise as told by your doctor.  This information is not intended to replace advice given to you by your health care provider. Make sure you discuss any questions you have with your health care provider.  Document Revised: 08/22/2019 Document Reviewed: 08/22/2019  [employer] Patient Education © 2021 [employer]      Gastroesophageal Reflux Disease, Adult    Gastroesophageal reflux (THELMA) happens when acid from the stomach flows up into the tube that connects the mouth and the stomach (esophagus). Normally, food travels down the esophagus and stays in the stomach to be digested. With THELMA, food and stomach acid sometimes move back up into the esophagus.  You may have a disease called gastroesophageal reflux disease (GERD) if the reflux:  · Happens often.  · Causes frequent or very bad symptoms.  · Causes problems such as damage to the esophagus.  When this happens, the esophagus becomes sore and swollen. Over time, GERD can make small holes (ulcers) in the lining of the esophagus.  What are the causes?  This condition is caused by a problem with the muscle between the esophagus and the stomach. When this muscle is weak or not normal, it does not close properly to keep food and acid from coming back up from the stomach.  The muscle can be weak because of:  · Tobacco use.  · Pregnancy.  · Having a certain type of  hernia (hiatal hernia).  · Alcohol use.  · Certain foods and drinks, such as coffee, chocolate, onions, and peppermint.  What increases the risk?  · Being overweight.  · Having a disease that affects your connective tissue.  · Taking NSAIDs, such a ibuprofen.  What are the signs or symptoms?  · Heartburn.  · Difficult or painful swallowing.  · The feeling of having a lump in the throat.  · A bitter taste in the mouth.  · Bad breath.  · Having a lot of saliva.  · Having an upset or bloated stomach.  · Burping.  · Chest pain. Different conditions can cause chest pain. Make sure you see your doctor if you have chest pain.  · Shortness of breath or wheezing.  · A long-term cough or a cough at night.  · Wearing away of the surface of teeth (tooth enamel).  · Weight loss.  How is this treated?  · Making changes to your diet.  · Taking medicine.  · Having surgery.  Treatment will depend on how bad your symptoms are.  Follow these instructions at home:  Eating and drinking    · Follow a diet as told by your doctor. You may need to avoid foods and drinks such as:  ? Coffee and tea, with or without caffeine.  ? Drinks that contain alcohol.  ? Energy drinks and sports drinks.  ? Bubbly (carbonated) drinks or sodas.  ? Chocolate and cocoa.  ? Peppermint and mint flavorings.  ? Garlic and onions.  ? Horseradish.  ? Spicy and acidic foods. These include peppers, chili powder, jacob powder, vinegar, hot sauces, and BBQ sauce.  ? Citrus fruit juices and citrus fruits, such as oranges, ayaan, and limes.  ? Tomato-based foods. These include red sauce, chili, salsa, and pizza with red sauce.  ? Fried and fatty foods. These include donuts, french fries, potato chips, and high-fat dressings.  ? High-fat meats. These include hot dogs, rib eye steak, sausage, ham, and molina.  ? High-fat dairy items, such as whole milk, butter, and cream cheese.  · Eat small meals often. Avoid eating large meals.  · Avoid drinking large amounts of liquid  with your meals.  · Avoid eating meals during the 2-3 hours before bedtime.  · Avoid lying down right after you eat.  · Do not exercise right after you eat.    Lifestyle    · Do not smoke or use any products that contain nicotine or tobacco. If you need help quitting, ask your doctor.  · Try to lower your stress. If you need help doing this, ask your doctor.  · If you are overweight, lose an amount of weight that is healthy for you. Ask your doctor about a safe weight loss goal.    General instructions  · Pay attention to any changes in your symptoms.  · Take over-the-counter and prescription medicines only as told by your doctor.  · Do not take aspirin, ibuprofen, or other NSAIDs unless your doctor says it is okay.  · Wear loose clothes. Do not wear anything tight around your waist.  · Raise (elevate) the head of your bed about 6 inches (15 cm). You may need to use a wedge to do this.  · Avoid bending over if this makes your symptoms worse.  · Keep all follow-up visits.  Contact a doctor if:  · You have new symptoms.  · You lose weight and you do not know why.  · You have trouble swallowing or it hurts to swallow.  · You have wheezing or a cough that keeps happening.  · You have a hoarse voice.  · Your symptoms do not get better with treatment.  Get help right away if:  · You have sudden pain in your arms, neck, jaw, teeth, or back.  · You suddenly feel sweaty, dizzy, or light-headed.  · You have chest pain or shortness of breath.  · You vomit and the vomit is green, yellow, or black, or it looks like blood or coffee grounds.  · You faint.  · Your poop (stool) is red, bloody, or black.  · You cannot swallow, drink, or eat.  These symptoms may represent a serious problem that is an emergency. Do not wait to see if the symptoms will go away. Get medical help right away. Call your local emergency services (911 in the [place] Do not drive yourself to the hospital.  Summary  · If a person has gastroesophageal reflux  disease (GERD), food and stomach acid move back up into the esophagus and cause symptoms or problems such as damage to the esophagus.  · Treatment will depend on how bad your symptoms are.  · Follow a diet as told by your doctor.  · Take all medicines only as told by your doctor.  This information is not intended to replace advice given to you by your health care provider. Make sure you discuss any questions you have with your health care provider.  Document Revised: 06/28/2021 Document Reviewed: 06/28/2021  [employer] Patient Education © 2021 [employer]      Sleep Apnea  Sleep apnea affects breathing during sleep. It causes breathing to stop for a short time or to become shallow. It can also increase the risk of:  · Heart attack.  · Stroke.  · Being very overweight (obese).  · Diabetes.  · Heart failure.  · Irregular heartbeat.  The goal of treatment is to help you breathe normally again.  What are the causes?  There are three kinds of sleep apnea:  · Obstructive sleep apnea. This is caused by a blocked or collapsed airway.  · Central sleep apnea. This happens when the brain does not send the right signals to the muscles that control breathing.  · Mixed sleep apnea. This is a combination of obstructive and central sleep apnea.  The most common cause of this condition is a collapsed or blocked airway. This can happen if:  · Your throat muscles are too relaxed.  · Your tongue and tonsils are too large.  · You are overweight.  · Your airway is too small.  What increases the risk?  · Being overweight.  · Smoking.  · Having a small airway.  · Being older.  · Being male.  · Drinking alcohol.  · Taking medicines to calm yourself (sedatives or tranquilizers).  · Having family members with the condition.  What are the signs or symptoms?  · Trouble staying asleep.  · Being sleepy or tired during the day.  · Getting angry a lot.  · Loud snoring.  · Headaches in the morning.  · Not being able to focus your mind  (concentrate).  · Forgetting things.  · Less interest in sex.  · Mood swings.  · Personality changes.  · Feelings of sadness (depression).  · Waking up a lot during the night to pee (urinate).  · Dry mouth.  · Sore throat.  How is this diagnosed?  · Your medical history.  · A physical exam.  · A test that is done when you are sleeping (sleep study). The test is most often done in a sleep lab but may also be done at home.  How is this treated?    · Sleeping on your side.  · Using a medicine to get rid of mucus in your nose (decongestant).  · Avoiding the use of alcohol, medicines to help you relax, or certain pain medicines (narcotics).  · Losing weight, if needed.  · Changing your diet.  · Not smoking.  · Using a machine to open your airway while you sleep, such as:  ? An oral appliance. This is a mouthpiece that shifts your lower jaw forward.  ? A CPAP device. This device blows air through a mask when you breathe out (exhale).  ? An EPAP device. This has valves that you put in each nostril.  ? A BPAP device. This device blows air through a mask when you breathe in (inhale) and breathe out.  · Having surgery if other treatments do not work.  It is important to get treatment for sleep apnea. Without treatment, it can lead to:  · High blood pressure.  · Coronary artery disease.  · In men, not being able to have an erection (impotence).  · Reduced thinking ability.  Follow these instructions at home:  Lifestyle  · Make changes that your doctor recommends.  · Eat a healthy diet.  · Lose weight if needed.  · Avoid alcohol, medicines to help you relax, and some pain medicines.  · Do not use any products that contain nicotine or tobacco, such as cigarettes, e-cigarettes, and chewing tobacco. If you need help quitting, ask your doctor.  General instructions  · Take over-the-counter and prescription medicines only as told by your doctor.  · If you were given a machine to use while you sleep, use it only as told by your  doctor.  · If you are having surgery, make sure to tell your doctor you have sleep apnea. You may need to bring your device with you.  · Keep all follow-up visits as told by your doctor. This is important.  Contact a doctor if:  · The machine that you were given to use during sleep bothers you or does not seem to be working.  · You do not get better.  · You get worse.  Get help right away if:  · Your chest hurts.  · You have trouble breathing in enough air.  · You have an uncomfortable feeling in your back, arms, or stomach.  · You have trouble talking.  · One side of your body feels weak.  · A part of your face is hanging down.  These symptoms may be an emergency. Do not wait to see if the symptoms will go away. Get medical help right away. Call your local emergency services (911 in the [place] Do not drive yourself to the hospital.  Summary  · This condition affects breathing during sleep.  · The most common cause is a collapsed or blocked airway.  · The goal of treatment is to help you breathe normally while you sleep.  This information is not intended to replace advice given to you by your health care provider. Make sure you discuss any questions you have with your health care provider.  Document Revised: 10/04/2019 Document Reviewed: 08/13/2019  Elsevier Patient Education © 2021 [employer].

## 2021-10-04 NOTE — PROCEDURES
Walking Oximetry  Performed by: Sophia Valero APRN  Authorized by: Sophia Valero APRN     Rest room air SAT %:  90  Exercise room air SAT %:  85  Rest on O2 @ Liters:  2  SAT %:  96  Exercise on O2 @ Liters:  2  SAT %:  92

## 2021-10-05 RX ORDER — DULOXETIN HYDROCHLORIDE 60 MG/1
60 CAPSULE, DELAYED RELEASE ORAL 2 TIMES DAILY
Qty: 60 CAPSULE | Refills: 2 | Status: SHIPPED | OUTPATIENT
Start: 2021-10-05 | End: 2022-01-10

## 2021-10-05 NOTE — TELEPHONE ENCOUNTER
Caller: Danisha Cannon    Relationship: Self      Medication requested (name and dosage):     Duloxetine 60 mg    Pharmacy where request should be sent:    [id number] - [place], [place] - [address] [place] [address] [phone] [facility] [phone]        Best call back number: [phone]    Does the patient have less than a 3 day supply:  [x] Yes  [] No    Marlen Ortiz MA   10/05/21 14:17 CDT

## 2021-10-05 NOTE — PROGRESS NOTES
Chief Complaint   Patient presents with   • Follow-up   • Rash   • Anxiety         History:  Danisha Cannon is a 66 y.o. female who presents today for evaluation of the above problems.          1) Continues to have issues with mites.  Will see tiny bugs on skin and then has patches of bumps with severe itching.  Uses Elimite if it gets bad.  Sometimes just uses Elimite on spots, sometimes whole body.  See Dr. Ashley Rocha for dermatology visit soon.  She has read about using ivermectin to treat scabies and wonders if she can get some of this.    2) Would like referral to behavioral health/psychiatry.  She did some Zoom visits but would like to go in person.  She is dealing with changes in life such as decline in 's health and trying to figure out where they will live.  Sad at times, anxious.    3)Mammogram due this month. Would like referral for this.      ROS:  Review of Systems  As above    Allergies   Allergen Reactions   • Other Rash     Dial soap  Redness and swelling on skin and burning sensation   • Codeine Dizziness and Nausea Only     Rapid heart rate, dizziness  NAUSEA   • Mobic [Meloxicam] Rash   • Morphine And Related Itching   • Azithromycin Other (See Comments)     Past Medical History:   Diagnosis Date   • Anemia    • Anxiety and depression    • Arthritis    • Atrial fibrillation (HCC)    • Chronic cough    • Disease of thyroid gland    • DVT (deep venous thrombosis) (HCC)    • GERD (gastroesophageal reflux disease)    • History of incision and drainage     Right knee   • History of staph infection     right knee, and upper right thigh   • Hyperlipidemia    • Infection      in right knee to bone, cleaned it out and put new hardware with antibiotic and pt developed hole in incision   • Neuropathy, peripheral    • Pneumonia     RECENT DX PER FAMILY DOCTOR   • PONV (postoperative nausea and vomiting)    • Restless leg syndrome    • Sleep apnea with use of continuous positive airway pressure (CPAP)      bipap   • SVT (supraventricular tachycardia) ([facility])      Past Surgical History:   Procedure Laterality Date   • BUNIONECTOMY Left     AND HAMMER TOE   • CARDIAC ABLATION      at [facility] 8/2019   • CARDIAC SURGERY      HEART CATH   • CATARACT EXTRACTION Right    • HERNIA REPAIR      UMBILICAL X3   • INCISION AND DRAINAGE LEG Right 1/28/2019    Procedure: INCISION AND DRAINAGE OF RIGHT THIGH HEMATOMA;  Surgeon: Nino Stern MD;  Location:  [facility] HYBRID OR 12;  Service: Vascular   • JOINT REPLACEMENT      RIGHT KNEE   • KNEE POLY INSERT EXCHANGE Right 3/3/2018    Procedure: IRRIGATION AND DEBRIDEMENT TOTAL KNEE & POLY EXCHANGE - RIGHT;  Surgeon: Amilcar Capellan MD;  Location:  [facility] OR;  Service:    • LAPAROSCOPIC CHOLECYSTECTOMY     • LEG DEBRIDEMENT Right 3/23/2018    Procedure: DEBRIDEMENT AND CLOSURE KNEE - RIGHT;  Surgeon: Amilcar Capellan MD;  Location:  [facility] OR;  Service: Orthopedics   • PERIPHERALLY INSERTED CENTRAL CATHETER INSERTION     • PILONIDAL CYSTECTOMY N/A 2/16/2017    Procedure: PILONIDAL CYSTECTOMY, EXCISION SEBACEOUS CYST - BACK;  Surgeon: Macrina Capellan MD;  Location:  [facility] OR;  Service:    • TOTAL KNEE  PROSTHESIS REMOVAL W/ SPACER INSERTION Right 3/13/2018    Procedure: 1.  EXPLANT TOTAL KNEE 2.  ANTIBOTIC SPACER KNEE;  Surgeon: Amilcar Capellan MD;  Location:  [facility] OR;  Service: Orthopedics   • TOTAL KNEE  PROSTHESIS REMOVAL W/ SPACER INSERTION Right 6/19/2020    Procedure: REMOVAL OF ANTIBIOTIC SPACER;  Surgeon: Amilcar Capellan MD;  Location:  [facility] OR;  Service: Orthopedics;  Laterality: Right;   • TOTAL KNEE ARTHROPLASTY REVISION Right 6/19/2020    Procedure: REVISION TOTAL KNEE REPLACEMENT;  Surgeon: Amilcar Capellan MD;  Location:  [facility] OR;  Service: Orthopedics;  Laterality: Right;   • TRUNK LESION/CYST EXCISION N/A 2/16/2017    Procedure: EXCISION SEBACEOUS CYST - BACK;  Surgeon: Macrina Capellan MD;  Location:  [facility] OR;  Service:      Family History    Problem Relation Age of Onset   • Diabetes Mother    • Heart disease Mother    • Alzheimer's disease Mother    • Hyperlipidemia Mother    • Hypertension Mother    • Cancer Father    • Hypertension Father    • Anxiety disorder Father    • Depression Father    • Leukemia Brother    • Heart attack Sister    • Clotting disorder Brother    • No Known Problems Brother    • Colon polyps Neg Hx    • Colon cancer Neg Hx      Danisha  reports that she has never smoked. She has never used smokeless tobacco. She reports that she does not drink alcohol and does not use drugs.    I have reviewed and updated the above documentation (if necessary) including but not limited to chief complaint, ROS, PFSH, allergies and medications        Current Outpatient Medications:   •  alendronate (FOSAMAX) 70 MG tablet, Take 70 mg by mouth Every 7 (Seven) Days., Disp: , Rfl:   •  amitriptyline (ELAVIL) 50 MG tablet, Take 100 mg by mouth Every Night., Disp: , Rfl:   •  ASPIRIN 81 PO, Take 1 tablet by mouth Daily., Disp: , Rfl:   •  clopidogrel (PLAVIX) 75 MG tablet, Take 75 mg by mouth Daily., Disp: , Rfl:   •  dexlansoprazole (DEXILANT) 60 MG capsule, Take 60 mg by mouth Daily., Disp: , Rfl:   •  diclofenac (VOLTAREN) 75 MG EC tablet, Take 75 mg by mouth Daily., Disp: , Rfl:   •  DULoxetine (CYMBALTA) 60 MG capsule, Take 60 mg by mouth 2 (Two) Times a Day., Disp: , Rfl:   •  famciclovir (FAMVIR) 500 MG tablet, Take 1 tablet by mouth 2 (two) times a day., Disp: 60 tablet, Rfl: 2  •  flecainide (TAMBOCOR) 100 MG tablet, Take 100 mg by mouth 2 (Two) Times a Day., Disp: , Rfl:   •  fluticasone (FLONASE) 50 MCG/ACT nasal spray, 2 sprays into the nostril(s) as directed by provider Daily As Needed for Rhinitis or Allergies., Disp: 16 g, Rfl: 5  •  fluticasone (Flovent HFA) 110 MCG/ACT inhaler, Inhale 1 puff 2 (Two) Times a Day., Disp: 1 each, Rfl: 11  •  furosemide (Lasix) 40 MG tablet, Take 1 tablet by mouth Daily As Needed (edema or soa)., Disp: 90  tablet, Rfl: 3  •  gabapentin (NEURONTIN) 800 MG tablet, Take 800 mg by mouth 3 (Three) Times a Day., Disp: , Rfl:   •  levothyroxine (SYNTHROID, LEVOTHROID) 137 MCG tablet, Take 1 tablet by mouth Every Morning., Disp: 90 tablet, Rfl: 3  •  metoprolol tartrate (LOPRESSOR) 25 MG tablet, Take 12.5 mg by mouth Every 12 (Twelve) Hours., Disp: , Rfl:   •  montelukast (SINGULAIR) 10 MG tablet, Take  by mouth Daily., Disp: , Rfl: 5  •  mupirocin (Bactroban) 2 % ointment, Apply  topically to the appropriate area as directed 2 (Two) Times a Day., Disp: 15 g, Rfl: 0  •  rOPINIRole (REQUIP) 3 MG tablet, Take 6 mg by mouth 2 (two) times a day. Take 3 tablets at night and 2 tablets every am, Disp: , Rfl:   •  triamcinolone (KENALOG) 0.1 % cream, Apply  topically to the appropriate area as directed 2 (Two) Times a Day., Disp: 45 g, Rfl: 0  •  triamcinolone (KENALOG) 0.1 % paste, Apply  to teeth See Admin Instructions., Disp: 5 g, Rfl: 2  •  levocetirizine (Xyzal) 5 MG tablet, Take 1 tablet by mouth Every Evening for 30 days., Disp: 30 tablet, Rfl: 11  •  permethrin (ELIMITE) 5 % cream, Apply  topically to the appropriate area as directed 1 (One) Time Per Week for 2 doses. From head to toe but not face.Shower after 8-14 hrs., Disp: 1 each, Rfl: 1  •  tiotropium bromide-olodaterol (Stiolto Respimat) 2.5-2.5 MCG/ACT aerosol solution inhaler, Inhale 2 puffs Daily for 30 days., Disp: 1 each, Rfl: 0    PHQ-9 Depression Screening  Little interest or pleasure in doing things?     Feeling down, depressed, or hopeless?     Trouble falling or staying asleep, or sleeping too much?     Feeling tired or having little energy?     Poor appetite or overeating?     Feeling bad about yourself - or that you are a failure or have let yourself or your family down?     Trouble concentrating on things, such as reading the newspaper or watching television?     Moving or speaking so slowly that other people could have noticed? Or the opposite - being so  "fidgety or restless that you have been moving around a lot more than usual?     Thoughts that you would be better off dead, or of hurting yourself in some way?     PHQ-9 Total Score     If you checked off any problems, how difficult have these problems made it for you to do your work, take care of things at home, or get along with other people?       PHQ-9 Total Score:      OBJECTIVE:  Visit Vitals  /76 (BP Location: Right arm, Patient Position: Sitting, Cuff Size: Adult)   Pulse 70   Ht 162.6 cm (64\")   Wt (!) 138 kg (303 lb 11.2 oz)   LMP  (LMP Unknown)   SpO2 97%   BMI 52.13 kg/m²      Physical Exam  Vitals and nursing note reviewed.   Constitutional:       General: She is not in acute distress.     Appearance: Normal appearance. She is not ill-appearing, toxic-appearing or diaphoretic.      Comments: Very pleasant, well-kept   HENT:      Head: Normocephalic and atraumatic.   Cardiovascular:      Rate and Rhythm: Regular rhythm.   Pulmonary:      Effort: Pulmonary effort is normal. No respiratory distress.   Abdominal:      Palpations: Abdomen is soft.   Musculoskeletal:      Right lower leg: No edema.      Left lower leg: No edema.   Skin:     General: Skin is warm and dry.      Coloration: Skin is not jaundiced.      Findings: Bruising (bruise mid-back where she says she bumped into a cabinet) present. No lesion or rash.      Comments: Right lower leg, just below the knee, there is a 2-3 inch patch of dermatitis that is excoriated.  No drainage, no vesicles or papules.  Only mildly erythematous.  Several tiny scabs from excoriation.  Otherwise, I have examined lower legs, waistline/trunk/abdomen/back, arms, neck, and face, and I see no other rash or sign of scabies/mites/bites today.   Neurological:      General: No focal deficit present.      Mental Status: She is alert and oriented to person, place, and time.   Psychiatric:         Mood and Affect: Mood normal.         Behavior: Behavior normal.         " Thought Content: Thought content normal.         Judgment: Judgment normal.         MDM    Assessment/Plan    Diagnoses and all orders for this visit:    1. Dermatosis due to mites (Primary)  -     permethrin (ELIMITE) 5 % cream; Apply  topically to the appropriate area as directed 1 (One) Time Per Week for 2 doses. From head to toe but not face.Shower after 8-14 hrs.  Dispense: 1 each; Refill: 1    2. Encounter for screening mammogram for malignant neoplasm of breast  -     Mammo screening digital tomosynthesis bilateral w CAD; Future    3. Anxiety  -     Ambulatory Referral to Psychiatry      I gave her the refill of Elimite, as this has worked for her in the past.  I encouraged her to try the triamcinolone cream first when she gets an itchy patch of skin to see if this helps rather than continually using Elimite.  I did not give her ivermectin, and we discussed that I see it is indicated for scabies and Norwegian mite infestations, but I don't feel she has that right now.  I also encouraged her to discuss this issue with Dr. Rocha when she goes to her annual dermatology appointment.    Mammogram referral has been done.    Referral to Dr. Null to address and continue care of anxiety and life stress management.    Education materials and an After Visit Summary were printed and given to the patient at discharge.  Return for Next scheduled follow up.         ERNESTO Beckford   07:46 CDT  10/5/2021

## 2021-10-07 ENCOUNTER — TELEPHONE (OUTPATIENT)
Dept: PULMONOLOGY | Facility: CLINIC | Age: 66
End: 2021-10-07

## 2021-10-07 NOTE — TELEPHONE ENCOUNTER
The patient is not compliant with her PAP machine.  Would recommend that she begin to use it as prescribed.  HI is controlled.

## 2021-10-07 NOTE — TELEPHONE ENCOUNTER
Pt is aware that she should be wearing the device for 4 hours/5 days per week at least.  We discussed that the mask was leaking.  I encouraged pt to go to DME and work with them on a better mask fit.  Pt was agreeable.

## 2021-10-08 ENCOUNTER — OFFICE VISIT (OUTPATIENT)
Dept: CARDIOLOGY | Facility: CLINIC | Age: 66
End: 2021-10-08

## 2021-10-08 ENCOUNTER — PATIENT ROUNDING (BHMG ONLY) (OUTPATIENT)
Dept: PULMONOLOGY | Facility: CLINIC | Age: 66
End: 2021-10-08

## 2021-10-08 VITALS
HEIGHT: 64 IN | SYSTOLIC BLOOD PRESSURE: 132 MMHG | BODY MASS INDEX: 50.02 KG/M2 | OXYGEN SATURATION: 94 % | HEART RATE: 84 BPM | DIASTOLIC BLOOD PRESSURE: 82 MMHG | WEIGHT: 293 LBS

## 2021-10-08 DIAGNOSIS — I47.1 SUPRAVENTRICULAR TACHYCARDIA BY ECG (HCC): ICD-10-CM

## 2021-10-08 DIAGNOSIS — Z98.890 S/P ABLATION OF ATRIAL FIBRILLATION: ICD-10-CM

## 2021-10-08 DIAGNOSIS — Z86.79 S/P ABLATION OF ATRIAL FIBRILLATION: ICD-10-CM

## 2021-10-08 DIAGNOSIS — G47.33 SLEEP APNEA, OBSTRUCTIVE: ICD-10-CM

## 2021-10-08 DIAGNOSIS — Z98.890 S/P AV NODAL ABLATION: ICD-10-CM

## 2021-10-08 DIAGNOSIS — I48.0 PAF (PAROXYSMAL ATRIAL FIBRILLATION) (HCC): Primary | ICD-10-CM

## 2021-10-08 DIAGNOSIS — E66.01 MORBID OBESITY WITH BMI OF 50.0-59.9, ADULT (HCC): Chronic | ICD-10-CM

## 2021-10-08 DIAGNOSIS — Z95.818 PRESENCE OF WATCHMAN LEFT ATRIAL APPENDAGE CLOSURE DEVICE: ICD-10-CM

## 2021-10-08 PROBLEM — I50.9 CONGESTIVE HEART FAILURE: Status: RESOLVED | Noted: 2020-07-07 | Resolved: 2021-10-08

## 2021-10-08 LAB
A ALTERNATA IGE QN: <0.1 KU/L
A FUMIGATUS IGE QN: <0.1 KU/L
BASOPHILS # BLD AUTO: 0 X10E3/UL (ref 0–0.2)
BASOPHILS NFR BLD AUTO: 0 %
BERMUDA GRASS IGE QN: <0.1 KU/L
BOXELDER IGE QN: <0.1 KU/L
C HERBARUM IGE QN: <0.1 KU/L
CAT DANDER IGE QN: <0.1 KU/L
COMMON RAGWEED IGE QN: <0.1 KU/L
CONV CLASS DESCRIPTION: ABNORMAL
COTTONWOOD IGE QN: <0.1 KU/L
D FARINAE IGE QN: <0.1 KU/L
D PTERONYSS IGE QN: <0.1 KU/L
DOG DANDER IGE QN: <0.1 KU/L
EOSINOPHIL # BLD AUTO: 0.3 X10E3/UL (ref 0–0.4)
EOSINOPHIL NFR BLD AUTO: 4 %
ERYTHROCYTE [DISTWIDTH] IN BLOOD BY AUTOMATED COUNT: 16.3 % (ref 11.7–15.4)
HCT VFR BLD AUTO: 40.1 % (ref 34–46.6)
HGB BLD-MCNC: 12.6 G/DL (ref 11.1–15.9)
IGA SERPL-MCNC: 349 MG/DL (ref 87–352)
IGE SERPL-ACNC: <2 IU/ML (ref 6–495)
IGG SERPL-MCNC: 1400 MG/DL (ref 586–1602)
IGG1 SER-MCNC: 909 MG/DL (ref 248–810)
IGG2 SER-MCNC: 134 MG/DL (ref 130–555)
IGG3 SER-MCNC: 59 MG/DL (ref 15–102)
IGG4 SER-MCNC: 29 MG/DL (ref 2–96)
IGM SERPL-MCNC: 96 MG/DL (ref 26–217)
IMM GRANULOCYTES # BLD AUTO: 0 X10E3/UL (ref 0–0.1)
IMM GRANULOCYTES NFR BLD AUTO: 0 %
LYMPHOCYTES # BLD AUTO: 1.4 X10E3/UL (ref 0.7–3.1)
LYMPHOCYTES NFR BLD AUTO: 20 %
MCH RBC QN AUTO: 27.2 PG (ref 26.6–33)
MCHC RBC AUTO-ENTMCNC: 31.4 G/DL (ref 31.5–35.7)
MCV RBC AUTO: 86 FL (ref 79–97)
MONOCYTES # BLD AUTO: 0.5 X10E3/UL (ref 0.1–0.9)
MONOCYTES NFR BLD AUTO: 7 %
MT JUNIPER IGE QN: <0.1 KU/L
NETTLE IGE QN: <0.1 KU/L
NEUTROPHILS # BLD AUTO: 4.9 X10E3/UL (ref 1.4–7)
NEUTROPHILS NFR BLD AUTO: 69 %
P NOTATUM IGE QN: <0.1 KU/L
PLATELET # BLD AUTO: 242 X10E3/UL (ref 150–450)
RBC # BLD AUTO: 4.64 X10E6/UL (ref 3.77–5.28)
ROACH IGE QN: <0.1 KU/L
SALTWORT IGE QN: <0.1 KU/L
SHEEP SORREL IGE QN: <0.1 KU/L
TIMOTHY IGE QN: <0.1 KU/L
WBC # BLD AUTO: 7.2 X10E3/UL (ref 3.4–10.8)
WHITE ASH IGE QN: <0.1 KU/L
WHITE ELM IGE QN: <0.1 KU/L
WHITE MULBERRY IGE QN: <0.1 KU/L
WHITE OAK IGE QN: <0.1 KU/L

## 2021-10-08 PROCEDURE — 93000 ELECTROCARDIOGRAM COMPLETE: CPT | Performed by: NURSE PRACTITIONER

## 2021-10-08 PROCEDURE — 99214 OFFICE O/P EST MOD 30 MIN: CPT | Performed by: NURSE PRACTITIONER

## 2021-10-08 NOTE — PROGRESS NOTES
October 8, 2021    Hello, may I speak with Danisha Cannon?    My name is Brittney Guerin      I am  with [facility] RESPIRATORY [facility] PULMONARY & CRITICAL CARE MEDICINE  [address] [address] [phone].    Before we get started may I verify your date of birth? 1955    I am calling to officially welcome you to our practice and ask about your recent visit. Is this a good time to talk? yes    Tell me about your visit with us. What things went well?  Patient was very impressed with care.  Very satisfied.       We're always looking for ways to make our patients' experiences even better. Do you have recommendations on ways we may improve?  no    Overall were you satisfied with your first visit to our practice? yes       I appreciate you taking the time to speak with me today. Is there anything else I can do for you? no      Thank you, and have a great day.

## 2021-10-08 NOTE — PROGRESS NOTES
"    Subjective:     Encounter Date:10/08/2021      Patient ID: Danisha Cannon is a 66 y.o. female with PAF s/p ablation and watchman implant and HLD.    Chief Complaint: \"no complaints\"  Atrial Fibrillation  Presents for follow-up visit. Symptoms are negative for chest pain, dizziness, palpitations, shortness of breath, syncope and weakness. The symptoms have been stable. Past medical history includes atrial fibrillation and hyperlipidemia.   Shortness of Breath  Pertinent negatives include no chest pain, leg swelling, orthopnea, PND, rash, sputum production, syncope or wheezing.   Hyperlipidemia  This is a chronic problem. The current episode started more than 1 year ago. The problem is controlled. Pertinent negatives include no chest pain or shortness of breath.     Patient presents today for a follow up. Patient was diagnosed with afib in 2015. She underwent an afib and AVNRT ablation by Dr. Guerin in 8/2019. She  remains on Flecainide and has had no reoccurrence of her arrhythmia. She underwent watchman implant on 2/21/21 per Dr. Guerin at [place]. ASHTYN  She had a fall in April that resulted in a left leg hematoma and a nonocclusive DVT. She was started on Eliquis briefly. She is now off her Eliquis and Plavix as it has been >6 months since watchman implant. She reports she has felt well. She saw pulmonology today and reports they are hopeful in regards to making her feel better and get her off oxygen. She has occasional swelling in her feet that is chronic and unchanged. She remains on Lasix and notes it helps keep the swelling down. She denies chest pain, palpitations, dyspnea, edema, orthopnea and PND.      The following portions of the patient's history were reviewed and updated as appropriate: allergies, current medications, past family history, past medical history, past social history, past surgical history and problem list.    Allergies   Allergen Reactions   • Other Rash     Dial soap  Redness and " swelling on skin and burning sensation   • Codeine Dizziness and Nausea Only     Rapid heart rate, dizziness  NAUSEA   • Mobic [Meloxicam] Rash   • Morphine And Related Itching   • Azithromycin Other (See Comments)       Current Outpatient Medications:   •  alendronate (FOSAMAX) 70 MG tablet, Take 70 mg by mouth Every 7 (Seven) Days., Disp: , Rfl:   •  amitriptyline (ELAVIL) 50 MG tablet, Take 100 mg by mouth Every Night., Disp: , Rfl:   •  ASPIRIN 81 PO, Take 1 tablet by mouth Daily., Disp: , Rfl:   •  dexlansoprazole (DEXILANT) 60 MG capsule, Take 60 mg by mouth Daily., Disp: , Rfl:   •  diclofenac (VOLTAREN) 75 MG EC tablet, Take 75 mg by mouth Daily., Disp: , Rfl:   •  DULoxetine (CYMBALTA) 60 MG capsule, Take 1 capsule by mouth 2 (Two) Times a Day., Disp: 60 capsule, Rfl: 2  •  famciclovir (FAMVIR) 500 MG tablet, Take 1 tablet by mouth 2 (two) times a day., Disp: 60 tablet, Rfl: 2  •  flecainide (TAMBOCOR) 100 MG tablet, Take 100 mg by mouth 2 (Two) Times a Day., Disp: , Rfl:   •  fluticasone (FLONASE) 50 MCG/ACT nasal spray, 2 sprays into the nostril(s) as directed by provider Daily As Needed for Rhinitis or Allergies., Disp: 16 g, Rfl: 5  •  furosemide (Lasix) 40 MG tablet, Take 1 tablet by mouth Daily As Needed (edema or soa)., Disp: 90 tablet, Rfl: 3  •  gabapentin (NEURONTIN) 800 MG tablet, Take 800 mg by mouth 3 (Three) Times a Day., Disp: , Rfl:   •  levothyroxine (SYNTHROID, LEVOTHROID) 137 MCG tablet, Take 1 tablet by mouth Every Morning., Disp: 90 tablet, Rfl: 3  •  metoprolol tartrate (LOPRESSOR) 25 MG tablet, Take 12.5 mg by mouth Every 12 (Twelve) Hours., Disp: , Rfl:   •  montelukast (SINGULAIR) 10 MG tablet, Take  by mouth Daily., Disp: , Rfl: 5  •  rOPINIRole (REQUIP) 3 MG tablet, Take 6 mg by mouth 2 (two) times a day. Take 3 tablets at night and 2 tablets every am, Disp: , Rfl:   •  tiotropium bromide-olodaterol (Stiolto Respimat) 2.5-2.5 MCG/ACT aerosol solution inhaler, Inhale 2 puffs Daily  for 30 days., Disp: 1 each, Rfl: 0  •  triamcinolone (KENALOG) 0.1 % paste, Apply  to teeth See Admin Instructions., Disp: 5 g, Rfl: 2  Past Medical History:   Diagnosis Date   • Anemia    • Anxiety and depression    • Arthritis    • Atrial fibrillation (HCC)    • Chronic cough    • Disease of thyroid gland    • DVT (deep venous thrombosis) ([facility])    • GERD (gastroesophageal reflux disease)    • History of incision and drainage     Right knee   • History of staph infection     right knee, and upper right thigh   • Hyperlipidemia    • Infection      in right knee to bone, cleaned it out and put new hardware with antibiotic and pt developed hole in incision   • Neuropathy, peripheral    • Pneumonia     RECENT DX PER FAMILY DOCTOR   • PONV (postoperative nausea and vomiting)    • Restless leg syndrome    • Sleep apnea with use of continuous positive airway pressure (CPAP)     bipap   • SVT (supraventricular tachycardia) ([facility])        Social History     Socioeconomic History   • Marital status:      Spouse name: Not on file   • Number of children: Not on file   • Years of education: Not on file   • Highest education level: Not on file   Tobacco Use   • Smoking status: Never Smoker   • Smokeless tobacco: Never Used   Vaping Use   • Vaping Use: Never used   Substance and Sexual Activity   • Alcohol use: No   • Drug use: No   • Sexual activity: Defer       Review of Systems   Constitutional: Negative for malaise/fatigue, weight gain and weight loss.   Cardiovascular: Negative for chest pain, dyspnea on exertion, irregular heartbeat, leg swelling, near-syncope, orthopnea, palpitations, paroxysmal nocturnal dyspnea and syncope.   Respiratory: Negative for cough, shortness of breath, sleep disturbances due to breathing, sputum production and wheezing.    Skin: Negative for dry skin, flushing, itching and rash.   Gastrointestinal: Negative for hematemesis and hematochezia.   Neurological: Negative for dizziness,  "light-headedness, loss of balance and weakness.   All other systems reviewed and are negative.         Objective:     Vitals reviewed.   Constitutional:       General: Not in acute distress.     Appearance: Well-developed. Not diaphoretic.   Eyes:      General: No scleral icterus.     Conjunctiva/sclera: Conjunctivae normal.      Pupils: Pupils are equal, round, and reactive to light.   HENT:      Head: Normocephalic.    Mouth/Throat:      Pharynx: No oropharyngeal exudate.   Pulmonary:      Effort: Pulmonary effort is normal. No respiratory distress.      Breath sounds: Normal breath sounds. No wheezing. No rales.   Chest:      Chest wall: Not tender to palpatation.   Cardiovascular:      Normal rate. Regular rhythm.   Pulses:     Intact distal pulses.   Edema:     Peripheral edema absent.   Abdominal:      General: Bowel sounds are normal. There is no distension.      Palpations: Abdomen is soft.      Tenderness: There is no abdominal tenderness.   Musculoskeletal: Normal range of motion.      Cervical back: Normal range of motion and neck supple. Skin:     General: Skin is warm and dry.      Coloration: Skin is not pale.      Findings: No erythema or rash.   Neurological:      Mental Status: Alert and oriented to person, place, and time.      Deep Tendon Reflexes: Reflexes are normal and symmetric.   Psychiatric:         Behavior: Behavior normal.           ECG 12 Lead    Date/Time: 10/8/2021 11:29 AM  Performed by: Barbara De Dios APRN  Authorized by: Barbara De Dios APRN   Comparison: compared with previous ECG from 8/27/2021  Comparison to previous ECG: Rate improved  Rhythm: sinus rhythm  Rate: normal  BPM: 74  Conduction: left anterior fascicular block  ST Segments: ST segments normal  T Waves: T waves normal  QRS axis: normal  Other: no other findings    Clinical impression: abnormal EKG          /82   Pulse 84   Ht 162.6 cm (64\")   Wt (!) 137 kg (301 lb)   LMP  (LMP Unknown)   SpO2 94%   BMI " 51.67 kg/m²     Lab Review:   I have reviewed previous office notes, [place] notes, recent labs and recent cardiac testing.         Assessment:          Diagnosis Plan   1. PAF (paroxysmal atrial fibrillation) ([facility])     2. S/P ablation of atrial fibrillation     3. Presence of Watchman left atrial appendage closure device     4. Supraventricular tachycardia by ECG ([facility])     5. Morbid obesity with BMI of 50.0-59.9, adult ([facility])     6. S/P AV juanita ablation     7. Sleep apnea, obstructive            Plan:       1. PAF- stable. NSR today. s/p ablation in 8/2019. Follows with Dr. Guerin. continue Flecainide, and lopressor. Not anticoagulated due to s/p watchman.     2. S/p ablation - per Dr. Guerin in 8/2019  3. S/p watchman- placed on 2/21/21 per Dr. Guerin. Continue ASA indefinitely   4. SVT- s/p ablation.   5. S/p AVNRT ablation - per Dr. Guerin in 8/2019  6. BMI- Patient's Body mass index is 51.67 kg/m². indicating that she is morbidly obese (BMI > 40 or > 35 with obesity - related health condition). Obesity-related health conditions include the following: obstructive sleep apnea. Obesity is unchanged. BMI is is above average; BMI management plan is completed. We discussed portion control and increasing exercise..  7. TRISTON- complaint with BiPAP    Follow up in 3 months or return sooner if symptoms worsen.     I spent 30 minutes caring for Danisha on this date of service. This time includes time spent by me in the following activities:preparing for the visit, reviewing tests, obtaining and/or reviewing a separately obtained history, performing a medically appropriate examination and/or evaluation , ordering medications, tests, or procedures, documenting information in the medical record and independently interpreting results and communicating that information with the patient/family/caregiver

## 2021-10-08 NOTE — PATIENT INSTRUCTIONS
"BMI for Adults  What is BMI?  Body mass index (BMI) is a number that is calculated from a person's weight and height. BMI can help estimate how much of a person's weight is composed of fat. BMI does not measure body fat directly. Rather, it is an alternative to procedures that directly measure body fat, which can be difficult and expensive.  BMI can help identify people who may be at higher risk for certain medical problems.  What are BMI measurements used for?  BMI is used as a screening tool to identify possible weight problems. It helps determine whether a person is obese, overweight, a healthy weight, or underweight.  BMI is useful for:  · Identifying a weight problem that may be related to a medical condition or may increase the risk for medical problems.  · Promoting changes, such as changes in diet and exercise, to help reach a healthy weight. BMI screening can be repeated to see if these changes are working.  How is BMI calculated?  BMI involves measuring your weight in relation to your height. Both height and weight are measured, and the BMI is calculated from those numbers. This can be done either in English (U.S.) or metric measurements. Note that charts and online BMI calculators are available to help you find your BMI quickly and easily without having to do these calculations yourself.  To calculate your BMI in English (U.S.) measurements:    1. Measure your weight in pounds (lb).  2. Multiply the number of pounds by 703.  ? For example, for a person who weighs 180 lb, multiply that number by 703, which equals 126,540.  3. Measure your height in inches. Then multiply that number by itself to get a measurement called \"inches squared.\"  ? For example, for a person who is 70 inches tall, the \"inches squared\" measurement is 70 inches x 70 inches, which equals 4,900 inches squared.  4. Divide the total from step 2 (number of lb x 703) by the total from step 3 (inches squared): 126,540 ÷ 4,900 = 25.8. This is " "your BMI.    To calculate your BMI in metric measurements:  1. Measure your weight in kilograms (kg).  2. Measure your height in meters (m). Then multiply that number by itself to get a measurement called \"meters squared.\"  ? For example, for a person who is 1.75 m tall, the \"meters squared\" measurement is 1.75 m x 1.75 m, which is equal to 3.1 meters squared.  3. Divide the number of kilograms (your weight) by the meters squared number. In this example: 70 ÷ 3.1 = 22.6. This is your BMI.  What do the results mean?  BMI charts are used to identify whether you are underweight, normal weight, overweight, or obese. The following guidelines will be used:  · Underweight: BMI less than 18.5.  · Normal weight: BMI between 18.5 and 24.9.  · Overweight: BMI between 25 and 29.9.  · Obese: BMI of 30 or above.  Keep these notes in mind:  · Weight includes both fat and muscle, so someone with a muscular build, such as an athlete, may have a BMI that is higher than 24.9. In cases like these, BMI is not an accurate measure of body fat.  · To determine if excess body fat is the cause of a BMI of 25 or higher, further assessments may need to be done by a health care provider.  · BMI is usually interpreted in the same way for men and women.  Where to find more information  For more information about BMI, including tools to quickly calculate your BMI, go to these websites:  · Centers for Disease Control and Prevention: www.cdc.gov  · American Heart Association: www.heart.org  · National Heart, Lung, and Blood [place]: www.nhlbi.nih.gov  Summary  · Body mass index (BMI) is a number that is calculated from a person's weight and height.  · BMI may help estimate how much of a person's weight is composed of fat. BMI can help identify those who may be at higher risk for certain medical problems.  · BMI can be measured using English measurements or metric measurements.  · BMI charts are used to identify whether you are underweight, normal " weight, overweight, or obese.  This information is not intended to replace advice given to you by your health care provider. Make sure you discuss any questions you have with your health care provider.  Document Revised: 09/09/2020 Document Reviewed: 07/17/2020  Elsevier Patient Education © 2021 Elsevier Inc.

## 2021-10-12 DIAGNOSIS — G47.33 OBSTRUCTIVE SLEEP APNEA: Chronic | ICD-10-CM

## 2021-10-15 RX ORDER — ROPINIROLE 3 MG/1
3 TABLET, FILM COATED ORAL TAKE AS DIRECTED
Qty: 450 TABLET | Refills: 3 | Status: SHIPPED | OUTPATIENT
Start: 2021-10-15 | End: 2021-10-19 | Stop reason: SDUPTHER

## 2021-10-19 RX ORDER — ROPINIROLE 3 MG/1
3 TABLET, FILM COATED ORAL SEE ADMIN INSTRUCTIONS
Qty: 450 TABLET | Refills: 3 | Status: CANCELLED | OUTPATIENT
Start: 2021-10-19

## 2021-10-19 RX ORDER — ROPINIROLE 3 MG/1
TABLET, FILM COATED ORAL
Qty: 450 TABLET | Refills: 3 | Status: SHIPPED | OUTPATIENT
Start: 2021-10-19

## 2021-10-19 NOTE — TELEPHONE ENCOUNTER
Patient states she was told by Yoni that her prescription was prescribed for 1 tablet of the Ropinirole 3 mg daily. She states she takes 2 tablets in the AM and 3 in the PM. She states her RLS is too severe to take 1 daily. Please advise.

## 2021-10-21 NOTE — TELEPHONE ENCOUNTER
Caller: Danisha Cannon    Relationship: Self    Best call back number: [phone]    What is the best time to reach you: ANY    Who are you requesting to speak with (clinical staff, provider,  specific staff member): CLINICAL      What was the call regarding: PATIENT STATES DR RUANO IS HER NEW PCP AND WOULD LIKE TO HAVE HER PRESCRIPTION SENT OVER TO BE FILLED.  DEXILANT 60MG   PATIENT STATES SHE ONLY HAS TWO LEFT PLEASE SEND PRESCRIPTION TO [id number] - [place], [place] - [facility] [address] [facility] AT [address] - [phone]  - [phone]   [phone]    Do you require a callback: YES

## 2021-10-22 RX ORDER — DEXLANSOPRAZOLE 60 MG/1
60 CAPSULE, DELAYED RELEASE ORAL DAILY
Qty: 30 CAPSULE | Refills: 11 | Status: SHIPPED | OUTPATIENT
Start: 2021-10-22

## 2021-10-25 ENCOUNTER — TELEPHONE (OUTPATIENT)
Dept: INTERNAL MEDICINE | Facility: CLINIC | Age: 66
End: 2021-10-25

## 2021-10-25 NOTE — TELEPHONE ENCOUNTER
Caller: Cannon Danisha ELIZABETH    Relationship: Self    Best call back number:[phone]    What medication are you requesting: NYSTATIN LIQUID FOR THRUSH    What are your current symptoms: THRUSH    How long have you been experiencing symptoms: 3 DAYS    Have you had these symptoms before:    [x] Yes  [] No    Have you been treated for these symptoms before:   [x] Yes  [] No    If a prescription is needed, what is your preferred pharmacy and phone number: [id number] - [address] AT [address] - [phone] [facility] [phone]      Additional notes:    NOT SURE IF DR RUANO HAS EVER WROTE FOR HER BEFORE BUT HAS BEEN PRESCRIBED THIS MEDICATION BEFORE

## 2021-10-27 ENCOUNTER — TELEPHONE (OUTPATIENT)
Dept: INTERNAL MEDICINE | Facility: CLINIC | Age: 66
End: 2021-10-27

## 2021-10-27 DIAGNOSIS — F41.9 ANXIETY: Primary | ICD-10-CM

## 2021-10-27 NOTE — TELEPHONE ENCOUNTER
Caller: Danisha Cannon    Relationship: Self    Best call back number: [phone]    What specialty or service is being requested: PSYCHOLOGIST     What is the provider, practice or medical service name: THE SAME PROVIDER SHE WAS REFERRED TO BEFORE

## 2021-10-27 NOTE — TELEPHONE ENCOUNTER
PATIENT HAS BEEN CALLED, SHE HAS REQUESTED TO GO TO [facility].  PLEASE RESEND TO THEM INSTEAD OF DR MARQUEZ.

## 2021-10-27 NOTE — TELEPHONE ENCOUNTER
PATIENT HAS BEEN CALLED, SHE STATED THAT DR MARQUEZ HAD THE WRONG PHONE NUMBER AND CALLED 3 TIMES.  THE PATIENT STATED THAT AFTER THEY CALL 3 TIMES THEY NEED A NEW REFERRAL.  PLEASE SEND TO DR MARQUEZ.

## 2021-11-09 RX ORDER — FAMCICLOVIR 500 MG/1
500 TABLET ORAL 2 TIMES DAILY
Qty: 60 TABLET | Refills: 2 | Status: SHIPPED | OUTPATIENT
Start: 2021-11-09 | End: 2022-02-07

## 2021-11-11 ENCOUNTER — OFFICE VISIT (OUTPATIENT)
Dept: INTERNAL MEDICINE | Facility: CLINIC | Age: 66
End: 2021-11-11

## 2021-11-11 VITALS
TEMPERATURE: 97.5 F | BODY MASS INDEX: 50.02 KG/M2 | HEIGHT: 64 IN | SYSTOLIC BLOOD PRESSURE: 116 MMHG | WEIGHT: 293 LBS | RESPIRATION RATE: 16 BRPM | OXYGEN SATURATION: 91 % | DIASTOLIC BLOOD PRESSURE: 80 MMHG | HEART RATE: 77 BPM

## 2021-11-11 DIAGNOSIS — K21.00 GASTROESOPHAGEAL REFLUX DISEASE WITH ESOPHAGITIS WITHOUT HEMORRHAGE: Primary | ICD-10-CM

## 2021-11-11 PROCEDURE — 99213 OFFICE O/P EST LOW 20 MIN: CPT | Performed by: NURSE PRACTITIONER

## 2021-11-11 RX ORDER — SUCRALFATE ORAL 1 G/10ML
1 SUSPENSION ORAL 4 TIMES DAILY
Qty: 420 ML | Refills: 0 | Status: SHIPPED | OUTPATIENT
Start: 2021-11-11 | End: 2021-11-19

## 2021-11-11 RX ORDER — DEXAMETHASONE 4 MG/1
1 TABLET ORAL 2 TIMES DAILY
COMMUNITY
Start: 2021-10-11

## 2021-11-11 NOTE — PROGRESS NOTES
Chief Complaint   Patient presents with   • Heartburn     pallette felt raw on yesterday and chest pain yesterday afternoon       History:  Danisha Cannon is a 66 y.o. female who presents today for follow-up for evaluation of the above:  HPI   Ms. Cannon presents today for worsening reflux symptoms. She has noticed worsening in the last two days. Yesterday the reflux pain was most intense in her throat. She took three of her Dexilant to obtain relief.  She notes that her reflux is typically worse based on her anxiety level. She has been more anxious lately and is attempting to manage that. A referral has been placed to Psychiatry.         ROS:  Review of Systems   Constitutional: Negative.    HENT: Negative.    Eyes: Negative.    Respiratory: Negative.    Cardiovascular: Negative.    Gastrointestinal: Negative for diarrhea, nausea and vomiting.        Reflux   Endocrine: Negative.    Genitourinary: Negative.    Musculoskeletal: Negative.    Skin: Negative.    Allergic/Immunologic: Negative.    Neurological: Negative.    Hematological: Negative.    Psychiatric/Behavioral: The patient is nervous/anxious.        Ms. Cannon  reports that she has never smoked. She has never used smokeless tobacco. She reports that she does not drink alcohol and does not use drugs.      Current Outpatient Medications:   •  alendronate (FOSAMAX) 70 MG tablet, Take 70 mg by mouth Every 7 (Seven) Days., Disp: , Rfl:   •  amitriptyline (ELAVIL) 50 MG tablet, Take 100 mg by mouth Every Night., Disp: , Rfl:   •  ASPIRIN 81 PO, Take 1 tablet by mouth Daily., Disp: , Rfl:   •  dexlansoprazole (Dexilant) 60 MG capsule, Take 1 capsule by mouth Daily., Disp: 30 capsule, Rfl: 11  •  diclofenac (VOLTAREN) 75 MG EC tablet, Take 75 mg by mouth Daily., Disp: , Rfl:   •  DULoxetine (CYMBALTA) 60 MG capsule, Take 1 capsule by mouth 2 (Two) Times a Day., Disp: 60 capsule, Rfl: 2  •  famciclovir (FAMVIR) 500 MG tablet, Take 1 tablet by mouth 2 (Two) Times a  "Day., Disp: 60 tablet, Rfl: 2  •  flecainide (TAMBOCOR) 100 MG tablet, Take 100 mg by mouth 2 (Two) Times a Day., Disp: , Rfl:   •  fluticasone (FLONASE) 50 MCG/ACT nasal spray, 2 sprays into the nostril(s) as directed by provider Daily As Needed for Rhinitis or Allergies., Disp: 16 g, Rfl: 5  •  fluticasone (Flovent HFA) 110 MCG/ACT inhaler, Inhale 1 puff 2 (Two) Times a Day., Disp: , Rfl:   •  furosemide (Lasix) 40 MG tablet, Take 1 tablet by mouth Daily As Needed (edema or soa)., Disp: 90 tablet, Rfl: 3  •  gabapentin (NEURONTIN) 800 MG tablet, Take 800 mg by mouth 3 (Three) Times a Day., Disp: , Rfl:   •  levothyroxine (SYNTHROID, LEVOTHROID) 137 MCG tablet, Take 1 tablet by mouth Every Morning., Disp: 90 tablet, Rfl: 3  •  metoprolol tartrate (LOPRESSOR) 25 MG tablet, Take 12.5 mg by mouth Every 12 (Twelve) Hours., Disp: , Rfl:   •  montelukast (SINGULAIR) 10 MG tablet, Take  by mouth Daily., Disp: , Rfl: 5  •  rOPINIRole (REQUIP) 3 MG tablet, 2 tablets each morning and 3 tablets at bedtime., Disp: 450 tablet, Rfl: 3  •  triamcinolone (KENALOG) 0.1 % paste, Apply  to teeth See Admin Instructions., Disp: 5 g, Rfl: 2  •  sucralfate (Carafate) 1 GM/10ML suspension, Take 10 mL by mouth 4 (Four) Times a Day., Disp: 420 mL, Rfl: 0      OBJECTIVE:  /80 (BP Location: Right arm, Patient Position: Sitting, Cuff Size: Large Adult)   Pulse 77   Temp 97.5 °F (36.4 °C) (Temporal)   Resp 16   Ht 162.6 cm (64\")   Wt (!) 137 kg (302 lb)   LMP  (LMP Unknown)   SpO2 91%   BMI 51.84 kg/m²    Physical Exam  Constitutional:       Appearance: She is obese.   HENT:      Head: Normocephalic.      Mouth/Throat:      Mouth: Mucous membranes are moist.   Eyes:      Pupils: Pupils are equal, round, and reactive to light.   Cardiovascular:      Rate and Rhythm: Normal rate and regular rhythm.      Pulses: Normal pulses.      Heart sounds: Normal heart sounds.   Pulmonary:      Effort: Pulmonary effort is normal.      Breath " sounds: Normal breath sounds.   Abdominal:      General: Abdomen is flat.   Musculoskeletal:         General: Normal range of motion.      Cervical back: Normal range of motion and neck supple.   Skin:     General: Skin is warm.      Capillary Refill: Capillary refill takes less than 2 seconds.   Neurological:      General: No focal deficit present.      Mental Status: She is alert.   Psychiatric:         Mood and Affect: Mood normal.         Assessment/Plan    Diagnoses and all orders for this visit:    1. Gastroesophageal reflux disease with esophagitis without hemorrhage (Primary)  Will prescribe Carafate at this time. Discussed diet.   Encouraged follow-up with Psychiatry and GI. If her symptoms do not improve she is to contact the office.     -     sucralfate (Carafate) 1 GM/10ML suspension; Take 10 mL by mouth 4 (Four) Times a Day.  Dispense: 420 mL; Refill: 0    Parts of this note were completed by [employer]ILL OROZCO student.   I have reviewed this note and agree with his documentation and have made additions based on my own face to face exam and history.        An After Visit Summary was printed and given to the patient at discharge.  Return if symptoms worsen or fail to improve, for Next scheduled follow up. Sooner if problems arise.          Renu OROZCO. 11/11/2021   Electronically Signed

## 2021-11-16 ENCOUNTER — HOSPITAL ENCOUNTER (OUTPATIENT)
Dept: CT IMAGING | Facility: HOSPITAL | Age: 66
Discharge: HOME | End: 2021-11-16
Admitting: NURSE PRACTITIONER

## 2021-11-16 DIAGNOSIS — G60.3 IDIOPATHIC PROGRESSIVE NEUROPATHY: ICD-10-CM

## 2021-11-16 DIAGNOSIS — R91.8 GROUND GLASS OPACITY PRESENT ON IMAGING OF LUNG: ICD-10-CM

## 2021-11-16 PROCEDURE — 71250 CT THORAX DX C-: CPT

## 2021-11-17 DIAGNOSIS — G47.33 OBSTRUCTIVE SLEEP APNEA: ICD-10-CM

## 2021-11-17 DIAGNOSIS — J96.11 CHRONIC RESPIRATORY FAILURE WITH HYPOXIA (HCC): Primary | ICD-10-CM

## 2021-11-17 NOTE — TELEPHONE ENCOUNTER
Riddhi Rock has requested a refill on her medication.       Last office visit : 8/17/2021   Next office visit : 12/14/2021   Last medication refill :5-16-21 5 BRANDO Spicer : 11-15-21    Requested Prescriptions     Pending Prescriptions Disp Refills    gabapentin (NEURONTIN) 800 MG tablet [Pharmacy Med Name: GABAPENTIN 800MG TABLETS] 90 tablet 5     Sig: TAKE 1 TABLET BY MOUTH THREE TIMES DAILY

## 2021-11-18 ENCOUNTER — OFFICE VISIT (OUTPATIENT)
Dept: OTOLARYNGOLOGY | Facility: CLINIC | Age: 66
End: 2021-11-18

## 2021-11-18 VITALS
TEMPERATURE: 97.1 F | SYSTOLIC BLOOD PRESSURE: 130 MMHG | DIASTOLIC BLOOD PRESSURE: 78 MMHG | WEIGHT: 293 LBS | BODY MASS INDEX: 50.02 KG/M2 | HEIGHT: 64 IN | HEART RATE: 82 BPM

## 2021-11-18 DIAGNOSIS — J30.9 ALLERGIC RHINITIS, UNSPECIFIED SEASONALITY, UNSPECIFIED TRIGGER: Primary | Chronic | ICD-10-CM

## 2021-11-18 PROCEDURE — 99203 OFFICE O/P NEW LOW 30 MIN: CPT | Performed by: OTOLARYNGOLOGY

## 2021-11-18 RX ORDER — AZELASTINE 1 MG/ML
2 SPRAY, METERED NASAL 2 TIMES DAILY
Qty: 30 ML | Refills: 11 | Status: SHIPPED | OUTPATIENT
Start: 2021-11-18 | End: 2022-08-04 | Stop reason: SDUPTHER

## 2021-11-18 RX ORDER — GABAPENTIN 800 MG/1
TABLET ORAL
Qty: 90 TABLET | Refills: 5 | Status: SHIPPED | OUTPATIENT
Start: 2021-11-18 | End: 2022-09-27

## 2021-11-18 RX ORDER — AZELASTINE 1 MG/ML
2 SPRAY, METERED NASAL 2 TIMES DAILY
Qty: 30 ML | Refills: 6 | Status: CANCELLED | OUTPATIENT
Start: 2021-11-18

## 2021-11-18 NOTE — ASSESSMENT & PLAN NOTE
Her current use of beta-blockers would prohibit any safe delivery of immunotherapy.  We could do allergy retesting but since she is on beta-blockers, I would not recommend immunotherapy.  We will add Astelin and nasal rinses.

## 2021-11-18 NOTE — PROGRESS NOTES
Vlad Angel MD     Chief Complaint   Patient presents with   • Allergic Rhinitis        HPI   Danisha Cannon is a  66 y.o. female who was referred by pulmonary for evaluation of allergic rhinitis.  She has a history of atrial fibrillation and is status post watchman implant placement in February 2021.  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  She has a history of immunotherapy in the past but has not been on them for 16 years.  She has seen Dr. Alfaro in the past but immunotherapy was not recommended.  She has a history of 8 cats that live in her home.  She is currently on a beta-blocker.  Today she complains mostly of postnasal drip and a tickle in her throat that is causing her to cough.  She is on diuretics and dry medication.  She has been taking proton pump inhibitors for her reflux.      History     Last Reviewed by Vlad Angel MD on 11/18/2021 at 12:25 PM    Sections Reviewed    Tobacco, Family, Medical, Surgical      Problem list reviewed by Vlad Angel MD on 11/18/2021 at 12:25 PM  Medicines reviewed by Vlad Angel MD on 11/18/2021 at 12:25 PM  Allergies reviewed by Vlad Angel MD on 11/18/2021 at 12:25 PM       Vital Signs:   Temp:  [97.1 °F (36.2 °C)] 97.1 °F (36.2 °C)  Heart Rate:  [82] 82  BP: (130)/(78) 130/78    ENT Physical Exam  Constitutional  Appearance: patient appears well-developed and well-nourished, obesity noted,  Communication/Voice: communication appropriate for developmental age; vocal quality normal;  Head and Face  Appearance: head appears normal, face appears normal and face appears atraumatic;  Palpation: facial palpation normal;  Salivary: glands normal;  Ear  Hearing: intact;  Auricles: right auricle normal; bilateral auricles normal; left auricle normal;  External Mastoids: right external mastoid normal; left external mastoid normal;  Ear Canals: bilateral ear canals normal;  Tympanic Membranes: bilateral tympanic  membranes normal;  Nose  External Nose: nares patent bilaterally; external nose normal;  Internal Nose: bilateral intranasal mucosa edematous; bilateral inferior turbinates edematous;  Oral Cavity/Oropharynx  Lips: normal;  Teeth: normal;  Gums: gingiva normal;  Tongue: normal;  Oral mucosa: normal;  Hard palate: normal;  Soft palate: normal;  Tonsils: normal;  Base of Tongue: normal;  Posterior pharyngeal wall: appearance is erythematous; Posterior wall comments: No overt drainage noted  Neck  Neck: neck normal;  Respiratory  Inspection: breathing unlabored; normal breathing rate;  Cardiovascular  Inspection: extremities are warm and well perfused; no peripheral edema present;  Neurovestibular  Mental Status: alert and oriented;  Psychiatric: mood normal; affect is appropriate;                   Assessment/Plan    Diagnoses and all orders for this visit:    1. Allergic rhinitis, unspecified seasonality, unspecified trigger (Primary)  Assessment & Plan:  Her current use of beta-blockers would prohibit any safe delivery of immunotherapy.  We could do allergy retesting but since she is on beta-blockers, I would not recommend immunotherapy.        Other orders  -     azelastine (ASTELIN) 0.1 % nasal spray; 2 sprays into the nostril(s) as directed by provider 2 (Two) Times a Day. Use in each nostril as directed  Dispense: 30 mL; Refill: 11          GENERAL ALLERGY AVOIDANCE: Regular vacuum cleaning is  recommended  to prevent accumulation of allergens. Use adequate filtration, including double thickness bags or HEPA filters on the  outlet. Use air-conditioning where possible. Change central air filters with an allergy rated filter on a regular basis. Install car pollen filters where possible.    Continue nasal sprays as previously prescribed.  For the best response, use your nasal sprays every day without skipping doses. It may take several weeks before the full effect is acheived.        Return in about 3 months  (around 2/18/2022) for follow up with Jazzy OROZCO.     Vlad Angel MD  11/18/2021  12:25 CST

## 2021-11-18 NOTE — PATIENT INSTRUCTIONS
Neilmed Saline Nasal Rinse  [email or web address]/directions-for-use.php        Step 1  Please wash your hands. Fill the clean bottle with the designated volume of either distilled, micro-filtered (through 0.2 micron filter), commercially bottled or previously boiled and cooled down water. Always rinse your nasal passages with NeilMed® Sinus Rinse™ packets only. Our packets contain a mixture of USP grade sodium chloride and sodium bicarbonate. These ingredients are of the purest quality available to make the dry powder mixture. Rinsing your nasal passages with only plain water without our mixture will result in a severe burning sensation as the plain water is not physiologic for your nasal lining, even if it is appropriate for drinking. Additionally, for your safety, do not use tap or faucet water for dissolving the mixture unless it has been previously boiled for five minutes or more as boiling sterilizes the water. Other choices are distilled, micro-filtered (through 0.2 micron), commercially bottled or, as mentioned earlier, previously boiled water at lukewarm or body temperature. You can store boiled water in a clean container for seven days or more if refrigerated. Do not use non-chlorinated or non-ultra (0.2 micron) filtered well water unless it is boiled and then cooled to lukewarm or body temperature.  *You may warm the water in a microwave in increments of 5 to 10 seconds to avoid overheating the water, damaging the device or scalding your nasal passage.   Step 2  Cut the Sinus Rinse™ mixture packet at the corner and pour its contents into the bottle. Tighten the cap and tube on the bottle securely. Place one finger over the tip of the cap and shake the bottle gently to dissolve the mixture.   Step 3  Standing in front of a sink, bend forward to your comfort level and tilt your head down. Keeping your mouth open, without holding your breath, place the cap snugly against your nasal passage. SQUEEZE  BOTTLE GENTLY until the solution starts draining from the OPPOSITE nasal passage. Some may drain from your mouth. For a proper rinse, keep squeezing the bottle GENTLY until at least 1/4 to 1/2 (60 mL to 120 mL or 2 to 4 fl oz) of the bottle is used. Do not swallow the solution.   Step 4  Blow your nose very gently, without pinching nose completely to avoid pressure on eardrums. If tolerable, sniff in gently any residual solution remaining in the nasal passage once or twice, because this may clean out the posterior nasopharyngeal area, which is the area at the back of your nasal passage. At times, some solution will reach the back of your throat, so please spit it out. To help drain any residual solution, blow your nose gently while tilting your head forward and to the opposite side of the nasal passage you just rinsed.  Step 5  Now repeat steps 3 and 4 for your other nasal passage. Most users fi nd that rinsing twice a day is beneficial, similar to brushing your teeth. Many doctors recommend rinsing 3-4 times daily or for special circumstances, even rinsing up to 6 times a day is safe. Please follow your physician’s advice.        CONTACT INFORMATION:  The main office phone number is [phone]. For emergencies after hours and on weekends, this number will convert over to our answering service and the on call provider will answer. Please try to keep non emergent phone calls/ questions to office hours 9am-5pm Monday through Friday.     [employer]  As an alternative, you can sign up and use the Epic MyChart system for more direct and quicker access for non emergent questions/ problems.  [facility] [employer] allows you to send messages to your doctor, view your test results, renew your prescriptions, schedule appointments, and more. To sign up, go to [employer] and click on the Sign Up Now link in the New User? box. Enter your [employer] Activation Code exactly as it appears below along with the last four  digits of your Social Security Number and your Date of Birth () to complete the sign-up process. If you do not sign up before the expiration date, you must request a new code.    [employer] Activation Code: Activation code not generated  Current [employer] Status: Active    If you have questions, you can email [email or web address] or call [phone] to talk to our [employer] staff. Remember, [employer] is NOT to be used for urgent needs. For medical emergencies, dial 911.

## 2021-11-19 DIAGNOSIS — K21.00 GASTROESOPHAGEAL REFLUX DISEASE WITH ESOPHAGITIS WITHOUT HEMORRHAGE: ICD-10-CM

## 2021-11-19 RX ORDER — SUCRALFATE ORAL 1 G/10ML
1 SUSPENSION ORAL
Qty: 420 ML | Refills: 1 | Status: SHIPPED | OUTPATIENT
Start: 2021-11-19 | End: 2022-06-06

## 2021-11-19 NOTE — TELEPHONE ENCOUNTER
Will you please check with patient and see if she is still needing this? Looks like it was a pharmacy request.  If she is better, she may not need refill. Thank you!

## 2021-11-20 ENCOUNTER — LAB (OUTPATIENT)
Dept: LAB | Facility: HOSPITAL | Age: 66
End: 2021-11-20

## 2021-11-20 DIAGNOSIS — Z20.822 ENCOUNTER FOR PREOPERATIVE SCREENING LABORATORY TESTING FOR COVID-19 VIRUS: ICD-10-CM

## 2021-11-20 DIAGNOSIS — Z01.812 ENCOUNTER FOR PREOPERATIVE SCREENING LABORATORY TESTING FOR COVID-19 VIRUS: ICD-10-CM

## 2021-11-20 LAB — SARS-COV-2 ORF1AB RESP QL NAA+PROBE: NOT DETECTED

## 2021-11-20 PROCEDURE — U0004 COV-19 TEST NON-CDC HGH THRU: HCPCS

## 2021-11-20 PROCEDURE — C9803 HOPD COVID-19 SPEC COLLECT: HCPCS

## 2021-11-20 PROCEDURE — U0005 INFEC AGEN DETEC AMPLI PROBE: HCPCS

## 2021-11-22 NOTE — PROGRESS NOTES
" ERNESTO Stroud  [facility]   Respiratory Disease Clinic  [address] [place] [address]  Phone: [phone]  Fax: [phone]       Chief Complaint  Shortness of Breath    Subjective    History of Present Illness    Danisha Cannon presents to [facility] PULMONARY & CRITICAL CARE MEDICINE   History of Present Illness   Pt with known afib, morbid obesity/deconditioning, chronic bronchitis, TRISTON, and allergic rhinitis.  Labs reviewed as noted below.  Patient has a low IgE which can make her more susceptible to respiratory infections.  Other immunoglobulins were within normal limits.  Office note from Dr. Angel was reviewed.  The patient does not qualify for immunotherapy as she is currently on beta-blockers.  HRCT from November 16, 2021 was reviewed and shows a negative high-resolution CT evaluation for interstitial lung disease.  There is a tiny nodule within each lung measuring 2 mm in the left lower lobe and 3 mm in the medial basal segment of the right lower lobe, these have a benign appearance per radiologist.  The patient is using Flovent and albuterol HFA as needed.  She reports compliance with her noninvasive positive pressure ventilation device and nocturnal oxygen.  She states that she is feeling much better than her last office visit.  She denies fevers, chills, cough with purulent sputum.  No other aggravating or alleviating factors.     Objective   Vital Signs:   /84   Pulse 62   Ht 162.6 cm (64\")   Wt (!) 138 kg (304 lb)   SpO2 94% Comment: RA  BMI 52.18 kg/m²     Physical Exam  Vitals reviewed.   Constitutional:       General: She is not in acute distress.     Appearance: She is well-developed. She is morbidly obese.      Interventions: Face mask in place.   HENT:      Head: Normocephalic and atraumatic.   Eyes:      General: No scleral icterus.     Conjunctiva/sclera: Conjunctivae normal.      Pupils: Pupils are equal, round, and reactive to " light.   Cardiovascular:      Rate and Rhythm: Normal rate.   Pulmonary:      Effort: Pulmonary effort is normal. No respiratory distress.      Breath sounds: Normal breath sounds. No wheezing or rales.   Musculoskeletal:         General: Normal range of motion.      Cervical back: Normal range of motion and neck supple.      Comments: Ambulates with Rollator.  She has an orthopedic boot noted to the lower extremity   Skin:     General: Skin is warm and dry.   Neurological:      Mental Status: She is alert and oriented to person, place, and time.   Psychiatric:         Behavior: Behavior normal.         Thought Content: Thought content normal.         Judgment: Judgment normal.        Result Review :  The following data was reviewed by: ERNESTO Stroud on 11/23/2021:  IgG subclasses (1-4) (10/04/2021 14:07)  IgE + Allergens (22) (10/04/2021 14:07)  CBC & Differential (10/04/2021 14:07)  IgM (10/04/2021 14:07)  IgA (10/04/2021 14:07)  CT Chest Hi Resolution Diagnostic (11/16/2021 14:06)  Office Visit with Vlad Angel MD (11/18/2021)  PULMONARY RESULTS - SCAN - PULSE OXIMETRY, OVERNIGHT-VIRTUOX-10/07/21 (10/07/2021)  DURABLE MEDICAL EQUIPMENT - SCAN - PAP COMPLIANCE SUMMARY-09/05/21-10/04/21 (10/04/2021)    Rest/Exercise Pulse Ox Values        Some values may be hidden. Unless noted otherwise, only the newest values recorded on each date are displayed.         Rest/Exercise Pulse Ox Results 10/4/21   Rest room air SAT % 90   Exercise room air SAT % 85   Rest on O2 @ Liters 2   Rest on O2 SAT % 96   Exercise on O2 @ Liters 2   Exercise on O2 SAT % 92           PFT Values        Some values may be hidden. Unless noted otherwise, only the newest values recorded on each date are displayed.         Old Values PFT Results 11/23/21   No data to display.      Pre Drug PFT Results 11/23/21   FVC 73   FEV1 81   FEF 25-75% 139   FEV1/FVC 86.84      Post Drug PFT Results 11/23/21   No data to display.       Other Tests PFT Results 21   TLC 85   RV 77   DLCO 96   D/VAsb 118             Results for orders placed in visit on 21    Pulmonary Function Test    Narrative  Pulmonary Function Test  Performed by: Francisca Villagomez, RRT  Authorized by: Sophia Valero APRN    Pre Drug % Predicted  FVC: 73%  FEV1: 81%  FEF 25-75%: 139%  FEV1/FVC: 86.84%  T%  RV: 77%  DLCO: 96%  D/VAsb: 118%    Interpretation  Spirometry  Spirometry shows moderate restriction. midflow is normal.  Review of FVL curve  Patient's effort is normal.  Lung Volume Measurements  Measurements show reduced lung volumes consistent with restriction.  Diffusion Capacity  The patient's diffusion capacity is normal.  Diffusion capacity is normal when corrected for alveolar volume.      Results for orders placed during the hospital encounter of 19    Full Pulmonary Function Test With Bronchodilator & ABG    [facility] - Pulmonary Function Test    [address]  [place]  [place]  [address]  [phone]    Patient : Danisha Cannon  MRN : [id number]  CSN : [id number]  Pulmonologist : Ryan Keith MD  Date : 2019    ______________________________________________________________________    Interpretation :  1.  Spirometry reveals a borderline low forced vital capacity and otherwise are within normal limits.  2.  Lung volumes also reveal a borderline low vital capacity and a decrease in expiratory reserve volume but otherwise are within normal limits.  3.  Diffusion capacity is within normal limits and when corrected for alveolar volume is supranormal.        Ryan Keith MD      Results for orders placed during the hospital encounter of 19    Full Pulmonary Function Test With Bronchodilator & ABG    [facility] - Pulmonary Function Test    [address]  [place]  [place]  [address]  [phone]    Patient : Danisha Cannon  MRN : [id number]  CSN : [id number]  Pulmonologist :  Ryan Keith MD  Date : 3/13/2019    ______________________________________________________________________    Interpretation :  1.  Spirometry is within normal limits.  2.  Lung volumes are within normal limits.  3.  There is a moderate diffusion impairment which when corrected for alveolar volume is normalized.      Ryan Keith MD           Assessment and Plan  Diagnoses and all orders for this visit:    1. Chronic bronchitis, unspecified chronic bronchitis type (HCC) (Primary)  Comments:  Continue Flovent and albuterol HFA as needed  Orders:  -     albuterol sulfate  (90 Base) MCG/ACT inhaler; Inhale 2 puffs Every 4 (Four) Hours As Needed for Wheezing or Shortness of Air for up to 30 days.  Dispense: 18 g; Refill: 11    2. Encounter for preoperative screening laboratory testing for COVID-19 virus  Comments:  Completed for pulmonary function testing  Orders:  -     COVID PRE-OP / PRE-PROCEDURE SCREENING ORDER (NO ISOLATION) - Swab, Nasal Cavity; Future    3. Allergic rhinitis, unspecified seasonality, unspecified trigger  Comments:  Continue azelastine, Singulair, and Flonase    4. Obstructive sleep apnea  Comments:  Continue noninvasive positive pressure ventilation.  The patient is benefiting from it and wishes to continue it.    5. Morbid obesity with BMI of 50.0-59.9, adult ([facility])  Comments:  Recommend weight loss    6. Restrictive lung disease  Comments:  Likely secondary to morbid obesity and sleep apnea    7. Chronic respiratory failure with hypoxia ([facility])  Comments:  Patient has chronic oxygen therapy.  She does not have it on today in the office.    8. Lung nodules  Comments:  Per HRCT 11/16/21- 2 mm nodule in the LLL and 3 mm nodule in the medial basal segment of the RLL. Consider f/u CT in 1 yr.       Health maintenance:   Influenza vaccine: Yes  Pneumovax 23: Yes  Prevnar 13: No  Covid 19: Yes  Follow Up  Sophia Valero, ERNESTO  11/23/2021  17:36 CST  Return in about 6 months  (around 5/23/2022).  Patient was given instructions and counseling regarding her condition or for health maintenance advice. Please see specific information pulled into the AVS if appropriate.   Please note that portions of this note were completed with a voice recognition program.

## 2021-11-23 ENCOUNTER — OFFICE VISIT (OUTPATIENT)
Dept: PULMONOLOGY | Facility: CLINIC | Age: 66
End: 2021-11-23

## 2021-11-23 VITALS
BODY MASS INDEX: 50.02 KG/M2 | HEART RATE: 62 BPM | OXYGEN SATURATION: 94 % | SYSTOLIC BLOOD PRESSURE: 142 MMHG | DIASTOLIC BLOOD PRESSURE: 84 MMHG | HEIGHT: 64 IN | WEIGHT: 293 LBS

## 2021-11-23 DIAGNOSIS — G47.33 OBSTRUCTIVE SLEEP APNEA: ICD-10-CM

## 2021-11-23 DIAGNOSIS — J96.11 CHRONIC RESPIRATORY FAILURE WITH HYPOXIA (HCC): ICD-10-CM

## 2021-11-23 DIAGNOSIS — J42 CHRONIC BRONCHITIS, UNSPECIFIED CHRONIC BRONCHITIS TYPE (HCC): Primary | ICD-10-CM

## 2021-11-23 DIAGNOSIS — J98.4 RESTRICTIVE LUNG DISEASE: ICD-10-CM

## 2021-11-23 DIAGNOSIS — Z01.812 ENCOUNTER FOR PREOPERATIVE SCREENING LABORATORY TESTING FOR COVID-19 VIRUS: ICD-10-CM

## 2021-11-23 DIAGNOSIS — J96.11 CHRONIC RESPIRATORY FAILURE WITH HYPOXIA (HCC): Primary | ICD-10-CM

## 2021-11-23 DIAGNOSIS — R91.8 LUNG NODULES: ICD-10-CM

## 2021-11-23 DIAGNOSIS — J30.9 ALLERGIC RHINITIS, UNSPECIFIED SEASONALITY, UNSPECIFIED TRIGGER: Chronic | ICD-10-CM

## 2021-11-23 DIAGNOSIS — G47.33 OBSTRUCTIVE SLEEP APNEA: Chronic | ICD-10-CM

## 2021-11-23 DIAGNOSIS — Z20.822 ENCOUNTER FOR PREOPERATIVE SCREENING LABORATORY TESTING FOR COVID-19 VIRUS: ICD-10-CM

## 2021-11-23 DIAGNOSIS — E66.01 MORBID OBESITY WITH BMI OF 50.0-59.9, ADULT (HCC): Chronic | ICD-10-CM

## 2021-11-23 PROCEDURE — 94729 DIFFUSING CAPACITY: CPT | Performed by: NURSE PRACTITIONER

## 2021-11-23 PROCEDURE — 94727 GAS DIL/WSHOT DETER LNG VOL: CPT | Performed by: NURSE PRACTITIONER

## 2021-11-23 PROCEDURE — 94010 BREATHING CAPACITY TEST: CPT | Performed by: NURSE PRACTITIONER

## 2021-11-23 PROCEDURE — 99214 OFFICE O/P EST MOD 30 MIN: CPT | Performed by: NURSE PRACTITIONER

## 2021-11-23 RX ORDER — ALBUTEROL SULFATE 90 UG/1
2 AEROSOL, METERED RESPIRATORY (INHALATION) EVERY 4 HOURS PRN
Qty: 18 G | Refills: 11 | Status: SHIPPED | OUTPATIENT
Start: 2021-11-23 | End: 2021-12-23

## 2021-11-23 NOTE — PROCEDURES
Pulmonary Function Test  Performed by: Francisca Villagomez, RRT  Authorized by: Sophia Valero APRN      Pre Drug % Predicted    FVC: 73%   FEV1: 81%   FEF 25-75%: 139%   FEV1/FVC: 86.84%   T%   RV: 77%   DLCO: 96%   D/VAsb: 118%    Interpretation   Spirometry   Spirometry shows moderate restriction. midflow is normal.  Review of FVL curve   Patient's effort is normal.   Lung Volume Measurements  Measurements show reduced lung volumes consistent with restriction.   Diffusion Capacity  The patient's diffusion capacity is normal.  Diffusion capacity is normal when corrected for alveolar volume.

## 2021-11-23 NOTE — PROGRESS NOTES
Test results discussed with patient.  Patient voiced understanding.      Pt would like to try 3L with a repeat overnight.

## 2021-11-24 ENCOUNTER — TELEPHONE (OUTPATIENT)
Dept: PULMONOLOGY | Facility: CLINIC | Age: 66
End: 2021-11-24

## 2021-12-03 ENCOUNTER — HOSPITAL ENCOUNTER (OUTPATIENT)
Dept: ULTRASOUND IMAGING | Facility: HOSPITAL | Age: 66
Discharge: HOME | End: 2021-12-03
Admitting: SURGERY

## 2021-12-03 DIAGNOSIS — I65.23 BILATERAL CAROTID ARTERY STENOSIS: ICD-10-CM

## 2021-12-03 PROCEDURE — 93880 EXTRACRANIAL BILAT STUDY: CPT

## 2021-12-03 PROCEDURE — 93880 EXTRACRANIAL BILAT STUDY: CPT | Performed by: SURGERY

## 2021-12-07 ENCOUNTER — TELEPHONE (OUTPATIENT)
Dept: VASCULAR SURGERY | Facility: CLINIC | Age: 66
End: 2021-12-07

## 2021-12-08 ENCOUNTER — OFFICE VISIT (OUTPATIENT)
Dept: GASTROENTEROLOGY | Facility: CLINIC | Age: 66
End: 2021-12-08

## 2021-12-08 ENCOUNTER — TELEPHONE (OUTPATIENT)
Dept: INTERNAL MEDICINE | Facility: CLINIC | Age: 66
End: 2021-12-08

## 2021-12-08 ENCOUNTER — OFFICE VISIT (OUTPATIENT)
Dept: VASCULAR SURGERY | Facility: CLINIC | Age: 66
End: 2021-12-08

## 2021-12-08 VITALS
OXYGEN SATURATION: 88 % | DIASTOLIC BLOOD PRESSURE: 80 MMHG | TEMPERATURE: 97.2 F | BODY MASS INDEX: 50.02 KG/M2 | HEIGHT: 64 IN | WEIGHT: 293 LBS | HEART RATE: 93 BPM | SYSTOLIC BLOOD PRESSURE: 148 MMHG

## 2021-12-08 VITALS
RESPIRATION RATE: 18 BRPM | WEIGHT: 293 LBS | SYSTOLIC BLOOD PRESSURE: 120 MMHG | HEART RATE: 77 BPM | HEIGHT: 64 IN | DIASTOLIC BLOOD PRESSURE: 80 MMHG | OXYGEN SATURATION: 90 % | BODY MASS INDEX: 50.02 KG/M2

## 2021-12-08 DIAGNOSIS — E66.01 MORBID OBESITY WITH BMI OF 50.0-59.9, ADULT (HCC): Chronic | ICD-10-CM

## 2021-12-08 DIAGNOSIS — Z86.79 S/P ABLATION OF ATRIAL FIBRILLATION: ICD-10-CM

## 2021-12-08 DIAGNOSIS — Z78.9 NONSMOKER: ICD-10-CM

## 2021-12-08 DIAGNOSIS — I48.92 ATRIAL FLUTTER, PAROXYSMAL (HCC): ICD-10-CM

## 2021-12-08 DIAGNOSIS — E66.9 OBESITY, UNSPECIFIED OBESITY SEVERITY, UNSPECIFIED OBESITY TYPE: ICD-10-CM

## 2021-12-08 DIAGNOSIS — I65.23 CAROTID STENOSIS, ASYMPTOMATIC, BILATERAL: Primary | ICD-10-CM

## 2021-12-08 DIAGNOSIS — K21.9 GASTROESOPHAGEAL REFLUX DISEASE, UNSPECIFIED WHETHER ESOPHAGITIS PRESENT: ICD-10-CM

## 2021-12-08 DIAGNOSIS — Z12.11 ENCOUNTER FOR SCREENING FOR MALIGNANT NEOPLASM OF COLON: Primary | ICD-10-CM

## 2021-12-08 DIAGNOSIS — I10 ESSENTIAL HYPERTENSION: ICD-10-CM

## 2021-12-08 DIAGNOSIS — E78.2 MIXED HYPERLIPIDEMIA: ICD-10-CM

## 2021-12-08 DIAGNOSIS — Z98.890 S/P ABLATION OF ATRIAL FIBRILLATION: ICD-10-CM

## 2021-12-08 DIAGNOSIS — I10 HTN (HYPERTENSION), BENIGN: ICD-10-CM

## 2021-12-08 PROCEDURE — 99214 OFFICE O/P EST MOD 30 MIN: CPT | Performed by: SURGERY

## 2021-12-08 PROCEDURE — 99214 OFFICE O/P EST MOD 30 MIN: CPT | Performed by: CLINICAL NURSE SPECIALIST

## 2021-12-08 NOTE — PROGRESS NOTES
Danisha Cannon  1955 12/8/2021  Chief Complaint   Patient presents with   • GI Problem     Reflux     Subjective   HPI  Danisha Cannon is a 66 y.o. female who presents with a complaint of reflux ongoing persistent severe for her.   She is on Dexilant and Carafate. She says it is severe burning over the past several months. No nausea or vomiting. No dysphagia.   She takes Fosamax and has for years  She is due for a colonoscopy last colonoscopy was 10+ years.    Past Medical History:   Diagnosis Date   • Anemia    • Anxiety and depression    • Arthritis    • Atrial fibrillation (HCC)    • Chronic cough    • Disease of thyroid gland    • DVT (deep venous thrombosis) ([facility])    • GERD (gastroesophageal reflux disease)    • History of incision and drainage     Right knee   • History of staph infection     right knee, and upper right thigh   • Hyperlipidemia    • Infection      in right knee to bone, cleaned it out and put new hardware with antibiotic and pt developed hole in incision   • Neuropathy, peripheral    • Pneumonia     RECENT DX PER FAMILY DOCTOR   • PONV (postoperative nausea and vomiting)    • Restless leg syndrome    • Sleep apnea with use of continuous positive airway pressure (CPAP)     bipap   • SVT (supraventricular tachycardia) ([facility])      Past Surgical History:   Procedure Laterality Date   • BUNIONECTOMY Left     AND HAMMER TOE   • CARDIAC ABLATION      at [facility] 8/2019   • CARDIAC SURGERY      HEART CATH   • CATARACT EXTRACTION Right    • HERNIA REPAIR      UMBILICAL X3   • INCISION AND DRAINAGE LEG Right 1/28/2019    Procedure: INCISION AND DRAINAGE OF RIGHT THIGH HEMATOMA;  Surgeon: Nino Stern MD;  Location: [facility] HYBRID OR 12;  Service: Vascular   • JOINT REPLACEMENT      RIGHT KNEE   • KNEE POLY INSERT EXCHANGE Right 3/3/2018    Procedure: IRRIGATION AND DEBRIDEMENT TOTAL KNEE & POLY EXCHANGE - RIGHT;  Surgeon: Amilcar Capellan MD;  Location: [facility] OR;  Service:    •  LAPAROSCOPIC CHOLECYSTECTOMY     • LEG DEBRIDEMENT Right 3/23/2018    Procedure: DEBRIDEMENT AND CLOSURE KNEE - RIGHT;  Surgeon: Amilcar Capellan MD;  Location:  [facility] OR;  Service: Orthopedics   • PERIPHERALLY INSERTED CENTRAL CATHETER INSERTION     • PILONIDAL CYSTECTOMY N/A 2/16/2017    Procedure: PILONIDAL CYSTECTOMY, EXCISION SEBACEOUS CYST - BACK;  Surgeon: Macrina Capellan MD;  Location:  [facility] OR;  Service:    • TOTAL KNEE  PROSTHESIS REMOVAL W/ SPACER INSERTION Right 3/13/2018    Procedure: 1.  EXPLANT TOTAL KNEE 2.  ANTIBOTIC SPACER KNEE;  Surgeon: Amilcar Capellan MD;  Location:  [facility] OR;  Service: Orthopedics   • TOTAL KNEE  PROSTHESIS REMOVAL W/ SPACER INSERTION Right 6/19/2020    Procedure: REMOVAL OF ANTIBIOTIC SPACER;  Surgeon: Amilcar Capellan MD;  Location:  [facility] OR;  Service: Orthopedics;  Laterality: Right;   • TOTAL KNEE ARTHROPLASTY REVISION Right 6/19/2020    Procedure: REVISION TOTAL KNEE REPLACEMENT;  Surgeon: Amilcar Capellan MD;  Location:  [facility] OR;  Service: Orthopedics;  Laterality: Right;   • TRUNK LESION/CYST EXCISION N/A 2/16/2017    Procedure: EXCISION SEBACEOUS CYST - BACK;  Surgeon: Macrina Capellan MD;  Location:  [facility] OR;  Service:        Outpatient Medications Marked as Taking for the 12/8/21 encounter (Office Visit) with Jazzy Maguire APRN   Medication Sig Dispense Refill   • albuterol sulfate  (90 Base) MCG/ACT inhaler Inhale 2 puffs Every 4 (Four) Hours As Needed for Wheezing or Shortness of Air for up to 30 days. 18 g 11   • alendronate (FOSAMAX) 70 MG tablet Take 70 mg by mouth Every 7 (Seven) Days.     • amitriptyline (ELAVIL) 50 MG tablet Take 100 mg by mouth Every Night.     • ASPIRIN 81 PO Take 1 tablet by mouth Daily.     • azelastine (ASTELIN) 0.1 % nasal spray 2 sprays into the nostril(s) as directed by provider 2 (Two) Times a Day. Use in each nostril as directed 30 mL 11   • dexlansoprazole (Dexilant) 60 MG capsule Take 1 capsule by  mouth Daily. 30 capsule 11   • diclofenac (VOLTAREN) 75 MG EC tablet Take 75 mg by mouth Daily.     • DULoxetine (CYMBALTA) 60 MG capsule Take 1 capsule by mouth 2 (Two) Times a Day. 60 capsule 2   • famciclovir (FAMVIR) 500 MG tablet Take 1 tablet by mouth 2 (Two) Times a Day. 60 tablet 2   • flecainide (TAMBOCOR) 100 MG tablet Take 100 mg by mouth 2 (Two) Times a Day.     • fluticasone (FLONASE) 50 MCG/ACT nasal spray 2 sprays into the nostril(s) as directed by provider Daily As Needed for Rhinitis or Allergies. 16 g 5   • fluticasone (Flovent HFA) 110 MCG/ACT inhaler Inhale 1 puff 2 (Two) Times a Day.     • furosemide (Lasix) 40 MG tablet Take 1 tablet by mouth Daily As Needed (edema or soa). 90 tablet 3   • gabapentin (NEURONTIN) 800 MG tablet Take 800 mg by mouth 3 (Three) Times a Day.     • levothyroxine (SYNTHROID, LEVOTHROID) 137 MCG tablet Take 1 tablet by mouth Every Morning. 90 tablet 3   • metoprolol tartrate (LOPRESSOR) 25 MG tablet Take 12.5 mg by mouth Every 12 (Twelve) Hours.     • montelukast (SINGULAIR) 10 MG tablet Take  by mouth Daily.  5   • rOPINIRole (REQUIP) 3 MG tablet 2 tablets each morning and 3 tablets at bedtime. 450 tablet 3   • sucralfate (CARAFATE) 1 GM/10ML suspension Take 10 mL by mouth 4 (Four) Times a Day With Meals & at Bedtime. 420 mL 1   • triamcinolone (KENALOG) 0.1 % paste Apply  to teeth See Admin Instructions. 5 g 2     Allergies   Allergen Reactions   • Other Rash     Dial soap  Redness and swelling on skin and burning sensation   • Codeine Dizziness and Nausea Only     Rapid heart rate, dizziness  NAUSEA   • Mobic [Meloxicam] Rash   • Morphine And Related Itching   • Azithromycin Other (See Comments)     Social History     Socioeconomic History   • Marital status:    Tobacco Use   • Smoking status: Never Smoker   • Smokeless tobacco: Never Used   Vaping Use   • Vaping Use: Never used   Substance and Sexual Activity   • Alcohol use: No   • Drug use: No   • Sexual  activity: Defer     Family History   Problem Relation Age of Onset   • Diabetes Mother    • Heart disease Mother    • Alzheimer's disease Mother    • Hyperlipidemia Mother    • Hypertension Mother    • Cancer Father    • Hypertension Father    • Anxiety disorder Father    • Depression Father    • Leukemia Brother    • Heart attack Sister    • Clotting disorder Brother    • No Known Problems Brother    • Colon polyps Neg Hx    • Colon cancer Neg Hx      Health Maintenance   Topic Date Due   • URINE MICROALBUMIN  Never done   • COLORECTAL CANCER SCREENING  Never done   • HEPATITIS C SCREENING  Never done   • DIABETIC FOOT EXAM  Never done   • DIABETIC EYE EXAM  Never done   • ZOSTER VACCINE (2 of 2) 10/27/2018   • HEMOGLOBIN A1C  09/12/2021   • COVID-19 Vaccine (3 - Booster for Moderna series) 10/13/2021   • DXA SCAN  01/15/2022   • ANNUAL WELLNESS VISIT  02/22/2022   • LIPID PANEL  03/12/2022   • MAMMOGRAM  10/28/2022   • TDAP/TD VACCINES (2 - Td or Tdap) 04/27/2031   • INFLUENZA VACCINE  Completed   • Pneumococcal Vaccine 65+  Completed     Review of Systems   Constitutional: Negative for activity change, appetite change, chills, diaphoresis, fatigue, fever and unexpected weight change.   HENT: Negative for ear pain, hearing loss, mouth sores, sore throat, trouble swallowing and voice change.    Eyes: Negative.    Respiratory: Negative for cough, choking, shortness of breath and wheezing.    Cardiovascular: Negative for chest pain and palpitations.   Gastrointestinal: Negative for abdominal pain, blood in stool, constipation, diarrhea, nausea and vomiting.   Endocrine: Negative for cold intolerance and heat intolerance.   Genitourinary: Negative for decreased urine volume, dysuria, frequency, hematuria and urgency.   Musculoskeletal: Negative for back pain, gait problem and myalgias.   Skin: Negative for color change, pallor and rash.   Allergic/Immunologic: Negative for food allergies and immunocompromised state.  "  Neurological: Negative for dizziness, tremors, seizures, syncope, weakness, light-headedness, numbness and headaches.   Hematological: Negative for adenopathy. Does not bruise/bleed easily.   Psychiatric/Behavioral: Negative for agitation and confusion. The patient is not nervous/anxious.    All other systems reviewed and are negative.    Objective   Vitals:    12/08/21 1037   BP: 148/80   Pulse: 93   Temp: 97.2 °F (36.2 °C)   SpO2: (!) 88%   Weight: (!) 137 kg (302 lb)   Height: 162.6 cm (64\")     Body mass index is 51.84 kg/m².  Physical Exam  Constitutional:       Appearance: She is well-developed.   HENT:      Head: Normocephalic and atraumatic.   Eyes:      Pupils: Pupils are equal, round, and reactive to light.   Neck:      Trachea: No tracheal deviation.   Cardiovascular:      Rate and Rhythm: Normal rate and regular rhythm.      Heart sounds: Normal heart sounds. No murmur heard.  No friction rub. No gallop.    Pulmonary:      Effort: Pulmonary effort is normal. No respiratory distress.      Breath sounds: Normal breath sounds. No wheezing or rales.   Chest:      Chest wall: No tenderness.   Abdominal:      General: Bowel sounds are normal. There is no distension.      Palpations: Abdomen is soft. Abdomen is not rigid.      Tenderness: There is no abdominal tenderness. There is no guarding or rebound.   Musculoskeletal:         General: No tenderness or deformity. Normal range of motion.      Cervical back: Normal range of motion and neck supple.   Skin:     General: Skin is warm and dry.      Coloration: Skin is not pale.      Findings: No rash.   Neurological:      Mental Status: She is alert and oriented to person, place, and time.      Deep Tendon Reflexes: Reflexes are normal and symmetric.   Psychiatric:         Behavior: Behavior normal.         Thought Content: Thought content normal.         Judgment: Judgment normal.       Assessment/Plan   Diagnoses and all orders for this visit:    1. Encounter " for screening for malignant neoplasm of colon (Primary)  -     polyethylene glycol (GoLYTELY) 236 g solution; Take as directed by office instructions.  Dispense: 4000 mL; Refill: 0  -     Case Request; Standing  -     COVID PRE-OP / PRE-PROCEDURE SCREENING ORDER (NO ISOLATION) - Swab, Nasal Cavity; Future  -     Follow Anesthesia Guidelines / Standing Orders; Future  -     Obtain Informed Consent; Future  -     Case Request    2. Gastroesophageal reflux disease, unspecified whether esophagitis present    3. Obesity, unspecified obesity severity, unspecified obesity type    4. Nonsmoker    5. HTN (hypertension), benign  Comments:  cont BP medication the day of procedure    I discussed non pharmaceutical treatment of gerd.  This includes gradually losing weight to achieve ideal body wt., elevation of the head of bed by 4-6 inches, nothing to eat or drink 3 hours prior to lying down, avoiding tight clothing, stress reduction, tobacco cessation, reduction of alcohol intake, and dietary restrictions (avoiding caffeine, coffee, fatty foods, mints, chocolate, spicy foods and tomato based sauces as much as possible).    Continue Dexilant add Gaviscon as needed she agrees  We also discussed that Fosamax could cause her some issues as well.     ESOPHAGOGASTRODUODENOSCOPY WITH ANESTHESIA (N/A), COLONOSCOPY WITH ANESTHESIA (N/A)  Part of this note may be an electronic transcription/translation of spoken language to printed text using the Dragon Dictation System.  Body mass index is 51.84 kg/m².  No follow-ups on file.    Patient's Body mass index is 51.84 kg/m². indicating that she is morbidly obese (BMI > 40 or > 35 with obesity - related health condition). Obesity-related health conditions include the following: hypertension and GERD. Obesity is unchanged. BMI is is above average; BMI management plan is completed. We discussed portion control and increasing exercise..      All risks, benefits, alternatives, and indications of  colonoscopy and/or Endoscopy procedure have been discussed with the patient. Risks to include perforation of the colon requiring possible surgery or colostomy, risk of bleeding from biopsies or removal of colon tissue, possibility of missing a colon polyp or cancer, or adverse drug reaction.  Benefits to include the diagnosis and management of disease of the colon and rectum. Alternatives to include barium enema, radiographic evaluation, lab testing or no intervention. Pt verbalizes understanding and agrees.     Jazzy Lorena Maguire, APRN  12/8/2021  10:58 CST          If you smoke or use tobacco, 4 minutes reading provided  Steps to Quit Smoking  Smoking tobacco can be harmful to your health and can affect almost every organ in your body. Smoking puts you, and those around you, at risk for developing many serious chronic diseases. Quitting smoking is difficult, but it is one of the best things that you can do for your health. It is never too late to quit.  What are the benefits of quitting smoking?  When you quit smoking, you lower your risk of developing serious diseases and conditions, such as:  · Lung cancer or lung disease, such as COPD.  · Heart disease.  · Stroke.  · Heart attack.  · Infertility.  · Osteoporosis and bone fractures.  Additionally, symptoms such as coughing, wheezing, and shortness of breath may get better when you quit. You may also find that you get sick less often because your body is stronger at fighting off colds and infections. If you are pregnant, quitting smoking can help to reduce your chances of having a baby of low birth weight.  How do I get ready to quit?  When you decide to quit smoking, create a plan to make sure that you are successful. Before you quit:  · Pick a date to quit. Set a date within the next two weeks to give you time to prepare.  · Write down the reasons why you are quitting. Keep this list in places where you will see it often, such as on your bathroom mirror or in  your car or wallet.  · Identify the people, places, things, and activities that make you want to smoke (triggers) and avoid them. Make sure to take these actions:  ¨ Throw away all cigarettes at home, at work, and in your car.  ¨ Throw away smoking accessories, such as ashtrays and lighters.  ¨ Clean your car and make sure to empty the ashtray.  ¨ Clean your home, including curtains and carpets.  · Tell your family, friends, and coworkers that you are quitting. Support from your loved ones can make quitting easier.  · Talk with your health care provider about your options for quitting smoking.  · Find out what treatment options are covered by your health insurance.  What strategies can I use to quit smoking?  Talk with your healthcare provider about different strategies to quit smoking. Some strategies include:  · Quitting smoking altogether instead of gradually lessening how much you smoke over a period of time. Research shows that quitting “cold turkey” is more successful than gradually quitting.  · Attending in-person counseling to help you build problem-solving skills. You are more likely to have success in quitting if you attend several counseling sessions. Even short sessions of 10 minutes can be effective.  · Finding resources and support systems that can help you to quit smoking and remain smoke-free after you quit. These resources are most helpful when you use them often. They can include:  ¨ Online chats with a counselor.  ¨ Telephone quitlines.  ¨ Printed self-help materials.  ¨ Support groups or group counseling.  ¨ Text messaging programs.  ¨ Mobile phone applications.  · Taking medicines to help you quit smoking. (If you are pregnant or breastfeeding, talk with your health care provider first.) Some medicines contain nicotine and some do not. Both types of medicines help with cravings, but the medicines that include nicotine help to relieve withdrawal symptoms. Your health care provider may  recommend:  ¨ Nicotine patches, gum, or lozenges.  ¨ Nicotine inhalers or sprays.  ¨ Non-nicotine medicine that is taken by mouth.  Talk with your health care provider about combining strategies, such as taking medicines while you are also receiving in-person counseling. Using these two strategies together makes you more likely to succeed in quitting than if you used either strategy on its own.  If you are pregnant or breastfeeding, talk with your health care provider about finding counseling or other support strategies to quit smoking. Do not take medicine to help you quit smoking unless told to do so by your health care provider.  What things can I do to make it easier to quit?  Quitting smoking might feel overwhelming at first, but there is a lot that you can do to make it easier. Take these important actions:  · Reach out to your family and friends and ask that they support and encourage you during this time. Call telephone quitlines, reach out to support groups, or work with a counselor for support.  · Ask people who smoke to avoid smoking around you.  · Avoid places that trigger you to smoke, such as bars, parties, or smoke-break areas at work.  · Spend time around people who do not smoke.  · Lessen stress in your life, because stress can be a smoking trigger for some people. To lessen stress, try:  ¨ Exercising regularly.  ¨ Deep-breathing exercises.  ¨ Yoga.  ¨ Meditating.  ¨ Performing a body scan. This involves closing your eyes, scanning your body from head to toe, and noticing which parts of your body are particularly tense. Purposefully relax the muscles in those areas.  · Download or purchase mobile phone or tablet apps (applications) that can help you stick to your quit plan by providing reminders, tips, and encouragement. There are many free apps, such as QuitGuide from the CDC (Centers for Disease Control and Prevention). You can find other support for quitting smoking (smoking cessation) through  smokefree.gov and other websites.  How will I feel when I quit smoking?  Within the first 24 hours of quitting smoking, you may start to feel some withdrawal symptoms. These symptoms are usually most noticeable 2-3 days after quitting, but they usually do not last beyond 2-3 weeks. Changes or symptoms that you might experience include:  · Mood swings.  · Restlessness, anxiety, or irritation.  · Difficulty concentrating.  · Dizziness.  · Strong cravings for sugary foods in addition to nicotine.  · Mild weight gain.  · Constipation.  · Nausea.  · Coughing or a sore throat.  · Changes in how your medicines work in your body.  · A depressed mood.  · Difficulty sleeping (insomnia).  After the first 2-3 weeks of quitting, you may start to notice more positive results, such as:  · Improved sense of smell and taste.  · Decreased coughing and sore throat.  · Slower heart rate.  · Lower blood pressure.  · Clearer skin.  · The ability to breathe more easily.  · Fewer sick days.  Quitting smoking is very challenging for most people. Do not get discouraged if you are not successful the first time. Some people need to make many attempts to quit before they achieve long-term success. Do your best to stick to your quit plan, and talk with your health care provider if you have any questions or concerns.  This information is not intended to replace advice given to you by your health care provider. Make sure you discuss any questions you have with your health care provider.  Document Released: 12/12/2002 Document Revised: 08/15/2017 Document Reviewed: 05/03/2016  Compliance 11 Interactive Patient Education © 2017 [employer]

## 2021-12-08 NOTE — PATIENT INSTRUCTIONS
"BMI for Adults  What is BMI?  Body mass index (BMI) is a number that is calculated from a person's weight and height. BMI can help estimate how much of a person's weight is composed of fat. BMI does not measure body fat directly. Rather, it is an alternative to procedures that directly measure body fat, which can be difficult and expensive.  BMI can help identify people who may be at higher risk for certain medical problems.  What are BMI measurements used for?  BMI is used as a screening tool to identify possible weight problems. It helps determine whether a person is obese, overweight, a healthy weight, or underweight.  BMI is useful for:  · Identifying a weight problem that may be related to a medical condition or may increase the risk for medical problems.  · Promoting changes, such as changes in diet and exercise, to help reach a healthy weight. BMI screening can be repeated to see if these changes are working.  How is BMI calculated?  BMI involves measuring your weight in relation to your height. Both height and weight are measured, and the BMI is calculated from those numbers. This can be done either in English (U[place] or metric measurements. Note that charts and online BMI calculators are available to help you find your BMI quickly and easily without having to do these calculations yourself.  To calculate your BMI in English (U.S[place] measurements:    1. Measure your weight in pounds (lb).  2. Multiply the number of pounds by 703.  ? For example, for a person who weighs 180 lb, multiply that number by 703, which equals 126,540.  3. Measure your height in inches. Then multiply that number by itself to get a measurement called \"inches squared.\"  ? For example, for a person who is 70 inches tall, the \"inches squared\" measurement is 70 inches x 70 inches, which equals 4,900 inches squared.  4. Divide the total from step 2 (number of lb x 703) by the total from step 3 (inches squared): 126,540 ÷ 4,900 = 25.8. This is " "your BMI.    To calculate your BMI in metric measurements:  1. Measure your weight in kilograms (kg).  2. Measure your height in meters (m). Then multiply that number by itself to get a measurement called \"meters squared.\"  ? For example, for a person who is 1.75 m tall, the \"meters squared\" measurement is 1.75 m x 1.75 m, which is equal to 3.1 meters squared.  3. Divide the number of kilograms (your weight) by the meters squared number. In this example: 70 ÷ 3.1 = 22.6. This is your BMI.  What do the results mean?  BMI charts are used to identify whether you are underweight, normal weight, overweight, or obese. The following guidelines will be used:  · Underweight: BMI less than 18.5.  · Normal weight: BMI between 18.5 and 24.9.  · Overweight: BMI between 25 and 29.9.  · Obese: BMI of 30 or above.  Keep these notes in mind:  · Weight includes both fat and muscle, so someone with a muscular build, such as an athlete, may have a BMI that is higher than 24.9. In cases like these, BMI is not an accurate measure of body fat.  · To determine if excess body fat is the cause of a BMI of 25 or higher, further assessments may need to be done by a health care provider.  · BMI is usually interpreted in the same way for men and women.  Where to find more information  For more information about BMI, including tools to quickly calculate your BMI, go to these websites:  · Centers for Disease Control and Prevention: www.cdc.gov  · American Heart Association: www.heart.org  · National Heart, Lung, and Blood [place]: www.nhlbi.nih.gov  Summary  · Body mass index (BMI) is a number that is calculated from a person's weight and height.  · BMI may help estimate how much of a person's weight is composed of fat. BMI can help identify those who may be at higher risk for certain medical problems.  · BMI can be measured using English measurements or metric measurements.  · BMI charts are used to identify whether you are underweight, normal " weight, overweight, or obese.  This information is not intended to replace advice given to you by your health care provider. Make sure you discuss any questions you have with your health care provider.  Document Revised: 09/09/2020 Document Reviewed: 07/17/2020  Elsevier Patient Education © 2021 [employer]

## 2021-12-08 NOTE — PROGRESS NOTES
12/08/2021      MATTHIEU Arauz MD  [address] [facility] [address]  [facility] [address]        Danisha Cannon  1955    Chief Complaint   Patient presents with   • Follow-up     6 month f/u with carotid testing.  Patient denies any stroke symptoms according to the patient.   • Non-smoker     Pt verified non-smoker.       Dear MATTHIEU Arauz MD:    HPI     I had the pleasure of seeing your patient in the office today for follow up.  As you recall, the patient is a 66 y.o. female who we are currently following a previous admission due to a left thigh hematoma back in April of this year.  Patient has a history of chronic anticoagulation due to A. fib and was actually seen here at this office back in late 2018, and in early 2019 after developing a right thigh hematoma after local trauma.  Initially we had been trying to manage it conservatively however she developed signs of infection of that hematoma and required incision and drainage of that area in the operating room in January 2019 with placement of a wound VAC.  She had then subsequently followed here at our [facility] wound care center and had healing of that area with good result.  Following that she did not come for any further follow-up here in the vascular surgery office.  She did however return here to [facility] for admission after another fall at her home where she injured her left thigh and developed ecchymosis and swelling.  She was found to have a hematoma in both the medial and lateral thighs and was able to be managed conservatively.  It was noted on that admission that she had had placement of a watchman device in [place] due to her A. fib and so was actually off of anticoagulation and on aspirin/Plavix at that time.  However here when she was found to have the hematoma she was also found to have a left profunda femoris vein DVT and so decision was made by the admitting team to continue her aspirin and stop the Plavix and place her on therapeutic  anticoagulation ultimately with Eliquis which she was discharged on.  Her hemoglobin remained stable and there was no further signs of any bleeding and so she was discharged.  On follow-up here back in June repeat duplex had shown resolution of that profunda femoris DVT and so her Eliquis was stopped and she was placed back on Plavix.  She has subsequently stopped that Plavix after consultation with cardiology and remains on an 81 mg aspirin daily.  The area of hematoma to the left thigh is completely resolved.  She does have some mild residual edema to the bilateral lower extremities and this is managed very well with compression.  She also had a noted history of asymptomatic carotid stenosis and a previous carotid duplex had shown 50 to 69% stenosis of the right ICA.  As such repeat carotid duplex was done today which I did personally review.  It again shows stable 50 to 69% stenosis of the right ICA, and less than 50% stenosis on the left.  She remains asymptomatic.  She otherwise has no new acute complaints today and feels fairly well.    Review of Systems   Constitutional: Negative.  Negative for activity change, appetite change, chills, diaphoresis, fatigue and fever.   HENT: Negative.  Negative for congestion, sneezing, sore throat and trouble swallowing.    Eyes: Negative.  Negative for visual disturbance.   Respiratory: Negative.  Negative for chest tightness and shortness of breath.    Cardiovascular: Negative.  Negative for chest pain, palpitations and leg swelling.   Gastrointestinal: Negative.  Negative for abdominal distention, abdominal pain, nausea and vomiting.   Endocrine: Negative.    Genitourinary: Negative.    Musculoskeletal: Negative.    Skin: Negative.    Allergic/Immunologic: Negative.    Neurological: Negative.    Hematological: Negative.    Psychiatric/Behavioral: Negative.        /80 (BP Location: Left arm, Patient Position: Sitting, Cuff Size: Adult)   Pulse 77   Resp 18   Ht  "162.6 cm (64\")   Wt (!) 138 kg (305 lb)   LMP  (LMP Unknown)   SpO2 90%   BMI 52.35 kg/m²   Physical Exam  Vitals reviewed.   Constitutional:       Appearance: Normal appearance. She is obese.   HENT:      Head: Normocephalic and atraumatic.      Nose: Nose normal.      Mouth/Throat:      Mouth: Mucous membranes are moist.   Eyes:      Extraocular Movements: Extraocular movements intact.      Pupils: Pupils are equal, round, and reactive to light.   Cardiovascular:      Rate and Rhythm: Normal rate and regular rhythm.      Pulses: Normal pulses.           Carotid pulses are 2+ on the right side and 2+ on the left side.       Radial pulses are 2+ on the right side and 2+ on the left side.        Femoral pulses are 2+ on the right side and 2+ on the left side.       Popliteal pulses are 2+ on the right side and 2+ on the left side.        Dorsalis pedis pulses are 2+ on the right side and 2+ on the left side.        Posterior tibial pulses are 2+ on the right side and 2+ on the left side.      Comments: She has chronic, mild edema of the bilateral lower extremities from ankle to knee.  Previous induration to the left thigh at the site of trauma/hematoma has completely resolved.  She does have some small telangiectasias and varicosities throughout the bilateral lower legs.  Otherwise she has palpable pulses throughout the bilateral lower extremities and both feet are warm.  Pulmonary:      Effort: Pulmonary effort is normal. No respiratory distress.   Abdominal:      General: There is no distension.      Palpations: Abdomen is soft. There is no mass.      Tenderness: There is no abdominal tenderness.   Musculoskeletal:      Cervical back: Normal range of motion and neck supple.      Right lower leg: Edema present.      Left lower leg: Edema present.   Skin:     General: Skin is warm.      Capillary Refill: Capillary refill takes less than 2 seconds.   Neurological:      General: No focal deficit present.      " Mental Status: She is alert and oriented to person, place, and time.   Psychiatric:         Mood and Affect: Mood normal.         Behavior: Behavior normal.         Thought Content: Thought content normal.         Judgment: Judgment normal.         DIAGNOSTIC DATA:    Carotid duplex was done today which I did personally review.  It shows 50 to 69% stenosis of the right ICA which is stable, and less than 50% stenosis on the left.    Pulmonary Function Test    Result Date: 2021  Narrative: Sophia Valero APRN     2021  5:30 PM Pulmonary Function Test Performed by: Francisca Villagomez, RRT Authorized by: Sophia Valero APRN  Pre Drug % Predicted  FVC: 73%  FEV1: 81%  FEF 25-75%: 139%  FEV1/FVC: 86.84%  T%  RV: 77%  DLCO: 96%  D/VAsb: 118% Interpretation Spirometry Spirometry shows moderate restriction. midflow is normal. Review of FVL curve Patient's effort is normal. Lung Volume Measurements Measurements show reduced lung volumes consistent with restriction. Diffusion Capacity The patient's diffusion capacity is normal.  Diffusion capacity is normal when corrected for alveolar volume.     US Carotid Bilateral    Result Date: 12/3/2021  Narrative: History: Carotid occlusive disease      Impression: Impression: 1. There is 50-69% stenosis of the right internal carotid artery. 2. There is less than 50% stenosis of the left internal carotid artery. 3. Antegrade flow is demonstrated in bilateral vertebral arteries.  Comments: Bilateral carotid vertebral arterial duplex scan was performed.  Grayscale imaging shows intimal thickening and calcified elements at the carotid bifurcation. The right internal carotid artery peak systolic velocity is 155.9 cm/sec. The end-diastolic velocity is 37.9 cm/sec. The right ICA/CCA ratio is approximately 1.5 . These findings correlate with 50-69% stenosis of the right internal carotid artery.  Grayscale imaging shows intimal thickening and calcified  elements at the carotid bifurcation. The left internal carotid artery peak systolic velocity is 97.7 cm/sec. The end-diastolic velocity is 31.7 cm/sec. The left ICA/CCA ratio is approximately 0.6 . These findings correlate with less than 50% stenosis of the left internal carotid artery.  Antegrade flow is demonstrated in bilateral vertebral arteries. There is greater than 50% stenosis of the left common carotid artery and bilateral external carotid arteries. This report was finalized on 12/03/2021 16:51 by Dr. Marco Abrams MD.    CT Chest Hi Resolution Diagnostic    Result Date: 11/16/2021  Narrative: EXAMINATION: CT CHEST HI RESOLUTION DIAGNOSTIC- 11/16/2021 3:54 PM CST  HISTORY: Interstitial lung disease; R91.8-Other nonspecific abnormal finding of lung field  DOSE: 2213 mGycm (Automatic exposure control technique was implemented in an effort to keep the radiation dose as low as possible without compromising image quality)  REPORT: Spiral CT of the chest was performed without intravenous contrast from the thoracic inlet through the upper abdomen using high-resolution protocol, which includes thin section images of the lungs, with supine and prone imaging, inspiration and expiration. Reconstructed coronal and sagittal images are also reviewed.  Comparison: CTA chest 5/11/2021.  Review of lung windows demonstrates interval resolution of ground glass infiltrates in both lungs. There is no evidence of pneumonia. The pulmonary interstitium appears normal. The airways are patent. There is no bronchiectasis. No pleural effusion or pneumothorax is identified. There is a tiny nodule in the superior segment of the left lower lobe, axial image 47. This measures 2 mm and is most likely benign. This also tiny 3 mm noncalcified nodule in the medial basal segment of the right lower lobe axial image 53. This has smooth margins, this was obscured on the previous CT. Soft tissue windows show no gross evidence of intrathoracic  lymphadenopathy. There is artifact associated with a left atrial appendage occlusion device. Heart size is normal. In the upper abdomen, cholecystectomy clips are noted. There is diffuse fatty replacement of the pancreas. Review of bone windows shows healed rib fractures are posteriorly on the left, there is diffuse degenerative change throughout the spine, most severe at the C7-T1 level, with disc space collapse, endplate erosions and grade 1 spondylolisthesis.      Impression: Negative high-resolution CT evaluation for interstitial lung disease. There is a tiny nodule within each lung, measuring 2 mm in the left lower lobe and 3 mm in the medial basal segment of the right lower lobe, these have a benign appearance.     This report was finalized on 11/16/2021 16:02 by Dr. Philip Sanchez MD.      Patient Active Problem List   Diagnosis   • Abdominal adhesions   • Abnormal echocardiogram   • Abnormal Holter exam   • Atrial flutter, paroxysmal (HCC)   • Bilateral edema of lower extremity   • Chest pain   • Cholecystitis with cholelithiasis   • Chronic right-sided low back pain with sciatica   • Morbid obesity with BMI of 50.0-59.9, adult (HCC)   • Facet syndrome, lumbar   • Heart rate fast   • Idiopathic hypotension   • Idiopathic progressive neuropathy   • Insomnia   • Lumbar facet arthropathy   • Memory loss   • MRSA carrier   • Obstructive sleep apnea   • Pilonidal cyst   • Primary osteoarthritis of right knee   • Recurrent ventral incisional hernia   • Restless leg syndrome   • Sciatica of right side   • Shortness of breath   • Sleep apnea   • Sleep apnea, obstructive   • Snoring   • Somnolence, daytime   • Spondylosis of lumbar region without myelopathy or radiculopathy   • Tear of medial meniscus of right knee, current   • Total knee replacement status, right   • Tremor   • Umbilical hernia   • Obesity, unspecified obesity severity, unspecified obesity type   • Infection associated with internal knee prosthesis  ([facility])   • Infection of right knee ([facility])   • Hypothyroidism   • Fever   • Hypoxia   • Leukocytosis   • Abnormal chest x-ray   • Abscess of right thigh   • Dyspepsia   • Anticoagulated   • Nonsmoker   • NSAID long-term use   • Esophageal dysphagia   • S/P ablation of atrial fibrillation   • Mixed hyperlipidemia   • PAF (paroxysmal atrial fibrillation) ([facility])   • Bradycardia   • Prediabetes   • Anxiety and depression   • Hematoma of left lower extremity   • Fall   • Acute blood loss anemia due to hematoma from recent fall   • Acute kidney injury ([facility])   • Presence of Watchman left atrial appendage closure device   • Acute deep vein thrombosis (DVT) of left lower extremity ([facility])   • Allergic rhinitis   • Anemia   • Arteriosclerotic vascular disease   • Bruit of right carotid artery   • Cataract extraction status, right eye   • Closed fracture of fourth metatarsal bone   • CPAP (continuous positive airway pressure) dependence   • Erosive osteoarthritis of both hands   • Glossitis   • Heart murmur   • Hypothyroidism due to drugs   • Infection or inflammatory reaction due to other internal prosthetic device, implant, or graft   • Osteoarthritis of carpometacarpal (CMC) joint of thumb   • Osteopenia   • Palpitations   • Postoperative nausea and vomiting   • Pruritic rash   • Retention of urine   • Seasonal allergies   • Swelling   • Bunion of great toe of left foot   • Hammer toe of left foot   • Osteoarthritis of foot joint   • Tailor's bunion   • Tear of medial meniscus of knee   • Tinea cruris   • Undifferentiated somatoform disorder   • Supraventricular tachycardia by ECG ([facility])   • Osteoporosis   • Hypoglycemia   • Major depressive disorder   • Chronic respiratory failure with hypoxia ([facility])   • S/P AV juanita ablation   • Chronic bronchitis ([facility])         ICD-10-CM ICD-9-CM   1. Carotid stenosis, asymptomatic, bilateral  I65.23 433.10     433.30   2. Atrial flutter, paroxysmal ([facility])  I48.92 427.32   3. Mixed hyperlipidemia   E78.2 272.2   4. S/P ablation of atrial fibrillation  Z98.890 V45.89    Z86.79    5. Morbid obesity with BMI of 50.0-59.9, adult (HCC)  E66.01 278.01    Z68.43 V85.43   6. Essential hypertension  I10 401.9       Lab Frequency Next Occurrence   Follow Anesthesia Guidelines / Standing Orders Once 12/16/2020   Provide NPO Instructions to Patient Once 12/21/2020   Chlorhexidine Skin Prep Once 12/21/2020   CBC (No Diff) Once 12/21/2020   Comprehensive Metabolic Panel Once 12/21/2020   Protime-INR Once 12/21/2020   XR Chest 2 View Once 12/21/2020   Follow Anesthesia Guidelines / Standing Orders Once 04/26/2021   Obtain Informed Consent Once 05/01/2021   US Carotid Bilateral Once 11/29/2021       PLAN: After thoroughly evaluating Danisha Cannon, I believe the best course of action is to remain conservative from a vascular standpoint.  Her previous left thigh hematoma is clinically resolved and she has no further induration or swelling.  She continues to have mild lower extremity edema to the bilateral lower legs which is well managed with compression.  As such I recommended that she continue with lower extremity compression and leg elevation on a daily basis to help further reduce edema and manage the symptoms.  From a carotid standpoint she continues to do well.  Her repeat carotid duplex done recently shows stable 50 to 69% stenosis of the right ICA, and less than 50% stenosis on the left.  She is asymptomatic.  As such plan will be for repeat carotid duplex in 6 months for continued surveillance.  I will see her back here in the office in 6 months with that repeat carotid duplex for further surveillance.  The patient is to continue taking their medications as previously discussed.   I did discuss vascular risk factors as they pertain to the progression of vascular disease including controlling hypertension, and hyperlipidemia. These factors remain stable. Patient's Body mass index is 52.35 kg/m². indicating that she is  morbidly obese (BMI > 40 or > 35 with obesity - related health condition). Obesity-related health conditions include the following: hypertension, dyslipidemias and lower extremity venous stasis disease. Obesity is unchanged. BMI is is above average; BMI management plan is completed. We discussed portion control and increasing exercise. This was all discussed in full with complete understanding.  Thank you for allowing me to participate in the care of your patient.  Please do not hesitate to call with any questions or concerns.  We will keep you aware of any further encounters with Danisha Cannon.      Sincerely Yours,      Nino Stern MD

## 2021-12-08 NOTE — TELEPHONE ENCOUNTER
PATIENT HAS CALLED, SHE STATED THAT SHE HAD AN APPOINTMENT WITH DR SIFUENTES ON 12/08/2021 AND HE ADVISED THAT PATIENT WOULD BENEFIT FROM BEING PRESCRIBED LIPITOR FOR HIGH CHOLESTEROL.   OTHER WISE PATIENT IS DOING REALLY GOOD.

## 2021-12-10 RX ORDER — TRIAMCINOLONE ACETONIDE 0.1 %
PASTE (GRAM) DENTAL SEE ADMIN INSTRUCTIONS
Qty: 5 G | Refills: 2 | Status: SHIPPED | OUTPATIENT
Start: 2021-12-10 | End: 2022-05-25

## 2021-12-10 NOTE — TELEPHONE ENCOUNTER
Caller: Danisha Cannon    Relationship: Self    Best call back number: [phone]     Requested Prescriptions: NYSTATIN ORAL SUSPENSION  Requested Prescriptions     Pending Prescriptions Disp Refills   • triamcinolone (KENALOG) 0.1 % paste 5 g 2     Sig: Apply  to teeth See Admin Instructions.        Pharm[facility] where request should be sent[id number] [address] [place] [address] AT [address] [phone] [facility] [phone] FX     Additional details provided by patient: COMPLETELY OUT     Does the patient have less than a 3 day supply:  [x] Yes  [] No    Abdon Salgado Rep   12/10/21 14:53 CST

## 2021-12-13 ENCOUNTER — OFFICE VISIT (OUTPATIENT)
Dept: INTERNAL MEDICINE | Facility: CLINIC | Age: 66
End: 2021-12-13

## 2021-12-13 VITALS
RESPIRATION RATE: 15 BRPM | HEIGHT: 64 IN | DIASTOLIC BLOOD PRESSURE: 80 MMHG | HEART RATE: 87 BPM | SYSTOLIC BLOOD PRESSURE: 122 MMHG | OXYGEN SATURATION: 98 % | BODY MASS INDEX: 50.02 KG/M2 | TEMPERATURE: 98.4 F | WEIGHT: 293 LBS

## 2021-12-13 DIAGNOSIS — E78.2 MIXED HYPERLIPIDEMIA: Primary | ICD-10-CM

## 2021-12-13 DIAGNOSIS — R73.03 PREDIABETES: ICD-10-CM

## 2021-12-13 DIAGNOSIS — G60.3 IDIOPATHIC PROGRESSIVE NEUROPATHY: Chronic | ICD-10-CM

## 2021-12-13 DIAGNOSIS — K11.7 XEROSTOMIA: ICD-10-CM

## 2021-12-13 PROCEDURE — 99214 OFFICE O/P EST MOD 30 MIN: CPT | Performed by: INTERNAL MEDICINE

## 2021-12-13 RX ORDER — PILOCARPINE HYDROCHLORIDE 5 MG/1
5 TABLET, FILM COATED ORAL 3 TIMES DAILY
Qty: 90 TABLET | Refills: 2 | Status: SHIPPED | OUTPATIENT
Start: 2021-12-13 | End: 2022-03-29

## 2021-12-13 RX ORDER — PERMETHRIN 50 MG/G
CREAM TOPICAL
COMMUNITY
Start: 2021-12-08 | End: 2022-02-01 | Stop reason: SDUPTHER

## 2021-12-13 RX ORDER — METOPROLOL SUCCINATE 25 MG/1
1 TABLET, EXTENDED RELEASE ORAL
COMMUNITY
Start: 2021-12-10

## 2021-12-13 NOTE — PROGRESS NOTES
Chief Complaint   Patient presents with   • Follow-up   • Hyperlipidemia         History:  Danisha Cannon is a 66 y.o. female who presents today for evaluation of the above problems.      4 mo follow up.  She states that the gabapentin makes her mouth very dry.  She then develops sores in her mouth.  Nystatin has helped her in the past.  She already takes Biotene to help with saliva but it does not do much for her.  She does report not drinking enough water throughout the day.  She sips water but does not focus on drinking a set amount of water per day.    She continues with progressive idiopathic neuropathy for which she takes gabapentin.    She reports that she feels the healthiest she has felt since 2017      HPI      ROS:  Review of Systems   Constitutional: Negative for fatigue and fever.   HENT:        Dry mouth   Eyes:        Denies dry eyes except for in the morning   Respiratory: Negative for shortness of breath.    Cardiovascular: Negative for chest pain and palpitations.   Musculoskeletal: Positive for gait problem.   Neurological: Positive for numbness.        Peripheral neuropathy         Current Outpatient Medications:   •  albuterol sulfate  (90 Base) MCG/ACT inhaler, Inhale 2 puffs Every 4 (Four) Hours As Needed for Wheezing or Shortness of Air for up to 30 days., Disp: 18 g, Rfl: 11  •  alendronate (FOSAMAX) 70 MG tablet, Take 70 mg by mouth Every 7 (Seven) Days., Disp: , Rfl:   •  amitriptyline (ELAVIL) 50 MG tablet, Take 100 mg by mouth Every Night., Disp: , Rfl:   •  ASPIRIN 81 PO, Take 1 tablet by mouth Daily., Disp: , Rfl:   •  azelastine (ASTELIN) 0.1 % nasal spray, 2 sprays into the nostril(s) as directed by provider 2 (Two) Times a Day. Use in each nostril as directed, Disp: 30 mL, Rfl: 11  •  dexlansoprazole (Dexilant) 60 MG capsule, Take 1 capsule by mouth Daily., Disp: 30 capsule, Rfl: 11  •  diclofenac (VOLTAREN) 75 MG EC tablet, Take 75 mg by mouth Daily., Disp: , Rfl:   •   DULoxetine (CYMBALTA) 60 MG capsule, Take 1 capsule by mouth 2 (Two) Times a Day., Disp: 60 capsule, Rfl: 2  •  famciclovir (FAMVIR) 500 MG tablet, Take 1 tablet by mouth 2 (Two) Times a Day., Disp: 60 tablet, Rfl: 2  •  flecainide (TAMBOCOR) 100 MG tablet, Take 100 mg by mouth 2 (Two) Times a Day., Disp: , Rfl:   •  fluticasone (FLONASE) 50 MCG/ACT nasal spray, 2 sprays into the nostril(s) as directed by provider Daily As Needed for Rhinitis or Allergies., Disp: 16 g, Rfl: 5  •  fluticasone (Flovent HFA) 110 MCG/ACT inhaler, Inhale 1 puff 2 (Two) Times a Day., Disp: , Rfl:   •  furosemide (Lasix) 40 MG tablet, Take 1 tablet by mouth Daily As Needed (edema or soa)., Disp: 90 tablet, Rfl: 3  •  gabapentin (NEURONTIN) 800 MG tablet, Take 800 mg by mouth 3 (Three) Times a Day., Disp: , Rfl:   •  levothyroxine (SYNTHROID, LEVOTHROID) 137 MCG tablet, Take 1 tablet by mouth Every Morning., Disp: 90 tablet, Rfl: 3  •  metoprolol succinate XL (TOPROL-XL) 25 MG 24 hr tablet, Take 1 tablet by mouth every night at bedtime., Disp: , Rfl:   •  montelukast (SINGULAIR) 10 MG tablet, Take  by mouth Daily., Disp: , Rfl: 5  •  permethrin (ELIMITE) 5 % cream, , Disp: , Rfl:   •  polyethylene glycol (GoLYTELY) 236 g solution, Take as directed by office instructions., Disp: 4000 mL, Rfl: 0  •  rOPINIRole (REQUIP) 3 MG tablet, 2 tablets each morning and 3 tablets at bedtime., Disp: 450 tablet, Rfl: 3  •  sucralfate (CARAFATE) 1 GM/10ML suspension, Take 10 mL by mouth 4 (Four) Times a Day With Meals & at Bedtime., Disp: 420 mL, Rfl: 1  •  triamcinolone (KENALOG) 0.1 % paste, Apply  to teeth See Admin Instructions., Disp: 5 g, Rfl: 2  •  pilocarpine (SALAGEN) 5 MG tablet, Take 1 tablet by mouth 3 (Three) Times a Day., Disp: 90 tablet, Rfl: 2    Lab Results   Component Value Date    GLUCOSE 111 (H) 08/27/2021    BUN 21 08/27/2021    CREATININE 0.91 08/27/2021    EGFRIFNONA 61 08/27/2021    BCR 23.1 08/27/2021    K 4.0 08/27/2021    CO2 29.0  08/27/2021    CALCIUM 9.0 08/27/2021    ALBUMIN 3.70 08/27/2021    AST 23 08/27/2021    ALT 17 08/27/2021       WBC   Date Value Ref Range Status   10/04/2021 7.2 3.4 - 10.8 x10E3/uL Final   01/10/2018 4.7 (L) 4.8 - 10.8 K/uL Final     RBC   Date Value Ref Range Status   10/04/2021 4.64 3.77 - 5.28 x10E6/uL Final     Hemoglobin   Date Value Ref Range Status   10/04/2021 12.6 11.1 - 15.9 g/dL Final   08/27/2021 12.8 12.0 - 15.9 g/dL Final   02/19/2018 10.2 (L) 12.0 - 16.0 g/dL Final     Hematocrit   Date Value Ref Range Status   10/04/2021 40.1 34.0 - 46.6 % Final   08/27/2021 41.2 34.0 - 46.6 % Final   02/19/2018 32.8 (L) 37.0 - 47.0 % Final     MCV   Date Value Ref Range Status   10/04/2021 86 79 - 97 fL Final   08/27/2021 87.7 79.0 - 97.0 fL Final   01/10/2018 93.3 81.0 - 99.0 fL Final     MCH   Date Value Ref Range Status   10/04/2021 27.2 26.6 - 33.0 pg Final   08/27/2021 27.2 26.6 - 33.0 pg Final   01/10/2018 28.6 27.0 - 31.0 pg Final     MCHC   Date Value Ref Range Status   10/04/2021 31.4 (L) 31.5 - 35.7 g/dL Final   08/27/2021 31.1 (L) 31.5 - 35.7 g/dL Final   01/10/2018 30.7 (L) 33.0 - 37.0 g/dL Final     RDW   Date Value Ref Range Status   10/04/2021 16.3 (H) 11.7 - 15.4 % Final   08/27/2021 17.7 (H) 12.3 - 15.4 % Final   01/10/2018 15.9 (H) 11.5 - 14.5 % Final     RDW-SD   Date Value Ref Range Status   08/27/2021 56.7 (H) 37.0 - 54.0 fl Final     MPV   Date Value Ref Range Status   08/27/2021 11.0 6.0 - 12.0 fL Final   01/10/2018 11.6 9.4 - 12.3 fL Final     Platelets   Date Value Ref Range Status   10/04/2021 242 150 - 450 x10E3/uL Final   08/27/2021 250 140 - 450 10*3/mm3 Final   01/10/2018 213 130 - 400 K/uL Final     Neutrophil Rel %   Date Value Ref Range Status   10/04/2021 69 Not Estab. % Final   01/10/2018 76.6 (H) 50.0 - 65.0 % Final     Neutrophil %   Date Value Ref Range Status   08/27/2021 72.9 42.7 - 76.0 % Final     Lymphocyte Rel %   Date Value Ref Range Status   10/04/2021 20 Not Estab. %  Final   01/10/2018 10.4 (L) 20.0 - 40.0 % Final     Lymphocyte %   Date Value Ref Range Status   08/27/2021 15.3 (L) 19.6 - 45.3 % Final     Monocyte Rel %   Date Value Ref Range Status   10/04/2021 7 Not Estab. % Final   01/10/2018 10.0 0.0 - 10.0 % Final     Monocyte %   Date Value Ref Range Status   08/27/2021 7.8 5.0 - 12.0 % Final     Eosinophil Rel %   Date Value Ref Range Status   10/04/2021 4 Not Estab. % Final   01/10/2018 1.5 0.0 - 5.0 % Final     Eosinophil %   Date Value Ref Range Status   08/27/2021 2.9 0.3 - 6.2 % Final     Basophil Rel %   Date Value Ref Range Status   10/04/2021 0 Not Estab. % Final   01/10/2018 0.4 0.0 - 1.0 % Final     Basophil %   Date Value Ref Range Status   08/27/2021 0.5 0.0 - 1.5 % Final     Immature Grans %   Date Value Ref Range Status   08/27/2021 0.6 (H) 0.0 - 0.5 % Final     Neutrophils Absolute   Date Value Ref Range Status   10/04/2021 4.9 1.4 - 7.0 x10E3/uL Final   01/10/2018 3.6 1.5 - 7.5 K/uL Final     Neutrophils, Absolute   Date Value Ref Range Status   08/27/2021 5.87 1.70 - 7.00 10*3/mm3 Final     Lymphocytes Absolute   Date Value Ref Range Status   10/04/2021 1.4 0.7 - 3.1 x10E3/uL Final   01/10/2018 0.5 (L) 1.1 - 4.5 K/uL Final     Lymphocytes, Absolute   Date Value Ref Range Status   08/27/2021 1.23 0.70 - 3.10 10*3/mm3 Final     Monocytes Absolute   Date Value Ref Range Status   10/04/2021 0.5 0.1 - 0.9 x10E3/uL Final   01/10/2018 0.50 0.00 - 0.90 K/uL Final     Monocytes, Absolute   Date Value Ref Range Status   08/27/2021 0.63 0.10 - 0.90 10*3/mm3 Final     Eosinophils Absolute   Date Value Ref Range Status   10/04/2021 0.3 0.0 - 0.4 x10E3/uL Final   01/10/2018 0.10 0.00 - 0.60 K/uL Final     Eosinophils, Absolute   Date Value Ref Range Status   08/27/2021 0.23 0.00 - 0.40 10*3/mm3 Final     Basophils Absolute   Date Value Ref Range Status   10/04/2021 0.0 0.0 - 0.2 x10E3/uL Final   01/10/2018 0.00 0.00 - 0.20 K/uL Final     Basophils, Absolute   Date  "Value Ref Range Status   08/27/2021 0.04 0.00 - 0.20 10*3/mm3 Final     Immature Grans, Absolute   Date Value Ref Range Status   08/27/2021 0.05 0.00 - 0.05 10*3/mm3 Final     nRBC   Date Value Ref Range Status   08/27/2021 0.0 0.0 - 0.2 /100 WBC Final         OBJECTIVE:  Visit Vitals  /80 (BP Location: Left arm, Patient Position: Sitting, Cuff Size: Adult)   Pulse 87   Temp 98.4 °F (36.9 °C) (Oral)   Resp 15   Ht 162.6 cm (64.02\")   Wt 134 kg (296 lb)   LMP  (LMP Unknown)   SpO2 98%   BMI 50.78 kg/m²      Physical Exam  Constitutional:       General: She is not in acute distress.     Appearance: She is well-developed. She is not diaphoretic.   HENT:      Head: Normocephalic and atraumatic.      Mouth/Throat:      Mouth: Mucous membranes are dry.      Comments: Her mouth and oropharynx is very dry.  There is a small aphthous type Resser along the lateral aspect of the right side of her tongue from a recent bite  Eyes:      Pupils: Pupils are equal, round, and reactive to light.   Neck:      Thyroid: No thyromegaly.      Trachea: Phonation normal.   Pulmonary:      Effort: No respiratory distress.   Skin:     Coloration: Skin is not pale.      Findings: No erythema.   Neurological:      Mental Status: She is alert.   Psychiatric:         Behavior: Behavior normal.         Thought Content: Thought content normal.         Judgment: Judgment normal.         Assessment/Plan    Diagnoses and all orders for this visit:    1. Mixed hyperlipidemia (Primary)    2. Xerostomia  -     pilocarpine (SALAGEN) 5 MG tablet; Take 1 tablet by mouth 3 (Three) Times a Day.  Dispense: 90 tablet; Refill: 2    3. Idiopathic progressive neuropathy    4. Prediabetes        Overall, she is doing very well.  She is not needing blood work, but I would get labs at the next visit.      Her mouth is very dry.  Would recommend she increase her water intake to at least 64 fluid ounces of water per day.  Continue Biotene.  She is interested in " prescription medication to help with the dry mouth as it has been very bothersome to her.  We will send a trial of pilocarpine to her pharmacy.    Follow-up in 4 months for annual Medicare wellness visit.  Follow-up sooner if needed.    Return in about 4 months (around 4/13/2022) for Medicare Wellness.      MELLISA Arazu MD  16:24 CST  12/13/2021

## 2021-12-13 NOTE — PATIENT INSTRUCTIONS
Pilocarpine tablets  What is this medicine?  PILOCARPINE (RADHACHARLEE DELONG penader) can increase saliva in the mouth. This medicine is used to treat dry mouth from radiation treatment or from Sjogren's syndrome.  This medicine may be used for other purposes; ask your health care provider or pharmacist if you have questions.  COMMON BRAND NAME(S): Mónica  What should I tell my health care provider before I take this medicine?  They need to know if you have any of these conditions:  · eye infection or other eye problems  · glaucoma  · heart disease  · liver disease  · lung or breathing disease, like asthma  · an unusual or allergic reaction to pilocarpine, other medicines, foods, dyes, or preservatives  · pregnant or trying to get pregnant  · breast-feeding  How should I use this medicine?  Take this medicine by mouth with a glass of water. Follow the directions on the prescription label. Take your doses at regular intervals. Do not take your medicine more often than directed.  Talk to your pediatrician regarding the use of this medicine in children. Special care may be needed.  Overdosage: If you think you have taken too much of this medicine contact a poison control center or emergency room at once.  NOTE: This medicine is only for you. Do not share this medicine with others.  What if I miss a dose?  If you miss a dose, take it as soon as you can. If it is almost time for your next dose, take only that dose. Do not take double or extra doses.  What may interact with this medicine?  · antihistamines for allergy, cough and cold  · atropine  · certain medicines for Alzheimer's disease like donepezil, galantamine, rivastigmine  · certain medicines for bladder problems like bethanecol, oxybutynin, tolterodine  · certain medicines for Parkinson's disease like benztropine, trihexyphenidyl  · certain medicines for quitting smoking like nicotine  · certain medicines for stomach problems like dicyclomine, hyoscyamine  · certain  medicines for travel sickness like scopolamine  · ipratropium  · medicines for blood pressure or heart problems like metoprolol  This list may not describe all possible interactions. Give your health care provider a list of all the medicines, herbs, non-prescription drugs, or dietary supplements you use. Also tell them if you smoke, drink alcohol, or use illegal drugs. Some items may interact with your medicine.  What should I watch for while using this medicine?  Visit your doctor for regular check ups. Tell your doctor if your symptoms do not get better or if they get worse.  You may get blurry vision or have trouble telling how far something is from you. This may be a problem at night or when the lights are low. Do not drive, use machinery, or do anything that needs clear vision until you know how this medicine affects you.  If you sweat a lot, drink enough to replace fluids. Do not get dehydrated.  What side effects may I notice from receiving this medicine?  Side effects that you should report to your doctor or health care professional as soon as possible:  · allergic reactions like skin rash, itching or hives  · breathing problems  · confusion  · irregular heartbeat  · stomach pains  · tremor  · unusual blood pressure  · unusually weak or tired  · vomiting  Side effects that usually do not require medical attention (report to your doctor or health care professional if they continue or are bothersome):  · changes in vision  · chills, flushing  · diarrhea  · headache  · increased sweating  · nausea  · runny eyes, nose  · urgent need to pass urine  This list may not describe all possible side effects. Call your doctor for medical advice about side effects. You may report side effects to FDA at 5-012-FDA-9952.  Where should I keep my medicine?  Keep out of the reach of children.  Store at room temperature between 15 and 30 degrees C (59 and 86 degrees F). Throw away any unused medicine after the expiration  date.  NOTE: This sheet is a summary. It may not cover all possible information. If you have questions about this medicine, talk to your doctor, pharmacist, or health care provider.  © 2021 [employer] (2009-07-16 15:03:54)

## 2021-12-14 ENCOUNTER — TELEPHONE (OUTPATIENT)
Dept: NEUROLOGY | Age: 66
End: 2021-12-14

## 2021-12-14 ENCOUNTER — OFFICE VISIT (OUTPATIENT)
Dept: NEUROLOGY | Age: 66
End: 2021-12-14
Payer: MEDICARE

## 2021-12-14 VITALS
WEIGHT: 293 LBS | OXYGEN SATURATION: 98 % | SYSTOLIC BLOOD PRESSURE: 98 MMHG | HEART RATE: 55 BPM | BODY MASS INDEX: 50.02 KG/M2 | DIASTOLIC BLOOD PRESSURE: 66 MMHG | HEIGHT: 64 IN

## 2021-12-14 DIAGNOSIS — G60.3 IDIOPATHIC PROGRESSIVE NEUROPATHY: ICD-10-CM

## 2021-12-14 DIAGNOSIS — G25.0 ESSENTIAL TREMOR: ICD-10-CM

## 2021-12-14 DIAGNOSIS — Z99.89 BIPAP (BIPHASIC POSITIVE AIRWAY PRESSURE) DEPENDENCE: ICD-10-CM

## 2021-12-14 DIAGNOSIS — G47.33 OBSTRUCTIVE SLEEP APNEA: ICD-10-CM

## 2021-12-14 DIAGNOSIS — R29.898 WEAKNESS OF LEFT LOWER EXTREMITY: ICD-10-CM

## 2021-12-14 DIAGNOSIS — G25.3 MYOCLONUS: Primary | ICD-10-CM

## 2021-12-14 PROCEDURE — G8427 DOCREV CUR MEDS BY ELIG CLIN: HCPCS | Performed by: PHYSICIAN ASSISTANT

## 2021-12-14 PROCEDURE — G8484 FLU IMMUNIZE NO ADMIN: HCPCS | Performed by: PHYSICIAN ASSISTANT

## 2021-12-14 PROCEDURE — G8399 PT W/DXA RESULTS DOCUMENT: HCPCS | Performed by: PHYSICIAN ASSISTANT

## 2021-12-14 PROCEDURE — 99214 OFFICE O/P EST MOD 30 MIN: CPT | Performed by: PHYSICIAN ASSISTANT

## 2021-12-14 PROCEDURE — G8417 CALC BMI ABV UP PARAM F/U: HCPCS | Performed by: PHYSICIAN ASSISTANT

## 2021-12-14 PROCEDURE — 1090F PRES/ABSN URINE INCON ASSESS: CPT | Performed by: PHYSICIAN ASSISTANT

## 2021-12-14 PROCEDURE — 4040F PNEUMOC VAC/ADMIN/RCVD: CPT | Performed by: PHYSICIAN ASSISTANT

## 2021-12-14 PROCEDURE — 3017F COLORECTAL CA SCREEN DOC REV: CPT | Performed by: PHYSICIAN ASSISTANT

## 2021-12-14 PROCEDURE — 1123F ACP DISCUSS/DSCN MKR DOCD: CPT | Performed by: PHYSICIAN ASSISTANT

## 2021-12-14 PROCEDURE — 1036F TOBACCO NON-USER: CPT | Performed by: PHYSICIAN ASSISTANT

## 2021-12-14 NOTE — PROGRESS NOTES
Patient stated Dr Nalini Vyas has none a nerve test on her several years ago.     Patient says her arm jerks when she is putting on makeup

## 2021-12-14 NOTE — PROGRESS NOTES
REVIEW OF SYSTEMS    Constitutional: []Fever []Sweats []Chills [] Recent Injury   [x] Denies all unless marked  HENT:[]Headache  [] Head Injury  [] Sore Throat  [] Ear Pain  [] Dizziness [] Hearing Loss   [x] Denies all unless marked  Musculoskeletal: [] Arthralgia  [] Myalgias [] Muscle cramps  [x] Muscle twitches   [] Denies all unless marked   Spine:  [] Neck pain  [] Back pain  [] Sciaticia  [x] Denies all unless marked  Neurological:[] Visual Disturbance [] Double Vision [] Slurred Speech [] Trouble swallowing  [] Vertigo [] Tingling [] Numbness [] Weakness [] Loss of Balance   [] Loss of Consciousness [] Memory Loss [] Seizures  [x] Denies all unless marked  Psychiatric/Behavioral:[] Depression [] Anxiety  [x] Denies all unless marked  Sleep: []  Insomnia [] Sleep Disturbance [] Snoring [] Restless Legs [] Daytime Sleepiness [] Sleep Apnea  [x] Denies all unless marked

## 2021-12-14 NOTE — TELEPHONE ENCOUNTER
Called and spoke with Tristian Everett, she is aware of MRI, NCV, and EEG appointment time, date, and locations.  She is ware of the follow up appointment with Dr. Shilpa Mckenzie and that we had to cancel the appt on 2/22/22 due to Dr. Shilpa Mckenzie out of the office

## 2021-12-14 NOTE — PATIENT INSTRUCTIONS
Patient Education        Electromyogram (EMG) and Nerve Conduction Studies: About These Tests  What are they? An electromyogram (EMG) measures the electrical activity of your muscles when you are not using them (at rest) and when you tighten them (muscle contraction). Nerve conduction studies (NCS) measure how well and how fast the nerves can send electrical signals. EMG and nerve conduction studies are often done together. If they are done together, the nerve conduction studies are done before the EMG. Why are they done? You may need an EMG to find diseases that damage your muscles or nerves or to find why you can't move your muscles (paralysis), why they feel weak, or why they twitch. These problems may include a herniated disc, amyotrophic lateral sclerosis (ALS), or myasthenia gravis (MG). You may need nerve conduction studies to find damage to the nerves that lead from the brain and spinal cord to the rest of the body. (This is called the peripheral nervous system.) These studies are often used to help find nerve disorders, such as carpal tunnel syndrome. How do you prepare for these tests?    · Wear loose-fitting clothing. You may be given a hospital gown to wear.     · The electrodes for the test are attached to your skin. Your skin needs to be clean and free of sprays, oils, creams, and lotions.     · You may be asked to sign a consent form that says you understand the risks of the test and agree to have it done. · Tell your doctor ALL the medicines, vitamins, supplements, and herbal remedies you take. Some may increase the risk of problems during your test. Your doctor will tell you if you should stop taking any of them before the test and how soon to do it.     · If you take aspirin or some other blood thinner, ask your doctor if you should stop taking it before your test. Make sure that you understand exactly what your doctor wants you to do. These medicines increase the risk of bleeding.    How able to go home right away. It depends on the reason for the test.  · You can go back to your usual activities right away. When should you call for help? Watch closely for changes in your health, and be sure to contact your doctor if:  · Muscle pain from an EMG test gets worse or you have swelling, tenderness, or pus at any of the needle sites. · You have any problems that you think may be from the test.  · You have any questions about the test or have not received your results. Follow-up care is a key part of your treatment and safety. Be sure to make and go to all appointments, and call your doctor if you are having problems. It's also a good idea to keep a list of the medicines you take. Ask your doctor when you can expect to have your test results. Where can you learn more? Go to [email or web address] and sign in to your [employer] account. Enter M776 in the [employer] box to learn more about \"Electromyogram (EMG) and Nerve Conduction Studies: About These Tests. \"     If you do not have an account, please click on the \"Sign Up Now\" link. Current as of: April 8, 2021               Content Version: 13.0  © 2006-2021 [employer]. Care instructions adapted under license by [facility]. If you have questions about a medical condition or this instruction, always ask your healthcare professional. [address] any warranty or liability for your use of this information.

## 2021-12-14 NOTE — PROGRESS NOTES
[address], [address]  [address] [facility], [facility] [place]  Phone [phone]  Fax [phone]         Information:   Patient Name: Yobany Lindquist  :   1955  Age:   74 y.o. MRN:   [id number]  Account #:  [de-identified]  Date:              21    Provider:  Mindy Carroll PA-C    Chief Complaint   Patient presents with    Tremors     NEW PROBLEM       Subjective: Yobany Lindquist is a 74 y.o. female  with a history of DEVEN/BiPAP, RLS, peripheral neuropathy, right carotid stenosis, and memory loss who comes in for a sooner follow up. She c/o tremor and jerking like movements. She reports that she has had a history of muscle jerks and tremor for awhile but it has worsened over the last 5-6 years. She thinks it may related to gabapentin but gabapentin is effective and she does not want to discontinue it. The muscle jerks do seem different. She gives examples of when putting on mascara her left arm jerks. She has dumped a whole bowl of cereal in her lap. When she holds a heavy object, even her purse, the muscle jerks in her left arm. Sometimes she can support the arm and it alleviates it. She reports that she wax exposed to methylbromide 30 years ago. She lived [place] house [place] 19 years and methylbromide is said to have been present in the soil. There is no history of seizures. In regards to the PN. She continues to have numbness and tingling in the feet. Her fingers burn and are hot to touch. She has weakness in the LLE that has been chronic but seems to be worsening. She said it gave out on her and she fell 2 weeks ago. She has a chronic postural tremor in the left arm that is worsening over 2 years. It does not occur at rest.     She obtained new BiPAP. She reports that she had overnight pulse ox with BiPAP and she reportedly had O2 desaturations as low as 64%. She is prescribed nocturnal supplemental O2 per pulmonology. She is followed by Pulmonology.  She is on 3L nocturnal supplemental O2.       Objective:     Past Medical History:   Diagnosis Date    Abdominal adhesions 01/19/2016    Anxiety     Arthritis     Atrial fibrillation (HCC)     Atrial fibrillation and flutter (HCC) 09/2015    BiPAP (biphasic positive airway pressure) dependence     Chronic back pain     Chronic cough     Depression     Edema     Fibrocystic breast     GERD (gastroesophageal reflux disease)     Glossitis     Headache(784.0)     Heart burn     Heart disease     Hypertension     Hypothyroidism     Mouth sore     MRSA (methicillin resistant staph aureus) culture positive 08/04/2014    nasal    Numbness     toes    Obstructive sleep apnea     BIPAP    DEVEN (obstructive sleep apnea)     Peripheral neuropathy     PONV (postoperative nausea and vomiting)     Prolonged emergence from general anesthesia     Recurrent ventral incisional hernia 01/18/2016    Sleep apnea     Stroke (HCC)     Stroke-like symptom     SVT (supraventricular tachycardia) (HCC)     SVT (supraventricular tachycardia) (HCC)     Swelling of extremity     Unspecified sleep apnea     clinicallybi-pap    Ventricular tachyarrhythmia ([facility] [place] [address] 09/2015    svt       Past Surgical History:   Procedure Laterality Date    CARDIAC CATHETERIZATION  8/18/15  JDT    EF 50%    CARDIAC SURGERY      CATARACT REMOVAL      CHOLECYSTECTOMY  8/4/14    BY Dr. Javier Chino  2008    CYST INCISION AND DRAINAGE  03/2017    FINGER TRIGGER RELEASE      FOOT SURGERY Left     removal of two screws    HAMMER TOE SURGERY      HARDWARE REMOVAL FOOT / ANKLE Left 8/16/2016    FOOT HARDWARE REMOVAL - DEEP / 2nd METATARSAL HEAD RESECTION performed by Cecilio Trent DPM at [address] [place] [address]  1/12/2015    x3    KNEE SURGERY      Took prostetic joint out because of infection and put a spacer in    OTHER SURGICAL HISTORY  02/2017    pilonidal cyst-Dr Bina Kim    MI KNEE [facility] Right 7/3/2017 KNEE ARTHROSCOPY PARTIAL MEDIAL MENISECTOMY performed by Cesario Howell MD at [facility] Right 2/16/2018    KNEE TOTAL ARTHROPLASTY performed by Elisa Cartwright MD at [facility] 41 N/A 1/18/2016    HERNIA VENTRAL REPAIR LAPAROSCOPIC WITH MESH  performed by Matheus Sheets MD at [facility]  ·     Significant Injuries  ·     Family History   Problem Relation Age of Onset    Heart Disease Mother     Diabetes Mother     High Blood Pressure Father     Cancer Father 79        lung CA    Cancer Brother         leukemia    Alzheimer's Disease Brother        Social History     Socioeconomic History    Marital status:      Spouse name: Clint Holloway Number of children: 2    Years of education: Not on file    Highest education level: Not on file   Occupational History    Not on file   Tobacco Use    Smoking status: Never Smoker    Smokeless tobacco: Never Used    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   Vaping Use    Vaping Use: Never used   Substance and Sexual Activity    Alcohol use: No    Drug use: No    Sexual activity: Never   Other Topics Concern    Not on file   Social History Narrative    Not on file     Social Determinants of Health     Financial Resource Strain:     Difficulty of Paying Living Expenses: Not on file   Food Insecurity:     Worried About [address] in the Last Year: Not on file    [address] in the Last Year: Not on file   Transportation Needs:     Lack of Transportation (Medical): Not on file    Lack of Transportation (Non-Medical):  Not on file   Physical Activity:     Days of Exercise per Week: Not on file    Minutes of Exercise per Session: Not on file   Stress:     Feeling of Stress : Not on file   Social Connections:     Frequency of Communication with Friends and Family: Not on file    Frequency of Social Gatherings with Friends and Family: Not on file   Hiren Yanez Attends Religion Services: Not on file    Active Member of Clubs or Organizations: Not on file    Attends Club or Organization Meetings: Not on file    Marital Status: Not on file   Intimate Partner Violence:     Fear of Current or Ex-Partner: Not on file    Emotionally Abused: Not on file    Physically Abused: Not on file    Sexually Abused: Not on file   Housing Stability:     Unable to Pay for Housing in the Last Year: Not on file    Number of [place] in the Last Year: Not on file    Unstable Housing in the Last Year: Not on file       Medications:  Current Outpatient Medications   Medication Sig Dispense Refill    gabapentin (NEURONTIN) 800 MG tablet TAKE 1 TABLET BY MOUTH THREE TIMES DAILY 90 tablet 5    clopidogrel (PLAVIX) 75 MG tablet Take 75 mg by mouth daily      sertraline (ZOLOFT) 50 MG tablet 50 mg      DULoxetine (CYMBALTA) 60 MG extended release capsule TAKE 1 CAPSULE BY MOUTH TWICE DAILY 180 capsule 3    amitriptyline (ELAVIL) 25 MG tablet TAKE 2 TABLETS BY MOUTH EVERY NIGHT 180 tablet 3    nystatin-triamcinolone (MYCOLOG II) 958427-3.1 UNIT/GM-% cream Apply topically 2 times daily. 1 Tube 1    triamcinolone acetonide (KENALOG) 0.1 % paste APPLY TWICE DAILY AS NEEDED TO TEETH 5 g 0    levothyroxine (SYNTHROID) 137 MCG tablet Take 137 mcg by mouth Daily      DEXILANT 60 MG CPDR delayed release capsule Take 60 mg by mouth daily       alendronate (FOSAMAX) 70 MG tablet TK 1 T PO ONCE A WEEK IN THE MORNING 30 MINUTES BEFORE FIRST MEAL OF THE DAY  3    flecainide (TAMBOCOR) 100 MG tablet TAKE 1 TABLET BY MOUTH TWICE DAILY 180 tablet 3    metoprolol tartrate (LOPRESSOR) 25 MG tablet TAKE 1/2 TABLET BY MOUTH TWICE DAILY 90 tablet 3    diclofenac (VOLTAREN) 50 MG EC tablet Take 50 mg by mouth daily      LORazepam (ATIVAN) 1 MG tablet Take 1 mg by mouth every 8 hours as needed .   2    rOPINIRole (REQUIP) 3 MG tablet Take 3 mg by mouth 2 times daily 2 tabs in AM and 3 tabs in PM  fluticasone (FLONASE) 50 MCG/ACT nasal spray 2 sprays by Nasal route as needed       famciclovir (FAMVIR) 500 MG tablet   Take 500 mg by mouth 2 times daily        No current facility-administered medications for this visit. Allergies: Allergies   Allergen Reactions    Azithromycin     Codeine Nausea Only     Rapid heart rate, dizziness    Mobic [Meloxicam] Rash     Arms and legs bumpy red rash    Morphine And Related Itching       REVIEW OF SYSTEMS     Constitutional: []? Fever []? Sweats []? Chills []? Recent Injury   [x]? Denies all unless marked  HENT:[]? Headache  []? Head Injury  []? Sore Throat  []? Ear Pain  []? Dizziness []? Hearing Loss   [x]? Denies all unless marked  Musculoskeletal: []? Arthralgia  []? Myalgias []? Muscle cramps  [x]? Muscle twitches   []? Denies all unless marked   Spine:  []? Neck pain  []? Back pain  []? Sciaticia  [x]? Denies all unless marked  Neurological:[]? Visual Disturbance []? Double Vision []? Slurred Speech []? Trouble swallowing  []? Vertigo [x]? Tingling [x]? Numbness [x]? Weakness []? Loss of Balance   []? Loss of Consciousness []? Memory Loss []? Seizures  [x]? Denies all unless marked  Psychiatric/Behavioral:[]? Depression []? Anxiety  [x]? Denies all unless marked  Sleep: []? Insomnia []? Sleep Disturbance []? Snoring []? Restless Legs []? Daytime Sleepiness [x]? Sleep Apnea  [x]? Denies all unless marked    The MA has completed the ROS with the patient. I have reviewed it in its' entirety with the patient and agree with the documentation.      PHYSICAL EXAM  BP 98/66 (Site: Left Upper Arm, Position: Sitting, Cuff Size: Large Adult)   Pulse 55   Ht 5' 4\" (1.626 m)   Wt 295 lb (133.8 kg)   SpO2 98%   BMI 50.64 kg/m²      Constitutional  Alert in NAD, well developed, pleasant and cooperative with exam  HEENT- Conjunctiva normal.  No scars, masses, or lesions over external nose or ears, hearing intact, no neck masses noted, no jugular vein distension, no bruit  Cardiac- Regular rate and rhythm  Pulmonary- Clear to auscultation, good expansion, normal effort without use of accessory muscles  Musculoskeletal  No significant wasting of muscles noted, no bony deformities  Extremities - No clubbing, cyanosis or edema  Skin  Warm, dry, and intact. No rash, erythema, or pallor  Psychiatric  Mood, affect, and behavior appear normal      NEUROLOGIC EXAMINATION:  Mental Status:  alert, oriented to person, place, and time. Speech:  Clear without dysarthria or dysphonia  Language:  Fluent without aphasia  Cranial Nerves:              II          Visual fields are full to confrontation              III,IV, VI           Extraocular movements are full              VII        Facial movements are symmetrical without weakness              VIII       Hearing is intact  Motor:  Normal strength in both upper and right lower extremities. There is weakness with dorsiflexion of the left foot. Normal muscle tone and bulk. Deep tendon reflexes are are 2+ at the biceps, otherwise absent. Coordination:  Rapid alternating movements are normal in both upper and lower extremities. Finger to nose testing is unimpaired bilaterally. Sensory is intact to light touch. There is a left.right postural tremor. No rigidity noted. 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 Idiopathic progressive neuropathy  G60.3 Nerve Conduction Test with EMG   3. Weakness of left lower extremity  R29.898 Nerve Conduction Test with EMG   4. Essential tremor  G25.0 MRI BRAIN WO CONTRAST   5. Obstructive sleep apnea  G47.33    6. BiPAP (biphasic positive airway pressure) dependence  Z99.89           []  :  Stable     []  :  Improved                       []  :  Well controlled              []  :  Resolving     []  :  Resolved     []  :  Inadequately controlled     []  :  Worsening     [x]  :  Additional workup planned      Plan:     Orders Placed This Encounter   Procedures    MRI BRAIN WO CONTRAST    Miscellaneous Sendout 1    EEG awake and drowsy    Nerve Conduction Test with EMG     No orders of the defined types were placed in this encounter. 1.  The following educational material has been included in this visit after visit summary for your review: EMG/NCV-  Discussed with the patient and all questions fully answered. 2.  We had a discussion about the clinical issues and went over the various important aspects to consider. The myoclonus and tremor is reportedly worsening. Will obtain further diagnostic studies. Treatment plan discussed. All questions were answered. Pt voiced understanding and agrees with treatment plan. 3.  Order-MRI brain   4. Order-labs  5. Order-EEG  6. Order-EMG  7. Consider MRI C-spine  8. Consider split-night PSG  9.   Follow up with Dr. Anais Biswas after EMG/NCV

## 2021-12-17 ENCOUNTER — LAB (OUTPATIENT)
Dept: LAB | Facility: HOSPITAL | Age: 66
End: 2021-12-17

## 2021-12-17 ENCOUNTER — OFFICE VISIT (OUTPATIENT)
Dept: INTERNAL MEDICINE | Facility: CLINIC | Age: 66
End: 2021-12-17

## 2021-12-17 VITALS
OXYGEN SATURATION: 94 % | TEMPERATURE: 98.3 F | HEART RATE: 84 BPM | DIASTOLIC BLOOD PRESSURE: 78 MMHG | SYSTOLIC BLOOD PRESSURE: 142 MMHG

## 2021-12-17 DIAGNOSIS — J02.9 ACUTE PHARYNGITIS, UNSPECIFIED ETIOLOGY: ICD-10-CM

## 2021-12-17 DIAGNOSIS — J01.00 ACUTE MAXILLARY SINUSITIS, RECURRENCE NOT SPECIFIED: ICD-10-CM

## 2021-12-17 DIAGNOSIS — J40 BRONCHITIS: Primary | ICD-10-CM

## 2021-12-17 DIAGNOSIS — J40 BRONCHITIS: ICD-10-CM

## 2021-12-17 PROBLEM — R29.898 WEAKNESS OF LEFT LOWER EXTREMITY: Status: ACTIVE | Noted: 2021-12-14

## 2021-12-17 PROBLEM — G25.0 ESSENTIAL TREMOR: Status: ACTIVE | Noted: 2021-12-14

## 2021-12-17 LAB — SARS-COV-2 RNA PNL SPEC NAA+PROBE: NOT DETECTED

## 2021-12-17 PROCEDURE — C9803 HOPD COVID-19 SPEC COLLECT: HCPCS

## 2021-12-17 PROCEDURE — 87635 SARS-COV-2 COVID-19 AMP PRB: CPT

## 2021-12-17 PROCEDURE — 99213 OFFICE O/P EST LOW 20 MIN: CPT | Performed by: NURSE PRACTITIONER

## 2021-12-17 RX ORDER — AMOXICILLIN AND CLAVULANATE POTASSIUM 875; 125 MG/1; MG/1
1 TABLET, FILM COATED ORAL 2 TIMES DAILY
Qty: 14 TABLET | Refills: 0 | Status: SHIPPED | OUTPATIENT
Start: 2021-12-17 | End: 2022-03-11

## 2021-12-17 NOTE — PROGRESS NOTES
Chief Complaint   Patient presents with   • Fever   • Headache   • Cough         History:  Danisha Cannon is a 66 y.o. female who presents today for evaluation of the above problems.          5 days, worsening cough, coughing up yellow. Low-grade fever to 99.  Yesterday began having right sinus pain under the eye.  Pulse ox at home normal. Throat is a little sore.    Had 2 Covid vaccines and had booster on 12/7.  Felt bad about a week after the booster, very fatigued.      Denies other pain or symptoms of illness.      ROS:  Review of Systems  As above    Allergies   Allergen Reactions   • Other Rash     Dial soap  Redness and swelling on skin and burning sensation   • Codeine Dizziness and Nausea Only     Rapid heart rate, dizziness  NAUSEA   • Mobic [Meloxicam] Rash   • Morphine And Related Itching   • Azithromycin Other (See Comments)     Past Medical History:   Diagnosis Date   • Anemia    • Anxiety and depression    • Arthritis    • Atrial fibrillation (HCC)    • Chronic cough    • Disease of thyroid gland    • DVT (deep venous thrombosis) ([facility])    • GERD (gastroesophageal reflux disease)    • History of incision and drainage     Right knee   • History of staph infection     right knee, and upper right thigh   • Hyperlipidemia    • Infection      in right knee to bone, cleaned it out and put new hardware with antibiotic and pt developed hole in incision   • Neuropathy, peripheral    • Pneumonia     RECENT DX PER FAMILY DOCTOR   • PONV (postoperative nausea and vomiting)    • Restless leg syndrome    • Sleep apnea with use of continuous positive airway pressure (CPAP)     bipap   • SVT (supraventricular tachycardia) ([facility])      Past Surgical History:   Procedure Laterality Date   • BUNIONECTOMY Left     AND HAMMER TOE   • CARDIAC ABLATION      at [facility] 8/2019   • CARDIAC SURGERY      HEART CATH   • CATARACT EXTRACTION Right    • HERNIA REPAIR      UMBILICAL X3   • INCISION AND DRAINAGE LEG Right 1/28/2019     Procedure: INCISION AND DRAINAGE OF RIGHT THIGH HEMATOMA;  Surgeon: Nino Stern MD;  Location:  [facility] HYBRID OR 12;  Service: Vascular   • JOINT REPLACEMENT      RIGHT KNEE   • KNEE POLY INSERT EXCHANGE Right 3/3/2018    Procedure: IRRIGATION AND DEBRIDEMENT TOTAL KNEE & POLY EXCHANGE - RIGHT;  Surgeon: Amilcar Capellan MD;  Location:  [facility] OR;  Service:    • LAPAROSCOPIC CHOLECYSTECTOMY     • LEG DEBRIDEMENT Right 3/23/2018    Procedure: DEBRIDEMENT AND CLOSURE KNEE - RIGHT;  Surgeon: Amilcar Capellan MD;  Location:  [facility] OR;  Service: Orthopedics   • PERIPHERALLY INSERTED CENTRAL CATHETER INSERTION     • PILONIDAL CYSTECTOMY N/A 2/16/2017    Procedure: PILONIDAL CYSTECTOMY, EXCISION SEBACEOUS CYST - BACK;  Surgeon: Macrina Capellan MD;  Location:  [facility] OR;  Service:    • TOTAL KNEE  PROSTHESIS REMOVAL W/ SPACER INSERTION Right 3/13/2018    Procedure: 1.  EXPLANT TOTAL KNEE 2.  ANTIBOTIC SPACER KNEE;  Surgeon: Amilcar Capellan MD;  Location:  [facility] OR;  Service: Orthopedics   • TOTAL KNEE  PROSTHESIS REMOVAL W/ SPACER INSERTION Right 6/19/2020    Procedure: REMOVAL OF ANTIBIOTIC SPACER;  Surgeon: Amilcar Capellan MD;  Location: [facility] OR;  Service: Orthopedics;  Laterality: Right;   • TOTAL KNEE ARTHROPLASTY REVISION Right 6/19/2020    Procedure: REVISION TOTAL KNEE REPLACEMENT;  Surgeon: Amilcar Capellan MD;  Location:  [facility] OR;  Service: Orthopedics;  Laterality: Right;   • TRUNK LESION/CYST EXCISION N/A 2/16/2017    Procedure: EXCISION SEBACEOUS CYST - BACK;  Surgeon: Macrina Capellan MD;  Location:  [facility] OR;  Service:      Family History   Problem Relation Age of Onset   • Diabetes Mother    • Heart disease Mother    • Alzheimer's disease Mother    • Hyperlipidemia Mother    • Hypertension Mother    • Cancer Father    • Hypertension Father    • Anxiety disorder Father    • Depression Father    • Leukemia Brother    • Heart attack Sister    • Clotting disorder Brother    • No Known  Problems Brother    • Colon polyps Neg Hx    • Colon cancer Neg Hx      Danisha  reports that she has never smoked. She has never used smokeless tobacco. She reports that she does not drink alcohol and does not use drugs.    I have reviewed and updated the above documentation (if necessary) including but not limited to chief complaint, ROS, PFSH, allergies and medications        Current Outpatient Medications:   •  albuterol sulfate  (90 Base) MCG/ACT inhaler, Inhale 2 puffs Every 4 (Four) Hours As Needed for Wheezing or Shortness of Air for up to 30 days., Disp: 18 g, Rfl: 11  •  alendronate (FOSAMAX) 70 MG tablet, Take 70 mg by mouth Every 7 (Seven) Days., Disp: , Rfl:   •  amitriptyline (ELAVIL) 50 MG tablet, Take 100 mg by mouth Every Night., Disp: , Rfl:   •  ASPIRIN 81 PO, Take 1 tablet by mouth Daily., Disp: , Rfl:   •  azelastine (ASTELIN) 0.1 % nasal spray, 2 sprays into the nostril(s) as directed by provider 2 (Two) Times a Day. Use in each nostril as directed, Disp: 30 mL, Rfl: 11  •  dexlansoprazole (Dexilant) 60 MG capsule, Take 1 capsule by mouth Daily., Disp: 30 capsule, Rfl: 11  •  diclofenac (VOLTAREN) 75 MG EC tablet, Take 75 mg by mouth Daily., Disp: , Rfl:   •  DULoxetine (CYMBALTA) 60 MG capsule, Take 1 capsule by mouth 2 (Two) Times a Day., Disp: 60 capsule, Rfl: 2  •  famciclovir (FAMVIR) 500 MG tablet, Take 1 tablet by mouth 2 (Two) Times a Day., Disp: 60 tablet, Rfl: 2  •  flecainide (TAMBOCOR) 100 MG tablet, Take 100 mg by mouth 2 (Two) Times a Day., Disp: , Rfl:   •  fluticasone (FLONASE) 50 MCG/ACT nasal spray, 2 sprays into the nostril(s) as directed by provider Daily As Needed for Rhinitis or Allergies., Disp: 16 g, Rfl: 5  •  fluticasone (Flovent HFA) 110 MCG/ACT inhaler, Inhale 1 puff 2 (Two) Times a Day., Disp: , Rfl:   •  furosemide (Lasix) 40 MG tablet, Take 1 tablet by mouth Daily As Needed (edema or soa)., Disp: 90 tablet, Rfl: 3  •  gabapentin (NEURONTIN) 800 MG tablet, Take  800 mg by mouth 3 (Three) Times a Day., Disp: , Rfl:   •  levothyroxine (SYNTHROID, LEVOTHROID) 137 MCG tablet, Take 1 tablet by mouth Every Morning., Disp: 90 tablet, Rfl: 3  •  metoprolol succinate XL (TOPROL-XL) 25 MG 24 hr tablet, Take 1 tablet by mouth every night at bedtime., Disp: , Rfl:   •  montelukast (SINGULAIR) 10 MG tablet, Take  by mouth Daily., Disp: , Rfl: 5  •  permethrin (ELIMITE) 5 % cream, , Disp: , Rfl:   •  pilocarpine (SALAGEN) 5 MG tablet, Take 1 tablet by mouth 3 (Three) Times a Day., Disp: 90 tablet, Rfl: 2  •  polyethylene glycol (GoLYTELY) 236 g solution, Take as directed by office instructions., Disp: 4000 mL, Rfl: 0  •  rOPINIRole (REQUIP) 3 MG tablet, 2 tablets each morning and 3 tablets at bedtime., Disp: 450 tablet, Rfl: 3  •  sucralfate (CARAFATE) 1 GM/10ML suspension, Take 10 mL by mouth 4 (Four) Times a Day With Meals & at Bedtime., Disp: 420 mL, Rfl: 1  •  triamcinolone (KENALOG) 0.1 % paste, Apply  to teeth See Admin Instructions., Disp: 5 g, Rfl: 2  •  amoxicillin-clavulanate (Augmentin) 875-125 MG per tablet, Take 1 tablet by mouth 2 (Two) Times a Day., Disp: 14 tablet, Rfl: 0    OBJECTIVE:  Visit Vitals  /78 (BP Location: Left arm, Patient Position: Sitting, Cuff Size: Adult)   Pulse 84   Temp 98.3 °F (36.8 °C) (Oral)   LMP  (LMP Unknown)   SpO2 94%      Physical Exam  Vitals and nursing note reviewed.   Constitutional:       General: She is not in acute distress.     Appearance: Normal appearance. She is ill-appearing (resting back on table). She is not toxic-appearing or diaphoretic.   HENT:      Head: Normocephalic and atraumatic.      Right Ear: Ear canal and external ear normal. There is no impacted cerumen.      Left Ear: Ear canal and external ear normal. There is no impacted cerumen.      Ears:      Comments: Bilateral TM's dull, no erythema     Nose: No congestion or rhinorrhea.      Comments: Right maxillary sinus tenderness     Mouth/Throat:      Mouth:  Mucous membranes are moist.      Comments: Bilateral 1+ tonsillar hypertrophy with exudate on left  Cardiovascular:      Rate and Rhythm: Normal rate and regular rhythm.      Heart sounds: Normal heart sounds.   Pulmonary:      Effort: Pulmonary effort is normal. No respiratory distress.      Breath sounds: Normal breath sounds. No wheezing.   Abdominal:      General: There is no distension.      Palpations: Abdomen is soft.      Tenderness: There is no abdominal tenderness.   Musculoskeletal:      Cervical back: Neck supple.      Right lower leg: No edema.      Left lower leg: No edema.   Lymphadenopathy:      Cervical: Cervical adenopathy (tender left sub-mandibular region) present.   Skin:     General: Skin is warm and dry.   Neurological:      Mental Status: She is alert and oriented to person, place, and time.   Psychiatric:         Mood and Affect: Mood normal.         Behavior: Behavior normal.         Thought Content: Thought content normal.         Judgment: Judgment normal.         [facility]    Assessment/Plan    Diagnoses and all orders for this visit:    1. Bronchitis (Primary)  -     COVID PRE-OP / PRE-PROCEDURE SCREENING ORDER (NO ISOLATION) - Swab, Oropharynx; Future    2. Acute maxillary sinusitis, recurrence not specified  -     COVID PRE-OP / PRE-PROCEDURE SCREENING ORDER (NO ISOLATION) - Swab, Oropharynx; Future  -     amoxicillin-clavulanate (Augmentin) 875-125 MG per tablet; Take 1 tablet by mouth 2 (Two) Times a Day.  Dispense: 14 tablet; Refill: 0    3. Acute pharyngitis, unspecified etiology        Education materials and an After Visit Summary were printed and given to the patient at discharge.  Return for Next scheduled follow up.         ERNESTO Beckford   09:20 CST  12/17/2021

## 2021-12-23 ENCOUNTER — TELEPHONE (OUTPATIENT)
Dept: INTERNAL MEDICINE | Facility: CLINIC | Age: 66
End: 2021-12-23

## 2021-12-23 RX ORDER — FLUCONAZOLE 150 MG/1
150 TABLET ORAL ONCE
Qty: 1 TABLET | Refills: 0 | Status: SHIPPED | OUTPATIENT
Start: 2021-12-23 | End: 2021-12-23

## 2021-12-23 NOTE — TELEPHONE ENCOUNTER
Caller: Davis Danisha JOHNSON    Relationship: Self    Best call back number: [phone]    What medication are you requesting:   PCP RECOMMENDATION    What are your current symptoms:   YEAST INFECTION    How long have you been experiencing symptoms:   N/A    Have you had these symptoms before:    [x] Yes  [] No    Have you been treated for these symptoms before:   [x] Yes  [] No    If a prescription is needed, what is your preferred pharmacy and phone number: [id number] [address] - [phone] [facility] [phone]      Additional notes:    N/A

## 2021-12-29 ENCOUNTER — HOSPITAL ENCOUNTER (OUTPATIENT)
Dept: NEUROLOGY | Age: 66
Discharge: HOME | End: 2021-12-29
Payer: MEDICARE

## 2021-12-29 ENCOUNTER — HOSPITAL ENCOUNTER (OUTPATIENT)
Dept: MRI IMAGING | Age: 66
Discharge: HOME | End: 2021-12-29
Payer: MEDICARE

## 2021-12-29 DIAGNOSIS — G25.3 MYOCLONUS: ICD-10-CM

## 2021-12-29 DIAGNOSIS — R29.898 WEAKNESS OF LEFT LOWER EXTREMITY: ICD-10-CM

## 2021-12-29 DIAGNOSIS — G60.3 IDIOPATHIC PROGRESSIVE NEUROPATHY: ICD-10-CM

## 2021-12-29 DIAGNOSIS — G25.0 ESSENTIAL TREMOR: ICD-10-CM

## 2021-12-29 PROCEDURE — 95886 MUSC TEST DONE W/N TEST COMP: CPT | Performed by: PSYCHIATRY & NEUROLOGY

## 2021-12-29 PROCEDURE — 95910 NRV CNDJ TEST 7-8 STUDIES: CPT | Performed by: PSYCHIATRY & NEUROLOGY

## 2021-12-29 PROCEDURE — 70551 MRI BRAIN STEM W/O DYE: CPT

## 2021-12-29 PROCEDURE — 95910 NRV CNDJ TEST 7-8 STUDIES: CPT

## 2021-12-29 PROCEDURE — 95886 MUSC TEST DONE W/N TEST COMP: CPT

## 2021-12-30 ENCOUNTER — TELEPHONE (OUTPATIENT)
Dept: NEUROLOGY | Age: 66
End: 2021-12-30

## 2021-12-30 DIAGNOSIS — Z01.818 PRE-OP TESTING: Primary | ICD-10-CM

## 2021-12-30 NOTE — TELEPHONE ENCOUNTER
----- Message from [place] sent at 12/29/2021  2:23 PM CST -----  The MRI brain is consistent with age related changes.

## 2022-01-10 DIAGNOSIS — J96.11 CHRONIC RESPIRATORY FAILURE WITH HYPOXIA: ICD-10-CM

## 2022-01-10 RX ORDER — DULOXETIN HYDROCHLORIDE 60 MG/1
CAPSULE, DELAYED RELEASE ORAL
Qty: 60 CAPSULE | Refills: 5 | Status: SHIPPED | OUTPATIENT
Start: 2022-01-10

## 2022-01-11 DIAGNOSIS — J96.11 CHRONIC RESPIRATORY FAILURE WITH HYPOXIA: Primary | ICD-10-CM

## 2022-01-11 NOTE — PROGRESS NOTES
Test results discussed with patient.  Patient voiced understanding.  Pt is aware she needs to wake up and go to [facility] to have ABG drawn.  She lives one hour away from [facility], but is aware to go asap from waking up.     [facility].

## 2022-02-01 RX ORDER — PERMETHRIN 50 MG/G
CREAM TOPICAL ONCE
Qty: 1 EACH | Refills: 0 | Status: SHIPPED | OUTPATIENT
Start: 2022-02-01 | End: 2022-02-01

## 2022-02-01 RX ORDER — PERMETHRIN 50 MG/G
CREAM TOPICAL
Qty: 60 G | OUTPATIENT
Start: 2022-02-01

## 2022-02-01 RX ORDER — DICLOFENAC SODIUM 75 MG/1
75 TABLET, DELAYED RELEASE ORAL DAILY
Qty: 90 TABLET | Refills: 1 | Status: SHIPPED | OUTPATIENT
Start: 2022-02-01

## 2022-02-01 NOTE — TELEPHONE ENCOUNTER
I am going to send in refills, but would you mind checking with patient to see if she ever saw dermatology? In the future would like to defer the mite treatment to derm if patient is continuing to have issues. Thank you!

## 2022-02-01 NOTE — TELEPHONE ENCOUNTER
PATIENT HAS BEEN CALLED, GIVEN MESSAGE ABOUT MEDICATIONS BEING CALLED IN.  GRATEFUL UNDERSTANDING VOICED.

## 2022-02-07 ENCOUNTER — HOSPITAL ENCOUNTER (OUTPATIENT)
Dept: WOMENS IMAGING | Age: 67
Discharge: HOME | End: 2022-02-07
Payer: MEDICARE

## 2022-02-07 DIAGNOSIS — Z12.31 ENCOUNTER FOR SCREENING MAMMOGRAM FOR MALIGNANT NEOPLASM OF BREAST: ICD-10-CM

## 2022-02-07 PROCEDURE — 77063 BREAST TOMOSYNTHESIS BI: CPT

## 2022-02-07 RX ORDER — FAMCICLOVIR 500 MG/1
TABLET ORAL
Qty: 60 TABLET | Refills: 2 | Status: SHIPPED | OUTPATIENT
Start: 2022-02-07 | End: 2022-05-09

## 2022-02-21 ENCOUNTER — OFFICE VISIT (OUTPATIENT)
Dept: OTOLARYNGOLOGY | Facility: CLINIC | Age: 67
End: 2022-02-21

## 2022-02-21 VITALS
DIASTOLIC BLOOD PRESSURE: 81 MMHG | HEART RATE: 89 BPM | HEIGHT: 64 IN | TEMPERATURE: 97 F | SYSTOLIC BLOOD PRESSURE: 158 MMHG | BODY MASS INDEX: 50.02 KG/M2 | WEIGHT: 293 LBS

## 2022-02-21 DIAGNOSIS — J30.9 ALLERGIC RHINITIS, UNSPECIFIED SEASONALITY, UNSPECIFIED TRIGGER: Primary | ICD-10-CM

## 2022-02-21 PROCEDURE — 99213 OFFICE O/P EST LOW 20 MIN: CPT | Performed by: EMERGENCY MEDICINE

## 2022-02-21 NOTE — PATIENT INSTRUCTIONS
CONTACT INFORMATION:  The main office phone number is [phone]. For emergencies after hours and on weekends, this number will convert over to our answering service and the on call provider will answer. Please try to keep non emergent phone calls/ questions to office hours 9am-5pm Monday through Friday.     PT PAL  As an alternative, you can sign up and use the Epic MyChart system for more direct and quicker access for non emergent questions/ problems.  [facility] PT PAL allows you to send messages to your doctor, view your test results, renew your prescriptions, schedule appointments, and more. To sign up, go to [employer] and click on the Sign Up Now link in the New User? box. Enter your PT PAL Activation Code exactly as it appears below along with the last four digits of your Social Security Number and your Date of Birth () to complete the sign-up process. If you do not sign up before the expiration date, you must request a new code.    PT PAL Activation Code: Activation code not generated  Current PT PAL Status: Active    If you have questions, you can email [email or web address] or call [phone] to talk to our PT PAL staff. Remember, PT PAL is NOT to be used for urgent needs. For medical emergencies, dial 911.

## 2022-02-21 NOTE — PROGRESS NOTES
ERNESTO Sanchez [facility] EAR NOSE & THROAT  [address], [address]  formerly [facility] [address]  Fax [phone]  Phone [phone]      Visit Type: FOLLOW UP   Chief Complaint   Patient presents with   • Follow-up   • Sinus Problem        HPI  She presents for a follow up evaluation. She has had no current complaints. The patient has not had complaints of: allergy.     Past Medical History:   Diagnosis Date   • Anemia    • Anxiety and depression    • Arthritis    • Atrial fibrillation (HCC)    • Chronic cough    • Disease of thyroid gland    • DVT (deep venous thrombosis) ([facility])    • GERD (gastroesophageal reflux disease)    • History of incision and drainage     Right knee   • History of staph infection     right knee, and upper right thigh   • Hyperlipidemia    • Infection      in right knee to bone, cleaned it out and put new hardware with antibiotic and pt developed hole in incision   • Neuropathy, peripheral    • Pneumonia     RECENT DX PER FAMILY DOCTOR   • PONV (postoperative nausea and vomiting)    • Restless leg syndrome    • Sleep apnea with use of continuous positive airway pressure (CPAP)     bipap   • SVT (supraventricular tachycardia) ([facility])        Past Surgical History:   Procedure Laterality Date   • BUNIONECTOMY Left     AND HAMMER TOE   • CARDIAC ABLATION      at [facility] ([place], [place]) 8/2019   • CARDIAC SURGERY      HEART CATH   • CATARACT EXTRACTION Right    • HERNIA REPAIR      UMBILICAL X3   • INCISION AND DRAINAGE LEG Right 1/28/2019    Procedure: INCISION AND DRAINAGE OF RIGHT THIGH HEMATOMA;  Surgeon: Nino Stern MD;  Location: [facility] HYBRID OR 12;  Service: Vascular   • JOINT REPLACEMENT      RIGHT KNEE   • KNEE POLY INSERT EXCHANGE Right 3/3/2018    Procedure: IRRIGATION AND DEBRIDEMENT TOTAL KNEE & POLY EXCHANGE - RIGHT;  Surgeon: Amilcar Capellan MD;  Location: [facility] OR;  Service:    • LAPAROSCOPIC CHOLECYSTECTOMY     •  LEG DEBRIDEMENT Right 3/23/2018    Procedure: DEBRIDEMENT AND CLOSURE KNEE - RIGHT;  Surgeon: Amilcar Capellan MD;  Location:  [facility] OR;  Service: Orthopedics   • PERIPHERALLY INSERTED CENTRAL CATHETER INSERTION     • PILONIDAL CYSTECTOMY N/A 2/16/2017    Procedure: PILONIDAL CYSTECTOMY, EXCISION SEBACEOUS CYST - BACK;  Surgeon: Macrina Capellan MD;  Location:  [facility] OR;  Service:    • TOTAL KNEE  PROSTHESIS REMOVAL W/ SPACER INSERTION Right 3/13/2018    Procedure: 1.  EXPLANT TOTAL KNEE 2.  ANTIBOTIC SPACER KNEE;  Surgeon: Amilcar Capellan MD;  Location:  [facility] OR;  Service: Orthopedics   • TOTAL KNEE  PROSTHESIS REMOVAL W/ SPACER INSERTION Right 6/19/2020    Procedure: REMOVAL OF ANTIBIOTIC SPACER;  Surgeon: Amilcar Capellan MD;  Location:  [facility] OR;  Service: Orthopedics;  Laterality: Right;   • TOTAL KNEE ARTHROPLASTY REVISION Right 6/19/2020    Procedure: REVISION TOTAL KNEE REPLACEMENT;  Surgeon: Amilcar Capellan MD;  Location:  [facility] OR;  Service: Orthopedics;  Laterality: Right;   • TRUNK LESION/CYST EXCISION N/A 2/16/2017    Procedure: EXCISION SEBACEOUS CYST - BACK;  Surgeon: Macrina Capellan MD;  Location:  [facility] OR;  Service:        Family History: Her family history includes Alzheimer's disease in her mother; Anxiety disorder in her father; Cancer in her father; Clotting disorder in her brother; Depression in her father; Diabetes in her mother; Heart attack in her sister; Heart disease in her mother; Hyperlipidemia in her mother; Hypertension in her father and mother; Leukemia in her brother; No Known Problems in her brother.     Social History: She  reports that she has never smoked. She has never used smokeless tobacco. She reports that she does not drink alcohol and does not use drugs.    Home Medications:  Aspirin, DULoxetine, alendronate, amitriptyline, amoxicillin-clavulanate, azelastine, dexlansoprazole, diclofenac, famciclovir, flecainide, fluticasone, furosemide, gabapentin,  levothyroxine, metoprolol succinate XL, montelukast, pilocarpine, polyethylene glycol, rOPINIRole, sucralfate, and triamcinolone    Allergies:  She is allergic to other, codeine, mobic [meloxicam], morphine and related, and azithromycin.       Vital Signs:   Temp:  [97 °F (36.1 °C)] 97 °F (36.1 °C)  Heart Rate:  [89] 89  BP: (158)/(81) 158/81  ENT Physical Exam  Constitutional  Appearance: patient appears well-developed, well-nourished and well-groomed,  Communication/Voice: communication appropriate for developmental age; vocal quality normal;  Head and Face  Appearance: head appears normal, face appears normal and face appears atraumatic;  Palpation: facial palpation normal;  Salivary: glands normal;  Ear  Hearing: intact;  Auricles: right auricle normal; left auricle normal;  External Mastoids: right external mastoid normal; left external mastoid normal;  Ear Canals: right ear canal normal; left ear canal normal;  Tympanic Membranes: right tympanic membrane normal; left tympanic membrane normal;  Nose  Internal Nose: bilateral intranasal mucosa edematous; bilateral inferior turbinates with hypertrophy;  Oral Cavity/Oropharynx  Lips: normal;  Teeth: normal;  Gums: gingiva normal;  Oral mucosa: mucous membranes dry;  Neck  Neck: neck normal; neck palpation normal;  Thyroid: thyroid normal;  Respiratory  Inspection: breathing unlabored;  Cardiovascular  Inspection: extremities are warm and well perfused;         Result Review    RESULTS REVIEW    I have reviewed the patients old records in the chart.     Assessment/Plan    Diagnoses and all orders for this visit:    1. Allergic rhinitis, unspecified seasonality, unspecified trigger (Primary)            For the best response, use your nasal sprays every day without skipping doses. It may take several weeks before the full effect is acheived.       Return in about 1 year (around 2/21/2023) for Follow up with ERNESTO Camp.      Jazzy Samaniego,  APRN  02/21/22  11:19 CST

## 2022-02-28 ENCOUNTER — LAB (OUTPATIENT)
Dept: LAB | Facility: HOSPITAL | Age: 67
End: 2022-02-28

## 2022-02-28 ENCOUNTER — TELEPHONE (OUTPATIENT)
Dept: INTERNAL MEDICINE | Facility: CLINIC | Age: 67
End: 2022-02-28

## 2022-02-28 DIAGNOSIS — Z01.818 PRE-OP TESTING: ICD-10-CM

## 2022-02-28 LAB — SARS-COV-2 ORF1AB RESP QL NAA+PROBE: NOT DETECTED

## 2022-02-28 PROCEDURE — U0005 INFEC AGEN DETEC AMPLI PROBE: HCPCS

## 2022-02-28 PROCEDURE — C9803 HOPD COVID-19 SPEC COLLECT: HCPCS

## 2022-02-28 PROCEDURE — U0004 COV-19 TEST NON-CDC HGH THRU: HCPCS

## 2022-02-28 NOTE — TELEPHONE ENCOUNTER
Caller: Danisha Cannon    Relationship: Self    Best call back number: [phone]    What medication are you requesting:MOUNTH RINSE    What are your current symptoms: THRUSH IN MOUTH    How long have you been experiencing symptoms: TODAY    Have you had these symptoms before:    [x] Yes  [] No    Have you been treated for these symptoms before:   [x] Yes  [] No    If a prescription is needed, what is your preferred pharmacy and phone number:  [id number] [address] AT [address] - [phone]  - [phone]   [phone]      Additional notes:      PATIENT STATES SHE IS HAVING AN ENDOSCOPY/COLONOSCOPY ON [facility] AND SHE IS NERVOUS. SHE STATES SHE IS HAVING AN OUTBREAK OF THRUSH IN HER MOUTH AND WOULD LIKE SOMETHING CALLED IN. PLEASE ADVISE

## 2022-03-10 RX ORDER — PERMETHRIN 50 MG/G
CREAM TOPICAL ONCE
Qty: 60 G | OUTPATIENT
Start: 2022-03-10 | End: 2022-03-10

## 2022-03-11 ENCOUNTER — LAB (OUTPATIENT)
Dept: LAB | Facility: HOSPITAL | Age: 67
End: 2022-03-11

## 2022-03-11 ENCOUNTER — OFFICE VISIT (OUTPATIENT)
Dept: INTERNAL MEDICINE | Facility: CLINIC | Age: 67
End: 2022-03-11

## 2022-03-11 VITALS — OXYGEN SATURATION: 91 % | HEART RATE: 72 BPM | DIASTOLIC BLOOD PRESSURE: 72 MMHG | SYSTOLIC BLOOD PRESSURE: 118 MMHG

## 2022-03-11 DIAGNOSIS — B88.9 DERMATOSIS DUE TO MITES: Primary | ICD-10-CM

## 2022-03-11 DIAGNOSIS — R32 URINARY INCONTINENCE, UNSPECIFIED TYPE: ICD-10-CM

## 2022-03-11 DIAGNOSIS — L30.9 DERMATITIS: ICD-10-CM

## 2022-03-11 PROBLEM — M15.9 GENERALIZED OSTEOARTHRITIS: Status: ACTIVE | Noted: 2022-03-11

## 2022-03-11 PROBLEM — I36.9 NONRHEUMATIC TRICUSPID VALVE DISORDER: Status: ACTIVE | Noted: 2022-03-11

## 2022-03-11 PROBLEM — F41.9 ANXIETY: Status: ACTIVE | Noted: 2022-03-11

## 2022-03-11 PROBLEM — Z01.419 NORMAL GYNECOLOGIC EXAMINATION: Status: ACTIVE | Noted: 2022-03-11

## 2022-03-11 PROBLEM — G44.219 EPISODIC TENSION-TYPE HEADACHE: Status: ACTIVE | Noted: 2022-03-11

## 2022-03-11 PROBLEM — M51.369 DEGENERATION OF LUMBAR INTERVERTEBRAL DISC: Status: ACTIVE | Noted: 2022-03-11

## 2022-03-11 PROBLEM — IMO0002 NAIL BED INFECTION: Status: ACTIVE | Noted: 2022-03-11

## 2022-03-11 PROBLEM — R53.83 MALAISE AND FATIGUE: Status: ACTIVE | Noted: 2022-03-11

## 2022-03-11 PROBLEM — R53.81 MALAISE AND FATIGUE: Status: ACTIVE | Noted: 2022-03-11

## 2022-03-11 PROBLEM — M51.36 DEGENERATION OF LUMBAR INTERVERTEBRAL DISC: Status: ACTIVE | Noted: 2022-03-11

## 2022-03-11 PROBLEM — S80.00XA CONTUSION OF KNEE: Status: ACTIVE | Noted: 2022-03-11

## 2022-03-11 LAB
BILIRUB UR QL STRIP: NEGATIVE
CLARITY UR: CLEAR
COLOR UR: ABNORMAL
GLUCOSE UR STRIP-MCNC: NEGATIVE MG/DL
HGB UR QL STRIP.AUTO: NEGATIVE
KETONES UR QL STRIP: ABNORMAL
LEUKOCYTE ESTERASE UR QL STRIP.AUTO: NEGATIVE
NITRITE UR QL STRIP: NEGATIVE
PH UR STRIP.AUTO: <=5 [PH] (ref 5–8)
PROT UR QL STRIP: NEGATIVE
SP GR UR STRIP: 1.02 (ref 1–1.03)
UROBILINOGEN UR QL STRIP: ABNORMAL

## 2022-03-11 PROCEDURE — 99214 OFFICE O/P EST MOD 30 MIN: CPT | Performed by: NURSE PRACTITIONER

## 2022-03-11 PROCEDURE — 81003 URINALYSIS AUTO W/O SCOPE: CPT

## 2022-03-11 RX ORDER — PERMETHRIN 50 MG/G
CREAM TOPICAL ONCE
Qty: 60 G | Refills: 1 | Status: SHIPPED | OUTPATIENT
Start: 2022-03-11 | End: 2022-03-11

## 2022-03-11 RX ORDER — MOMETASONE FUROATE 1 MG/G
1 CREAM TOPICAL DAILY
Qty: 45 G | Refills: 0 | Status: SHIPPED | OUTPATIENT
Start: 2022-03-11 | End: 2022-06-06

## 2022-03-11 NOTE — PROGRESS NOTES
"Chief Complaint   Patient presents with   • Rash   • Urinary Incontinence         History:  Danisha Cannon is a 66 y.o. female who presents today for evaluation of the above problems.          1) Rash, feels it is still mites biting her.  Now has on left ankle.  She brings a book with pictures of mites and the symptoms she is having. She thinks they are in her carpet.  She has been to [facility] before and has gotten Elimite, and we have prescribed it here as well.  Whenever she uses it, the itchy rash goes away.      2)\" I have become incontinent.\" Since last April Leaking urine day and night.  She can sit down and urinate as usual, but she has to wear Depends because she leaks small amounts of urine.        ROS:  Review of Systems   as above    Allergies   Allergen Reactions   • Other Rash     Dial soap  Redness and swelling on skin and burning sensation   • Codeine Dizziness and Nausea Only     Rapid heart rate, dizziness  NAUSEA   • Mobic [Meloxicam] Rash   • Morphine And Related Itching   • Azithromycin Other (See Comments)   • Cyclobenzaprine Unknown - Low Severity     Past Medical History:   Diagnosis Date   • Anemia    • Anxiety and depression    • Arthritis    • Atrial fibrillation (HCC)    • Chronic cough    • Disease of thyroid gland    • DVT (deep venous thrombosis) ([facility])    • GERD (gastroesophageal reflux disease)    • History of incision and drainage     Right knee   • History of staph infection     right knee, and upper right thigh   • Hyperlipidemia    • Infection      in right knee to bone, cleaned it out and put new hardware with antibiotic and pt developed hole in incision   • Neuropathy, peripheral    • Pneumonia     RECENT DX PER FAMILY DOCTOR   • PONV (postoperative nausea and vomiting)    • Restless leg syndrome    • Sleep apnea with use of continuous positive airway pressure (CPAP)     bipap   • SVT (supraventricular tachycardia) ([facility])      Past Surgical History:   Procedure " Laterality Date   • BUNIONECTOMY Left     AND HAMMER TOE   • CARDIAC ABLATION      at [facility] 8/2019   • CARDIAC SURGERY      HEART CATH   • CATARACT EXTRACTION Right    • HERNIA REPAIR      UMBILICAL X3   • INCISION AND DRAINAGE LEG Right 1/28/2019    Procedure: INCISION AND DRAINAGE OF RIGHT THIGH HEMATOMA;  Surgeon: Nino Stern MD;  Location:  [facility] HYBRID OR 12;  Service: Vascular   • JOINT REPLACEMENT      RIGHT KNEE   • KNEE POLY INSERT EXCHANGE Right 3/3/2018    Procedure: IRRIGATION AND DEBRIDEMENT TOTAL KNEE & POLY EXCHANGE - RIGHT;  Surgeon: Amilcar Capellan MD;  Location:  [facility] OR;  Service:    • LAPAROSCOPIC CHOLECYSTECTOMY     • LEG DEBRIDEMENT Right 3/23/2018    Procedure: DEBRIDEMENT AND CLOSURE KNEE - RIGHT;  Surgeon: Amilcar Capellan MD;  Location:  [facility] OR;  Service: Orthopedics   • PERIPHERALLY INSERTED CENTRAL CATHETER INSERTION     • PILONIDAL CYSTECTOMY N/A 2/16/2017    Procedure: PILONIDAL CYSTECTOMY, EXCISION SEBACEOUS CYST - BACK;  Surgeon: Macrina Capellan MD;  Location:  [facility] OR;  Service:    • TOTAL KNEE  PROSTHESIS REMOVAL W/ SPACER INSERTION Right 3/13/2018    Procedure: 1.  EXPLANT TOTAL KNEE 2.  ANTIBOTIC SPACER KNEE;  Surgeon: Amilcar Capellan MD;  Location:  [facility] OR;  Service: Orthopedics   • TOTAL KNEE  PROSTHESIS REMOVAL W/ SPACER INSERTION Right 6/19/2020    Procedure: REMOVAL OF ANTIBIOTIC SPACER;  Surgeon: Amilcar Capellan MD;  Location:  [facility] OR;  Service: Orthopedics;  Laterality: Right;   • TOTAL KNEE ARTHROPLASTY REVISION Right 6/19/2020    Procedure: REVISION TOTAL KNEE REPLACEMENT;  Surgeon: Amilcar Capellan MD;  Location:  [facility] OR;  Service: Orthopedics;  Laterality: Right;   • TRUNK LESION/CYST EXCISION N/A 2/16/2017    Procedure: EXCISION SEBACEOUS CYST - BACK;  Surgeon: Macrina Capellan MD;  Location:  [facility] OR;  Service:      Family History   Problem Relation Age of Onset   • Diabetes Mother    • Heart disease Mother    • Alzheimer's  disease Mother    • Hyperlipidemia Mother    • Hypertension Mother    • Cancer Father    • Hypertension Father    • Anxiety disorder Father    • Depression Father    • Leukemia Brother    • Heart attack Sister    • Clotting disorder Brother    • No Known Problems Brother    • Colon polyps Neg Hx    • Colon cancer Neg Hx      Danisha  reports that she has never smoked. She has never used smokeless tobacco. She reports that she does not drink alcohol and does not use drugs.    I have reviewed and updated the above documentation (if necessary) including but not limited to chief complaint, ROS, PFSH, allergies and medications        Current Outpatient Medications:   •  alendronate (FOSAMAX) 70 MG tablet, Take 70 mg by mouth Every 7 (Seven) Days., Disp: , Rfl:   •  amitriptyline (ELAVIL) 50 MG tablet, Take 100 mg by mouth Every Night., Disp: , Rfl:   •  ASPIRIN 81 PO, Take 1 tablet by mouth Daily., Disp: , Rfl:   •  azelastine (ASTELIN) 0.1 % nasal spray, 2 sprays into the nostril(s) as directed by provider 2 (Two) Times a Day. Use in each nostril as directed, Disp: 30 mL, Rfl: 11  •  dexlansoprazole (Dexilant) 60 MG capsule, Take 1 capsule by mouth Daily., Disp: 30 capsule, Rfl: 11  •  diclofenac (VOLTAREN) 75 MG EC tablet, Take 1 tablet by mouth Daily., Disp: 90 tablet, Rfl: 1  •  DULoxetine (CYMBALTA) 60 MG capsule, TAKE 1 CAPSULE BY MOUTH TWICE DAILY, Disp: 60 capsule, Rfl: 5  •  famciclovir (FAMVIR) 500 MG tablet, TAKE 1 TABLET BY MOUTH TWICE DAILY, Disp: 60 tablet, Rfl: 2  •  flecainide (TAMBOCOR) 100 MG tablet, Take 100 mg by mouth 2 (Two) Times a Day., Disp: , Rfl:   •  fluticasone (FLONASE) 50 MCG/ACT nasal spray, 2 sprays into the nostril(s) as directed by provider Daily As Needed for Rhinitis or Allergies., Disp: 16 g, Rfl: 5  •  fluticasone (Flovent HFA) 110 MCG/ACT inhaler, Inhale 1 puff 2 (Two) Times a Day., Disp: , Rfl:   •  furosemide (Lasix) 40 MG tablet, Take 1 tablet by mouth Daily As Needed (edema or  soa)., Disp: 90 tablet, Rfl: 3  •  gabapentin (NEURONTIN) 800 MG tablet, Take 800 mg by mouth 3 (Three) Times a Day., Disp: , Rfl:   •  levothyroxine (SYNTHROID, LEVOTHROID) 137 MCG tablet, Take 1 tablet by mouth Every Morning., Disp: 90 tablet, Rfl: 3  •  metoprolol succinate XL (TOPROL-XL) 25 MG 24 hr tablet, Take 1 tablet by mouth every night at bedtime., Disp: , Rfl:   •  montelukast (SINGULAIR) 10 MG tablet, Take  by mouth Daily., Disp: , Rfl: 5  •  nystatin (MYCOSTATIN) 100,000 unit/mL suspension, Swish and swallow 5 mL 3 (Three) Times a Day., Disp: 150 mL, Rfl: 0  •  permethrin (Elimite) 5 % cream, Apply  topically to the appropriate area as directed 1 (One) Time for 1 dose., Disp: 60 g, Rfl: 1  •  pilocarpine (SALAGEN) 5 MG tablet, Take 1 tablet by mouth 3 (Three) Times a Day., Disp: 90 tablet, Rfl: 2  •  polyethylene glycol (GoLYTELY) 236 g solution, Take as directed by office instructions., Disp: 4000 mL, Rfl: 0  •  rOPINIRole (REQUIP) 3 MG tablet, 2 tablets each morning and 3 tablets at bedtime., Disp: 450 tablet, Rfl: 3  •  sucralfate (CARAFATE) 1 GM/10ML suspension, Take 10 mL by mouth 4 (Four) Times a Day With Meals & at Bedtime., Disp: 420 mL, Rfl: 1  •  triamcinolone (KENALOG) 0.1 % paste, Apply  to teeth See Admin Instructions., Disp: 5 g, Rfl: 2  •  mometasone (Elocon) 0.1 % cream, Apply 1 application topically to the appropriate area as directed Daily., Disp: 45 g, Rfl: 0    OBJECTIVE:  Visit Vitals  /72 (BP Location: Right arm, Patient Position: Sitting, Cuff Size: Adult)   Pulse 72   LMP  (LMP Unknown)   SpO2 91%      Physical Exam  Vitals and nursing note reviewed.   Constitutional:       General: She is not in acute distress.     Appearance: Normal appearance. She is not ill-appearing, toxic-appearing or diaphoretic.   HENT:      Head: Normocephalic and atraumatic.   Pulmonary:      Effort: Pulmonary effort is normal. No respiratory distress.   Abdominal:      Palpations: Abdomen is  soft.      Tenderness: There is no abdominal tenderness.   Skin:     General: Skin is warm and dry.             Comments: Left anterior ankle, extending onto lower left shin, there are multiple pinpoint raised papules that are not in any particular pattern.  They do appear to be bites.  No evidence of secondary infection.   Neurological:      Mental Status: She is alert.   Psychiatric:         Mood and Affect: Mood normal.         Behavior: Behavior normal.         Thought Content: Thought content normal.         Judgment: Judgment normal.         [facility]    Assessment/Plan    Diagnoses and all orders for this visit:    1. 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  Dispense: 60 g; Refill: 1    2. Dermatitis  -     mometasone (Elocon) 0.1 % cream; Apply 1 application topically to the appropriate area as directed Daily.  Dispense: 45 g; Refill: 0    3. Urinary incontinence, unspecified type  -     Urinalysis With Culture If Indicated - Urine, Clean Catch; Future  -     Ambulatory Referral to Urology    The Elimite cream appears to work for her when she gets these itchy areas of rash.  The rash does resemble bites/scabies lesions, though I do not see tracking yet.  I also gave her some Elocon for the itching.  I told her I am concerned about her using the Elimite cream so often and have asked her to follow up with dermatology the next time she gets a rash to see if they can confirm the presence of mites.  We do not have a microscope here.  She says she will do this.      Will check a urinalysis and refer to urology for the incontinence she has developed over the last year.    Education materials and an After Visit Summary were printed and given to the patient at discharge.  Return for Next scheduled follow up.         Julisa Donovan, ERNESTO   11:43 CST  3/11/2022

## 2022-03-14 ENCOUNTER — OFFICE VISIT (OUTPATIENT)
Dept: OBGYN CLINIC | Age: 67
End: 2022-03-14
Payer: MEDICARE

## 2022-03-14 VITALS
SYSTOLIC BLOOD PRESSURE: 138 MMHG | HEART RATE: 78 BPM | DIASTOLIC BLOOD PRESSURE: 79 MMHG | WEIGHT: 293 LBS | HEIGHT: 64 IN | BODY MASS INDEX: 50.02 KG/M2

## 2022-03-14 DIAGNOSIS — Z78.0 POSTMENOPAUSAL: ICD-10-CM

## 2022-03-14 DIAGNOSIS — Z01.419 ENCOUNTER FOR ROUTINE GYNECOLOGIC EXAMINATION IN MEDICARE PATIENT: Primary | ICD-10-CM

## 2022-03-14 DIAGNOSIS — Z12.4 SCREENING FOR CERVICAL CANCER: ICD-10-CM

## 2022-03-14 DIAGNOSIS — Z12.31 ENCOUNTER FOR SCREENING MAMMOGRAM FOR MALIGNANT NEOPLASM OF BREAST: ICD-10-CM

## 2022-03-14 DIAGNOSIS — N39.41 URGE INCONTINENCE: ICD-10-CM

## 2022-03-14 DIAGNOSIS — Z12.39 SCREENING BREAST EXAMINATION: ICD-10-CM

## 2022-03-14 PROCEDURE — 3017F COLORECTAL CA SCREEN DOC REV: CPT | Performed by: NURSE PRACTITIONER

## 2022-03-14 PROCEDURE — G0101 CA SCREEN;PELVIC/BREAST EXAM: HCPCS | Performed by: NURSE PRACTITIONER

## 2022-03-14 PROCEDURE — 4040F PNEUMOC VAC/ADMIN/RCVD: CPT | Performed by: NURSE PRACTITIONER

## 2022-03-14 PROCEDURE — 1036F TOBACCO NON-USER: CPT | Performed by: NURSE PRACTITIONER

## 2022-03-14 PROCEDURE — G8427 DOCREV CUR MEDS BY ELIG CLIN: HCPCS | Performed by: NURSE PRACTITIONER

## 2022-03-14 PROCEDURE — G8417 CALC BMI ABV UP PARAM F/U: HCPCS | Performed by: NURSE PRACTITIONER

## 2022-03-14 PROCEDURE — 0509F URINE INCON PLAN DOCD: CPT | Performed by: NURSE PRACTITIONER

## 2022-03-14 PROCEDURE — G8399 PT W/DXA RESULTS DOCUMENT: HCPCS | Performed by: NURSE PRACTITIONER

## 2022-03-14 PROCEDURE — G8484 FLU IMMUNIZE NO ADMIN: HCPCS | Performed by: NURSE PRACTITIONER

## 2022-03-14 PROCEDURE — 1123F ACP DISCUSS/DSCN MKR DOCD: CPT | Performed by: NURSE PRACTITIONER

## 2022-03-14 PROCEDURE — 1090F PRES/ABSN URINE INCON ASSESS: CPT | Performed by: NURSE PRACTITIONER

## 2022-03-14 RX ORDER — OXYBUTYNIN CHLORIDE 5 MG/1
5 TABLET, EXTENDED RELEASE ORAL DAILY
Qty: 30 TABLET | Refills: 3 | Status: SHIPPED | OUTPATIENT
Start: 2022-03-14 | End: 2022-07-26

## 2022-03-14 ASSESSMENT — ENCOUNTER SYMPTOMS
EYES NEGATIVE: 1
DIARRHEA: 0
GASTROINTESTINAL NEGATIVE: 1
RESPIRATORY NEGATIVE: 1
CONSTIPATION: 0
ALLERGIC/IMMUNOLOGIC NEGATIVE: 1

## 2022-03-14 NOTE — PROGRESS NOTES
Stepan Simental is a 74 y.o. female who presents today for her medical conditions/ complaints as noted below. Stepan Simental is c/o of Gynecologic Exam        HPI   Pt presents for annual exam. Normal pap in . PCP draws labs and manages meds. Having some incontinence. Leaks throughout the day and at night. Has to wear depends pads. Mammo:2022  Pap smear:  Contraception:postmenopausal     P:2  Ab:0  Bone density:  Colonoscopy:  No LMP recorded.  Patient is postmenopausal.  [id number]    Past Medical History:   Diagnosis Date    Abdominal adhesions 2016    Anxiety     Arthritis     Atrial fibrillation (HCC)     Atrial fibrillation and flutter (HCC) 2015    BiPAP (biphasic positive airway pressure) dependence     Chronic back pain     Chronic cough     Depression     Edema     Fibrocystic breast     GERD (gastroesophageal reflux disease)     Glossitis     Headache(784.0)     Heart burn     Heart disease     Hypertension     Hypothyroidism     Mouth sore     MRSA (methicillin resistant staph aureus) culture positive 2014    nasal    Numbness     toes    Obstructive sleep apnea     BIPAP    DEVEN (obstructive sleep apnea)     Peripheral neuropathy     PONV (postoperative nausea and vomiting)     Prolonged emergence from general anesthesia     Recurrent ventral incisional hernia 2016    Sleep apnea     Stroke ([address]     Stroke-like symptom     [facility]     SVT [facility]supraventricular tachycardia) ([facility])     Swelling of extremity     Unspecified sleep apnea     clinicallybi-pap    Ventricular tachyarrhythmia ([address] 2015    svt     Past Surgical History:   Procedure Laterality Date    CARDIAC CATHETERIZATION  8/18/15  JDT    EF 50%    CARDIAC SURGERY      CATARACT REMOVAL      CHOLECYSTECTOMY  14    BY Dr. Henok Mathias      CYST INCISION AND DRAINAGE  2017    FINGER TRIGGER RELEASE      FOOT SURGERY Left     removal of two screws    HAMMER TOE SURGERY      HARDWARE REMOVAL FOOT / ANKLE Left 8/16/2016    FOOT HARDWARE REMOVAL - DEEP / 2nd METATARSAL HEAD RESECTION performed by Jennifer Almanza DPM at [facility]  1/12/2015    x3    KNEE SURGERY      Took prostetic joint out because of infection and put a spacer in    OTHER SURGICAL HISTORY  02/2017    pilonidal cyst-Dr Bina Kim    [place] [facility] Right 7/3/2017    KNEE ARTHROSCOPY PARTIAL MEDIAL MENISECTOMY performed by Momo Jimenez MD at [facility]. 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   Substance Use Topics    Alcohol use: No       Current Outpatient Medications   Medication Sig Dispense Refill    oxybutynin (DITROPAN XL) 5 MG extended release tablet Take 1 tablet by mouth daily 30 tablet 3    gabapentin (NEURONTIN) 800 MG tablet TAKE 1 TABLET BY MOUTH THREE TIMES DAILY 90 tablet 5    clopidogrel (PLAVIX) 75 MG tablet Take 75 mg by mouth daily      sertraline (ZOLOFT) 50 MG tablet 50 mg      DULoxetine (CYMBALTA) 60 MG extended release capsule TAKE 1 CAPSULE BY MOUTH TWICE DAILY 180 capsule 3    amitriptyline (ELAVIL) 25 MG tablet TAKE 2 TABLETS BY MOUTH EVERY NIGHT 180 tablet 3    nystatin-triamcinolone (MYCOLOG II) 301645-5.1 UNIT/GM-% cream Apply topically 2 times daily.  1 Tube 1    triamcinolone acetonide (KENALOG) 0.1 % paste APPLY TWICE DAILY AS NEEDED TO TEETH 5 g 0    levothyroxine (SYNTHROID) 137 MCG tablet Take 137 mcg by mouth Daily      DEXILANT 60 MG CPDR delayed release capsule Take 60 mg by mouth daily       alendronate (FOSAMAX) 70 MG tablet TK 1 T PO ONCE A WEEK IN THE MORNING 30 MINUTES BEFORE FIRST MEAL OF THE DAY  3    flecainide (TAMBOCOR) 100 MG tablet TAKE 1 TABLET BY MOUTH TWICE DAILY 180 tablet 3    metoprolol tartrate (LOPRESSOR) 25 MG tablet TAKE 1/2 TABLET BY MOUTH TWICE DAILY 90 tablet 3    diclofenac (VOLTAREN) 50 MG EC tablet Take 50 mg by mouth daily      LORazepam (ATIVAN) 1 MG tablet Take 1 mg by mouth every 8 hours as needed . 2    rOPINIRole (REQUIP) 3 MG tablet Take 3 mg by mouth 2 times daily 2 tabs in AM and 3 tabs in PM       fluticasone (FLONASE) 50 MCG/ACT nasal spray 2 sprays by Nasal route as needed       famciclovir (FAMVIR) 500 MG tablet   Take 500 mg by mouth 2 times daily        No current facility-administered medications for this visit. Allergies   Allergen Reactions    Azithromycin     Codeine Nausea Only     Rapid heart rate, dizziness    Mobic [Meloxicam] Rash     Arms and legs bumpy red rash    Morphine And Related Itching     Vitals:    03/14/22 1432   BP: 138/79   Pulse: 78     Body mass index is 51.32 kg/m². Review of Systems   Constitutional: Negative. HENT: Negative. Eyes: Negative. Respiratory: Negative. Cardiovascular: Negative. Gastrointestinal: Negative. Negative for constipation and diarrhea. Endocrine: Negative. Genitourinary: Positive for enuresis. Negative for frequency, menstrual problem and urgency. Musculoskeletal: Negative. Skin: Negative. Allergic/Immunologic: Negative. Neurological: Negative. Hematological: Negative. Psychiatric/Behavioral: Negative. All other systems reviewed and are negative. Physical Exam  Vitals and nursing note reviewed.    Constitutional:       Appearance: She is well-developed. HENT:      Head: Normocephalic. Neck:      Thyroid: No thyroid mass or thyromegaly. Cardiovascular:      Rate and Rhythm: Normal rate and regular rhythm. Pulmonary:      Effort: Pulmonary effort is normal.      Breath sounds: Normal breath sounds. Chest:   Breasts:      Right: No inverted nipple, mass, nipple discharge or skin change. Left: No inverted nipple, mass, nipple discharge or skin change. Abdominal:      Palpations: Abdomen is soft. There is no mass. Tenderness: There is no abdominal tenderness. Genitourinary:     General: Normal vulva. Vagina: Normal.      Cervix: No cervical motion tenderness. Uterus: Normal. Not enlarged. Adnexa:         Right: No mass or tenderness. Left: No mass or tenderness. Comments: Atrophic vaginal changes  Exam difficult d/t pt body habitus  Musculoskeletal:         General: Normal range of motion. Cervical back: Normal range of motion and neck supple. Skin:     General: Skin is warm and dry. Neurological:      Mental Status: She is alert and oriented to person, place, and time. Psychiatric:         Attention and Perception: Attention normal.         Mood and Affect: Mood normal.         Speech: Speech normal.         Behavior: Behavior normal.         Thought Content: Thought content normal.         Cognition and Memory: Cognition normal.         Judgment: Judgment normal.          Diagnosis Orders   1. Encounter for routine gynecologic examination in Medicare patient     2. Screening for cervical cancer     3. Encounter for screening mammogram for malignant neoplasm of breast  CHONG DIGITAL SCREEN W OR WO CAD BILATERAL   4. Screening breast examination     5. Postmenopausal     6.  Urge incontinence  oxybutynin (DITROPAN XL) 5 MG extended release tablet       MEDICATIONS:  Orders Placed This Encounter   Medications    oxybutynin (DITROPAN XL) 5 MG extended release tablet     Sig: Take 1 tablet by mouth daily     Dispense:  30 tablet     Refill:  3       ORDERS:  Orders Placed This Encounter   Procedures    CHONG DIGITAL SCREEN W OR WO CAD BILATERAL       PLAN:  Pap deferred  Ordered screening mammogram  Starting Ditropan for incontinence      Patient Instructions   Patient Education        Breast Self-Exam: Care Instructions  Your Care Instructions     A breast self-exam is when you check your breasts for lumps or changes. This regular exam helps you learn how your breasts normally look and feel. Most breast problems or changes are not because of cancer. Breast self-exam is not a substitute for a mammogram. Having regular breast exams by your doctor and regular mammograms improve your chances of finding any problems with your breasts. Some women set a time each month to do a step-by-step breast self-exam. Other women like a less formal system. They might look at their breasts as they brush their teeth, or feel their breasts once in a while in the shower. If you notice a change in your breast, tell your doctor. Follow-up care is a key part of your treatment and safety. Be sure to make and go to all appointments, and call your doctor if you are having problems. It's also a good idea to know your test results and keep a list of the medicines you take. How do you do a breast self-exam?  · The best time to examine your breasts is usually one week after your menstrual period begins. Your breasts should not be tender then. If you do not have periods, you might do your exam on a day of the month that is easy to remember. · To examine your breasts:  ? Remove all your clothes above the waist and lie down. When you are lying down, your breast tissue spreads evenly over your chest wall, which makes it easier to feel all your breast tissue. ?  Use the pads--not the fingertips--of the 3 middle fingers of your left hand to check your right breast. Move your fingers slowly in small coin-sized circles that overlap. ? Use three levels of pressure to feel of all your breast tissue. Use light pressure to feel the tissue close to the skin surface. Use medium pressure to feel a little deeper. Use firm pressure to feel your tissue close to your breastbone and ribs. Use each pressure level to feel your breast tissue before moving on to the next spot. ? Check your entire breast, moving up and down as if following a strip from the collarbone to the bra line, and from the armpit to the ribs. Repeat until you have covered the entire breast.  ? Repeat this procedure for your left breast, using the pads of the 3 middle fingers of your right hand. · To examine your breasts while in the shower:  ? Place one arm over your head and lightly soap your breast on that side. ? Using the pads of your fingers, gently move your hand over your breast (in the strip pattern described above), feeling carefully for any lumps or changes. ? Repeat for the other breast.  · Have your doctor inspect anything you notice to see if you need further testing. Where can you learn more? Go to https://Scriptick.MyDeals.com. org and sign in to your [employer] account. Enter P148 in the [employer] box to learn more about \"Breast Self-Exam: Care Instructions. \"     If you do not have an account, please click on the \"Sign Up Now\" link. Current as of: September 8, 2021               Content Version: 13.1  © 2006-2021 Healthwise, Incorporated. Care instructions adapted under license by [facility] ([facility]). If you have questions about a medical condition or this instruction, always ask your healthcare professional. [place] [address] any warranty or liability for your use of this information.

## 2022-03-14 NOTE — PATIENT INSTRUCTIONS
Patient Education        Breast Self-Exam: Care Instructions  Your Care Instructions     A breast self-exam is when you check your breasts for lumps or changes. This regular exam helps you learn how your breasts normally look and feel. Most breast problems or changes are not because of cancer. Breast self-exam is not a substitute for a mammogram. Having regular breast exams by your doctor and regular mammograms improve your chances of finding any problems with your breasts. Some women set a time each month to do a step-by-step breast self-exam. Other women like a less formal system. They might look at their breasts as they brush their teeth, or feel their breasts once in a while in the shower. If you notice a change in your breast, tell your doctor. Follow-up care is a key part of your treatment and safety. Be sure to make and go to all appointments, and call your doctor if you are having problems. It's also a good idea to know your test results and keep a list of the medicines you take. How do you do a breast self-exam?  · The best time to examine your breasts is usually one week after your menstrual period begins. Your breasts should not be tender then. If you do not have periods, you might do your exam on a day of the month that is easy to remember. · To examine your breasts:  ? Remove all your clothes above the waist and lie down. When you are lying down, your breast tissue spreads evenly over your chest wall, which makes it easier to feel all your breast tissue. ? Use the pads--not the fingertips--of the 3 middle fingers of your left hand to check your right breast. Move your fingers slowly in small coin-sized circles that overlap. ? Use three levels of pressure to feel of all your breast tissue. Use light pressure to feel the tissue close to the skin surface. Use medium pressure to feel a little deeper. Use firm pressure to feel your tissue close to your breastbone and ribs.  Use each pressure level to feel your breast tissue before moving on to the next spot. ? Check your entire breast, moving up and down as if following a strip from the collarbone to the bra line, and from the armpit to the ribs. Repeat until you have covered the entire breast.  ? Repeat this procedure for your left breast, using the pads of the 3 middle fingers of your right hand. · To examine your breasts while in the shower:  ? Place one arm over your head and lightly soap your breast on that side. ? Using the pads of your fingers, gently move your hand over your breast (in the strip pattern described above), feeling carefully for any lumps or changes. ? Repeat for the other breast.  · Have your doctor inspect anything you notice to see if you need further testing. Where can you learn more? Go to https://[employer].pejaydeneb.Photoblog. org and sign in to your [employer] account. Enter P148 in the [employer] box to learn more about \"Breast Self-Exam: Care Instructions. \"     If you do not have an account, please click on the \"Sign Up Now\" link. Current as of: September 8, 2021               Content Version: 13.1  © 5093-0412 Healthwise, Incorporated. Care instructions adapted under license by [facility] ([facility]). If you have questions about a medical condition or this instruction, always ask your healthcare professional. [address]  any warranty or liability for your use of this information.

## 2022-03-14 NOTE — PROGRESS NOTES
Pt presents today for pap smear and breast exam.      Mammo:2022  Pap smear:  Contraception:postmenopausal     P:  Ab:   Bone density:  Colonoscopy:2008

## 2022-03-22 ENCOUNTER — OFFICE VISIT (OUTPATIENT)
Dept: NEUROLOGY | Age: 67
End: 2022-03-22
Payer: MEDICARE

## 2022-03-22 VITALS
WEIGHT: 293 LBS | RESPIRATION RATE: 18 BRPM | DIASTOLIC BLOOD PRESSURE: 73 MMHG | HEART RATE: 83 BPM | SYSTOLIC BLOOD PRESSURE: 119 MMHG | BODY MASS INDEX: 50.02 KG/M2 | HEIGHT: 64 IN

## 2022-03-22 DIAGNOSIS — R27.8 ASTERIXIS: ICD-10-CM

## 2022-03-22 DIAGNOSIS — R41.3 MEMORY LOSS: ICD-10-CM

## 2022-03-22 DIAGNOSIS — G60.3 IDIOPATHIC PROGRESSIVE NEUROPATHY: Primary | ICD-10-CM

## 2022-03-22 DIAGNOSIS — G47.33 OBSTRUCTIVE SLEEP APNEA: ICD-10-CM

## 2022-03-22 PROCEDURE — 1036F TOBACCO NON-USER: CPT | Performed by: PSYCHIATRY & NEUROLOGY

## 2022-03-22 PROCEDURE — G8417 CALC BMI ABV UP PARAM F/U: HCPCS | Performed by: PSYCHIATRY & NEUROLOGY

## 2022-03-22 PROCEDURE — 4040F PNEUMOC VAC/ADMIN/RCVD: CPT | Performed by: PSYCHIATRY & NEUROLOGY

## 2022-03-22 PROCEDURE — 1090F PRES/ABSN URINE INCON ASSESS: CPT | Performed by: PSYCHIATRY & NEUROLOGY

## 2022-03-22 PROCEDURE — 1123F ACP DISCUSS/DSCN MKR DOCD: CPT | Performed by: PSYCHIATRY & NEUROLOGY

## 2022-03-22 PROCEDURE — 99214 OFFICE O/P EST MOD 30 MIN: CPT | Performed by: PSYCHIATRY & NEUROLOGY

## 2022-03-22 PROCEDURE — G8427 DOCREV CUR MEDS BY ELIG CLIN: HCPCS | Performed by: PSYCHIATRY & NEUROLOGY

## 2022-03-22 PROCEDURE — 3017F COLORECTAL CA SCREEN DOC REV: CPT | Performed by: PSYCHIATRY & NEUROLOGY

## 2022-03-22 PROCEDURE — G8399 PT W/DXA RESULTS DOCUMENT: HCPCS | Performed by: PSYCHIATRY & NEUROLOGY

## 2022-03-22 PROCEDURE — G8484 FLU IMMUNIZE NO ADMIN: HCPCS | Performed by: PSYCHIATRY & NEUROLOGY

## 2022-03-22 RX ORDER — MONTELUKAST SODIUM 10 MG/1
TABLET ORAL
COMMUNITY
Start: 2020-04-25

## 2022-03-22 RX ORDER — LEVOCETIRIZINE DIHYDROCHLORIDE 5 MG/1
5 TABLET, FILM COATED ORAL NIGHTLY
COMMUNITY

## 2022-03-22 RX ORDER — ASPIRIN 81 MG/1
81 TABLET, CHEWABLE ORAL DAILY
COMMUNITY

## 2022-03-29 DIAGNOSIS — K11.7 XEROSTOMIA: ICD-10-CM

## 2022-03-29 DIAGNOSIS — Z01.818 OTHER SPECIFIED PRE-OPERATIVE EXAMINATION: Primary | ICD-10-CM

## 2022-03-29 RX ORDER — PILOCARPINE HYDROCHLORIDE 5 MG/1
5 TABLET, FILM COATED ORAL 3 TIMES DAILY
Qty: 90 TABLET | Refills: 2 | Status: SHIPPED | OUTPATIENT
Start: 2022-03-29 | End: 2022-06-06

## 2022-03-30 ENCOUNTER — TELEPHONE (OUTPATIENT)
Dept: GASTROENTEROLOGY | Facility: CLINIC | Age: 67
End: 2022-03-30

## 2022-03-30 NOTE — TELEPHONE ENCOUNTER
Patient called to cancel EGD/Colon scheduled on April 5 states she isn't having problems with reflux it's under control and she's decided to do Cologuard test with her pcp

## 2022-04-08 DIAGNOSIS — R60.0 BILATERAL EDEMA OF LOWER EXTREMITY: ICD-10-CM

## 2022-04-08 RX ORDER — FUROSEMIDE 40 MG/1
40 TABLET ORAL DAILY
Qty: 90 TABLET | Refills: 3 | Status: SHIPPED | OUTPATIENT
Start: 2022-04-08

## 2022-05-09 RX ORDER — FAMCICLOVIR 500 MG/1
500 TABLET ORAL 2 TIMES DAILY
Qty: 60 TABLET | Refills: 2 | Status: SHIPPED | OUTPATIENT
Start: 2022-05-09

## 2022-05-11 RX ORDER — AMITRIPTYLINE HYDROCHLORIDE 25 MG/1
TABLET, FILM COATED ORAL
Qty: 180 TABLET | Refills: 3 | Status: SHIPPED | OUTPATIENT
Start: 2022-05-11 | End: 2022-09-27 | Stop reason: SDUPTHER

## 2022-05-11 NOTE — TELEPHONE ENCOUNTER
Requested Prescriptions     Pending Prescriptions Disp Refills    amitriptyline (ELAVIL) 25 MG tablet [Pharmacy Med Name: AMITRIPTYLINE 25MG TABLETS] 180 tablet 3     Sig: TAKE 2 TABLETS BY MOUTH EVERY NIGHT       Last Office Visit: 3/22/2022  Next Office Visit: 9/27/2022  Last Medication Refill: 4/26/2021

## 2022-05-23 PROBLEM — G47.33 SLEEP APNEA, OBSTRUCTIVE: Status: RESOLVED | Noted: 2017-04-04 | Resolved: 2022-05-23

## 2022-05-23 PROBLEM — R91.8 LUNG NODULES: Status: ACTIVE | Noted: 2022-05-23

## 2022-05-25 RX ORDER — TRIAMCINOLONE ACETONIDE 0.1 %
PASTE (GRAM) DENTAL TAKE AS DIRECTED
Qty: 5 G | Refills: 2 | Status: SHIPPED | OUTPATIENT
Start: 2022-05-25

## 2022-05-31 ENCOUNTER — TELEPHONE (OUTPATIENT)
Dept: PULMONOLOGY | Facility: CLINIC | Age: 67
End: 2022-05-31

## 2022-06-02 ENCOUNTER — HOSPITAL ENCOUNTER (OUTPATIENT)
Dept: PULMONOLOGY | Facility: HOSPITAL | Age: 67
Discharge: HOME | End: 2022-06-02
Admitting: NURSE PRACTITIONER

## 2022-06-02 LAB
A-A DO2: 21.8 MMHG
ARTERIAL PATENCY WRIST A: ABNORMAL
ATMOSPHERIC PRESS: 748 MMHG
BASE EXCESS BLDA CALC-SCNC: 4.4 MMOL/L (ref 0–2)
BDY SITE: ABNORMAL
BODY TEMPERATURE: 37 C
COHGB MFR BLD: 1.6 % (ref 0–5)
HCO3 BLDA-SCNC: 30.6 MMOL/L (ref 20–26)
HCT VFR BLD CALC: 42.2 % (ref 38–51)
HGB BLDA-MCNC: 13.8 G/DL (ref 12–16)
Lab: ABNORMAL
METHGB BLD QL: 0.3 % (ref 0–3)
MODALITY: ABNORMAL
NOTE: ABNORMAL
OXYHGB MFR BLDV: 92.3 % (ref 94–99)
PCO2 BLDA: 50.8 MM HG (ref 35–45)
PCO2 TEMP ADJ BLD: 50.8 MM HG (ref 35–45)
PH BLDA: 7.39 PH UNITS (ref 7.35–7.45)
PH, TEMP CORRECTED: 7.39 PH UNITS (ref 7.35–7.45)
PO2 BLDA: 68 MM HG (ref 83–108)
PO2 TEMP ADJ BLD: 68 MM HG (ref 83–108)
POTASSIUM BLDA-SCNC: 3.8 MMOL/L (ref 3.5–5.2)
SAO2 % BLDCOA: 94 % (ref 94–99)
SODIUM BLDA-SCNC: 143 MMOL/L (ref 136–145)
VENTILATOR MODE: ABNORMAL

## 2022-06-02 PROCEDURE — 36600 WITHDRAWAL OF ARTERIAL BLOOD: CPT

## 2022-06-02 PROCEDURE — 83050 HGB METHEMOGLOBIN QUAN: CPT

## 2022-06-02 PROCEDURE — 82805 BLOOD GASES W/O2 SATURATION: CPT

## 2022-06-02 PROCEDURE — 82375 ASSAY CARBOXYHB QUANT: CPT

## 2022-06-03 ENCOUNTER — TELEPHONE (OUTPATIENT)
Dept: VASCULAR SURGERY | Facility: CLINIC | Age: 67
End: 2022-06-03

## 2022-06-06 ENCOUNTER — HOSPITAL ENCOUNTER (OUTPATIENT)
Dept: ULTRASOUND IMAGING | Facility: HOSPITAL | Age: 67
Discharge: HOME | End: 2022-06-06
Admitting: SURGERY

## 2022-06-06 ENCOUNTER — OFFICE VISIT (OUTPATIENT)
Dept: VASCULAR SURGERY | Facility: CLINIC | Age: 67
End: 2022-06-06

## 2022-06-06 VITALS
HEIGHT: 64 IN | BODY MASS INDEX: 49.68 KG/M2 | WEIGHT: 291 LBS | SYSTOLIC BLOOD PRESSURE: 120 MMHG | DIASTOLIC BLOOD PRESSURE: 66 MMHG | OXYGEN SATURATION: 96 % | HEART RATE: 83 BPM | RESPIRATION RATE: 18 BRPM

## 2022-06-06 DIAGNOSIS — E78.2 MIXED HYPERLIPIDEMIA: ICD-10-CM

## 2022-06-06 DIAGNOSIS — I10 ESSENTIAL HYPERTENSION: ICD-10-CM

## 2022-06-06 DIAGNOSIS — I65.23 BILATERAL CAROTID ARTERY STENOSIS: Primary | ICD-10-CM

## 2022-06-06 DIAGNOSIS — I65.23 CAROTID STENOSIS, ASYMPTOMATIC, BILATERAL: ICD-10-CM

## 2022-06-06 DIAGNOSIS — E66.01 MORBID OBESITY WITH BMI OF 45.0-49.9, ADULT: ICD-10-CM

## 2022-06-06 PROBLEM — I44.4 LEFT ANTERIOR FASCICULAR BLOCK: Status: ACTIVE | Noted: 2022-06-06

## 2022-06-06 PROBLEM — E55.9 VITAMIN D DEFICIENCY: Status: ACTIVE | Noted: 2022-06-06

## 2022-06-06 PROCEDURE — 93880 EXTRACRANIAL BILAT STUDY: CPT | Performed by: SURGERY

## 2022-06-06 PROCEDURE — 93880 EXTRACRANIAL BILAT STUDY: CPT

## 2022-06-06 PROCEDURE — 99214 OFFICE O/P EST MOD 30 MIN: CPT | Performed by: SURGERY

## 2022-06-06 RX ORDER — LORAZEPAM 1 MG/1
1 TABLET ORAL 2 TIMES DAILY PRN
COMMUNITY
Start: 2022-03-29

## 2022-06-06 RX ORDER — FLECAINIDE ACETATE 50 MG/1
50 TABLET ORAL EVERY 12 HOURS
COMMUNITY
Start: 2022-04-24

## 2022-06-06 RX ORDER — OXYBUTYNIN CHLORIDE 5 MG/1
5 TABLET, EXTENDED RELEASE ORAL DAILY
COMMUNITY
Start: 2022-03-14

## 2022-06-06 RX ORDER — ROSUVASTATIN CALCIUM 5 MG/1
1 TABLET, COATED ORAL DAILY
COMMUNITY
Start: 2022-05-31

## 2022-06-06 NOTE — PROGRESS NOTES
" Sophia Valero, MSN, APRN, NP-C, CESAR, CFRN  [facility]  [address] [place] [address]  Phone: [phone]  Fax: [phone]          Chief Complaint  chronic bronchitis, unspecified type    Subjective    History of Present Illness    Danisha Cannon presents to [facility]   History of Present Illness   The patient presents today for f/u chronic bronchitis.  Pt with known afib, morbid obesity/deconditioning, chronic bronchitis, TRSITON, chronic respiratory failure with hypoxemia/hypercapnia, restrictive lung disease, lung nodules, and allergic rhinitis.  The patient is accompanied by her spouse.  She ambulates with a Rollator.  The patient reports a recent visit with Dr. Stern.  She states that she has \"more blockage and may need surgery.\"  Reviewed with the patient results of ABG and overnights.  ABG from June 2, 2022 on room air showed a pH 7.38, PCO2 50.8, PO2 68.0, HCO3 30.6.  Overnight from January 10, 2022 completed on CPAP +3 L showed 165.4 minutes of time less than 88% and 250 desaturation events.  Patient has failed Pap therapy and would benefit from home trilogy.  The patient is agreeable.  She denies fevers, chills, cough with purulent sputum.  No other aggravating or alleviating factors.     Objective   Vital Signs:   /86   Pulse 71   Ht 162.6 cm (64\")   Wt 131 kg (289 lb)   SpO2 92% Comment: RA  BMI 49.61 kg/m²     Physical Exam  Vitals reviewed.   Constitutional:       General: She is not in acute distress.     Appearance: She is well-developed. She is morbidly obese.      Interventions: Face mask in place.   HENT:      Head: Normocephalic and atraumatic.   Eyes:      General: No scleral icterus.     Conjunctiva/sclera: Conjunctivae normal.      Pupils: Pupils are equal, round, and reactive to light.   Cardiovascular:      Rate and Rhythm: Normal rate.   Pulmonary:      Effort: Pulmonary effort is normal. " No respiratory distress.      Breath sounds: Normal breath sounds. No wheezing or rales.   Musculoskeletal:         General: Normal range of motion.      Cervical back: Normal range of motion and neck supple.   Skin:     General: Skin is warm and dry.   Neurological:      Mental Status: She is alert and oriented to person, place, and time.   Psychiatric:         Behavior: Behavior normal.         Thought Content: Thought content normal.         Judgment: Judgment normal.        Result Review :  The following data was reviewed by: ERNESTO Stroud on 06/07/2022:    CT Chest Hi Resolution Diagnostic (11/16/2021 14:06)  IMPRESSION:  Negative high-resolution CT evaluation for interstitial lung  disease. There is a tiny nodule within each lung, measuring 2 mm in the  left lower lobe and 3 mm in the medial basal segment of the right lower  lobe, these have a benign appearance.    Blood Gas, Arterial With Co-Ox (06/02/2022 16:47)    PULMONARY RESULTS - SCAN - PULSE OXIMETRY, OVERNIGHT-VIRTUOX-01/07/22 (01/07/2022)    PULMONARY RESULTS - SCAN - PULSE OXIMETRY, OVERNIGHT-VIRTUOX-11/23/21 (11/23/2021)    Rest/Exercise Pulse Ox Values        Some values may be hidden. Unless noted otherwise, only the newest values recorded on each date are displayed.         Rest/Exercise Pulse Ox Results 10/4/21   Rest room air SAT % 90   Exercise room air SAT % 85   Rest on O2 @ Liters 2   Rest on O2 SAT % 96   Exercise on O2 @ Liters 2   Exercise on O2 SAT % 92           PFT Values        Some values may be hidden. Unless noted otherwise, only the newest values recorded on each date are displayed.         Old Values PFT Results 11/23/21   No data to display.      Pre Drug PFT Results 11/23/21   FVC 73   FEV1 81   FEF 25-75% 139   FEV1/FVC 86.84      Post Drug PFT Results 11/23/21   No data to display.      Other Tests PFT Results 11/23/21   TLC 85   RV 77   DLCO 96   D/VAsb 118             Results for orders placed in visit on  21    Pulmonary Function Test    Narrative  Pulmonary Function Test  Performed by: Francisca Villagomez, RRT  Authorized by: Sophia Valero APRN    Pre Drug % Predicted  FVC: 73%  FEV1: 81%  FEF 25-75%: 139%  FEV1/FVC: 86.84%  T%  RV: 77%  DLCO: 96%  D/VAsb: 118%    Interpretation  Spirometry  Spirometry shows moderate restriction. midflow is normal.  Review of FVL curve  Patient's effort is normal.  Lung Volume Measurements  Measurements show reduced lung volumes consistent with restriction.  Diffusion Capacity  The patient's diffusion capacity is normal.  Diffusion capacity is normal when corrected for alveolar volume.      Results for orders placed during the hospital encounter of 19    Full Pulmonary Function Test With Bronchodilator & ABG    [facility] - Pulmonary Function Test    [address]  [place]  [place]  [address]  [phone]    Patient : Danisha Cannon  MRN : [id number]  CSN : [id number]  Pulmonologist : Ryan Keith MD  Date : 2019    ______________________________________________________________________    Interpretation :  1.  Spirometry reveals a borderline low forced vital capacity and otherwise are within normal limits.  2.  Lung volumes also reveal a borderline low vital capacity and a decrease in expiratory reserve volume but otherwise are within normal limits.  3.  Diffusion capacity is within normal limits and when corrected for alveolar volume is supranormal.        Ryan Keith MD      Results for orders placed during the hospital encounter of 19    Full Pulmonary Function Test With Bronchodilator & ABG    [facility] - Pulmonary Function Test    [address]  [place]  [place]  [address]  [phone]    Patient : Danisha Cannon  MRN : [id number]  CSN : [id number]  Pulmonologist : Ryan Keith MD  Date :  3/13/2019    ______________________________________________________________________    Interpretation :  1.  Spirometry is within normal limits.  2.  Lung volumes are within normal limits.  3.  There is a moderate diffusion impairment which when corrected for alveolar volume is normalized.      Ryan Keith MD           Assessment and Plan  Diagnoses and all orders for this visit:    1. Chronic bronchitis, unspecified chronic bronchitis type (HCC) (Primary)  Overview:  Mildly low IGE, mildly elevated IGG subclass 1  +eos 410-5/11/21    flovent HFA-110, albuterol HFA        2. Obstructive sleep apnea  Overview:  The patient has failed Pap therapy.  Will place order for trilogy.    Orders:  -     DME NIPPV - IPPV/BiPAP/CPAP    3. Allergic rhinitis, unspecified seasonality, unspecified trigger  Overview:  Azelastine, flonase, singulair    Not candidate for immunotherapy per Dr. Angel as the patient is on a beta blocker.      4. Chronic respiratory failure with hypoxia and hypercapnia (HCC)  Overview:  Per ABG and overnight pulse ox the patient has failed Pap therapy.  I recommend home trilogy.    Assessment & Plan:  Patient requires frequent use of NIMV. Intermittent support will not be sufficient. I am ordering nocturnal and daytime volume ventilation to reduce the risk of exacerbation.   The individual cannot maintain spontaneous ventilation for four or more consecutive hours without the assistance of a device. Bi-level (or inferior choice) was considered however I feel that this would be insufficient given the severity of disease, lack of backup battery and continuous alarms that would be required, which are not provided by a bi-level device.  Patient's condition is of severity where back-up battery and continuous alarms are required, which are not provided by bi-level devices.  Interruption of failure to provide NIMV would quickly lead to exacerbation of patient's condition, hospital readmission, and  likely harm the patient.  Primary cause of hypercapnia is restrictive lung disease, morbid obesity, and obstructive sleep apnea.  Continued use is preferred.        Orders:  -     DME NIPPV - IPPV/BiPAP/CPAP    5. Restrictive lung disease  Overview:  Likely secondary to morbid obesity and obstructive sleep apnea    Orders:  -     DME NIPPV - IPPV/BiPAP/CPAP    6. Lung nodules  Overview:  HRCT 11/16/21 - 2mm LLL, 3mm RLL  Consider f/u CT 11/2022    Assessment & Plan:  Place follow-up CT order at next office visit.      7. Gastroesophageal reflux disease, unspecified whether esophagitis present  Overview:  PPI- Dexilant      8. Morbid obesity with BMI of 45.0-49.9, adult ([facility])  Assessment & Plan:  BMI 49.61 today.  Recommend weight loss as this can help improve pulmonary function.        Follow Up  Sophia Valero, ERNESTO  6/7/2022  15:23 CDT  Return in about 2 months (around 8/7/2022).  Patient was given instructions and counseling regarding her condition or for health maintenance advice. Please see specific information pulled into the AVS if appropriate.   Please note that portions of this note were completed with a voice recognition program.

## 2022-06-06 NOTE — PROGRESS NOTES
06/06/2022      Tc Bee DO  [address] [place] [address]   [place] [address]        Danisha Cannon  1955    Chief Complaint   Patient presents with   • Carotid Artery Disease     6 month follow-up with testing.  Pt denies any stroke-like symptoms.       Dear Tc Bee DO:    Carotid Artery Disease  Pertinent negatives include no abdominal pain, chest pain, chills, congestion, diaphoresis, fatigue, fever, nausea, sore throat or vomiting.       I had the pleasure of seeing your patient in the office today for follow up.  As you recall, the patient is a 66 y.o. female who we are currently following a previous admission due to a left thigh hematoma back in April of this year.  Patient has a history of chronic anticoagulation due to A. fib and was actually seen here at this office back in late 2018, and in early 2019 after developing a right thigh hematoma after local trauma.  Initially we had been trying to manage it conservatively however she developed signs of infection of that hematoma and required incision and drainage of that area in the operating room in January 2019 with placement of a wound VAC.  She had then subsequently followed here at our [facility] wound care center and had healing of that area with good result.  Following that she did not come for any further follow-up here in the vascular surgery office.  She did however return here to [facility] for admission after another fall at her home where she injured her left thigh and developed ecchymosis and swelling.  She was found to have a hematoma in both the medial and lateral thighs and was able to be managed conservatively.  It was noted on that admission that she had had placement of a watchman device in [place] due to her A. fib and so was actually off of anticoagulation and on aspirin/Plavix at that time.  However here when she was found to have the hematoma she was also found to have a left profunda femoris vein DVT and so  decision was made by the admitting team to continue her aspirin and stop the Plavix and place her on therapeutic anticoagulation ultimately with Eliquis which she was discharged on.  Her hemoglobin remained stable and there was no further signs of any bleeding and so she was discharged.  On follow-up here back in June 2021 repeat duplex had shown resolution of that profunda femoris DVT and so her Eliquis was stopped and she was placed back on Plavix.  She has subsequently stopped that Plavix after consultation with cardiology and remains on an 81 mg aspirin daily.  The area of hematoma to the left thigh did resolve.  She does have some mild residual edema to the bilateral lower extremities and this is managed very well with compression.  She also had a noted history of asymptomatic carotid stenosis and a previous carotid duplex had shown 50 to 69% stenosis of the right ICA.  We have been following this with surveillance duplex.  She had a repeat duplex done today which I did review.  It shows 3 to 69% stenosis of the right ICA, and less than 50% stenosis on the left by velocity.  However on review of the images there is increased calcified plaque at both bifurcations today and the stenosis may be underestimated.  She remains asymptomatic from a carotid standpoint with no TIA or strokelike symptoms since our last visit.  She otherwise overall feels well and has no acute complaints at this time.    Review of Systems   Constitutional: Negative.  Negative for activity change, appetite change, chills, diaphoresis, fatigue and fever.   HENT: Negative.  Negative for congestion, sneezing, sore throat and trouble swallowing.    Eyes: Negative.  Negative for visual disturbance.   Respiratory: Negative.  Negative for chest tightness and shortness of breath.    Cardiovascular: Negative.  Negative for chest pain, palpitations and leg swelling.   Gastrointestinal: Negative.  Negative for abdominal distention, abdominal pain,  "nausea and vomiting.   Endocrine: Negative.    Genitourinary: Negative.    Musculoskeletal: Negative.    Skin: Negative.    Allergic/Immunologic: Negative.    Neurological: Negative.    Hematological: Negative.    Psychiatric/Behavioral: Negative.        /66 (BP Location: Left arm, Patient Position: Sitting, Cuff Size: Adult)   Pulse 83   Resp 18   Ht 162.6 cm (64\")   Wt 132 kg (291 lb)   LMP  (LMP Unknown)   SpO2 96%   BMI 49.95 kg/m²   Physical Exam  Vitals reviewed.   Constitutional:       Appearance: Normal appearance. She is obese.   HENT:      Head: Normocephalic and atraumatic.      Nose: Nose normal.      Mouth/Throat:      Mouth: Mucous membranes are moist.   Eyes:      Extraocular Movements: Extraocular movements intact.      Pupils: Pupils are equal, round, and reactive to light.   Cardiovascular:      Rate and Rhythm: Normal rate and regular rhythm.      Pulses: Normal pulses.           Carotid pulses are 2+ on the right side and 2+ on the left side.       Radial pulses are 2+ on the right side and 2+ on the left side.        Femoral pulses are 2+ on the right side and 2+ on the left side.       Popliteal pulses are 2+ on the right side and 2+ on the left side.        Dorsalis pedis pulses are 2+ on the right side and 2+ on the left side.        Posterior tibial pulses are 2+ on the right side and 2+ on the left side.      Comments: She has chronic, mild edema of the bilateral lower extremities from ankle to knee.  Previous induration to the left thigh at the site of trauma/hematoma has completely resolved.  She does have some small telangiectasias and varicosities throughout the bilateral lower legs.  Otherwise she has palpable pulses throughout the bilateral lower extremities and both feet are warm.  Pulmonary:      Effort: Pulmonary effort is normal. No respiratory distress.   Abdominal:      General: There is no distension.      Palpations: Abdomen is soft. There is no mass.      " Tenderness: There is no abdominal tenderness.   Musculoskeletal:      Cervical back: Normal range of motion and neck supple.      Right lower leg: Edema present.      Left lower leg: Edema present.   Skin:     General: Skin is warm.      Capillary Refill: Capillary refill takes less than 2 seconds.   Neurological:      General: No focal deficit present.      Mental Status: She is alert and oriented to person, place, and time.   Psychiatric:         Mood and Affect: Mood normal.         Behavior: Behavior normal.         Thought Content: Thought content normal.         Judgment: Judgment normal.         DIAGNOSTIC DATA:    Carotid duplex was done today which I did personally review.  It shows 50 to 69% stenosis of the right ICA which is stable, and less than 50% stenosis on the left.  There is increased calcified plaque at both bifurcations which may be underestimating stenosis.    US Carotid Bilateral    Result Date: 6/6/2022  Narrative: History: Carotid occlusive disease      Impression: Impression: 1. There is 50-69% stenosis of the right internal carotid artery. 2. There is less than 50% stenosis of the left internal carotid artery. 3. Antegrade flow is demonstrated in bilateral vertebral arteries. 4. There is heavy plaque burden at both bifurcations. Further imaging with a CTA of the neck may be warranted.  Comments: Bilateral carotid vertebral arterial duplex scan was performed.  Grayscale imaging shows intimal thickening and calcified elements at the carotid bifurcation. The right internal carotid artery peak systolic velocity is 135.6 cm/sec. The end-diastolic velocity is 33 cm/sec. The right ICA/CCA ratio is approximately 1.2 . These findings correlate with 50-69% stenosis of the right internal carotid artery.  Grayscale imaging shows intimal thickening and calcified elements at the carotid bifurcation. The left internal carotid artery peak systolic velocity is 101.6 cm/sec. The end-diastolic velocity is  40.7 cm/sec. The left ICA/CCA ratio is approximately 0.8 . These findings correlate with less than 50% stenosis of the left internal carotid artery.  Antegrade flow is demonstrated in bilateral vertebral arteries. There is greater than 50% stenosis of the right external carotid artery. This report was finalized on 06/06/2022 15:17 by Dr. Marco Abrams MD.      Patient Active Problem List   Diagnosis   • Abdominal adhesions   • Abnormal echocardiogram   • Abnormal Holter exam   • Atrial flutter, paroxysmal ([facility])   • Bilateral edema of lower extremity   • Chest pain   • Cholecystitis with cholelithiasis   • Chronic right-sided low back pain with sciatica   • Morbid obesity with BMI of 50.0-59.9, adult ([facility])   • Facet syndrome, lumbar   • Heart rate fast   • Idiopathic hypotension   • Idiopathic progressive neuropathy   • Insomnia   • Lumbar facet arthropathy   • Memory loss   • MRSA carrier   • Obstructive sleep apnea   • Pilonidal cyst   • Primary osteoarthritis of right knee   • Recurrent ventral incisional hernia   • Restless leg syndrome   • Sciatica of right side   • Snoring   • Somnolence, daytime   • Spondylosis of lumbar region without myelopathy or radiculopathy   • Tear of medial meniscus of right knee, current   • Total knee replacement status, right   • Tremor   • Umbilical hernia   • Obesity, unspecified obesity severity, unspecified obesity type   • Infection associated with internal knee prosthesis ([facility])   • Infection of right knee ([facility])   • Hypothyroidism   • Fever   • Hypoxia   • Leukocytosis   • Abnormal chest x-ray   • Abscess of right thigh   • Dyspepsia   • Anticoagulated   • Nonsmoker   • NSAID long-term use   • Esophageal dysphagia   • S/P ablation of atrial fibrillation   • Mixed hyperlipidemia   • PAF (paroxysmal atrial fibrillation) ([facility])   • Bradycardia   • Prediabetes   • Anxiety and depression   • Hematoma of left lower extremity   • Fall   • Acute blood loss anemia due to hematoma from  recent fall   • Acute kidney injury ([facility])   • Presence of Watchman left atrial appendage closure device   • Acute deep vein thrombosis (DVT) of left lower extremity ([facility])   • Allergic rhinitis   • Anemia   • Arteriosclerotic vascular disease   • Bruit of right carotid artery   • Cataract extraction status, right eye   • Closed fracture of fourth metatarsal bone   • Erosive osteoarthritis of both hands   • Glossitis   • Heart murmur   • Hypothyroidism due to drugs   • Infection or inflammatory reaction due to other internal prosthetic device, implant, or graft   • Osteoarthritis of carpometacarpal (CMC) joint of thumb   • Osteopenia   • Palpitations   • Postoperative nausea and vomiting   • Pruritic rash   • Retention of urine   • Seasonal allergies   • Swelling   • Bunion of great toe of left foot   • Hammer toe of left foot   • Osteoarthritis of foot joint   • Tailor's bunion   • Tear of medial meniscus of knee   • Tinea cruris   • Undifferentiated somatoform disorder   • Supraventricular tachycardia by ECG ([facility])   • Osteoporosis   • Hypoglycemia   • Major depressive disorder   • Chronic respiratory failure with hypoxia ([facility])   • S/P AV juanita ablation   • Chronic bronchitis ([facility])   • Xerostomia   • Essential tremor   • Weakness of left lower extremity   • Anxiety   • Contusion of knee   • Degeneration of lumbar intervertebral disc   • Episodic tension-type headache   • Generalized osteoarthritis   • Malaise and fatigue   • Nail bed infection   • Nonrheumatic tricuspid valve disorder   • Normal gynecologic examination   • Lung nodules   • Left anterior fascicular block   • Vitamin D deficiency         ICD-10-CM ICD-9-CM   1. Bilateral carotid artery stenosis  I65.23 433.10     433.30   2. Essential hypertension  I10 401.9   3. Mixed hyperlipidemia  E78.2 272.2   4. Morbid obesity with BMI of 45.0-49.9, adult ([facility])  E66.01 278.01    Z68.42 V85.42       Lab Frequency Next Occurrence   Follow Anesthesia Guidelines /  Standing Orders Once 12/16/2020   Provide NPO Instructions to Patient Once 12/21/2020   Chlorhexidine Skin Prep Once 12/21/2020   CBC (No Diff) Once 12/21/2020   Comprehensive Metabolic Panel Once 12/21/2020   Protime-INR Once 12/21/2020   XR Chest 2 View Once 12/21/2020   Follow Anesthesia Guidelines / Standing Orders Once 04/26/2021   Obtain Informed Consent Once 05/01/2021   US Carotid Bilateral Once 11/29/2021       PLAN: After thoroughly evaluating Danisha Cannon, I believe the best course of action is to obtain further imaging.  She returns today after having undergone recent repeat carotid duplex for continued surveillance.  I did review that study in the office today.  By velocity it shows 50 to 69% stenosis of the right ICA, and less than 50% stenosis on the left.  However on review of the images there is increased calcified plaque at the bilateral bifurcations and this may underestimate stenosis.  Given these findings I have ordered a CTA of the neck for further evaluation.  I will see her back here in the office in 2 weeks with the CT completed to evaluate it and make further recommendations. The patient is to continue taking their medications as previously discussed.   I did discuss vascular risk factors as they pertain to the progression of vascular disease including controlling hypertension, and hyperlipidemia. These factors remain stable. Class 3 Severe Obesity (BMI >=40). Obesity-related health conditions include the following: hypertension and dyslipidemias. Obesity is improving with lifestyle modifications. BMI is is above average; BMI management plan is completed. We discussed portion control and increasing exercise.This was all discussed in full with complete understanding.  Thank you for allowing me to participate in the care of your patient.  Please do not hesitate to call with any questions or concerns.  We will keep you aware of any further encounters with Danisha Cannon.      Sincerely  Yours,      Nino Stern MD

## 2022-06-07 ENCOUNTER — OFFICE VISIT (OUTPATIENT)
Dept: PULMONOLOGY | Facility: CLINIC | Age: 67
End: 2022-06-07

## 2022-06-07 ENCOUNTER — TELEPHONE (OUTPATIENT)
Dept: PODIATRY | Facility: CLINIC | Age: 67
End: 2022-06-07

## 2022-06-07 VITALS
DIASTOLIC BLOOD PRESSURE: 86 MMHG | SYSTOLIC BLOOD PRESSURE: 164 MMHG | HEART RATE: 71 BPM | HEIGHT: 64 IN | OXYGEN SATURATION: 92 % | WEIGHT: 289 LBS | BODY MASS INDEX: 49.34 KG/M2

## 2022-06-07 DIAGNOSIS — K21.9 GASTROESOPHAGEAL REFLUX DISEASE, UNSPECIFIED WHETHER ESOPHAGITIS PRESENT: ICD-10-CM

## 2022-06-07 DIAGNOSIS — J30.9 ALLERGIC RHINITIS, UNSPECIFIED SEASONALITY, UNSPECIFIED TRIGGER: Chronic | ICD-10-CM

## 2022-06-07 DIAGNOSIS — J96.12 CHRONIC RESPIRATORY FAILURE WITH HYPOXIA AND HYPERCAPNIA: ICD-10-CM

## 2022-06-07 DIAGNOSIS — J96.11 CHRONIC RESPIRATORY FAILURE WITH HYPOXIA AND HYPERCAPNIA: ICD-10-CM

## 2022-06-07 DIAGNOSIS — R91.8 LUNG NODULES: Chronic | ICD-10-CM

## 2022-06-07 DIAGNOSIS — E66.01 MORBID OBESITY WITH BMI OF 45.0-49.9, ADULT: ICD-10-CM

## 2022-06-07 DIAGNOSIS — G47.33 OBSTRUCTIVE SLEEP APNEA: Chronic | ICD-10-CM

## 2022-06-07 DIAGNOSIS — J98.4 RESTRICTIVE LUNG DISEASE: ICD-10-CM

## 2022-06-07 DIAGNOSIS — J42 CHRONIC BRONCHITIS, UNSPECIFIED CHRONIC BRONCHITIS TYPE: Primary | Chronic | ICD-10-CM

## 2022-06-07 PROCEDURE — 99214 OFFICE O/P EST MOD 30 MIN: CPT | Performed by: NURSE PRACTITIONER

## 2022-06-07 NOTE — PATIENT INSTRUCTIONS
"https://medlineplus.gov/chronicbronchitis.html\">   Chronic Bronchitis, Adult    Chronic bronchitis is inflammation of the large airways (bronchial tubes) that carry air to the lungs. The inflammation causes more mucus (sputum) to build up. The inflammation and mucus make it hard to breathe. This condition is a type of chronic obstructive pulmonary disease (COPD).  Chronic bronchitis is a long-term (chronic) condition. It is defined as a chronic cough with sputum production:  For at least 3 months of the year.  For 2 years in a row.  People with chronic bronchitis are more likely to get colds and other infections in the nose, throat, or airways.  What are the causes?  This condition is most often caused by:  A history of smoking.  Exposure to secondhand smoke or a smoky area for a long period of time.  Lung problems, such as emphysema, asthma, bronchiectasis, or cystic fibrosis.  Long-term exposure to certain fumes or chemicals that irritate the lungs.  Infection.  What are the signs or symptoms?  Symptoms of chronic bronchitis may include:  A cough that brings up mucus (productive cough).  Shortness of breath.  A whistling sound when you breathe (wheezing).  Chest tightness.  Colds or respiratory infections that go away and return.  How is this diagnosed?  This condition may be diagnosed based on:  Your symptoms and medical history.  A physical exam.  Tests, such as:  Testing a sputum sample.  Blood tests.  A chest X-ray.  Tests of lung (pulmonary) function.  How is this treated?  There is no cure for chronic bronchitis. Treatment may help control your symptoms. This includes:  Drinking fluids. This may help thin your mucus so it is easier to cough up.  Mucus-clearing techniques.  Taking medicine prescribed by your health care provider, such as:  Inhaled medicine to improve airflow in and out of your lungs.  Antibiotics to treat or prevent bacterial lung infections.  Using oxygen therapy, if your blood oxygen level " is very low.  Pulmonary rehabilitation. This is a program that helps you learn how to manage your breathing problem. The program may include exercise, education, counseling, treatment, and support.  Follow these instructions at home:    Medicines  Take over-the-counter and prescription medicines only as told by your health care provider.  If you were prescribed an antibiotic medicine, take it as told by your health care provider. Do not stop taking the antibiotic even if you start to feel better.  Lifestyle    Do not use any products that contain nicotine or tobacco, such as cigarettes, e-cigarettes, and chewing tobacco. If you need help quitting, ask your health care provider.  Stay away from other people's smoke (secondhand smoke) and any irritants that make you cough more, such as chemical fumes.  Eat a healthy diet and get regular exercise. Talk with your health care provider about what activities are safe for you.    Preventing infections  Stay up to date on all immunizations, including the pneumonia and flu vaccines.  Wash your hands often with soap and water for at least 20 seconds. If soap and water are not available, use hand .  Avoid contact with people who have symptoms of a cold or the flu.  General instructions  Drink enough fluids to keep your urine pale yellow.  Use oxygen therapy at home as directed.  Follow instructions from your health care provider about how to use oxygen safely and take steps to prevent fire.  Do not smoke while using oxygen or allow others to smoke in your home.  Keep all follow-up visits as told by your health care provider. This is important.  Contact a health care provider if:  Your shortness of breath or coughing gets worse even when you take medicine.  Your mucus gets thicker or changes color.  You are not able to cough up your mucus.  You have a fever.  Get help right away if:  You have trouble breathing.  You have chest pain.  You feel dizzy or confused.  These  symptoms may represent a serious problem that is an emergency. Do not wait to see if the symptoms will go away. Get medical help right away. Call your local emergency services (911 in the [place] Do not drive yourself to the hospital.  Summary  Chronic bronchitis is inflammation of the large airways (bronchial tubes) that carry air to the lungs. The inflammation causes mucus (sputum) to build up. The inflammation and mucus make it hard to breathe.  If you were prescribed an antibiotic medicine, take it as told by your health care provider. Do not stop taking the antibiotic even if you start to feel better.  Drink enough fluids to keep your urine pale yellow. Drinking fluids may help thin your mucus so it is easier to cough up.  Do not use any products that contain nicotine or tobacco, such as cigarettes, e-cigarettes, and chewing tobacco. If you need help quitting, ask your health care provider.  This information is not intended to replace advice given to you by your health care provider. Make sure you discuss any questions you have with your health care provider.  Document Revised: 02/02/2021 Document Reviewed: 02/02/2021  Elsevier Patient Education © 2021 [employer]  Sleep Apnea  Sleep apnea affects breathing during sleep. It causes breathing to stop for a short time or to become shallow. It can also increase the risk of:  Heart attack.  Stroke.  Being very overweight (obese).  Diabetes.  Heart failure.  Irregular heartbeat.  The goal of treatment is to help you breathe normally again.  What are the causes?  There are three kinds of sleep apnea:  Obstructive sleep apnea. This is caused by a blocked or collapsed airway.  Central sleep apnea. This happens when the brain does not send the right signals to the muscles that control breathing.  Mixed sleep apnea. This is a combination of obstructive and central sleep apnea.  The most common cause of this condition is a collapsed or blocked airway. This can happen  if:  Your throat muscles are too relaxed.  Your tongue and tonsils are too large.  You are overweight.  Your airway is too small.  What increases the risk?  Being overweight.  Smoking.  Having a small airway.  Being older.  Being male.  Drinking alcohol.  Taking medicines to calm yourself (sedatives or tranquilizers).  Having family members with the condition.  What are the signs or symptoms?  Trouble staying asleep.  Being sleepy or tired during the day.  Getting angry a lot.  Loud snoring.  Headaches in the morning.  Not being able to focus your mind (concentrate).  Forgetting things.  Less interest in sex.  Mood swings.  Personality changes.  Feelings of sadness (depression).  Waking up a lot during the night to pee (urinate).  Dry mouth.  Sore throat.  How is this diagnosed?  Your medical history.  A physical exam.  A test that is done when you are sleeping (sleep study). The test is most often done in a sleep lab but may also be done at home.  How is this treated?    Sleeping on your side.  Using a medicine to get rid of mucus in your nose (decongestant).  Avoiding the use of alcohol, medicines to help you relax, or certain pain medicines (narcotics).  Losing weight, if needed.  Changing your diet.  Not smoking.  Using a machine to open your airway while you sleep, such as:  An oral appliance. This is a mouthpiece that shifts your lower jaw forward.  A CPAP device. This device blows air through a mask when you breathe out (exhale).  An EPAP device. This has valves that you put in each nostril.  A BPAP device. This device blows air through a mask when you breathe in (inhale) and breathe out.  Having surgery if other treatments do not work.  It is important to get treatment for sleep apnea. Without treatment, it can lead to:  High blood pressure.  Coronary artery disease.  In men, not being able to have an erection (impotence).  Reduced thinking ability.  Follow these instructions at home:  Lifestyle  Make  changes that your doctor recommends.  Eat a healthy diet.  Lose weight if needed.  Avoid alcohol, medicines to help you relax, and some pain medicines.  Do not use any products that contain nicotine or tobacco, such as cigarettes, e-cigarettes, and chewing tobacco. If you need help quitting, ask your doctor.  General instructions  Take over-the-counter and prescription medicines only as told by your doctor.  If you were given a machine to use while you sleep, use it only as told by your doctor.  If you are having surgery, make sure to tell your doctor you have sleep apnea. You may need to bring your device with you.  Keep all follow-up visits as told by your doctor. This is important.  Contact a doctor if:  The machine that you were given to use during sleep bothers you or does not seem to be working.  You do not get better.  You get worse.  Get help right away if:  Your chest hurts.  You have trouble breathing in enough air.  You have an uncomfortable feeling in your back, arms, or stomach.  You have trouble talking.  One side of your body feels weak.  A part of your face is hanging down.  These symptoms may be an emergency. Do not wait to see if the symptoms will go away. Get medical help right away. Call your local emergency services (911 in the [place] Do not drive yourself to the hospital.  Summary  This condition affects breathing during sleep.  The most common cause is a collapsed or blocked airway.  The goal of treatment is to help you breathe normally while you sleep.  This information is not intended to replace advice given to you by your health care provider. Make sure you discuss any questions you have with your health care provider.  Document Revised: 10/04/2019 Document Reviewed: 08/13/2019  Elsevier Patient Education © 2021 [employer]  Allergic Rhinitis, Adult    Allergic rhinitis is an allergic reaction that affects the mucous membrane inside the nose. The mucous membrane is the tissue that  produces mucus.  There are two types of allergic rhinitis:  Seasonal. This type is also called hay fever and happens only during certain seasons.  Perennial. This type can happen at any time of the year.  Allergic rhinitis cannot be spread from person to person. This condition can be mild, moderate, or severe. It can develop at any age and may be outgrown.  What are the causes?  This condition is caused by allergens. These are things that can cause an allergic reaction. Allergens may differ for seasonal allergic rhinitis and perennial allergic rhinitis.  Seasonal allergic rhinitis is triggered by pollen. Pollen can come from grasses, trees, and weeds.  Perennial allergic rhinitis may be triggered by:  Dust mites.  Proteins in a pet's urine, saliva, or dander. Dander is dead skin cells from a pet.  Smoke, mold, or car fumes.  What increases the risk?  You are more likely to develop this condition if you have a family history of allergies or other conditions related to allergies, including:  Allergic conjunctivitis. This is inflammation of parts of the eyes and eyelids.  Asthma. This condition affects the lungs and makes it hard to breathe.  Atopic dermatitis or eczema. This is long term (chronic) inflammation of the skin.  Food allergies.  What are the signs or symptoms?  Symptoms of this condition include:  Sneezing or coughing.  A stuffy nose (nasal congestion), itchy nose, or nasal discharge.  Itchy eyes and tearing of the eyes.  A feeling of mucus dripping down the back of your throat (postnasal drip).  Trouble sleeping.  Tiredness or fatigue.  Headache.  Sore throat.  How is this diagnosed?  This condition may be diagnosed with your symptoms, medical history, and physical exam. Your health care provider may check for related conditions, such as:  Asthma.  Pink eye. This is eye inflammation caused by infection (conjunctivitis).  Ear infection.  Upper respiratory infection. This is an infection in the nose,  throat, or upper airways.  You may also have tests to find out which allergens trigger your symptoms. These may include skin tests or blood tests.  How is this treated?  There is no cure for this condition, but treatment can help control symptoms. Treatment may include:  Taking medicines that block allergy symptoms, such as corticosteroids and antihistamines. Medicine may be given as a shot, nasal spray, or pill.  Avoiding any allergens.  Being exposed again and again to tiny amounts of allergens to help you build a defense against allergens (immunotherapy). This is done if other treatments have not helped. It may include:  Allergy shots. These are injected medicines that have small amounts of allergen in them.  Sublingual immunotherapy. This involves taking small doses of a medicine with allergen in it under your tongue.  If these treatments do not work, your health care provider may prescribe newer, stronger medicines.  Follow these instructions at home:  Avoiding allergens  Find out what you are allergic to and avoid those allergens. These are some things you can do to help avoid allergens:  If you have perennial allergies:  Replace carpet with wood, tile, or vinyl gagan. Carpet can trap dander and dust.  Do not smoke. Do not allow smoking in your home.  Change your heating and air conditioning filters at least once a month.  If you have seasonal allergies, take these steps during allergy season:  Keep windows closed as much as possible.  Plan outdoor activities when pollen counts are lowest. Check pollen counts before you plan outdoor activities.  When coming indoors, change clothing and shower before sitting on furniture or bedding.  If you have a pet in the house that produces allergens:  Keep the pet out of the bedroom.  Vacuum, sweep, and dust regularly.  General instructions  Take over-the-counter and prescription medicines only as told by your health care provider.  Drink enough fluid to keep your urine  pale yellow.  Keep all follow-up visits as told by your health care provider. This is important.  Where to find more information  American Academy of Allergy, Asthma & Immunology: www.aaaai.org  Contact a health care provider if:  You have a fever.  You develop a cough that does not go away.  You make whistling sounds when you breathe (wheeze).  Your symptoms slow you down or stop you from doing your normal activities each day.  Get help right away if:  You have shortness of breath.  This symptom may represent a serious problem that is an emergency. Do not wait to see if the symptom will go away. Get medical help right away. Call your local emergency services (911 in the [place] Do not drive yourself to the hospital.  Summary  Allergic rhinitis may be managed by taking medicines as directed and avoiding allergens.  If you have seasonal allergies, keep windows closed as much as possible during allergy season.  Contact your health care provider if you develop a fever or a cough that does not go away.  This information is not intended to replace advice given to you by your health care provider. Make sure you discuss any questions you have with your health care provider.  Document Revised: 02/05/2021 Document Reviewed: 12/15/2020  Elsevier Patient Education © 2021 [employer] Inc.

## 2022-06-07 NOTE — TELEPHONE ENCOUNTER
You saw pt yesterday, she forgot to ask and would like to know the occlusion percent on her carotids if poss

## 2022-06-07 NOTE — ASSESSMENT & PLAN NOTE
Patient requires frequent use of NIMV. Intermittent support will not be sufficient. I am ordering nocturnal and daytime volume ventilation to reduce the risk of exacerbation.   The individual cannot maintain spontaneous ventilation for four or more consecutive hours without the assistance of a device. Bi-level (or inferior choice) was considered however I feel that this would be insufficient given the severity of disease, lack of backup battery and continuous alarms that would be required, which are not provided by a bi-level device.  Patient's condition is of severity where back-up battery and continuous alarms are required, which are not provided by bi-level devices.  Interruption of failure to provide NIMV would quickly lead to exacerbation of patient's condition, hospital readmission, and likely harm the patient.  Primary cause of hypercapnia is restrictive lung disease, morbid obesity, and obstructive sleep apnea.  Continued use is preferred.

## 2022-06-10 ENCOUNTER — TELEPHONE (OUTPATIENT)
Dept: PULMONOLOGY | Facility: CLINIC | Age: 67
End: 2022-06-10

## 2022-06-10 NOTE — TELEPHONE ENCOUNTER
"Patient left a voicemail that she just picked up her ventilator. She states she needs \"humidity\" added to it.    I talked to Sheri and was told to just fax over a note stating \"Add humidity to patient's NIV\".  "

## 2022-06-10 NOTE — TELEPHONE ENCOUNTER
Please add humidity to Danisha Cannon (1955) home Trilogy device.    Sophia Valero, ERNESTO  6/10/2022  10:16 CDT

## 2022-06-17 ENCOUNTER — HOSPITAL ENCOUNTER (OUTPATIENT)
Dept: CT IMAGING | Facility: HOSPITAL | Age: 67
Discharge: HOME | End: 2022-06-17
Admitting: SURGERY

## 2022-06-17 ENCOUNTER — TELEPHONE (OUTPATIENT)
Dept: VASCULAR SURGERY | Facility: CLINIC | Age: 67
End: 2022-06-17

## 2022-06-17 DIAGNOSIS — I65.23 BILATERAL CAROTID ARTERY STENOSIS: ICD-10-CM

## 2022-06-17 LAB — CREAT BLDA-MCNC: 1.5 MG/DL (ref 0.6–1.3)

## 2022-06-17 PROCEDURE — 0 IOPAMIDOL PER 1 ML: Performed by: SURGERY

## 2022-06-17 PROCEDURE — 82565 ASSAY OF CREATININE: CPT

## 2022-06-17 PROCEDURE — 70498 CT ANGIOGRAPHY NECK: CPT

## 2022-06-17 RX ADMIN — IOPAMIDOL 100 ML: 755 INJECTION, SOLUTION INTRAVENOUS at 10:20

## 2022-06-20 ENCOUNTER — OFFICE VISIT (OUTPATIENT)
Dept: VASCULAR SURGERY | Facility: CLINIC | Age: 67
End: 2022-06-20

## 2022-06-20 VITALS
SYSTOLIC BLOOD PRESSURE: 120 MMHG | RESPIRATION RATE: 18 BRPM | OXYGEN SATURATION: 94 % | HEART RATE: 92 BPM | HEIGHT: 64 IN | BODY MASS INDEX: 50.02 KG/M2 | DIASTOLIC BLOOD PRESSURE: 66 MMHG | WEIGHT: 293 LBS

## 2022-06-20 DIAGNOSIS — E78.2 MIXED HYPERLIPIDEMIA: ICD-10-CM

## 2022-06-20 DIAGNOSIS — I65.23 BILATERAL CAROTID ARTERY STENOSIS: Primary | ICD-10-CM

## 2022-06-20 DIAGNOSIS — I10 ESSENTIAL HYPERTENSION: ICD-10-CM

## 2022-06-20 DIAGNOSIS — E66.01 MORBID OBESITY WITH BMI OF 50.0-59.9, ADULT: ICD-10-CM

## 2022-06-20 PROCEDURE — 99214 OFFICE O/P EST MOD 30 MIN: CPT | Performed by: SURGERY

## 2022-06-20 NOTE — PROGRESS NOTES
06/20/2022      Tc Bee,   [address] [place] [address]   [place] [address]        Danisha Cannon  1955    Chief Complaint   Patient presents with   • Carotid Artery Disease     2 week follow-up with CT pad angiogram neck w wo.  Pt denies any stroke-like symptoms.       Dear Tc Bee DO:    Carotid Artery Disease  Pertinent negatives include no abdominal pain, chest pain, chills, congestion, diaphoresis, fatigue, fever, nausea, sore throat or vomiting.       I had the pleasure of seeing your patient in the office today for follow up.  As you recall, the patient is a 67 y.o. female who we are currently following a previous admission due to a left thigh hematoma back in April of this year.  Patient has a history of chronic anticoagulation due to A. fib and was actually seen here at this office back in late 2018, and in early 2019 after developing a right thigh hematoma after local trauma.  Initially we had been trying to manage it conservatively however she developed signs of infection of that hematoma and required incision and drainage of that area in the operating room in January 2019 with placement of a wound VAC.  She had then subsequently followed here at our [facility] wound care center and had healing of that area with good result.  Following that she did not come for any further follow-up here in the vascular surgery office.  She did however return here to [facility] for admission after another fall at her home where she injured her left thigh and developed ecchymosis and swelling.  She was found to have a hematoma in both the medial and lateral thighs and was able to be managed conservatively.  It was noted on that admission that she had had placement of a watchman device in [place] due to her A. fib and so was actually off of anticoagulation and on aspirin/Plavix at that time.  However here when she was found to have the hematoma she was also found to have a left profunda femoris  vein DVT and so decision was made by the admitting team to continue her aspirin and stop the Plavix and place her on therapeutic anticoagulation ultimately with Eliquis which she was discharged on.  Her hemoglobin remained stable and there was no further signs of any bleeding and so she was discharged.  On follow-up here back in June 2021 repeat duplex had shown resolution of that profunda femoris DVT and so her Eliquis was stopped and she was placed back on Plavix.  She has subsequently stopped that Plavix after consultation with cardiology and remains on an 81 mg aspirin daily.  The area of hematoma to the left thigh did resolve.  She does have some mild residual edema to the bilateral lower extremities and this is managed very well with compression.  She also had a noted history of asymptomatic carotid stenosis and a previous carotid duplex had shown 50 to 69% stenosis of the right ICA.  We have been following this with surveillance duplex.  At her last visit she had a carotid duplex for ongoing surveillance.  It showed 50 to 69% stenosis of the right ICA, and less than 50% stenosis on the left by velocity.  However on review of the images there was increased calcified plaque at both bifurcations today and the stenosis may have been underestimated.  As such I sent her for CTA of the neck which she had recently done and I did personally review in the office today.  It shows some mild plaque disease bilaterally but no significant stenosis on the CTA images.  She continues to remain asymptomatic with no TIA or strokelike symptoms.    Review of Systems   Constitutional: Negative.  Negative for activity change, appetite change, chills, diaphoresis, fatigue and fever.   HENT: Negative.  Negative for congestion, sneezing, sore throat and trouble swallowing.    Eyes: Negative.  Negative for visual disturbance.   Respiratory: Negative.  Negative for chest tightness and shortness of breath.    Cardiovascular: Negative.   "Negative for chest pain, palpitations and leg swelling.   Gastrointestinal: Negative.  Negative for abdominal distention, abdominal pain, nausea and vomiting.   Endocrine: Negative.    Genitourinary: Negative.    Musculoskeletal: Negative.    Skin: Negative.    Allergic/Immunologic: Negative.    Neurological: Negative.    Hematological: Negative.    Psychiatric/Behavioral: Negative.        /66 (BP Location: Left arm, Patient Position: Sitting, Cuff Size: Adult)   Pulse 92   Resp 18   Ht 162.6 cm (64\")   Wt 134 kg (296 lb)   LMP  (LMP Unknown)   SpO2 94%   BMI 50.81 kg/m²   Physical Exam  Vitals reviewed.   Constitutional:       Appearance: Normal appearance. She is obese.   HENT:      Head: Normocephalic and atraumatic.      Nose: Nose normal.      Mouth/Throat:      Mouth: Mucous membranes are moist.   Eyes:      Extraocular Movements: Extraocular movements intact.      Pupils: Pupils are equal, round, and reactive to light.   Cardiovascular:      Rate and Rhythm: Normal rate and regular rhythm.      Pulses: Normal pulses.           Carotid pulses are 2+ on the right side and 2+ on the left side.       Radial pulses are 2+ on the right side and 2+ on the left side.        Femoral pulses are 2+ on the right side and 2+ on the left side.       Popliteal pulses are 2+ on the right side and 2+ on the left side.        Dorsalis pedis pulses are 2+ on the right side and 2+ on the left side.        Posterior tibial pulses are 2+ on the right side and 2+ on the left side.      Comments: She has chronic, mild edema of the bilateral lower extremities from ankle to knee.  Previous induration to the left thigh at the site of trauma/hematoma has completely resolved.  She does have some small telangiectasias and varicosities throughout the bilateral lower legs.  Otherwise she has palpable pulses throughout the bilateral lower extremities and both feet are warm.  Pulmonary:      Effort: Pulmonary effort is normal. No " respiratory distress.   Abdominal:      General: There is no distension.      Palpations: Abdomen is soft. There is no mass.      Tenderness: There is no abdominal tenderness.   Musculoskeletal:      Cervical back: Normal range of motion and neck supple.      Right lower leg: Edema present.      Left lower leg: Edema present.   Skin:     General: Skin is warm.      Capillary Refill: Capillary refill takes less than 2 seconds.   Neurological:      General: No focal deficit present.      Mental Status: She is alert and oriented to person, place, and time.   Psychiatric:         Mood and Affect: Mood normal.         Behavior: Behavior normal.         Thought Content: Thought content normal.         Judgment: Judgment normal.         DIAGNOSTIC DATA:    CT Angiogram Neck    Result Date: 6/17/2022  Narrative: EXAMINATION: CT ANGIOGRAM NECK-   6/17/2022 9:56 AM CDT  HISTORY: Carotid artery stenosis; I65.23-Occlusion and stenosis of bilateral carotid arteries  In order to have a CT radiation dose as low as reasonably achievable Automated Exposure Control was utilized for adjustment of the mA and/or KV according to patient size.  DLP in mGycm= 269  The CT angiography of the neck performed after intravenous contrast enhancement. Images are acquired in axial plane and subsequent 2-D reconstruction in coronal and sagittal planes and 3-D maximum intensity projection reconstruction.  There is no previous similar study for comparison.  Correlation is made with carotid sonography dated 6/6/2022.  Atheromatous changes of the limited visualized thoracic aorta is seen.  There is a common origin of the right brachiocephalic trunk and left common carotid artery. The right brachiocephalic trunk divides into normal-appearing right common carotid and right subclavian arteries.  There is a large calcific plaque at the origin of the left subclavian artery. Approximately 50% focal stenosis. The remaining left subclavian artery as visualized  appear normal.  There is marked tortuosity of both common carotid arteries in the neck. No focal stenosis or aneurysmal dilatation.  There is a small plaque along the posterior aspect of the distal right common carotid artery. No stenosis. It then divides into normal-appearing intrarenal external carotid arteries. There is marked tortuosity of the proximal left internal carotid artery. No focal stenosis or aneurysmal dilatation. There is normal right external carotid artery and branches.  There is a relatively large calcified plaque along the posterior lateral aspect of the distal right common carotid artery extending into posterior aspect of the proximal internal carotid artery. No significant stenosis of either. The remaining left internal carotid artery is significantly tortuous without areas of focal stenosis or aneurysmal dilatation. The right external carotid artery has normal course caliber and branches. Normal size internal carotid arteries is seen at the base of the skull.  There is normal origin course and caliber of both vertebral arteries throughout the neck. No focal stenosis or aneurysmal dilatation. Normal size vertebral arteries enter the foramen magnum. The right vertebral artery is significantly attenuated after the origin of PICA. A small and attenuated right and a normal-sized left vertebral artery joint to make a normal basilar artery.  Soft tissues of the neck are partly obscured by the extensive streak artifact from the dental hardware. No significant mass or lymphadenopathy. Severe chronic degenerative changes of the cervical spine are noted.      Impression: 1. Atheromatous changes of the distal common and proximal internal carotid arteries without significant stenosis. 2. Normal vertebral arteries. 3. The NASCET criteria was utilized to for estimation of carotid arterial stenosis.        This report was finalized on 06/17/2022 15:42 by Dr. Chapincito Ponce MD.    US Carotid  Bilateral    Result Date: 6/6/2022  Narrative: History: Carotid occlusive disease      Impression: Impression: 1. There is 50-69% stenosis of the right internal carotid artery. 2. There is less than 50% stenosis of the left internal carotid artery. 3. Antegrade flow is demonstrated in bilateral vertebral arteries. 4. There is heavy plaque burden at both bifurcations. Further imaging with a CTA of the neck may be warranted.  Comments: Bilateral carotid vertebral arterial duplex scan was performed.  Grayscale imaging shows intimal thickening and calcified elements at the carotid bifurcation. The right internal carotid artery peak systolic velocity is 135.6 cm/sec. The end-diastolic velocity is 33 cm/sec. The right ICA/CCA ratio is approximately 1.2 . These findings correlate with 50-69% stenosis of the right internal carotid artery.  Grayscale imaging shows intimal thickening and calcified elements at the carotid bifurcation. The left internal carotid artery peak systolic velocity is 101.6 cm/sec. The end-diastolic velocity is 40.7 cm/sec. The left ICA/CCA ratio is approximately 0.8 . These findings correlate with less than 50% stenosis of the left internal carotid artery.  Antegrade flow is demonstrated in bilateral vertebral arteries. There is greater than 50% stenosis of the right external carotid artery. This report was finalized on 06/06/2022 15:17 by Dr. Marco Abrams MD.      Patient Active Problem List   Diagnosis   • Abdominal adhesions   • Abnormal echocardiogram   • Abnormal Holter exam   • Atrial flutter, paroxysmal (HCC)   • Bilateral edema of lower extremity   • Chest pain   • Cholecystitis with cholelithiasis   • Chronic right-sided low back pain with sciatica   • Morbid obesity with BMI of 45.0-49.9, adult (HCC)   • Facet syndrome, lumbar   • Heart rate fast   • Idiopathic hypotension   • Idiopathic progressive neuropathy   • Insomnia   • Lumbar facet arthropathy   • Memory loss   • MRSA carrier   •  Obstructive sleep apnea   • Pilonidal cyst   • Primary osteoarthritis of right knee   • Recurrent ventral incisional hernia   • Restless leg syndrome   • Sciatica of right side   • Snoring   • Somnolence, daytime   • Spondylosis of lumbar region without myelopathy or radiculopathy   • Tear of medial meniscus of right knee, current   • Total knee replacement status, right   • Tremor   • Umbilical hernia   • Obesity, unspecified obesity severity, unspecified obesity type   • Infection associated with internal knee prosthesis (HCC)   • Infection of right knee ([facility])   • Hypothyroidism   • Fever   • Hypoxia   • Leukocytosis   • Abnormal chest x-ray   • Abscess of right thigh   • Dyspepsia   • Anticoagulated   • Nonsmoker   • NSAID long-term use   • Esophageal dysphagia   • S/P ablation of atrial fibrillation   • Mixed hyperlipidemia   • PAF (paroxysmal atrial fibrillation) ([facility])   • Bradycardia   • Prediabetes   • Anxiety and depression   • Hematoma of left lower extremity   • Fall   • Acute blood loss anemia due to hematoma from recent fall   • Acute kidney injury ([facility])   • Presence of Watchman left atrial appendage closure device   • Acute deep vein thrombosis (DVT) of left lower extremity ([facility])   • Allergic rhinitis   • Anemia   • Arteriosclerotic vascular disease   • Bruit of right carotid artery   • Cataract extraction status, right eye   • Closed fracture of fourth metatarsal bone   • Erosive osteoarthritis of both hands   • Glossitis   • Heart murmur   • Hypothyroidism due to drugs   • Infection or inflammatory reaction due to other internal prosthetic device, implant, or graft   • Osteoarthritis of carpometacarpal (CMC) joint of thumb   • Osteopenia   • Palpitations   • Postoperative nausea and vomiting   • Pruritic rash   • Retention of urine   • Seasonal allergies   • Swelling   • Bunion of great toe of left foot   • Hammer toe of left foot   • Osteoarthritis of foot joint   • Tailor's bunion   • Tear of medial  meniscus of knee   • Tinea cruris   • Undifferentiated somatoform disorder   • Supraventricular tachycardia by ECG ([facility])   • Osteoporosis   • Hypoglycemia   • Major depressive disorder   • Chronic respiratory failure with hypoxia and hypercapnia ([facility])   • S/P AV juanita ablation   • Chronic bronchitis ([facility])   • Xerostomia   • Essential tremor   • Weakness of left lower extremity   • Anxiety   • Contusion of knee   • Degeneration of lumbar intervertebral disc   • Episodic tension-type headache   • Generalized osteoarthritis   • Malaise and fatigue   • Nail bed infection   • Nonrheumatic tricuspid valve disorder   • Normal gynecologic examination   • Lung nodules   • Left anterior fascicular block   • Vitamin D deficiency   • Restrictive lung disease   • Gastroesophageal reflux disease         ICD-10-CM ICD-9-CM   1. Bilateral carotid artery stenosis  I65.23 433.10     433.30   2. Essential hypertension  I10 401.9   3. Mixed hyperlipidemia  E78.2 272.2   4. Morbid obesity with BMI of 50.0-59.9, adult ([facility])  E66.01 278.01    Z68.43 V85.43       Lab Frequency Next Occurrence   Follow Anesthesia Guidelines / Standing Orders Once 12/16/2020   Provide NPO Instructions to Patient Once 12/21/2020   Chlorhexidine Skin Prep Once 12/21/2020   CBC (No Diff) Once 12/21/2020   Comprehensive Metabolic Panel Once 12/21/2020   Protime-INR Once 12/21/2020   XR Chest 2 View Once 12/21/2020   Follow Anesthesia Guidelines / Standing Orders Once 04/26/2021   Obtain Informed Consent Once 05/01/2021   US Carotid Bilateral Once 11/29/2021       PLAN: After thoroughly evaluating Danisha Cannon, I believe the best course of action is to remain conservative from a vascular standpoint.  CTA of the neck done recently which I did personally review in the office today shows some plaque disease bilaterally at the bifurcations but no obvious significant stenoses in either ICA.  She remains asymptomatic from a carotid standpoint with no TIA or strokelike  symptoms.  As such moving forward plan will be for ongoing surveillance with carotid duplex repeated in 6 months.  Otherwise from a lower extremity standpoint she should continue with compression as we previously recommended.  I will see her in the office in 6 months. The patient is to continue taking their medications as previously discussed.   I did discuss vascular risk factors as they pertain to the progression of vascular disease including controlling hypertension, and hyperlipidemia. These factors remain stable. Class 3 Severe Obesity (BMI >=40). Obesity-related health conditions include the following: hypertension and dyslipidemias. Obesity is improving with lifestyle modifications. BMI is is above average; BMI management plan is completed. We discussed portion control and increasing exercise.This was all discussed in full with complete understanding.  Thank you for allowing me to participate in the care of your patient.  Please do not hesitate to call with any questions or concerns.  We will keep you aware of any further encounters with Danisha Cannon.      Sincerely Yours,      Nino Stern MD

## 2022-07-26 DIAGNOSIS — N39.41 URGE INCONTINENCE: ICD-10-CM

## 2022-07-26 RX ORDER — OXYBUTYNIN CHLORIDE 5 MG/1
5 TABLET, EXTENDED RELEASE ORAL DAILY
Qty: 30 TABLET | Refills: 8 | Status: SHIPPED | OUTPATIENT
Start: 2022-07-26 | End: 2022-09-27

## 2022-08-01 RX ORDER — LEVOTHYROXINE SODIUM 137 UG/1
137 TABLET ORAL EVERY MORNING
Qty: 90 TABLET | Refills: 3 | OUTPATIENT
Start: 2022-08-01

## 2022-08-01 RX ORDER — FAMCICLOVIR 500 MG/1
500 TABLET ORAL 2 TIMES DAILY
Qty: 60 TABLET | Refills: 2 | OUTPATIENT
Start: 2022-08-01

## 2022-08-02 NOTE — PROGRESS NOTES
" Sophia Valero, MSN, APRN, NP-C, CESAR, CFRN  [facility]  [address] [place] [address]  Phone: [phone]  Fax: [phone]          Chief Complaint  chronic bronchitis, unspecified chronic bronchitis type    Subjective    History of Present Illness    Danisha Cannon presents to [facility]   History of Present Illness   The patient presents today for f/u chronic bronchitis.  Pt with known afib, morbid obesity/deconditioning, chronic bronchitis, TRISTON, chronic respiratory failure with hypoxemia/hypercapnia, restrictive lung disease, lung nodules, and allergic rhinitis.  The patient recently started using trilogy machine.  She states that she is benefiting from it and wishes to continue it.  She states that she is not as tired as she was before.  She is using Flovent and albuterol HFA as needed.  She denies fevers, chills, cough with purulent sputum.  Objective   Vital Signs:   /84   Pulse 73   Ht 162.6 cm (64\")   Wt 132 kg (290 lb)   SpO2 93% Comment: RA  BMI 49.78 kg/m²     Physical Exam  Vitals reviewed.   Constitutional:       General: She is not in acute distress.     Appearance: She is well-developed. She is morbidly obese.      Interventions: Face mask in place.      Comments: Ambulates with Rollator   HENT:      Head: Normocephalic and atraumatic.   Eyes:      General: No scleral icterus.     Conjunctiva/sclera: Conjunctivae normal.      Pupils: Pupils are equal, round, and reactive to light.   Cardiovascular:      Rate and Rhythm: Normal rate.   Pulmonary:      Effort: Pulmonary effort is normal. No respiratory distress.      Breath sounds: Normal breath sounds. No wheezing or rales.   Musculoskeletal:         General: Normal range of motion.      Cervical back: Normal range of motion and neck supple.      Right lower leg: Edema present.      Left lower leg: Edema present.   Skin:     General: Skin is warm and " dry.      Comments: Redness noted to her left lower extremity.  She reports having seen dermatology and currently has a steroid cream.   Neurological:      Mental Status: She is alert and oriented to person, place, and time.   Psychiatric:         Behavior: Behavior normal.         Thought Content: Thought content normal.         Judgment: Judgment normal.        Result Review :  The following data was reviewed by: ERNESTO Stroud on 2022:  CT Chest Hi Resolution Diagnostic (2021 14:06)  IMPRESSION:  Negative high-resolution CT evaluation for interstitial lung  disease. There is a tiny nodule within each lung, measuring 2 mm in the left lower lobe and 3 mm in the medial basal segment of the right lower lobe, these have a benign appearance.  Rest/Exercise Pulse Ox Values        Some values may be hidden. Unless noted otherwise, only the newest values recorded on each date are displayed.         Rest/Exercise Pulse Ox Results 10/4/21   Rest room air SAT % 90   Exercise room air SAT % 85   Rest on O2 @ Liters 2   Rest on O2 SAT % 96   Exercise on O2 @ Liters 2   Exercise on O2 SAT % 92           PFT Values        Some values may be hidden. Unless noted otherwise, only the newest values recorded on each date are displayed.         Old Values PFT Results 21   No data to display.      Pre Drug PFT Results 21   FVC 73   FEV1 81   FEF 25-75% 139   FEV1/FVC 86.84      Post Drug PFT Results 21   No data to display.      Other Tests PFT Results 21   TLC 85   RV 77   DLCO 96   D/VAsb 118             Results for orders placed in visit on 21    Pulmonary Function Test    Narrative  Pulmonary Function Test  Performed by: Francisca Villagomez, RRT  Authorized by: Sophia Valero APRN    Pre Drug % Predicted  FVC: 73%  FEV1: 81%  FEF 25-75%: 139%  FEV1/FVC: 86.84%  T%  RV: 77%  DLCO: 96%  D/VAsb: 118%    Interpretation  Spirometry  Spirometry shows moderate  restriction. midflow is normal.  Review of FVL curve  Patient's effort is normal.  Lung Volume Measurements  Measurements show reduced lung volumes consistent with restriction.  Diffusion Capacity  The patient's diffusion capacity is normal.  Diffusion capacity is normal when corrected for alveolar volume.      Results for orders placed during the hospital encounter of 12/13/19    Full Pulmonary Function Test With Bronchodilator & ABG    [facility] - Pulmonary Function Test    [address]  [place]  [place]  [address]  [phone]    Patient : Danisha Cannon  MRN : [id number]  CSN : [id number]  Pulmonologist : Ryan Keith MD  Date : 12/13/2019    ______________________________________________________________________    Interpretation :  1.  Spirometry reveals a borderline low forced vital capacity and otherwise are within normal limits.  2.  Lung volumes also reveal a borderline low vital capacity and a decrease in expiratory reserve volume but otherwise are within normal limits.  3.  Diffusion capacity is within normal limits and when corrected for alveolar volume is supranormal.        Ryan Keith MD      Results for orders placed during the hospital encounter of 03/11/19    Full Pulmonary Function Test With Bronchodilator & ABG    [facility] - [facility] Function Test    [address]  [place]  [place]  [address]  [phone]    Patient : Danisha Cannon  MRN : [id number]  CSN : [id number]  Pulmonologist : Ryan Keith MD  Date : 3/13/2019    ______________________________________________________________________    Interpretation :  1.  Spirometry is within normal limits.  2.  Lung volumes are within normal limits.  3.  There is a moderate diffusion impairment which when corrected for alveolar volume is normalized.      Ryan Keith MD           Assessment and Plan  Diagnoses and all orders for this visit:    1. Chronic bronchitis, unspecified chronic  bronchitis type (HCC) (Primary)  Overview:  Mildly low IGE, mildly elevated IGG subclass 1  +eos 410-5/11/21    flovent HFA-110, albuterol HFA      Assessment & Plan:  Continue current treatment regimen      2. Allergic rhinitis, unspecified seasonality, unspecified trigger  Overview:  Azelastine, flonase, singulair    Not candidate for immunotherapy per Dr. Angel as the patient is on a beta blocker.    Assessment & Plan:  Continue current treatment regimen    Orders:  -     azelastine (ASTELIN) 0.1 % nasal spray; 2 sprays into the nostril(s) as directed by provider 2 (Two) Times a Day. Use in each nostril as directed  Dispense: 30 mL; Refill: 11    3. Lung nodules  Overview:  HRCT 11/16/21 - 2mm LLL, 3mm RLL    Assessment & Plan:  Obtain follow-up CT in November 2022.    Orders:  -     CT Chest Without Contrast; Future    4. Obstructive sleep apnea  Overview:  Home trilogy    Assessment & Plan:  The patient is using home trilogy, benefiting from it, and wishes to continue it.      5. Chronic respiratory failure with hypoxia and hypercapnia (HCC)  Overview:  Home trilogy    Assessment & Plan:  Continue trilogy device.  The patient is benefiting from it and wishes to continue it.      6. Restrictive lung disease  Overview:  Likely secondary to morbid obesity and obstructive sleep apnea        Follow Up  ERNESTO Stroud  8/4/2022  15:02 CDT  Return in about 4 months (around 11/28/2022) for CT.  Patient was given instructions and counseling regarding her condition or for health maintenance advice. Please see specific information pulled into the AVS if appropriate.   Please note that portions of this note were completed with a voice recognition program.

## 2022-08-04 ENCOUNTER — OFFICE VISIT (OUTPATIENT)
Dept: PULMONOLOGY | Facility: CLINIC | Age: 67
End: 2022-08-04

## 2022-08-04 VITALS
WEIGHT: 290 LBS | SYSTOLIC BLOOD PRESSURE: 142 MMHG | HEIGHT: 64 IN | DIASTOLIC BLOOD PRESSURE: 84 MMHG | OXYGEN SATURATION: 93 % | HEART RATE: 73 BPM | BODY MASS INDEX: 49.51 KG/M2

## 2022-08-04 DIAGNOSIS — J30.9 ALLERGIC RHINITIS, UNSPECIFIED SEASONALITY, UNSPECIFIED TRIGGER: ICD-10-CM

## 2022-08-04 DIAGNOSIS — J98.4 RESTRICTIVE LUNG DISEASE: Chronic | ICD-10-CM

## 2022-08-04 DIAGNOSIS — G47.33 OBSTRUCTIVE SLEEP APNEA: Chronic | ICD-10-CM

## 2022-08-04 DIAGNOSIS — J42 CHRONIC BRONCHITIS, UNSPECIFIED CHRONIC BRONCHITIS TYPE: Primary | Chronic | ICD-10-CM

## 2022-08-04 DIAGNOSIS — J96.12 CHRONIC RESPIRATORY FAILURE WITH HYPOXIA AND HYPERCAPNIA: Chronic | ICD-10-CM

## 2022-08-04 DIAGNOSIS — R91.8 LUNG NODULES: ICD-10-CM

## 2022-08-04 DIAGNOSIS — J96.11 CHRONIC RESPIRATORY FAILURE WITH HYPOXIA AND HYPERCAPNIA: Chronic | ICD-10-CM

## 2022-08-04 PROCEDURE — 99214 OFFICE O/P EST MOD 30 MIN: CPT | Performed by: NURSE PRACTITIONER

## 2022-08-04 RX ORDER — CEFUROXIME AXETIL 250 MG/1
250 TABLET ORAL 2 TIMES DAILY
COMMUNITY
Start: 2022-07-11 | End: 2022-10-10

## 2022-08-04 RX ORDER — MOMETASONE FUROATE 1 MG/G
1 CREAM TOPICAL DAILY
COMMUNITY

## 2022-08-04 RX ORDER — PERMETHRIN 50 MG/G
1 CREAM TOPICAL ONCE
COMMUNITY

## 2022-08-04 RX ORDER — HYDROXYZINE PAMOATE 50 MG/1
50 CAPSULE ORAL 3 TIMES DAILY PRN
COMMUNITY
End: 2022-10-10

## 2022-08-04 RX ORDER — PREDNISONE 10 MG/1
10 TABLET ORAL DAILY
COMMUNITY
End: 2022-10-10

## 2022-08-04 RX ORDER — CLOBETASOL PROPIONATE 0.5 MG/G
CREAM TOPICAL
COMMUNITY
Start: 2022-06-24

## 2022-08-04 RX ORDER — AZELASTINE 1 MG/ML
2 SPRAY, METERED NASAL 2 TIMES DAILY
Qty: 30 ML | Refills: 11 | Status: SHIPPED | OUTPATIENT
Start: 2022-08-04 | End: 2023-03-28 | Stop reason: SDUPTHER

## 2022-08-04 RX ORDER — TRIAMCINOLONE ACETONIDE 1 MG/G
CREAM TOPICAL
COMMUNITY
Start: 2022-06-28

## 2022-08-04 RX ORDER — AMITRIPTYLINE HYDROCHLORIDE 25 MG/1
TABLET, FILM COATED ORAL
COMMUNITY
Start: 2022-07-31 | End: 2022-10-10

## 2022-08-04 RX ORDER — CHLORHEXIDINE GLUCONATE 0.12 MG/ML
15 RINSE ORAL 2 TIMES DAILY
COMMUNITY
End: 2022-10-10

## 2022-08-04 NOTE — PATIENT INSTRUCTIONS
"https://medlineplus.gov/chronicbronchitis.html\">   Chronic Bronchitis, Adult    Chronic bronchitis is inflammation of the large airways (bronchial tubes) that carry air to the lungs. The inflammation causes more mucus (sputum) to build up. The inflammation and mucus make it hard to breathe. This condition is a type of chronic obstructive pulmonary disease (COPD).  Chronic bronchitis is a long-term (chronic) condition. It is defined as a chronic cough with sputum production:  For at least 3 months of the year.  For 2 years in a row.  People with chronic bronchitis are more likely to get colds and other infections in the nose, throat, or airways.  What are the causes?  This condition is most often caused by:  A history of smoking.  Exposure to secondhand smoke or a smoky area for a long period of time.  Lung problems, such as emphysema, asthma, bronchiectasis, or cystic fibrosis.  Long-term exposure to certain fumes or chemicals that irritate the lungs.  Infection.  What are the signs or symptoms?  Symptoms of chronic bronchitis may include:  A cough that brings up mucus (productive cough).  Shortness of breath.  A whistling sound when you breathe (wheezing).  Chest tightness.  Colds or respiratory infections that go away and return.  How is this diagnosed?  This condition may be diagnosed based on:  Your symptoms and medical history.  A physical exam.  Tests, such as:  Testing a sputum sample.  Blood tests.  A chest X-ray.  Tests of lung (pulmonary) function.  How is this treated?  There is no cure for chronic bronchitis. Treatment may help control your symptoms. This includes:  Drinking fluids. This may help thin your mucus so it is easier to cough up.  Mucus-clearing techniques.  Taking medicine prescribed by your health care provider, such as:  Inhaled medicine to improve airflow in and out of your lungs.  Antibiotics to treat or prevent bacterial lung infections.  Using oxygen therapy, if your blood oxygen level " is very low.  Pulmonary rehabilitation. This is a program that helps you learn how to manage your breathing problem. The program may include exercise, education, counseling, treatment, and support.  Follow these instructions at home:    Medicines  Take over-the-counter and prescription medicines only as told by your health care provider.  If you were prescribed an antibiotic medicine, take it as told by your health care provider. Do not stop taking the antibiotic even if you start to feel better.  Lifestyle    Do not use any products that contain nicotine or tobacco, such as cigarettes, e-cigarettes, and chewing tobacco. If you need help quitting, ask your health care provider.  Stay away from other people's smoke (secondhand smoke) and any irritants that make you cough more, such as chemical fumes.  Eat a healthy diet and get regular exercise. Talk with your health care provider about what activities are safe for you.    Preventing infections  Stay up to date on all immunizations, including the pneumonia and flu vaccines.  Wash your hands often with soap and water for at least 20 seconds. If soap and water are not available, use hand .  Avoid contact with people who have symptoms of a cold or the flu.  General instructions  Drink enough fluids to keep your urine pale yellow.  Use oxygen therapy at home as directed.  Follow instructions from your health care provider about how to use oxygen safely and take steps to prevent fire.  Do not smoke while using oxygen or allow others to smoke in your home.  Keep all follow-up visits as told by your health care provider. This is important.  Contact a health care provider if:  Your shortness of breath or coughing gets worse even when you take medicine.  Your mucus gets thicker or changes color.  You are not able to cough up your mucus.  You have a fever.  Get help right away if:  You have trouble breathing.  You have chest pain.  You feel dizzy or confused.  These  symptoms may represent a serious problem that is an emergency. Do not wait to see if the symptoms will go away. Get medical help right away. Call your local emergency services (911 in the [place] Do not drive yourself to the hospital.  Summary  Chronic bronchitis is inflammation of the large airways (bronchial tubes) that carry air to the lungs. The inflammation causes mucus (sputum) to build up. The inflammation and mucus make it hard to breathe.  If you were prescribed an antibiotic medicine, take it as told by your health care provider. Do not stop taking the antibiotic even if you start to feel better.  Drink enough fluids to keep your urine pale yellow. Drinking fluids may help thin your mucus so it is easier to cough up.  Do not use any products that contain nicotine or tobacco, such as cigarettes, e-cigarettes, and chewing tobacco. If you need help quitting, ask your health care provider.  This information is not intended to replace advice given to you by your health care provider. Make sure you discuss any questions you have with your health care provider.  Document Revised: 02/02/2021 Document Reviewed: 02/02/2021  Elsevier Patient Education © 2021 [email or web address]  Sleep Apnea  Sleep apnea affects breathing during sleep. It causes breathing to stop for a short time or to become shallow. It can also increase the risk of:  Heart attack.  Stroke.  Being very overweight (obese).  Diabetes.  Heart failure.  Irregular heartbeat.  The goal of treatment is to help you breathe normally again.  What are the causes?  There are three kinds of sleep apnea:  Obstructive sleep apnea. This is caused by a blocked or collapsed airway.  Central sleep apnea. This happens when the brain does not send the right signals to the muscles that control breathing.  Mixed sleep apnea. This is a combination of obstructive and central sleep apnea.  The most common cause of this condition is a collapsed or blocked airway. This can happen  if:  Your throat muscles are too relaxed.  Your tongue and tonsils are too large.  You are overweight.  Your airway is too small.  What increases the risk?  Being overweight.  Smoking.  Having a small airway.  Being older.  Being male.  Drinking alcohol.  Taking medicines to calm yourself (sedatives or tranquilizers).  Having family members with the condition.  What are the signs or symptoms?  Trouble staying asleep.  Being sleepy or tired during the day.  Getting angry a lot.  Loud snoring.  Headaches in the morning.  Not being able to focus your mind (concentrate).  Forgetting things.  Less interest in sex.  Mood swings.  Personality changes.  Feelings of sadness (depression).  Waking up a lot during the night to pee (urinate).  Dry mouth.  Sore throat.  How is this diagnosed?  Your medical history.  A physical exam.  A test that is done when you are sleeping (sleep study). The test is most often done in a sleep lab but may also be done at home.  How is this treated?    Sleeping on your side.  Using a medicine to get rid of mucus in your nose (decongestant).  Avoiding the use of alcohol, medicines to help you relax, or certain pain medicines (narcotics).  Losing weight, if needed.  Changing your diet.  Not smoking.  Using a machine to open your airway while you sleep, such as:  An oral appliance. This is a mouthpiece that shifts your lower jaw forward.  A CPAP device. This device blows air through a mask when you breathe out (exhale).  An EPAP device. This has valves that you put in each nostril.  A BPAP device. This device blows air through a mask when you breathe in (inhale) and breathe out.  Having surgery if other treatments do not work.  It is important to get treatment for sleep apnea. Without treatment, it can lead to:  High blood pressure.  Coronary artery disease.  In men, not being able to have an erection (impotence).  Reduced thinking ability.  Follow these instructions at home:  Lifestyle  Make  changes that your doctor recommends.  Eat a healthy diet.  Lose weight if needed.  Avoid alcohol, medicines to help you relax, and some pain medicines.  Do not use any products that contain nicotine or tobacco, such as cigarettes, e-cigarettes, and chewing tobacco. If you need help quitting, ask your doctor.  General instructions  Take over-the-counter and prescription medicines only as told by your doctor.  If you were given a machine to use while you sleep, use it only as told by your doctor.  If you are having surgery, make sure to tell your doctor you have sleep apnea. You may need to bring your device with you.  Keep all follow-up visits as told by your doctor. This is important.  Contact a doctor if:  The machine that you were given to use during sleep bothers you or does not seem to be working.  You do not get better.  You get worse.  Get help right away if:  Your chest hurts.  You have trouble breathing in enough air.  You have an uncomfortable feeling in your back, arms, or stomach.  You have trouble talking.  One side of your body feels weak.  A part of your face is hanging down.  These symptoms may be an emergency. Do not wait to see if the symptoms will go away. Get medical help right away. Call your local emergency services (911 in the [place] Do not drive yourself to the hospital.  Summary  This condition affects breathing during sleep.  The most common cause is a collapsed or blocked airway.  The goal of treatment is to help you breathe normally while you sleep.  This information is not intended to replace advice given to you by your health care provider. Make sure you discuss any questions you have with your health care provider.  Document Revised: 10/04/2019 Document Reviewed: 08/13/2019  Elsevier Patient Education © 2021 [employer]  Allergic Rhinitis, Adult    Allergic rhinitis is an allergic reaction that affects the mucous membrane inside the nose. The mucous membrane is the tissue that  produces mucus.  There are two types of allergic rhinitis:  Seasonal. This type is also called hay fever and happens only during certain seasons.  Perennial. This type can happen at any time of the year.  Allergic rhinitis cannot be spread from person to person. This condition can be mild, moderate, or severe. It can develop at any age and may be outgrown.  What are the causes?  This condition is caused by allergens. These are things that can cause an allergic reaction. Allergens may differ for seasonal allergic rhinitis and perennial allergic rhinitis.  Seasonal allergic rhinitis is triggered by pollen. Pollen can come from grasses, trees, and weeds.  Perennial allergic rhinitis may be triggered by:  Dust mites.  Proteins in a pet's urine, saliva, or dander. Dander is dead skin cells from a pet.  Smoke, mold, or car fumes.  What increases the risk?  You are more likely to develop this condition if you have a family history of allergies or other conditions related to allergies, including:  Allergic conjunctivitis. This is inflammation of parts of the eyes and eyelids.  Asthma. This condition affects the lungs and makes it hard to breathe.  Atopic dermatitis or eczema. This is long term (chronic) inflammation of the skin.  Food allergies.  What are the signs or symptoms?  Symptoms of this condition include:  Sneezing or coughing.  A stuffy nose (nasal congestion), itchy nose, or nasal discharge.  Itchy eyes and tearing of the eyes.  A feeling of mucus dripping down the back of your throat (postnasal drip).  Trouble sleeping.  Tiredness or fatigue.  Headache.  Sore throat.  How is this diagnosed?  This condition may be diagnosed with your symptoms, medical history, and physical exam. Your health care provider may check for related conditions, such as:  Asthma.  Pink eye. This is eye inflammation caused by infection (conjunctivitis).  Ear infection.  Upper respiratory infection. This is an infection in the nose,  throat, or upper airways.  You may also have tests to find out which allergens trigger your symptoms. These may include skin tests or blood tests.  How is this treated?  There is no cure for this condition, but treatment can help control symptoms. Treatment may include:  Taking medicines that block allergy symptoms, such as corticosteroids and antihistamines. Medicine may be given as a shot, nasal spray, or pill.  Avoiding any allergens.  Being exposed again and again to tiny amounts of allergens to help you build a defense against allergens (immunotherapy). This is done if other treatments have not helped. It may include:  Allergy shots. These are injected medicines that have small amounts of allergen in them.  Sublingual immunotherapy. This involves taking small doses of a medicine with allergen in it under your tongue.  If these treatments do not work, your health care provider may prescribe newer, stronger medicines.  Follow these instructions at home:  Avoiding allergens  Find out what you are allergic to and avoid those allergens. These are some things you can do to help avoid allergens:  If you have perennial allergies:  Replace carpet with wood, tile, or vinyl gagan. Carpet can trap dander and dust.  Do not smoke. Do not allow smoking in your home.  Change your heating and air conditioning filters at least once a month.  If you have seasonal allergies, take these steps during allergy season:  Keep windows closed as much as possible.  Plan outdoor activities when pollen counts are lowest. Check pollen counts before you plan outdoor activities.  When coming indoors, change clothing and shower before sitting on furniture or bedding.  If you have a pet in the house that produces allergens:  Keep the pet out of the bedroom.  Vacuum, sweep, and dust regularly.  General instructions  Take over-the-counter and prescription medicines only as told by your health care provider.  Drink enough fluid to keep your urine  pale yellow.  Keep all follow-up visits as told by your health care provider. This is important.  Where to find more information  American Academy of Allergy, Asthma & Immunology: www.aaaai.org  Contact a health care provider if:  You have a fever.  You develop a cough that does not go away.  You make whistling sounds when you breathe (wheeze).  Your symptoms slow you down or stop you from doing your normal activities each day.  Get help right away if:  You have shortness of breath.  This symptom may represent a serious problem that is an emergency. Do not wait to see if the symptom will go away. Get medical help right away. Call your local emergency services (911 in the [place] Do not drive yourself to the hospital.  Summary  Allergic rhinitis may be managed by taking medicines as directed and avoiding allergens.  If you have seasonal allergies, keep windows closed as much as possible during allergy season.  Contact your health care provider if you develop a fever or a cough that does not go away.  This information is not intended to replace advice given to you by your health care provider. Make sure you discuss any questions you have with your health care provider.  Document Revised: 02/05/2021 Document Reviewed: 12/15/2020  [employer] Patient Education © 2021 [employer]  Obesity, Adult  Obesity is having too much body fat. Being obese means that your weight is more than what is healthy for you.  BMI is a number that explains how much body fat you have. If you have a BMI of 30 or more, you are obese. Obesity is often caused by eating or drinking more calories than your body uses. Changing your lifestyle can help you lose weight.  Obesity can cause serious health problems, such as:  Stroke.  Coronary artery disease (CAD).  Type 2 diabetes.  Some types of cancer, including cancers of the colon, breast, uterus, and gallbladder.  Osteoarthritis.  High blood pressure (hypertension).  High cholesterol.  Sleep  apnea.  Gallbladder stones.  Infertility problems.  What are the causes?  Eating meals each day that are high in calories, sugar, and fat.  Being born with genes that may make you more likely to become obese.  Having a medical condition that causes obesity.  Taking certain medicines.  Sitting a lot (having a sedentary lifestyle).  Not getting enough sleep.  Drinking a lot of drinks that have sugar in them.  What increases the risk?  Having a family history of obesity.  Being an  woman.  Being a  man.  Living in an area with limited access to:  [place], recreation centers, or sidewalks.  Healthy food choices, such as grocery stores and farmers' markets.  What are the signs or symptoms?  The main sign is having too much body fat.  How is this treated?  Treatment for this condition often includes changing your lifestyle. Treatment may include:  Changing your diet. This may include making a healthy meal plan.  Exercise. This may include activity that causes your heart to beat faster (aerobic exercise) and strength training. Work with your doctor to design a program that works for you.  Medicine to help you lose weight. This may be used if you are not able to lose 1 pound a week after 6 weeks of healthy eating and more exercise.  Treating conditions that cause the obesity.  Surgery. Options may include gastric banding and gastric bypass. This may be done if:  Other treatments have not helped to improve your condition.  You have a BMI of 40 or higher.  You have life-threatening health problems related to obesity.  Follow these instructions at home:  Eating and drinking    Follow advice from your doctor about what to eat and drink. Your doctor may tell you to:  Limit fast food, sweets, and processed snack foods.  Choose low-fat options. For example, choose low-fat milk instead of whole milk.  Eat 5 or more servings of fruits or vegetables each day.  Eat at home more often. This gives you more control  over what you eat.  Choose healthy foods when you eat out.  Learn to read food labels. This will help you learn how much food is in 1 serving.  Keep low-fat snacks available.  Avoid drinks that have a lot of sugar in them. These include soda, fruit juice, iced tea with sugar, and flavored milk.  Drink enough water to keep your pee (urine) pale yellow.  Do not go on fad diets.    Physical activity  Exercise often, as told by your doctor. Most adults should get up to 150 minutes of moderate-intensity exercise every week.Ask your doctor:  What types of exercise are safe for you.  How often you should exercise.  Warm up and stretch before being active.  Do slow stretching after being active (cool down).  Rest between times of being active.  Lifestyle  Work with your doctor and a food expert (dietitian) to set a weight-loss goal that is best for you.  Limit your screen time.  Find ways to reward yourself that do not involve food.  Do not drink alcohol if:  Your doctor tells you not to drink.  You are pregnant, may be pregnant, or are planning to become pregnant.  If you drink alcohol:  Limit how much you use to:  0-1 drink a day for women.  0-2 drinks a day for men.  Be aware of how much alcohol is in your drink. In the [place] one drink equals one 12 oz bottle of beer (355 mL), one 5 oz glass of wine (148 mL), or one 1½ oz glass of hard liquor (44 mL).  General instructions  Keep a weight-loss journal. This can help you keep track of:  The food that you eat.  How much exercise you get.  Take over-the-counter and prescription medicines only as told by your doctor.  Take vitamins and supplements only as told by your doctor.  Think about joining a support group.  Keep all follow-up visits as told by your doctor. This is important.  Contact a doctor if:  You cannot meet your weight loss goal after you have changed your diet and lifestyle for 6 weeks.  Get help right away if you:  Are having trouble breathing.  Are having  thoughts of harming yourself.  Summary  Obesity is having too much body fat.  Being obese means that your weight is more than what is healthy for you.  Work with your doctor to set a weight-loss goal.  Get regular exercise as told by your doctor.  This information is not intended to replace advice given to you by your health care provider. Make sure you discuss any questions you have with your health care provider.  Document Revised: 08/22/2019 Document Reviewed: 08/22/2019  Elsevier Patient Education © 2021 [employer] Inc[employer]

## 2022-09-06 ENCOUNTER — TELEPHONE (OUTPATIENT)
Dept: NEUROLOGY | Age: 67
End: 2022-09-06

## 2022-09-06 RX ORDER — LEVOCETIRIZINE DIHYDROCHLORIDE 5 MG/1
5 TABLET, FILM COATED ORAL EVERY EVENING
Qty: 30 TABLET | Refills: 11 | Status: SHIPPED | OUTPATIENT
Start: 2022-09-06 | End: 2023-03-28 | Stop reason: SDUPTHER

## 2022-09-06 NOTE — TELEPHONE ENCOUNTER
[facility] called today and states that her fingers/hands are now tingling and going numb. Patient has a f/u appointment on 9/27 with Dr. Kyree Diamond but patient wanted me to inform Dr. Kyree Diamond now. I did tell patient she could go to the ER ad be evaluated otherwise patient would need to discuss at f/u.  Patient states that this new feeling comes and goes

## 2022-09-06 NOTE — TELEPHONE ENCOUNTER
Patient left a message stating that she needed a refill on Levocetirizine 5 mg and sent to [facility] in [place].    Rx Refill Note  Requested Prescriptions     Pending Prescriptions Disp Refills   • levocetirizine (XYZAL) 5 MG tablet       Sig: Take 1 tablet by mouth Every Evening.      Last office visit with prescribing clinician: 8/4/2022      Next office visit with prescribing clinician: 12/6/2022          {TIP  Is Refill Pharmacy correct?:23}  Sara Carrington MA  09/06/22, 16:44 CDT

## 2022-09-19 RX ORDER — DEXLANSOPRAZOLE 60 MG/1
CAPSULE, DELAYED RELEASE ORAL
Qty: 30 CAPSULE | Refills: 11 | OUTPATIENT
Start: 2022-09-19

## 2022-09-23 ENCOUNTER — TELEPHONE (OUTPATIENT)
Dept: NEUROLOGY | Age: 67
End: 2022-09-23

## 2022-09-23 RX ORDER — FLUTICASONE PROPIONATE 50 MCG
SPRAY, SUSPENSION (ML) NASAL
Qty: 16 G | Refills: 5 | OUTPATIENT
Start: 2022-09-23

## 2022-09-23 RX ORDER — TRIAMCINOLONE ACETONIDE 0.1 %
PASTE (GRAM) DENTAL
Qty: 5 G | Refills: 2 | OUTPATIENT
Start: 2022-09-23

## 2022-09-23 NOTE — TELEPHONE ENCOUNTER
Called and left patient a VM to get a current DME dwnld report of her sleep machine from [address] for her appointment.

## 2022-09-27 ENCOUNTER — OFFICE VISIT (OUTPATIENT)
Dept: NEUROLOGY | Age: 67
End: 2022-09-27
Payer: MEDICARE

## 2022-09-27 VITALS
BODY MASS INDEX: 48.65 KG/M2 | RESPIRATION RATE: 22 BRPM | WEIGHT: 285 LBS | HEIGHT: 64 IN | HEART RATE: 59 BPM | SYSTOLIC BLOOD PRESSURE: 124 MMHG | DIASTOLIC BLOOD PRESSURE: 70 MMHG

## 2022-09-27 DIAGNOSIS — I65.21 CAROTID STENOSIS, RIGHT: ICD-10-CM

## 2022-09-27 DIAGNOSIS — G89.29 CHRONIC RIGHT-SIDED LOW BACK PAIN WITH RIGHT-SIDED SCIATICA: ICD-10-CM

## 2022-09-27 DIAGNOSIS — G60.3 IDIOPATHIC PROGRESSIVE NEUROPATHY: Primary | ICD-10-CM

## 2022-09-27 DIAGNOSIS — G47.33 OBSTRUCTIVE SLEEP APNEA: ICD-10-CM

## 2022-09-27 DIAGNOSIS — R41.3 MEMORY LOSS: ICD-10-CM

## 2022-09-27 DIAGNOSIS — M54.41 CHRONIC RIGHT-SIDED LOW BACK PAIN WITH RIGHT-SIDED SCIATICA: ICD-10-CM

## 2022-09-27 DIAGNOSIS — G25.81 RESTLESS LEG SYNDROME: ICD-10-CM

## 2022-09-27 PROCEDURE — G8417 CALC BMI ABV UP PARAM F/U: HCPCS | Performed by: PSYCHIATRY & NEUROLOGY

## 2022-09-27 PROCEDURE — 1090F PRES/ABSN URINE INCON ASSESS: CPT | Performed by: PSYCHIATRY & NEUROLOGY

## 2022-09-27 PROCEDURE — G8428 CUR MEDS NOT DOCUMENT: HCPCS | Performed by: PSYCHIATRY & NEUROLOGY

## 2022-09-27 PROCEDURE — 99214 OFFICE O/P EST MOD 30 MIN: CPT | Performed by: PSYCHIATRY & NEUROLOGY

## 2022-09-27 PROCEDURE — 3017F COLORECTAL CA SCREEN DOC REV: CPT | Performed by: PSYCHIATRY & NEUROLOGY

## 2022-09-27 PROCEDURE — G8399 PT W/DXA RESULTS DOCUMENT: HCPCS | Performed by: PSYCHIATRY & NEUROLOGY

## 2022-09-27 PROCEDURE — 1123F ACP DISCUSS/DSCN MKR DOCD: CPT | Performed by: PSYCHIATRY & NEUROLOGY

## 2022-09-27 PROCEDURE — 1036F TOBACCO NON-USER: CPT | Performed by: PSYCHIATRY & NEUROLOGY

## 2022-09-27 RX ORDER — AZELASTINE HYDROCHLORIDE, FLUTICASONE PROPIONATE 137; 50 UG/1; UG/1
SPRAY, METERED NASAL
COMMUNITY

## 2022-09-27 RX ORDER — DONEPEZIL HYDROCHLORIDE 10 MG/1
TABLET, FILM COATED ORAL
COMMUNITY
Start: 2022-08-16

## 2022-09-27 RX ORDER — AMITRIPTYLINE HYDROCHLORIDE 75 MG/1
TABLET, FILM COATED ORAL
Qty: 30 TABLET | Refills: 5 | Status: SHIPPED | OUTPATIENT
Start: 2022-09-27

## 2022-09-27 NOTE — PROGRESS NOTES
[facility]  [address] [place] [address] Drive, [address] [address],[address]  Morteza Peterson  Phone [phone]  Fax [phone]     [facility] Follow Up Encounter  22 12:28 PM CDT    Information:   Patient Name: Emeli Reich  :   1955  Age:   69 y.o. MRN:   [id number]  Account #:  [de-identified]  Today:  22    Provider: Uche Riddle M.D. Chief Complaint:   Chief Complaint   Patient presents with    Follow-up       Subjective: Emeli Reich is a 69 y.o. morbidly obese woman with a history of obstructive sleep apnea, chronic respiratory failure with hypoxemia and hypercapnea, mixed CHF, peripheral neuropathy, memory impairment, and asterixis who is following up. She has had stomach problems. She was diagnosed with chronic kidney disease and taken off her Diclofenac. She says her arthritis is much worse. She complains of pain in her feet. She has been using icy hot. She has been seeing Bolivar Nolasco at the pulmonary clinic. She was diagnosed with hypercapnia and put on a Trilogy machine. She uses this with 3.5 LPM oxygen supplementation. She continues to have jerking in her hands and drops items. She has numbness and tingling and burning in her feet but not her hands. She complains of insomnia lately. She has had not only her difficulties but her 's to deal with.         Objective:     Past Medical History:  Past Medical History:   Diagnosis Date    Abdominal adhesions 2016    Anxiety     Arthritis     Atrial fibrillation (HCC)     Atrial fibrillation and flutter ([address] 2015    BiPAP (biphasic positive airway pressure) dependence     Chronic back pain     Chronic cough     Depression     Edema     Fibrocystic breast     GERD (gastroesophageal reflux disease)     Glossitis     Headache(784.0)     Heart burn     Heart disease     Hypertension     Hypothyroidism     Mouth sore     MRSA (methicillin resistant staph aureus) culture positive 2014    nasal    Numbness     toes Obstructive sleep apnea     BIPAP    DEVEN (obstructive sleep apnea)     Peripheral neuropathy     PONV (postoperative nausea and vomiting)     Prolonged emergence from general anesthesia     Recurrent ventral incisional hernia 01/18/2016    Sleep apnea     Stroke ([address]     Stroke-like symptom     SVT (supraventricular tachycardia) (HCC)     SVT (supraventricular tachycardia) (HCC)     Swelling of extremity     Unspecified sleep apnea     clinicallybi-pap    Ventricular tachyarrhythmia ([address] 09/2015    svt       Past Surgical History:   Procedure Laterality Date    BREAST CYST INCISION AND DRAINAGE  03/2017    CARDIAC CATHETERIZATION  8/18/15  JDT    EF 50%    CARDIAC SURGERY      CATARACT REMOVAL      CHOLECYSTECTOMY  8/4/14    BY Dr. Rubi Paul  2008    FOOT SURGERY Left     removal of two screws    HAMMER TOE SURGERY      HARDWARE REMOVAL FOOT / ANKLE Left 8/16/2016    FOOT HARDWARE REMOVAL - DEEP / 2nd METATARSAL HEAD RESECTION performed by Estrella Jung DPM at [facility]  1/12/2015    x3    KNEE SURGERY      Took prostetic joint out because of infection and put a spacer in    OTHER SURGICAL HISTORY  02/2017    pilonidal cyst-Dr Bina Kim    MO KNEE [facility] Right 7/3/2017    KNEE ARTHROSCOPY PARTIAL MEDIAL MENISECTOMY performed by Rafaela Anthony MD at [facility] Right 2/16/2018    KNEE TOTAL ARTHROPLASTY performed by Joshua Fung MD at [facility] N/A 1/18/2016    HERNIA VENTRAL REPAIR LAPAROSCOPIC WITH MESH  performed by Debora Watts MD at [facility]  None    Significant Injuries  None    Habits  Dawson Charles reports that she has never smoked. She has never used smokeless tobacco. She reports that she does not drink alcohol and does not use drugs.     Family History   Problem Relation Age of Onset    Heart Disease Mother     Diabetes Mother     High Blood Pressure Allergies: Allergies as of 09/27/2022 - Fully Reviewed 09/27/2022   Allergen Reaction Noted    Azithromycin  07/09/2019    Codeine Nausea Only 07/29/2014    Mobic [meloxicam] Rash 12/13/2017    Morphine and related Itching 03/02/2018       Review of Systems:  Constitutional: negative for - chills and fever  Eyes:  negative for - visual disturbance and photophobia  HENMT: negative for - headaches and sinus pain  Respiratory: negative for - cough, hemoptysis  positive for - shortness of breath  Cardiovascular: negative for - chest pain and palpitations  Gastrointestinal: negative for - blood in stools, constipation, diarrhea, nausea, and vomiting  Genito-Urinary: negative for - hematuria, urinary frequency, urinary urgency, and urinary retention  Musculoskeletal: positivefor - joint pain, joint stiffness, and joint swelling  Hematological and Lymphatic: negative for - bleeding problems, abnormal bruising, and swollen lymph nodes  Endocrine:  negative for - polydipsia and polyphagia  Allergy/Immunology:  negative for - rhinorrhea, sinus congestion, hives  Integument:  negative for - negative for - rash, change in moles, new or changing lesions  Psychological: negative for - anxiety and depression  Neurological: positive for - memory loss, numbness/tingling, and weakness     PHYSICAL EXAMINATION:  Vitals:  /70   Pulse 59   Resp 22   Ht 5' 4\" (1.626 m)   Wt 285 lb (129.3 kg)   BMI 48.92 kg/m²   General appearance:  Alert, well developed, well nourished, in no distress  HEENT:  normocephalic, atraumatic, sclera appear normal, no nasal abnormalities, no rhinorrhea, Ears appear normal, oral mucous membranes are moist without erythema, trachea midline, thyroid is normal, no lymphadenopathy or neck mass. Cardiovascular:  Regular rate and rhythm without murmer. moderate peripheral edema, No cyanosis or clubbing. No carotid bruits. Pulmonary:  Lungs are clear to auscultation.   Breathing appears normal, good expansion, normal effort without use of accessory muscles  Musculoskeletal:  Joints are osteoarthritis  Integument:  No rash, erythema, or pallor  Psychiatric:  Mood, affect, and behavior appear normal      NEUROLOGIC EXAMINATION:  Mental Status:  alert, oriented to person, place, and time. Speech:  Clear without dysarthria or dysphonia  Language:  Fluent without aphasia  Cranial Nerves:   II Visual fields are full to confrontation   III,IV, VI Extraocular movements are full   VII Facial movements are symmetrical without weakness   VIII Hearing is intact   IX,X Shoulder shrug and head rotation strength are intact   XII No tongue atrophy or fasciculations. Normal tongue protrusion. No tongue weakness  Motor:  Normal strength in both upper and lower extremities. Normal muscle tone and bulk. Deep tendon reflexes are absent. Issa's signs are absent bilaterally. There is no ankle clonus on either side. Sensation:  Sensation is reduced to light touch, temperature, and vibration in distal extremities. Coordination:  Rapid alternating movements are normal in both upper and lower extremities. Finger to nose testing is unimpaired bilaterally. Gait:  Unsteady     Pertinent Diagnostic Studies:  [facility] notes, labs    Assessment:       ICD-10-CM    1. Idiopathic progressive neuropathy  G60.3       2. Obstructive sleep apnea  G47.33       3. Memory loss  R41.3       4. Carotid stenosis, right  I65.21       5. Chronic right-sided low back pain with right-sided sciatica  M54.41     G89.29       6. Restless leg syndrome  G25.81       I discussed the above with her. She has a painful PN and is on several medications for this, little else to use. She has severe DEVEN now with chronic respiratory failure, obesity hypoventilation syndrome. She is using a Trilogy device. She was never complaint with BiPAP. Plan:   1. Continue use of Trilogy device with supplemental oxygen during sleep and PRN during the daytime. 2. Continue Cymbalta 60 mg BId  3. Continue gabapentin 800 mg TID  4. Increase amitriptyline to 75 mg q HS  5.  Follow up here in 6 months      Electronically signed by Maile Balbuena MD on 9/27/22

## 2022-10-10 ENCOUNTER — OFFICE VISIT (OUTPATIENT)
Dept: CARDIOLOGY | Facility: CLINIC | Age: 67
End: 2022-10-10

## 2022-10-10 VITALS
DIASTOLIC BLOOD PRESSURE: 60 MMHG | OXYGEN SATURATION: 95 % | WEIGHT: 293 LBS | BODY MASS INDEX: 50.02 KG/M2 | HEIGHT: 64 IN | HEART RATE: 74 BPM | SYSTOLIC BLOOD PRESSURE: 112 MMHG

## 2022-10-10 DIAGNOSIS — I48.0 PAF (PAROXYSMAL ATRIAL FIBRILLATION): Primary | ICD-10-CM

## 2022-10-10 DIAGNOSIS — I47.1 PAROXYSMAL SVT (SUPRAVENTRICULAR TACHYCARDIA): ICD-10-CM

## 2022-10-10 DIAGNOSIS — G47.33 OBSTRUCTIVE SLEEP APNEA: Chronic | ICD-10-CM

## 2022-10-10 DIAGNOSIS — E66.01 MORBID OBESITY WITH BMI OF 50.0-59.9, ADULT: ICD-10-CM

## 2022-10-10 PROCEDURE — 99214 OFFICE O/P EST MOD 30 MIN: CPT | Performed by: NURSE PRACTITIONER

## 2022-10-10 PROCEDURE — 93000 ELECTROCARDIOGRAM COMPLETE: CPT | Performed by: NURSE PRACTITIONER

## 2022-10-10 RX ORDER — AMITRIPTYLINE HYDROCHLORIDE 75 MG/1
TABLET, FILM COATED ORAL
COMMUNITY
Start: 2022-09-28

## 2022-10-10 RX ORDER — DONEPEZIL HYDROCHLORIDE 10 MG/1
10 TABLET, FILM COATED ORAL DAILY
COMMUNITY
Start: 2022-08-16

## 2022-10-10 RX ORDER — DULOXETIN HYDROCHLORIDE 30 MG/1
30 CAPSULE, DELAYED RELEASE ORAL DAILY
COMMUNITY
Start: 2022-09-14

## 2022-10-10 NOTE — PROGRESS NOTES
"    Subjective:     Encounter Date:10/10/2022      Patient ID: Danisha Cannon is a 67 y.o. female     Chief Complaint: \"no complaints\"  Atrial Fibrillation  Presents for follow-up visit. Symptoms are negative for dizziness, palpitations, shortness of breath and syncope. The symptoms have been stable. Past medical history includes atrial fibrillation and hyperlipidemia.   Shortness of Breath  Associated symptoms include leg swelling. Pertinent negatives include no orthopnea, PND, sputum production, syncope or wheezing.   Hyperlipidemia  This is a chronic problem. The current episode started more than 1 year ago. The problem is controlled. Pertinent negatives include no shortness of breath.     Patient presents today for a follow up. Patient is followed for PAF s/p ablation and SVT s/p AVNRT ablation in 8/2019 per Dr. Guerin. She underwent watchman implant on 2/21/21 per Dr. Guerin at [place]. She saw Dr. Guerin in April at which time her Flecainide was reduced to 50mg BID and possibly stopped in 2 months.     She reports she has felt well until recently. She notes 3 days ago her allergies started bothering her and she has developed a cough with productive green sputum and worsening BERMEO. She notes she was weaned off O2 and is concerned she may need it. She notes since her reduction in Flecainide she has felt no different. She notes she can \"hear\" her heart skipping beats due to \"fluid behind her ears\" but is unable to feel them. She continues to note occasional swelling in her feet that is chronic and unchanged. She remains on Lasix and notes it helps keep the swelling down. She denies chest pain, palpitations, dyspnea, edema, orthopnea and PND.       The following portions of the patient's history were reviewed and updated as appropriate: allergies, current medications, past family history, past medical history, past social history, past surgical history and problem list.    Allergies   Allergen Reactions   • " Other Rash     Dial soap  Redness and swelling on skin and burning sensation   • Codeine Dizziness and Nausea Only     Rapid heart rate, dizziness  NAUSEA   • Mobic [Meloxicam] Rash   • Morphine And Related Itching   • Azithromycin Other (See Comments)   • Cyclobenzaprine Unknown - Low Severity       Current Outpatient Medications:   •  amitriptyline (ELAVIL) 75 MG tablet, , Disp: , Rfl:   •  ASPIRIN 81 PO, Take 1 tablet by mouth Daily., Disp: , Rfl:   •  azelastine (ASTELIN) 0.1 % nasal spray, 2 sprays into the nostril(s) as directed by provider 2 (Two) Times a Day. Use in each nostril as directed, Disp: 30 mL, Rfl: 11  •  clobetasol (TEMOVATE) 0.05 % cream, APPLY TOPICALLY TO THE AFFECTED AREA TWICE DAILY ON LEGS, Disp: , Rfl:   •  dexlansoprazole (Dexilant) 60 MG capsule, Take 1 capsule by mouth Daily., Disp: 30 capsule, Rfl: 11  •  diclofenac (VOLTAREN) 75 MG EC tablet, Take 1 tablet by mouth Daily., Disp: 90 tablet, Rfl: 1  •  donepezil (ARICEPT) 10 MG tablet, Take 1 tablet by mouth Daily., Disp: , Rfl:   •  DULoxetine (CYMBALTA) 30 MG capsule, Take 1 capsule by mouth Daily., Disp: , Rfl:   •  DULoxetine (CYMBALTA) 60 MG capsule, TAKE 1 CAPSULE BY MOUTH TWICE DAILY, Disp: 60 capsule, Rfl: 5  •  famciclovir (FAMVIR) 500 MG tablet, Take 1 tablet by mouth 2 (Two) Times a Day., Disp: 60 tablet, Rfl: 2  •  flecainide (TAMBOCOR) 50 MG tablet, Take 50 mg by mouth Every 12 (Twelve) Hours., Disp: , Rfl:   •  fluticasone (FLONASE) 50 MCG/ACT nasal spray, 2 sprays into the nostril(s) as directed by provider Daily As Needed for Rhinitis or Allergies., Disp: 16 g, Rfl: 5  •  fluticasone (Flovent HFA) 110 MCG/ACT inhaler, Inhale 1 puff 2 (Two) Times a Day., Disp: , Rfl:   •  furosemide (LASIX) 40 MG tablet, Take 1 tablet by mouth Daily., Disp: 90 tablet, Rfl: 3  •  gabapentin (NEURONTIN) 800 MG tablet, Take 800 mg by mouth 3 (Three) Times a Day., Disp: , Rfl:   •  levothyroxine (SYNTHROID, LEVOTHROID) 137 MCG tablet, Take 1  tablet by mouth Every Morning., Disp: 90 tablet, Rfl: 3  •  LORazepam (ATIVAN) 1 MG tablet, Take 1 mg by mouth 2 (Two) Times a Day As Needed., Disp: , Rfl:   •  metoprolol succinate XL (TOPROL-XL) 25 MG 24 hr tablet, Take 1 tablet by mouth every night at bedtime., Disp: , Rfl:   •  mometasone (ELOCON) 0.1 % cream, Apply 1 application topically to the appropriate area as directed Daily., Disp: , Rfl:   •  oxybutynin XL (DITROPAN-XL) 5 MG 24 hr tablet, Take 5 mg by mouth Daily., Disp: , Rfl:   •  permethrin (ELIMITE) 5 % cream, Apply 1 application topically to the appropriate area as directed 1 (One) Time., Disp: , Rfl:   •  rOPINIRole (REQUIP) 3 MG tablet, 2 tablets each morning and 3 tablets at bedtime., Disp: 450 tablet, Rfl: 3  •  rosuvastatin (CRESTOR) 5 MG tablet, 1 tablet Daily., Disp: , Rfl:   •  triamcinolone (KENALOG) 0.1 % cream, APPLY TO RASH ON BOTH LOWER LEGS TWICE DAILY FOR 2 WEEKS THEN STOP. MAY USE UP TO 2 WEEKS AT A TIME, Disp: , Rfl:   •  triamcinolone (KENALOG) 0.1 % paste, Apply  to teeth Take As Directed., Disp: 5 g, Rfl: 2  •  levocetirizine (XYZAL) 5 MG tablet, Take 1 tablet by mouth Every Evening for 30 days., Disp: 30 tablet, Rfl: 11  Past Medical History:   Diagnosis Date   • Anemia    • Anxiety and depression    • Arthritis    • Atrial fibrillation (HCC)    • Chronic cough    • Disease of thyroid gland    • DVT (deep venous thrombosis) (HCC)    • GERD (gastroesophageal reflux disease)    • History of incision and drainage     Right knee   • History of staph infection     right knee, and upper right thigh   • Hyperlipidemia    • Infection      in right knee to bone, cleaned it out and put new hardware with antibiotic and pt developed hole in incision   • Inflammation of vein     in leg and leaking   • Neuropathy, peripheral    • Pneumonia     RECENT DX PER FAMILY DOCTOR   • PONV (postoperative nausea and vomiting)    • Restless leg syndrome    • Sleep apnea with use of continuous positive  airway pressure (CPAP)     bipap   • SVT (supraventricular tachycardia) (HCC)        Social History     Socioeconomic History   • Marital status:    Tobacco Use   • Smoking status: Never   • Smokeless tobacco: Never   Vaping Use   • Vaping Use: Never used   Substance and Sexual Activity   • Alcohol use: No   • Drug use: No   • Sexual activity: Defer       Review of Systems   Constitutional: Negative for malaise/fatigue, weight gain and weight loss.   Cardiovascular: Positive for dyspnea on exertion and leg swelling. Negative for irregular heartbeat, near-syncope, orthopnea, palpitations, paroxysmal nocturnal dyspnea and syncope.   Respiratory: Positive for cough. Negative for shortness of breath, sleep disturbances due to breathing, sputum production and wheezing.    Skin: Negative for dry skin, flushing and itching.   Gastrointestinal: Negative for hematemesis and hematochezia.   Neurological: Negative for dizziness, light-headedness and loss of balance.   All other systems reviewed and are negative.         Objective:     Vitals reviewed.   Constitutional:       General: Not in acute distress.     Appearance: Well-developed. Not diaphoretic.   Eyes:      General: No scleral icterus.     Conjunctiva/sclera: Conjunctivae normal.      Pupils: Pupils are equal, round, and reactive to light.   HENT:      Head: Normocephalic.    Mouth/Throat:      Pharynx: No oropharyngeal exudate.   Pulmonary:      Effort: Pulmonary effort is normal. No respiratory distress.      Breath sounds: Examination of the right-lower field reveals decreased breath sounds. Examination of the left-lower field reveals decreased breath sounds. Decreased breath sounds present. No wheezing. No rales.   Chest:      Chest wall: Not tender to palpatation.   Cardiovascular:      Normal rate. Regular rhythm.      Murmurs: There is no murmur.   Pulses:     Intact distal pulses.   Edema:     Peripheral edema absent.   Abdominal:      General: Bowel  "sounds are normal. There is no distension.      Palpations: Abdomen is soft.      Tenderness: There is no abdominal tenderness.   Musculoskeletal: Normal range of motion.      Cervical back: Normal range of motion and neck supple. Skin:     General: Skin is warm and dry.      Coloration: Skin is not pale.      Findings: No erythema or rash.   Neurological:      Mental Status: Alert and oriented to person, place, and time.      Deep Tendon Reflexes: Reflexes are normal and symmetric.   Psychiatric:         Behavior: Behavior normal.           ECG 12 Lead    Date/Time: 10/10/2022 3:27 PM  Performed by: Barbara De Dios APRN  Authorized by: Barbara De Dios APRN   Comparison: compared with previous ECG from 10/8/2021  Similar to previous ECG  Comparison to previous ECG: PVCs now present  Rhythm: sinus rhythm  Ectopy: unifocal PVCs  Rate: normal  BPM: 74  Conduction: left anterior fascicular block  ST Segments: ST segments normal  T Waves: T waves normal  QRS axis: normal  Other: no other findings    Clinical impression: abnormal EKG          /60 (BP Location: Left arm, Patient Position: Sitting, Cuff Size: Large Adult)   Pulse 74   Ht 162.6 cm (64\")   Wt 134 kg (296 lb)   LMP  (LMP Unknown)   SpO2 95%   BMI 50.81 kg/m²     Lab Review:   I have reviewed previous office notes, [place] notes, recent labs and recent cardiac testing.   Dr. Guerin 4/2022:        Assessment:          Diagnosis Plan   1. PAF (paroxysmal atrial fibrillation) ([facility])        2. Paroxysmal SVT (supraventricular tachycardia) ([facility])        3. Morbid obesity with BMI of 50.0-59.9, adult ([facility])        4. Obstructive sleep apnea             Plan:       1. PAF- stable. NSR today. s/p ablation in 8/2019. Follows with Dr. Guerin. continue Flecainide, and lopressor. Not anticoagulated due to s/p watchman.     2. SVT- s/p ablation per Dr. Guerin.    3. BMI- Patient's Body mass index is 50.81 kg/m². indicating that she is morbidly obese (BMI " > 40 or > 35 with obesity - related health condition). Obesity-related health conditions include the following: obstructive sleep apnea. Obesity is unchanged. BMI is is above average; BMI management plan is completed. We discussed portion control and increasing exercise..  4. TRISTON- complaint.     Follow up in 1 year or return sooner if symptoms worsen.     I spent 30 minutes caring for Danisha on this date of service. This time includes time spent by me in the following activities:preparing for the visit, reviewing tests, obtaining and/or reviewing a separately obtained history, performing a medically appropriate examination and/or evaluation , ordering medications, tests, or procedures, documenting information in the medical record and independently interpreting results and communicating that information with the patient/family/caregiver

## 2022-11-02 DIAGNOSIS — J30.9 ALLERGIC RHINITIS, UNSPECIFIED SEASONALITY, UNSPECIFIED TRIGGER: Primary | ICD-10-CM

## 2022-11-02 NOTE — TELEPHONE ENCOUNTER
Patient called to request refills on Flonase NS. Routed to [facility].  Rx Refill Note  Requested Prescriptions     Pending Prescriptions Disp Refills   • fluticasone (FLONASE) 50 MCG/ACT nasal spray 16 g 5     Si sprays into the nostril(s) as directed by provider Daily As Needed for Rhinitis or Allergies.      Last office visit with prescribing clinician: 2022      Next office visit with prescribing clinician: 2022            Valdemar Tolbert CMA  22, 14:45 CDT

## 2022-11-03 RX ORDER — FLUTICASONE PROPIONATE 50 MCG
2 SPRAY, SUSPENSION (ML) NASAL DAILY PRN
Qty: 16 G | Refills: 5 | Status: SHIPPED | OUTPATIENT
Start: 2022-11-03 | End: 2022-12-06 | Stop reason: SDUPTHER

## 2022-11-15 ENCOUNTER — APPOINTMENT (OUTPATIENT)
Dept: CT IMAGING | Facility: HOSPITAL | Age: 67
End: 2022-11-15

## 2022-11-30 ENCOUNTER — HOSPITAL ENCOUNTER (OUTPATIENT)
Dept: CT IMAGING | Facility: HOSPITAL | Age: 67
Discharge: HOME | End: 2022-11-30
Admitting: NURSE PRACTITIONER

## 2022-11-30 DIAGNOSIS — R91.8 LUNG NODULES: ICD-10-CM

## 2022-11-30 PROCEDURE — 71250 CT THORAX DX C-: CPT

## 2022-12-05 NOTE — PROGRESS NOTES
" Sophia Valero, MSN, APRN, NP-C, CESAR, CFRN  [facility] [place] [place] [facility]  [address]            [place] [address]  Phone: [phone]  Fax: [phone]          Chief Complaint  Chronic bronchitis, unspecified    Subjective    History of Present Illness    Danisha Cannon presents to [facility] PULMONARY & CRITICAL CARE MEDICINE   History of Present Illness   The patient presents today for f/u chronic bronchitis.  Pt with known afib, morbid obesity/deconditioning, chronic bronchitis, TRISTON, chronic respiratory failure with hypoxemia/hypercapnia, restrictive lung disease, lung nodules, and allergic rhinitis.  She reports having had URI recently.  She states that she did not require antibiotic or steroid.  She states that she took NyQuil and has improved.  He is using Flovent and albuterol HFA as needed.  She reports compliance with trilogy device.  She ambulates with Rollator.  CT chest reviewed as noted below.  No increasing shortness of breath, no fever, no chills, no cough with purulent sputum.      Objective   Vital Signs:   /80   Pulse 78   Ht 162.6 cm (64\")   Wt 129 kg (285 lb 6.4 oz)   SpO2 96% Comment: RA  BMI 48.99 kg/m²     Physical Exam  Vitals reviewed.   Constitutional:       General: She is not in acute distress.     Appearance: She is well-developed. She is morbidly obese.      Interventions: Face mask in place.      Comments: Ambulates with Rollator   HENT:      Head: Normocephalic and atraumatic.   Eyes:      General: No scleral icterus.     Conjunctiva/sclera: Conjunctivae normal.      Pupils: Pupils are equal, round, and reactive to light.   Cardiovascular:      Rate and Rhythm: Normal rate.   Pulmonary:      Effort: Pulmonary effort is normal. No respiratory distress.      Breath sounds: Normal breath sounds. No wheezing or rales.   Musculoskeletal:         General: Normal range of motion.      Cervical back: Normal range of motion and neck supple.   Skin:     " General: Skin is warm and dry.   Neurological:      Mental Status: She is alert and oriented to person, place, and time.   Psychiatric:         Behavior: Behavior normal.         Thought Content: Thought content normal.         Judgment: Judgment normal.        Result Review :  The following data was reviewed by: ERNESTO Stroud on 2022:  CT Chest Without Contrast Diagnostic (2022 13:49)  IMPRESSION:  New 3 mm LEFT lower lobe pulmonary nodule. This is likely infectious or inflammatory in nature but if the patient has risk factors for pulmonary  malignancy, recommend a follow-up chest CT in one year based on  Fleischner criteria. Multiple other small subcentimeter pulmonary  nodules are stable.  This report was finalized on 2022 14:24 by Dr. Brayan Braden MD.  Rest/Exercise Pulse Ox Values        Some values may be hidden. Unless noted otherwise, only the newest values recorded on each date are displayed.         Rest/Exercise Pulse Ox Results 10/4/21   Rest room air SAT % 90   Exercise room air SAT % 85   Rest on O2 @ Liters 2   Rest on O2 SAT % 96   Exercise on O2 @ Liters 2   Exercise on O2 SAT % 92           PFT Values        Some values may be hidden. Unless noted otherwise, only the newest values recorded on each date are displayed.         Old Values PFT Results 21   No data to display.      Pre Drug PFT Results 21   FVC 73   FEV1 81   FEF 25-75% 139   FEV1/FVC 86.84      Post Drug PFT Results 21   No data to display.      Other Tests PFT Results 21   TLC 85   RV 77   DLCO 96   D/VAsb 118             Results for orders placed in visit on 21    Pulmonary Function Test    Narrative  Pulmonary Function Test  Performed by: Francisca Villagomez, RRT  Authorized by: Sophia Valero APRN    Pre Drug % Predicted  FVC: 73%  FEV1: 81%  FEF 25-75%: 139%  FEV1/FVC: 86.84%  T%  RV: 77%  DLCO: 96%  D/VAsb: 118%    Interpretation  Spirometry  Spirometry  shows moderate restriction. midflow is normal.  Review of FVL curve  Patient's effort is normal.  Lung Volume Measurements  Measurements show reduced lung volumes consistent with restriction.  Diffusion Capacity  The patient's diffusion capacity is normal.  Diffusion capacity is normal when corrected for alveolar volume.      Results for orders placed during the hospital encounter of 12/13/19    Full Pulmonary Function Test With Bronchodilator & ABG    [facility] - Pulmonary Function Test    [address]  [place]  [place]  [address]  [phone]    Patient : Danisha Cannon  MRN : [id number]  CSN : [id number]  Pulmonologist : Ryan Keith MD  Date : 12/13/2019    ______________________________________________________________________    Interpretation :  1.  Spirometry reveals a borderline low forced vital capacity and otherwise are within normal limits.  2.  Lung volumes also reveal a borderline low vital capacity and a decrease in expiratory reserve volume but otherwise are within normal limits.  3.  Diffusion capacity is within normal limits and when corrected for alveolar volume is supranormal.        Ryan Keith MD      Results for orders placed during the hospital encounter of 03/11/19    Full Pulmonary Function Test With Bronchodilator & ABG    [facility] - Pulmonary Function [facility]    [address]  [place]  [place]  [address]  [phone]    Patient : Danisha Cannon  MRN : [id number]  CSN : [id number]  Pulmonologist : Ryan Keith MD  Date : 3/13/2019    ______________________________________________________________________    Interpretation :  1.  Spirometry is within normal limits.  2.  Lung volumes are within normal limits.  3.  There is a moderate diffusion impairment which when corrected for alveolar volume is normalized.      Ryan Keith MD           Assessment and Plan  Diagnoses and all orders for this visit:    1. Chronic bronchitis,  unspecified chronic bronchitis type (HCC) (Primary)  Overview:  Mildly low IGE, mildly elevated IGG subclass 1  +eos 410-5/11/21    flovent HFA-110, albuterol HFA      Assessment & Plan:  Continue current treatment regimen      2. Obstructive sleep apnea  Overview:  Home trilogy    Assessment & Plan:  Continue home trilogy.  The patient is benefiting from it and wishes to continue it.      3. Allergic rhinitis, unspecified seasonality, unspecified trigger  Overview:  Azelastine, flonase, singulair    Not candidate for immunotherapy per Dr. Angel as the patient is on a beta blocker.    Assessment & Plan:  Continue current treatment regimen    Orders:  -     fluticasone (FLONASE) 50 MCG/ACT nasal spray; 2 sprays into the nostril(s) as directed by provider Daily As Needed for Rhinitis or Allergies for up to 30 days.  Dispense: 16 g; Refill: 11    4. Chronic respiratory failure with hypoxia and hypercapnia (HCC)  Overview:  Home trilogy    Assessment & Plan:  Continue home trilogy.  The patient is benefiting from it and wishes to continue it.      5. Lung nodules  Overview:  HRCT 11/16/21 - 2mm LLL, 3mm RLL    CT chest 11/30/2022-new 3 mm LEFT lower lobe pulmonary nodule on image 97.  Stable 3 mm LEFT lower lobe pulmonary nodule, image 72.  Stable 3 mm RIGHT lower lobe pulmonary nodule, image 70.  Stable 3 mm subpleural RIGHT lower lobe pulmonary nodule, image 58.  Stable 3 mm RIGHT upper lobe pulmonary nodule, image 37.    Assessment & Plan:  Will obtain follow-up CT in 1 year.  Place order at next office visit      6. Restrictive lung disease  Overview:  Likely secondary to morbid obesity and obstructive sleep apnea      7. Gastroesophageal reflux disease, unspecified whether esophagitis present  Overview:  PPI- Dexilant    Assessment & Plan:  Continue current treatment regimen        Follow Up  ERNESTO Stroud  12/6/2022  15:21 CST  Return in about 6 months (around 6/6/2023) for FVL+DLCO.  Patient was  given instructions and counseling regarding her condition or for health maintenance advice. Please see specific information pulled into the AVS if appropriate.   Please note that portions of this note were completed with a voice recognition program.

## 2022-12-06 ENCOUNTER — OFFICE VISIT (OUTPATIENT)
Dept: PULMONOLOGY | Facility: CLINIC | Age: 67
End: 2022-12-06

## 2022-12-06 VITALS
WEIGHT: 285.4 LBS | HEART RATE: 78 BPM | SYSTOLIC BLOOD PRESSURE: 136 MMHG | HEIGHT: 64 IN | BODY MASS INDEX: 48.73 KG/M2 | DIASTOLIC BLOOD PRESSURE: 80 MMHG | OXYGEN SATURATION: 96 %

## 2022-12-06 DIAGNOSIS — J96.11 CHRONIC RESPIRATORY FAILURE WITH HYPOXIA AND HYPERCAPNIA: Chronic | ICD-10-CM

## 2022-12-06 DIAGNOSIS — J96.12 CHRONIC RESPIRATORY FAILURE WITH HYPOXIA AND HYPERCAPNIA: Chronic | ICD-10-CM

## 2022-12-06 DIAGNOSIS — R91.8 LUNG NODULES: Chronic | ICD-10-CM

## 2022-12-06 DIAGNOSIS — K21.9 GASTROESOPHAGEAL REFLUX DISEASE, UNSPECIFIED WHETHER ESOPHAGITIS PRESENT: Chronic | ICD-10-CM

## 2022-12-06 DIAGNOSIS — J42 CHRONIC BRONCHITIS, UNSPECIFIED CHRONIC BRONCHITIS TYPE: Primary | Chronic | ICD-10-CM

## 2022-12-06 DIAGNOSIS — J98.4 RESTRICTIVE LUNG DISEASE: Chronic | ICD-10-CM

## 2022-12-06 DIAGNOSIS — G47.33 OBSTRUCTIVE SLEEP APNEA: Chronic | ICD-10-CM

## 2022-12-06 DIAGNOSIS — J30.9 ALLERGIC RHINITIS, UNSPECIFIED SEASONALITY, UNSPECIFIED TRIGGER: Chronic | ICD-10-CM

## 2022-12-06 PROCEDURE — 99214 OFFICE O/P EST MOD 30 MIN: CPT | Performed by: NURSE PRACTITIONER

## 2022-12-06 RX ORDER — FLUTICASONE PROPIONATE 50 MCG
2 SPRAY, SUSPENSION (ML) NASAL DAILY PRN
Qty: 16 G | Refills: 11 | Status: SHIPPED | OUTPATIENT
Start: 2022-12-06 | End: 2023-03-28 | Stop reason: SDUPTHER

## 2022-12-06 RX ORDER — ALBUTEROL SULFATE 90 UG/1
AEROSOL, METERED RESPIRATORY (INHALATION)
COMMUNITY
Start: 2022-10-18

## 2022-12-06 NOTE — PATIENT INSTRUCTIONS
Allergic Rhinitis, Adult  Allergic rhinitis is an allergic reaction that affects the mucous membrane inside the nose. The mucous membrane is the tissue that produces mucus.  There are two types of allergic rhinitis:  Seasonal. This type is also called hay fever and happens only during certain seasons.  Perennial. This type can happen at any time of the year.  Allergic rhinitis cannot be spread from person to person. This condition can be mild, moderate, or severe. It can develop at any age and may be outgrown.  What are the causes?  This condition is caused by allergens. These are things that can cause an allergic reaction. Allergens may differ for seasonal allergic rhinitis and perennial allergic rhinitis.  Seasonal allergic rhinitis is triggered by pollen. Pollen can come from grasses, trees, and weeds.  Perennial allergic rhinitis may be triggered by:  Dust mites.  Proteins in a pet's urine, saliva, or dander. Dander is dead skin cells from a pet.  Smoke, mold, or car fumes.  What increases the risk?  You are more likely to develop this condition if you have a family history of allergies or other conditions related to allergies, including:  Allergic conjunctivitis. This is inflammation of parts of the eyes and eyelids.  Asthma. This condition affects the lungs and makes it hard to breathe.  Atopic dermatitis or eczema. This is long term (chronic) inflammation of the skin.  Food allergies.  What are the signs or symptoms?  Symptoms of this condition include:  Sneezing or coughing.  A stuffy nose (nasal congestion), itchy nose, or nasal discharge.  Itchy eyes and tearing of the eyes.  A feeling of mucus dripping down the back of your throat (postnasal drip).  Trouble sleeping.  Tiredness or fatigue.  Headache.  Sore throat.  How is this diagnosed?  This condition may be diagnosed with your symptoms, medical history, and physical exam. Your health care provider may check for related conditions, such  as:  Asthma.  Pink eye. This is eye inflammation caused by infection (conjunctivitis).  Ear infection.  Upper respiratory infection. This is an infection in the nose, throat, or upper airways.  You may also have tests to find out which allergens trigger your symptoms. These may include skin tests or blood tests.  How is this treated?  There is no cure for this condition, but treatment can help control symptoms. Treatment may include:  Taking medicines that block allergy symptoms, such as corticosteroids and antihistamines. Medicine may be given as a shot, nasal spray, or pill.  Avoiding any allergens.  Being exposed again and again to tiny amounts of allergens to help you build a defense against allergens (immunotherapy). This is done if other treatments have not helped. It may include:  Allergy shots. These are injected medicines that have small amounts of allergen in them.  Sublingual immunotherapy. This involves taking small doses of a medicine with allergen in it under your tongue.  If these treatments do not work, your health care provider may prescribe newer, stronger medicines.  Follow these instructions at home:  Avoiding allergens  Find out what you are allergic to and avoid those allergens. These are some things you can do to help avoid allergens:  If you have perennial allergies:  Replace carpet with wood, tile, or vinyl gagan. Carpet can trap dander and dust.  Do not smoke. Do not allow smoking in your home.  Change your heating and air conditioning filters at least once a month.  If you have seasonal allergies, take these steps during allergy season:  Keep windows closed as much as possible.  Plan outdoor activities when pollen counts are lowest. Check pollen counts before you plan outdoor activities.  When coming indoors, change clothing and shower before sitting on furniture or bedding.  If you have a pet in the house that produces allergens:  Keep the pet out of the bedroom.  Vacuum, sweep, and  dust regularly.  General instructions  Take over-the-counter and prescription medicines only as told by your health care provider.  Drink enough fluid to keep your urine pale yellow.  Keep all follow-up visits as told by your health care provider. This is important.  Where to find more information  American Academy of Allergy, Asthma & Immunology: www.aaaai.org  Contact a health care provider if:  You have a fever.  You develop a cough that does not go away.  You make whistling sounds when you breathe (wheeze).  Your symptoms slow you down or stop you from doing your normal activities each day.  Get help right away if:  You have shortness of breath.  This symptom may represent a serious problem that is an emergency. Do not wait to see if the symptom will go away. Get medical help right away. Call your local emergency services (911 in the [place] Do not drive yourself to the hospital.  Summary  Allergic rhinitis may be managed by taking medicines as directed and avoiding allergens.  If you have seasonal allergies, keep windows closed as much as possible during allergy season.  Contact your health care provider if you develop a fever or a cough that does not go away.  This information is not intended to replace advice given to you by your health care provider. Make sure you discuss any questions you have with your health care provider.  Document Revised: 02/05/2021 Document Reviewed: 12/15/2020  Elsevier Patient Education © 2022 [employer]  Chronic Bronchitis, Adult  Chronic bronchitis is inflammation inside of the main airways (bronchi) that come off the windpipe (trachea) in the lungs. The swelling causes the airways to narrow and make more mucus than normal. This can make it hard to breathe and may cause coughing or noisy breathing (wheezing). This condition is a type of chronic obstructive pulmonary disease (COPD). Chronic bronchitis is often associated with other chronic respiratory conditions, such as  emphysema, asthma, bronchiectasis, or cystic fibrosis.  Chronic bronchitis is a long-term (chronic) condition. It is defined as a chronic cough with mucus (sputum) production:  For at least 3 months of the year.  For 2 years in a row.  People with chronic bronchitis are more likely to get colds and other infections in the nose, throat, or airways.  What are the causes?  This condition is most often caused by:  A history of smoking.  Exposure to secondhand smoke or a smoky area for a long period of time.  Frequent lung infections.  Long-term exposure to certain fumes or chemicals that irritate the lungs.  What are the signs or symptoms?  Symptoms of chronic bronchitis may include:  A cough that brings up mucus (productive cough).  A whistling sound when you breathe (wheezing).  Shortness of breath.  Chest tightness or soreness.  Fever or chills.  Colds or respiratory infections that go away and return.  How is this diagnosed?  Your health care provider may diagnose this condition based on your signs and symptoms, especially if you have a cough that lasts a long time or keeps coming back.  This condition may be diagnosed based on:  Your symptoms and medical history.  A physical exam, including listening to your lungs.  Tests, such as:  Testing a sputum sample.  Blood tests.  A chest X-ray.  Tests of lung (pulmonary) function.  How is this treated?  There is no cure for chronic bronchitis. Treatment may help control your symptoms. This includes:  Drinking fluids. This may help thin your mucus so it is easier to cough up.  Mucus-clearing techniques. Your health care provider will show you which techniques are best for you.  Medicines such as:  Inhaled medicine (inhaler) to improve air flow in and out of your lungs.  Antibiotics to treat or prevent bacterial lung infections.  Mucus-thinning medicines.  Pulmonary rehabilitation. This is a program that helps you learn how to manage your breathing problem. The program may  include exercise, education, counseling, treatment, and support.  Using oxygen therapy, if your blood oxygen level is very low.  Follow these instructions at home:  Medicines  Take over-the-counter and prescription medicines only as told by your health care provider.  If you were prescribed an antibiotic medicine, take it as told by your health care provider. Do not stop taking the antibiotic even if you start to feel better.  Lifestyle    Do not use any products that contain nicotine or tobacco. These products include cigarettes, chewing tobacco, and vaping devices, such as e-cigarettes. If you need help quitting, ask your health care provider.  Stay away from other people's smoke (secondhand smoke) and any irritants that make you cough more, such as chemical fumes.  Eat a healthy diet and get regular exercise. Talk with your health care provider about what activities are safe for you.  Return to normal activities as told by your health care provider. Ask your health care provider what activities are safe for you.  Preventing infections  Stay up to date on all immunizations, including the pneumonia and flu vaccines.  Wash your hands often with soap and water for at least 20 seconds. If soap and water are not available, use hand .  Avoid contact with people who have symptoms of a cold or the flu.  Keep your environment free from any known allergens such as dust, mold, pets, and pollen.  General instructions  Get plenty of rest.  Drink enough fluids to keep your urine pale yellow.  Use oxygen therapy at home as directed.  Follow instructions from your health care provider about how to use oxygen safely and take steps to prevent fire.  Do not smoke while using oxygen or allow others to smoke in your home.  Keep all follow-up visits. This is important.  Contact a health care provider if:  Your shortness of breath or coughing gets worse even when you take medicine.  Your mucus gets thicker or changes  color.  You are not able to cough up your mucus.  You have a fever.  Get help right away if:  You cough up blood.  You have trouble breathing.  You have chest pain.  You feel dizzy or confused.  These symptoms may represent a serious problem that is an emergency. Do not wait to see if the symptoms will go away. Get medical help right away. Call your local emergency services (911 in the [place] Do not drive yourself to the hospital.  Summary  Chronic bronchitis is inflammation inside of the main airways (bronchi) that come off the windpipe (trachea) in the lungs. The swelling causes the airways to narrow and make more mucus than normal.  Chronic bronchitis is a long-term (chronic) condition. It is defined as a chronic cough with mucus (sputum) production for at least 3 months of the year for 2 years in a row.  If you were prescribed an antibiotic medicine, take it as told by your health care provider. Do not stop taking the antibiotic even if you start to feel better.  Do not use any products that contain nicotine or tobacco. These products include cigarettes, chewing tobacco, and vaping devices, such as e-cigarettes. If you need help quitting, ask your health care provider.  This information is not intended to replace advice given to you by your health care provider. Make sure you discuss any questions you have with your health care provider.  Document Revised: 04/20/2022 Document Reviewed: 04/20/2022  [employer] Patient Education © 2022 [employer]  Obesity, Adult  Obesity is having too much body fat. Being obese means that your weight is more than what is healthy for you.   BMI (body mass index) is a number that explains how much body fat you have. If you have a BMI of 30 or more, you are obese.  Obesity can cause serious health problems, such as:  Stroke.  Coronary artery disease (CAD).  Type 2 diabetes.  Some types of cancer.  High blood pressure (hypertension).  High cholesterol.  Gallbladder stones.  Obesity  can also contribute to:  Osteoarthritis.  Sleep apnea.  Infertility problems.  What are the causes?  Eating meals each day that are high in calories, sugar, and fat.  Drinking a lot of drinks that have sugar in them.  Being born with genes that may make you more likely to become obese.  Having a medical condition that causes obesity.  Taking certain medicines.  Sitting a lot (having a sedentary lifestyle).  Not getting enough sleep.  What increases the risk?  Having a family history of obesity.  Living in an area with limited access to:  [place], recreation centers, or sidewalks.  Healthy food choices, such as grocery stores and farmers' markets.  What are the signs or symptoms?  The main sign is having too much body fat.  How is this treated?  Treatment for this condition often includes changing your lifestyle. Treatment may include:  Changing your diet. This may include making a healthy meal plan.  Exercise. This may include activity that causes your heart to beat faster (aerobic exercise) and strength training. Work with your doctor to design a program that works for you.  Medicine to help you lose weight. This may be used if you are not able to lose one pound a week after 6 weeks of healthy eating and more exercise.  Treating conditions that cause the obesity.  Surgery. Options may include gastric banding and gastric bypass. This may be done if:  Other treatments have not helped to improve your condition.  You have a BMI of 40 or higher.  You have life-threatening health problems related to obesity.  Follow these instructions at home:  Eating and drinking    Follow advice from your doctor about what to eat and drink. Your doctor may tell you to:  Limit fast food, sweets, and processed snack foods.  Choose low-fat options. For example, choose low-fat milk instead of whole milk.  Eat five or more servings of fruits or vegetables each day.  Eat at home more often. This gives you more control over what you  eat.  Choose healthy foods when you eat out.  Learn to read food labels. This will help you learn how much food is in one serving.  Keep low-fat snacks available.  Avoid drinks that have a lot of sugar in them. These include soda, fruit juice, iced tea with sugar, and flavored milk.  Drink enough water to keep your pee (urine) pale yellow.  Do not go on fad diets.  Physical activity  Exercise often, as told by your doctor. Most adults should get up to 150 minutes of moderate-intensity exercise every week.Ask your doctor:  What types of exercise are safe for you.  How often you should exercise.  Warm up and stretch before being active.  Do slow stretching after being active (cool down).  Rest between times of being active.  Lifestyle  Work with your doctor and a food expert (dietitian) to set a weight-loss goal that is best for you.  Limit your screen time.  Find ways to reward yourself that do not involve food.  Do not drink alcohol if:  Your doctor tells you not to drink.  You are pregnant, may be pregnant, or are planning to become pregnant.  If you drink alcohol:  Limit how much you have to:  0-1 drink a day for women.  0-2 drinks a day for men.  Know how much alcohol is in your drink. In the [place] one drink equals one 12 oz bottle of beer (355 mL), one 5 oz glass of wine (148 mL), or one 1½ oz glass of hard liquor (44 mL).  General instructions  Keep a weight-loss journal. This can help you keep track of:  The food that you eat.  How much exercise you get.  Take over-the-counter and prescription medicines only as told by your doctor.  Take vitamins and supplements only as told by your doctor.  Think about joining a support group.  Pay attention to your mental health as obesity can lead to depression or self esteem issues.  Keep all follow-up visits.  Contact a doctor if:  You cannot meet your weight-loss goal after you have changed your diet and lifestyle for 6 weeks.  You are having trouble  breathing.  Summary  Obesity is having too much body fat.  Being obese means that your weight is more than what is healthy for you.  Work with your doctor to set a weight-loss goal.  Get regular exercise as told by your doctor.  This information is not intended to replace advice given to you by your health care provider. Make sure you discuss any questions you have with your health care provider.  Document Revised: 07/26/2022 Document Reviewed: 07/26/2022  [employer]vier Patient Education © 2022 [employer]

## 2022-12-21 ENCOUNTER — TELEPHONE (OUTPATIENT)
Dept: VASCULAR SURGERY | Facility: CLINIC | Age: 67
End: 2022-12-21

## 2022-12-21 NOTE — TELEPHONE ENCOUNTER
UNABLE TO CONFIRM TESTING AND APPOINTMENT WITH [facility] ON 12/23/22. LEFT VM WITH TIMES, LOCATIONS AND CONTACT INFO.

## 2022-12-23 ENCOUNTER — TELEPHONE (OUTPATIENT)
Dept: VASCULAR SURGERY | Facility: CLINIC | Age: 67
End: 2022-12-23

## 2022-12-23 ENCOUNTER — APPOINTMENT (OUTPATIENT)
Dept: ULTRASOUND IMAGING | Facility: HOSPITAL | Age: 67
End: 2022-12-23

## 2022-12-29 ENCOUNTER — TELEPHONE (OUTPATIENT)
Dept: VASCULAR SURGERY | Facility: CLINIC | Age: 67
End: 2022-12-29

## 2022-12-29 NOTE — TELEPHONE ENCOUNTER
UNABLE TO CONTACT PATIENT TO RESCHEDULE CANCELLED 12/23/22 APPOINTMENT DUE TO WEATHER. PHONE IS GOING DIRECTLY TO [facility]ICEM[facility]. LEFT MESSAGE WITH CONTACT INFO FOR RETURN CALL.

## 2022-12-29 NOTE — TELEPHONE ENCOUNTER
Caller: Cannon Danisha JOHNSON    Relationship to patient: Self    Best call back number: [phone]    Patient is needing: APPT WITH DR SIFUENTES SCHEDULED. PT HAS US SCHEDULED TOMORROW- NO FOLLOW UP AVAILABILITY WITH DR SIFUENTES. HUB ATTEMPTED TO WT. NO ANSWER. PLEASE CONTACT PT. THANK YOU!    SENDING AS HIGH PRIORITY DUE TO TESTING BEING TOMORROW.

## 2022-12-30 ENCOUNTER — HOSPITAL ENCOUNTER (OUTPATIENT)
Dept: ULTRASOUND IMAGING | Facility: HOSPITAL | Age: 67
Discharge: HOME | End: 2022-12-30
Admitting: SURGERY

## 2022-12-30 DIAGNOSIS — I65.23 BILATERAL CAROTID ARTERY STENOSIS: ICD-10-CM

## 2022-12-30 PROCEDURE — 93880 EXTRACRANIAL BILAT STUDY: CPT | Performed by: SURGERY

## 2022-12-30 PROCEDURE — 93880 EXTRACRANIAL BILAT STUDY: CPT

## 2023-01-06 ENCOUNTER — TELEPHONE (OUTPATIENT)
Dept: VASCULAR SURGERY | Facility: CLINIC | Age: 68
End: 2023-01-06
Payer: MEDICARE

## 2023-01-09 ENCOUNTER — OFFICE VISIT (OUTPATIENT)
Dept: VASCULAR SURGERY | Facility: CLINIC | Age: 68
End: 2023-01-09
Payer: MEDICARE

## 2023-01-09 VITALS
BODY MASS INDEX: 48.14 KG/M2 | HEART RATE: 65 BPM | DIASTOLIC BLOOD PRESSURE: 70 MMHG | SYSTOLIC BLOOD PRESSURE: 114 MMHG | WEIGHT: 282 LBS | OXYGEN SATURATION: 97 % | HEIGHT: 64 IN

## 2023-01-09 DIAGNOSIS — I83.893 VARICOSE VEINS OF BILATERAL LOWER EXTREMITIES WITH OTHER COMPLICATIONS: Primary | ICD-10-CM

## 2023-01-09 DIAGNOSIS — I65.23 BILATERAL CAROTID ARTERY STENOSIS: ICD-10-CM

## 2023-01-09 DIAGNOSIS — R60.0 EDEMA OF BOTH LOWER EXTREMITIES: ICD-10-CM

## 2023-01-09 DIAGNOSIS — I87.2 VENOUS (PERIPHERAL) INSUFFICIENCY: ICD-10-CM

## 2023-01-09 DIAGNOSIS — G62.9 PERIPHERAL POLYNEUROPATHY: ICD-10-CM

## 2023-01-09 DIAGNOSIS — E78.2 MIXED HYPERLIPIDEMIA: ICD-10-CM

## 2023-01-09 DIAGNOSIS — I10 ESSENTIAL HYPERTENSION: ICD-10-CM

## 2023-01-09 PROCEDURE — 99214 OFFICE O/P EST MOD 30 MIN: CPT | Performed by: SURGERY

## 2023-01-09 NOTE — PROGRESS NOTES
01/09/2023      Tc Bee,   [address] [facility] [place] [address]        Danisha Cannon  1955    Chief Complaint   Patient presents with   • Follow-up     6 month follow up w/ testing. Last seen 6/20/22. States that since last appointment, she has with pain and redness in left leg since.        Dear Tc Bee DO:    I had the pleasure of seeing your patient in the office today for follow up.  As you recall, the patient is a 67 y.o. female who we are currently following for asymptomatic carotid stenosis.  Patient has a history of chronic anticoagulation due to A. fib and was actually seen here at this office back in late 2018, and in early 2019 after developing a right thigh hematoma after local trauma.  Initially we had been trying to manage it conservatively however she developed signs of infection of that hematoma and required incision and drainage of that area in the operating room in January 2019 with placement of a wound VAC.  She had then subsequently followed here at our [facility] wound care center and had healing of that area with good result.  Following that she did not come for any further follow-up here in the vascular surgery office.  She did however return here to [facility] for admission after another fall at her home where she injured her left thigh and developed ecchymosis and swelling.  She was found to have a hematoma in both the medial and lateral thighs and was able to be managed conservatively.  It was noted on that admission that she had had placement of a watchman device in [place] due to her A. fib and so was actually off of anticoagulation and on aspirin/Plavix at that time.  However here when she was found to have the hematoma she was also found to have a left profunda femoris vein DVT and so decision was made by the admitting team to continue her aspirin and stop the Plavix and place her on therapeutic anticoagulation ultimately with Eliquis which she was discharged  on.  Her hemoglobin remained stable and there was no further signs of any bleeding and so she was discharged.  On follow-up here back in June 2021 repeat duplex had shown resolution of that profunda femoris DVT and so her Eliquis was stopped and she was placed back on Plavix.  She has subsequently stopped that Plavix after consultation with cardiology and remains on an 81 mg aspirin daily.  The area of hematoma to the left thigh did resolve.  She did have some mild residual edema to the bilateral lower extremities and this was managed very well with compression.  She also had a noted history of asymptomatic carotid stenosis and a previous carotid duplex had shown 50 to 69% stenosis of the right ICA.  We have been following this with surveillance duplex.  At her last visit CTA of the neck and been performed which showed some mild plaque disease at the bilateral carotid bifurcations with no significant stenosis.  She returns today after having undergone repeat carotid duplex for ongoing surveillance recently.  It again shows 50 to 69% stenosis of the right ICA, and less than 50% stenosis on the left.  She continues to remain asymptomatic from a carotid standpoint with no TIA or strokelike symptoms.  She does note some slightly increased edema of the bilateral lower extremities with some chronic changes of stasis dermatitis.  She does wear light compression and this does help.  She also has been using leg elevation.  She otherwise denies any lower extremity arterial symptoms with no claudication or rest pain.  She has no other acute complaints.    Review of Systems   Constitutional: Negative.  Negative for activity change, appetite change, chills, diaphoresis, fatigue and fever.   HENT: Negative.  Negative for congestion, sneezing, sore throat and trouble swallowing.    Eyes: Negative.  Negative for visual disturbance.   Respiratory: Negative.  Negative for chest tightness and shortness of breath.    Cardiovascular:  Positive for leg swelling. Negative for chest pain and palpitations.   Gastrointestinal: Negative.  Negative for abdominal distention, abdominal pain, nausea and vomiting.   Endocrine: Negative.    Genitourinary: Negative.    Musculoskeletal: Negative.    Skin: Negative.    Allergic/Immunologic: Negative.    Neurological: Negative.    Hematological: Negative.    Psychiatric/Behavioral: Negative.        /70   Pulse 65   Ht 162.6 cm (64\")   Wt 128 kg (282 lb)   LMP  (LMP Unknown)   SpO2 97%   BMI 48.41 kg/m²   Physical Exam  Vitals reviewed.   Constitutional:       Appearance: Normal appearance. She is obese.   HENT:      Head: Normocephalic and atraumatic.      Nose: Nose normal.      Mouth/Throat:      Mouth: Mucous membranes are moist.   Eyes:      Extraocular Movements: Extraocular movements intact.      Pupils: Pupils are equal, round, and reactive to light.   Cardiovascular:      Rate and Rhythm: Normal rate and regular rhythm.      Pulses: Normal pulses.           Carotid pulses are 2+ on the right side and 2+ on the left side.       Radial pulses are 2+ on the right side and 2+ on the left side.        Femoral pulses are 2+ on the right side and 2+ on the left side.       Popliteal pulses are 2+ on the right side and 2+ on the left side.        Dorsalis pedis pulses are 2+ on the right side and 2+ on the left side.        Posterior tibial pulses are 2+ on the right side and 2+ on the left side.      Comments: She has chronic, mild edema of the bilateral lower extremities from ankle to knee.  She has multiple small telangiectasias and varicosities throughout the bilateral lower legs.  Otherwise she has palpable pulses throughout the bilateral lower extremities and both feet are warm.  Pulmonary:      Effort: Pulmonary effort is normal. No respiratory distress.   Abdominal:      General: There is no distension.      Palpations: Abdomen is soft. There is no mass.      Tenderness: There is no abdominal  tenderness.   Musculoskeletal:      Cervical back: Normal range of motion and neck supple.      Right lower leg: Edema present.      Left lower leg: Edema present.   Skin:     General: Skin is warm.      Capillary Refill: Capillary refill takes less than 2 seconds.   Neurological:      General: No focal deficit present.      Mental Status: She is alert and oriented to person, place, and time.   Psychiatric:         Mood and Affect: Mood normal.         Behavior: Behavior normal.         Thought Content: Thought content normal.         Judgment: Judgment normal.         DIAGNOSTIC DATA:    US Carotid Bilateral    Result Date: 1/3/2023  Narrative: History: Carotid occlusive disease      Impression: Impression: 1. There is 50-69% stenosis of the right internal carotid artery. 2. There is less than 50% stenosis of the left internal carotid artery. 3. Antegrade flow is demonstrated in bilateral vertebral arteries.  Comments: Bilateral carotid vertebral arterial duplex scan was performed.  Grayscale imaging shows intimal thickening and calcified elements at the carotid bifurcation. The right internal carotid artery peak systolic velocity is 143.8 cm/sec. The end-diastolic velocity is 45.5 cm/sec. The right ICA/CCA ratio is approximately 1.4 . These findings correlate with 50-69% stenosis of the right internal carotid artery.  Grayscale imaging shows intimal thickening and calcified elements at the carotid bifurcation. The left internal carotid artery peak systolic velocity is 103.2 cm/sec. The end-diastolic velocity is 37.7 cm/sec. The left ICA/CCA ratio is approximately 0.8 . These findings correlate with less than 50% stenosis of the left internal carotid artery.  Antegrade flow is demonstrated in bilateral vertebral arteries. There is greater than 50% stenosis of the left common carotid artery and bilateral external carotid arteries. This report was finalized on 01/03/2023 13:03 by Dr. Marco Abrams MD.      Patient  Active Problem List   Diagnosis   • Abdominal adhesions   • Abnormal echocardiogram   • Abnormal Holter exam   • Atrial flutter, paroxysmal ([facility])   • Bilateral edema of lower extremity   • Chest pain   • Cholecystitis with cholelithiasis   • Chronic right-sided low back pain with sciatica   • Morbid obesity with BMI of 45.0-49.9, adult ([facility])   • Facet syndrome, lumbar   • Heart rate fast   • Idiopathic hypotension   • Idiopathic progressive neuropathy   • Insomnia   • Lumbar facet arthropathy   • Memory loss   • MRSA carrier   • Obstructive sleep apnea   • Pilonidal cyst   • Primary osteoarthritis of right knee   • Recurrent ventral incisional hernia   • Restless leg syndrome   • Sciatica of right side   • Snoring   • Somnolence, daytime   • Spondylosis of lumbar region without myelopathy or radiculopathy   • Tear of medial meniscus of right knee, current   • Total knee replacement status, right   • Tremor   • Umbilical hernia   • Obesity, unspecified obesity severity, unspecified obesity type   • Infection associated with internal knee prosthesis ([facility])   • Infection of right knee ([facility])   • Hypothyroidism   • Fever   • Hypoxia   • Leukocytosis   • Abnormal chest x-ray   • Abscess of right thigh   • Dyspepsia   • Anticoagulated   • Nonsmoker   • NSAID long-term use   • Esophageal dysphagia   • S/P ablation of atrial fibrillation   • Mixed hyperlipidemia   • PAF (paroxysmal atrial fibrillation) ([facility])   • Bradycardia   • Prediabetes   • Anxiety and depression   • Hematoma of left lower extremity   • Fall   • Acute blood loss anemia due to hematoma from recent fall   • Acute kidney injury ([facility])   • Presence of Watchman left atrial appendage closure device   • Acute deep vein thrombosis (DVT) of left lower extremity ([facility])   • Allergic rhinitis   • Anemia   • Arteriosclerotic vascular disease   • Bruit of right carotid artery   • Cataract extraction status, right eye   • Closed fracture of fourth metatarsal bone   •  Erosive osteoarthritis of both hands   • Glossitis   • Heart murmur   • Hypothyroidism due to drugs   • Infection or inflammatory reaction due to other internal prosthetic device, implant, or graft   • Osteoarthritis of carpometacarpal (CMC) joint of thumb   • Osteopenia   • Palpitations   • Postoperative nausea and vomiting   • Pruritic rash   • Retention of urine   • Seasonal allergies   • Swelling   • Bunion of great toe of left foot   • Hammer toe of left foot   • Osteoarthritis of foot joint   • Tailor's bunion   • Tear of medial meniscus of knee   • Tinea cruris   • Undifferentiated somatoform disorder   • Paroxysmal SVT (supraventricular tachycardia) ([facility])   • Osteoporosis   • Hypoglycemia   • Major depressive disorder   • Chronic respiratory failure with hypoxia and hypercapnia ([facility])   • S/P AV juanita ablation   • Chronic bronchitis ([facility])   • Xerostomia   • Essential tremor   • Weakness of left lower extremity   • Anxiety   • Contusion of knee   • Degeneration of lumbar intervertebral disc   • Episodic tension-type headache   • Generalized osteoarthritis   • Malaise and fatigue   • Nail bed infection   • Nonrheumatic tricuspid valve disorder   • Normal gynecologic examination   • Lung nodules   • Left anterior fascicular block   • Vitamin D deficiency   • Restrictive lung disease   • Gastroesophageal reflux disease         ICD-10-CM ICD-9-CM   1. Varicose veins of bilateral lower extremities with other complications  I83.893 454.8   2. Venous (peripheral) insufficiency  I87.2 459.81   3. Edema of both lower extremities  R60.0 782.3   4. Bilateral carotid artery stenosis  I65.23 433.10     433.30   5. Essential hypertension  I10 401.9   6. Mixed hyperlipidemia  E78.2 272.2   7. Peripheral polyneuropathy  G62.9 356.9       Lab Frequency Next Occurrence   Follow Anesthesia Guidelines / Standing Orders Once 12/16/2020   Provide NPO Instructions to Patient Once 12/21/2020   Chlorhexidine Skin Prep Once 12/21/2020    CBC (No Diff) Once 12/21/2020   Comprehensive Metabolic Panel Once 12/21/2020   Protime-INR Once 12/21/2020   XR Chest 2 View Once 12/21/2020   Follow Anesthesia Guidelines / Standing Orders Once 04/26/2021   Obtain Informed Consent Once 05/01/2021   US Carotid Bilateral Once 11/29/2021       PLAN: After thoroughly evaluating Danisha Cannon, I believe the best course of action is to remain conservative from a vascular standpoint.  Repeat carotid duplex done today shows stable 50 to 69% stenosis of the right ICA, and less than 50% stenosis on the left.  She remains asymptomatic from a carotid standpoint with no TIA or strokelike symptoms.  As such moving forward plan will be for ongoing surveillance with carotid duplex repeated in 6 months.  Otherwise from a lower extremity standpoint she should continue with compression as we previously recommended.  Did provide her with a new prescription today for compression stockings in the 20 to 30 mmHg range.  Plan will be for venous duplex with insufficiency study when she returns in 6 months for further evaluation.  I will see her in the office in 6 months. The patient is to continue taking their medications as previously discussed.   I did discuss vascular risk factors as they pertain to the progression of vascular disease including controlling hypertension, and hyperlipidemia. These factors are well controlled on her current regimen. Class 3 Severe Obesity (BMI >=40). Obesity-related health conditions include the following: hypertension, dyslipidemias and lower extremity venous stasis disease. Obesity is unchanged. BMI is is above average; BMI management plan is completed. We discussed portion control and increasing exercise.This was all discussed in full with complete understanding.  Thank you for allowing me to participate in the care of your patient.  Please do not hesitate to call with any questions or concerns.  We will keep you aware of any further encounters with  Danisha Cannon.      Sincerely Yours,      Nino Stern MD

## 2023-01-09 NOTE — LETTER
January 9, 2023     Tc Bee DO  [address] [facility] [place] [address]    Patient: aDnisha Cannon   YOB: 1955   Date of Visit: 1/9/2023       Dear Dr. Rohit DO:    Thank you for referring Danisha Cannon to me for evaluation. Below are the relevant portions of my assessment and plan of care.    If you have questions, please do not hesitate to call me. I look forward to following Danisha along with you.         Sincerely,        Nino Stern MD        CC: No Recipients  Nino Stern MD  01/09/23 1231  Signed  01/09/2023      Tc Bee DO  [address] [facility] [place] [address]        Danisha Cannon  1955    Chief Complaint   Patient presents with   • Follow-up     6 month follow up w/ testing. Last seen 6/20/22. States that since last appointment, she has with pain and redness in left leg since.        Dear Tc Bee DO:    I had the pleasure of seeing your patient in the office today for follow up.  As you recall, the patient is a 67 y.o. female who we are currently following for asymptomatic carotid stenosis.  Patient has a history of chronic anticoagulation due to A. fib and was actually seen here at this office back in late 2018, and in early 2019 after developing a right thigh hematoma after local trauma.  Initially we had been trying to manage it conservatively however she developed signs of infection of that hematoma and required incision and drainage of that area in the operating room in January 2019 with placement of a wound VAC.  She had then subsequently followed here at our [facility] wound care center and had healing of that area with good result.  Following that she did not come for any further follow-up here in the vascular surgery office.  She did however return here to [facility] for admission after another fall at her home where she injured her left thigh and developed ecchymosis and swelling.  She was found to have a hematoma in both the medial and  lateral thighs and was able to be managed conservatively.  It was noted on that admission that she had had placement of a watchman device in [place] due to her A. fib and so was actually off of anticoagulation and on aspirin/Plavix at that time.  However here when she was found to have the hematoma she was also found to have a left profunda femoris vein DVT and so decision was made by the admitting team to continue her aspirin and stop the Plavix and place her on therapeutic anticoagulation ultimately with Eliquis which she was discharged on.  Her hemoglobin remained stable and there was no further signs of any bleeding and so she was discharged.  On follow-up here back in June 2021 repeat duplex had shown resolution of that profunda femoris DVT and so her Eliquis was stopped and she was placed back on Plavix.  She has subsequently stopped that Plavix after consultation with cardiology and remains on an 81 mg aspirin daily.  The area of hematoma to the left thigh did resolve.  She did have some mild residual edema to the bilateral lower extremities and this was managed very well with compression.  She also had a noted history of asymptomatic carotid stenosis and a previous carotid duplex had shown 50 to 69% stenosis of the right ICA.  We have been following this with surveillance duplex.  At her last visit CTA of the neck and been performed which showed some mild plaque disease at the bilateral carotid bifurcations with no significant stenosis.  She returns today after having undergone repeat carotid duplex for ongoing surveillance recently.  It again shows 50 to 69% stenosis of the right ICA, and less than 50% stenosis on the left.  She continues to remain asymptomatic from a carotid standpoint with no TIA or strokelike symptoms.  She does note some slightly increased edema of the bilateral lower extremities with some chronic changes of stasis dermatitis.  She does wear light compression and this does help.  She  also has been using leg elevation.  She otherwise denies any lower extremity arterial symptoms with no claudication or rest pain.  She has no other acute complaints.    Review of Systems   Constitutional: Negative.  Negative for activity change, appetite change, chills, diaphoresis, fatigue and fever.   HENT: Negative.  Negative for congestion, sneezing, sore throat and trouble swallowing.    Eyes: Negative.  Negative for visual disturbance.   Respiratory: Negative.  Negative for chest tightness and shortness of breath.    Cardiovascular: Positive for leg swelling. Negative for chest pain and palpitations.   Gastrointestinal: Negative.  Negative for abdominal distention, abdominal pain, nausea and vomiting.   Endocrine: Negative.    Genitourinary: Negative.    Musculoskeletal: Negative.    Skin: Negative.    Allergic/Immunologic: Negative.    Neurological: Negative.    Hematological: Negative.    Psychiatric/Behavioral: Negative.        /70   Pulse 65   Ht 162.6 cm (64\")   Wt 128 kg (282 lb)   LMP  (LMP Unknown)   SpO2 97%   BMI 48.41 kg/m²   Physical Exam  Vitals reviewed.   Constitutional:       Appearance: Normal appearance. She is obese.   HENT:      Head: Normocephalic and atraumatic.      Nose: Nose normal.      Mouth/Throat:      Mouth: Mucous membranes are moist.   Eyes:      Extraocular Movements: Extraocular movements intact.      Pupils: Pupils are equal, round, and reactive to light.   Cardiovascular:      Rate and Rhythm: Normal rate and regular rhythm.      Pulses: Normal pulses.           Carotid pulses are 2+ on the right side and 2+ on the left side.       Radial pulses are 2+ on the right side and 2+ on the left side.        Femoral pulses are 2+ on the right side and 2+ on the left side.       Popliteal pulses are 2+ on the right side and 2+ on the left side.        Dorsalis pedis pulses are 2+ on the right side and 2+ on the left side.        Posterior tibial pulses are 2+ on the  right side and 2+ on the left side.      Comments: She has chronic, mild edema of the bilateral lower extremities from ankle to knee.  She has multiple small telangiectasias and varicosities throughout the bilateral lower legs.  Otherwise she has palpable pulses throughout the bilateral lower extremities and both feet are warm.  Pulmonary:      Effort: Pulmonary effort is normal. No respiratory distress.   Abdominal:      General: There is no distension.      Palpations: Abdomen is soft. There is no mass.      Tenderness: There is no abdominal tenderness.   Musculoskeletal:      Cervical back: Normal range of motion and neck supple.      Right lower leg: Edema present.      Left lower leg: Edema present.   Skin:     General: Skin is warm.      Capillary Refill: Capillary refill takes less than 2 seconds.   Neurological:      General: No focal deficit present.      Mental Status: She is alert and oriented to person, place, and time.   Psychiatric:         Mood and Affect: Mood normal.         Behavior: Behavior normal.         Thought Content: Thought content normal.         Judgment: Judgment normal.         DIAGNOSTIC DATA:    US Carotid Bilateral    Result Date: 1/3/2023  Narrative: History: Carotid occlusive disease      Impression: Impression: 1. There is 50-69% stenosis of the right internal carotid artery. 2. There is less than 50% stenosis of the left internal carotid artery. 3. Antegrade flow is demonstrated in bilateral vertebral arteries.  Comments: Bilateral carotid vertebral arterial duplex scan was performed.  Grayscale imaging shows intimal thickening and calcified elements at the carotid bifurcation. The right internal carotid artery peak systolic velocity is 143.8 cm/sec. The end-diastolic velocity is 45.5 cm/sec. The right ICA/CCA ratio is approximately 1.4 . These findings correlate with 50-69% stenosis of the right internal carotid artery.  Grayscale imaging shows intimal thickening and calcified  elements at the carotid bifurcation. The left internal carotid artery peak systolic velocity is 103.2 cm/sec. The end-diastolic velocity is 37.7 cm/sec. The left ICA/CCA ratio is approximately 0.8 . These findings correlate with less than 50% stenosis of the left internal carotid artery.  Antegrade flow is demonstrated in bilateral vertebral arteries. There is greater than 50% stenosis of the left common carotid artery and bilateral external carotid arteries. This report was finalized on 01/03/2023 13:03 by Dr. Marco Abrams MD.      Patient Active Problem List   Diagnosis   • Abdominal adhesions   • Abnormal echocardiogram   • Abnormal Holter exam   • Atrial flutter, paroxysmal (HCC)   • Bilateral edema of lower extremity   • Chest pain   • Cholecystitis with cholelithiasis   • Chronic right-sided low back pain with sciatica   • Morbid obesity with BMI of 45.0-49.9, adult (HCC)   • Facet syndrome, lumbar   • Heart rate fast   • Idiopathic hypotension   • Idiopathic progressive neuropathy   • Insomnia   • Lumbar facet arthropathy   • Memory loss   • MRSA carrier   • Obstructive sleep apnea   • Pilonidal cyst   • Primary osteoarthritis of right knee   • Recurrent ventral incisional hernia   • Restless leg syndrome   • Sciatica of right side   • Snoring   • Somnolence, daytime   • Spondylosis of lumbar region without myelopathy or radiculopathy   • Tear of medial meniscus of right knee, current   • Total knee replacement status, right   • Tremor   • Umbilical hernia   • Obesity, unspecified obesity severity, unspecified obesity type   • Infection associated with internal knee prosthesis (HCC)   • Infection of right knee (HCC)   • Hypothyroidism   • Fever   • Hypoxia   • Leukocytosis   • Abnormal chest x-ray   • Abscess of right thigh   • Dyspepsia   • Anticoagulated   • Nonsmoker   • NSAID long-term use   • Esophageal dysphagia   • S/P ablation of atrial fibrillation   • Mixed hyperlipidemia   • PAF (paroxysmal  atrial fibrillation) ([facility])   • Bradycardia   • Prediabetes   • Anxiety and depression   • Hematoma of left lower extremity   • Fall   • Acute blood loss anemia due to hematoma from recent fall   • Acute kidney injury ([facility])   • Presence of Watchman left atrial appendage closure device   • Acute deep vein thrombosis (DVT) of left lower extremity ([facility])   • Allergic rhinitis   • Anemia   • Arteriosclerotic vascular disease   • Bruit of right carotid artery   • Cataract extraction status, right eye   • Closed fracture of fourth metatarsal bone   • Erosive osteoarthritis of both hands   • Glossitis   • Heart murmur   • Hypothyroidism due to drugs   • Infection or inflammatory reaction due to other internal prosthetic device, implant, or graft   • Osteoarthritis of carpometacarpal (CMC) joint of thumb   • Osteopenia   • Palpitations   • Postoperative nausea and vomiting   • Pruritic rash   • Retention of urine   • Seasonal allergies   • Swelling   • Bunion of great toe of left foot   • Hammer toe of left foot   • Osteoarthritis of foot joint   • Tailor's bunion   • Tear of medial meniscus of knee   • Tinea cruris   • Undifferentiated somatoform disorder   • Paroxysmal SVT (supraventricular tachycardia) ([facility])   • Osteoporosis   • Hypoglycemia   • Major depressive disorder   • Chronic respiratory failure with hypoxia and hypercapnia ([facility])   • S/P AV juanita ablation   • Chronic bronchitis ([facility])   • Xerostomia   • Essential tremor   • Weakness of left lower extremity   • Anxiety   • Contusion of knee   • Degeneration of lumbar intervertebral disc   • Episodic tension-type headache   • Generalized osteoarthritis   • Malaise and fatigue   • Nail bed infection   • Nonrheumatic tricuspid valve disorder   • Normal gynecologic examination   • Lung nodules   • Left anterior fascicular block   • Vitamin D deficiency   • Restrictive lung disease   • Gastroesophageal reflux disease         ICD-10-CM ICD-9-CM   1. Varicose veins of  bilateral lower extremities with other complications  I83.893 454.8   2. Venous (peripheral) insufficiency  I87.2 459.81   3. Edema of both lower extremities  R60.0 782.3   4. Bilateral carotid artery stenosis  I65.23 433.10     433.30   5. Essential hypertension  I10 401.9   6. Mixed hyperlipidemia  E78.2 272.2   7. Peripheral polyneuropathy  G62.9 356.9       Lab Frequency Next Occurrence   Follow Anesthesia Guidelines / Standing Orders Once 12/16/2020   Provide NPO Instructions to Patient Once 12/21/2020   Chlorhexidine Skin Prep Once 12/21/2020   CBC (No Diff) Once 12/21/2020   Comprehensive Metabolic Panel Once 12/21/2020   Protime-INR Once 12/21/2020   XR Chest 2 View Once 12/21/2020   Follow Anesthesia Guidelines / Standing Orders Once 04/26/2021   Obtain Informed Consent Once 05/01/2021   US Carotid Bilateral Once 11/29/2021       PLAN: After thoroughly evaluating Danisha Cannon, I believe the best course of action is to remain conservative from a vascular standpoint.  Repeat carotid duplex done today shows stable 50 to 69% stenosis of the right ICA, and less than 50% stenosis on the left.  She remains asymptomatic from a carotid standpoint with no TIA or strokelike symptoms.  As such moving forward plan will be for ongoing surveillance with carotid duplex repeated in 6 months.  Otherwise from a lower extremity standpoint she should continue with compression as we previously recommended.  Did provide her with a new prescription today for compression stockings in the 20 to 30 mmHg range.  Plan will be for venous duplex with insufficiency study when she returns in 6 months for further evaluation.  I will see her in the office in 6 months. The patient is to continue taking their medications as previously discussed.   I did discuss vascular risk factors as they pertain to the progression of vascular disease including controlling hypertension, and hyperlipidemia. These factors are well controlled on her current  regimen. Class 3 Severe Obesity (BMI >=40). Obesity-related health conditions include the following: hypertension, dyslipidemias and lower extremity venous stasis disease. Obesity is unchanged. BMI is is above average; BMI management plan is completed. We discussed portion control and increasing exercise.This was all discussed in full with complete understanding.  Thank you for allowing me to participate in the care of your patient.  Please do not hesitate to call with any questions or concerns.  We will keep you aware of any further encounters with Danisha Cannon.      Sincerely Yours,      Nino Stern MD

## 2023-02-03 ENCOUNTER — TELEPHONE (OUTPATIENT)
Dept: OTOLARYNGOLOGY | Facility: CLINIC | Age: 68
End: 2023-02-03
Payer: MEDICARE

## 2023-02-20 ENCOUNTER — TELEPHONE (OUTPATIENT)
Dept: VASCULAR SURGERY | Facility: CLINIC | Age: 68
End: 2023-02-20
Payer: MEDICARE

## 2023-03-12 DIAGNOSIS — R60.0 BILATERAL EDEMA OF LOWER EXTREMITY: ICD-10-CM

## 2023-03-13 RX ORDER — FUROSEMIDE 40 MG/1
40 TABLET ORAL DAILY
Qty: 90 TABLET | Refills: 3 | OUTPATIENT
Start: 2023-03-13

## 2023-03-13 NOTE — TELEPHONE ENCOUNTER
Furosemide last filled on 4/8/22, qty 90 with 3 refills.     Follow up appointment 03/28/2023.    Please advise

## 2023-03-23 ENCOUNTER — TELEPHONE (OUTPATIENT)
Dept: CARDIOLOGY | Facility: CLINIC | Age: 68
End: 2023-03-23
Payer: MEDICARE

## 2023-03-23 ENCOUNTER — OFFICE VISIT (OUTPATIENT)
Dept: OBGYN CLINIC | Age: 68
End: 2023-03-23

## 2023-03-23 VITALS
HEIGHT: 64 IN | BODY MASS INDEX: 48.83 KG/M2 | WEIGHT: 286 LBS | SYSTOLIC BLOOD PRESSURE: 107 MMHG | HEART RATE: 66 BPM | DIASTOLIC BLOOD PRESSURE: 61 MMHG

## 2023-03-23 DIAGNOSIS — Z01.419 ENCOUNTER FOR ROUTINE GYNECOLOGIC EXAMINATION IN MEDICARE PATIENT: Primary | ICD-10-CM

## 2023-03-23 DIAGNOSIS — Z12.4 SCREENING FOR CERVICAL CANCER: ICD-10-CM

## 2023-03-23 DIAGNOSIS — Z12.39 SCREENING BREAST EXAMINATION: ICD-10-CM

## 2023-03-23 DIAGNOSIS — N39.46 MIXED INCONTINENCE: ICD-10-CM

## 2023-03-23 DIAGNOSIS — Z11.51 SCREENING FOR HPV (HUMAN PAPILLOMAVIRUS): ICD-10-CM

## 2023-03-23 DIAGNOSIS — N32.81 OAB (OVERACTIVE BLADDER): ICD-10-CM

## 2023-03-23 DIAGNOSIS — Z78.0 POSTMENOPAUSAL: ICD-10-CM

## 2023-03-23 LAB — HPV16+18+H RISK 12 DNA SPEC-IMP: NORMAL

## 2023-03-23 RX ORDER — VIBEGRON 75 MG/1
75 TABLET, FILM COATED ORAL DAILY
Qty: 30 TABLET | Refills: 3 | Status: SHIPPED | OUTPATIENT
Start: 2023-03-23

## 2023-03-23 ASSESSMENT — ENCOUNTER SYMPTOMS
CONSTIPATION: 0
ALLERGIC/IMMUNOLOGIC NEGATIVE: 1
DIARRHEA: 0
RESPIRATORY NEGATIVE: 1
GASTROINTESTINAL NEGATIVE: 1
EYES NEGATIVE: 1

## 2023-03-23 NOTE — PROGRESS NOTES
Pt presents today for pap smear and breast exam.  She has no complaints.      Last mammogram:    Last pap smear:    Contraception:  n/a  :  2  Para:  2  AB:    Last bone density:  several years  Last colonoscopy:   several years
81 mg by mouth daily      montelukast (SINGULAIR) 10 MG tablet       levocetirizine (XYZAL) 5 MG tablet Take 5 mg by mouth nightly      ALBUTEROL IN Inhale into the lungs      DULoxetine (CYMBALTA) 60 MG extended release capsule TAKE 1 CAPSULE BY MOUTH TWICE DAILY (Patient taking differently: 60mg AM and 90mg QPM) 180 capsule 3    triamcinolone acetonide (KENALOG) 0.1 % paste APPLY TWICE DAILY AS NEEDED TO TEETH 5 g 0    levothyroxine (SYNTHROID) 137 MCG tablet Take 137 mcg by mouth Daily      DEXILANT 60 MG CPDR delayed release capsule Take 60 mg by mouth daily       alendronate (FOSAMAX) 70 MG tablet TK 1 T PO ONCE A WEEK IN THE MORNING 30 MINUTES BEFORE FIRST MEAL OF THE DAY  3    flecainide (TAMBOCOR) 100 MG tablet TAKE 1 TABLET BY MOUTH TWICE DAILY 180 tablet 3    metoprolol tartrate (LOPRESSOR) 25 MG tablet TAKE 1/2 TABLET BY MOUTH TWICE DAILY 90 tablet 3    LORazepam (ATIVAN) 1 MG tablet Take 1 mg by mouth every 8 hours as needed . 2    rOPINIRole (REQUIP) 3 MG tablet Take 3 mg by mouth 2 times daily 2 tabs in AM and 3 tabs in PM       fluticasone (FLONASE) 50 MCG/ACT nasal spray 2 sprays by Nasal route as needed       famciclovir (FAMVIR) 500 MG tablet   Take 500 mg by mouth 2 times daily       gabapentin (NEURONTIN) 800 MG tablet TAKE 1 TABLET BY MOUTH THREE TIMES DAILY 90 tablet 5     No current facility-administered medications for this visit. Allergies   Allergen Reactions    Azithromycin     Codeine Nausea Only     Rapid heart rate, dizziness    Mobic [Meloxicam] Rash     Arms and legs bumpy red rash    Morphine And Related Itching     Vitals:    03/23/23 1314   BP: 107/61   Pulse: 66     Body mass index is 49.09 kg/m². Review of Systems   Constitutional: Negative. HENT: Negative. Eyes: Negative. Respiratory: Negative. Cardiovascular: Negative. Gastrointestinal: Negative. Negative for constipation and diarrhea. Endocrine: Negative. Genitourinary:  Positive for enuresis.

## 2023-03-23 NOTE — TELEPHONE ENCOUNTER
Caller: Danisha Cannon    Relationship to patient: Self    Best call back number: [phone]    Type of visit: F/U WITH ASHLEY    Requested date: AS AVAILABLE    If rescheduling, when is the original appointment: WITH NICOLAS CHAUHAN, 10/13/23    Additional notes: PATIENT WOULD LIKE TO SEE DR RAMIREZ FOR FUTURE CARE GIVEN HER HEART RHYTHM ISSUES.   PATIENT NOTED HER HISTORY INCLUDES A WATCHMAN PROCEDURE AND CATHETER ABLATION PERFORMED AT [facility] IN [place].   HUB INFORMED HER THAT TRANSFER OF CARE REQUIRES A REFERRAL FROM HER CARDIOLOGIST.

## 2023-03-24 DIAGNOSIS — I48.92 ATRIAL FLUTTER, PAROXYSMAL: Primary | ICD-10-CM

## 2023-03-24 DIAGNOSIS — I48.0 PAF (PAROXYSMAL ATRIAL FIBRILLATION): ICD-10-CM

## 2023-03-24 DIAGNOSIS — I47.1 PAROXYSMAL SVT (SUPRAVENTRICULAR TACHYCARDIA): ICD-10-CM

## 2023-03-28 ENCOUNTER — OFFICE VISIT (OUTPATIENT)
Dept: OTOLARYNGOLOGY | Facility: CLINIC | Age: 68
End: 2023-03-28
Payer: MEDICARE

## 2023-03-28 ENCOUNTER — OFFICE VISIT (OUTPATIENT)
Dept: NEUROLOGY | Age: 68
End: 2023-03-28
Payer: MEDICARE

## 2023-03-28 VITALS — TEMPERATURE: 97.7 F

## 2023-03-28 VITALS
BODY MASS INDEX: 48.65 KG/M2 | SYSTOLIC BLOOD PRESSURE: 106 MMHG | HEIGHT: 64 IN | WEIGHT: 285 LBS | RESPIRATION RATE: 20 BRPM | DIASTOLIC BLOOD PRESSURE: 64 MMHG | HEART RATE: 74 BPM

## 2023-03-28 DIAGNOSIS — R41.3 MEMORY LOSS: ICD-10-CM

## 2023-03-28 DIAGNOSIS — J30.9 ALLERGIC RHINITIS, UNSPECIFIED SEASONALITY, UNSPECIFIED TRIGGER: Primary | ICD-10-CM

## 2023-03-28 DIAGNOSIS — G47.33 OBSTRUCTIVE SLEEP APNEA: ICD-10-CM

## 2023-03-28 DIAGNOSIS — R27.8 ASTERIXIS: ICD-10-CM

## 2023-03-28 DIAGNOSIS — G60.3 IDIOPATHIC PROGRESSIVE NEUROPATHY: Primary | ICD-10-CM

## 2023-03-28 DIAGNOSIS — G25.0 ESSENTIAL TREMOR: ICD-10-CM

## 2023-03-28 DIAGNOSIS — I65.21 CAROTID STENOSIS, RIGHT: ICD-10-CM

## 2023-03-28 DIAGNOSIS — G25.81 RESTLESS LEG SYNDROME: ICD-10-CM

## 2023-03-28 LAB
HPV HR 12 DNA SPEC QL NAA+PROBE: NOT DETECTED
HPV16 DNA SPEC QL NAA+PROBE: NOT DETECTED
HPV16+18+H RISK 12 DNA SPEC-IMP: NORMAL
HPV18 DNA SPEC QL NAA+PROBE: NOT DETECTED

## 2023-03-28 PROCEDURE — G8417 CALC BMI ABV UP PARAM F/U: HCPCS | Performed by: PSYCHIATRY & NEUROLOGY

## 2023-03-28 PROCEDURE — 99213 OFFICE O/P EST LOW 20 MIN: CPT | Performed by: PSYCHIATRY & NEUROLOGY

## 2023-03-28 PROCEDURE — 1123F ACP DISCUSS/DSCN MKR DOCD: CPT | Performed by: PSYCHIATRY & NEUROLOGY

## 2023-03-28 PROCEDURE — 1090F PRES/ABSN URINE INCON ASSESS: CPT | Performed by: PSYCHIATRY & NEUROLOGY

## 2023-03-28 PROCEDURE — G8427 DOCREV CUR MEDS BY ELIG CLIN: HCPCS | Performed by: PSYCHIATRY & NEUROLOGY

## 2023-03-28 PROCEDURE — 1160F RVW MEDS BY RX/DR IN RCRD: CPT | Performed by: EMERGENCY MEDICINE

## 2023-03-28 PROCEDURE — G8484 FLU IMMUNIZE NO ADMIN: HCPCS | Performed by: PSYCHIATRY & NEUROLOGY

## 2023-03-28 PROCEDURE — 3074F SYST BP LT 130 MM HG: CPT | Performed by: PSYCHIATRY & NEUROLOGY

## 2023-03-28 PROCEDURE — 1036F TOBACCO NON-USER: CPT | Performed by: PSYCHIATRY & NEUROLOGY

## 2023-03-28 PROCEDURE — G8399 PT W/DXA RESULTS DOCUMENT: HCPCS | Performed by: PSYCHIATRY & NEUROLOGY

## 2023-03-28 PROCEDURE — 99213 OFFICE O/P EST LOW 20 MIN: CPT | Performed by: EMERGENCY MEDICINE

## 2023-03-28 PROCEDURE — 3017F COLORECTAL CA SCREEN DOC REV: CPT | Performed by: PSYCHIATRY & NEUROLOGY

## 2023-03-28 PROCEDURE — 1159F MED LIST DOCD IN RCRD: CPT | Performed by: EMERGENCY MEDICINE

## 2023-03-28 PROCEDURE — 3078F DIAST BP <80 MM HG: CPT | Performed by: PSYCHIATRY & NEUROLOGY

## 2023-03-28 RX ORDER — FLUTICASONE PROPIONATE 50 MCG
2 SPRAY, SUSPENSION (ML) NASAL DAILY PRN
Qty: 16 G | Refills: 11 | Status: SHIPPED | OUTPATIENT
Start: 2023-03-28 | End: 2023-04-27

## 2023-03-28 RX ORDER — OLOPATADINE HYDROCHLORIDE 2 MG/ML
1 SOLUTION/ DROPS OPHTHALMIC DAILY PRN
Qty: 2.5 ML | Refills: 11 | Status: SHIPPED | OUTPATIENT
Start: 2023-03-28

## 2023-03-28 RX ORDER — AZELASTINE 1 MG/ML
2 SPRAY, METERED NASAL 2 TIMES DAILY
Qty: 30 ML | Refills: 11 | Status: SHIPPED | OUTPATIENT
Start: 2023-03-28

## 2023-03-28 RX ORDER — LEVOCETIRIZINE DIHYDROCHLORIDE 5 MG/1
5 TABLET, FILM COATED ORAL EVERY EVENING
Qty: 30 TABLET | Refills: 11 | Status: SHIPPED | OUTPATIENT
Start: 2023-03-28 | End: 2023-04-27

## 2023-03-28 NOTE — PROGRESS NOTES
[facility]  [address] [place] [address] Drive, [address] [address]  Morteza Peterson  Phone [phone]  Fax [phone]     [facility] Follow Up Encounter  3/28/2023 2:15 PM    Information:   Patient Name: Jess Guerrero  :   1955  Age:   69 y.o. MRN:   [id number]  Account #:  [de-identified]  Today:  3/28/23    Provider: Israel Landaverde M.D. Chief Complaint:   Chief Complaint   Patient presents with    Follow-up       Subjective: Jess Guerrero is a 69 y.o. woman with a history of obstructive sleep apnea, chronic respiratory failure with hypoxemia and hypercapnea, mixed CHF, peripheral neuropathy, memory impairment, and asterixis who is following up. She uses a Trilogy machine with 3.5 LPM supplemental oxygen during sleep. She has chronic worsening numbness, tingling, burning in her feet, lower legs, and hands. She has severe peripheral edema and attends the lymphedema clinic. Her memory is about the same. She sometimes noticies jerking in her extremities and drops items.         Objective:     Past Medical History:  Past Medical History:   Diagnosis Date    Abdominal adhesions 2016    Anxiety     Arthritis     Atrial fibrillation (HCC)     Atrial fibrillation and flutter ([facility] [address] 2015    BiPAP (biphasic positive airway pressure) dependence     Chronic back pain     Chronic cough     Depression     Edema     Fibrocystic breast     GERD (gastroesophageal reflux disease)     Glossitis     Headache(784.0)     Heart burn     Heart disease     Hypertension     Hypothyroidism     Mouth sore     MRSA (methicillin resistant staph aureus) culture positive 2014    nasal    Numbness     toes    Obstructive sleep apnea     BIPAP    DEVEN (obstructive sleep apnea)     Peripheral neuropathy     PONV (postoperative nausea and vomiting)     Prolonged emergence from general anesthesia     Recurrent ventral incisional hernia 2016    Sleep apnea     Stroke (HCC)     Stroke-like symptom     SVT (supraventricular

## 2023-03-28 NOTE — PROGRESS NOTES
ERNESTO Sanchez ENT [facility] EAR NOSE & THROAT  [address], [address]  [facility] [address]  Fax [phone]  Phone [phone]      Visit Type: FOLLOW UP   Chief Complaint   Patient presents with   • Allergic Rhinitis        HPI  She presents for a follow up evaluation.      Past Medical History:   Diagnosis Date   • Anemia    • Anxiety and depression    • Arthritis    • Asthma 2017   • Atrial fibrillation ([facility])    • CHF (congestive heart failure) ([facility]) 2021   • Chronic cough    • Disease of thyroid gland    • DVT (deep venous thrombosis) ([facility])    • GERD (gastroesophageal reflux disease)    • History of incision and drainage     Right knee   • History of staph infection     right knee, and upper right thigh   • Hyperlipidemia    • Hypothyroidism 2003   • Infection      in right knee to bone, cleaned it out and put new hardware with antibiotic and pt developed hole in incision   • Inflammation of vein     in leg and leaking   • Neuropathy, peripheral    • Pneumonia     RECENT DX PER FAMILY DOCTOR   • PONV (postoperative nausea and vomiting)    • Restless leg syndrome    • Sleep apnea with use of continuous positive airway pressure (CPAP)     bipap   • SVT (supraventricular tachycardia) ([facility])        Past Surgical History:   Procedure Laterality Date   • ABLATION OF DYSRHYTHMIC FOCUS     • BUNIONECTOMY Left     AND HAMMER TOE   • CARDIAC ABLATION      at [facility] 8/2019   • CARDIAC CATHETERIZATION     • CARDIAC SURGERY      HEART CATH   • CATARACT EXTRACTION Right    • HERNIA REPAIR      UMBILICAL X3   • INCISION AND DRAINAGE LEG Right 01/28/2019    Procedure: INCISION AND DRAINAGE OF RIGHT THIGH HEMATOMA;  Surgeon: Nino Stern MD;  Location: [place] [address];  Service: Vascular   • JOINT REPLACEMENT      RIGHT KNEE   • KNEE POLY INSERT EXCHANGE Right 03/03/2018    Procedure: IRRIGATION AND DEBRIDEMENT TOTAL KNEE & POLY EXCHANGE -  RIGHT;  Surgeon: Amilcar Capellan MD;  Location:  [facility];  Service:    • LAPAROSCOPIC CHOLECYSTECTOMY     • LEG DEBRIDEMENT Right 03/23/2018    Procedure: DEBRIDEMENT AND CLOSURE KNEE - RIGHT;  Surgeon: Amilcar Capellan MD;  Location:  [facility];  Service: Orthopedics   • PERIPHERALLY INSERTED CENTRAL CATHETER INSERTION     • PILONIDAL CYSTECTOMY N/A 02/16/2017    Procedure: PILONIDAL CYSTECTOMY, EXCISION SEBACEOUS CYST - BACK;  Surgeon: Macrina Capellan MD;  Location:  [facility];  Service:    • TOTAL KNEE  PROSTHESIS REMOVAL W/ SPACER INSERTION Right 03/13/2018    Procedure: 1.  EXPLANT TOTAL KNEE 2.  ANTIBOTIC SPACER KNEE;  Surgeon: Amilcar Capellan MD;  Location:  [facility];  Service: Orthopedics   • TOTAL KNEE  PROSTHESIS REMOVAL W/ SPACER INSERTION Right 06/19/2020    Procedure: REMOVAL OF ANTIBIOTIC SPACER;  Surgeon: Amilcar Capellan MD;  Location:  [facility];  Service: Orthopedics;  Laterality: Right;   • TOTAL KNEE ARTHROPLASTY REVISION Right 06/19/2020    Procedure: REVISION TOTAL KNEE REPLACEMENT;  Surgeon: Amilcar Capellan MD;  Location:  [facility];  Service: Orthopedics;  Laterality: Right;   • TRUNK LESION/CYST EXCISION N/A 02/16/2017    Procedure: EXCISION SEBACEOUS CYST - BACK;  Surgeon: Macrina Capellan MD;  Location:  [facility];  Service:        Family History: Her family history includes Alzheimer's disease in her mother; Anxiety disorder in her father; Cancer in her brother and father; Clotting disorder in her brother; Coronary artery disease in her mother; Depression in her father; Diabetes in her mother; Heart attack in her sister; Heart disease in her mother; Hyperlipidemia in her father and mother; Hypertension in her father and mother; Leukemia in her brother; Thyroid disease in her sister.     Social History: She  reports that she has never smoked. She has never used smokeless tobacco. She reports that she does not drink alcohol and does not use drugs.    Home Medications:  Aspirin,  DULoxetine, LORazepam, O2, albuterol sulfate HFA, amitriptyline, azelastine, clobetasol, dexlansoprazole, diclofenac, donepezil, famciclovir, flecainide, fluticasone, furosemide, gabapentin, levocetirizine, levothyroxine, metoprolol succinate XL, mometasone, olopatadine, oxybutynin XL, permethrin, rOPINIRole, rosuvastatin, and triamcinolone    Allergies:  She is allergic to other, codeine, mobic [meloxicam], morphine and related, azithromycin, and cyclobenzaprine.       Vital Signs:   Temp:  [97.7 °F (36.5 °C)] 97.7 °F (36.5 °C)  ENT Physical Exam  Constitutional  Appearance: patient appears well-developed, well-nourished and well-groomed,  Communication/Voice: communication appropriate for developmental age; vocal quality normal;  Head and Face  Appearance: head appears normal, face appears normal and face appears atraumatic;  Palpation: facial palpation normal;  Salivary: glands normal;  Ear  Hearing: intact;  Auricles: right auricle normal; left auricle normal;  External Mastoids: right external mastoid normal; left external mastoid normal;  Ear Canals: right ear canal normal; left ear canal normal;  Tympanic Membranes: right tympanic membrane normal; left tympanic membrane normal;  Nose  Internal Nose: bilateral intranasal mucosa edematous and erythematous; nasal septal deviation present;  Oral Cavity/Oropharynx  Lips: normal;  Teeth: normal;  Gums: gingiva normal;  Tongue: normal;  Oral mucosa: normal;  Hard palate: normal;  Tonsils: bilateral tonsils 2+, cryptic;  Neck  Neck: neck normal;  Respiratory  Inspection: breathing unlabored;  Cardiovascular  Inspection: extremities are warm and well perfused;         Result Review    RESULTS REVIEW    I have reviewed the patients old records in the chart.     Assessment & Plan    Diagnoses and all orders for this visit:    1. Allergic rhinitis, unspecified seasonality, unspecified trigger (Primary)  Overview:  Azelastine, flonase, singulair    Not candidate for  immunotherapy per Dr. Angel as the patient is on a beta blocker.    Orders:  -     azelastine (ASTELIN) 0.1 % nasal spray; 2 sprays into the nostril(s) as directed by provider 2 (Two) Times a Day. Use in each nostril as directed  Dispense: 30 mL; Refill: 11  -     fluticasone (FLONASE) 50 MCG/ACT nasal spray; 2 sprays into the nostril(s) as directed by provider Daily As Needed for Rhinitis or Allergies for up to 30 days.  Dispense: 16 g; Refill: 11    Other orders  -     olopatadine (PATADAY) 0.2 % solution ophthalmic solution; Administer 1 drop to both eyes Daily As Needed (Eyes).  Dispense: 2.5 mL; Refill: 11  -     levocetirizine (XYZAL) 5 MG tablet; Take 1 tablet by mouth Every Evening for 30 days.  Dispense: 30 tablet; Refill: 11     Continue nasal sprays as previously prescribed.    Return if symptoms worsen or fail to improve.      Jazzy Samaniego, ERNESTO   03/28/23  11:08 CDT

## 2023-04-03 ENCOUNTER — TELEPHONE (OUTPATIENT)
Dept: PULMONOLOGY | Facility: CLINIC | Age: 68
End: 2023-04-03
Payer: COMMERCIAL

## 2023-04-03 NOTE — TELEPHONE ENCOUNTER
"Patient states she has noticed that she has been falling asleep if she sits for very long. She states Sunday when she was at [employer] she felt herself \"nod off during [employer]\". She had a nurse from the [employer] come tell her she is not \"falling asleep she is passing out\". The nurse told her she is \"not getting enough oxygen\". Patient does have a Trilogy with 3L of oxgyen at night.   She states she has been checking her oxygen levels during the day and they range from 85% - 95% on room air. While on the phone with me her oxygen was 90% after walking to get her pulse oximeter.   She states she does wheeze occasionally and she does cough up yellow sputum which she states is \"normal for her\".   Patient is aware the Provider is not in the office at this time. Patient informed this problem will be addressed as soon as possible but it may be the next day. Patient is aware to go to the ER for severe or urgent complaints. Patient voiced understanding and is agreeable.   Please advise.   "

## 2023-04-03 NOTE — TELEPHONE ENCOUNTER
I agree with what Ignacia said below. She should be wearing portable O2 at 2L.     Get compliance on trilogy with 3L.

## 2023-04-03 NOTE — TELEPHONE ENCOUNTER
Per rest and exercise oximetry testing in the past with Sophia she should be on 2 L of oxygen with exertion.  If she is not doing that she should be.  Continue trilogy +3 L at night.  If she is having some daytime sleepiness I recommend she make an appointment with Sophia when she comes back.  She needs to bring compliance report with her or have DME send one over on her behalf.  If she is compliant with it as she should be it may need to be adjusted.  Sophia will need to do additional testing to determine that.  Recommend she make an appointment with her PCP this week to be evaluated while Sophia is out of the office for her current complaints..  There are other reasons patients can have fatigue, sleepiness and/or syncope.  They can evaluate this while waiting on Sophia to determine whether or not her Trelegy needs adjustment.  If she cannot wait to see her PCP she should go to the ER

## 2023-04-03 NOTE — TELEPHONE ENCOUNTER
Relayed message to the patient and she voiced understanding. Spoke to [email or web address] and their system is down and can't get the download at this time. She said they would send it as soon as it is available. [facility] does have a current order for her to have portable oxygen. Patient informed she should call them and have them deliver tanks to her to wear with exertion.  Patient also informed her message was being sent to Sophia [employer] and that we would contact her at the beginning of the week.   In the meantime patient plans on contacting her PCP about her symptoms.

## 2023-04-04 NOTE — TELEPHONE ENCOUNTER
Message relayed to the patient. She did get her portable oxygen tanks from [facility]. She also has an appointment with her PCP today.  Will continue to check with [employer] about her Trilogy download.

## 2023-04-10 NOTE — TELEPHONE ENCOUNTER
Per [employer] they are still unable to get her download from her Trilogy due to a needed software update. They are working on getting this taken care of.

## 2023-04-17 ENCOUNTER — TELEPHONE (OUTPATIENT)
Dept: PULMONOLOGY | Facility: CLINIC | Age: 68
End: 2023-04-17
Payer: COMMERCIAL

## 2023-04-27 ENCOUNTER — TELEPHONE (OUTPATIENT)
Dept: PULMONOLOGY | Facility: CLINIC | Age: 68
End: 2023-04-27
Payer: COMMERCIAL

## 2023-04-27 DIAGNOSIS — J96.12 CHRONIC RESPIRATORY FAILURE WITH HYPOXIA AND HYPERCAPNIA: Primary | ICD-10-CM

## 2023-04-27 DIAGNOSIS — J96.11 CHRONIC RESPIRATORY FAILURE WITH HYPOXIA AND HYPERCAPNIA: Primary | ICD-10-CM

## 2023-04-27 NOTE — PROGRESS NOTES
Sophia Valero, MSN, APRN, NP-C, CESAR, CFRN, EMT-B  [facility]  [address] [place] [place] [address]  Phone: [phone]  Fax: [phone]          Chief Complaint  Bronchitis    Subjective    History of Present Illness  Danisha Cannon presents to [facility] PULMONARY [facility] CRITICAL CARE MEDICINE   History of Present Illness   The patient presents today for f/u chronic bronchitis. She is accompanied by her . Patient with known afib, morbid obesity/deconditioning, chronic bronchitis, TRISTON, chronic respiratory failure with hypoxemia/hypercapnia, restrictive lung disease, lung nodules, and allergic rhinitis. She is using flovent, albuterol HFA. She reports compliance with home triology+2L. She c/o increasing shortness of breath and extreme daytime sleepiness and falling asleep.  The patient's O2 sat was noted to be 85% upon rooming on 2 L pulsed dose.  Rest and exercise sats were obtained.  The patient requires 2L continuous versus pulsed dose.  Compliance report was reviewed and shows that the patient is compliant with her home device; however, there is a leak noted.  The patient confirms that her mask does leak.  RT will notify DME to check the patient's mask fitting.  Overnight pulse ox completed on trilogy +2 L shows 1 hour 33 minutes of time less than 88%, oxygen desaturation events 222, ADILENE 40.  It is noted that the patient is supposed to be on trilogy +3 L at night.  We will increase her oxygen at night to 3.5 L and repeat overnight.  Again, DME to check mask fitting.  Obtain CXR. The patient denies fevers, chills, cough with purulent sputum.  The patient complains of a feeling of the something pushing in her throat against her airway when she looks down.  She states that she feels like she cannot breathe when she does this.  She also states that her family members tell her that she has noisy breathing.  Will refer to ENT for further evaluation of upper airway.    "Objective   Vital Signs:   /72   Pulse 74   Ht 162.6 cm (64\")   Wt 131 kg (288 lb 9.6 oz)   SpO2 90% Comment: 2 l  BMI 49.54 kg/m²     Physical Exam  Vitals reviewed.   Constitutional:       General: She is not in acute distress.     Appearance: She is well-developed. She is morbidly obese.      Comments: Ambulates with rollator   HENT:      Head: Normocephalic and atraumatic.   Eyes:      General: No scleral icterus.     Conjunctiva/sclera: Conjunctivae normal.      Pupils: Pupils are equal, round, and reactive to light.   Cardiovascular:      Rate and Rhythm: Normal rate.   Pulmonary:      Effort: Pulmonary effort is normal. No respiratory distress.      Breath sounds: Decreased breath sounds present. No wheezing or rales.   Musculoskeletal:         General: Normal range of motion.      Cervical back: Normal range of motion and neck supple.      Right lower leg: Edema present.      Left lower leg: Edema present.   Skin:     General: Skin is warm and dry.   Neurological:      Mental Status: She is alert and oriented to person, place, and time.   Psychiatric:         Behavior: Behavior normal.         Thought Content: Thought content normal.         Judgment: Judgment normal.        Result Review :  The following data was reviewed by: ERNESTO Stroud on 05/04/2023:  CT Chest Without Contrast Diagnostic (11/30/2022 13:49)  IMPRESSION:  New 3 mm LEFT lower lobe pulmonary nodule. This is likely infectious or  inflammatory in nature but if the patient has risk factors for pulmonary  malignancy, recommend a follow-up chest CT in one year based on  Fleischner criteria. Multiple other small subcentimeter pulmonary  nodules are stable.  This report was finalized on 11/30/2022 14:24 by Dr. Brayan Braden MD.  Rest/Exercise Pulse Ox Values        10/4/2021    13:30 5/4/2023    11:45   Rest/Exercise Pulse Ox Results   Rest room air SAT % 90    Exercise room air SAT % 85    Rest on O2 @ Liters 2 2L   Rest " on O2 SAT % 96 98%   Exercise on O2 @ Liters 2 2L   Exercise on O2 SAT % 92 93%     PFT Values        2021    11:30   Pre Drug PFT Results   FVC 73   FEV1 81   FEF 25-75% 139   FEV1/FVC 86.84   Other Tests PFT Results   TLC 85   RV 77   DLCO 96   D/VAsb 118       Results for orders placed in visit on 21    Pulmonary Function Test    Narrative  Pulmonary Function Test  Performed by: Francisca Villagomez, RRT  Authorized by: Sophia Valero APRN    Pre Drug % Predicted  FVC: 73%  FEV1: 81%  FEF 25-75%: 139%  FEV1/FVC: 86.84%  T%  RV: 77%  DLCO: 96%  D/VAsb: 118%    Interpretation  Spirometry  Spirometry shows moderate restriction. midflow is normal.  Review of FVL curve  Patient's effort is normal.  Lung Volume Measurements  Measurements show reduced lung volumes consistent with restriction.  Diffusion Capacity  The patient's diffusion capacity is normal.  Diffusion capacity is normal when corrected for alveolar volume.      Results for orders placed during the hospital encounter of 19    Full Pulmonary Function Test With Bronchodilator & ABG    [facility] - Pulmonary Function Test    [address] [place] [address]  [address]  [phone]    Patient : Danisha Cannon  MRN : [id number]  CSN : [id number]  Pulmonologist : Ryan Keith MD  Date : 2019    ______________________________________________________________________    Interpretation :  1.  Spirometry reveals a borderline low forced vital capacity and otherwise are within normal limits.  2.  Lung volumes also reveal a borderline low vital capacity and a decrease in expiratory reserve volume but otherwise are within normal limits.  3.  Diffusion capacity is within normal limits and when corrected for alveolar volume is supranormal.        Ryan Keith MD      Results for orders placed during the hospital encounter of 19    Full Pulmonary Function Test With Bronchodilator &  ABG    [facility] - Pulmonary Function Test    [address]  [place]  [place]  [address]  [phone]    Patient : Danisha Cannon  MRN : [id number]  CSN : [id number]  Pulmonologist : Ryan Keith MD  Date : 3/13/2019    ______________________________________________________________________    Interpretation :  1.  Spirometry is within normal limits.  2.  Lung volumes are within normal limits.  3.  There is a moderate diffusion impairment which when corrected for alveolar volume is normalized.      Ryan Keith MD           Assessment and Plan  Diagnoses and all orders for this visit:    1. Chronic bronchitis, unspecified chronic bronchitis type (Primary)  Overview:  Mildly low IGE, mildly elevated IGG subclass 1  +eos 410-5/11/21    flovent HFA-110, albuterol HFA      Assessment & Plan:  Continue current treatment regimen.      2. Chronic respiratory failure with hypoxia and hypercapnia  Overview:  Home trilogy    Assessment & Plan:  The patient's O2 sat was noted to be 85% upon rooming on 2 L pulsed dose. Rest and exercise sats were obtained.  The patient requires 2L continuous versus pulsed dose.    Compliance report was reviewed and shows that the patient is compliant with her home device; however, there is a leak noted. The patient confirms that her mask does leak.  RT will notify DME to check the patient's mask fitting.  Overnight pulse ox completed on trilogy +2 L shows 1 hour 33 minutes of time less than 88%, oxygen desaturation events 222, ADILENE 40.  It is noted that the patient is supposed to be on trilogy +3 L at night.  We will increase her oxygen at night to 3.5 L and repeat overnight.  Again, DME to check mask fitting.  Obtain CXR.     Orders:  -     Overnight Sleep Oximetry Study  -     Overnight Sleep Oximetry Study; Future  -     Oxygen Therapy    3. Shortness of breath  -     XR Chest 2 View; Future  -     Walking Oximetry    4. Globus sensation  Assessment & Plan:  The  patient complains of a feeling of the something pushing in her throat against her airway when she looks down.  She states that she feels like she cannot breathe when she does this.  She also states that her family members tell her that she has noisy breathing.  Will refer to ENT for further evaluation of upper airway.      Orders:  -     Ambulatory Referral to ENT (Otolaryngology)    5. Obstructive sleep apnea  Overview:  Home trilogy    Assessment & Plan:  Continue home trilogy. Increase O2 to 3.5L and repeat ON.       6. Allergic rhinitis, unspecified seasonality, unspecified trigger  Overview:  xyzal, flonase    Not candidate for immunotherapy per Dr. Angel as the patient is on a beta blocker.    Assessment & Plan:  Continue current treatment regimen.      7. Lung nodules  Overview:  HRCT 11/16/21 - 2mm LLL, 3mm RLL    CT chest 11/30/2022-new 3 mm LEFT lower lobe pulmonary nodule on image 97.  Stable 3 mm LEFT lower lobe pulmonary nodule, image 72.  Stable 3 mm RIGHT lower lobe pulmonary nodule, image 70.  Stable 3 mm subpleural RIGHT lower lobe pulmonary nodule, image 58.  Stable 3 mm RIGHT upper lobe pulmonary nodule, image 37.      8. Restrictive lung disease  Overview:  Likely 2' morbid obesity and obstructive sleep apnea      Follow Up  Sophia Valero, APRN  5/4/2023  18:58 CDT    Return in about 3 months (around 8/4/2023).    Patient was given instructions and counseling regarding her condition or for health maintenance advice. Please see specific information pulled into the AVS if appropriate.     Please note that portions of this note were completed with a voice recognition program.

## 2023-05-04 ENCOUNTER — OFFICE VISIT (OUTPATIENT)
Dept: PULMONOLOGY | Facility: CLINIC | Age: 68
End: 2023-05-04
Payer: MEDICARE

## 2023-05-04 ENCOUNTER — HOSPITAL ENCOUNTER (OUTPATIENT)
Dept: GENERAL RADIOLOGY | Facility: HOSPITAL | Age: 68
Discharge: HOME | End: 2023-05-04
Payer: MEDICARE

## 2023-05-04 VITALS
HEIGHT: 64 IN | DIASTOLIC BLOOD PRESSURE: 72 MMHG | OXYGEN SATURATION: 90 % | BODY MASS INDEX: 49.27 KG/M2 | HEART RATE: 74 BPM | SYSTOLIC BLOOD PRESSURE: 124 MMHG | WEIGHT: 288.6 LBS

## 2023-05-04 DIAGNOSIS — G47.33 OBSTRUCTIVE SLEEP APNEA: Chronic | ICD-10-CM

## 2023-05-04 DIAGNOSIS — R91.8 LUNG NODULES: Chronic | ICD-10-CM

## 2023-05-04 DIAGNOSIS — J96.11 CHRONIC RESPIRATORY FAILURE WITH HYPOXIA AND HYPERCAPNIA: ICD-10-CM

## 2023-05-04 DIAGNOSIS — J96.12 CHRONIC RESPIRATORY FAILURE WITH HYPOXIA AND HYPERCAPNIA: ICD-10-CM

## 2023-05-04 DIAGNOSIS — J42 CHRONIC BRONCHITIS, UNSPECIFIED CHRONIC BRONCHITIS TYPE: Primary | Chronic | ICD-10-CM

## 2023-05-04 DIAGNOSIS — J98.4 RESTRICTIVE LUNG DISEASE: Chronic | ICD-10-CM

## 2023-05-04 DIAGNOSIS — J30.9 ALLERGIC RHINITIS, UNSPECIFIED SEASONALITY, UNSPECIFIED TRIGGER: Chronic | ICD-10-CM

## 2023-05-04 DIAGNOSIS — R09.89 GLOBUS SENSATION: ICD-10-CM

## 2023-05-04 DIAGNOSIS — R06.02 SHORTNESS OF BREATH: ICD-10-CM

## 2023-05-04 PROBLEM — R09.A2 GLOBUS SENSATION: Status: ACTIVE | Noted: 2021-05-13

## 2023-05-04 PROCEDURE — 71046 X-RAY EXAM CHEST 2 VIEWS: CPT

## 2023-05-04 NOTE — PROCEDURES
Walking Oximetry  Performed by: Valdemar Tolbert CMA  Authorized by: Sophia Valero APRN     Supplemental Oxygen: continuous  Rest on O2 @ Liters:  2L  SAT %:  98%  Exercise on O2 @ Liters:  2L  SAT %:  93%

## 2023-05-04 NOTE — ASSESSMENT & PLAN NOTE
The patient's O2 sat was noted to be 85% upon rooming on 2 L pulsed dose. Rest and exercise sats were obtained.  The patient requires 2L continuous versus pulsed dose.    Compliance report was reviewed and shows that the patient is compliant with her home device; however, there is a leak noted. The patient confirms that her mask does leak.  RT will notify DME to check the patient's mask fitting.  Overnight pulse ox completed on trilogy +2 L shows 1 hour 33 minutes of time less than 88%, oxygen desaturation events 222, ADILENE 40.  It is noted that the patient is supposed to be on trilogy +3 L at night.  We will increase her oxygen at night to 3.5 L and repeat overnight.  Again, DME to check mask fitting.  Obtain CXR.

## 2023-05-04 NOTE — ASSESSMENT & PLAN NOTE
The patient complains of a feeling of the something pushing in her throat against her airway when she looks down.  She states that she feels like she cannot breathe when she does this.  She also states that her family members tell her that she has noisy breathing.  Will refer to ENT for further evaluation of upper airway.

## 2023-05-04 NOTE — PATIENT INSTRUCTIONS
Chronic Bronchitis, Adult  Chronic bronchitis is inflammation inside of the main airways (bronchi) that come off the windpipe (trachea) in the lungs. The swelling causes the airways to narrow and make more mucus than normal. This can make it hard to breathe and may cause coughing or noisy breathing (wheezing). This condition is a type of chronic obstructive pulmonary disease (COPD). Chronic bronchitis is often associated with other chronic respiratory conditions, such as emphysema, asthma, bronchiectasis, or cystic fibrosis.  Chronic bronchitis is a long-term (chronic) condition. It is defined as a chronic cough with mucus (sputum) production:  For at least 3 months of the year.  For 2 years in a row.  People with chronic bronchitis are more likely to get colds and other infections in the nose, throat, or airways.  What are the causes?  This condition is most often caused by:  A history of smoking.  Exposure to secondhand smoke or a smoky area for a long period of time.  Frequent lung infections.  Long-term exposure to certain fumes or chemicals that irritate the lungs.  What are the signs or symptoms?  Symptoms of chronic bronchitis may include:  A cough that brings up mucus (productive cough).  A whistling sound when you breathe (wheezing).  Shortness of breath.  Chest tightness or soreness.  Fever or chills.  Colds or respiratory infections that go away and return.  How is this diagnosed?  Your health care provider may diagnose this condition based on your signs and symptoms, especially if you have a cough that lasts a long time or keeps coming back.  This condition may be diagnosed based on:  Your symptoms and medical history.  A physical exam, including listening to your lungs.  Tests, such as:  Testing a sputum sample.  Blood tests.  A chest X-ray.  Tests of lung (pulmonary) function.  How is this treated?  There is no cure for chronic bronchitis. Treatment may help control your symptoms. This  includes:  Drinking fluids. This may help thin your mucus so it is easier to cough up.  Mucus-clearing techniques. Your health care provider will show you which techniques are best for you.  Medicines such as:  Inhaled medicine (inhaler) to improve air flow in and out of your lungs.  Antibiotics to treat or prevent bacterial lung infections.  Mucus-thinning medicines.  Pulmonary rehabilitation. This is a program that helps you learn how to manage your breathing problem. The program may include exercise, education, counseling, treatment, and support.  Using oxygen therapy, if your blood oxygen level is very low.  Follow these instructions at home:  Medicines  Take over-the-counter and prescription medicines only as told by your health care provider.  If you were prescribed an antibiotic medicine, take it as told by your health care provider. Do not stop taking the antibiotic even if you start to feel better.  Lifestyle    Do not use any products that contain nicotine or tobacco. These products include cigarettes, chewing tobacco, and vaping devices, such as e-cigarettes. If you need help quitting, ask your health care provider.  Stay away from other people's smoke (secondhand smoke) and any irritants that make you cough more, such as chemical fumes.  Eat a healthy diet and get regular exercise. Talk with your health care provider about what activities are safe for you.  Return to normal activities as told by your health care provider. Ask your health care provider what activities are safe for you.  Preventing infections  Stay up to date on all immunizations, including the pneumonia and flu vaccines.  Wash your hands often with soap and water for at least 20 seconds. If soap and water are not available, use hand .  Avoid contact with people who have symptoms of a cold or the flu.  Keep your environment free from any known allergens such as dust, mold, pets, and pollen.  General instructions  Get plenty of  rest.  Drink enough fluids to keep your urine pale yellow.  Use oxygen therapy at home as directed.  Follow instructions from your health care provider about how to use oxygen safely and take steps to prevent fire.  Do not smoke while using oxygen or allow others to smoke in your home.  Keep all follow-up visits. This is important.  Contact a health care provider if:  Your shortness of breath or coughing gets worse even when you take medicine.  Your mucus gets thicker or changes color.  You are not able to cough up your mucus.  You have a fever.  Get help right away if:  You cough up blood.  You have trouble breathing.  You have chest pain.  You feel dizzy or confused.  These symptoms may represent a serious problem that is an emergency. Do not wait to see if the symptoms will go away. Get medical help right away. Call your local emergency services (911 in the [place] Do not drive yourself to the hospital.  Summary  Chronic bronchitis is inflammation inside of the main airways (bronchi) that come off the windpipe (trachea) in the lungs. The swelling causes the airways to narrow and make more mucus than normal.  Chronic bronchitis is a long-term (chronic) condition. It is defined as a chronic cough with mucus (sputum) production for at least 3 months of the year for 2 years in a row.  If you were prescribed an antibiotic medicine, take it as told by your health care provider. Do not stop taking the antibiotic even if you start to feel better.  Do not use any products that contain nicotine or tobacco. These products include cigarettes, chewing tobacco, and vaping devices, such as e-cigarettes. If you need help quitting, ask your health care provider.  This information is not intended to replace advice given to you by your health care provider. Make sure you discuss any questions you have with your health care provider.  Document Revised: 04/20/2022 Document Reviewed: 04/20/2022  Elsevier Patient Education © 2022  [employer]  Obesity, Adult  Obesity is having too much body fat. Being obese means that your weight is more than what is healthy for you.   BMI (body mass index) is a number that explains how much body fat you have. If you have a BMI of 30 or more, you are obese.  Obesity can cause serious health problems, such as:  Stroke.  Coronary artery disease (CAD).  Type 2 diabetes.  Some types of cancer.  High blood pressure (hypertension).  High cholesterol.  Gallbladder stones.  Obesity can also contribute to:  Osteoarthritis.  Sleep apnea.  Infertility problems.  What are the causes?  Eating meals each day that are high in calories, sugar, and fat.  Drinking a lot of drinks that have sugar in them.  Being born with genes that may make you more likely to become obese.  Having a medical condition that causes obesity.  Taking certain medicines.  Sitting a lot (having a sedentary lifestyle).  Not getting enough sleep.  What increases the risk?  Having a family history of obesity.  Living in an area with limited access to:  [place], recreation centers, or sidewalks.  Healthy food choices, such as grocery stores and farmers' markets.  What are the signs or symptoms?  The main sign is having too much body fat.  How is this treated?  Treatment for this condition often includes changing your lifestyle. Treatment may include:  Changing your diet. This may include making a healthy meal plan.  Exercise. This may include activity that causes your heart to beat faster (aerobic exercise) and strength training. Work with your doctor to design a program that works for you.  Medicine to help you lose weight. This may be used if you are not able to lose one pound a week after 6 weeks of healthy eating and more exercise.  Treating conditions that cause the obesity.  Surgery. Options may include gastric banding and gastric bypass. This may be done if:  Other treatments have not helped to improve your condition.  You have a BMI of 40 or  higher.  You have life-threatening health problems related to obesity.  Follow these instructions at home:  Eating and drinking    Follow advice from your doctor about what to eat and drink. Your doctor may tell you to:  Limit fast food, sweets, and processed snack foods.  Choose low-fat options. For example, choose low-fat milk instead of whole milk.  Eat five or more servings of fruits or vegetables each day.  Eat at home more often. This gives you more control over what you eat.  Choose healthy foods when you eat out.  Learn to read food labels. This will help you learn how much food is in one serving.  Keep low-fat snacks available.  Avoid drinks that have a lot of sugar in them. These include soda, fruit juice, iced tea with sugar, and flavored milk.  Drink enough water to keep your pee (urine) pale yellow.  Do not go on fad diets.  Physical activity  Exercise often, as told by your doctor. Most adults should get up to 150 minutes of moderate-intensity exercise every week.Ask your doctor:  What types of exercise are safe for you.  How often you should exercise.  Warm up and stretch before being active.  Do slow stretching after being active (cool down).  Rest between times of being active.  Lifestyle  Work with your doctor and a food expert (dietitian) to set a weight-loss goal that is best for you.  Limit your screen time.  Find ways to reward yourself that do not involve food.  Do not drink alcohol if:  Your doctor tells you not to drink.  You are pregnant, may be pregnant, or are planning to become pregnant.  If you drink alcohol:  Limit how much you have to:  0-1 drink a day for women.  0-2 drinks a day for men.  Know how much alcohol is in your drink. In the [place] one drink equals one 12 oz bottle of beer (355 mL), one 5 oz glass of wine (148 mL), or one 1½ oz glass of hard liquor (44 mL).  General instructions  Keep a weight-loss journal. This can help you keep track of:  The food that you eat.  How much  exercise you get.  Take over-the-counter and prescription medicines only as told by your doctor.  Take vitamins and supplements only as told by your doctor.  Think about joining a support group.  Pay attention to your mental health as obesity can lead to depression or self esteem issues.  Keep all follow-up visits.  Contact a doctor if:  You cannot meet your weight-loss goal after you have changed your diet and lifestyle for 6 weeks.  You are having trouble breathing.  Summary  Obesity is having too much body fat.  Being obese means that your weight is more than what is healthy for you.  Work with your doctor to set a weight-loss goal.  Get regular exercise as told by your doctor.  This information is not intended to replace advice given to you by your health care provider. Make sure you discuss any questions you have with your health care provider.  Document Revised: 07/26/2022 Document Reviewed: 07/26/2022  [employer] Patient Education © 2022 [employer]  Sleep Apnea  Sleep apnea affects breathing during sleep. It causes breathing to stop for 10 seconds or more, or to become shallow. People with sleep apnea usually snore loudly.  It can also increase the risk of:  Heart attack.  Stroke.  Being very overweight (obese).  Diabetes.  Heart failure.  Irregular heartbeat.  High blood pressure.  The goal of treatment is to help you breathe normally again.  What are the causes?  The most common cause of this condition is a collapsed or blocked airway.  There are three kinds of sleep apnea:  Obstructive sleep apnea. This is caused by a blocked or collapsed airway.  Central sleep apnea. This happens when the brain does not send the right signals to the muscles that control breathing.  Mixed sleep apnea. This is a combination of obstructive and central sleep apnea.  What increases the risk?  Being overweight.  Smoking.  Having a small airway.  Being older.  Being male.  Drinking alcohol.  Taking medicines to calm yourself  (sedatives or tranquilizers).  Having family members with the condition.  Having a tongue or tonsils that are larger than normal.  What are the signs or symptoms?  Trouble staying asleep.  Loud snoring.  Headaches in the morning.  Waking up gasping.  Dry mouth or sore throat in the morning.  Being sleepy or tired during the day.  If you are sleepy or tired during the day, you may also:  Not be able to focus your mind (concentrate).  Forget things.  Get angry a lot and have mood swings.  Feel sad (depressed).  Have changes in your personality.  Have less interest in sex, if you are female.  Be unable to have an erection, if you are male.  How is this treated?    Sleeping on your side.  Using a medicine to get rid of mucus in your nose (decongestant).  Avoiding the use of alcohol, medicines to help you relax, or certain pain medicines (narcotics).  Losing weight, if needed.  Changing your diet.  Quitting smoking.  Using a machine to open your airway while you sleep, such as:  An oral appliance. This is a mouthpiece that shifts your lower jaw forward.  A CPAP device. This device blows air through a mask when you breathe out (exhale).  An EPAP device. This has valves that you put in each nostril.  A BIPAP device. This device blows air through a mask when you breathe in (inhale) and breathe out.  Having surgery if other treatments do not work.  Follow these instructions at home:  Lifestyle  Make changes that your doctor recommends.  Eat a healthy diet.  Lose weight if needed.  Avoid alcohol, medicines to help you relax, and some pain medicines.  Do not smoke or use any products that contain nicotine or tobacco. If you need help quitting, ask your doctor.  General instructions  Take over-the-counter and prescription medicines only as told by your doctor.  If you were given a machine to use while you sleep, use it only as told by your doctor.  If you are having surgery, make sure to tell your doctor you have sleep apnea.  You may need to bring your device with you.  Keep all follow-up visits.  Contact a doctor if:  The machine that you were given to use during sleep bothers you or does not seem to be working.  You do not get better.  You get worse.  Get help right away if:  Your chest hurts.  You have trouble breathing in enough air.  You have an uncomfortable feeling in your back, arms, or stomach.  You have trouble talking.  One side of your body feels weak.  A part of your face is hanging down.  These symptoms may be an emergency. Get help right away. Call your local emergency services (911 in the [place]  Do not wait to see if the symptoms will go away.  Do not drive yourself to the hospital.  Summary  This condition affects breathing during sleep.  The most common cause is a collapsed or blocked airway.  The goal of treatment is to help you breathe normally while you sleep.  This information is not intended to replace advice given to you by your health care provider. Make sure you discuss any questions you have with your health care provider.  Document Revised: 07/27/2022 Document Reviewed: 11/26/2021  Elsevier Patient Education © 2022 [employer] Inc[employer]

## 2023-05-12 DIAGNOSIS — J96.11 CHRONIC RESPIRATORY FAILURE WITH HYPOXIA AND HYPERCAPNIA: ICD-10-CM

## 2023-05-12 DIAGNOSIS — J96.12 CHRONIC RESPIRATORY FAILURE WITH HYPOXIA AND HYPERCAPNIA: ICD-10-CM

## 2023-05-15 DIAGNOSIS — N39.41 URGE INCONTINENCE: ICD-10-CM

## 2023-05-15 RX ORDER — OXYBUTYNIN CHLORIDE 5 MG/1
5 TABLET, EXTENDED RELEASE ORAL DAILY
Qty: 30 TABLET | Refills: 8 | Status: SHIPPED | OUTPATIENT
Start: 2023-05-15

## 2023-05-18 RX ORDER — AMITRIPTYLINE HYDROCHLORIDE 75 MG/1
TABLET, FILM COATED ORAL
Qty: 30 TABLET | Refills: 5 | Status: SHIPPED | OUTPATIENT
Start: 2023-05-18

## 2023-05-18 NOTE — TELEPHONE ENCOUNTER
Requested Prescriptions     Pending Prescriptions Disp Refills    amitriptyline (ELAVIL) 75 MG tablet [Pharmacy Med Name: AMITRIPTYLINE 75MG TABLETS] 30 tablet 5     Sig: TAKE 1 TABLET BY MOUTH EVERY NIGHT       Last Office Visit: 3/28/2023  Next Office Visit: 4/2/24  Last Medication Refill: 9/27/22 with 5 RF

## 2023-05-30 ENCOUNTER — OFFICE VISIT (OUTPATIENT)
Dept: CARDIOLOGY | Facility: CLINIC | Age: 68
End: 2023-05-30

## 2023-05-30 VITALS
OXYGEN SATURATION: 99 % | HEIGHT: 64 IN | DIASTOLIC BLOOD PRESSURE: 70 MMHG | SYSTOLIC BLOOD PRESSURE: 110 MMHG | RESPIRATION RATE: 18 BRPM | BODY MASS INDEX: 48.65 KG/M2 | HEART RATE: 64 BPM | WEIGHT: 285 LBS

## 2023-05-30 DIAGNOSIS — I48.0 PAF (PAROXYSMAL ATRIAL FIBRILLATION): Primary | ICD-10-CM

## 2023-05-30 DIAGNOSIS — M54.50 CHRONIC LOW BACK PAIN, UNSPECIFIED BACK PAIN LATERALITY, UNSPECIFIED WHETHER SCIATICA PRESENT: ICD-10-CM

## 2023-05-30 DIAGNOSIS — I47.1 AVNRT (AV NODAL RE-ENTRY TACHYCARDIA): ICD-10-CM

## 2023-05-30 DIAGNOSIS — G89.29 CHRONIC LOW BACK PAIN, UNSPECIFIED BACK PAIN LATERALITY, UNSPECIFIED WHETHER SCIATICA PRESENT: ICD-10-CM

## 2023-05-30 DIAGNOSIS — I44.4 LEFT ANTERIOR FASCICULAR BLOCK: ICD-10-CM

## 2023-05-30 DIAGNOSIS — E66.01 MORBID OBESITY WITH BMI OF 50.0-59.9, ADULT: ICD-10-CM

## 2023-05-30 PROBLEM — Z98.890 S/P AV NODAL ABLATION: Status: RESOLVED | Noted: 2021-10-08 | Resolved: 2023-05-30

## 2023-05-30 NOTE — PROGRESS NOTES
"EP NEW PATIENT VISIT    Chief Complaint  Atrial Fibrillation (NP - x 4 days ago- /101 and  - had symptoms of dizziness, blurred vision and ears ringing )    Subjective        History of Present Illness    EP Problems:   Paroxysmal atrial fibrillation  8/2019: Truong Marques  8/27/21:  Holter with 2500 runs of pSVT  Prior AAD use: Flecainide   AVNRT  8/2019:  Slow pathway modification  LAFB  Presence of a JOHNSON occluder  2/21/21: Wonman implantTruong     Medical Problems:   Morbid obesity  5/2023:  BMI 49  Dysphagia  GERD  MDD  Anxiety  Peripheral neuropathy  Essential tremor  COPD  TRISTON on home Trilogy        Danisha Cannon is a 67 y.o. female with problem list as above who presents to the clinic for evaluation of paroxysmal atrial fibrillation, AVNRT.  She has had previous ablation with Dr. Guerin as well as AVNRT ablation.  She underwent Watchman implant in 2021.  She has been treated with flecainide in the past.  She used to take up to 100 mg twice daily but this was decreased to 50 mg twice daily due to concerns regarding a left anterior fascicular block.    She thinks she was doing fairly well until an episode last Friday when she was at the dentist office.  She states that she came back home and found that her heart rate was elevated and that her blood pressure was increased.  This was causing symptoms.  The whole episode lasted approximately 1 hour.  She was not able to capture the episode on her cardia or smart watch.    Objective   Vital Signs:  /70 (BP Location: Right arm, Patient Position: Sitting)   Pulse 64   Resp 18   Ht 162.6 cm (64\")   Wt 129 kg (285 lb)   SpO2 99%   BMI 48.92 kg/m²   Estimated body mass index is 48.92 kg/m² as calculated from the following:    Height as of this encounter: 162.6 cm (64\").    Weight as of this encounter: 129 kg (285 lb).      Physical Exam  Vitals reviewed.   Constitutional:       Appearance: She is obese.   Cardiovascular:      Rate and " Rhythm: Normal rate and regular rhythm.      Pulses: Normal pulses.      Heart sounds: Normal heart sounds. No murmur heard.  Pulmonary:      Effort: Pulmonary effort is normal. No respiratory distress.      Breath sounds: Normal breath sounds.   Skin:     General: Skin is warm and dry.   Neurological:      General: No focal deficit present.      Mental Status: She is alert and oriented to person, place, and time.   Psychiatric:         Mood and Affect: Mood normal.         Judgment: Judgment normal.      Result Review :  The following data was reviewed by: Leslie Luu MD on 05/30/2023:  CMP          6/17/2022    10:07   CMP   Creatinine 1.50             [id number] SCORE   [id number] Score: 6 (5/30/2023  1:03 PM)          ECG 12 Lead    Date/Time: 5/30/2023 2:15 PM  Performed by: Leslie Luu MD  Authorized by: Leslie Luu MD   Comparison: compared with previous ECG from 10/10/2022  Similar to previous ECG  Rhythm: sinus rhythm  Rate: normal  BPM: 64  Conduction: left anterior fascicular block  QRS axis: left  Other findings: non-specific ST-T wave changes, low voltage and poor R wave progression    Clinical impression: abnormal EKG            Assessment and Plan     Diagnoses and all orders for this visit:    1. PAF (paroxysmal atrial fibrillation) (Primary)    2. Morbid obesity with BMI of 50.0-59.9, adult  -     Ambulatory Referral to Nutrition Services    3. Chronic low back pain, unspecified back pain laterality, unspecified whether sciatica present  -     Ambulatory Referral to Physical Therapy Evaluate and treat    4. AVNRT (AV juanita re-entry tachycardia)    5. Left anterior fascicular block    Other orders  -     ECG 12 Lead        Danisha Cannon is a 67 y.o. female with problem list as above who presents to the clinic for evaluation of paroxysmal atrial fibrillation, AVNRT.  She is doing reasonably well overall with only rare episodes of tachycardia on flecainide.  We discussed risk factor  modification to try to reduce the likelihood of further events moving forward.  I think that her biggest risk at this time would be her weight.  Otherwise, we discussed increasing her flecainide dose given her recent episode, however given her prior left anterior fascicular block and concerns about the flecainide use, we will plan to leave her on the current dose unless she has more episodes.    Plan:  -With them well-controlled on current medication regimen, continue flecainide at current dose  -Continue metoprolol 25 mg daily as adjunct of therapy  -Continue aspirin daily given history of Watchman  -Risk factor modification as below     Risk factor modification:   Weight loss:  Encouraged weight loss with goal weight loss goal of 6lbs over the next 3 months  Nutrition consult  Exercise:  PT consult for exercise  Sleep apnea  Compliance with trilogy encouraged  Alcohol:  Encouraged no more than moderate alcohol intake  Nicotine:  Not currently using tobacco products          Follow Up     Return in about 3 months (around 8/30/2023).  Patient was given instructions and counseling regarding her condition or for health maintenance advice. Please see specific information pulled into the AVS if appropriate.     Part of this note may be an electronic transcription/translation of spoken language to printed text using the Dragon Dictation System.

## 2023-05-30 NOTE — PATIENT INSTRUCTIONS
PT consult  Nutrition consult  Goal weight loss of 6lbs over 3 months  No medication changes for now  Follow up in clinic in 3 months

## 2023-06-07 ENCOUNTER — HOSPITAL ENCOUNTER (OUTPATIENT)
Dept: WOMENS IMAGING | Age: 68
Discharge: HOME | End: 2023-06-07
Payer: MEDICARE

## 2023-06-07 ENCOUNTER — HOSPITAL ENCOUNTER (OUTPATIENT)
Dept: NUTRITION | Facility: HOSPITAL | Age: 68
Discharge: HOME | End: 2023-06-07
Admitting: STUDENT IN AN ORGANIZED HEALTH CARE EDUCATION/TRAINING PROGRAM
Payer: MEDICARE

## 2023-06-07 DIAGNOSIS — Z78.0 POSTMENOPAUSAL: ICD-10-CM

## 2023-06-07 PROCEDURE — 97802 MEDICAL NUTRITION INDIV IN: CPT

## 2023-06-07 PROCEDURE — 77080 DXA BONE DENSITY AXIAL: CPT

## 2023-06-07 NOTE — PROGRESS NOTES
Adult Outpatient Nutrition  Assessment/PES    Patient Name:  Danisha Cannon  YOB: 1955  MRN: [id number]    Assessment Date:  6/7/2023    Reason for referral:  Weight loss    3-day food dairy received?  No    Nutrition History Questionnaire and Assessment received?  No    Patient information:  Pt seen for weight loss. Pt and spouse present at visit. Pt uses walker and electric scooter. Pt meal pattern is 2-3 meals per day with snacking throughout the day. Pt stated that she has a craving for sugar and snacks on high-calorie food products such as donuts, chips, cookies, and pastries. Beverages include water with crystal light at home and sweet tea or soda when eating out. Education included meal pattern, portion control, food/beverage alternatives, nutrition label reading, eating habits, calorie counting, and exercise. I was going to provide a diet menu plan to pt but pt refused due to limit ability to cook at home. Pt stated that she has dieted 9x before in her life and just wants to make small changes at this time.    Goals:  Keep fruits/vegetables at home  Snack options:  Carrots, cucumbers w/ fat-free ranch  Follow recommended food/beverage alternatives  Start walking to mailbox (Monday - Saturday) 1x per day  In 2 weeks, increase walk to 2x per day  Choose sugar free, fat-free, reduced fat food options  Weight loss goal of 6 lbs. in the next 6 months    Education Materials Provided:  2200 kcal menu plan    Recommended pt to contact physician and follow-up in 1-month. Provided contact information.     General Info       Row Name 06/07/23 1140       Today's Session    Person(s) attending today's session Patient;Spouse     Services Used Today? No       General Information    How Well Do You Speak English? very well    Preferred Language English    Are you able to read and write English? Yes    People in Home spouse                   Physical Findings       Row Name 06/07/23 9097           "Physical Findings    Overall Physical Appearance Calm and cooperative. NC. BMI 48.92                    Retired Nutritional Info/Activity       Row Name 06/07/23 1455          Eating Environment    Eating environment Family        Physical Activity    Are you currently involved in an activity/exercise program?  No     Reasons for Inactivity Limited activity;Other (comment)  Pt uses stand-up walker and electric scooter                    Home Nutrition Report       Row Name 06/07/23 1456          Home Nutrition Report    Diet No specific     Typical Intake (Food/Fluid/EN/PN) Pt stated she mostly consumes [employer] frozen meals. Pt stated she has does not cook anymore. Pt consumes food products such as pizza, honey oats (in the morning), and snacks on sweets (donuts, cookies, pastries). Pt beverages mostly include water w/ crystal light. Pt stated she does drink sweet tea or regular soda when she eats out. Pt stated she rarely eats out and mostly eats at home.     Food Preferences sweets     Meal/Snack Patterns 2-3x and snacks throughout the day                    Estimated/Assessed Needs - Anthropometrics       Row Name 06/07/23 1459          Anthropometrics    Height for Calculation 1.626 m (5' 4.02\")     Weight for Calculation 73.1 kg (161 lb 4 oz)  adjBW        Estimated/Assessed Needs    Additional Documentation Fluid Requirements (Group);Protein Requirements (Group);KCAL/KG (Group)        KCAL/KG    KCAL/KG 25 Kcal/Kg (kcal);30 Kcal/Kg (kcal)     25 Kcal/Kg (kcal) 1828.575     30 Kcal/Kg (kcal) 2194.29        Heard-St. Jeor Equation    Heard-St. Jeor Activity Factors 1.2     Activity Factors (Heard-St. Jeor) 0        Protein Requirements    Weight Used For Protein Calculations 129 kg (284 lb 6.3 oz)     Est Protein Requirement Amount (gms/kg) 0.8 gm protein     Estimated Protein Requirements (gms/day) 103.2        Fluid Requirements    Fluid Requirements (mL/day) 3225     Estimated Fluid Requirement Method " other (see comments)  TFG 25 mL/kg     RDA Method (mL) 6155                         Problem/Interventions:   Problem 1       Row Name 06/07/23 1501          Nutrition Diagnoses Problem 1    Problem 1 Overweight/Obesity     Etiology (related to) Factors Affecting Nutrition     Appetite Hungry     Food Habit/Preferences Other  snacks on sweets, donutes, cookies, chips, pastries.     Signs/Symptoms (evidenced by) BMI     BMI Greater than 40                            Intervention Goal       Row Name 06/07/23 1503          Intervention Goal    General Provide information regarding MNT for treatment/condition;Reduce/improve symptoms     Weight Appropriate weight loss                        Education/Evaluation       Row Name 06/07/23 1503          Education    Education Advised regarding habits/behavior;Provided education regarding;Education topics     Provided education regarding Key food habit change     Education Topics Basic nutrition;Gradual weight loss;Calorie counting     Kcal/Day 2200 Kcal/day     Advised Regarding Habits/Behavior Activity;Food choices;Label reading;Appropriate beverage;Meal planning;Appropriate portions;Eating out;Food shopping;Eating pattern;Snacks        Monitor/Evaluation    Monitor Per protocol;Weight                     Electronically signed by:  Braeden Gonzalez RD  06/07/23 15:05 CDT

## 2023-06-29 PROBLEM — R13.14 PHARYNGOESOPHAGEAL DYSPHAGIA: Status: ACTIVE | Noted: 2023-06-29

## 2023-06-29 PROBLEM — K21.9 LARYNGOPHARYNGEAL REFLUX: Status: ACTIVE | Noted: 2023-06-29

## 2023-07-20 NOTE — TELEPHONE ENCOUNTER
Requested Prescriptions     Pending Prescriptions Disp Refills    amitriptyline (ELAVIL) 25 MG tablet [Pharmacy Med Name: AMITRIPTYLINE 25MG TABLETS] 180 tablet 3     Sig: TAKE 2 TABLETS BY MOUTH EVERY NIGHT       Last Office Visit: 3/28/2023  Next Office Visit: Visit date not found  Last Medication Refill: 5/11/2022 with 3 RF

## 2023-07-21 ENCOUNTER — TREATMENT (OUTPATIENT)
Dept: PHYSICAL THERAPY | Facility: CLINIC | Age: 68
End: 2023-07-21
Payer: MEDICARE

## 2023-07-21 DIAGNOSIS — M54.41 CHRONIC BILATERAL LOW BACK PAIN WITH BILATERAL SCIATICA: Primary | ICD-10-CM

## 2023-07-21 DIAGNOSIS — G89.29 CHRONIC PAIN OF BOTH KNEES: ICD-10-CM

## 2023-07-21 DIAGNOSIS — M25.562 CHRONIC PAIN OF BOTH KNEES: ICD-10-CM

## 2023-07-21 DIAGNOSIS — M54.42 CHRONIC BILATERAL LOW BACK PAIN WITH BILATERAL SCIATICA: Primary | ICD-10-CM

## 2023-07-21 DIAGNOSIS — M79.671 BILATERAL FOOT PAIN: ICD-10-CM

## 2023-07-21 DIAGNOSIS — M79.672 BILATERAL FOOT PAIN: ICD-10-CM

## 2023-07-21 DIAGNOSIS — M25.561 CHRONIC PAIN OF BOTH KNEES: ICD-10-CM

## 2023-07-21 DIAGNOSIS — G89.29 CHRONIC BILATERAL LOW BACK PAIN WITH BILATERAL SCIATICA: Primary | ICD-10-CM

## 2023-07-21 DIAGNOSIS — M25.552 LEFT HIP PAIN: ICD-10-CM

## 2023-07-21 RX ORDER — AMITRIPTYLINE HYDROCHLORIDE 25 MG/1
TABLET, FILM COATED ORAL
Qty: 180 TABLET | Refills: 3 | Status: SHIPPED | OUTPATIENT
Start: 2023-07-21 | End: 2023-08-20

## 2023-07-21 NOTE — PROGRESS NOTES
"                                                                Physical Therapy Treatment Note  [address] [place] [address]    Patient: Danisha Cannon                                                 Visit Date: 2023  :     1955    Referring practitioner:    Leslie Luu MD  Date of Initial Visit:          Type: THERAPY  Noted: 2023    Patient seen for 2 sessions    Visit Diagnoses:    ICD-10-CM ICD-9-CM   1. Chronic bilateral low back pain with bilateral sciatica  M54.42 724.2    M54.41 724.3    G89.29 338.29   2. Left hip pain  M25.552 719.45   3. Chronic pain of both knees  M25.561 719.46    M25.562 338.29    G89.29    4. Bilateral foot pain  M79.671 729.5    M79.672      SUBJECTIVE     Subjective: Pt reports that she has been doing okay since IE. She reports new onset of a \"stitch\" or \"sore spot\" on her left side that started about a week ago pulling a water hose and it got stuck, resulting in a jarring effect on her arm/back. She is a firm believer in wearing magnetic patches, and notices a significant increase in pain after 3 days of not wearing one. She lost her HEP.      PAIN: 1/10         OBJECTIVE     Objective     From IE:       LE MMT   HIP Strength L Strength R   flexion 4 4   extension 4 4   ABD 4- 4-           KNEE       flexion 5 5   extension 5 5           ANKLE       dorsiflexion 5 5   plantarflexion 5 5   *pain    Manual Therapy     [id number]  Comments   STM to posterior lateral back (lat region)  Use of massage cream;                   Timed Minutes 15       Neuromuscular Reeducation     65869 Comments   Standing step taps in // bars, 4 in step  2x3 min; cues for upright posture,    Standing TrA bracing SB on raised mat table    Bridges with adduction ball squeeze 2x10, tactile cues for proper muscle recruitment           Timed Minutes 15       Therapeutic Activities    83852 Comments   STS training from raised mat table  3x5   Side stepping with HHA  2x25'; cues to keep pelvis " level                Timed Minutes 15         Therapy Education/Self Care 27661   Education offered today HEP below   Tabitha Code Access Code: [email or web address]  URL: [email or web address]   Ongoing HEP      Date: 07/07/2023  Prepared by: Panfilo Heard     Exercises  - Supine Transversus Abdominis Bracing - Hands on Stomach  - 2 x daily - 7 x weekly - 5 sets - 15 hold  - Supine Bridge  - 2 x daily - 7 x weekly - 2 sets - 10 reps  - Sidelying Hip Abduction  - 2 x daily - 7 x weekly - 2 sets - 10 reps  - Standing Hip Abduction with Counter Support  - 10 x daily - 7 x weekly - 2 sets - 10 reps - 10 hold  - Standing Hip Extension with Counter Support  - 10 x daily - 7 x weekly - 2 sets - 10 reps - 10 hold  - Mini Squat with Counter Support  - 2 x daily - 7 x weekly - 2 sets - 10 reps      Timed Minutes        Total Timed Treatment:     45   mins  Total Time of Visit:             45   mins         ASSESSMENT/PLAN     GOALS  Goals                                          Progress Note due by 8/6/23                                                      Recert due by 10/4/23   LTG by: 6 weeks Comments Date Status   Able to stand erect x 1 min without UE support         SLS x 3 sec without UE support Worked on balance today 7/21     Fareed hip abd MMT 4/5         Independent with HEP for hip/core stability  Provided new copy of HEP today.  7/21 Ongoing                  Assessment/Plan     ASSESSMENT:   This is the first treatment since IE. Pt with new c/o flank pain on the L side, denied any S&S of UTI, and educated on symptoms to watch for should this be the case. However, upon further questioning, MASOOD is consistent with minor lat strain resulting from incident with garden hose. Pt did well with upright activity today, but does require some rest breaks.     PLAN:    Focus on core and hip stability and work toward more of an upright posture. Progress HEP for same.      SIGNATURE: Adriana Contreras, PT, [place] License #:   [id number]    Electronically Signed on 7/21/2023        [address] [place] [address]  [phone]

## 2023-07-21 NOTE — PROGRESS NOTES
The clinical instructor and/or supervising staff, Panfilo Heard PT, was present in clinic guiding the visit by approving, concurring, and confirming the skilled judgement for all services rendered.    Signature:  Panfilo Heard PT, [place] License #: [id number]  Electronically signed on 7/21/2023

## 2023-08-04 ENCOUNTER — TELEPHONE (OUTPATIENT)
Dept: PHYSICAL THERAPY | Facility: CLINIC | Age: 68
End: 2023-08-04

## 2023-08-04 ENCOUNTER — OFFICE VISIT (OUTPATIENT)
Dept: PHYSICAL THERAPY | Facility: CLINIC | Age: 68
End: 2023-08-04
Payer: MEDICARE

## 2023-08-04 DIAGNOSIS — R13.12 OROPHARYNGEAL DYSPHAGIA: Primary | ICD-10-CM

## 2023-08-04 PROCEDURE — 92610 EVALUATE SWALLOWING FUNCTION: CPT | Performed by: SPEECH-LANGUAGE PATHOLOGIST

## 2023-08-08 ENCOUNTER — TREATMENT (OUTPATIENT)
Dept: PHYSICAL THERAPY | Facility: CLINIC | Age: 68
End: 2023-08-08
Payer: MEDICARE

## 2023-08-08 DIAGNOSIS — R60.0 BILATERAL EDEMA OF LOWER EXTREMITY: ICD-10-CM

## 2023-08-08 DIAGNOSIS — R13.12 OROPHARYNGEAL DYSPHAGIA: Primary | ICD-10-CM

## 2023-08-08 PROCEDURE — 92526 ORAL FUNCTION THERAPY: CPT | Performed by: SPEECH-LANGUAGE PATHOLOGIST

## 2023-08-08 NOTE — PROGRESS NOTES
Speech Language Pathology Treatment Note  [address] [place] [address]    Patient: Danisha Cannon                                                                                     Visit Date: 2023  :     1955    Referring practitioner:    Reji Rocha MD  Date of Initial Visit:          Type: THERAPY  Noted: 2023    Patient seen for 2 sessions    Visit Diagnoses:    ICD-10-CM ICD-9-CM   1. Oropharyngeal dysphagia  R13.12 787.22     SUBJECTIVE     Patient was alert and ready for therapy. She reports no new problems.     OBJECTIVE   GOALS  Goals                                            STG  Comments Status   Patient will improve oral skills to enhance safety and increase eating efficiency and bolus control as measured by increased lip closure, improved bolus formation, and improved posterior propulsion of the bolus.  Patient given instruction on oral exercises: Tongue press, Tongue push, Lip press. Able to demonstrate New   Patient will improve hyolaryngeal elevation and increase base of the tongue strength and posterior pharyngeal wall excursion as measured by decreased overt signs and symptoms of aspiration and decreased penetration and aspiration on repeat instrumental swallow study, if applicable.   Patient given instruction on effortful swallow and supraglottic swallow. Able to demonstrate New   Patient will report decreased difficulty with swallowing.  Initial Eat 10 score 8. Pt reports no changes since evaluation New           LTG        Patient will safely consume full PO diet of regular consistency food and thin liquids without difficulty or complications such as aspiration or pneumonia.   Patient able to demonstrate exercises after instructions and cues New       Therapy Education/Self Care    Details: POC   Given Home Exercise Program   Progress: New   Education provided to:  Patient   Level of understanding  Verbalized and Demonstrated             Total Time of Visit:             35   mins         ASSESSMENT/PLAN     ASSESSMENT: Patient able to demonstrate exercises after instructions and cues    PLAN: Continue therapy and home exercises    Progress Note Due Date:9/1/2023  Recertification Due Date:11/1/2023    SIGNATURE: Kinga [place] CCC-SLP, [place] License #: [id number]  Electronically Signed on 8/8/2023          [facility] [place] [place] [address]  [phone]

## 2023-08-09 ENCOUNTER — TELEPHONE (OUTPATIENT)
Dept: CARDIOLOGY | Facility: CLINIC | Age: 68
End: 2023-08-09
Payer: MEDICARE

## 2023-08-09 DIAGNOSIS — R60.0 BILATERAL EDEMA OF LOWER EXTREMITY: Primary | ICD-10-CM

## 2023-08-09 DIAGNOSIS — I50.32 CHRONIC DIASTOLIC HEART FAILURE OF UNKNOWN ETIOLOGY: ICD-10-CM

## 2023-08-09 RX ORDER — FUROSEMIDE 40 MG/1
40 TABLET ORAL DAILY
Qty: 90 TABLET | Refills: 3 | Status: SHIPPED | OUTPATIENT
Start: 2023-08-09

## 2023-08-09 NOTE — TELEPHONE ENCOUNTER
----- Message from ERNESTO Spaulding sent at 8/9/2023  8:04 AM CDT -----  Can you see if she wants to follow with Dr. Luu only or with me also for general cardiology?

## 2023-08-09 NOTE — TELEPHONE ENCOUNTER
Per verbal release, left VM with detailed message and instructions to call back with any questions.  Peggy Mayberry MA

## 2023-08-12 NOTE — PROGRESS NOTES
" ERNESTO Pereira  [facility]  [address]            [place] [address]  Phone: [phone]  Fax: [phone]          Chief Complaint  Bronchitis and Chronic respiratory failure with hypoxia    Subjective    History of Present Illness  Danisha Cannon presents to [facility] for appointment. She is accompanied by a family member.  She ambulates with a rollator.  Patient with known afib, morbid obesity/deconditioning, chronic bronchitis, TRISTON, chronic respiratory failure with hypoxemia/hypercapnia, restrictive lung disease, lung nodules, and allergic rhinitis. She is using flovent, albuterol HFA. She reports compliance with home triology+3.5L.  She has no new pulmonary complaints today.  She states that she is doing quite well at this time.  She is now completing speech therapy for dysphagia.  She is due for follow-up CT in November regarding lung nodules.  She is agreeable for follow-up.  Order was placed.  She denies hospitalization or illness since her last office visit May 4, 2023. No other aggravating or alleviating factors.     Objective   Vital Signs:   /80   Pulse 83   Ht 162.6 cm (64.02\")   Wt 125 kg (275 lb 3.2 oz)   SpO2 95% Comment: 4L  BMI 47.21 kg/mý     Physical Exam  Vitals reviewed.   Constitutional:       General: She is not in acute distress.     Appearance: She is well-developed. She is morbidly obese.      Interventions: Nasal cannula in place.      Comments: Ambulates with Rolator    HENT:      Head: Normocephalic and atraumatic.   Eyes:      General: No scleral icterus.     Conjunctiva/sclera: Conjunctivae normal.      Pupils: Pupils are equal, round, and reactive to light.   Cardiovascular:      Rate and Rhythm: Normal rate.   Pulmonary:      Effort: Pulmonary effort is normal. No respiratory distress.      Breath sounds: Normal breath sounds. No wheezing or rales.   Musculoskeletal:         General: " Normal range of motion.      Cervical back: Normal range of motion and neck supple.      Right lower leg: Edema present.      Left lower leg: Edema present.   Skin:     General: Skin is warm and dry.   Neurological:      Mental Status: She is alert and oriented to person, place, and time.   Psychiatric:         Behavior: Behavior normal.         Thought Content: Thought content normal.         Judgment: Judgment normal.      Result Review :  The following data was reviewed by: ERNESTO Stroud on 2023:  Overnight Sleep Oximetry Study (2023 00:00)   Office Visit with Reji Rocha MD (2023)   Rest/Exercise Pulse Ox Values          10/4/2021    13:30 2023    11:45   Rest/Exercise Pulse Ox Results   Rest room air SAT % 90    Exercise room air SAT % 85    Rest on O2 @ Liters 2 2L   Rest on O2 SAT % 96 98%   Exercise on O2 @ Liters 2 2L   Exercise on O2 SAT % 92 93%     PFT Values          2021    11:30   Pre Drug PFT Results   FVC 73   FEV1 81   FEF 25-75% 139   FEV1/FVC 86.84   Other Tests PFT Results   TLC 85   RV 77   DLCO 96   D/VAsb 118       Results for orders placed in visit on 21    Pulmonary Function Test    Narrative  Pulmonary Function Test  Performed by: Francisca Villagomez, RRT  Authorized by: Sophia Valero APRN    Pre Drug % Predicted  FVC: 73%  FEV1: 81%  FEF 25-75%: 139%  FEV1/FVC: 86.84%  T%  RV: 77%  DLCO: 96%  D/VAsb: 118%    Interpretation  Spirometry  Spirometry shows moderate restriction. midflow is normal.  Review of FVL curve  Patient's effort is normal.  Lung Volume Measurements  Measurements show reduced lung volumes consistent with restriction.  Diffusion Capacity  The patient's diffusion capacity is normal.  Diffusion capacity is normal when corrected for alveolar volume.      Results for orders placed during the hospital encounter of 19    Full Pulmonary Function Test With Bronchodilator & ABG    Narrative  [facility] - Pulmonary Function Test    [address]  [place]  [place]  [address]  [phone]    Patient : Danisha Cannon  MRN : [id number]  CSN : [id number]  Pulmonologist : Ryan Keith MD  Date : 12/13/2019    ______________________________________________________________________    Interpretation :  1.  Spirometry reveals a borderline low forced vital capacity and otherwise are within normal limits.  2.  Lung volumes also reveal a borderline low vital capacity and a decrease in expiratory reserve volume but otherwise are within normal limits.  3.  Diffusion capacity is within normal limits and when corrected for alveolar volume is supranormal.        Ryan Keith MD      Results for orders placed during the hospital encounter of 03/11/19    Full Pulmonary Function Test With Bronchodilator & ABG    Narrative  [facility] - Pulmonary Function Test    [address]  [place]  [place]  [address]  [phone]    Patient : Danisha Cannon  MRN : [id number]  CSN : [id number]  Pulmonologist : Ryan Keith MD  Date : 3/13/2019    ______________________________________________________________________    Interpretation :  1.  Spirometry is within normal limits.  2.  Lung volumes are within normal limits.  3.  There is a moderate diffusion impairment which when corrected for alveolar volume is normalized.      Ryan Keith MD           Assessment and Plan  Diagnoses and all orders for this visit:    1. Chronic bronchitis, unspecified chronic bronchitis type (Primary)  Overview:  Mildly low IGE, mildly elevated IGG subclass 1  +eos 410-5/11/21    flovent HFA-110, albuterol HFA      Assessment & Plan:  Continue current treatment regimen.        2. Lung nodules  Overview:  HRCT 11/16/21 - 2mm LLL, 3mm RLL    CT chest 11/30/2022-new 3 mm LEFT lower lobe pulmonary nodule on image 97.  Stable 3 mm LEFT lower lobe pulmonary nodule, image 72.  Stable 3 mm RIGHT lower lobe pulmonary nodule, image  70.  Stable 3 mm subpleural RIGHT lower lobe pulmonary nodule, image 58.  Stable 3 mm RIGHT upper lobe pulmonary nodule, image 37.    Assessment & Plan:  Obtain follow-up CT of the chest in November.  Order was placed.    Orders:  -     CT Chest Without Contrast; Future    3. Allergic rhinitis, unspecified seasonality, unspecified trigger  Overview:  Xyzal and flonase prn, singulair      Not candidate for immunotherapy per Dr. Angel as the patient is on a beta blocker.    Assessment & Plan:  Continue current treatment regimen.        4. Chronic respiratory failure with hypoxia and hypercapnia  Overview:  Home trilogy  3.5L O2    Assessment & Plan:  Continue home Trilogy device. The patient is benefiting from it and wishes to continue it.     Continue chronic oxygen therapy.  The patient is benefiting from it and wishes to continue it.          5. Obstructive sleep apnea  Overview:  Home trilogy    Assessment & Plan:  Continue home trilogy + O2. The patient is benefiting from it and wishes to continue it.       6. Restrictive lung disease  Overview:  Likely 2' morbid obesity and obstructive sleep apnea      7. Morbid obesity with BMI of 45.0-49.9, adult  Assessment & Plan:  Patient's (Body mass index is 47.21 kg/mý.) Recommend weight loss, defer to PCP.             Follow Up  Sophia Valero, APRN  8/14/2023  15:14 CDT    Return in about 6 months (around 2/14/2024).    Patient was given instructions and counseling regarding her condition or for health maintenance advice. Please see specific information pulled into the AVS if appropriate.     Please note that portions of this note were completed with a voice recognition program.

## 2023-08-14 ENCOUNTER — OFFICE VISIT (OUTPATIENT)
Dept: PULMONOLOGY | Facility: CLINIC | Age: 68
End: 2023-08-14
Payer: MEDICARE

## 2023-08-14 ENCOUNTER — LAB (OUTPATIENT)
Dept: LAB | Facility: HOSPITAL | Age: 68
End: 2023-08-14
Payer: MEDICARE

## 2023-08-14 VITALS
DIASTOLIC BLOOD PRESSURE: 80 MMHG | HEIGHT: 64 IN | WEIGHT: 275.2 LBS | OXYGEN SATURATION: 95 % | HEART RATE: 83 BPM | SYSTOLIC BLOOD PRESSURE: 124 MMHG | BODY MASS INDEX: 46.98 KG/M2

## 2023-08-14 DIAGNOSIS — J96.12 CHRONIC RESPIRATORY FAILURE WITH HYPOXIA AND HYPERCAPNIA: Chronic | ICD-10-CM

## 2023-08-14 DIAGNOSIS — G47.33 OBSTRUCTIVE SLEEP APNEA: Chronic | ICD-10-CM

## 2023-08-14 DIAGNOSIS — J42 CHRONIC BRONCHITIS, UNSPECIFIED CHRONIC BRONCHITIS TYPE: Primary | Chronic | ICD-10-CM

## 2023-08-14 DIAGNOSIS — R60.0 BILATERAL EDEMA OF LOWER EXTREMITY: ICD-10-CM

## 2023-08-14 DIAGNOSIS — E66.01 MORBID OBESITY WITH BMI OF 45.0-49.9, ADULT: Chronic | ICD-10-CM

## 2023-08-14 DIAGNOSIS — R91.8 LUNG NODULES: ICD-10-CM

## 2023-08-14 DIAGNOSIS — I50.32 CHRONIC DIASTOLIC HEART FAILURE OF UNKNOWN ETIOLOGY: ICD-10-CM

## 2023-08-14 DIAGNOSIS — J96.11 CHRONIC RESPIRATORY FAILURE WITH HYPOXIA AND HYPERCAPNIA: Chronic | ICD-10-CM

## 2023-08-14 DIAGNOSIS — J98.4 RESTRICTIVE LUNG DISEASE: Chronic | ICD-10-CM

## 2023-08-14 DIAGNOSIS — J30.9 ALLERGIC RHINITIS, UNSPECIFIED SEASONALITY, UNSPECIFIED TRIGGER: Chronic | ICD-10-CM

## 2023-08-14 LAB
ANION GAP SERPL CALCULATED.3IONS-SCNC: 10 MMOL/L (ref 5–15)
BUN SERPL-MCNC: 30 MG/DL (ref 8–23)
BUN/CREAT SERPL: 21.4 (ref 7–25)
CALCIUM SPEC-SCNC: 9.2 MG/DL (ref 8.6–10.5)
CHLORIDE SERPL-SCNC: 104 MMOL/L (ref 98–107)
CO2 SERPL-SCNC: 28 MMOL/L (ref 22–29)
CREAT SERPL-MCNC: 1.4 MG/DL (ref 0.57–1)
EGFRCR SERPLBLD CKD-EPI 2021: 40.6 ML/MIN/1.73
GLUCOSE SERPL-MCNC: 107 MG/DL (ref 65–99)
MAGNESIUM SERPL-MCNC: 2.2 MG/DL (ref 1.6–2.4)
NT-PROBNP SERPL-MCNC: 111.6 PG/ML (ref 0–900)
POTASSIUM SERPL-SCNC: 4.6 MMOL/L (ref 3.5–5.2)
SODIUM SERPL-SCNC: 142 MMOL/L (ref 136–145)

## 2023-08-14 PROCEDURE — 83735 ASSAY OF MAGNESIUM: CPT

## 2023-08-14 PROCEDURE — 99214 OFFICE O/P EST MOD 30 MIN: CPT | Performed by: NURSE PRACTITIONER

## 2023-08-14 PROCEDURE — 1159F MED LIST DOCD IN RCRD: CPT | Performed by: NURSE PRACTITIONER

## 2023-08-14 PROCEDURE — 1160F RVW MEDS BY RX/DR IN RCRD: CPT | Performed by: NURSE PRACTITIONER

## 2023-08-14 PROCEDURE — 83880 ASSAY OF NATRIURETIC PEPTIDE: CPT

## 2023-08-14 PROCEDURE — 80048 BASIC METABOLIC PNL TOTAL CA: CPT

## 2023-08-14 PROCEDURE — 36415 COLL VENOUS BLD VENIPUNCTURE: CPT

## 2023-08-14 NOTE — TELEPHONE ENCOUNTER
Patient contacted, will come to [facility] outpatient lab to get labs done, and will wait for Dr. Luu's recommendation on taking her Lasix.

## 2023-08-14 NOTE — PATIENT INSTRUCTIONS
Obesity, Adult  Obesity is having too much body fat. Being obese means that your weight is more than what is healthy for you.   BMI (body mass index) is a number that explains how much body fat you have. If you have a BMI of 30 or more, you are obese.  Obesity can cause serious health problems, such as:  Stroke.  Coronary artery disease (CAD).  Type 2 diabetes.  Some types of cancer.  High blood pressure (hypertension).  High cholesterol.  Gallbladder stones.  Obesity can also contribute to:  Osteoarthritis.  Sleep apnea.  Infertility problems.  What are the causes?  Eating meals each day that are high in calories, sugar, and fat.  Drinking a lot of drinks that have sugar in them.  Being born with genes that may make you more likely to become obese.  Having a medical condition that causes obesity.  Taking certain medicines.  Sitting a lot (having a sedentary lifestyle).  Not getting enough sleep.  What increases the risk?  Having a family history of obesity.  Living in an area with limited access to:  [place], recreation centers, or sidewalks.  Healthy food choices, such as grocery stores and farmers' markets.  What are the signs or symptoms?  The main sign is having too much body fat.  How is this treated?  Treatment for this condition often includes changing your lifestyle. Treatment may include:  Changing your diet. This may include making a healthy meal plan.  Exercise. This may include activity that causes your heart to beat faster (aerobic exercise) and strength training. Work with your doctor to design a program that works for you.  Medicine to help you lose weight. This may be used if you are not able to lose one pound a week after 6 weeks of healthy eating and more exercise.  Treating conditions that cause the obesity.  Surgery. Options may include gastric banding and gastric bypass. This may be done if:  Other treatments have not helped to improve your condition.  You have a BMI of 40 or higher.  You have  life-threatening health problems related to obesity.  Follow these instructions at home:  Eating and drinking    Follow advice from your doctor about what to eat and drink. Your doctor may tell you to:  Limit fast food, sweets, and processed snack foods.  Choose low-fat options. For example, choose low-fat milk instead of whole milk.  Eat five or more servings of fruits or vegetables each day.  Eat at home more often. This gives you more control over what you eat.  Choose healthy foods when you eat out.  Learn to read food labels. This will help you learn how much food is in one serving.  Keep low-fat snacks available.  Avoid drinks that have a lot of sugar in them. These include soda, fruit juice, iced tea with sugar, and flavored milk.  Drink enough water to keep your pee (urine) pale yellow.  Do not go on fad diets.  Physical activity  Exercise often, as told by your doctor. Most adults should get up to 150 minutes of moderate-intensity exercise every week.Ask your doctor:  What types of exercise are safe for you.  How often you should exercise.  Warm up and stretch before being active.  Do slow stretching after being active (cool down).  Rest between times of being active.  Lifestyle  Work with your doctor and a food expert (dietitian) to set a weight-loss goal that is best for you.  Limit your screen time.  Find ways to reward yourself that do not involve food.  Do not drink alcohol if:  Your doctor tells you not to drink.  You are pregnant, may be pregnant, or are planning to become pregnant.  If you drink alcohol:  Limit how much you have to:  0-1 drink a day for women.  0-2 drinks a day for men.  Know how much alcohol is in your drink. In the [place] one drink equals one 12 oz bottle of beer (355 mL), one 5 oz glass of wine (148 mL), or one 1« oz glass of hard liquor (44 mL).  General instructions  Keep a weight-loss journal. This can help you keep track of:  The food that you eat.  How much exercise you  get.  Take over-the-counter and prescription medicines only as told by your doctor.  Take vitamins and supplements only as told by your doctor.  Think about joining a support group.  Pay attention to your mental health as obesity can lead to depression or self esteem issues.  Keep all follow-up visits.  Contact a doctor if:  You cannot meet your weight-loss goal after you have changed your diet and lifestyle for 6 weeks.  You are having trouble breathing.  Summary  Obesity is having too much body fat.  Being obese means that your weight is more than what is healthy for you.  Work with your doctor to set a weight-loss goal.  Get regular exercise as told by your doctor.  This information is not intended to replace advice given to you by your health care provider. Make sure you discuss any questions you have with your health care provider.  Document Revised: 07/26/2022 Document Reviewed: 07/26/2022  [employer] Patient Education c 2023 Else[employer] Inc.  Sleep Apnea  Sleep apnea affects breathing during sleep. It causes breathing to stop for 10 seconds or more, or to become shallow. People with sleep apnea usually snore loudly.  It can also increase the risk of:  Heart attack.  Stroke.  Being very overweight (obese).  Diabetes.  Heart failure.  Irregular heartbeat.  High blood pressure.  The goal of treatment is to help you breathe normally again.  What are the causes?    The most common cause of this condition is a collapsed or blocked airway.  There are three kinds of sleep apnea:  Obstructive sleep apnea. This is caused by a blocked or collapsed airway.  Central sleep apnea. This happens when the brain does not send the right signals to the muscles that control breathing.  Mixed sleep apnea. This is a combination of obstructive and central sleep apnea.  What increases the risk?  Being overweight.  Smoking.  Having a small airway.  Being older.  Being male.  Drinking alcohol.  Taking medicines to calm yourself (sedatives or  tranquilizers).  Having family members with the condition.  Having a tongue or tonsils that are larger than normal.  What are the signs or symptoms?  Trouble staying asleep.  Loud snoring.  Headaches in the morning.  Waking up gasping.  Dry mouth or sore throat in the morning.  Being sleepy or tired during the day.  If you are sleepy or tired during the day, you may also:  Not be able to focus your mind (concentrate).  Forget things.  Get angry a lot and have mood swings.  Feel sad (depressed).  Have changes in your personality.  Have less interest in sex, if you are female.  Be unable to have an erection, if you are male.  How is this treated?    Sleeping on your side.  Using a medicine to get rid of mucus in your nose (decongestant).  Avoiding the use of alcohol, medicines to help you relax, or certain pain medicines (narcotics).  Losing weight, if needed.  Changing your diet.  Quitting smoking.  Using a machine to open your airway while you sleep, such as:  An oral appliance. This is a mouthpiece that shifts your lower jaw forward.  A CPAP device. This device blows air through a mask when you breathe out (exhale).  An EPAP device. This has valves that you put in each nostril.  A BIPAP device. This device blows air through a mask when you breathe in (inhale) and breathe out.  Having surgery if other treatments do not work.  Follow these instructions at home:  Lifestyle  Make changes that your doctor recommends.  Eat a healthy diet.  Lose weight if needed.  Avoid alcohol, medicines to help you relax, and some pain medicines.  Do not smoke or use any products that contain nicotine or tobacco. If you need help quitting, ask your doctor.  General instructions  Take over-the-counter and prescription medicines only as told by your doctor.  If you were given a machine to use while you sleep, use it only as told by your doctor.  If you are having surgery, make sure to tell your doctor you have sleep apnea. You may need to  bring your device with you.  Keep all follow-up visits.  Contact a doctor if:  The machine that you were given to use during sleep bothers you or does not seem to be working.  You do not get better.  You get worse.  Get help right away if:  Your chest hurts.  You have trouble breathing in enough air.  You have an uncomfortable feeling in your back, arms, or stomach.  You have trouble talking.  One side of your body feels weak.  A part of your face is hanging down.  These symptoms may be an emergency. Get help right away. Call your local emergency services (911 in the [place]  Do not wait to see if the symptoms will go away.  Do not drive yourself to the hospital.  Summary  This condition affects breathing during sleep.  The most common cause is a collapsed or blocked airway.  The goal of treatment is to help you breathe normally while you sleep.  This information is not intended to replace advice given to you by your health care provider. Make sure you discuss any questions you have with your health care provider.  Document Revised: 07/27/2022 Document Reviewed: 11/26/2021  [employer] Patient Education c 2023 Else[employer] Inc.  Chronic Bronchitis, Adult    Chronic bronchitis is inflammation inside of the main airways (bronchi) that come off the windpipe (trachea) in the lungs. The swelling causes the airways to narrow and make more mucus than normal. This can make it hard to breathe and may cause coughing or noisy breathing (wheezing). This condition is a type of chronic obstructive pulmonary disease (COPD). Chronic bronchitis is often associated with other chronic respiratory conditions, such as emphysema, asthma, bronchiectasis, or cystic fibrosis.  Chronic bronchitis is a long-term (chronic) condition. It is defined as a chronic cough with mucus (sputum) production:  For at least 3 months of the year.  For 2 years in a row.  People with chronic bronchitis are more likely to get colds and other infections in the nose,  throat, or airways.  What are the causes?  This condition is most often caused by:  A history of smoking.  Exposure to secondhand smoke or a smoky area for a long period of time.  Frequent lung infections.  Long-term exposure to certain fumes or chemicals that irritate the lungs.  What are the signs or symptoms?  Symptoms of chronic bronchitis may include:  A cough that brings up mucus (productive cough).  A whistling sound when you breathe (wheezing).  Shortness of breath.  Chest tightness or soreness.  Fever or chills.  Colds or respiratory infections that go away and return.  How is this diagnosed?  Your health care provider may diagnose this condition based on your signs and symptoms, especially if you have a cough that lasts a long time or keeps coming back.  This condition may be diagnosed based on:  Your symptoms and medical history.  A physical exam, including listening to your lungs.  Tests, such as:  Testing a sputum sample.  Blood tests.  A chest X-ray.  Tests of lung (pulmonary) function.  How is this treated?  There is no cure for chronic bronchitis. Treatment may help control your symptoms. This includes:  Drinking fluids. This may help thin your mucus so it is easier to cough up.  Mucus-clearing techniques. Your health care provider will show you which techniques are best for you.  Medicines such as:  Inhaled medicine (inhaler) to improve air flow in and out of your lungs.  Antibiotics to treat or prevent bacterial lung infections.  Mucus-thinning medicines.  Pulmonary rehabilitation. This is a program that helps you learn how to manage your breathing problem. The program may include exercise, education, counseling, treatment, and support.  Using oxygen therapy, if your blood oxygen level is very low.  Follow these instructions at home:  Medicines  Take over-the-counter and prescription medicines only as told by your health care provider.  If you were prescribed an antibiotic medicine, take it as told  by your health care provider. Do not stop taking the antibiotic even if you start to feel better.  Lifestyle    Do not use any products that contain nicotine or tobacco. These products include cigarettes, chewing tobacco, and vaping devices, such as e-cigarettes. If you need help quitting, ask your health care provider.  Stay away from other people's smoke (secondhand smoke) and any irritants that make you cough more, such as chemical fumes.  Eat a healthy diet and get regular exercise. Talk with your health care provider about what activities are safe for you.  Return to normal activities as told by your health care provider. Ask your health care provider what activities are safe for you.  Preventing infections  Stay up to date on all immunizations, including the pneumonia and flu vaccines.  Wash your hands often with soap and water for at least 20 seconds. If soap and water are not available, use hand .  Avoid contact with people who have symptoms of a cold or the flu.  Keep your environment free from any known allergens such as dust, mold, pets, and pollen.  General instructions  Get plenty of rest.  Drink enough fluids to keep your urine pale yellow.  Use oxygen therapy at home as directed.  Follow instructions from your health care provider about how to use oxygen safely and take steps to prevent fire.  Do not smoke while using oxygen or allow others to smoke in your home.  Keep all follow-up visits. This is important.  Contact a health care provider if:  Your shortness of breath or coughing gets worse even when you take medicine.  Your mucus gets thicker or changes color.  You are not able to cough up your mucus.  You have a fever.  Get help right away if:  You cough up blood.  You have trouble breathing.  You have chest pain.  You feel dizzy or confused.  These symptoms may represent a serious problem that is an emergency. Do not wait to see if the symptoms will go away. Get medical help right away.  Call your local emergency services (911 in the [place] Do not drive yourself to the hospital.  Summary  Chronic bronchitis is inflammation inside of the main airways (bronchi) that come off the windpipe (trachea) in the lungs. The swelling causes the airways to narrow and make more mucus than normal.  Chronic bronchitis is a long-term (chronic) condition. It is defined as a chronic cough with mucus (sputum) production for at least 3 months of the year for 2 years in a row.  If you were prescribed an antibiotic medicine, take it as told by your health care provider. Do not stop taking the antibiotic even if you start to feel better.  Do not use any products that contain nicotine or tobacco. These products include cigarettes, chewing tobacco, and vaping devices, such as e-cigarettes. If you need help quitting, ask your health care provider.  This information is not intended to replace advice given to you by your health care provider. Make sure you discuss any questions you have with your health care provider.  Document Revised: 04/20/2022 Document Reviewed: 04/20/2022  [employer] Patient Education c 2023 [employer]

## 2023-08-14 NOTE — ASSESSMENT & PLAN NOTE
Continue home Trilogy device. The patient is benefiting from it and wishes to continue it.     Continue chronic oxygen therapy.  The patient is benefiting from it and wishes to continue it.

## 2023-08-14 NOTE — ASSESSMENT & PLAN NOTE
Patient's (Body mass index is 47.21 kg/mý.) Recommend weight loss, defer to PCP.      Gabapentin Counseling: I discussed with the patient the risks of gabapentin including but not limited to dizziness, somnolence, fatigue and ataxia.

## 2023-08-15 ENCOUNTER — TREATMENT (OUTPATIENT)
Dept: PHYSICAL THERAPY | Facility: CLINIC | Age: 68
End: 2023-08-15
Payer: MEDICARE

## 2023-08-15 DIAGNOSIS — R13.12 OROPHARYNGEAL DYSPHAGIA: Primary | ICD-10-CM

## 2023-08-15 PROCEDURE — 92526 ORAL FUNCTION THERAPY: CPT | Performed by: SPEECH-LANGUAGE PATHOLOGIST

## 2023-08-15 NOTE — PROGRESS NOTES
Speech Language Pathology Treatment Note  [address] [place] [address]    Patient: Danisha Cannon                                                                                     Visit Date: 8/15/2023  :     1955    Referring practitioner:    Reji Rocha MD  Date of Initial Visit:          Type: THERAPY  Noted: 2023    Patient seen for 3 sessions    Visit Diagnoses:    ICD-10-CM ICD-9-CM   1. Oropharyngeal dysphagia  R13.12 787.22     SUBJECTIVE     Patient was alert and ready for therapy. She reports no new problems.     OBJECTIVE   GOALS  Goals                                            STG  Comments Status   Patient will improve oral skills to enhance safety and increase eating efficiency and bolus control as measured by increased lip closure, improved bolus formation, and improved posterior propulsion of the bolus. Reviewed oral exercises: Tongue press, Tongue push, Lip press. Able to demonstrate Ongoing   Patient will improve hyolaryngeal elevation and increase base of the tongue strength and posterior pharyngeal wall excursion as measured by decreased overt signs and symptoms of aspiration and decreased penetration and aspiration on repeat instrumental swallow study, if applicable.  Reviewed effortful swallow and supraglottic swallow. Able to demonstrate 25 effortful swallows without difficulty. She had difficulty with supraglottic swallows, given reinstruction and cues.  Ongoing   Patient will report decreased difficulty with swallowing.  Initial Eat 10 score 8. Pt reports that she feels that the exercises are helping. Only one episode of food getting stuck this week.  Ongoing           LTG        Patient will safely consume full PO diet of regular consistency food and thin liquids without difficulty or complications such as aspiration or pneumonia.   Patient able to demonstrate all exercises with minimal cues. She  feels they are helping. She reports that she had a pice of food stuck at the back of her tongue and she was able to swallow hard to get it to go down, she did cough 2x.  Ongoing.        Therapy Education/Self Care    Details: POC   Given Home Exercise Program   Progress: Reinforced   Education provided to:  Patient   Level of understanding Verbalized and Demonstrated             Total Time of Visit:             25   mins         ASSESSMENT/PLAN     ASSESSMENT: Patient able to demonstrate exercises with minimal cues. No problems noted, she feels the exercises are working.     PLAN: Continue therapy and home exercises    Progress Note Due Date:9/1/2023  Recertification Due Date:11/1/2023    SIGNATURE: Kinga Johnson CCC-SLP, [place] License #: [id number]  Electronically Signed on 8/15/2023          [address] [place] [address] [place]. [id number]  [phone]

## 2023-08-24 ENCOUNTER — TREATMENT (OUTPATIENT)
Dept: PHYSICAL THERAPY | Facility: CLINIC | Age: 68
End: 2023-08-24
Payer: MEDICARE

## 2023-08-24 DIAGNOSIS — R13.12 OROPHARYNGEAL DYSPHAGIA: Primary | ICD-10-CM

## 2023-08-28 ENCOUNTER — HOSPITAL ENCOUNTER (OUTPATIENT)
Dept: ULTRASOUND IMAGING | Facility: HOSPITAL | Age: 68
Discharge: HOME | End: 2023-08-28
Payer: MEDICARE

## 2023-08-28 DIAGNOSIS — I65.23 BILATERAL CAROTID ARTERY STENOSIS: ICD-10-CM

## 2023-08-28 DIAGNOSIS — I83.893 VARICOSE VEINS OF BILATERAL LOWER EXTREMITIES WITH OTHER COMPLICATIONS: ICD-10-CM

## 2023-08-28 DIAGNOSIS — I83.893 VARICOSE VEINS OF BILATERAL LOWER EXTREMITIES WITH OTHER COMPLICATIONS: Primary | ICD-10-CM

## 2023-08-28 PROCEDURE — 93970 EXTREMITY STUDY: CPT | Performed by: SURGERY

## 2023-08-28 PROCEDURE — 93970 EXTREMITY STUDY: CPT

## 2023-08-28 PROCEDURE — 93880 EXTRACRANIAL BILAT STUDY: CPT

## 2023-08-28 PROCEDURE — 93880 EXTRACRANIAL BILAT STUDY: CPT | Performed by: SURGERY

## 2023-09-01 ENCOUNTER — OFFICE VISIT (OUTPATIENT)
Dept: CARDIOLOGY | Facility: CLINIC | Age: 68
End: 2023-09-01
Payer: MEDICARE

## 2023-09-01 VITALS
HEART RATE: 76 BPM | HEIGHT: 64 IN | SYSTOLIC BLOOD PRESSURE: 110 MMHG | WEIGHT: 269 LBS | BODY MASS INDEX: 45.93 KG/M2 | RESPIRATION RATE: 18 BRPM | OXYGEN SATURATION: 98 % | DIASTOLIC BLOOD PRESSURE: 80 MMHG

## 2023-09-01 DIAGNOSIS — E66.2 CLASS 3 OBESITY WITH ALVEOLAR HYPOVENTILATION, SERIOUS COMORBIDITY, AND BODY MASS INDEX (BMI) OF 45.0 TO 49.9 IN ADULT: ICD-10-CM

## 2023-09-01 DIAGNOSIS — I48.0 PAF (PAROXYSMAL ATRIAL FIBRILLATION): Primary | ICD-10-CM

## 2023-09-01 DIAGNOSIS — E66.01 MORBID OBESITY WITH BMI OF 45.0-49.9, ADULT: Chronic | ICD-10-CM

## 2023-09-01 PROCEDURE — 99214 OFFICE O/P EST MOD 30 MIN: CPT | Performed by: STUDENT IN AN ORGANIZED HEALTH CARE EDUCATION/TRAINING PROGRAM

## 2023-09-01 RX ORDER — FLECAINIDE ACETATE 100 MG/1
100 TABLET ORAL EVERY 12 HOURS
Qty: 180 TABLET | Refills: 3 | Status: SHIPPED | OUTPATIENT
Start: 2023-09-01

## 2023-09-01 RX ORDER — FLECAINIDE ACETATE 100 MG/1
100 TABLET ORAL EVERY 12 HOURS
Qty: 180 TABLET | Refills: 3 | Status: SHIPPED | OUTPATIENT
Start: 2023-09-01 | End: 2023-09-01 | Stop reason: SDUPTHER

## 2023-09-03 NOTE — PROGRESS NOTES
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 female with problem list as above who presents to the clinic for follow up of paroxysmal atrial fibrillation, morbid obesity.    She has been working hard on weight loss since last time she was seen in clinic.  She otherwise continues to have breakthrough episodes of atrial fibrillation despite flecainide use.    Objective   Vital Signs:  /80 (BP Location: Right arm, Patient Position: Sitting)   Pulse 76   Resp 18   Ht 162.6 cm (64\")   Wt 122 kg (269 lb)   SpO2 98%   BMI 46.17 kg/m²   Estimated body mass index is 46.17 kg/m² as calculated from the following:    Height as of this encounter: 162.6 cm (64\").    Weight as of this encounter: 122 kg (269 lb).      Physical Exam  Vitals reviewed.   Constitutional:       Appearance: She is obese.   Cardiovascular:      Rate and Rhythm: Normal rate and regular rhythm.      Pulses: Normal pulses.      Heart sounds: Normal heart sounds. No murmur heard.  Pulmonary:      Effort: Pulmonary effort is normal. No respiratory distress.      Breath sounds: Normal breath sounds.   Skin:     General: Skin is warm and dry.   Neurological:      General: No focal deficit present.      Mental Status: She is alert and oriented to person, place, and time.   Psychiatric:         Mood and Affect: Mood normal.         Judgment: Judgment normal.      Result Review :    [id number] SCORE   [id number] Score: 6 (9/5/2023 12:05 AM)          ECG 12 " Lead    Date/Time: 9/5/2023 12:05 AM  Performed by: Leslie Luu MD  Authorized by: Leslie Luu MD   Comparison: compared with previous ECG from 5/30/2023  Similar to previous ECG  Rhythm: sinus rhythm  Rate: normal  Conduction: left anterior fascicular block  QRS axis: right  Other findings: non-specific ST-T wave changes and poor R wave progression    Clinical impression: abnormal EKG            Assessment and Plan   Diagnoses and all orders for this visit:    1. PAF (paroxysmal atrial fibrillation) (Primary)  -     ECG 12 Lead; Future    2. Morbid obesity with BMI of 45.0-49.9, adult    3. Class 3 obesity with alveolar hypoventilation, serious comorbidity, and body mass index (BMI) of 45.0 to 49.9 in adult    Other orders  -     Discontinue: flecainide (TAMBOCOR) 100 MG tablet; Take 1 tablet by mouth Every 12 (Twelve) Hours.  Dispense: 180 tablet; Refill: 3  -     flecainide (TAMBOCOR) 100 MG tablet; Take 1 tablet by mouth Every 12 (Twelve) Hours.  Dispense: 180 tablet; Refill: 3        Danisha Cannon is a 68 y.o. female with problem list as above who presents to the clinic for follow up of paroxysmal atrial fibrillation, morbid obesity.  She is doing extremely well with her weight loss.  I have encouraged her to continue to do so moving forward.  We will set a 10 pound weight loss goal over the next 3 months.  Otherwise, given her continued breakthrough episodes, I do think that going back up on her flecainide is appropriate.  She has previously had a left anterior fascicular block with the increased dose of flecainide, however at this point, it does appear that the risks of this are relatively low.    Plan:  -Increase flecainide to 100 mg twice daily  -Return to clinic in 1 week for follow-up ECG  -Continue metoprolol succinate  -Continue risk factor modification and weight loss         Follow Up   Return in about 3 months (around 12/1/2023).  Patient was given instructions and counseling regarding her  condition or for health maintenance advice. Please see specific information pulled into the AVS if appropriate.     Part of this note may be an electronic transcription/translation of spoken language to printed text using the Dragon Dictation System.

## 2023-09-05 PROBLEM — E66.2 HYPOVENTILATION ASSOCIATED WITH OBESITY: Status: ACTIVE | Noted: 2023-09-05

## 2023-09-05 PROCEDURE — 93000 ELECTROCARDIOGRAM COMPLETE: CPT | Performed by: STUDENT IN AN ORGANIZED HEALTH CARE EDUCATION/TRAINING PROGRAM

## 2023-09-06 DIAGNOSIS — R00.2 PALPITATIONS: Primary | ICD-10-CM

## 2023-09-14 ENCOUNTER — TREATMENT (OUTPATIENT)
Dept: PHYSICAL THERAPY | Facility: CLINIC | Age: 68
End: 2023-09-14
Payer: MEDICARE

## 2023-09-14 DIAGNOSIS — R13.12 OROPHARYNGEAL DYSPHAGIA: Primary | ICD-10-CM

## 2023-09-14 NOTE — PROGRESS NOTES
Speech Language Pathology 30 Day Progress Note  [address] [place] [address]    Patient: Danisha Cannon                                                                                     Visit Date: 2023  :     1955    Referring practitioner:    Reji Rocha MD  Date of Initial Visit:          Type: THERAPY  Noted: 2023    Patient seen for 5 sessions    Visit Diagnoses:    ICD-10-CM ICD-9-CM   1. Oropharyngeal dysphagia  R13.12 787.22     SUBJECTIVE     Patient arrived almost 15 min late for therapy. Attendance policy was reviewed. Patient appeared somewhat out of breath.     OBJECTIVE   GOALS  Goals                                            STG  Comments Status   Patient will improve oral skills to enhance safety and increase eating efficiency and bolus control as measured by increased lip closure, improved bolus formation, and improved posterior propulsion of the bolus. Reviewed oral exercises: Tongue press, Tongue push, Lip press. Patient reported she has not been practicing at home due to tongue being sore. SLP instructed patient to not push or press with as much force to reduce the soreness. Patient was able to demonstrate with minimal cues.  Ongoing   Patient will improve hyolaryngeal elevation and increase base of the tongue strength and posterior pharyngeal wall excursion as measured by decreased overt signs and symptoms of aspiration and decreased penetration and aspiration on repeat instrumental swallow study, if applicable.  Reviewed effortful swallow. Able to demonstrate 25 effortful swallows with frequent sips of water. She c/o difficulty initiating swallows without a drink.   Supraglotic swallow on hold while patient becomes more comfortable with effortful swallows.  Ongoing   Patient will report decreased difficulty with swallowing.  Previous: Eat 10 score 8.   Not assessed today.  Ongoing           LTG       "  Patient will safely consume full PO diet of regular consistency food and thin liquids without difficulty or complications such as aspiration or pneumonia.   Patient able to demonstrate current exercises with minimal cues. She feels exercises are helping. She reported 1 choking episode (eating molina) at a restaurant this past week, stating that \"everything just stopped\" but that she did not want to cough in the restaurant. She was eventually able to clear it.  Ongoing.        Therapy Education/Self Care    Details: POC   Given Home Exercise Program   Progress: Reinforced   Education provided to:  Patient   Level of understanding Verbalized and Demonstrated             Total Time of Visit:             25   mins         ASSESSMENT/PLAN     ASSESSMENT: Patient able to demonstrate exercises with minimal cues. Patient reported she has been practicing swallowing exercise at home but not tongue or lip exercises due to soreness. SLP instructed the patient to not push or press with as much force to reduce the soreness. Patient was able to demonstrate with minimal cues. Patient reported one choking episode when eating molina at a restaurant, but she noted that she feels the exercises are working.     PLAN: Continue therapy and home exercises    Progress Note Due Date:10/1/2023  Recertification Due Date:11/1/2023    SIGNATURE: Florence Thompson, Speech Therapy Student  Electronically Signed on 9/14/2023    The clinical instructor and/or supervising staff, Kinga Johnson CCC-SLP, was present in clinic guiding the visit by approving, concurring, and confirming the skilled judgement for all services rendered.    Signature:  Kinga Johnson CCC-SLP, [place] License #: [id number]  Electronically signed on 9/14/2023        [facility] [address]  [phone]    "

## 2023-09-28 ENCOUNTER — OFFICE VISIT (OUTPATIENT)
Dept: SURGERY | Facility: CLINIC | Age: 68
End: 2023-09-28
Payer: MEDICARE

## 2023-09-28 VITALS
BODY MASS INDEX: 45.93 KG/M2 | HEART RATE: 75 BPM | HEIGHT: 64 IN | DIASTOLIC BLOOD PRESSURE: 65 MMHG | WEIGHT: 269 LBS | OXYGEN SATURATION: 94 % | SYSTOLIC BLOOD PRESSURE: 134 MMHG

## 2023-09-28 DIAGNOSIS — Z99.81 SUPPLEMENTAL OXYGEN DEPENDENT: ICD-10-CM

## 2023-09-28 DIAGNOSIS — E66.01 OBESITY, MORBID (MORE THAN 100 LBS OVER IDEAL WEIGHT OR BMI > 40): ICD-10-CM

## 2023-09-28 DIAGNOSIS — K42.9 RECURRENT UMBILICAL HERNIA: Primary | ICD-10-CM

## 2023-09-28 PROCEDURE — 1159F MED LIST DOCD IN RCRD: CPT | Performed by: STUDENT IN AN ORGANIZED HEALTH CARE EDUCATION/TRAINING PROGRAM

## 2023-09-28 PROCEDURE — 1160F RVW MEDS BY RX/DR IN RCRD: CPT | Performed by: STUDENT IN AN ORGANIZED HEALTH CARE EDUCATION/TRAINING PROGRAM

## 2023-09-28 PROCEDURE — 99203 OFFICE O/P NEW LOW 30 MIN: CPT | Performed by: STUDENT IN AN ORGANIZED HEALTH CARE EDUCATION/TRAINING PROGRAM

## 2023-09-28 RX ORDER — CYCLOBENZAPRINE HCL 5 MG
1 TABLET ORAL EVERY 12 HOURS SCHEDULED
COMMUNITY
Start: 2023-09-18

## 2023-09-28 NOTE — PROGRESS NOTES
Office New Patient History and Physical:     Referring Provider: Tc Bee, *    Chief Complaint   Patient presents with    Hernia       Subjective .     History of present illness:  Danisha Cannon is a 68 y.o. female who present with an umbilical hernia. She first felt a bulge at her umbilicus 10 years ago. It is getting bigger. It is not tender. She does have intermittent nausea and vomiting. She does complain of diffuse abdominal pain followed by diarrhea that comes and goes.     BMI is 46. She is on home O2. She has had prior cholecystectomy and three prior umbilical hernia repairs with mesh per the patient. She is on 81 mg ASA. She is a non-smoker.     History  Past Medical History:   Diagnosis Date    Allergic rhinitis 1975    Anemia     Anxiety and depression     Arthritis     Asthma 2017    Atrial fibrillation     CHF (congestive heart failure) 2021    Chronic cough     Disease of thyroid gland     DVT (deep venous thrombosis)     GERD (gastroesophageal reflux disease)     Headache 1973    History of incision and drainage     Right knee    History of staph infection     right knee, and upper right thigh    Hyperlipidemia     Hypothyroidism 2003    Infection      in right knee to bone, cleaned it out and put new hardware with antibiotic and pt developed hole in incision    Inflammation of vein     in leg and leaking    Neuropathy, peripheral     Pneumonia     RECENT DX PER FAMILY DOCTOR    PONV (postoperative nausea and vomiting)     Restless leg syndrome     Sleep apnea with use of continuous positive airway pressure (CPAP)     bipap    SVT (supraventricular tachycardia)    ,   Past Surgical History:   Procedure Laterality Date    ABLATION OF DYSRHYTHMIC FOCUS      BUNIONECTOMY Left     AND HAMMER TOE    CARDIAC ABLATION      at [facility] 8/2019    CARDIAC CATHETERIZATION      CARDIAC SURGERY      HEART CATH    CATARACT EXTRACTION Right     HERNIA REPAIR      UMBILICAL X3    INCISION AND DRAINAGE  LEG Right 01/28/2019    Procedure: INCISION AND DRAINAGE OF RIGHT THIGH HEMATOMA;  Surgeon: Nino Stern MD;  Location:  [facility] HYBRID OR 12;  Service: Vascular    JOINT REPLACEMENT      RIGHT KNEE    KNEE POLY INSERT EXCHANGE Right 03/03/2018    Procedure: IRRIGATION AND DEBRIDEMENT TOTAL KNEE & POLY EXCHANGE - RIGHT;  Surgeon: Amilcar Capellan MD;  Location:  [facility] OR;  Service:     LAPAROSCOPIC CHOLECYSTECTOMY      LEG DEBRIDEMENT Right 03/23/2018    Procedure: DEBRIDEMENT AND CLOSURE KNEE - RIGHT;  Surgeon: Amilcar Capellan MD;  Location:  [facility] OR;  Service: Orthopedics    PERIPHERALLY INSERTED CENTRAL CATHETER INSERTION      PILONIDAL CYSTECTOMY N/A 02/16/2017    Procedure: PILONIDAL CYSTECTOMY, EXCISION SEBACEOUS CYST - BACK;  Surgeon: Macrina Capellan MD;  Location:  [facility] OR;  Service:     TOTAL KNEE  PROSTHESIS REMOVAL W/ SPACER INSERTION Right 03/13/2018    Procedure: 1.  EXPLANT TOTAL KNEE 2.  ANTIBOTIC SPACER KNEE;  Surgeon: Amilcar Capellan MD;  Location:  [facility] OR;  Service: Orthopedics    TOTAL KNEE  PROSTHESIS REMOVAL W/ SPACER INSERTION Right 06/19/2020    Procedure: REMOVAL OF ANTIBIOTIC SPACER;  Surgeon: Amilcar Capellan MD;  Location:  [facility] OR;  Service: Orthopedics;  Laterality: Right;    TOTAL KNEE ARTHROPLASTY REVISION Right 06/19/2020    Procedure: REVISION TOTAL KNEE REPLACEMENT;  Surgeon: Amilcar Capellan MD;  Location:  [facility] OR;  Service: Orthopedics;  Laterality: Right;    TRUNK LESION/CYST EXCISION N/A 02/16/2017    Procedure: EXCISION SEBACEOUS CYST - BACK;  Surgeon: Macrina Capellan MD;  Location:  [facility] OR;  Service:    ,   Family History   Problem Relation Age of Onset    Diabetes Mother     Heart disease Mother     Alzheimer's disease Mother     Hyperlipidemia Mother     Hypertension Mother     Coronary artery disease Mother         Triple bypass    Heart failure Mother     Osteoarthritis Mother     Cancer Father         Lung    Hypertension Father      Anxiety disorder Father     Depression Father     Hyperlipidemia Father     Osteoarthritis Father     Leukemia Brother     Heart attack Sister         Sudden cardiac death    Thyroid disease Sister     Heart failure Sister         MI    Osteoarthritis Sister     Clotting disorder Brother     Cancer Brother         Leukemia    Colon polyps Neg Hx     Colon cancer Neg Hx    ,   Social History     Tobacco Use    Smoking status: Never    Smokeless tobacco: Never    Tobacco comments:     Exposed to second hand smoke for 22 years, both parents smoked   Vaping Use    Vaping Use: Never used   Substance Use Topics    Alcohol use: No    Drug use: No   , (Not in a hospital admission)   and Allergies:  Other, Codeine, Mobic [meloxicam], Morphine and related, Azithromycin, and Cyclobenzaprine    Current Outpatient Medications:     albuterol sulfate  (90 Base) MCG/ACT inhaler, INHALE 2 PUFFS BY MOUTH EVERY 4 HOURS AS NEEDED FOR WHEEZING AND SHORTNESS OF BREATH, Disp: , Rfl:     amitriptyline (ELAVIL) 75 MG tablet, Take 1 tablet by mouth Every Night., Disp: , Rfl:     ASPIRIN 81 PO, Take 1 tablet by mouth Daily., Disp: , Rfl:     azelastine (ASTELIN) 0.1 % nasal spray, 2 sprays into the nostril(s) as directed by provider 2 (Two) Times a Day. Use in each nostril as directed, Disp: 30 mL, Rfl: 11    cyclobenzaprine (FLEXERIL) 5 MG tablet, Take 1 tablet by mouth Every 12 (Twelve) Hours., Disp: , Rfl:     dexlansoprazole (Dexilant) 60 MG capsule, Take 1 capsule by mouth Daily., Disp: 30 capsule, Rfl: 11    diclofenac (VOLTAREN) 75 MG EC tablet, Take 1 tablet by mouth Daily. (Patient taking differently: Take 1 tablet by mouth 2 (Two) Times a Day.), Disp: 90 tablet, Rfl: 1    donepezil (ARICEPT) 10 MG tablet, Take 1 tablet by mouth Daily., Disp: , Rfl:     DULoxetine (CYMBALTA) 60 MG capsule, TAKE 1 CAPSULE BY MOUTH TWICE DAILY, Disp: 60 capsule, Rfl: 5    flecainide (TAMBOCOR) 100 MG tablet, Take 1 tablet by mouth Every 12  "(Twelve) Hours., Disp: 180 tablet, Rfl: 3    fluticasone (Flovent HFA) 110 MCG/ACT inhaler, Inhale 1 puff 2 (Two) Times a Day., Disp: , Rfl:     furosemide (LASIX) 40 MG tablet, Take 1 tablet by mouth Daily. (Patient taking differently: Take 1 tablet by mouth Daily. Every other day), Disp: 90 tablet, Rfl: 3    gabapentin (NEURONTIN) 800 MG tablet, Take 1 tablet by mouth 3 (Three) Times a Day., Disp: , Rfl:     levothyroxine (SYNTHROID, LEVOTHROID) 137 MCG tablet, Take 1 tablet by mouth Every Morning., Disp: 90 tablet, Rfl: 3    LORazepam (ATIVAN) 1 MG tablet, Take 1 tablet by mouth 2 (Two) Times a Day As Needed., Disp: , Rfl:     metoprolol succinate XL (TOPROL-XL) 25 MG 24 hr tablet, Take 2 tablets by mouth every night at bedtime., Disp: , Rfl:     montelukast (SINGULAIR) 10 MG tablet, Take 1 tablet by mouth Daily., Disp: , Rfl:     O2 (OXYGEN), Inhale 3.5 L/min 1 (One) Time. NOC, Disp: , Rfl:     rOPINIRole (REQUIP) 3 MG tablet, 2 tablets each morning and 3 tablets at bedtime., Disp: 450 tablet, Rfl: 3    rosuvastatin (CRESTOR) 5 MG tablet, 1 tablet Daily., Disp: , Rfl:     triamcinolone (KENALOG) 0.1 % paste, APPLY TO THE AFFECTED AREA THREE TIMES DAILY AS NEEDED, Disp: , Rfl:     fluticasone (FLONASE) 50 MCG/ACT nasal spray, 2 sprays into the nostril(s) as directed by provider Daily As Needed for Rhinitis or Allergies for up to 30 days., Disp: 16 g, Rfl: 11    levocetirizine (XYZAL) 5 MG tablet, Take 1 tablet by mouth Every Evening for 30 days., Disp: 30 tablet, Rfl: 11    Objective     Vital Signs   /65   Pulse 75   Ht 162.6 cm (64\")   Wt 122 kg (269 lb)   LMP  (LMP Unknown)   SpO2 94%   BMI 46.17 kg/m²      Physical Exam:  General appearance - alert, well appearing, and in no distress  Mental status - alert, oriented to person, place, and time  Eyes - pupils equal and reactive, extraocular eye movements intact  Neck - supple, no significant adenopathy  Chest - no tachypnea, retractions or cyanosis, " on supplemental O2 \  Abdomen - soft, nontender, nondistended, no masses or organomegaly  Reducible recurrent umbilical hernia superior to umbilicus. Cannot measure defect size 2/2 body habitus.   Neurological - alert, oriented, normal speech, no focal findings or movement disorder noted  Musculoskeletal - no joint tenderness, deformity or swelling    Results Review:     The following data was reviewed by: Brook Tate MD on 09/28/2023:  REFERRAL/PRE-AUTH MRN - SCAN (08/22/2023)     Assessment & Plan       Diagnoses and all orders for this visit:    1. Recurrent umbilical hernia (Primary)    2. Obesity, morbid (more than 100 lbs over ideal weight or BMI > 40)    3. Supplemental oxygen dependent         Danisha Cannon is a 68 y.o. female with a recurrent x 3 umbilical hernia. She is asymptomatic at this time. I had a long discussion with her that I would not proceed with elective repair until her BMI was < 40. Her risk of surgery would be very high with her respiratory status. I would recommend repair if she becomes symptomatic or has incarceration. I educated her to watch for these signs - the bugle becomes hard and she cannot push it back in, pain at the bulge that does not improve with pushing it back in, severe intractable nausea and vomiting. She is on a weight loss program with her cardiologist and she has lost 22 lb so far. She saw a dietician to help with this. I have recommended that she call to return if the hernia becomes symptomatic or if she gets her BMI < 40. She is in agreement with this plan.     This is a chronic problem with progression (enlarging). I have reviewed the note above.     Class 3 Severe Obesity (BMI >=40). Obesity-related health conditions include the following: hypertension and dyslipidemias. Obesity is improving with lifestyle modifications. BMI is is above average; BMI management plan is completed. We discussed portion control and increasing exercise.          Brook MARI  MD [place]  09/28/23  13:55 CDT

## 2023-09-28 NOTE — PATIENT INSTRUCTIONS

## 2023-10-03 ENCOUNTER — TRANSCRIBE ORDERS (OUTPATIENT)
Dept: GENERAL RADIOLOGY | Facility: HOSPITAL | Age: 68
End: 2023-10-03
Payer: MEDICARE

## 2023-10-03 ENCOUNTER — HOSPITAL ENCOUNTER (OUTPATIENT)
Dept: GENERAL RADIOLOGY | Facility: HOSPITAL | Age: 68
Discharge: HOME | End: 2023-10-03
Payer: MEDICARE

## 2023-10-03 DIAGNOSIS — M54.50 LOW BACK PAIN, UNSPECIFIED BACK PAIN LATERALITY, UNSPECIFIED CHRONICITY, UNSPECIFIED WHETHER SCIATICA PRESENT: Primary | ICD-10-CM

## 2023-10-03 DIAGNOSIS — M54.50 LOW BACK PAIN, UNSPECIFIED BACK PAIN LATERALITY, UNSPECIFIED CHRONICITY, UNSPECIFIED WHETHER SCIATICA PRESENT: ICD-10-CM

## 2023-10-03 DIAGNOSIS — M54.6 THORACIC SPINE PAIN: ICD-10-CM

## 2023-10-03 PROCEDURE — 72070 X-RAY EXAM THORAC SPINE 2VWS: CPT

## 2023-10-03 PROCEDURE — 72100 X-RAY EXAM L-S SPINE 2/3 VWS: CPT

## 2023-10-12 ENCOUNTER — OFFICE VISIT (OUTPATIENT)
Dept: CARDIOLOGY | Facility: CLINIC | Age: 68
End: 2023-10-12
Payer: MEDICARE

## 2023-10-12 VITALS
DIASTOLIC BLOOD PRESSURE: 56 MMHG | HEART RATE: 58 BPM | WEIGHT: 260 LBS | BODY MASS INDEX: 44.39 KG/M2 | SYSTOLIC BLOOD PRESSURE: 98 MMHG | OXYGEN SATURATION: 97 % | HEIGHT: 64 IN

## 2023-10-12 DIAGNOSIS — I48.0 PAF (PAROXYSMAL ATRIAL FIBRILLATION): Primary | ICD-10-CM

## 2023-10-12 DIAGNOSIS — I87.2 VENOUS INSUFFICIENCY: ICD-10-CM

## 2023-10-12 DIAGNOSIS — E66.01 MORBID OBESITY WITH BMI OF 40.0-44.9, ADULT: ICD-10-CM

## 2023-10-12 DIAGNOSIS — G47.33 OBSTRUCTIVE SLEEP APNEA: Chronic | ICD-10-CM

## 2023-10-12 DIAGNOSIS — I47.19 AVNRT (AV NODAL RE-ENTRY TACHYCARDIA): ICD-10-CM

## 2023-10-12 PROCEDURE — 99214 OFFICE O/P EST MOD 30 MIN: CPT | Performed by: NURSE PRACTITIONER

## 2023-10-12 PROCEDURE — 1160F RVW MEDS BY RX/DR IN RCRD: CPT | Performed by: NURSE PRACTITIONER

## 2023-10-12 PROCEDURE — 1159F MED LIST DOCD IN RCRD: CPT | Performed by: NURSE PRACTITIONER

## 2023-10-12 PROCEDURE — 93000 ELECTROCARDIOGRAM COMPLETE: CPT | Performed by: NURSE PRACTITIONER

## 2023-10-12 RX ORDER — METOPROLOL SUCCINATE 25 MG/1
1 TABLET, EXTENDED RELEASE ORAL DAILY
COMMUNITY
Start: 2023-10-02

## 2023-10-18 ENCOUNTER — TELEPHONE (OUTPATIENT)
Dept: PULMONOLOGY | Facility: CLINIC | Age: 68
End: 2023-10-18
Payer: MEDICARE

## 2023-10-18 NOTE — TELEPHONE ENCOUNTER
Patient left voicemail requesting POC.  I see in office notes she came in office with 2L pulse dose oxygen with saturation of 85% on 5-4-23.  Sophia then changed her to continuous oxygen.  Called patient to discuss. No answer. Left voicemail.

## 2023-10-30 ENCOUNTER — TRANSCRIBE ORDERS (OUTPATIENT)
Dept: ADMINISTRATIVE | Facility: HOSPITAL | Age: 68
End: 2023-10-30
Payer: MEDICARE

## 2023-10-30 DIAGNOSIS — M54.40 LOW BACK PAIN WITH SCIATICA, SCIATICA LATERALITY UNSPECIFIED, UNSPECIFIED BACK PAIN LATERALITY, UNSPECIFIED CHRONICITY: Primary | ICD-10-CM

## 2023-11-13 ENCOUNTER — TELEPHONE (OUTPATIENT)
Dept: PULMONOLOGY | Facility: CLINIC | Age: 68
End: 2023-11-13
Payer: MEDICARE

## 2023-11-13 NOTE — TELEPHONE ENCOUNTER
Caller: Lucretia Cannon    Relationship: Self    Best call back number: [phone]    What is the best time to reach you: ANYTIME     Who are you requesting to speak with (clinical staff, provider,  specific staff member): CLINICAL    Do you know the name of the person who called: LUCRETIA    What was the call regarding: PATIENT HAS QUESTIONS ABOUT TRAVEL AND HIGH ALTITUDES, AND IF THERE NEEDS TO BE SPECIAL ARRANGEMENTS MADE FOR HER OXYGEN     Is it okay if the provider responds through MyChart: YES

## 2023-11-13 NOTE — TELEPHONE ENCOUNTER
Patient is planning a trip to [facility] and states the altitude is about 5000 ft. She is asking your opinion for traveling with her lung condition.  Patient is aware that she would need to check with her DME company about arrangements for her oxygen. Patient is also aware that Sophia OROZCO is not in the office at this time and to expect a call back tomorrow. Patient voiced understanding and is agreeable.   Please advise.

## 2023-11-14 NOTE — TELEPHONE ENCOUNTER
She will need to check with the airlines that she is flying regarding taking her oxygen with her.  She will also need to speak with her [employer] company for arrangements for her oxygen to be available when she is in [facility].  The increase in altitude while in [facility] will likely cause her to have more shortness of breath and she will likely need to take frequent breaks when ambulating.  She needs to ensure that she has her inhalers with her including her albuterol HFA inhaler.  I recommend wearing a mask while she is on the planes.

## 2023-11-15 RX ORDER — AMITRIPTYLINE HYDROCHLORIDE 75 MG/1
TABLET ORAL
Qty: 30 TABLET | Refills: 5 | Status: SHIPPED | OUTPATIENT
Start: 2023-11-15

## 2023-11-15 NOTE — TELEPHONE ENCOUNTER
Requested Prescriptions     Pending Prescriptions Disp Refills    amitriptyline (ELAVIL) 75 MG tablet [Pharmacy Med Name: AMITRIPTYLINE 75MG TABLETS] 30 tablet 5     Sig: TAKE 1 TABLET BY MOUTH EVERY NIGHT       Last Office Visit: 3/28/2023  Next Office Visit: 4/2/2024  Last Medication Refill: 7/21/2023 with 3 RF

## 2023-11-21 ENCOUNTER — HOSPITAL ENCOUNTER (OUTPATIENT)
Dept: CT IMAGING | Facility: HOSPITAL | Age: 68
Discharge: HOME | End: 2023-11-21
Payer: MEDICARE

## 2023-11-21 ENCOUNTER — HOSPITAL ENCOUNTER (OUTPATIENT)
Dept: MRI IMAGING | Facility: HOSPITAL | Age: 68
Discharge: HOME | End: 2023-11-21
Admitting: NURSE PRACTITIONER
Payer: MEDICARE

## 2023-11-21 DIAGNOSIS — R91.8 LUNG NODULES: ICD-10-CM

## 2023-11-21 DIAGNOSIS — M54.40 LOW BACK PAIN WITH SCIATICA, SCIATICA LATERALITY UNSPECIFIED, UNSPECIFIED BACK PAIN LATERALITY, UNSPECIFIED CHRONICITY: ICD-10-CM

## 2023-11-21 PROCEDURE — 71250 CT THORAX DX C-: CPT

## 2023-11-21 PROCEDURE — 72148 MRI LUMBAR SPINE W/O DYE: CPT

## 2023-12-05 ENCOUNTER — OFFICE VISIT (OUTPATIENT)
Dept: NEUROSURGERY | Facility: CLINIC | Age: 68
End: 2023-12-05
Payer: MEDICARE

## 2023-12-05 ENCOUNTER — OFFICE VISIT (OUTPATIENT)
Dept: CARDIOLOGY | Facility: CLINIC | Age: 68
End: 2023-12-05
Payer: MEDICARE

## 2023-12-05 VITALS
SYSTOLIC BLOOD PRESSURE: 92 MMHG | HEART RATE: 51 BPM | RESPIRATION RATE: 18 BRPM | WEIGHT: 267 LBS | OXYGEN SATURATION: 98 % | HEIGHT: 64 IN | DIASTOLIC BLOOD PRESSURE: 68 MMHG | BODY MASS INDEX: 45.58 KG/M2

## 2023-12-05 VITALS — BODY MASS INDEX: 45.58 KG/M2 | HEIGHT: 64 IN | WEIGHT: 267 LBS

## 2023-12-05 DIAGNOSIS — I47.19 AVNRT (AV NODAL RE-ENTRY TACHYCARDIA): ICD-10-CM

## 2023-12-05 DIAGNOSIS — M51.36 LUMBAR DEGENERATIVE DISC DISEASE: ICD-10-CM

## 2023-12-05 DIAGNOSIS — S34.132A: ICD-10-CM

## 2023-12-05 DIAGNOSIS — I48.0 PAF (PAROXYSMAL ATRIAL FIBRILLATION): Primary | ICD-10-CM

## 2023-12-05 DIAGNOSIS — I45.4 IVCD (INTRAVENTRICULAR CONDUCTION DEFECT): ICD-10-CM

## 2023-12-05 DIAGNOSIS — M41.85 OTHER FORM OF SCOLIOSIS OF THORACOLUMBAR SPINE: ICD-10-CM

## 2023-12-05 DIAGNOSIS — E66.01 CLASS 3 SEVERE OBESITY DUE TO EXCESS CALORIES WITHOUT SERIOUS COMORBIDITY WITH BODY MASS INDEX (BMI) OF 45.0 TO 49.9 IN ADULT: ICD-10-CM

## 2023-12-05 DIAGNOSIS — Z78.9 NONSMOKER: ICD-10-CM

## 2023-12-05 DIAGNOSIS — I48.92 ATRIAL FLUTTER, PAROXYSMAL: ICD-10-CM

## 2023-12-05 PROBLEM — M41.9 SCOLIOSIS: Status: ACTIVE | Noted: 2023-12-05

## 2023-12-05 PROBLEM — E66.813 CLASS 3 SEVERE OBESITY DUE TO EXCESS CALORIES WITHOUT SERIOUS COMORBIDITY WITH BODY MASS INDEX (BMI) OF 45.0 TO 49.9 IN ADULT: Status: ACTIVE | Noted: 2023-09-05

## 2023-12-05 PROCEDURE — 99214 OFFICE O/P EST MOD 30 MIN: CPT | Performed by: STUDENT IN AN ORGANIZED HEALTH CARE EDUCATION/TRAINING PROGRAM

## 2023-12-05 NOTE — PATIENT INSTRUCTIONS
Advance Care Planning and Advance Directives     You make decisions on a daily basis - decisions about where you want to live, your career, your home, your life. Perhaps one of the most important decisions you face is your choice for future medical care. Take time to talk with your family and your healthcare team and start planning today.  Advance Care Planning is a process that can help you:  Understand possible future healthcare decisions in light of your own experiences  Reflect on those decision in light of your goals and values  Discuss your decisions with those closest to you and the healthcare professionals that care for you  Make a plan by creating a document that reflects your wishes    Surrogate Decision Maker  In the event of a medical emergency, which has left you unable to communicate or to make your own decisions, you would need someone to make decisions for you.  It is important to discuss your preferences for medical treatment with this person while you are in good health.     Qualities of a surrogate decision maker:  Willing to take on this role and responsibility  Knows what you want for future medical care  Willing to follow your wishes even if they don't agree with them  Able to make difficult medical decisions under stressful circumstances    Advance Directives  These are legal documents you can create that will guide your healthcare team and decision maker(s) when needed. These documents can be stored in the electronic medical record.    Living Will - a legal document to guide your care if you have a terminal condition or a serious illness and are unable to communicate. States vary by statute in document names/types, but most forms may include one or more of the following:        -  Directions regarding life-prolonging treatments        -  Directions regarding artificially provided nutrition/hydration        -  Choosing a healthcare decision maker        -  Direction regarding organ/tissue  donation    Durable Power of  for Healthcare - this document names an -in-fact to make medical decisions for you, but it may also allow this person to make personal and financial decisions for you. Please seek the advice of an  if you need this type of document.    **Advance Directives are not required and no one may discriminate against you if you do not sign one.    Medical Orders  Many states allow specific forms/orders signed by your physician to record your wishes for medical treatment in your current state of health. This form, signed in personal communication with your physician, addresses resuscitation and other medical interventions that you may or may not want.      For more information or to schedule a time with a [facility] Advance Care Planning Facilitator contact: [facility] or call [phone] and someone will contact you directly.BMI for Adults  What is BMI?  Body mass index (BMI) is a number that is calculated from a person's weight and height. BMI can help estimate how much of a person's weight is composed of fat. BMI does not measure body fat directly. Rather, it is an alternative to procedures that directly measure body fat, which can be difficult and expensive.  BMI can help identify people who may be at higher risk for certain medical problems.  What are BMI measurements used for?  BMI is used as a screening tool to identify possible weight problems. It helps determine whether a person is obese, overweight, a healthy weight, or underweight.  BMI is useful for:  Identifying a weight problem that may be related to a medical condition or may increase the risk for medical problems.  Promoting changes, such as changes in diet and exercise, to help reach a healthy weight. BMI screening can be repeated to see if these changes are working.  How is BMI calculated?  BMI involves measuring your weight in relation to your height. Both height and weight are measured,  "and the BMI is calculated from those numbers. This can be done either in English ([place] or metric measurements. Note that charts and online BMI calculators are available to help you find your BMI quickly and easily without having to do these calculations yourself.  To calculate your BMI in English ([place] measurements:    Measure your weight in pounds (lb).  Multiply the number of pounds by 703.  For example, for a person who weighs 180 lb, multiply that number by 703, which equals 126,540.  Measure your height in inches. Then multiply that number by itself to get a measurement called \"inches squared.\"  For example, for a person who is 70 inches tall, the \"inches squared\" measurement is 70 inches x 70 inches, which equals 4,900 inches squared.  Divide the total from step 2 (number of lb x 703) by the total from step 3 (inches squared): 126,540 ÷ 4,900 = 25.8. This is your BMI.    To calculate your BMI in metric measurements:  Measure your weight in kilograms (kg).  Measure your height in meters (m). Then multiply that number by itself to get a measurement called \"meters squared.\"  For example, for a person who is 1.75 m tall, the \"meters squared\" measurement is 1.75 m x 1.75 m, which is equal to 3.1 meters squared.  Divide the number of kilograms (your weight) by the meters squared number. In this example: 70 ÷ 3.1 = 22.6. This is your BMI.  What do the results mean?  BMI charts are used to identify whether you are underweight, normal weight, overweight, or obese. The following guidelines will be used:  Underweight: BMI less than 18.5.  Normal weight: BMI between 18.5 and 24.9.  Overweight: BMI between 25 and 29.9.  Obese: BMI of 30 or above.  Keep these notes in mind:  Weight includes both fat and muscle, so someone with a muscular build, such as an athlete, may have a BMI that is higher than 24.9. In cases like these, BMI is not an accurate measure of body fat.  To determine if excess body fat is the cause of a BMI " "of 25 or higher, further assessments may need to be done by a health care provider.  BMI is usually interpreted in the same way for men and women.  Where to find more information  For more information about BMI, including tools to quickly calculate your BMI, go to these websites:  Centers for Disease Control and Prevention: www.cdc.gov  American Heart Association: www.heart.org  National Heart, Lung, and Blood [place]: www.nhlbi.nih.gov  Summary  Body mass index (BMI) is a number that is calculated from a person's weight and height.  BMI may help estimate how much of a person's weight is composed of fat. BMI can help identify those who may be at higher risk for certain medical problems.  BMI can be measured using English measurements or metric measurements.  BMI charts are used to identify whether you are underweight, normal weight, overweight, or obese.  This information is not intended to replace advice given to you by your health care provider. Make sure you discuss any questions you have with your health care provider.  Document Revised: 09/09/2020 Document Reviewed: 07/17/2020  [employer] Patient Education © 2021 [employer] https://www.nhlbi.nih.gov/files/docs/public/heart/dash_brief.pdf\">   DASH Eating Plan  DASH stands for Dietary Approaches to Stop Hypertension. The DASH eating plan is a healthy eating plan that has been shown to:  Reduce high blood pressure (hypertension).  Reduce your risk for type 2 diabetes, heart disease, and stroke.  Help with weight loss.  What are tips for following this plan?  Reading food labels  Check food labels for the amount of salt (sodium) per serving. Choose foods with less than 5 percent of the Daily Value of sodium. Generally, foods with less than 300 milligrams (mg) of sodium per serving fit into this eating plan.  To find whole grains, look for the word \"whole\" as the first word in the ingredient list.  Shopping  Buy products labeled as \"low-sodium\" or \"no salt " "added.\"  Buy fresh foods. Avoid canned foods and pre-made or frozen meals.  Cooking  Avoid adding salt when cooking. Use salt-free seasonings or herbs instead of table salt or sea salt. Check with your health care provider or pharmacist before using salt substitutes.  Do not thompson foods. Cook foods using healthy methods such as baking, boiling, grilling, roasting, and broiling instead.  Cook with heart-healthy oils, such as olive, canola, avocado, soybean, or sunflower oil.  Meal planning    Eat a balanced diet that includes:  4 or more servings of fruits and 4 or more servings of vegetables each day. Try to fill one-half of your plate with fruits and vegetables.  6-8 servings of whole grains each day.  Less than 6 oz (170 g) of lean meat, poultry, or fish each day. A 3-oz (85-g) serving of meat is about the same size as a deck of cards. One egg equals 1 oz (28 g).  2-3 servings of low-fat dairy each day. One serving is 1 cup (237 mL).  1 serving of nuts, seeds, or beans 5 times each week.  2-3 servings of heart-healthy fats. Healthy fats called omega-3 fatty acids are found in foods such as walnuts, flaxseeds, fortified milks, and eggs. These fats are also found in cold-water fish, such as sardines, salmon, and mackerel.  Limit how much you eat of:  Canned or prepackaged foods.  Food that is high in trans fat, such as some fried foods.  Food that is high in saturated fat, such as fatty meat.  Desserts and other sweets, sugary drinks, and other foods with added sugar.  Full-fat dairy products.  Do not salt foods before eating.  Do not eat more than 4 egg yolks a week.  Try to eat at least 2 vegetarian meals a week.  Eat more home-cooked food and less restaurant, buffet, and fast food.    Lifestyle  When eating at a restaurant, ask that your food be prepared with less salt or no salt, if possible.  If you drink alcohol:  Limit how much you use to:  0-1 drink a day for women who are not pregnant.  0-2 drinks a day for " men.  Be aware of how much alcohol is in your drink. In the [place] one drink equals one 12 oz bottle of beer (355 mL), one 5 oz glass of wine (148 mL), or one 1½ oz glass of hard liquor (44 mL).  General information  Avoid eating more than 2,300 mg of salt a day. If you have hypertension, you may need to reduce your sodium intake to 1,500 mg a day.  Work with your health care provider to maintain a healthy body weight or to lose weight. Ask what an ideal weight is for you.  Get at least 30 minutes of exercise that causes your heart to beat faster (aerobic exercise) most days of the week. Activities may include walking, swimming, or biking.  Work with your health care provider or dietitian to adjust your eating plan to your individual calorie needs.  What foods should I eat?  Fruits  All fresh, dried, or frozen fruit. Canned fruit in natural juice (without added sugar).  Vegetables  Fresh or frozen vegetables (raw, steamed, roasted, or grilled). Low-sodium or reduced-sodium tomato and vegetable juice. Low-sodium or reduced-sodium tomato sauce and tomato paste. Low-sodium or reduced-sodium canned vegetables.  Grains  Whole-grain or whole-wheat bread. Whole-grain or whole-wheat pasta. Brown rice. Oatmeal. Quinoa. Bulgur. Whole-grain and low-sodium cereals. Sharon bread. Low-fat, low-sodium crackers. Whole-wheat flour tortillas.  Meats and other proteins  Skinless chicken or turkey. Ground chicken or turkey. Pork with fat trimmed off. Fish and seafood. Egg whites. Dried beans, peas, or lentils. Unsalted nuts, nut butters, and seeds. Unsalted canned beans. Lean cuts of beef with fat trimmed off. Low-sodium, lean precooked or cured meat, such as sausages or meat loaves.  Dairy  Low-fat (1%) or fat-free (skim) milk. Reduced-fat, low-fat, or fat-free cheeses. Nonfat, low-sodium ricotta or cottage cheese. Low-fat or nonfat yogurt. Low-fat, low-sodium cheese.  Fats and oils  Soft margarine without trans fats. Vegetable oil.  Reduced-fat, low-fat, or light mayonnaise and salad dressings (reduced-sodium). Canola, safflower, olive, avocado, soybean, and sunflower oils. Avocado.  Seasonings and condiments  Herbs. Spices. Seasoning mixes without salt.  Other foods  Unsalted popcorn and pretzels. Fat-free sweets.  The items listed above may not be a complete list of foods and beverages you can eat. Contact a dietitian for more information.  What foods should I avoid?  Fruits  Canned fruit in a light or heavy syrup. Fried fruit. Fruit in cream or butter sauce.  Vegetables  Creamed or fried vegetables. Vegetables in a cheese sauce. Regular canned vegetables (not low-sodium or reduced-sodium). Regular canned tomato sauce and paste (not low-sodium or reduced-sodium). Regular tomato and vegetable juice (not low-sodium or reduced-sodium). Pickles. Olives.  Grains  Baked goods made with fat, such as croissants, muffins, or some breads. Dry pasta or rice meal packs.  Meats and other proteins  Fatty cuts of meat. Ribs. Fried meat. [place]kew. Bologna, salami, and other precooked or cured meats, such as sausages or meat loaves. Fat from the back of a pig (fatback). Bratwurst. Salted nuts and seeds. Canned beans with added salt. Canned or smoked fish. Whole eggs or egg yolks. Chicken or turkey with skin.  Dairy  Whole or 2% milk, cream, and half-and-half. Whole or full-fat cream cheese. Whole-fat or sweetened yogurt. Full-fat cheese. Nondairy creamers. Whipped toppings. Processed cheese and cheese spreads.  Fats and oils  Butter. Stick margarine. Lard. Shortening. Ghee. [place]kew fat. Tropical oils, such as coconut, palm kernel, or palm oil.  Seasonings and condiments  Onion salt, garlic salt, seasoned salt, table salt, and sea salt. Worcestershire sauce. Tartar sauce. Barbecue sauce. Teriyaki sauce. Soy sauce, including reduced-sodium. Steak sauce. Canned and packaged gravies. Fish sauce. Oyster sauce. Cocktail sauce. Store-bought horseradish. Ketchup. Mustard.  Meat flavorings and tenderizers. Bouillon cubes. Hot sauces. Pre-made or packaged marinades. Pre-made or packaged taco seasonings. Relishes. Regular salad dressings.  Other foods  Salted popcorn and pretzels.  The items listed above may not be a complete list of foods and beverages you should avoid. Contact a dietitian for more information.  Where to find more information  National Heart, Lung, and Blood [place]: www.nhlbi.nih.gov  American Heart Association: www.heart.org  Academy of Nutrition and Dietetics: www.eatright.org  National Kidney Foundation: www.kidney.org  Summary  The DASH eating plan is a healthy eating plan that has been shown to reduce high blood pressure (hypertension). It may also reduce your risk for type 2 diabetes, heart disease, and stroke.  When on the DASH eating plan, aim to eat more fresh fruits and vegetables, whole grains, lean proteins, low-fat dairy, and heart-healthy fats.  With the DASH eating plan, you should limit salt (sodium) intake to 2,300 mg a day. If you have hypertension, you may need to reduce your sodium intake to 1,500 mg a day.  Work with your health care provider or dietitian to adjust your eating plan to your individual calorie needs.  This information is not intended to replace advice given to you by your health care provider. Make sure you discuss any questions you have with your health care provider.  Document Revised: 11/20/2020 Document Reviewed: 11/20/2020  [employer] Patient Education © 2021 [employer] High-Protein and High-Calorie Diet  Eating high-protein and high-calorie foods can help you to gain weight, heal after an injury, and recover after an illness or surgery. The specific amount of daily protein and calories you need depends on:  Your body weight.  The reason this diet is recommended for you.  What is my plan?  Generally, a high-protein, high-calorie diet involves:  Eating 250-500 extra calories each day.  Making sure that you get enough of your  daily calories from protein. Ask your health care provider how many of your calories should come from protein.  Talk with a health care provider, such as a diet and nutrition specialist (dietitian), about how much protein and how many calories you need each day. Follow the diet as directed by your health care provider.  What are tips for following this plan?    Preparing meals  Add whole milk, half-and-half, or heavy cream to cereal, pudding, soup, or hot cocoa.  Add whole milk to instant breakfast drinks.  Add peanut butter to oatmeal or smoothies.  Add powdered milk to baked goods, smoothies, or milkshakes.  Add powdered milk, cream, or butter to mashed potatoes.  Add cheese to cooked vegetables.  Make whole-milk yogurt parfaits. Top them with granola, fruit, or nuts.  Add cottage cheese to your fruit.  Add avocado, cheese, or both to sandwiches or salads.  Add meat, poultry, or seafood to rice, pasta, casseroles, salads, and soups.  Use mayonnaise when making egg salad, chicken salad, or tuna salad.  Use peanut butter as a dip for vegetables or as a topping for pretzels, celery, or crackers.  Add beans to casseroles, dips, and spreads.  Add pureed beans to sauces and soups.  Replace calorie-free drinks with calorie-containing drinks, such as milk and fruit juice.  Replace water with milk or heavy cream when making foods such as oatmeal, pudding, or cocoa.  General instructions  Ask your health care provider if you should take a nutritional supplement.  Try to eat six small meals each day instead of three large meals.  Eat a balanced diet. In each meal, include one food that is high in protein.  Keep nutritious snacks available, such as nuts, trail mixes, dried fruit, and yogurt.  If you have kidney disease or diabetes, talk with your health care provider about how much protein is safe for you. Too much protein may put extra stress on your kidneys.  Drink your calories. Choose high-calorie drinks and have them  after your meals.  What high-protein foods should I eat?    Vegetables  Soybeans. Peas.  Grains  Quinoa. Bulgur wheat.  Meats and other proteins  Beef, pork, and poultry. Fish and seafood. Eggs. Tofu. Textured vegetable protein (TVP). Peanut butter. Nuts and seeds. Dried beans. Protein powders.  Dairy  Whole milk. Whole-milk yogurt. Powdered milk. Cheese. Cottage Cheese. Eggnog.  Beverages  High-protein supplement drinks. Soy milk.  Other foods  Protein bars.  The items listed above may not be a complete list of high-protein foods and beverages. Contact a dietitian for more options.  What high-calorie foods should I eat?  Fruits  Dried fruit. Fruit leather. Canned fruit in syrup. Fruit juice. Avocado.  Vegetables  Vegetables cooked in oil or butter. Fried potatoes.  Grains  Pasta. Quick breads. Muffins. Pancakes. Ready-to-eat cereal.  Meats and other proteins  Peanut butter. Nuts and seeds.  Dairy  Heavy cream. Whipped cream. Cream cheese. Sour cream. Ice cream. Custard. Pudding.  Beverages  Meal-replacement beverages. Nutrition shakes. Fruit juice. Sugar-sweetened soft drinks.  Seasonings and condiments  Salad dressing. Mayonnaise. Bryant sauce. Fruit preserves or jelly. Honey. Syrup.  Sweets and desserts  Cake. Cookies. Pie. Pastries. Candy bars. Chocolate.  Fats and oils  Butter or margarine. Oil. Gravy.  Other foods  Meal-replacement bars.  The items listed above may not be a complete list of high-calorie foods and beverages. Contact a dietitian for more options.  Summary  A high-protein, high-calorie diet can help you gain weight or heal faster after an injury, illness, or surgery.  To increase your protein and calories, add ingredients such as whole milk, peanut butter, cheese, beans, meat, or seafood to meal items.  To get enough extra calories each day, include high-calorie foods and beverages at each meal.  Adding a high-calorie drink or shake can be an easy way to help you get enough calories each day.  Talk with your healthcare provider or dietitian about the best options for you.  This information is not intended to replace advice given to you by your health care provider. Make sure you discuss any questions you have with your health care provider.  Document Revised: 11/30/2018 Document Reviewed: 10/30/2018  [employer] Patient Education © 2021 [employer]

## 2023-12-05 NOTE — PROGRESS NOTES
Chief complaint:   Chief Complaint   Patient presents with    Back Pain     Pt here for constant lbp. Pt states she has not had any physical therapy,pain mgmt or seen a chiropractor. Pt walking with walker today.       Subjective     HPI: This is a 68-year-old female patient who was referred to us by Dr. Tc Bee for back pain.  She is here to be evaluated today.  The patient says that she has been dealing with back pain for about 5 months.  She also does have problems with peripheral neuropathy as well as some vascular issues in her left lower extremity.  Currently she says the pain in her back is intermittent.  Is worse with prolonged sitting and better with laying down.  She is not really describing any radicular components from her leg issues.  She has been using a walker for the last 6 years in regards to her vascular and peripheral neuropathy issues.  Denies any bowel or bladder incontinence.  He has not done any recent physical therapy or chiropractic care pain management injections.  She is left-hand dominant.  She is .  Denies any tobacco, alcohol, or illicit drug use.    Review of Systems   Constitutional:  Positive for activity change.   Musculoskeletal:  Positive for arthralgias, back pain, gait problem and myalgias.   Neurological:  Negative for weakness.        Past Medical History:   Diagnosis Date    Abnormal ECG     Allergic rhinitis 1975    Anemia     Anxiety and depression     Arthritis     Asthma 2017    Atrial fibrillation     CHF (congestive heart failure) 2021    Chronic cough     Disease of thyroid gland     DVT (deep venous thrombosis)     GERD (gastroesophageal reflux disease)     Headache 1973    History of incision and drainage     Right knee    History of staph infection     right knee, and upper right thigh    Hyperlipidemia     Hypothyroidism 2003    Infection      in right knee to bone, cleaned it out and put new hardware with antibiotic and pt developed hole in  incision    Inflammation of vein     in leg and leaking    Neuropathy, peripheral     Pneumonia     RECENT DX PER FAMILY DOCTOR    PONV (postoperative nausea and vomiting)     Restless leg syndrome     Sleep apnea with use of continuous positive airway pressure (CPAP)     bipap    SVT (supraventricular tachycardia)      Past Surgical History:   Procedure Laterality Date    ABLATION OF DYSRHYTHMIC FOCUS      BUNIONECTOMY Left     AND HAMMER TOE    CARDIAC ABLATION      at [facility] 8/2019    CARDIAC CATHETERIZATION      CARDIAC SURGERY      HEART CATH    CATARACT EXTRACTION Right     CORONARY STENT PLACEMENT  2020    HERNIA REPAIR      UMBILICAL X3    INCISION AND DRAINAGE LEG Right 01/28/2019    Procedure: INCISION AND DRAINAGE OF RIGHT THIGH HEMATOMA;  Surgeon: Nino Stern MD;  Location:  [facility]BR[facility] OR 12;  Service: Vascular    JOINT REPLACEMENT      RIGHT KNEE    KNEE POLY INSERT EXCHANGE Right 03/03/2018    Procedure: IRRIGATION AND DEBRIDEMENT TOTAL KNEE & POLY EXCHANGE - RIGHT;  Surgeon: Amilcar Capellan MD;  Location:  [facility] OR;  Service:     LAPAROSCOPIC CHOLECYSTECTOMY      LEG DEBRIDEMENT Right 03/23/2018    Procedure: DEBRIDEMENT AND CLOSURE KNEE - RIGHT;  Surgeon: Amilcar Capellan MD;  Location:  [facility] OR;  Service: Orthopedics    PERIPHERALLY INSERTED CENTRAL CATHETER INSERTION      PILONIDAL CYSTECTOMY N/A 02/16/2017    Procedure: PILONIDAL CYSTECTOMY, EXCISION SEBACEOUS CYST - BACK;  Surgeon: Macrina Capellan MD;  Location:  [facility] OR;  Service:     TOTAL KNEE  PROSTHESIS REMOVAL W/ SPACER INSERTION Right 03/13/2018    Procedure: 1.  EXPLANT TOTAL KNEE 2.  ANTIBOTIC SPACER KNEE;  Surgeon: Amilcar Capellan MD;  Location:  [facility] OR;  Service: Orthopedics    TOTAL KNEE  PROSTHESIS REMOVAL W/ SPACER INSERTION Right 06/19/2020    Procedure: REMOVAL OF ANTIBIOTIC SPACER;  Surgeon: Amilcar Capellan MD;  Location:  [facility] OR;  Service: Orthopedics;  Laterality: Right;    TOTAL KNEE  "ARTHROPLASTY REVISION Right 06/19/2020    Procedure: REVISION TOTAL KNEE REPLACEMENT;  Surgeon: Amilcar Capellan MD;  Location:  [facility] OR;  Service: Orthopedics;  Laterality: Right;    TRUNK LESION/CYST EXCISION N/A 02/16/2017    Procedure: EXCISION SEBACEOUS CYST - BACK;  Surgeon: Macrina Capellan MD;  Location:  [facility] OR;  Service:      Family History   Problem Relation Age of Onset    Diabetes Mother     Heart disease Mother         Triple Bypass Surgery    Alzheimer's disease Mother     Hyperlipidemia Mother     Hypertension Mother     Coronary artery disease Mother         Triple bypass    Heart failure Mother     Osteoarthritis Mother     Cancer Father         Lung    Hypertension Father     Anxiety disorder Father     Depression Father     Hyperlipidemia Father     Osteoarthritis Father     Heart disease Father         CHF    Heart attack Sister         Sudden cardiac death    Thyroid disease Sister     Heart failure Sister         MI    Osteoarthritis Sister     Leukemia Brother     Clotting disorder Brother     Cancer Brother         Leukemia    Colon polyps Neg Hx     Colon cancer Neg Hx      Social History     Tobacco Use    Smoking status: Never     Passive exposure: Never    Smokeless tobacco: Never    Tobacco comments:     Exposed to second hand smoke for 22 years, both parents smoked   Vaping Use    Vaping Use: Never used   Substance Use Topics    Alcohol use: No    Drug use: No     (Not in a hospital admission)    Allergies:  Other, Codeine, Mobic [meloxicam], Morphine and related, Azithromycin, and Cyclobenzaprine    Objective      Vital Signs  Ht 162.6 cm (64\")   Wt 121 kg (267 lb)   LMP  (LMP Unknown)   BMI 45.83 kg/m²     Physical Exam  Constitutional:       Appearance: Normal appearance. She is well-developed. She is morbidly obese.   HENT:      Head: Normocephalic.   Eyes:      General: Lids are normal.      Extraocular Movements: EOM normal.      Conjunctiva/sclera: Conjunctivae normal. "      Pupils: Pupils are equal, round, and reactive to light.   Pulmonary:      Effort: Pulmonary effort is normal.      Breath sounds: Normal breath sounds.   Musculoskeletal:         General: Normal range of motion.      Cervical back: Normal range of motion.   Skin:     General: Skin is warm.   Neurological:      Mental Status: She is alert and oriented to person, place, and time.      GCS: GCS eye subscore is 4. GCS verbal subscore is 5. GCS motor subscore is 6.      Cranial Nerves: No cranial nerve deficit.      Sensory: No sensory deficit.      Motor: Motor strength is normal.     Gait: Gait abnormal.      Deep Tendon Reflexes: Reflexes are normal and symmetric. Reflexes normal.      Comments: Walking independently with a walker   Psychiatric:         Speech: Speech normal.         Behavior: Behavior normal.         Thought Content: Thought content normal.         Neurologic Exam     Mental Status   Oriented to person, place, and time.   Attention: normal. Concentration: normal.   Speech: speech is normal   Level of consciousness: alert  Normal comprehension.     Cranial Nerves     CN II   Visual fields full to confrontation.     CN III, IV, VI   Pupils are equal, round, and reactive to light.  Extraocular motions are normal.     CN V   Facial sensation intact.     CN VII   Facial expression full, symmetric.     CN VIII   CN VIII normal.     CN IX, X   CN IX normal.   CN X normal.     CN XI   CN XI normal.     CN XII   CN XII normal.     Motor Exam   Muscle bulk: normal    Strength   Strength 5/5 throughout.     Sensory Exam   Light touch normal.     Gait, Coordination, and Reflexes     Reflexes   Right ankle clonus: absent  Left ankle clonus: absent      Imaging review: MRI of the lumbar spine that was on November 21, 2023 shows a degenerative scoliotic deformity of the lumbar spine.  At L5-S1 there is left-sided foraminal narrowing along with disc degeneration.  At L4-5 disc degeneration with bilateral  foraminal narrowing.  At L3-4 disc degeneration with bilateral foraminal narrowing and facet arthropathy.  At L2-3 left-sided foraminal narrowing.  At L 1-2 bilateral foraminal narrowing with the right being worse than the left.  The conus does terminate at L1.  No cord signal change.  Facet arthropathy is noted in the thoracic spine but no significant thoracic stenosis is noted.        Assessment/Plan: Patient is complaining of back pain.  She does have lumbar disc degeneration and scoliosis.  I will send her for x-rays of the lumbar spine to include standing flexion and extension as well as scoliosis x-rays.  I did review the MRI with Dr. Dill in regards to the sacral nodule.  We will also obtain an MRI with and without contrast to further evaluate this area.  We will have her follow-up with Dr. Dill after imaging is completed and make further recommendation at that time.  Her questions and concerns were addressed.  Patient is a nonsmoker  The patient's Body mass index is 45.83 kg/m².. BMI is above normal parameters. Recommendations include: educational material and nutrition counseling  Advance Care Planning   ACP discussion was held with the patient during this visit. Patient does not have an advance directive, information provided.  STEADI Fall Risk Assessment was completed, and patient is at HIGH risk for falls. Assessment completed on:12/5/2023       Diagnoses and all orders for this visit:    1. Other form of scoliosis of thoracolumbar spine  -     XR Spine Lumbar Complete With Flex & Ext  -     XR Spine Scoliosis 2 or 3 Views; Future    2. Incomplete lesion of sacral spinal cord, initial encounter  -     MRI Lumbar Spine With & Without Contrast; Future    3. Lumbar degenerative disc disease  -     XR Spine Lumbar Complete With Flex & Ext  -     XR Spine Scoliosis 2 or 3 Views; Future    4. Class 3 severe obesity due to excess calories without serious comorbidity with body mass index (BMI) of 45.0 to  49.9 in adult    5. Nonsmoker          I discussed the patients findings and my recommendations with patient    Claudio Sargent, APRN  12/05/23  15:55 CST

## 2023-12-10 PROBLEM — I45.4 IVCD (INTRAVENTRICULAR CONDUCTION DEFECT): Status: ACTIVE | Noted: 2023-12-10

## 2023-12-10 PROCEDURE — 93000 ELECTROCARDIOGRAM COMPLETE: CPT | Performed by: STUDENT IN AN ORGANIZED HEALTH CARE EDUCATION/TRAINING PROGRAM

## 2023-12-11 NOTE — PROGRESS NOTES
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 female with problem list as above who presents to the clinic for follow up of paroxysmal atrial fibrillation, AVNRT.  Doing well overall, no significant events.  She feels like her weight loss is slowed.    Objective   Vital Signs:  BP 92/68 (BP Location: Right arm, Patient Position: Sitting)   Pulse 51   Resp 18   Ht 162.6 cm (64\")   Wt 121 kg (267 lb)   SpO2 98%   BMI 45.83 kg/m²   Estimated body mass index is 45.83 kg/m² as calculated from the following:    Height as of this encounter: 162.6 cm (64\").    Weight as of this encounter: 121 kg (267 lb).      Physical Exam  Vitals reviewed.   Constitutional:       Appearance: She is obese.   Cardiovascular:      Rate and Rhythm: Normal rate and regular rhythm.      Pulses: Normal pulses.      Heart sounds: Normal heart sounds. No murmur heard.  Pulmonary:      Effort: Pulmonary effort is normal. No respiratory distress.      Breath sounds: Normal breath sounds.   Musculoskeletal:      Comments: Kyphotic   Skin:     General: Skin is warm and dry.   Neurological:      General: No focal deficit present.      Mental Status: She is alert and oriented to person, place, and time.   Psychiatric:         Mood and Affect: Mood normal.         Judgment: Judgment normal.        Result Review :  The following data was reviewed by: Leslie Luu MD on 12/05/2023:    [id number] SCORE   [id number] Score: 6 (12/10/2023 10:49 PM)          ECG 12 " Lead    Date/Time: 12/10/2023 10:49 PM  Performed by: Leslie Luu MD    Authorized by: Leslie Luu MD  Comparison: compared with previous ECG from 10/12/2023  Rhythm: sinus rhythm  Rate: bradycardic  BPM: 51  Conduction: non-specific intraventricular conduction delay  Q wave noted on lead: Lateral Q-wave pattern.    QRS axis: left  Other findings: poor R wave progression    Clinical impression: abnormal EKG              Assessment and Plan   Diagnoses and all orders for this visit:    1. PAF (paroxysmal atrial fibrillation) (Primary)    2. AVNRT (AV juanita re-entry tachycardia)    3. Atrial flutter, paroxysmal    4. IVCD (intraventricular conduction defect)    Other orders  -     ECG 12 Lead        Danisha Cannon is a 68 y.o. female with problem list as above who presents to the clinic for follow up of paroxysmal atrial fibrillation, AVNRT, IVCD.  No significant breakthrough events.  Overall tolerating medical therapy well.    Plan:  -Arrhythmias are well-controlled on current regimen; continue flecainide 100 mg twice daily in combination with metoprolol 50 mg daily  -Continue indefinite aspirin given history of watchman implant         Follow Up   Return in about 6 months (around 6/5/2024).  Patient was given instructions and counseling regarding her condition or for health maintenance advice. Please see specific information pulled into the AVS if appropriate.     Part of this note may be an electronic transcription/translation of spoken language to printed text using the Dragon Dictation System.

## 2023-12-18 ENCOUNTER — OFFICE VISIT (OUTPATIENT)
Dept: OTOLARYNGOLOGY | Facility: CLINIC | Age: 68
End: 2023-12-18
Payer: MEDICARE

## 2023-12-18 VITALS
DIASTOLIC BLOOD PRESSURE: 67 MMHG | TEMPERATURE: 97.5 F | WEIGHT: 265 LBS | BODY MASS INDEX: 45.24 KG/M2 | HEART RATE: 111 BPM | SYSTOLIC BLOOD PRESSURE: 123 MMHG | RESPIRATION RATE: 16 BRPM | HEIGHT: 64 IN

## 2023-12-18 DIAGNOSIS — J34.89 SINUS PRESSURE: ICD-10-CM

## 2023-12-18 DIAGNOSIS — J34.2 NASAL SEPTAL DEVIATION: ICD-10-CM

## 2023-12-18 DIAGNOSIS — J30.9 ALLERGIC RHINITIS, UNSPECIFIED SEASONALITY, UNSPECIFIED TRIGGER: Primary | ICD-10-CM

## 2023-12-18 DIAGNOSIS — R09.82 POST-NASAL DRAINAGE: ICD-10-CM

## 2023-12-18 DIAGNOSIS — J34.89 SINUS PAIN: ICD-10-CM

## 2023-12-18 DIAGNOSIS — R09.A2 GLOBUS SENSATION: ICD-10-CM

## 2023-12-18 DIAGNOSIS — R13.19 ESOPHAGEAL DYSPHAGIA: ICD-10-CM

## 2023-12-18 DIAGNOSIS — K21.9 LARYNGOPHARYNGEAL REFLUX: ICD-10-CM

## 2023-12-18 DIAGNOSIS — R09.81 NASAL CONGESTION: ICD-10-CM

## 2023-12-18 NOTE — PROGRESS NOTES
YOB: 1955  Location: [facility]  Location Address: [address] [place] [address],  3, [address] [place] [address]  Location Phone: [phone]    Chief Complaint   Patient presents with    Allergies       History of Present Illness  Danisha Cannon is a 68 y.o. female.  Danisha Cannon is here for follow up of ENT complaints. The patient has had problems with nasal drainage, nasal congestion left > right, post nasal drip, dysphagia, reflux, and excessive mucous.  She is currently taking dexilant daily. She underwent dysphagia therapy and feels that this has improved her dysphagia.  She is taking flonase, xyzal, and singulair daily. She uses astelin as needed.  She has a history of immunotherapy in the past but has not been on them for 16 years.   She is using oxygen 2 liters daily and 4 liters at night.    Past Medical History:   Diagnosis Date    Abnormal ECG     Allergic rhinitis     Anemia     Anxiety and depression     Arthritis     Asthma     Atrial fibrillation     CHF (congestive heart failure)     Chronic cough     Disease of thyroid gland     DVT (deep venous thrombosis)     GERD (gastroesophageal reflux disease)     Headache 1973    History of incision and drainage     Right knee    History of staph infection     right knee, and upper right thigh    Hyperlipidemia     Hypothyroidism 2003    Infection      in right knee to bone, cleaned it out and put new hardware with antibiotic and pt developed hole in incision    Inflammation of vein     in leg and leaking    IVCD (intraventricular conduction defect) 12/10/2023    Neuropathy, peripheral     Pneumonia     RECENT DX PER FAMILY DOCTOR    PONV (postoperative nausea and vomiting)     Restless leg syndrome     Sleep apnea with use of continuous positive airway pressure (CPAP)     bipap    SVT (supraventricular tachycardia)        Past Surgical History:   Procedure Laterality Date    ABLATION OF DYSRHYTHMIC FOCUS      BUNIONECTOMY Left     AND  HAMMER TOE    CARDIAC ABLATION      at [facility] 8/2019    CARDIAC CATHETERIZATION      CARDIAC SURGERY      HEART CATH    CATARACT EXTRACTION Right     CORONARY STENT PLACEMENT  2020    HERNIA REPAIR      UMBILICAL X3    INCISION AND DRAINAGE LEG Right 01/28/2019    Procedure: INCISION AND DRAINAGE OF RIGHT THIGH HEMATOMA;  Surgeon: Nino Stern MD;  Location:  [facility] 12;  Service: Vascular    JOINT REPLACEMENT      RIGHT KNEE    KNEE POLY INSERT EXCHANGE Right 03/03/2018    Procedure: IRRIGATION AND DEBRIDEMENT TOTAL KNEE & POLY EXCHANGE - RIGHT;  Surgeon: Amilcar Capellan MD;  Location:  [facility] OR;  Service:     LAPAROSCOPIC CHOLECYSTECTOMY      LEG DEBRIDEMENT Right 03/23/2018    Procedure: DEBRIDEMENT AND CLOSURE KNEE - RIGHT;  Surgeon: Amilcar Capellan MD;  Location:  [facility] OR;  Service: Orthopedics    PERIPHERALLY INSERTED CENTRAL CATHETER INSERTION      PILONIDAL CYSTECTOMY N/A 02/16/2017    Procedure: PILONIDAL CYSTECTOMY, EXCISION SEBACEOUS CYST - BACK;  Surgeon: Macrina Capellan MD;  Location:  [facility] OR;  Service:     TOTAL KNEE  PROSTHESIS REMOVAL W/ SPACER INSERTION Right 03/13/2018    Procedure: 1.  EXPLANT TOTAL KNEE 2.  ANTIBOTIC SPACER KNEE;  Surgeon: Amilcar Capellan MD;  Location:  [facility] OR;  Service: Orthopedics    TOTAL KNEE  PROSTHESIS REMOVAL W/ SPACER INSERTION Right 06/19/2020    Procedure: REMOVAL OF ANTIBIOTIC SPACER;  Surgeon: Amilcar Capellan MD;  Location:  [facility] OR;  Service: Orthopedics;  Laterality: Right;    TOTAL KNEE ARTHROPLASTY REVISION Right 06/19/2020    Procedure: REVISION TOTAL KNEE REPLACEMENT;  Surgeon: Amilcar Capellan MD;  Location:  [facility];  Service: Orthopedics;  Laterality: Right;    TRUNK LESION/CYST EXCISION N/A 02/16/2017    Procedure: EXCISION SEBACEOUS CYST - BACK;  Surgeon: Macrina Capellan MD;  Location:  [facility] OR;  Service:        Outpatient Medications Marked as Taking for the 12/18/23 encounter (Office Visit) with Rachel,  ERNESTO Hartley   Medication Sig Dispense Refill    albuterol sulfate  (90 Base) MCG/ACT inhaler INHALE 2 PUFFS BY MOUTH EVERY 4 HOURS AS NEEDED FOR WHEEZING AND SHORTNESS OF BREATH      amitriptyline (ELAVIL) 75 MG tablet Take 1 tablet by mouth Every Night.      ASPIRIN 81 PO Take 1 tablet by mouth Daily.      azelastine (ASTELIN) 0.1 % nasal spray 2 sprays into the nostril(s) as directed by provider 2 (Two) Times a Day. Use in each nostril as directed 30 mL 11    dexlansoprazole (Dexilant) 60 MG capsule Take 1 capsule by mouth Daily. 30 capsule 11    diclofenac (VOLTAREN) 75 MG EC tablet Take 1 tablet by mouth Daily. (Patient taking differently: Take 1 tablet by mouth 2 (Two) Times a Day.) 90 tablet 1    donepezil (ARICEPT) 10 MG tablet Take 1 tablet by mouth Daily.      DULoxetine (CYMBALTA) 60 MG capsule TAKE 1 CAPSULE BY MOUTH TWICE DAILY 60 capsule 5    flecainide (TAMBOCOR) 100 MG tablet Take 1 tablet by mouth Every 12 (Twelve) Hours. 180 tablet 3    fluticasone (FLONASE) 50 MCG/ACT nasal spray 2 sprays into the nostril(s) as directed by provider Daily As Needed for Rhinitis or Allergies for up to 30 days. 16 g 11    fluticasone (Flovent HFA) 110 MCG/ACT inhaler Inhale 1 puff 2 (Two) Times a Day.      furosemide (LASIX) 40 MG tablet Take 1 tablet by mouth Daily. 90 tablet 3    gabapentin (NEURONTIN) 800 MG tablet Take 1 tablet by mouth 2 (Two) Times a Day.      levocetirizine (XYZAL) 5 MG tablet Take 1 tablet by mouth Every Evening for 30 days. 30 tablet 11    levothyroxine (SYNTHROID, LEVOTHROID) 137 MCG tablet Take 1 tablet by mouth Every Morning. 90 tablet 3    LORazepam (ATIVAN) 1 MG tablet Take 1 tablet by mouth 2 (Two) Times a Day As Needed.      metoprolol succinate XL (TOPROL-XL) 25 MG 24 hr tablet Take 2 tablets by mouth Daily.      montelukast (SINGULAIR) 10 MG tablet Take 1 tablet by mouth Daily.      O2 (OXYGEN) Inhale 3.5 L/min 1 (One) Time. NOC      rOPINIRole (REQUIP) 3 MG tablet 2  tablets each morning and 3 tablets at bedtime. (Patient taking differently: 2 tablets each morning) 450 tablet 3    rosuvastatin (CRESTOR) 5 MG tablet 1 tablet Daily.      triamcinolone (KENALOG) 0.1 % paste APPLY TO THE AFFECTED AREA THREE TIMES DAILY AS NEEDED         Other, Codeine, Mobic [meloxicam], Morphine and related, Azithromycin, and Cyclobenzaprine    Family History   Problem Relation Age of Onset    Diabetes Mother     Heart disease Mother         Triple Bypass Surgery    Alzheimer's disease Mother     Hyperlipidemia Mother     Hypertension Mother     Coronary artery disease Mother         Triple bypass    Heart failure Mother     Osteoarthritis Mother     Cancer Father         Lung    Hypertension Father     Anxiety disorder Father     Depression Father     Hyperlipidemia Father     Osteoarthritis Father     Heart disease Father         CHF    Heart attack Sister         Sudden cardiac death    Thyroid disease Sister     Heart failure Sister         MI    Osteoarthritis Sister     Leukemia Brother     Clotting disorder Brother     Cancer Brother         Leukemia    Colon polyps Neg Hx     Colon cancer Neg Hx        Social History     Socioeconomic History    Marital status:    Tobacco Use    Smoking status: Never     Passive exposure: Never    Smokeless tobacco: Never    Tobacco comments:     Exposed to second hand smoke for 22 years, both parents smoked   Vaping Use    Vaping Use: Never used   Substance and Sexual Activity    Alcohol use: No    Drug use: No    Sexual activity: Not Currently     Partners: Male     Birth control/protection: Post-menopausal, Vasectomy       Review of Systems   Constitutional: Negative.    HENT:  Positive for congestion, postnasal drip, rhinorrhea, sinus pressure and sinus pain.    Respiratory: Negative.     Genitourinary: Negative.    Neurological: Negative.        Vitals:    12/18/23 1412   BP: 123/67   Pulse: 111   Resp: 16   Temp: 97.5 °F (36.4 °C)       Body  mass index is 45.49 kg/m².    Objective     Physical Exam  Vitals reviewed.   Constitutional:       Appearance: Normal appearance. She is obese.   HENT:      Head: Normocephalic.      Right Ear: Tympanic membrane, ear canal and external ear normal.      Left Ear: Tympanic membrane, ear canal and external ear normal.      Nose: Septal deviation present.      Comments: Nasal cannula in place     Mouth/Throat:      Lips: Pink.      Mouth: Mucous membranes are moist.   Musculoskeletal:      Cervical back: Full passive range of motion without pain.   Neurological:      Mental Status: She is alert.   Psychiatric:         Behavior: Behavior is cooperative.         Assessment & Plan   Diagnoses and all orders for this visit:    1. Allergic rhinitis, unspecified seasonality, unspecified trigger (Primary)  -     CT Sinus Without Contrast; Future    2. Nasal congestion  -     CT Sinus Without Contrast; Future    3. Sinus pain  -     CT Sinus Without Contrast; Future    4. Sinus pressure  -     CT Sinus Without Contrast; Future    5. Post-nasal drainage  -     CT Sinus Without Contrast; Future    6. Nasal septal deviation  -     CT Sinus Without Contrast; Future    7. Globus sensation    8. Laryngopharyngeal reflux    9. Esophageal dysphagia    Other orders  -     sodium chloride (OCEAN) 0.65 % nasal spray; 2-3 sprays into the nostril(s) as directed by provider As Needed (nasal irrigation).  Dispense: 50 mL; Refill: 12      * Surgery not found *  Orders Placed This Encounter   Procedures    CT Sinus Without Contrast     Standing Status:   Future     Standing Expiration Date:   12/18/2024     Scheduling Instructions:      Stealth -  image guidance     Order Specific Question:   Release to patient     Answer:   Routine Release [[id number]]     Will obtain CT sinus  Stop xyzal daily  Add saline spray daily  Use astelin daily consistently  Return for problems    Return in about 7 weeks (around 2/5/2024) for Recheck, CT.        Patient Instructions   Will obtain CT sinus  Stop xyzal daily  Add saline spray daily  Use astelin daily consistently  Return for problems    CONTACT INFORMATION:  The main office phone number is [phone]. For emergencies after hours and on weekends, this number will convert over to our answering service and the on call provider will answer. Please try to keep non emergent phone calls/ questions to office hours 9am-5pm Monday through Friday.      [facility]  As an alternative, you can sign up and use the Epic MyChart system for more direct and quicker access for non emergent questions/ problems.  [employer] allows you to send messages to your doctor, view your test results, renew your prescriptions, schedule appointments, and more. To sign up, go to [employer] and click on the Sign Up Now link in the New User? box. Enter your [facility] Activation Code exactly as it appears below along with the last four digits of your Social Security Number and your Date of Birth () to complete the sign-up process. If you do not sign up before the expiration date, you must request a new code.     [facility] Activation Code: Activation code not generated  Current [facility] Status: Active     If you have questions, you can email [email or web address] or call [phone] to talk to our [facility] staff. Remember, [facility] is NOT to be used for urgent needs. For medical emergencies, dial 911.     IF YOU SMOKE OR USE TOBACCO PLEASE READ THE FOLLOWING:  Why is smoking bad for me?  Smoking increases the risk of heart disease, lung disease, vascular disease, stroke, and cancer. If you smoke, STOP!        IF YOU SMOKE OR USE TOBACCO PLEASE READ THE FOLLOWING:  Why is smoking bad for me?  Smoking increases the risk of heart disease, lung disease, vascular disease, stroke, and cancer. If you smoke, STOP!     For more information:  Quit Now [place]  -QUIT-NOW  [email or web address]

## 2023-12-18 NOTE — PATIENT INSTRUCTIONS
Will obtain CT sinus  Stop xyzal daily  Add saline spray daily  Use astelin daily consistently  Return for problems    CONTACT INFORMATION:  The main office phone number is [phone]. For emergencies after hours and on weekends, this number will convert over to our answering service and the on call provider will answer. Please try to keep non emergent phone calls/ questions to office hours 9am-5pm Monday through Friday.      [facility]  As an alternative, you can sign up and use the Epic MyChart system for more direct and quicker access for non emergent questions/ problems.  [employer] allows you to send messages to your doctor, view your test results, renew your prescriptions, schedule appointments, and more. To sign up, go to [employer] and click on the Sign Up Now link in the New User? box. Enter your [employer] Activation Code exactly as it appears below along with the last four digits of your Social Security Number and your Date of Birth () to complete the sign-up process. If you do not sign up before the expiration date, you must request a new code.     [facility] Activation Code: Activation code not generated  Current [facility] Status: Active     If you have questions, you can email [email or web address] or call [phone] to talk to our [employer] staff. Remember, [employer] is NOT to be used for urgent needs. For medical emergencies, dial 911.     IF YOU SMOKE OR USE TOBACCO PLEASE READ THE FOLLOWING:  Why is smoking bad for me?  Smoking increases the risk of heart disease, lung disease, vascular disease, stroke, and cancer. If you smoke, STOP!        IF YOU SMOKE OR USE TOBACCO PLEASE READ THE FOLLOWING:  Why is smoking bad for me?  Smoking increases the risk of heart disease, lung disease, vascular disease, stroke, and cancer. If you smoke, STOP!     For more information:  [employer]uit Now [place]  -QUIT-NOW  [email or web address]

## 2023-12-27 ENCOUNTER — HOSPITAL ENCOUNTER (OUTPATIENT)
Dept: GENERAL RADIOLOGY | Facility: HOSPITAL | Age: 68
Discharge: HOME | End: 2023-12-27
Payer: MEDICARE

## 2023-12-27 ENCOUNTER — HOSPITAL ENCOUNTER (OUTPATIENT)
Dept: CT IMAGING | Facility: HOSPITAL | Age: 68
Discharge: HOME | End: 2023-12-27
Payer: MEDICARE

## 2023-12-27 ENCOUNTER — HOSPITAL ENCOUNTER (OUTPATIENT)
Dept: MRI IMAGING | Facility: HOSPITAL | Age: 68
Discharge: HOME | End: 2023-12-27
Payer: MEDICARE

## 2023-12-27 DIAGNOSIS — R09.81 NASAL CONGESTION: ICD-10-CM

## 2023-12-27 DIAGNOSIS — J34.2 NASAL SEPTAL DEVIATION: ICD-10-CM

## 2023-12-27 DIAGNOSIS — J34.89 SINUS PRESSURE: ICD-10-CM

## 2023-12-27 DIAGNOSIS — J30.9 ALLERGIC RHINITIS, UNSPECIFIED SEASONALITY, UNSPECIFIED TRIGGER: ICD-10-CM

## 2023-12-27 DIAGNOSIS — S34.132A: ICD-10-CM

## 2023-12-27 DIAGNOSIS — M51.36 LUMBAR DEGENERATIVE DISC DISEASE: ICD-10-CM

## 2023-12-27 DIAGNOSIS — M41.85 OTHER FORM OF SCOLIOSIS OF THORACOLUMBAR SPINE: ICD-10-CM

## 2023-12-27 DIAGNOSIS — R09.82 POST-NASAL DRAINAGE: ICD-10-CM

## 2023-12-27 DIAGNOSIS — J34.89 SINUS PAIN: ICD-10-CM

## 2023-12-27 LAB — CREAT BLDA-MCNC: 1.2 MG/DL (ref 0.6–1.3)

## 2023-12-27 PROCEDURE — 72082 X-RAY EXAM ENTIRE SPI 2/3 VW: CPT

## 2023-12-27 PROCEDURE — 72158 MRI LUMBAR SPINE W/O & W/DYE: CPT

## 2023-12-27 PROCEDURE — 72114 X-RAY EXAM L-S SPINE BENDING: CPT

## 2023-12-27 PROCEDURE — A9577 INJ MULTIHANCE: HCPCS | Performed by: NURSE PRACTITIONER

## 2023-12-27 PROCEDURE — 70486 CT MAXILLOFACIAL W/O DYE: CPT

## 2023-12-27 PROCEDURE — 0 GADOBENATE DIMEGLUMINE 529 MG/ML SOLUTION: Performed by: NURSE PRACTITIONER

## 2023-12-27 PROCEDURE — 82565 ASSAY OF CREATININE: CPT

## 2023-12-27 RX ADMIN — GADOBENATE DIMEGLUMINE 20 ML: 529 INJECTION, SOLUTION INTRAVENOUS at 11:20

## 2024-01-02 ENCOUNTER — TELEPHONE (OUTPATIENT)
Dept: OTOLARYNGOLOGY | Facility: CLINIC | Age: 69
End: 2024-01-02
Payer: MEDICARE

## 2024-01-02 NOTE — TELEPHONE ENCOUNTER
----- Message from ERNESTO Barr sent at 12/30/2023 10:59 AM CST -----  Please give patient Ct results. Will further discuss at follow up

## 2024-01-04 ENCOUNTER — TELEPHONE (OUTPATIENT)
Dept: NEUROSURGERY | Facility: CLINIC | Age: 69
End: 2024-01-04
Payer: MEDICARE

## 2024-01-04 DIAGNOSIS — D49.9 TUMOR: Primary | ICD-10-CM

## 2024-01-04 NOTE — TELEPHONE ENCOUNTER
Reviewed imaging with Dr. Dill.  Given the radiology report it is felt necessary to pursue a CT scan of the chest abdomen and pelvis to rule out any other tumor.  I have contacted the patient and notified her of the results and our current plan for further imaging.  She is agreeable.  Will get the testing completed as soon as possible

## 2024-01-18 ENCOUNTER — HOSPITAL ENCOUNTER (OUTPATIENT)
Dept: CT IMAGING | Facility: HOSPITAL | Age: 69
Discharge: HOME | End: 2024-01-18
Admitting: NURSE PRACTITIONER
Payer: MEDICARE

## 2024-01-18 DIAGNOSIS — D49.9 TUMOR: ICD-10-CM

## 2024-01-18 LAB — CREAT BLDA-MCNC: 1.1 MG/DL (ref 0.6–1.3)

## 2024-01-18 PROCEDURE — 74177 CT ABD & PELVIS W/CONTRAST: CPT

## 2024-01-18 PROCEDURE — 71260 CT THORAX DX C+: CPT

## 2024-01-18 PROCEDURE — 82565 ASSAY OF CREATININE: CPT

## 2024-01-18 PROCEDURE — 25510000001 IOPAMIDOL 61 % SOLUTION: Performed by: NURSE PRACTITIONER

## 2024-01-18 RX ADMIN — IOPAMIDOL 100 ML: 612 INJECTION, SOLUTION INTRAVENOUS at 13:04

## 2024-01-19 ENCOUNTER — TELEPHONE (OUTPATIENT)
Dept: NEUROSURGERY | Facility: CLINIC | Age: 69
End: 2024-01-19
Payer: MEDICARE

## 2024-01-19 NOTE — TELEPHONE ENCOUNTER
Called and left message for patient in regards to the CT scan of her abdomen and pelvis.  No masses or lesions were noted.  She does have a umbilical hernia.  At this time I would recommend that she engage in physical therapy and pain management injections prior to her appointment with Dr. Dill to try and help with her pain issues..  Okay to relay information to patient if she calls back.

## 2024-01-23 ENCOUNTER — TELEPHONE (OUTPATIENT)
Dept: VASCULAR SURGERY | Facility: CLINIC | Age: 69
End: 2024-01-23

## 2024-01-23 NOTE — TELEPHONE ENCOUNTER
Caller: Danisha Cannon    Relationship: Self    Best call back number: [phone]    What is the best time to reach you: ANY    Who are you requesting to speak with (clinical staff, provider,  specific staff member): NA     Do you know the name of the person who called: PATTIE?     What was the call regarding:   PT CALLING IN STATING SHE HAD A MISSED CALL FROM PATTIE- PT UNSURE OF WHAT CALL WAS REGARDING.    HUB ATTEMPTED TO WT NO ANSWER

## 2024-01-31 ENCOUNTER — TELEPHONE (OUTPATIENT)
Dept: NEUROSURGERY | Facility: CLINIC | Age: 69
End: 2024-01-31
Payer: MEDICARE

## 2024-02-06 ENCOUNTER — OFFICE VISIT (OUTPATIENT)
Dept: OTOLARYNGOLOGY | Facility: CLINIC | Age: 69
End: 2024-02-06
Payer: MEDICARE

## 2024-02-06 VITALS
DIASTOLIC BLOOD PRESSURE: 85 MMHG | BODY MASS INDEX: 47.46 KG/M2 | HEIGHT: 64 IN | SYSTOLIC BLOOD PRESSURE: 110 MMHG | TEMPERATURE: 97.3 F | WEIGHT: 278 LBS | HEART RATE: 106 BPM

## 2024-02-06 DIAGNOSIS — R09.82 POST-NASAL DRAINAGE: ICD-10-CM

## 2024-02-06 DIAGNOSIS — G47.33 OBSTRUCTIVE SLEEP APNEA: ICD-10-CM

## 2024-02-06 DIAGNOSIS — J34.89 RHINORRHEA: ICD-10-CM

## 2024-02-06 DIAGNOSIS — J30.9 ALLERGIC RHINITIS, UNSPECIFIED SEASONALITY, UNSPECIFIED TRIGGER: Primary | ICD-10-CM

## 2024-02-06 RX ORDER — IPRATROPIUM BROMIDE 42 UG/1
2 SPRAY, METERED NASAL 3 TIMES DAILY
Qty: 15 ML | Refills: 5 | Status: SHIPPED | OUTPATIENT
Start: 2024-02-06

## 2024-02-06 RX ORDER — FAMCICLOVIR 500 MG/1
500 TABLET ORAL 2 TIMES DAILY
COMMUNITY

## 2024-02-06 RX ORDER — HYDROXYZINE PAMOATE 25 MG/1
1 CAPSULE ORAL 3 TIMES DAILY PRN
COMMUNITY

## 2024-02-06 RX ORDER — ESOMEPRAZOLE MAGNESIUM 40 MG/1
CAPSULE, DELAYED RELEASE ORAL
COMMUNITY
End: 2024-02-06

## 2024-02-06 RX ORDER — TIZANIDINE HYDROCHLORIDE 6 MG/1
CAPSULE, GELATIN COATED ORAL
COMMUNITY
Start: 2024-02-02

## 2024-02-06 NOTE — PATIENT INSTRUCTIONS
Gastroesophageal Reflux Disease (Laryngopharyngeal Reflux), Adult  Gastroesophageal reflux disease (GERD) and/or Laryngopharyngeal Reflux, (LPR) happens when acid from your stomach flows up into the esophagus and/or throat and voicebox or larynx. When acid comes in contact with the these organs, the acid can cause soreness (inflammation). Over time, GERD may create small holes (ulcers) in the lining of the esophagus and may lead to the development of hoarseness, difficulty swallowing,   feeling of something stuck in the throat, increased mucous or drainage and even predispose to the development of malignancies, (cancer).    CAUSES   Increased body weight. This puts pressure on the stomach, making acid rise from the stomach into the esophagus.  Smoking. This increases acid production in the stomach.  Drinking alcohol. This causes decreased pressure in the lower esophageal sphincter (valve or ring of muscle between the esophagus and stomach), allowing acid from the stomach into the esophagus.  Late evening meals and a full stomach. This increases pressure and acid production in the stomach.  A malformed lower esophageal sphincter  Diet which can include avoidance of gluten and dairy products  Age  SYMPTOMS   Burning pain in the lower part of the mid-chest behind the breastbone and in the mid-stomach area. This may occur twice a week or more often.  Trouble swallowing.  Sore throat.  Dry cough.  Asthma-like symptoms including chest tightness, shortness of breath, or wheezing.  Globus sensation-something stuck in the throat/fullness  Hoarseness  DIAGNOSIS   Your caregiver may be able to diagnose GERD based on your symptoms. In some cases, X-rays and other tests may be done to check for complications or to check the condition of your stomach and esophagus.  You may need to see another doctor.  TREATMENT   Over-the-counter or prescription medicines to help decrease acid production.   Dietary and behavioral modifications  or changes may be also recommended.  HOME CARE INSTRUCTIONS   Change the factors that you can control. Ask your caregiver for guidance concerning weight loss, quitting smoking, and alcohol consumption.  Avoid foods and drinks that make your symptoms worse, and MAY include such as:  Caffeine or alcoholic drinks.  Chocolate.  Gluten containing foods  Dairy  Peppermint or mint flavorings.  Garlic and onions.  Spicy foods.  Citrus fruits, such as oranges, ayaan, or limes.  Tomato-based foods such as sauce, chili, salsa, and pizza.  Fried and fatty foods.  Avoid lying down for the 3 hours prior to your bedtime or prior to taking a nap.  Eat small, frequent meals instead of large meals.  Wear loose-fitting clothing. Do not wear anything tight around your waist that causes pressure on your stomach.  Raise the head of your bed 6 to 8 inches with wood blocks to help you sleep. Extra pillows will not help.  Only take over-the-counter or prescription medicines for pain, discomfort, or fever as directed by your caregiver.  Do not take aspirin, ibuprofen, or other nonsteroidal anti-inflammatory drugs if possible (NSAIDs).  SEEK IMMEDIATE MEDICAL CARE IF:   You have pain in your arms, neck, jaw, teeth, or back.  Your pain increases or changes in intensity or duration.  You develop nausea, vomiting, or sweating (diaphoresis).  You develop shortness of breath, or you faint.  Your vomit is green, yellow, black, or looks like coffee grounds or blood.  Your stool is red, bloody, or black.  These symptoms could be signs of other problems, such as heart disease, gastric bleeding, or esophageal bleeding.  MAKE SURE YOU:   Understand these instructions.  Will watch your condition.  Will get help right away if you are not doing well or get worse.     This information is not intended to replace advice given to you by your physician. Make sure you discuss any questions you have with your health care provider.     Modified by Reji Rocha,  MD, FACS 9/8/2016.  Document Released: 09/27/2006 Document Revised: 01/08/2016 Document Reviewed: 04/13/2016  [employer] Interactive Patient Education ©2016 [employer] For the best response, use your nasal sprays every day without skipping doses. It may take several weeks before the full effect is acheived.

## 2024-02-10 ENCOUNTER — TELEPHONE (OUTPATIENT)
Dept: NEUROSURGERY | Facility: CLINIC | Age: 69
End: 2024-02-10
Payer: MEDICARE

## 2024-02-10 NOTE — TELEPHONE ENCOUNTER
Attempted to call patient to give results of CT scans.  No answer.  Have previously left message with patient to call us back for imaging results

## 2024-02-12 ENCOUNTER — OFFICE VISIT (OUTPATIENT)
Dept: PULMONOLOGY | Facility: CLINIC | Age: 69
End: 2024-02-12
Payer: MEDICARE

## 2024-02-12 VITALS
OXYGEN SATURATION: 98 % | HEIGHT: 63 IN | BODY MASS INDEX: 48.55 KG/M2 | SYSTOLIC BLOOD PRESSURE: 142 MMHG | WEIGHT: 274 LBS | DIASTOLIC BLOOD PRESSURE: 92 MMHG | HEART RATE: 62 BPM

## 2024-02-12 DIAGNOSIS — G47.33 OBSTRUCTIVE SLEEP APNEA: Chronic | ICD-10-CM

## 2024-02-12 DIAGNOSIS — J98.4 RESTRICTIVE LUNG DISEASE: Chronic | ICD-10-CM

## 2024-02-12 DIAGNOSIS — J30.9 ALLERGIC RHINITIS, UNSPECIFIED SEASONALITY, UNSPECIFIED TRIGGER: Chronic | ICD-10-CM

## 2024-02-12 DIAGNOSIS — J96.12 CHRONIC RESPIRATORY FAILURE WITH HYPOXIA AND HYPERCAPNIA: ICD-10-CM

## 2024-02-12 DIAGNOSIS — E66.01 CLASS 3 SEVERE OBESITY DUE TO EXCESS CALORIES WITHOUT SERIOUS COMORBIDITY WITH BODY MASS INDEX (BMI) OF 45.0 TO 49.9 IN ADULT: Chronic | ICD-10-CM

## 2024-02-12 DIAGNOSIS — K21.9 GASTROESOPHAGEAL REFLUX DISEASE, UNSPECIFIED WHETHER ESOPHAGITIS PRESENT: Chronic | ICD-10-CM

## 2024-02-12 DIAGNOSIS — J42 CHRONIC BRONCHITIS, UNSPECIFIED CHRONIC BRONCHITIS TYPE: Primary | ICD-10-CM

## 2024-02-12 DIAGNOSIS — J96.11 CHRONIC RESPIRATORY FAILURE WITH HYPOXIA AND HYPERCAPNIA: ICD-10-CM

## 2024-02-12 DIAGNOSIS — R91.8 LUNG NODULES: Chronic | ICD-10-CM

## 2024-02-12 PROBLEM — E66.813 CLASS 3 SEVERE OBESITY DUE TO EXCESS CALORIES WITHOUT SERIOUS COMORBIDITY WITH BODY MASS INDEX (BMI) OF 45.0 TO 49.9 IN ADULT: Chronic | Status: ACTIVE | Noted: 2023-09-05

## 2024-02-12 PROCEDURE — 96372 THER/PROPH/DIAG INJ SC/IM: CPT | Performed by: NURSE PRACTITIONER

## 2024-02-12 PROCEDURE — 1159F MED LIST DOCD IN RCRD: CPT | Performed by: NURSE PRACTITIONER

## 2024-02-12 PROCEDURE — 99214 OFFICE O/P EST MOD 30 MIN: CPT | Performed by: NURSE PRACTITIONER

## 2024-02-12 PROCEDURE — 1160F RVW MEDS BY RX/DR IN RCRD: CPT | Performed by: NURSE PRACTITIONER

## 2024-02-12 RX ORDER — METHYLPREDNISOLONE ACETATE 40 MG/ML
80 INJECTION, SUSPENSION INTRA-ARTICULAR; INTRALESIONAL; INTRAMUSCULAR; SOFT TISSUE ONCE
Status: COMPLETED | OUTPATIENT
Start: 2024-02-12 | End: 2024-02-12

## 2024-02-12 RX ORDER — METHYLPREDNISOLONE ACETATE 80 MG/ML
80 INJECTION, SUSPENSION INTRA-ARTICULAR; INTRALESIONAL; INTRAMUSCULAR; SOFT TISSUE ONCE
Status: DISCONTINUED | OUTPATIENT
Start: 2024-02-12 | End: 2024-02-12

## 2024-02-12 RX ADMIN — METHYLPREDNISOLONE ACETATE 80 MG: 40 INJECTION, SUSPENSION INTRA-ARTICULAR; INTRALESIONAL; INTRAMUSCULAR; SOFT TISSUE at 13:44

## 2024-02-14 ENCOUNTER — HOSPITAL ENCOUNTER (OUTPATIENT)
Dept: PULMONOLOGY | Facility: HOSPITAL | Age: 69
Discharge: HOME | End: 2024-02-14
Admitting: NURSE PRACTITIONER
Payer: MEDICARE

## 2024-02-14 LAB
A-A DO2: ABNORMAL
ARTERIAL PATENCY WRIST A: ABNORMAL
ATMOSPHERIC PRESS: 754 MMHG
BASE EXCESS BLDA CALC-SCNC: 7.6 MMOL/L (ref 0–2)
BDY SITE: ABNORMAL
BODY TEMPERATURE: 37
COHGB MFR BLD: 1.2 % (ref 0–5)
GAS FLOW AIRWAY: 2 LPM
HCO3 BLDA-SCNC: 34 MMOL/L (ref 20–26)
HCT VFR BLD CALC: 36.7 % (ref 38–51)
HGB BLDA-MCNC: 12 G/DL (ref 12–16)
Lab: ABNORMAL
METHGB BLD QL: 0.4 % (ref 0–3)
MODALITY: ABNORMAL
OXYHGB MFR BLDV: 94.4 % (ref 94–99)
PCO2 BLDA: 55.1 MM HG (ref 35–45)
PCO2 TEMP ADJ BLD: 55.1 MM HG (ref 35–45)
PH BLDA: 7.4 PH UNITS (ref 7.35–7.45)
PH, TEMP CORRECTED: 7.4 PH UNITS (ref 7.35–7.45)
PO2 BLDA: 91.6 MM HG (ref 83–108)
PO2 TEMP ADJ BLD: 91.6 MM HG (ref 83–108)
POTASSIUM BLDA-SCNC: 4 MMOL/L (ref 3.5–5.2)
SAO2 % BLDCOA: 95.9 % (ref 94–99)
SODIUM BLDA-SCNC: 143 MMOL/L (ref 136–145)
VENTILATOR MODE: ABNORMAL

## 2024-02-14 PROCEDURE — 36600 WITHDRAWAL OF ARTERIAL BLOOD: CPT

## 2024-02-14 PROCEDURE — 82805 BLOOD GASES W/O2 SATURATION: CPT

## 2024-02-14 PROCEDURE — 83050 HGB METHEMOGLOBIN QUAN: CPT

## 2024-02-14 PROCEDURE — 82375 ASSAY CARBOXYHB QUANT: CPT

## 2024-02-17 NOTE — PROGRESS NOTES
"    Subjective:     Encounter Date:10/12/2023      Patient ID: Danisha Cannon is a 68 y.o. female     Chief Complaint: \"no complaints\"  Atrial Fibrillation  Presents for follow-up visit. Symptoms are negative for dizziness, palpitations, shortness of breath and syncope. The symptoms have been stable. Past medical history includes atrial fibrillation and hyperlipidemia.   Shortness of Breath  Pertinent negatives include no leg swelling, orthopnea, PND, sputum production, syncope or wheezing.   Hyperlipidemia  This is a chronic problem. The current episode started more than 1 year ago. The problem is controlled. Pertinent negatives include no shortness of breath.     Patient presents today for a follow up. Patient has been followed for PAF s/p ablation and SVT s/p AVNRT ablation in 8/2019 per Dr. Guerin. She underwent watchman implant on 2/21/21 per Dr. Guerin at [facility]. She has since transferred her EP care to Dr. Luu whom she saw in Sept and her Flecainide was increased to 100mg BID. Due to her chronic use of Lasix, she was advised to continue to follow with general cardiology for management of diuretic.     She reports she has felt well. She has lost 25lbs after being placed on a diet per Dr. Luu with current home weight at 260lbs. She has not experienced any episodes of afib since her last visit with EP. She is unable to tolerate taking her lasix every other day due to \"fluid retention\" with associated edema, therefore continues to take her lasix daily. She notes her edema is well controlled with leg elevation and daily lasix use. She monitors her BP occasionally at home and reports generalized readings of 120/60. She denies chest pain, palpitations, dyspnea, edema, orthopnea and PND.     The following portions of the patient's history were reviewed and updated as appropriate: allergies, current medications, past family history, past medical history, past social history, past surgical history and problem " Assessment: 88M PMHx ACarissa meza previously on Eliquis, CHF (EF 45-50%, combined systolic and diastolic), CVA, HTN, COPD, CKD3, multiple hospitalizations for CHF exacerbation in the prior months, left sided pleural effusion that was drained last hospitalization, admitted to Health system on 2/6/2024 for CHF exacerbation and acute on chronic hypoxic respiratory failure 2/2 large left pleural effusion. TTE on 2/7 showed EF 55-60%, pulmonary hypertension, moderate mitral stenosis. He was evaluated by Pulmonary and had Thoracentesis on 2/8, 1 L bloody output drained.  Pulmonology recommended transfer to [facility] for Pleurex catheter and possible EUS for lymph node biopsy.  Patient placed on IV lasix for diuresis.    Plan  - Tentative plan for Pleurx cath placement this week  - CXR w/ mod-large pleural effusion; Pt stable on 2L NC  - No plan today for thoracentesis  - Daily CXR  - Agree with diuresis  - Monitor resp status  - Please call w/ any further concerns    Thoracic Surgery  [id number]   list.    Allergies   Allergen Reactions    Other Rash     Dial soap  Redness and swelling on skin and burning sensation    Codeine Dizziness and Nausea Only     Rapid heart rate, dizziness  NAUSEA    Mobic [Meloxicam] Rash    Morphine And Related Itching    Azithromycin Other (See Comments)    Cyclobenzaprine Unknown - Low Severity       Current Outpatient Medications:     albuterol sulfate  (90 Base) MCG/ACT inhaler, INHALE 2 PUFFS BY MOUTH EVERY 4 HOURS AS NEEDED FOR WHEEZING AND SHORTNESS OF BREATH, Disp: , Rfl:     amitriptyline (ELAVIL) 75 MG tablet, Take 1 tablet by mouth Every Night., Disp: , Rfl:     ASPIRIN 81 PO, Take 1 tablet by mouth Daily., Disp: , Rfl:     azelastine (ASTELIN) 0.1 % nasal spray, 2 sprays into the nostril(s) as directed by provider 2 (Two) Times a Day. Use in each nostril as directed, Disp: 30 mL, Rfl: 11    dexlansoprazole (Dexilant) 60 MG capsule, Take 1 capsule by mouth Daily., Disp: 30 capsule, Rfl: 11    diclofenac (VOLTAREN) 75 MG EC tablet, Take 1 tablet by mouth Daily. (Patient taking differently: Take 1 tablet by mouth 2 (Two) Times a Day.), Disp: 90 tablet, Rfl: 1    donepezil (ARICEPT) 10 MG tablet, Take 1 tablet by mouth Daily., Disp: , Rfl:     DULoxetine (CYMBALTA) 60 MG capsule, TAKE 1 CAPSULE BY MOUTH TWICE DAILY, Disp: 60 capsule, Rfl: 5    flecainide (TAMBOCOR) 100 MG tablet, Take 1 tablet by mouth Every 12 (Twelve) Hours., Disp: 180 tablet, Rfl: 3    fluticasone (Flovent HFA) 110 MCG/ACT inhaler, Inhale 1 puff 2 (Two) Times a Day., Disp: , Rfl:     furosemide (LASIX) 40 MG tablet, Take 1 tablet by mouth Daily., Disp: 90 tablet, Rfl: 3    gabapentin (NEURONTIN) 800 MG tablet, Take 1 tablet by mouth 2 (Two) Times a Day., Disp: , Rfl:     levothyroxine (SYNTHROID, LEVOTHROID) 137 MCG tablet, Take 1 tablet by mouth Every Morning., Disp: 90 tablet, Rfl: 3    LORazepam (ATIVAN) 1 MG tablet, Take 1 tablet by mouth 2 (Two) Times a Day As Needed., Disp: , Rfl:      metoprolol succinate XL (TOPROL-XL) 25 MG 24 hr tablet, Take 1 tablet by mouth Daily., Disp: , Rfl:     montelukast (SINGULAIR) 10 MG tablet, Take 1 tablet by mouth Daily., Disp: , Rfl:     O2 (OXYGEN), Inhale 3.5 L/min 1 (One) Time. NOC, Disp: , Rfl:     rOPINIRole (REQUIP) 3 MG tablet, 2 tablets each morning and 3 tablets at bedtime. (Patient taking differently: 2 tablets each morning), Disp: 450 tablet, Rfl: 3    rosuvastatin (CRESTOR) 5 MG tablet, 1 tablet Daily., Disp: , Rfl:     triamcinolone (KENALOG) 0.1 % paste, APPLY TO THE AFFECTED AREA THREE TIMES DAILY AS NEEDED, Disp: , Rfl:     fluticasone (FLONASE) 50 MCG/ACT nasal spray, 2 sprays into the nostril(s) as directed by provider Daily As Needed for Rhinitis or Allergies for up to 30 days., Disp: 16 g, Rfl: 11    levocetirizine (XYZAL) 5 MG tablet, Take 1 tablet by mouth Every Evening for 30 days., Disp: 30 tablet, Rfl: 11  Past Medical History:   Diagnosis Date    Allergic rhinitis 1975    Anemia     Anxiety and depression     Arthritis     Asthma 2017    Atrial fibrillation     CHF (congestive heart failure) 2021    Chronic cough     Disease of thyroid gland     DVT (deep venous thrombosis)     GERD (gastroesophageal reflux disease)     Headache 1973    History of incision and drainage     Right knee    History of staph infection     right knee, and upper right thigh    Hyperlipidemia     Hypothyroidism 2003    Infection      in right knee to bone, cleaned it out and put new hardware with antibiotic and pt developed hole in incision    Inflammation of vein     in leg and leaking    Neuropathy, peripheral     Pneumonia     RECENT DX PER FAMILY DOCTOR    PONV (postoperative nausea and vomiting)     Restless leg syndrome     Sleep apnea with use of continuous positive airway pressure (CPAP)     bipap    SVT (supraventricular tachycardia)        Social History     Socioeconomic History    Marital status:    Tobacco Use    Smoking status: Never      Passive exposure: Never    Smokeless tobacco: Never    Tobacco comments:     Exposed to second hand smoke for 22 years, both parents smoked   Vaping Use    Vaping Use: Never used   Substance and Sexual Activity    Alcohol use: No    Drug use: No    Sexual activity: Defer       Review of Systems   Constitutional: Negative for malaise/fatigue, weight gain and weight loss.   Cardiovascular:  Negative for dyspnea on exertion, irregular heartbeat, leg swelling, near-syncope, orthopnea, palpitations, paroxysmal nocturnal dyspnea and syncope.   Respiratory:  Negative for cough, shortness of breath, sleep disturbances due to breathing, sputum production and wheezing.    Skin:  Negative for dry skin, flushing and itching.   Gastrointestinal:  Negative for hematemesis and hematochezia.   Neurological:  Negative for dizziness, light-headedness and loss of balance.   All other systems reviewed and are negative.         Objective:     Vitals reviewed.   Constitutional:       General: Not in acute distress.     Appearance: Well-developed. Morbidly obese. Not diaphoretic.      Interventions: Nasal cannula in place.   Eyes:      General: No scleral icterus.     Conjunctiva/sclera: Conjunctivae normal.      Pupils: Pupils are equal, round, and reactive to light.   HENT:      Head: Normocephalic.    Mouth/Throat:      Pharynx: No oropharyngeal exudate.   Pulmonary:      Effort: Pulmonary effort is normal. No respiratory distress.      Breath sounds: Examination of the right-lower field reveals decreased breath sounds. Examination of the left-lower field reveals decreased breath sounds. Decreased breath sounds present. No wheezing. No rales.   Chest:      Chest wall: Not tender to palpatation.   Cardiovascular:      Normal rate. Regular rhythm.      Murmurs: There is no murmur.   Pulses:     Intact distal pulses.   Edema:     Peripheral edema absent.   Abdominal:      General: Bowel sounds are normal. There is no distension.       "Palpations: Abdomen is soft.      Tenderness: There is no abdominal tenderness.   Musculoskeletal: Normal range of motion.      Cervical back: Normal range of motion and neck supple. Skin:     General: Skin is warm and dry.      Coloration: Skin is not pale.      Findings: No erythema or rash.   Neurological:      Mental Status: Alert and oriented to person, place, and time.      Deep Tendon Reflexes: Reflexes are normal and symmetric.   Psychiatric:         Behavior: Behavior normal.           ECG 12 Lead    Date/Time: 10/12/2023 2:07 PM  Performed by: Barbara De Dios APRN    Authorized by: Barbara De Dios APRN  Comparison: compared with previous ECG from 9/1/2023  Similar to previous ECG  Rhythm: sinus bradycardia  Ectopy: atrial premature contractions  Rate: bradycardic  BPM: 58  Conduction: left anterior fascicular block  ST Segments: ST segments normal  QRS axis: left    Clinical impression: abnormal EKG        BP 98/56 (BP Location: Right arm, Patient Position: Sitting, Cuff Size: Adult)   Pulse 58   Ht 162.6 cm (64\")   Wt 118 kg (260 lb)   LMP  (LMP Unknown)   SpO2 97%   BMI 44.63 kg/mý     Lab Review:   I have reviewed previous office notes, EP records, recent labs and recent cardiac testing.         Assessment:          Diagnosis Plan   1. PAF (paroxysmal atrial fibrillation)        2. AVNRT (AV juanita re-entry tachycardia)        3. Morbid obesity with BMI of 40.0-44.9, adult        4. Obstructive sleep apnea        5. Venous insufficiency               Plan:       1. PAF- stable. NSR today. s/p ablation in 8/2019. Being managed per Dr. Luu. Continue Flecainide,and metoprolol as directed by EP. Not anticoagulated- s/p watchman.     2. SVT- s/p ablation per Dr. Guerin.    3. BMI- Patient's Body mass index is 44.63 kg/mý. indicating that she is morbidly obese (BMI > 40 or > 35 with obesity - related health condition). Obesity-related health conditions include the following: obstructive sleep apnea. " Obesity is unchanged. BMI is is above average; BMI management plan is completed. We discussed portion control and increasing exercise..  4. TRISTON- complaint.   5. Venous insufficiency- well managed with elevation of legs and lasix use. Unsure why cardiology was asked to refill her lasix as it was previously prescribed by her PCP. She does not require a visit with general cardiology and EP at this time. I advised her to ask her PCP to take over refills for her lasix as it is not used for a cardiac disease. If PCP is willing to take over refills, she will not need a follow up with general cardiology.     Follow up in 1 year or return sooner if symptoms worsen.     I spent 30 minutes caring for Danisha on this date of service. This time includes time spent by me in the following activities:preparing for the visit, reviewing tests, obtaining and/or reviewing a separately obtained history, performing a medically appropriate examination and/or evaluation , ordering medications, tests, or procedures, documenting information in the medical record and independently interpreting results and communicating that information with the patient/family/caregiver    I spent 2 minutes on the separately reported service of EKG interpretation. This time is not included in the time used to support the E/M service also reported today.

## 2024-02-20 ENCOUNTER — OFFICE VISIT (OUTPATIENT)
Dept: NEUROSURGERY | Facility: CLINIC | Age: 69
End: 2024-02-20
Payer: MEDICARE

## 2024-02-20 VITALS — HEIGHT: 63 IN | WEIGHT: 266 LBS | BODY MASS INDEX: 47.13 KG/M2

## 2024-02-20 DIAGNOSIS — Z78.9 NONSMOKER: ICD-10-CM

## 2024-02-20 DIAGNOSIS — J96.12 CHRONIC RESPIRATORY FAILURE WITH HYPOXIA AND HYPERCAPNIA: ICD-10-CM

## 2024-02-20 DIAGNOSIS — M41.85 OTHER FORM OF SCOLIOSIS OF THORACOLUMBAR SPINE: Primary | ICD-10-CM

## 2024-02-20 DIAGNOSIS — E66.01 CLASS 3 SEVERE OBESITY DUE TO EXCESS CALORIES WITHOUT SERIOUS COMORBIDITY WITH BODY MASS INDEX (BMI) OF 45.0 TO 49.9 IN ADULT: ICD-10-CM

## 2024-02-20 DIAGNOSIS — M51.36 LUMBAR DEGENERATIVE DISC DISEASE: ICD-10-CM

## 2024-02-20 DIAGNOSIS — J96.11 CHRONIC RESPIRATORY FAILURE WITH HYPOXIA AND HYPERCAPNIA: ICD-10-CM

## 2024-02-20 PROCEDURE — 1160F RVW MEDS BY RX/DR IN RCRD: CPT | Performed by: NURSE PRACTITIONER

## 2024-02-20 PROCEDURE — 1159F MED LIST DOCD IN RCRD: CPT | Performed by: NURSE PRACTITIONER

## 2024-02-20 PROCEDURE — 99213 OFFICE O/P EST LOW 20 MIN: CPT | Performed by: NURSE PRACTITIONER

## 2024-02-20 RX ORDER — GABAPENTIN 300 MG/1
1 CAPSULE ORAL 3 TIMES DAILY
COMMUNITY
Start: 2024-02-15

## 2024-02-20 NOTE — PROGRESS NOTES
"    Chief complaint:   Chief Complaint   Patient presents with    Back Pain     Pt here for f/u/e. Pt states she has been having constant lbp. Pt states she has not had any physical therapy, pain mgmt or seen a chiropractor. Pt walking with a walker today.         Subjective     HPI: This is a 68-year-old female patient who was referred to us by Dr. Tc Bee for back pain.  She is here to be evaluated today.  The patient says that she has been dealing with back pain for about 5 months.  She also does have problems with peripheral neuropathy as well as some vascular issues in her left lower extremity.  Currently she says the pain in her back is intermittent.  Is worse with prolonged sitting and better with laying down.  She is not really describing any radicular components from her leg issues.  She has been using a walker for the last 6 years in regards to her vascular and peripheral neuropathy issues.  Denies any bowel or bladder incontinence.  She has not done any recent physical therapy or chiropractic care pain management injections.  She is left-hand dominant.  She is .  Denies any tobacco, alcohol, or illicit drug use.     We did send the patient for imaging and she is here in follow-up today.  She is continue to complain of back pain.  She is walking with a walker.  He is not really complaining of any lower extremity pain or numbness and tingling.    Review of Systems   Musculoskeletal:  Positive for back pain and gait problem.   Psychiatric/Behavioral: Negative.           Objective      Vital Signs  Ht 158.8 cm (62.5\")   Wt 121 kg (266 lb)   LMP  (LMP Unknown)   BMI 47.88 kg/m²     Physical Exam  Constitutional:       Appearance: Normal appearance. She is well-developed. She is morbidly obese.   HENT:      Head: Normocephalic.   Eyes:      General: Lids are normal.      Extraocular Movements: EOM normal.      Conjunctiva/sclera: Conjunctivae normal.      Pupils: Pupils are equal, round, and " reactive to light.   Pulmonary:      Effort: Pulmonary effort is normal.      Breath sounds: Normal breath sounds.   Musculoskeletal:         General: Normal range of motion.      Cervical back: Normal range of motion.   Skin:     General: Skin is warm.   Neurological:      Mental Status: She is alert and oriented to person, place, and time.      GCS: GCS eye subscore is 4. GCS verbal subscore is 5. GCS motor subscore is 6.      Cranial Nerves: No cranial nerve deficit.      Sensory: No sensory deficit.      Motor: Motor strength is normal.     Gait: Gait abnormal.      Deep Tendon Reflexes: Reflexes are normal and symmetric. Reflexes normal.      Comments: Walking independently with a walker   Psychiatric:         Speech: Speech normal.         Behavior: Behavior normal.         Thought Content: Thought content normal.         Neurologic Exam     Mental Status   Oriented to person, place, and time.   Attention: normal. Concentration: normal.   Speech: speech is normal   Level of consciousness: alert  Normal comprehension.     Cranial Nerves     CN II   Visual fields full to confrontation.     CN III, IV, VI   Pupils are equal, round, and reactive to light.  Extraocular motions are normal.     CN V   Facial sensation intact.     CN VII   Facial expression full, symmetric.     CN VIII   CN VIII normal.     CN IX, X   CN IX normal.   CN X normal.     CN XI   CN XI normal.     CN XII   CN XII normal.     Motor Exam   Muscle bulk: normal    Strength   Strength 5/5 throughout.     Sensory Exam   Light touch normal.     Gait, Coordination, and Reflexes     Reflexes   Right ankle clonus: absent  Left ankle clonus: absent      Imaging review: MRI of the lumbar spine that was on November 21, 2023 shows a degenerative scoliotic deformity of the lumbar spine.  At L5-S1 there is left-sided foraminal narrowing along with disc degeneration.  At L4-5 disc degeneration with bilateral foraminal narrowing.  At L3-4 disc degeneration  with bilateral foraminal narrowing and facet arthropathy.  At L2-3 left-sided foraminal narrowing.  At L 1-2 bilateral foraminal narrowing with the right being worse than the left.  The conus does terminate at L1.  No cord signal change.  Facet arthropathy is noted in the thoracic spine but no significant thoracic stenosis is noted.  There is also noted a nodule behind S2.     MRI of the lumbar spine with and without contrast did show the enhancing nodule in the sacral thecal sac.  Alternative diagnosis was a drop metastasis however in the absence of any concern for any metastatic disease in the chest abdomen pelvis this is felt less likely.  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  No fractures were noted.  No abnormal motion noted in flexion or extension.  Scoliosis x-rays also note flattened thoracic spine.        CT scan of the chest shows stable pulmonary nodules.  No concern for metastatic disease    CT scan of the abdomen and pelvis does not show any concern for metastatic disease.  There is a ventral hernia noted.  There is diverticulitis.    Assessment/Plan: The patient is continue to complain of back pain.  There is no evidence of any metastatic disease on her workup.  I would have the patient go for dedicated course of physical therapy.  I will also have her go for an evaluation with pain management with [facility] to see about injections.  Will have her follow-up with Dr. Dill.  Her questions and concerns were addressed.    Patient is a nonsmoker  The patient's Body mass index is 47.88 kg/m².. BMI is above normal parameters. Recommendations include: educational material and nutrition counseling  Advance Care Planning   ACP discussion was held with the patient during this visit. Patient does not have an advance directive, information provided.   STEADI Fall Risk Assessment has not been completed.     Diagnoses  and all orders for this visit:    1. Other form of scoliosis of thoracolumbar spine (Primary)  -     Ambulatory Referral to Pain Management Clinic  -     Ambulatory Referral to Physical Therapy Evaluate and treat, Neuro; Strengthening, ROM, Stretching; Full weight bearing    2. Lumbar degenerative disc disease  -     Ambulatory Referral to Pain Management Clinic  -     Ambulatory Referral to Physical Therapy Evaluate and treat, Neuro; Strengthening, ROM, Stretching; Full weight bearing    3. Class 3 severe obesity due to excess calories without serious comorbidity with body mass index (BMI) of 45.0 to 49.9 in adult    4. Nonsmoker        I discussed the patients findings and my recommendations with patient  Claudio Sargent, APRN  02/20/24  13:48 CST

## 2024-02-20 NOTE — PATIENT INSTRUCTIONS
Advance Care Planning and Advance Directives     You make decisions on a daily basis - decisions about where you want to live, your career, your home, your life. Perhaps one of the most important decisions you face is your choice for future medical care. Take time to talk with your family and your healthcare team and start planning today.  Advance Care Planning is a process that can help you:  Understand possible future healthcare decisions in light of your own experiences  Reflect on those decision in light of your goals and values  Discuss your decisions with those closest to you and the healthcare professionals that care for you  Make a plan by creating a document that reflects your wishes    Surrogate Decision Maker  In the event of a medical emergency, which has left you unable to communicate or to make your own decisions, you would need someone to make decisions for you.  It is important to discuss your preferences for medical treatment with this person while you are in good health.     Qualities of a surrogate decision maker:  Willing to take on this role and responsibility  Knows what you want for future medical care  Willing to follow your wishes even if they don't agree with them  Able to make difficult medical decisions under stressful circumstances    Advance Directives  These are legal documents you can create that will guide your healthcare team and decision maker(s) when needed. These documents can be stored in the electronic medical record.    Living Will - a legal document to guide your care if you have a terminal condition or a serious illness and are unable to communicate. States vary by statute in document names/types, but most forms may include one or more of the following:        -  Directions regarding life-prolonging treatments        -  Directions regarding artificially provided nutrition/hydration        -  Choosing a healthcare decision maker        -  Direction regarding organ/tissue  donation    Durable Power of  for Healthcare - this document names an -in-fact to make medical decisions for you, but it may also allow this person to make personal and financial decisions for you. Please seek the advice of an  if you need this type of document.    **Advance Directives are not required and no one may discriminate against you if you do not sign one.    Medical Orders  Many states allow specific forms/orders signed by your physician to record your wishes for medical treatment in your current state of health. This form, signed in personal communication with your physician, addresses resuscitation and other medical interventions that you may or may not want.      For more information or to schedule a time with a [facility] Advance Care Planning Facilitator contact: [facility] or call [phone] and someone will contact you directly.BMI for Adults  What is BMI?  Body mass index (BMI) is a number that is calculated from a person's weight and height. BMI can help estimate how much of a person's weight is composed of fat. BMI does not measure body fat directly. Rather, it is an alternative to procedures that directly measure body fat, which can be difficult and expensive.  BMI can help identify people who may be at higher risk for certain medical problems.  What are BMI measurements used for?  BMI is used as a screening tool to identify possible weight problems. It helps determine whether a person is obese, overweight, a healthy weight, or underweight.  BMI is useful for:  Identifying a weight problem that may be related to a medical condition or may increase the risk for medical problems.  Promoting changes, such as changes in diet and exercise, to help reach a healthy weight. BMI screening can be repeated to see if these changes are working.  How is BMI calculated?  BMI involves measuring your weight in relation to your height. Both height and weight are measured,  "and the BMI is calculated from those numbers. This can be done either in English ([place] or metric measurements. Note that charts and online BMI calculators are available to help you find your BMI quickly and easily without having to do these calculations yourself.  To calculate your BMI in English ([place] measurements:    Measure your weight in pounds (lb).  Multiply the number of pounds by 703.  For example, for a person who weighs 180 lb, multiply that number by 703, which equals 126,540.  Measure your height in inches. Then multiply that number by itself to get a measurement called \"inches squared.\"  For example, for a person who is 70 inches tall, the \"inches squared\" measurement is 70 inches x 70 inches, which equals 4,900 inches squared.  Divide the total from step 2 (number of lb x 703) by the total from step 3 (inches squared): 126,540 ÷ 4,900 = 25.8. This is your BMI.    To calculate your BMI in metric measurements:  Measure your weight in kilograms (kg).  Measure your height in meters (m). Then multiply that number by itself to get a measurement called \"meters squared.\"  For example, for a person who is 1.75 m tall, the \"meters squared\" measurement is 1.75 m x 1.75 m, which is equal to 3.1 meters squared.  Divide the number of kilograms (your weight) by the meters squared number. In this example: 70 ÷ 3.1 = 22.6. This is your BMI.  What do the results mean?  BMI charts are used to identify whether you are underweight, normal weight, overweight, or obese. The following guidelines will be used:  Underweight: BMI less than 18.5.  Normal weight: BMI between 18.5 and 24.9.  Overweight: BMI between 25 and 29.9.  Obese: BMI of 30 or above.  Keep these notes in mind:  Weight includes both fat and muscle, so someone with a muscular build, such as an athlete, may have a BMI that is higher than 24.9. In cases like these, BMI is not an accurate measure of body fat.  To determine if excess body fat is the cause of a BMI " "of 25 or higher, further assessments may need to be done by a health care provider.  BMI is usually interpreted in the same way for men and women.  Where to find more information  For more information about BMI, including tools to quickly calculate your BMI, go to these websites:  Centers for Disease Control and Prevention: www.cdc.gov  American Heart Association: www.heart.org  National Heart, Lung, and Blood [place]: www.nhlbi.nih.gov  Summary  Body mass index (BMI) is a number that is calculated from a person's weight and height.  BMI may help estimate how much of a person's weight is composed of fat. BMI can help identify those who may be at higher risk for certain medical problems.  BMI can be measured using English measurements or metric measurements.  BMI charts are used to identify whether you are underweight, normal weight, overweight, or obese.  This information is not intended to replace advice given to you by your health care provider. Make sure you discuss any questions you have with your health care provider.  Document Revised: 09/09/2020 Document Reviewed: 07/17/2020  [employer] Patient Education © 2021 [employer] https://www.nhlbi.nih.gov/files/docs/public/heart/dash_brief.pdf\">   DASH Eating Plan  DASH stands for Dietary Approaches to Stop Hypertension. The DASH eating plan is a healthy eating plan that has been shown to:  Reduce high blood pressure (hypertension).  Reduce your risk for type 2 diabetes, heart disease, and stroke.  Help with weight loss.  What are tips for following this plan?  Reading food labels  Check food labels for the amount of salt (sodium) per serving. Choose foods with less than 5 percent of the Daily Value of sodium. Generally, foods with less than 300 milligrams (mg) of sodium per serving fit into this eating plan.  To find whole grains, look for the word \"whole\" as the first word in the ingredient list.  Shopping  Buy products labeled as \"low-sodium\" or \"no salt " "added.\"  Buy fresh foods. Avoid canned foods and pre-made or frozen meals.  Cooking  Avoid adding salt when cooking. Use salt-free seasonings or herbs instead of table salt or sea salt. Check with your health care provider or pharmacist before using salt substitutes.  Do not thompson foods. Cook foods using healthy methods such as baking, boiling, grilling, roasting, and broiling instead.  Cook with heart-healthy oils, such as olive, canola, avocado, soybean, or sunflower oil.  Meal planning    Eat a balanced diet that includes:  4 or more servings of fruits and 4 or more servings of vegetables each day. Try to fill one-half of your plate with fruits and vegetables.  6-8 servings of whole grains each day.  Less than 6 oz (170 g) of lean meat, poultry, or fish each day. A 3-oz (85-g) serving of meat is about the same size as a deck of cards. One egg equals 1 oz (28 g).  2-3 servings of low-fat dairy each day. One serving is 1 cup (237 mL).  1 serving of nuts, seeds, or beans 5 times each week.  2-3 servings of heart-healthy fats. Healthy fats called omega-3 fatty acids are found in foods such as walnuts, flaxseeds, fortified milks, and eggs. These fats are also found in cold-water fish, such as sardines, salmon, and mackerel.  Limit how much you eat of:  Canned or prepackaged foods.  Food that is high in trans fat, such as some fried foods.  Food that is high in saturated fat, such as fatty meat.  Desserts and other sweets, sugary drinks, and other foods with added sugar.  Full-fat dairy products.  Do not salt foods before eating.  Do not eat more than 4 egg yolks a week.  Try to eat at least 2 vegetarian meals a week.  Eat more home-cooked food and less restaurant, buffet, and fast food.    Lifestyle  When eating at a restaurant, ask that your food be prepared with less salt or no salt, if possible.  If you drink alcohol:  Limit how much you use to:  0-1 drink a day for women who are not pregnant.  0-2 drinks a day for " men.  Be aware of how much alcohol is in your drink. In the [place] one drink equals one 12 oz bottle of beer (355 mL), one 5 oz glass of wine (148 mL), or one 1½ oz glass of hard liquor (44 mL).  General information  Avoid eating more than 2,300 mg of salt a day. If you have hypertension, you may need to reduce your sodium intake to 1,500 mg a day.  Work with your health care provider to maintain a healthy body weight or to lose weight. Ask what an ideal weight is for you.  Get at least 30 minutes of exercise that causes your heart to beat faster (aerobic exercise) most days of the week. Activities may include walking, swimming, or biking.  Work with your health care provider or dietitian to adjust your eating plan to your individual calorie needs.  What foods should I eat?  Fruits  All fresh, dried, or frozen fruit. Canned fruit in natural juice (without added sugar).  Vegetables  Fresh or frozen vegetables (raw, steamed, roasted, or grilled). Low-sodium or reduced-sodium tomato and vegetable juice. Low-sodium or reduced-sodium tomato sauce and tomato paste. Low-sodium or reduced-sodium canned vegetables.  Grains  Whole-grain or whole-wheat bread. Whole-grain or whole-wheat pasta. Brown rice. Oatmeal. Quinoa. Bulgur. Whole-grain and low-sodium cereals. Sharon bread. Low-fat, low-sodium crackers. Whole-wheat flour tortillas.  Meats and other proteins  Skinless chicken or turkey. Ground chicken or turkey. Pork with fat trimmed off. Fish and seafood. Egg whites. Dried beans, peas, or lentils. Unsalted nuts, nut butters, and seeds. Unsalted canned beans. Lean cuts of beef with fat trimmed off. Low-sodium, lean precooked or cured meat, such as sausages or meat loaves.  Dairy  Low-fat (1%) or fat-free (skim) milk. Reduced-fat, low-fat, or fat-free cheeses. Nonfat, low-sodium ricotta or cottage cheese. Low-fat or nonfat yogurt. Low-fat, low-sodium cheese.  Fats and oils  Soft margarine without trans fats. Vegetable oil.  Reduced-fat, low-fat, or light mayonnaise and salad dressings (reduced-sodium). Canola, safflower, olive, avocado, soybean, and sunflower oils. Avocado.  Seasonings and condiments  Herbs. Spices. Seasoning mixes without salt.  Other foods  Unsalted popcorn and pretzels. Fat-free sweets.  The items listed above may not be a complete list of foods and beverages you can eat. Contact a dietitian for more information.  What foods should I avoid?  Fruits  Canned fruit in a light or heavy syrup. Fried fruit. Fruit in cream or butter sauce.  Vegetables  Creamed or fried vegetables. Vegetables in a cheese sauce. Regular canned vegetables (not low-sodium or reduced-sodium). Regular canned tomato sauce and paste (not low-sodium or reduced-sodium). Regular tomato and vegetable juice (not low-sodium or reduced-sodium). Pickles. Olives.  Grains  Baked goods made with fat, such as croissants, muffins, or some breads. Dry pasta or rice meal packs.  Meats and other proteins  Fatty cuts of meat. Ribs. Fried meat. Askew. Bologna, salami, and other precooked or cured meats, such as sausages or meat loaves. Fat from the back of a pig (fatback). Bratwurst. Salted nuts and seeds. Canned beans with added salt. Canned or smoked fish. Whole eggs or egg yolks. Chicken or turkey with skin.  Dairy  Whole or 2% milk, cream, and half-and-half. Whole or full-fat cream cheese. Whole-fat or sweetened yogurt. Full-fat cheese. Nondairy creamers. Whipped toppings. Processed cheese and cheese spreads.  Fats and oils  Butter. Stick margarine. Lard. Shortening. Ghee. Askew fat. Tropical oils, such as coconut, palm kernel, or palm oil.  Seasonings and condiments  Onion salt, garlic salt, seasoned salt, table salt, and sea salt. Worcestershire sauce. Tartar sauce. Barbecue sauce. Teriyaki sauce. Soy sauce, including reduced-sodium. Steak sauce. Canned and packaged gravies. Fish sauce. Oyster sauce. Cocktail sauce. Store-bought horseradish. Ketchup. Mustard.  Meat flavorings and tenderizers. Bouillon cubes. Hot sauces. Pre-made or packaged marinades. Pre-made or packaged taco seasonings. Relishes. Regular salad dressings.  Other foods  Salted popcorn and pretzels.  The items listed above may not be a complete list of foods and beverages you should avoid. Contact a dietitian for more information.  Where to find more information  National Heart, Lung, and Blood [place]: www.nhlbi.nih.gov  American Heart Association: www.heart.org  Academy of Nutrition and Dietetics: www.eatright.org  National Kidney Foundation: www.kidney.org  Summary  The DASH eating plan is a healthy eating plan that has been shown to reduce high blood pressure (hypertension). It may also reduce your risk for type 2 diabetes, heart disease, and stroke.  When on the DASH eating plan, aim to eat more fresh fruits and vegetables, whole grains, lean proteins, low-fat dairy, and heart-healthy fats.  With the DASH eating plan, you should limit salt (sodium) intake to 2,300 mg a day. If you have hypertension, you may need to reduce your sodium intake to 1,500 mg a day.  Work with your health care provider or dietitian to adjust your eating plan to your individual calorie needs.  This information is not intended to replace advice given to you by your health care provider. Make sure you discuss any questions you have with your health care provider.  Document Revised: 11/20/2020 Document Reviewed: 11/20/2020  [employer] Patient Education © 2021 [employer] High-Protein and High-Calorie Diet  Eating high-protein and high-calorie foods can help you to gain weight, heal after an injury, and recover after an illness or surgery. The specific amount of daily protein and calories you need depends on:  Your body weight.  The reason this diet is recommended for you.  What is my plan?  Generally, a high-protein, high-calorie diet involves:  Eating 250-500 extra calories each day.  Making sure that you get enough of your  daily calories from protein. Ask your health care provider how many of your calories should come from protein.  Talk with a health care provider, such as a diet and nutrition specialist (dietitian), about how much protein and how many calories you need each day. Follow the diet as directed by your health care provider.  What are tips for following this plan?    Preparing meals  Add whole milk, half-and-half, or heavy cream to cereal, pudding, soup, or hot cocoa.  Add whole milk to instant breakfast drinks.  Add peanut butter to oatmeal or smoothies.  Add powdered milk to baked goods, smoothies, or milkshakes.  Add powdered milk, cream, or butter to mashed potatoes.  Add cheese to cooked vegetables.  Make whole-milk yogurt parfaits. Top them with granola, fruit, or nuts.  Add cottage cheese to your fruit.  Add avocado, cheese, or both to sandwiches or salads.  Add meat, poultry, or seafood to rice, pasta, casseroles, salads, and soups.  Use mayonnaise when making egg salad, chicken salad, or tuna salad.  Use peanut butter as a dip for vegetables or as a topping for pretzels, celery, or crackers.  Add beans to casseroles, dips, and spreads.  Add pureed beans to sauces and soups.  Replace calorie-free drinks with calorie-containing drinks, such as milk and fruit juice.  Replace water with milk or heavy cream when making foods such as oatmeal, pudding, or cocoa.  General instructions  Ask your health care provider if you should take a nutritional supplement.  Try to eat six small meals each day instead of three large meals.  Eat a balanced diet. In each meal, include one food that is high in protein.  Keep nutritious snacks available, such as nuts, trail mixes, dried fruit, and yogurt.  If you have kidney disease or diabetes, talk with your health care provider about how much protein is safe for you. Too much protein may put extra stress on your kidneys.  Drink your calories. Choose high-calorie drinks and have them  after your meals.  What high-protein foods should I eat?    Vegetables  Soybeans. Peas.  Grains  Quinoa. Bulgur wheat.  Meats and other proteins  Beef, pork, and poultry. Fish and seafood. Eggs. Tofu. Textured vegetable protein (TVP). Peanut butter. Nuts and seeds. Dried beans. Protein powders.  Dairy  Whole milk. Whole-milk yogurt. Powdered milk. Cheese. Cottage Cheese. Eggnog.  Beverages  High-protein supplement drinks. Soy milk.  Other foods  Protein bars.  The items listed above may not be a complete list of high-protein foods and beverages. Contact a dietitian for more options.  What high-calorie foods should I eat?  Fruits  Dried fruit. Fruit leather. Canned fruit in syrup. Fruit juice. Avocado.  Vegetables  Vegetables cooked in oil or butter. Fried potatoes.  Grains  Pasta. Quick breads. Muffins. Pancakes. Ready-to-eat cereal.  Meats and other proteins  Peanut butter. Nuts and seeds.  Dairy  Heavy cream. Whipped cream. Cream cheese. Sour cream. Ice cream. Custard. Pudding.  Beverages  Meal-replacement beverages. Nutrition shakes. Fruit juice. Sugar-sweetened soft drinks.  Seasonings and condiments  Salad dressing. Mayonnaise. Bryant sauce. Fruit preserves or jelly. Honey. Syrup.  Sweets and desserts  Cake. Cookies. Pie. Pastries. Candy bars. Chocolate.  Fats and oils  Butter or margarine. Oil. Gravy.  Other foods  Meal-replacement bars.  The items listed above may not be a complete list of high-calorie foods and beverages. Contact a dietitian for more options.  Summary  A high-protein, high-calorie diet can help you gain weight or heal faster after an injury, illness, or surgery.  To increase your protein and calories, add ingredients such as whole milk, peanut butter, cheese, beans, meat, or seafood to meal items.  To get enough extra calories each day, include high-calorie foods and beverages at each meal.  Adding a high-calorie drink or shake can be an easy way to help you get enough calories each day.  Talk with your healthcare provider or dietitian about the best options for you.  This information is not intended to replace advice given to you by your health care provider. Make sure you discuss any questions you have with your health care provider.  Document Revised: 11/30/2018 Document Reviewed: 10/30/2018  [employer] Patient Education © 2021 [employer].

## 2024-02-22 NOTE — PROGRESS NOTES
Test results discussed with patient.  Patient voiced understanding.      Order to follow for TV increase.

## 2024-03-26 ENCOUNTER — OFFICE VISIT (OUTPATIENT)
Dept: OBGYN CLINIC | Age: 69
End: 2024-03-26
Payer: MEDICARE

## 2024-03-26 VITALS
BODY MASS INDEX: 45.83 KG/M2 | SYSTOLIC BLOOD PRESSURE: 144 MMHG | HEART RATE: 76 BPM | DIASTOLIC BLOOD PRESSURE: 82 MMHG | WEIGHT: 267 LBS

## 2024-03-26 DIAGNOSIS — N39.41 URGE INCONTINENCE: Primary | ICD-10-CM

## 2024-03-26 DIAGNOSIS — Z78.0 POSTMENOPAUSAL: ICD-10-CM

## 2024-03-26 DIAGNOSIS — N39.46 MIXED INCONTINENCE: ICD-10-CM

## 2024-03-26 DIAGNOSIS — N32.81 OAB (OVERACTIVE BLADDER): ICD-10-CM

## 2024-03-26 DIAGNOSIS — Z12.31 BREAST CANCER SCREENING BY MAMMOGRAM: ICD-10-CM

## 2024-03-26 PROCEDURE — 1123F ACP DISCUSS/DSCN MKR DOCD: CPT | Performed by: NURSE PRACTITIONER

## 2024-03-26 PROCEDURE — G8484 FLU IMMUNIZE NO ADMIN: HCPCS | Performed by: NURSE PRACTITIONER

## 2024-03-26 PROCEDURE — 1036F TOBACCO NON-USER: CPT | Performed by: NURSE PRACTITIONER

## 2024-03-26 PROCEDURE — 3079F DIAST BP 80-89 MM HG: CPT | Performed by: NURSE PRACTITIONER

## 2024-03-26 PROCEDURE — 1090F PRES/ABSN URINE INCON ASSESS: CPT | Performed by: NURSE PRACTITIONER

## 2024-03-26 PROCEDURE — 3077F SYST BP >= 140 MM HG: CPT | Performed by: NURSE PRACTITIONER

## 2024-03-26 PROCEDURE — G8399 PT W/DXA RESULTS DOCUMENT: HCPCS | Performed by: NURSE PRACTITIONER

## 2024-03-26 PROCEDURE — G8417 CALC BMI ABV UP PARAM F/U: HCPCS | Performed by: NURSE PRACTITIONER

## 2024-03-26 PROCEDURE — 0509F URINE INCON PLAN DOCD: CPT | Performed by: NURSE PRACTITIONER

## 2024-03-26 PROCEDURE — G8427 DOCREV CUR MEDS BY ELIG CLIN: HCPCS | Performed by: NURSE PRACTITIONER

## 2024-03-26 PROCEDURE — 99213 OFFICE O/P EST LOW 20 MIN: CPT | Performed by: NURSE PRACTITIONER

## 2024-03-26 PROCEDURE — 3017F COLORECTAL CA SCREEN DOC REV: CPT | Performed by: NURSE PRACTITIONER

## 2024-03-26 RX ORDER — TIZANIDINE HYDROCHLORIDE 6 MG/1
6 CAPSULE, GELATIN COATED ORAL 2 TIMES DAILY
COMMUNITY

## 2024-03-26 RX ORDER — ALENDRONATE SODIUM 70 MG/1
TABLET ORAL
Qty: 4 TABLET | Refills: 3 | Status: SHIPPED | OUTPATIENT
Start: 2024-03-26

## 2024-03-26 RX ORDER — GABAPENTIN 300 MG/1
300 CAPSULE ORAL 3 TIMES DAILY
COMMUNITY

## 2024-03-26 ASSESSMENT — ENCOUNTER SYMPTOMS
ALLERGIC/IMMUNOLOGIC NEGATIVE: 1
SHORTNESS OF BREATH: 1
EYES NEGATIVE: 1
CONSTIPATION: 1
DIARRHEA: 0

## 2024-03-26 NOTE — PROGRESS NOTES
Pt is here for a follow up. She states she does not need a refill on her oxybutynin.   
Negative for enuresis, frequency, menstrual problem and urgency.   Musculoskeletal: Negative.    Skin: Negative.    Allergic/Immunologic: Negative.    Neurological:  Positive for weakness (uses a walker).   Hematological: Negative.    Psychiatric/Behavioral: Negative.     All other systems reviewed and are negative.        Physical Exam  Vitals and nursing note reviewed.   Constitutional:       Appearance: She is well-developed.   HENT:      Head: Normocephalic.      Right Ear: External ear normal.      Left Ear: External ear normal.      Nose: Nose normal.   Musculoskeletal:         General: Normal range of motion.      Cervical back: Normal range of motion.   Skin:     General: Skin is warm and dry.   Neurological:      Mental Status: She is alert and oriented to person, place, and time.   Psychiatric:         Attention and Perception: Attention normal.         Mood and Affect: Mood normal.         Speech: Speech normal.         Behavior: Behavior normal.         Thought Content: Thought content normal.         Cognition and Memory: Cognition normal.         Judgment: Judgment normal.              Diagnosis Orders   1. Urge incontinence        2. OAB (overactive bladder)        3. Mixed incontinence        4. Postmenopausal        5. Breast cancer screening by mammogram  [facility] DIGITAL SCREEN W OR WO CAD BILATERAL          MEDICATIONS:  Orders Placed This Encounter   Medications    alendronate (FOSAMAX) 70 MG tablet     Sig: TK 1 T PO ONCE A WEEK IN THE MORNING 30 MINUTES BEFORE FIRST MEAL OF THE DAY     Dispense:  4 tablet     Refill:  3       ORDERS:  Orders Placed This Encounter   Procedures    [facility] DIGITAL SCREEN W OR WO CAD BILATERAL       PLAN:  Ordered screening mammogram   Plan DEXA for 1 year  Ok to resume Fosamax- refills sent  Continue calcium, Vit D and weight bearing exercises      There are no Patient Instructions on file for this visit.

## 2024-03-27 ENCOUNTER — TELEPHONE (OUTPATIENT)
Dept: BARIATRICS/WEIGHT MGMT | Facility: CLINIC | Age: 69
End: 2024-03-27
Payer: MEDICARE

## 2024-04-02 ENCOUNTER — OFFICE VISIT (OUTPATIENT)
Dept: NEUROLOGY | Age: 69
End: 2024-04-02
Payer: MEDICARE

## 2024-04-02 VITALS
RESPIRATION RATE: 20 BRPM | HEART RATE: 67 BPM | BODY MASS INDEX: 48.58 KG/M2 | WEIGHT: 264 LBS | SYSTOLIC BLOOD PRESSURE: 122 MMHG | DIASTOLIC BLOOD PRESSURE: 77 MMHG | HEIGHT: 62 IN

## 2024-04-02 DIAGNOSIS — G47.33 OBSTRUCTIVE SLEEP APNEA: ICD-10-CM

## 2024-04-02 DIAGNOSIS — R41.3 MEMORY LOSS: ICD-10-CM

## 2024-04-02 DIAGNOSIS — I65.21 CAROTID STENOSIS, RIGHT: ICD-10-CM

## 2024-04-02 DIAGNOSIS — G60.3 IDIOPATHIC PROGRESSIVE NEUROPATHY: Primary | ICD-10-CM

## 2024-04-02 DIAGNOSIS — G25.81 RESTLESS LEG SYNDROME: ICD-10-CM

## 2024-04-02 PROCEDURE — G8427 DOCREV CUR MEDS BY ELIG CLIN: HCPCS | Performed by: PSYCHIATRY & NEUROLOGY

## 2024-04-02 PROCEDURE — G8417 CALC BMI ABV UP PARAM F/U: HCPCS | Performed by: PSYCHIATRY & NEUROLOGY

## 2024-04-02 PROCEDURE — 3017F COLORECTAL CA SCREEN DOC REV: CPT | Performed by: PSYCHIATRY & NEUROLOGY

## 2024-04-02 PROCEDURE — 99213 OFFICE O/P EST LOW 20 MIN: CPT | Performed by: PSYCHIATRY & NEUROLOGY

## 2024-04-02 PROCEDURE — 3078F DIAST BP <80 MM HG: CPT | Performed by: PSYCHIATRY & NEUROLOGY

## 2024-04-02 PROCEDURE — 1123F ACP DISCUSS/DSCN MKR DOCD: CPT | Performed by: PSYCHIATRY & NEUROLOGY

## 2024-04-02 PROCEDURE — 1036F TOBACCO NON-USER: CPT | Performed by: PSYCHIATRY & NEUROLOGY

## 2024-04-02 PROCEDURE — G8399 PT W/DXA RESULTS DOCUMENT: HCPCS | Performed by: PSYCHIATRY & NEUROLOGY

## 2024-04-02 PROCEDURE — 1090F PRES/ABSN URINE INCON ASSESS: CPT | Performed by: PSYCHIATRY & NEUROLOGY

## 2024-04-02 PROCEDURE — 3074F SYST BP LT 130 MM HG: CPT | Performed by: PSYCHIATRY & NEUROLOGY

## 2024-04-02 NOTE — PROGRESS NOTES
[facility] Neurology  [address], [address]  [place] [address]  Phone [phone]  Fax [phone]     [facility] Neurology Follow Up Encounter  24 2:12 PM CDT    Information:   Patient Name: Morena Mukherjee  :   1955  Age:   68 y.o.  MRN:   [id number]  Account #:  [id number]  Today:  24    Provider: Toni Hutchison M.D.     Chief Complaint:   Chief Complaint   Patient presents with    Follow-up       Subjective:   Morena Mukherjee is a 68 y.o. woman with a peripheral neuropathy, obstructive sleep apnea, restless legs syndrome, right carotid stenosis, and memory loss who is following up.  She is morbidly obese.  She is on oxygen .  She can stand and transfer but is in a wheelchair most of the time.  She has numbness and tingling in her feet and lower legs.  This is about the same.  She has RLS and takes a high dose of Requip that helps.  She has had no stroke symptoms.  She uses a Trilogy device with 4LPM during sleep.  She uses supplemental oxygen all day as well. She gets about 8 hours of sleep.  She complains of persistent drowsiness.  She is on several sedating medications.        Objective:     Past Medical History:  Past Medical History:   Diagnosis Date    Abdominal adhesions 2016    Anxiety     Arthritis     Atrial fibrillation (HCC)     Atrial fibrillation and flutter (HCC) 2015    BiPAP (biphasic positive airway pressure) dependence     Chronic back pain     Chronic cough     Depression     Edema     Fibrocystic breast     GERD (gastroesophageal reflux disease)     Glossitis     Headache(784.0)     Heart burn     Heart disease     Hypertension     Hypothyroidism     Mouth sore     MRSA (methicillin resistant staph aureus) culture positive 2014    nasal    Numbness     toes    Obstructive sleep apnea     BIPAP    DEVEN (obstructive sleep apnea)     Peripheral neuropathy     PONV (postoperative nausea and vomiting)     Prolonged emergence from general anesthesia     Recurrent

## 2024-04-05 ENCOUNTER — TELEPHONE (OUTPATIENT)
Dept: PULMONOLOGY | Facility: CLINIC | Age: 69
End: 2024-04-05
Payer: MEDICARE

## 2024-04-08 NOTE — TELEPHONE ENCOUNTER
MaryluSophia, APRN  You1 hour ago (9:31 AM)       Thank you for checking on this too. Please let patient know that report looks ok.     Relayed to patient.  Patient voiced understanding.

## 2024-04-08 NOTE — TELEPHONE ENCOUNTER
Patient is compliant.  She has worn device for an average of  6 hours/day for the past 30 days. Her leak is also minimal.

## 2024-05-10 ENCOUNTER — TELEPHONE (OUTPATIENT)
Dept: NEUROSURGERY | Facility: CLINIC | Age: 69
End: 2024-05-10
Payer: COMMERCIAL

## 2024-05-10 NOTE — TELEPHONE ENCOUNTER
Called to confirm patient's appt with Dr Dill on Thursday 5/16/24 @ 12 noon - no answer so left a message to call me back      miriam miller CMA      IT IS OKAY FOR THE HUB TO DELIVER THIS INFORMATION TO THE PATIENT IF THEY RECEIVE THIS CALL BACK

## 2024-05-13 NOTE — TELEPHONE ENCOUNTER
Called patient but had to leave a VM again today.  I also called her , Clark, but had to leave a VM for him as well.    MILLICENT HENDRIX [facility]  PHYSICIAN LEAD  DR LALA HAYES  [facility] NEUROSURGERY      IT IS OKAY FOR THE HUB TO DELIVER THIS INFORMATION TO THE PATIENT IF THEY RECEIVE THIS CALL BACK

## 2024-05-14 RX ORDER — METAXALONE 800 MG/1
1 TABLET ORAL 3 TIMES DAILY
COMMUNITY
Start: 2024-05-07

## 2024-05-14 RX ORDER — ALENDRONATE SODIUM 70 MG/1
70 TABLET ORAL
COMMUNITY
Start: 2024-03-26

## 2024-05-14 RX ORDER — LORAZEPAM 1 MG/1
1 TABLET ORAL EVERY 12 HOURS SCHEDULED
COMMUNITY
Start: 2024-03-07

## 2024-05-14 RX ORDER — FAMOTIDINE 40 MG/1
1 TABLET, FILM COATED ORAL EVERY 12 HOURS SCHEDULED
COMMUNITY
Start: 2024-04-10

## 2024-05-14 RX ORDER — SEMAGLUTIDE 0.68 MG/ML
0.5 INJECTION, SOLUTION SUBCUTANEOUS WEEKLY
COMMUNITY
Start: 2024-04-18

## 2024-05-16 ENCOUNTER — OFFICE VISIT (OUTPATIENT)
Dept: NEUROSURGERY | Facility: CLINIC | Age: 69
End: 2024-05-16
Payer: MEDICARE

## 2024-05-16 VITALS — WEIGHT: 257 LBS | BODY MASS INDEX: 45.54 KG/M2 | HEIGHT: 63 IN

## 2024-05-16 DIAGNOSIS — M41.85 OTHER FORM OF SCOLIOSIS OF THORACOLUMBAR SPINE: ICD-10-CM

## 2024-05-16 DIAGNOSIS — E66.01 CLASS 3 SEVERE OBESITY DUE TO EXCESS CALORIES WITHOUT SERIOUS COMORBIDITY WITH BODY MASS INDEX (BMI) OF 45.0 TO 49.9 IN ADULT: ICD-10-CM

## 2024-05-16 DIAGNOSIS — M51.36 LUMBAR DEGENERATIVE DISC DISEASE: Primary | ICD-10-CM

## 2024-05-16 DIAGNOSIS — Z78.9 NONSMOKER: ICD-10-CM

## 2024-05-16 PROCEDURE — 1159F MED LIST DOCD IN RCRD: CPT | Performed by: NEUROLOGICAL SURGERY

## 2024-05-16 PROCEDURE — 1160F RVW MEDS BY RX/DR IN RCRD: CPT | Performed by: NEUROLOGICAL SURGERY

## 2024-05-16 PROCEDURE — 99213 OFFICE O/P EST LOW 20 MIN: CPT | Performed by: NEUROLOGICAL SURGERY

## 2024-05-16 NOTE — PROGRESS NOTES
SUBJECTIVE:  Patient ID: Danisha Cannon is a 68 y.o. female is here today for follow-up.    Chief Complaint: Back pain  Chief Complaint   Patient presents with    THERAPY & PAIN MANAGEMENT FOLLOW UP     Patient did therapy @ [facility] and said it was helping with her core.  She has seen [facility], had test injections, and is having the nerve ablation in a couple of weeks.  She states the test dose gave her 100% relief.  Imaging @ [facility]       HPI  68-year-old female that we have been following and working up for primarily complaint of low back pain.  She also does have some peripheral vascular issues and some peripheral neuropathy issues.  She has not really describe any radicular component to her pain.  She also has multiple medical comorbidities.  We sent her for a dedicated course of physical therapy which she says was very helpful and does feel that it improved her back pain.  She is also been working with [facility] and gotten a couple of injections which gave her several days of significant relief.  She is scheduled in the near future for a ablation treatment.    The following portions of the patient's history were reviewed and updated as appropriate: allergies, current medications, past family history, past medical history, past social history, past surgical history and problem list.    OBJECTIVE:    Review of Systems   Musculoskeletal:  Positive for arthralgias and back pain.   All other systems reviewed and are negative.         Physical Exam  Eyes:      Extraocular Movements: EOM normal.      Pupils: Pupils are equal, round, and reactive to light.   Neurological:      Mental Status: She is oriented to person, place, and time.      Motor: Motor strength is normal.     Coordination: Finger-Nose-Finger Test normal.      Gait: Gait is intact.   Psychiatric:         Speech: Speech normal.         Neurologic Exam     Mental Status   Oriented to person, place, and time.   Attention: normal.   Speech: speech  is normal   Level of consciousness: alert  Knowledge: good.     Cranial Nerves     CN II   Visual fields full to confrontation.     CN III, IV, VI   Pupils are equal, round, and reactive to light.  Extraocular motions are normal.     CN V   Facial sensation intact.     CN VII   Facial expression full, symmetric.     CN VIII   CN VIII normal.     CN IX, X   CN IX normal.   CN X normal.     CN XI   CN XI normal.     CN XII   CN XII normal.     Motor Exam   Muscle bulk: normal  Overall muscle tone: normal  Right arm pronator drift: absent  Left arm pronator drift: absent    Strength   Strength 5/5 throughout.     Sensory Exam   Light touch normal.   Pinprick normal.     Gait, Coordination, and Reflexes     Gait  Gait: normal    Coordination   Finger to nose coordination: normal    Tremor   Resting tremor: absent  Intention tremor: absent  Action tremor: absent    Reflexes   Reflexes 2+ except as noted.       Independent Review of Radiographic Studies:       ASSESSMENT/PLAN:  Imaging demonstrates multilevel degenerative disc disease.  There is a sagittal and coronal scoliotic deformity.  Given the patient's overall challenges she has with functioning condition I do not think any aggressive surgical intervention would be appropriate.  I would recommend she continue to work with [facility].  She is done well with test dosing for a ablations.  I be happy to see her again in the future on an as-needed basis.      1. Lumbar degenerative disc disease    2. Other form of scoliosis of thoracolumbar spine    3. Nonsmoker    4. Class 3 severe obesity due to excess calories without serious comorbidity with body mass index (BMI) of 45.0 to 49.9 in adult        The patient's Body mass index is 46.26 kg/m².. BMI is above normal parameters. Recommendations include: educational material    Return if symptoms worsen or fail to improve.      Andrez Dill MD

## 2024-05-16 NOTE — PATIENT INSTRUCTIONS
"PATIENT TO CONTINUE TO FOLLOW UP WITH HER PRIMARY CARE PROVIDER FOR YEARLY PHYSICAL EXAMS TO ENSURE COMPLETE HEALTH MAINTENANCE      BMI for Adults  Body mass index (BMI) is a number found using a person's weight and height. BMI can help tell how much of a person's weight is made up of fat. BMI does not measure body fat directly. It is used instead of tests that directly measure body fat, which can be difficult and expensive.  What are BMI measurements used for?  BMI is useful to:  Find out if your weight puts you at higher risk for medical problems.  Help recommend changes, such as in diet and exercise. This can help you reach a healthy weight. BMI screening can be done again to see if these changes are working.  How is BMI calculated?  Your height and weight are measured. The BMI is found from those numbers. This can be done with U.[place] or metric measurements. Note that charts and online BMI calculators are available to help you find your BMI quickly and easily without doing these calculations.  To calculate your BMI in U.S[place] measurements:  Measure your weight in pounds (lb).  Multiply the number of pounds by 703.  So, for an adult who weighs 150 lb, multiply that number by 703: 150 x 703, which equals 105,450.  Measure your height in inches. Then multiply that number by itself to get a measurement called \"inches squared.\"  So, for an adult who is 70 inches tall, the \"inches squared\" measurement is 70 inches x 70 inches, which equals 4,900 inches squared.  Divide the total from step 2 (number of lb x 703) by the total from step 3 (inches squared): 105,450 ÷ 4,900 = 21.5. This is your BMI.  To calculate your BMI in metric measurements:    Measure your weight in kilograms (kg).  For this example, the weight is 70 kg.  Measure your height in meters (m). Then multiply that number by itself to get a measurement called \"meters squared.\"  So, for an adult who is 1.75 m tall, the \"meters squared\" measurement is 1.75 m x 1.75 " m, which equals 3.1 meters squared.  Divide the number of kilograms (your weight) by the meters squared number. In this example: 70 ÷ 3.1 = 22.6. This is your BMI.  What do the results mean?  BMI charts are used to see if you are underweight, normal weight, overweight, or obese. The following guidelines will be used:  Underweight: BMI less than 18.5.  Normal weight: BMI between 18.5 and 24.9.  Overweight: BMI between 25 and 29.9.  Obese: BMI of 30 or above.  BMI is a tool and cannot diagnose a condition. Talk with your health care provider about what your BMI means for you. Keep these notes in mind:  Weight includes fat and muscle. Someone with a muscular build, such as an athlete, may have a BMI that is higher than 24.9. In cases like these, BMI is not a correct measure of body fat.  If you have a BMI of 25 or higher, your provider may need to do more testing to find out if excess body fat is the cause.  BMI is measured the same way for males and females. Females usually have more body fat than males of the same height and weight.  Where to find more information  For more information about BMI, including tools to quickly find your BMI, go to:  Centers for Disease Control and Prevention: cdc.gov  American Heart Association: heart.org  National Heart, Lung, and Blood [place]: nhlbi.nih.gov  This information is not intended to replace advice given to you by your health care provider. Make sure you discuss any questions you have with your health care provider.  Document Revised: 09/07/2023 Document Reviewed: 08/31/2023  [employer] Patient Education © 2024 [employer]  DASH Eating Plan  DASH stands for Dietary Approaches to Stop Hypertension. The DASH eating plan is a healthy eating plan that has been shown to:  Lower high blood pressure (hypertension).  Reduce your risk for type 2 diabetes, heart disease, and stroke.  Help with weight loss.  What are tips for following this plan?  Reading food labels  Check food  "labels for the amount of salt (sodium) per serving. Choose foods with less than 5 percent of the Daily Value (DV) of sodium. In general, foods with less than 300 milligrams (mg) of sodium per serving fit into this eating plan.  To find whole grains, look for the word \"whole\" as the first word in the ingredient list.  Shopping  Buy products labeled as \"low-sodium\" or \"no salt added.\"  Buy fresh foods. Avoid canned foods and pre-made or frozen meals.  Cooking  Try not to add salt when you cook. Use salt-free seasonings or herbs instead of table salt or sea salt. Check with your health care provider or pharmacist before using salt substitutes.  Do not thompson foods. Cook foods in healthy ways, such as baking, boiling, grilling, roasting, or broiling.  Cook using oils that are good for your heart. These include olive, canola, avocado, soybean, and sunflower oil.  Meal planning    Eat a balanced diet. This should include:  4 or more servings of fruits and 4 or more servings of vegetables each day. Try to fill half of your plate with fruits and vegetables.  6-8 servings of whole grains each day.  6 or less servings of lean meat, poultry, or fish each day. 1 oz is 1 serving. A 3 oz (85 g) serving of meat is about the same size as the palm of your hand. One egg is 1 oz (28 g).  2-3 servings of low-fat dairy each day. One serving is 1 cup (237 mL).  1 serving of nuts, seeds, or beans 5 times each week.  2-3 servings of heart-healthy fats. Healthy fats called omega-3 fatty acids are found in foods such as walnuts, flaxseeds, fortified milks, and eggs. These fats are also found in cold-water fish, such as sardines, salmon, and mackerel.  Limit how much you eat of:  Canned or prepackaged foods.  Food that is high in trans fat, such as fried foods.  Food that is high in saturated fat, such as fatty meat.  Desserts and other sweets, sugary drinks, and other foods with added sugar.  Full-fat dairy products.  Do not salt foods before " eating.  Do not eat more than 4 egg yolks a week.  Try to eat at least 2 vegetarian meals a week.  Eat more home-cooked food and less restaurant, buffet, and fast food.  Lifestyle  When eating at a restaurant, ask if your food can be made with less salt or no salt.  If you drink alcohol:  Limit how much you have to:  0-1 drink a day if you are female.  0-2 drinks a day if you are male.  Know how much alcohol is in your drink. In the [place] one drink is one 12 oz bottle of beer (355 mL), one 5 oz glass of wine (148 mL), or one 1½ oz glass of hard liquor (44 mL).  General information  Avoid eating more than 2,300 mg of salt a day. If you have hypertension, you may need to reduce your sodium intake to 1,500 mg a day.  Work with your provider to stay at a healthy body weight or lose weight. Ask what the best weight range is for you.  On most days of the week, get at least 30 minutes of exercise that causes your heart to beat faster. This may include walking, swimming, or biking.  Work with your provider or dietitian to adjust your eating plan to meet your specific calorie needs.  What foods should I eat?  Fruits  All fresh, dried, or frozen fruit. Canned fruits that are in their natural juice and do not have sugar added to them.  Vegetables  Fresh or frozen vegetables that are raw, steamed, roasted, or grilled. Low-sodium or reduced-sodium tomato and vegetable juice. Low-sodium or reduced-sodium tomato sauce and tomato paste. Low-sodium or reduced-sodium canned vegetables.  Grains  Whole-grain or whole-wheat bread. Whole-grain or whole-wheat pasta. Brown rice. Oatmeal. Quinoa. Bulgur. Whole-grain and low-sodium cereals. Sharon bread. Low-fat, low-sodium crackers. Whole-wheat flour tortillas.  Meats and other proteins  Skinless chicken or turkey. Ground chicken or turkey. Pork with fat trimmed off. Fish and seafood. Egg whites. Dried beans, peas, or lentils. Unsalted nuts, nut butters, and seeds. Unsalted canned beans. Lean  cuts of beef with fat trimmed off. Low-sodium, lean precooked or cured meat, such as sausages or meat loaves.  Dairy  Low-fat (1%) or fat-free (skim) milk. Reduced-fat, low-fat, or fat-free cheeses. Nonfat, low-sodium ricotta or cottage cheese. Low-fat or nonfat yogurt. Low-fat, low-sodium cheese.  Fats and oils  Soft margarine without trans fats. Vegetable oil. Reduced-fat, low-fat, or light mayonnaise and salad dressings (reduced-sodium). Canola, safflower, olive, avocado, soybean, and sunflower oils. Avocado.  Seasonings and condiments  Herbs. Spices. Seasoning mixes without salt.  Other foods  Unsalted popcorn and pretzels. Fat-free sweets.  The items listed above may not be all the foods and drinks you can have. Talk to a dietitian to learn more.  What foods should I avoid?  Fruits  Canned fruit in a light or heavy syrup. Fried fruit. Fruit in cream or butter sauce.  Vegetables  Creamed or fried vegetables. Vegetables in a cheese sauce. Regular canned vegetables that are not marked as low-sodium or reduced-sodium. Regular canned tomato sauce and paste that are not marked as low-sodium or reduced-sodium. Regular tomato and vegetable juices that are not marked as low-sodium or reduced-sodium. Pickles. Olives.  Grains  Baked goods made with fat, such as croissants, muffins, or some breads. Dry pasta or rice meal packs.  Meats and other proteins  Fatty cuts of meat. Ribs. Fried meat. Askew. Bologna, salami, and other precooked or cured meats, such as sausages or meat loaves, that are not lean and low in sodium. Fat from the back of a pig (fatback). Bratwurst. Salted nuts and seeds. Canned beans with added salt. Canned or smoked fish. Whole eggs or egg yolks. Chicken or turkey with skin.  Dairy  Whole or 2% milk, cream, and half-and-half. Whole or full-fat cream cheese. Whole-fat or sweetened yogurt. Full-fat cheese. Nondairy creamers. Whipped toppings. Processed cheese and cheese spreads.  Fats and oils  Butter.  Stick margarine. Lard. Shortening. Ghee. Askew fat. Tropical oils, such as coconut, palm kernel, or palm oil.  Seasonings and condiments  Onion salt, garlic salt, seasoned salt, table salt, and sea salt. Worcestershire sauce. Tartar sauce. Barbecue sauce. Teriyaki sauce. Soy sauce, including reduced-sodium soy sauce. Steak sauce. Canned and packaged gravies. Fish sauce. Oyster sauce. Cocktail sauce. Store-bought horseradish. Ketchup. Mustard. Meat flavorings and tenderizers. Bouillon cubes. Hot sauces. Pre-made or packaged marinades. Pre-made or packaged taco seasonings. Relishes. Regular salad dressings.  Other foods  Salted popcorn and pretzels.  The items listed above may not be all the foods and drinks you should avoid. Talk to a dietitian to learn more.  Where to find more information  National Heart, Lung, and Blood [place] (NHLBI): nhlbi.nih.gov  American Heart Association (AHA): heart.org  Academy of Nutrition and Dietetics: eatright.org  National Kidney Foundation (NKF): kidney.org  This information is not intended to replace advice given to you by your health care provider. Make sure you discuss any questions you have with your health care provider.  Document Revised: 01/04/2024 Document Reviewed: 01/04/2024  [employer] Patient Education © 2024 [employer]

## 2024-05-17 NOTE — PROGRESS NOTES
ERNESTO Pereira  [facility]  [address]            [place] [address]  Phone: [phone]  Fax: [phone]          Chief Complaint  Bronchitis    Subjective    History of Present Illness  Danisha Cannon presents to [facility] for appointment.  Patient with known afib, morbid obesity/deconditioning, chronic bronchitis, TRISTON, chronic respiratory failure with hypoxemia/hypercapnia, restrictive lung disease, lung nodules, and allergic rhinitis. She is using albuterol HFA. She reports compliance with home triology+4L.  Last office visit 2/12/2024 the patient was complaining of falling asleep during the daytime and having more sleepiness than usual.  She had reported that her daughter was reviewing her medications and question the possibility of decreasing dosage of Neurontin, we quit, and/or Zanaflex.  I recommended that she discuss with her primary care provider regarding decreasing dosages of her medications.  Today the patient states that her Neurontin and Robinul dosages have been decreased per her PCP.  Workup regarding home trilogy included an overnight, ABG, and compliance report.  It was recommended to adjust settings for home trilogy to increase tidal volume to at least 500.  The patient reports that she is feeling better today with the adjustment of trilogy device.  However, she states that she is still falling asleep.  The patient is asking to be assessed for possible narcolepsy.  Will refer patient to neurology for further assessment.  In the meantime I discussed that it is possible that the chronic respiratory failure with hypoxemia/hypercapnia/TRISTON/obesity hypoventilation syndrome could be contributing and I recommend her using home trilogy for short periods during the day as well.  She voiced understanding.  The patient is no longer using Flovent inhaler.  She states that she has been off of this for approximately 6  "months.  She does have albuterol HFA inhaler on hand.  The patient does not feel that she needs a maintenance inhaler at this time.  She does tell me that her daughter complains of her wheezing at times.  I recommend that she utilize albuterol if she is wheezing. If she continues to have wheezing then she may need to consider restarting flovent or something similaor for maintenance. No other aggravating or alleviating factors.     Objective   Vital Signs:   /80   Pulse 70   Ht 158.8 cm (62.5\")   Wt 116 kg (256 lb)   SpO2 95% Comment: 2L  BMI 46.08 kg/m²        Physical Exam  Vitals reviewed.   Constitutional:       General: She is not in acute distress.     Appearance: She is well-developed. She is morbidly obese.      Interventions: Nasal cannula in place.      Comments: Ambulates with rollator   HENT:      Head: Normocephalic and atraumatic.   Eyes:      General: No scleral icterus.     Conjunctiva/sclera: Conjunctivae normal.      Pupils: Pupils are equal, round, and reactive to light.   Cardiovascular:      Rate and Rhythm: Normal rate.   Pulmonary:      Effort: Pulmonary effort is normal. No respiratory distress.      Breath sounds: Decreased breath sounds present. No wheezing or rales.   Musculoskeletal:         General: Normal range of motion.      Cervical back: Normal range of motion and neck supple.      Right lower leg: Edema present.      Left lower leg: Edema present.   Skin:     General: Skin is warm and dry.   Neurological:      Mental Status: She is alert and oriented to person, place, and time.   Psychiatric:         Behavior: Behavior normal.         Thought Content: Thought content normal.         Judgment: Judgment normal.        Result Review :  The following data was reviewed by: ERNESTO Stroud on 05/20/2024:  Blood Gas, Arterial With Co-Ox (02/14/2024 13:14)     DURABLE MEDICAL EQUIPMENT - SCAN - USQ-OUPUJT-3/6/2024-4/4/2024 (04/05/2024)     DURABLE MEDICAL EQUIPMENT - " SCAN - [id number]-79/22/2024 (2024)     PULMONARY RESULTS - SCAN - PULSE OXIMETRY, OVERNIGHT-NIV + 2L-ROTECH (2024)     PULMONARY - SCAN - TRILOGY COMPLIANCE-ROTECH-1/15/24-24 (2024)     Rest/Exercise Pulse Ox Values          2023    11:45   Rest/Exercise Pulse Ox Results   Rest on O2 @ Liters 2L   Rest on O2 SAT % 98%   Exercise on O2 @ Liters 2L   Exercise on O2 SAT % 93%         Results for orders placed in visit on 21    Pulmonary Function Test    Narrative  Pulmonary Function Test  Performed by: Francisca Villagomez, RRT  Authorized by: Sophia Valero APRN    Pre Drug % Predicted  FVC: 73%  FEV1: 81%  FEF 25-75%: 139%  FEV1/FVC: 86.84%  T%  RV: 77%  DLCO: 96%  D/VAsb: 118%    Interpretation  Spirometry  Spirometry shows moderate restriction. midflow is normal.  Review of FVL curve  Patient's effort is normal.  Lung Volume Measurements  Measurements show reduced lung volumes consistent with restriction.  Diffusion Capacity  The patient's diffusion capacity is normal.  Diffusion capacity is normal when corrected for alveolar volume.      Results for orders placed during the hospital encounter of 19    Full Pulmonary Function Test With Bronchodilator & ABG    Narrative  [facility] - Pulmonary Function Test    [facility] [place]  [address]  [phone]    Patient : Danisha Cannon  MRN : [id number]  CSN : [id number]  Pulmonologist : Ryan Keith MD  Date : 2019    ______________________________________________________________________    Interpretation :  1.  Spirometry reveals a borderline low forced vital capacity and otherwise are within normal limits.  2.  Lung volumes also reveal a borderline low vital capacity and a decrease in expiratory reserve volume but otherwise are within normal limits.  3.  Diffusion capacity is within normal limits and when corrected for alveolar volume is supranormal.        Ryan Keith,  MD      Results for orders placed during the hospital encounter of 03/11/19    Full Pulmonary Function Test With Bronchodilator & ABG    Narrative  [facility] - Pulmonary Function Test    [facility]  [place]  [address]  [phone]    Patient : Danisha Cannon  MRN : [id number]  CSN : [id number]  Pulmonologist : Ryan Keith MD  Date : 3/13/2019    ______________________________________________________________________    Interpretation :  1.  Spirometry is within normal limits.  2.  Lung volumes are within normal limits.  3.  There is a moderate diffusion impairment which when corrected for alveolar volume is normalized.      Ryan Keith MD           Assessment and Plan  Diagnoses and all orders for this visit:    1. Chronic bronchitis, unspecified chronic bronchitis type (Primary)  Overview:  Mildly low IGE, mildly elevated IGG subclass 1  +eos 410-5/11/21    albuterol HFA      Assessment & Plan:  The patient is no longer using Flovent inhaler.  She states that she has been off of this for approximately 6 months.  She does have albuterol HFA inhaler on hand.  The patient does not feel that she needs a maintenance inhaler at this time.  She does tell me that her daughter complains of her wheezing at times.  I recommend that she utilize albuterol if she is wheezing. If she continues to have wheezing then she may need to consider restarting flovent or something similaor for maintenance.      2. Hypersomnia  -     Ambulatory Referral to Neurology    3. Allergic rhinitis, unspecified seasonality, unspecified trigger  Overview:  Astelin, flonase, singulair      Not candidate for immunotherapy per Dr. Angel as the patient is on a beta blocker.    Assessment & Plan:  Continue current treatment regimen.        4. Chronic respiratory failure with hypoxia and hypercapnia  Overview:  Home trilogy  4L O2    Assessment & Plan:   Last office visit 2/12/2024 the patient was complaining of falling  asleep during the daytime and having more sleepiness than usual.  She had reported that her daughter was reviewing her medications and question the possibility of decreasing dosage of Neurontin, we quit, and/or Zanaflex.  I recommended that she discuss with her primary care provider regarding decreasing dosages of her medications.  Today the patient states that her Neurontin and Robinul dosages have been decreased per her PCP.  Workup regarding home trilogy included an overnight, ABG, and compliance report.  It was recommended to adjust settings for home trilogy to increase tidal volume to at least 500.  The patient reports that she is feeling better today with the adjustment of trilogy device.  However, she states that she is still falling asleep.  The patient is asking to be assessed for possible narcolepsy.  Will refer patient to neurology for further assessment.  In the meantime I discussed that it is possible that the chronic respiratory failure with hypoxemia/hypercapnia/TRISTON/obesity hypoventilation syndrome could be contributing and I recommend her using home trilogy for short periods during the day as well.  She voiced understanding.      5. Lung nodules  Overview:  HRCT 11/16/21 - 2mm LLL, 3mm RLL    CT chest 11/30/2022-new 3 mm LEFT lower lobe pulmonary nodule on image 97.  Stable 3 mm LEFT lower lobe pulmonary nodule, image 72.  Stable 3 mm RIGHT lower lobe pulmonary nodule, image 70.  Stable 3 mm subpleural RIGHT lower lobe pulmonary nodule, image 58.  Stable 3 mm RIGHT upper lobe pulmonary nodule, image 37.    CT Chest Without Contrast Diagnostic (11/21/2023 10:22)    Unchanged 3 mm right upper lobe  pulmonary nodule image 40. Unchanged 3 mm left lower lobe pulmonary  nodule image 73. Unchanged 4 mm right lower lobe pulmonary nodule image  79.    CT Chest With Contrast Diagnostic (01/18/2024 13:02)  There is a 3 mm nodule within the lateral left lower lobe on  image 77 of series 5 stable from the  previous exam. There is a 4 mm  nodule in the medial aspect of the right lower lobe on image 84 of  series 5 also stable from the previous exam. Mild left basilar  atelectasis is present. A 4 mm nodule in the central right upper lobe on image 42 of series 3 is stable. No additional lung nodules are present.    Assessment & Plan:  Stable. Consider f/u CT in January 2025.       6. Obstructive sleep apnea  Overview:  Home trilogy  4L O2    Assessment & Plan:  Continue home trilogy device. The patient is benefiting from it and wishes to continue it.      Continue chronic oxygen therapy.  The patient is benefiting from it and wishes to continue it.        7. Restrictive lung disease  Overview:  Likely 2' morbid obesity and TRISTON/OHS      8. Gastroesophageal reflux disease, unspecified whether esophagitis present  Overview:  PPI- Dexilant/pepcid     Assessment & Plan:  Continue current treatment regimen.            Follow Up  Sophia Valero, APRN  5/20/2024  15:40 CDT    Return in about 3 months (around 8/20/2024) for FVL+DLCO.    Patient was given instructions and counseling regarding her condition or for health maintenance advice. Please see specific information pulled into the AVS if appropriate.     Please note that portions of this note were completed with a voice recognition program.

## 2024-05-20 ENCOUNTER — OFFICE VISIT (OUTPATIENT)
Dept: PULMONOLOGY | Facility: CLINIC | Age: 69
End: 2024-05-20
Payer: MEDICARE

## 2024-05-20 VITALS
HEART RATE: 70 BPM | WEIGHT: 256 LBS | HEIGHT: 63 IN | OXYGEN SATURATION: 95 % | BODY MASS INDEX: 45.36 KG/M2 | SYSTOLIC BLOOD PRESSURE: 132 MMHG | DIASTOLIC BLOOD PRESSURE: 80 MMHG

## 2024-05-20 DIAGNOSIS — J98.4 RESTRICTIVE LUNG DISEASE: Chronic | ICD-10-CM

## 2024-05-20 DIAGNOSIS — G47.33 OBSTRUCTIVE SLEEP APNEA: Chronic | ICD-10-CM

## 2024-05-20 DIAGNOSIS — J96.11 CHRONIC RESPIRATORY FAILURE WITH HYPOXIA AND HYPERCAPNIA: Chronic | ICD-10-CM

## 2024-05-20 DIAGNOSIS — J96.12 CHRONIC RESPIRATORY FAILURE WITH HYPOXIA AND HYPERCAPNIA: Chronic | ICD-10-CM

## 2024-05-20 DIAGNOSIS — J42 CHRONIC BRONCHITIS, UNSPECIFIED CHRONIC BRONCHITIS TYPE: Primary | Chronic | ICD-10-CM

## 2024-05-20 DIAGNOSIS — G47.10 HYPERSOMNIA: ICD-10-CM

## 2024-05-20 DIAGNOSIS — R91.8 LUNG NODULES: Chronic | ICD-10-CM

## 2024-05-20 DIAGNOSIS — J30.9 ALLERGIC RHINITIS, UNSPECIFIED SEASONALITY, UNSPECIFIED TRIGGER: Chronic | ICD-10-CM

## 2024-05-20 DIAGNOSIS — K21.9 GASTROESOPHAGEAL REFLUX DISEASE, UNSPECIFIED WHETHER ESOPHAGITIS PRESENT: Chronic | ICD-10-CM

## 2024-05-20 PROCEDURE — 1159F MED LIST DOCD IN RCRD: CPT | Performed by: NURSE PRACTITIONER

## 2024-05-20 PROCEDURE — 99214 OFFICE O/P EST MOD 30 MIN: CPT | Performed by: NURSE PRACTITIONER

## 2024-05-20 PROCEDURE — 1160F RVW MEDS BY RX/DR IN RCRD: CPT | Performed by: NURSE PRACTITIONER

## 2024-05-20 NOTE — ASSESSMENT & PLAN NOTE
The patient is no longer using Flovent inhaler.  She states that she has been off of this for approximately 6 months.  She does have albuterol HFA inhaler on hand.  The patient does not feel that she needs a maintenance inhaler at this time.  She does tell me that her daughter complains of her wheezing at times.  I recommend that she utilize albuterol if she is wheezing. If she continues to have wheezing then she may need to consider restarting flovent or something similaor for maintenance.

## 2024-05-20 NOTE — ASSESSMENT & PLAN NOTE
Last office visit 2/12/2024 the patient was complaining of falling asleep during the daytime and having more sleepiness than usual.  She had reported that her daughter was reviewing her medications and question the possibility of decreasing dosage of Neurontin, we quit, and/or Zanaflex.  I recommended that she discuss with her primary care provider regarding decreasing dosages of her medications.  Today the patient states that her Neurontin and Robinul dosages have been decreased per her PCP.  Workup regarding home trilogy included an overnight, ABG, and compliance report.  It was recommended to adjust settings for home trilogy to increase tidal volume to at least 500.  The patient reports that she is feeling better today with the adjustment of trilogy device.  However, she states that she is still falling asleep.  The patient is asking to be assessed for possible narcolepsy.  Will refer patient to neurology for further assessment.  In the meantime I discussed that it is possible that the chronic respiratory failure with hypoxemia/hypercapnia/TRISTON/obesity hypoventilation syndrome could be contributing and I recommend her using home trilogy for short periods during the day as well.  She voiced understanding.

## 2024-05-20 NOTE — PATIENT INSTRUCTIONS
Chronic Bronchitis, Adult    Chronic bronchitis is inflammation inside of the main airways (bronchi) that come off the windpipe (trachea) in the lungs. The swelling causes the airways to narrow and make more mucus than normal. This can make it hard to breathe and may cause coughing or noisy breathing (wheezing). This condition is a type of chronic obstructive pulmonary disease (COPD). Chronic bronchitis is often associated with other chronic respiratory conditions, such as emphysema, asthma, bronchiectasis, or cystic fibrosis.  Chronic bronchitis is a long-term (chronic) condition. It is defined as a chronic cough with mucus (sputum) production:  For at least 3 months of the year.  For 2 years in a row.  People with chronic bronchitis are more likely to get colds and other infections in the nose, throat, or airways.  What are the causes?  This condition is most often caused by:  A history of smoking.  Exposure to secondhand smoke or a smoky area for a long period of time.  Frequent lung infections.  Long-term exposure to certain fumes or chemicals that irritate the lungs.  What are the signs or symptoms?  Symptoms of chronic bronchitis may include:  A cough that brings up mucus (productive cough).  A whistling sound when you breathe (wheezing).  Shortness of breath.  Chest tightness or soreness.  Fever or chills.  Colds or respiratory infections that go away and return.  How is this diagnosed?  Your health care provider may diagnose this condition based on your signs and symptoms, especially if you have a cough that lasts a long time or keeps coming back.  This condition may be diagnosed based on:  Your symptoms and medical history.  A physical exam, including listening to your lungs.  Tests, such as:  Testing a sputum sample.  Blood tests.  A chest X-ray.  Tests of lung (pulmonary) function.  How is this treated?  There is no cure for chronic bronchitis. Treatment may help control your symptoms. This  includes:  Drinking fluids. This may help thin your mucus so it is easier to cough up.  Mucus-clearing techniques. Your health care provider will show you which techniques are best for you.  Medicines such as:  Inhaled medicine (inhaler) to improve air flow in and out of your lungs.  Antibiotics to treat or prevent bacterial lung infections.  Mucus-thinning medicines.  Pulmonary rehabilitation. This is a program that helps you learn how to manage your breathing problem. The program may include exercise, education, counseling, treatment, and support.  Using oxygen therapy, if your blood oxygen level is very low.  Follow these instructions at home:  Medicines  Take over-the-counter and prescription medicines only as told by your health care provider.  If you were prescribed an antibiotic medicine, take it as told by your health care provider. Do not stop taking the antibiotic even if you start to feel better.  Lifestyle    Do not use any products that contain nicotine or tobacco. These products include cigarettes, chewing tobacco, and vaping devices, such as e-cigarettes. If you need help quitting, ask your health care provider.  Stay away from other people's smoke (secondhand smoke) and any irritants that make you cough more, such as chemical fumes.  Eat a healthy diet and get regular exercise. Talk with your health care provider about what activities are safe for you.  Return to normal activities as told by your health care provider. Ask your health care provider what activities are safe for you.  Preventing infections  Stay up to date on all immunizations, including the pneumonia and flu vaccines.  Wash your hands often with soap and water for at least 20 seconds. If soap and water are not available, use hand .  Avoid contact with people who have symptoms of a cold or the flu.  Keep your environment free from any known allergens such as dust, mold, pets, and pollen.  General instructions  Get plenty of  rest.  Drink enough fluids to keep your urine pale yellow.  Use oxygen therapy at home as directed.  Follow instructions from your health care provider about how to use oxygen safely and take steps to prevent fire.  Do not smoke while using oxygen or allow others to smoke in your home.  Keep all follow-up visits. This is important.  Contact a health care provider if:  Your shortness of breath or coughing gets worse even when you take medicine.  Your mucus gets thicker or changes color.  You are not able to cough up your mucus.  You have a fever.  Get help right away if:  You cough up blood.  You have trouble breathing.  You have chest pain.  You feel dizzy or confused.  These symptoms may represent a serious problem that is an emergency. Do not wait to see if the symptoms will go away. Get medical help right away. Call your local emergency services (911 in the [place] Do not drive yourself to the hospital.  Summary  Chronic bronchitis is inflammation inside of the main airways (bronchi) that come off the windpipe (trachea) in the lungs. The swelling causes the airways to narrow and make more mucus than normal.  Chronic bronchitis is a long-term (chronic) condition. It is defined as a chronic cough with mucus (sputum) production for at least 3 months of the year for 2 years in a row.  If you were prescribed an antibiotic medicine, take it as told by your health care provider. Do not stop taking the antibiotic even if you start to feel better.  Do not use any products that contain nicotine or tobacco. These products include cigarettes, chewing tobacco, and vaping devices, such as e-cigarettes. If you need help quitting, ask your health care provider.  This information is not intended to replace advice given to you by your health care provider. Make sure you discuss any questions you have with your health care provider.  Document Revised: 04/20/2022 Document Reviewed: 04/20/2022  Elsevier Patient Education © 2024  Elsevier Inc[employer]  Sleep Apnea  Sleep apnea affects breathing during sleep. It causes breathing to stop for 10 seconds or more, or to become shallow. People with sleep apnea usually snore loudly.  It can also increase the risk of:  Heart attack.  Stroke.  Being very overweight (obese).  Diabetes.  Heart failure.  Irregular heartbeat.  High blood pressure.  The goal of treatment is to help you breathe normally again.  What are the causes?    The most common cause of this condition is a collapsed or blocked airway.  There are three kinds of sleep apnea:  Obstructive sleep apnea. This is caused by a blocked or collapsed airway.  Central sleep apnea. This happens when the brain does not send the right signals to the muscles that control breathing.  Mixed sleep apnea. This is a combination of obstructive and central sleep apnea.  What increases the risk?  Being overweight.  Smoking.  Having a small airway.  Being older.  Being male.  Drinking alcohol.  Taking medicines to calm yourself (sedatives or tranquilizers).  Having family members with the condition.  Having a tongue or tonsils that are larger than normal.  What are the signs or symptoms?  Trouble staying asleep.  Loud snoring.  Headaches in the morning.  Waking up gasping.  Dry mouth or sore throat in the morning.  Being sleepy or tired during the day.  If you are sleepy or tired during the day, you may also:  Not be able to focus your mind (concentrate).  Forget things.  Get angry a lot and have mood swings.  Feel sad (depressed).  Have changes in your personality.  Have less interest in sex, if you are female.  Be unable to have an erection, if you are male.  How is this treated?    Sleeping on your side.  Using a medicine to get rid of mucus in your nose (decongestant).  Avoiding the use of alcohol, medicines to help you relax, or certain pain medicines (narcotics).  Losing weight, if needed.  Changing your diet.  Quitting smoking.  Using a machine to open your  airway while you sleep, such as:  An oral appliance. This is a mouthpiece that shifts your lower jaw forward.  A CPAP device. This device blows air through a mask when you breathe out (exhale).  An EPAP device. This has valves that you put in each nostril.  A BIPAP device. This device blows air through a mask when you breathe in (inhale) and breathe out.  Having surgery if other treatments do not work.  Follow these instructions at home:  Lifestyle  Make changes that your doctor recommends.  Eat a healthy diet.  Lose weight if needed.  Avoid alcohol, medicines to help you relax, and some pain medicines.  Do not smoke or use any products that contain nicotine or tobacco. If you need help quitting, ask your doctor.  General instructions  Take over-the-counter and prescription medicines only as told by your doctor.  If you were given a machine to use while you sleep, use it only as told by your doctor.  If you are having surgery, make sure to tell your doctor you have sleep apnea. You may need to bring your device with you.  Keep all follow-up visits.  Contact a doctor if:  The machine that you were given to use during sleep bothers you or does not seem to be working.  You do not get better.  You get worse.  Get help right away if:  Your chest hurts.  You have trouble breathing in enough air.  You have an uncomfortable feeling in your back, arms, or stomach.  You have trouble talking.  One side of your body feels weak.  A part of your face is hanging down.  These symptoms may be an emergency. Get help right away. Call your local emergency services (911 in the [place]  Do not wait to see if the symptoms will go away.  Do not drive yourself to the hospital.  Summary  This condition affects breathing during sleep.  The most common cause is a collapsed or blocked airway.  The goal of treatment is to help you breathe normally while you sleep.  This information is not intended to replace advice given to you by your health  care provider. Make sure you discuss any questions you have with your health care provider.  Document Revised: 07/27/2022 Document Reviewed: 11/26/2021  Elsevier Patient Education © 2024 Elsevier Inc.

## 2024-05-23 ENCOUNTER — HOSPITAL ENCOUNTER (OUTPATIENT)
Dept: GENERAL RADIOLOGY | Facility: HOSPITAL | Age: 69
Discharge: HOME | End: 2024-05-23
Payer: MEDICARE

## 2024-05-23 ENCOUNTER — TRANSCRIBE ORDERS (OUTPATIENT)
Dept: ADMINISTRATIVE | Facility: HOSPITAL | Age: 69
End: 2024-05-23
Payer: COMMERCIAL

## 2024-05-23 DIAGNOSIS — G89.29 OTHER CHRONIC PAIN: ICD-10-CM

## 2024-05-23 DIAGNOSIS — G89.29 OTHER CHRONIC PAIN: Primary | ICD-10-CM

## 2024-05-23 PROCEDURE — 73562 X-RAY EXAM OF KNEE 3: CPT

## 2024-05-23 PROCEDURE — 73502 X-RAY EXAM HIP UNI 2-3 VIEWS: CPT

## 2024-06-04 ENCOUNTER — HOSPITAL ENCOUNTER (OUTPATIENT)
Dept: ULTRASOUND IMAGING | Age: 69
Discharge: HOME | End: 2024-06-04
Attending: INTERNAL MEDICINE
Payer: MEDICARE

## 2024-06-04 DIAGNOSIS — N18.32 STAGE 3B CHRONIC KIDNEY DISEASE (HCC): ICD-10-CM

## 2024-06-04 PROCEDURE — 76770 US EXAM ABDO BACK WALL COMP: CPT

## 2024-06-06 ENCOUNTER — TELEPHONE (OUTPATIENT)
Dept: VASCULAR SURGERY | Facility: CLINIC | Age: 69
End: 2024-06-06

## 2024-06-06 ENCOUNTER — OFFICE VISIT (OUTPATIENT)
Dept: CARDIOLOGY | Facility: CLINIC | Age: 69
End: 2024-06-06
Payer: MEDICARE

## 2024-06-06 ENCOUNTER — TELEPHONE (OUTPATIENT)
Dept: OTOLARYNGOLOGY | Facility: CLINIC | Age: 69
End: 2024-06-06
Payer: COMMERCIAL

## 2024-06-06 ENCOUNTER — TELEPHONE (OUTPATIENT)
Dept: VASCULAR SURGERY | Facility: CLINIC | Age: 69
End: 2024-06-06
Payer: COMMERCIAL

## 2024-06-06 VITALS
SYSTOLIC BLOOD PRESSURE: 122 MMHG | WEIGHT: 255 LBS | HEART RATE: 88 BPM | BODY MASS INDEX: 45.18 KG/M2 | HEIGHT: 63 IN | OXYGEN SATURATION: 98 % | DIASTOLIC BLOOD PRESSURE: 88 MMHG

## 2024-06-06 DIAGNOSIS — Z95.818 PRESENCE OF WATCHMAN LEFT ATRIAL APPENDAGE CLOSURE DEVICE: ICD-10-CM

## 2024-06-06 DIAGNOSIS — J30.9 ALLERGIC RHINITIS, UNSPECIFIED SEASONALITY, UNSPECIFIED TRIGGER: Primary | ICD-10-CM

## 2024-06-06 DIAGNOSIS — I48.0 PAF (PAROXYSMAL ATRIAL FIBRILLATION): Primary | ICD-10-CM

## 2024-06-06 DIAGNOSIS — M79.89 LEG SWELLING: Primary | ICD-10-CM

## 2024-06-06 DIAGNOSIS — I65.23 BILATERAL CAROTID ARTERY STENOSIS: ICD-10-CM

## 2024-06-06 DIAGNOSIS — I73.9 PAD (PERIPHERAL ARTERY DISEASE): Primary | ICD-10-CM

## 2024-06-06 PROCEDURE — 99214 OFFICE O/P EST MOD 30 MIN: CPT | Performed by: PHYSICIAN ASSISTANT

## 2024-06-06 PROCEDURE — 93000 ELECTROCARDIOGRAM COMPLETE: CPT | Performed by: PHYSICIAN ASSISTANT

## 2024-06-06 RX ORDER — LEVOCETIRIZINE DIHYDROCHLORIDE 5 MG/1
5 TABLET, FILM COATED ORAL EVERY EVENING
Qty: 30 TABLET | Refills: 0 | Status: SHIPPED | OUTPATIENT
Start: 2024-06-06 | End: 2024-07-06

## 2024-06-06 NOTE — TELEPHONE ENCOUNTER
Caller: Danisha Cannon    Relationship to patient: Self    Best call back number: [phone]    Chief complaint: WAS A BEAR PT    Type of visit: ANNUAL    Requested date: ASAP     If rescheduling, when is the original appointment: N/A     Additional notes:PT STATES SHE HAS NOT BEEN SEEN SINCE DR. SIFUENTES LEFT BUT WOULD LIKE TO SCHEDULE A ANNUAL APPT WITH ANOTHER PROVIDER. PT ALSO STATES SHE USUALLY HAS TESTING DONE AS WELL. PLEASE GIVE PT A CALL TO DISCUSS SCHEDULING AND TESTING. THANK YOU             
Yes-Patient/Caregiver accepts free interpretation services...

## 2024-06-06 NOTE — PROGRESS NOTES
"[facility]ART GROUP -  CLINIC FOLLOW UP     Patient Care Team:  Tc Bee DO as PCP - General (Family Medicine)  Clarence Alvarez MD as Consulting Physician (Gastroenterology)  MaryluSophia APRN as Nurse Practitioner (Pulmonary Disease)  Claudio Sargent APRN as Nurse Practitioner (Nurse Practitioner)  Vlad Marquez DO as Consulting Physician (Pain Medicine)    Chief Complaint: PAF     Subjective   EP Problems:   Paroxysmal atrial fibrillation  8/2019: Truong Marques  8/27/21:  Holter with 2500 runs of pSVT  Prior AAD use: Flecainide   AVNRT  8/2019:  Slow pathway modification  LAFB  Presence of a JOHNSON occluder  2/21/21: Watchman implantTruong     Medical Problems:   Morbid obesity  5/2023:  BMI 49  Dysphagia  GERD  MDD  Anxiety  Peripheral neuropathy  Essential tremor  COPD  TRISTON on home Trilogy     HPI: Today I had the pleasure of seeing Danisha Cannon in the cardiology clinic for follow up. Sh eis a 68 year old female with a history of paroxysmal atrial fibrillation, AVNRT. She has had previous ablations, but holter monitor in 2021 showed episodes of SVT so she has been on Flecainide since. This was increased in September 2023 to 100mg BID and she has gradually had widening of her QRS.     She states she has elevated rates daily still, but only notes it on her heart rate sal. It will increase to 120-140 and then come back down. She is not necessarily aware of palpitations. She has continued to lose weight with ozempic and is down to 255lbs now. She wears her O2 during the day at 2L, but can desaturate to 88% if she's walking around. She is planning to travel to [place] in August for a wedding and will be at [place],000 feet above sea level.         Objective     Visit Vitals  /88   Pulse 88   Ht 158.8 cm (62.52\")   Wt 116 kg (255 lb)   LMP  (LMP Unknown)   SpO2 98%   BMI 45.87 kg/m²           Vitals reviewed.   Constitutional:       Appearance: Not in distress. " Obese.   Eyes:      Extraocular Movements: Extraocular movements intact.      Conjunctiva/sclera: Conjunctivae normal.   HENT:      Head: Normocephalic and atraumatic.      Nose: Nose normal.    Mouth/Throat:      Lips: Pink.      Mouth: Mucous membranes are moist.      Pharynx: Oropharynx is clear.   Neck:      Vascular: No carotid bruit or JVD. JVD normal.   Pulmonary:      Effort: Pulmonary effort is normal.      Breath sounds: Normal breath sounds.   Chest:      Chest wall: Not tender to palpatation.   Cardiovascular:      PMI at left midclavicular line. Normal rate. Regular rhythm. Normal S1. Normal S2.       Murmurs: There is no murmur.      No gallop.  No rub.   Pulses:     Radial: 2+ bilaterally.  Edema:     Peripheral edema absent.   Musculoskeletal: Normal range of motion.      Extremities: No clubbing present.     Cervical back: Normal range of motion. Skin:     General: Skin is warm and dry.   Neurological:      General: No focal deficit present.      Mental Status: Alert and oriented to person, place, and time.      Gait: Gait abnormal.   Psychiatric:         Attention and Perception: Attention normal.         Mood and Affect: Affect normal.         Speech: Speech normal.         Behavior: Behavior normal.         Cognition and Memory: Cognition normal.             The following portions of the patient's history were reviewed and updated as appropriate: allergies, current medications, past medical history, past social history, past and problem list.     Review of Systems   Constitutional: Negative.    HENT: Negative.     Eyes: Negative.    Respiratory:  Positive for shortness of breath.    Cardiovascular: Negative.    Gastrointestinal: Negative.    Endocrine: Negative.    Genitourinary: Negative.    Musculoskeletal: Negative.    Skin: Negative.    Allergic/Immunologic: Negative.    Neurological: Negative.    Hematological: Negative.    Psychiatric/Behavioral: Negative.            Echo EF Estimated  Lab  Results   Component Value Date    ECHOEFEST 65 12/10/2020         ECG 12 Lead    Date/Time: 6/6/2024 2:23 PM  Performed by: Sheri Jerry PA    Authorized by: Sheri Jerry PA  Rhythm: sinus rhythm  Rate: normal  Conduction: right bundle branch block and left anterior fascicular block    Clinical impression: abnormal EKG  Comments: QRS duration 154ms             Medication Review: yes    Current Outpatient Medications:     albuterol sulfate  (90 Base) MCG/ACT inhaler, INHALE 2 PUFFS BY MOUTH EVERY 4 HOURS AS NEEDED FOR WHEEZING AND SHORTNESS OF BREATH, Disp: , Rfl:     alendronate (FOSAMAX) 70 MG tablet, Take 1 tablet by mouth Every 7 (Seven) Days., Disp: , Rfl:     amitriptyline (ELAVIL) 75 MG tablet, Take 1 tablet by mouth Every Night., Disp: , Rfl:     ASPIRIN 81 PO, Take 1 tablet by mouth Daily., Disp: , Rfl:     azelastine (ASTELIN) 0.1 % nasal spray, 2 sprays into the nostril(s) as directed by provider 2 (Two) Times a Day. Use in each nostril as directed, Disp: 30 mL, Rfl: 11    dexlansoprazole (Dexilant) 60 MG capsule, Take 1 capsule by mouth Daily., Disp: 30 capsule, Rfl: 11    diclofenac (VOLTAREN) 75 MG EC tablet, Take 1 tablet by mouth Daily. (Patient taking differently: Take 1 tablet by mouth 2 (Two) Times a Day.), Disp: 90 tablet, Rfl: 1    donepezil (ARICEPT) 10 MG tablet, Take 1 tablet by mouth Daily., Disp: , Rfl:     DULoxetine (CYMBALTA) 60 MG capsule, TAKE 1 CAPSULE BY MOUTH TWICE DAILY, Disp: 60 capsule, Rfl: 5    famciclovir (FAMVIR) 500 MG tablet, Take 1 tablet by mouth 2 (Two) Times a Day., Disp: , Rfl:     famotidine (PEPCID) 40 MG tablet, Take 1 tablet by mouth Every 12 (Twelve) Hours., Disp: , Rfl:     flecainide (TAMBOCOR) 100 MG tablet, Take 1 tablet by mouth Every 12 (Twelve) Hours., Disp: 180 tablet, Rfl: 3    fluticasone (Flovent HFA) 110 MCG/ACT inhaler, Inhale 1 puff 2 (Two) Times a Day., Disp: , Rfl:     furosemide (LASIX) 40 MG tablet, Take 1 tablet by mouth Daily.,  Disp: 90 tablet, Rfl: 3    gabapentin (NEURONTIN) 300 MG capsule, Take 1 capsule by mouth 3 times a day., Disp: , Rfl:     hydrOXYzine pamoate (VISTARIL) 25 MG capsule, Take 1 capsule by mouth 3 (Three) Times a Day As Needed., Disp: , Rfl:     ipratropium (ATROVENT) 0.06 % nasal spray, 2 sprays into the nostril(s) as directed by provider 3 (Three) Times a Day., Disp: 15 mL, Rfl: 5    levocetirizine (XYZAL) 5 MG tablet, Take 1 tablet by mouth Every Evening for 30 days., Disp: 30 tablet, Rfl: 0    levothyroxine (SYNTHROID, LEVOTHROID) 137 MCG tablet, Take 1 tablet by mouth Every Morning., Disp: 90 tablet, Rfl: 3    LORazepam (ATIVAN) 1 MG tablet, Take 1 tablet by mouth Every 12 (Twelve) Hours., Disp: , Rfl:     metaxalone (SKELAXIN) 800 MG tablet, Take 1 tablet by mouth 3 times a day., Disp: , Rfl:     metoprolol succinate XL (TOPROL-XL) 25 MG 24 hr tablet, Take 2 tablets by mouth Daily., Disp: , Rfl:     montelukast (SINGULAIR) 10 MG tablet, Take 1 tablet by mouth Daily., Disp: , Rfl:     O2 (OXYGEN), Inhale 3.5 L/min 1 (One) Time. NOC, Disp: , Rfl:     Ozempic, 0.25 or 0.5 MG/DOSE, 2 MG/3ML solution pen-injector, Inject 0.5 mg under the skin into the appropriate area as directed 1 (One) Time Per Week., Disp: , Rfl:     rOPINIRole (REQUIP) 3 MG tablet, 2 tablets each morning and 3 tablets at bedtime. (Patient taking differently: Take 1 tablet by mouth 2 (Two) Times a Day. 1 TABLET IN THE MORNING & 1 TABLET AT  BEDTIME), Disp: 450 tablet, Rfl: 3    rosuvastatin (CRESTOR) 5 MG tablet, 1 tablet Daily., Disp: , Rfl:     sodium chloride (OCEAN) 0.65 % nasal spray, 2-3 sprays into the nostril(s) as directed by provider As Needed (nasal irrigation)., Disp: 50 mL, Rfl: 12    TiZANidine (ZANAFLEX) 6 MG capsule, , Disp: , Rfl:     triamcinolone (KENALOG) 0.1 % paste, APPLY TO THE AFFECTED AREA THREE TIMES DAILY AS NEEDED, Disp: , Rfl:     fluticasone (FLONASE) 50 MCG/ACT nasal spray, 2 sprays into the nostril(s) as directed by  "provider Daily As Needed for Rhinitis or Allergies for up to 30 days., Disp: 16 g, Rfl: 11   Allergies   Allergen Reactions    Other Rash     Dial soap  Redness and swelling on skin and burning sensation    Codeine Dizziness and Nausea Only     Rapid heart rate, dizziness  NAUSEA    Mobic [Meloxicam] Rash    Morphine And Codeine Itching    Azithromycin Other (See Comments)    Cyclobenzaprine Unknown - Low Severity       I have reviewed       CBC:No results for input(s): \"WBC\", \"HGB\", \"HCT\", \"PLT\", \"IRONSERUM\", \"IRON\" in the last 90373 hours.   BMP/CMP:  Lab Results - Last 18 Months   Lab Units 02/14/24  1314 01/18/24  1233 12/27/23  1039 08/14/23  1711   SODIUM mmol/L  --   --   --  142   SODIUM, ARTERIAL mmol/L 143  --   --   --    POTASSIUM mmol/L  --   --   --  4.6   CHLORIDE mmol/L  --   --   --  104   CO2 mmol/L  --   --   --  28.0   GLUCOSE mg/dL  --   --   --  107*   BUN mg/dL  --   --   --  30*   CREATININE mg/dL  --  1.10   < > 1.40*   CALCIUM mg/dL  --   --   --  9.2    < > = values in this interval not displayed.     BNP:   Lab Results - Last 18 Months   Lab Units 08/14/23  1711   PROBNP pg/mL 111.6      THYROID:No results for input(s): \"TSH\", \"FREET4\", \"FTI\" in the last 75515 hours.    Invalid input(s): \"FREET3\", \"T3\", \"T4\", \"TEUP\", \"TOTALT4\"    Results for orders placed during the hospital encounter of 12/10/20    Adult Transesophageal Echo (ASHTYN) W/ Cont if Necessary Per Protocol    Interpretation Summary  · Left ventricular wall thickness is consistent with concentric hypertrophy.  · Estimated left ventricular EF = 65% Left ventricular systolic function is normal.  · The right ventricular cavity is mildly dilated.     Assessment:   Diagnoses and all orders for this visit:    1. PAF (paroxysmal atrial fibrillation) (Primary)  -     ECG 12 Lead; Future  -     Holter Monitor - 72 Hour Up To 15 Days; Future    2. Presence of Watchman left atrial appendage closure device  Overview:  2/21/2021 per  " Guerin      Other orders  -     ECG 12 Lead      PAF: her elevated rates could be just regular ST on exertion. However, given that her QRS is widening, if it's not working well either, then it may be worthwhile to think about ablation   -Check Holter and see what her palpitations and elevated HR is  -May need to consider ablation   -S/p Watchman, continue aspirin 81mg   -Follow up in 6 weeks     Obesity: She has continued to lose weight on Ozempic           I spent 30 minutes caring for Danisha on this date of service. This time includes time spent by me in the following activities:preparing for the visit, reviewing tests, obtaining and/or reviewing a separately obtained history, performing a medically appropriate examination and/or evaluation , counseling and educating the patient/family/caregiver, ordering medications, tests, or procedures, referring and communicating with other health care professionals , documenting information in the medical record, and independently interpreting results and communicating that information with the patient/family/caregiver        Electronically signed by HAVEN Rahman

## 2024-06-06 NOTE — TELEPHONE ENCOUNTER
Returned call to patient regarding message sent thru, but no answer. Left VM explaining that testing had to be set for separate days. Per Lisha, venous and arterial testing done same day will not be covered by insurance. Carotid is scheduled 7/2/24 @ 8:30, then VVI is set for 7/11/24 7am @  followed by appointment with Amirah Gomes @ 8:30am. Asked for return call to go over info with patient.

## 2024-06-06 NOTE — TELEPHONE ENCOUNTER
[facility] received a fax that requires your attention. The document has been indexed to the patient’s chart for your review.      Reason for sending: PRESCRIPTION REFILL REQUEST LEVOCETIRIZINE  5MG TAB    Name of Sender: ADRIÁN GLORIA [phone]    Date Indexed: 6.6.24     Notes (if needed): INDEXED UNDER MEDICATIONS _6.6.24  THANK YOU

## 2024-06-18 ENCOUNTER — TELEPHONE (OUTPATIENT)
Dept: PULMONOLOGY | Facility: CLINIC | Age: 69
End: 2024-06-18
Payer: COMMERCIAL
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
## 2024-06-19 RX ORDER — AMITRIPTYLINE HYDROCHLORIDE 75 MG/1
TABLET ORAL
Qty: 30 TABLET | Refills: 5 | Status: SHIPPED | OUTPATIENT
Start: 2024-06-19

## 2024-06-19 NOTE — TELEPHONE ENCOUNTER
Requested Prescriptions     Pending Prescriptions Disp Refills    amitriptyline (ELAVIL) 75 MG tablet [Pharmacy Med Name: AMITRIPTYLINE 75MG TABLETS] 30 tablet 5     Sig: TAKE 1 TABLET BY MOUTH EVERY NIGHT       Last Office Visit: 4/2/2024  Next Office Visit: 4/8/25  Last Medication Refill: 11/15/23 with 5rf

## 2024-06-27 ENCOUNTER — TELEPHONE (OUTPATIENT)
Dept: CARDIOLOGY | Facility: CLINIC | Age: 69
End: 2024-06-27
Payer: COMMERCIAL

## 2024-06-27 NOTE — TELEPHONE ENCOUNTER
Leslie Luu MD Pagan, Bridget G, PA; Lily Allison MA  Please let her know the monitor findings were relatively benign.  Short runs of PSVT.  No sustained arrhythmias.      Patient notified.

## 2024-07-02 ENCOUNTER — HOSPITAL ENCOUNTER (OUTPATIENT)
Dept: ULTRASOUND IMAGING | Facility: HOSPITAL | Age: 69
Discharge: HOME | End: 2024-07-02
Admitting: NURSE PRACTITIONER
Payer: MEDICARE

## 2024-07-02 ENCOUNTER — APPOINTMENT (OUTPATIENT)
Dept: ULTRASOUND IMAGING | Facility: HOSPITAL | Age: 69
End: 2024-07-02
Payer: MEDICARE

## 2024-07-02 DIAGNOSIS — I65.23 BILATERAL CAROTID ARTERY STENOSIS: ICD-10-CM

## 2024-07-02 DIAGNOSIS — J30.9 ALLERGIC RHINITIS, UNSPECIFIED SEASONALITY, UNSPECIFIED TRIGGER: ICD-10-CM

## 2024-07-02 PROCEDURE — 93880 EXTRACRANIAL BILAT STUDY: CPT

## 2024-07-02 PROCEDURE — 93880 EXTRACRANIAL BILAT STUDY: CPT | Performed by: SURGERY

## 2024-07-02 RX ORDER — LEVOCETIRIZINE DIHYDROCHLORIDE 5 MG/1
5 TABLET, FILM COATED ORAL EVERY EVENING
Qty: 90 TABLET | Refills: 0 | Status: SHIPPED | OUTPATIENT
Start: 2024-07-02

## 2024-07-10 ENCOUNTER — TELEPHONE (OUTPATIENT)
Dept: VASCULAR SURGERY | Facility: CLINIC | Age: 69
End: 2024-07-10
Payer: COMMERCIAL

## 2024-07-10 DIAGNOSIS — M79.89 LEG SWELLING: Primary | ICD-10-CM

## 2024-07-10 NOTE — TELEPHONE ENCOUNTER
Unable to confirm testing or appointment with [facility] on 7/11/24. Left VM.

## 2024-07-11 ENCOUNTER — HOSPITAL ENCOUNTER (OUTPATIENT)
Dept: ULTRASOUND IMAGING | Facility: HOSPITAL | Age: 69
Discharge: HOME | End: 2024-07-11
Admitting: NURSE PRACTITIONER
Payer: MEDICARE

## 2024-07-11 ENCOUNTER — OFFICE VISIT (OUTPATIENT)
Dept: VASCULAR SURGERY | Facility: CLINIC | Age: 69
End: 2024-07-11
Payer: MEDICARE

## 2024-07-11 VITALS
WEIGHT: 245 LBS | HEART RATE: 67 BPM | SYSTOLIC BLOOD PRESSURE: 148 MMHG | OXYGEN SATURATION: 96 % | HEIGHT: 62 IN | DIASTOLIC BLOOD PRESSURE: 80 MMHG | BODY MASS INDEX: 45.08 KG/M2

## 2024-07-11 DIAGNOSIS — M79.89 LEG SWELLING: ICD-10-CM

## 2024-07-11 DIAGNOSIS — E78.2 MIXED HYPERLIPIDEMIA: ICD-10-CM

## 2024-07-11 DIAGNOSIS — I65.23 BILATERAL CAROTID ARTERY STENOSIS: Primary | ICD-10-CM

## 2024-07-11 PROCEDURE — 93970 EXTREMITY STUDY: CPT

## 2024-07-11 NOTE — PROGRESS NOTES
"7/11/2024        Tc Bee,   [address] [place] [address]        Danihsa Cannon  1955    Chief Complaint   Patient presents with    Follow-up     1 year follow up w/ testing. Last seen 1/9/23 w/ Jarred. Patient denies any stroke type symptoms or changes in legs.        Dear Tc Bee DO:    I had the pleasure of seeing your patient in the office today for follow up.  As you recall, the patient is a 69 y.o. female who we are currently following for carotid occlusive disease.  He is here today for her 1 year follow-up.  Her only complaint is swelling to her bilateral lower extremities.  She does wear compression stockings daily which does help alleviate that.  She recently found out that she has kidney disease and is being followed by Dr. Diaz.  She denies any symptoms of claudication or ischemia.  She denies any strokelike symptoms.  She is maintained on aspirin and Crestor.  She did have noninvasive testing performed today, which I did review in office.    Review of Systems   Constitutional: Negative.  Negative for diaphoresis and fever.   HENT: Negative.     Eyes: Negative.    Respiratory: Negative.  Negative for shortness of breath and wheezing.    Cardiovascular:  Positive for leg swelling.   Gastrointestinal: Negative.  Negative for abdominal pain.   Endocrine: Negative.    Genitourinary: Negative.    Musculoskeletal: Negative.  Negative for gait problem.   Skin: Negative.    Allergic/Immunologic: Negative.    Neurological: Negative.  Negative for dizziness and numbness.   Hematological: Negative.    Psychiatric/Behavioral: Negative.           /80   Pulse 67   Ht 157.5 cm (62\")   Wt 111 kg (245 lb)   LMP  (LMP Unknown)   SpO2 96%   BMI 44.81 kg/m²     Physical Exam  Vitals and nursing note reviewed.   Constitutional:       General: She is not in acute distress.     Appearance: Normal appearance. She is obese. She is not diaphoretic.   HENT:      Head: " Normocephalic. No right periorbital erythema or left periorbital erythema.      Nose: Nose normal.   Eyes:      General: No scleral icterus.     Pupils: Pupils are equal.   Cardiovascular:      Rate and Rhythm: Normal rate and regular rhythm.      Pulses: Normal pulses.           Carotid pulses are 2+ on the right side and 2+ on the left side.       Femoral pulses are 2+ on the right side and 2+ on the left side.       Popliteal pulses are 2+ on the right side and 2+ on the left side.        Dorsalis pedis pulses are 2+ on the right side and 2+ on the left side.        Posterior tibial pulses are 2+ on the right side and 2+ on the left side.      Heart sounds: Normal heart sounds. No murmur heard.     Comments: Varicosities and telangiectasias to bilateral lower extremities.    Pulmonary:      Effort: Pulmonary effort is normal. No respiratory distress.      Breath sounds: Normal breath sounds.   Abdominal:      General: Bowel sounds are normal. There is no distension.      Palpations: Abdomen is soft.      Tenderness: There is no abdominal tenderness. There is no guarding.   Musculoskeletal:         General: No swelling or tenderness. Normal range of motion.      Cervical back: Normal range of motion and neck supple.      Right lower leg: Edema present.      Left lower leg: Edema present.   Feet:      Right foot:      Skin integrity: Skin integrity normal.      Left foot:      Skin integrity: Skin integrity normal.   Skin:     General: Skin is warm and dry.      Findings: No erythema or rash.   Neurological:      General: No focal deficit present.      Mental Status: She is alert and oriented to person, place, and time. Mental status is at baseline.      Cranial Nerves: No cranial nerve deficit.      Gait: Gait normal.   Psychiatric:         Attention and Perception: Attention normal.         Mood and Affect: Mood normal.         Behavior: Behavior normal.         Thought Content: Thought content normal.          Judgment: Judgment normal.         DIAGNOSTIC DATA:    US Carotid Bilateral    Result Date: 7/2/2024  Narrative: History: Carotid occlusive disease      Impression: Impression: 1. There is 50 to 69% stenosis of the right internal carotid artery. 2. There is less than 50% stenosis of the left internal carotid artery. 3. Antegrade flow is demonstrated in bilateral vertebral arteries.  Comments: Bilateral carotid vertebral arterial duplex scan was performed.  Grayscale imaging shows intimal thickening and calcified elements at the carotid bifurcation. The right internal carotid artery peak systolic velocity is 132.8 cm/sec. The end-diastolic velocity is 38.5 cm/sec. The right ICA/CCA ratio is approximately 1.4. These findings correlate with 50 to 69% stenosis of the right internal carotid artery.  Grayscale imaging shows intimal thickening and calcified elements at the carotid bifurcation. The left internal carotid artery peak systolic velocity is 78.1 cm/sec. The end-diastolic velocity is 21.4 cm/sec. The left ICA/CCA ratio is approximately 0.6. These findings correlate with less than 50% stenosis of the left internal carotid artery.  Antegrade flow is demonstrated in bilateral vertebral arteries.   This report was signed and finalized on 7/2/2024 3:58 PM by Dr. Marco Abrams MD.         Patient Active Problem List   Diagnosis    Abdominal adhesions    Abnormal echocardiogram    Abnormal Holter exam    Atrial flutter, paroxysmal    Bilateral edema of lower extremity    Chest pain    Cholecystitis with cholelithiasis    Chronic right-sided low back pain with sciatica    Morbid obesity with BMI of 40.0-44.9, adult    Facet syndrome, lumbar    Heart rate fast    Idiopathic hypotension    Idiopathic progressive neuropathy    Insomnia    Lumbar facet arthropathy    Memory loss    MRSA carrier    Obstructive sleep apnea    Pilonidal cyst    Primary osteoarthritis of right knee    Recurrent ventral incisional hernia     Restless leg syndrome    Sciatica of right side    Snoring    Somnolence, daytime    Spondylosis of lumbar region without myelopathy or radiculopathy    Tear of medial meniscus of right knee, current    Total knee replacement status, right    Tremor    Umbilical hernia    Obesity, unspecified obesity severity, unspecified obesity type    Infection associated with internal knee prosthesis    Infection of right knee    Hypothyroidism    Fever    Hypoxia    Leukocytosis    Abnormal chest x-ray    Abscess of right thigh    Dyspepsia    Anticoagulated    Nonsmoker    NSAID long-term use    Esophageal dysphagia    S/P ablation of atrial fibrillation    Mixed hyperlipidemia    PAF (paroxysmal atrial fibrillation)    Bradycardia    Prediabetes    Anxiety and depression    Hematoma of left lower extremity    Fall    Acute blood loss anemia due to hematoma from recent fall    Acute kidney injury    Presence of Watchman left atrial appendage closure device    Acute deep vein thrombosis (DVT) of left lower extremity    Allergic rhinitis    Anemia    Arteriosclerotic vascular disease    Globus sensation    Cataract extraction status, right eye    Closed fracture of fourth metatarsal bone    Erosive osteoarthritis of both hands    Glossitis    Heart murmur    Hypothyroidism due to drugs    Infection or inflammatory reaction due to other internal prosthetic device, implant, or graft    Osteoarthritis of carpometacarpal (CMC) joint of thumb    Osteopenia    Palpitations    Postoperative nausea and vomiting    Pruritic rash    Retention of urine    Seasonal allergies    Swelling    Bunion of great toe of left foot    Hammer toe of left foot    Osteoarthritis of foot joint    Tailor's bunion    Tear of medial meniscus of knee    Tinea cruris    Undifferentiated somatoform disorder    AVNRT (AV juanita re-entry tachycardia)    Osteoporosis    Hypoglycemia    Major depressive disorder    Chronic respiratory failure with hypoxia and  hypercapnia    Chronic bronchitis    Xerostomia    Essential tremor    Weakness of left lower extremity    Anxiety    Contusion of knee    Lumbar degenerative disc disease    Episodic tension-type headache    Generalized osteoarthritis    Malaise and fatigue    Nail bed infection    Nonrheumatic tricuspid valve disorder    Normal gynecologic examination    Lung nodules    Left anterior fascicular block    Vitamin D deficiency    Restrictive lung disease    Gastroesophageal reflux disease    Laryngopharyngeal reflux    Pharyngoesophageal dysphagia    Class 3 severe obesity due to excess calories without serious comorbidity with body mass index (BMI) of 45.0 to 49.9 in adult    Venous insufficiency    Incomplete lesion of sacral spinal cord    Scoliosis    IVCD (intraventricular conduction defect)         ICD-10-CM ICD-9-CM   1. Bilateral carotid artery stenosis  I65.23 433.10     433.30   2. Mixed hyperlipidemia  E78.2 272.2   3. Leg swelling  M79.89 729.81             PLAN: After thoroughly evaluating Danisha Cannon, I believe the best course of action is to remain conservative from vascular surgery standpoint.  She did have carotid duplex performed today which shows less than 50% left carotid stenosis and 50 to 69% right carotid stenosis, this is unchanged from her last years testing.  She also had venous duplex ran which does show venous insufficiency in her bilateral lower extremities, making her a good candidate for bilateral radiofrequency ablation.  She would like to hold off as she does not have any discomfort or heaviness at rest and she is able to alleviate the swelling with her compression stockings.  We will see her back in 1 year with repeat noninvasive testing to include carotid duplex.  She is maintained on aspirin 81 mg daily and Crestor 5 mg nightly.  I did discuss vascular risk factors as they pertain to the progression of vascular disease including controlling high blood pressure and hyperlipidemia,  her blood pressure is elevated today in office at 148/80.  She states that she does not have a history of hypertension but she has been running high lately.  I did remind her that elevated blood pressure does take a toll on her kidneys, I would like her to take her blood pressure in the morning and in the evening keeping a log and if this continues she needs to follow-up with her PCP for further recommendations.  I was unable to review any current lab values.  She can continue all other medications as previously planned. This was all discussed in full with complete understanding.    Thank you for allowing me to participate in the care of your patient.  Please do not hesitate to call with any questions or concerns.  We will keep you aware of any further encounters with Danisha Cannon.      Sincerely Yours,      ERNESTO Ching

## 2024-08-14 NOTE — H&P
No chief complaint on file.      Subjective .     History of present illness:  Danisha Cannon is a 65 y.o. yo female with history of PAF, s/p ablation in the past with a history of frequent falls who presents today for ASHTYN for screening for watchman placement.    History  Past Medical History:   Diagnosis Date   • Anemia    • Anxiety and depression    • Arthritis    • Atrial fibrillation (CMS/HCC)    • Chronic cough    • Disease of thyroid gland    • GERD (gastroesophageal reflux disease)    • History of incision and drainage     Right knee   • History of staph infection     right knee, and upper right thigh   • Hyperlipidemia    • Infection      in right knee to bone, cleaned it out and put new hardware with antibiotic and pt developed hole in incision   • Neuropathy, peripheral    • Pneumonia     RECENT DX PER FAMILY DOCTOR   • PONV (postoperative nausea and vomiting)    • Restless leg syndrome    • Sleep apnea with use of continuous positive airway pressure (CPAP)     bipap   • SVT (supraventricular tachycardia) (CMS/HCC)    ,   Past Surgical History:   Procedure Laterality Date   • BUNIONECTOMY Left     AND HAMMER TOE   • CARDIAC ABLATION      at [facility] 8/2019   • CARDIAC SURGERY      HEART CATH   • CATARACT EXTRACTION Right    • HERNIA REPAIR      UMBILICAL X3   • INCISION AND DRAINAGE LEG Right 1/28/2019    Procedure: INCISION AND DRAINAGE OF RIGHT THIGH HEMATOMA;  Surgeon: Nino Stern MD;  Location: [facility] HYBRID OR 12;  Service: Vascular   • JOINT REPLACEMENT      RIGHT KNEE   • KNEE POLY INSERT EXCHANGE Right 3/3/2018    Procedure: IRRIGATION AND DEBRIDEMENT TOTAL KNEE & POLY EXCHANGE - RIGHT;  Surgeon: Amilcar Capellan MD;  Location: [facility] OR;  Service:    • LAPAROSCOPIC CHOLECYSTECTOMY     • LEG DEBRIDEMENT Right 3/23/2018    Procedure: DEBRIDEMENT AND CLOSURE KNEE - RIGHT;  Surgeon: Amilcar Capellan MD;  Location: [facility] OR;  Service: Orthopedics   • PERIPHERALLY INSERTED CENTRAL  Serum glucose 50. D10 bolus infusing. D10 continuous infusion ordered to be started after bolus complete.   CATHETER INSERTION     • PILONIDAL CYSTECTOMY N/A 2/16/2017    Procedure: PILONIDAL CYSTECTOMY, EXCISION SEBACEOUS CYST - BACK;  Surgeon: Macrina Capellan MD;  Location:  [facility] OR;  Service:    • TOTAL KNEE  PROSTHESIS REMOVAL W/ SPACER INSERTION Right 3/13/2018    Procedure: 1.  EXPLANT TOTAL KNEE 2.  ANTIBOTIC SPACER KNEE;  Surgeon: Amilcar Capellan MD;  Location:  [facility] OR;  Service: Orthopedics   • TOTAL KNEE  PROSTHESIS REMOVAL W/ SPACER INSERTION Right 6/19/2020    Procedure: REMOVAL OF ANTIBIOTIC SPACER;  Surgeon: Amilcar Capellan MD;  Location:  [facility] OR;  Service: Orthopedics;  Laterality: Right;   • TOTAL KNEE ARTHROPLASTY REVISION Right 6/19/2020    Procedure: REVISION TOTAL KNEE REPLACEMENT;  Surgeon: Amilcar Capellan MD;  Location:  [facility] OR;  Service: Orthopedics;  Laterality: Right;   • TRUNK LESION/CYST EXCISION N/A 2/16/2017    Procedure: EXCISION SEBACEOUS CYST - BACK;  Surgeon: Macrina Capellan MD;  Location:  [facility] OR;  Service:    ,   Family History   Problem Relation Age of Onset   • Diabetes Mother    • Heart disease Mother    • Alzheimer's disease Mother    • Hyperlipidemia Mother    • Hypertension Mother    • Cancer Father    • Hypertension Father    • Leukemia Brother    • Heart attack Sister    • Clotting disorder Brother    • No Known Problems Brother    • Colon polyps Neg Hx    • Colon cancer Neg Hx    ,   Social History     Tobacco Use   • Smoking status: Never Smoker   • Smokeless tobacco: Never Used   Substance Use Topics   • Alcohol use: No   • Drug use: No   ,     Medications  Current Outpatient Medications   Medication Sig Dispense Refill   • alendronate (FOSAMAX) 70 MG tablet Take 70 mg by mouth Every 7 (Seven) Days. Friday evening.     • amitriptyline (ELAVIL) 50 MG tablet Take 100 mg by mouth Every Night.     • apixaban (ELIQUIS) 5 MG tablet tablet Take 5 mg by mouth 2 (Two) Times a Day.     • dexlansoprazole (DEXILANT) 60 MG capsule Take 60 mg by mouth Daily.     •  diclofenac (VOLTAREN) 75 MG EC tablet Take 75 mg by mouth Daily.     • DULoxetine (CYMBALTA) 60 MG capsule Take 60 mg by mouth 2 (Two) Times a Day.     • flecainide (TAMBOCOR) 100 MG tablet Take 100 mg by mouth 2 (Two) Times a Day.     • fluticasone (FLONASE) 50 MCG/ACT nasal spray 2 sprays into the nostril(s) as directed by provider Daily As Needed for Rhinitis or Allergies.     • gabapentin (NEURONTIN) 800 MG tablet Take 800 mg by mouth 3 (Three) Times a Day.     • levothyroxine (SYNTHROID, LEVOTHROID) 137 MCG tablet Take 137 mcg by mouth Every Morning.     • LORazepam (ATIVAN) 1 MG tablet Take 1 mg by mouth 2 (Two) Times a Day.     • metoprolol tartrate (LOPRESSOR) 25 MG tablet Take 12.5 mg by mouth Every 12 (Twelve) Hours.     • montelukast (SINGULAIR) 10 MG tablet Take  by mouth Daily.  5   • nystatin (MYCOSTATIN) 294366 UNIT/ML suspension 5 mL Every 6 (Six) Hours.     • triamcinolone (KENALOG) 0.1 % paste See Admin Instructions.  5   • venlafaxine XR (EFFEXOR-XR) 75 MG 24 hr capsule TK 1 C PO D  2   • Aspirin-Acetaminophen-Caffeine (EXCEDRIN EXTRA STRENGTH PO) Take 2 tablets by mouth As Needed (for headache).     • phenylephrine (AGATA-SYNEPHRINE) 0.5 % nasal spray 1 spray into the nostril(s) as directed by provider Every 4 (Four) Hours As Needed for Congestion. DAILY     • rOPINIRole (REQUIP) 3 MG tablet Take 6 mg by mouth 2 (two) times a day. Take 3 tablets at night and 2 tablets every am       Current Facility-Administered Medications   Medication Dose Route Frequency Provider Last Rate Last Admin   • sodium chloride 0.9 % flush 10 mL  10 mL Intravenous Q12H Philip Cobb MD       • sodium chloride 0.9 % flush 10 mL  10 mL Intravenous PRN Philip Cobb MD       • sodium chloride 0.9 % infusion  75 mL/hr Intravenous Continuous Philip Cobb MD           Allergies:  Other, Codeine, Mobic [meloxicam], and Morphine and related    Review of Systems  Review of Systems   HENT: Negative for nosebleeds.   "  Cardiovascular: Negative for chest pain, claudication, dyspnea on exertion, irregular heartbeat, leg swelling, near-syncope, orthopnea, palpitations, paroxysmal nocturnal dyspnea and syncope.   Respiratory: Negative for cough, hemoptysis and shortness of breath.    Gastrointestinal: Negative for dysphagia, hematemesis and melena.   Genitourinary: Negative for hematuria.   All other systems reviewed and are negative.      Objective     Physical Exam:  Patient Vitals for the past 24 hrs:   BP Temp Temp src Pulse Resp SpO2 Height Weight   12/10/20 0935 132/76 -- -- 74 18 96 % -- --   12/10/20 0921 149/70 97 °F (36.1 °C) Temporal 71 17 98 % 162.6 cm (64\") (!) 139 kg (306 lb 6.4 oz)     Constitutional:       General: Not in acute distress.     Appearance: Well-developed. Not diaphoretic.   Eyes:      General: No scleral icterus.  HENT:      Head: Normocephalic and atraumatic.   Neck:      Musculoskeletal: Normal range of motion.   Pulmonary:      Effort: Pulmonary effort is normal. No respiratory distress.      Breath sounds: Normal breath sounds. No wheezing. No rales.   Cardiovascular:      Normal rate. Regular rhythm.      No gallop.   Edema:     Peripheral edema absent.   Abdominal:      General: Bowel sounds are normal. There is no distension.      Palpations: Abdomen is soft.      Tenderness: There is no abdominal tenderness.   Skin:     General: Skin is warm and dry.      Findings: No erythema.   Neurological:      Mental Status: Alert and oriented to person, place, and time.      Cranial Nerves: No cranial nerve deficit.   Psychiatric:         Behavior: Behavior normal.         Results Review:   I reviewed the patient's new clinical results.  Lab Results (last 24 hours)     ** No results found for the last 24 hours. **        Imaging Results (Last 24 Hours)     ** No results found for the last 24 hours. **        Lab Results   Component Value Date    ECHOEFEST 50 01/27/2019         Assessment/Plan     * No " active hospital problems. *    PAF with history of frequent falls here for ASHTYN to be considered for Watchman placement. Risk, Benefits, and Alternatives discussed with the patient and/or family.  Plan is for moderate sedation, and the patient agrees to proceed with the procedure.  An immediate assessment was done prior to the administration of moderate sedation.

## 2024-08-20 ENCOUNTER — OFFICE VISIT (OUTPATIENT)
Dept: PULMONOLOGY | Facility: CLINIC | Age: 69
End: 2024-08-20
Payer: MEDICARE

## 2024-08-20 ENCOUNTER — PROCEDURE VISIT (OUTPATIENT)
Dept: PULMONOLOGY | Facility: CLINIC | Age: 69
End: 2024-08-20
Payer: MEDICARE

## 2024-08-20 VITALS
HEART RATE: 85 BPM | OXYGEN SATURATION: 98 % | SYSTOLIC BLOOD PRESSURE: 122 MMHG | WEIGHT: 243.4 LBS | HEIGHT: 62 IN | BODY MASS INDEX: 44.79 KG/M2 | DIASTOLIC BLOOD PRESSURE: 78 MMHG

## 2024-08-20 DIAGNOSIS — J96.11 CHRONIC RESPIRATORY FAILURE WITH HYPOXIA AND HYPERCAPNIA: Chronic | ICD-10-CM

## 2024-08-20 DIAGNOSIS — R91.8 LUNG NODULES: Chronic | ICD-10-CM

## 2024-08-20 DIAGNOSIS — J98.4 RESTRICTIVE LUNG DISEASE: ICD-10-CM

## 2024-08-20 DIAGNOSIS — J42 CHRONIC BRONCHITIS, UNSPECIFIED CHRONIC BRONCHITIS TYPE: Primary | Chronic | ICD-10-CM

## 2024-08-20 DIAGNOSIS — J96.12 CHRONIC RESPIRATORY FAILURE WITH HYPOXIA AND HYPERCAPNIA: Chronic | ICD-10-CM

## 2024-08-20 DIAGNOSIS — J30.9 ALLERGIC RHINITIS, UNSPECIFIED SEASONALITY, UNSPECIFIED TRIGGER: Chronic | ICD-10-CM

## 2024-08-20 DIAGNOSIS — G47.33 OBSTRUCTIVE SLEEP APNEA: Chronic | ICD-10-CM

## 2024-08-20 DIAGNOSIS — K21.9 GASTROESOPHAGEAL REFLUX DISEASE, UNSPECIFIED WHETHER ESOPHAGITIS PRESENT: Chronic | ICD-10-CM

## 2024-08-20 DIAGNOSIS — E66.01 MORBID OBESITY WITH BMI OF 40.0-44.9, ADULT: ICD-10-CM

## 2024-08-20 DIAGNOSIS — J98.4 RESTRICTIVE LUNG DISEASE: Primary | ICD-10-CM

## 2024-08-20 PROCEDURE — 94729 DIFFUSING CAPACITY: CPT | Performed by: NURSE PRACTITIONER

## 2024-08-20 PROCEDURE — 99214 OFFICE O/P EST MOD 30 MIN: CPT | Performed by: NURSE PRACTITIONER

## 2024-08-20 PROCEDURE — 1160F RVW MEDS BY RX/DR IN RCRD: CPT | Performed by: NURSE PRACTITIONER

## 2024-08-20 PROCEDURE — 94375 RESPIRATORY FLOW VOLUME LOOP: CPT | Performed by: NURSE PRACTITIONER

## 2024-08-20 PROCEDURE — 1159F MED LIST DOCD IN RCRD: CPT | Performed by: NURSE PRACTITIONER

## 2024-08-20 RX ORDER — DAPAGLIFLOZIN 10 MG/1
1 TABLET, FILM COATED ORAL DAILY
COMMUNITY

## 2024-08-20 NOTE — PROCEDURES
Spirometry with Diffusion Capacity    Performed by: Francisca Villagomez, RRT  Authorized by: Sophia Valero APRN     Pre Drug % Predicted    FVC: 82%   FEV1: 98%   FEF 25-75%: 250%   FEV1/FVC: 93%   DLCO: 89%   D/VAsb: 103%    Interpretation   Spirometry   Spirometry shows normal results. midflow is normal.  Review of FVL curve   Patient's effort is normal.   Diffusion Capacity  The patient's diffusion capacity is normal.  Diffusion capacity is normal when corrected for alveolar volume.

## 2024-08-20 NOTE — PROGRESS NOTES
" Sophia Valero, ERNESTO  [facility]  [address]            [place] [address]  Phone: [phone]  Fax: [phone]          Chief Complaint  Chronic bronchitis, unspecified chronic bronchitis type    Subjective     Danisha Cannon presents to [facility] for appointment.  History of Present Illness  Patient with known afib, morbid obesity/deconditioning, chronic bronchitis, TRISTON, chronic respiratory failure with hypoxemia/hypercapnia, restrictive lung disease, lung nodules, and allergic rhinitis. She is using albuterol HFA. She reports compliance with home triology+4L.     She reports overall good health with no new pulmonary complaints.     She reports compliance with home trilogy. She had had issues with mask leaking.  She states that she has figured out how to remedy the leak and tighten her mask.    The patient states that she will likely resume Flovent HFA inhaler in the fall when her allergies worsen.    Objective   Vital Signs:   /78 (BP Location: Right arm, Patient Position: Sitting, Cuff Size: Adult) Comment (Cuff Size): Manual  Pulse 85   Ht 157.5 cm (62\")   Wt 110 kg (243 lb 6.4 oz)   SpO2 98% Comment: 2L  BMI 44.52 kg/m²        Physical Exam  Vitals reviewed.   Constitutional:       General: She is not in acute distress.     Appearance: She is well-developed. She is morbidly obese.      Comments: Ambulates with rollator   HENT:      Head: Normocephalic and atraumatic.   Eyes:      General: No scleral icterus.     Conjunctiva/sclera: Conjunctivae normal.      Pupils: Pupils are equal, round, and reactive to light.   Cardiovascular:      Rate and Rhythm: Normal rate.   Pulmonary:      Effort: Pulmonary effort is normal. No respiratory distress.      Breath sounds: Normal breath sounds. No wheezing or rales.   Musculoskeletal:         General: Normal range of motion.      Cervical back: Normal range of motion and neck " supple.   Skin:     General: Skin is warm and dry.   Neurological:      Mental Status: She is alert and oriented to person, place, and time.   Psychiatric:         Behavior: Behavior normal.         Thought Content: Thought content normal.         Judgment: Judgment normal.          Result Review :  The following data was reviewed by: ERNESTO Stroud on 2024:  CT Chest With Contrast Diagnostic (2024 13:02)   IMPRESSION:  1. Stable pulmonary nodules within the lungs when compared to the  previous exam. No new or enlarging nodules are present. There is mild left basilar atelectasis. No consolidative pneumonia or effusion..     Rest/Exercise Pulse Ox Values          2023    11:45   Rest/Exercise Pulse Ox Results   Rest on O2 @ Liters 2L   Rest on O2 SAT % 98%   Exercise on O2 @ Liters 2L   Exercise on O2 SAT % 93%     PFT Values          2024    15:15   Pre Drug PFT Results   FVC 82   FEV1 98   FEF 25-75% 250   FEV1/FVC 93   Other Tests PFT Results   DLCO 89   D/VAsb 103       Results for orders placed in visit on 24    Spirometry with Diffusion Capacity    Narrative  Spirometry with Diffusion Capacity    Performed by: Francisca Villagomez, RRT  Authorized by: Sophia Valero APRN  Pre Drug % Predicted  FVC: 82%  FEV1: 98%  FEF 25-75%: 250%  FEV1/FVC: 93%  DLCO: 89%  D/VAsb: 103%    Interpretation  Spirometry  Spirometry shows normal results. midflow is normal.  Review of FVL curve  Patient's effort is normal.  Diffusion Capacity  The patient's diffusion capacity is normal.  Diffusion capacity is normal when corrected for alveolar volume.      Results for orders placed in visit on 21    Pulmonary Function Test    Narrative  Pulmonary Function Test  Performed by: Francisca Villagomez, RRT  Authorized by: Sophia Valero APRN    Pre Drug % Predicted  FVC: 73%  FEV1: 81%  FEF 25-75%: 139%  FEV1/FVC: 86.84%  T%  RV: 77%  DLCO: 96%  D/VAsb:  118%    Interpretation  Spirometry  Spirometry shows moderate restriction. midflow is normal.  Review of FVL curve  Patient's effort is normal.  Lung Volume Measurements  Measurements show reduced lung volumes consistent with restriction.  Diffusion Capacity  The patient's diffusion capacity is normal.  Diffusion capacity is normal when corrected for alveolar volume.      Results for orders placed during the hospital encounter of 12/13/19    Full Pulmonary Function Test With Bronchodilator & ABG    Narrative  [facility] - Pulmonary Function Test    [address] [facility]  [id number]  [phone]    Patient : Danisha Cannon  MRN : [id number]  CSN : [id number]  Pulmonologist : Ryan Keith MD  Date : 12/13/2019    ______________________________________________________________________    Interpretation :  1.  Spirometry reveals a borderline low forced vital capacity and otherwise are within normal limits.  2.  Lung volumes also reveal a borderline low vital capacity and a decrease in expiratory reserve volume but otherwise are within normal limits.  3.  Diffusion capacity is within normal limits and when corrected for alveolar volume is supranormal.        Ryan Keith MD             Assessment and Plan  Diagnoses and all orders for this visit:    1. Chronic bronchitis, unspecified chronic bronchitis type (Primary)  Overview:  Mildly low IGE, mildly elevated IGG subclass 1  +eos 410-5/11/21    albuterol HFA as needed and Flovent 110 HFA as needed      Assessment & Plan:  The patient states that she will likely resume Flovent HFA inhaler and the fall when her allergies become worse.  She continues albuterol HFA as needed.      2. Restrictive lung disease  Overview:  Likely 2' morbid obesity and TRISTON/OHS    Assessment & Plan:  Spirometry completed today was within normal limits.  Continue to monitor.    Orders:  -     Spirometry with Diffusion Capacity    3. Lung nodules  Overview:  HRCT  11/16/21 - 2mm LLL, 3mm RLL    CT chest 11/30/2022-new 3 mm LEFT lower lobe pulmonary nodule on image 97.  Stable 3 mm LEFT lower lobe pulmonary nodule, image 72.  Stable 3 mm RIGHT lower lobe pulmonary nodule, image 70.  Stable 3 mm subpleural RIGHT lower lobe pulmonary nodule, image 58.  Stable 3 mm RIGHT upper lobe pulmonary nodule, image 37.    CT Chest Without Contrast Diagnostic (11/21/2023 10:22)    Unchanged 3 mm right upper lobe  pulmonary nodule image 40. Unchanged 3 mm left lower lobe pulmonary  nodule image 73. Unchanged 4 mm right lower lobe pulmonary nodule image  79.    CT Chest With Contrast Diagnostic (01/18/2024 13:02)  There is a 3 mm nodule within the lateral left lower lobe on  image 77 of series 5 stable from the previous exam. There is a 4 mm  nodule in the medial aspect of the right lower lobe on image 84 of  series 5 also stable from the previous exam. Mild left basilar  atelectasis is present. A 4 mm nodule in the central right upper lobe on image 42 of series 3 is stable. No additional lung nodules are present.    Assessment & Plan:  Obtain follow-up CT of the chest in January 2025.  Order was placed.    Orders:  -     CT Chest Without Contrast; Future    4. Allergic rhinitis, unspecified seasonality, unspecified trigger  Overview:  Astelin, flonase, singulair      Not candidate for immunotherapy per Dr. Angel as the patient is on a beta blocker.    Assessment & Plan:  Continue current treatment regimen.        5. Chronic respiratory failure with hypoxia and hypercapnia  Overview:  Home trilogy  4L O2    Assessment & Plan:  Continue home trilogy device. The patient is benefiting from it and wishes to continue it.     Continue chronic oxygen therapy.  The patient is benefiting from it and wishes to continue it.          6. Obstructive sleep apnea  Overview:  Home trilogy  4L O2    Assessment & Plan:  Continue home trilogy device. The patient is benefiting from it and wishes to continue it.       Continue chronic oxygen therapy.  The patient is benefiting from it and wishes to continue it.        7. Gastroesophageal reflux disease, unspecified whether esophagitis present  Overview:  PPI- Dexilant/pepcid     Assessment & Plan:  Continue current treatment regimen.        8. Morbid obesity with BMI of 40.0-44.9, adult  Assessment & Plan:  Patient's (Body mass index is 44.52 kg/m².) Recommend weight loss, defer to PCP.             Follow Up  ERNESTO Stroud  8/20/2024  18:38 CDT    Return in about 5 months (around 1/20/2025) for CT.    Patient was given instructions and counseling regarding her condition or for health maintenance advice. Please see specific information pulled into the AVS if appropriate.     Please note that portions of this note were completed with a voice recognition program.    Patient or patient representative verbalized consent for the use of Ambient Listening during the visit with  ERNESTO Stroud for chart documentation. 8/20/2024  18:35 CDT

## 2024-08-20 NOTE — ASSESSMENT & PLAN NOTE
The patient states that she will likely resume Flovent HFA inhaler and the fall when her allergies become worse.  She continues albuterol HFA as needed.

## 2024-08-23 RX ORDER — FLECAINIDE ACETATE 100 MG/1
100 TABLET ORAL EVERY 12 HOURS
Qty: 180 TABLET | Refills: 3 | Status: SHIPPED | OUTPATIENT
Start: 2024-08-23

## 2024-09-04 DIAGNOSIS — R60.0 BILATERAL EDEMA OF LOWER EXTREMITY: ICD-10-CM

## 2024-09-05 RX ORDER — FUROSEMIDE 40 MG
40 TABLET ORAL DAILY
OUTPATIENT
Start: 2024-09-05

## 2024-09-17 NOTE — PROGRESS NOTES
Referring Provider: Dr. Amilcar Capellan MD  Reason for Consultation: HTN, a-fib/flutter    Patient Care Team:  Juan Carlos Sparks MD as PCP - General  Juan Carlos Sparks MD as PCP - Family Medicine    Chief complaint right knee infection    Subjective .     History of present illness:  Mrs. Cannon this 62-year-old with end-stage osteoarthritis right knee that underwent total knee arthroplasty 2 weeks ago.  She developed purulent discharge from the wound and was taken to the operating room by Dr. Capellan a few days ago for washout and exchange of liner.  Since admission she has had some fluctuation in her blood pressures.  This morning her heart rate began to be abnormal ranging from the 80s to 140s.  Patient denies any active chest pain.  No increase in baseline shortness of air.  His having some postoperative pain in the right knee but overall feels okay.  Pain is reasonably controlled.  She has a history of arrhythmia and is followed by a cardiologist that unfortunately does not attend at [facility].    Review of Systems  Pertinent items are noted in HPI    History  Past Medical History:   Diagnosis Date   • Arthritis    • Atrial fibrillation    • Disease of thyroid gland    • GERD (gastroesophageal reflux disease)    • Neuropathy, peripheral    • Pneumonia     RECENT DX PER FAMILY DOCTOR   • PONV (postoperative nausea and vomiting)    • Restless leg syndrome    • Sleep apnea    • SVT (supraventricular tachycardia)    ,   Past Surgical History:   Procedure Laterality Date   • BUNIONECTOMY Left     AND HAMMER TOE   • CARDIAC SURGERY      HEART CATH   • CATARACT EXTRACTION Right    • HERNIA REPAIR      UMBILICAL X3   • JOINT REPLACEMENT      RIGHT KNEE   • KNEE POLY INSERT EXCHANGE Right 3/3/2018    Procedure: IRRIGATION AND DEBRIDEMENT TOTAL KNEE & POLY EXCHANGE - RIGHT;  Surgeon: Amilcar Capellan MD;  Location: [facility] OR;  Service:    • LAPAROSCOPIC CHOLECYSTECTOMY     • PILONIDAL CYSTECTOMY N/A  [id number]: Study Visit Note   Subject name: Alek Mcneill     Visit: Week 27    Did the study visit occur within the appropriate window allowed by the protocol? yes    Since the last study visit, Alek has been doing very well. He denies new medications or changes to existing medications. I reviewed with Alek his current ongoing medical history events and adverse events. He denied any changes or new adverse events.     I have personally interviewed Alek Mcneill and reviewed his medical record for adverse events and concomitant medications and these have been recorded on the corresponding logs in Alek Mcneill's research file.     Alek Mcneill was given the opportunity to ask any trial related questions.  Please see provider progress note for physical exam and other clinical information. Labs were reviewed - any significant lab values were addressed and reviewed.    Marciano Castle  Clinical Research Coordinator III  [phone]  [email or web address]        2/16/2017    Procedure: PILONIDAL CYSTECTOMY, EXCISION SEBACEOUS CYST - BACK;  Surgeon: Macrina Capellan MD;  Location:  [facility] OR;  Service:    • TRUNK LESION/CYST EXCISION N/A 2/16/2017    Procedure: EXCISION SEBACEOUS CYST - BACK;  Surgeon: Macrina Capellan MD;  Location:  [facility] OR;  Service:    ,   Family History   Problem Relation Age of Onset   • Diabetes Mother    • Heart disease Mother    • Cancer Father    • Leukemia Brother    ,   Social History   Substance Use Topics   • Smoking status: Never Smoker   • Smokeless tobacco: Former User   • Alcohol use No   ,   Prescriptions Prior to Admission   Medication Sig Dispense Refill Last Dose   • alendronate (FOSAMAX) 70 MG tablet Take 1 tablet by mouth Every 7 (Seven) Days.   2/12/2017   • aspirin 325 MG tablet Take 1 tablet by mouth Daily.   2/15/2017 at 0800   • DICLOFENAC PO Take 50 mg by mouth 2 (Two) Times a Day.   2/16/2017 at 0530   • DULoxetine (CYMBALTA) 30 MG capsule Take 30 mg by mouth 2 (Two) Times a Day.   2/15/2017 at 2100   • famciclovir (FAMVIR) 500 MG tablet Take 500 mg by mouth 2 (Two) Times a Day.   2/15/2017 at 2100   • flecainide (TAMBOCOR) 100 MG tablet Take 100 mg by mouth 2 (Two) Times a Day.   2/15/2017 at 2100   • fluconazole (DIFLUCAN) 150 MG tablet Take 150 mg by mouth 1 (One) Time. Takes as needed   2/2/2017   • fluticasone (VERAMYST) 27.5 MCG/SPRAY nasal spray 2 sprays into each nostril Daily.   2/15/2017 at 2100   • gabapentin (NEURONTIN) 600 MG tablet Take 600 mg by mouth 3 (Three) Times a Day.   Patient Taking Differently at Unknown time   • HYDROcodone-acetaminophen (NORCO) 5-325 MG per tablet Take 1-2 tablets by mouth Every 4 (Four) Hours As Needed (Pain). 30 tablet 0    • levothyroxine (SYNTHROID, LEVOTHROID) 137 MCG tablet Take 137 mcg by mouth Daily.   2/15/2017 at 0800   • LORazepam (ATIVAN) 1 MG tablet Take 1 mg by mouth Every 8 (Eight) Hours As Needed for anxiety.   2/15/2017 at 2100   • metoprolol tartrate (LOPRESSOR) 25 MG  tablet Take 25 mg by mouth 2 (Two) Times a Day. TAKES 1/2 TAB BID   2/16/2017 at 0530   • pantoprazole (PROTONIX) 40 MG EC tablet Take 40 mg by mouth 2 (Two) Times a Day.   2/16/2017 at 0530   • rOPINIRole (REQUIP) 3 MG tablet Take 3 mg by mouth Every Night. 2 TABS IN AM AND 3 TABS AT NIGHT   2/15/2017 at 2100   • triamcinolone (KENALOG) 0.1 % cream Apply  topically 2 (Two) Times a Day.   2/15/2017 at 2100   • venlafaxine XR (EFFEXOR-XR) 150 MG 24 hr capsule Take 150 mg by mouth Daily.   2/15/2017 at 0800   , Scheduled Meds:    acyclovir 400 mg Oral TID   DULoxetine 30 mg Oral Daily   flecainide 50 mg Oral Q12H   fluticasone 2 spray Each Nare Daily   gabapentin 600 mg Oral Q8H   levothyroxine 137 mcg Oral Daily   metoprolol tartrate 25 mg Oral Q12H   nafcillin 2 g Intravenous Q4H   nystatin 5 mL Mouth/Throat 4x Daily   pantoprazole 40 mg Oral Q AM   rOPINIRole 3 mg Oral Nightly   triamcinolone  Topical Q12H   venlafaxine  mg Oral Daily   , Continuous Infusions:    lactated ringers 20 mL/hr Last Rate: 20 mL/hr (03/05/18 0217)   , PRN Meds:  docusate sodium  •  HYDROcodone-acetaminophen  •  LORazepam  •  magnesium hydroxide  •  naloxone  •  ondansetron **OR** ondansetron ODT **OR** ondansetron and Allergies:  Codeine; Mobic [meloxicam]; and Morphine and related    Objective     Vital Signs   Temp:  [97.2 °F (36.2 °C)-99.6 °F (37.6 °C)] 98.5 °F (36.9 °C)  Heart Rate:  [] 74  Resp:  [16-19] 19  BP: (122-148)/() 137/56    Physical Exam:   General Appearance: alert, appears stated age, cooperative and morbidly obese  Eyes: conjunctivae and sclerae normal and no icterus  Throat: oral mucosa moist, Mild thrush  Lungs: clear to auscultation, respirations regular, respirations even and respirations unlabored  Heart: normal S1, S2, tachycardia and Irregularly irregular rhythm  Abdomen: normal bowel sounds, soft non-tender and no guarding  Extremities: Some redness below the knee.  The knee is wrapped in a  dressing.  Wound not examined.  Edema bilaterally.  Neurologic: Mental Status orientated to person, place, time and situation    Results Review:   I reviewed the patient's new clinical results.      Assessment/Plan     Principal Problem:    Infection associated with internal knee prosthesis  Active Problems:    Atrial flutter, paroxysmal    HTN (hypertension)    Idiopathic hypotension    Idiopathic progressive neuropathy    MRSA carrier    Obstructive sleep apnea    Restless leg syndrome    Total knee replacement status, right    Morbid (severe) obesity due to excess calories      Telemetry has been ordered along with an acute EKG.  I think she is back in atrial fibrillation.  At the present my exam her heart rates fluctuating.  At one point she has been in the 90s per her continuous pulse oximetry but then quickly jumps to the 140s.  We will check a troponin to add onto her morning labs.  I will have a low threshold of starting her on a Cardizem drip and involving cardiology here given she has a significant history at this and is already on antiarrhythmics.  I will also write for some nystatin swish and spit to help with her thrush symptoms.  Thank you is always coming to dissipate oxide U constipation.  She have any questions or concerns please do not hesitate to call.    I discussed the patients findings and my recommendations with patient    Juan Carlos Sparks MD  03/07/18  6:46 AM    Time: Greater than 40 minutes

## 2024-09-23 ENCOUNTER — OFFICE VISIT (OUTPATIENT)
Dept: CARDIOLOGY | Facility: CLINIC | Age: 69
End: 2024-09-23
Payer: MEDICARE

## 2024-09-23 VITALS
DIASTOLIC BLOOD PRESSURE: 68 MMHG | WEIGHT: 238 LBS | HEART RATE: 74 BPM | SYSTOLIC BLOOD PRESSURE: 96 MMHG | BODY MASS INDEX: 43.79 KG/M2 | HEIGHT: 62 IN

## 2024-09-23 DIAGNOSIS — R94.31 ABNORMAL EKG: ICD-10-CM

## 2024-09-23 DIAGNOSIS — I48.0 PAF (PAROXYSMAL ATRIAL FIBRILLATION): Primary | ICD-10-CM

## 2024-09-23 PROCEDURE — 99214 OFFICE O/P EST MOD 30 MIN: CPT | Performed by: PHYSICIAN ASSISTANT

## 2024-09-23 PROCEDURE — 93000 ELECTROCARDIOGRAM COMPLETE: CPT | Performed by: PHYSICIAN ASSISTANT

## 2024-10-01 ENCOUNTER — OFFICE VISIT (OUTPATIENT)
Dept: GASTROENTEROLOGY | Facility: CLINIC | Age: 69
End: 2024-10-01
Payer: MEDICARE

## 2024-10-01 ENCOUNTER — HOSPITAL ENCOUNTER (OUTPATIENT)
Dept: CARDIOLOGY | Facility: HOSPITAL | Age: 69
Discharge: HOME | End: 2024-10-01
Admitting: PHYSICIAN ASSISTANT
Payer: MEDICARE

## 2024-10-01 VITALS
HEART RATE: 67 BPM | WEIGHT: 238.6 LBS | TEMPERATURE: 95.9 F | OXYGEN SATURATION: 94 % | BODY MASS INDEX: 43.91 KG/M2 | HEIGHT: 62 IN | SYSTOLIC BLOOD PRESSURE: 130 MMHG | DIASTOLIC BLOOD PRESSURE: 82 MMHG

## 2024-10-01 DIAGNOSIS — R13.19 ESOPHAGEAL DYSPHAGIA: ICD-10-CM

## 2024-10-01 DIAGNOSIS — Z78.9 NONSMOKER: ICD-10-CM

## 2024-10-01 DIAGNOSIS — I48.0 PAF (PAROXYSMAL ATRIAL FIBRILLATION): ICD-10-CM

## 2024-10-01 DIAGNOSIS — K59.04 CHRONIC IDIOPATHIC CONSTIPATION: Primary | ICD-10-CM

## 2024-10-01 DIAGNOSIS — R10.13 DYSPEPSIA: ICD-10-CM

## 2024-10-01 PROCEDURE — 93005 ELECTROCARDIOGRAM TRACING: CPT | Performed by: PHYSICIAN ASSISTANT

## 2024-10-01 NOTE — PROGRESS NOTES
Danisha Cannon  1955    10/1/2024  Chief Complaint   Patient presents with    GI Problem     Chronic constipation     Subjective   HPI  Danisha Cannon is a 69 y.o. female who presents with a complaint of ongoing persistent chronic constipation for years. Progressive worsening while on Ozempic. She is taking fiber supplement with minimal results.  No rectal bleeding. No abdominal pain. No fever chills or sweats. No wt loss.   Her last colonoscopy was years ago 15 + no polyps removed. No family hx for colon cancer.     Acid reflux stable with Dexilant. Ongoing persistent for years. No nausea or vomiting. . No epigastric pain.   Dysphagia: difficulty with meat breads or dry foods. Upper esophageal region. Moderate for her and intermittent. She has had dilatation in the past years ago. Time makes it better usually.     Past Medical History:   Diagnosis Date    Abnormal ECG     Allergic rhinitis 1975    Anemia     Anxiety and depression     Arthritis     Asthma 2017    Atrial fibrillation     CHF (congestive heart failure) 2021    Chronic cough     Chronic kidney disease     Disease of thyroid gland     DVT (deep venous thrombosis)     GERD (gastroesophageal reflux disease)     Headache 1973    History of incision and drainage     Right knee    History of staph infection     right knee, and upper right thigh    Hyperlipidemia     Hypothyroidism 2003    Infection      in right knee to bone, cleaned it out and put new hardware with antibiotic and pt developed hole in incision    Inflammation of vein     in leg and leaking    IVCD (intraventricular conduction defect) 12/10/2023    Low back pain     Neuropathy, peripheral     Pneumonia     RECENT DX PER FAMILY DOCTOR    PONV (postoperative nausea and vomiting)     Restless leg syndrome     Sleep apnea with use of continuous positive airway pressure (CPAP)     bipap    SVT (supraventricular tachycardia)      Past Surgical History:   Procedure Laterality Date    ABLATION OF  DYSRHYTHMIC FOCUS      BUNIONECTOMY Left     AND HAMMER TOE    CARDIAC ABLATION      at [facility] 8/2019    CARDIAC CATHETERIZATION      CARDIAC SURGERY      HEART CATH    CATARACT EXTRACTION Right     COLONOSCOPY      [facility] over 10 years ago    CORONARY STENT PLACEMENT  2020    HERNIA REPAIR      UMBILICAL X3    INCISION AND DRAINAGE LEG Right 01/28/2019    Procedure: INCISION AND DRAINAGE OF RIGHT THIGH HEMATOMA;  Surgeon: Nino Stern MD;  Location:  [facility] HYBRID OR 12;  Service: Vascular    JOINT REPLACEMENT      RIGHT KNEE    KNEE POLY INSERT EXCHANGE Right 03/03/2018    Procedure: IRRIGATION AND DEBRIDEMENT TOTAL KNEE & POLY EXCHANGE - RIGHT;  Surgeon: Amilcar Capellan MD;  Location:  [facility] OR;  Service:     LAPAROSCOPIC CHOLECYSTECTOMY      LEG DEBRIDEMENT Right 03/23/2018    Procedure: DEBRIDEMENT AND CLOSURE KNEE - RIGHT;  Surgeon: Amilcar Capellan MD;  Location:  [facility] OR;  Service: Orthopedics    PERIPHERALLY INSERTED CENTRAL CATHETER INSERTION      PILONIDAL CYSTECTOMY N/A 02/16/2017    Procedure: PILONIDAL CYSTECTOMY, EXCISION SEBACEOUS CYST - BACK;  Surgeon: Macrina Capellan MD;  Location:  [facility] OR;  Service:     TOTAL KNEE  PROSTHESIS REMOVAL W/ SPACER INSERTION Right 03/13/2018    Procedure: 1.  EXPLANT TOTAL KNEE 2.  ANTIBOTIC SPACER KNEE;  Surgeon: Amilcar Capellan MD;  Location:  [facility] OR;  Service: Orthopedics    TOTAL KNEE  PROSTHESIS REMOVAL W/ SPACER INSERTION Right 06/19/2020    Procedure: REMOVAL OF ANTIBIOTIC SPACER;  Surgeon: Amilcar Capellan MD;  Location:  [facility] OR;  Service: Orthopedics;  Laterality: Right;    TOTAL KNEE ARTHROPLASTY REVISION Right 06/19/2020    Procedure: REVISION TOTAL KNEE REPLACEMENT;  Surgeon: Amilcar Capellan MD;  Location:  [facility] OR;  Service: Orthopedics;  Laterality: Right;    TRUNK LESION/CYST EXCISION N/A 02/16/2017    Procedure: EXCISION SEBACEOUS CYST - BACK;  Surgeon: Macrina Capellan MD;  Location:  [facility] OR;  Service:         Outpatient Medications Marked as Taking for the 10/1/24 encounter (Office Visit) with Jazzy Maguire APRN   Medication Sig Dispense Refill    albuterol sulfate  (90 Base) MCG/ACT inhaler INHALE 2 PUFFS BY MOUTH EVERY 4 HOURS AS NEEDED FOR WHEEZING AND SHORTNESS OF BREATH      alendronate (FOSAMAX) 70 MG tablet Take 1 tablet by mouth Every 7 (Seven) Days.      amitriptyline (ELAVIL) 75 MG tablet Take 1 tablet by mouth Every Night.      ASPIRIN 81 PO Take 1 tablet by mouth Daily.      azelastine (ASTELIN) 0.1 % nasal spray 2 sprays into the nostril(s) as directed by provider 2 (Two) Times a Day. Use in each nostril as directed 30 mL 11    dexlansoprazole (Dexilant) 60 MG capsule Take 1 capsule by mouth Daily. 30 capsule 11    diclofenac (VOLTAREN) 75 MG EC tablet Take 1 tablet by mouth Daily. (Patient taking differently: Take 1 tablet by mouth 2 (Two) Times a Day.) 90 tablet 1    donepezil (ARICEPT) 10 MG tablet Take 1 tablet by mouth Daily.      DULoxetine (CYMBALTA) 60 MG capsule TAKE 1 CAPSULE BY MOUTH TWICE DAILY 60 capsule 5    famciclovir (FAMVIR) 500 MG tablet Take 1 tablet by mouth 2 (Two) Times a Day.      Farxiga 10 MG tablet Take 10 mg by mouth Daily.      fluticasone (FLONASE) 50 MCG/ACT nasal spray 2 sprays into the nostril(s) as directed by provider Daily As Needed for Rhinitis or Allergies for up to 30 days. 16 g 11    fluticasone (Flovent HFA) 110 MCG/ACT inhaler Inhale 1 puff 2 (Two) Times a Day.      furosemide (LASIX) 40 MG tablet Take 1 tablet by mouth Daily. 90 tablet 3    gabapentin (NEURONTIN) 300 MG capsule Take 1 capsule by mouth 2 (Two) Times a Day.      hydrOXYzine pamoate (VISTARIL) 25 MG capsule Take 1 capsule by mouth 3 (Three) Times a Day As Needed.      ipratropium (ATROVENT) 0.06 % nasal spray 2 sprays into the nostril(s) as directed by provider 3 (Three) Times a Day. 15 mL 5    levocetirizine (XYZAL) 5 MG tablet TAKE 1 TABLET BY MOUTH EVERY EVENING 90 tablet 0     levothyroxine (SYNTHROID, LEVOTHROID) 137 MCG tablet Take 1 tablet by mouth Every Morning. 90 tablet 3    LORazepam (ATIVAN) 1 MG tablet Take 1 tablet by mouth Every 12 (Twelve) Hours.      metaxalone (SKELAXIN) 800 MG tablet Take 1 tablet by mouth 3 times a day.      metoprolol succinate XL (TOPROL-XL) 25 MG 24 hr tablet Take 2 tablets by mouth Daily.      montelukast (SINGULAIR) 10 MG tablet Take 1 tablet by mouth Daily.      O2 (OXYGEN) Inhale 3.5 L/min 1 (One) Time. NOC      Ozempic, 0.25 or 0.5 MG/DOSE, 2 MG/3ML solution pen-injector Inject 0.5 mg under the skin into the appropriate area as directed 1 (One) Time Per Week.      rOPINIRole (REQUIP) 3 MG tablet 2 tablets each morning and 3 tablets at bedtime. (Patient taking differently: Take 1 tablet by mouth 2 (Two) Times a Day. 1 TABLET IN THE MORNING & 1 TABLET AT  BEDTIME) 450 tablet 3    rosuvastatin (CRESTOR) 5 MG tablet 1 tablet Daily.      sodium chloride (OCEAN) 0.65 % nasal spray 2-3 sprays into the nostril(s) as directed by provider As Needed (nasal irrigation). 50 mL 12    TiZANidine (ZANAFLEX) 6 MG capsule       triamcinolone (KENALOG) 0.1 % paste APPLY TO THE AFFECTED AREA THREE TIMES DAILY AS NEEDED       Allergies   Allergen Reactions    Other Rash     Dial soap  Redness and swelling on skin and burning sensation    Codeine Dizziness and Nausea Only     Rapid heart rate, dizziness  NAUSEA    Mobic [Meloxicam] Rash    Morphine And Codeine Itching    Azithromycin Other (See Comments)    Cyclobenzaprine Unknown - Low Severity     Social History     Socioeconomic History    Marital status:    Tobacco Use    Smoking status: Never     Passive exposure: Past    Smokeless tobacco: Never    Tobacco comments:     Exposed to second hand smoke for 22 years, both parents smoked   Vaping Use    Vaping status: Never Used   Substance and Sexual Activity    Alcohol use: No    Drug use: No    Sexual activity: Not Currently     Partners: Male     Birth  control/protection: Post-menopausal, Vasectomy     Family History   Problem Relation Age of Onset    Diabetes Mother     Heart disease Mother         Triple Bypass Surgery    Alzheimer's disease Mother     Hyperlipidemia Mother     Hypertension Mother     Coronary artery disease Mother         Triple bypass    Heart failure Mother     Osteoarthritis Mother     Cancer Father         Lung    Hypertension Father     Anxiety disorder Father     Depression Father     Hyperlipidemia Father     Osteoarthritis Father     Heart disease Father         CHF    Heart attack Sister         Sudden cardiac death    Thyroid disease Sister     Heart failure Sister         MI    Osteoarthritis Sister     Leukemia Brother     Clotting disorder Brother     Cancer Brother         Leukemia    Colon polyps Neg Hx     Colon cancer Neg Hx      Health Maintenance   Topic Date Due    HEPATITIS C SCREENING  Never done    ZOSTER VACCINE (2 of 2) 10/27/2018    COLORECTAL CANCER SCREENING  02/12/2019    Pneumococcal Vaccine 65+ (2 of 2 - PCV) 09/04/2019    ANNUAL WELLNESS VISIT  05/02/2024    LIPID PANEL  05/19/2024    COVID-19 Vaccine (4 - 2023-24 season) 09/01/2024    MAMMOGRAM  04/11/2025    BMI FOLLOWUP  05/16/2025    DXA SCAN  06/08/2025    TDAP/TD VACCINES (2 - Td or Tdap) 04/27/2031    INFLUENZA VACCINE  Completed     Review of Systems   Constitutional:  Negative for activity change, appetite change, chills, diaphoresis, fatigue, fever and unexpected weight change.   HENT:  Negative for ear pain, hearing loss, mouth sores, sore throat, trouble swallowing and voice change.    Eyes: Negative.    Respiratory:  Negative for cough, choking, shortness of breath and wheezing.    Cardiovascular:  Negative for chest pain and palpitations.   Gastrointestinal:  Positive for constipation. Negative for abdominal pain, blood in stool, diarrhea, nausea and vomiting.   Endocrine: Negative for cold intolerance and heat intolerance.   Genitourinary:   "Negative for decreased urine volume, dysuria, frequency, hematuria and urgency.   Musculoskeletal:  Negative for back pain, gait problem and myalgias.   Skin:  Negative for color change, pallor and rash.   Allergic/Immunologic: Negative for food allergies and immunocompromised state.   Neurological:  Negative for dizziness, tremors, seizures, syncope, weakness, light-headedness, numbness and headaches.   Hematological:  Negative for adenopathy. Does not bruise/bleed easily.   Psychiatric/Behavioral:  Negative for agitation and confusion. The patient is not nervous/anxious.    All other systems reviewed and are negative.    Objective   Vitals:    10/01/24 1329   BP: 130/82   BP Location: Left arm   Pulse: 67   Temp: 95.9 °F (35.5 °C)   TempSrc: Temporal   SpO2: 94%   Weight: 108 kg (238 lb 9.6 oz)   Height: 157.5 cm (62.01\")     Body mass index is 43.63 kg/m².  Physical Exam  Constitutional:       Appearance: She is well-developed.   HENT:      Head: Normocephalic and atraumatic.   Eyes:      Pupils: Pupils are equal, round, and reactive to light.   Neck:      Trachea: No tracheal deviation.   Cardiovascular:      Rate and Rhythm: Normal rate and regular rhythm.      Heart sounds: Normal heart sounds. No murmur heard.     No friction rub. No gallop.   Pulmonary:      Effort: Pulmonary effort is normal. No respiratory distress.      Breath sounds: Normal breath sounds. No wheezing or rales.   Chest:      Chest wall: No tenderness.   Abdominal:      General: Bowel sounds are normal. There is no distension.      Palpations: Abdomen is soft. Abdomen is not rigid.      Tenderness: There is no abdominal tenderness. There is no guarding or rebound.   Musculoskeletal:         General: No tenderness or deformity. Normal range of motion.      Cervical back: Normal range of motion and neck supple.   Skin:     General: Skin is warm and dry.      Coloration: Skin is not pale.      Findings: No rash.   Neurological:      Mental " Status: She is alert and oriented to person, place, and time.      Deep Tendon Reflexes: Reflexes are normal and symmetric.   Psychiatric:         Behavior: Behavior normal.         Thought Content: Thought content normal.         Judgment: Judgment normal.       Assessment & Plan   Diagnoses and all orders for this visit:    1. Chronic idiopathic constipation (Primary)    2. Dyspepsia    3. Esophageal dysphagia    4. Nonsmoker    I suggested Miralax up to twice per day for her constipation  Increase water with her fiber regimen.   I suggested endo/colon evaluations she is unsure she wants to do these.   Will need Golytely   She is aware of all R/B/I/A and does not want procedures.   I discussed non pharmaceutical treatment of gerd.  This includes gradually losing weight to achieve ideal body wt., elevation of the head of bed by 4-6 inches, nothing to eat or drink 3 hours prior to lying down, avoiding tight clothing, stress reduction, tobacco cessation, reduction of alcohol intake, and dietary restrictions (avoiding caffeine, coffee, fatty foods, mints, chocolate, spicy foods and tomato based sauces as much as possible).  Continue Dexilant  * Surgery not found *  Part of this note may be an electronic transcription/translation of spoken language to printed text using the Dragon Dictation System.  Body mass index is 43.63 kg/m².  No follow-ups on file.           All risks, benefits, alternatives, and indications of colonoscopy and/or Endoscopy procedure have been discussed with the patient. Risks to include perforation of the colon requiring possible surgery or colostomy, risk of bleeding from biopsies or removal of colon tissue, possibility of missing a colon polyp or cancer, or adverse drug reaction.  Benefits to include the diagnosis and management of disease of the colon and rectum. Alternatives to include barium enema, radiographic evaluation, lab testing or no intervention. Pt verbalizes understanding and  agrees.     Jazzy Maguire, APRN  10/1/2024  13:54 CDT          If you smoke or use tobacco, 4 minutes reading provided  Steps to Quit Smoking  Smoking tobacco can be harmful to your health and can affect almost every organ in your body. Smoking puts you, and those around you, at risk for developing many serious chronic diseases. Quitting smoking is difficult, but it is one of the best things that you can do for your health. It is never too late to quit.  What are the benefits of quitting smoking?  When you quit smoking, you lower your risk of developing serious diseases and conditions, such as:  Lung cancer or lung disease, such as COPD.  Heart disease.  Stroke.  Heart attack.  Infertility.  Osteoporosis and bone fractures.  Additionally, symptoms such as coughing, wheezing, and shortness of breath may get better when you quit. You may also find that you get sick less often because your body is stronger at fighting off colds and infections. If you are pregnant, quitting smoking can help to reduce your chances of having a baby of low birth weight.  How do I get ready to quit?  When you decide to quit smoking, create a plan to make sure that you are successful. Before you quit:  Pick a date to quit. Set a date within the next two weeks to give you time to prepare.  Write down the reasons why you are quitting. Keep this list in places where you will see it often, such as on your bathroom mirror or in your car or wallet.  Identify the people, places, things, and activities that make you want to smoke (triggers) and avoid them. Make sure to take these actions:  Throw away all cigarettes at home, at work, and in your car.  Throw away smoking accessories, such as ashtrays and lighters.  Clean your car and make sure to empty the ashtray.  Clean your home, including curtains and carpets.  Tell your family, friends, and coworkers that you are quitting. Support from your loved ones can make quitting easier.  Talk with  your health care provider about your options for quitting smoking.  Find out what treatment options are covered by your health insurance.  What strategies can I use to quit smoking?  Talk with your healthcare provider about different strategies to quit smoking. Some strategies include:  Quitting smoking altogether instead of gradually lessening how much you smoke over a period of time. Research shows that quitting “cold turkey” is more successful than gradually quitting.  Attending in-person counseling to help you build problem-solving skills. You are more likely to have success in quitting if you attend several counseling sessions. Even short sessions of 10 minutes can be effective.  Finding resources and support systems that can help you to quit smoking and remain smoke-free after you quit. These resources are most helpful when you use them often. They can include:  Online chats with a counselor.  Telephone quitlines.  Printed self-help materials.  Support groups or group counseling.  Text messaging programs.  Mobile phone applications.  Taking medicines to help you quit smoking. (If you are pregnant or breastfeeding, talk with your health care provider first.) Some medicines contain nicotine and some do not. Both types of medicines help with cravings, but the medicines that include nicotine help to relieve withdrawal symptoms. Your health care provider may recommend:  Nicotine patches, gum, or lozenges.  Nicotine inhalers or sprays.  Non-nicotine medicine that is taken by mouth.  Talk with your health care provider about combining strategies, such as taking medicines while you are also receiving in-person counseling. Using these two strategies together makes you more likely to succeed in quitting than if you used either strategy on its own.  If you are pregnant or breastfeeding, talk with your health care provider about finding counseling or other support strategies to quit smoking. Do not take medicine to help  you quit smoking unless told to do so by your health care provider.  What things can I do to make it easier to quit?  Quitting smoking might feel overwhelming at first, but there is a lot that you can do to make it easier. Take these important actions:  Reach out to your family and friends and ask that they support and encourage you during this time. Call telephone quitlines, reach out to support groups, or work with a counselor for support.  Ask people who smoke to avoid smoking around you.  Avoid places that trigger you to smoke, such as bars, parties, or smoke-break areas at work.  Spend time around people who do not smoke.  Lessen stress in your life, because stress can be a smoking trigger for some people. To lessen stress, try:  Exercising regularly.  Deep-breathing exercises.  Yoga.  Meditating.  Performing a body scan. This involves closing your eyes, scanning your body from head to toe, and noticing which parts of your body are particularly tense. Purposefully relax the muscles in those areas.  Download or purchase mobile phone or tablet apps (applications) that can help you stick to your quit plan by providing reminders, tips, and encouragement. There are many free apps, such as QuitGuide from the CDC (Centers for Disease Control and Prevention). You can find other support for quitting smoking (smoking cessation) through smokefree.gov and other websites.  How will I feel when I quit smoking?  Within the first 24 hours of quitting smoking, you may start to feel some withdrawal symptoms. These symptoms are usually most noticeable 2-3 days after quitting, but they usually do not last beyond 2-3 weeks. Changes or symptoms that you might experience include:  Mood swings.  Restlessness, anxiety, or irritation.  Difficulty concentrating.  Dizziness.  Strong cravings for sugary foods in addition to nicotine.  Mild weight gain.  Constipation.  Nausea.  Coughing or a sore throat.  Changes in how your medicines work  in your body.  A depressed mood.  Difficulty sleeping (insomnia).  After the first 2-3 weeks of quitting, you may start to notice more positive results, such as:  Improved sense of smell and taste.  Decreased coughing and sore throat.  Slower heart rate.  Lower blood pressure.  Clearer skin.  The ability to breathe more easily.  Fewer sick days.  Quitting smoking is very challenging for most people. Do not get discouraged if you are not successful the first time. Some people need to make many attempts to quit before they achieve long-term success. Do your best to stick to your quit plan, and talk with your health care provider if you have any questions or concerns.  This information is not intended to replace advice given to you by your health care provider. Make sure you discuss any questions you have with your health care provider.  Document Released: 12/12/2002 Document Revised: 08/15/2017 Document Reviewed: 05/03/2016  [employer] Interactive Patient Education © 2017 [employer]

## 2024-10-02 LAB
QT INTERVAL: 450 MS
QTC INTERVAL: 453 MS

## 2024-10-03 ENCOUNTER — PREP FOR SURGERY (OUTPATIENT)
Dept: OTHER | Facility: HOSPITAL | Age: 69
End: 2024-10-03
Payer: COMMERCIAL

## 2024-10-03 DIAGNOSIS — I48.19 PERSISTENT ATRIAL FIBRILLATION: Primary | ICD-10-CM

## 2024-10-03 RX ORDER — SODIUM CHLORIDE 0.9 % (FLUSH) 0.9 %
10 SYRINGE (ML) INJECTION EVERY 12 HOURS SCHEDULED
OUTPATIENT
Start: 2024-10-03

## 2024-10-03 RX ORDER — SODIUM CHLORIDE 0.9 % (FLUSH) 0.9 %
10 SYRINGE (ML) INJECTION AS NEEDED
OUTPATIENT
Start: 2024-10-03

## 2024-10-03 RX ORDER — SODIUM CHLORIDE 9 MG/ML
40 INJECTION, SOLUTION INTRAVENOUS AS NEEDED
OUTPATIENT
Start: 2024-10-03

## 2024-10-04 DIAGNOSIS — J30.9 ALLERGIC RHINITIS, UNSPECIFIED SEASONALITY, UNSPECIFIED TRIGGER: ICD-10-CM

## 2024-10-04 RX ORDER — LEVOCETIRIZINE DIHYDROCHLORIDE 5 MG/1
5 TABLET, FILM COATED ORAL EVERY EVENING
Qty: 90 TABLET | Refills: 0 | Status: SHIPPED | OUTPATIENT
Start: 2024-10-04

## 2024-10-14 ENCOUNTER — OFFICE VISIT (OUTPATIENT)
Dept: CARDIOLOGY | Facility: CLINIC | Age: 69
End: 2024-10-14
Payer: MEDICARE

## 2024-10-14 VITALS
OXYGEN SATURATION: 95 % | DIASTOLIC BLOOD PRESSURE: 72 MMHG | HEART RATE: 89 BPM | BODY MASS INDEX: 43.24 KG/M2 | WEIGHT: 235 LBS | SYSTOLIC BLOOD PRESSURE: 126 MMHG | HEIGHT: 62 IN

## 2024-10-14 DIAGNOSIS — G47.33 OBSTRUCTIVE SLEEP APNEA: Chronic | ICD-10-CM

## 2024-10-14 DIAGNOSIS — E66.01 MORBID OBESITY WITH BMI OF 40.0-44.9, ADULT: ICD-10-CM

## 2024-10-14 DIAGNOSIS — I87.2 VENOUS INSUFFICIENCY: ICD-10-CM

## 2024-10-14 DIAGNOSIS — I48.0 PAF (PAROXYSMAL ATRIAL FIBRILLATION): Primary | ICD-10-CM

## 2024-10-14 DIAGNOSIS — I47.19 AVNRT (AV NODAL RE-ENTRY TACHYCARDIA): ICD-10-CM

## 2024-10-14 PROCEDURE — 1159F MED LIST DOCD IN RCRD: CPT | Performed by: NURSE PRACTITIONER

## 2024-10-14 PROCEDURE — 1160F RVW MEDS BY RX/DR IN RCRD: CPT | Performed by: NURSE PRACTITIONER

## 2024-10-14 PROCEDURE — 99213 OFFICE O/P EST LOW 20 MIN: CPT | Performed by: NURSE PRACTITIONER

## 2024-10-14 NOTE — PROGRESS NOTES
"    Subjective:     Encounter Date:10/14/2024      Patient ID: Danisha Cannon is a 69 y.o. female     Chief Complaint: \"no complaints\"  Atrial Fibrillation  Presents for follow-up visit. Symptoms are negative for chest pain, dizziness, palpitations, shortness of breath and syncope. The symptoms have been stable. Past medical history includes atrial fibrillation and hyperlipidemia.   Shortness of Breath  Pertinent negatives include no chest pain, leg swelling, orthopnea, PND, sputum production, syncope or wheezing.   Hyperlipidemia  This is a chronic problem. The current episode started more than 1 year ago. The problem is controlled. Pertinent negatives include no chest pain or shortness of breath.     Patient presents today for a follow up. Patient was previously followed by Dr. Cobb and Dr. Guerin, EP, for PAF s/p ablation in 8/2019 and SVT s/p AVNRT ablation in 8/2019. She underwent watchman implant on 2/21/21 per Dr. Guerin at [facility] and has since transitioned her EP care to Dr. Luu. She was seen by myself last year as she was needing her lasix refilled which was not prescribed for cardiac reasons and was told to have her PCP or vascular refill her lasix going forward. She has seen EP recently and her Flecainide was stopped due to QRS widening. Further discussion regarding Amiodarone initiation vs ablation is being had at her upcoming appointment with Dr. Luu in December.    She reports she has felt well. She continues to use lasix daily which controls her edema. She does not use flex dosing of lasix. Her weights are stable and run about 229lbs. Since her flecainide has been stopped, she notes her HR has been higher per her Apple watch. She reports feeling chest tightness and feeling her heart racing when her HR was 104 however reports she was also emotionally upset at that time so is unsure if her heart was actually the cause of her symptoms. She reports experiencing 1 episode of orthopnea that resolved " "once she sat up and controlled her breathing. She was able to lay down flat and fall asleep. She also reports waking up \"once\" because of a dream and noticed she was short of breath. She denies chest pain, palpitations and dyspnea.      The following portions of the patient's history were reviewed and updated as appropriate: allergies, current medications, past family history, past medical history, past social history, past surgical history and problem list.    Allergies   Allergen Reactions    Other Rash     Dial soap  Redness and swelling on skin and burning sensation    Codeine Dizziness and Nausea Only     Rapid heart rate, dizziness  NAUSEA    Mobic [Meloxicam] Rash    Morphine And Codeine Itching    Azithromycin Other (See Comments)    Cyclobenzaprine Unknown - Low Severity       Current Outpatient Medications:     albuterol sulfate  (90 Base) MCG/ACT inhaler, INHALE 2 PUFFS BY MOUTH EVERY 4 HOURS AS NEEDED FOR WHEEZING AND SHORTNESS OF BREATH, Disp: , Rfl:     amitriptyline (ELAVIL) 75 MG tablet, Take 1 tablet by mouth Every Night., Disp: , Rfl:     ASPIRIN 81 PO, Take 1 tablet by mouth Daily., Disp: , Rfl:     azelastine (ASTELIN) 0.1 % nasal spray, 2 sprays into the nostril(s) as directed by provider 2 (Two) Times a Day. Use in each nostril as directed, Disp: 30 mL, Rfl: 11    dexlansoprazole (Dexilant) 60 MG capsule, Take 1 capsule by mouth Daily., Disp: 30 capsule, Rfl: 11    diclofenac (VOLTAREN) 75 MG EC tablet, Take 1 tablet by mouth Daily. (Patient taking differently: Take 1 tablet by mouth 2 (Two) Times a Day.), Disp: 90 tablet, Rfl: 1    donepezil (ARICEPT) 10 MG tablet, Take 1 tablet by mouth Daily., Disp: , Rfl:     DULoxetine (CYMBALTA) 60 MG capsule, TAKE 1 CAPSULE BY MOUTH TWICE DAILY, Disp: 60 capsule, Rfl: 5    famciclovir (FAMVIR) 500 MG tablet, Take 1 tablet by mouth 2 (Two) Times a Day., Disp: , Rfl:     Farxiga 10 MG tablet, Take 10 mg by mouth Daily., Disp: , Rfl:     fluticasone " (Flovent HFA) 110 MCG/ACT inhaler, Inhale 1 puff 2 (Two) Times a Day., Disp: , Rfl:     furosemide (LASIX) 40 MG tablet, Take 1 tablet by mouth Daily., Disp: 90 tablet, Rfl: 3    gabapentin (NEURONTIN) 300 MG capsule, Take 1 capsule by mouth 2 (Two) Times a Day., Disp: , Rfl:     ipratropium (ATROVENT) 0.06 % nasal spray, 2 sprays into the nostril(s) as directed by provider 3 (Three) Times a Day., Disp: 15 mL, Rfl: 5    levocetirizine (XYZAL) 5 MG tablet, TAKE 1 TABLET BY MOUTH EVERY EVENING, Disp: 90 tablet, Rfl: 0    levothyroxine (SYNTHROID, LEVOTHROID) 137 MCG tablet, Take 1 tablet by mouth Every Morning., Disp: 90 tablet, Rfl: 3    LORazepam (ATIVAN) 1 MG tablet, Take 1 tablet by mouth Every 12 (Twelve) Hours., Disp: , Rfl:     metoprolol succinate XL (TOPROL-XL) 25 MG 24 hr tablet, Take 2 tablets by mouth Daily. (Patient taking differently: Take 1 tablet by mouth Daily.), Disp: , Rfl:     montelukast (SINGULAIR) 10 MG tablet, Take 1 tablet by mouth Daily., Disp: , Rfl:     O2 (OXYGEN), Inhale 3.5 L/min 1 (One) Time. NOC, Disp: , Rfl:     Ozempic, 0.25 or 0.5 MG/DOSE, 2 MG/3ML solution pen-injector, Inject 0.5 mg under the skin into the appropriate area as directed 1 (One) Time Per Week., Disp: , Rfl:     rOPINIRole (REQUIP) 3 MG tablet, 2 tablets each morning and 3 tablets at bedtime. (Patient taking differently: Take 1 tablet by mouth 2 (Two) Times a Day. 1 TABLET IN THE MORNING & 1 TABLET AT  BEDTIME), Disp: 450 tablet, Rfl: 3    rosuvastatin (CRESTOR) 5 MG tablet, 1 tablet Daily., Disp: , Rfl:     sodium chloride (OCEAN) 0.65 % nasal spray, 2-3 sprays into the nostril(s) as directed by provider As Needed (nasal irrigation)., Disp: 50 mL, Rfl: 12    TiZANidine (ZANAFLEX) 6 MG capsule, , Disp: , Rfl:     triamcinolone (KENALOG) 0.1 % paste, APPLY TO THE AFFECTED AREA THREE TIMES DAILY AS NEEDED, Disp: , Rfl:     fluticasone (FLONASE) 50 MCG/ACT nasal spray, 2 sprays into the nostril(s) as directed by provider  Daily As Needed for Rhinitis or Allergies for up to 30 days., Disp: 16 g, Rfl: 11    hydrOXYzine pamoate (VISTARIL) 25 MG capsule, Take 1 capsule by mouth 3 (Three) Times a Day As Needed., Disp: , Rfl:   Past Medical History:   Diagnosis Date    Abnormal ECG     Allergic rhinitis 1975    Anemia     Anxiety and depression     Arthritis     Asthma 2017    Atrial fibrillation     CHF (congestive heart failure) 2021    Chronic cough     Chronic kidney disease     Disease of thyroid gland     DVT (deep venous thrombosis)     GERD (gastroesophageal reflux disease)     Headache 1973    History of incision and drainage     Right knee    History of staph infection     right knee, and upper right thigh    Hyperlipidemia     Hypothyroidism 2003    Infection      in right knee to bone, cleaned it out and put new hardware with antibiotic and pt developed hole in incision    Inflammation of vein     in leg and leaking    IVCD (intraventricular conduction defect) 12/10/2023    Low back pain     Neuropathy, peripheral     Pneumonia     RECENT DX PER FAMILY DOCTOR    PONV (postoperative nausea and vomiting)     Restless leg syndrome     Sleep apnea with use of continuous positive airway pressure (CPAP)     bipap    SVT (supraventricular tachycardia)        Social History     Socioeconomic History    Marital status:    Tobacco Use    Smoking status: Never     Passive exposure: Past    Smokeless tobacco: Never    Tobacco comments:     Exposed to second hand smoke for 22 years, both parents smoked   Vaping Use    Vaping status: Never Used   Substance and Sexual Activity    Alcohol use: No    Drug use: No    Sexual activity: Not Currently     Partners: Male     Birth control/protection: Post-menopausal, Vasectomy       Review of Systems   Constitutional: Negative for malaise/fatigue, weight gain and weight loss.   Cardiovascular:  Negative for chest pain, dyspnea on exertion, irregular heartbeat, leg swelling, near-syncope,  orthopnea, palpitations, paroxysmal nocturnal dyspnea and syncope.   Respiratory:  Negative for cough, shortness of breath, sleep disturbances due to breathing, sputum production and wheezing.    Skin:  Negative for dry skin, flushing and itching.   Gastrointestinal:  Negative for hematemesis and hematochezia.   Neurological:  Negative for dizziness, light-headedness and loss of balance.   All other systems reviewed and are negative.       Objective:     Vitals reviewed.   Constitutional:       General: Not in acute distress.     Appearance: Well-developed. Morbidly obese. Not diaphoretic.      Interventions: Nasal cannula in place.   Eyes:      General: No scleral icterus.     Conjunctiva/sclera: Conjunctivae normal.      Pupils: Pupils are equal, round, and reactive to light.   HENT:      Head: Normocephalic.    Mouth/Throat:      Pharynx: No oropharyngeal exudate.   Pulmonary:      Effort: Pulmonary effort is normal. No respiratory distress.      Breath sounds: Examination of the right-lower field reveals decreased breath sounds. Examination of the left-lower field reveals decreased breath sounds. Decreased breath sounds present. No wheezing. No rales.   Chest:      Chest wall: Not tender to palpatation.   Cardiovascular:      Normal rate. Regular rhythm.      Murmurs: There is no murmur.   Pulses:     Intact distal pulses.   Edema:     Peripheral edema absent.   Abdominal:      General: Bowel sounds are normal. There is no distension.      Palpations: Abdomen is soft.      Tenderness: There is no abdominal tenderness.   Musculoskeletal: Normal range of motion.      Cervical back: Normal range of motion and neck supple. Skin:     General: Skin is warm and dry.      Coloration: Skin is not pale.      Findings: No erythema or rash.   Neurological:      Mental Status: Alert and oriented to person, place, and time.      Deep Tendon Reflexes: Reflexes are normal and symmetric.   Psychiatric:         Behavior: Behavior  "normal.         Procedures  /72 (BP Location: Right arm, Patient Position: Sitting, Cuff Size: Adult)   Pulse 89   Ht 157.5 cm (62\")   Wt 107 kg (235 lb)   LMP  (LMP Unknown)   SpO2 95%   BMI 42.98 kg/m²     Lab Review:   I have reviewed previous office notes, EP records, recent labs and recent cardiac testing.         Assessment:          Diagnosis Plan   1. PAF (paroxysmal atrial fibrillation)        2. AVNRT (AV juanita re-entry tachycardia)        3. Obstructive sleep apnea        4. Venous insufficiency        5. Morbid obesity with BMI of 40.0-44.9, adult                 Plan:       1. PAF- stable. Regular rhythm per exam today. s/p ablation in 8/2019. Being managed per Dr. Luu. Flecainide was stopped per EP. No evidence of AF per holter in June. Not anticoagulated- s/p watchman.     2. SVT- s/p ablation per Dr. Guerin.    3. TRISTON- complaint with BiPAP.   4. Venous insufficiency- being managed per vascular. Controlled with daily use of lasix. Lasix to be refilled by PCP or vascular.   5. BMI- Patient's Body mass index is 42.98 kg/m². indicating that she is morbidly obese (BMI > 40 or > 35 with obesity - related health condition). Obesity-related health conditions include the following: obstructive sleep apnea. Obesity is unchanged. BMI is is above average; BMI management plan is completed. We discussed portion control and increasing exercise..    Follow up PRN if symptoms worsen.     I spent 30 minutes caring for Danisha on this date of service. This time includes time spent by me in the following activities:preparing for the visit, reviewing tests, obtaining and/or reviewing a separately obtained history, performing a medically appropriate examination and/or evaluation , ordering medications, tests, or procedures, documenting information in the medical record and independently interpreting results and communicating that information with the patient/family/caregiver      "

## 2024-10-28 DIAGNOSIS — M25.562 LEFT KNEE PAIN, UNSPECIFIED CHRONICITY: Primary | ICD-10-CM

## 2024-10-31 ENCOUNTER — OFFICE VISIT (OUTPATIENT)
Age: 69
End: 2024-10-31

## 2024-10-31 VITALS — WEIGHT: 237 LBS | HEIGHT: 62 IN | BODY MASS INDEX: 43.61 KG/M2

## 2024-10-31 DIAGNOSIS — M25.562 CHRONIC PAIN OF LEFT KNEE: ICD-10-CM

## 2024-10-31 DIAGNOSIS — M17.12 PRIMARY OSTEOARTHRITIS OF LEFT KNEE: Primary | ICD-10-CM

## 2024-10-31 DIAGNOSIS — G89.29 CHRONIC PAIN OF LEFT KNEE: ICD-10-CM

## 2024-10-31 RX ORDER — DAPAGLIFLOZIN 10 MG/1
10 TABLET, FILM COATED ORAL EVERY MORNING
COMMUNITY

## 2024-10-31 NOTE — PROGRESS NOTES
Orthopaedic History and Physical - New Patient    NAME:  Morena Mukherjee   : 1955  MRN: [id number]      10/31/2024     CHIEF COMPLAINT:    Chief Complaint   Patient presents with    Left knee       HISTORY OF PRESENT ILLNESS:   The patient is a 69 y.o. female who presents to the office for evaluation and treatment of left knee.   Pain began 6 months ago and was associated without a traumatic event. Pain is described as GS PAIN CHARACTER: dull and aching, associated with Associated symptoms: none worse with Pain worse when: she is actually not having pain in the knee. It just grinds and pops. Treatment has consisted of hip injection through pain management at [facility].  Pain is rated a 2/10 and located on the left knee.    Past Medical History:        Diagnosis Date    Abdominal adhesions 2016    Anxiety     Arthritis     Atrial fibrillation ([facility])     Atrial fibrillation and flutter ([facility]) 2015    BiPAP (biphasic positive airway pressure) dependence     Chronic back pain     Chronic cough     Deep vein thrombophlebitis of calf, left ([facility])     Depression     Edema     Fibrocystic breast     GERD (gastroesophageal reflux disease)     Glossitis     Headache(784.0)     Heart burn     Heart disease     Hypertension     Hypothyroidism     Mouth sore     MRSA (methicillin resistant staph aureus) culture positive 2014    nasal    Numbness     toes    Obstructive sleep apnea     BIPAP    DEVEN (obstructive sleep apnea)     Peripheral neuropathy     PONV (postoperative nausea and vomiting)     Prolonged emergence from general anesthesia     Recurrent ventral incisional hernia 2016    Sleep apnea     Stroke ([facility])     Stroke-like symptom     SVT (supraventricular tachycardia) ([facility])     SVT (supraventricular tachycardia) ([facility])     Swelling of extremity     Unspecified sleep apnea     clinicallybi-pap    Ventricular tachyarrhythmia ([facility]) 2015    svt       Past Surgical History:        Procedure

## 2024-12-20 ENCOUNTER — OFFICE VISIT (OUTPATIENT)
Dept: CARDIOLOGY | Facility: CLINIC | Age: 69
End: 2024-12-20
Payer: MEDICARE

## 2024-12-20 VITALS
HEIGHT: 62 IN | SYSTOLIC BLOOD PRESSURE: 110 MMHG | WEIGHT: 229 LBS | HEART RATE: 81 BPM | DIASTOLIC BLOOD PRESSURE: 64 MMHG | BODY MASS INDEX: 42.14 KG/M2

## 2024-12-20 DIAGNOSIS — I47.10 PAROXYSMAL SVT (SUPRAVENTRICULAR TACHYCARDIA): ICD-10-CM

## 2024-12-20 DIAGNOSIS — I48.0 PAF (PAROXYSMAL ATRIAL FIBRILLATION): Primary | ICD-10-CM

## 2024-12-20 DIAGNOSIS — I48.92 ATRIAL FLUTTER, PAROXYSMAL: ICD-10-CM

## 2024-12-20 RX ORDER — METOPROLOL SUCCINATE 25 MG/1
25 TABLET, EXTENDED RELEASE ORAL NIGHTLY
Qty: 90 TABLET | Refills: 3 | Status: SHIPPED | OUTPATIENT
Start: 2024-12-20

## 2024-12-20 NOTE — PROGRESS NOTES
"Chief Complaint  Atrial Fibrillation (DISCUSS PVI)    Subjective        History of Present Illness    EP Problems:   Paroxysmal atrial fibrillation  8/2019: Truong Marques  8/27/21:  Holter with 2500 runs of pSVT  Prior AAD use: Flecainide   AVNRT  8/2019:  Slow pathway modification  LAFB  Presence of a JOHNSON occluder  2/21/21: Watchman implantTruong     Medical Problems:   Morbid obesity  5/2023:  BMI 49  Dysphagia  GERD  MDD  Anxiety  Peripheral neuropathy  Essential tremor  COPD  TRISTON on home Trilogy    History of Present Illness  The patient is a 69-year-old woman who presents to the clinic for follow-up of paroxysmal atrial fibrillation and paroxysmal supraventricular tachycardia (SVT).    She reports occasional breakthrough episodes of atrial fibrillation but overall feels well. She utilizes a MoodMea device to monitor her heart rhythm during these episodes and has brought the recordings for review. She also mentions that her Apple watch has not alerted her to any significant events since her catheter ablation procedure several years ago.    She is currently on a regimen of metoprolol 25 mg, taking one tablet daily, and requests a refill of this medication.    MEDICATIONS  Current: Metoprolol      Objective   Vital Signs:  /64   Pulse 81   Ht 157.5 cm (62\")   Wt 104 kg (229 lb)   BMI 41.88 kg/m²   Estimated body mass index is 41.88 kg/m² as calculated from the following:    Height as of this encounter: 157.5 cm (62\").    Weight as of this encounter: 104 kg (229 lb).      Physical Exam  Vitals reviewed.   Constitutional:       Appearance: She is obese.   Cardiovascular:      Rate and Rhythm: Normal rate and regular rhythm.      Pulses: Normal pulses.      Heart sounds: Normal heart sounds. No murmur heard.  Pulmonary:      Effort: Pulmonary effort is normal. No respiratory distress.      Breath sounds: Normal breath sounds.   Skin:     General: Skin is warm and dry.   Neurological:      General: " No focal deficit present.      Mental Status: She is alert and oriented to person, place, and time.   Psychiatric:         Mood and Affect: Mood normal.         Judgment: Judgment normal.          Physical Exam      Result Review :  The following data was reviewed by: Leslie Luu MD on today's date:    [id number] SCORE   [id number] Score: 4 (12/25/2024  9:08 AM)                 Assessment and Plan   Diagnoses and all orders for this visit:    1. PAF (paroxysmal atrial fibrillation) (Primary)    2. Atrial flutter, paroxysmal    3. Paroxysmal SVT (supraventricular tachycardia)    Other orders  -     metoprolol succinate XL (TOPROL-XL) 25 MG 24 hr tablet; Take 1 tablet by mouth Every Night. 1 TABLET AT NIGHT  Dispense: 90 tablet; Refill: 3        Assessment & Plan  1. Paroxysmal atrial fibrillation.  2.  Atrial flutter  3.  Paroxysmal supraventricular tachycardia.  Her Holter monitoring has shown very frequent episodes of paroxysmal SVT but no further evidence of sustained atrial fibrillation or atrial flutter.  With this in mind, we discussed available treatment options today.  For now, we will continue on flecainide for rhythm control.  Should her episodes become more sustained or prolonged, I do think it would be reasonable to reconsider another attempt at ablation at that point.  She will continue metoprolol 25 mg daily for adjunctive rhythm control.        Plan:  -Continue metoprolol and flecainide current doses  -Aspirin therapy given history of watchman  -Risk factor modification for secondary prophylaxis of atrial fibrillation  -Long-term follow-up in our clinic for paroxysmal atrial fibrillation, atrial flutter, paroxysmal SVT             Follow Up   Return in about 6 months (around 6/20/2025).  Patient was given instructions and counseling regarding her condition or for health maintenance advice. Please see specific information pulled into the AVS if appropriate.     Part of this note may be an  electronic transcription/translation of spoken language to printed text using the Dragon Dictation System.    Patient or patient representative verbalized consent for the use of Ambient Listening during the visit with  Leslie Luu MD for chart documentation. 12/25/2024  09:11 CST

## 2024-12-20 NOTE — PATIENT INSTRUCTIONS
6 month follow up  No medication changes  Start a rhythm diary and contact us if your symptoms significantly change

## 2024-12-25 PROBLEM — Z86.79 S/P ABLATION OF ATRIAL FIBRILLATION: Status: RESOLVED | Noted: 2020-04-21 | Resolved: 2024-12-25

## 2024-12-25 PROBLEM — Z98.890 S/P ABLATION OF ATRIAL FIBRILLATION: Status: RESOLVED | Noted: 2020-04-21 | Resolved: 2024-12-25

## 2024-12-25 PROBLEM — I47.10 PAROXYSMAL SVT (SUPRAVENTRICULAR TACHYCARDIA): Status: ACTIVE | Noted: 2024-12-25

## 2024-12-31 DIAGNOSIS — J30.9 ALLERGIC RHINITIS, UNSPECIFIED SEASONALITY, UNSPECIFIED TRIGGER: ICD-10-CM

## 2024-12-31 RX ORDER — LEVOCETIRIZINE DIHYDROCHLORIDE 5 MG/1
5 TABLET, FILM COATED ORAL EVERY EVENING
Qty: 90 TABLET | Refills: 0 | Status: SHIPPED | OUTPATIENT
Start: 2024-12-31

## 2025-01-03 NOTE — TELEPHONE ENCOUNTER
Requested Prescriptions     Pending Prescriptions Disp Refills    amitriptyline (ELAVIL) 75 MG tablet [Pharmacy Med Name: AMITRIPTYLINE 75MG TABLETS] 30 tablet 5     Sig: TAKE 1 TABLET BY MOUTH EVERY NIGHT       Last Office Visit: 4/2/2024  Next Office Visit: 4/8/2025  Last Medication Refill: 6/19/24 w 5 rf

## 2025-01-04 RX ORDER — AMITRIPTYLINE HYDROCHLORIDE 75 MG/1
TABLET ORAL
Qty: 30 TABLET | Refills: 5 | Status: SHIPPED | OUTPATIENT
Start: 2025-01-04

## 2025-01-06 PROBLEM — R93.89 ABNORMAL CHEST X-RAY: Status: RESOLVED | Noted: 2019-01-28 | Resolved: 2025-01-06

## 2025-01-06 PROBLEM — J42 CHRONIC BRONCHITIS: Chronic | Status: RESOLVED | Noted: 2021-11-23 | Resolved: 2025-01-06

## 2025-01-06 NOTE — PROGRESS NOTES
Chief Complaint  Restrictive Lung Disease    Subjective    History of Present Illness {  Problem List  Visit Diagnosis   Encounters  Notes  Medications  Labs  Result Review Imaging  Media: 23}    Danisha Cannon presents to [facility] for:    History of Present Illness  Management of restrictive lung disease, lung nodules and chronic respiratory failure.  This is a former patient of Sophia Aaron.  Sophia has since relocated to another clinic.    She is a never smoker.  Restrictive disease felt to be secondary to obesity.  BMI 44.    She is on Trilogy +4 L of oxygen with sleep and as needed for hypercapnia and obesity hypoventilation syndrome.  She is compliant with this and benefiting from it.  She has daytime oxygen but has not had to use it in quite some time.      She Flovent 110, 1 puff twice daily routinely.  She seldom requires her albuterol.      Allergies contribute to her cough and congestion.  She is on Flonase, azelastine, ipratropium nasal spray and Singulair with benefit.      Reflux also contributes to her cough.  She is on Dexilant.     Nodules have been present going back to 2021.  Last CT January 2024 showing multiple nodules, largest 4 mm.  She is here for follow-up imaging today.         Current Outpatient Medications:     albuterol sulfate  (90 Base) MCG/ACT inhaler, INHALE 2 PUFFS BY MOUTH EVERY 4 HOURS AS NEEDED FOR WHEEZING AND SHORTNESS OF BREATH, Disp: , Rfl:     amitriptyline (ELAVIL) 75 MG tablet, Take 1 tablet by mouth Every Night., Disp: , Rfl:     ASPIRIN 81 PO, Take 1 tablet by mouth Daily., Disp: , Rfl:     azelastine (ASTELIN) 0.1 % nasal spray, 2 sprays into the nostril(s) as directed by provider 2 (Two) Times a Day. Use in each nostril as directed, Disp: 30 mL, Rfl: 11    dexlansoprazole (Dexilant) 60 MG capsule, Take 1 capsule by mouth Daily., Disp: 30 capsule, Rfl: 11    diclofenac (VOLTAREN) 75 MG EC tablet,  Take 1 tablet by mouth Daily. (Patient taking differently: Take 1 tablet by mouth 2 (Two) Times a Day.), Disp: 90 tablet, Rfl: 1    donepezil (ARICEPT) 10 MG tablet, Take 1 tablet by mouth Daily., Disp: , Rfl:     DULoxetine (CYMBALTA) 60 MG capsule, TAKE 1 CAPSULE BY MOUTH TWICE DAILY, Disp: 60 capsule, Rfl: 5    famciclovir (FAMVIR) 500 MG tablet, Take 1 tablet by mouth 2 (Two) Times a Day., Disp: , Rfl:     Farxiga 10 MG tablet, Take 10 mg by mouth Daily., Disp: , Rfl:     fluticasone (Flovent HFA) 110 MCG/ACT inhaler, Inhale 1 puff 2 (Two) Times a Day., Disp: , Rfl:     furosemide (LASIX) 40 MG tablet, Take 1 tablet by mouth Daily., Disp: 90 tablet, Rfl: 3    gabapentin (NEURONTIN) 300 MG capsule, Take 1 capsule by mouth 2 (Two) Times a Day., Disp: , Rfl:     hydrOXYzine pamoate (VISTARIL) 25 MG capsule, Take 1 capsule by mouth 3 (Three) Times a Day As Needed., Disp: , Rfl:     ipratropium (ATROVENT) 0.06 % nasal spray, 2 sprays into the nostril(s) as directed by provider 3 (Three) Times a Day., Disp: 15 mL, Rfl: 5    levocetirizine (XYZAL) 5 MG tablet, TAKE 1 TABLET BY MOUTH EVERY EVENING, Disp: 90 tablet, Rfl: 0    levothyroxine (SYNTHROID, LEVOTHROID) 137 MCG tablet, Take 1 tablet by mouth Every Morning., Disp: 90 tablet, Rfl: 3    LORazepam (ATIVAN) 1 MG tablet, Take 1 tablet by mouth Every 12 (Twelve) Hours., Disp: , Rfl:     metoprolol succinate XL (TOPROL-XL) 25 MG 24 hr tablet, Take 1 tablet by mouth Every Night. 1 TABLET AT NIGHT, Disp: 90 tablet, Rfl: 3    montelukast (SINGULAIR) 10 MG tablet, Take 1 tablet by mouth Daily., Disp: , Rfl:     O2 (OXYGEN), Inhale 3.5 L/min 1 (One) Time. NOC, Disp: , Rfl:     Ozempic, 0.25 or 0.5 MG/DOSE, 2 MG/3ML solution pen-injector, Inject 0.5 mg under the skin into the appropriate area as directed 1 (One) Time Per Week., Disp: , Rfl:     polyethylene glycol (GoLYTELY) 236 g solution, Take as directed by office instructions., Disp: 4000 mL, Rfl: 0    rOPINIRole  "(REQUIP) 3 MG tablet, 2 tablets each morning and 3 tablets at bedtime. (Patient taking differently: Take 1 tablet by mouth 2 (Two) Times a Day. 1 TABLET IN THE MORNING & 1 TABLET AT  BEDTIME), Disp: 450 tablet, Rfl: 3    rosuvastatin (CRESTOR) 5 MG tablet, 1 tablet Daily., Disp: , Rfl:     sodium chloride (OCEAN) 0.65 % nasal spray, 2-3 sprays into the nostril(s) as directed by provider As Needed (nasal irrigation)., Disp: 50 mL, Rfl: 12    TiZANidine (ZANAFLEX) 6 MG capsule, Take 1 capsule by mouth 3 (Three) Times a Day., Disp: , Rfl:     triamcinolone (KENALOG) 0.1 % paste, APPLY TO THE AFFECTED AREA THREE TIMES DAILY AS NEEDED, Disp: , Rfl:     fluticasone (FLONASE) 50 MCG/ACT nasal spray, 2 sprays into the nostril(s) as directed by provider Daily As Needed for Rhinitis or Allergies for up to 30 days., Disp: 16 g, Rfl: 11    Social History     Socioeconomic History    Marital status:    Tobacco Use    Smoking status: Never     Passive exposure: Past    Smokeless tobacco: Never    Tobacco comments:     Exposed to second hand smoke for 22 years, both parents smoked   Vaping Use    Vaping status: Never Used   Substance and Sexual Activity    Alcohol use: No    Drug use: No    Sexual activity: Not Currently     Partners: Male     Birth control/protection: Post-menopausal, Vasectomy       Objective   Vital Signs:   /70   Pulse 94   Ht 157.5 cm (62\")   Wt 104 kg (230 lb)   SpO2 98% Comment: RA  BMI 42.07 kg/m²     Physical Exam  Constitutional:       Appearance: She is morbidly obese.   Eyes:      Comments: glasses   Cardiovascular:      Rate and Rhythm: Normal rate and regular rhythm.      Heart sounds: No murmur heard.  Pulmonary:      Effort: Pulmonary effort is normal.      Breath sounds: Normal breath sounds.   Musculoskeletal:      Right lower leg: No edema.      Left lower leg: No edema.      Comments: Walker, chronic   Neurological:      Mental Status: She is alert and oriented to person, " place, and time.        Result Review :    PFT Values          2024    15:15   Pre Drug PFT Results   FVC 82   FEV1 98   FEF 25-75% 250   FEV1/FVC 93   Other Tests PFT Results   DLCO 89   D/VAsb 103     My interpretation of the PFT : none    Results for orders placed in visit on 24    Spirometry with Diffusion Capacity    Narrative  Spirometry with Diffusion Capacity    Performed by: Francisca Villagomez RRT  Authorized by: Sophia Valero APRN  Pre Drug % Predicted  FVC: 82%  FEV1: 98%  FEF 25-75%: 250%  FEV1/FVC: 93%  DLCO: 89%  D/VAsb: 103%    Interpretation  Spirometry  Spirometry shows normal results. midflow is normal.  Review of FVL curve  Patient's effort is normal.  Diffusion Capacity  The patient's diffusion capacity is normal.  Diffusion capacity is normal when corrected for alveolar volume.      Results for orders placed in visit on 21    Pulmonary Function Test    Narrative  Pulmonary Function Test  Performed by: Francisca Villagomez RRT  Authorized by: Sophia Valero APRN    Pre Drug % Predicted  FVC: 73%  FEV1: 81%  FEF 25-75%: 139%  FEV1/FVC: 86.84%  T%  RV: 77%  DLCO: 96%  D/VAsb: 118%    Interpretation  Spirometry  Spirometry shows moderate restriction. midflow is normal.  Review of FVL curve  Patient's effort is normal.  Lung Volume Measurements  Measurements show reduced lung volumes consistent with restriction.  Diffusion Capacity  The patient's diffusion capacity is normal.  Diffusion capacity is normal when corrected for alveolar volume.      Results for orders placed during the hospital encounter of 19    Full Pulmonary Function Test With Bronchodilator & ABG    Narrative  [facility] - Pulmonary Function Test    [address] [place] [address]  [place]  [address]  [phone]    Patient : Danisha Cannon  MRN : [id number]  CSN : [id number]  Pulmonologist : Ryan Keith MD  Date :  12/13/2019    ______________________________________________________________________    Interpretation :  1.  Spirometry reveals a borderline low forced vital capacity and otherwise are within normal limits.  2.  Lung volumes also reveal a borderline low vital capacity and a decrease in expiratory reserve volume but otherwise are within normal limits.  3.  Diffusion capacity is within normal limits and when corrected for alveolar volume is supranormal.        Ryan Keith MD    Rest/Exercise Pulse Ox Values          5/4/2023    11:45   Rest/Exercise Pulse Ox Results   Rest on O2 @ Liters 2L   Rest on O2 SAT % 98%   Exercise on O2 @ Liters 2L   Exercise on O2 SAT % 93%     CT Chest Without Contrast Diagnostic (01/20/2025 12:24)     My interpretation of imaging: Stable 3 mm nodule right upper lobe, stable 4 mm nodule right lower lobe, stable 3 mm nodule left lower lobe, Watchman device    My interpretation of labs: none      Assessment and Plan {CC Problem List  Visit Diagnosis  ROS  Review (Popup)  Health Maintenance  Quality  BestPractice  Medications  SmartSets  SnapShot Encounters  Media : 23}    Diagnoses and all orders for this visit:    1. Restrictive lung disease (Primary)  Comments:  Obesity contributes to this condition.  Education material provided.  Continue Flovent.  PFTs with follow-up    2. Allergic rhinitis, unspecified seasonality, unspecified trigger  Comments:  Contribute to cough and congestion.  Continue Flonase, Singulair, ipratropium nasal spray and azelastine.    3. Class 3 severe obesity due to excess calories without serious comorbidity with body mass index (BMI) of 40.0 to 44.9 in adult  Comments:  Contributes to shortness of breath and restrictive lung disease.  Education material provided    4. Gastroesophageal reflux disease, unspecified whether esophagitis present  Comments:  Can contribute to cough and congestion.  Continue Dexilant.    5. Lung  nodules  Comments:  Stable on CT scan    6. Chronic respiratory failure with hypoxia and hypercapnia  Comments:  She is on Trilogy +4 L. She is compliant with this and benefiting from it. Please continue    7. Obstructive sleep apnea  Comments:  She is on Trilogy +4 L.  She is compliant with this and benefiting from it.  Please continue        Body mass index is 42.07 kg/m². Educational material provided after visit summary about elevated BMI      Ignacia Gomez, ERNESTO  1/20/2025  15:19 CST    Follow Up   Return in about 6 months (around 7/20/2025) for FVL.    Patient was given instructions and counseling regarding her condition or for health maintenance advice. Please see specific information pulled into the AVS if appropriate.

## 2025-01-17 ENCOUNTER — OFFICE VISIT (OUTPATIENT)
Dept: GASTROENTEROLOGY | Facility: CLINIC | Age: 70
End: 2025-01-17
Payer: MEDICARE

## 2025-01-17 VITALS
HEIGHT: 62 IN | HEART RATE: 65 BPM | BODY MASS INDEX: 42.69 KG/M2 | WEIGHT: 232 LBS | TEMPERATURE: 96.8 F | OXYGEN SATURATION: 99 % | DIASTOLIC BLOOD PRESSURE: 78 MMHG | SYSTOLIC BLOOD PRESSURE: 130 MMHG

## 2025-01-17 DIAGNOSIS — R19.4 CHANGE IN BOWEL HABITS: Primary | ICD-10-CM

## 2025-01-17 DIAGNOSIS — R13.19 ESOPHAGEAL DYSPHAGIA: ICD-10-CM

## 2025-01-17 DIAGNOSIS — R10.13 DYSPEPSIA: ICD-10-CM

## 2025-01-17 DIAGNOSIS — Z78.9 NONSMOKER: ICD-10-CM

## 2025-01-17 PROCEDURE — 1159F MED LIST DOCD IN RCRD: CPT | Performed by: CLINICAL NURSE SPECIALIST

## 2025-01-17 PROCEDURE — 99214 OFFICE O/P EST MOD 30 MIN: CPT | Performed by: CLINICAL NURSE SPECIALIST

## 2025-01-17 PROCEDURE — 1160F RVW MEDS BY RX/DR IN RCRD: CPT | Performed by: CLINICAL NURSE SPECIALIST

## 2025-01-17 NOTE — PROGRESS NOTES
Danisha Cannon  1955 1/17/2025  Chief Complaint   Patient presents with    Abdominal Pain     Pt c/o intermittent lower abdominal pain for the last 3 weeks-states if the pain gets too bad she will start vomiting and having diarrhea; Pt states she is still having with constipation unless the pain causes diarrhea          HPI    Danisha Cannon is a  69 y.o. female here for a follow up visit for alternating bowel habits ongoing persistent for her for months to a year or more. She has constipation then diarrhea. She has this intermittent no certain triggers. No alleviating factors. No wt loss. No fever chills or sweats. She says it may be stress induced.   Acid reflux: ongoing for years. She has been on Dexilant for years. No dysphagia. No persistent nausea or vomiting. She has good appetite.   She previously saw Gurwinder CARLSON and last colonoscopy was approx 10 years ago.   She has never been diagnosed with IBS>  She does take NSAIDS daily  Dysphagia: with solid foods moderate for her. Mid esophageal region. Time makes it better.     Past Medical History:   Diagnosis Date    Abnormal ECG     Allergic rhinitis 1975    Anemia     Anxiety and depression     Arthritis     Asthma 2017    Atrial fibrillation     CHF (congestive heart failure) 2021    Chronic cough     Chronic kidney disease     Disease of thyroid gland     DVT (deep venous thrombosis)     GERD (gastroesophageal reflux disease)     Headache 1973    History of incision and drainage     Right knee    History of staph infection     right knee, and upper right thigh    Hyperlipidemia     Hypothyroidism 2003    Infection      in right knee to bone, cleaned it out and put new hardware with antibiotic and pt developed hole in incision    Inflammation of vein     in leg and leaking    IVCD (intraventricular conduction defect) 12/10/2023    Low back pain     Neuropathy, peripheral     Pneumonia     RECENT DX PER FAMILY DOCTOR    PONV (postoperative nausea and  vomiting)     Restless leg syndrome     Sleep apnea with use of continuous positive airway pressure (CPAP)     bipap    SVT (supraventricular tachycardia)      Past Surgical History:   Procedure Laterality Date    ABLATION OF DYSRHYTHMIC FOCUS      BUNIONECTOMY Left     AND HAMMER TOE    CARDIAC ABLATION      at [facility] 8/2019    CARDIAC CATHETERIZATION      CARDIAC SURGERY      HEART CATH    CATARACT EXTRACTION Right     CHOLECYSTECTOMY      COLONOSCOPY      [facility] [place] Dr. [facility]uno over 10 years ago    CORONARY STENT PLACEMENT  2020    HERNIA REPAIR      UMBILICAL X3    INCISION AND DRAINAGE LEG Right 01/28/2019    Procedure: INCISION AND DRAINAGE OF RIGHT THIGH HEMATOMA;  Surgeon: Nino Stern MD;  Location:  [facility] HYBRID OR 12;  Service: Vascular    JOINT REPLACEMENT      RIGHT KNEE    KNEE POLY INSERT EXCHANGE Right 03/03/2018    Procedure: IRRIGATION AND DEBRIDEMENT TOTAL KNEE & POLY EXCHANGE - RIGHT;  Surgeon: Amilcar Capellan MD;  Location:  [facility] OR;  Service:     LAPAROSCOPIC CHOLECYSTECTOMY      LEG DEBRIDEMENT Right 03/23/2018    Procedure: DEBRIDEMENT AND CLOSURE KNEE - RIGHT;  Surgeon: Amilcar Capellan MD;  Location:  [facility] OR;  Service: Orthopedics    PERIPHERALLY INSERTED CENTRAL CATHETER INSERTION      PILONIDAL CYSTECTOMY N/A 02/16/2017    Procedure: PILONIDAL CYSTECTOMY, EXCISION SEBACEOUS CYST - BACK;  Surgeon: Macrina Capellan MD;  Location:  [facility] OR;  Service:     TOTAL KNEE  PROSTHESIS REMOVAL W/ SPACER INSERTION Right 03/13/2018    Procedure: 1.  EXPLANT TOTAL KNEE 2.  ANTIBOTIC SPACER KNEE;  Surgeon: Amilcar Capellan MD;  Location:  [facility] OR;  Service: Orthopedics    TOTAL KNEE  PROSTHESIS REMOVAL W/ SPACER INSERTION Right 06/19/2020    Procedure: REMOVAL OF ANTIBIOTIC SPACER;  Surgeon: Amilcar Capellan MD;  Location:  [facility] OR;  Service: Orthopedics;  Laterality: Right;    TOTAL KNEE ARTHROPLASTY REVISION Right 06/19/2020    Procedure: REVISION TOTAL KNEE REPLACEMENT;   Surgeon: Amilcar Capellan MD;  Location:  [facility] OR;  Service: Orthopedics;  Laterality: Right;    TRUNK LESION/CYST EXCISION N/A 02/16/2017    Procedure: EXCISION SEBACEOUS CYST - BACK;  Surgeon: Macrina Capellan MD;  Location:  [facility] OR;  Service:        Outpatient Medications Marked as Taking for the 1/17/25 encounter (Office Visit) with Jazzy Maguire APRN   Medication Sig Dispense Refill    albuterol sulfate  (90 Base) MCG/ACT inhaler INHALE 2 PUFFS BY MOUTH EVERY 4 HOURS AS NEEDED FOR WHEEZING AND SHORTNESS OF BREATH      amitriptyline (ELAVIL) 75 MG tablet Take 1 tablet by mouth Every Night.      ASPIRIN 81 PO Take 1 tablet by mouth Daily.      azelastine (ASTELIN) 0.1 % nasal spray 2 sprays into the nostril(s) as directed by provider 2 (Two) Times a Day. Use in each nostril as directed 30 mL 11    dexlansoprazole (Dexilant) 60 MG capsule Take 1 capsule by mouth Daily. 30 capsule 11    diclofenac (VOLTAREN) 75 MG EC tablet Take 1 tablet by mouth Daily. (Patient taking differently: Take 1 tablet by mouth 2 (Two) Times a Day.) 90 tablet 1    donepezil (ARICEPT) 10 MG tablet Take 1 tablet by mouth Daily.      DULoxetine (CYMBALTA) 60 MG capsule TAKE 1 CAPSULE BY MOUTH TWICE DAILY 60 capsule 5    famciclovir (FAMVIR) 500 MG tablet Take 1 tablet by mouth 2 (Two) Times a Day.      Farxiga 10 MG tablet Take 10 mg by mouth Daily.      fluticasone (Flovent HFA) 110 MCG/ACT inhaler Inhale 1 puff 2 (Two) Times a Day.      furosemide (LASIX) 40 MG tablet Take 1 tablet by mouth Daily. 90 tablet 3    gabapentin (NEURONTIN) 300 MG capsule Take 1 capsule by mouth 2 (Two) Times a Day.      hydrOXYzine pamoate (VISTARIL) 25 MG capsule Take 1 capsule by mouth 3 (Three) Times a Day As Needed.      ipratropium (ATROVENT) 0.06 % nasal spray 2 sprays into the nostril(s) as directed by provider 3 (Three) Times a Day. 15 mL 5    levocetirizine (XYZAL) 5 MG tablet TAKE 1 TABLET BY MOUTH EVERY EVENING 90 tablet 0     levothyroxine (SYNTHROID, LEVOTHROID) 137 MCG tablet Take 1 tablet by mouth Every Morning. 90 tablet 3    LORazepam (ATIVAN) 1 MG tablet Take 1 tablet by mouth Every 12 (Twelve) Hours.      metoprolol succinate XL (TOPROL-XL) 25 MG 24 hr tablet Take 1 tablet by mouth Every Night. 1 TABLET AT NIGHT 90 tablet 3    montelukast (SINGULAIR) 10 MG tablet Take 1 tablet by mouth Daily.      O2 (OXYGEN) Inhale 3.5 L/min 1 (One) Time. NOC      Ozempic, 0.25 or 0.5 MG/DOSE, 2 MG/3ML solution pen-injector Inject 0.5 mg under the skin into the appropriate area as directed 1 (One) Time Per Week.      rOPINIRole (REQUIP) 3 MG tablet 2 tablets each morning and 3 tablets at bedtime. (Patient taking differently: Take 1 tablet by mouth 2 (Two) Times a Day. 1 TABLET IN THE MORNING & 1 TABLET AT  BEDTIME) 450 tablet 3    rosuvastatin (CRESTOR) 5 MG tablet 1 tablet Daily.      sodium chloride (OCEAN) 0.65 % nasal spray 2-3 sprays into the nostril(s) as directed by provider As Needed (nasal irrigation). 50 mL 12    TiZANidine (ZANAFLEX) 6 MG capsule Take 1 capsule by mouth 3 (Three) Times a Day.      triamcinolone (KENALOG) 0.1 % paste APPLY TO THE AFFECTED AREA THREE TIMES DAILY AS NEEDED         Allergies   Allergen Reactions    Other Rash     Dial soap  Redness and swelling on skin and burning sensation    Codeine Dizziness and Nausea Only     Rapid heart rate, dizziness  NAUSEA    Mobic [Meloxicam] Rash    Morphine And Codeine Itching    Azithromycin Other (See Comments)    Cyclobenzaprine Unknown - Low Severity       Social History     Socioeconomic History    Marital status:    Tobacco Use    Smoking status: Never     Passive exposure: Past    Smokeless tobacco: Never    Tobacco comments:     Exposed to second hand smoke for 22 years, both parents smoked   Vaping Use    Vaping status: Never Used   Substance and Sexual Activity    Alcohol use: No    Drug use: No    Sexual activity: Not Currently     Partners: Male     Birth  control/protection: Post-menopausal, Vasectomy       Family History   Problem Relation Age of Onset    Diabetes Mother     Heart disease Mother         Triple Bypass Surgery    Alzheimer's disease Mother     Hyperlipidemia Mother     Hypertension Mother     Coronary artery disease Mother         Triple bypass    Heart failure Mother     Osteoarthritis Mother     Cancer Father         Lung    Hypertension Father     Anxiety disorder Father     Depression Father     Hyperlipidemia Father     Osteoarthritis Father     Heart disease Father         CHF    Heart attack Sister         Sudden cardiac death    Thyroid disease Sister     Heart failure Sister         MI    Osteoarthritis Sister     Leukemia Brother     Clotting disorder Brother     Cancer Brother         Leukemia    Colon polyps Neg Hx     Colon cancer Neg Hx        Review of Systems   Constitutional:  Negative for activity change, appetite change, chills, diaphoresis, fatigue, fever and unexpected weight change.   HENT:  Negative for ear pain, hearing loss, mouth sores, sore throat, trouble swallowing and voice change.    Eyes: Negative.    Respiratory:  Negative for cough, choking, shortness of breath and wheezing.    Cardiovascular:  Negative for chest pain and palpitations.   Gastrointestinal:  Positive for abdominal pain, constipation and diarrhea. Negative for blood in stool, nausea and vomiting.   Endocrine: Negative for cold intolerance and heat intolerance.   Genitourinary:  Negative for decreased urine volume, dysuria, frequency, hematuria and urgency.   Musculoskeletal:  Negative for back pain, gait problem and myalgias.   Skin:  Negative for color change, pallor and rash.   Allergic/Immunologic: Negative for food allergies and immunocompromised state.   Neurological:  Negative for dizziness, tremors, seizures, syncope, weakness, light-headedness, numbness and headaches.   Hematological:  Negative for adenopathy. Does not bruise/bleed easily.  "  Psychiatric/Behavioral:  Negative for agitation and confusion. The patient is not nervous/anxious.    All other systems reviewed and are negative.      /78 (BP Location: Left arm, Patient Position: Sitting, Cuff Size: Adult)   Pulse 65   Temp 96.8 °F (36 °C) (Infrared)   Ht 157.5 cm (62\")   Wt 105 kg (232 lb)   LMP  (LMP Unknown)   SpO2 99%   Breastfeeding No   BMI 42.43 kg/m²   Body mass index is 42.43 kg/m².    Physical Exam  Constitutional:       Appearance: She is well-developed.   HENT:      Head: Normocephalic and atraumatic.   Eyes:      Pupils: Pupils are equal, round, and reactive to light.   Neck:      Trachea: No tracheal deviation.   Cardiovascular:      Rate and Rhythm: Normal rate and regular rhythm.      Heart sounds: Normal heart sounds. No murmur heard.     No friction rub. No gallop.   Pulmonary:      Effort: Pulmonary effort is normal. No respiratory distress.      Breath sounds: Normal breath sounds. No wheezing or rales.   Chest:      Chest wall: No tenderness.   Abdominal:      General: Bowel sounds are normal. There is no distension.      Palpations: Abdomen is soft. Abdomen is not rigid.      Tenderness: There is no abdominal tenderness. There is no guarding or rebound.   Musculoskeletal:         General: No tenderness or deformity. Normal range of motion.      Cervical back: Normal range of motion and neck supple.   Skin:     General: Skin is warm and dry.      Coloration: Skin is not pale.      Findings: No rash.   Neurological:      Mental Status: She is alert and oriented to person, place, and time.      Deep Tendon Reflexes: Reflexes are normal and symmetric.   Psychiatric:         Behavior: Behavior normal.         Thought Content: Thought content normal.         Judgment: Judgment normal.         ASSESSMENT AND PLAN         Diagnoses and all orders for this visit:    1. Change in bowel habits (Primary)  Comments:  alternating pattern consitpation and " diarrhea.  Orders:  -     Case Request; Standing  -     Case Request  -     polyethylene glycol (GoLYTELY) 236 g solution; Take as directed by office instructions.  Dispense: 4000 mL; Refill: 0    2. Dyspepsia    3. Nonsmoker    4. Esophageal dysphagia    Other orders  -     Implement Anesthesia Orders Day of Procedure; Standing  -     Follow Anesthesia Guidelines / Protocol; Future  -     Verify Bowel Prep Was Successful; Standing    I discussed non pharmaceutical treatment of gerd.  This includes gradually losing weight to achieve ideal body wt., elevation of the head of bed by 4-6 inches, nothing to eat or drink 3 hours prior to lying down, avoiding tight clothing, stress reduction, tobacco cessation, reduction of alcohol intake, and dietary restrictions (avoiding caffeine, coffee, fatty foods, mints, chocolate, spicy foods and tomato based sauces as much as possible).  Fiber regimen suggested for alternating bowel habit and regularity  Avoid high fats and sugars s/p cholecystectomy so I do suspect a bile salt induced diarrhea   IBS with constipation and diarrhea discussed. I also have concern that some could be medication related given the number of medications she takes daily      ESOPHAGOGASTRODUODENOSCOPY WITH ANESTHESIA (N/A), COLONOSCOPY WITH ANESTHESIA (N/A)    There are no Patient Instructions on file for this visit.  Jazzy Maguire, APRN  09:55 CST  1/17/2025    Obesity, Adult  Obesity is the condition of having too much total body fat. Being overweight or obese means that your weight is greater than what is considered healthy for your body size. Obesity is determined by a measurement called BMI. BMI is an estimate of body fat and is calculated from height and weight. For adults, a BMI of 30 or higher is considered obese.  Obesity can eventually lead to other health concerns and major illnesses, including:  Stroke.  Coronary artery disease (CAD).  Type 2 diabetes.  Some types of cancer, including  cancers of the colon, breast, uterus, and gallbladder.  Osteoarthritis.  High blood pressure (hypertension).  High cholesterol.  Sleep apnea.  Gallbladder stones.  Infertility problems.  What are the causes?  The main cause of obesity is taking in (consuming) more calories than your body uses for energy. Other factors that contribute to this condition may include:  Being born with genes that make you more likely to become obese.  Having a medical condition that causes obesity. These conditions include:  Hypothyroidism.  Polycystic ovarian syndrome (PCOS).  Binge-eating disorder.  Cushing syndrome.  Taking certain medicines, such as steroids, antidepressants, and seizure medicines.  Not being physically active (sedentary lifestyle).  Living where there are limited places to exercise safely or buy healthy foods.  Not getting enough sleep.  What increases the risk?  The following factors may increase your risk of this condition:  Having a family history of obesity.  Being a woman of -American descent.  Being a man of  descent.  What are the signs or symptoms?  Having excessive body fat is the main symptom of this condition.  How is this diagnosed?  This condition may be diagnosed based on:  Your symptoms.  Your medical history.  A physical exam. Your health care provider may measure:  Your BMI. If you are an adult with a BMI between 25 and less than 30, you are considered overweight. If you are an adult with a BMI of 30 or higher, you are considered obese.  The distances around your hips and your waist (circumferences). These may be compared to each other to help diagnose your condition.  Your skinfold thickness. Your health care provider may gently pinch a fold of your skin and measure it.  How is this treated?  Treatment for this condition often includes changing your lifestyle. Treatment may include some or all of the following:  Dietary changes. Work with your health care provider and a dietitian to  set a weight-loss goal that is healthy and reasonable for you. Dietary changes may include eating:  Smaller portions. A portion size is the amount of a particular food that is healthy for you to eat at one time. This varies from person to person.  Low-calorie or low-fat options.  More whole grains, fruits, and vegetables.  Regular physical activity. This may include aerobic activity (cardio) and strength training.  Medicine to help you lose weight. Your health care provider may prescribe medicine if you are unable to lose 1 pound a week after 6 weeks of eating more healthily and doing more physical activity.  Surgery. Surgical options may include gastric banding and gastric bypass. Surgery may be done if:  Other treatments have not helped to improve your condition.  You have a BMI of 40 or higher.  You have life-threatening health problems related to obesity.  Follow these instructions at home:     Eating and drinking     Follow recommendations from your health care provider about what you eat and drink. Your health care provider may advise you to:  Limit fast foods, sweets, and processed snack foods.  Choose low-fat options, such as low-fat milk instead of whole milk.  Eat 5 or more servings of fruits or vegetables every day.  Eat at home more often. This gives you more control over what you eat.  Choose healthy foods when you eat out.  Learn what a healthy portion size is.  Keep low-fat snacks on hand.  Avoid sugary drinks, such as soda, fruit juice, iced tea sweetened with sugar, and flavored milk.  Eat a healthy breakfast.  Drink enough water to keep your urine clear or pale yellow.  Do not go without eating for long periods of time (do not fast) or follow a fad diet. Fasting and fad diets can be unhealthy and even dangerous.  Physical Activity   Exercise regularly, as told by your health care provider. Ask your health care provider what types of exercise are safe for you and how often you should exercise.  Warm  up and stretch before being active.  Cool down and stretch after being active.  Rest between periods of activity.  Lifestyle   Limit the time that you spend in front of your TV, computer, or video game system.  Find ways to reward yourself that do not involve food.  Limit alcohol intake to no more than 1 drink a day for nonpregnant women and 2 drinks a day for men. One drink equals 12 oz of beer, 5 oz of wine, or 1½ oz of hard liquor.  General instructions   Keep a weight loss journal to keep track of the food you eat and how much you exercise you get.  Take over-the-counter and prescription medicines only as told by your health care provider.  Take vitamins and supplements only as told by your health care provider.  Consider joining a support group. Your health care provider may be able to recommend a support group.  Keep all follow-up visits as told by your health care provider. This is important.  Contact a health care provider if:  You are unable to meet your weight loss goal after 6 weeks of dietary and lifestyle changes.  This information is not intended to replace advice given to you by your health care provider. Make sure you discuss any questions you have with your health care provider.  Document Released: 01/25/2006 Document Revised: 05/22/2017 Document Reviewed: 10/05/2016  [employer] Interactive Patient Education © 2017 [employer]      IF YOU SMOKE OR USE TOBACCO PLEASE READ THE FOLLOWING:    Why is smoking bad for me?  Smoking increases the risk of heart disease, lung disease, vascular disease, stroke, and cancer.     If you smoke, STOP!    If you would like more information on quitting smoking, please visit the [employer] website: [email or web address]   This link will provide additional resources including the QUIT line and the [employer] support groups.     For more information:    Quit Now  [place]  1-800-QUIT-NOW  https://[place].quitlogix.org/en-[place]/

## 2025-01-20 ENCOUNTER — HOSPITAL ENCOUNTER (OUTPATIENT)
Dept: CT IMAGING | Facility: HOSPITAL | Age: 70
Discharge: HOME | End: 2025-01-20
Admitting: NURSE PRACTITIONER
Payer: MEDICARE

## 2025-01-20 ENCOUNTER — OFFICE VISIT (OUTPATIENT)
Dept: PULMONOLOGY | Facility: CLINIC | Age: 70
End: 2025-01-20
Payer: MEDICARE

## 2025-01-20 ENCOUNTER — TELEPHONE (OUTPATIENT)
Dept: PULMONOLOGY | Facility: CLINIC | Age: 70
End: 2025-01-20

## 2025-01-20 VITALS
SYSTOLIC BLOOD PRESSURE: 122 MMHG | DIASTOLIC BLOOD PRESSURE: 70 MMHG | WEIGHT: 230 LBS | OXYGEN SATURATION: 98 % | HEART RATE: 94 BPM | HEIGHT: 62 IN | BODY MASS INDEX: 42.33 KG/M2

## 2025-01-20 DIAGNOSIS — K21.9 GASTROESOPHAGEAL REFLUX DISEASE, UNSPECIFIED WHETHER ESOPHAGITIS PRESENT: Chronic | ICD-10-CM

## 2025-01-20 DIAGNOSIS — E66.01 CLASS 3 SEVERE OBESITY DUE TO EXCESS CALORIES WITHOUT SERIOUS COMORBIDITY WITH BODY MASS INDEX (BMI) OF 40.0 TO 44.9 IN ADULT: ICD-10-CM

## 2025-01-20 DIAGNOSIS — J30.9 ALLERGIC RHINITIS, UNSPECIFIED SEASONALITY, UNSPECIFIED TRIGGER: Chronic | ICD-10-CM

## 2025-01-20 DIAGNOSIS — R91.8 LUNG NODULES: Chronic | ICD-10-CM

## 2025-01-20 DIAGNOSIS — J96.11 CHRONIC RESPIRATORY FAILURE WITH HYPOXIA AND HYPERCAPNIA: Chronic | ICD-10-CM

## 2025-01-20 DIAGNOSIS — E66.813 CLASS 3 SEVERE OBESITY DUE TO EXCESS CALORIES WITHOUT SERIOUS COMORBIDITY WITH BODY MASS INDEX (BMI) OF 40.0 TO 44.9 IN ADULT: ICD-10-CM

## 2025-01-20 DIAGNOSIS — J98.4 RESTRICTIVE LUNG DISEASE: Primary | Chronic | ICD-10-CM

## 2025-01-20 DIAGNOSIS — G47.33 OBSTRUCTIVE SLEEP APNEA: Chronic | ICD-10-CM

## 2025-01-20 DIAGNOSIS — J96.12 CHRONIC RESPIRATORY FAILURE WITH HYPOXIA AND HYPERCAPNIA: Chronic | ICD-10-CM

## 2025-01-20 PROCEDURE — 99214 OFFICE O/P EST MOD 30 MIN: CPT | Performed by: NURSE PRACTITIONER

## 2025-01-20 PROCEDURE — 1159F MED LIST DOCD IN RCRD: CPT | Performed by: NURSE PRACTITIONER

## 2025-01-20 PROCEDURE — 1160F RVW MEDS BY RX/DR IN RCRD: CPT | Performed by: NURSE PRACTITIONER

## 2025-01-20 PROCEDURE — G2211 COMPLEX E/M VISIT ADD ON: HCPCS | Performed by: NURSE PRACTITIONER

## 2025-01-20 PROCEDURE — 71250 CT THORAX DX C-: CPT

## 2025-01-20 NOTE — TELEPHONE ENCOUNTER
----- Message from Sophia Valero sent at 1/20/2025  2:50 PM CST -----  The patient CT of the chest is stable.  Lung nodules are stable.  She does need to have a follow-up appointment placed with pulmonology.

## 2025-02-18 ENCOUNTER — TELEPHONE (OUTPATIENT)
Dept: GASTROENTEROLOGY | Facility: CLINIC | Age: 70
End: 2025-02-18
Payer: MEDICARE

## 2025-02-19 ENCOUNTER — ANESTHESIA EVENT (OUTPATIENT)
Dept: GASTROENTEROLOGY | Facility: HOSPITAL | Age: 70
End: 2025-02-19
Payer: MEDICARE

## 2025-02-19 NOTE — ANESTHESIA PREPROCEDURE EVALUATION
Anesthesia Evaluation     Patient summary reviewed   history of anesthetic complications:  PONV  NPO Solid Status: > 8 hours  NPO Liquid Status: > 8 hours           Airway   Mallampati: I  TM distance: >3 FB  Neck ROM: full  No difficulty expected  Dental - normal exam         Pulmonary    (+) asthma,home oxygen (prescribed), sleep apnea (with bipap) on CPAP  (-) pneumonia, COPD, recent URI, not a smoker  Cardiovascular   Exercise tolerance: poor (<4 METS) ( Can walk with a walker, no chest pain with exertion. )    (+) pacemaker, valvular problems/murmurs murmur, dysrhythmias Atrial Fib, Paroxysmal Atrial Fib, Tachycardia, CHF , DVT, hyperlipidemia  (-) hypertension, past MI, angina, cardiac stents, CABG      Neuro/Psych  (+) numbness (bilateral peripheral neuropathy), psychiatric history Anxiety and Depression  (-) seizures, TIA, CVA  GI/Hepatic/Renal/Endo    (+) morbid obesity, GERD, renal disease- CRI, thyroid problem hypothyroidism  (-) liver disease, diabetes    Musculoskeletal     Abdominal   (+) obese   Substance History      OB/GYN          Other   arthritis,                       Anesthesia Plan    ASA 3     MAC     (Chart review only, provider to perform exam and discuss R/B/A prior to proceeding on day of procedure  )

## 2025-02-20 ENCOUNTER — ANESTHESIA (OUTPATIENT)
Dept: GASTROENTEROLOGY | Facility: HOSPITAL | Age: 70
End: 2025-02-20
Payer: MEDICARE

## 2025-03-13 ENCOUNTER — TELEPHONE (OUTPATIENT)
Dept: VASCULAR SURGERY | Facility: CLINIC | Age: 70
End: 2025-03-13
Payer: MEDICARE

## 2025-03-14 ENCOUNTER — OFFICE VISIT (OUTPATIENT)
Dept: VASCULAR SURGERY | Facility: CLINIC | Age: 70
End: 2025-03-14
Payer: MEDICARE

## 2025-03-14 VITALS
DIASTOLIC BLOOD PRESSURE: 62 MMHG | BODY MASS INDEX: 41.22 KG/M2 | RESPIRATION RATE: 18 BRPM | WEIGHT: 224 LBS | OXYGEN SATURATION: 94 % | HEART RATE: 63 BPM | HEIGHT: 62 IN | SYSTOLIC BLOOD PRESSURE: 102 MMHG

## 2025-03-14 DIAGNOSIS — I73.9 PAD (PERIPHERAL ARTERY DISEASE): Primary | ICD-10-CM

## 2025-03-14 DIAGNOSIS — E66.01 MORBID OBESITY WITH BMI OF 40.0-44.9, ADULT: ICD-10-CM

## 2025-03-14 DIAGNOSIS — E78.2 MIXED HYPERLIPIDEMIA: ICD-10-CM

## 2025-03-14 PROCEDURE — 1160F RVW MEDS BY RX/DR IN RCRD: CPT | Performed by: NURSE PRACTITIONER

## 2025-03-14 PROCEDURE — 1159F MED LIST DOCD IN RCRD: CPT | Performed by: NURSE PRACTITIONER

## 2025-03-14 PROCEDURE — 99213 OFFICE O/P EST LOW 20 MIN: CPT | Performed by: NURSE PRACTITIONER

## 2025-03-14 RX ORDER — SEMAGLUTIDE 2.68 MG/ML
INJECTION, SOLUTION SUBCUTANEOUS
COMMUNITY
Start: 2025-02-24

## 2025-03-14 RX ORDER — FAMOTIDINE 40 MG/1
TABLET, FILM COATED ORAL
COMMUNITY
Start: 2025-03-13

## 2025-03-14 RX ORDER — TRAMADOL HYDROCHLORIDE 50 MG/1
TABLET ORAL
COMMUNITY
Start: 2025-03-12

## 2025-03-14 NOTE — PROGRESS NOTES
"3/17/2025        Tc Bee,   [address] [facility] [place] [address]        Danisha Cannon  1955    Chief Complaint   Patient presents with    Lower Extremity Issue     Patient complains of her legs being hot at night intermittently for the past 2 months.       Dear Tc Bee DO:    I had the pleasure of seeing your patient in the office today for follow up.  As you recall, the patient is a 69 y.o. female who we are currently following for carotid occlusive disease.  She is here today with complaints of her legs burning intermittently for the past 2 months.  She also complains of some numbness.  Previously she complained of her bilateral lower extremity swelling however she uses compression stockings and those helped improve her swelling.  She does have kidney disease and is followed by Dr. Diaz.  She is also followed by neurosurgery and reports having scoliosis.      Review of Systems   Constitutional: Negative.  Negative for diaphoresis and fever.   HENT: Negative.     Eyes: Negative.    Respiratory: Negative.  Negative for shortness of breath and wheezing.    Cardiovascular: Negative.  Negative for leg swelling.   Gastrointestinal: Negative.  Negative for abdominal pain.   Endocrine: Negative.    Genitourinary: Negative.    Musculoskeletal: Negative.  Negative for gait problem.   Skin: Negative.    Allergic/Immunologic: Negative.    Neurological: Negative.  Positive for numbness (Burning bilateral lower extremities). Negative for dizziness.   Hematological: Negative.    Psychiatric/Behavioral: Negative.           /62   Pulse 63   Resp 18   Ht 157.5 cm (62\")   Wt 102 kg (224 lb)   LMP  (LMP Unknown)   SpO2 94%   BMI 40.97 kg/m²     Physical Exam  Vitals and nursing note reviewed.   Constitutional:       General: She is not in acute distress.     Appearance: Normal appearance. She is obese. She is not diaphoretic.   HENT:      Head: Normocephalic. No right periorbital " erythema or left periorbital erythema.      Nose: Nose normal.   Eyes:      General: No scleral icterus.     Pupils: Pupils are equal.   Cardiovascular:      Rate and Rhythm: Normal rate and regular rhythm.      Pulses:           Dorsalis pedis pulses are detected w/ Doppler on the right side and detected w/ Doppler on the left side.        Posterior tibial pulses are detected w/ Doppler on the right side and detected w/ Doppler on the left side.      Heart sounds: Normal heart sounds. No murmur heard.  Pulmonary:      Effort: Pulmonary effort is normal. No respiratory distress.      Breath sounds: Normal breath sounds.   Abdominal:      General: Bowel sounds are normal. There is no distension.      Palpations: Abdomen is soft.      Tenderness: There is no abdominal tenderness. There is no guarding.   Musculoskeletal:         General: No swelling or tenderness. Normal range of motion.      Cervical back: Normal range of motion and neck supple.      Right lower leg: No edema.      Left lower leg: No edema.   Feet:      Right foot:      Skin integrity: Skin integrity normal.      Left foot:      Skin integrity: Skin integrity normal.   Skin:     General: Skin is warm and dry.      Findings: No erythema or rash.   Neurological:      General: No focal deficit present.      Mental Status: She is alert and oriented to person, place, and time. Mental status is at baseline.      Cranial Nerves: No cranial nerve deficit.      Gait: Gait normal.   Psychiatric:         Attention and Perception: Attention normal.         Mood and Affect: Mood normal.         Behavior: Behavior normal.         Thought Content: Thought content normal.         Judgment: Judgment normal.         DIAGNOSTIC DATA:          Patient Active Problem List   Diagnosis    Abdominal adhesions    Abnormal echocardiogram    Abnormal EKG    Atrial flutter, paroxysmal    Bilateral edema of lower extremity    Chest pain    Cholecystitis with cholelithiasis     Chronic right-sided low back pain with sciatica    Morbid obesity with BMI of 40.0-44.9, adult    Facet syndrome, lumbar    Heart rate fast    Idiopathic hypotension    Idiopathic progressive neuropathy    Insomnia    Lumbar facet arthropathy    Memory loss    MRSA carrier    Obstructive sleep apnea    Pilonidal cyst    Primary osteoarthritis of right knee    Recurrent ventral incisional hernia    Restless leg syndrome    Sciatica of right side    Snoring    Somnolence, daytime    Spondylosis of lumbar region without myelopathy or radiculopathy    Tear of medial meniscus of right knee, current    Total knee replacement status, right    Tremor    Umbilical hernia    Obesity, unspecified obesity severity, unspecified obesity type    Infection associated with internal knee prosthesis    Infection of right knee    Hypothyroidism    Fever    Hypoxia    Leukocytosis    Abscess of right thigh    Dyspepsia    Anticoagulated    Nonsmoker    NSAID long-term use    Esophageal dysphagia    Mixed hyperlipidemia    PAF (paroxysmal atrial fibrillation)    Bradycardia    Prediabetes    Anxiety and depression    Hematoma of left lower extremity    Fall    Acute blood loss anemia due to hematoma from recent fall    Acute kidney injury    Presence of Watchman left atrial appendage closure device    Acute deep vein thrombosis (DVT) of left lower extremity    Allergic rhinitis    Anemia    Arteriosclerotic vascular disease    Globus sensation    Cataract extraction status, right eye    Closed fracture of fourth metatarsal bone    Erosive osteoarthritis of both hands    Glossitis    Heart murmur    Hypothyroidism due to drugs    Infection or inflammatory reaction due to other internal prosthetic device, implant, or graft    Osteoarthritis of carpometacarpal (CMC) joint of thumb    Osteopenia    Palpitations    Postoperative nausea and vomiting    Pruritic rash    Retention of urine    Seasonal allergies    Swelling    Bunion of great toe  of left foot    Hammer toe of left foot    Osteoarthritis of foot joint    Tailor's bunion    Tear of medial meniscus of knee    Tinea cruris    Undifferentiated somatoform disorder    AVNRT (AV juanita re-entry tachycardia)    Osteoporosis    Hypoglycemia    Major depressive disorder    Chronic respiratory failure with hypoxia and hypercapnia    Xerostomia    Essential tremor    Weakness of left lower extremity    Anxiety    Contusion of knee    Lumbar degenerative disc disease    Episodic tension-type headache    Generalized osteoarthritis    Malaise and fatigue    Nail bed infection    Nonrheumatic tricuspid valve disorder    Normal gynecologic examination    Lung nodules    Left anterior fascicular block    Vitamin D deficiency    Restrictive lung disease    Gastroesophageal reflux disease    Laryngopharyngeal reflux    Pharyngoesophageal dysphagia    Class 3 severe obesity due to excess calories without serious comorbidity with body mass index (BMI) of 45.0 to 49.9 in adult    Venous insufficiency    Incomplete lesion of sacral spinal cord    Scoliosis    IVCD (intraventricular conduction defect)    Paroxysmal SVT (supraventricular tachycardia)    Change in bowel habits         ICD-10-CM ICD-9-CM   1. PAD (peripheral artery disease)  I73.9 443.9   2. Mixed hyperlipidemia  E78.2 272.2   3. Morbid obesity with BMI of 40.0-44.9, adult  E66.01 278.01    Z68.41 V85.41               PLAN: After thoroughly evaluating Danisha Cannon, I believe the best course of action is to remain conservative from vascular surgery standpoint.  She does complain of burning and numbness.  Her bilateral feet are warm, signals are dopplerable.  I do believe her symptoms are neurogenic in nature possibly stemming from her back issues.  We will see her at her already scheduled appointment in June.  She should continue her compression stockings on a daily basis, elevating her legs when not on them, and exercising.  She should continue her  aspirin 81 mg daily and Crestor 5 mg nightly in addition to her other medications.  I did discuss vascular risk factors as it pertains to the progression of vascular disease including controlling her hyperlipidemia.  I am unable to review any current lab values. Body mass index is 40.97 kg/m².         This was all discussed in full with complete understanding.    Thank you for allowing me to participate in the care of your patient.  Please do not hesitate to call with any questions or concerns.  We will keep you aware of any further encounters with Danisha Cannon.      Sincerely Yours,      ERNESTO Ching

## 2025-03-21 ENCOUNTER — HOSPITAL ENCOUNTER (OUTPATIENT)
Facility: HOSPITAL | Age: 70
Setting detail: HOSPITAL OUTPATIENT SURGERY
Discharge: HOME | End: 2025-03-21
Attending: INTERNAL MEDICINE | Admitting: INTERNAL MEDICINE
Payer: MEDICARE

## 2025-03-21 VITALS
HEART RATE: 80 BPM | WEIGHT: 224 LBS | TEMPERATURE: 97.4 F | SYSTOLIC BLOOD PRESSURE: 123 MMHG | DIASTOLIC BLOOD PRESSURE: 77 MMHG | OXYGEN SATURATION: 98 % | RESPIRATION RATE: 20 BRPM | HEIGHT: 62 IN | BODY MASS INDEX: 41.22 KG/M2

## 2025-03-21 DIAGNOSIS — R19.4 CHANGE IN BOWEL HABITS: ICD-10-CM

## 2025-03-21 PROCEDURE — 43249 ESOPH EGD DILATION <30 MM: CPT | Performed by: INTERNAL MEDICINE

## 2025-03-21 PROCEDURE — 45380 COLONOSCOPY AND BIOPSY: CPT | Performed by: INTERNAL MEDICINE

## 2025-03-21 PROCEDURE — 88305 TISSUE EXAM BY PATHOLOGIST: CPT | Performed by: INTERNAL MEDICINE

## 2025-03-21 PROCEDURE — C1726 CATH, BAL DIL, NON-VASCULAR: HCPCS | Performed by: INTERNAL MEDICINE

## 2025-03-21 PROCEDURE — 25010000002 LIDOCAINE PF 2% 2 % SOLUTION: Performed by: NURSE ANESTHETIST, CERTIFIED REGISTERED

## 2025-03-21 PROCEDURE — 25010000002 PROPOFOL 10 MG/ML EMULSION: Performed by: NURSE ANESTHETIST, CERTIFIED REGISTERED

## 2025-03-21 PROCEDURE — 25810000003 SODIUM CHLORIDE 0.9 % SOLUTION: Performed by: ANESTHESIOLOGY

## 2025-03-21 RX ORDER — SODIUM CHLORIDE 9 MG/ML
500 INJECTION, SOLUTION INTRAVENOUS CONTINUOUS PRN
Status: DISCONTINUED | OUTPATIENT
Start: 2025-03-21 | End: 2025-03-21 | Stop reason: HOSPADM

## 2025-03-21 RX ORDER — LIDOCAINE HYDROCHLORIDE 20 MG/ML
INJECTION, SOLUTION EPIDURAL; INFILTRATION; INTRACAUDAL; PERINEURAL AS NEEDED
Status: DISCONTINUED | OUTPATIENT
Start: 2025-03-21 | End: 2025-03-21 | Stop reason: SURG

## 2025-03-21 RX ORDER — LIDOCAINE HYDROCHLORIDE 10 MG/ML
0.5 INJECTION, SOLUTION EPIDURAL; INFILTRATION; INTRACAUDAL; PERINEURAL ONCE AS NEEDED
Status: DISCONTINUED | OUTPATIENT
Start: 2025-03-21 | End: 2025-03-21 | Stop reason: HOSPADM

## 2025-03-21 RX ORDER — SODIUM CHLORIDE 0.9 % (FLUSH) 0.9 %
10 SYRINGE (ML) INJECTION AS NEEDED
Status: DISCONTINUED | OUTPATIENT
Start: 2025-03-21 | End: 2025-03-21 | Stop reason: HOSPADM

## 2025-03-21 RX ORDER — PROPOFOL 10 MG/ML
VIAL (ML) INTRAVENOUS AS NEEDED
Status: DISCONTINUED | OUTPATIENT
Start: 2025-03-21 | End: 2025-03-21 | Stop reason: SURG

## 2025-03-21 RX ADMIN — LIDOCAINE HYDROCHLORIDE 50 MG: 20 INJECTION, SOLUTION EPIDURAL; INFILTRATION; INTRACAUDAL; PERINEURAL at 13:13

## 2025-03-21 RX ADMIN — SODIUM CHLORIDE 500 ML: 9 INJECTION, SOLUTION INTRAVENOUS at 10:00

## 2025-03-21 RX ADMIN — PROPOFOL 600 MG: 10 INJECTION, EMULSION INTRAVENOUS at 13:14

## 2025-03-21 NOTE — H&P
Chief Complaint:   Dysphagia and diarrhea alternate with constipation    Subjective     HPI:   Patient is having complaints of dysphagia.  She is also noticing diarrhea alternate with constipation.  She is on Ozempic.  Last colonoscopy was over 10 years ago in [place].    Past Medical History:   Past Medical History:   Diagnosis Date    Abnormal ECG     Allergic rhinitis 1975    Anemia     Anxiety and depression     Arthritis     Asthma 2017    Atrial fibrillation     CHF (congestive heart failure) 2021    Chronic cough     Chronic kidney disease     Coronary artery disease     Disease of thyroid gland     DVT (deep venous thrombosis)     GERD (gastroesophageal reflux disease)     Headache 1973    History of incision and drainage     Right knee    History of staph infection     right knee, and upper right thigh    Hyperlipidemia     Hypothyroidism 2003    Infection      in right knee to bone, cleaned it out and put new hardware with antibiotic and pt developed hole in incision    Inflammation of vein     in leg and leaking    IVCD (intraventricular conduction defect) 12/10/2023    Low back pain     Neuropathy, peripheral     Pneumonia     RECENT DX PER FAMILY DOCTOR    PONV (postoperative nausea and vomiting)     Restless leg syndrome     Sleep apnea with use of continuous positive airway pressure (CPAP)     biapa with 4 l/m o2    SVT (supraventricular tachycardia)        Past Surgical History:  Past Surgical History:   Procedure Laterality Date    ABLATION OF DYSRHYTHMIC FOCUS      BUNIONECTOMY Left     AND HAMMER TOE    CARDIAC ABLATION      at [facility] 8/2019    CARDIAC CATHETERIZATION      CARDIAC SURGERY      HEART CATH    CATARACT EXTRACTION Right     CHOLECYSTECTOMY      COLONOSCOPY      [facility] Dr. Nuno over 10 years ago    CORONARY STENT PLACEMENT  2020    HERNIA REPAIR      UMBILICAL X3    INCISION AND DRAINAGE LEG Right 01/28/2019    Procedure: INCISION AND DRAINAGE OF RIGHT THIGH HEMATOMA;   Surgeon: Nino Stern MD;  Location:  [facility] HYBRID OR 12;  Service: Vascular    JOINT REPLACEMENT      RIGHT KNEE    KNEE POLY INSERT EXCHANGE Right 03/03/2018    Procedure: IRRIGATION AND DEBRIDEMENT TOTAL KNEE & POLY EXCHANGE - RIGHT;  Surgeon: Amilcar Capellan MD;  Location:  [facility] OR;  Service:     LAPAROSCOPIC CHOLECYSTECTOMY      LEG DEBRIDEMENT Right 03/23/2018    Procedure: DEBRIDEMENT AND CLOSURE KNEE - RIGHT;  Surgeon: Amilcar Capellan MD;  Location:  [facility] OR;  Service: Orthopedics    PERIPHERALLY INSERTED CENTRAL CATHETER INSERTION      PILONIDAL CYSTECTOMY N/A 02/16/2017    Procedure: PILONIDAL CYSTECTOMY, EXCISION SEBACEOUS CYST - BACK;  Surgeon: Macrina Capellan MD;  Location:  [facility] OR;  Service:     TOTAL KNEE  PROSTHESIS REMOVAL W/ SPACER INSERTION Right 03/13/2018    Procedure: 1.  EXPLANT TOTAL KNEE 2.  ANTIBOTIC SPACER KNEE;  Surgeon: Amilcar Capellan MD;  Location:  [facility] OR;  Service: Orthopedics    TOTAL KNEE  PROSTHESIS REMOVAL W/ SPACER INSERTION Right 06/19/2020    Procedure: REMOVAL OF ANTIBIOTIC SPACER;  Surgeon: Amilcar Capellan MD;  Location:  [facility] OR;  Service: Orthopedics;  Laterality: Right;    TOTAL KNEE ARTHROPLASTY REVISION Right 06/19/2020    Procedure: REVISION TOTAL KNEE REPLACEMENT;  Surgeon: Amilcar Capellan MD;  Location:  [facility] OR;  Service: Orthopedics;  Laterality: Right;    TRUNK LESION/CYST EXCISION N/A 02/16/2017    Procedure: EXCISION SEBACEOUS CYST - BACK;  Surgeon: Macrina Capellan MD;  Location:  [facility] OR;  Service:        Family History:  Family History   Problem Relation Age of Onset    Diabetes Mother     Heart disease Mother         Triple Bypass Surgery    Alzheimer's disease Mother     Hyperlipidemia Mother     Hypertension Mother     Coronary artery disease Mother         Triple bypass    Heart failure Mother     Osteoarthritis Mother     Cancer Father         Lung    Hypertension Father     Anxiety disorder Father     Depression  Father     Hyperlipidemia Father     Osteoarthritis Father     Heart disease Father         CHF    Heart attack Sister         Sudden cardiac death    Thyroid disease Sister     Heart failure Sister         MI    Osteoarthritis Sister     Leukemia Brother     Clotting disorder Brother     Cancer Brother         Leukemia    Colon polyps Neg Hx     Colon cancer Neg Hx        Social History:   reports that she has never smoked. She has been exposed to tobacco smoke. She has never used smokeless tobacco. She reports that she does not drink alcohol and does not use drugs.    Medications:   Medications Prior to Admission   Medication Sig Dispense Refill Last Dose/Taking    diclofenac (VOLTAREN) 75 MG EC tablet Take 1 tablet by mouth Daily. (Patient taking differently: Take 1 tablet by mouth 2 (Two) Times a Day.) 90 tablet 1 3/20/2025 Morning    famotidine (PEPCID) 40 MG tablet    3/20/2025 Morning    furosemide (LASIX) 40 MG tablet Take 1 tablet by mouth Daily. 90 tablet 3 3/20/2025 Morning    gabapentin (NEURONTIN) 300 MG capsule Take 1 capsule by mouth 2 (Two) Times a Day.   3/20/2025    levothyroxine (SYNTHROID, LEVOTHROID) 137 MCG tablet Take 1 tablet by mouth Every Morning. 90 tablet 3 3/20/2025 Morning    LORazepam (ATIVAN) 1 MG tablet Take 1 tablet by mouth Every 12 (Twelve) Hours.   3/21/2025 Morning    O2 (OXYGEN) Inhale 3.5 L/min 1 (One) Time. NOC   3/20/2025    polyethylene glycol (GoLYTELY) 236 g solution Take as directed by office instructions. 4000 mL 0 3/21/2025 Morning    rOPINIRole (REQUIP) 3 MG tablet 2 tablets each morning and 3 tablets at bedtime. (Patient taking differently: Take 1 tablet by mouth 2 (Two) Times a Day. 1 TABLET IN THE MORNING & 1 TABLET AT  BEDTIME) 450 tablet 3 3/20/2025 Morning    TiZANidine (ZANAFLEX) 6 MG capsule Take 1 capsule by mouth 3 (Three) Times a Day.   3/20/2025 Morning    albuterol sulfate  (90 Base) MCG/ACT inhaler INHALE 2 PUFFS BY MOUTH EVERY 4 HOURS AS NEEDED  FOR WHEEZING AND SHORTNESS OF BREATH   More than a month    amitriptyline (ELAVIL) 75 MG tablet Take 1 tablet by mouth Every Night.   3/19/2025    ASPIRIN 81 PO Take 1 tablet by mouth Daily.   3/19/2025    azelastine (ASTELIN) 0.1 % nasal spray 2 sprays into the nostril(s) as directed by provider 2 (Two) Times a Day. Use in each nostril as directed 30 mL 11 3/19/2025    dexlansoprazole (Dexilant) 60 MG capsule Take 1 capsule by mouth Daily. 30 capsule 11 3/19/2025    donepezil (ARICEPT) 10 MG tablet Take 1 tablet by mouth Daily.   3/19/2025    DULoxetine (CYMBALTA) 60 MG capsule TAKE 1 CAPSULE BY MOUTH TWICE DAILY 60 capsule 5 3/19/2025    famciclovir (FAMVIR) 500 MG tablet Take 1 tablet by mouth 2 (Two) Times a Day.   3/19/2025    Farxiga 10 MG tablet Take 10 mg by mouth Daily.   3/19/2025    fluticasone (FLONASE) 50 MCG/ACT nasal spray 2 sprays into the nostril(s) as directed by provider Daily As Needed for Rhinitis or Allergies for up to 30 days. 16 g 11     fluticasone (Flovent HFA) 110 MCG/ACT inhaler Inhale 1 puff 2 (Two) Times a Day.   More than a month    hydrOXYzine pamoate (VISTARIL) 25 MG capsule Take 1 capsule by mouth 3 (Three) Times a Day As Needed.   3/19/2025    ipratropium (ATROVENT) 0.06 % nasal spray 2 sprays into the nostril(s) as directed by provider 3 (Three) Times a Day. 15 mL 5 More than a month    levocetirizine (XYZAL) 5 MG tablet TAKE 1 TABLET BY MOUTH EVERY EVENING 90 tablet 0 3/14/2025    metoprolol succinate XL (TOPROL-XL) 25 MG 24 hr tablet Take 1 tablet by mouth Every Night. 1 TABLET AT NIGHT 90 tablet 3 3/19/2025    montelukast (SINGULAIR) 10 MG tablet Take 1 tablet by mouth Daily.   3/14/2025    Ozempic, 2 MG/DOSE, 8 MG/3ML solution pen-injector INJECT 2 MG UNDER THE SKIN WEEKLY AS DIRECTED   3/13/2025    rosuvastatin (CRESTOR) 5 MG tablet 1 tablet Daily.   3/19/2025    sodium chloride (OCEAN) 0.65 % nasal spray 2-3 sprays into the nostril(s) as directed by provider As Needed (nasal  "irrigation). 50 mL 12 Unknown    traMADol (ULTRAM) 50 MG tablet TAKE 1 TABLET BY MOUTH EVERY 8 HOURS AS NEEDED. MUST LAST 7 DAYS.   3/14/2025    triamcinolone (KENALOG) 0.1 % paste APPLY TO THE AFFECTED AREA THREE TIMES DAILY AS NEEDED   3/19/2025       Allergies:  Codeine, Mobic [meloxicam], Morphine and codeine, Azithromycin, and Cyclobenzaprine    ROS:    Resp: No SOA  Cardiovascular: No CP      Objective     /76 (Patient Position: Sitting)   Pulse 74   Temp 97.4 °F (36.3 °C) (Temporal)   Resp 16   Ht 157.5 cm (62\")   Wt 102 kg (224 lb)   LMP  (LMP Unknown)   SpO2 99%   BMI 40.97 kg/m²     Physical Exam   Constitutional: Pt is oriented to person, place, and in no distress.  Pulmonary/Chest: No distress.  No audible wheezes   Psychiatric: Mood, memory, affect and judgment appear normal.     Assessment & Plan     Diagnosis:  Dysphagia  Diarrhea alternate with constipation    Anticipated Surgical Procedure:  Endoscopy and colonoscopy    The risks, benefits, and alternatives of endoscopy were reviewed with the patient today.  Risks including perforation, with or without dilation, possibly requiring surgery.  Additional risks include risk of bleeding.  There is also the risk of a drug reaction or problems with anesthesia.  This will be discussed with the further by the anesthesia team on the day of the procedure. The benefits include the diagnosis and management of disease of the upper digestive tract.  Alternatives to endoscopy include upper GI series, laboratory testing, radiographic evaluation, or no intervention.  The patient verbalizes understanding and agrees.    The risks, benefits, and alternatives of colonoscopy were reviewed with the patient today.  Risks including perforation of the colon possibly requiring surgery or colostomy.  Additional risks include risk of bleeding from biopsies or removal of colon tissue.  There is also the risk of a drug reaction or problems with anesthesia.  This will " be discussed with the further by the anesthesia team on the day of the procedure.  Lastly there is a possibility of missing a colon polyp or cancer.  The benefits include the diagnosis and management of disease of the colon and rectum.  Alternatives to colonoscopy include barium enema, laboratory testing, radiographic evaluation, or no intervention.  The patient verbalizes understanding and agrees.    Please note that portions of this note were completed with a voice recognition program.

## 2025-03-21 NOTE — ANESTHESIA POSTPROCEDURE EVALUATION
Patient: Danisha Cannon    Procedure Summary       Date: 03/21/25 Room / Location: [facility] ENDOSCOPY 5 / [facility]    Anesthesia Start: 1310 Anesthesia Stop: 1345    Procedures:       ESOPHAGOGASTRODUODENOSCOPY WITH ANESTHESIA      COLONOSCOPY WITH ANESTHESIA Diagnosis:       Change in bowel habits      (Change in bowel habits [R19.4])    Surgeons: Bibi Galindo MD Provider: Vlad Collazo CRNA    Anesthesia Type: MAC ASA Status: 3            Anesthesia Type: MAC    Vitals  Vitals Value Taken Time   BP     Temp     Pulse 81 03/21/25 13:45   Resp     SpO2 94 % 03/21/25 13:45   Vitals shown include unfiled device data.        Post Anesthesia Care and Evaluation    Patient location during evaluation: PACU  Patient participation: complete - patient participated  Level of consciousness: awake and awake and alert  Pain score: 0  Pain management: adequate    Airway patency: patent  Anesthetic complications: No anesthetic complications    Cardiovascular status: acceptable and stable  Respiratory status: acceptable and unassisted  Hydration status: acceptable

## 2025-03-25 LAB
CYTO UR: NORMAL
LAB AP CASE REPORT: NORMAL
Lab: NORMAL
PATH REPORT.FINAL DX SPEC: NORMAL
PATH REPORT.GROSS SPEC: NORMAL

## 2025-03-31 ENCOUNTER — TELEPHONE (OUTPATIENT)
Dept: GASTROENTEROLOGY | Facility: CLINIC | Age: 70
End: 2025-03-31
Payer: MEDICARE

## 2025-03-31 DIAGNOSIS — R19.7 DIARRHEA, UNSPECIFIED TYPE: Primary | ICD-10-CM

## 2025-03-31 PROBLEM — K59.1 FUNCTIONAL DIARRHEA: Status: ACTIVE | Noted: 2025-01-17

## 2025-03-31 NOTE — TELEPHONE ENCOUNTER
Called and spoke to pt re: results-she VU. I have placed her in recall for colon for 3/2035. She will come by outpatient lab at [facility] at her convenience to have labs to check for celiac. Pt advised to call me back with any further questions/problems, otherwise she will keep f/u with Shanelle in May and knows we will touch base with lab results once back.

## 2025-03-31 NOTE — TELEPHONE ENCOUNTER
Please let Danisha know that her colon biopsies were normal.  Consider repeat colonoscopy in 10 years pending overall medical health.    Also let her know that small bowel biopsies only show minimal nonspecific inflammation.  I would like to have her show up to the lab to check a blood work test looking for celiac disease.  She can do this at her convenience.    Bibi Galindo MD

## 2025-04-02 ENCOUNTER — LAB (OUTPATIENT)
Dept: LAB | Facility: HOSPITAL | Age: 70
End: 2025-04-02
Payer: MEDICARE

## 2025-04-02 DIAGNOSIS — R19.7 DIARRHEA, UNSPECIFIED TYPE: ICD-10-CM

## 2025-04-02 PROCEDURE — 86364 TISS TRNSGLTMNASE EA IG CLAS: CPT

## 2025-04-02 PROCEDURE — 36415 COLL VENOUS BLD VENIPUNCTURE: CPT

## 2025-04-03 LAB — TTG IGA SER-ACNC: <2 U/ML (ref 0–3)

## 2025-04-08 ENCOUNTER — OFFICE VISIT (OUTPATIENT)
Dept: NEUROLOGY | Age: 70
End: 2025-04-08
Payer: MEDICARE

## 2025-04-08 VITALS
RESPIRATION RATE: 16 BRPM | BODY MASS INDEX: 40.48 KG/M2 | SYSTOLIC BLOOD PRESSURE: 127 MMHG | WEIGHT: 220 LBS | HEART RATE: 71 BPM | DIASTOLIC BLOOD PRESSURE: 78 MMHG | HEIGHT: 62 IN

## 2025-04-08 DIAGNOSIS — I65.21 CAROTID STENOSIS, RIGHT: ICD-10-CM

## 2025-04-08 DIAGNOSIS — G60.3 IDIOPATHIC PROGRESSIVE NEUROPATHY: Primary | ICD-10-CM

## 2025-04-08 DIAGNOSIS — R41.3 MEMORY LOSS: ICD-10-CM

## 2025-04-08 DIAGNOSIS — G47.33 OBSTRUCTIVE SLEEP APNEA: ICD-10-CM

## 2025-04-08 DIAGNOSIS — G25.81 RESTLESS LEG SYNDROME: ICD-10-CM

## 2025-04-08 PROCEDURE — 3074F SYST BP LT 130 MM HG: CPT | Performed by: PSYCHIATRY & NEUROLOGY

## 2025-04-08 PROCEDURE — G8399 PT W/DXA RESULTS DOCUMENT: HCPCS | Performed by: PSYCHIATRY & NEUROLOGY

## 2025-04-08 PROCEDURE — 99213 OFFICE O/P EST LOW 20 MIN: CPT | Performed by: PSYCHIATRY & NEUROLOGY

## 2025-04-08 PROCEDURE — 1123F ACP DISCUSS/DSCN MKR DOCD: CPT | Performed by: PSYCHIATRY & NEUROLOGY

## 2025-04-08 PROCEDURE — 1090F PRES/ABSN URINE INCON ASSESS: CPT | Performed by: PSYCHIATRY & NEUROLOGY

## 2025-04-08 PROCEDURE — 1159F MED LIST DOCD IN RCRD: CPT | Performed by: PSYCHIATRY & NEUROLOGY

## 2025-04-08 PROCEDURE — 1036F TOBACCO NON-USER: CPT | Performed by: PSYCHIATRY & NEUROLOGY

## 2025-04-08 PROCEDURE — 3017F COLORECTAL CA SCREEN DOC REV: CPT | Performed by: PSYCHIATRY & NEUROLOGY

## 2025-04-08 PROCEDURE — G8417 CALC BMI ABV UP PARAM F/U: HCPCS | Performed by: PSYCHIATRY & NEUROLOGY

## 2025-04-08 PROCEDURE — 3078F DIAST BP <80 MM HG: CPT | Performed by: PSYCHIATRY & NEUROLOGY

## 2025-04-08 PROCEDURE — G8427 DOCREV CUR MEDS BY ELIG CLIN: HCPCS | Performed by: PSYCHIATRY & NEUROLOGY

## 2025-04-08 RX ORDER — VALACYCLOVIR HYDROCHLORIDE 1 G/1
TABLET, FILM COATED ORAL
COMMUNITY

## 2025-04-08 RX ORDER — TRAMADOL HYDROCHLORIDE 50 MG/1
50 TABLET ORAL EVERY 8 HOURS PRN
COMMUNITY

## 2025-04-08 RX ORDER — FUROSEMIDE 40 MG/1
1 TABLET ORAL
COMMUNITY
Start: 2021-05-14

## 2025-04-08 RX ORDER — MECLIZINE HCL 12.5 MG 12.5 MG/1
TABLET ORAL
COMMUNITY

## 2025-04-08 RX ORDER — ROSUVASTATIN CALCIUM 5 MG/1
5 TABLET, COATED ORAL NIGHTLY
COMMUNITY

## 2025-04-08 RX ORDER — HYDROXYZINE PAMOATE 25 MG/1
25 CAPSULE ORAL 3 TIMES DAILY PRN
COMMUNITY

## 2025-04-08 RX ORDER — METOPROLOL SUCCINATE 25 MG/1
TABLET, EXTENDED RELEASE ORAL
COMMUNITY
Start: 2025-03-28

## 2025-04-08 NOTE — PATIENT INSTRUCTIONS
INSTRUCTIONS:  1. Continue CPAP use during sleep  2. Try taking alpha lipoic acid, one capsule daily for the peripheral neuropathy

## 2025-04-08 NOTE — PROGRESS NOTES
PONV (postoperative nausea and vomiting)     Prolonged emergence from general anesthesia     Recurrent ventral incisional hernia 01/18/2016    Sleep apnea     Stroke (HCC)     Stroke-like symptom     SVT (supraventricular tachycardia)     SVT (supraventricular tachycardia)     Swelling of extremity     Unspecified sleep apnea     clinicallybi-pap    Ventricular tachyarrhythmia (HCC) 09/2015    svt       Past Surgical History:   Procedure Laterality Date    CARDIAC CATHETERIZATION  8/18/15  JDT    EF 50%    CARDIAC SURGERY      CATARACT REMOVAL      CHOLECYSTECTOMY  8/4/14    BY Dr. Salas    COLONOSCOPY  2008    CYST INCISION AND DRAINAGE  03/2017    FINGER TRIGGER RELEASE      FOOT SURGERY Left     removal of two screws    HAMMER TOE SURGERY      HARDWARE REMOVAL FOOT / ANKLE Left 8/16/2016    FOOT HARDWARE REMOVAL - DEEP / 2nd METATARSAL HEAD RESECTION performed by Toni He II, DPM at [facility] OR    HERNIA REPAIR  1/12/2015    x3    KNEE SURGERY      Took prostetic joint out because of infection and put a spacer in    OTHER SURGICAL HISTORY  02/2017    pilonidal cyst-Dr Bina Kim    AK ARTHROSCOPY KNEE DIAGNOSTIC W/WO SYNOVIAL BX SPX Right 7/3/2017    KNEE ARTHROSCOPY PARTIAL MEDIAL MENISECTOMY performed by Dominick Bowles MD at [facility] OR    AK ARTHRP KNE CONDYLE&PLATU MEDIAL&LAT COMPARTMENTS Right 2/16/2018    KNEE TOTAL ARTHROPLASTY performed by Nacho Kim MD at [facility] OR    VENTRAL HERNIA REPAIR N/A 1/18/2016    HERNIA VENTRAL REPAIR LAPAROSCOPIC WITH MESH  performed by Rebecca Velez MD at [facility] OR       Recent Hospitalizations  None    Significant Injuries  None    Habits  Morena Mukherjee reports that she has never smoked. She has never used smokeless tobacco. She reports that she does not drink alcohol and does not use drugs.    Family History   Problem Relation Age of Onset    Heart Disease Mother     Diabetes Mother     High Blood Pressure Father     Cancer Father 70        lung CA    Cancer

## 2025-04-15 ENCOUNTER — TELEPHONE (OUTPATIENT)
Dept: GASTROENTEROLOGY | Facility: CLINIC | Age: 70
End: 2025-04-15
Payer: MEDICARE

## 2025-04-15 NOTE — TELEPHONE ENCOUNTER
Pt called wanting results of celiac testing. I read them to her off her MyChart. She tells me she is still having abd pain with diarrhea and wants to know what she can do. Please advise.

## 2025-04-16 NOTE — TELEPHONE ENCOUNTER
She has an appointment scheduled for next month.  She needs to keep this.  In the meantime, discuss other options besides Ozempic with her prescribing physician.    Gastrointestinal adverse effects for Semaglutide:  Abdominal pain: 6% to 20%  Constipation, oral: 5% to 6%; SUBQ: 3% to 24%  Diarrhea, oral: 9% to 10%; SUBQ: 9% to 30%  Nausea, oral: 11% to 20%; SUBQ: 16% to 44%  Vomiting, oral: 6% to 8%; SUBQ: 5% to 24%    Bibi Galindo MD

## 2025-04-16 NOTE — TELEPHONE ENCOUNTER
Spoke with pt and ileana cox. She will call PCP to discuss other meds besides Ozempic and keep appt for next month. Advised to call back with any questions.

## 2025-05-15 ENCOUNTER — OFFICE VISIT (OUTPATIENT)
Dept: GASTROENTEROLOGY | Facility: CLINIC | Age: 70
End: 2025-05-15
Payer: MEDICARE

## 2025-05-15 VITALS
DIASTOLIC BLOOD PRESSURE: 58 MMHG | SYSTOLIC BLOOD PRESSURE: 98 MMHG | HEART RATE: 65 BPM | BODY MASS INDEX: 41.26 KG/M2 | TEMPERATURE: 96.8 F | OXYGEN SATURATION: 95 % | WEIGHT: 224.2 LBS | HEIGHT: 62 IN

## 2025-05-15 DIAGNOSIS — K59.01 SLOW TRANSIT CONSTIPATION: Primary | ICD-10-CM

## 2025-05-15 DIAGNOSIS — Z78.9 NONSMOKER: ICD-10-CM

## 2025-05-15 PROCEDURE — 1159F MED LIST DOCD IN RCRD: CPT | Performed by: CLINICAL NURSE SPECIALIST

## 2025-05-15 PROCEDURE — 99213 OFFICE O/P EST LOW 20 MIN: CPT | Performed by: CLINICAL NURSE SPECIALIST

## 2025-05-15 PROCEDURE — 1160F RVW MEDS BY RX/DR IN RCRD: CPT | Performed by: CLINICAL NURSE SPECIALIST

## 2025-05-15 NOTE — PROGRESS NOTES
Danisha Cannon  1955    5/15/2025  Chief Complaint   Patient presents with    GI Problem     Colonoscopy follow up visit     Subjective   HPI  Danisha Cannon is a 69 y.o. female who presents with a complaint of IBS. She has had her colonoscopy and it was felt symptoms may be IBS related. She is predominantly constipated. She has not taken miralax regular. No bleeding. She does say that her anxiety is a trigger.      Past Medical History:   Diagnosis Date    Abnormal ECG     Allergic rhinitis 1975    Anemia     Anxiety and depression     Arthritis     Asthma 2017    Atrial fibrillation     CHF (congestive heart failure) 2021    Chronic cough     Chronic kidney disease     Coronary artery disease     Disease of thyroid gland     DVT (deep venous thrombosis)     GERD (gastroesophageal reflux disease)     Headache 1973    History of incision and drainage     Right knee    History of staph infection     right knee, and upper right thigh    Hyperlipidemia     Hypothyroidism 2003    Infection      in right knee to bone, cleaned it out and put new hardware with antibiotic and pt developed hole in incision    Inflammation of vein     in leg and leaking    IVCD (intraventricular conduction defect) 12/10/2023    Low back pain     Neuropathy, peripheral     Pneumonia     RECENT DX PER FAMILY DOCTOR    PONV (postoperative nausea and vomiting)     Restless leg syndrome     Sleep apnea with use of continuous positive airway pressure (CPAP)     biapa with 4 l/m o2    SVT (supraventricular tachycardia)      Past Surgical History:   Procedure Laterality Date    ABLATION OF DYSRHYTHMIC FOCUS      BUNIONECTOMY Left     AND HAMMER TOE    CARDIAC ABLATION      at [facility] 8/2019    CARDIAC CATHETERIZATION      CARDIAC SURGERY      HEART CATH    CATARACT EXTRACTION Right     CHOLECYSTECTOMY      COLONOSCOPY      [facility]. Nuno over 10 years ago    COLONOSCOPY N/A 03/21/2025    Dr. Galindo-Diverticulosis in the left colon. - Normal mucosa  in the entire examined colon. Biopsied- Fragments of benign colonic mucosa. Comment: Histologic changes of collagenous or lymphocytic (microscopic) colitis are not identified    CORONARY STENT PLACEMENT  2020    ENDOSCOPY N/A 03/21/2025    Dr. Galindo-Non-obstructing Schatzki ring. Dilated. - Normal stomach. - Normal examined duodenum. Biopsied.- Fragments of benign duodenal mucosa with submucosa exhibiting minimal active inflammation, nonspecific. Comment: Histologic changes of celiac sprue are not identified    HERNIA REPAIR      UMBILICAL X3    INCISION AND DRAINAGE LEG Right 01/28/2019    Procedure: INCISION AND DRAINAGE OF RIGHT THIGH HEMATOMA;  Surgeon: Nino Stern MD;  Location:  [facility] HYBRID OR 12;  Service: Vascular    JOINT REPLACEMENT      RIGHT KNEE    KNEE POLY INSERT EXCHANGE Right 03/03/2018    Procedure: IRRIGATION AND DEBRIDEMENT TOTAL KNEE & POLY EXCHANGE - RIGHT;  Surgeon: Amilcar Capellan MD;  Location:  [facility] OR;  Service:     LAPAROSCOPIC CHOLECYSTECTOMY      LEG DEBRIDEMENT Right 03/23/2018    Procedure: DEBRIDEMENT AND CLOSURE KNEE - RIGHT;  Surgeon: Amilcar Capellan MD;  Location:  [facility] OR;  Service: Orthopedics    PERIPHERALLY INSERTED CENTRAL CATHETER INSERTION      PILONIDAL CYSTECTOMY N/A 02/16/2017    Procedure: PILONIDAL CYSTECTOMY, EXCISION SEBACEOUS CYST - BACK;  Surgeon: Macrina Capellan MD;  Location:  [facility] OR;  Service:     TOTAL KNEE  PROSTHESIS REMOVAL W/ SPACER INSERTION Right 03/13/2018    Procedure: 1.  EXPLANT TOTAL KNEE 2.  ANTIBOTIC SPACER KNEE;  Surgeon: Amilcar Capellan MD;  Location:  [facility] OR;  Service: Orthopedics    TOTAL KNEE  PROSTHESIS REMOVAL W/ SPACER INSERTION Right 06/19/2020    Procedure: REMOVAL OF ANTIBIOTIC SPACER;  Surgeon: Amilcar Capellan MD;  Location:  [facility] OR;  Service: Orthopedics;  Laterality: Right;    TOTAL KNEE ARTHROPLASTY REVISION Right 06/19/2020    Procedure: REVISION TOTAL KNEE REPLACEMENT;  Surgeon: Amilcar Capellan  MD;  Location:  [facility] OR;  Service: Orthopedics;  Laterality: Right;    TRUNK LESION/CYST EXCISION N/A 02/16/2017    Procedure: EXCISION SEBACEOUS CYST - BACK;  Surgeon: Macrina Capellan MD;  Location:  [facility] OR;  Service:        Outpatient Medications Marked as Taking for the 5/15/25 encounter (Office Visit) with Jazzy Maguire APRN   Medication Sig Dispense Refill    albuterol sulfate  (90 Base) MCG/ACT inhaler INHALE 2 PUFFS BY MOUTH EVERY 4 HOURS AS NEEDED FOR WHEEZING AND SHORTNESS OF BREATH      amitriptyline (ELAVIL) 75 MG tablet Take 1 tablet by mouth Every Night.      ASPIRIN 81 PO Take 1 tablet by mouth Daily.      azelastine (ASTELIN) 0.1 % nasal spray 2 sprays into the nostril(s) as directed by provider 2 (Two) Times a Day. Use in each nostril as directed 30 mL 11    dexlansoprazole (Dexilant) 60 MG capsule Take 1 capsule by mouth Daily. 30 capsule 11    diclofenac (VOLTAREN) 75 MG EC tablet Take 1 tablet by mouth Daily. (Patient taking differently: Take 1 tablet by mouth 2 (Two) Times a Day.) 90 tablet 1    donepezil (ARICEPT) 10 MG tablet Take 1 tablet by mouth Daily.      DULoxetine (CYMBALTA) 60 MG capsule TAKE 1 CAPSULE BY MOUTH TWICE DAILY 60 capsule 5    famciclovir (FAMVIR) 500 MG tablet Take 1 tablet by mouth 2 (Two) Times a Day.      famotidine (PEPCID) 40 MG tablet       Farxiga 10 MG tablet Take 10 mg by mouth Daily.      fluticasone (Flovent HFA) 110 MCG/ACT inhaler Inhale 1 puff 2 (Two) Times a Day.      furosemide (LASIX) 40 MG tablet Take 1 tablet by mouth Daily. 90 tablet 3    gabapentin (NEURONTIN) 300 MG capsule Take 1 capsule by mouth 2 (Two) Times a Day.      hydrOXYzine pamoate (VISTARIL) 25 MG capsule Take 1 capsule by mouth 3 (Three) Times a Day As Needed.      ipratropium (ATROVENT) 0.06 % nasal spray 2 sprays into the nostril(s) as directed by provider 3 (Three) Times a Day. 15 mL 5    levocetirizine (XYZAL) 5 MG tablet TAKE 1 TABLET BY MOUTH EVERY EVENING 90  tablet 0    levothyroxine (SYNTHROID, LEVOTHROID) 137 MCG tablet Take 1 tablet by mouth Every Morning. 90 tablet 3    LORazepam (ATIVAN) 1 MG tablet Take 1 tablet by mouth Every 12 (Twelve) Hours.      metoprolol succinate XL (TOPROL-XL) 25 MG 24 hr tablet Take 1 tablet by mouth Every Night. 1 TABLET AT NIGHT 90 tablet 3    montelukast (SINGULAIR) 10 MG tablet Take 1 tablet by mouth Daily.      O2 (OXYGEN) Inhale 3.5 L/min 1 (One) Time. NOC      Ozempic, 2 MG/DOSE, 8 MG/3ML solution pen-injector INJECT 2 MG UNDER THE SKIN WEEKLY AS DIRECTED      rOPINIRole (REQUIP) 3 MG tablet 2 tablets each morning and 3 tablets at bedtime. (Patient taking differently: Take 1 tablet by mouth 2 (Two) Times a Day. 1 TABLET IN THE MORNING & 1 TABLET AT  BEDTIME) 450 tablet 3    rosuvastatin (CRESTOR) 5 MG tablet 1 tablet Daily.      sodium chloride (OCEAN) 0.65 % nasal spray 2-3 sprays into the nostril(s) as directed by provider As Needed (nasal irrigation). 50 mL 12    TiZANidine (ZANAFLEX) 6 MG capsule Take 1 capsule by mouth 3 (Three) Times a Day.      traMADol (ULTRAM) 50 MG tablet TAKE 1 TABLET BY MOUTH EVERY 8 HOURS AS NEEDED. MUST LAST 7 DAYS.      triamcinolone (KENALOG) 0.1 % paste APPLY TO THE AFFECTED AREA THREE TIMES DAILY AS NEEDED       Allergies   Allergen Reactions    Codeine Dizziness and Nausea Only     Rapid heart rate, dizziness  NAUSEA    Mobic [Meloxicam] Rash    Morphine And Codeine Itching    Azithromycin Other (See Comments)    Cyclobenzaprine Unknown - Low Severity     Social History     Socioeconomic History    Marital status:    Tobacco Use    Smoking status: Never     Passive exposure: Past    Smokeless tobacco: Never    Tobacco comments:     Exposed to second hand smoke for 22 years, both parents smoked   Vaping Use    Vaping status: Never Used   Substance and Sexual Activity    Alcohol use: No    Drug use: No    Sexual activity: Defer     Family History   Problem Relation Age of Onset    Diabetes  Mother     Heart disease Mother         Triple Bypass Surgery    Alzheimer's disease Mother     Hyperlipidemia Mother     Hypertension Mother     Coronary artery disease Mother         Triple bypass    Heart failure Mother     Osteoarthritis Mother     Cancer Father         Lung    Hypertension Father     Anxiety disorder Father     Depression Father     Hyperlipidemia Father     Osteoarthritis Father     Heart disease Father         CHF    Heart attack Sister         Sudden cardiac death    Thyroid disease Sister     Heart failure Sister         MI    Osteoarthritis Sister     Leukemia Brother     Clotting disorder Brother     Cancer Brother         Leukemia    Colon polyps Neg Hx     Colon cancer Neg Hx      Health Maintenance   Topic Date Due    HEPATITIS C SCREENING  Never done    ZOSTER VACCINE (2 of 2) 10/27/2018    Pneumococcal Vaccine 50+ (2 of 2 - PCV) 09/04/2019    ANNUAL WELLNESS VISIT  05/02/2024    LIPID PANEL  05/19/2024    COVID-19 Vaccine (4 - 2024-25 season) 09/01/2024    MAMMOGRAM  04/11/2025    DXA SCAN  06/08/2025    INFLUENZA VACCINE  07/01/2025    TDAP/TD VACCINES (2 - Td or Tdap) 04/27/2031    COLORECTAL CANCER SCREENING  03/21/2035    HEMOGLOBIN A1C  Discontinued     Review of Systems   Constitutional:  Negative for activity change, appetite change, chills, diaphoresis, fatigue, fever and unexpected weight change.   HENT:  Negative for ear pain, hearing loss, mouth sores, sore throat, trouble swallowing and voice change.    Eyes: Negative.    Respiratory:  Negative for cough, choking, shortness of breath and wheezing.    Cardiovascular:  Negative for chest pain and palpitations.   Gastrointestinal:  Negative for abdominal pain, blood in stool, constipation, diarrhea, nausea and vomiting.   Endocrine: Negative for cold intolerance and heat intolerance.   Genitourinary:  Negative for decreased urine volume, dysuria, frequency, hematuria and urgency.   Musculoskeletal:  Negative for back pain,  "gait problem and myalgias.   Skin:  Negative for color change, pallor and rash.   Allergic/Immunologic: Negative for food allergies and immunocompromised state.   Neurological:  Negative for dizziness, tremors, seizures, syncope, weakness, light-headedness, numbness and headaches.   Hematological:  Negative for adenopathy. Does not bruise/bleed easily.   Psychiatric/Behavioral:  Negative for agitation and confusion. The patient is not nervous/anxious.    All other systems reviewed and are negative.    Objective   Vitals:    05/15/25 1410   BP: 98/58   Pulse: 65   Temp: 96.8 °F (36 °C)   SpO2: 95%   Weight: 102 kg (224 lb 3.2 oz)   Height: 157.5 cm (62\")     Body mass index is 41.01 kg/m².  Physical Exam  Constitutional:       Appearance: She is well-developed.   HENT:      Head: Normocephalic and atraumatic.   Eyes:      Pupils: Pupils are equal, round, and reactive to light.   Neck:      Trachea: No tracheal deviation.   Cardiovascular:      Rate and Rhythm: Normal rate and regular rhythm.      Heart sounds: Normal heart sounds. No murmur heard.     No friction rub. No gallop.   Pulmonary:      Effort: Pulmonary effort is normal. No respiratory distress.      Breath sounds: Normal breath sounds. No wheezing or rales.   Chest:      Chest wall: No tenderness.   Abdominal:      General: Bowel sounds are normal. There is no distension.      Palpations: Abdomen is soft. Abdomen is not rigid.      Tenderness: There is no abdominal tenderness. There is no guarding or rebound.   Musculoskeletal:         General: No tenderness or deformity. Normal range of motion.      Cervical back: Normal range of motion and neck supple.   Skin:     General: Skin is warm and dry.      Coloration: Skin is not pale.      Findings: No rash.   Neurological:      Mental Status: She is alert and oriented to person, place, and time.      Deep Tendon Reflexes: Reflexes are normal and symmetric.   Psychiatric:         Behavior: Behavior normal.    "      Thought Content: Thought content normal.         Judgment: Judgment normal.       Assessment & Plan   Diagnoses and all orders for this visit:    1. Slow transit constipation (Primary)    2. Nonsmoker    Increase water intake  Start Miralax daily up to twice per day  Colonoscopy reviewed as well as endoscopy    Manage her anxiety as well which does help her    Part of this note may be an electronic transcription/translation of spoken language to printed text using the Dragon Dictation System.  Body mass index is 41.01 kg/m².  Return if symptoms worsen or fail to improve.           All risks, benefits, alternatives, and indications of colonoscopy and/or Endoscopy procedure have been discussed with the patient. Risks to include perforation of the colon requiring possible surgery or colostomy, risk of bleeding from biopsies or removal of colon tissue, possibility of missing a colon polyp or cancer, or adverse drug reaction.  Benefits to include the diagnosis and management of disease of the colon and rectum. Alternatives to include barium enema, radiographic evaluation, lab testing or no intervention. Pt verbalizes understanding and agrees.     Jazzy Maguire, APRN  5/15/2025  14:31 CDT          If you smoke or use tobacco, 4 minutes reading provided  Steps to Quit Smoking  Smoking tobacco can be harmful to your health and can affect almost every organ in your body. Smoking puts you, and those around you, at risk for developing many serious chronic diseases. Quitting smoking is difficult, but it is one of the best things that you can do for your health. It is never too late to quit.  What are the benefits of quitting smoking?  When you quit smoking, you lower your risk of developing serious diseases and conditions, such as:  Lung cancer or lung disease, such as COPD.  Heart disease.  Stroke.  Heart attack.  Infertility.  Osteoporosis and bone fractures.  Additionally, symptoms such as coughing, wheezing, and  shortness of breath may get better when you quit. You may also find that you get sick less often because your body is stronger at fighting off colds and infections. If you are pregnant, quitting smoking can help to reduce your chances of having a baby of low birth weight.  How do I get ready to quit?  When you decide to quit smoking, create a plan to make sure that you are successful. Before you quit:  Pick a date to quit. Set a date within the next two weeks to give you time to prepare.  Write down the reasons why you are quitting. Keep this list in places where you will see it often, such as on your bathroom mirror or in your car or wallet.  Identify the people, places, things, and activities that make you want to smoke (triggers) and avoid them. Make sure to take these actions:  Throw away all cigarettes at home, at work, and in your car.  Throw away smoking accessories, such as ashtrays and lighters.  Clean your car and make sure to empty the ashtray.  Clean your home, including curtains and carpets.  Tell your family, friends, and coworkers that you are quitting. Support from your loved ones can make quitting easier.  Talk with your health care provider about your options for quitting smoking.  Find out what treatment options are covered by your health insurance.  What strategies can I use to quit smoking?  Talk with your healthcare provider about different strategies to quit smoking. Some strategies include:  Quitting smoking altogether instead of gradually lessening how much you smoke over a period of time. Research shows that quitting “cold turkey” is more successful than gradually quitting.  Attending in-person counseling to help you build problem-solving skills. You are more likely to have success in quitting if you attend several counseling sessions. Even short sessions of 10 minutes can be effective.  Finding resources and support systems that can help you to quit smoking and remain smoke-free after you  quit. These resources are most helpful when you use them often. They can include:  Online chats with a counselor.  Telephone quitlines.  Printed self-help materials.  Support groups or group counseling.  Text messaging programs.  Mobile phone applications.  Taking medicines to help you quit smoking. (If you are pregnant or breastfeeding, talk with your health care provider first.) Some medicines contain nicotine and some do not. Both types of medicines help with cravings, but the medicines that include nicotine help to relieve withdrawal symptoms. Your health care provider may recommend:  Nicotine patches, gum, or lozenges.  Nicotine inhalers or sprays.  Non-nicotine medicine that is taken by mouth.  Talk with your health care provider about combining strategies, such as taking medicines while you are also receiving in-person counseling. Using these two strategies together makes you more likely to succeed in quitting than if you used either strategy on its own.  If you are pregnant or breastfeeding, talk with your health care provider about finding counseling or other support strategies to quit smoking. Do not take medicine to help you quit smoking unless told to do so by your health care provider.  What things can I do to make it easier to quit?  Quitting smoking might feel overwhelming at first, but there is a lot that you can do to make it easier. Take these important actions:  Reach out to your family and friends and ask that they support and encourage you during this time. Call telephone quitlines, reach out to support groups, or work with a counselor for support.  Ask people who smoke to avoid smoking around you.  Avoid places that trigger you to smoke, such as bars, parties, or smoke-break areas at work.  Spend time around people who do not smoke.  Lessen stress in your life, because stress can be a smoking trigger for some people. To lessen stress, try:  Exercising regularly.  Deep-breathing  exercises.  Yoga.  Meditating.  Performing a body scan. This involves closing your eyes, scanning your body from head to toe, and noticing which parts of your body are particularly tense. Purposefully relax the muscles in those areas.  Download or purchase mobile phone or tablet apps (applications) that can help you stick to your quit plan by providing reminders, tips, and encouragement. There are many free apps, such as QuitGuide from the CDC (Centers for Disease Control and Prevention). You can find other support for quitting smoking (smoking cessation) through smokefree.gov and other websites.  How will I feel when I quit smoking?  Within the first 24 hours of quitting smoking, you may start to feel some withdrawal symptoms. These symptoms are usually most noticeable 2-3 days after quitting, but they usually do not last beyond 2-3 weeks. Changes or symptoms that you might experience include:  Mood swings.  Restlessness, anxiety, or irritation.  Difficulty concentrating.  Dizziness.  Strong cravings for sugary foods in addition to nicotine.  Mild weight gain.  Constipation.  Nausea.  Coughing or a sore throat.  Changes in how your medicines work in your body.  A depressed mood.  Difficulty sleeping (insomnia).  After the first 2-3 weeks of quitting, you may start to notice more positive results, such as:  Improved sense of smell and taste.  Decreased coughing and sore throat.  Slower heart rate.  Lower blood pressure.  Clearer skin.  The ability to breathe more easily.  Fewer sick days.  Quitting smoking is very challenging for most people. Do not get discouraged if you are not successful the first time. Some people need to make many attempts to quit before they achieve long-term success. Do your best to stick to your quit plan, and talk with your health care provider if you have any questions or concerns.  This information is not intended to replace advice given to you by your health care provider. Make sure you  discuss any questions you have with your health care provider.  Document Released: 12/12/2002 Document Revised: 08/15/2017 Document Reviewed: 05/03/2016  [employer] Interactive Patient Education © 2017 [employer]

## 2025-06-24 ENCOUNTER — OFFICE VISIT (OUTPATIENT)
Dept: CARDIOLOGY | Facility: CLINIC | Age: 70
End: 2025-06-24
Payer: MEDICARE

## 2025-06-24 VITALS
SYSTOLIC BLOOD PRESSURE: 128 MMHG | HEART RATE: 73 BPM | DIASTOLIC BLOOD PRESSURE: 74 MMHG | HEIGHT: 62 IN | WEIGHT: 221 LBS | BODY MASS INDEX: 40.67 KG/M2 | OXYGEN SATURATION: 96 %

## 2025-06-24 DIAGNOSIS — I47.10 PAROXYSMAL SVT (SUPRAVENTRICULAR TACHYCARDIA): ICD-10-CM

## 2025-06-24 DIAGNOSIS — I45.2 BIFASCICULAR BLOCK: ICD-10-CM

## 2025-06-24 DIAGNOSIS — I48.92 ATRIAL FLUTTER, PAROXYSMAL: ICD-10-CM

## 2025-06-24 DIAGNOSIS — I48.0 PAF (PAROXYSMAL ATRIAL FIBRILLATION): Primary | ICD-10-CM

## 2025-06-24 PROCEDURE — 93000 ELECTROCARDIOGRAM COMPLETE: CPT

## 2025-06-24 PROCEDURE — 99214 OFFICE O/P EST MOD 30 MIN: CPT

## 2025-06-24 NOTE — PROGRESS NOTES
[facility] Electrophysiology   Reason For Visit:  Atrial Fibrillation     Subjective        EP Problems:   Paroxysmal atrial fibrillation  8/2019: AblationTruong  8/27/21:  Holter with 2500 runs of pSVT  Prior AAD use: Flecainide   AVNRT  8/2019:  Slow pathway modification  LAFB  Presence of a JOHNSON occluder  2/21/21: Watchman implantTruong     Medical Problems:   Morbid obesity  5/2023:  BMI 49  Dysphagia  GERD  MDD  Anxiety  Peripheral neuropathy  Essential tremor  COPD  TRISTON on home Trilogy      Danisha Cannon is a 70 y.o. female with above pertinent PMH who presents for follow-up of PAF.    Last seen in December 2024.  Was having intermittent atrial fibrillation, however improved compared to previous.  Holter did show frequent episodes of PSVT, however no atrial fibrillation no adjustments were made at that time.    Since her previous appointment she has been doing well.  She denies any significant cardiac concerns.  She is tolerating her current regimen well.  Tolerating her baby aspirin with no significant bleeding concerns.  Denies any significant dizziness or lightheadedness.   denies any near-syncope or syncope      ROS: Pertinent findings as noted above        Pertinent past medical, surgical, family, and social history were reviewed.      Current Outpatient Medications:     albuterol sulfate  (90 Base) MCG/ACT inhaler, INHALE 2 PUFFS BY MOUTH EVERY 4 HOURS AS NEEDED FOR WHEEZING AND SHORTNESS OF BREATH, Disp: , Rfl:     ASPIRIN 81 PO, Take 1 tablet by mouth Daily., Disp: , Rfl:     azelastine (ASTELIN) 0.1 % nasal spray, 2 sprays into the nostril(s) as directed by provider 2 (Two) Times a Day. Use in each nostril as directed, Disp: 30 mL, Rfl: 11    dexlansoprazole (Dexilant) 60 MG capsule, Take 1 capsule by mouth Daily., Disp: 30 capsule, Rfl: 11    diclofenac (VOLTAREN) 75 MG EC tablet, Take 1 tablet by mouth Daily. (Patient taking differently: Take 1 tablet by mouth 2 (Two) Times a Day.),  Disp: 90 tablet, Rfl: 1    donepezil (ARICEPT) 10 MG tablet, Take 1 tablet by mouth Daily., Disp: , Rfl:     DULoxetine (CYMBALTA) 60 MG capsule, TAKE 1 CAPSULE BY MOUTH TWICE DAILY, Disp: 60 capsule, Rfl: 5    famciclovir (FAMVIR) 500 MG tablet, Take 1 tablet by mouth 2 (Two) Times a Day., Disp: , Rfl:     famotidine (PEPCID) 40 MG tablet, , Disp: , Rfl:     Farxiga 10 MG tablet, Take 10 mg by mouth Daily., Disp: , Rfl:     fluticasone (Flovent HFA) 110 MCG/ACT inhaler, Inhale 1 puff 2 (Two) Times a Day., Disp: , Rfl:     furosemide (LASIX) 40 MG tablet, Take 1 tablet by mouth Daily., Disp: 90 tablet, Rfl: 3    gabapentin (NEURONTIN) 300 MG capsule, Take 1 capsule by mouth 2 (Two) Times a Day., Disp: , Rfl:     hydrOXYzine pamoate (VISTARIL) 25 MG capsule, Take 1 capsule by mouth 3 (Three) Times a Day As Needed., Disp: , Rfl:     ipratropium (ATROVENT) 0.06 % nasal spray, 2 sprays into the nostril(s) as directed by provider 3 (Three) Times a Day., Disp: 15 mL, Rfl: 5    levocetirizine (XYZAL) 5 MG tablet, TAKE 1 TABLET BY MOUTH EVERY EVENING, Disp: 90 tablet, Rfl: 0    levothyroxine (SYNTHROID, LEVOTHROID) 137 MCG tablet, Take 1 tablet by mouth Every Morning., Disp: 90 tablet, Rfl: 3    LORazepam (ATIVAN) 1 MG tablet, Take 1 tablet by mouth Every 12 (Twelve) Hours., Disp: , Rfl:     metoprolol succinate XL (TOPROL-XL) 25 MG 24 hr tablet, Take 1 tablet by mouth Every Night. 1 TABLET AT NIGHT, Disp: 90 tablet, Rfl: 3    montelukast (SINGULAIR) 10 MG tablet, Take 1 tablet by mouth Daily., Disp: , Rfl:     O2 (OXYGEN), Inhale 3.5 L/min 1 (One) Time. NOC, Disp: , Rfl:     Ozempic, 2 MG/DOSE, 8 MG/3ML solution pen-injector, INJECT 2 MG UNDER THE SKIN WEEKLY AS DIRECTED, Disp: , Rfl:     rOPINIRole (REQUIP) 3 MG tablet, 2 tablets each morning and 3 tablets at bedtime. (Patient taking differently: Take 2 mg by mouth Every Night. 1 TABLET IN THE MORNING & 1 TABLET AT  BEDTIME), Disp: 450 tablet, Rfl: 3    rosuvastatin  "(CRESTOR) 5 MG tablet, 1 tablet Daily., Disp: , Rfl:     sodium chloride (OCEAN) 0.65 % nasal spray, 2-3 sprays into the nostril(s) as directed by provider As Needed (nasal irrigation)., Disp: 50 mL, Rfl: 12    TiZANidine (ZANAFLEX) 6 MG capsule, Take 1 capsule by mouth 3 (Three) Times a Day., Disp: , Rfl:     traMADol (ULTRAM) 50 MG tablet, TAKE 1 TABLET BY MOUTH EVERY 8 HOURS AS NEEDED. MUST LAST 7 DAYS., Disp: , Rfl:     triamcinolone (KENALOG) 0.1 % paste, APPLY TO THE AFFECTED AREA THREE TIMES DAILY AS NEEDED, Disp: , Rfl:     fluticasone (FLONASE) 50 MCG/ACT nasal spray, 2 sprays into the nostril(s) as directed by provider Daily As Needed for Rhinitis or Allergies for up to 30 days., Disp: 16 g, Rfl: 11     Objective   Vital Signs:  /74   Pulse 73   Ht 157.5 cm (62.01\")   Wt 100 kg (221 lb)   SpO2 96%   BMI 40.41 kg/m²   Estimated body mass index is 40.41 kg/m² as calculated from the following:    Height as of this encounter: 157.5 cm (62.01\").    Weight as of this encounter: 100 kg (221 lb).      Constitutional:       Appearance: Healthy appearance. Not in distress.   Pulmonary:      Effort: Pulmonary effort is normal.      Breath sounds: Normal breath sounds.   Cardiovascular:      PMI at left midclavicular line. Normal rate. Regular rhythm. Normal S1. Normal S2.       Murmurs: There is no murmur.      No gallop.  No click. No rub.   Pulses:     Intact distal pulses.   Edema:     Peripheral edema absent.   Neurological:      Mental Status: Alert and oriented to person, place and time.        Result Review :           ECG 12 Lead    Date/Time: 6/24/2025 2:11 PM  Performed by: Arnold Benítez APRN    Authorized by: Arnold Benítez APRN  Comparison: compared with previous ECG from 10/1/2024  Similar to previous ECG  Comparison to previous ECG: Sinus rhythm with bifascicular block  Rhythm: sinus rhythm  Rate: normal  Conduction: bifascicular block  QRS axis: left    Clinical impression: abnormal " EKG            Assessment and Plan   Diagnoses and all orders for this visit:    1. PAF (paroxysmal atrial fibrillation) (Primary)  2. Atrial flutter, paroxysmal  3. Paroxysmal SVT (supraventricular tachycardia)  4. Bifascicular block  -Sinus rhythm on EKG today  - Continue aspirin 81 mg daily given history of left atrial appendage occluder device  - Continue Toprol-XL 25 mg daily for SVT suppression, will not increase further given bifascicular block  - Follow-up in the EP clinic in 6 months                     I spent 2 minutes on the separately reported service of EKG interpretation. This time is not included in the time used to support the E/M service also reported today.      Follow Up   Return in about 6 months (around 12/24/2025).  Patient was given instructions and counseling regarding her condition or for health maintenance advice. Please see specific information pulled into the AVS if appropriate.       Part of this note may be an electronic transcription/translation of spoken language to printed text using the Dragon Dictation System.

## 2025-06-29 NOTE — PROGRESS NOTES
"6/30/2025        Tc Bee,   [address]  [place] [facility] [place] [address]        Danisha Cannon  1955    Chief Complaint   Patient presents with    Follow-up     1 year follow up w/ testing. No stroke symptoms. No issues with legs.        Dear Tc Bee DO:    I had the pleasure of seeing your patient in the office today for follow up.  As you recall, the patient is a 70 y.o. female who we are currently following for carotid occlusive disease.  She has previously complained of of burning and numbness to her bilateral lower extremities.  She also had complaints of swelling to her bilateral lower extremities, however she uses compression stockings and those have helped improve her symptoms.  She does have kidney disease and is followed by Dr. Diaz.  She is also followed by neurosurgery reports having scoliosis.  She denies any symptoms of claudication or ischemia pain.  She denies any strokelike symptoms.  She is maintained on aspirin and Crestor.  She did have noninvasive testing performed, which I did review in office.    Review of Systems   Constitutional: Negative.  Negative for diaphoresis and fever.   HENT: Negative.     Eyes: Negative.    Respiratory: Negative.  Negative for shortness of breath and wheezing.    Cardiovascular:  Positive for leg swelling.   Gastrointestinal: Negative.  Negative for abdominal pain.   Endocrine: Negative.    Genitourinary: Negative.    Musculoskeletal:  Positive for arthralgias. Negative for gait problem.   Skin: Negative.    Allergic/Immunologic: Negative.    Neurological: Negative.  Negative for dizziness and numbness.   Hematological: Negative.    Psychiatric/Behavioral: Negative.       /80   Pulse 68   Ht 160 cm (63\")   Wt 99.8 kg (220 lb)   LMP  (LMP Unknown)   SpO2 95%   BMI 38.97 kg/m²     Physical Exam  Vitals and nursing note reviewed.   Constitutional:       General: She is not in acute distress.     Appearance: Normal " appearance. She is morbidly obese. She is not diaphoretic.   HENT:      Head: Normocephalic. No right periorbital erythema or left periorbital erythema.      Nose: Nose normal.   Eyes:      General: No scleral icterus.     Pupils: Pupils are equal.   Cardiovascular:      Rate and Rhythm: Normal rate and regular rhythm.      Pulses: Normal pulses.           Dorsalis pedis pulses are 2+ on the right side and 2+ on the left side.        Posterior tibial pulses are 2+ on the right side and 2+ on the left side.      Heart sounds: No murmur heard.  Pulmonary:      Effort: Pulmonary effort is normal. No respiratory distress.   Abdominal:      General: There is no distension.      Palpations: Abdomen is soft.      Tenderness: There is no abdominal tenderness. There is no guarding.   Musculoskeletal:         General: Swelling present. No tenderness. Normal range of motion.      Cervical back: Normal range of motion and neck supple.      Right lower leg: No edema.      Left lower leg: No edema.   Feet:      Right foot:      Skin integrity: Skin integrity normal.      Left foot:      Skin integrity: Skin integrity normal.   Skin:     General: Skin is warm and dry.      Findings: No erythema or rash.   Neurological:      General: No focal deficit present.      Mental Status: She is alert and oriented to person, place, and time. Mental status is at baseline.      Cranial Nerves: No cranial nerve deficit.      Gait: Gait normal.   Psychiatric:         Attention and Perception: Attention normal.         Mood and Affect: Mood normal.         Behavior: Behavior normal.         Thought Content: Thought content normal.         Judgment: Judgment normal.       DIAGNOSTIC DATA:  Narrative & Impression   History: Carotid occlusive disease     IMPRESSION:  Impression:  1. There is less than 50% stenosis of the right internal carotid artery.  2. There is less than 50% stenosis of the left internal carotid artery.  3. Antegrade flow is  demonstrated in bilateral vertebral arteries.     Comments: Bilateral carotid vertebral arterial duplex scan was  performed.     Grayscale imaging shows intimal thickening and calcified elements at the  carotid bifurcation. The right internal carotid artery peak systolic  velocity is 115 cm/sec. The end-diastolic velocity is 28.6 cm/sec. The  right ICA/CCA ratio is approximately 1.6. These findings correlate with  less than 50% stenosis of the right internal carotid artery.     Grayscale imaging shows intimal thickening and calcified elements at the  carotid bifurcation. The left internal carotid artery peak systolic  velocity is 75.5 cm/sec. The end-diastolic velocity is 28.6 cm/sec. The  left ICA/CCA ratio is approximately 0.9. These findings correlate with  less than 50% stenosis of the left internal carotid artery.     Antegrade flow is demonstrated in bilateral vertebral arteries.        This report was signed and finalized on 6/30/2025 1:12 PM by Chris Richmond.              Patient Active Problem List   Diagnosis    Abdominal adhesions    Abnormal echocardiogram    Abnormal EKG    Atrial flutter, paroxysmal    Bilateral edema of lower extremity    Chest pain    Cholecystitis with cholelithiasis    Chronic right-sided low back pain with sciatica    Morbid obesity with BMI of 40.0-44.9, adult    Facet syndrome, lumbar    Heart rate fast    Idiopathic hypotension    Idiopathic progressive neuropathy    Insomnia    Lumbar facet arthropathy    Memory loss    MRSA carrier    Obstructive sleep apnea    Pilonidal cyst    Primary osteoarthritis of right knee    Recurrent ventral incisional hernia    Restless leg syndrome    Sciatica of right side    Snoring    Somnolence, daytime    Spondylosis of lumbar region without myelopathy or radiculopathy    Tear of medial meniscus of right knee, current    Total knee replacement status, right    Tremor    Umbilical hernia    Obesity, unspecified obesity severity, unspecified  obesity type    Infection associated with internal knee prosthesis    Infection of right knee    Hypothyroidism    Fever    Hypoxia    Leukocytosis    Abscess of right thigh    Dyspepsia    Anticoagulated    Nonsmoker    NSAID long-term use    Esophageal dysphagia    Mixed hyperlipidemia    PAF (paroxysmal atrial fibrillation)    Bradycardia    Prediabetes    Anxiety and depression    Hematoma of left lower extremity    Fall    Acute blood loss anemia due to hematoma from recent fall    Acute kidney injury    Presence of Watchman left atrial appendage closure device    Acute deep vein thrombosis (DVT) of left lower extremity    Allergic rhinitis    Anemia    Arteriosclerotic vascular disease    Globus sensation    Cataract extraction status, right eye    Closed fracture of fourth metatarsal bone    Erosive osteoarthritis of both hands    Glossitis    Heart murmur    Hypothyroidism due to drugs    Infection or inflammatory reaction due to other internal prosthetic device, implant, or graft    Osteoarthritis of carpometacarpal (CMC) joint of thumb    Osteopenia    Palpitations    Postoperative nausea and vomiting    Pruritic rash    Retention of urine    Seasonal allergies    Swelling    Bunion of great toe of left foot    Hammer toe of left foot    Osteoarthritis of foot joint    Tailor's bunion    Tear of medial meniscus of knee    Tinea cruris    Undifferentiated somatoform disorder    AVNRT (AV juanita re-entry tachycardia)    Osteoporosis    Hypoglycemia    Major depressive disorder    Chronic respiratory failure with hypoxia and hypercapnia    Xerostomia    Essential tremor    Weakness of left lower extremity    Anxiety    Contusion of knee    Lumbar degenerative disc disease    Episodic tension-type headache    Generalized osteoarthritis    Malaise and fatigue    Nail bed infection    Nonrheumatic tricuspid valve disorder    Normal gynecologic examination    Lung nodules    Left anterior fascicular block     Vitamin D deficiency    Restrictive lung disease    Gastroesophageal reflux disease    Laryngopharyngeal reflux    Pharyngoesophageal dysphagia    Class 3 severe obesity due to excess calories without serious comorbidity with body mass index (BMI) of 45.0 to 49.9 in adult    Venous insufficiency    Incomplete lesion of sacral spinal cord    Scoliosis    IVCD (intraventricular conduction defect)    Paroxysmal SVT (supraventricular tachycardia)    Functional diarrhea         ICD-10-CM ICD-9-CM   1. Bilateral carotid artery stenosis  I65.23 433.10     433.30   2. Leg swelling  M79.89 729.81   3. Varicose veins of bilateral lower extremities with other complications  I83.893 454.8   4. Mixed hyperlipidemia  E78.2 272.2   5. Morbid obesity with BMI of 40.0-44.9, adult  E66.01 278.01    Z68.41 V85.41                 PLAN: After thoroughly evaluating Danisha Cannon, I believe the best course of action is to remain conservative from vascular surgery standpoint.  Overall she is doing well and denies any strokelike symptoms.  She denies any symptoms of claudication or ischemia pain.  Her carotid duplex shows less than 50% bilateral carotid stenosis.  We will see her back in 1 year with repeat noninvasive testing to include carotid duplex for continued surveillance.  She should continue to wear her compression stockings on a daily basis, elevating her legs when not on them and exercising.  She should continue her aspirin 81 mg daily and Crestor 5 mg nightly in addition to her other medications.  I did discuss vascular risk factors as it pertains to the progression of vascular disease including controlling her hyperlipidemia.  I am unable to review any current lab values.  Her blood pressure is stable today in office.  Body mass index is 38.97 kg/m².       This was all discussed in full with complete understanding.    Thank you for allowing me to participate in the care of your patient.  Please do not hesitate to call with any  questions or concerns.  We will keep you aware of any further encounters with Danisha Cannon.      Sincerely Yours,      ERNESTO Delgado

## 2025-06-30 ENCOUNTER — OFFICE VISIT (OUTPATIENT)
Dept: VASCULAR SURGERY | Facility: CLINIC | Age: 70
End: 2025-06-30
Payer: MEDICARE

## 2025-06-30 ENCOUNTER — HOSPITAL ENCOUNTER (OUTPATIENT)
Dept: ULTRASOUND IMAGING | Facility: HOSPITAL | Age: 70
Discharge: HOME | End: 2025-06-30
Admitting: NURSE PRACTITIONER
Payer: MEDICARE

## 2025-06-30 VITALS
HEIGHT: 63 IN | DIASTOLIC BLOOD PRESSURE: 80 MMHG | WEIGHT: 220 LBS | HEART RATE: 68 BPM | BODY MASS INDEX: 38.98 KG/M2 | SYSTOLIC BLOOD PRESSURE: 122 MMHG | OXYGEN SATURATION: 95 %

## 2025-06-30 DIAGNOSIS — I83.893 VARICOSE VEINS OF BILATERAL LOWER EXTREMITIES WITH OTHER COMPLICATIONS: ICD-10-CM

## 2025-06-30 DIAGNOSIS — E78.2 MIXED HYPERLIPIDEMIA: ICD-10-CM

## 2025-06-30 DIAGNOSIS — I65.23 BILATERAL CAROTID ARTERY STENOSIS: ICD-10-CM

## 2025-06-30 DIAGNOSIS — I65.23 BILATERAL CAROTID ARTERY STENOSIS: Primary | ICD-10-CM

## 2025-06-30 DIAGNOSIS — E66.01 MORBID OBESITY WITH BMI OF 40.0-44.9, ADULT: ICD-10-CM

## 2025-06-30 DIAGNOSIS — M79.89 LEG SWELLING: ICD-10-CM

## 2025-06-30 PROCEDURE — 93880 EXTRACRANIAL BILAT STUDY: CPT | Performed by: STUDENT IN AN ORGANIZED HEALTH CARE EDUCATION/TRAINING PROGRAM

## 2025-06-30 PROCEDURE — 93880 EXTRACRANIAL BILAT STUDY: CPT

## 2025-07-08 PROBLEM — E66.9 OBESITY, UNSPECIFIED OBESITY SEVERITY, UNSPECIFIED OBESITY TYPE: Chronic | Status: RESOLVED | Noted: 2018-03-07 | Resolved: 2025-07-08

## 2025-07-08 PROBLEM — E66.813 CLASS 3 SEVERE OBESITY DUE TO EXCESS CALORIES WITHOUT SERIOUS COMORBIDITY WITH BODY MASS INDEX (BMI) OF 45.0 TO 49.9 IN ADULT: Chronic | Status: RESOLVED | Noted: 2023-09-05 | Resolved: 2025-07-08

## 2025-07-08 PROBLEM — I48.0 PAF (PAROXYSMAL ATRIAL FIBRILLATION): Chronic | Status: ACTIVE | Noted: 2020-12-16

## 2025-07-08 PROBLEM — R09.02 HYPOXIA: Status: RESOLVED | Noted: 2019-01-25 | Resolved: 2025-07-08

## 2025-07-08 PROBLEM — I48.0 PAF (PAROXYSMAL ATRIAL FIBRILLATION): Chronic | Status: RESOLVED | Noted: 2020-12-16 | Resolved: 2025-07-08

## 2025-07-08 PROBLEM — J30.2 SEASONAL ALLERGIES: Status: RESOLVED | Noted: 2020-04-15 | Resolved: 2025-07-08

## 2025-07-08 PROBLEM — I47.10 PAROXYSMAL SVT (SUPRAVENTRICULAR TACHYCARDIA): Chronic | Status: ACTIVE | Noted: 2024-12-25

## 2025-07-08 NOTE — PROGRESS NOTES
Chief Complaint  Restrictive lung disease    Subjective    History of Present Illness {CC  Problem List  Visit Diagnosis   Encounters  Notes  Medications  Labs  Result Review Imaging  Media: 23}    Danisha Cannon presents to [facility] for:    History of Present Illness  Management of restrictive lung disease, lung nodules and chronic respiratory failure.  This is a former patient of Sophia Aaron.  Sophia has since relocated to another clinic.     She is a never smoker.  Restrictive disease felt to be secondary to obesity.  BMI 44.     She is on Trilogy +4 L of oxygen with sleep and as needed for hypercapnia and obesity hypoventilation syndrome.  She is compliant with this and benefiting from it.  She has daytime oxygen but has not had to use it in quite some time.       She is supposed to be on Flovent 110, 1 puff twice daily routinely.  She has not been requiring it for the last several months.  She has not been requiring her albuterol either.         Allergies contribute to her cough and congestion.  She believes her allergies are her biggest contributor.  She is on Flonase, azelastine, ipratropium nasal spray and Singulair with benefit.  Currently these are helping.     Reflux also contributes to her cough.  She is on Pepcid.  She is having no issues.     She is followed by cardiology for SVT.  She is on a beta-blocker and doing well with this.     Nodules have been present going back to 2021.  Last CT January 2025 showing multiple nodules, largest 4 mm, all stable.              Current Outpatient Medications:     albuterol sulfate  (90 Base) MCG/ACT inhaler, INHALE 2 PUFFS BY MOUTH EVERY 4 HOURS AS NEEDED FOR WHEEZING AND SHORTNESS OF BREATH, Disp: , Rfl:     ASPIRIN 81 PO, Take 1 tablet by mouth Daily., Disp: , Rfl:     azelastine (ASTELIN) 0.1 % nasal spray, 2 sprays into the nostril(s) as directed by provider 2 (Two) Times a Day. Use in each  nostril as directed, Disp: 30 mL, Rfl: 11    dexlansoprazole (Dexilant) 60 MG capsule, Take 1 capsule by mouth Daily., Disp: 30 capsule, Rfl: 11    diclofenac (VOLTAREN) 75 MG EC tablet, Take 1 tablet by mouth Daily. (Patient taking differently: Take 1 tablet by mouth 2 (Two) Times a Day.), Disp: 90 tablet, Rfl: 1    donepezil (ARICEPT) 10 MG tablet, Take 1 tablet by mouth Daily., Disp: , Rfl:     DULoxetine (CYMBALTA) 60 MG capsule, TAKE 1 CAPSULE BY MOUTH TWICE DAILY, Disp: 60 capsule, Rfl: 5    famciclovir (FAMVIR) 500 MG tablet, Take 1 tablet by mouth 2 (Two) Times a Day., Disp: , Rfl:     famotidine (PEPCID) 40 MG tablet, , Disp: , Rfl:     Farxiga 10 MG tablet, Take 10 mg by mouth Daily., Disp: , Rfl:     fluticasone (FLONASE) 50 MCG/ACT nasal spray, 2 sprays into the nostril(s) as directed by provider Daily As Needed for Rhinitis or Allergies for up to 30 days., Disp: 16 g, Rfl: 11    fluticasone (Flovent HFA) 110 MCG/ACT inhaler, Inhale 1 puff 2 (Two) Times a Day., Disp: , Rfl:     furosemide (LASIX) 40 MG tablet, Take 1 tablet by mouth Daily., Disp: 90 tablet, Rfl: 3    gabapentin (NEURONTIN) 300 MG capsule, Take 1 capsule by mouth 2 (Two) Times a Day., Disp: , Rfl:     hydrOXYzine pamoate (VISTARIL) 25 MG capsule, Take 1 capsule by mouth 3 (Three) Times a Day As Needed., Disp: , Rfl:     ipratropium (ATROVENT) 0.06 % nasal spray, 2 sprays into the nostril(s) as directed by provider 3 (Three) Times a Day., Disp: 15 mL, Rfl: 5    levocetirizine (XYZAL) 5 MG tablet, TAKE 1 TABLET BY MOUTH EVERY EVENING, Disp: 90 tablet, Rfl: 0    levothyroxine (SYNTHROID, LEVOTHROID) 137 MCG tablet, Take 1 tablet by mouth Every Morning., Disp: 90 tablet, Rfl: 3    LORazepam (ATIVAN) 1 MG tablet, Take 1 tablet by mouth Every 12 (Twelve) Hours., Disp: , Rfl:     metoprolol succinate XL (TOPROL-XL) 25 MG 24 hr tablet, Take 1 tablet by mouth Every Night. 1 TABLET AT NIGHT, Disp: 90 tablet, Rfl: 3    montelukast (SINGULAIR) 10 MG  "tablet, Take 1 tablet by mouth Daily., Disp: , Rfl:     O2 (OXYGEN), Inhale 3.5 L/min 1 (One) Time. NOC, Disp: , Rfl:     Ozempic, 2 MG/DOSE, 8 MG/3ML solution pen-injector, INJECT 2 MG UNDER THE SKIN WEEKLY AS DIRECTED, Disp: , Rfl:     rOPINIRole (REQUIP) 3 MG tablet, 2 tablets each morning and 3 tablets at bedtime. (Patient taking differently: Take 2 mg by mouth Every Night. 1 TABLET IN THE MORNING & 1 TABLET AT  BEDTIME), Disp: 450 tablet, Rfl: 3    rosuvastatin (CRESTOR) 5 MG tablet, 1 tablet Daily., Disp: , Rfl:     sodium chloride (OCEAN) 0.65 % nasal spray, 2-3 sprays into the nostril(s) as directed by provider As Needed (nasal irrigation)., Disp: 50 mL, Rfl: 12    TiZANidine (ZANAFLEX) 6 MG capsule, Take 1 capsule by mouth 3 (Three) Times a Day., Disp: , Rfl:     traMADol (ULTRAM) 50 MG tablet, TAKE 1 TABLET BY MOUTH EVERY 8 HOURS AS NEEDED. MUST LAST 7 DAYS., Disp: , Rfl:     triamcinolone (KENALOG) 0.1 % paste, APPLY TO THE AFFECTED AREA THREE TIMES DAILY AS NEEDED, Disp: , Rfl:     Social History     Socioeconomic History    Marital status:    Tobacco Use    Smoking status: Never     Passive exposure: Past    Smokeless tobacco: Never    Tobacco comments:     Exposed to second hand smoke for 22 years, both parents smoked   Vaping Use    Vaping status: Never Used   Substance and Sexual Activity    Alcohol use: No    Drug use: No    Sexual activity: Defer       Objective   Vital Signs:   /74   Pulse 61   Ht 156.2 cm (61.5\")   Wt 104 kg (230 lb)   SpO2 97% Comment: RA  BMI 42.75 kg/m²     Physical Exam  Constitutional:       Appearance: She is obese.   Eyes:      Comments: glasses   Cardiovascular:      Rate and Rhythm: Normal rate and regular rhythm.      Heart sounds: No murmur heard.  Pulmonary:      Effort: Pulmonary effort is normal.      Breath sounds: Normal breath sounds.   Musculoskeletal:      Right lower leg: No edema.      Left lower leg: No edema.      Comments: Rollator, " chronic   Neurological:      Mental Status: She is alert and oriented to person, place, and time.        Result Review :    PFT Values          2024    15:15 2025    13:45   Pre Drug PFT Results   FVC 82 100   FEV1 98 114   FEF 25-75% 250 209   FEV1/FVC 93 88   Other Tests PFT Results   DLCO 89    D/VAsb 103      Results for orders placed in visit on 25    Spirometry    Narrative  Spirometry    Performed by: Francisca Villagomez RRT  Authorized by: Ignacia Gomez APRN  Pre Drug % Predicted  FVC: 100%  FEV1: 114%  FEF 25-75%: 209%  FEV1/FVC: 88%    Interpretation  Spirometry  Spirometry shows normal results.  Review of FVL curve  Patient's effort is normal.      Results for orders placed in visit on 24    Spirometry with Diffusion Capacity    Narrative  Spirometry with Diffusion Capacity    Performed by: Francisca Villagomez RRT  Authorized by: Sophia Valero APRN  Pre Drug % Predicted  FVC: 82%  FEV1: 98%  FEF 25-75%: 250%  FEV1/FVC: 93%  DLCO: 89%  D/VAsb: 103%    Interpretation  Spirometry  Spirometry shows normal results. midflow is normal.  Review of FVL curve  Patient's effort is normal.  Diffusion Capacity  The patient's diffusion capacity is normal.  Diffusion capacity is normal when corrected for alveolar volume.      Results for orders placed in visit on 21    Pulmonary Function Test    Narrative  Pulmonary Function Test  Performed by: Francisca Villagomez RRT  Authorized by: Sophia Valero APRN    Pre Drug % Predicted  FVC: 73%  FEV1: 81%  FEF 25-75%: 139%  FEV1/FVC: 86.84%  T%  RV: 77%  DLCO: 96%  D/VAsb: 118%    Interpretation  Spirometry  Spirometry shows moderate restriction. midflow is normal.  Review of FVL curve  Patient's effort is normal.  Lung Volume Measurements  Measurements show reduced lung volumes consistent with restriction.  Diffusion Capacity  The patient's diffusion capacity is normal.  Diffusion capacity is normal when corrected  for alveolar volume.      My interpretation of imaging:  none    My interpretation of labs: none      Assessment and Plan {CC Problem List  Visit Diagnosis  ROS  Review (Popup)  Health Maintenance  Quality  BestPractice  Medications  SmartSets  SnapShot Encounters  Media : 23}    Diagnoses and all orders for this visit:    1. Restrictive lung disease (Primary)  -     Spirometry    2. Obstructive sleep apnea    3. Lung nodules  -     CT Chest Without Contrast; Future    4. Chronic respiratory failure with hypoxia and hypercapnia    5. Gastroesophageal reflux disease, unspecified whether esophagitis present    6. Morbid obesity with BMI of 40.0-44.9, adult    7. Allergic rhinitis, unspecified seasonality, unspecified trigger    8. Paroxysmal SVT (supraventricular tachycardia)        Body mass index is 42.75 kg/m². Educational material provided after visit summary about elevated BMI      Ignacia Gomez, ERNESTO  7/21/2025  14:00 CDT    Follow Up   Return in about 6 months (around 1/21/2026) for ct.    Patient was given instructions and counseling regarding her condition or for health maintenance advice. Please see specific information pulled into the AVS if appropriate.

## 2025-07-14 ENCOUNTER — OFFICE VISIT (OUTPATIENT)
Age: 70
End: 2025-07-14
Payer: MEDICARE

## 2025-07-14 VITALS — BODY MASS INDEX: 41.33 KG/M2 | WEIGHT: 224.6 LBS | HEIGHT: 62 IN

## 2025-07-14 DIAGNOSIS — M25.511 ACUTE PAIN OF RIGHT SHOULDER: Primary | ICD-10-CM

## 2025-07-14 DIAGNOSIS — M75.41 IMPINGEMENT SYNDROME OF RIGHT SHOULDER: ICD-10-CM

## 2025-07-14 PROCEDURE — 99213 OFFICE O/P EST LOW 20 MIN: CPT | Performed by: PHYSICIAN ASSISTANT

## 2025-07-14 PROCEDURE — 1123F ACP DISCUSS/DSCN MKR DOCD: CPT | Performed by: PHYSICIAN ASSISTANT

## 2025-07-14 PROCEDURE — G8417 CALC BMI ABV UP PARAM F/U: HCPCS | Performed by: PHYSICIAN ASSISTANT

## 2025-07-14 PROCEDURE — 1090F PRES/ABSN URINE INCON ASSESS: CPT | Performed by: PHYSICIAN ASSISTANT

## 2025-07-14 PROCEDURE — 20610 DRAIN/INJ JOINT/BURSA W/O US: CPT | Performed by: PHYSICIAN ASSISTANT

## 2025-07-14 PROCEDURE — 1159F MED LIST DOCD IN RCRD: CPT | Performed by: PHYSICIAN ASSISTANT

## 2025-07-14 PROCEDURE — G8427 DOCREV CUR MEDS BY ELIG CLIN: HCPCS | Performed by: PHYSICIAN ASSISTANT

## 2025-07-14 PROCEDURE — 3017F COLORECTAL CA SCREEN DOC REV: CPT | Performed by: PHYSICIAN ASSISTANT

## 2025-07-14 PROCEDURE — 1036F TOBACCO NON-USER: CPT | Performed by: PHYSICIAN ASSISTANT

## 2025-07-14 PROCEDURE — G8399 PT W/DXA RESULTS DOCUMENT: HCPCS | Performed by: PHYSICIAN ASSISTANT

## 2025-07-14 PROCEDURE — 1125F AMNT PAIN NOTED PAIN PRSNT: CPT | Performed by: PHYSICIAN ASSISTANT

## 2025-07-14 RX ORDER — DICLOFENAC SODIUM 75 MG/1
75 TABLET, DELAYED RELEASE ORAL 2 TIMES DAILY PRN
COMMUNITY
Start: 2025-07-03

## 2025-07-14 RX ORDER — BETAMETHASONE SODIUM PHOSPHATE AND BETAMETHASONE ACETATE 3; 3 MG/ML; MG/ML
6 INJECTION, SUSPENSION INTRA-ARTICULAR; INTRALESIONAL; INTRAMUSCULAR; SOFT TISSUE ONCE
Status: COMPLETED | OUTPATIENT
Start: 2025-07-14 | End: 2025-07-14

## 2025-07-14 RX ORDER — LIDOCAINE HYDROCHLORIDE 10 MG/ML
6 INJECTION, SOLUTION INFILTRATION; PERINEURAL ONCE
Status: COMPLETED | OUTPATIENT
Start: 2025-07-14 | End: 2025-07-14

## 2025-07-14 RX ORDER — METHYLPREDNISOLONE 4 MG/1
TABLET ORAL
COMMUNITY
Start: 2025-07-03

## 2025-07-14 RX ADMIN — BETAMETHASONE SODIUM PHOSPHATE AND BETAMETHASONE ACETATE 6 MG: 3; 3 INJECTION, SUSPENSION INTRA-ARTICULAR; INTRALESIONAL; INTRAMUSCULAR; SOFT TISSUE at 08:46

## 2025-07-14 RX ADMIN — LIDOCAINE HYDROCHLORIDE 6 ML: 10 INJECTION, SOLUTION INFILTRATION; PERINEURAL at 08:46

## 2025-07-14 ASSESSMENT — ENCOUNTER SYMPTOMS: SHORTNESS OF BREATH: 0

## 2025-07-14 NOTE — PROGRESS NOTES
Thought Content: Thought content normal.             Radiology:  XR SHOULDER RIGHT (MIN 2 VIEWS)  3 views of the right shoulder taken x-ray: Grashey, Y, axial views.    Well-maintained joint space in the glenohumeral joint.  AC joint mild   osteoarthritis.  No fracture, dislocations, radiolucent lines noted.      Ht 1.575 m (5' 2\")   Wt 101.9 kg (224 lb 9.6 oz)   BMI 41.08 kg/m²   Body mass index is 41.08 kg/m².         Assessment:  1. Acute pain of right shoulder    2. Impingement syndrome of right shoulder      PROCEDURE NOTE:    SHOULDER INJECTION: Risks/benefits were explained and verbal consent was given. Under sterile conditions the skin was cleansed with Chloraprep and alcohol. Using sterile conditions 5 cc of 1% Lidocaine without epinephrine, and 6 mg of Celestone was injected into the subacromial space of the right using a 22 gauge 1-1/2 inch needle. Needle was withdrawn. Hemostasis was achieved. Sterile dressing was applied in the form of an adhesive bandage. The patient tolerated the procedure well.     Plan:     Several treatment options were discussed in office today.  She can continue tramadol as prescribed.  Discontinue oral steroids.  We will perform a steroid injection into the right shoulder today.  We did discuss physical therapy but she like to wait on that.  She will return in 6 weeks for a follow-up.  If at that time she has no improvement we can consider physical therapy or surgical interventions.  Patient voices understanding and agrees to care plan.    Orders Placed This Encounter   Medications    betamethasone acetate-betamethasone sodium phosphate (CELESTONE) injection 6 mg    lidocaine 1 % injection 6 mL     Orders Placed This Encounter   Procedures    DRAIN/INJECT LARGE JOINT/BURSA     Return in about 6 weeks (around 8/25/2025) for injection follow up, no xray right shoulder.    Plan of care reviewed with patient and questions answered.  Patient states understanding.           An

## 2025-07-21 ENCOUNTER — OFFICE VISIT (OUTPATIENT)
Dept: PULMONOLOGY | Facility: CLINIC | Age: 70
End: 2025-07-21
Payer: MEDICARE

## 2025-07-21 VITALS
BODY MASS INDEX: 42.33 KG/M2 | WEIGHT: 230 LBS | OXYGEN SATURATION: 97 % | HEIGHT: 62 IN | HEART RATE: 61 BPM | DIASTOLIC BLOOD PRESSURE: 74 MMHG | SYSTOLIC BLOOD PRESSURE: 120 MMHG

## 2025-07-21 DIAGNOSIS — E66.01 MORBID OBESITY WITH BMI OF 40.0-44.9, ADULT: Chronic | ICD-10-CM

## 2025-07-21 DIAGNOSIS — J96.12 CHRONIC RESPIRATORY FAILURE WITH HYPOXIA AND HYPERCAPNIA: Chronic | ICD-10-CM

## 2025-07-21 DIAGNOSIS — G47.33 OBSTRUCTIVE SLEEP APNEA: Chronic | ICD-10-CM

## 2025-07-21 DIAGNOSIS — J30.9 ALLERGIC RHINITIS, UNSPECIFIED SEASONALITY, UNSPECIFIED TRIGGER: Chronic | ICD-10-CM

## 2025-07-21 DIAGNOSIS — K21.9 GASTROESOPHAGEAL REFLUX DISEASE, UNSPECIFIED WHETHER ESOPHAGITIS PRESENT: Chronic | ICD-10-CM

## 2025-07-21 DIAGNOSIS — R91.8 LUNG NODULES: Chronic | ICD-10-CM

## 2025-07-21 DIAGNOSIS — J96.11 CHRONIC RESPIRATORY FAILURE WITH HYPOXIA AND HYPERCAPNIA: Chronic | ICD-10-CM

## 2025-07-21 DIAGNOSIS — I47.10 PAROXYSMAL SVT (SUPRAVENTRICULAR TACHYCARDIA): Chronic | ICD-10-CM

## 2025-07-21 DIAGNOSIS — J98.4 RESTRICTIVE LUNG DISEASE: Primary | ICD-10-CM

## 2025-07-21 PROCEDURE — 1160F RVW MEDS BY RX/DR IN RCRD: CPT | Performed by: NURSE PRACTITIONER

## 2025-07-21 PROCEDURE — 94375 RESPIRATORY FLOW VOLUME LOOP: CPT | Performed by: NURSE PRACTITIONER

## 2025-07-21 PROCEDURE — 1159F MED LIST DOCD IN RCRD: CPT | Performed by: NURSE PRACTITIONER

## 2025-07-21 PROCEDURE — 99214 OFFICE O/P EST MOD 30 MIN: CPT | Performed by: NURSE PRACTITIONER

## 2025-07-21 NOTE — PROCEDURES
Spirometry    Performed by: Francisca Villagomez, RRT  Authorized by: Ignacia Gomez APRN     Pre Drug % Predicted    FVC: 100%   FEV1: 114%   FEF 25-75%: 209%   FEV1/FVC: 88%    Interpretation   Spirometry   Spirometry shows normal results.   Review of FVL curve   Patient's effort is normal.

## 2025-08-01 ENCOUNTER — OFFICE VISIT (OUTPATIENT)
Age: 70
End: 2025-08-01
Payer: MEDICARE

## 2025-08-01 VITALS — HEIGHT: 62 IN | BODY MASS INDEX: 41.22 KG/M2 | WEIGHT: 224 LBS

## 2025-08-01 DIAGNOSIS — M75.21 BICEPS TENDINITIS OF RIGHT SHOULDER: Primary | ICD-10-CM

## 2025-08-01 DIAGNOSIS — M75.41 IMPINGEMENT SYNDROME OF RIGHT SHOULDER: ICD-10-CM

## 2025-08-01 PROCEDURE — G8399 PT W/DXA RESULTS DOCUMENT: HCPCS | Performed by: PHYSICIAN ASSISTANT

## 2025-08-01 PROCEDURE — 20610 DRAIN/INJ JOINT/BURSA W/O US: CPT | Performed by: PHYSICIAN ASSISTANT

## 2025-08-01 PROCEDURE — G8417 CALC BMI ABV UP PARAM F/U: HCPCS | Performed by: PHYSICIAN ASSISTANT

## 2025-08-01 PROCEDURE — 99213 OFFICE O/P EST LOW 20 MIN: CPT | Performed by: PHYSICIAN ASSISTANT

## 2025-08-01 PROCEDURE — 1159F MED LIST DOCD IN RCRD: CPT | Performed by: PHYSICIAN ASSISTANT

## 2025-08-01 PROCEDURE — 1036F TOBACCO NON-USER: CPT | Performed by: PHYSICIAN ASSISTANT

## 2025-08-01 PROCEDURE — 1090F PRES/ABSN URINE INCON ASSESS: CPT | Performed by: PHYSICIAN ASSISTANT

## 2025-08-01 PROCEDURE — 1123F ACP DISCUSS/DSCN MKR DOCD: CPT | Performed by: PHYSICIAN ASSISTANT

## 2025-08-01 PROCEDURE — G8427 DOCREV CUR MEDS BY ELIG CLIN: HCPCS | Performed by: PHYSICIAN ASSISTANT

## 2025-08-01 PROCEDURE — 3017F COLORECTAL CA SCREEN DOC REV: CPT | Performed by: PHYSICIAN ASSISTANT

## 2025-08-01 RX ORDER — BETAMETHASONE SODIUM PHOSPHATE AND BETAMETHASONE ACETATE 3; 3 MG/ML; MG/ML
6 INJECTION, SUSPENSION INTRA-ARTICULAR; INTRALESIONAL; INTRAMUSCULAR; SOFT TISSUE ONCE
Status: COMPLETED | OUTPATIENT
Start: 2025-08-01 | End: 2025-08-01

## 2025-08-01 RX ORDER — LIDOCAINE HYDROCHLORIDE 10 MG/ML
6 INJECTION, SOLUTION INFILTRATION; PERINEURAL ONCE
Status: COMPLETED | OUTPATIENT
Start: 2025-08-01 | End: 2025-08-01

## 2025-08-01 RX ADMIN — BETAMETHASONE SODIUM PHOSPHATE AND BETAMETHASONE ACETATE 6 MG: 3; 3 INJECTION, SUSPENSION INTRA-ARTICULAR; INTRALESIONAL; INTRAMUSCULAR; SOFT TISSUE at 10:31

## 2025-08-01 RX ADMIN — LIDOCAINE HYDROCHLORIDE 6 ML: 10 INJECTION, SOLUTION INFILTRATION; PERINEURAL at 10:31

## 2025-08-04 ASSESSMENT — ENCOUNTER SYMPTOMS: SHORTNESS OF BREATH: 0

## (undated) DEVICE — PK TURNOVER RM ADV

## (undated) DEVICE — GLV SURG TRIUMPH MICRO PF LTX 7.5 STRL

## (undated) DEVICE — FRCP BX RADJAW4 NDL 2.8 240 STD OG

## (undated) DEVICE — 4-PORT MANIFOLD: Brand: NEPTUNE 2

## (undated) DEVICE — 3M™ STERI-STRIP™ REINFORCED ADHESIVE SKIN CLOSURES, R1547, 1/2 IN X 4 IN (12 MM X 100 MM), 6 STRIPS/ENVELOPE: Brand: 3M™ STERI-STRIP™

## (undated) DEVICE — GAUZE,SPONGE,4"X4",8PLY,STRL,LF,10/TRAY: Brand: MEDLINE

## (undated) DEVICE — PROXIMATE RH ROTATING HEAD SKIN STAPLERS (35 WIDE) CONTAINS 35 STAINLESS STEEL STAPLES: Brand: PROXIMATE

## (undated) DEVICE — SUTURE ABSRB BRAID COAT UD CP NO 2 27IN VCRL J195H

## (undated) DEVICE — CVR BRD ARM 13X30

## (undated) DEVICE — GLV SURG TRIUMPH GREEN W/ALOE PF LTX 8 STRL

## (undated) DEVICE — SUT SILK 3/0 SUTUPAK TIES 24IN SA74H

## (undated) DEVICE — KT DRN EVAC WND PVC PCH WTROC RND 10F400

## (undated) DEVICE — SURGICAL PROCEDURE PACK LOWER EXTREMITY LOURDES HOSP

## (undated) DEVICE — DUAL CUT SAGITTAL BLADE

## (undated) DEVICE — BIPOLAR SEALER 23-112-1 AQM 6.0: Brand: AQUAMANTYS™

## (undated) DEVICE — GAUZE,SPONGE,FLUFF,6"X6.75",STRL,10/TRAY: Brand: MEDLINE

## (undated) DEVICE — WIPE THERAWASH SLV SPEC CARE 2PK

## (undated) DEVICE — CHLORAPREP 26ML ORANGE

## (undated) DEVICE — APPL CHLORAPREP W/TINT 26ML ORNG

## (undated) DEVICE — THE CHANNEL CLEANING BRUSH IS A NYLON FLEXI BRUSH ATTACHED TO A FLEXIBLE PLASTIC SHEATH DESIGNED TO SAFELY REMOVE DEBRIS FROM FLEXIBLE ENDOSCOPES.

## (undated) DEVICE — PAD UNDERCAST WYTEX 6IN 4YD LF STRL

## (undated) DEVICE — HANDPIECE SET WITH HIGH FLOW TIP AND SUCTION TUBE: Brand: INTERPULSE

## (undated) DEVICE — CEMENT MIXING SYSTEM WITH FEMORAL BREAKWAY NOZZLE: Brand: REVOLUTION

## (undated) DEVICE — TOWEL,OR,DSP,ST,BLUE,STD,4/PK,20PK/CS: Brand: MEDLINE

## (undated) DEVICE — DRSNG WND GZ CURAD OIL EMULSION 3X8IN STRL PK/3

## (undated) DEVICE — FAN SPRAY KIT: Brand: PULSAVAC®

## (undated) DEVICE — SUT SILK 2/0 SUTUPAK TIES 24IN SA75H

## (undated) DEVICE — POOLE SUCTION INSTRUMENT WITH REMOVABLE SHEATH: Brand: POOLE

## (undated) DEVICE — DISCONTINUED NO SUB IDED TG GLOVE SURG SENSICARE ALOE LT LF PF ST GRN SZ 8

## (undated) DEVICE — PENCL E/S PLUMEPEN HLSTR 3/8IN 10FT CA/5

## (undated) DEVICE — DEV INFL ALLIANCE2 SYS

## (undated) DEVICE — SENSR O2 OXIMAX FNGR A/ 18IN NONSTR

## (undated) DEVICE — DRSNG SURESITE WNDW 4X4.5

## (undated) DEVICE — THE SINGLE USE ETRAP – POLYP TRAP IS USED FOR SUCTION RETRIEVAL OF ENDOSCOPICALLY REMOVED POLYPS.: Brand: ETRAP

## (undated) DEVICE — OSTEOTOME THN 3IN 5MM

## (undated) DEVICE — PAD,PREPPING,CUFFED,24X48,7",NONSTERILE: Brand: MEDLINE

## (undated) DEVICE — JP 3-SPRING RES W/10FR PVC DRAIN/TR: Brand: CARDINAL HEALTH

## (undated) DEVICE — TRAY PREP DRY W/ PREM GLV 2 APPL 6 SPNG 2 UNDPD 1 OVERWRAP

## (undated) DEVICE — CONTAINER,SPECIMEN,OR STERILE,4OZ: Brand: MEDLINE

## (undated) DEVICE — Z INACTIVE USE 2660664 SOLUTION IRRIG 3000ML 0.9% SOD CHL USP UROMATIC PLAS CONT

## (undated) DEVICE — Device: Brand: POWER-FLO®

## (undated) DEVICE — ANTIBACTERIAL UNDYED BRAIDED (POLYGLACTIN 910), SYNTHETIC ABSORBABLE SUTURE: Brand: COATED VICRYL

## (undated) DEVICE — KAMVAC CURVED MINI SUCTION TUBE: Brand: KAMVAC

## (undated) DEVICE — INTENDED FOR TISSUE SEPARATION, AND OTHER PROCEDURES THAT REQUIRE A SHARP SURGICAL BLADE TO PUNCTURE OR CUT.: Brand: BARD-PARKER ® STAINLESS STEEL BLADES

## (undated) DEVICE — TRY PREP SCRB VAG PVP

## (undated) DEVICE — GLV SURG TRIUMPH MICRO PF LTX 8 STRL

## (undated) DEVICE — IMMOB KN 3PNL DLX CANVS 22IN BLU

## (undated) DEVICE — PAD MINOR UNIVERSAL: Brand: MEDLINE INDUSTRIES, INC.

## (undated) DEVICE — JP 3-SPRING RES W/19FR PVC DRAIN/TR: Brand: CARDINAL HEALTH

## (undated) DEVICE — COTTON UNDERCAST PADDING,REGULAR FINISH: Brand: WEBRIL

## (undated) DEVICE — CONCISE CEMENT SCULPS KIT: Brand: CONCISE

## (undated) DEVICE — SHORT LENS-STERILE

## (undated) DEVICE — PK EXTRM 30

## (undated) DEVICE — SUT GUT CHRM 2/0 CT1 36IN 923H

## (undated) DEVICE — SPNG GZ WOVN 4X4IN 12PLY 10/BX STRL

## (undated) DEVICE — GLV SURG TRIUMPH MICRO PF LTX 8.5 STRL

## (undated) DEVICE — Device

## (undated) DEVICE — DISPOSABLE TOURNIQUET CUFF SINGLE BLADDER, SINGLE PORT AND QUICK CONNECT CONNECTOR: Brand: COLOR CUFF

## (undated) DEVICE — ADHS LIQ MASTISOL 2/3ML

## (undated) DEVICE — ESOPHAGEAL BALLOON DILATATION CATHETER: Brand: CRE FIXED WIRE

## (undated) DEVICE — OSCILLATOR BLADE, 25.4 X 90 X 1.27 MM (.050"): Brand: CONMED

## (undated) DEVICE — RECIPROCATING BLADE, DOUBLE SIDED, OFFSET  (70.0 X 1.0 X 12.5MM)

## (undated) DEVICE — ZIMMER® STERILE DISPOSABLE TOURNIQUET CUFF WITH PLC, DUAL PORT, SINGLE BLADDER, 34 IN. (86 CM)

## (undated) DEVICE — PADDING UNDERCAST W4INXL4YD 100% COT CRIMPED FINISH WBRL II

## (undated) DEVICE — PAD,ABDOMINAL,8"X10",ST,LF: Brand: MEDLINE

## (undated) DEVICE — PROXIMATE RH ROTATING HEAD SKIN STAPLERS (35 REGULAR) CONTAINS 35 STAINLESS STEEL STAPLES: Brand: PROXIMATE

## (undated) DEVICE — KIT URIN CATHETER 16 FR 5 CC M INDWL SIL

## (undated) DEVICE — SUT ETHLN 3/0 FS1 30IN 669H

## (undated) DEVICE — PK KN TOTL 30

## (undated) DEVICE — BIPOLAR SEALER 23-112-1 AQM 6.0: Brand: AQUAMANTYS ®

## (undated) DEVICE — KT DRN EVAC WND PVC PCH WTROC RND 19F400

## (undated) DEVICE — SUTURE ETHLN SZ 3-0 L30IN NONABSORBABLE BLK L36MM FSLX 3/8 1673BH

## (undated) DEVICE — I DISPOSABLE MIXING BOWL AND SPATULA: Brand: HOWMEDICA, MIX-KIT

## (undated) DEVICE — BANDAGE COMPR W6XL80IN COT E 1 LAYR CLP CLSR PREM GRD CONTEX

## (undated) DEVICE — TUBING PMP IRRIG GOFLO

## (undated) DEVICE — GLV SURG TRIUMPH PF LTX 7.5 STRL

## (undated) DEVICE — CONMED SCOPE SAVER BITE BLOCK, 20X27 MM: Brand: SCOPE SAVER

## (undated) DEVICE — PANT KNIT MATERN L/XL 2BG

## (undated) DEVICE — SUT SILK 4/0 SUTUPAK TIES 24IN SA73H

## (undated) DEVICE — SWAB CULT AERO TWIN MD

## (undated) DEVICE — ARGYLE YANKAUER BULB TIP WITH VENT: Brand: ARGYLE

## (undated) DEVICE — SUT SILK 2/0 FS BLK 18IN 685G

## (undated) DEVICE — 3M™ COBAN™ NL STERILE NON-LATEX SELF-ADHERENT WRAP, 2086S, 6 IN X 5 YD (15 CM X 4,5 M), 12 ROLLS/CASE: Brand: 3M™ COBAN™

## (undated) DEVICE — RECIPROCATOR BLADE, 12.5 X 89 X 1.5 MM: Brand: CONMED

## (undated) DEVICE — SOLUTION IV 500ML 0.9% SOD CHL PH 5 INJ USP VIAFLX PLAS

## (undated) DEVICE — GLOVE SURG SZ 75 L12IN FNGR THK87MIL DK GRN LTX FREE ISOLEX

## (undated) DEVICE — PENCL E/S PLUMEPEN 9.5MM 10FT LF

## (undated) DEVICE — BLD SAW RECIP DBL SIDED 1.1X68MM STRL

## (undated) DEVICE — SUT ETHLN 2/0 FSLX 30IN 1674H

## (undated) DEVICE — STRAP POSTN FM KN/BDY 4X60IN

## (undated) DEVICE — MASK,OXYGEN,MED CONC,ADLT,7' TUB, UC: Brand: PENDING

## (undated) DEVICE — GLOVE SURG SZ 75 L12IN FNGR THK94MIL TRNSLUC YEL LTX

## (undated) DEVICE — SUT ETHLN 3/0 PSLX 30IN 1683H

## (undated) DEVICE — 4.5 MM INCISOR PLUS STRAIGHT                                    BLADES, POWER/EP-1, VIOLET, PACKAGED                                    6 PER BOX, STERILE

## (undated) DEVICE — SURGICAL PROCEDURE PACK KNEE TOT DBD CDS LOURDES HOSP LF

## (undated) DEVICE — HDRST INTUB GENTLETOUCH SLOT 7IN RT

## (undated) DEVICE — ELECTRD BLD EDGE/INSUL1P 2.4X5.1MM STRL

## (undated) DEVICE — SUTURE CHROMIC GUT SZ 2-0 L27IN ABSRB BRN L36MM CT-1 1/2 811H

## (undated) DEVICE — SUTURE ETHLN SZ 2-0 L30IN NONABSORBABLE BLK L36MM FSLX 3/8 1674H

## (undated) DEVICE — LARGE BONE HALL BLADE, RECIPROCATOR, 12.5 X 76 X 1.27 MM: Brand: HALL

## (undated) DEVICE — GLV SURG DERMASSURE GRN LF PF 8.0

## (undated) DEVICE — SUT PROLN 1 CTX 30IN 8455H

## (undated) DEVICE — RECIPROCATING BLADE HEAVY DUTY LONG, OFFSET  (77.6 X 0.77 X 11.2MM)

## (undated) DEVICE — CLTH CLENS READYCLEANSE PERI CARE PK/5

## (undated) DEVICE — TWIST DRILL 2.7MM DIA 127.0MM LONG

## (undated) DEVICE — GLV SURG BIOGEL LTX PF 6 1/2

## (undated) DEVICE — FOAM BUMP ROUND LARGE: Brand: MEDLINE INDUSTRIES, INC.

## (undated) DEVICE — 4.0MM EGG BUR

## (undated) DEVICE — CUFF,BP,DISP,1 TUBE,ADULT,HP: Brand: MEDLINE

## (undated) DEVICE — GLV SURG SENSICARE W/ALOE PF LF 7.5 STRL

## (undated) DEVICE — SUTURE VCRL SZ 1 L27IN ABSRB UD L36MM CP-1 1/2 CIR REV CUT J268H

## (undated) DEVICE — DEFENDO AIR WATER SUCTION AND BIOPSY VALVE KIT FOR  OLYMPUS: Brand: DEFENDO AIR/WATER/SUCTION AND BIOPSY VALVE